# Patient Record
Sex: FEMALE | Race: BLACK OR AFRICAN AMERICAN | NOT HISPANIC OR LATINO | URBAN - METROPOLITAN AREA
[De-identification: names, ages, dates, MRNs, and addresses within clinical notes are randomized per-mention and may not be internally consistent; named-entity substitution may affect disease eponyms.]

---

## 2018-07-24 ENCOUNTER — EMERGENCY (EMERGENCY)
Facility: HOSPITAL | Age: 68
LOS: 0 days | Discharge: HOME | End: 2018-07-24
Attending: EMERGENCY MEDICINE
Payer: MEDICARE

## 2018-07-24 VITALS
SYSTOLIC BLOOD PRESSURE: 152 MMHG | OXYGEN SATURATION: 97 % | HEART RATE: 86 BPM | TEMPERATURE: 98 F | DIASTOLIC BLOOD PRESSURE: 91 MMHG | RESPIRATION RATE: 20 BRPM

## 2018-07-24 VITALS
RESPIRATION RATE: 24 BRPM | SYSTOLIC BLOOD PRESSURE: 145 MMHG | DIASTOLIC BLOOD PRESSURE: 86 MMHG | OXYGEN SATURATION: 94 % | WEIGHT: 160.06 LBS | HEIGHT: 58 IN | HEART RATE: 76 BPM | TEMPERATURE: 98 F

## 2018-07-24 DIAGNOSIS — J45.41 MODERATE PERSISTENT ASTHMA WITH (ACUTE) EXACERBATION: ICD-10-CM

## 2018-07-24 DIAGNOSIS — R06.02 SHORTNESS OF BREATH: ICD-10-CM

## 2018-07-24 LAB
ALBUMIN SERPL ELPH-MCNC: 3.7 G/DL — SIGNIFICANT CHANGE UP (ref 3.3–5)
ALP SERPL-CCNC: 82 U/L — SIGNIFICANT CHANGE UP (ref 40–120)
ALT FLD-CCNC: 42 U/L — SIGNIFICANT CHANGE UP (ref 12–78)
ANION GAP SERPL CALC-SCNC: 1 MMOL/L — LOW (ref 5–17)
AST SERPL-CCNC: 28 U/L — SIGNIFICANT CHANGE UP (ref 15–37)
BASOPHILS # BLD AUTO: 0.01 K/UL — SIGNIFICANT CHANGE UP (ref 0–0.2)
BASOPHILS NFR BLD AUTO: 0.1 % — SIGNIFICANT CHANGE UP (ref 0–2)
BILIRUB SERPL-MCNC: 0.6 MG/DL — SIGNIFICANT CHANGE UP (ref 0.2–1.2)
BUN SERPL-MCNC: 14 MG/DL — SIGNIFICANT CHANGE UP (ref 7–23)
CALCIUM SERPL-MCNC: 8.5 MG/DL — SIGNIFICANT CHANGE UP (ref 8.5–10.1)
CHLORIDE SERPL-SCNC: 104 MMOL/L — SIGNIFICANT CHANGE UP (ref 96–108)
CK MB CFR SERPL CALC: 1.1 NG/ML — SIGNIFICANT CHANGE UP (ref 0.5–3.6)
CO2 SERPL-SCNC: 36 MMOL/L — HIGH (ref 22–31)
CREAT SERPL-MCNC: 0.85 MG/DL — SIGNIFICANT CHANGE UP (ref 0.5–1.3)
EOSINOPHIL # BLD AUTO: 0.24 K/UL — SIGNIFICANT CHANGE UP (ref 0–0.5)
EOSINOPHIL NFR BLD AUTO: 3.3 % — SIGNIFICANT CHANGE UP (ref 0–6)
GLUCOSE SERPL-MCNC: 102 MG/DL — HIGH (ref 70–99)
HCT VFR BLD CALC: 38.7 % — SIGNIFICANT CHANGE UP (ref 34.5–45)
HGB BLD-MCNC: 12.3 G/DL — SIGNIFICANT CHANGE UP (ref 11.5–15.5)
IMM GRANULOCYTES NFR BLD AUTO: 0.1 % — SIGNIFICANT CHANGE UP (ref 0–1.5)
LYMPHOCYTES # BLD AUTO: 4.33 K/UL — HIGH (ref 1–3.3)
LYMPHOCYTES # BLD AUTO: 60 % — HIGH (ref 13–44)
MAGNESIUM SERPL-MCNC: 2.3 MG/DL — SIGNIFICANT CHANGE UP (ref 1.6–2.6)
MCHC RBC-ENTMCNC: 31.8 GM/DL — LOW (ref 32–36)
MCHC RBC-ENTMCNC: 31.9 PG — SIGNIFICANT CHANGE UP (ref 27–34)
MCV RBC AUTO: 100.3 FL — HIGH (ref 80–100)
MONOCYTES # BLD AUTO: 0.59 K/UL — SIGNIFICANT CHANGE UP (ref 0–0.9)
MONOCYTES NFR BLD AUTO: 8.2 % — SIGNIFICANT CHANGE UP (ref 2–14)
NEUTROPHILS # BLD AUTO: 2.04 K/UL — SIGNIFICANT CHANGE UP (ref 1.8–7.4)
NEUTROPHILS NFR BLD AUTO: 28.3 % — LOW (ref 43–77)
NRBC # BLD: 0 /100 WBCS — SIGNIFICANT CHANGE UP (ref 0–0)
NT-PROBNP SERPL-SCNC: 81 PG/ML — SIGNIFICANT CHANGE UP (ref 0–125)
PLATELET # BLD AUTO: 143 K/UL — LOW (ref 150–400)
POTASSIUM SERPL-MCNC: 4.2 MMOL/L — SIGNIFICANT CHANGE UP (ref 3.5–5.3)
POTASSIUM SERPL-SCNC: 4.2 MMOL/L — SIGNIFICANT CHANGE UP (ref 3.5–5.3)
PROT SERPL-MCNC: 7.2 GM/DL — SIGNIFICANT CHANGE UP (ref 6–8.3)
RBC # BLD: 3.86 M/UL — SIGNIFICANT CHANGE UP (ref 3.8–5.2)
RBC # FLD: 12.8 % — SIGNIFICANT CHANGE UP (ref 10.3–14.5)
SODIUM SERPL-SCNC: 141 MMOL/L — SIGNIFICANT CHANGE UP (ref 135–145)
TROPONIN I SERPL-MCNC: <.015 NG/ML — SIGNIFICANT CHANGE UP (ref 0.01–0.04)
WBC # BLD: 7.22 K/UL — SIGNIFICANT CHANGE UP (ref 3.8–10.5)
WBC # FLD AUTO: 7.22 K/UL — SIGNIFICANT CHANGE UP (ref 3.8–10.5)

## 2018-07-24 PROCEDURE — 99285 EMERGENCY DEPT VISIT HI MDM: CPT

## 2018-07-24 PROCEDURE — 71045 X-RAY EXAM CHEST 1 VIEW: CPT | Mod: 26

## 2018-07-24 RX ORDER — ALBUTEROL 90 UG/1
2 AEROSOL, METERED ORAL
Qty: 1 | Refills: 0 | OUTPATIENT
Start: 2018-07-24 | End: 2018-07-25

## 2018-07-24 RX ORDER — ALBUTEROL 90 UG/1
2.5 AEROSOL, METERED ORAL
Qty: 0 | Refills: 0 | Status: COMPLETED | OUTPATIENT
Start: 2018-07-24 | End: 2018-07-24

## 2018-07-24 RX ORDER — DEXAMETHASONE 0.5 MG/5ML
2 ELIXIR ORAL
Qty: 6 | Refills: 0 | OUTPATIENT
Start: 2018-07-24 | End: 2018-07-26

## 2018-07-24 RX ADMIN — Medication 125 MILLIGRAM(S): at 12:49

## 2018-07-24 RX ADMIN — ALBUTEROL 2.5 MILLIGRAM(S): 90 AEROSOL, METERED ORAL at 12:49

## 2018-07-24 RX ADMIN — ALBUTEROL 2.5 MILLIGRAM(S): 90 AEROSOL, METERED ORAL at 13:00

## 2018-07-24 RX ADMIN — ALBUTEROL 2.5 MILLIGRAM(S): 90 AEROSOL, METERED ORAL at 13:36

## 2018-07-24 NOTE — ED ADULT TRIAGE NOTE - CHIEF COMPLAINT QUOTE
pt complaining of shortness of breath and swelling to bilateral legs. states a nurse practitioner listened to her breath sounds this morning and told her to come to the ER. pt has history of asthma.

## 2018-07-24 NOTE — ED PROVIDER NOTE - PHYSICAL EXAMINATION
Gen: Alert, NAD  Head: NC, AT   Eyes: PERRL, EOMI, normal lids/conjunctiva  ENT: normal hearing, patent oropharynx without erythema/exudate, uvula midline  Neck: supple, no tenderness, Trachea midline  Pulm: Bilateral BS, decreased air movement bl. no significant wheezing   CV: RRR, no M/R/G, 2+ radial and dp pulses bl, mild ankle edema  Abd: soft, NT/ND, +BS, no hepatosplenomegaly  Mskel: extremities x4 with normal ROM and no joint effusions. no ctl spine ttp.   Skin: no rash, no bruising   Neuro: AAOx3, no sensory/motor deficits, CN 2-12 intact

## 2018-07-24 NOTE — ED ADULT NURSE NOTE - OBJECTIVE STATEMENT
patient stated that a house nurse came and he said that the breathing was not normal and I am swelling up also, he called my doctor and my doctor asked my  to see me, denies pain at this current time, no SOB

## 2018-07-24 NOTE — ED PROVIDER NOTE - OBJECTIVE STATEMENT
Pertinent PMH/PSH/FHx/SHx and Review of Systems contained within:  67F hx of asthma pw sob and leg aswelling. patient was seen by visiting np who heard wheezing and told her to come to the er. no cp, sob, cough, fever, chills, nausea or vomiting. patient also denies ha, vision change, rash, bleeding and dysuria  she did not take anything for symptoms as of yet  Fh and Sh not otherwise contributory  ROS otherwise negative

## 2018-07-24 NOTE — ED PROVIDER NOTE - MEDICAL DECISION MAKING DETAILS
patient pw sob that is more likely to be asthma rather than chf. will give nebs and steroids. though there is some pitting edema in the lower ext. do not think this represents acute heart failure or dvt. patient pw sob that is more likely to be asthma rather than chf. will give nebs and steroids. though there is some pitting edema in the lower ext. do not think this represents acute heart failure or dvt. As interpreted by ED physician, ECG is NSR with normal intervals/axis, no changes in QRS, no ST/T changes. patient pw sob that is more likely to be asthma rather than chf. will give nebs and steroids. though there is some pitting edema in the lower ext. do not think this represents acute heart failure or dvt. As interpreted by ED physician, ECG is NSR with normal intervals/axis, no changes in QRS, no ST/T changes. after treatment patient breathing much better. okay for dc home.

## 2018-07-24 NOTE — ED ADULT NURSE NOTE - ADDITIONAL PRINTED INSTRUCTIONS GIVEN
discharged home, pt and  instructed to follow up with primary md, verbalized understanding of instructions

## 2020-04-01 NOTE — ED PROVIDER NOTE - NS ED MD EM SELECTION
[id number] Comprehensive You can access the FollowMyHealth Patient Portal offered by [facility] by registering at the following website: [email or web address] By joining [facility]’s FollowMyHealth portal, you will also be able to view your health information using other applications (apps) compatible with our system.

## 2021-03-25 ENCOUNTER — OFFICE VISIT (OUTPATIENT)
Dept: URGENT CARE | Age: 71
End: 2021-03-25
Payer: COMMERCIAL

## 2021-03-25 VITALS
TEMPERATURE: 99 F | HEIGHT: 60 IN | BODY MASS INDEX: 32.39 KG/M2 | RESPIRATION RATE: 20 BRPM | HEART RATE: 80 BPM | OXYGEN SATURATION: 95 % | SYSTOLIC BLOOD PRESSURE: 148 MMHG | DIASTOLIC BLOOD PRESSURE: 76 MMHG | WEIGHT: 165 LBS

## 2021-03-25 DIAGNOSIS — M79.604 LOW BACK PAIN RADIATING TO RIGHT LOWER EXTREMITY: Primary | ICD-10-CM

## 2021-03-25 DIAGNOSIS — M62.830 LUMBAR PARASPINAL MUSCLE SPASM: ICD-10-CM

## 2021-03-25 DIAGNOSIS — M54.50 LOW BACK PAIN RADIATING TO RIGHT LOWER EXTREMITY: Primary | ICD-10-CM

## 2021-03-25 PROCEDURE — 99213 OFFICE O/P EST LOW 20 MIN: CPT | Performed by: NURSE PRACTITIONER

## 2021-03-25 RX ORDER — AMLODIPINE BESYLATE 10 MG/1
10 TABLET ORAL DAILY
COMMUNITY

## 2021-03-25 RX ORDER — MELOXICAM 7.5 MG/1
7.5 TABLET ORAL DAILY
Qty: 12 TABLET | Refills: 0 | Status: SHIPPED | OUTPATIENT
Start: 2021-03-25

## 2021-03-25 RX ORDER — SERTRALINE HYDROCHLORIDE 100 MG/1
100 TABLET, FILM COATED ORAL
COMMUNITY

## 2021-03-25 RX ORDER — MONTELUKAST SODIUM 10 MG/1
10 TABLET ORAL
COMMUNITY

## 2021-03-25 RX ORDER — METHOCARBAMOL 500 MG/1
500 TABLET, FILM COATED ORAL 2 TIMES DAILY PRN
Qty: 20 TABLET | Refills: 0 | Status: SHIPPED | OUTPATIENT
Start: 2021-03-25

## 2021-03-25 RX ORDER — OLANZAPINE 10 MG/1
10 TABLET ORAL
COMMUNITY

## 2021-03-25 NOTE — LETTER
March 25, 2021     Patient: Sonia Simmons   YOB: 1950   Date of Visit: 3/25/2021       To Whom It May Concern:    Please excuse this patients son _____________________________ from work today 0/25/2021 for he was he with his mother for an appt           Sincerely,        KOLTON Lopez    CC: No Recipients

## 2021-03-25 NOTE — PROGRESS NOTES
NAME: Andrea Green is a 79 y o  female  : 1950    MRN: [id number]    /76   Pulse 80   Temp 99 °F (37 2 °C)   Resp 20   Ht 5' (1 524 m)   Wt 74 8 kg (165 lb)   SpO2 95%   BMI 32 22 kg/m²     Assessment and Plan   Low back pain radiating to right lower extremity [M54 5]  1  Low back pain radiating to right lower extremity  methocarbamol (ROBAXIN) 500 mg tablet    meloxicam (MOBIC) 7 5 mg tablet   2  Lumbar paraspinal muscle spasm  methocarbamol (ROBAXIN) 500 mg tablet    meloxicam (MOBIC) 7 5 mg tablet       Manisha was seen today for leg pain  Diagnoses and all orders for this visit:    Low back pain radiating to right lower extremity  -     methocarbamol (ROBAXIN) 500 mg tablet; Take 1 tablet (500 mg total) by mouth 2 (two) times a day as needed for muscle spasms  -     meloxicam (MOBIC) 7 5 mg tablet; Take 1 tablet (7 5 mg total) by mouth daily    Lumbar paraspinal muscle spasm  -     methocarbamol (ROBAXIN) 500 mg tablet; Take 1 tablet (500 mg total) by mouth 2 (two) times a day as needed for muscle spasms  -     meloxicam (MOBIC) 7 5 mg tablet; Take 1 tablet (7 5 mg total) by mouth daily        Patient Instructions     Patient Instructions   Sciatica   WHAT YOU NEED TO KNOW:   Sciatica is a condition that causes pain along your sciatic nerve  The sciatic nerve runs from your spine through both sides of your buttocks  It then runs down the back of your thigh, into your lower leg and foot  Your sciatic nerve may be compressed, inflamed, irritated, or stretched  DISCHARGE INSTRUCTIONS:   Medicines:   · NSAIDs:  These medicines decrease swelling and pain  NSAIDs are available without a doctor's order  Ask your healthcare provider which medicine is right for you  Ask how much to take and when to take it  Take as directed  NSAIDs can cause stomach bleeding or kidney problems if not taken correctly  · Acetaminophen: This medicine decreases pain   Acetaminophen is available without a doctor's order  Ask how much to take and when to take it  Follow directions  Acetaminophen can cause liver damage if not taken correctly  · Muscle relaxers  help decrease pain and muscle spasms  · Take your medicine as directed  Contact your healthcare provider if you think your medicine is not helping or if you have side effects  Tell him of her if you are allergic to any medicine  Keep a list of the medicines, vitamins, and herbs you take  Include the amounts, and when and why you take them  Bring the list or the pill bottles to follow-up visits  Carry your medicine list with you in case of an emergency  Follow up with your healthcare provider as directed:  Write down your questions so you remember to ask them during your visits  Manage your symptoms:   · Activity:  Decrease your activity  Do not lift heavy objects or twist your back for at least 6 weeks  Slowly return to your usual activity  · Ice:  Ice helps decrease swelling and pain  Ice may also help prevent tissue damage  Use an ice pack, or put crushed ice in a plastic bag  Cover it with a towel and place it on your low back or leg for 15 to 20 minutes every hour or as directed  · Heat:  Heat helps decrease pain and muscle spasms  Apply heat on the area for 20 to 30 minutes every 2 hours for as many days as directed  · Physical therapy:  You may need to see physical therapist to teach you exercises to help improve movement and strength, and to decrease pain  An occupational therapist teaches you skills to help with your daily activities  · Use assistive devices if directed: You may need to wear back support, such as a back brace  You may need crutches, a cane, or a walker to decrease stress on your lower back and leg muscles  Ask your healthcare provider for more information about assistive devices and how to use them correctly      Self-care:   · Avoid pressure on your back and legs:  Do not  lift heavy objects, or stand or sit for long periods of time  · Lift objects safely:  Keep your back straight and bend your knees when you  an object  Do not bend or twist your back when you lift  · Maintain a healthy weight:  Ask your healthcare provider how much you should weigh  Ask him to help you create a weight loss plan if you are overweight  · Exercise:  Ask your healthcare provider about the best stretching, warmup, and exercise plan for you  Contact your healthcare provider if:   · You have pain in your lower back at night or when resting  · You have pain in your lower back with numbness below the knee  · You have weakness in one leg only  · You have questions or concerns about your condition or care  Return to the emergency department if:   · You have trouble holding back your urine or bowel movements  · You have weakness in both legs  · You have numbness in your groin or buttocks  © Copyright [address] Information is for End User's use only and may not be sold, redistributed or otherwise used for commercial purposes  All illustrations and images included in CareNotes® are the copyrighted property of A D A M , Inc  or [address] [place] [address]  The above information is an  only  It is not intended as medical advice for individual conditions or treatments  Talk to your doctor, nurse or pharmacist before following any medical regimen to see if it is safe and effective for you  Muscle Strain   WHAT YOU NEED TO KNOW:   A muscle strain is a twist, pull, or tear of a muscle or tendon  A tendon is a strong elastic tissue that connects a muscle to a bone  Signs of a strained muscle include bruising and swelling over the area, pain with movement, and loss of strength  DISCHARGE INSTRUCTIONS:   Return to the emergency department if:   · You suddenly cannot feel or move your injured muscle  Contact your healthcare provider if:   · Your pain and swelling worsen or do not go away  · You have questions or concerns about your condition or care  Medicines:   · NSAIDs  help decrease swelling and pain or fever  This medicine is available with or without a doctor's order  NSAIDs can cause stomach bleeding or kidney problems in certain people  If you take blood thinner medicine, always ask your healthcare provider if NSAIDs are safe for you  Always read the medicine label and follow directions  · Muscle relaxers  help decrease pain and muscle spasms  · Take your medicine as directed  Contact your healthcare provider if you think your medicine is not helping or if you have side effects  Tell him of her if you are allergic to any medicine  Keep a list of the medicines, vitamins, and herbs you take  Include the amounts, and when and why you take them  Bring the list or the pill bottles to follow-up visits  Carry your medicine list with you in case of an emergency  Follow up with your healthcare provider as directed: Your healthcare provider may suggest that you have a follow-up visit before you go back to your usual activity  Write down your questions so you remember to ask them during your visits  Self-care:   · 3 to 7 days after the injury:  Use Rest, Ice, Compression, and Elevation (RICE) to help stop bruising and decrease pain and swelling  ? Rest:  Rest your muscle to allow your injury to heal  When the pain decreases, begin normal, slow movements  For mild and moderate muscle strains, you should rest your muscles for about 2 days  However, if you have a severe muscle strain, you should rest for 10 to 14 days  You may need to use crutches to walk if your muscle strain is in your legs or lower body  ? Ice:  Put an ice pack on the injured area  Put a towel between the ice pack and your skin  Do not put the ice pack directly on your skin  You can use a package of frozen peas instead of an ice pack  ?  Compression:  You may need to wrap an elastic bandage around the area to decrease swelling  It should be tight enough for you to feel support  Do not wrap it too tightly  ? Elevation:  Keep the injured muscle raised above your heart if possible  For example if you have a strain of your lower leg muscle, lie down and prop your leg up on pillows  This helps decrease pain and swelling  · 3 to 21 days after the injury:  Start to slowly and regularly exercise your muscle  This will help it heal  If you feel pain, decrease how hard you are exercising  · 1 to 6 weeks after the injury:  Stretch the injured muscle  Hold the stretch for about 30 seconds  Do this 4 times a day  You may stretch the muscle until you feel a slight pull  Stop stretching if you feel pain  · 2 weeks to 6 months after the injury:  The goal of this phase is to return to the activity you were doing before the injury happened, without hurting the muscle again  · 3 weeks to 6 months after the injury:  Keep stretching and strengthening your muscles to avoid injury  Slowly increase the time and distance that you exercise  You may have signs and symptoms of muscle strain 6 months after the injury, even if you do things to help it heal  In this case, you may need surgery on the muscle  © Copyright [address] Information is for End User's use only and may not be sold, redistributed or otherwise used for commercial purposes  All illustrations and images included in CareNotes® are the copyrighted property of [employer]  or [place] [facility] Vig[facility]   The above information is an  only  It is not intended as medical advice for individual conditions or treatments  Talk to your doctor, nurse or pharmacist before following any medical regimen to see if it is safe and effective for you  Proceed to the nearest ER if symptoms worsen, Follow up with your PCP  Continue to social distance, wash your hands, and wear your masks  Please continue to follow the CDC  gov guidelines daily for they are subject to change on COVID-19    Chief Complaint     Chief Complaint   Patient presents with    Leg Pain     Pt states she was pushing a stroller yesterday afternoon and felt pain in her rt hip radiating down leg  No pain meds attempted  History of Present Illness      Patient is a 51-year-old female who is in town from [place] visiting her son developed right lower back pain  Yesterday while patient was pushing a double Stroll of her grandchildren weight approximately 60 lb up a slight hill about a block in slippers patient developed right lower back pain and has increased down her leg behind her knee  She has pain when walking and bearing weight on to the right leg  Patient is walking with a shifting gait  Patient denies having any other symptoms and denies any pain with urination  She has not taken anything over-the-counter for symptoms and has not used any ice or heat at this time  Patient is accompanied by her son who is in the waiting room with history older in  Patient will be returning to [place] and will follow up her PCP  Pt denies hx of lower right sided back pain, no hx of sciatica that she is aware of, denies falling on the right side  Review of Systems   Review of Systems   Constitutional: Negative  HENT: Negative  Cardiovascular: Negative  Gastrointestinal: Negative  Genitourinary: Negative  Musculoskeletal: Positive for back pain (lower right side), gait problem and myalgias (lower right side)  Negative for arthralgias and joint swelling           Current Medications       Current Outpatient Medications:     amLODIPine (NORVASC) 10 mg tablet, Take 10 mg by mouth daily, Disp: , Rfl:     montelukast (SINGULAIR) 10 mg tablet, Take 10 mg by mouth daily at bedtime, Disp: , Rfl:     meloxicam (MOBIC) 7 5 mg tablet, Take 1 tablet (7 5 mg total) by mouth daily, Disp: 12 tablet, Rfl: 0    methocarbamol (ROBAXIN) 500 mg tablet, Take 1 tablet (500 mg total) by mouth 2 (two) times a day as needed for muscle spasms, Disp: 20 tablet, Rfl: 0    OLANZapine (ZyPREXA) 10 mg tablet, Take 10 mg by mouth, Disp: , Rfl:     sertraline (ZOLOFT) 100 mg tablet, Take 100 mg by mouth, Disp: , Rfl:     Current Allergies     Allergies as of 03/25/2021    (No Known Allergies)              Past Medical History:   Diagnosis Date    Depression        History reviewed  No pertinent surgical history  History reviewed  No pertinent family history  Medications have been verified  The following portions of the patient's history were reviewed and updated as appropriate: allergies, current medications, past family history, past medical history, past social history, past surgical history and problem list     Objective   /76   Pulse 80   Temp 99 °F (37 2 °C)   Resp 20   Ht 5' (1 524 m)   Wt 74 8 kg (165 lb)   SpO2 95%   BMI 32 22 kg/m²      Physical Exam     Physical Exam  Constitutional:       Appearance: Normal appearance  Musculoskeletal:      Lumbar back: She exhibits decreased range of motion, tenderness, pain and spasm  She exhibits no swelling and no deformity  Back:    Skin:     General: Skin is warm  Capillary Refill: Capillary refill takes less than 2 seconds  Findings: No abrasion, abscess, burn, ecchymosis, signs of injury, petechiae or rash  Neurological:      Mental Status: She is alert  Note: Portions of this record may have been created with voice recognition software  Occasional wrong word or "sound a like" substitutions may have occurred due to the inherent limitations of voice recognition software  Please read the chart carefully and recognize, using context, where substitutions have occurred  KOLTON Dowling

## 2021-03-25 NOTE — PATIENT INSTRUCTIONS
Sciatica   WHAT YOU NEED TO KNOW:   Sciatica is a condition that causes pain along your sciatic nerve  The sciatic nerve runs from your spine through both sides of your buttocks  It then runs down the back of your thigh, into your lower leg and foot  Your sciatic nerve may be compressed, inflamed, irritated, or stretched  DISCHARGE INSTRUCTIONS:   Medicines:   · NSAIDs:  These medicines decrease swelling and pain  NSAIDs are available without a doctor's order  Ask your healthcare provider which medicine is right for you  Ask how much to take and when to take it  Take as directed  NSAIDs can cause stomach bleeding or kidney problems if not taken correctly  · Acetaminophen: This medicine decreases pain  Acetaminophen is available without a doctor's order  Ask how much to take and when to take it  Follow directions  Acetaminophen can cause liver damage if not taken correctly  · Muscle relaxers  help decrease pain and muscle spasms  · Take your medicine as directed  Contact your healthcare provider if you think your medicine is not helping or if you have side effects  Tell him of her if you are allergic to any medicine  Keep a list of the medicines, vitamins, and herbs you take  Include the amounts, and when and why you take them  Bring the list or the pill bottles to follow-up visits  Carry your medicine list with you in case of an emergency  Follow up with your healthcare provider as directed:  Write down your questions so you remember to ask them during your visits  Manage your symptoms:   · Activity:  Decrease your activity  Do not lift heavy objects or twist your back for at least 6 weeks  Slowly return to your usual activity  · Ice:  Ice helps decrease swelling and pain  Ice may also help prevent tissue damage  Use an ice pack, or put crushed ice in a plastic bag  Cover it with a towel and place it on your low back or leg for 15 to 20 minutes every hour or as directed      · Heat:  Heat helps decrease pain and muscle spasms  Apply heat on the area for 20 to 30 minutes every 2 hours for as many days as directed  · Physical therapy:  You may need to see physical therapist to teach you exercises to help improve movement and strength, and to decrease pain  An occupational therapist teaches you skills to help with your daily activities  · Use assistive devices if directed: You may need to wear back support, such as a back brace  You may need crutches, a cane, or a walker to decrease stress on your lower back and leg muscles  Ask your healthcare provider for more information about assistive devices and how to use them correctly  Self-care:   · Avoid pressure on your back and legs:  Do not  lift heavy objects, or stand or sit for long periods of time  · Lift objects safely:  Keep your back straight and bend your knees when you  an object  Do not bend or twist your back when you lift  · Maintain a healthy weight:  Ask your healthcare provider how much you should weigh  Ask him to help you create a weight loss plan if you are overweight  · Exercise:  Ask your healthcare provider about the best stretching, warmup, and exercise plan for you  Contact your healthcare provider if:   · You have pain in your lower back at night or when resting  · You have pain in your lower back with numbness below the knee  · You have weakness in one leg only  · You have questions or concerns about your condition or care  Return to the emergency department if:   · You have trouble holding back your urine or bowel movements  · You have weakness in both legs  · You have numbness in your groin or buttocks  © Copyright [address] Information is for End User's use only and may not be sold, redistributed or otherwise used for commercial purposes   All illustrations and images included in CareNotes® are the copyrighted property of A D A M , Inc  or Eileen Norris  The above information is an  only  It is not intended as medical advice for individual conditions or treatments  Talk to your doctor, nurse or pharmacist before following any medical regimen to see if it is safe and effective for you  Muscle Strain   WHAT YOU NEED TO KNOW:   A muscle strain is a twist, pull, or tear of a muscle or tendon  A tendon is a strong elastic tissue that connects a muscle to a bone  Signs of a strained muscle include bruising and swelling over the area, pain with movement, and loss of strength  DISCHARGE INSTRUCTIONS:   Return to the emergency department if:   · You suddenly cannot feel or move your injured muscle  Contact your healthcare provider if:   · Your pain and swelling worsen or do not go away  · You have questions or concerns about your condition or care  Medicines:   · NSAIDs  help decrease swelling and pain or fever  This medicine is available with or without a doctor's order  NSAIDs can cause stomach bleeding or kidney problems in certain people  If you take blood thinner medicine, always ask your healthcare provider if NSAIDs are safe for you  Always read the medicine label and follow directions  · Muscle relaxers  help decrease pain and muscle spasms  · Take your medicine as directed  Contact your healthcare provider if you think your medicine is not helping or if you have side effects  Tell him of her if you are allergic to any medicine  Keep a list of the medicines, vitamins, and herbs you take  Include the amounts, and when and why you take them  Bring the list or the pill bottles to follow-up visits  Carry your medicine list with you in case of an emergency  Follow up with your healthcare provider as directed: Your healthcare provider may suggest that you have a follow-up visit before you go back to your usual activity  Write down your questions so you remember to ask them during your visits    Self-care:   · 3 to 7 days after the injury:  Use Rest, Ice, Compression, and Elevation (RICE) to help stop bruising and decrease pain and swelling  ? Rest:  Rest your muscle to allow your injury to heal  When the pain decreases, begin normal, slow movements  For mild and moderate muscle strains, you should rest your muscles for about 2 days  However, if you have a severe muscle strain, you should rest for 10 to 14 days  You may need to use crutches to walk if your muscle strain is in your legs or lower body  ? Ice:  Put an ice pack on the injured area  Put a towel between the ice pack and your skin  Do not put the ice pack directly on your skin  You can use a package of frozen peas instead of an ice pack  ? Compression:  You may need to wrap an elastic bandage around the area to decrease swelling  It should be tight enough for you to feel support  Do not wrap it too tightly  ? Elevation:  Keep the injured muscle raised above your heart if possible  For example if you have a strain of your lower leg muscle, lie down and prop your leg up on pillows  This helps decrease pain and swelling  · 3 to 21 days after the injury:  Start to slowly and regularly exercise your muscle  This will help it heal  If you feel pain, decrease how hard you are exercising  · 1 to 6 weeks after the injury:  Stretch the injured muscle  Hold the stretch for about 30 seconds  Do this 4 times a day  You may stretch the muscle until you feel a slight pull  Stop stretching if you feel pain  · 2 weeks to 6 months after the injury:  The goal of this phase is to return to the activity you were doing before the injury happened, without hurting the muscle again  · 3 weeks to 6 months after the injury:  Keep stretching and strengthening your muscles to avoid injury  Slowly increase the time and distance that you exercise   You may have signs and symptoms of muscle strain 6 months after the injury, even if you do things to help it heal  In this case, you may need surgery on the muscle  © Copyright [address] Information is for End User's use only and may not be sold, redistributed or otherwise used for commercial purposes  All illustrations and images included in CareNotes® are the copyrighted property of A D A M , Inc  or Eileen Norris  The above information is an  only  It is not intended as medical advice for individual conditions or treatments  Talk to your doctor, nurse or pharmacist before following any medical regimen to see if it is safe and effective for you

## 2024-08-27 ENCOUNTER — APPOINTMENT (OUTPATIENT)
Dept: NEUROLOGY | Facility: CLINIC | Age: 74
End: 2024-08-27

## 2025-03-08 ENCOUNTER — INPATIENT (INPATIENT)
Facility: HOSPITAL | Age: 75
LOS: 73 days | Discharge: PSYCHIATRIC OR SUBSTANCE USE FACILITY | End: 2025-05-21
Attending: HOSPITALIST | Admitting: HOSPITALIST
Payer: MEDICARE

## 2025-03-08 VITALS
DIASTOLIC BLOOD PRESSURE: 77 MMHG | TEMPERATURE: 98 F | SYSTOLIC BLOOD PRESSURE: 122 MMHG | OXYGEN SATURATION: 95 % | RESPIRATION RATE: 24 BRPM | HEART RATE: 70 BPM

## 2025-03-08 DIAGNOSIS — R06.02 SHORTNESS OF BREATH: ICD-10-CM

## 2025-03-08 LAB
ADD ON TEST-SPECIMEN IN LAB: SIGNIFICANT CHANGE UP
ADD ON TEST-SPECIMEN IN LAB: SIGNIFICANT CHANGE UP
ALBUMIN SERPL ELPH-MCNC: 4.3 G/DL — SIGNIFICANT CHANGE UP (ref 3.3–5)
ALP SERPL-CCNC: 75 U/L — SIGNIFICANT CHANGE UP (ref 40–120)
ALT FLD-CCNC: 47 U/L — HIGH (ref 4–33)
ANION GAP SERPL CALC-SCNC: 14 MMOL/L — SIGNIFICANT CHANGE UP (ref 7–14)
ANION GAP SERPL CALC-SCNC: 15 MMOL/L — HIGH (ref 7–14)
ANION GAP SERPL CALC-SCNC: 16 MMOL/L — HIGH (ref 7–14)
APTT BLD: 25.3 SEC — SIGNIFICANT CHANGE UP (ref 24.5–35.6)
AST SERPL-CCNC: 59 U/L — HIGH (ref 4–32)
B PERT DNA SPEC QL NAA+PROBE: SIGNIFICANT CHANGE UP
B PERT+PARAPERT DNA PNL SPEC NAA+PROBE: SIGNIFICANT CHANGE UP
BASOPHILS # BLD AUTO: 0 K/UL — SIGNIFICANT CHANGE UP (ref 0–0.2)
BASOPHILS # BLD AUTO: 0.01 K/UL — SIGNIFICANT CHANGE UP (ref 0–0.2)
BASOPHILS NFR BLD AUTO: 0 % — SIGNIFICANT CHANGE UP (ref 0–2)
BASOPHILS NFR BLD AUTO: 0.1 % — SIGNIFICANT CHANGE UP (ref 0–2)
BILIRUB SERPL-MCNC: 0.5 MG/DL — SIGNIFICANT CHANGE UP (ref 0.2–1.2)
BLOOD GAS ARTERIAL COMPREHENSIVE RESULT: SIGNIFICANT CHANGE UP
BUN SERPL-MCNC: 21 MG/DL — SIGNIFICANT CHANGE UP (ref 7–23)
BUN SERPL-MCNC: 22 MG/DL — SIGNIFICANT CHANGE UP (ref 7–23)
BUN SERPL-MCNC: 25 MG/DL — HIGH (ref 7–23)
C PNEUM DNA SPEC QL NAA+PROBE: SIGNIFICANT CHANGE UP
CALCIUM SERPL-MCNC: 8.6 MG/DL — SIGNIFICANT CHANGE UP (ref 8.4–10.5)
CALCIUM SERPL-MCNC: 8.7 MG/DL — SIGNIFICANT CHANGE UP (ref 8.4–10.5)
CALCIUM SERPL-MCNC: 9.2 MG/DL — SIGNIFICANT CHANGE UP (ref 8.4–10.5)
CHLORIDE SERPL-SCNC: 85 MMOL/L — LOW (ref 98–107)
CHLORIDE SERPL-SCNC: 86 MMOL/L — LOW (ref 98–107)
CHLORIDE SERPL-SCNC: 87 MMOL/L — LOW (ref 98–107)
CO2 SERPL-SCNC: 24 MMOL/L — SIGNIFICANT CHANGE UP (ref 22–31)
CO2 SERPL-SCNC: 25 MMOL/L — SIGNIFICANT CHANGE UP (ref 22–31)
CO2 SERPL-SCNC: 27 MMOL/L — SIGNIFICANT CHANGE UP (ref 22–31)
CREAT SERPL-MCNC: 0.58 MG/DL — SIGNIFICANT CHANGE UP (ref 0.5–1.3)
CREAT SERPL-MCNC: 0.63 MG/DL — SIGNIFICANT CHANGE UP (ref 0.5–1.3)
CREAT SERPL-MCNC: 0.69 MG/DL — SIGNIFICANT CHANGE UP (ref 0.5–1.3)
EGFR: 91 ML/MIN/1.73M2 — SIGNIFICANT CHANGE UP
EGFR: 91 ML/MIN/1.73M2 — SIGNIFICANT CHANGE UP
EGFR: 93 ML/MIN/1.73M2 — SIGNIFICANT CHANGE UP
EGFR: 93 ML/MIN/1.73M2 — SIGNIFICANT CHANGE UP
EGFR: 95 ML/MIN/1.73M2 — SIGNIFICANT CHANGE UP
EGFR: 95 ML/MIN/1.73M2 — SIGNIFICANT CHANGE UP
EOSINOPHIL # BLD AUTO: 0.03 K/UL — SIGNIFICANT CHANGE UP (ref 0–0.5)
EOSINOPHIL # BLD AUTO: 0.13 K/UL — SIGNIFICANT CHANGE UP (ref 0–0.5)
EOSINOPHIL NFR BLD AUTO: 0.5 % — SIGNIFICANT CHANGE UP (ref 0–6)
EOSINOPHIL NFR BLD AUTO: 1.5 % — SIGNIFICANT CHANGE UP (ref 0–6)
FLUAV AG NPH QL: SIGNIFICANT CHANGE UP
FLUAV SUBTYP SPEC NAA+PROBE: SIGNIFICANT CHANGE UP
FLUBV AG NPH QL: SIGNIFICANT CHANGE UP
FLUBV RNA SPEC QL NAA+PROBE: SIGNIFICANT CHANGE UP
GAS PNL BLDV: SIGNIFICANT CHANGE UP
GAS PNL BLDV: SIGNIFICANT CHANGE UP
GLUCOSE SERPL-MCNC: 111 MG/DL — HIGH (ref 70–99)
GLUCOSE SERPL-MCNC: 150 MG/DL — HIGH (ref 70–99)
GLUCOSE SERPL-MCNC: 164 MG/DL — HIGH (ref 70–99)
HADV DNA SPEC QL NAA+PROBE: SIGNIFICANT CHANGE UP
HCOV 229E RNA SPEC QL NAA+PROBE: SIGNIFICANT CHANGE UP
HCOV HKU1 RNA SPEC QL NAA+PROBE: SIGNIFICANT CHANGE UP
HCOV NL63 RNA SPEC QL NAA+PROBE: SIGNIFICANT CHANGE UP
HCOV OC43 RNA SPEC QL NAA+PROBE: SIGNIFICANT CHANGE UP
HCT VFR BLD CALC: 36.2 % — SIGNIFICANT CHANGE UP (ref 34.5–45)
HCT VFR BLD CALC: 36.3 % — SIGNIFICANT CHANGE UP (ref 34.5–45)
HCT VFR BLD CALC: 36.9 % — SIGNIFICANT CHANGE UP (ref 34.5–45)
HGB BLD-MCNC: 12.5 G/DL — SIGNIFICANT CHANGE UP (ref 11.5–15.5)
HGB BLD-MCNC: 12.6 G/DL — SIGNIFICANT CHANGE UP (ref 11.5–15.5)
HGB BLD-MCNC: 12.7 G/DL — SIGNIFICANT CHANGE UP (ref 11.5–15.5)
HMPV RNA SPEC QL NAA+PROBE: SIGNIFICANT CHANGE UP
HPIV1 RNA SPEC QL NAA+PROBE: SIGNIFICANT CHANGE UP
HPIV2 RNA SPEC QL NAA+PROBE: SIGNIFICANT CHANGE UP
HPIV3 RNA SPEC QL NAA+PROBE: SIGNIFICANT CHANGE UP
HPIV4 RNA SPEC QL NAA+PROBE: SIGNIFICANT CHANGE UP
IANC: 3.61 K/UL — SIGNIFICANT CHANGE UP (ref 1.8–7.4)
IANC: 3.86 K/UL — SIGNIFICANT CHANGE UP (ref 1.8–7.4)
IMM GRANULOCYTES NFR BLD AUTO: 0.2 % — SIGNIFICANT CHANGE UP (ref 0–0.9)
IMM GRANULOCYTES NFR BLD AUTO: 0.3 % — SIGNIFICANT CHANGE UP (ref 0–0.9)
INR BLD: <0.9 RATIO — SIGNIFICANT CHANGE UP (ref 0.85–1.16)
LYMPHOCYTES # BLD AUTO: 2.41 K/UL — SIGNIFICANT CHANGE UP (ref 1–3.3)
LYMPHOCYTES # BLD AUTO: 36.5 % — SIGNIFICANT CHANGE UP (ref 13–44)
LYMPHOCYTES # BLD AUTO: 4.1 K/UL — HIGH (ref 1–3.3)
LYMPHOCYTES # BLD AUTO: 48 % — HIGH (ref 13–44)
M PNEUMO DNA SPEC QL NAA+PROBE: SIGNIFICANT CHANGE UP
MAGNESIUM SERPL-MCNC: 1.8 MG/DL — SIGNIFICANT CHANGE UP (ref 1.6–2.6)
MAGNESIUM SERPL-MCNC: 1.8 MG/DL — SIGNIFICANT CHANGE UP (ref 1.6–2.6)
MAGNESIUM SERPL-MCNC: 2.1 MG/DL — SIGNIFICANT CHANGE UP (ref 1.6–2.6)
MCHC RBC-ENTMCNC: 31.9 PG — SIGNIFICANT CHANGE UP (ref 27–34)
MCHC RBC-ENTMCNC: 32.3 PG — SIGNIFICANT CHANGE UP (ref 27–34)
MCHC RBC-ENTMCNC: 32.7 PG — SIGNIFICANT CHANGE UP (ref 27–34)
MCHC RBC-ENTMCNC: 34.1 G/DL — SIGNIFICANT CHANGE UP (ref 32–36)
MCHC RBC-ENTMCNC: 34.4 G/DL — SIGNIFICANT CHANGE UP (ref 32–36)
MCHC RBC-ENTMCNC: 35.1 G/DL — SIGNIFICANT CHANGE UP (ref 32–36)
MCV RBC AUTO: 92.6 FL — SIGNIFICANT CHANGE UP (ref 80–100)
MCV RBC AUTO: 93.3 FL — SIGNIFICANT CHANGE UP (ref 80–100)
MCV RBC AUTO: 94.6 FL — SIGNIFICANT CHANGE UP (ref 80–100)
MONOCYTES # BLD AUTO: 0.29 K/UL — SIGNIFICANT CHANGE UP (ref 0–0.9)
MONOCYTES # BLD AUTO: 0.67 K/UL — SIGNIFICANT CHANGE UP (ref 0–0.9)
MONOCYTES NFR BLD AUTO: 4.4 % — SIGNIFICANT CHANGE UP (ref 2–14)
MONOCYTES NFR BLD AUTO: 7.8 % — SIGNIFICANT CHANGE UP (ref 2–14)
NEUTROPHILS # BLD AUTO: 3.61 K/UL — SIGNIFICANT CHANGE UP (ref 1.8–7.4)
NEUTROPHILS # BLD AUTO: 3.86 K/UL — SIGNIFICANT CHANGE UP (ref 1.8–7.4)
NEUTROPHILS NFR BLD AUTO: 42.4 % — LOW (ref 43–77)
NEUTROPHILS NFR BLD AUTO: 58.3 % — SIGNIFICANT CHANGE UP (ref 43–77)
NRBC # BLD AUTO: 0 K/UL — SIGNIFICANT CHANGE UP (ref 0–0)
NRBC # FLD: 0 K/UL — SIGNIFICANT CHANGE UP (ref 0–0)
NRBC BLD AUTO-RTO: 0 /100 WBCS — SIGNIFICANT CHANGE UP (ref 0–0)
NT-PROBNP SERPL-SCNC: 480 PG/ML — HIGH
OSMOLALITY UR: 145 MOSM/KG — SIGNIFICANT CHANGE UP (ref 50–1200)
PHOSPHATE SERPL-MCNC: 2 MG/DL — LOW (ref 2.5–4.5)
PHOSPHATE SERPL-MCNC: 2.2 MG/DL — LOW (ref 2.5–4.5)
PLATELET # BLD AUTO: 120 K/UL — LOW (ref 150–400)
PLATELET # BLD AUTO: 131 K/UL — LOW (ref 150–400)
PLATELET # BLD AUTO: 158 K/UL — SIGNIFICANT CHANGE UP (ref 150–400)
POTASSIUM SERPL-MCNC: 4.1 MMOL/L — SIGNIFICANT CHANGE UP (ref 3.5–5.3)
POTASSIUM SERPL-MCNC: 4.2 MMOL/L — SIGNIFICANT CHANGE UP (ref 3.5–5.3)
POTASSIUM SERPL-MCNC: 4.3 MMOL/L — SIGNIFICANT CHANGE UP (ref 3.5–5.3)
POTASSIUM SERPL-SCNC: 4.1 MMOL/L — SIGNIFICANT CHANGE UP (ref 3.5–5.3)
POTASSIUM SERPL-SCNC: 4.2 MMOL/L — SIGNIFICANT CHANGE UP (ref 3.5–5.3)
POTASSIUM SERPL-SCNC: 4.3 MMOL/L — SIGNIFICANT CHANGE UP (ref 3.5–5.3)
PROCALCITONIN SERPL-MCNC: 0.07 NG/ML — SIGNIFICANT CHANGE UP (ref 0.02–0.1)
PROT SERPL-MCNC: 7.2 G/DL — SIGNIFICANT CHANGE UP (ref 6–8.3)
PROTHROM AB SERPL-ACNC: 10.3 SEC — SIGNIFICANT CHANGE UP (ref 9.9–13.4)
RAPID RVP RESULT: SIGNIFICANT CHANGE UP
RBC # BLD: 3.88 M/UL — SIGNIFICANT CHANGE UP (ref 3.8–5.2)
RBC # BLD: 3.9 M/UL — SIGNIFICANT CHANGE UP (ref 3.8–5.2)
RBC # BLD: 3.92 M/UL — SIGNIFICANT CHANGE UP (ref 3.8–5.2)
RBC # FLD: 12 % — SIGNIFICANT CHANGE UP (ref 10.3–14.5)
RBC # FLD: 12 % — SIGNIFICANT CHANGE UP (ref 10.3–14.5)
RBC # FLD: 12.6 % — SIGNIFICANT CHANGE UP (ref 10.3–14.5)
RSV RNA NPH QL NAA+NON-PROBE: SIGNIFICANT CHANGE UP
RSV RNA SPEC QL NAA+PROBE: SIGNIFICANT CHANGE UP
RV+EV RNA SPEC QL NAA+PROBE: SIGNIFICANT CHANGE UP
SARS-COV-2 RNA SPEC QL NAA+PROBE: SIGNIFICANT CHANGE UP
SARS-COV-2 RNA SPEC QL NAA+PROBE: SIGNIFICANT CHANGE UP
SODIUM SERPL-SCNC: 126 MMOL/L — LOW (ref 135–145)
SODIUM SERPL-SCNC: 126 MMOL/L — LOW (ref 135–145)
SODIUM SERPL-SCNC: 127 MMOL/L — LOW (ref 135–145)
SODIUM UR-SCNC: <20 MMOL/L — SIGNIFICANT CHANGE UP
TROPONIN T, HIGH SENSITIVITY RESULT: 9 NG/L — SIGNIFICANT CHANGE UP
WBC # BLD: 6.2 K/UL — SIGNIFICANT CHANGE UP (ref 3.8–10.5)
WBC # BLD: 6.61 K/UL — SIGNIFICANT CHANGE UP (ref 3.8–10.5)
WBC # BLD: 8.54 K/UL — SIGNIFICANT CHANGE UP (ref 3.8–10.5)
WBC # FLD AUTO: 6.2 K/UL — SIGNIFICANT CHANGE UP (ref 3.8–10.5)
WBC # FLD AUTO: 6.61 K/UL — SIGNIFICANT CHANGE UP (ref 3.8–10.5)
WBC # FLD AUTO: 8.54 K/UL — SIGNIFICANT CHANGE UP (ref 3.8–10.5)

## 2025-03-08 PROCEDURE — 70450 CT HEAD/BRAIN W/O DYE: CPT | Mod: 26

## 2025-03-08 PROCEDURE — 99291 CRITICAL CARE FIRST HOUR: CPT

## 2025-03-08 PROCEDURE — 31500 INSERT EMERGENCY AIRWAY: CPT

## 2025-03-08 PROCEDURE — 99291 CRITICAL CARE FIRST HOUR: CPT | Mod: FT,25

## 2025-03-08 PROCEDURE — 71275 CT ANGIOGRAPHY CHEST: CPT | Mod: 26

## 2025-03-08 PROCEDURE — 72125 CT NECK SPINE W/O DYE: CPT | Mod: 26

## 2025-03-08 PROCEDURE — 71045 X-RAY EXAM CHEST 1 VIEW: CPT | Mod: 26,76

## 2025-03-08 RX ORDER — KETAMINE HCL IN 0.9 % NACL 50 MG/5 ML
2 SYRINGE (ML) INTRAVENOUS
Qty: 1000 | Refills: 0 | Status: DISCONTINUED | OUTPATIENT
Start: 2025-03-08 | End: 2025-03-12

## 2025-03-08 RX ORDER — FENTANYL CITRATE-0.9 % NACL/PF 100MCG/2ML
0.5 SYRINGE (ML) INTRAVENOUS
Qty: 2500 | Refills: 0 | Status: DISCONTINUED | OUTPATIENT
Start: 2025-03-08 | End: 2025-03-08

## 2025-03-08 RX ORDER — NOREPINEPHRINE BITARTRATE 8 MG
0.02 SOLUTION INTRAVENOUS
Qty: 8 | Refills: 0 | Status: DISCONTINUED | OUTPATIENT
Start: 2025-03-08 | End: 2025-03-11

## 2025-03-08 RX ORDER — ENOXAPARIN SODIUM 100 MG/ML
40 INJECTION SUBCUTANEOUS EVERY 24 HOURS
Refills: 0 | Status: DISCONTINUED | OUTPATIENT
Start: 2025-03-08 | End: 2025-03-17

## 2025-03-08 RX ORDER — AZITHROMYCIN 250 MG
500 CAPSULE ORAL EVERY 24 HOURS
Refills: 0 | Status: DISCONTINUED | OUTPATIENT
Start: 2025-03-08 | End: 2025-03-08

## 2025-03-08 RX ORDER — PROPOFOL 10 MG/ML
20 INJECTION, EMULSION INTRAVENOUS
Qty: 500 | Refills: 0 | Status: DISCONTINUED | OUTPATIENT
Start: 2025-03-08 | End: 2025-03-08

## 2025-03-08 RX ORDER — IPRATROPIUM BROMIDE AND ALBUTEROL SULFATE .5; 2.5 MG/3ML; MG/3ML
3 SOLUTION RESPIRATORY (INHALATION) ONCE
Refills: 0 | Status: COMPLETED | OUTPATIENT
Start: 2025-03-08 | End: 2025-03-08

## 2025-03-08 RX ORDER — MAGNESIUM SULFATE 500 MG/ML
2 SYRINGE (ML) INJECTION ONCE
Refills: 0 | Status: COMPLETED | OUTPATIENT
Start: 2025-03-08 | End: 2025-03-08

## 2025-03-08 RX ORDER — CEFTRIAXONE 500 MG/1
1000 INJECTION, POWDER, FOR SOLUTION INTRAMUSCULAR; INTRAVENOUS EVERY 24 HOURS
Refills: 0 | Status: DISCONTINUED | OUTPATIENT
Start: 2025-03-08 | End: 2025-03-08

## 2025-03-08 RX ORDER — METHYLPREDNISOLONE ACETATE 80 MG/ML
125 INJECTION, SUSPENSION INTRA-ARTICULAR; INTRALESIONAL; INTRAMUSCULAR; SOFT TISSUE ONCE
Refills: 0 | Status: COMPLETED | OUTPATIENT
Start: 2025-03-08 | End: 2025-03-08

## 2025-03-08 RX ORDER — ROCURONIUM BROMIDE 10 MG/ML
80 INJECTION, SOLUTION INTRAVENOUS ONCE
Refills: 0 | Status: COMPLETED | OUTPATIENT
Start: 2025-03-08 | End: 2025-03-08

## 2025-03-08 RX ORDER — FENTANYL CITRATE-0.9 % NACL/PF 100MCG/2ML
3 SYRINGE (ML) INTRAVENOUS
Qty: 2500 | Refills: 0 | Status: DISCONTINUED | OUTPATIENT
Start: 2025-03-08 | End: 2025-03-11

## 2025-03-08 RX ORDER — POLYETHYLENE GLYCOL 3350 17 G/17G
17 POWDER, FOR SOLUTION ORAL DAILY
Refills: 0 | Status: DISCONTINUED | OUTPATIENT
Start: 2025-03-08 | End: 2025-03-20

## 2025-03-08 RX ORDER — OLANZAPINE 10 MG/1
7.5 TABLET ORAL DAILY
Refills: 0 | Status: DISCONTINUED | OUTPATIENT
Start: 2025-03-08 | End: 2025-03-13

## 2025-03-08 RX ORDER — SERTRALINE 100 MG/1
25 TABLET, FILM COATED ORAL DAILY
Refills: 0 | Status: DISCONTINUED | OUTPATIENT
Start: 2025-03-08 | End: 2025-03-14

## 2025-03-08 RX ORDER — IPRATROPIUM BROMIDE AND ALBUTEROL SULFATE .5; 2.5 MG/3ML; MG/3ML
3 SOLUTION RESPIRATORY (INHALATION) EVERY 6 HOURS
Refills: 0 | Status: DISCONTINUED | OUTPATIENT
Start: 2025-03-08 | End: 2025-03-08

## 2025-03-08 RX ORDER — KETAMINE HCL IN 0.9 % NACL 50 MG/5 ML
150 SYRINGE (ML) INTRAVENOUS ONCE
Refills: 0 | Status: DISCONTINUED | OUTPATIENT
Start: 2025-03-08 | End: 2025-03-08

## 2025-03-08 RX ORDER — ALBUTEROL SULFATE 2.5 MG/3ML
5 VIAL, NEBULIZER (ML) INHALATION
Qty: 100 | Refills: 0 | Status: DISCONTINUED | OUTPATIENT
Start: 2025-03-08 | End: 2025-03-09

## 2025-03-08 RX ORDER — METHYLPREDNISOLONE ACETATE 80 MG/ML
40 INJECTION, SUSPENSION INTRA-ARTICULAR; INTRALESIONAL; INTRAMUSCULAR; SOFT TISSUE DAILY
Refills: 0 | Status: DISCONTINUED | OUTPATIENT
Start: 2025-03-09 | End: 2025-03-11

## 2025-03-08 RX ORDER — AZITHROMYCIN 250 MG
500 CAPSULE ORAL EVERY 24 HOURS
Refills: 0 | Status: COMPLETED | OUTPATIENT
Start: 2025-03-08 | End: 2025-03-10

## 2025-03-08 RX ORDER — CEFTRIAXONE 500 MG/1
1000 INJECTION, POWDER, FOR SOLUTION INTRAMUSCULAR; INTRAVENOUS EVERY 24 HOURS
Refills: 0 | Status: DISCONTINUED | OUTPATIENT
Start: 2025-03-08 | End: 2025-03-10

## 2025-03-08 RX ORDER — AMLODIPINE BESYLATE 10 MG/1
10 TABLET ORAL DAILY
Refills: 0 | Status: DISCONTINUED | OUTPATIENT
Start: 2025-03-08 | End: 2025-03-09

## 2025-03-08 RX ADMIN — ROCURONIUM BROMIDE 80 MILLIGRAM(S): 10 INJECTION, SOLUTION INTRAVENOUS at 15:40

## 2025-03-08 RX ADMIN — Medication 150 GRAM(S): at 20:40

## 2025-03-08 RX ADMIN — Medication 2 MG/HR: at 23:29

## 2025-03-08 RX ADMIN — PROPOFOL 9.6 MICROGRAM(S)/KG/MIN: 10 INJECTION, EMULSION INTRAVENOUS at 16:41

## 2025-03-08 RX ADMIN — CEFTRIAXONE 100 MILLIGRAM(S): 500 INJECTION, POWDER, FOR SOLUTION INTRAMUSCULAR; INTRAVENOUS at 20:40

## 2025-03-08 RX ADMIN — Medication 250 MILLIGRAM(S): at 20:45

## 2025-03-08 RX ADMIN — Medication 8 MG/KG/HR: at 20:41

## 2025-03-08 RX ADMIN — Medication 2 MG/HR: at 20:54

## 2025-03-08 RX ADMIN — Medication 4 MICROGRAM(S)/KG/HR: at 16:40

## 2025-03-08 RX ADMIN — IPRATROPIUM BROMIDE AND ALBUTEROL SULFATE 3 MILLILITER(S): .5; 2.5 SOLUTION RESPIRATORY (INHALATION) at 14:12

## 2025-03-08 RX ADMIN — Medication 250 MILLIGRAM(S): at 20:40

## 2025-03-08 RX ADMIN — Medication 16 MICROGRAM(S)/KG/HR: at 20:41

## 2025-03-08 RX ADMIN — IPRATROPIUM BROMIDE AND ALBUTEROL SULFATE 3 MILLILITER(S): .5; 2.5 SOLUTION RESPIRATORY (INHALATION) at 14:58

## 2025-03-08 RX ADMIN — ENOXAPARIN SODIUM 40 MILLIGRAM(S): 100 INJECTION SUBCUTANEOUS at 20:41

## 2025-03-08 RX ADMIN — Medication 150 MILLIGRAM(S): at 15:40

## 2025-03-08 RX ADMIN — METHYLPREDNISOLONE ACETATE 125 MILLIGRAM(S): 80 INJECTION, SUSPENSION INTRA-ARTICULAR; INTRALESIONAL; INTRAMUSCULAR; SOFT TISSUE at 14:15

## 2025-03-08 RX ADMIN — Medication 1000 MILLILITER(S): at 15:39

## 2025-03-08 RX ADMIN — IPRATROPIUM BROMIDE AND ALBUTEROL SULFATE 3 MILLILITER(S): .5; 2.5 SOLUTION RESPIRATORY (INHALATION) at 14:55

## 2025-03-08 NOTE — H&P ADULT - ATTENDING COMMENTS
75 yo F with PMH wheezing for 3 days, found on floor (unwitnessed fall, no LOC yesterday) presenting with AHRF and hypercapnic respiratory acidosis with AMS requiring intubation for airway protection in ED, admitted to MICU. Hx of 3 asthma attacks last year, no hx of intubation, no clear asthma triggers but felt sick after flight home from [facility] 3/3/25 days where she visited for 3 months and was in usual state of health per . RVP negative, f/u cultures.  Continue ventilatory support, sedation, serial ABGS, monitor off antibiotics (no clear source), CTPA reviewed, no PE, no evidence of PNA, f/u CTH/cspine.

## 2025-03-08 NOTE — ED PROVIDER NOTE - PHYSICAL EXAMINATION
Attending/Laurel:  No signs of trauma, PERR, crustations bilateral eyes, supple, no ERASMO, no JVD, RRR, limited airway movement with exp wheezing; Abd-soft, NT/ND, no HSM; +2 pitting LE edema Rt > Lt, Responsive to noxious stimuli only; nonfocal; Skin-warm/dry  POx 89% on RA imrpoves with 4 liter O2 to 95%

## 2025-03-08 NOTE — PATIENT PROFILE ADULT - CENTRAL VENOUS CATHETER/PICC LINE
9.6    9.41  )-----------( 207      ( 27 Mar 2022 17:23 )             31.2       03-27    141  |  106  |  12  ----------------------------<  102<H>  3.4<L>   |  26  |  0.96    Ca    9.0      27 Mar 2022 17:23  Mg     1.8     03-27          EKG:     UTOX: negative    CTA Chest (PE Protocol) 3/27  IMPRESSION:  1.  Motion artifact limits evaluation of the bilateral segmental and subsegmental pulmonary arteries. No central pulmonary embolism.  2.  Poorly visualized enlarged right thyroid lobe. Recommend correlation with thyroid function tests and ultrasound.    CT Head 3/27  IMPRESSION:  No acute intracranial hemorrhage, mass effect, or transcortical infarction. 9.6    9.41  )-----------( 207      ( 27 Mar 2022 17:23 )             31.2       03-27    141  |  106  |  12  ----------------------------<  102<H>  3.4<L>   |  26  |  0.96    Ca    9.0      27 Mar 2022 17:23  Mg     1.8     03-27          EKG (per ER provider note; EKG from [facility] unavailable)- sinus tachycardia 109bpm, non-specific repolarization abnormality    UTOX: negative    CTA Chest (PE Protocol) 3/27  IMPRESSION:  1.  Motion artifact limits evaluation of the bilateral segmental and subsegmental pulmonary arteries. No central pulmonary embolism.  2.  Poorly visualized enlarged right thyroid lobe. Recommend correlation with thyroid function tests and ultrasound.    CT Head 3/27  IMPRESSION:  No acute intracranial hemorrhage, mass effect, or transcortical infarction. no LABS:              9.6    9.41  )-----------( 207      ( 27 Mar 2022 17:23 )             31.2       03-27    141  |  106  |  12  ----------------------------<  102<H>  3.4<L>   |  26  |  0.96    Ca    9.0      27 Mar 2022 17:23  Mg     1.8     03-27        EKG (per ER provider note; EKG from [facility] unavailable)- sinus tachycardia 109bpm, non-specific repolarization abnormality    UTOX: negative    Imaging:  CTA Chest (PE Protocol) 3/27  IMPRESSION:  1.  Motion artifact limits evaluation of the bilateral segmental and subsegmental pulmonary arteries. No central pulmonary embolism.  2.  Poorly visualized enlarged right thyroid lobe. Recommend correlation with thyroid function tests and ultrasound.    CT Head 3/27  IMPRESSION:  No acute intracranial hemorrhage, mass effect, or transcortical infarction.

## 2025-03-08 NOTE — ED ADULT TRIAGE NOTE - CHIEF COMPLAINT QUOTE
states pt   " not  acting herself since returning from [facility] on monday"   increased  sleeping. denies fever. denies cough.  decreased appetite. py wheezing upon arrival.   .  states wheezing x 2 days. fell during night,- unwitnessed-  heard pt fall to floor- was awake-  leg swelling noted.

## 2025-03-08 NOTE — ED ADULT NURSE NOTE - AS TEMP SITE
[facility]  [address] ,  [address]     Dear Dr Tadeo Nguyen     Thank you for requesting a  consultation on your patient Michael Camarillo for the following indications:  Fetal anatomy  She started her prenatal care in [place]  Based on records sent she had a 7 week 5 day ultrasound that correlated with her last menstrual period  Her hospital did not send any copies of prenatal labs or genetic screening or a 20 week anatomy scan  She completed prenatal labs on 21 which we reviewed and she had a normal Glucola screen  History  Past medical history:  BMI of 36  Past surgical history:  denies  Past obstetrical history:   1 para 0  Social history:  Does not report use of cigarettes, alcohol or illicit drugs  First generation family history:  Denies hypertension, diabetes, thrombosis or congenital anomalies    Medications:  Prenatal vitamins  Allergies to medications:  denies    Ultrasound findings: her ultrasound today shows a fetus that is growing concordant with her dates  No malformations are detected however we could not complete the anatomical survey secondary to fetal position  The patient was informed of the findings and counseled about the limitations of the exam in detecting all forms of fetal congenital abnormalities  She does not report any vaginal bleeding or uterine cramping/contractions  She does feel fetal movement  BMI above 30 confers increased pregnancy risks  Maternal risks include preeclampsia, gestational diabetes, cardiac dysfunction, and sleep apnea  Fetal risks include miscarriage, fetal anomalies, and stillbirth as well as macrosomia and impaired growth  Intrapartum risks include  delivery, wound complications, and venous thrombosis  The  detection of congenital anomalies is reduced with increasing maternal BMI       Baby aspirin 162 milligrams orally taken till delivery has been associated with a lower risk of developing preeclampsia in pregnancy  We discussed the option of genetic screening which she is interested in but currently does not have insurance  She is applying for medical assistance  At this gestational age the option for genetic screening includes NIPT which can screen for Down syndrome, trisomy 25,  trisomy 15 and sex chromosome issues  Follow up recommended:   Recommend a follow-up ultrasound for growth and missed anatomy at 32 weeks  She is aware to call our office when she gets her insurance coverage and she was given our office phone number  A prescription for baby aspirin 162 milligrams daily was sent to her pharmacy  The majority of time (greater then 50%) was spent on counseling and coordination of care of this patient and /or family member  Approximate face to face time was 15 minutes              Sarahi Fan MD rectal

## 2025-03-08 NOTE — PATIENT PROFILE ADULT - FUNCTIONAL ASSESSMENT - BASIC MOBILITY SCORE.
Spoke with patient's mother Lily. Dr. Velez's message conveyed. She states that the clindamycin was not in stock at the pharmacy but she will  when available. She is using the other medications as directed. She verbalized understanding of instructions for Luis. Patient was scheduled for 7/16 at 1:15PM.   12

## 2025-03-08 NOTE — ED PROCEDURE NOTE - ATTENDING CONTRIBUTION TO CARE
ETT procedure as documented above. ETT confirmed by exam, capnography and CXR.  No complications, POx remained above 92.

## 2025-03-08 NOTE — H&P ADULT - ASSESSMENT
Assessment/Plan:    73 yo F with PMH notable for asthma presenting with AHRF and hypercapnic respiratory acidosis with AMS requiring intubation for airway protection. Now admitted to MICU on ventilator for further evlauation and management iso asthma exacerbation.     Neuro  #acute metabolic encepholopathy  - likely iso hypercapnic respiratory failure vs hyponatremia  - intubated and sedated  - monitor mental status once once sedation  - f/u CTH/cspine    #sedation  -propofol  -fentanyl  -wean as tolerated    Cardiovascular  #HTN  - hold antihypertensives for now  - lisinopril and HCTZ  - troponin 9, EKG without acute ischemia  - TTE  - monitor MAP > 65    Pulmonary  #AHRF  #hypercapnic respiratory failure  #respiratory acidosis  #asthma  - likely iso asthma exacerbation with wheezing  - RVP negative, expand to full  - CTA without evidence of PNA or PE  - albuterol/ipratropium   - s/p methylprednisone 125mg  - c/w solumedrol x 5 days  - Mg 2mg  - respiratory support as tolerated  - serial blood gases  - vent settings to avoid air trapping  - monitor peak pressures    GI  - no acute issues  #Diet  - NPO for now    Renal/  #metabolic alkalosis  #hyponatremia  - Cr 0.69  - bicarb likely compensatory iso hypercapnia  - trend Cr, electrolytes  - free water restriction  - replete electrolytes prn (K, Mg, phos)    ID  - no infectious symptoms or leukocytosis  - RVP neg, expand to full  - CT chest without PNA  - monitor for infectious symptoms and trend WBC  - MRSA swab    Endocrine  - no acute issues  - f/u A1c  - SSI if needed for hyperglycemia    Heme  #thrombocytopenia  - monitor for now  - transfuse for PLT < 10 or < 50 with bleeding  #DVT ppx  - Lovenox  - SCDs    Ethics: full code

## 2025-03-08 NOTE — PATIENT PROFILE ADULT - FUNCTIONAL ASSESSMENT - BASIC MOBILITY 5.
Gastroenterology Pre-op History and Physical    Rosario Rudd MRN# [id number]   Age: 83 year old YOB: 1941      Date of Surgery: 06/25/24  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  No history of pancreatitis, no family history of pancreatic cancer.           Past Medical and Surgical History:     Past Medical History:   Diagnosis Date    Depression     GERD (gastroesophageal reflux disease)     Glaucoma     Macular degeneration      Past Surgical History:   Procedure Laterality Date    BIOPSY BREAST      CATARACT IOL, RT/LT      Cataract IOL RT/LT    CHOLECYSTECTOMY, LAPOROSCOPIC  01/01/2008    Cholecystectomy, Laparoscopic    LAMINECT/DISCECTOMY, LUMBAR  2008, 2009    infection in spine, had rods and screws placed, removed a year later    TUBAL LIGATION              Medications (include herbals and vitamins):        Medications Prior to Admission   Medication Sig Dispense Refill Last Dose    acetaminophen (TYLENOL) 325 MG tablet Take 3 tablets (975 mg) by mouth every 8 hours 30 tablet 0 More than a month    atorvastatin (LIPITOR) 10 MG tablet Take 1 tablet (10 mg) by mouth daily 90 tablet 3 6/25/2024    Cholecalciferol (VITAMIN D3 PO) Take 1 capsule by mouth daily Unknown dose   6/25/2024    hydrochlorothiazide (HYDRODIURIL) 12.5 MG tablet Take 1 tablet (12.5 mg) by mouth daily 90 tablet 3 6/25/2024    losartan (COZAAR) 100 MG tablet Take 1 tablet (100 mg) by mouth every morning 90 tablet 3 6/25/2024    Multiple Vitamins-Minerals (PRESERVISION AREDS) TABS Take 1 tablet by mouth 2 times daily   More than a month    omeprazole (PRILOSEC) 40 MG DR capsule Take 1 capsule (40 mg) by mouth every morning 90 capsule 3 6/25/2024    fluticasone (FLONASE) 50  "MCG/ACT nasal spray Spray 1 spray into both nostrils daily (Patient not taking: Reported on 10/27/2023) 11.1 mL 0     latanoprost (XALATAN) 0.005 % ophthalmic solution Place 1 drop into both eyes At Bedtime       metroNIDAZOLE (FLAGYL) 500 MG tablet Take 1 tablet (500 mg) by mouth 2 times daily (Patient not taking: Reported on 5/10/2024) 14 tablet 0     timolol maleate (TIMOPTIC) 0.5 % ophthalmic solution Place 1 drop Into the left eye 2 times daily                Allergies:      Allergies   Allergen Reactions    Hydromorphone Other (See Comments) and Nausea     also felt hot and BP \"dropped\", noted 12/12/22                 Physical Exam:   All vitals have been reviewed  Patient Vitals for the past 8 hrs:   BP Temp Temp src Pulse Resp SpO2 Height Weight   06/25/24 1607 -- -- -- -- 16 -- -- --   06/25/24 1546 (!) 136/92 -- -- 84 16 98 % -- --   06/25/24 1400 (!) 151/79 97.7  F (36.5  C) Oral 84 19 98 % 1.565 m (5' 1.61\") 69.9 kg (154 lb 1.6 oz)     No intake/output data recorded.  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  The main duct abruptly cuts off in this region.   Will proceed with EUS for further evalution of an underlying lesion and for further evaluation of the duct for worrisome features.    Rom Cavanaguh MD        " 2 = A lot of assistance

## 2025-03-08 NOTE — ED ADULT NURSE NOTE - OBJECTIVE STATEMENT
Pt arrives via wheelchair to room 4 with  at bedside.  brought patient, 73 y/o F to ER after noticing a mental status change, and generalized weakness since Monday after returning home from [facility] . Pt states she is experiencing right thigh pain that began on Monday. Pt noted to be lethargic, and closing her eyes between questions. Rectal temperature 99.4 at this time. Noted to have expiratory wheezing at this time. Pt noted to be saturating 85% on RA, placed on 5L NC at this time. Skin dry, clean and intact. radial and dorsalis pedis pulses noted, +2 and equal bilaterally. Noted to have swelling to right leg. #20g placed to L forearm. As per , patient experienced a fall over night. Unknown LOC and unknown head strike. Labs drawn and sent per provider order. Medications administered per provider order. Report given to primary RN.

## 2025-03-08 NOTE — ED PROVIDER NOTE - NSICDXPASTSURGICALHX_GEN_ALL_CORE_FT
PAST SURGICAL HISTORY:  No significant past surgical history      Showering allowed/Walking - Indoors allowed/Walking - Outdoors allowed/Follow Instructions Provided by your Surgical Team

## 2025-03-08 NOTE — ED PROCEDURE NOTE - CPROC ED PATIENT POSITION1
63 Y F with hx HTN, HLD, gout and BL OA of the knees, she is complaining of pain in her L arm over the past 6 days today was worse mostly in elbow area. Pt states that the entire arm hurts. She denies numbness tingling, swelling, bruising or truama to the arm. She notes that she also has mild pain in her R arm x 2 days. She denies fever, nausea vomiting. She has had pain like this before in her elbow but similar to her OA pain in the knees. She is able to move the arm but has mild increase in pain when she does. supine

## 2025-03-08 NOTE — PATIENT PROFILE ADULT - FALL HARM RISK - RISK INTERVENTIONS

## 2025-03-08 NOTE — ED ADULT NURSE NOTE - NSFALLRISKINTERV_ED_ALL_ED
Assistance OOB with selected safe patient handling equipment if applicable/Assistance with ambulation/Communicate fall risk and risk factors to all staff, patient, and family/Monitor gait and stability/Monitor for mental status changes and reorient to person, place, and time, as needed/Move patient closer to nursing station/within visual sight of ED staff/Provide visual cue: yellow wristband, yellow gown, etc/Reinforce activity limits and safety measures with patient and family/Toileting schedule using arm’s reach rule for commode and bathroom/Use of alarms - bed, stretcher, chair and/or video monitoring/Call bell, personal items and telephone in reach/Instruct patient to call for assistance before getting out of bed/chair/stretcher/Non-slip footwear applied when patient is off stretcher/[place] to call system/Physically safe environment - no spills, clutter or unnecessary equipment/Purposeful Proactive Rounding/Room/bathroom lighting operational, light cord in reach

## 2025-03-08 NOTE — ED PROVIDER NOTE - OBJECTIVE STATEMENT
Attending/Laurel: 74-year-old female history of asthma, depression flew back from [place] 6 days ago (10.5-hour trip) as per her  patient with worsening difficulty breathing/wheezing, depressed mood, and BLE edema.. Patient is responsive only to noxious stimuli only. Patient's  providing collateral information. He reported she had reportedly fallen at home; found on floor with presumed unwitnessed fall. Attending/Laurel: 74-year-old female history of asthma, depression flew back from [place] 6 days ago (10.5-hour trip) as per her  patient with worsening difficulty breathing/wheezing, depressed mood, and BLE edema.. Patient is responsive only to noxious stimuli only. Patient's  providing collateral information. He reported she had reportedly fallen at home; found on floor with presumed unwitnessed fall. Patient nonverbal, unable to provide additional information.

## 2025-03-08 NOTE — ED ADULT NURSE REASSESSMENT NOTE - NS ED NURSE REASSESS COMMENT FT1
received report from PANCHITO Sparks, pt remains obtunded. pt is currently on bipap, as per MD Paul pt will be intubated. float PANCHITO Knapp at bedside.

## 2025-03-08 NOTE — ED ADULT NURSE NOTE - NSFALLDEVICES_ED_ALL_ED
Patient notes he has had a tremor on and off in his right hand, he is able to control the tremor thinking about, the tremor last for a few minutes per day and then goes away its own, it has not worsened at any point, he has noticed no other weakness he is able to run for exercise without difficulty, likely essential tremor advised if worsening or any weakness in extremity or pains to call us right away  Will check thyroid hormone today  None

## 2025-03-08 NOTE — ED ADULT NURSE REASSESSMENT NOTE - NS ED NURSE REASSESS COMMENT FT1
Facilitator RN: pt received in trauma room C from PANCHITO Latham. Pt intubated and sedated, on fentanyl 1.5mcg/kg/h and propofol 40mcg/kg/min. size 7.5 et tube 23cm at lip line, VSS.  attempted report with MICU, will call back. Family updated on plan of care

## 2025-03-08 NOTE — H&P ADULT - VTE RISK ASSESSMENT
<<--- Click to launch Complex Repair And A-T Advancement Flap Text: The defect edges were debeveled with a #15 scalpel blade.  The primary defect was closed partially with a complex linear closure.  Given the location of the remaining defect, shape of the defect and the proximity to free margins an A-T advancement flap was deemed most appropriate for complete closure of the defect.  Using a sterile surgical marker, an appropriate advancement flap was drawn incorporating the defect and placing the expected incisions within the relaxed skin tension lines where possible.    The area thus outlined was incised deep to adipose tissue with a #15 scalpel blade.  The skin margins were undermined to an appropriate distance in all directions utilizing iris scissors.

## 2025-03-08 NOTE — ED PROVIDER NOTE - PROGRESS NOTE DETAILS
Kaila Cruz PGY3 MD  Spoke with MICU.  Patient to be admitted under their service.  Will obtain repeat VBG.  Patient will be given fentanyl for increased sedation.

## 2025-03-08 NOTE — ED ADULT NURSE NOTE - CHIEF COMPLAINT QUOTE
states pt   " not  acting herself since returning from [facility] on monday"   increased  sleeping. denies fever. denies cough.  decreased appetite. py wheezing upon arrival.   .  states wheezing x 2 days. fell during night,- unwitnessed-  heard pt fall to floor- was awake-  leg swelling noted.

## 2025-03-08 NOTE — ED PROVIDER NOTE - CLINICAL SUMMARY MEDICAL DECISION MAKING FREE TEXT BOX
A/P  74-year-old female history of hypertension, asthma, recently traveled back from [facility] who presents with worsening depressed mood, wheezing, lower extremity edema.  patient's  on her before yesterday after an unwitnessed fall.  Exam noted for altered mental status P/AV PU, no signs of head trauma, diminished breath sounds bilateral with expiratory wheezing, +2 bilateral lower extremity pitting edema.  Plan: Screening EKG, CT-head/cspine, labs,duoneb, mag, steroids, cardiac monitoring, admit  Relevant EMR reviewed

## 2025-03-08 NOTE — H&P ADULT - HISTORY OF PRESENT ILLNESS
75 yo F with PMH asthma, depression presenting to [facility] with worsening dyspnea and wheezing with BLE edema since returning from 10.5 hour trip to [facility] 6 days ago. Altered in ED and unable to provide history, only reponsive to noxious stimulli. History provided by  who states he found her on floor with unwitnessed fall.     ED Course: vitals notable for HR 60s, -115 with systolics in 180s, tachypneic to 24 with sPO2 95% on RA. Exam notable for wheezing. Placed on NC 5L then trialed on BiPAP but then required intubation to protect airway due to AMS. Labs notable for WBC 8.5, Hgb 12.6, , coags wnl, Na 126, K 4.3, Cl 85, bicarb 27, Cr 0.69, AST/ALT 59/47, proBNP 480, troponin 9, TSH 2.81, pH 7.29, pCO2 72, bicarb 35, neg RVP, CTA chest with no acute findings. CT head/cspine pending. Treated with 1L NS, Duonebs, solumedrol, and sedated with propofol/fentanyl. Admitted to MICU for AHRF.     75 yo F with PMH notable for asthma presenting with AHRF and hypercapnic respiratory acidosis with AMS requiring intubation for airway protection. Now admitted to MICU on ventilator for further evlauation and management iso asthma exacerbation.  73 yo F with PMH asthma, depression presenting to [facility] with worsening dyspnea and wheezing with BLE edema since returning from 10.5 hour trip to [facility] 6 days ago. Altered in ED and unable to provide history, only responsive to noxious stimulli. History provided by  who states he found her on floor with unwitnessed fall. Hx of 3 asthma attacks last year, no hx of intubation, no clear asthma triggers but felt sick after flight home from [facility] 3/3/25 days where she visited for 3 months and was in usual state of health per .    ED Course: vitals notable for HR 60s, -115 with systolics in 180s, tachypneic to 24 with sPO2 95% on RA. Exam notable for wheezing. Placed on NC 5L then trialed on BiPAP but then required intubation to protect airway due to AMS. Labs notable for WBC 8.5, Hgb 12.6, , coags wnl, Na 126, K 4.3, Cl 85, bicarb 27, Cr 0.69, AST/ALT 59/47, proBNP 480, troponin 9, TSH 2.81, pH 7.29, pCO2 72, bicarb 35, neg RVP, CTA chest with no acute findings. CT head/cspine pending. Treated with 1L NS, Duonebs, solumedrol, and sedated with propofol/fentanyl. Admitted to MICU for AHRF.     73 yo F with PMH notable for asthma presenting with AHRF and hypercapnic respiratory acidosis with AMS requiring intubation for airway protection. Now admitted to MICU on ventilator for further evlauation and management iso asthma exacerbation.

## 2025-03-08 NOTE — ED PROVIDER NOTE - NSICDXPASTMEDICALHX_GEN_ALL_CORE_FT
PAST MEDICAL HISTORY:  Asthma, mild intermittent, uncomplicated     Essential hypertension     MDD (major depressive disorder), recurrent, severe, with psychosis

## 2025-03-09 LAB
A1C WITH ESTIMATED AVERAGE GLUCOSE RESULT: 5.7 % — HIGH (ref 4–5.6)
ALBUMIN SERPL ELPH-MCNC: 3.6 G/DL — SIGNIFICANT CHANGE UP (ref 3.3–5)
ALBUMIN SERPL ELPH-MCNC: 3.7 G/DL — SIGNIFICANT CHANGE UP (ref 3.3–5)
ALP SERPL-CCNC: 67 U/L — SIGNIFICANT CHANGE UP (ref 40–120)
ALP SERPL-CCNC: 71 U/L — SIGNIFICANT CHANGE UP (ref 40–120)
ALT FLD-CCNC: 52 U/L — HIGH (ref 4–33)
ALT FLD-CCNC: 58 U/L — HIGH (ref 4–33)
ANION GAP SERPL CALC-SCNC: 16 MMOL/L — HIGH (ref 7–14)
ANION GAP SERPL CALC-SCNC: 19 MMOL/L — HIGH (ref 7–14)
ANION GAP SERPL CALC-SCNC: 20 MMOL/L — HIGH (ref 7–14)
APPEARANCE UR: CLEAR — SIGNIFICANT CHANGE UP
APTT BLD: 30.3 SEC — SIGNIFICANT CHANGE UP (ref 24.5–35.6)
AST SERPL-CCNC: 40 U/L — HIGH (ref 4–32)
AST SERPL-CCNC: 51 U/L — HIGH (ref 4–32)
BACTERIA # UR AUTO: NEGATIVE /HPF — SIGNIFICANT CHANGE UP
BILIRUB SERPL-MCNC: 0.5 MG/DL — SIGNIFICANT CHANGE UP (ref 0.2–1.2)
BILIRUB SERPL-MCNC: 0.6 MG/DL — SIGNIFICANT CHANGE UP (ref 0.2–1.2)
BILIRUB UR-MCNC: NEGATIVE — SIGNIFICANT CHANGE UP
BLOOD GAS VENOUS COMPREHENSIVE RESULT: SIGNIFICANT CHANGE UP
BUN SERPL-MCNC: 16 MG/DL — SIGNIFICANT CHANGE UP (ref 7–23)
BUN SERPL-MCNC: 18 MG/DL — SIGNIFICANT CHANGE UP (ref 7–23)
BUN SERPL-MCNC: 19 MG/DL — SIGNIFICANT CHANGE UP (ref 7–23)
CA-I BLD-SCNC: 1.02 MMOL/L — LOW (ref 1.15–1.29)
CALCIUM SERPL-MCNC: 8.5 MG/DL — SIGNIFICANT CHANGE UP (ref 8.4–10.5)
CALCIUM SERPL-MCNC: 8.8 MG/DL — SIGNIFICANT CHANGE UP (ref 8.4–10.5)
CALCIUM SERPL-MCNC: 9.1 MG/DL — SIGNIFICANT CHANGE UP (ref 8.4–10.5)
CAST: 2 /LPF — SIGNIFICANT CHANGE UP (ref 0–4)
CHLORIDE SERPL-SCNC: 88 MMOL/L — LOW (ref 98–107)
CHLORIDE SERPL-SCNC: 92 MMOL/L — LOW (ref 98–107)
CHLORIDE SERPL-SCNC: 98 MMOL/L — SIGNIFICANT CHANGE UP (ref 98–107)
CK SERPL-CCNC: 291 U/L — HIGH (ref 25–170)
CO2 SERPL-SCNC: 19 MMOL/L — LOW (ref 22–31)
CO2 SERPL-SCNC: 21 MMOL/L — LOW (ref 22–31)
CO2 SERPL-SCNC: 24 MMOL/L — SIGNIFICANT CHANGE UP (ref 22–31)
COLOR SPEC: YELLOW — SIGNIFICANT CHANGE UP
CREAT SERPL-MCNC: 0.68 MG/DL — SIGNIFICANT CHANGE UP (ref 0.5–1.3)
CREAT SERPL-MCNC: 0.69 MG/DL — SIGNIFICANT CHANGE UP (ref 0.5–1.3)
CREAT SERPL-MCNC: 0.69 MG/DL — SIGNIFICANT CHANGE UP (ref 0.5–1.3)
DIFF PNL FLD: NEGATIVE — SIGNIFICANT CHANGE UP
EGFR: 91 ML/MIN/1.73M2 — SIGNIFICANT CHANGE UP
ESTIMATED AVERAGE GLUCOSE: 117 — SIGNIFICANT CHANGE UP
GLUCOSE BLDC GLUCOMTR-MCNC: 140 MG/DL — HIGH (ref 70–99)
GLUCOSE BLDC GLUCOMTR-MCNC: 276 MG/DL — HIGH (ref 70–99)
GLUCOSE BLDC GLUCOMTR-MCNC: 288 MG/DL — HIGH (ref 70–99)
GLUCOSE BLDC GLUCOMTR-MCNC: 95 MG/DL — SIGNIFICANT CHANGE UP (ref 70–99)
GLUCOSE SERPL-MCNC: 293 MG/DL — HIGH (ref 70–99)
GLUCOSE SERPL-MCNC: 312 MG/DL — HIGH (ref 70–99)
GLUCOSE SERPL-MCNC: 326 MG/DL — HIGH (ref 70–99)
GLUCOSE UR QL: 500 MG/DL
HCT VFR BLD CALC: 35.3 % — SIGNIFICANT CHANGE UP (ref 34.5–45)
HGB BLD-MCNC: 12.4 G/DL — SIGNIFICANT CHANGE UP (ref 11.5–15.5)
INR BLD: 0.91 RATIO — SIGNIFICANT CHANGE UP (ref 0.85–1.16)
KETONES UR-MCNC: NEGATIVE MG/DL — SIGNIFICANT CHANGE UP
LACTATE BLDV-MCNC: 5.4 MMOL/L — CRITICAL HIGH (ref 0.5–2)
LACTATE BLDV-MCNC: 6.2 MMOL/L — CRITICAL HIGH (ref 0.5–2)
LACTATE SERPL-SCNC: 7.3 MMOL/L — CRITICAL HIGH (ref 0.5–2)
LEUKOCYTE ESTERASE UR-ACNC: NEGATIVE — SIGNIFICANT CHANGE UP
MAGNESIUM SERPL-MCNC: 2.4 MG/DL — SIGNIFICANT CHANGE UP (ref 1.6–2.6)
MAGNESIUM SERPL-MCNC: 2.5 MG/DL — SIGNIFICANT CHANGE UP (ref 1.6–2.6)
MAGNESIUM SERPL-MCNC: 2.6 MG/DL — SIGNIFICANT CHANGE UP (ref 1.6–2.6)
MCHC RBC-ENTMCNC: 32.5 PG — SIGNIFICANT CHANGE UP (ref 27–34)
MCHC RBC-ENTMCNC: 35.1 G/DL — SIGNIFICANT CHANGE UP (ref 32–36)
MCV RBC AUTO: 92.4 FL — SIGNIFICANT CHANGE UP (ref 80–100)
MRSA PCR RESULT.: SIGNIFICANT CHANGE UP
NITRITE UR-MCNC: NEGATIVE — SIGNIFICANT CHANGE UP
NRBC # BLD AUTO: 0 K/UL — SIGNIFICANT CHANGE UP (ref 0–0)
NRBC # FLD: 0 K/UL — SIGNIFICANT CHANGE UP (ref 0–0)
NRBC BLD AUTO-RTO: 0 /100 WBCS — SIGNIFICANT CHANGE UP (ref 0–0)
PH UR: 6 — SIGNIFICANT CHANGE UP (ref 5–8)
PHOSPHATE SERPL-MCNC: 2.2 MG/DL — LOW (ref 2.5–4.5)
PHOSPHATE SERPL-MCNC: 2.7 MG/DL — SIGNIFICANT CHANGE UP (ref 2.5–4.5)
PHOSPHATE SERPL-MCNC: 3.9 MG/DL — SIGNIFICANT CHANGE UP (ref 2.5–4.5)
PLATELET # BLD AUTO: 130 K/UL — LOW (ref 150–400)
POTASSIUM SERPL-MCNC: 2.9 MMOL/L — CRITICAL LOW (ref 3.5–5.3)
POTASSIUM SERPL-MCNC: 3.3 MMOL/L — LOW (ref 3.5–5.3)
POTASSIUM SERPL-MCNC: 3.3 MMOL/L — LOW (ref 3.5–5.3)
POTASSIUM SERPL-SCNC: 2.9 MMOL/L — CRITICAL LOW (ref 3.5–5.3)
POTASSIUM SERPL-SCNC: 3.3 MMOL/L — LOW (ref 3.5–5.3)
POTASSIUM SERPL-SCNC: 3.3 MMOL/L — LOW (ref 3.5–5.3)
PROLACTIN SERPL-MCNC: 59.9 NG/ML — HIGH (ref 3.4–24.1)
PROT SERPL-MCNC: 6 G/DL — SIGNIFICANT CHANGE UP (ref 6–8.3)
PROT SERPL-MCNC: 6.4 G/DL — SIGNIFICANT CHANGE UP (ref 6–8.3)
PROT UR-MCNC: NEGATIVE MG/DL — SIGNIFICANT CHANGE UP
PROTHROM AB SERPL-ACNC: 10.8 SEC — SIGNIFICANT CHANGE UP (ref 9.9–13.4)
RBC # BLD: 3.82 M/UL — SIGNIFICANT CHANGE UP (ref 3.8–5.2)
RBC # FLD: 11.9 % — SIGNIFICANT CHANGE UP (ref 10.3–14.5)
RBC CASTS # UR COMP ASSIST: 1 /HPF — SIGNIFICANT CHANGE UP (ref 0–4)
S AUREUS DNA NOSE QL NAA+PROBE: SIGNIFICANT CHANGE UP
SODIUM SERPL-SCNC: 127 MMOL/L — LOW (ref 135–145)
SODIUM SERPL-SCNC: 132 MMOL/L — LOW (ref 135–145)
SODIUM SERPL-SCNC: 138 MMOL/L — SIGNIFICANT CHANGE UP (ref 135–145)
SP GR SPEC: 1.01 — SIGNIFICANT CHANGE UP (ref 1–1.03)
SQUAMOUS # UR AUTO: 0 /HPF — SIGNIFICANT CHANGE UP (ref 0–5)
TSH SERPL-MCNC: 0.69 UIU/ML — SIGNIFICANT CHANGE UP (ref 0.27–4.2)
UROBILINOGEN FLD QL: 0.2 MG/DL — SIGNIFICANT CHANGE UP (ref 0.2–1)
VIT B12 SERPL-MCNC: >2000 PG/ML — HIGH (ref 200–900)
WBC # BLD: 8.49 K/UL — SIGNIFICANT CHANGE UP (ref 3.8–10.5)
WBC # FLD AUTO: 8.49 K/UL — SIGNIFICANT CHANGE UP (ref 3.8–10.5)
WBC UR QL: 1 /HPF — SIGNIFICANT CHANGE UP (ref 0–5)

## 2025-03-09 PROCEDURE — 95720 EEG PHY/QHP EA INCR W/VEEG: CPT

## 2025-03-09 PROCEDURE — 93356 MYOCRD STRAIN IMG SPCKL TRCK: CPT

## 2025-03-09 PROCEDURE — 93308 TTE F-UP OR LMTD: CPT | Mod: 26,GC

## 2025-03-09 PROCEDURE — 99291 CRITICAL CARE FIRST HOUR: CPT

## 2025-03-09 PROCEDURE — 93306 TTE W/DOPPLER COMPLETE: CPT | Mod: 26

## 2025-03-09 RX ORDER — INSULIN LISPRO 100 U/ML
INJECTION, SOLUTION INTRAVENOUS; SUBCUTANEOUS EVERY 6 HOURS
Refills: 0 | Status: DISCONTINUED | OUTPATIENT
Start: 2025-03-09 | End: 2025-03-14

## 2025-03-09 RX ORDER — LORAZEPAM 4 MG/ML
2 VIAL (ML) INJECTION ONCE
Refills: 0 | Status: DISCONTINUED | OUTPATIENT
Start: 2025-03-09 | End: 2025-03-09

## 2025-03-09 RX ORDER — IPRATROPIUM BROMIDE AND ALBUTEROL SULFATE .5; 2.5 MG/3ML; MG/3ML
3 SOLUTION RESPIRATORY (INHALATION) EVERY 6 HOURS
Refills: 0 | Status: DISCONTINUED | OUTPATIENT
Start: 2025-03-09 | End: 2025-03-10

## 2025-03-09 RX ORDER — SODIUM CHLORIDE 9 G/1000ML
500 INJECTION, SOLUTION INTRAVENOUS ONCE
Refills: 0 | Status: COMPLETED | OUTPATIENT
Start: 2025-03-09 | End: 2025-03-09

## 2025-03-09 RX ORDER — DEXTROSE 50 % IN WATER 50 %
25 SYRINGE (ML) INTRAVENOUS ONCE
Refills: 0 | Status: DISCONTINUED | OUTPATIENT
Start: 2025-03-09 | End: 2025-05-21

## 2025-03-09 RX ORDER — MIDAZOLAM IN 0.9 % SOD.CHLORID 1 MG/ML
2 PLASTIC BAG, INJECTION (ML) INTRAVENOUS ONCE
Refills: 0 | Status: DISCONTINUED | OUTPATIENT
Start: 2025-03-09 | End: 2025-03-09

## 2025-03-09 RX ORDER — DEXTROSE 50 % IN WATER 50 %
15 SYRINGE (ML) INTRAVENOUS ONCE
Refills: 0 | Status: DISCONTINUED | OUTPATIENT
Start: 2025-03-09 | End: 2025-05-21

## 2025-03-09 RX ORDER — DEXTROSE 50 % IN WATER 50 %
12.5 SYRINGE (ML) INTRAVENOUS ONCE
Refills: 0 | Status: DISCONTINUED | OUTPATIENT
Start: 2025-03-09 | End: 2025-05-21

## 2025-03-09 RX ORDER — GLUCAGON 3 MG/1
1 POWDER NASAL ONCE
Refills: 0 | Status: DISCONTINUED | OUTPATIENT
Start: 2025-03-09 | End: 2025-05-21

## 2025-03-09 RX ORDER — SODIUM CHLORIDE 9 G/1000ML
1000 INJECTION, SOLUTION INTRAVENOUS
Refills: 0 | Status: DISCONTINUED | OUTPATIENT
Start: 2025-03-09 | End: 2025-04-20

## 2025-03-09 RX ORDER — CALCIUM GLUCONATE 20 MG/ML
1 INJECTION, SOLUTION INTRAVENOUS ONCE
Refills: 0 | Status: COMPLETED | OUTPATIENT
Start: 2025-03-09 | End: 2025-03-09

## 2025-03-09 RX ORDER — POTASSIUM PHOSPHATE, MONOBASIC POTASSIUM PHOSPHATE, DIBASIC INJECTION, 236; 224 MG/ML; MG/ML
30 SOLUTION, CONCENTRATE INTRAVENOUS ONCE
Refills: 0 | Status: COMPLETED | OUTPATIENT
Start: 2025-03-09 | End: 2025-03-09

## 2025-03-09 RX ORDER — POTASSIUM PHOSPHATE, MONOBASIC POTASSIUM PHOSPHATE, DIBASIC INJECTION, 236; 224 MG/ML; MG/ML
15 SOLUTION, CONCENTRATE INTRAVENOUS ONCE
Refills: 0 | Status: COMPLETED | OUTPATIENT
Start: 2025-03-09 | End: 2025-03-09

## 2025-03-09 RX ORDER — FENTANYL CITRATE-0.9 % NACL/PF 100MCG/2ML
50 SYRINGE (ML) INTRAVENOUS ONCE
Refills: 0 | Status: DISCONTINUED | OUTPATIENT
Start: 2025-03-09 | End: 2025-03-09

## 2025-03-09 RX ADMIN — Medication 8 MG/KG/HR: at 19:21

## 2025-03-09 RX ADMIN — CEFTRIAXONE 100 MILLIGRAM(S): 500 INJECTION, POWDER, FOR SOLUTION INTRAMUSCULAR; INTRAVENOUS at 18:29

## 2025-03-09 RX ADMIN — POTASSIUM PHOSPHATE, MONOBASIC POTASSIUM PHOSPHATE, DIBASIC INJECTION, 83.33 MILLIMOLE(S): 236; 224 SOLUTION, CONCENTRATE INTRAVENOUS at 13:24

## 2025-03-09 RX ADMIN — Medication 8 MG/KG/HR: at 05:21

## 2025-03-09 RX ADMIN — Medication 50 MICROGRAM(S): at 20:02

## 2025-03-09 RX ADMIN — Medication 2 MG/HR: at 03:33

## 2025-03-09 RX ADMIN — Medication 100 MILLIEQUIVALENT(S): at 13:25

## 2025-03-09 RX ADMIN — Medication 100 MILLIEQUIVALENT(S): at 16:08

## 2025-03-09 RX ADMIN — Medication 2 MILLIGRAM(S): at 06:38

## 2025-03-09 RX ADMIN — Medication 2 MILLIGRAM(S): at 03:00

## 2025-03-09 RX ADMIN — POLYETHYLENE GLYCOL 3350 17 GRAM(S): 17 POWDER, FOR SOLUTION ORAL at 11:30

## 2025-03-09 RX ADMIN — IPRATROPIUM BROMIDE AND ALBUTEROL SULFATE 3 MILLILITER(S): .5; 2.5 SOLUTION RESPIRATORY (INHALATION) at 08:25

## 2025-03-09 RX ADMIN — Medication 15 MILLILITER(S): at 05:29

## 2025-03-09 RX ADMIN — Medication 8 MG/KG/HR: at 08:13

## 2025-03-09 RX ADMIN — SERTRALINE 25 MILLIGRAM(S): 100 TABLET, FILM COATED ORAL at 11:30

## 2025-03-09 RX ADMIN — ENOXAPARIN SODIUM 40 MILLIGRAM(S): 100 INJECTION SUBCUTANEOUS at 19:35

## 2025-03-09 RX ADMIN — Medication 1 APPLICATION(S): at 05:21

## 2025-03-09 RX ADMIN — Medication 16 MICROGRAM(S)/KG/HR: at 08:12

## 2025-03-09 RX ADMIN — Medication 2 MILLIGRAM(S): at 23:32

## 2025-03-09 RX ADMIN — NOREPINEPHRINE BITARTRATE 3 MICROGRAM(S)/KG/MIN: 8 SOLUTION at 08:13

## 2025-03-09 RX ADMIN — INSULIN LISPRO 3: 100 INJECTION, SOLUTION INTRAVENOUS; SUBCUTANEOUS at 06:44

## 2025-03-09 RX ADMIN — Medication 100 MILLIEQUIVALENT(S): at 14:18

## 2025-03-09 RX ADMIN — Medication 16 MICROGRAM(S)/KG/HR: at 19:21

## 2025-03-09 RX ADMIN — IPRATROPIUM BROMIDE AND ALBUTEROL SULFATE 3 MILLILITER(S): .5; 2.5 SOLUTION RESPIRATORY (INHALATION) at 20:30

## 2025-03-09 RX ADMIN — AMLODIPINE BESYLATE 10 MILLIGRAM(S): 10 TABLET ORAL at 05:21

## 2025-03-09 RX ADMIN — Medication 40 MILLIEQUIVALENT(S): at 06:15

## 2025-03-09 RX ADMIN — METHYLPREDNISOLONE ACETATE 40 MILLIGRAM(S): 80 INJECTION, SUSPENSION INTRA-ARTICULAR; INTRALESIONAL; INTRAMUSCULAR; SOFT TISSUE at 05:21

## 2025-03-09 RX ADMIN — Medication 8 MG/KG/HR: at 18:28

## 2025-03-09 RX ADMIN — SODIUM CHLORIDE 4.17 MILLILITER(S): 9 INJECTION, SOLUTION INTRAVENOUS at 09:34

## 2025-03-09 RX ADMIN — Medication 100 MILLIEQUIVALENT(S): at 16:05

## 2025-03-09 RX ADMIN — IPRATROPIUM BROMIDE AND ALBUTEROL SULFATE 3 MILLILITER(S): .5; 2.5 SOLUTION RESPIRATORY (INHALATION) at 15:26

## 2025-03-09 RX ADMIN — POTASSIUM PHOSPHATE, MONOBASIC POTASSIUM PHOSPHATE, DIBASIC INJECTION, 62.5 MILLIMOLE(S): 236; 224 SOLUTION, CONCENTRATE INTRAVENOUS at 05:28

## 2025-03-09 RX ADMIN — NOREPINEPHRINE BITARTRATE 3 MICROGRAM(S)/KG/MIN: 8 SOLUTION at 19:22

## 2025-03-09 RX ADMIN — CALCIUM GLUCONATE 100 GRAM(S): 20 INJECTION, SOLUTION INTRAVENOUS at 06:07

## 2025-03-09 RX ADMIN — Medication 250 MILLIGRAM(S): at 19:35

## 2025-03-09 RX ADMIN — OLANZAPINE 7.5 MILLIGRAM(S): 10 TABLET ORAL at 11:30

## 2025-03-09 RX ADMIN — INSULIN LISPRO 3: 100 INJECTION, SOLUTION INTRAVENOUS; SUBCUTANEOUS at 11:30

## 2025-03-09 RX ADMIN — Medication 15 MILLILITER(S): at 17:16

## 2025-03-09 NOTE — CHART NOTE - NSCHARTNOTEFT_GEN_A_CORE
:  [employer]Wang    INDICATION:  Shock    PROCEDURE:  [x ] LIMITED ECHO  [ ] LIMITED CHEST  [ ] LIMITED RETROPERITONEAL  [ ] LIMITED ABDOMINAL  [ ] LIMITED DVT  [ ] NEEDLE GUIDANCE VASCULAR  [ ] NEEDLE GUIDANCE THORACENTESIS  [ ] NEEDLE GUIDANCE PARACENTESIS  [ ] NEEDLE GUIDANCE PERICARDIOCENTESIS  [ ] OTHER    FINDINGS:  normal LVSF and RVSF. No SWMA. LVOT VTI 23. TAPSE 2.2cm. IVC 1.9cm.    INTERPRETATION:  Consistent with vasodistributive shock    Images uploaded to [employer]    Time spent on the procedure was separate from the critical care time spent for patient care.

## 2025-03-09 NOTE — CHART NOTE - NSCHARTNOTEFT_GEN_A_CORE
EEG preliminary read (not final) on the initial recording hour(s) = x 7    No seizures recorded.  Severe slowing noted, nonspecific.  Final report to follow tomorrow morning after completion of study.    [facility] EEG Reading Room Ph#: [phone]  Epilepsy Answering Service after 5PM and before 8:30AM: Ph#: [phone]

## 2025-03-09 NOTE — CHART NOTE - NSCHARTNOTEFT_GEN_A_CORE
Patient noted with rhythmic flexing movements of upper and lower right extremities suspicious for possible seizure activity. Movements limited to right side for first 3 minutes of seizure before progressing to bilateral upper/lower extremities. No eye deviation, tongue biting, tachycardia or tachypnea. Unable to assess urinary incontinence 2/2 mahan catheter. Activity stopped after 5 minutes following 2mg Ativan x 2. F/u CK, Lactate, Prolactin. EEG pending. Will continue to monitor.

## 2025-03-09 NOTE — PROGRESS NOTE ADULT - ASSESSMENT
75 yo F with PMH notable for asthma presenting with AHRF and hypercapnic respiratory acidosis with AMS requiring intubation for airway protection. Now admitted to MICU on ventilator for further evlauation and management iso asthma exacerbation.     Neuro  #acute metabolic encepholopathy  #seizure  - likely iso hypercapnic respiratory failure vs hyponatremia  - CTH: no acute changes  - CT spine: no fractures  - 3/8 ON: 6 minutes of seizure-like activity with myoclonic upper jerks -> broke with ativan  - vEEG ordered   - consider neuro consult     Cardiovascular  #HTN  - hold antihypertensives for now  - lisinopril and HCTZ  - troponin 9, EKG without acute ischemia  - f/u TTE  - monitor MAP > 65    Pulmonary  #AHRF  #hypercapnic respiratory failure  #respiratory acidosis  #asthma  - likely iso asthma exacerbation with wheezing  - CTA without evidence of PNA or PE  - RVP negative, expand to full  - albuterol/ipratropium   - s/p methylprednisone 125mg  - c/w solumedrol x 5 days  - Mg 2mg  - respiratory support as tolerated  - serial blood gases  - vent settings to avoid air trapping  - monitor peak pressures    GI  #Diet  - NPO for now    Renal/  #metabolic alkalosis  #hyponatremia  - Cr 0.69  - bicarb likely compensatory iso hypercapnia  - trend Cr, electrolytes  - free water restriction  - replete electrolytes prn (K, Mg, phos)    ID  - no infectious symptoms or leukocytosis  - RVP neg, expand to full  - CT chest without PNA  - monitor for infectious symptoms and trend WBC  - MRSA swab    Endocrine  - a1c 5.7  - SSI if needed for hyperglycemia    Heme  #thrombocytopenia  - monitor for now  - transfuse for PLT < 10 or < 50 with bleeding  #DVT ppx  - Lovenox  - SCDs       75 yo F with PMH notable for asthma presenting with AHRF and hypercapnic respiratory acidosis with AMS requiring intubation for airway protection. Now admitted to MICU on ventilator for further evlauation and management iso asthma exacerbation.     Neuro  #acute metabolic encepholopathy  #seizure  - likely iso hypercapnic respiratory failure vs hyponatremia  - CTH: no acute changes  - CT spine: no fractures  - 3/8 ON: 6 minutes of seizure-like activity with myoclonic upper jerks -> broke with ativan  - vEEG ordered       Cardiovascular  #HTN  - hold antihypertensives for now  - lisinopril and HCTZ  - troponin 9, EKG without acute ischemia  - f/u TTE  - monitor MAP > 65    Pulmonary  #AHRF  #hypercapnic respiratory failure  #respiratory acidosis  #asthma  - likely iso asthma exacerbation with wheezing  - CTA without evidence of PNA or PE  - RVP negative, expand to full  - albuterol/ipratropium   - s/p methylprednisone 125mg  - c/w solumedrol x 5 days  - Mg 2mg  - respiratory support as tolerated  - serial blood gases  - vent settings to avoid air trapping  - monitor peak pressures    GI  #Diet  - NPO for now    Renal/  #metabolic alkalosis  #hyponatremia  - Cr 0.69  - bicarb likely compensatory iso hypercapnia  - trend Cr, electrolytes  - free water restriction  - replete electrolytes prn (K, Mg, phos)    ID  - no infectious symptoms or leukocytosis  - RVP neg, expand to full  - CT chest without PNA  - c/w empiric CAP azithro and CTX  - ur strep and legionella ordered  - f/u bcx     Endocrine  - a1c 5.7  - SSI if needed for hyperglycemia    Heme  #thrombocytopenia  - monitor for now  - transfuse for PLT < 10 or < 50 with bleeding  #DVT ppx  - Lovenox  - SCDs

## 2025-03-09 NOTE — PROGRESS NOTE ADULT - SUBJECTIVE AND OBJECTIVE BOX
INTERVAL HPI/OVERNIGHT EVENTS:    SUBJECTIVE: Patient seen and examined at bedside.       VITAL SIGNS:  ICU Vital Signs Last 24 Hrs  T(C): 37.4 (09 Mar 2025 04:00), Max: 37.4 (08 Mar 2025 14:18)  T(F): 99.3 (09 Mar 2025 04:00), Max: 99.4 (08 Mar 2025 14:18)  HR: 88 (09 Mar 2025 07:37) (50 - 94)  BP: 136/59 (09 Mar 2025 07:00) (86/47 - 188/99)  BP(mean): 81 (09 Mar 2025 07:00) (60 - 123)  ABP: --  ABP(mean): --  RR: 14 (09 Mar 2025 07:00) (13 - 24)  SpO2: 98% (09 Mar 2025 07:37) (94% - 100%)    O2 Parameters below as of 09 Mar 2025 07:00  Patient On (Oxygen Delivery Method): ventilator    O2 Concentration (%): 40      Mode: AC/ CMV (Assist Control/ Continuous Mandatory Ventilation), RR (machine): 14, TV (machine): 400, FiO2: 40, PEEP: 6, ITime: 0.53, MAP: 9, PIP: 27  Plateau pressure:   P/F ratio:     03-08 @ 06:01  -  03-09 @ 07:00  --------------------------------------------------------  IN: 1038 mL / OUT: 2150 mL / NET: -1112 mL      CAPILLARY BLOOD GLUCOSE      POCT Blood Glucose.: 288 mg/dL (09 Mar 2025 06:41)    ECG:    PHYSICAL EXAM:    General:   HEENT:   Neck:   Respiratory:   Cardiovascular:   Abdomen:   Extremities:  Neurological:    MEDICATIONS:  MEDICATIONS  (STANDING):  albuterol/ipratropium for Nebulization 3 milliLiter(s) Nebulizer every 6 hours  amLODIPine   Tablet 10 milliGRAM(s) Oral daily  azithromycin  IVPB 500 milliGRAM(s) IV Intermittent every 24 hours  cefTRIAXone   IVPB 1000 milliGRAM(s) IV Intermittent every 24 hours  chlorhexidine 0.12% Liquid 15 milliLiter(s) Oral Mucosa every 12 hours  chlorhexidine 2% Cloths 1 Application(s) Topical <User Schedule>  dextrose 5%. 1000 milliLiter(s) (100 mL/Hr) IV Continuous <Continuous>  dextrose 5%. 1000 milliLiter(s) (50 mL/Hr) IV Continuous <Continuous>  dextrose 50% Injectable 25 Gram(s) IV Push once  dextrose 50% Injectable 12.5 Gram(s) IV Push once  dextrose 50% Injectable 25 Gram(s) IV Push once  dextrose Oral Gel 15 Gram(s) Oral once  enoxaparin Injectable 40 milliGRAM(s) SubCutaneous every 24 hours  fentaNYL   Infusion. 2 MICROgram(s)/kG/Hr (16 mL/Hr) IV Continuous <Continuous>  glucagon  Injectable 1 milliGRAM(s) IntraMuscular once  insulin lispro (ADMELOG) corrective regimen sliding scale   SubCutaneous every 6 hours  ketamine Infusion. 1 mG/kG/Hr (8 mL/Hr) IV Continuous <Continuous>  methylPREDNISolone sodium succinate Injectable 40 milliGRAM(s) IV Push daily  norepinephrine Infusion 0.02 MICROgram(s)/kG/Min (3 mL/Hr) IV Continuous <Continuous>  OLANZapine 7.5 milliGRAM(s) Oral daily  polyethylene glycol 3350 17 Gram(s) Oral daily  sertraline 25 milliGRAM(s) Oral daily    MEDICATIONS  (PRN):      ALLERGIES:  Allergies    No Known Allergies    Intolerances        LABS:                        12.4   8.49  )-----------( 130      ( 09 Mar 2025 03:30 )             35.3     03-09    127[L]  |  88[L]  |  19  ----------------------------<  326[H]  2.9[LL]   |  19[L]  |  0.68    Ca    8.5      09 Mar 2025 03:30  Phos  2.7     03-09  Mg     2.40     03-09    TPro  6.4  /  Alb  3.7  /  TBili  0.6  /  DBili  x   /  AST  51[H]  /  ALT  58[H]  /  AlkPhos  71  03-09    PT/INR - ( 09 Mar 2025 03:30 )   PT: 10.8 sec;   INR: 0.91 ratio         PTT - ( 09 Mar 2025 03:30 )  PTT:30.3 sec  Urinalysis Basic - ( 09 Mar 2025 03:30 )    Color: Yellow / Appearance: Clear / S.014 / pH: x  Gluc: 326 mg/dL / Ketone: Negative mg/dL  / Bili: Negative / Urobili: 0.2 mg/dL   Blood: x / Protein: Negative mg/dL / Nitrite: Negative   Leuk Esterase: Negative / RBC: 1 /HPF / WBC 1 /HPF   Sq Epi: x / Non Sq Epi: 0 /HPF / Bacteria: Negative /HPF        RADIOLOGY & ADDITIONAL TESTS: Reviewed.   INTERVAL HPI/OVERNIGHT EVENTS:    SUBJECTIVE: Patient seen and examined at bedside.       VITAL SIGNS:  ICU Vital Signs Last 24 Hrs  T(C): 37.4 (09 Mar 2025 04:00), Max: 37.4 (08 Mar 2025 14:18)  T(F): 99.3 (09 Mar 2025 04:00), Max: 99.4 (08 Mar 2025 14:18)  HR: 88 (09 Mar 2025 07:37) (50 - 94)  BP: 136/59 (09 Mar 2025 07:00) (86/47 - 188/99)  BP(mean): 81 (09 Mar 2025 07:00) (60 - 123)  ABP: --  ABP(mean): --  RR: 14 (09 Mar 2025 07:00) (13 - 24)  SpO2: 98% (09 Mar 2025 07:37) (94% - 100%)    O2 Parameters below as of 09 Mar 2025 07:00  Patient On (Oxygen Delivery Method): ventilator    O2 Concentration (%): 40      Mode: AC/ CMV (Assist Control/ Continuous Mandatory Ventilation), RR (machine): 14, TV (machine): 400, FiO2: 40, PEEP: 6, ITime: 0.53, MAP: 9, PIP: 27  Plateau pressure:   P/F ratio:     03-08 @ 06:01  -  03-09 @ 07:00  --------------------------------------------------------  IN: 1038 mL / OUT: 2150 mL / NET: -1112 mL      CAPILLARY BLOOD GLUCOSE      POCT Blood Glucose.: 288 mg/dL (09 Mar 2025 06:41)    ECG:    PHYSICAL EXAM:    General: NAD  HEENT: PERRLA  Respiratory: intubated, no wheezing  Cardiovascular: RRR  Abdomen: soft. NT  Extremities: 2+ b/l pitting edema  Neurological: sedated     MEDICATIONS:  MEDICATIONS  (STANDING):  albuterol/ipratropium for Nebulization 3 milliLiter(s) Nebulizer every 6 hours  amLODIPine   Tablet 10 milliGRAM(s) Oral daily  azithromycin  IVPB 500 milliGRAM(s) IV Intermittent every 24 hours  cefTRIAXone   IVPB 1000 milliGRAM(s) IV Intermittent every 24 hours  chlorhexidine 0.12% Liquid 15 milliLiter(s) Oral Mucosa every 12 hours  chlorhexidine 2% Cloths 1 Application(s) Topical <User Schedule>  dextrose 5%. 1000 milliLiter(s) (100 mL/Hr) IV Continuous <Continuous>  dextrose 5%. 1000 milliLiter(s) (50 mL/Hr) IV Continuous <Continuous>  dextrose 50% Injectable 25 Gram(s) IV Push once  dextrose 50% Injectable 12.5 Gram(s) IV Push once  dextrose 50% Injectable 25 Gram(s) IV Push once  dextrose Oral Gel 15 Gram(s) Oral once  enoxaparin Injectable 40 milliGRAM(s) SubCutaneous every 24 hours  fentaNYL   Infusion. 2 MICROgram(s)/kG/Hr (16 mL/Hr) IV Continuous <Continuous>  glucagon  Injectable 1 milliGRAM(s) IntraMuscular once  insulin lispro (ADMELOG) corrective regimen sliding scale   SubCutaneous every 6 hours  ketamine Infusion. 1 mG/kG/Hr (8 mL/Hr) IV Continuous <Continuous>  methylPREDNISolone sodium succinate Injectable 40 milliGRAM(s) IV Push daily  norepinephrine Infusion 0.02 MICROgram(s)/kG/Min (3 mL/Hr) IV Continuous <Continuous>  OLANZapine 7.5 milliGRAM(s) Oral daily  polyethylene glycol 3350 17 Gram(s) Oral daily  sertraline 25 milliGRAM(s) Oral daily    MEDICATIONS  (PRN):      ALLERGIES:  Allergies    No Known Allergies    Intolerances        LABS:                        12.4   8.49  )-----------( 130      ( 09 Mar 2025 03:30 )             35.3     03-    127[L]  |  88[L]  |  19  ----------------------------<  326[H]  2.9[LL]   |  19[L]  |  0.68    Ca    8.5      09 Mar 2025 03:30  Phos  2.7     03-09  Mg     2.40     -    TPro  6.4  /  Alb  3.7  /  TBili  0.6  /  DBili  x   /  AST  51[H]  /  ALT  58[H]  /  AlkPhos  71  03-09    PT/INR - ( 09 Mar 2025 03:30 )   PT: 10.8 sec;   INR: 0.91 ratio         PTT - ( 09 Mar 2025 03:30 )  PTT:30.3 sec  Urinalysis Basic - ( 09 Mar 2025 03:30 )    Color: Yellow / Appearance: Clear / S.014 / pH: x  Gluc: 326 mg/dL / Ketone: Negative mg/dL  / Bili: Negative / Urobili: 0.2 mg/dL   Blood: x / Protein: Negative mg/dL / Nitrite: Negative   Leuk Esterase: Negative / RBC: 1 /HPF / WBC 1 /HPF   Sq Epi: x / Non Sq Epi: 0 /HPF / Bacteria: Negative /HPF        RADIOLOGY & ADDITIONAL TESTS: Reviewed.

## 2025-03-09 NOTE — PROGRESS NOTE ADULT - ATTENDING COMMENTS
73 yo F with PMH wheezing for 3 days, found on floor (unwitnessed fall, no LOC yesterday) presenting with AHRF and hypercapnic respiratory acidosis with AMS requiring intubation for airway protection in ED, admitted to MICU. CTPA reviewed, no PE, no evidence of PNA. CTH and c-spine without acute findings. Switch to ketamine with improved wheezing and peak airway pressures, Continue ventilatory support, sedation, wean levophed as tolerated. serial ABGS. empiric antibiotics started overnight although no clear source, f/u cultures,

## 2025-03-10 ENCOUNTER — RESULT REVIEW (OUTPATIENT)
Age: 75
End: 2025-03-10

## 2025-03-10 LAB
ALBUMIN SERPL ELPH-MCNC: 3.3 G/DL — SIGNIFICANT CHANGE UP (ref 3.3–5)
ALP SERPL-CCNC: 62 U/L — SIGNIFICANT CHANGE UP (ref 40–120)
ALT FLD-CCNC: 45 U/L — HIGH (ref 4–33)
ANION GAP SERPL CALC-SCNC: 8 MMOL/L — SIGNIFICANT CHANGE UP (ref 7–14)
APTT BLD: 20.3 SEC — LOW (ref 24.5–35.6)
AST SERPL-CCNC: 33 U/L — HIGH (ref 4–32)
BILIRUB SERPL-MCNC: 0.5 MG/DL — SIGNIFICANT CHANGE UP (ref 0.2–1.2)
BLD GP AB SCN SERPL QL: NEGATIVE — SIGNIFICANT CHANGE UP
BLOOD GAS VENOUS COMPREHENSIVE RESULT: SIGNIFICANT CHANGE UP
BLOOD GAS VENOUS COMPREHENSIVE RESULT: SIGNIFICANT CHANGE UP
BUN SERPL-MCNC: 11 MG/DL — SIGNIFICANT CHANGE UP (ref 7–23)
CA-I BLD-SCNC: 1.15 MMOL/L — SIGNIFICANT CHANGE UP (ref 1.15–1.29)
CALCIUM SERPL-MCNC: 8.9 MG/DL — SIGNIFICANT CHANGE UP (ref 8.4–10.5)
CHLORIDE SERPL-SCNC: 104 MMOL/L — SIGNIFICANT CHANGE UP (ref 98–107)
CK SERPL-CCNC: 322 U/L — HIGH (ref 25–170)
CO2 SERPL-SCNC: 26 MMOL/L — SIGNIFICANT CHANGE UP (ref 22–31)
CREAT SERPL-MCNC: 0.7 MG/DL — SIGNIFICANT CHANGE UP (ref 0.5–1.3)
EGFR: 91 ML/MIN/1.73M2 — SIGNIFICANT CHANGE UP
EGFR: 91 ML/MIN/1.73M2 — SIGNIFICANT CHANGE UP
GLUCOSE BLDC GLUCOMTR-MCNC: 113 MG/DL — HIGH (ref 70–99)
GLUCOSE BLDC GLUCOMTR-MCNC: 137 MG/DL — HIGH (ref 70–99)
GLUCOSE BLDC GLUCOMTR-MCNC: 190 MG/DL — HIGH (ref 70–99)
GLUCOSE BLDC GLUCOMTR-MCNC: 98 MG/DL — SIGNIFICANT CHANGE UP (ref 70–99)
GLUCOSE SERPL-MCNC: 87 MG/DL — SIGNIFICANT CHANGE UP (ref 70–99)
HCT VFR BLD CALC: 32.5 % — LOW (ref 34.5–45)
HGB BLD-MCNC: 11.5 G/DL — SIGNIFICANT CHANGE UP (ref 11.5–15.5)
INR BLD: 1 RATIO — SIGNIFICANT CHANGE UP (ref 0.85–1.16)
LEGIONELLA AG UR QL: NEGATIVE — SIGNIFICANT CHANGE UP
MAGNESIUM SERPL-MCNC: 2.1 MG/DL — SIGNIFICANT CHANGE UP (ref 1.6–2.6)
MCHC RBC-ENTMCNC: 32.6 PG — SIGNIFICANT CHANGE UP (ref 27–34)
MCHC RBC-ENTMCNC: 35.4 G/DL — SIGNIFICANT CHANGE UP (ref 32–36)
MCV RBC AUTO: 92.1 FL — SIGNIFICANT CHANGE UP (ref 80–100)
NRBC # BLD AUTO: 0 K/UL — SIGNIFICANT CHANGE UP (ref 0–0)
NRBC # FLD: 0 K/UL — SIGNIFICANT CHANGE UP (ref 0–0)
NRBC BLD AUTO-RTO: 0 /100 WBCS — SIGNIFICANT CHANGE UP (ref 0–0)
PHOSPHATE SERPL-MCNC: 2.4 MG/DL — LOW (ref 2.5–4.5)
PLATELET # BLD AUTO: 124 K/UL — LOW (ref 150–400)
POTASSIUM SERPL-MCNC: 4.3 MMOL/L — SIGNIFICANT CHANGE UP (ref 3.5–5.3)
POTASSIUM SERPL-SCNC: 4.3 MMOL/L — SIGNIFICANT CHANGE UP (ref 3.5–5.3)
PROT SERPL-MCNC: 5.7 G/DL — LOW (ref 6–8.3)
PROTHROM AB SERPL-ACNC: 11.9 SEC — SIGNIFICANT CHANGE UP (ref 9.9–13.4)
RBC # BLD: 3.53 M/UL — LOW (ref 3.8–5.2)
RBC # FLD: 12.5 % — SIGNIFICANT CHANGE UP (ref 10.3–14.5)
RH IG SCN BLD-IMP: POSITIVE — SIGNIFICANT CHANGE UP
SODIUM SERPL-SCNC: 138 MMOL/L — SIGNIFICANT CHANGE UP (ref 135–145)
WBC # BLD: 17.69 K/UL — HIGH (ref 3.8–10.5)
WBC # FLD AUTO: 17.69 K/UL — HIGH (ref 3.8–10.5)

## 2025-03-10 PROCEDURE — 95718 EEG PHYS/QHP 2-12 HR W/VEEG: CPT

## 2025-03-10 PROCEDURE — 99291 CRITICAL CARE FIRST HOUR: CPT

## 2025-03-10 PROCEDURE — 93970 EXTREMITY STUDY: CPT | Mod: 26

## 2025-03-10 RX ORDER — IPRATROPIUM BROMIDE AND ALBUTEROL SULFATE .5; 2.5 MG/3ML; MG/3ML
3 SOLUTION RESPIRATORY (INHALATION) EVERY 4 HOURS
Refills: 0 | Status: DISCONTINUED | OUTPATIENT
Start: 2025-03-10 | End: 2025-03-13

## 2025-03-10 RX ORDER — DEXMEDETOMIDINE HYDROCHLORIDE IN SODIUM CHLORIDE 4 UG/ML
0.4 INJECTION INTRAVENOUS
Qty: 400 | Refills: 0 | Status: DISCONTINUED | OUTPATIENT
Start: 2025-03-10 | End: 2025-03-10

## 2025-03-10 RX ORDER — DEXMEDETOMIDINE HYDROCHLORIDE IN SODIUM CHLORIDE 4 UG/ML
0.8 INJECTION INTRAVENOUS
Qty: 400 | Refills: 0 | Status: DISCONTINUED | OUTPATIENT
Start: 2025-03-10 | End: 2025-03-12

## 2025-03-10 RX ORDER — MIDAZOLAM IN 0.9 % SOD.CHLORID 1 MG/ML
2 PLASTIC BAG, INJECTION (ML) INTRAVENOUS ONCE
Refills: 0 | Status: DISCONTINUED | OUTPATIENT
Start: 2025-03-10 | End: 2025-03-10

## 2025-03-10 RX ORDER — POTASSIUM PHOSPHATE, MONOBASIC POTASSIUM PHOSPHATE, DIBASIC INJECTION, 236; 224 MG/ML; MG/ML
15 SOLUTION, CONCENTRATE INTRAVENOUS ONCE
Refills: 0 | Status: COMPLETED | OUTPATIENT
Start: 2025-03-10 | End: 2025-03-10

## 2025-03-10 RX ORDER — BUDESONIDE 0.25 MG/2ML
0.5 SUSPENSION RESPIRATORY (INHALATION) EVERY 12 HOURS
Refills: 0 | Status: DISCONTINUED | OUTPATIENT
Start: 2025-03-10 | End: 2025-03-15

## 2025-03-10 RX ORDER — LANOLIN/MINERAL OIL/PETROLATUM
1 OINTMENT (GRAM) OPHTHALMIC (EYE)
Refills: 0 | Status: DISCONTINUED | OUTPATIENT
Start: 2025-03-10 | End: 2025-03-12

## 2025-03-10 RX ADMIN — IPRATROPIUM BROMIDE AND ALBUTEROL SULFATE 3 MILLILITER(S): .5; 2.5 SOLUTION RESPIRATORY (INHALATION) at 19:54

## 2025-03-10 RX ADMIN — POTASSIUM PHOSPHATE, MONOBASIC POTASSIUM PHOSPHATE, DIBASIC INJECTION, 62.5 MILLIMOLE(S): 236; 224 SOLUTION, CONCENTRATE INTRAVENOUS at 03:39

## 2025-03-10 RX ADMIN — DEXMEDETOMIDINE HYDROCHLORIDE IN SODIUM CHLORIDE 10 MICROGRAM(S)/KG/HR: 4 INJECTION INTRAVENOUS at 02:26

## 2025-03-10 RX ADMIN — Medication 24 MICROGRAM(S)/KG/HR: at 07:31

## 2025-03-10 RX ADMIN — IPRATROPIUM BROMIDE AND ALBUTEROL SULFATE 3 MILLILITER(S): .5; 2.5 SOLUTION RESPIRATORY (INHALATION) at 23:20

## 2025-03-10 RX ADMIN — POLYETHYLENE GLYCOL 3350 17 GRAM(S): 17 POWDER, FOR SOLUTION ORAL at 12:02

## 2025-03-10 RX ADMIN — Medication 1 APPLICATION(S): at 05:11

## 2025-03-10 RX ADMIN — Medication 24 MICROGRAM(S)/KG/HR: at 06:21

## 2025-03-10 RX ADMIN — Medication 2 MILLIGRAM(S): at 00:50

## 2025-03-10 RX ADMIN — IPRATROPIUM BROMIDE AND ALBUTEROL SULFATE 3 MILLILITER(S): .5; 2.5 SOLUTION RESPIRATORY (INHALATION) at 11:04

## 2025-03-10 RX ADMIN — DEXMEDETOMIDINE HYDROCHLORIDE IN SODIUM CHLORIDE 16 MICROGRAM(S)/KG/HR: 4 INJECTION INTRAVENOUS at 05:11

## 2025-03-10 RX ADMIN — SERTRALINE 25 MILLIGRAM(S): 100 TABLET, FILM COATED ORAL at 12:02

## 2025-03-10 RX ADMIN — Medication 16 MG/KG/HR: at 07:32

## 2025-03-10 RX ADMIN — ENOXAPARIN SODIUM 40 MILLIGRAM(S): 100 INJECTION SUBCUTANEOUS at 21:05

## 2025-03-10 RX ADMIN — Medication 16 MG/KG/HR: at 21:05

## 2025-03-10 RX ADMIN — BUDESONIDE 0.5 MILLIGRAM(S): 0.25 SUSPENSION RESPIRATORY (INHALATION) at 15:27

## 2025-03-10 RX ADMIN — OLANZAPINE 7.5 MILLIGRAM(S): 10 TABLET ORAL at 12:02

## 2025-03-10 RX ADMIN — BUDESONIDE 0.5 MILLIGRAM(S): 0.25 SUSPENSION RESPIRATORY (INHALATION) at 19:54

## 2025-03-10 RX ADMIN — Medication 1 APPLICATION(S): at 18:31

## 2025-03-10 RX ADMIN — IPRATROPIUM BROMIDE AND ALBUTEROL SULFATE 3 MILLILITER(S): .5; 2.5 SOLUTION RESPIRATORY (INHALATION) at 07:49

## 2025-03-10 RX ADMIN — METHYLPREDNISOLONE ACETATE 40 MILLIGRAM(S): 80 INJECTION, SUSPENSION INTRA-ARTICULAR; INTRALESIONAL; INTRAMUSCULAR; SOFT TISSUE at 05:10

## 2025-03-10 RX ADMIN — IPRATROPIUM BROMIDE AND ALBUTEROL SULFATE 3 MILLILITER(S): .5; 2.5 SOLUTION RESPIRATORY (INHALATION) at 03:19

## 2025-03-10 RX ADMIN — Medication 250 MILLIGRAM(S): at 21:05

## 2025-03-10 RX ADMIN — Medication 16 MG/KG/HR: at 05:11

## 2025-03-10 RX ADMIN — IPRATROPIUM BROMIDE AND ALBUTEROL SULFATE 3 MILLILITER(S): .5; 2.5 SOLUTION RESPIRATORY (INHALATION) at 15:27

## 2025-03-10 RX ADMIN — INSULIN LISPRO 1: 100 INJECTION, SOLUTION INTRAVENOUS; SUBCUTANEOUS at 12:03

## 2025-03-10 RX ADMIN — Medication 24 MICROGRAM(S)/KG/HR: at 21:05

## 2025-03-10 RX ADMIN — Medication 15 MILLILITER(S): at 05:13

## 2025-03-10 RX ADMIN — Medication 16 MG/KG/HR: at 01:07

## 2025-03-10 RX ADMIN — DEXMEDETOMIDINE HYDROCHLORIDE IN SODIUM CHLORIDE 16 MICROGRAM(S)/KG/HR: 4 INJECTION INTRAVENOUS at 07:32

## 2025-03-10 RX ADMIN — Medication 15 MILLILITER(S): at 18:31

## 2025-03-10 NOTE — PROGRESS NOTE ADULT - ATTENDING COMMENTS
73 yo F with hx of asthma (on prn Albuterol only) admitted to MICU with acute hypoxemic hypercapnic respiratory failure in the setting of asthma exacerbation.   Currently improved respiratory status, on minimal vent settings, no e/o air trapping or increased peak flows. Continue with standing duonebs, Mg, IV steroids, add Budesonide nebs. Ok to d/c Ceftriaxone given no e/o bacterial infection, c/w Azithro for anti-inflammatory effects. Wean sedation and CPAP as able.

## 2025-03-10 NOTE — DIETITIAN INITIAL EVALUATION ADULT - ENTERAL
- Start Rhonda Aguayo's Peptide 1.5 @ 10mL/hr then increase by 10mL q 4hrs, as tolerated to goal of 55mL/hr x 18hrs (11a-5a) to provide:    990mL total volume  1485 Kcals  73g protein  693mL free water      This will give 18.5 KCals/kg actual/current weight & ~1.5g protein/kg Ideal Body Weight.

## 2025-03-10 NOTE — DIETITIAN INITIAL EVALUATION ADULT - ORAL INTAKE PTA/DIET HISTORY
Per son, Dr. Silva, Pt. with good appetite/PO intake PTA. Generally eats 2-3 meals/day and drinks mostly water.  Pt. prefers to eat home cooked meals.  Takes multivitamin.    NKFA.

## 2025-03-10 NOTE — PROGRESS NOTE ADULT - SUBJECTIVE AND OBJECTIVE BOX
************************  Mir Tyler MD-PhD  Internal Medicine PGY-3  ************************    SUBJECTIVE:      Patient seen and examined at bedside. No events overnight, no acute complaints this morning. Patient with appropriate PO intake and urinating well with BM(s). Denies CP, SOB, fevers/chills, N/V, or abdominal pain. Patient reminded of ongoing careplan.      VITAL SIGNS:  ICU Vital Signs Last 24 Hrs  T(C): 36.2 (10 Mar 2025 04:00), Max: 37.2 (09 Mar 2025 08:00)  T(F): 97.2 (10 Mar 2025 04:00), Max: 99 (09 Mar 2025 08:00)  HR: 78 (10 Mar 2025 05:00) (72 - 92)  BP: 145/77 (10 Mar 2025 05:00) (86/69 - 149/66)  BP(mean): 97 (10 Mar 2025 05:00) (69 - 99)  ABP: --  ABP(mean): --  RR: 13 (10 Mar 2025 05:00) (13 - 19)  SpO2: 95% (10 Mar 2025 05:00) (93% - 99%)    O2 Parameters below as of 10 Mar 2025 05:00  Patient On (Oxygen Delivery Method): ventilator    O2 Concentration (%): 30      Mode: AC/ CMV (Assist Control/ Continuous Mandatory Ventilation), RR (machine): 13, TV (machine): 380, FiO2: 30, PEEP: 6, ITime: 0.53, MAP: 9, PIP: 26  Plateau pressure:   P/F ratio:     03-08 @ 06:01  -  03-09 @ 07:00  --------------------------------------------------------  IN: 1038 mL / OUT: 2150 mL / NET: -1112 mL    03-09 @ 07:01  -  03-10 @ 06:18  --------------------------------------------------------  IN: 1456 mL / OUT: 3510 mL / NET: -2054 mL      CAPILLARY BLOOD GLUCOSE      POCT Blood Glucose.: 113 mg/dL (10 Mar 2025 05:15)    ECG:     PHYSICAL EXAM:    03-08 @ 06:01  -  03-09 @ 07:00  --------------------------------------------------------  IN: 1038 mL / OUT: 2150 mL / NET: -1112 mL    03-09 @ 07:01  -  03-10 @ 06:18  --------------------------------------------------------  IN: 1456 mL / OUT: 3510 mL / NET: -2054 mL    Vital Signs Last 24 Hrs  T(C): 36.2 (10 Mar 2025 04:00), Max: 37.2 (09 Mar 2025 08:00)  T(F): 97.2 (10 Mar 2025 04:00), Max: 99 (09 Mar 2025 08:00)  HR: 78 (10 Mar 2025 05:00) (72 - 92)  BP: 145/77 (10 Mar 2025 05:00) (86/69 - 149/66)  BP(mean): 97 (10 Mar 2025 05:00) (69 - 99)  RR: 13 (10 Mar 2025 05:00) (13 - 19)  SpO2: 95% (10 Mar 2025 05:00) (93% - 99%)    Parameters below as of 10 Mar 2025 05:00  Patient On (Oxygen Delivery Method): ventilator    O2 Concentration (%): 30  T(C): 36.2 (03-10-25 @ 04:00), Max: 37.2 (03-09-25 @ 08:00)  HR: 78 (03-10-25 @ 05:00) (72 - 92)  BP: 145/77 (03-10-25 @ 05:00) (86/69 - 149/66)  RR: 13 (03-10-25 @ 05:00) (13 - 19)  SpO2: 95% (03-10-25 @ 05:00) (93% - 99%)    General: NAD  HEENT: PERRLA  Respiratory: intubated, no wheezing  Cardiovascular: RRR  Abdomen: soft. NT  Extremities: 2+ b/l pitting edema  Neurological: sedated     MEDICATIONS:  MEDICATIONS  (STANDING):  albuterol/ipratropium for Nebulization 3 milliLiter(s) Nebulizer every 4 hours  azithromycin  IVPB 500 milliGRAM(s) IV Intermittent every 24 hours  cefTRIAXone   IVPB 1000 milliGRAM(s) IV Intermittent every 24 hours  chlorhexidine 0.12% Liquid 15 milliLiter(s) Oral Mucosa every 12 hours  chlorhexidine 2% Cloths 1 Application(s) Topical <User Schedule>  dexMEDEtomidine Infusion 0.8 MICROgram(s)/kG/Hr (16 mL/Hr) IV Continuous <Continuous>  dextrose 5%. 1000 milliLiter(s) (100 mL/Hr) IV Continuous <Continuous>  dextrose 5%. 1000 milliLiter(s) (50 mL/Hr) IV Continuous <Continuous>  dextrose 50% Injectable 25 Gram(s) IV Push once  dextrose 50% Injectable 12.5 Gram(s) IV Push once  dextrose 50% Injectable 25 Gram(s) IV Push once  dextrose Oral Gel 15 Gram(s) Oral once  enoxaparin Injectable 40 milliGRAM(s) SubCutaneous every 24 hours  fentaNYL   Infusion. 3 MICROgram(s)/kG/Hr (24 mL/Hr) IV Continuous <Continuous>  glucagon  Injectable 1 milliGRAM(s) IntraMuscular once  insulin lispro (ADMELOG) corrective regimen sliding scale   SubCutaneous every 6 hours  ketamine Infusion. 2 mG/kG/Hr (16 mL/Hr) IV Continuous <Continuous>  methylPREDNISolone sodium succinate Injectable 40 milliGRAM(s) IV Push daily  norepinephrine Infusion 0.02 MICROgram(s)/kG/Min (3 mL/Hr) IV Continuous <Continuous>  OLANZapine 7.5 milliGRAM(s) Oral daily  polyethylene glycol 3350 17 Gram(s) Oral daily  sertraline 25 milliGRAM(s) Oral daily    MEDICATIONS  (PRN):      ALLERGIES:  Allergies    No Known Allergies    Intolerances        LABS:                        11.5   17.69 )-----------( 124      ( 10 Mar 2025 00:20 )             32.5     03-10    138  |  104  |  11  ----------------------------<  87  4.3   |  26  |  0.70    Ca    8.9      10 Mar 2025 00:20  Phos  2.4     03-10  Mg     2.10     03-10    TPro  5.7[L]  /  Alb  3.3  /  TBili  0.5  /  DBili  x   /  AST  33[H]  /  ALT  45[H]  /  AlkPhos  62  03-10    PT/INR - ( 10 Mar 2025 00:20 )   PT: 11.9 sec;   INR: 1.00 ratio         PTT - ( 10 Mar 2025 00:20 )  PTT:20.3 sec      Urinalysis Basic - ( 10 Mar 2025 00:20 )    Color: x / Appearance: x / SG: x / pH: x  Gluc: 87 mg/dL / Ketone: x  / Bili: x / Urobili: x   Blood: x / Protein: x / Nitrite: x   Leuk Esterase: x / RBC: x / WBC x   Sq Epi: x / Non Sq Epi: x / Bacteria: x          IMAGING & OTHER TESTS:    NNI   ************************  Mir Tyler, MD-PhD  Internal Medicine PGY-3  ************************    SUBJECTIVE:      Patient seen and examined at bedside. No events overnight. Patient reminded of ongoing careplan.      VITAL SIGNS:  ICU Vital Signs Last 24 Hrs  T(C): 36.2 (10 Mar 2025 04:00), Max: 37.2 (09 Mar 2025 08:00)  T(F): 97.2 (10 Mar 2025 04:00), Max: 99 (09 Mar 2025 08:00)  HR: 78 (10 Mar 2025 05:00) (72 - 92)  BP: 145/77 (10 Mar 2025 05:00) (86/69 - 149/66)  BP(mean): 97 (10 Mar 2025 05:00) (69 - 99)  ABP: --  ABP(mean): --  RR: 13 (10 Mar 2025 05:00) (13 - 19)  SpO2: 95% (10 Mar 2025 05:00) (93% - 99%)    O2 Parameters below as of 10 Mar 2025 05:00  Patient On (Oxygen Delivery Method): ventilator    O2 Concentration (%): 30      Mode: AC/ CMV (Assist Control/ Continuous Mandatory Ventilation), RR (machine): 13, TV (machine): 380, FiO2: 30, PEEP: 6, ITime: 0.53, MAP: 9, PIP: 26  Plateau pressure:   P/F ratio:     03-08 @ 06:01  -  03-09 @ 07:00  --------------------------------------------------------  IN: 1038 mL / OUT: 2150 mL / NET: -1112 mL    03-09 @ 07:01  -  03-10 @ 06:18  --------------------------------------------------------  IN: 1456 mL / OUT: 3510 mL / NET: -2054 mL      CAPILLARY BLOOD GLUCOSE      POCT Blood Glucose.: 113 mg/dL (10 Mar 2025 05:15)    ECG:     PHYSICAL EXAM:    03-08 @ 06:01  -  03-09 @ 07:00  --------------------------------------------------------  IN: 1038 mL / OUT: 2150 mL / NET: -1112 mL    03-09 @ 07:01  -  03-10 @ 06:18  --------------------------------------------------------  IN: 1456 mL / OUT: 3510 mL / NET: -2054 mL    Vital Signs Last 24 Hrs  T(C): 36.2 (10 Mar 2025 04:00), Max: 37.2 (09 Mar 2025 08:00)  T(F): 97.2 (10 Mar 2025 04:00), Max: 99 (09 Mar 2025 08:00)  HR: 78 (10 Mar 2025 05:00) (72 - 92)  BP: 145/77 (10 Mar 2025 05:00) (86/69 - 149/66)  BP(mean): 97 (10 Mar 2025 05:00) (69 - 99)  RR: 13 (10 Mar 2025 05:00) (13 - 19)  SpO2: 95% (10 Mar 2025 05:00) (93% - 99%)    Parameters below as of 10 Mar 2025 05:00  Patient On (Oxygen Delivery Method): ventilator    O2 Concentration (%): 30  T(C): 36.2 (03-10-25 @ 04:00), Max: 37.2 (03-09-25 @ 08:00)  HR: 78 (03-10-25 @ 05:00) (72 - 92)  BP: 145/77 (03-10-25 @ 05:00) (86/69 - 149/66)  RR: 13 (03-10-25 @ 05:00) (13 - 19)  SpO2: 95% (03-10-25 @ 05:00) (93% - 99%)    General: NAD  HEENT: PERRLA  Respiratory: intubated, lungs clear  Cardiovascular: RRR  Abdomen: soft. NT  Extremities: 2+ b/l pitting edema  Neurological: sedated     MEDICATIONS:  MEDICATIONS  (STANDING):  albuterol/ipratropium for Nebulization 3 milliLiter(s) Nebulizer every 4 hours  azithromycin  IVPB 500 milliGRAM(s) IV Intermittent every 24 hours  cefTRIAXone   IVPB 1000 milliGRAM(s) IV Intermittent every 24 hours  chlorhexidine 0.12% Liquid 15 milliLiter(s) Oral Mucosa every 12 hours  chlorhexidine 2% Cloths 1 Application(s) Topical <User Schedule>  dexMEDEtomidine Infusion 0.8 MICROgram(s)/kG/Hr (16 mL/Hr) IV Continuous <Continuous>  dextrose 5%. 1000 milliLiter(s) (100 mL/Hr) IV Continuous <Continuous>  dextrose 5%. 1000 milliLiter(s) (50 mL/Hr) IV Continuous <Continuous>  dextrose 50% Injectable 25 Gram(s) IV Push once  dextrose 50% Injectable 12.5 Gram(s) IV Push once  dextrose 50% Injectable 25 Gram(s) IV Push once  dextrose Oral Gel 15 Gram(s) Oral once  enoxaparin Injectable 40 milliGRAM(s) SubCutaneous every 24 hours  fentaNYL   Infusion. 3 MICROgram(s)/kG/Hr (24 mL/Hr) IV Continuous <Continuous>  glucagon  Injectable 1 milliGRAM(s) IntraMuscular once  insulin lispro (ADMELOG) corrective regimen sliding scale   SubCutaneous every 6 hours  ketamine Infusion. 2 mG/kG/Hr (16 mL/Hr) IV Continuous <Continuous>  methylPREDNISolone sodium succinate Injectable 40 milliGRAM(s) IV Push daily  norepinephrine Infusion 0.02 MICROgram(s)/kG/Min (3 mL/Hr) IV Continuous <Continuous>  OLANZapine 7.5 milliGRAM(s) Oral daily  polyethylene glycol 3350 17 Gram(s) Oral daily  sertraline 25 milliGRAM(s) Oral daily    MEDICATIONS  (PRN):      ALLERGIES:  Allergies    No Known Allergies    Intolerances        LABS:                        11.5   17.69 )-----------( 124      ( 10 Mar 2025 00:20 )             32.5     03-10    138  |  104  |  11  ----------------------------<  87  4.3   |  26  |  0.70    Ca    8.9      10 Mar 2025 00:20  Phos  2.4     03-10  Mg     2.10     03-10    TPro  5.7[L]  /  Alb  3.3  /  TBili  0.5  /  DBili  x   /  AST  33[H]  /  ALT  45[H]  /  AlkPhos  62  03-10    PT/INR - ( 10 Mar 2025 00:20 )   PT: 11.9 sec;   INR: 1.00 ratio         PTT - ( 10 Mar 2025 00:20 )  PTT:20.3 sec      Urinalysis Basic - ( 10 Mar 2025 00:20 )    Color: x / Appearance: x / SG: x / pH: x  Gluc: 87 mg/dL / Ketone: x  / Bili: x / Urobili: x   Blood: x / Protein: x / Nitrite: x   Leuk Esterase: x / RBC: x / WBC x   Sq Epi: x / Non Sq Epi: x / Bacteria: x          IMAGING & OTHER TESTS:    NNI

## 2025-03-10 NOTE — DIETITIAN INITIAL EVALUATION ADULT - PERTINENT LABORATORY DATA
03-10    138  |  104  |  11  ----------------------------<  87  4.3   |  26  |  0.70    Ca    8.9      10 Mar 2025 00:20  Phos  2.4     03-10  Mg     2.10     03-10      CAPILLARY BLOOD GLUCOSE    POCT Blood Glucose.: 190 mg/dL (10 Mar 2025 12:01)  POCT Blood Glucose.: 113 mg/dL (10 Mar 2025 05:15)  POCT Blood Glucose.: 98 mg/dL (10 Mar 2025 00:22)  POCT Blood Glucose.: 95 mg/dL (09 Mar 2025 22:12)  POCT Blood Glucose.: 140 mg/dL (09 Mar 2025 17:11)    A1C with Estimated Average Glucose Result: 5.7 % (03-09-25 @ 07:10)

## 2025-03-10 NOTE — DIETITIAN INITIAL EVALUATION ADULT - [id number]_GEN_A_CORE
Bowel movement (3/7). Hypoactive bowel sounds, per flow sheets.    No noted vomiting/diarrhea/constipation or abdominal distention.

## 2025-03-10 NOTE — PROGRESS NOTE ADULT - ASSESSMENT
74F hx asthma and depressed presented with AHHRF c/b AMS requiring MICU admission for intubation for airway protection i/s/o asthma exacerbation.    Neuro  # Acute Metabolic Encephalopathy  # Seizure-like activity  Encephalopathy i/s/o hypercapnic respiratory failure vs hyponatremia  - CTH: no acute changes  - CT spine: no fractures  - 3/8 ON: 6 minutes of seizure-like activity with myoclonic upper jerks -> broke with ativan  > cont sedation w/ fent and ketamine; wean as tolerated (off proprofol for vEEG)  > prelim vEEG w/ diffuse slowing (non-specific); pending final vEEG results      Cardiovascular  # HTN  # Resolved shock state  - troponin 9, EKG without acute ischemia  - TTE unremarkable, mild pulm HTN (44 mmHg)  - off pressors (norepi) since 3/10 00:01  > hold antihypertensives for now (lisinopril and HCTZ)    Pulmonary  # AHHRF  # Asthma, prior exacerbation  Likely 2/2 asthma exacerbation, with wheezing.  - CTA without evidence of PNA or PE  - ful RVP negative  > cont albuterol/ipratropium   > c/w solumedrol x 5 days; s/p methylprednisone 125mg, and Magnesium  > cont suppl O2 as needed, wean as tolerated based on serial blood gases  > Vent settings to avoid air trapping; CTM peak pressures    GI  #Diet  > NPO for now, resume w/ TFs if to remain intubated    Renal/  # Metabolic alkalosis  # Hyponatremia  Kidney fxn intact (SCr ~0.7). Bicarb elevation likely compensatory i/s/o chronic hypercapnia.  > trend Cr, electrolytes  > free water restriction  > replete electrolytes PRN (K, Mg, Ph)    ID  No infectious symptoms or leukocytosis  - Full RVP neg  - CT chest without PNA  > c/w empiric CAP antibiotics: azithro and CTX  > f/u Ur strep and legionella (in lab)  > f/u Bcx     Endocrine  No active issues.  - A1c 5.7%  > SSI if needed for hyperglycemia    Heme  # Thrombocytopenia, mild  Likely i/s/o critical illness.  > CTM for now; transfuse for PLT < 10 or < 50 with bleeding    # DVT ppx  > Enoxaparin 40mg qD 74F hx asthma and depressed presented with AHHRF c/b AMS requiring MICU admission for intubation for airway protection i/s/o asthma exacerbation.    Neuro  # Acute Metabolic Encephalopathy  # Seizure-like activity  Encephalopathy i/s/o hypercapnic respiratory failure vs hyponatremia  - CTH: no acute changes  - CT spine: no fractures  - 3/8 ON: 6 minutes of seizure-like activity with myoclonic upper jerks -> broke with ativan  > cont sedation w/ fent (wean), ketamine (wean), precedex; wean as tolerated (off propofol for vEEG)  > prelim vEEG w/ diffuse slowing (non-specific); pending final vEEG results      Cardiovascular  # HTN  # Resolved shock state  - troponin 9, EKG without acute ischemia  - TTE unremarkable, mild pulm HTN (44 mmHg)  - off pressors (norepi) since 3/10 00:01  > cont holding antihypertensives for now (lisinopril and HCTZ)    Pulmonary  # AHHRF  # Asthma, prior exacerbation  Likely 2/2 asthma exacerbation, with wheezing.  - CTA without evidence of PNA or PE  - ful RVP negative  > cont duonebs w/ budesonide  > c/w solumedrol x 5 days w/ PO taper planned; s/p methylprednisolone 125mg, and Magnesium  > cont suppl O2 as needed, wean as tolerated  > Vent settings to avoid air trapping; CTM peak pressures    GI  #Diet  > started TFs    Renal/  # Metabolic alkalosis  # Hyponatremia  Kidney fxn intact (SCr ~0.7). Bicarb elevation likely compensatory i/s/o chronic hypercapnia.  > trend Cr, electrolytes  > free water restriction  > replete electrolytes PRN (K, Mg, Ph)    ID  No infectious symptoms or leukocytosis  - Full RVP neg; legionella negative  - CT chest without PNA  > c/w azithro for anti-inflammatory aspect; stop empiric CAP coverage (CTX)  > f/u Ur strep (in lab)  > f/u Bcx (NGTD)    Endocrine  No active issues.  - A1c 5.7%  > SSI if needed for hyperglycemia    Heme  # Thrombocytopenia, mild  Likely i/s/o critical illness.  > CTM for now; transfuse for PLT < 10 or < 50 with bleeding    # r/o DVT  # DVT ppx  > R>L leg size; f/u bi/l LE duplex  > Enoxaparin 40mg qD

## 2025-03-10 NOTE — EEG REPORT - NS EEG TEXT BOX
[facility]   COMPREHENSIVE EPILEPSY CENTER   REPORT OF LONG-TERM VIDEO EEG     [facility]: [address], [place] [address], Ph#: [phone]  [facility]: [address] [place] [address], Ph#: [phone]  [facility]: [address] [place] [address], Ph#: [phone]    Patient Name: GHISLAINE RODRIGUEZ  Age and : 74y (50)  MRN #: [id number]  Location: [place] [address]  Referring Physician: Elham Yusuf    Start Time/Date: 13:00 on 3/9/2025  End Time/Date: 08:00 on 3/10/2025  Duration: 17  hours    _____________________________________________________________  STUDY INFORMATION    EEG Recording Technique:  The patient underwent continuous Video-EEG monitoring, using Telemetry System hardware on the XLTek Digital System. EEG and video data were stored on a computer hard drive with important events saved in digital archive files. The material was reviewed by a physician (electroencephalographer / epileptologist) on a daily basis. Trev and seizure detection algorithms were utilized and reviewed. An EEG Technician attended to the patient, and was available throughout daytime work hours.  The epilepsy center neurologist was available in person or on call 24-hours per day.    EEG Placement and Labeling of Electrodes:  The EEG was performed utilizing 20 channel referential EEG connections (coronal over temporal over parasagittal montage) using all standard 10-20 electrode placements with EKG, with additional electrodes placed in the inferior temporal region using the modified 10-10 montage electrode placements for elective admissions, or if deemed necessary. Recording was at a sampling rate of 256 samples per second per channel. Time synchronized digital video recording was done simultaneously with EEG recording. A low light infrared camera was used for low light recording.     _____________________________________________________________  HISTORY    Patient is a 74y old  Female who presents with a chief complaint of respiratory failure.    PERTINENT MEDICATION:  MEDICATIONS  (STANDING):  albuterol/ipratropium for Nebulization 3 milliLiter(s) Nebulizer every 4 hours  azithromycin  IVPB 500 milliGRAM(s) IV Intermittent every 24 hours  cefTRIAXone   IVPB 1000 milliGRAM(s) IV Intermittent every 24 hours  chlorhexidine 0.12% Liquid 15 milliLiter(s) Oral Mucosa every 12 hours  chlorhexidine 2% Cloths 1 Application(s) Topical <User Schedule>  dexMEDEtomidine Infusion 0.8 MICROgram(s)/kG/Hr (16 mL/Hr) IV Continuous <Continuous>  dextrose 5%. 1000 milliLiter(s) (100 mL/Hr) IV Continuous <Continuous>  dextrose 5%. 1000 milliLiter(s) (50 mL/Hr) IV Continuous <Continuous>  dextrose 50% Injectable 25 Gram(s) IV Push once  dextrose 50% Injectable 12.5 Gram(s) IV Push once  dextrose 50% Injectable 25 Gram(s) IV Push once  dextrose Oral Gel 15 Gram(s) Oral once  enoxaparin Injectable 40 milliGRAM(s) SubCutaneous every 24 hours  fentaNYL   Infusion. 3 MICROgram(s)/kG/Hr (24 mL/Hr) IV Continuous <Continuous>  glucagon  Injectable 1 milliGRAM(s) IntraMuscular once  insulin lispro (ADMELOG) corrective regimen sliding scale   SubCutaneous every 6 hours  ketamine Infusion. 2 mG/kG/Hr (16 mL/Hr) IV Continuous <Continuous>  methylPREDNISolone sodium succinate Injectable 40 milliGRAM(s) IV Push daily  norepinephrine Infusion 0.02 MICROgram(s)/kG/Min (3 mL/Hr) IV Continuous <Continuous>  OLANZapine 7.5 milliGRAM(s) Oral daily  petrolatum Ophthalmic Ointment 1 Application(s) Both EYES two times a day  polyethylene glycol 3350 17 Gram(s) Oral daily  sertraline 25 milliGRAM(s) Oral daily    _____________________________________________________________  STUDY INTERPRETATION      FINDINGS:      Background:  Continuity: continuous  Symmetry: symmetric  PDR: none  Reactivity: absent  Voltage: low  Anterior Posterior Gradient: absent  Other background findings: none  Breach: absent    Background Slowing:  Generalized slowing: severe, with predominantly low amplitude delta activity.  Focal slowing: none was present.    State Changes:   Absent    Sporadic Epileptiform Discharges:    None    Rhythmic and Periodic Patterns (RPPs):  None     Electrographic and Electroclinical seizures:  None    Other Clinical Events:  None    Activation Procedures:   -Hyperventilation was not performed.    -Photic stimulation was not performed.    Artifacts:  Intermittent myogenic and movement artifacts were noted.    ECG:  The heart rate on single channel ECG was predominantly ~80 bpm.    Summary:  Abnormal  EEG in the comatose state.  Severe diffuse slowing and attenuation.    Clinical Impression:  Severe degree of diffuse or multifocal cerebral dysfunction.  There were no epileptiform abnormalities recorded.      -------------------------------------------------------------------------------------------------------    Chyna Morrissey MD  Director, Epilepsy - [facility]    Questions please call [phone]  After hours (5 PM - 8:30 AM) please call the epilepsy answering service at [phone]

## 2025-03-10 NOTE — DIETITIAN INITIAL EVALUATION ADULT - PERTINENT MEDS FT
MEDICATIONS  (STANDING):  albuterol/ipratropium for Nebulization 3 milliLiter(s) Nebulizer every 4 hours  azithromycin  IVPB 500 milliGRAM(s) IV Intermittent every 24 hours  buDESOnide    Inhalation Suspension 0.5 milliGRAM(s) Inhalation every 12 hours  chlorhexidine 0.12% Liquid 15 milliLiter(s) Oral Mucosa every 12 hours  chlorhexidine 2% Cloths 1 Application(s) Topical <User Schedule>  dexMEDEtomidine Infusion 0.8 MICROgram(s)/kG/Hr (16 mL/Hr) IV Continuous <Continuous>  dextrose 5%. 1000 milliLiter(s) (100 mL/Hr) IV Continuous <Continuous>  dextrose 5%. 1000 milliLiter(s) (50 mL/Hr) IV Continuous <Continuous>  dextrose 50% Injectable 25 Gram(s) IV Push once  dextrose 50% Injectable 12.5 Gram(s) IV Push once  dextrose 50% Injectable 25 Gram(s) IV Push once  dextrose Oral Gel 15 Gram(s) Oral once  enoxaparin Injectable 40 milliGRAM(s) SubCutaneous every 24 hours  fentaNYL   Infusion. 3 MICROgram(s)/kG/Hr (24 mL/Hr) IV Continuous <Continuous>  glucagon  Injectable 1 milliGRAM(s) IntraMuscular once  insulin lispro (ADMELOG) corrective regimen sliding scale   SubCutaneous every 6 hours  ketamine Infusion. 2 mG/kG/Hr (16 mL/Hr) IV Continuous <Continuous>  methylPREDNISolone sodium succinate Injectable 40 milliGRAM(s) IV Push daily  norepinephrine Infusion 0.02 MICROgram(s)/kG/Min (3 mL/Hr) IV Continuous <Continuous>  OLANZapine 7.5 milliGRAM(s) Oral daily  petrolatum Ophthalmic Ointment 1 Application(s) Both EYES two times a day  polyethylene glycol 3350 17 Gram(s) Oral daily  sertraline 25 milliGRAM(s) Oral daily    MEDICATIONS  (PRN):

## 2025-03-10 NOTE — DIETITIAN INITIAL EVALUATION ADULT - REASON FOR ADMISSION
-75 y/o F with Hx of asthma & MDD. Presenting with wheezing x 3d & s/p unwitnessed fall.  Admitted with acute hypoxic respiratory failure & hypercapnic respiratory acidosis.  Intubated in ED for airway protection.

## 2025-03-10 NOTE — DIETITIAN INITIAL EVALUATION ADULT - OTHER INFO
Spoke with resident physician.  Plan is to initiate enteral nutrition.     Spoke with resident physician.  Plan is to initiate enteral nutrition.  Pt. remains intubated.  vEEG monitoring observed in place.    Met with son of Pt.  When asked, reports Pt. with gradual weight increase in recent years attributed to Olanzapine.  Amount of weight increase not specified.  RDN discussed role of enteral nutrition & tube feeding @ this time.  Son with appropriate nutrition related questions, which were answered and explained & son demonstrates understanding.    Informed RN and resident physician of enteral feeding suggestions with Rhonda Aguayo's Peptide 1.5 with eventual goal of 55mL/hr x 18hrs.

## 2025-03-11 LAB
ALBUMIN SERPL ELPH-MCNC: 3.5 G/DL — SIGNIFICANT CHANGE UP (ref 3.3–5)
ALP SERPL-CCNC: 79 U/L — SIGNIFICANT CHANGE UP (ref 40–120)
ALT FLD-CCNC: 46 U/L — HIGH (ref 4–33)
ANION GAP SERPL CALC-SCNC: 13 MMOL/L — SIGNIFICANT CHANGE UP (ref 7–14)
APTT BLD: 22.7 SEC — LOW (ref 24.5–35.6)
AST SERPL-CCNC: 35 U/L — HIGH (ref 4–32)
BILIRUB SERPL-MCNC: 0.4 MG/DL — SIGNIFICANT CHANGE UP (ref 0.2–1.2)
BLOOD GAS VENOUS COMPREHENSIVE RESULT: SIGNIFICANT CHANGE UP
BUN SERPL-MCNC: 11 MG/DL — SIGNIFICANT CHANGE UP (ref 7–23)
CALCIUM SERPL-MCNC: 9 MG/DL — SIGNIFICANT CHANGE UP (ref 8.4–10.5)
CHLORIDE SERPL-SCNC: 101 MMOL/L — SIGNIFICANT CHANGE UP (ref 98–107)
CO2 SERPL-SCNC: 27 MMOL/L — SIGNIFICANT CHANGE UP (ref 22–31)
CREAT SERPL-MCNC: 0.54 MG/DL — SIGNIFICANT CHANGE UP (ref 0.5–1.3)
EGFR: 97 ML/MIN/1.73M2 — SIGNIFICANT CHANGE UP
EGFR: 97 ML/MIN/1.73M2 — SIGNIFICANT CHANGE UP
GLUCOSE BLDC GLUCOMTR-MCNC: 135 MG/DL — HIGH (ref 70–99)
GLUCOSE BLDC GLUCOMTR-MCNC: 135 MG/DL — HIGH (ref 70–99)
GLUCOSE BLDC GLUCOMTR-MCNC: 141 MG/DL — HIGH (ref 70–99)
GLUCOSE BLDC GLUCOMTR-MCNC: 181 MG/DL — HIGH (ref 70–99)
GLUCOSE SERPL-MCNC: 143 MG/DL — HIGH (ref 70–99)
HCT VFR BLD CALC: 40.1 % — SIGNIFICANT CHANGE UP (ref 34.5–45)
HGB BLD-MCNC: 13.6 G/DL — SIGNIFICANT CHANGE UP (ref 11.5–15.5)
INR BLD: 1 RATIO — SIGNIFICANT CHANGE UP (ref 0.85–1.16)
MAGNESIUM SERPL-MCNC: 1.8 MG/DL — SIGNIFICANT CHANGE UP (ref 1.6–2.6)
MCHC RBC-ENTMCNC: 32.1 PG — SIGNIFICANT CHANGE UP (ref 27–34)
MCHC RBC-ENTMCNC: 33.9 G/DL — SIGNIFICANT CHANGE UP (ref 32–36)
MCV RBC AUTO: 94.6 FL — SIGNIFICANT CHANGE UP (ref 80–100)
NRBC # BLD AUTO: 0 K/UL — SIGNIFICANT CHANGE UP (ref 0–0)
NRBC # FLD: 0 K/UL — SIGNIFICANT CHANGE UP (ref 0–0)
NRBC BLD AUTO-RTO: 0 /100 WBCS — SIGNIFICANT CHANGE UP (ref 0–0)
PHOSPHATE SERPL-MCNC: 3.9 MG/DL — SIGNIFICANT CHANGE UP (ref 2.5–4.5)
PLATELET # BLD AUTO: 130 K/UL — LOW (ref 150–400)
POTASSIUM SERPL-MCNC: 3.7 MMOL/L — SIGNIFICANT CHANGE UP (ref 3.5–5.3)
POTASSIUM SERPL-SCNC: 3.7 MMOL/L — SIGNIFICANT CHANGE UP (ref 3.5–5.3)
PROCALCITONIN SERPL-MCNC: 0.07 NG/ML — SIGNIFICANT CHANGE UP (ref 0.02–0.1)
PROT SERPL-MCNC: 6.5 G/DL — SIGNIFICANT CHANGE UP (ref 6–8.3)
PROTHROM AB SERPL-ACNC: 11.6 SEC — SIGNIFICANT CHANGE UP (ref 9.9–13.4)
RBC # BLD: 4.24 M/UL — SIGNIFICANT CHANGE UP (ref 3.8–5.2)
RBC # FLD: 13.2 % — SIGNIFICANT CHANGE UP (ref 10.3–14.5)
S PNEUM AG UR QL: NEGATIVE — SIGNIFICANT CHANGE UP
SODIUM SERPL-SCNC: 141 MMOL/L — SIGNIFICANT CHANGE UP (ref 135–145)
WBC # BLD: 16.04 K/UL — HIGH (ref 3.8–10.5)
WBC # FLD AUTO: 16.04 K/UL — HIGH (ref 3.8–10.5)

## 2025-03-11 PROCEDURE — 99291 CRITICAL CARE FIRST HOUR: CPT

## 2025-03-11 RX ORDER — MAGNESIUM SULFATE 500 MG/ML
2 SYRINGE (ML) INJECTION ONCE
Refills: 0 | Status: COMPLETED | OUTPATIENT
Start: 2025-03-11 | End: 2025-03-11

## 2025-03-11 RX ORDER — ACETAMINOPHEN 500 MG/5ML
1000 LIQUID (ML) ORAL ONCE
Refills: 0 | Status: COMPLETED | OUTPATIENT
Start: 2025-03-11 | End: 2025-03-12

## 2025-03-11 RX ORDER — PREDNISONE 20 MG/1
TABLET ORAL
Refills: 0 | Status: DISCONTINUED | OUTPATIENT
Start: 2025-03-12 | End: 2025-03-13

## 2025-03-11 RX ORDER — AZITHROMYCIN 250 MG
500 CAPSULE ORAL EVERY 24 HOURS
Refills: 0 | Status: COMPLETED | OUTPATIENT
Start: 2025-03-11 | End: 2025-03-12

## 2025-03-11 RX ORDER — PREDNISONE 20 MG/1
40 TABLET ORAL DAILY
Refills: 0 | Status: DISCONTINUED | OUTPATIENT
Start: 2025-03-12 | End: 2025-03-13

## 2025-03-11 RX ADMIN — POLYETHYLENE GLYCOL 3350 17 GRAM(S): 17 POWDER, FOR SOLUTION ORAL at 13:29

## 2025-03-11 RX ADMIN — Medication 15 MILLILITER(S): at 05:45

## 2025-03-11 RX ADMIN — IPRATROPIUM BROMIDE AND ALBUTEROL SULFATE 3 MILLILITER(S): .5; 2.5 SOLUTION RESPIRATORY (INHALATION) at 08:00

## 2025-03-11 RX ADMIN — ENOXAPARIN SODIUM 40 MILLIGRAM(S): 100 INJECTION SUBCUTANEOUS at 21:49

## 2025-03-11 RX ADMIN — IPRATROPIUM BROMIDE AND ALBUTEROL SULFATE 3 MILLILITER(S): .5; 2.5 SOLUTION RESPIRATORY (INHALATION) at 14:55

## 2025-03-11 RX ADMIN — IPRATROPIUM BROMIDE AND ALBUTEROL SULFATE 3 MILLILITER(S): .5; 2.5 SOLUTION RESPIRATORY (INHALATION) at 03:14

## 2025-03-11 RX ADMIN — IPRATROPIUM BROMIDE AND ALBUTEROL SULFATE 3 MILLILITER(S): .5; 2.5 SOLUTION RESPIRATORY (INHALATION) at 20:05

## 2025-03-11 RX ADMIN — SERTRALINE 25 MILLIGRAM(S): 100 TABLET, FILM COATED ORAL at 13:28

## 2025-03-11 RX ADMIN — METHYLPREDNISOLONE ACETATE 40 MILLIGRAM(S): 80 INJECTION, SUSPENSION INTRA-ARTICULAR; INTRALESIONAL; INTRAMUSCULAR; SOFT TISSUE at 05:45

## 2025-03-11 RX ADMIN — Medication 250 MILLIGRAM(S): at 21:48

## 2025-03-11 RX ADMIN — Medication 1 APPLICATION(S): at 05:45

## 2025-03-11 RX ADMIN — IPRATROPIUM BROMIDE AND ALBUTEROL SULFATE 3 MILLILITER(S): .5; 2.5 SOLUTION RESPIRATORY (INHALATION) at 23:09

## 2025-03-11 RX ADMIN — OLANZAPINE 7.5 MILLIGRAM(S): 10 TABLET ORAL at 13:29

## 2025-03-11 RX ADMIN — BUDESONIDE 0.5 MILLIGRAM(S): 0.25 SUSPENSION RESPIRATORY (INHALATION) at 20:06

## 2025-03-11 RX ADMIN — Medication 40 MILLIEQUIVALENT(S): at 06:45

## 2025-03-11 RX ADMIN — Medication 1 APPLICATION(S): at 05:44

## 2025-03-11 RX ADMIN — BUDESONIDE 0.5 MILLIGRAM(S): 0.25 SUSPENSION RESPIRATORY (INHALATION) at 08:00

## 2025-03-11 RX ADMIN — IPRATROPIUM BROMIDE AND ALBUTEROL SULFATE 3 MILLILITER(S): .5; 2.5 SOLUTION RESPIRATORY (INHALATION) at 11:32

## 2025-03-11 RX ADMIN — Medication 15 MILLILITER(S): at 18:15

## 2025-03-11 RX ADMIN — Medication 25 GRAM(S): at 06:46

## 2025-03-11 RX ADMIN — INSULIN LISPRO 1: 100 INJECTION, SOLUTION INTRAVENOUS; SUBCUTANEOUS at 13:27

## 2025-03-11 RX ADMIN — Medication 1 APPLICATION(S): at 18:15

## 2025-03-11 NOTE — EEG REPORT - NS EEG TEXT BOX
[facility]   COMPREHENSIVE EPILEPSY CENTER   REPORT OF LONG-TERM VIDEO EEG     [facility]: [address], [place] [address], Ph#: [phone]  [facility]:  [address] [place] [address], Ph#: [phone]  [facility]: [address] [place] [address], Ph#: [phone]    Patient Name: GHISLAINE ORDRIGUEZ  Age and : 74y (50)  MRN #: [id number]  Location: [place] [address]  Referring Physician: Elham Yusuf    Start Time/Date: 08:00 on 3/10/2025  End Time/Date: 14:05 on 3/10/2025  Duration: 6 hours 5 minutes  _____________________________________________________________  STUDY INFORMATION    EEG Recording Technique:  The patient underwent continuous Video-EEG monitoring, using Telemetry System hardware on the XLTek Digital System. EEG and video data were stored on a computer hard drive with important events saved in digital archive files. The material was reviewed by a physician (electroencephalographer / epileptologist) on a daily basis. Trev and seizure detection algorithms were utilized and reviewed. An EEG Technician attended to the patient, and was available throughout daytime work hours.  The epilepsy center neurologist was available in person or on call 24-hours per day.    EEG Placement and Labeling of Electrodes:  The EEG was performed utilizing 20 channel referential EEG connections (coronal over temporal over parasagittal montage) using all standard 10-20 electrode placements with EKG, with additional electrodes placed in the inferior temporal region using the modified 10-10 montage electrode placements for elective admissions, or if deemed necessary. Recording was at a sampling rate of 256 samples per second per channel. Time synchronized digital video recording was done simultaneously with EEG recording. A low light infrared camera was used for low light recording.     _____________________________________________________________  HISTORY    Patient is a 74y old  Female who presents with a chief complaint of respiratory failure.    PERTINENT MEDICATION:  MEDICATIONS  (STANDING):  albuterol/ipratropium for Nebulization 3 milliLiter(s) Nebulizer every 4 hours  azithromycin  IVPB 500 milliGRAM(s) IV Intermittent every 24 hours  cefTRIAXone   IVPB 1000 milliGRAM(s) IV Intermittent every 24 hours  chlorhexidine 0.12% Liquid 15 milliLiter(s) Oral Mucosa every 12 hours  chlorhexidine 2% Cloths 1 Application(s) Topical <User Schedule>  dexMEDEtomidine Infusion 0.8 MICROgram(s)/kG/Hr (16 mL/Hr) IV Continuous <Continuous>  dextrose 5%. 1000 milliLiter(s) (100 mL/Hr) IV Continuous <Continuous>  dextrose 5%. 1000 milliLiter(s) (50 mL/Hr) IV Continuous <Continuous>  dextrose 50% Injectable 25 Gram(s) IV Push once  dextrose 50% Injectable 12.5 Gram(s) IV Push once  dextrose 50% Injectable 25 Gram(s) IV Push once  dextrose Oral Gel 15 Gram(s) Oral once  enoxaparin Injectable 40 milliGRAM(s) SubCutaneous every 24 hours  fentaNYL   Infusion. 3 MICROgram(s)/kG/Hr (24 mL/Hr) IV Continuous <Continuous>  glucagon  Injectable 1 milliGRAM(s) IntraMuscular once  insulin lispro (ADMELOG) corrective regimen sliding scale   SubCutaneous every 6 hours  ketamine Infusion. 2 mG/kG/Hr (16 mL/Hr) IV Continuous <Continuous>  methylPREDNISolone sodium succinate Injectable 40 milliGRAM(s) IV Push daily  norepinephrine Infusion 0.02 MICROgram(s)/kG/Min (3 mL/Hr) IV Continuous <Continuous>  OLANZapine 7.5 milliGRAM(s) Oral daily  petrolatum Ophthalmic Ointment 1 Application(s) Both EYES two times a day  polyethylene glycol 3350 17 Gram(s) Oral daily  sertraline 25 milliGRAM(s) Oral daily    _____________________________________________________________  STUDY INTERPRETATION      FINDINGS:      Background:  Continuity: continuous  Symmetry: symmetric  PDR: none  Reactivity: absent  Voltage: low  Anterior Posterior Gradient: absent  Other background findings: none  Breach: absent    Background Slowing:  Generalized slowing: severe, with predominantly low amplitude delta activity.  Focal slowing: none was present.    State Changes:   Absent    Sporadic Epileptiform Discharges:    None    Rhythmic and Periodic Patterns (RPPs):  None     Electrographic and Electroclinical seizures:  None    Other Clinical Events:  None    Activation Procedures:   -Hyperventilation was not performed.    -Photic stimulation was not performed.    Artifacts:  Intermittent myogenic and movement artifacts were noted.    ECG:  The heart rate on single channel ECG was predominantly ~80 bpm.    Summary:  Abnormal  EEG in the comatose state.  Severe diffuse slowing and attenuation.    Clinical Impression:  Severe degree of diffuse or multifocal cerebral dysfunction.  There were no epileptiform abnormalities recorded.      -------------------------------------------------------------------------------------------------------    Chyna Morrissey MD  Director, Epilepsy - [facility]    Questions please call [phone]  After hours (5 PM - 8:30 AM) please call the epilepsy answering service at [phone]

## 2025-03-11 NOTE — PROGRESS NOTE ADULT - ASSESSMENT
74F hx asthma and depressed presented with AHHRF c/b AMS requiring MICU admission for intubation for airway protection i/s/o asthma exacerbation.    Neuro  # Acute Metabolic Encephalopathy  # Seizure-like activity, resolved  Encephalopathy i/s/o hypercapnic respiratory failure vs hyponatremia  - CTH: no acute changes  - CT spine: no fractures  - 3/8 ON: 6 minutes of seizure-like activity with myoclonic upper jerks -> broke with ativan  -- EEG 3/10 showing no epileptiform activity; severe degree of diffuse or multifocal cerebral dysfunction c/w comatose  > cont to wean sedation: fent -> ketamine -> pcdx      Cardiovascular  - No active issues  # HTN  # Resolved shock state  - troponin 9, EKG without acute ischemia  - TTE unremarkable, mild pulm HTN (44 mmHg)  - off pressors (norepi) since ON 3/10  > cont holding antihypertensives for now (lisinopril and HCTZ); monitor BPs and consider restarting as needed    Pulmonary  # AHHRF  # Asthma, prior exacerbation  Likely 2/2 asthma exacerbation, with wheezing.  - CTA without evidence of PNA or PE  - full RVP negative  > cont duonebs w/ budesonide  > c/w solumedrol x 5 days w/ PO taper planned; s/p methylprednisolone 125mg, and Magnesium  > cont suppl O2 as needed, wean as tolerated; on minimal vent support  > Vent settings to avoid air trapping; CTM peak pressures    GI  - No active issues  # Diet  > cont TFs    Renal/  - No active issues    ID  - No active issues  - Full RVP neg; legionella negative  - CT chest without PNA  > c/w azithro for anti-inflammatory aspect; stop empiric CAP coverage (CTX)  > f/u Ur strep (in lab)  > f/u Bcx (NGTD)    Endocrine  - No active issues  - A1c 5.7%  > SSI if needed for hyperglycemia    Heme  - No active issues  # Thrombocytopenia, mild  Likely i/s/o critical illness.  > CTM for now; transfuse for PLT < 10 or < 50 with bleeding    # DVT ppx  - LE bi/l duplex w/o DVT  > Enoxaparin 40mg qD 74F hx asthma and depressed presented with AHHRF c/b AMS requiring MICU admission for intubation for airway protection i/s/o asthma exacerbation.    Neuro  # Acute Metabolic Encephalopathy  # Seizure-like activity, resolved  Encephalopathy i/s/o hypercapnic respiratory failure vs hyponatremia  - CTH: no acute changes  - CT spine: no fractures  - 3/8 ON: 6 minutes of seizure-like activity with myoclonic upper jerks -> broke with ativan  -- EEG 3/10 showing no epileptiform activity; severe degree of diffuse or multifocal cerebral dysfunction c/w comatose  > cont to wean sedation: ketamine -> pcdx; off fent since (3/11)      Cardiovascular  - No active issues  # HTN  # Resolved shock state  - troponin 9, EKG without acute ischemia  - TTE unremarkable, mild pulm HTN (44 mmHg)  - off pressors (norepi) since ON 3/10  > cont holding antihypertensives for now (lisinopril and HCTZ); monitor BPs and consider restarting as needed    Pulmonary  # AHHRF  # Asthma, prior exacerbation  Likely 2/2 asthma exacerbation, with wheezing.  - CTA without evidence of PNA or PE  - full RVP negative  > cont duonebs w/ budesonide  > c/w steroid PO taper; s/p solumedrol x 3 days and initial methylprednisolone 125mg, and Magnesium  > replete Mag PRN, goal >2  > cont suppl O2 as needed, wean as tolerated; on minimal vent support  > Vent settings to avoid air trapping; CTM peak pressures    GI  - No active issues  # Diet  > cont TFs    Renal/  - No active issues    ID  - No active issues  - Full RVP neg; legionella negative  - Bcx no growth  - CT chest without PNA  > c/w azithro for anti-inflammatory aspect to complete 5 days; stop empiric CAP coverage (CTX)  > f/u Ur strep (in lab)    Endocrine  - No active issues  - A1c 5.7%  > SSI if needed for hyperglycemia    Heme  - No active issues  # Thrombocytopenia, mild  Likely i/s/o critical illness.  > CTM for now; transfuse for PLT < 10 or < 50 with bleeding    # DVT ppx  - LE bi/l duplex w/o DVT  > Enoxaparin 40mg qD 74F hx asthma and depressed presented with AHHRF c/b AMS requiring MICU admission for intubation for airway protection i/s/o asthma exacerbation.    Neuro  # Acute Metabolic Encephalopathy  # Seizure-like activity, resolved  Encephalopathy i/s/o hypercapnic respiratory failure vs hyponatremia  - CTH: no acute changes  - CT spine: no fractures  - 3/8 ON: 6 minutes of seizure-like activity with myoclonic upper jerks -> broke with ativan  -- EEG 3/10 showing no epileptiform activity; severe degree of diffuse or multifocal cerebral dysfunction c/w comatose  > cont to wean sedation: ketamine -> pcdx; off fent since (3/11)      Cardiovascular  - No active issues  # HTN  # Resolved shock state  - troponin 9, EKG without acute ischemia  - TTE unremarkable, mild pulm HTN (44 mmHg)  - off pressors (norepi) since ON 3/10  > cont holding antihypertensives for now (lisinopril and HCTZ); monitor BPs and consider restarting as needed    Pulmonary  # AHHRF  # Asthma, prior exacerbation  Likely 2/2 asthma exacerbation, with wheezing.  - CTA without evidence of PNA or PE  - full RVP negative  > SBTs as tolerated  > cont duonebs w/ budesonide  > c/w steroid PO taper; s/p solumedrol x 3 days and initial methylprednisolone 125mg, and Magnesium  > replete Mag PRN, goal >2  > cont suppl O2 as needed, wean as tolerated; on minimal vent support  > Vent settings to avoid air trapping; CTM peak pressures    GI  - No active issues  # Diet  > cont TFs    Renal/  - No active issues    ID  - No active issues  - Full RVP neg; legionella negative  - Bcx no growth  - CT chest without PNA  > c/w azithro for anti-inflammatory aspect to complete 5 days; stop empiric CAP coverage (CTX)  > f/u Ur strep (in lab)    Endocrine  - No active issues  - A1c 5.7%  > SSI if needed for hyperglycemia    Heme  - No active issues  # Thrombocytopenia, mild  Likely i/s/o critical illness.  > CTM for now; transfuse for PLT < 10 or < 50 with bleeding    # DVT ppx  - LE bi/l duplex w/o DVT  > Enoxaparin 40mg qD

## 2025-03-11 NOTE — PROGRESS NOTE ADULT - SUBJECTIVE AND OBJECTIVE BOX
************************  Mir Tyler, MD-PhD  Internal Medicine PGY-3  ************************    SUBJECTIVE:      Patient seen and examined at bedside. No events overnight, no acute complaints this morning. Patient reminded of ongoing careplan.      VITAL SIGNS:  ICU Vital Signs Last 24 Hrs  T(C): 35.4 (11 Mar 2025 04:00), Max: 36 (10 Mar 2025 08:00)  T(F): 95.7 (11 Mar 2025 04:00), Max: 96.8 (10 Mar 2025 08:00)  HR: 87 (11 Mar 2025 05:00) (69 - 88)  BP: 108/70 (11 Mar 2025 05:00) (103/63 - 166/93)  BP(mean): 83 (11 Mar 2025 05:00) (75 - 132)  ABP: --  ABP(mean): --  RR: 13 (11 Mar 2025 05:00) (13 - 23)  SpO2: 93% (11 Mar 2025 05:00) (91% - 100%)    O2 Parameters below as of 11 Mar 2025 05:00  Patient On (Oxygen Delivery Method): ventilator    O2 Concentration (%): 30      Mode: AC/ CMV (Assist Control/ Continuous Mandatory Ventilation), RR (machine): 13, TV (machine): 350, FiO2: 30, PEEP: 6, ITime: 0.51, MAP: 8, PIP: 25  Plateau pressure:   P/F ratio:     03-09 @ 07:01  -  03-10 @ 07:00  --------------------------------------------------------  IN: 1512 mL / OUT: 4385 mL / NET: -2873 mL    03-10 @ 07:01  -  03-11 @ 06:27  --------------------------------------------------------  IN: 1236 mL / OUT: 3475 mL / NET: -2239 mL      CAPILLARY BLOOD GLUCOSE      POCT Blood Glucose.: 141 mg/dL (11 Mar 2025 05:40)    ECG:     PHYSICAL EXAM:    03-09 @ 07:01  -  03-10 @ 07:00  --------------------------------------------------------  IN: 1512 mL / OUT: 4385 mL / NET: -2873 mL    03-10 @ 07:01  -  03-11 @ 06:27  --------------------------------------------------------  IN: 1236 mL / OUT: 3475 mL / NET: -2239 mL    Vital Signs Last 24 Hrs  T(C): 35.4 (11 Mar 2025 04:00), Max: 36 (10 Mar 2025 08:00)  T(F): 95.7 (11 Mar 2025 04:00), Max: 96.8 (10 Mar 2025 08:00)  HR: 87 (11 Mar 2025 05:00) (69 - 88)  BP: 108/70 (11 Mar 2025 05:00) (103/63 - 166/93)  BP(mean): 83 (11 Mar 2025 05:00) (75 - 132)  RR: 13 (11 Mar 2025 05:00) (13 - 23)  SpO2: 93% (11 Mar 2025 05:00) (91% - 100%)    Parameters below as of 11 Mar 2025 05:00  Patient On (Oxygen Delivery Method): ventilator    O2 Concentration (%): 30  T(C): 35.4 (03-11-25 @ 04:00), Max: 36 (03-10-25 @ 08:00)  HR: 87 (03-11-25 @ 05:00) (69 - 88)  BP: 108/70 (03-11-25 @ 05:00) (103/63 - 166/93)  RR: 13 (03-11-25 @ 05:00) (13 - 23)  SpO2: 93% (03-11-25 @ 05:00) (91% - 100%)    General: NAD  HEENT: PERRLA  Respiratory: intubated, lungs clear  Cardiovascular: RRR  Abdomen: soft. NT  Extremities: 2+ b/l pitting edema  Neurological: sedated     MEDICATIONS:  MEDICATIONS  (STANDING):  albuterol/ipratropium for Nebulization 3 milliLiter(s) Nebulizer every 4 hours  buDESOnide    Inhalation Suspension 0.5 milliGRAM(s) Inhalation every 12 hours  chlorhexidine 0.12% Liquid 15 milliLiter(s) Oral Mucosa every 12 hours  chlorhexidine 2% Cloths 1 Application(s) Topical <User Schedule>  dexMEDEtomidine Infusion 0.8 MICROgram(s)/kG/Hr (16 mL/Hr) IV Continuous <Continuous>  dextrose 5%. 1000 milliLiter(s) (100 mL/Hr) IV Continuous <Continuous>  dextrose 5%. 1000 milliLiter(s) (50 mL/Hr) IV Continuous <Continuous>  dextrose 50% Injectable 25 Gram(s) IV Push once  dextrose 50% Injectable 12.5 Gram(s) IV Push once  dextrose 50% Injectable 25 Gram(s) IV Push once  dextrose Oral Gel 15 Gram(s) Oral once  enoxaparin Injectable 40 milliGRAM(s) SubCutaneous every 24 hours  fentaNYL   Infusion. 3 MICROgram(s)/kG/Hr (24 mL/Hr) IV Continuous <Continuous>  glucagon  Injectable 1 milliGRAM(s) IntraMuscular once  insulin lispro (ADMELOG) corrective regimen sliding scale   SubCutaneous every 6 hours  ketamine Infusion. 2 mG/kG/Hr (16 mL/Hr) IV Continuous <Continuous>  methylPREDNISolone sodium succinate Injectable 40 milliGRAM(s) IV Push daily  norepinephrine Infusion 0.02 MICROgram(s)/kG/Min (3 mL/Hr) IV Continuous <Continuous>  OLANZapine 7.5 milliGRAM(s) Oral daily  petrolatum Ophthalmic Ointment 1 Application(s) Both EYES two times a day  polyethylene glycol 3350 17 Gram(s) Oral daily  sertraline 25 milliGRAM(s) Oral daily    MEDICATIONS  (PRN):      ALLERGIES:  Allergies    No Known Allergies    Intolerances        LABS:                        13.6   16.04 )-----------( 130      ( 11 Mar 2025 00:55 )             40.1     03-11    141  |  101  |  11  ----------------------------<  143[H]  3.7   |  27  |  0.54    Ca    9.0      11 Mar 2025 00:55  Phos  3.9     03-11  Mg     1.80     03-11    TPro  6.5  /  Alb  3.5  /  TBili  0.4  /  DBili  x   /  AST  35[H]  /  ALT  46[H]  /  AlkPhos  79  03-11    PT/INR - ( 11 Mar 2025 00:55 )   PT: 11.6 sec;   INR: 1.00 ratio         PTT - ( 11 Mar 2025 00:55 )  PTT:22.7 sec      Urinalysis Basic - ( 11 Mar 2025 00:55 )    Color: x / Appearance: x / SG: x / pH: x  Gluc: 143 mg/dL / Ketone: x  / Bili: x / Urobili: x   Blood: x / Protein: x / Nitrite: x   Leuk Esterase: x / RBC: x / WBC x   Sq Epi: x / Non Sq Epi: x / Bacteria: x        Culture - Blood (collected 08 Mar 2025 23:00)  Source: Blood Blood  Preliminary Report (10 Mar 2025 09:01):    No growth at 24 hours    Culture - Blood (collected 08 Mar 2025 22:00)  Source: Blood Blood  Preliminary Report (10 Mar 2025 09:01):    No growth at 24 hours        IMAGING & OTHER TESTS:    NNI

## 2025-03-11 NOTE — PROGRESS NOTE ADULT - ATTENDING COMMENTS
73 yo F with hx of asthma (on prn Albuterol only) admitted to MICU with acute hypoxemic hypercapnic respiratory failure in the setting of asthma exacerbation.   Currently improved respiratory status, on minimal vent settings, no e/o air trapping or increased peak flows. CPAP when awake.    Continue with standing duonebs + Budesonide. Change to PO steroids, plan for slow taper. No e/o active Change to PO IV steroids, add Budesonide nebs. c/w Azithro x5d for anti-inflammatory effects, no e/o bacterial infection.   Will need pulmonary f/u as outpatient.

## 2025-03-12 LAB
ALBUMIN SERPL ELPH-MCNC: 3.6 G/DL — SIGNIFICANT CHANGE UP (ref 3.3–5)
ALP SERPL-CCNC: 91 U/L — SIGNIFICANT CHANGE UP (ref 40–120)
ALT FLD-CCNC: 43 U/L — HIGH (ref 4–33)
ANION GAP SERPL CALC-SCNC: 13 MMOL/L — SIGNIFICANT CHANGE UP (ref 7–14)
APTT BLD: 27.8 SEC — SIGNIFICANT CHANGE UP (ref 24.5–35.6)
AST SERPL-CCNC: 28 U/L — SIGNIFICANT CHANGE UP (ref 4–32)
BILIRUB SERPL-MCNC: 0.4 MG/DL — SIGNIFICANT CHANGE UP (ref 0.2–1.2)
BLOOD GAS ARTERIAL - LYTES,HGB,ICA,LACT RESULT: SIGNIFICANT CHANGE UP
BLOOD GAS VENOUS COMPREHENSIVE RESULT: SIGNIFICANT CHANGE UP
BUN SERPL-MCNC: 16 MG/DL — SIGNIFICANT CHANGE UP (ref 7–23)
CA-I BLD-SCNC: 1.15 MMOL/L — SIGNIFICANT CHANGE UP (ref 1.15–1.29)
CALCIUM SERPL-MCNC: 9.2 MG/DL — SIGNIFICANT CHANGE UP (ref 8.4–10.5)
CHLORIDE SERPL-SCNC: 93 MMOL/L — LOW (ref 98–107)
CO2 SERPL-SCNC: 27 MMOL/L — SIGNIFICANT CHANGE UP (ref 22–31)
CREAT SERPL-MCNC: 0.57 MG/DL — SIGNIFICANT CHANGE UP (ref 0.5–1.3)
EGFR: 95 ML/MIN/1.73M2 — SIGNIFICANT CHANGE UP
EGFR: 95 ML/MIN/1.73M2 — SIGNIFICANT CHANGE UP
GLUCOSE BLDC GLUCOMTR-MCNC: 114 MG/DL — HIGH (ref 70–99)
GLUCOSE BLDC GLUCOMTR-MCNC: 131 MG/DL — HIGH (ref 70–99)
GLUCOSE BLDC GLUCOMTR-MCNC: 136 MG/DL — HIGH (ref 70–99)
GLUCOSE BLDC GLUCOMTR-MCNC: 176 MG/DL — HIGH (ref 70–99)
GLUCOSE BLDC GLUCOMTR-MCNC: 91 MG/DL — SIGNIFICANT CHANGE UP (ref 70–99)
GLUCOSE SERPL-MCNC: 117 MG/DL — HIGH (ref 70–99)
HCT VFR BLD CALC: 42.7 % — SIGNIFICANT CHANGE UP (ref 34.5–45)
HGB BLD-MCNC: 14.2 G/DL — SIGNIFICANT CHANGE UP (ref 11.5–15.5)
INR BLD: 0.94 RATIO — SIGNIFICANT CHANGE UP (ref 0.85–1.16)
MAGNESIUM SERPL-MCNC: 2 MG/DL — SIGNIFICANT CHANGE UP (ref 1.6–2.6)
MCHC RBC-ENTMCNC: 32.6 PG — SIGNIFICANT CHANGE UP (ref 27–34)
MCHC RBC-ENTMCNC: 33.3 G/DL — SIGNIFICANT CHANGE UP (ref 32–36)
MCV RBC AUTO: 98.2 FL — SIGNIFICANT CHANGE UP (ref 80–100)
NRBC # BLD AUTO: 0 K/UL — SIGNIFICANT CHANGE UP (ref 0–0)
NRBC # FLD: 0 K/UL — SIGNIFICANT CHANGE UP (ref 0–0)
NRBC BLD AUTO-RTO: 0 /100 WBCS — SIGNIFICANT CHANGE UP (ref 0–0)
PHOSPHATE SERPL-MCNC: 2.9 MG/DL — SIGNIFICANT CHANGE UP (ref 2.5–4.5)
PLATELET # BLD AUTO: 126 K/UL — LOW (ref 150–400)
POTASSIUM SERPL-MCNC: 3.8 MMOL/L — SIGNIFICANT CHANGE UP (ref 3.5–5.3)
POTASSIUM SERPL-SCNC: 3.8 MMOL/L — SIGNIFICANT CHANGE UP (ref 3.5–5.3)
PROT SERPL-MCNC: 6.7 G/DL — SIGNIFICANT CHANGE UP (ref 6–8.3)
PROTHROM AB SERPL-ACNC: 11.2 SEC — SIGNIFICANT CHANGE UP (ref 9.9–13.4)
RBC # BLD: 4.35 M/UL — SIGNIFICANT CHANGE UP (ref 3.8–5.2)
RBC # FLD: 13.4 % — SIGNIFICANT CHANGE UP (ref 10.3–14.5)
SODIUM SERPL-SCNC: 133 MMOL/L — LOW (ref 135–145)
WBC # BLD: 14.03 K/UL — HIGH (ref 3.8–10.5)
WBC # FLD AUTO: 14.03 K/UL — HIGH (ref 3.8–10.5)

## 2025-03-12 PROCEDURE — 99291 CRITICAL CARE FIRST HOUR: CPT

## 2025-03-12 RX ORDER — AMLODIPINE BESYLATE 10 MG/1
5 TABLET ORAL DAILY
Refills: 0 | Status: DISCONTINUED | OUTPATIENT
Start: 2025-03-12 | End: 2025-03-13

## 2025-03-12 RX ORDER — ACETAMINOPHEN 500 MG/5ML
1000 LIQUID (ML) ORAL ONCE
Refills: 0 | Status: COMPLETED | OUTPATIENT
Start: 2025-03-12 | End: 2025-03-12

## 2025-03-12 RX ADMIN — POLYETHYLENE GLYCOL 3350 17 GRAM(S): 17 POWDER, FOR SOLUTION ORAL at 11:10

## 2025-03-12 RX ADMIN — Medication 5 MILLIGRAM(S): at 11:06

## 2025-03-12 RX ADMIN — Medication 1000 MILLIGRAM(S): at 00:15

## 2025-03-12 RX ADMIN — Medication 400 MILLIGRAM(S): at 06:29

## 2025-03-12 RX ADMIN — INSULIN LISPRO 1: 100 INJECTION, SOLUTION INTRAVENOUS; SUBCUTANEOUS at 06:02

## 2025-03-12 RX ADMIN — DEXMEDETOMIDINE HYDROCHLORIDE IN SODIUM CHLORIDE 16 MICROGRAM(S)/KG/HR: 4 INJECTION INTRAVENOUS at 07:37

## 2025-03-12 RX ADMIN — SERTRALINE 25 MILLIGRAM(S): 100 TABLET, FILM COATED ORAL at 11:10

## 2025-03-12 RX ADMIN — BUDESONIDE 0.5 MILLIGRAM(S): 0.25 SUSPENSION RESPIRATORY (INHALATION) at 07:25

## 2025-03-12 RX ADMIN — IPRATROPIUM BROMIDE AND ALBUTEROL SULFATE 3 MILLILITER(S): .5; 2.5 SOLUTION RESPIRATORY (INHALATION) at 03:15

## 2025-03-12 RX ADMIN — Medication 5 MILLIGRAM(S): at 21:37

## 2025-03-12 RX ADMIN — Medication 250 MILLIGRAM(S): at 21:41

## 2025-03-12 RX ADMIN — PREDNISONE 40 MILLIGRAM(S): 20 TABLET ORAL at 06:03

## 2025-03-12 RX ADMIN — IPRATROPIUM BROMIDE AND ALBUTEROL SULFATE 3 MILLILITER(S): .5; 2.5 SOLUTION RESPIRATORY (INHALATION) at 23:08

## 2025-03-12 RX ADMIN — Medication 1 APPLICATION(S): at 06:03

## 2025-03-12 RX ADMIN — Medication 15 MILLILITER(S): at 06:03

## 2025-03-12 RX ADMIN — IPRATROPIUM BROMIDE AND ALBUTEROL SULFATE 3 MILLILITER(S): .5; 2.5 SOLUTION RESPIRATORY (INHALATION) at 15:01

## 2025-03-12 RX ADMIN — AMLODIPINE BESYLATE 5 MILLIGRAM(S): 10 TABLET ORAL at 11:09

## 2025-03-12 RX ADMIN — IPRATROPIUM BROMIDE AND ALBUTEROL SULFATE 3 MILLILITER(S): .5; 2.5 SOLUTION RESPIRATORY (INHALATION) at 19:45

## 2025-03-12 RX ADMIN — Medication 1 DOSE(S): at 20:25

## 2025-03-12 RX ADMIN — Medication 400 MILLIGRAM(S): at 00:00

## 2025-03-12 RX ADMIN — IPRATROPIUM BROMIDE AND ALBUTEROL SULFATE 3 MILLILITER(S): .5; 2.5 SOLUTION RESPIRATORY (INHALATION) at 10:59

## 2025-03-12 RX ADMIN — IPRATROPIUM BROMIDE AND ALBUTEROL SULFATE 3 MILLILITER(S): .5; 2.5 SOLUTION RESPIRATORY (INHALATION) at 07:24

## 2025-03-12 RX ADMIN — BUDESONIDE 0.5 MILLIGRAM(S): 0.25 SUSPENSION RESPIRATORY (INHALATION) at 20:25

## 2025-03-12 RX ADMIN — OLANZAPINE 7.5 MILLIGRAM(S): 10 TABLET ORAL at 11:09

## 2025-03-12 RX ADMIN — ENOXAPARIN SODIUM 40 MILLIGRAM(S): 100 INJECTION SUBCUTANEOUS at 21:39

## 2025-03-12 RX ADMIN — Medication 5 MILLIGRAM(S): at 19:43

## 2025-03-12 NOTE — PROGRESS NOTE ADULT - ASSESSMENT
74F hx asthma and depressed presented with AHHRF c/b AMS requiring MICU admission for intubation for airway protection i/s/o asthma exacerbation.    Neuro  # Acute Metabolic Encephalopathy  # Seizure-like activity, resolved  Encephalopathy i/s/o hypercapnic respiratory failure vs hyponatremia  - CTH: no acute changes  - CT spine: no fractures  - 3/8 ON: 6 minutes of seizure-like activity with myoclonic upper jerks -> broke with ativan  -- EEG 3/10 showing no epileptiform activity; severe degree of diffuse or multifocal cerebral dysfunction c/w comatose  > cont to wean sedation: ketamine -> pcdx; off fent since (3/11)      Cardiovascular  - No active issues  # HTN  # Resolved shock state  - troponin 9, EKG without acute ischemia  - TTE unremarkable, mild pulm HTN (44 mmHg)  - off pressors (norepi) since ON 3/10  > cont holding antihypertensives for now (lisinopril and HCTZ); monitor BPs and consider restarting as needed    Pulmonary  # AHHRF  # Asthma, prior exacerbation  Likely 2/2 asthma exacerbation, with wheezing.  - CTA without evidence of PNA or PE  - full RVP negative  > SBTs as tolerated  > cont duonebs w/ budesonide  > c/w steroid PO taper; s/p solumedrol x 3 days and initial methylprednisolone 125mg, and Magnesium  > replete Mag PRN, goal >2  > cont suppl O2 as needed, wean as tolerated; on minimal vent support  > Vent settings to avoid air trapping; CTM peak pressures    GI  - No active issues  # Diet  > cont TFs    Renal/  - No active issues    ID  # Leukocytosis, resolving  Likely reactive to steroids. Febrile ON into 3/12.  - Full RVP neg; legionella/strep negative  - Bcx no growth  - CT chest without PNA  > c/w azithro for anti-inflammatory aspect to complete 5 days; stop empiric CAP coverage (CTX)    Endocrine  - No active issues  - A1c 5.7%  > SSI if needed for hyperglycemia    Heme  - No active issues  # Thrombocytopenia, mild  Likely i/s/o critical illness.  > CTM for now; transfuse for PLT < 10 or < 50 with bleeding    # DVT ppx  - LE bi/l duplex w/o DVT  > Enoxaparin 40mg qD 74F hx asthma and depressed presented with AHHRF c/b AMS requiring MICU admission for intubation for airway protection i/s/o asthma exacerbation.    Neuro  # Acute Metabolic Encephalopathy  # Seizure-like activity, resolved  Encephalopathy i/s/o hypercapnic respiratory failure vs hyponatremia  - CTH: no acute changes  - CT spine: no fractures  - 3/8 ON: 6 minutes of seizure-like activity with myoclonic upper jerks -> broke with ativan  -- EEG 3/10 showing no epileptiform activity; severe degree of diffuse or multifocal cerebral dysfunction c/w comatose  > cont to wean sedation: ketamine -> pcdx; off fent since (3/11)      Cardiovascular  - No active issues  # HTN  # Resolved shock state  - troponin 9, EKG without acute ischemia  - TTE unremarkable, mild pulm HTN (44 mmHg)  - off pressors (norepi) since ON 3/10  > cont holding antihypertensives for now (lisinopril and HCTZ); monitor BPs and consider restarting as needed  > control blood pressures for extubation; hydral 5mv IVP PRN and start amlodipine 5mg PO    Pulmonary  # AHHRF  # Asthma, prior exacerbation  Likely 2/2 asthma exacerbation, with wheezing.  - CTA without evidence of PNA or PE  - full RVP negative  > SBTs as tolerated  > cont duonebs w/ budesonide; transition to inhaled fluticasone/salmeterol post-extubation  > c/w steroid PO taper (40x3 days/30x3 days/20x3 days/10x3 days); s/p solumedrol x 3 days and initial methylprednisolone 125mg, and Magnesium  > replete Mag PRN, goal >2  > cont suppl O2 as needed, wean as tolerated; on minimal vent support  > Vent settings to avoid air trapping; CTM peak pressures    GI  - No active issues  # Diet  > cont TFs    Renal/  - No active issues    ID  # Leukocytosis, resolving  Likely reactive to steroids. Febrile ON into 3/12.  - Full RVP neg; legionella/strep negative  - Bcx no growth  - CT chest without PNA  > c/w azithro for anti-inflammatory aspect to complete 5 days (finishes 3/12); stop empiric CAP coverage (CTX)    Endocrine  - No active issues  - A1c 5.7%  > SSI if needed for hyperglycemia    Heme  - No active issues  # Thrombocytopenia, mild  Likely i/s/o critical illness.  > CTM for now; transfuse for PLT < 10 or < 50 with bleeding    # DVT ppx  - LE bi/l duplex w/o DVT  > Enoxaparin 40mg qD

## 2025-03-12 NOTE — PROGRESS NOTE ADULT - ATTENDING COMMENTS
75 yo F with hx of asthma (on prn Albuterol only) admitted to MICU with acute hypoxemic hypercapnic respiratory failure in the setting of asthma exacerbation. S/p intubation, now extubated on 3/12.    Continue with standing duonebs + Budesonide. Start Symbicort with spacer. c/w PO steroids, plan for slow taper.  c/w Azithro x5d for anti-inflammatory effects, no e/o bacterial infection.   Will need pulmonary f/u as outpatient.

## 2025-03-12 NOTE — PROGRESS NOTE ADULT - SUBJECTIVE AND OBJECTIVE BOX
************************  Mir Tyler, MD-PhD  Internal Medicine PGY-3  ************************    SUBJECTIVE:      Patient seen and examined at bedside. Febrile overnight, received tylenol. Patient reminded of ongoing careplan.      VITAL SIGNS:  ICU Vital Signs Last 24 Hrs  T(C): 38.1 (12 Mar 2025 00:15), Max: 38.3 (12 Mar 2025 00:00)  T(F): 100.6 (12 Mar 2025 00:15), Max: 100.9 (12 Mar 2025 00:00)  HR: 90 (12 Mar 2025 05:00) (72 - 90)  BP: 174/90 (12 Mar 2025 05:00) (96/65 - 175/97)  BP(mean): 115 (12 Mar 2025 05:00) (76 - 128)  ABP: --  ABP(mean): --  RR: 21 (12 Mar 2025 05:00) (13 - 21)  SpO2: 94% (12 Mar 2025 05:00) (91% - 97%)    O2 Parameters below as of 12 Mar 2025 03:45  Patient On (Oxygen Delivery Method): ventilator          Mode: AC/ CMV (Assist Control/ Continuous Mandatory Ventilation), RR (machine): 13, TV (machine): 350, FiO2: 30, PEEP: 6, MAP: 9, PIP: 32  Plateau pressure:   P/F ratio:     03-10 @ 07:01  -  03-11 @ 07:00  --------------------------------------------------------  IN: 1296 mL / OUT: 3475 mL / NET: -2179 mL    03-11 @ 07:01  -  03-12 @ 06:24  --------------------------------------------------------  IN: 1148 mL / OUT: 1200 mL / NET: -52 mL      CAPILLARY BLOOD GLUCOSE      POCT Blood Glucose.: 176 mg/dL (12 Mar 2025 05:49)    ECG:     PHYSICAL EXAM:    03-10 @ 07:01  -  03-11 @ 07:00  --------------------------------------------------------  IN: 1296 mL / OUT: 3475 mL / NET: -2179 mL    03-11 @ 07:01  -  03-12 @ 06:24  --------------------------------------------------------  IN: 1148 mL / OUT: 1200 mL / NET: -52 mL    Vital Signs Last 24 Hrs  T(C): 38.1 (12 Mar 2025 00:15), Max: 38.3 (12 Mar 2025 00:00)  T(F): 100.6 (12 Mar 2025 00:15), Max: 100.9 (12 Mar 2025 00:00)  HR: 90 (12 Mar 2025 05:00) (72 - 90)  BP: 174/90 (12 Mar 2025 05:00) (96/65 - 175/97)  BP(mean): 115 (12 Mar 2025 05:00) (76 - 128)  RR: 21 (12 Mar 2025 05:00) (13 - 21)  SpO2: 94% (12 Mar 2025 05:00) (91% - 97%)    Parameters below as of 12 Mar 2025 03:45  Patient On (Oxygen Delivery Method): ventilator      T(C): 38.1 (03-12-25 @ 00:15), Max: 38.3 (03-12-25 @ 00:00)  HR: 90 (03-12-25 @ 05:00) (72 - 90)  BP: 174/90 (03-12-25 @ 05:00) (96/65 - 175/97)  RR: 21 (03-12-25 @ 05:00) (13 - 21)  SpO2: 94% (03-12-25 @ 05:00) (91% - 97%)    General: NAD  HEENT: PERRLA  Respiratory: intubated, lungs clear  Cardiovascular: RRR  Abdomen: soft. NT  Extremities: 2+ b/l pitting edema  Neurological: sedated     MEDICATIONS:  MEDICATIONS  (STANDING):  acetaminophen   IVPB .. 1000 milliGRAM(s) IV Intermittent once  albuterol/ipratropium for Nebulization 3 milliLiter(s) Nebulizer every 4 hours  azithromycin  IVPB 500 milliGRAM(s) IV Intermittent every 24 hours  buDESOnide    Inhalation Suspension 0.5 milliGRAM(s) Inhalation every 12 hours  chlorhexidine 0.12% Liquid 15 milliLiter(s) Oral Mucosa every 12 hours  chlorhexidine 2% Cloths 1 Application(s) Topical <User Schedule>  dexMEDEtomidine Infusion 0.8 MICROgram(s)/kG/Hr (16 mL/Hr) IV Continuous <Continuous>  dextrose 5%. 1000 milliLiter(s) (100 mL/Hr) IV Continuous <Continuous>  dextrose 5%. 1000 milliLiter(s) (50 mL/Hr) IV Continuous <Continuous>  dextrose 50% Injectable 25 Gram(s) IV Push once  dextrose 50% Injectable 12.5 Gram(s) IV Push once  dextrose 50% Injectable 25 Gram(s) IV Push once  dextrose Oral Gel 15 Gram(s) Oral once  enoxaparin Injectable 40 milliGRAM(s) SubCutaneous every 24 hours  glucagon  Injectable 1 milliGRAM(s) IntraMuscular once  insulin lispro (ADMELOG) corrective regimen sliding scale   SubCutaneous every 6 hours  ketamine Infusion. 2 mG/kG/Hr (16 mL/Hr) IV Continuous <Continuous>  OLANZapine 7.5 milliGRAM(s) Oral daily  petrolatum Ophthalmic Ointment 1 Application(s) Both EYES two times a day  polyethylene glycol 3350 17 Gram(s) Oral daily  predniSONE   Tablet   Oral   predniSONE   Tablet 40 milliGRAM(s) Oral daily  sertraline 25 milliGRAM(s) Oral daily    MEDICATIONS  (PRN):      ALLERGIES:  Allergies    No Known Allergies    Intolerances        LABS:                        14.2   14.03 )-----------( 126      ( 12 Mar 2025 01:10 )             42.7     03-12    133[L]  |  93[L]  |  16  ----------------------------<  117[H]  3.8   |  27  |  0.57    Ca    9.2      12 Mar 2025 01:10  Phos  2.9     03-12  Mg     2.00     03-12    TPro  6.7  /  Alb  3.6  /  TBili  0.4  /  DBili  x   /  AST  28  /  ALT  43[H]  /  AlkPhos  91  03-12    PT/INR - ( 12 Mar 2025 01:10 )   PT: 11.2 sec;   INR: 0.94 ratio         PTT - ( 12 Mar 2025 01:10 )  PTT:27.8 sec      Urinalysis Basic - ( 12 Mar 2025 01:10 )    Color: x / Appearance: x / SG: x / pH: x  Gluc: 117 mg/dL / Ketone: x  / Bili: x / Urobili: x   Blood: x / Protein: x / Nitrite: x   Leuk Esterase: x / RBC: x / WBC x   Sq Epi: x / Non Sq Epi: x / Bacteria: x          IMAGING & OTHER TESTS:    NNI

## 2025-03-13 LAB
ALBUMIN SERPL ELPH-MCNC: 3.4 G/DL — SIGNIFICANT CHANGE UP (ref 3.3–5)
ALBUMIN SERPL ELPH-MCNC: 3.5 G/DL — SIGNIFICANT CHANGE UP (ref 3.3–5)
ALP SERPL-CCNC: 104 U/L — SIGNIFICANT CHANGE UP (ref 40–120)
ALP SERPL-CCNC: 96 U/L — SIGNIFICANT CHANGE UP (ref 40–120)
ALT FLD-CCNC: 38 U/L — HIGH (ref 4–33)
ALT FLD-CCNC: 39 U/L — HIGH (ref 4–33)
ANION GAP SERPL CALC-SCNC: 15 MMOL/L — HIGH (ref 7–14)
ANION GAP SERPL CALC-SCNC: 16 MMOL/L — HIGH (ref 7–14)
APPEARANCE UR: CLEAR — SIGNIFICANT CHANGE UP
APTT BLD: 44.7 SEC — HIGH (ref 24.5–35.6)
AST SERPL-CCNC: 31 U/L — SIGNIFICANT CHANGE UP (ref 4–32)
AST SERPL-CCNC: 32 U/L — SIGNIFICANT CHANGE UP (ref 4–32)
BACTERIA # UR AUTO: NEGATIVE /HPF — SIGNIFICANT CHANGE UP
BILIRUB SERPL-MCNC: 0.5 MG/DL — SIGNIFICANT CHANGE UP (ref 0.2–1.2)
BILIRUB SERPL-MCNC: 0.7 MG/DL — SIGNIFICANT CHANGE UP (ref 0.2–1.2)
BILIRUB UR-MCNC: NEGATIVE — SIGNIFICANT CHANGE UP
BLD GP AB SCN SERPL QL: NEGATIVE — SIGNIFICANT CHANGE UP
BLOOD GAS VENOUS COMPREHENSIVE RESULT: SIGNIFICANT CHANGE UP
BUN SERPL-MCNC: 18 MG/DL — SIGNIFICANT CHANGE UP (ref 7–23)
BUN SERPL-MCNC: 22 MG/DL — SIGNIFICANT CHANGE UP (ref 7–23)
CA-I BLD-SCNC: 1.1 MMOL/L — LOW (ref 1.15–1.29)
CALCIUM SERPL-MCNC: 9.3 MG/DL — SIGNIFICANT CHANGE UP (ref 8.4–10.5)
CALCIUM SERPL-MCNC: 9.8 MG/DL — SIGNIFICANT CHANGE UP (ref 8.4–10.5)
CAST: 1 /LPF — SIGNIFICANT CHANGE UP (ref 0–4)
CHLORIDE SERPL-SCNC: 92 MMOL/L — LOW (ref 98–107)
CHLORIDE SERPL-SCNC: 93 MMOL/L — LOW (ref 98–107)
CO2 SERPL-SCNC: 22 MMOL/L — SIGNIFICANT CHANGE UP (ref 22–31)
CO2 SERPL-SCNC: 24 MMOL/L — SIGNIFICANT CHANGE UP (ref 22–31)
COLOR SPEC: SIGNIFICANT CHANGE UP
CREAT ?TM UR-MCNC: 140 MG/DL — SIGNIFICANT CHANGE UP
CREAT SERPL-MCNC: 0.54 MG/DL — SIGNIFICANT CHANGE UP (ref 0.5–1.3)
CREAT SERPL-MCNC: 0.61 MG/DL — SIGNIFICANT CHANGE UP (ref 0.5–1.3)
DIFF PNL FLD: ABNORMAL
EGFR: 94 ML/MIN/1.73M2 — SIGNIFICANT CHANGE UP
EGFR: 94 ML/MIN/1.73M2 — SIGNIFICANT CHANGE UP
EGFR: 97 ML/MIN/1.73M2 — SIGNIFICANT CHANGE UP
EGFR: 97 ML/MIN/1.73M2 — SIGNIFICANT CHANGE UP
GAS PNL BLDA: SIGNIFICANT CHANGE UP
GLUCOSE BLDC GLUCOMTR-MCNC: 103 MG/DL — HIGH (ref 70–99)
GLUCOSE BLDC GLUCOMTR-MCNC: 108 MG/DL — HIGH (ref 70–99)
GLUCOSE BLDC GLUCOMTR-MCNC: 138 MG/DL — HIGH (ref 70–99)
GLUCOSE BLDC GLUCOMTR-MCNC: 142 MG/DL — HIGH (ref 70–99)
GLUCOSE SERPL-MCNC: 145 MG/DL — HIGH (ref 70–99)
GLUCOSE SERPL-MCNC: 149 MG/DL — HIGH (ref 70–99)
GLUCOSE UR QL: NEGATIVE MG/DL — SIGNIFICANT CHANGE UP
HCT VFR BLD CALC: 40.6 % — SIGNIFICANT CHANGE UP (ref 34.5–45)
HGB BLD-MCNC: 13.6 G/DL — SIGNIFICANT CHANGE UP (ref 11.5–15.5)
INR BLD: 0.94 RATIO — SIGNIFICANT CHANGE UP (ref 0.85–1.16)
KETONES UR-MCNC: 40 MG/DL
LEUKOCYTE ESTERASE UR-ACNC: NEGATIVE — SIGNIFICANT CHANGE UP
MAGNESIUM SERPL-MCNC: 1.9 MG/DL — SIGNIFICANT CHANGE UP (ref 1.6–2.6)
MAGNESIUM SERPL-MCNC: 2.1 MG/DL — SIGNIFICANT CHANGE UP (ref 1.6–2.6)
MCHC RBC-ENTMCNC: 32.2 PG — SIGNIFICANT CHANGE UP (ref 27–34)
MCHC RBC-ENTMCNC: 33.5 G/DL — SIGNIFICANT CHANGE UP (ref 32–36)
MCV RBC AUTO: 96 FL — SIGNIFICANT CHANGE UP (ref 80–100)
NITRITE UR-MCNC: NEGATIVE — SIGNIFICANT CHANGE UP
NRBC # BLD AUTO: 0 K/UL — SIGNIFICANT CHANGE UP (ref 0–0)
NRBC # FLD: 0 K/UL — SIGNIFICANT CHANGE UP (ref 0–0)
NRBC BLD AUTO-RTO: 0 /100 WBCS — SIGNIFICANT CHANGE UP (ref 0–0)
OSMOLALITY UR: 681 MOSM/KG — SIGNIFICANT CHANGE UP (ref 50–1200)
PH UR: 6 — SIGNIFICANT CHANGE UP (ref 5–8)
PHOSPHATE SERPL-MCNC: 2.7 MG/DL — SIGNIFICANT CHANGE UP (ref 2.5–4.5)
PHOSPHATE SERPL-MCNC: 3.4 MG/DL — SIGNIFICANT CHANGE UP (ref 2.5–4.5)
PLATELET # BLD AUTO: 123 K/UL — LOW (ref 150–400)
POTASSIUM SERPL-MCNC: 4.2 MMOL/L — SIGNIFICANT CHANGE UP (ref 3.5–5.3)
POTASSIUM SERPL-MCNC: 4.3 MMOL/L — SIGNIFICANT CHANGE UP (ref 3.5–5.3)
POTASSIUM SERPL-SCNC: 4.2 MMOL/L — SIGNIFICANT CHANGE UP (ref 3.5–5.3)
POTASSIUM SERPL-SCNC: 4.3 MMOL/L — SIGNIFICANT CHANGE UP (ref 3.5–5.3)
POTASSIUM UR-SCNC: 77.1 MMOL/L — SIGNIFICANT CHANGE UP
PROT ?TM UR-MCNC: 141 MG/DL — SIGNIFICANT CHANGE UP
PROT SERPL-MCNC: 6.9 G/DL — SIGNIFICANT CHANGE UP (ref 6–8.3)
PROT SERPL-MCNC: 7.2 G/DL — SIGNIFICANT CHANGE UP (ref 6–8.3)
PROT UR-MCNC: 100 MG/DL
PROT/CREAT UR-RTO: 1 RATIO — HIGH (ref 0–0.2)
PROTHROM AB SERPL-ACNC: 11.2 SEC — SIGNIFICANT CHANGE UP (ref 9.9–13.4)
RBC # BLD: 4.23 M/UL — SIGNIFICANT CHANGE UP (ref 3.8–5.2)
RBC # FLD: 13.5 % — SIGNIFICANT CHANGE UP (ref 10.3–14.5)
RBC CASTS # UR COMP ASSIST: 127 /HPF — HIGH (ref 0–4)
RH IG SCN BLD-IMP: POSITIVE — SIGNIFICANT CHANGE UP
SODIUM SERPL-SCNC: 130 MMOL/L — LOW (ref 135–145)
SODIUM SERPL-SCNC: 132 MMOL/L — LOW (ref 135–145)
SODIUM UR-SCNC: 37 MMOL/L — SIGNIFICANT CHANGE UP
SP GR SPEC: 1.03 — SIGNIFICANT CHANGE UP (ref 1–1.03)
SQUAMOUS # UR AUTO: 4 /HPF — SIGNIFICANT CHANGE UP (ref 0–5)
UROBILINOGEN FLD QL: 1 MG/DL — SIGNIFICANT CHANGE UP (ref 0.2–1)
UUN UR-MCNC: 896 MG/DL — SIGNIFICANT CHANGE UP
WBC # BLD: 14.8 K/UL — HIGH (ref 3.8–10.5)
WBC # FLD AUTO: 14.8 K/UL — HIGH (ref 3.8–10.5)
WBC UR QL: 4 /HPF — SIGNIFICANT CHANGE UP (ref 0–5)

## 2025-03-13 PROCEDURE — 99291 CRITICAL CARE FIRST HOUR: CPT

## 2025-03-13 PROCEDURE — 76604 US EXAM CHEST: CPT | Mod: 26

## 2025-03-13 RX ORDER — SODIUM CHLORIDE 9 G/1000ML
1000 INJECTION, SOLUTION INTRAVENOUS
Refills: 0 | Status: DISCONTINUED | OUTPATIENT
Start: 2025-03-13 | End: 2025-03-14

## 2025-03-13 RX ORDER — PIPERACILLIN-TAZO-DEXTROSE,ISO 3.375G/5
3.38 IV SOLUTION, PIGGYBACK PREMIX FROZEN(ML) INTRAVENOUS ONCE
Refills: 0 | Status: COMPLETED | OUTPATIENT
Start: 2025-03-13 | End: 2025-03-13

## 2025-03-13 RX ORDER — CALCIUM GLUCONATE 20 MG/ML
1 INJECTION, SOLUTION INTRAVENOUS ONCE
Refills: 0 | Status: COMPLETED | OUTPATIENT
Start: 2025-03-13 | End: 2025-03-13

## 2025-03-13 RX ORDER — IPRATROPIUM BROMIDE AND ALBUTEROL SULFATE .5; 2.5 MG/3ML; MG/3ML
3 SOLUTION RESPIRATORY (INHALATION) EVERY 4 HOURS
Refills: 0 | Status: DISCONTINUED | OUTPATIENT
Start: 2025-03-13 | End: 2025-03-13

## 2025-03-13 RX ORDER — MAGNESIUM SULFATE 500 MG/ML
2 SYRINGE (ML) INJECTION ONCE
Refills: 0 | Status: COMPLETED | OUTPATIENT
Start: 2025-03-13 | End: 2025-03-13

## 2025-03-13 RX ORDER — IPRATROPIUM BROMIDE AND ALBUTEROL SULFATE .5; 2.5 MG/3ML; MG/3ML
3 SOLUTION RESPIRATORY (INHALATION) EVERY 4 HOURS
Refills: 0 | Status: DISCONTINUED | OUTPATIENT
Start: 2025-03-13 | End: 2025-03-15

## 2025-03-13 RX ORDER — IPRATROPIUM BROMIDE AND ALBUTEROL SULFATE .5; 2.5 MG/3ML; MG/3ML
3 SOLUTION RESPIRATORY (INHALATION) EVERY 6 HOURS
Refills: 0 | Status: DISCONTINUED | OUTPATIENT
Start: 2025-03-13 | End: 2025-03-13

## 2025-03-13 RX ORDER — SODIUM PHOSPHATE,DIBASIC DIHYD
15 POWDER (GRAM) MISCELLANEOUS ONCE
Refills: 0 | Status: COMPLETED | OUTPATIENT
Start: 2025-03-13 | End: 2025-03-13

## 2025-03-13 RX ORDER — PIPERACILLIN-TAZO-DEXTROSE,ISO 3.375G/5
3.38 IV SOLUTION, PIGGYBACK PREMIX FROZEN(ML) INTRAVENOUS EVERY 8 HOURS
Refills: 0 | Status: DISCONTINUED | OUTPATIENT
Start: 2025-03-13 | End: 2025-03-17

## 2025-03-13 RX ORDER — LABETALOL HYDROCHLORIDE 200 MG/1
10 TABLET, FILM COATED ORAL EVERY 8 HOURS
Refills: 0 | Status: DISCONTINUED | OUTPATIENT
Start: 2025-03-13 | End: 2025-03-13

## 2025-03-13 RX ORDER — SODIUM CHLORIDE 9 G/1000ML
1000 INJECTION, SOLUTION INTRAVENOUS ONCE
Refills: 0 | Status: COMPLETED | OUTPATIENT
Start: 2025-03-13 | End: 2025-03-13

## 2025-03-13 RX ORDER — METHYLPREDNISOLONE ACETATE 80 MG/ML
20 INJECTION, SUSPENSION INTRA-ARTICULAR; INTRALESIONAL; INTRAMUSCULAR; SOFT TISSUE EVERY 8 HOURS
Refills: 0 | Status: DISCONTINUED | OUTPATIENT
Start: 2025-03-13 | End: 2025-03-15

## 2025-03-13 RX ADMIN — IPRATROPIUM BROMIDE AND ALBUTEROL SULFATE 3 MILLILITER(S): .5; 2.5 SOLUTION RESPIRATORY (INHALATION) at 19:47

## 2025-03-13 RX ADMIN — IPRATROPIUM BROMIDE AND ALBUTEROL SULFATE 3 MILLILITER(S): .5; 2.5 SOLUTION RESPIRATORY (INHALATION) at 07:31

## 2025-03-13 RX ADMIN — Medication 5 MILLIGRAM(S): at 11:14

## 2025-03-13 RX ADMIN — SODIUM CHLORIDE 250 MILLILITER(S): 9 INJECTION, SOLUTION INTRAVENOUS at 12:17

## 2025-03-13 RX ADMIN — Medication 25 GRAM(S): at 05:29

## 2025-03-13 RX ADMIN — SODIUM CHLORIDE 75 MILLILITER(S): 9 INJECTION, SOLUTION INTRAVENOUS at 11:59

## 2025-03-13 RX ADMIN — ENOXAPARIN SODIUM 40 MILLIGRAM(S): 100 INJECTION SUBCUTANEOUS at 21:15

## 2025-03-13 RX ADMIN — METHYLPREDNISOLONE ACETATE 20 MILLIGRAM(S): 80 INJECTION, SUSPENSION INTRA-ARTICULAR; INTRALESIONAL; INTRAMUSCULAR; SOFT TISSUE at 14:02

## 2025-03-13 RX ADMIN — METHYLPREDNISOLONE ACETATE 20 MILLIGRAM(S): 80 INJECTION, SUSPENSION INTRA-ARTICULAR; INTRALESIONAL; INTRAMUSCULAR; SOFT TISSUE at 21:13

## 2025-03-13 RX ADMIN — LABETALOL HYDROCHLORIDE 10 MILLIGRAM(S): 200 TABLET, FILM COATED ORAL at 14:02

## 2025-03-13 RX ADMIN — Medication 25 GRAM(S): at 22:49

## 2025-03-13 RX ADMIN — Medication 1 APPLICATION(S): at 05:28

## 2025-03-13 RX ADMIN — IPRATROPIUM BROMIDE AND ALBUTEROL SULFATE 3 MILLILITER(S): .5; 2.5 SOLUTION RESPIRATORY (INHALATION) at 03:12

## 2025-03-13 RX ADMIN — Medication 63.75 MILLIMOLE(S): at 21:11

## 2025-03-13 RX ADMIN — BUDESONIDE 0.5 MILLIGRAM(S): 0.25 SUSPENSION RESPIRATORY (INHALATION) at 20:24

## 2025-03-13 RX ADMIN — Medication 200 GRAM(S): at 12:19

## 2025-03-13 RX ADMIN — IPRATROPIUM BROMIDE AND ALBUTEROL SULFATE 3 MILLILITER(S): .5; 2.5 SOLUTION RESPIRATORY (INHALATION) at 16:05

## 2025-03-13 RX ADMIN — IPRATROPIUM BROMIDE AND ALBUTEROL SULFATE 3 MILLILITER(S): .5; 2.5 SOLUTION RESPIRATORY (INHALATION) at 23:16

## 2025-03-13 RX ADMIN — Medication 25 GRAM(S): at 15:01

## 2025-03-13 RX ADMIN — CALCIUM GLUCONATE 100 GRAM(S): 20 INJECTION, SOLUTION INTRAVENOUS at 07:31

## 2025-03-13 RX ADMIN — SODIUM CHLORIDE 75 MILLILITER(S): 9 INJECTION, SOLUTION INTRAVENOUS at 21:11

## 2025-03-13 RX ADMIN — Medication 10 MILLIGRAM(S): at 15:16

## 2025-03-13 RX ADMIN — Medication 10 MILLIGRAM(S): at 21:11

## 2025-03-13 RX ADMIN — BUDESONIDE 0.5 MILLIGRAM(S): 0.25 SUSPENSION RESPIRATORY (INHALATION) at 07:31

## 2025-03-13 NOTE — PROGRESS NOTE ADULT - ASSESSMENT
74F hx asthma and depressed presented with AHHRF c/b AMS requiring MICU admission for intubation for airway protection i/s/o asthma exacerbation.    Neuro  - No active issues  # Acute Metabolic Encephalopathy  # Seizure-like activity, resolved  Encephalopathy i/s/o hypercapnic respiratory failure.  - CTH: no acute changes  - CT spine: no fractures  - 3/8 ON: 6 minutes of seizure-like activity with myoclonic upper jerks -> broke with ativan  -- EEG 3/10 showing no epileptiform activity; severe degree of diffuse or multifocal cerebral dysfunction c/w comatose    Cardiovascular  - No active issues  # HTN  > cont amlodipine 5mg qD (home meds lisinopril and HCTZ)    Pulmonary  # Chronic Hypercapnia i/s/o Asthma, controlled  # AHHRF, resolved  Likely 2/2 asthma exacerbation, with wheezing.  - CTA without evidence of PNA or PE  - full RVP negative  > cont duonebs w/ budesonide; transition to inhaled fluticasone/salmeterol post-extubation  > c/w steroid PO taper (40x3 days/30x3 days/20x3 days/10x3 days)  > replete Mag PRN, goal >2  > cont suppl O2 as needed, wean as tolerated    GI  - No active issues  # Diet  > cont TFs    Renal/  - No active issues    ID  # Leukocytosis, resolving  # Fevers, unknown origin  Likely reactive to steroids.  Persistent new onset fevers since ON into 3/12.  - Full RVP neg; legionella/strep negative  - Bcx no growth  - CTA chest w/o evidence of PNA or PE  - LE dopplers w/o DVT  - s/p azithro (5-days, finished 3/12)  > f/u rpt Bcx sent 3/12    Endocrine  - No active issues  - A1c 5.7%  > low SSI for steroids    Heme  - No active issues  # Thrombocytopenia, mild  Likely i/s/o critical illness.  > CTM for now; transfuse for PLT < 10 or < 50 with bleeding    # DVT ppx  - LE bi/l duplex w/o DVT  > Enoxaparin 40mg qD 74F hx asthma and depressed presented with AHHRF c/b AMS requiring MICU admission for intubation for airway protection i/s/o asthma exacerbation.    Neuro  - No active issues  # Acute Metabolic Encephalopathy  # Seizure-like activity, resolved  Encephalopathy i/s/o hypercapnic respiratory failure.  - CTH: no acute changes  - CT spine: no fractures  - 3/8 ON: 6 minutes of seizure-like activity with myoclonic upper jerks -> broke with ativan  -- EEG 3/10 showing no epileptiform activity; severe degree of diffuse or multifocal cerebral dysfunction c/w comatose    Cardiovascular  # HTN  > start labetalol 10mg IV q8h; Hydral 5mg IV PRN q8hr, stop amlodipine (NPO)    Pulmonary  # Chronic Hypercapnia i/s/o Asthma  # AHHRF, resolved  # PNA  Likely 2/2 asthma exacerbation, with wheezing.  - CTA without evidence of PNA or PE  - full RVP negative  > cont duonebs q4h w/ budesonide q12h; transition to inhaled fluticasone/salmeterol when PNA/asthma symptoms better controlled before discharge  > continue steroids as 20mg IV q8hr (transition back to IV)  > replete Mg PRN, goal >2  > cont suppl O2 as needed, wean as tolerated    GI  # Diet  > NPO post-extubation while patient having questionable aspiration    Renal/  # Hyponatremia  Likely i/s/o hypovolemia.  > IVF trial; salt tabs if not improving  > f/u urine studies  > CTM Na on daily labs    ID  # Leukocytosis, resolving  # Fevers, unknown origin  Likely reactive to steroids.  Persistent new onset fevers since ON into 3/12.  - Full RVP neg; legionella/strep negative  - Bcx no growth  - CTA chest w/o evidence of PNA or PE  - LE dopplers w/o DVT  - s/p azithro (5-days, finished 3/12)  > f/u rpt Bcx sent 3/12  > f/u UA/Ucx reflex  > resume empiric coverage with zosyn (3/13- )    Endocrine  - No active issues  - A1c 5.7%  > low SSI for steroids    Heme  - No active issues  # Thrombocytopenia, mild  Likely i/s/o critical illness.  > CTM for now; transfuse for PLT < 10 or < 50 with bleeding    # DVT ppx  - LE bi/l duplex w/o DVT  > Enoxaparin 40mg qD 74F hx asthma and depressed presented with AHHRF c/b AMS requiring MICU admission for intubation for airway protection i/s/o asthma exacerbation.    Neuro  - No active issues  # Acute Metabolic Encephalopathy  # Seizure-like activity, resolved  Encephalopathy i/s/o hypercapnic respiratory failure.  - CTH: no acute changes  - CT spine: no fractures  - 3/8 ON: 6 minutes of seizure-like activity with myoclonic upper jerks -> broke with ativan  -- EEG 3/10 showing no epileptiform activity; severe degree of diffuse or multifocal cerebral dysfunction c/w comatose    Cardiovascular  # HTN  > start labetalol 10mg IV q8h; Hydral 5mg IV PRN q8hr, stop amlodipine (NPO)    Pulmonary  # Chronic Hypercapnia i/s/o Asthma  # AHHRF, resolved  # PNA  Likely 2/2 asthma exacerbation, with wheezing.  - CTA without evidence of PNA or PE  - full RVP negative  > cont duonebs q4h w/ budesonide q12h; transition to inhaled fluticasone/salmeterol when PNA/asthma symptoms better controlled before discharge  > continue steroids as 20mg IV q8hr (transition back to IV)  > replete Mg PRN, goal >2  > cont suppl O2 as needed, wean as tolerated    GI  # Diet  > NPO post-extubation while patient having questionable aspiration  > SLP eval    Renal/  # Hyponatremia  Likely i/s/o hypovolemia.  > IVF trial; salt tabs if not improving  > f/u urine studies  > CTM Na on daily labs    ID  # Leukocytosis, resolving  # Fevers, unknown origin  Likely reactive to steroids.  Persistent new onset fevers since ON into 3/12.  - Full RVP neg; legionella/strep negative  - Bcx no growth  - CTA chest w/o evidence of PNA or PE  - LE dopplers w/o DVT  - s/p azithro (5-days, finished 3/12)  > f/u rpt Bcx sent 3/12  > f/u UA/Ucx reflex  > resume empiric coverage with zosyn (3/13- )    Endocrine  - No active issues  - A1c 5.7%  > low SSI for steroids    Heme  - No active issues  # Thrombocytopenia, mild  Likely i/s/o critical illness.  > CTM for now; transfuse for PLT < 10 or < 50 with bleeding    # DVT ppx  - LE bi/l duplex w/o DVT  > Enoxaparin 40mg qD

## 2025-03-13 NOTE — SWALLOW BEDSIDE ASSESSMENT ADULT - COMMENTS
Progress Note- MICU 3/13: "74F hx asthma and depressed presented with AHHRF c/b AMS requiring MICU admission for intubation for airway protection i/s/o asthma exacerbation."     Patient seen awake/alert this PM in MICU for a clinical swallow evaluation with RN and son present at bedside. Patient is currently on face tent with oxygen saturation of 93 upon arrival. Face tent loosened to accommodate for PO trials and patient noted with desaturation to mid to low 80s therefore face tent readjusted with oxygen saturation returning to high of mid 90s (96%). Swallow evaluation/PO trials deferred at this time given patient presenting with increased oxygen demand at this time. MICU team made aware and this service to follow up tomorrow.

## 2025-03-13 NOTE — PROGRESS NOTE ADULT - SUBJECTIVE AND OBJECTIVE BOX
************************  Mir Tyler, MD-PhD  Internal Medicine PGY-3  ************************    SUBJECTIVE:      Patient seen and examined at bedside. No events overnight, no acute complaints this morning. Patient reminded of ongoing careplan.      VITAL SIGNS:  ICU Vital Signs Last 24 Hrs  T(C): 37.4 (13 Mar 2025 04:00), Max: 38.1 (12 Mar 2025 06:30)  T(F): 99.3 (13 Mar 2025 04:00), Max: 100.6 (12 Mar 2025 06:30)  HR: 102 (13 Mar 2025 05:00) (79 - 115)  BP: 167/74 (13 Mar 2025 05:00) (125/70 - 194/101)  BP(mean): 99 (13 Mar 2025 05:00) (79 - 129)  ABP: --  ABP(mean): --  RR: 21 (13 Mar 2025 05:00) (10 - 35)  SpO2: 96% (13 Mar 2025 05:00) (92% - 98%)    O2 Parameters below as of 13 Mar 2025 04:00  Patient On (Oxygen Delivery Method): face tent  O2 Flow (L/min): 10  O2 Concentration (%): 50      Mode: standby  Plateau pressure:   P/F ratio:     03-11 @ 07:01  -  03-12 @ 07:00  --------------------------------------------------------  IN: 1188 mL / OUT: 1400 mL / NET: -212 mL    03-12 @ 07:01  -  03-13 @ 06:19  --------------------------------------------------------  IN: 510 mL / OUT: 950 mL / NET: -440 mL      CAPILLARY BLOOD GLUCOSE      POCT Blood Glucose.: 103 mg/dL (13 Mar 2025 05:27)    ECG:     PHYSICAL EXAM:    03-11 @ 07:01  -  03-12 @ 07:00  --------------------------------------------------------  IN: 1188 mL / OUT: 1400 mL / NET: -212 mL    03-12 @ 07:01  -  03-13 @ 06:19  --------------------------------------------------------  IN: 510 mL / OUT: 950 mL / NET: -440 mL    Vital Signs Last 24 Hrs  T(C): 37.4 (13 Mar 2025 04:00), Max: 38.1 (12 Mar 2025 06:30)  T(F): 99.3 (13 Mar 2025 04:00), Max: 100.6 (12 Mar 2025 06:30)  HR: 102 (13 Mar 2025 05:00) (79 - 115)  BP: 167/74 (13 Mar 2025 05:00) (125/70 - 194/101)  BP(mean): 99 (13 Mar 2025 05:00) (79 - 129)  RR: 21 (13 Mar 2025 05:00) (10 - 35)  SpO2: 96% (13 Mar 2025 05:00) (92% - 98%)    Parameters below as of 13 Mar 2025 04:00  Patient On (Oxygen Delivery Method): face tent  O2 Flow (L/min): 10  O2 Concentration (%): 50  T(C): 37.4 (03-13-25 @ 04:00), Max: 38.1 (03-12-25 @ 06:30)  HR: 102 (03-13-25 @ 05:00) (79 - 115)  BP: 167/74 (03-13-25 @ 05:00) (125/70 - 194/101)  RR: 21 (03-13-25 @ 05:00) (10 - 35)  SpO2: 96% (03-13-25 @ 05:00) (92% - 98%)    GENERAL: well-appearing, NAD, appears comfortable  HEENT: NC/AT, EOMI, no conjunctival icterus, dry mucus membranes  LUNGS: non-labored breathing, CTAB, no wheezing/rales/rhonchi  CV: RRR, no m/r/g, 2+ palpable peripheral pulses bi/l, cap refill <2 secs  ABD: non-distended, soft, non-tender, no rebound tenderness or guarding  EXT: warm and well-perfused, trace-1+ bi/l LE peripheral edema  SKIN: no rashes or lesions  NEURO: AAOx1-2, no focal deficits    MEDICATIONS:  MEDICATIONS  (STANDING):  albuterol/ipratropium for Nebulization 3 milliLiter(s) Nebulizer every 4 hours  amLODIPine   Tablet 5 milliGRAM(s) Oral daily  buDESOnide    Inhalation Suspension 0.5 milliGRAM(s) Inhalation every 12 hours  chlorhexidine 2% Cloths 1 Application(s) Topical <User Schedule>  dextrose 5%. 1000 milliLiter(s) (100 mL/Hr) IV Continuous <Continuous>  dextrose 5%. 1000 milliLiter(s) (50 mL/Hr) IV Continuous <Continuous>  dextrose 50% Injectable 25 Gram(s) IV Push once  dextrose 50% Injectable 12.5 Gram(s) IV Push once  dextrose 50% Injectable 25 Gram(s) IV Push once  dextrose Oral Gel 15 Gram(s) Oral once  enoxaparin Injectable 40 milliGRAM(s) SubCutaneous every 24 hours  fluticasone propionate/ salmeterol 250-50 MICROgram(s) Diskus 1 Dose(s) Inhalation two times a day  glucagon  Injectable 1 milliGRAM(s) IntraMuscular once  insulin lispro (ADMELOG) corrective regimen sliding scale   SubCutaneous every 6 hours  OLANZapine 7.5 milliGRAM(s) Oral daily  polyethylene glycol 3350 17 Gram(s) Oral daily  predniSONE   Tablet   Oral   predniSONE   Tablet 40 milliGRAM(s) Oral daily  sertraline 25 milliGRAM(s) Oral daily    MEDICATIONS  (PRN):      ALLERGIES:  Allergies    No Known Allergies    Intolerances        LABS:                        13.6   14.80 )-----------( 123      ( 13 Mar 2025 00:50 )             40.6     03-13    130[L]  |  92[L]  |  18  ----------------------------<  145[H]  4.2   |  22  |  0.54    Ca    9.3      13 Mar 2025 00:50  Phos  3.4     03-13  Mg     1.90     03-13    TPro  6.9  /  Alb  3.4  /  TBili  0.5  /  DBili  x   /  AST  32  /  ALT  39[H]  /  AlkPhos  104  03-13    PT/INR - ( 13 Mar 2025 00:50 )   PT: 11.2 sec;   INR: 0.94 ratio         PTT - ( 13 Mar 2025 00:50 )  PTT:44.7 sec      Urinalysis Basic - ( 13 Mar 2025 00:50 )    Color: x / Appearance: x / SG: x / pH: x  Gluc: 145 mg/dL / Ketone: x  / Bili: x / Urobili: x   Blood: x / Protein: x / Nitrite: x   Leuk Esterase: x / RBC: x / WBC x   Sq Epi: x / Non Sq Epi: x / Bacteria: x          IMAGING & OTHER TESTS:    NNI   ************************  Mir Tyler, MD-PhD  Internal Medicine PGY-3  ************************    SUBJECTIVE:      Patient seen and examined at bedside. Febrile overnight, cultures sent, no acute complaints this morning. Patient reminded of ongoing careplan.      VITAL SIGNS:  ICU Vital Signs Last 24 Hrs  T(C): 37.4 (13 Mar 2025 04:00), Max: 38.1 (12 Mar 2025 06:30)  T(F): 99.3 (13 Mar 2025 04:00), Max: 100.6 (12 Mar 2025 06:30)  HR: 102 (13 Mar 2025 05:00) (79 - 115)  BP: 167/74 (13 Mar 2025 05:00) (125/70 - 194/101)  BP(mean): 99 (13 Mar 2025 05:00) (79 - 129)  ABP: --  ABP(mean): --  RR: 21 (13 Mar 2025 05:00) (10 - 35)  SpO2: 96% (13 Mar 2025 05:00) (92% - 98%)    O2 Parameters below as of 13 Mar 2025 04:00  Patient On (Oxygen Delivery Method): face tent  O2 Flow (L/min): 10  O2 Concentration (%): 50      Mode: standby  Plateau pressure:   P/F ratio:     03-11 @ 07:01  -  03-12 @ 07:00  --------------------------------------------------------  IN: 1188 mL / OUT: 1400 mL / NET: -212 mL    03-12 @ 07:01  -  03-13 @ 06:19  --------------------------------------------------------  IN: 510 mL / OUT: 950 mL / NET: -440 mL      CAPILLARY BLOOD GLUCOSE      POCT Blood Glucose.: 103 mg/dL (13 Mar 2025 05:27)    ECG:     PHYSICAL EXAM:    03-11 @ 07:01  -  03-12 @ 07:00  --------------------------------------------------------  IN: 1188 mL / OUT: 1400 mL / NET: -212 mL    03-12 @ 07:01  -  03-13 @ 06:19  --------------------------------------------------------  IN: 510 mL / OUT: 950 mL / NET: -440 mL    Vital Signs Last 24 Hrs  T(C): 37.4 (13 Mar 2025 04:00), Max: 38.1 (12 Mar 2025 06:30)  T(F): 99.3 (13 Mar 2025 04:00), Max: 100.6 (12 Mar 2025 06:30)  HR: 102 (13 Mar 2025 05:00) (79 - 115)  BP: 167/74 (13 Mar 2025 05:00) (125/70 - 194/101)  BP(mean): 99 (13 Mar 2025 05:00) (79 - 129)  RR: 21 (13 Mar 2025 05:00) (10 - 35)  SpO2: 96% (13 Mar 2025 05:00) (92% - 98%)    Parameters below as of 13 Mar 2025 04:00  Patient On (Oxygen Delivery Method): face tent  O2 Flow (L/min): 10  O2 Concentration (%): 50  T(C): 37.4 (03-13-25 @ 04:00), Max: 38.1 (03-12-25 @ 06:30)  HR: 102 (03-13-25 @ 05:00) (79 - 115)  BP: 167/74 (03-13-25 @ 05:00) (125/70 - 194/101)  RR: 21 (03-13-25 @ 05:00) (10 - 35)  SpO2: 96% (03-13-25 @ 05:00) (92% - 98%)    GENERAL: well-appearing, NAD, appears comfortable  HEENT: NC/AT, EOMI, no conjunctival icterus, dry mucus membranes  LUNGS: non-labored breathing, wheezing and tight airway, no wheezing/rales/rhonchi  CV: RRR, no m/r/g, 2+ palpable peripheral pulses bi/l, cap refill <2 secs  ABD: non-distended, soft, non-tender, no rebound tenderness or guarding  EXT: warm and well-perfused, trace-1+ bi/l LE peripheral edema  SKIN: no rashes or lesions  NEURO: AAOx1-2, no focal deficits    MEDICATIONS:  MEDICATIONS  (STANDING):  albuterol/ipratropium for Nebulization 3 milliLiter(s) Nebulizer every 4 hours  amLODIPine   Tablet 5 milliGRAM(s) Oral daily  buDESOnide    Inhalation Suspension 0.5 milliGRAM(s) Inhalation every 12 hours  chlorhexidine 2% Cloths 1 Application(s) Topical <User Schedule>  dextrose 5%. 1000 milliLiter(s) (100 mL/Hr) IV Continuous <Continuous>  dextrose 5%. 1000 milliLiter(s) (50 mL/Hr) IV Continuous <Continuous>  dextrose 50% Injectable 25 Gram(s) IV Push once  dextrose 50% Injectable 12.5 Gram(s) IV Push once  dextrose 50% Injectable 25 Gram(s) IV Push once  dextrose Oral Gel 15 Gram(s) Oral once  enoxaparin Injectable 40 milliGRAM(s) SubCutaneous every 24 hours  fluticasone propionate/ salmeterol 250-50 MICROgram(s) Diskus 1 Dose(s) Inhalation two times a day  glucagon  Injectable 1 milliGRAM(s) IntraMuscular once  insulin lispro (ADMELOG) corrective regimen sliding scale   SubCutaneous every 6 hours  OLANZapine 7.5 milliGRAM(s) Oral daily  polyethylene glycol 3350 17 Gram(s) Oral daily  predniSONE   Tablet   Oral   predniSONE   Tablet 40 milliGRAM(s) Oral daily  sertraline 25 milliGRAM(s) Oral daily    MEDICATIONS  (PRN):      ALLERGIES:  Allergies    No Known Allergies    Intolerances        LABS:                        13.6   14.80 )-----------( 123      ( 13 Mar 2025 00:50 )             40.6     03-13    130[L]  |  92[L]  |  18  ----------------------------<  145[H]  4.2   |  22  |  0.54    Ca    9.3      13 Mar 2025 00:50  Phos  3.4     03-13  Mg     1.90     03-13    TPro  6.9  /  Alb  3.4  /  TBili  0.5  /  DBili  x   /  AST  32  /  ALT  39[H]  /  AlkPhos  104  03-13    PT/INR - ( 13 Mar 2025 00:50 )   PT: 11.2 sec;   INR: 0.94 ratio         PTT - ( 13 Mar 2025 00:50 )  PTT:44.7 sec      Urinalysis Basic - ( 13 Mar 2025 00:50 )    Color: x / Appearance: x / SG: x / pH: x  Gluc: 145 mg/dL / Ketone: x  / Bili: x / Urobili: x   Blood: x / Protein: x / Nitrite: x   Leuk Esterase: x / RBC: x / WBC x   Sq Epi: x / Non Sq Epi: x / Bacteria: x          IMAGING & OTHER TESTS:    NNI

## 2025-03-13 NOTE — CHART NOTE - NSCHARTNOTEFT_GEN_A_CORE
:  [facility]une/Morim    INDICATION:  respiratory failure    PROCEDURE:  [ ] LIMITED ECHO  [x ] LIMITED CHEST  [ ] LIMITED RETROPERITONEAL  [ ] LIMITED ABDOMINAL  [ ] LIMITED DVT  [ ] NEEDLE GUIDANCE VASCULAR  [ ] NEEDLE GUIDANCE THORACENTESIS  [ ] NEEDLE GUIDANCE PARACENTESIS  [ ] NEEDLE GUIDANCE PERICARDIOCENTESIS  [ ] OTHER    FINDINGS:  A-line predominant lung pattern  Small b/l basilar consolidations, Lt>Rt    INTERPRETATION:  normal lung aeration pattern anteriorly with bases concerning for pneumonia      Images uploaded to [employer]    Time spent on the procedure was separate from the critical care time spent for patient care.

## 2025-03-13 NOTE — PROGRESS NOTE ADULT - ATTENDING COMMENTS
75 yo F with hx of asthma (on prn Albuterol only) admitted to MICU with acute hypoxemic hypercapnic respiratory failure in the setting of asthma exacerbation. S/p intubation, now extubated on 3/12. Still wheezing, borderline hypoxemic, wheezing on exam.    Continue with standing duonebs + Budesonide. Start Symbicort with spacer when able. Will place back on IV steroids.  s/p Azithro. Concern for superimposed PNA. Add Zosyn.   Will need close pulmonary f/u as outpatient. 73 yo F with hx of asthma (on prn Albuterol only) admitted to MICU with acute hypoxemic hypercapnic respiratory failure in the setting of asthma exacerbation. S/p intubation, now extubated on 3/12. Still wheezing, borderline hypoxemic, wheezing on exam.    Continue with standing duonebs + Budesonide. Start Symbicort with spacer when able. Will place back on IV steroids.  s/p Azithro. Concern for superimposed PNA. Add Zosyn. Virtual IVC on POCUS. Will bolus IVF and start maintenance while NPO. Check urine lytes and add salt tabs for hyponatremia.   Will need close pulmonary f/u as outpatient.

## 2025-03-13 NOTE — CHART NOTE - NSCHARTNOTEFT_GEN_A_CORE
MICU Transfer Note  ---------------------------    Transfer from: MICU  Transfer to:  (  ) Medicine    (  ) Telemetry    (  ) RCU    (  ) Palliative    (  ) Stroke Unit    (  ) _______________  Accepting Physician:  Room:     Hospital course:  74F hx asthma, depression, and HTN was admitted to the MICU with AHRF 2/2 asthma exacerbation resulting in acute on chronic HHRF causing AMS and increased WOB that required intubation. During her ICU course, she experienced seizure-like activity (6 minutes of myoclonic upper jerks on 3/8 that resolved with Ativan, later progressing to rhythmic flexing movements in extremities) likely 2/2 hypercapnia and/or hyponatremia. Her CT head was negative for acute changes, and a video EEG was ordered that showed severe diffuse cerebral dysfunction consistent with comatose state but no epileptiform activity. Respiratory management included albuterol/ipratropium w/ budesonide in addition to solumedrol 125mg load and resulting 40mg x 5 days with a subsequent transition to PO taper, and magnesium supplementation, and ventilator settings to avoid air trapping. CTA was negative for pneumonia or pulmonary embolism, and respiratory viral panel was negative. She was empirically treated with azithromycin and ceftriaxone for CAP despite no infectious symptoms or leukocytosis, but CTX was stopped and azithro continued to complete 5 days for anti-inflammatory properties. Additional issues included hyponatremia (resolved, managed with free water restriction), acute on chronic metabolic alkalosis (likely compensatory), and mild thrombocytopenia (monitoring with plans). The primary etiology of her decompensation appears to have be an asthma exacerbation, which has now improved.  Since 3/12 patient also having fevers without clear source (no VTEs, prior cultures negative, no known hx of malignancy). Sent rpt Bcx 3/12 PM.    For Follow-Up:  [ ] CTM fevers, WBC  [ ] f/u 3/12 blood cultures  [ ] chronic asthma management with duonebs and inhaled+PO steroids (taper); requires outpatient pulm f/u for ongoing management        OBJECTIVE --  Vital Signs Last 24 Hrs  T(C): 37.4 (13 Mar 2025 04:00), Max: 38.1 (12 Mar 2025 07:00)  T(F): 99.3 (13 Mar 2025 04:00), Max: 100.6 (12 Mar 2025 07:00)  HR: 104 (13 Mar 2025 06:00) (79 - 115)  BP: 163/79 (13 Mar 2025 06:00) (125/70 - 194/101)  BP(mean): 102 (13 Mar 2025 06:00) (79 - 129)  RR: 29 (13 Mar 2025 06:00) (10 - 35)  SpO2: 96% (13 Mar 2025 06:00) (92% - 98%)    Parameters below as of 13 Mar 2025 06:00  Patient On (Oxygen Delivery Method): face tent    O2 Concentration (%): 50    I&O's Summary    11 Mar 2025 07:01  -  12 Mar 2025 07:00  --------------------------------------------------------  IN: 1188 mL / OUT: 1400 mL / NET: -212 mL    12 Mar 2025 07:01  -  13 Mar 2025 06:30  --------------------------------------------------------  IN: 510 mL / OUT: 950 mL / NET: -440 mL        MEDICATIONS  (STANDING):  albuterol/ipratropium for Nebulization 3 milliLiter(s) Nebulizer every 4 hours  amLODIPine   Tablet 5 milliGRAM(s) Oral daily  buDESOnide    Inhalation Suspension 0.5 milliGRAM(s) Inhalation every 12 hours  chlorhexidine 2% Cloths 1 Application(s) Topical <User Schedule>  dextrose 5%. 1000 milliLiter(s) (100 mL/Hr) IV Continuous <Continuous>  dextrose 5%. 1000 milliLiter(s) (50 mL/Hr) IV Continuous <Continuous>  dextrose 50% Injectable 25 Gram(s) IV Push once  dextrose 50% Injectable 12.5 Gram(s) IV Push once  dextrose 50% Injectable 25 Gram(s) IV Push once  dextrose Oral Gel 15 Gram(s) Oral once  enoxaparin Injectable 40 milliGRAM(s) SubCutaneous every 24 hours  fluticasone propionate/ salmeterol 250-50 MICROgram(s) Diskus 1 Dose(s) Inhalation two times a day  glucagon  Injectable 1 milliGRAM(s) IntraMuscular once  insulin lispro (ADMELOG) corrective regimen sliding scale   SubCutaneous every 6 hours  OLANZapine 7.5 milliGRAM(s) Oral daily  polyethylene glycol 3350 17 Gram(s) Oral daily  predniSONE   Tablet   Oral   predniSONE   Tablet 40 milliGRAM(s) Oral daily  sertraline 25 milliGRAM(s) Oral daily    MEDICATIONS  (PRN):        LABS                                            13.6                  Neurophils% (auto):   x      (03-13 @ 00:50):    14.80)-----------(123          Lymphocytes% (auto):  x                                             40.6                   Eosinphils% (auto):   x        Manual%: Neutrophils x    ; Lymphocytes x    ; Eosinophils x    ; Bands%: x    ; Blasts x                                    130    |  92     |  18                  Calcium: 9.3   / iCa: 1.10   (03-13 @ 00:50)    ----------------------------<  145       Magnesium: 1.90                             4.2     |  22     |  0.54             Phosphorous: 3.4      TPro  6.9    /  Alb  3.4    /  TBili  0.5    /  DBili  x      /  AST  32     /  ALT  39     /  AlkPhos  104    13 Mar 2025 00:50    ( 03-13 @ 00:50 )   PT: 11.2 sec;   INR: 0.94 ratio  aPTT: 44.7 sec          ASSESSMENT & PLAN:    74F hx asthma and depressed presented with AHHRF c/b AMS requiring MICU admission for intubation for airway protection i/s/o asthma exacerbation.    Neuro  - No active issues  # Acute Metabolic Encephalopathy  # Seizure-like activity, resolved  Encephalopathy i/s/o hypercapnic respiratory failure.  - CTH: no acute changes  - CT spine: no fractures  - 3/8 ON: 6 minutes of seizure-like activity with myoclonic upper jerks -> broke with ativan  -- EEG 3/10 showing no epileptiform activity; severe degree of diffuse or multifocal cerebral dysfunction c/w comatose    Cardiovascular  - No active issues  # HTN  > cont amlodipine 5mg qD (home meds lisinopril and HCTZ)    Pulmonary  # Chronic Hypercapnia i/s/o Asthma, controlled  # AHHRF, resolved  Likely 2/2 asthma exacerbation, with wheezing.  - CTA without evidence of PNA or PE  - full RVP negative  > cont duonebs w/ budesonide; transition to inhaled fluticasone/salmeterol post-extubation  > c/w steroid PO taper (40x3 days/30x3 days/20x3 days/10x3 days)  > replete Mag PRN, goal >2  > cont suppl O2 as needed, wean as tolerated    GI  - No active issues  # Diet  > cont TFs    Renal/  - No active issues    ID  # Leukocytosis, resolving  # Fevers, unknown origin  Likely reactive to steroids.  Persistent new onset fevers since ON into 3/12.  - Full RVP neg; legionella/strep negative  - Bcx no growth  - CTA chest w/o evidence of PNA or PE  - LE dopplers w/o DVT  - s/p azithro (5-days, finished 3/12)  > f/u rpt Bcx sent 3/12    Endocrine  - No active issues  - A1c 5.7%  > low SSI for steroids    Heme  - No active issues  # Thrombocytopenia, mild  Likely i/s/o critical illness.  > CTM for now; transfuse for PLT < 10 or < 50 with bleeding    # DVT ppx  - LE bi/l duplex w/o DVT  > Enoxaparin 40mg qD MICU Transfer Note  ---------------------------    Transfer from: MICU  Transfer to:  (  ) Medicine    (  ) Telemetry    (  ) RCU    (  ) Palliative    (  ) Stroke Unit    (  ) _______________  Accepting Physician:  Room:     Hospital course:  74F hx asthma, depression, and HTN was admitted to the MICU with AHRF 2/2 asthma exacerbation resulting in acute on chronic HHRF causing AMS and increased WOB that required intubation. During her ICU course, she experienced seizure-like activity (6 minutes of myoclonic upper jerks on 3/8 that resolved with Ativan, later progressing to rhythmic flexing movements in extremities) likely 2/2 hypercapnia and/or hyponatremia. Her CT head was negative for acute changes, and a video EEG was ordered that showed severe diffuse cerebral dysfunction consistent with comatose state but no epileptiform activity. Respiratory management included albuterol/ipratropium w/ budesonide in addition to solumedrol 125mg load and resulting 40mg x 5 days with a subsequent transition to PO taper, and magnesium supplementation, and ventilator settings to avoid air trapping. CTA was negative for pneumonia or pulmonary embolism, and respiratory viral panel was negative. She was empirically treated with azithromycin and ceftriaxone for CAP despite no infectious symptoms or leukocytosis, but CTX was stopped and azithro continued to complete 5 days for anti-inflammatory properties. Patient extubated 3/12 to face tent, responding to commands. Additional issues included hyponatremia (resolved, managed with free water restriction), acute on chronic metabolic alkalosis (likely compensatory), and mild thrombocytopenia (monitoring with plans). The primary etiology of her decompensation appears to have be an asthma exacerbation, which has now improved. Since 3/12 patient also having fevers without clear source (no VTEs, prior cultures negative, no known hx of malignancy). Sent rpt Bcx 3/12 PM.    For Follow-Up:  [ ] CTM fevers, WBC  [ ] f/u 3/12 blood cultures  [ ] chronic asthma management with duonebs and inhaled+PO steroids (taper); requires outpatient pulm f/u for ongoing management        OBJECTIVE --  Vital Signs Last 24 Hrs  T(C): 37.4 (13 Mar 2025 04:00), Max: 38.1 (12 Mar 2025 07:00)  T(F): 99.3 (13 Mar 2025 04:00), Max: 100.6 (12 Mar 2025 07:00)  HR: 104 (13 Mar 2025 06:00) (79 - 115)  BP: 163/79 (13 Mar 2025 06:00) (125/70 - 194/101)  BP(mean): 102 (13 Mar 2025 06:00) (79 - 129)  RR: 29 (13 Mar 2025 06:00) (10 - 35)  SpO2: 96% (13 Mar 2025 06:00) (92% - 98%)    Parameters below as of 13 Mar 2025 06:00  Patient On (Oxygen Delivery Method): face tent    O2 Concentration (%): 50    I&O's Summary    11 Mar 2025 07:01  -  12 Mar 2025 07:00  --------------------------------------------------------  IN: 1188 mL / OUT: 1400 mL / NET: -212 mL    12 Mar 2025 07:01  -  13 Mar 2025 06:30  --------------------------------------------------------  IN: 510 mL / OUT: 950 mL / NET: -440 mL        MEDICATIONS  (STANDING):  albuterol/ipratropium for Nebulization 3 milliLiter(s) Nebulizer every 4 hours  amLODIPine   Tablet 5 milliGRAM(s) Oral daily  buDESOnide    Inhalation Suspension 0.5 milliGRAM(s) Inhalation every 12 hours  chlorhexidine 2% Cloths 1 Application(s) Topical <User Schedule>  dextrose 5%. 1000 milliLiter(s) (100 mL/Hr) IV Continuous <Continuous>  dextrose 5%. 1000 milliLiter(s) (50 mL/Hr) IV Continuous <Continuous>  dextrose 50% Injectable 25 Gram(s) IV Push once  dextrose 50% Injectable 12.5 Gram(s) IV Push once  dextrose 50% Injectable 25 Gram(s) IV Push once  dextrose Oral Gel 15 Gram(s) Oral once  enoxaparin Injectable 40 milliGRAM(s) SubCutaneous every 24 hours  fluticasone propionate/ salmeterol 250-50 MICROgram(s) Diskus 1 Dose(s) Inhalation two times a day  glucagon  Injectable 1 milliGRAM(s) IntraMuscular once  insulin lispro (ADMELOG) corrective regimen sliding scale   SubCutaneous every 6 hours  OLANZapine 7.5 milliGRAM(s) Oral daily  polyethylene glycol 3350 17 Gram(s) Oral daily  predniSONE   Tablet   Oral   predniSONE   Tablet 40 milliGRAM(s) Oral daily  sertraline 25 milliGRAM(s) Oral daily    MEDICATIONS  (PRN):        LABS                                            13.6                  Neurophils% (auto):   x      (03-13 @ 00:50):    14.80)-----------(123          Lymphocytes% (auto):  x                                             40.6                   Eosinphils% (auto):   x        Manual%: Neutrophils x    ; Lymphocytes x    ; Eosinophils x    ; Bands%: x    ; Blasts x                                    130    |  92     |  18                  Calcium: 9.3   / iCa: 1.10   (03-13 @ 00:50)    ----------------------------<  145       Magnesium: 1.90                             4.2     |  22     |  0.54             Phosphorous: 3.4      TPro  6.9    /  Alb  3.4    /  TBili  0.5    /  DBili  x      /  AST  32     /  ALT  39     /  AlkPhos  104    13 Mar 2025 00:50    ( 03-13 @ 00:50 )   PT: 11.2 sec;   INR: 0.94 ratio  aPTT: 44.7 sec          ASSESSMENT & PLAN:    74F hx asthma and depressed presented with AHHRF c/b AMS requiring MICU admission for intubation for airway protection i/s/o asthma exacerbation.    Neuro  - No active issues  # Acute Metabolic Encephalopathy  # Seizure-like activity, resolved  Encephalopathy i/s/o hypercapnic respiratory failure.  - CTH: no acute changes  - CT spine: no fractures  - 3/8 ON: 6 minutes of seizure-like activity with myoclonic upper jerks -> broke with ativan  -- EEG 3/10 showing no epileptiform activity; severe degree of diffuse or multifocal cerebral dysfunction c/w comatose    Cardiovascular  - No active issues  # HTN  > cont amlodipine 5mg qD (home meds lisinopril and HCTZ)    Pulmonary  # Chronic Hypercapnia i/s/o Asthma, controlled  # AHHRF, resolved  Likely 2/2 asthma exacerbation, with wheezing.  - CTA without evidence of PNA or PE  - full RVP negative  > cont duonebs w/ budesonide; transition to inhaled fluticasone/salmeterol post-extubation  > c/w steroid PO taper (40x3 days/30x3 days/20x3 days/10x3 days)  > replete Mag PRN, goal >2  > cont suppl O2 as needed, wean as tolerated    GI  - No active issues  # Diet  > cont TFs    Renal/  - No active issues    ID  # Leukocytosis, resolving  # Fevers, unknown origin  Likely reactive to steroids.  Persistent new onset fevers since ON into 3/12.  - Full RVP neg; legionella/strep negative  - Bcx no growth  - CTA chest w/o evidence of PNA or PE  - LE dopplers w/o DVT  - s/p azithro (5-days, finished 3/12)  > f/u rpt Bcx sent 3/12    Endocrine  - No active issues  - A1c 5.7%  > low SSI for steroids    Heme  - No active issues  # Thrombocytopenia, mild  Likely i/s/o critical illness.  > CTM for now; transfuse for PLT < 10 or < 50 with bleeding    # DVT ppx  - LE bi/l duplex w/o DVT  > Enoxaparin 40mg qD MICU Transfer Note  ---------------------------    Transfer from: MICU  Transfer to:  ( x) Medicine    (  ) Telemetry    (  ) RCU    (  ) Palliative    (  ) Stroke Unit    (  ) _______________  Accepting Physician:  Room:     Hospital course:  74F hx asthma, depression, and HTN was admitted to the MICU with AHRF 2/2 asthma exacerbation resulting in acute on chronic HHRF causing AMS and increased WOB that required intubation. During her ICU course, she experienced seizure-like activity (6 minutes of myoclonic upper jerks on 3/8 that resolved with Ativan, later progressing to rhythmic flexing movements in extremities) likely 2/2 hypercapnia and/or hyponatremia. Her CT head was negative for acute changes, and a video EEG was ordered that showed severe diffuse cerebral dysfunction consistent with comatose state but no epileptiform activity. Respiratory management included albuterol/ipratropium w/ budesonide in addition to solumedrol 125mg load and resulting 40mg x 5 days with a subsequent transition to PO taper, and magnesium supplementation, and ventilator settings to avoid air trapping. CTA was negative for pneumonia or pulmonary embolism, and respiratory viral panel was negative. She was empirically treated with azithromycin and ceftriaxone for CAP despite no infectious symptoms or leukocytosis, but CTX was stopped and azithro continued to complete 5 days for anti-inflammatory properties. Patient extubated 3/12 to face tent, responding to commands. Additional issues included hyponatremia (resolved, managed with free water restriction), acute on chronic metabolic alkalosis (likely compensatory), and mild thrombocytopenia (monitoring with plans). The primary etiology of her decompensation appears to have be an asthma exacerbation, which has now improved. Since 3/12 patient also having fevers without clear source (no VTEs, prior cultures negative, no known hx of malignancy). Sent rpt Bcx 3/12 PM.    For Follow-Up:  [ ] Monitor Blood pressures- started Amlodipine 10mg and Lisinopril 40mg   [ ] f/u 3/12 blood cultures  [ ] Taper Steroids- currently on Prednisone 40mg   [ ] Continue Zosyn for a 7 day course- until 3/19         OBJECTIVE --  Vital Signs Last 24 Hrs  T(C): 37.4 (13 Mar 2025 04:00), Max: 38.1 (12 Mar 2025 07:00)  T(F): 99.3 (13 Mar 2025 04:00), Max: 100.6 (12 Mar 2025 07:00)  HR: 104 (13 Mar 2025 06:00) (79 - 115)  BP: 163/79 (13 Mar 2025 06:00) (125/70 - 194/101)  BP(mean): 102 (13 Mar 2025 06:00) (79 - 129)  RR: 29 (13 Mar 2025 06:00) (10 - 35)  SpO2: 96% (13 Mar 2025 06:00) (92% - 98%)    Parameters below as of 13 Mar 2025 06:00  Patient On (Oxygen Delivery Method): face tent    O2 Concentration (%): 50    I&O's Summary    11 Mar 2025 07:01  -  12 Mar 2025 07:00  --------------------------------------------------------  IN: 1188 mL / OUT: 1400 mL / NET: -212 mL    12 Mar 2025 07:01  -  13 Mar 2025 06:30  --------------------------------------------------------  IN: 510 mL / OUT: 950 mL / NET: -440 mL        MEDICATIONS  (STANDING):  albuterol/ipratropium for Nebulization 3 milliLiter(s) Nebulizer every 4 hours  amLODIPine   Tablet 5 milliGRAM(s) Oral daily  buDESOnide    Inhalation Suspension 0.5 milliGRAM(s) Inhalation every 12 hours  chlorhexidine 2% Cloths 1 Application(s) Topical <User Schedule>  dextrose 5%. 1000 milliLiter(s) (100 mL/Hr) IV Continuous <Continuous>  dextrose 5%. 1000 milliLiter(s) (50 mL/Hr) IV Continuous <Continuous>  dextrose 50% Injectable 25 Gram(s) IV Push once  dextrose 50% Injectable 12.5 Gram(s) IV Push once  dextrose 50% Injectable 25 Gram(s) IV Push once  dextrose Oral Gel 15 Gram(s) Oral once  enoxaparin Injectable 40 milliGRAM(s) SubCutaneous every 24 hours  fluticasone propionate/ salmeterol 250-50 MICROgram(s) Diskus 1 Dose(s) Inhalation two times a day  glucagon  Injectable 1 milliGRAM(s) IntraMuscular once  insulin lispro (ADMELOG) corrective regimen sliding scale   SubCutaneous every 6 hours  OLANZapine 7.5 milliGRAM(s) Oral daily  polyethylene glycol 3350 17 Gram(s) Oral daily  predniSONE   Tablet   Oral   predniSONE   Tablet 40 milliGRAM(s) Oral daily  sertraline 25 milliGRAM(s) Oral daily    MEDICATIONS  (PRN):        LABS                                            13.6                  Neurophils% (auto):   x      (03-13 @ 00:50):    14.80)-----------(123          Lymphocytes% (auto):  x                                             40.6                   Eosinphils% (auto):   x        Manual%: Neutrophils x    ; Lymphocytes x    ; Eosinophils x    ; Bands%: x    ; Blasts x                                    130    |  92     |  18                  Calcium: 9.3   / iCa: 1.10   (03-13 @ 00:50)    ----------------------------<  145       Magnesium: 1.90                             4.2     |  22     |  0.54             Phosphorous: 3.4      TPro  6.9    /  Alb  3.4    /  TBili  0.5    /  DBili  x      /  AST  32     /  ALT  39     /  AlkPhos  104    13 Mar 2025 00:50    ( 03-13 @ 00:50 )   PT: 11.2 sec;   INR: 0.94 ratio  aPTT: 44.7 sec          ASSESSMENT & PLAN:    74F hx asthma and depressed presented with AHHRF c/b AMS requiring MICU admission for intubation for airway protection i/s/o asthma exacerbation.    Neuro  - No active issues  # Acute Metabolic Encephalopathy  # Seizure-like activity, resolved  Encephalopathy i/s/o hypercapnic respiratory failure.  - CTH: no acute changes  - CT spine: no fractures  - 3/8 ON: 6 minutes of seizure-like activity with myoclonic upper jerks -> broke with ativan  -- EEG 3/10 showing no epileptiform activity; severe degree of diffuse or multifocal cerebral dysfunction c/w comatose    Cardiovascular  - No active issues  # HTN  > cont amlodipine 5mg qD (home meds lisinopril and HCTZ)    Pulmonary  # Chronic Hypercapnia i/s/o Asthma, controlled  # AHHRF, resolved  Likely 2/2 asthma exacerbation, with wheezing.  - CTA without evidence of PNA or PE  - full RVP negative  > cont duonebs w/ budesonide; transition to inhaled fluticasone/salmeterol post-extubation  > c/w steroid PO taper (40x3 days/30x3 days/20x3 days/10x3 days)  > replete Mag PRN, goal >2  > cont suppl O2 as needed, wean as tolerated    GI  - No active issues  # Diet  > cont TFs    Renal/  - No active issues    ID  # Leukocytosis, resolving  # Fevers, unknown origin  Likely reactive to steroids.  Persistent new onset fevers since ON into 3/12.  - Full RVP neg; legionella/strep negative  - Bcx no growth  - CTA chest w/o evidence of PNA or PE  - LE dopplers w/o DVT  - s/p azithro (5-days, finished 3/12)  > f/u rpt Bcx sent 3/12    Endocrine  - No active issues  - A1c 5.7%  > low SSI for steroids    Heme  - No active issues  # Thrombocytopenia, mild  Likely i/s/o critical illness.  > CTM for now; transfuse for PLT < 10 or < 50 with bleeding    # DVT ppx  - LE bi/l duplex w/o DVT  > Enoxaparin 40mg qD MICU Transfer Note  ---------------------------    Transfer from: MICU  Transfer to:  ( x) Medicine    (  ) Telemetry    (  ) RCU    (  ) Palliative    (  ) Stroke Unit    (  ) _______________  Accepting Physician:  Room:     Hospital course:  Patient is a 74F hx of asthma, depression, and HTN was admitted to the MICU with AHRF 2/2 asthma exacerbation resulting in acute on chronic HHRF causing AMS and increased WOB that required intubation. During her ICU course, she experienced seizure-like activity (6 minutes of myoclonic upper jerks on 3/8 that resolved with Ativan, later progressing to rhythmic flexing movements in extremities) likely 2/2 hypercapnia and/or hyponatremia. Her CT head was negative for acute changes, and a video EEG was ordered that showed severe diffuse cerebral dysfunction consistent with comatose state but no epileptiform activity. Respiratory management included albuterol/ipratropium w/ budesonide in addition to solumedrol 125mg load and resulting 40mg x 5 days with a subsequent transition to PO taper, and magnesium supplementation, and ventilator settings to avoid air trapping. CTA was negative for pneumonia or pulmonary embolism, and respiratory viral panel was negative. She was empirically treated with azithromycin and Zosyn for CAP. Patient extubated 3/12 to face tent, responding to commands. Additional issues included hyponatremia (resolved, managed with free water restriction), acute on chronic metabolic alkalosis (likely compensatory), and mild thrombocytopenia (monitoring with plans). The primary etiology of her decompensation appears to have be an asthma exacerbation, which has now improved. Since 3/12 patient also having fevers without clear source (no VTEs, prior cultures negative, no known hx of malignancy). Sent rpt Bcx 3/12 pm with no growth so far.     For Follow-Up:  [ ] Monitor Blood pressures- started Amlodipine 10mg and Lisinopril 40mg   [ ] f/u 3/12 blood cultures- NGTD   [ ] Taper Steroids- currently on Prednisone 40mg   [ ] Continue Zosyn for a 7 day course- until 3/19         OBJECTIVE --  Vital Signs Last 24 Hrs  T(C): 37.4 (13 Mar 2025 04:00), Max: 38.1 (12 Mar 2025 07:00)  T(F): 99.3 (13 Mar 2025 04:00), Max: 100.6 (12 Mar 2025 07:00)  HR: 104 (13 Mar 2025 06:00) (79 - 115)  BP: 163/79 (13 Mar 2025 06:00) (125/70 - 194/101)  BP(mean): 102 (13 Mar 2025 06:00) (79 - 129)  RR: 29 (13 Mar 2025 06:00) (10 - 35)  SpO2: 96% (13 Mar 2025 06:00) (92% - 98%)    Parameters below as of 13 Mar 2025 06:00  Patient On (Oxygen Delivery Method): face tent    O2 Concentration (%): 50    I&O's Summary    11 Mar 2025 07:01  -  12 Mar 2025 07:00  --------------------------------------------------------  IN: 1188 mL / OUT: 1400 mL / NET: -212 mL    12 Mar 2025 07:01  -  13 Mar 2025 06:30  --------------------------------------------------------  IN: 510 mL / OUT: 950 mL / NET: -440 mL        MEDICATIONS  (STANDING):  albuterol/ipratropium for Nebulization 3 milliLiter(s) Nebulizer every 4 hours  amLODIPine   Tablet 5 milliGRAM(s) Oral daily  buDESOnide    Inhalation Suspension 0.5 milliGRAM(s) Inhalation every 12 hours  chlorhexidine 2% Cloths 1 Application(s) Topical <User Schedule>  dextrose 5%. 1000 milliLiter(s) (100 mL/Hr) IV Continuous <Continuous>  dextrose 5%. 1000 milliLiter(s) (50 mL/Hr) IV Continuous <Continuous>  dextrose 50% Injectable 25 Gram(s) IV Push once  dextrose 50% Injectable 12.5 Gram(s) IV Push once  dextrose 50% Injectable 25 Gram(s) IV Push once  dextrose Oral Gel 15 Gram(s) Oral once  enoxaparin Injectable 40 milliGRAM(s) SubCutaneous every 24 hours  fluticasone propionate/ salmeterol 250-50 MICROgram(s) Diskus 1 Dose(s) Inhalation two times a day  glucagon  Injectable 1 milliGRAM(s) IntraMuscular once  insulin lispro (ADMELOG) corrective regimen sliding scale   SubCutaneous every 6 hours  OLANZapine 7.5 milliGRAM(s) Oral daily  polyethylene glycol 3350 17 Gram(s) Oral daily  predniSONE   Tablet   Oral   predniSONE   Tablet 40 milliGRAM(s) Oral daily  sertraline 25 milliGRAM(s) Oral daily    MEDICATIONS  (PRN):        LABS                                            13.6                  Neurophils% (auto):   x      (03-13 @ 00:50):    14.80)-----------(123          Lymphocytes% (auto):  x                                             40.6                   Eosinphils% (auto):   x        Manual%: Neutrophils x    ; Lymphocytes x    ; Eosinophils x    ; Bands%: x    ; Blasts x                                    130    |  92     |  18                  Calcium: 9.3   / iCa: 1.10   (03-13 @ 00:50)    ----------------------------<  145       Magnesium: 1.90                             4.2     |  22     |  0.54             Phosphorous: 3.4      TPro  6.9    /  Alb  3.4    /  TBili  0.5    /  DBili  x      /  AST  32     /  ALT  39     /  AlkPhos  104    13 Mar 2025 00:50    ( 03-13 @ 00:50 )   PT: 11.2 sec;   INR: 0.94 ratio  aPTT: 44.7 sec          ASSESSMENT & PLAN:    74F hx asthma and depressed presented with AHHRF c/b AMS requiring MICU admission for intubation for airway protection i/s/o asthma exacerbation. s/p extubation with labile BPs requiring nicardipine gtt.    Neuro  - No active issues  # Depression  Hx tardive dyskinesia symptoms on prior medications.  > continue home Sertraline  > hold home Olanzapine, consider resuming    # Toxic Metabolic encephalopathy, resolved  # Seizure-like activity, resolved  Encephalopathy i/s/o hypercapnic respiratory failure and sepsis.  - CTH: no acute changes  - CT spine: no fractures  - 3/8 ON: 6 minutes of seizure-like activity with myoclonic upper jerks -> broke with ativan  -- EEG 3/10 showing no epileptiform activity; severe degree of diffuse or multifocal cerebral dysfunction c/w comatose    Cardiovascular  # HTN  -Off Nicardipine ggt since 3/15  > Continue amlodipine 10mg qD and increase Lisinopril to 40mg qD; hold home HCTZ, avoid beta-blockers given asthma    Pulmonary  # Chronic Hypercapnia i/s/o Asthma  # Acute on Chronic HHRF, resolving  # PNA, improving  Likely 2/2 asthma exacerbation, with wheezing.  - CTA without evidence of PNA or PE  > Continue Advair   > Last Solumedrol 20mg IV Dose on 3/16- from 3/17 will transition to Prednisone 40mg daily.   > replete Mg PRN, goal >2  > cont suppl O2 as needed, wean as tolerated    GI  # Diet  > soft and bite sized, escalate as tolerated    Renal/  # Hyponatremia, resolved  Likely i/s/o hypovolemia and component of underlying SIADH.  - IVFs trial successful  > CTM Na on daily labs  > consider fluid restriction vs salt tabs if worsening Na again    ID  # ?PNA  Leukocytosis i/s/o steroids and possible PNA, persistent fevers starting 3/12 now progressed to low-grade temps.  - Full RVP neg, legionella/strep negative, full RVP negative  - initial Bcx no growth; rpt Bcx 3/13 no growth  - UA 3/12 neg  - CTA chest w/o evidence of PNA or PE  - LE dopplers w/o DVT  - s/p azithro (5-days, finished 3/12)  > cont empiric coverage with zosyn (3/13- ) will aim for 7 day course    Endocrine  - No active issues  - A1c 5.7%  >moderate ISS for steroids and hyperglycemia     Heme  - No active issues  # Thrombocytopenia, mild  Likely i/s/o critical illness.  > CTM for now; transfuse for PLT <10 or <50 with bleeding    # DVT r/o for LE edema  - LE bi/l duplex w/o DVT    # Prophylactic  > Enoxaparin 40mg qD  > PT consulted MICU Transfer Note  ---------------------------    Transfer from: MICU  Transfer to:  ( x) Medicine    (  ) Telemetry    (  ) RCU    (  ) Palliative    (  ) Stroke Unit    (  ) _______________  Accepting Physician: Dr. Lima   Room:     Hospital course:  Patient is a 74F hx of asthma, depression, and HTN was admitted to the MICU with AHRF 2/2 asthma exacerbation resulting in acute on chronic HHRF causing AMS and increased WOB that required intubation. During her ICU course, she experienced seizure-like activity (6 minutes of myoclonic upper jerks on 3/8 that resolved with Ativan, later progressing to rhythmic flexing movements in extremities) likely 2/2 hypercapnia and/or hyponatremia. Her CT head was negative for acute changes, and a video EEG was ordered that showed severe diffuse cerebral dysfunction consistent with comatose state but no epileptiform activity. Respiratory management included albuterol/ipratropium w/ budesonide in addition to solumedrol 125mg load and resulting 40mg x 5 days with a subsequent transition to PO taper, and magnesium supplementation, and ventilator settings to avoid air trapping. CTA was negative for pneumonia or pulmonary embolism, and respiratory viral panel was negative. She was empirically treated with azithromycin and Zosyn for CAP. Patient extubated 3/12 to face tent, responding to commands. Additional issues included hyponatremia (resolved, managed with free water restriction), acute on chronic metabolic alkalosis (likely compensatory), and mild thrombocytopenia (monitoring with plans). The primary etiology of her decompensation appears to have be an asthma exacerbation, which has now improved. Since 3/12 patient also having fevers without clear source (no VTEs, prior cultures negative, no known hx of malignancy). Sent rpt Bcx 3/12 pm with no growth so far.     For Follow-Up:  [ ] Monitor Blood pressures- started Amlodipine 10mg and Lisinopril 40mg   [ ] f/u 3/12 blood cultures- NGTD   [ ] Taper Steroids- currently on Prednisone 40mg   [ ] Continue Zosyn for a 7 day course- until 3/19         OBJECTIVE --  Vital Signs Last 24 Hrs  T(C): 37.4 (13 Mar 2025 04:00), Max: 38.1 (12 Mar 2025 07:00)  T(F): 99.3 (13 Mar 2025 04:00), Max: 100.6 (12 Mar 2025 07:00)  HR: 104 (13 Mar 2025 06:00) (79 - 115)  BP: 163/79 (13 Mar 2025 06:00) (125/70 - 194/101)  BP(mean): 102 (13 Mar 2025 06:00) (79 - 129)  RR: 29 (13 Mar 2025 06:00) (10 - 35)  SpO2: 96% (13 Mar 2025 06:00) (92% - 98%)    Parameters below as of 13 Mar 2025 06:00  Patient On (Oxygen Delivery Method): face tent    O2 Concentration (%): 50    I&O's Summary    11 Mar 2025 07:01  -  12 Mar 2025 07:00  --------------------------------------------------------  IN: 1188 mL / OUT: 1400 mL / NET: -212 mL    12 Mar 2025 07:01  -  13 Mar 2025 06:30  --------------------------------------------------------  IN: 510 mL / OUT: 950 mL / NET: -440 mL        MEDICATIONS  (STANDING):  albuterol/ipratropium for Nebulization 3 milliLiter(s) Nebulizer every 4 hours  amLODIPine   Tablet 5 milliGRAM(s) Oral daily  buDESOnide    Inhalation Suspension 0.5 milliGRAM(s) Inhalation every 12 hours  chlorhexidine 2% Cloths 1 Application(s) Topical <User Schedule>  dextrose 5%. 1000 milliLiter(s) (100 mL/Hr) IV Continuous <Continuous>  dextrose 5%. 1000 milliLiter(s) (50 mL/Hr) IV Continuous <Continuous>  dextrose 50% Injectable 25 Gram(s) IV Push once  dextrose 50% Injectable 12.5 Gram(s) IV Push once  dextrose 50% Injectable 25 Gram(s) IV Push once  dextrose Oral Gel 15 Gram(s) Oral once  enoxaparin Injectable 40 milliGRAM(s) SubCutaneous every 24 hours  fluticasone propionate/ salmeterol 250-50 MICROgram(s) Diskus 1 Dose(s) Inhalation two times a day  glucagon  Injectable 1 milliGRAM(s) IntraMuscular once  insulin lispro (ADMELOG) corrective regimen sliding scale   SubCutaneous every 6 hours  OLANZapine 7.5 milliGRAM(s) Oral daily  polyethylene glycol 3350 17 Gram(s) Oral daily  predniSONE   Tablet   Oral   predniSONE   Tablet 40 milliGRAM(s) Oral daily  sertraline 25 milliGRAM(s) Oral daily    MEDICATIONS  (PRN):        LABS                                            13.6                  Neurophils% (auto):   x      (03-13 @ 00:50):    14.80)-----------(123          Lymphocytes% (auto):  x                                             40.6                   Eosinphils% (auto):   x        Manual%: Neutrophils x    ; Lymphocytes x    ; Eosinophils x    ; Bands%: x    ; Blasts x                                    130    |  92     |  18                  Calcium: 9.3   / iCa: 1.10   (03-13 @ 00:50)    ----------------------------<  145       Magnesium: 1.90                             4.2     |  22     |  0.54             Phosphorous: 3.4      TPro  6.9    /  Alb  3.4    /  TBili  0.5    /  DBili  x      /  AST  32     /  ALT  39     /  AlkPhos  104    13 Mar 2025 00:50    ( 03-13 @ 00:50 )   PT: 11.2 sec;   INR: 0.94 ratio  aPTT: 44.7 sec          ASSESSMENT & PLAN:    74F hx asthma and depressed presented with AHHRF c/b AMS requiring MICU admission for intubation for airway protection i/s/o asthma exacerbation. s/p extubation with labile BPs requiring nicardipine gtt.    Neuro  - No active issues  # Depression  Hx tardive dyskinesia symptoms on prior medications.  > continue home Sertraline  > hold home Olanzapine, consider resuming    # Toxic Metabolic encephalopathy, resolved  # Seizure-like activity, resolved  Encephalopathy i/s/o hypercapnic respiratory failure and sepsis.  - CTH: no acute changes  - CT spine: no fractures  - 3/8 ON: 6 minutes of seizure-like activity with myoclonic upper jerks -> broke with ativan  -- EEG 3/10 showing no epileptiform activity; severe degree of diffuse or multifocal cerebral dysfunction c/w comatose    Cardiovascular  # HTN  -Off Nicardipine ggt since 3/15  > Continue amlodipine 10mg qD and increase Lisinopril to 40mg qD; hold home HCTZ, avoid beta-blockers given asthma    Pulmonary  # Chronic Hypercapnia i/s/o Asthma  # Acute on Chronic HHRF, resolving  # PNA, improving  Likely 2/2 asthma exacerbation, with wheezing.  - CTA without evidence of PNA or PE  > Continue Advair   > Last Solumedrol 20mg IV Dose on 3/16- from 3/17 will transition to Prednisone 40mg daily.   > replete Mg PRN, goal >2  > cont suppl O2 as needed, wean as tolerated    GI  # Diet  > soft and bite sized, escalate as tolerated    Renal/  # Hyponatremia, resolved  Likely i/s/o hypovolemia and component of underlying SIADH.  - IVFs trial successful  > CTM Na on daily labs  > consider fluid restriction vs salt tabs if worsening Na again    ID  # ?PNA  Leukocytosis i/s/o steroids and possible PNA, persistent fevers starting 3/12 now progressed to low-grade temps.  - Full RVP neg, legionella/strep negative, full RVP negative  - initial Bcx no growth; rpt Bcx 3/13 no growth  - UA 3/12 neg  - CTA chest w/o evidence of PNA or PE  - LE dopplers w/o DVT  - s/p azithro (5-days, finished 3/12)  > cont empiric coverage with zosyn (3/13- ) will aim for 7 day course    Endocrine  - No active issues  - A1c 5.7%  >moderate ISS for steroids and hyperglycemia     Heme  - No active issues  # Thrombocytopenia, mild  Likely i/s/o critical illness.  > CTM for now; transfuse for PLT <10 or <50 with bleeding    # DVT r/o for LE edema  - LE bi/l duplex w/o DVT    # Prophylactic  > Enoxaparin 40mg qD  > PT consulted MICU Transfer Note  ---------------------------    Transfer from: MICU  Transfer to:  ( x) Medicine    (  ) Telemetry    (  ) RCU    (  ) Palliative    (  ) Stroke Unit    (  ) _______________  Accepting Physician: Dr. Lima   Room: 909B    Hospital course:  Patient is a 74F hx of asthma, depression, and HTN was admitted to the MICU with AHRF 2/2 asthma exacerbation resulting in acute on chronic HHRF causing AMS and increased WOB that required intubation. During her ICU course, she experienced seizure-like activity (6 minutes of myoclonic upper jerks on 3/8 that resolved with Ativan, later progressing to rhythmic flexing movements in extremities) likely 2/2 hypercapnia and/or hyponatremia. Her CT head was negative for acute changes, and a video EEG was ordered that showed severe diffuse cerebral dysfunction consistent with comatose state but no epileptiform activity. Respiratory management included albuterol/ipratropium w/ budesonide in addition to solumedrol 125mg load and resulting 40mg x 5 days with a subsequent transition to PO taper, and magnesium supplementation, and ventilator settings to avoid air trapping. CTA was negative for pneumonia or pulmonary embolism, and respiratory viral panel was negative. She was empirically treated with azithromycin and Zosyn for CAP. Patient extubated 3/12 to face tent, responding to commands. Additional issues included hyponatremia (resolved, managed with free water restriction), acute on chronic metabolic alkalosis (likely compensatory), and mild thrombocytopenia (monitoring with plans). The primary etiology of her decompensation appears to have be an asthma exacerbation, which has now improved. Since 3/12 patient also having fevers without clear source (no VTEs, prior cultures negative, no known hx of malignancy). Sent rpt Bcx 3/12 pm with no growth so far.     For Follow-Up:  [ ] Monitor Blood pressures- started Amlodipine 10mg and Lisinopril 40mg   [ ] f/u 3/12 blood cultures- NGTD   [ ] Taper Steroids- currently on Prednisone 40mg   [ ] Continue Zosyn for a 7 day course- until 3/19         OBJECTIVE --  Vital Signs Last 24 Hrs  T(C): 37.4 (13 Mar 2025 04:00), Max: 38.1 (12 Mar 2025 07:00)  T(F): 99.3 (13 Mar 2025 04:00), Max: 100.6 (12 Mar 2025 07:00)  HR: 104 (13 Mar 2025 06:00) (79 - 115)  BP: 163/79 (13 Mar 2025 06:00) (125/70 - 194/101)  BP(mean): 102 (13 Mar 2025 06:00) (79 - 129)  RR: 29 (13 Mar 2025 06:00) (10 - 35)  SpO2: 96% (13 Mar 2025 06:00) (92% - 98%)    Parameters below as of 13 Mar 2025 06:00  Patient On (Oxygen Delivery Method): face tent    O2 Concentration (%): 50    I&O's Summary    11 Mar 2025 07:01  -  12 Mar 2025 07:00  --------------------------------------------------------  IN: 1188 mL / OUT: 1400 mL / NET: -212 mL    12 Mar 2025 07:01  -  13 Mar 2025 06:30  --------------------------------------------------------  IN: 510 mL / OUT: 950 mL / NET: -440 mL        MEDICATIONS  (STANDING):  albuterol/ipratropium for Nebulization 3 milliLiter(s) Nebulizer every 4 hours  amLODIPine   Tablet 5 milliGRAM(s) Oral daily  buDESOnide    Inhalation Suspension 0.5 milliGRAM(s) Inhalation every 12 hours  chlorhexidine 2% Cloths 1 Application(s) Topical <User Schedule>  dextrose 5%. 1000 milliLiter(s) (100 mL/Hr) IV Continuous <Continuous>  dextrose 5%. 1000 milliLiter(s) (50 mL/Hr) IV Continuous <Continuous>  dextrose 50% Injectable 25 Gram(s) IV Push once  dextrose 50% Injectable 12.5 Gram(s) IV Push once  dextrose 50% Injectable 25 Gram(s) IV Push once  dextrose Oral Gel 15 Gram(s) Oral once  enoxaparin Injectable 40 milliGRAM(s) SubCutaneous every 24 hours  fluticasone propionate/ salmeterol 250-50 MICROgram(s) Diskus 1 Dose(s) Inhalation two times a day  glucagon  Injectable 1 milliGRAM(s) IntraMuscular once  insulin lispro (ADMELOG) corrective regimen sliding scale   SubCutaneous every 6 hours  OLANZapine 7.5 milliGRAM(s) Oral daily  polyethylene glycol 3350 17 Gram(s) Oral daily  predniSONE   Tablet   Oral   predniSONE   Tablet 40 milliGRAM(s) Oral daily  sertraline 25 milliGRAM(s) Oral daily    MEDICATIONS  (PRN):        LABS                                            13.6                  Neurophils% (auto):   x      (03-13 @ 00:50):    14.80)-----------(123          Lymphocytes% (auto):  x                                             40.6                   Eosinphils% (auto):   x        Manual%: Neutrophils x    ; Lymphocytes x    ; Eosinophils x    ; Bands%: x    ; Blasts x                                    130    |  92     |  18                  Calcium: 9.3   / iCa: 1.10   (03-13 @ 00:50)    ----------------------------<  145       Magnesium: 1.90                             4.2     |  22     |  0.54             Phosphorous: 3.4      TPro  6.9    /  Alb  3.4    /  TBili  0.5    /  DBili  x      /  AST  32     /  ALT  39     /  AlkPhos  104    13 Mar 2025 00:50    ( 03-13 @ 00:50 )   PT: 11.2 sec;   INR: 0.94 ratio  aPTT: 44.7 sec          ASSESSMENT & PLAN:    74F hx asthma and depressed presented with AHHRF c/b AMS requiring MICU admission for intubation for airway protection i/s/o asthma exacerbation. s/p extubation with labile BPs requiring nicardipine gtt.    Neuro  - No active issues  # Depression  Hx tardive dyskinesia symptoms on prior medications.  > continue home Sertraline  > hold home Olanzapine, consider resuming    # Toxic Metabolic encephalopathy, resolved  # Seizure-like activity, resolved  Encephalopathy i/s/o hypercapnic respiratory failure and sepsis.  - CTH: no acute changes  - CT spine: no fractures  - 3/8 ON: 6 minutes of seizure-like activity with myoclonic upper jerks -> broke with ativan  -- EEG 3/10 showing no epileptiform activity; severe degree of diffuse or multifocal cerebral dysfunction c/w comatose    Cardiovascular  # HTN  -Off Nicardipine ggt since 3/15  > Continue amlodipine 10mg qD and increase Lisinopril to 40mg qD; hold home HCTZ, avoid beta-blockers given asthma    Pulmonary  # Chronic Hypercapnia i/s/o Asthma  # Acute on Chronic HHRF, resolving  # PNA, improving  Likely 2/2 asthma exacerbation, with wheezing.  - CTA without evidence of PNA or PE  > Continue Advair   > Last Solumedrol 20mg IV Dose on 3/16- from 3/17 will transition to Prednisone 40mg daily.   > replete Mg PRN, goal >2  > cont suppl O2 as needed, wean as tolerated    GI  # Diet  > soft and bite sized, escalate as tolerated    Renal/  # Hyponatremia, resolved  Likely i/s/o hypovolemia and component of underlying SIADH.  - IVFs trial successful  > CTM Na on daily labs  > consider fluid restriction vs salt tabs if worsening Na again    ID  # ?PNA  Leukocytosis i/s/o steroids and possible PNA, persistent fevers starting 3/12 now progressed to low-grade temps.  - Full RVP neg, legionella/strep negative, full RVP negative  - initial Bcx no growth; rpt Bcx 3/13 no growth  - UA 3/12 neg  - CTA chest w/o evidence of PNA or PE  - LE dopplers w/o DVT  - s/p azithro (5-days, finished 3/12)  > cont empiric coverage with zosyn (3/13- ) will aim for 7 day course    Endocrine  - No active issues  - A1c 5.7%  >moderate ISS for steroids and hyperglycemia     Heme  - No active issues  # Thrombocytopenia, mild  Likely i/s/o critical illness.  > CTM for now; transfuse for PLT <10 or <50 with bleeding    # DVT r/o for LE edema  - LE bi/l duplex w/o DVT    # Prophylactic  > Enoxaparin 40mg qD  > PT consulted

## 2025-03-14 LAB
ALBUMIN SERPL ELPH-MCNC: 3.2 G/DL — LOW (ref 3.3–5)
ALBUMIN SERPL ELPH-MCNC: 3.2 G/DL — LOW (ref 3.3–5)
ALP SERPL-CCNC: 78 U/L — SIGNIFICANT CHANGE UP (ref 40–120)
ALP SERPL-CCNC: 90 U/L — SIGNIFICANT CHANGE UP (ref 40–120)
ALT FLD-CCNC: 30 U/L — SIGNIFICANT CHANGE UP (ref 4–33)
ALT FLD-CCNC: 33 U/L — SIGNIFICANT CHANGE UP (ref 4–33)
ANION GAP SERPL CALC-SCNC: 12 MMOL/L — SIGNIFICANT CHANGE UP (ref 7–14)
ANION GAP SERPL CALC-SCNC: 20 MMOL/L — HIGH (ref 7–14)
APTT BLD: 29.7 SEC — SIGNIFICANT CHANGE UP (ref 24.5–35.6)
AST SERPL-CCNC: 23 U/L — SIGNIFICANT CHANGE UP (ref 4–32)
AST SERPL-CCNC: 26 U/L — SIGNIFICANT CHANGE UP (ref 4–32)
BILIRUB SERPL-MCNC: 0.5 MG/DL — SIGNIFICANT CHANGE UP (ref 0.2–1.2)
BILIRUB SERPL-MCNC: 0.6 MG/DL — SIGNIFICANT CHANGE UP (ref 0.2–1.2)
BLOOD GAS VENOUS COMPREHENSIVE RESULT: SIGNIFICANT CHANGE UP
BUN SERPL-MCNC: 17 MG/DL — SIGNIFICANT CHANGE UP (ref 7–23)
BUN SERPL-MCNC: 22 MG/DL — SIGNIFICANT CHANGE UP (ref 7–23)
CA-I BLD-SCNC: 1.1 MMOL/L — LOW (ref 1.15–1.29)
CALCIUM SERPL-MCNC: 8.9 MG/DL — SIGNIFICANT CHANGE UP (ref 8.4–10.5)
CALCIUM SERPL-MCNC: 9 MG/DL — SIGNIFICANT CHANGE UP (ref 8.4–10.5)
CHLORIDE SERPL-SCNC: 91 MMOL/L — LOW (ref 98–107)
CHLORIDE SERPL-SCNC: 95 MMOL/L — LOW (ref 98–107)
CO2 SERPL-SCNC: 21 MMOL/L — LOW (ref 22–31)
CO2 SERPL-SCNC: 26 MMOL/L — SIGNIFICANT CHANGE UP (ref 22–31)
CREAT SERPL-MCNC: 0.52 MG/DL — SIGNIFICANT CHANGE UP (ref 0.5–1.3)
CREAT SERPL-MCNC: 0.6 MG/DL — SIGNIFICANT CHANGE UP (ref 0.5–1.3)
CULTURE RESULTS: SIGNIFICANT CHANGE UP
CULTURE RESULTS: SIGNIFICANT CHANGE UP
EGFR: 94 ML/MIN/1.73M2 — SIGNIFICANT CHANGE UP
EGFR: 94 ML/MIN/1.73M2 — SIGNIFICANT CHANGE UP
EGFR: 97 ML/MIN/1.73M2 — SIGNIFICANT CHANGE UP
EGFR: 97 ML/MIN/1.73M2 — SIGNIFICANT CHANGE UP
GAS PNL BLDA: SIGNIFICANT CHANGE UP
GLUCOSE BLDC GLUCOMTR-MCNC: 137 MG/DL — HIGH (ref 70–99)
GLUCOSE BLDC GLUCOMTR-MCNC: 204 MG/DL — HIGH (ref 70–99)
GLUCOSE BLDC GLUCOMTR-MCNC: 230 MG/DL — HIGH (ref 70–99)
GLUCOSE BLDC GLUCOMTR-MCNC: 255 MG/DL — HIGH (ref 70–99)
GLUCOSE SERPL-MCNC: 175 MG/DL — HIGH (ref 70–99)
GLUCOSE SERPL-MCNC: 195 MG/DL — HIGH (ref 70–99)
HCT VFR BLD CALC: 37.5 % — SIGNIFICANT CHANGE UP (ref 34.5–45)
HGB BLD-MCNC: 12.5 G/DL — SIGNIFICANT CHANGE UP (ref 11.5–15.5)
INR BLD: 0.96 RATIO — SIGNIFICANT CHANGE UP (ref 0.85–1.16)
MAGNESIUM SERPL-MCNC: 1.9 MG/DL — SIGNIFICANT CHANGE UP (ref 1.6–2.6)
MCHC RBC-ENTMCNC: 32 PG — SIGNIFICANT CHANGE UP (ref 27–34)
MCHC RBC-ENTMCNC: 33.3 G/DL — SIGNIFICANT CHANGE UP (ref 32–36)
MCV RBC AUTO: 95.9 FL — SIGNIFICANT CHANGE UP (ref 80–100)
NRBC # BLD AUTO: 0 K/UL — SIGNIFICANT CHANGE UP (ref 0–0)
NRBC # FLD: 0 K/UL — SIGNIFICANT CHANGE UP (ref 0–0)
NRBC BLD AUTO-RTO: 0 /100 WBCS — SIGNIFICANT CHANGE UP (ref 0–0)
PHOSPHATE SERPL-MCNC: 3.6 MG/DL — SIGNIFICANT CHANGE UP (ref 2.5–4.5)
PLATELET # BLD AUTO: 142 K/UL — LOW (ref 150–400)
POTASSIUM SERPL-MCNC: 3.7 MMOL/L — SIGNIFICANT CHANGE UP (ref 3.5–5.3)
POTASSIUM SERPL-MCNC: 4.1 MMOL/L — SIGNIFICANT CHANGE UP (ref 3.5–5.3)
POTASSIUM SERPL-SCNC: 3.7 MMOL/L — SIGNIFICANT CHANGE UP (ref 3.5–5.3)
POTASSIUM SERPL-SCNC: 4.1 MMOL/L — SIGNIFICANT CHANGE UP (ref 3.5–5.3)
PROT SERPL-MCNC: 6.3 G/DL — SIGNIFICANT CHANGE UP (ref 6–8.3)
PROT SERPL-MCNC: 6.3 G/DL — SIGNIFICANT CHANGE UP (ref 6–8.3)
PROTHROM AB SERPL-ACNC: 11.4 SEC — SIGNIFICANT CHANGE UP (ref 9.9–13.4)
RBC # BLD: 3.91 M/UL — SIGNIFICANT CHANGE UP (ref 3.8–5.2)
RBC # FLD: 13.6 % — SIGNIFICANT CHANGE UP (ref 10.3–14.5)
SODIUM SERPL-SCNC: 132 MMOL/L — LOW (ref 135–145)
SODIUM SERPL-SCNC: 133 MMOL/L — LOW (ref 135–145)
SPECIMEN SOURCE: SIGNIFICANT CHANGE UP
SPECIMEN SOURCE: SIGNIFICANT CHANGE UP
WBC # BLD: 12.76 K/UL — HIGH (ref 3.8–10.5)
WBC # FLD AUTO: 12.76 K/UL — HIGH (ref 3.8–10.5)

## 2025-03-14 PROCEDURE — 99291 CRITICAL CARE FIRST HOUR: CPT

## 2025-03-14 RX ORDER — INSULIN LISPRO 100 U/ML
INJECTION, SOLUTION INTRAVENOUS; SUBCUTANEOUS AT BEDTIME
Refills: 0 | Status: DISCONTINUED | OUTPATIENT
Start: 2025-03-14 | End: 2025-03-15

## 2025-03-14 RX ORDER — NICARDIPINE HCL 30 MG
5 CAPSULE ORAL
Qty: 40 | Refills: 0 | Status: DISCONTINUED | OUTPATIENT
Start: 2025-03-14 | End: 2025-03-15

## 2025-03-14 RX ORDER — AMLODIPINE BESYLATE 10 MG/1
10 TABLET ORAL DAILY
Refills: 0 | Status: DISCONTINUED | OUTPATIENT
Start: 2025-03-14 | End: 2025-03-15

## 2025-03-14 RX ORDER — AMLODIPINE BESYLATE 10 MG/1
5 TABLET ORAL DAILY
Refills: 0 | Status: DISCONTINUED | OUTPATIENT
Start: 2025-03-14 | End: 2025-03-14

## 2025-03-14 RX ORDER — MAGNESIUM SULFATE 500 MG/ML
2 SYRINGE (ML) INJECTION ONCE
Refills: 0 | Status: COMPLETED | OUTPATIENT
Start: 2025-03-14 | End: 2025-03-14

## 2025-03-14 RX ORDER — SENNA 187 MG
2 TABLET ORAL AT BEDTIME
Refills: 0 | Status: DISCONTINUED | OUTPATIENT
Start: 2025-03-14 | End: 2025-05-21

## 2025-03-14 RX ORDER — DEXTROMETHORPHAN HBR, GUAIFENESIN 200 MG/10ML
600 LIQUID ORAL EVERY 12 HOURS
Refills: 0 | Status: DISCONTINUED | OUTPATIENT
Start: 2025-03-14 | End: 2025-04-02

## 2025-03-14 RX ORDER — INSULIN LISPRO 100 U/ML
INJECTION, SOLUTION INTRAVENOUS; SUBCUTANEOUS
Refills: 0 | Status: DISCONTINUED | OUTPATIENT
Start: 2025-03-14 | End: 2025-03-15

## 2025-03-14 RX ORDER — SODIUM CHLORIDE 9 G/1000ML
1000 INJECTION, SOLUTION INTRAVENOUS
Refills: 0 | Status: DISCONTINUED | OUTPATIENT
Start: 2025-03-14 | End: 2025-03-14

## 2025-03-14 RX ORDER — MAGNESIUM SULFATE 500 MG/ML
2 SYRINGE (ML) INJECTION ONCE
Refills: 0 | Status: DISCONTINUED | OUTPATIENT
Start: 2025-03-14 | End: 2025-03-14

## 2025-03-14 RX ORDER — SODIUM CHLORIDE 9 G/1000ML
1000 INJECTION, SOLUTION INTRAVENOUS ONCE
Refills: 0 | Status: COMPLETED | OUTPATIENT
Start: 2025-03-14 | End: 2025-03-14

## 2025-03-14 RX ORDER — ACETAMINOPHEN 500 MG/5ML
650 LIQUID (ML) ORAL ONCE
Refills: 0 | Status: COMPLETED | OUTPATIENT
Start: 2025-03-14 | End: 2025-03-14

## 2025-03-14 RX ORDER — ACETAMINOPHEN 500 MG/5ML
1000 LIQUID (ML) ORAL ONCE
Refills: 0 | Status: COMPLETED | OUTPATIENT
Start: 2025-03-14 | End: 2025-03-14

## 2025-03-14 RX ORDER — ACETAMINOPHEN 500 MG/5ML
800 LIQUID (ML) ORAL EVERY 6 HOURS
Refills: 0 | Status: DISCONTINUED | OUTPATIENT
Start: 2025-03-14 | End: 2025-03-14

## 2025-03-14 RX ORDER — SODIUM CHLORIDE 9 G/1000ML
1000 INJECTION, SOLUTION INTRAVENOUS
Refills: 0 | Status: DISCONTINUED | OUTPATIENT
Start: 2025-03-14 | End: 2025-03-16

## 2025-03-14 RX ADMIN — Medication 2 TABLET(S): at 21:24

## 2025-03-14 RX ADMIN — SODIUM CHLORIDE 75 MILLILITER(S): 9 INJECTION, SOLUTION INTRAVENOUS at 04:52

## 2025-03-14 RX ADMIN — IPRATROPIUM BROMIDE AND ALBUTEROL SULFATE 3 MILLILITER(S): .5; 2.5 SOLUTION RESPIRATORY (INHALATION) at 15:02

## 2025-03-14 RX ADMIN — IPRATROPIUM BROMIDE AND ALBUTEROL SULFATE 3 MILLILITER(S): .5; 2.5 SOLUTION RESPIRATORY (INHALATION) at 03:04

## 2025-03-14 RX ADMIN — METHYLPREDNISOLONE ACETATE 20 MILLIGRAM(S): 80 INJECTION, SUSPENSION INTRA-ARTICULAR; INTRALESIONAL; INTRAMUSCULAR; SOFT TISSUE at 06:02

## 2025-03-14 RX ADMIN — Medication 25 GRAM(S): at 04:52

## 2025-03-14 RX ADMIN — IPRATROPIUM BROMIDE AND ALBUTEROL SULFATE 3 MILLILITER(S): .5; 2.5 SOLUTION RESPIRATORY (INHALATION) at 18:53

## 2025-03-14 RX ADMIN — BUDESONIDE 0.5 MILLIGRAM(S): 0.25 SUSPENSION RESPIRATORY (INHALATION) at 07:36

## 2025-03-14 RX ADMIN — AMLODIPINE BESYLATE 10 MILLIGRAM(S): 10 TABLET ORAL at 11:31

## 2025-03-14 RX ADMIN — Medication 25 GRAM(S): at 05:57

## 2025-03-14 RX ADMIN — BUDESONIDE 0.5 MILLIGRAM(S): 0.25 SUSPENSION RESPIRATORY (INHALATION) at 18:59

## 2025-03-14 RX ADMIN — SODIUM CHLORIDE 1000 MILLILITER(S): 9 INJECTION, SOLUTION INTRAVENOUS at 09:18

## 2025-03-14 RX ADMIN — Medication 25 MG/HR: at 03:17

## 2025-03-14 RX ADMIN — ENOXAPARIN SODIUM 40 MILLIGRAM(S): 100 INJECTION SUBCUTANEOUS at 20:11

## 2025-03-14 RX ADMIN — IPRATROPIUM BROMIDE AND ALBUTEROL SULFATE 3 MILLILITER(S): .5; 2.5 SOLUTION RESPIRATORY (INHALATION) at 07:36

## 2025-03-14 RX ADMIN — POLYETHYLENE GLYCOL 3350 17 GRAM(S): 17 POWDER, FOR SOLUTION ORAL at 11:31

## 2025-03-14 RX ADMIN — IPRATROPIUM BROMIDE AND ALBUTEROL SULFATE 3 MILLILITER(S): .5; 2.5 SOLUTION RESPIRATORY (INHALATION) at 23:10

## 2025-03-14 RX ADMIN — IPRATROPIUM BROMIDE AND ALBUTEROL SULFATE 3 MILLILITER(S): .5; 2.5 SOLUTION RESPIRATORY (INHALATION) at 11:15

## 2025-03-14 RX ADMIN — Medication 25 GRAM(S): at 15:04

## 2025-03-14 RX ADMIN — METHYLPREDNISOLONE ACETATE 20 MILLIGRAM(S): 80 INJECTION, SUSPENSION INTRA-ARTICULAR; INTRALESIONAL; INTRAMUSCULAR; SOFT TISSUE at 21:23

## 2025-03-14 RX ADMIN — Medication 25 GRAM(S): at 21:24

## 2025-03-14 RX ADMIN — METHYLPREDNISOLONE ACETATE 20 MILLIGRAM(S): 80 INJECTION, SUSPENSION INTRA-ARTICULAR; INTRALESIONAL; INTRAMUSCULAR; SOFT TISSUE at 15:04

## 2025-03-14 RX ADMIN — INSULIN LISPRO 2: 100 INJECTION, SOLUTION INTRAVENOUS; SUBCUTANEOUS at 12:53

## 2025-03-14 RX ADMIN — Medication 1 APPLICATION(S): at 05:57

## 2025-03-14 RX ADMIN — Medication 25 MG/HR: at 09:18

## 2025-03-14 RX ADMIN — Medication 400 MILLIGRAM(S): at 12:53

## 2025-03-14 RX ADMIN — Medication 25 MG/HR: at 22:14

## 2025-03-14 RX ADMIN — SODIUM CHLORIDE 75 MILLILITER(S): 9 INJECTION, SOLUTION INTRAVENOUS at 09:25

## 2025-03-14 RX ADMIN — Medication 10 MILLIGRAM(S): at 02:10

## 2025-03-14 RX ADMIN — INSULIN LISPRO 1: 100 INJECTION, SOLUTION INTRAVENOUS; SUBCUTANEOUS at 22:14

## 2025-03-14 RX ADMIN — INSULIN LISPRO 2: 100 INJECTION, SOLUTION INTRAVENOUS; SUBCUTANEOUS at 19:10

## 2025-03-14 NOTE — PHYSICAL THERAPY INITIAL EVALUATION ADULT - GENERAL OBSERVATIONS, REHAB EVAL
Pt found semi reclined in bed; +02 via face tent; spoke with PANCHITO Martines, who advised patient may participate;  bpm.

## 2025-03-14 NOTE — PHYSICAL THERAPY INITIAL EVALUATION ADULT - PLANNED THERAPY INTERVENTIONS, PT EVAL
Patient left positioned for safety in bed in NAD, call bell in reach, all lines intact. +bed alarm. PANCHITO Martines, at bedside and aware./balance training/bed mobility training/gait training/strengthening/transfer training

## 2025-03-14 NOTE — SWALLOW BEDSIDE ASSESSMENT ADULT - COMMENTS
MICU Resident 3/14: "74F hx asthma and depressed presented with AHHRF c/b AMS requiring MICU admission for intubation for airway protection i/s/o asthma exacerbation."    3/9: IMPRESSION: Enteric tube coursing below the diaphragm with side port in the stomach. Endotracheal tube terminating above the rudolph.    Patient received awake, alert, on face tent; O2 sats maintained above 95% throughout session. Patient denies difficulty chewing/swallowing prior to admission.

## 2025-03-14 NOTE — PHYSICAL THERAPY INITIAL EVALUATION ADULT - PERTINENT HX OF CURRENT PROBLEM, REHAB EVAL
As per chart, patient is a 74 year old female with PMH asthma, depression presenting to [facility] ED with worsening dyspnea and wheezing with bilateral Lower Extremity edema since returning from 10.5 hour trip to [facility] 6 days ago. Altered in ED and unable to provide history, only responsive to noxious stimuli. History provided by  who states he found her on floor with unwitnessed fall. Hx of 3 asthma attacks last year, no hx of intubation, no clear asthma triggers but felt sick after flight home from [facility] 3/3/25 days where she visited for 3 months and was in usual state of health per . Ivermectin Counseling:  Patient instructed to take medication on an empty stomach with a full glass of water.  Patient informed of potential adverse effects including but not limited to nausea, diarrhea, dizziness, itching, and swelling of the extremities or lymph nodes.  The patient verbalized understanding of the proper use and possible adverse effects of ivermectin.  All of the patient's questions and concerns were addressed.

## 2025-03-14 NOTE — PROGRESS NOTE ADULT - SUBJECTIVE AND OBJECTIVE BOX
************************  Mir Tyler, MD-PhD  Internal Medicine PGY-3  ************************    SUBJECTIVE:      Patient seen and examined at bedside. No events overnight, no acute complaints this morning. Patient NPO pending additional SLP eval. Patient reminded of ongoing careplan.      VITAL SIGNS:  ICU Vital Signs Last 24 Hrs  T(C): 37.4 (14 Mar 2025 04:00), Max: 37.9 (13 Mar 2025 20:00)  T(F): 99.3 (14 Mar 2025 04:00), Max: 100.2 (13 Mar 2025 20:00)  HR: 105 (14 Mar 2025 06:15) (98 - 115)  BP: 154/65 (14 Mar 2025 06:15) (128/87 - 191/96)  BP(mean): 91 (14 Mar 2025 06:15) (86 - 128)  ABP: --  ABP(mean): --  RR: 23 (14 Mar 2025 06:15) (23 - 40)  SpO2: 96% (14 Mar 2025 06:15) (91% - 97%)    O2 Parameters below as of 14 Mar 2025 06:00  Patient On (Oxygen Delivery Method): face tent    O2 Concentration (%): 50        Plateau pressure:   P/F ratio:     03-12 @ 07:01  -  03-13 @ 07:00  --------------------------------------------------------  IN: 510 mL / OUT: 950 mL / NET: -440 mL    03-13 @ 07:01  -  03-14 @ 06:36  --------------------------------------------------------  IN: 3017.5 mL / OUT: 1450 mL / NET: 1567.5 mL      CAPILLARY BLOOD GLUCOSE      POCT Blood Glucose.: 137 mg/dL (14 Mar 2025 05:55)    ECG:     PHYSICAL EXAM:    03-12 @ 07:01  -  03-13 @ 07:00  --------------------------------------------------------  IN: 510 mL / OUT: 950 mL / NET: -440 mL    03-13 @ 07:01  -  03-14 @ 06:36  --------------------------------------------------------  IN: 3017.5 mL / OUT: 1450 mL / NET: 1567.5 mL    Vital Signs Last 24 Hrs  T(C): 37.4 (14 Mar 2025 04:00), Max: 37.9 (13 Mar 2025 20:00)  T(F): 99.3 (14 Mar 2025 04:00), Max: 100.2 (13 Mar 2025 20:00)  HR: 105 (14 Mar 2025 06:15) (98 - 115)  BP: 154/65 (14 Mar 2025 06:15) (128/87 - 191/96)  BP(mean): 91 (14 Mar 2025 06:15) (86 - 128)  RR: 23 (14 Mar 2025 06:15) (23 - 40)  SpO2: 96% (14 Mar 2025 06:15) (91% - 97%)    Parameters below as of 14 Mar 2025 06:00  Patient On (Oxygen Delivery Method): face tent    O2 Concentration (%): 50  T(C): 37.4 (03-14-25 @ 04:00), Max: 37.9 (03-13-25 @ 20:00)  HR: 105 (03-14-25 @ 06:15) (98 - 115)  BP: 154/65 (03-14-25 @ 06:15) (128/87 - 191/96)  RR: 23 (03-14-25 @ 06:15) (23 - 40)  SpO2: 96% (03-14-25 @ 06:15) (91% - 97%)    GENERAL: well-appearing, NAD, appears comfortable  HEENT: NC/AT, EOMI, no conjunctival icterus, dry mucus membranes  LUNGS: non-labored breathing, wheezing and tight airway, no wheezing/rales/rhonchi  CV: RRR, no m/r/g, 2+ palpable peripheral pulses bi/l, cap refill <2 secs  ABD: non-distended, soft, non-tender, no rebound tenderness or guarding  EXT: warm and well-perfused, trace-1+ bi/l LE peripheral edema  SKIN: no rashes or lesions  NEURO: AAOx1-2, no focal deficits    MEDICATIONS:  MEDICATIONS  (STANDING):  albuterol/ipratropium for Nebulization 3 milliLiter(s) Nebulizer every 4 hours  buDESOnide    Inhalation Suspension 0.5 milliGRAM(s) Inhalation every 12 hours  chlorhexidine 2% Cloths 1 Application(s) Topical <User Schedule>  dextrose 5%. 1000 milliLiter(s) (100 mL/Hr) IV Continuous <Continuous>  dextrose 5%. 1000 milliLiter(s) (50 mL/Hr) IV Continuous <Continuous>  dextrose 50% Injectable 25 Gram(s) IV Push once  dextrose 50% Injectable 12.5 Gram(s) IV Push once  dextrose 50% Injectable 25 Gram(s) IV Push once  dextrose Oral Gel 15 Gram(s) Oral once  enoxaparin Injectable 40 milliGRAM(s) SubCutaneous every 24 hours  glucagon  Injectable 1 milliGRAM(s) IntraMuscular once  insulin lispro (ADMELOG) corrective regimen sliding scale   SubCutaneous every 6 hours  lactated ringers. 1000 milliLiter(s) (75 mL/Hr) IV Continuous <Continuous>  methylPREDNISolone sodium succinate Injectable 20 milliGRAM(s) IV Push every 8 hours  niCARdipine Infusion 5 mG/Hr (25 mL/Hr) IV Continuous <Continuous>  piperacillin/tazobactam IVPB.. 3.375 Gram(s) IV Intermittent every 8 hours  polyethylene glycol 3350 17 Gram(s) Oral daily  sertraline 25 milliGRAM(s) Oral daily    MEDICATIONS  (PRN):      ALLERGIES:  Allergies    No Known Allergies    Intolerances        LABS:                        12.5   12.76 )-----------( 142      ( 14 Mar 2025 00:30 )             37.5     03-14    132[L]  |  91[L]  |  22  ----------------------------<  175[H]  4.1   |  21[L]  |  0.60    Ca    9.0      14 Mar 2025 00:30  Phos  3.6     03-14  Mg     1.90     03-14    TPro  6.3  /  Alb  3.2[L]  /  TBili  0.6  /  DBili  x   /  AST  26  /  ALT  33  /  AlkPhos  90  03-14    PT/INR - ( 14 Mar 2025 00:30 )   PT: 11.4 sec;   INR: 0.96 ratio         PTT - ( 14 Mar 2025 00:30 )  PTT:29.7 sec      Urinalysis Basic - ( 14 Mar 2025 00:30 )    Color: x / Appearance: x / SG: x / pH: x  Gluc: 175 mg/dL / Ketone: x  / Bili: x / Urobili: x   Blood: x / Protein: x / Nitrite: x   Leuk Esterase: x / RBC: x / WBC x   Sq Epi: x / Non Sq Epi: x / Bacteria: x        Urinalysis with Rflx Culture (collected 13 Mar 2025 12:00)        IMAGING & OTHER TESTS:    NNI

## 2025-03-14 NOTE — PROGRESS NOTE ADULT - ATTENDING COMMENTS
73 yo F with hx of asthma (on prn Albuterol only) admitted to MICU with acute hypoxemic hypercapnic respiratory failure in the setting of asthma exacerbation. S/p intubation, now extubated on 3/12. Still wheezing, borderline hypoxemic, wheezing on exam.    Continue with standing duonebs + Budesonide. Start Symbicort with spacer when able. c/w IV steroids today, plan to transition to PO tomorrow.  s/p Azithro. On Zosyn for superimposed PNA. Metabolic acidosis noted, likely starvation. Start D5LR for maintenance. Will place NG and start feeds.   Will need close pulmonary f/u as outpatient.

## 2025-03-14 NOTE — CHART NOTE - NSCHARTNOTEFT_GEN_A_CORE
______________ CRITICAL CARE NUTRITION FOLLOW-UP ________________        --Medical Course--  73 y/o F with Hx of asthma & MDD. Presenting with wheezing x 3d & s/p unwitnessed fall.  Admitted with acute hypoxic respiratory failure & hypercapnic respiratory acidosis.  Intubated in ED for airway protection.  S/p extubation (3/12).  Also with hyponatremia, likely hypovolemia, per chart    + Constipation.  Bowel movement (3/11).  Consider more aggressive bowel regime (in addition to Miralax).    --Nutrition Course--  Inadequate energy intake (<75%) since admission (6d)  Pt. frequently NPO.  Received enteral feeding (10/10-10/12). Highest noted rate with 40mL/hr, per flow sheets review.  Prior nutrition evaluation had recommended goal of 55mL/hr x 18hrs with Rhonda Aguayo's Peptide 1.5.    Pt. also with >4.5% significant weight decrease since admission. May somewhat fluid related with consideration of possible hypovolemia.    Met with Pt.  Alert, disoriented and minimally verbal.    S/p swallow evaluation. Per discussion with SLP, recommendation for soft and bite sized foods with thin liquids.  RDN also suggests Glucerna Shake 8oz PO 2x daily.  Also discussed with MD and RN.          __________Diet Order_________            Weight:      Height:    62"             Ideal Body Weight: 110lbs / 50kg  76.4kg (3/14)  80.2kg (3/10)  80.0kg (3/8 on admit)          _____Estimated Energy Needs (based on current weight 76.4kg)_____  20- 25 Kcals/kg = 4097-7958 KCals/d  1.2-1.4g protein/kg = 92-104g protein/d        Edema: 1+ generalized, 2+ b/l feet    Skin: No pressure injuries    ________________________Pertinent Medications____________   MEDICATIONS  (STANDING):  albuterol/ipratropium for Nebulization 3 milliLiter(s) Nebulizer every 4 hours  amLODIPine   Tablet 10 milliGRAM(s) Oral daily  buDESOnide    Inhalation Suspension 0.5 milliGRAM(s) Inhalation every 12 hours  chlorhexidine 2% Cloths 1 Application(s) Topical <User Schedule>  dextrose 5% + lactated ringers. 1000 milliLiter(s) (75 mL/Hr) IV Continuous <Continuous>  dextrose 5%. 1000 milliLiter(s) (100 mL/Hr) IV Continuous <Continuous>  dextrose 5%. 1000 milliLiter(s) (50 mL/Hr) IV Continuous <Continuous>  dextrose 50% Injectable 25 Gram(s) IV Push once  dextrose 50% Injectable 12.5 Gram(s) IV Push once  dextrose 50% Injectable 25 Gram(s) IV Push once  dextrose Oral Gel 15 Gram(s) Oral once  enoxaparin Injectable 40 milliGRAM(s) SubCutaneous every 24 hours  glucagon  Injectable 1 milliGRAM(s) IntraMuscular once  guaiFENesin  milliGRAM(s) Oral every 12 hours  insulin lispro (ADMELOG) corrective regimen sliding scale   SubCutaneous every 6 hours  methylPREDNISolone sodium succinate Injectable 20 milliGRAM(s) IV Push every 8 hours  niCARdipine Infusion 5 mG/Hr (25 mL/Hr) IV Continuous <Continuous>  piperacillin/tazobactam IVPB.. 3.375 Gram(s) IV Intermittent every 8 hours  polyethylene glycol 3350 17 Gram(s) Oral daily  senna 2 Tablet(s) Oral at bedtime    MEDICATIONS  (PRN):          _________________________Pertinent Labs____________________     03-14    132[L]  |  91[L]  |  22  ----------------------------<  175[H]  4.1   |  21[L]  |  0.60    Ca    9.0      14 Mar 2025 00:30  Phos  3.6     03-14  Mg     1.90     03-14        CAPILLARY BLOOD GLUCOSE      POCT Blood Glucose.: 137 mg/dL (03-14-25 @ 05:55)  POCT Blood Glucose.: 142 mg/dL (03-13-25 @ 23:15)  POCT Blood Glucose.: 138 mg/dL (03-13-25 @ 17:39)  POCT Blood Glucose.: 108 mg/dL (03-13-25 @ 12:13)        NEW NUTRITION Dx  Inadequate energy intake (>75% of estimated needs) related to NPO status and Pt. below recommend enteral goal rate.          PLAN/RECOMMENDATIONS:    1) Soft and Bite Sized diet, thin liquids + Glucerna Shake 8oz PO 2x daily   2) Obtain daily weights  3) Monitor GI status, electrolytes, glucose  4) Assist with feeding  5) Nursing to please document % PO intake via nutrition flow sheet.    RDN remains available and will f/u PRN.          Bambi Braun RDN, CDN       pager [phone] or MS Teams

## 2025-03-14 NOTE — SWALLOW BEDSIDE ASSESSMENT ADULT - SWALLOW EVAL: DIAGNOSIS
Functional oral stage for puree, regular solids, and thin liquids characterized by adequate oral retrieval, functional mastication of solid, adequate bolus collection, anterior posterior transfer, and oral clearance. Functional pharyngeal stage suspected for the above noted consistencies characterized by swallow initiation & hyolaryngeal excursion upon digital palpation and no overt clinical s/s airway penetration/aspiration.

## 2025-03-14 NOTE — PROGRESS NOTE ADULT - ASSESSMENT
74F hx asthma and depressed presented with AHHRF c/b AMS requiring MICU admission for intubation for airway protection i/s/o asthma exacerbation.    Neuro  - No active issues  # Acute Metabolic Encephalopathy  # Seizure-like activity, resolved  Encephalopathy i/s/o hypercapnic respiratory failure.  - CTH: no acute changes  - CT spine: no fractures  - 3/8 ON: 6 minutes of seizure-like activity with myoclonic upper jerks -> broke with ativan  -- EEG 3/10 showing no epileptiform activity; severe degree of diffuse or multifocal cerebral dysfunction c/w comatose    Cardiovascular  # HTN  > cont Nicardipine gtt, wean as tolerated; stop amlodipine (NPO) and labetalol    Pulmonary  # Chronic Hypercapnia i/s/o Asthma  # AHHRF, resolved  # PNA  Likely 2/2 asthma exacerbation, with wheezing.  - CTA without evidence of PNA or PE  - full RVP negative  > cont duonebs q4h w/ budesonide q12h; transition to inhaled fluticasone/salmeterol when PNA/asthma symptoms better controlled before discharge  > continue steroids as 20mg IV q8hr (transition back to IV)  > replete Mg PRN, goal >2  > cont suppl O2 as needed, wean as tolerated    GI  # Diet  > NPO post-extubation while patient having questionable aspiration  > f/u SLP eval    Renal/  # Hyponatremia  Likely i/s/o hypovolemia.  > IVF trial; salt tabs if not improving  > f/u urine studies  > CTM Na on daily labs    ID  # Leukocytosis, resolving  # Fevers, unknown origin  Likely reactive to steroids.  Persistent new onset fevers since ON into 3/12.  - Full RVP neg; legionella/strep negative  - Bcx no growth  - CTA chest w/o evidence of PNA or PE  - LE dopplers w/o DVT  - s/p azithro (5-days, finished 3/12)  > f/u rpt Bcx sent 3/12  > f/u UA/Ucx reflex  > resume empiric coverage with zosyn (3/13- )    Endocrine  - No active issues  - A1c 5.7%  > low SSI for steroids    Heme  - No active issues  # Thrombocytopenia, mild  Likely i/s/o critical illness.  > CTM for now; transfuse for PLT < 10 or < 50 with bleeding    # DVT ppx  - LE bi/l duplex w/o DVT  > Enoxaparin 40mg qD 74F hx asthma and depressed presented with AHHRF c/b AMS requiring MICU admission for intubation for airway protection i/s/o asthma exacerbation.    Neuro  - No active issues  # Acute Metabolic Encephalopathy  # Seizure-like activity, resolved  Encephalopathy i/s/o hypercapnic respiratory failure.  - CTH: no acute changes  - CT spine: no fractures  - 3/8 ON: 6 minutes of seizure-like activity with myoclonic upper jerks -> broke with ativan  -- EEG 3/10 showing no epileptiform activity; severe degree of diffuse or multifocal cerebral dysfunction c/w comatose    Cardiovascular  # HTN  > cont Nicardipine gtt, wean as tolerated; stop amlodipine (NPO) and labetalol    Pulmonary  # Chronic Hypercapnia i/s/o Asthma  # Acute on Chronic HHRF  # PNA  Likely 2/2 asthma exacerbation, with wheezing.  - CTA without evidence of PNA or PE  > cont duonebs q4h w/ budesonide q12h; transition to inhaled fluticasone/salmeterol when PNA/asthma symptoms better controlled before discharge  > continue steroids as 20mg IV q8hr  > replete Mg PRN, goal >2  > cont suppl O2 as needed, wean as tolerated    GI  # Diet  > NPO post-extubation while patient having questionable aspiration  > f/u SLP eval    Renal/  # Hyponatremia  Likely i/s/o hypovolemia and component of underlying SIADH.  > IVF trial; salt tabs if not improving or <130  > CTM Na on daily labs    ID  # Leukocytosis, resolving  # Fevers, unknown origin  Likely reactive to steroids.  Persistent new onset fevers since ON into 3/12.  - Full RVP neg; legionella/strep negative  - initial Bcx no growth  - full RVP negative  - CTA chest w/o evidence of PNA or PE  - LE dopplers w/o DVT  - s/p azithro (5-days, finished 3/12)  - UA 3/12 neg  > f/u rpt Bcx sent 3/12  > resume empiric coverage with zosyn (3/13- )    Endocrine  - No active issues  - A1c 5.7%  > low SSI for steroids    Heme  - No active issues  # Thrombocytopenia, mild  Likely i/s/o critical illness.  > CTM for now; transfuse for PLT < 10 or < 50 with bleeding    # DVT ppx  - LE bi/l duplex w/o DVT  > Enoxaparin 40mg qD

## 2025-03-15 LAB
ALBUMIN SERPL ELPH-MCNC: 3.6 G/DL — SIGNIFICANT CHANGE UP (ref 3.3–5)
ALP SERPL-CCNC: 84 U/L — SIGNIFICANT CHANGE UP (ref 40–120)
ALT FLD-CCNC: 30 U/L — SIGNIFICANT CHANGE UP (ref 4–33)
ANION GAP SERPL CALC-SCNC: 12 MMOL/L — SIGNIFICANT CHANGE UP (ref 7–14)
APTT BLD: 29.3 SEC — SIGNIFICANT CHANGE UP (ref 24.5–35.6)
AST SERPL-CCNC: 22 U/L — SIGNIFICANT CHANGE UP (ref 4–32)
BILIRUB SERPL-MCNC: 0.6 MG/DL — SIGNIFICANT CHANGE UP (ref 0.2–1.2)
BLOOD GAS VENOUS COMPREHENSIVE RESULT: SIGNIFICANT CHANGE UP
BUN SERPL-MCNC: 17 MG/DL — SIGNIFICANT CHANGE UP (ref 7–23)
CALCIUM SERPL-MCNC: 9 MG/DL — SIGNIFICANT CHANGE UP (ref 8.4–10.5)
CHLORIDE SERPL-SCNC: 96 MMOL/L — LOW (ref 98–107)
CO2 SERPL-SCNC: 27 MMOL/L — SIGNIFICANT CHANGE UP (ref 22–31)
CREAT SERPL-MCNC: 0.59 MG/DL — SIGNIFICANT CHANGE UP (ref 0.5–1.3)
EGFR: 95 ML/MIN/1.73M2 — SIGNIFICANT CHANGE UP
EGFR: 95 ML/MIN/1.73M2 — SIGNIFICANT CHANGE UP
GLUCOSE BLDC GLUCOMTR-MCNC: 198 MG/DL — HIGH (ref 70–99)
GLUCOSE BLDC GLUCOMTR-MCNC: 203 MG/DL — HIGH (ref 70–99)
GLUCOSE BLDC GLUCOMTR-MCNC: 285 MG/DL — HIGH (ref 70–99)
GLUCOSE SERPL-MCNC: 236 MG/DL — HIGH (ref 70–99)
HCT VFR BLD CALC: 37.6 % — SIGNIFICANT CHANGE UP (ref 34.5–45)
HGB BLD-MCNC: 12.4 G/DL — SIGNIFICANT CHANGE UP (ref 11.5–15.5)
INR BLD: 0.99 RATIO — SIGNIFICANT CHANGE UP (ref 0.85–1.16)
MAGNESIUM SERPL-MCNC: 1.9 MG/DL — SIGNIFICANT CHANGE UP (ref 1.6–2.6)
MCHC RBC-ENTMCNC: 32 PG — SIGNIFICANT CHANGE UP (ref 27–34)
MCHC RBC-ENTMCNC: 33 G/DL — SIGNIFICANT CHANGE UP (ref 32–36)
MCV RBC AUTO: 97.2 FL — SIGNIFICANT CHANGE UP (ref 80–100)
NRBC # BLD AUTO: 0 K/UL — SIGNIFICANT CHANGE UP (ref 0–0)
NRBC # FLD: 0 K/UL — SIGNIFICANT CHANGE UP (ref 0–0)
NRBC BLD AUTO-RTO: 0 /100 WBCS — SIGNIFICANT CHANGE UP (ref 0–0)
PHOSPHATE SERPL-MCNC: 2.1 MG/DL — LOW (ref 2.5–4.5)
PLATELET # BLD AUTO: 150 K/UL — SIGNIFICANT CHANGE UP (ref 150–400)
POTASSIUM SERPL-MCNC: 4 MMOL/L — SIGNIFICANT CHANGE UP (ref 3.5–5.3)
POTASSIUM SERPL-SCNC: 4 MMOL/L — SIGNIFICANT CHANGE UP (ref 3.5–5.3)
PROT SERPL-MCNC: 6.6 G/DL — SIGNIFICANT CHANGE UP (ref 6–8.3)
PROTHROM AB SERPL-ACNC: 11.5 SEC — SIGNIFICANT CHANGE UP (ref 9.9–13.4)
RBC # BLD: 3.87 M/UL — SIGNIFICANT CHANGE UP (ref 3.8–5.2)
RBC # FLD: 13.3 % — SIGNIFICANT CHANGE UP (ref 10.3–14.5)
SODIUM SERPL-SCNC: 135 MMOL/L — SIGNIFICANT CHANGE UP (ref 135–145)
WBC # BLD: 9.64 K/UL — SIGNIFICANT CHANGE UP (ref 3.8–10.5)
WBC # FLD AUTO: 9.64 K/UL — SIGNIFICANT CHANGE UP (ref 3.8–10.5)

## 2025-03-15 PROCEDURE — 99291 CRITICAL CARE FIRST HOUR: CPT

## 2025-03-15 RX ORDER — INSULIN LISPRO 100 U/ML
INJECTION, SOLUTION INTRAVENOUS; SUBCUTANEOUS
Refills: 0 | Status: DISCONTINUED | OUTPATIENT
Start: 2025-03-15 | End: 2025-03-16

## 2025-03-15 RX ORDER — METHYLPREDNISOLONE ACETATE 80 MG/ML
20 INJECTION, SUSPENSION INTRA-ARTICULAR; INTRALESIONAL; INTRAMUSCULAR; SOFT TISSUE EVERY 12 HOURS
Refills: 0 | Status: DISCONTINUED | OUTPATIENT
Start: 2025-03-15 | End: 2025-03-16

## 2025-03-15 RX ORDER — INSULIN LISPRO 100 U/ML
INJECTION, SOLUTION INTRAVENOUS; SUBCUTANEOUS AT BEDTIME
Refills: 0 | Status: DISCONTINUED | OUTPATIENT
Start: 2025-03-15 | End: 2025-03-16

## 2025-03-15 RX ORDER — LISINOPRIL 5 MG/1
10 TABLET ORAL DAILY
Refills: 0 | Status: DISCONTINUED | OUTPATIENT
Start: 2025-03-15 | End: 2025-03-16

## 2025-03-15 RX ORDER — AMLODIPINE BESYLATE 10 MG/1
10 TABLET ORAL DAILY
Refills: 0 | Status: DISCONTINUED | OUTPATIENT
Start: 2025-03-15 | End: 2025-03-15

## 2025-03-15 RX ORDER — SERTRALINE 100 MG/1
25 TABLET, FILM COATED ORAL DAILY
Refills: 0 | Status: DISCONTINUED | OUTPATIENT
Start: 2025-03-15 | End: 2025-03-21

## 2025-03-15 RX ORDER — SODIUM PHOSPHATE,DIBASIC DIHYD
15 POWDER (GRAM) MISCELLANEOUS ONCE
Refills: 0 | Status: COMPLETED | OUTPATIENT
Start: 2025-03-15 | End: 2025-03-15

## 2025-03-15 RX ORDER — MAGNESIUM SULFATE 500 MG/ML
2 SYRINGE (ML) INJECTION ONCE
Refills: 0 | Status: COMPLETED | OUTPATIENT
Start: 2025-03-15 | End: 2025-03-15

## 2025-03-15 RX ORDER — AMLODIPINE BESYLATE 10 MG/1
10 TABLET ORAL DAILY
Refills: 0 | Status: DISCONTINUED | OUTPATIENT
Start: 2025-03-15 | End: 2025-03-19

## 2025-03-15 RX ADMIN — POLYETHYLENE GLYCOL 3350 17 GRAM(S): 17 POWDER, FOR SOLUTION ORAL at 12:26

## 2025-03-15 RX ADMIN — Medication 25 GRAM(S): at 02:51

## 2025-03-15 RX ADMIN — DEXTROMETHORPHAN HBR, GUAIFENESIN 600 MILLIGRAM(S): 200 LIQUID ORAL at 05:39

## 2025-03-15 RX ADMIN — IPRATROPIUM BROMIDE AND ALBUTEROL SULFATE 3 MILLILITER(S): .5; 2.5 SOLUTION RESPIRATORY (INHALATION) at 11:06

## 2025-03-15 RX ADMIN — METHYLPREDNISOLONE ACETATE 20 MILLIGRAM(S): 80 INJECTION, SUSPENSION INTRA-ARTICULAR; INTRALESIONAL; INTRAMUSCULAR; SOFT TISSUE at 05:34

## 2025-03-15 RX ADMIN — Medication 63.75 MILLIMOLE(S): at 03:22

## 2025-03-15 RX ADMIN — AMLODIPINE BESYLATE 10 MILLIGRAM(S): 10 TABLET ORAL at 05:34

## 2025-03-15 RX ADMIN — Medication 1 DOSE(S): at 19:31

## 2025-03-15 RX ADMIN — ENOXAPARIN SODIUM 40 MILLIGRAM(S): 100 INJECTION SUBCUTANEOUS at 21:08

## 2025-03-15 RX ADMIN — Medication 25 GRAM(S): at 05:34

## 2025-03-15 RX ADMIN — LISINOPRIL 10 MILLIGRAM(S): 5 TABLET ORAL at 05:44

## 2025-03-15 RX ADMIN — IPRATROPIUM BROMIDE AND ALBUTEROL SULFATE 3 MILLILITER(S): .5; 2.5 SOLUTION RESPIRATORY (INHALATION) at 03:03

## 2025-03-15 RX ADMIN — INSULIN LISPRO 1: 100 INJECTION, SOLUTION INTRAVENOUS; SUBCUTANEOUS at 22:04

## 2025-03-15 RX ADMIN — Medication 2 TABLET(S): at 21:07

## 2025-03-15 RX ADMIN — METHYLPREDNISOLONE ACETATE 20 MILLIGRAM(S): 80 INJECTION, SUSPENSION INTRA-ARTICULAR; INTRALESIONAL; INTRAMUSCULAR; SOFT TISSUE at 17:30

## 2025-03-15 RX ADMIN — INSULIN LISPRO 1: 100 INJECTION, SOLUTION INTRAVENOUS; SUBCUTANEOUS at 12:26

## 2025-03-15 RX ADMIN — Medication 25 GRAM(S): at 14:30

## 2025-03-15 RX ADMIN — SERTRALINE 25 MILLIGRAM(S): 100 TABLET, FILM COATED ORAL at 12:26

## 2025-03-15 RX ADMIN — Medication 1 APPLICATION(S): at 05:33

## 2025-03-15 RX ADMIN — INSULIN LISPRO 2: 100 INJECTION, SOLUTION INTRAVENOUS; SUBCUTANEOUS at 05:44

## 2025-03-15 RX ADMIN — IPRATROPIUM BROMIDE AND ALBUTEROL SULFATE 3 MILLILITER(S): .5; 2.5 SOLUTION RESPIRATORY (INHALATION) at 07:42

## 2025-03-15 RX ADMIN — Medication 25 GRAM(S): at 21:05

## 2025-03-15 RX ADMIN — BUDESONIDE 0.5 MILLIGRAM(S): 0.25 SUSPENSION RESPIRATORY (INHALATION) at 07:42

## 2025-03-15 NOTE — PROGRESS NOTE ADULT - ATTENDING COMMENTS
74-year-old female with history of asthma, recently extubated after ICU admission for toxic respiratory failure due to pneumonia and asthma exacerbation.     1. Asthma Exacerbation - Post-extubation asthma worsened but improved with medication adjustments. Currently stable on Duoneb Q4, budesonide Q12, and IV steroids. Patient experienced desaturation issues when taken off prednisone but is now stable at 97% oxygen saturation. Lungs sound clear on examination.  - Continue Duoneb Q4  - Attempt to discontinue budesonide  - Consider switching to Symbicort or dual medication inhaler with spacer if patient can participate  - Transition off IV steroids to a taper (Soumedrol Q12 from Q8)  - Continue chest physiotherapy  - Continue Mucinex for secretions    2. Pneumonia - Patient was treated for suspected pneumonia, though no true evidence was found. She responded well to antibiotic therapy. Currently on day 3 of Zosyn, added due to fevers and increased white blood cell count. Fevers have resolved, and white blood cell count has normalized.  - Continue Zosyn antibiotic regimen    3. Hypertension - History of hypertension. Blood pressures initially difficult to control but improved with medication adjustments. Currently managed with nicotine drip, hydralazine, and lisinopril. Blood pressures have stabilized, and patient was transitioned back to home medications.  - Continue current antihypertensive regimen  - Monitor blood pressure    4. Depression and Mood Disorder - History of depression, previously on sertraline and olanzapine for mood disorder. Both medications were held post-extubation. Patient is currently doing better off these medications.  - Give back sertraline    5. Nutrition - Patient is on a soft and bite-sized diet, taking 30-45 minutes to eat. This diet was recommended by the care team and preferred by the patient.  - Continue soft and bite-sized diet  - Monitor nutritional intake and tolerance    Will need outpatient followup with pulmonary for further evaluation of her asthma.   Prognosis is guarded. Rest of care as per above.

## 2025-03-15 NOTE — PROGRESS NOTE ADULT - ASSESSMENT
74F hx asthma and depressed presented with AHHRF c/b AMS requiring MICU admission for intubation for airway protection i/s/o asthma exacerbation. s/p extubation with labile BPs requiring nicardipine gtt.    Neuro  - No active issues  # Acute Metabolic Encephalopathy, resolving  # Seizure-like activity, resolved  Encephalopathy i/s/o hypercapnic respiratory failure.  - CTH: no acute changes  - CT spine: no fractures  - 3/8 ON: 6 minutes of seizure-like activity with myoclonic upper jerks -> broke with ativan  -- EEG 3/10 showing no epileptiform activity; severe degree of diffuse or multifocal cerebral dysfunction c/w comatose    Cardiovascular  # HTN  > cont Nicardipine gtt, wean as tolerated  > resume amlodipine 10mg qD and start home lisinopril 10mg qD; hold home HCTZ, avoid beta-blockers given asthma    Pulmonary  # Chronic Hypercapnia i/s/o Asthma  # Acute on Chronic HHRF, resolving  # PNA, improving  Likely 2/2 asthma exacerbation, with wheezing.  - CTA without evidence of PNA or PE  > cont duonebs q4h w/ budesonide q12h; transition to inhaled fluticasone/salmeterol when PNA/asthma symptoms better controlled before discharge  > continue steroids as 20mg IV q8hr  > replete Mg PRN, goal >2  > cont suppl O2 as needed, wean as tolerated    GI  # Diet  > soft and bite sized    Renal/  # Hyponatremia  Likely i/s/o hypovolemia and component of underlying SIADH.  > IVFs trial; start salt tabs  > CTM Na on daily labs    ID  # ?PNA  Leukocytosis i/s/o steroids and possible PNA, persistent fevers starting 3/12 now progressed to low-grade temps.  - Full RVP neg, legionella/strep negative, full RVP negative  - initial Bcx no growth; rpt Bcx 3/13 no growth  - UA 3/12 neg  - CTA chest w/o evidence of PNA or PE  - LE dopplers w/o DVT  - s/p azithro (5-days, finished 3/12)  > cont empiric coverage with zosyn (3/13- )    Endocrine  - No active issues  - A1c 5.7%  > low SSI for steroids    Heme  - No active issues  # Thrombocytopenia, mild  Likely i/s/o critical illness.  > CTM for now; transfuse for PLT < 10 or < 50 with bleeding    # DVT ppx  - LE bi/l duplex w/o DVT  > Enoxaparin 40mg qD  > PT consulted   74F hx asthma and depressed presented with AHHRF c/b AMS requiring MICU admission for intubation for airway protection i/s/o asthma exacerbation. s/p extubation with labile BPs requiring nicardipine gtt.    Neuro  - No active issues  # Depression  Hx tardive dyskinesia symptoms on prior medications.  > resume home Sertraline  > hold home Olanzapine, consider resuming    # Toxic Metabolic encephalopathy, resolved  # Seizure-like activity, resolved  Encephalopathy i/s/o hypercapnic respiratory failure and sepsis.  - CTH: no acute changes  - CT spine: no fractures  - 3/8 ON: 6 minutes of seizure-like activity with myoclonic upper jerks -> broke with ativan  -- EEG 3/10 showing no epileptiform activity; severe degree of diffuse or multifocal cerebral dysfunction c/w comatose    Cardiovascular  # HTN  > cont Nicardipine gtt, wean as tolerated  > resume amlodipine 10mg qD and start home lisinopril 10mg qD; hold home HCTZ, avoid beta-blockers given asthma    Pulmonary  # Chronic Hypercapnia i/s/o Asthma  # Acute on Chronic HHRF, resolving  # PNA, improving  Likely 2/2 asthma exacerbation, with wheezing.  - CTA without evidence of PNA or PE  > switch to Advair from duonebs/budesonide for asthma control  > continue steroids as 20mg IV q12hr; eventual switch to PO w/ taper  > replete Mg PRN, goal >2  > cont suppl O2 as needed, wean as tolerated    GI  # Diet  > soft and bite sized, escalate as tolerated    Renal/  # Hyponatremia, resolved  Likely i/s/o hypovolemia and component of underlying SIADH.  - IVFs trial successful  > CTM Na on daily labs  > consider fluid restriction vs salt tabs if worsening Na again    ID  # ?PNA  Leukocytosis i/s/o steroids and possible PNA, persistent fevers starting 3/12 now progressed to low-grade temps.  - Full RVP neg, legionella/strep negative, full RVP negative  - initial Bcx no growth; rpt Bcx 3/13 no growth  - UA 3/12 neg  - CTA chest w/o evidence of PNA or PE  - LE dopplers w/o DVT  - s/p azithro (5-days, finished 3/12)  > cont empiric coverage with zosyn (3/13- )    Endocrine  - No active issues  - A1c 5.7%  > low SSI for steroids    Heme  - No active issues  # Thrombocytopenia, mild  Likely i/s/o critical illness.  > CTM for now; transfuse for PLT <10 or <50 with bleeding    # DVT r/o for LE edema  - LE bi/l duplex w/o DVT    # Prophylactic  > Enoxaparin 40mg qD  > PT consulted

## 2025-03-15 NOTE — PROGRESS NOTE ADULT - SUBJECTIVE AND OBJECTIVE BOX
************************  Mir Tyler, MD-PhD  Internal Medicine PGY-3  ************************    SUBJECTIVE:      Patient seen and examined at bedside. No events overnight, no acute complaints this morning. Patient reminded of ongoing careplan.      VITAL SIGNS:  ICU Vital Signs Last 24 Hrs  T(C): 37.3 (15 Mar 2025 00:00), Max: 37.8 (14 Mar 2025 12:00)  T(F): 99.1 (15 Mar 2025 00:00), Max: 100 (14 Mar 2025 12:00)  HR: 101 (15 Mar 2025 04:00) (80 - 116)  BP: 166/78 (15 Mar 2025 04:00) (141/72 - 189/93)  BP(mean): 100 (15 Mar 2025 04:00) (92 - 116)  ABP: --  ABP(mean): --  RR: 20 (15 Mar 2025 04:00) (18 - 31)  SpO2: 92% (15 Mar 2025 04:00) (92% - 99%)    O2 Parameters below as of 15 Mar 2025 04:00  Patient On (Oxygen Delivery Method): face tent  O2 Flow (L/min): 10  O2 Concentration (%): 50        Plateau pressure:   P/F ratio:     03-13 @ 07:01  -  03-14 @ 07:00  --------------------------------------------------------  IN: 3117.5 mL / OUT: 1450 mL / NET: 1667.5 mL    03-14 @ 07:01  -  03-15 @ 06:45  --------------------------------------------------------  IN: 1287.5 mL / OUT: 2000 mL / NET: -712.5 mL      CAPILLARY BLOOD GLUCOSE      POCT Blood Glucose.: 203 mg/dL (15 Mar 2025 05:37)    ECG:     PHYSICAL EXAM:    03-13 @ 07:01  -  03-14 @ 07:00  --------------------------------------------------------  IN: 3117.5 mL / OUT: 1450 mL / NET: 1667.5 mL    03-14 @ 07:01  -  03-15 @ 06:45  --------------------------------------------------------  IN: 1287.5 mL / OUT: 2000 mL / NET: -712.5 mL    Vital Signs Last 24 Hrs  T(C): 37.3 (15 Mar 2025 00:00), Max: 37.8 (14 Mar 2025 12:00)  T(F): 99.1 (15 Mar 2025 00:00), Max: 100 (14 Mar 2025 12:00)  HR: 101 (15 Mar 2025 04:00) (80 - 116)  BP: 166/78 (15 Mar 2025 04:00) (141/72 - 189/93)  BP(mean): 100 (15 Mar 2025 04:00) (92 - 116)  RR: 20 (15 Mar 2025 04:00) (18 - 31)  SpO2: 92% (15 Mar 2025 04:00) (92% - 99%)    Parameters below as of 15 Mar 2025 04:00  Patient On (Oxygen Delivery Method): face tent  O2 Flow (L/min): 10  O2 Concentration (%): 50  T(C): 37.3 (03-15-25 @ 00:00), Max: 37.8 (03-14-25 @ 12:00)  HR: 101 (03-15-25 @ 04:00) (80 - 116)  BP: 166/78 (03-15-25 @ 04:00) (141/72 - 189/93)  RR: 20 (03-15-25 @ 04:00) (18 - 31)  SpO2: 92% (03-15-25 @ 04:00) (92% - 99%)    GENERAL: well-appearing, NAD, appears comfortable  HEENT: NC/AT, EOMI, no conjunctival icterus, dry mucus membranes  LUNGS: non-labored breathing, CTAB, no wheezing/rales/rhonchi  CV: RRR, no m/r/g, 2+ palpable peripheral pulses bi/l, cap refill <2 secs  ABD: non-distended, soft, non-tender, no rebound tenderness or guarding  EXT: warm and well-perfused, trace-1+ bi/l LE peripheral edema  SKIN: no rashes or lesions  NEURO: AAOx1-2, no focal deficits    MEDICATIONS:  MEDICATIONS  (STANDING):  albuterol/ipratropium for Nebulization 3 milliLiter(s) Nebulizer every 4 hours  amLODIPine   Tablet 10 milliGRAM(s) Oral daily  buDESOnide    Inhalation Suspension 0.5 milliGRAM(s) Inhalation every 12 hours  chlorhexidine 2% Cloths 1 Application(s) Topical <User Schedule>  dextrose 5% + lactated ringers. 1000 milliLiter(s) (75 mL/Hr) IV Continuous <Continuous>  dextrose 5%. 1000 milliLiter(s) (100 mL/Hr) IV Continuous <Continuous>  dextrose 5%. 1000 milliLiter(s) (50 mL/Hr) IV Continuous <Continuous>  dextrose 50% Injectable 25 Gram(s) IV Push once  dextrose 50% Injectable 12.5 Gram(s) IV Push once  dextrose 50% Injectable 25 Gram(s) IV Push once  dextrose Oral Gel 15 Gram(s) Oral once  enoxaparin Injectable 40 milliGRAM(s) SubCutaneous every 24 hours  glucagon  Injectable 1 milliGRAM(s) IntraMuscular once  guaiFENesin  milliGRAM(s) Oral every 12 hours  insulin lispro (ADMELOG) corrective regimen sliding scale   SubCutaneous three times a day before meals  insulin lispro (ADMELOG) corrective regimen sliding scale   SubCutaneous at bedtime  lisinopril 10 milliGRAM(s) Oral daily  methylPREDNISolone sodium succinate Injectable 20 milliGRAM(s) IV Push every 8 hours  niCARdipine Infusion 5 mG/Hr (25 mL/Hr) IV Continuous <Continuous>  piperacillin/tazobactam IVPB.. 3.375 Gram(s) IV Intermittent every 8 hours  polyethylene glycol 3350 17 Gram(s) Oral daily  senna 2 Tablet(s) Oral at bedtime    MEDICATIONS  (PRN):      ALLERGIES:  Allergies    No Known Allergies    Intolerances        LABS:                        12.4   9.64  )-----------( 150      ( 15 Mar 2025 00:36 )             37.6     03-15    135  |  96[L]  |  17  ----------------------------<  236[H]  4.0   |  27  |  0.59    Ca    9.0      15 Mar 2025 00:36  Phos  2.1     03-15  Mg     1.90     03-15    TPro  6.6  /  Alb  3.6  /  TBili  0.6  /  DBili  x   /  AST  22  /  ALT  30  /  AlkPhos  84  03-15    PT/INR - ( 15 Mar 2025 00:36 )   PT: 11.5 sec;   INR: 0.99 ratio         PTT - ( 15 Mar 2025 00:36 )  PTT:29.3 sec      Urinalysis Basic - ( 15 Mar 2025 00:36 )    Color: x / Appearance: x / SG: x / pH: x  Gluc: 236 mg/dL / Ketone: x  / Bili: x / Urobili: x   Blood: x / Protein: x / Nitrite: x   Leuk Esterase: x / RBC: x / WBC x   Sq Epi: x / Non Sq Epi: x / Bacteria: x        Urinalysis with Rflx Culture (collected 13 Mar 2025 12:00)    Culture - Blood (collected 13 Mar 2025 00:50)  Source: Blood Blood-Peripheral  Preliminary Report (14 Mar 2025 09:02):    No growth at 24 hours    Culture - Blood (collected 13 Mar 2025 00:45)  Source: Blood Blood-Peripheral  Preliminary Report (14 Mar 2025 09:02):    No growth at 24 hours        IMAGING & OTHER TESTS:    NNI

## 2025-03-16 LAB
ALBUMIN SERPL ELPH-MCNC: 3.3 G/DL — SIGNIFICANT CHANGE UP (ref 3.3–5)
ALP SERPL-CCNC: 79 U/L — SIGNIFICANT CHANGE UP (ref 40–120)
ALT FLD-CCNC: 27 U/L — SIGNIFICANT CHANGE UP (ref 4–33)
ANION GAP SERPL CALC-SCNC: 10 MMOL/L — SIGNIFICANT CHANGE UP (ref 7–14)
AST SERPL-CCNC: 19 U/L — SIGNIFICANT CHANGE UP (ref 4–32)
BILIRUB SERPL-MCNC: 0.4 MG/DL — SIGNIFICANT CHANGE UP (ref 0.2–1.2)
BLD GP AB SCN SERPL QL: NEGATIVE — SIGNIFICANT CHANGE UP
BUN SERPL-MCNC: 18 MG/DL — SIGNIFICANT CHANGE UP (ref 7–23)
CA-I BLD-SCNC: 1.14 MMOL/L — LOW (ref 1.15–1.29)
CALCIUM SERPL-MCNC: 9 MG/DL — SIGNIFICANT CHANGE UP (ref 8.4–10.5)
CHLORIDE SERPL-SCNC: 94 MMOL/L — LOW (ref 98–107)
CO2 SERPL-SCNC: 29 MMOL/L — SIGNIFICANT CHANGE UP (ref 22–31)
CREAT SERPL-MCNC: 0.6 MG/DL — SIGNIFICANT CHANGE UP (ref 0.5–1.3)
EGFR: 94 ML/MIN/1.73M2 — SIGNIFICANT CHANGE UP
EGFR: 94 ML/MIN/1.73M2 — SIGNIFICANT CHANGE UP
GAS PNL BLDA: SIGNIFICANT CHANGE UP
GLUCOSE BLDC GLUCOMTR-MCNC: 159 MG/DL — HIGH (ref 70–99)
GLUCOSE BLDC GLUCOMTR-MCNC: 160 MG/DL — HIGH (ref 70–99)
GLUCOSE BLDC GLUCOMTR-MCNC: 170 MG/DL — HIGH (ref 70–99)
GLUCOSE BLDC GLUCOMTR-MCNC: 173 MG/DL — HIGH (ref 70–99)
GLUCOSE BLDC GLUCOMTR-MCNC: 218 MG/DL — HIGH (ref 70–99)
GLUCOSE BLDC GLUCOMTR-MCNC: 243 MG/DL — HIGH (ref 70–99)
GLUCOSE SERPL-MCNC: 264 MG/DL — HIGH (ref 70–99)
HCT VFR BLD CALC: 36.6 % — SIGNIFICANT CHANGE UP (ref 34.5–45)
HGB BLD-MCNC: 12.1 G/DL — SIGNIFICANT CHANGE UP (ref 11.5–15.5)
MAGNESIUM SERPL-MCNC: 2.1 MG/DL — SIGNIFICANT CHANGE UP (ref 1.6–2.6)
MCHC RBC-ENTMCNC: 31.9 PG — SIGNIFICANT CHANGE UP (ref 27–34)
MCHC RBC-ENTMCNC: 33.1 G/DL — SIGNIFICANT CHANGE UP (ref 32–36)
MCV RBC AUTO: 96.6 FL — SIGNIFICANT CHANGE UP (ref 80–100)
NRBC # BLD AUTO: 0 K/UL — SIGNIFICANT CHANGE UP (ref 0–0)
NRBC # FLD: 0 K/UL — SIGNIFICANT CHANGE UP (ref 0–0)
NRBC BLD AUTO-RTO: 0 /100 WBCS — SIGNIFICANT CHANGE UP (ref 0–0)
PHOSPHATE SERPL-MCNC: 3.5 MG/DL — SIGNIFICANT CHANGE UP (ref 2.5–4.5)
PLATELET # BLD AUTO: 158 K/UL — SIGNIFICANT CHANGE UP (ref 150–400)
POTASSIUM SERPL-MCNC: 4.7 MMOL/L — SIGNIFICANT CHANGE UP (ref 3.5–5.3)
POTASSIUM SERPL-SCNC: 4.7 MMOL/L — SIGNIFICANT CHANGE UP (ref 3.5–5.3)
PROT SERPL-MCNC: 6 G/DL — SIGNIFICANT CHANGE UP (ref 6–8.3)
RBC # BLD: 3.79 M/UL — LOW (ref 3.8–5.2)
RBC # FLD: 12.8 % — SIGNIFICANT CHANGE UP (ref 10.3–14.5)
RH IG SCN BLD-IMP: POSITIVE — SIGNIFICANT CHANGE UP
SODIUM SERPL-SCNC: 133 MMOL/L — LOW (ref 135–145)
WBC # BLD: 10.76 K/UL — HIGH (ref 3.8–10.5)
WBC # FLD AUTO: 10.76 K/UL — HIGH (ref 3.8–10.5)

## 2025-03-16 PROCEDURE — 99291 CRITICAL CARE FIRST HOUR: CPT

## 2025-03-16 RX ORDER — LISINOPRIL 5 MG/1
40 TABLET ORAL DAILY
Refills: 0 | Status: DISCONTINUED | OUTPATIENT
Start: 2025-03-16 | End: 2025-05-21

## 2025-03-16 RX ORDER — INSULIN LISPRO 100 U/ML
INJECTION, SOLUTION INTRAVENOUS; SUBCUTANEOUS AT BEDTIME
Refills: 0 | Status: DISCONTINUED | OUTPATIENT
Start: 2025-03-16 | End: 2025-03-19

## 2025-03-16 RX ORDER — INSULIN LISPRO 100 U/ML
INJECTION, SOLUTION INTRAVENOUS; SUBCUTANEOUS
Refills: 0 | Status: DISCONTINUED | OUTPATIENT
Start: 2025-03-16 | End: 2025-03-19

## 2025-03-16 RX ORDER — INSULIN LISPRO 100 U/ML
2 INJECTION, SOLUTION INTRAVENOUS; SUBCUTANEOUS ONCE
Refills: 0 | Status: COMPLETED | OUTPATIENT
Start: 2025-03-16 | End: 2025-03-16

## 2025-03-16 RX ORDER — PREDNISONE 20 MG/1
40 TABLET ORAL DAILY
Refills: 0 | Status: COMPLETED | OUTPATIENT
Start: 2025-03-17 | End: 2025-03-17

## 2025-03-16 RX ORDER — LISINOPRIL 5 MG/1
30 TABLET ORAL ONCE
Refills: 0 | Status: COMPLETED | OUTPATIENT
Start: 2025-03-16 | End: 2025-03-16

## 2025-03-16 RX ADMIN — Medication 25 GRAM(S): at 21:57

## 2025-03-16 RX ADMIN — INSULIN LISPRO 2 UNIT(S): 100 INJECTION, SOLUTION INTRAVENOUS; SUBCUTANEOUS at 08:13

## 2025-03-16 RX ADMIN — LISINOPRIL 10 MILLIGRAM(S): 5 TABLET ORAL at 05:10

## 2025-03-16 RX ADMIN — DEXTROMETHORPHAN HBR, GUAIFENESIN 600 MILLIGRAM(S): 200 LIQUID ORAL at 05:11

## 2025-03-16 RX ADMIN — LISINOPRIL 40 MILLIGRAM(S): 5 TABLET ORAL at 18:04

## 2025-03-16 RX ADMIN — LISINOPRIL 30 MILLIGRAM(S): 5 TABLET ORAL at 10:12

## 2025-03-16 RX ADMIN — AMLODIPINE BESYLATE 10 MILLIGRAM(S): 10 TABLET ORAL at 05:10

## 2025-03-16 RX ADMIN — Medication 1 APPLICATION(S): at 05:11

## 2025-03-16 RX ADMIN — Medication 2 TABLET(S): at 21:57

## 2025-03-16 RX ADMIN — METHYLPREDNISOLONE ACETATE 20 MILLIGRAM(S): 80 INJECTION, SUSPENSION INTRA-ARTICULAR; INTRALESIONAL; INTRAMUSCULAR; SOFT TISSUE at 05:10

## 2025-03-16 RX ADMIN — SERTRALINE 25 MILLIGRAM(S): 100 TABLET, FILM COATED ORAL at 11:53

## 2025-03-16 RX ADMIN — INSULIN LISPRO 2: 100 INJECTION, SOLUTION INTRAVENOUS; SUBCUTANEOUS at 11:53

## 2025-03-16 RX ADMIN — INSULIN LISPRO 4: 100 INJECTION, SOLUTION INTRAVENOUS; SUBCUTANEOUS at 18:04

## 2025-03-16 RX ADMIN — Medication 1 DOSE(S): at 21:57

## 2025-03-16 RX ADMIN — Medication 25 GRAM(S): at 14:49

## 2025-03-16 RX ADMIN — DEXTROMETHORPHAN HBR, GUAIFENESIN 600 MILLIGRAM(S): 200 LIQUID ORAL at 18:04

## 2025-03-16 RX ADMIN — Medication 1 DOSE(S): at 08:53

## 2025-03-16 RX ADMIN — Medication 25 GRAM(S): at 05:11

## 2025-03-16 RX ADMIN — ENOXAPARIN SODIUM 40 MILLIGRAM(S): 100 INJECTION SUBCUTANEOUS at 21:58

## 2025-03-16 NOTE — PROGRESS NOTE ADULT - SUBJECTIVE AND OBJECTIVE BOX
INTERVAL HPI/OVERNIGHT EVENTS: Weaned off Nicardipine ggt     SUBJECTIVE: Patient seen and examined at bedside. Doing well on 3L NC.       VITAL SIGNS:  ICU Vital Signs Last 24 Hrs  T(C): 36.8 (16 Mar 2025 07:00), Max: 37.2 (15 Mar 2025 08:00)  T(F): 98.3 (16 Mar 2025 07:00), Max: 99 (15 Mar 2025 08:00)  HR: 85 (16 Mar 2025 07:00) (73 - 103)  BP: 188/92 (16 Mar 2025 07:00) (136/73 - 188/92)  BP(mean): 118 (16 Mar 2025 07:00) (91 - 123)  ABP: --  ABP(mean): --  RR: 17 (16 Mar 2025 07:00) (13 - 23)  SpO2: 96% (16 Mar 2025 07:00) (89% - 100%)    O2 Parameters below as of 16 Mar 2025 07:00  Patient On (Oxygen Delivery Method): nasal cannula, high flow  O2 Flow (L/min): 3          Plateau pressure:   P/F ratio:     03-15 @ 07:01  -  03-16 @ 07:00  --------------------------------------------------------  IN: 550 mL / OUT: 1400 mL / NET: -850 mL      CAPILLARY BLOOD GLUCOSE      POCT Blood Glucose.: 285 mg/dL (15 Mar 2025 21:07)    ECG:    PHYSICAL EXAM:  GENERAL: well-appearing, NAD, appears comfortable  HEENT: NC/AT, EOMI, no conjunctival icterus, dry mucus membranes  LUNGS: non-labored breathing, wheezing and tight airway, no wheezing/rales/rhonchi  CV: RRR, no m/r/g, 2+ palpable peripheral pulses bi/l, cap refill <2 secs  ABD: non-distended, soft, non-tender, no rebound tenderness or guarding  EXT: warm and well-perfused, trace-1+ bi/l LE peripheral edema  SKIN: no rashes or lesions  NEURO: AAOx1-2, no focal deficits    MEDICATIONS:  MEDICATIONS  (STANDING):  amLODIPine   Tablet 10 milliGRAM(s) Oral daily  chlorhexidine 2% Cloths 1 Application(s) Topical <User Schedule>  dextrose 5% + lactated ringers. 1000 milliLiter(s) (75 mL/Hr) IV Continuous <Continuous>  dextrose 5%. 1000 milliLiter(s) (50 mL/Hr) IV Continuous <Continuous>  dextrose 5%. 1000 milliLiter(s) (100 mL/Hr) IV Continuous <Continuous>  dextrose 50% Injectable 25 Gram(s) IV Push once  dextrose 50% Injectable 12.5 Gram(s) IV Push once  dextrose 50% Injectable 25 Gram(s) IV Push once  dextrose Oral Gel 15 Gram(s) Oral once  enoxaparin Injectable 40 milliGRAM(s) SubCutaneous every 24 hours  fluticasone propionate/ salmeterol 250-50 MICROgram(s) Diskus 1 Dose(s) Inhalation two times a day  glucagon  Injectable 1 milliGRAM(s) IntraMuscular once  guaiFENesin  milliGRAM(s) Oral every 12 hours  insulin lispro (ADMELOG) corrective regimen sliding scale   SubCutaneous three times a day before meals  insulin lispro (ADMELOG) corrective regimen sliding scale   SubCutaneous at bedtime  lisinopril 10 milliGRAM(s) Oral daily  methylPREDNISolone sodium succinate Injectable 20 milliGRAM(s) IV Push every 12 hours  piperacillin/tazobactam IVPB.. 3.375 Gram(s) IV Intermittent every 8 hours  polyethylene glycol 3350 17 Gram(s) Oral daily  senna 2 Tablet(s) Oral at bedtime  sertraline 25 milliGRAM(s) Oral daily    MEDICATIONS  (PRN):      ALLERGIES:  Allergies    No Known Allergies    Intolerances        LABS:                        12.1   10.76 )-----------( 158      ( 16 Mar 2025 01:00 )             36.6     03-16    133[L]  |  94[L]  |  18  ----------------------------<  264[H]  4.7   |  29  |  0.60    Ca    9.0      16 Mar 2025 01:00  Phos  3.5     03-16  Mg     2.10     03-16    TPro  6.0  /  Alb  3.3  /  TBili  0.4  /  DBili  x   /  AST  19  /  ALT  27  /  AlkPhos  79  03-16    PT/INR - ( 15 Mar 2025 00:36 )   PT: 11.5 sec;   INR: 0.99 ratio         PTT - ( 15 Mar 2025 00:36 )  PTT:29.3 sec  Urinalysis Basic - ( 16 Mar 2025 01:00 )    Color: x / Appearance: x / SG: x / pH: x  Gluc: 264 mg/dL / Ketone: x  / Bili: x / Urobili: x   Blood: x / Protein: x / Nitrite: x   Leuk Esterase: x / RBC: x / WBC x   Sq Epi: x / Non Sq Epi: x / Bacteria: x        RADIOLOGY & ADDITIONAL TESTS: Reviewed.   INTERVAL HPI/OVERNIGHT EVENTS: Weaned off Nicardipine ggt     SUBJECTIVE: Patient seen and examined at bedside. Doing well on 3L NC. Denies any dyspnea, or chest pain.       VITAL SIGNS:  ICU Vital Signs Last 24 Hrs  T(C): 36.8 (16 Mar 2025 07:00), Max: 37.2 (15 Mar 2025 08:00)  T(F): 98.3 (16 Mar 2025 07:00), Max: 99 (15 Mar 2025 08:00)  HR: 85 (16 Mar 2025 07:00) (73 - 103)  BP: 188/92 (16 Mar 2025 07:00) (136/73 - 188/92)  BP(mean): 118 (16 Mar 2025 07:00) (91 - 123)  ABP: --  ABP(mean): --  RR: 17 (16 Mar 2025 07:00) (13 - 23)  SpO2: 96% (16 Mar 2025 07:00) (89% - 100%)    O2 Parameters below as of 16 Mar 2025 07:00  Patient On (Oxygen Delivery Method): nasal cannula, high flow  O2 Flow (L/min): 3          Plateau pressure:   P/F ratio:     03-15 @ 07:01  -  03-16 @ 07:00  --------------------------------------------------------  IN: 550 mL / OUT: 1400 mL / NET: -850 mL      CAPILLARY BLOOD GLUCOSE      POCT Blood Glucose.: 285 mg/dL (15 Mar 2025 21:07)    ECG:    PHYSICAL EXAM:  GENERAL: well-appearing, NAD, appears comfortable  HEENT: NC/AT, EOMI, no conjunctival icterus, dry mucus membranes  LUNGS: non-labored breathing, wheezing and tight airway, no wheezing/rales/rhonchi  CV: RRR, no m/r/g, 2+ palpable peripheral pulses bi/l, cap refill <2 secs  ABD: non-distended, soft, non-tender, no rebound tenderness or guarding  EXT: warm and well-perfused, trace-1+ bi/l LE peripheral edema  SKIN: no rashes or lesions  NEURO: AAOx3, no focal deficits    MEDICATIONS:  MEDICATIONS  (STANDING):  amLODIPine   Tablet 10 milliGRAM(s) Oral daily  chlorhexidine 2% Cloths 1 Application(s) Topical <User Schedule>  dextrose 5% + lactated ringers. 1000 milliLiter(s) (75 mL/Hr) IV Continuous <Continuous>  dextrose 5%. 1000 milliLiter(s) (50 mL/Hr) IV Continuous <Continuous>  dextrose 5%. 1000 milliLiter(s) (100 mL/Hr) IV Continuous <Continuous>  dextrose 50% Injectable 25 Gram(s) IV Push once  dextrose 50% Injectable 12.5 Gram(s) IV Push once  dextrose 50% Injectable 25 Gram(s) IV Push once  dextrose Oral Gel 15 Gram(s) Oral once  enoxaparin Injectable 40 milliGRAM(s) SubCutaneous every 24 hours  fluticasone propionate/ salmeterol 250-50 MICROgram(s) Diskus 1 Dose(s) Inhalation two times a day  glucagon  Injectable 1 milliGRAM(s) IntraMuscular once  guaiFENesin  milliGRAM(s) Oral every 12 hours  insulin lispro (ADMELOG) corrective regimen sliding scale   SubCutaneous three times a day before meals  insulin lispro (ADMELOG) corrective regimen sliding scale   SubCutaneous at bedtime  lisinopril 10 milliGRAM(s) Oral daily  methylPREDNISolone sodium succinate Injectable 20 milliGRAM(s) IV Push every 12 hours  piperacillin/tazobactam IVPB.. 3.375 Gram(s) IV Intermittent every 8 hours  polyethylene glycol 3350 17 Gram(s) Oral daily  senna 2 Tablet(s) Oral at bedtime  sertraline 25 milliGRAM(s) Oral daily    MEDICATIONS  (PRN):      ALLERGIES:  Allergies    No Known Allergies    Intolerances        LABS:                        12.1   10.76 )-----------( 158      ( 16 Mar 2025 01:00 )             36.6     03-16    133[L]  |  94[L]  |  18  ----------------------------<  264[H]  4.7   |  29  |  0.60    Ca    9.0      16 Mar 2025 01:00  Phos  3.5     03-16  Mg     2.10     03-16    TPro  6.0  /  Alb  3.3  /  TBili  0.4  /  DBili  x   /  AST  19  /  ALT  27  /  AlkPhos  79  03-16    PT/INR - ( 15 Mar 2025 00:36 )   PT: 11.5 sec;   INR: 0.99 ratio         PTT - ( 15 Mar 2025 00:36 )  PTT:29.3 sec  Urinalysis Basic - ( 16 Mar 2025 01:00 )    Color: x / Appearance: x / SG: x / pH: x  Gluc: 264 mg/dL / Ketone: x  / Bili: x / Urobili: x   Blood: x / Protein: x / Nitrite: x   Leuk Esterase: x / RBC: x / WBC x   Sq Epi: x / Non Sq Epi: x / Bacteria: x        RADIOLOGY & ADDITIONAL TESTS: Reviewed.

## 2025-03-16 NOTE — CHART NOTE - NSCHARTNOTEFT_GEN_A_CORE
MAR Accept:    Hospital course:  Patient is a 74F hx of asthma, depression, and HTN was admitted to the MICU with AHRF 2/2 asthma exacerbation resulting in acute on chronic HRF causing AMS and increased WOB that required intubation. During her ICU course, she experienced seizure-like activity (6 minutes of myoclonic upper jerks on 3/8 that resolved with Ativan, later progressing to rhythmic flexing movements in extremities) likely 2/2 hypercapnia and/or hyponatremia. Her CT head was negative for acute changes, and a video EEG was ordered that showed severe diffuse cerebral dysfunction consistent with comatose state but no epileptiform activity. Respiratory management included albuterol/ipratropium w/ budesonide in addition to solumedrol 125mg load and resulting 40mg x 5 days with a subsequent transition to PO taper, and magnesium supplementation, and ventilator settings to avoid air trapping. CTA was negative for pneumonia or pulmonary embolism, and respiratory viral panel was negative. She was empirically treated with azithromycin and Zosyn for CAP. Patient extubated 3/12 to face tent, responding to commands. Additional issues included hyponatremia (resolved, managed with free water restriction), acute on chronic metabolic alkalosis (likely compensatory), and mild thrombocytopenia (monitoring with plans). The primary etiology of her decompensation appears to have be an asthma exacerbation, which has now improved. Since 3/12 patient also having fevers without clear source (no VTEs, prior cultures negative, no known hx of malignancy). Sent rpt Bcx 3/12 pm with no growth so far.     For Follow-Up:  [ ] Monitor Blood pressures- started Amlodipine 10mg and Lisinopril 40mg   [ ] f/u 3/12 blood cultures- NGTD   [ ] Taper Steroids- currently on Prednisone 40mg   [ ] Continue Zosyn for a 7 day course- until 3/19

## 2025-03-16 NOTE — PROGRESS NOTE ADULT - ATTENDING COMMENTS
74-year-old female with history of asthma, recently extubated after ICU admission for toxic respiratory failure due to pneumonia and asthma exacerbation.     1. Asthma Exacerbation - Post-extubation asthma worsened but improved with medication adjustments. Currently stable on Duoneb Q4, budesonide Q12, and IV steroids. Patient experienced desaturation issues when taken off prednisone and put back on IV steroids. Lungs sound clear on examination.  - Continue Duoneb Q4 PRN  - Cont Advair or any ICS/LABA with spacer if patient can participate  - Transition off IV steroids to a taper (Soumedrol QD today then D/C)  - Start prednisone 40mg on Monday 03/17  - Continue chest physiotherapy  - Continue Mucinex for secretions    2. Pneumonia - Patient was treated for suspected pneumonia, though no true evidence was found. She responded well to antibiotic therapy. Currently on day 3 of Zosyn, added due to fevers and increased white blood cell count. Fevers have resolved, and white blood cell count has normalized.  - Continue Zosyn antibiotic regimen for a total of 7 days (Can switch to augmentin if needed)    3. Hypertension - History of hypertension. Blood pressures initially difficult to control but improved with medication adjustments. Currently managed with nicotine drip, hydralazine, and lisinopril. Blood pressures have stabilized, and patient was transitioned back to home medications.  - Increase Lisinopril from 10mg to 40mg  - Continue Amlodipine 10mg  - Monitor blood pressure  - Now off Cardene drip    4. Depression and Mood Disorder - History of depression, previously on sertraline and olanzapine for mood disorder. Both medications were held post-extubation. Patient is currently doing better off these medications.  - Cont sertraline    5. Nutrition - Patient is on a soft and bite-sized diet, taking 30-45 minutes to eat. This diet was recommended by the care team and preferred by the patient.  - Continue soft and bite-sized diet  - Monitor nutritional intake and tolerance    Will need outpatient followup with pulmonary for further evaluation of her asthma.   Prognosis is guarded. Rest of care as per above.

## 2025-03-16 NOTE — PROGRESS NOTE ADULT - ASSESSMENT
74F hx asthma and depressed presented with AHHRF c/b AMS requiring MICU admission for intubation for airway protection i/s/o asthma exacerbation. s/p extubation with labile BPs requiring nicardipine gtt.    Neuro  - No active issues  # Depression  Hx tardive dyskinesia symptoms on prior medications.  > continue home Sertraline  > hold home Olanzapine, consider resuming    # Toxic Metabolic encephalopathy, resolved  # Seizure-like activity, resolved  Encephalopathy i/s/o hypercapnic respiratory failure and sepsis.  - CTH: no acute changes  - CT spine: no fractures  - 3/8 ON: 6 minutes of seizure-like activity with myoclonic upper jerks -> broke with ativan  -- EEG 3/10 showing no epileptiform activity; severe degree of diffuse or multifocal cerebral dysfunction c/w comatose    Cardiovascular  # HTN  -Off Nicardipine ggt since 3/15  > resume amlodipine 10mg qD and start home lisinopril 10mg qD; hold home HCTZ, avoid beta-blockers given asthma    Pulmonary  # Chronic Hypercapnia i/s/o Asthma  # Acute on Chronic HHRF, resolving  # PNA, improving  Likely 2/2 asthma exacerbation, with wheezing.  - CTA without evidence of PNA or PE  > Continue Advair   > Solumedrol 20mg IV q12hr; eventual switch to PO w/ taper  > replete Mg PRN, goal >2  > cont suppl O2 as needed, wean as tolerated    GI  # Diet  > soft and bite sized, escalate as tolerated    Renal/  # Hyponatremia, resolved  Likely i/s/o hypovolemia and component of underlying SIADH.  - IVFs trial successful  > CTM Na on daily labs  > consider fluid restriction vs salt tabs if worsening Na again    ID  # ?PNA  Leukocytosis i/s/o steroids and possible PNA, persistent fevers starting 3/12 now progressed to low-grade temps.  - Full RVP neg, legionella/strep negative, full RVP negative  - initial Bcx no growth; rpt Bcx 3/13 no growth  - UA 3/12 neg  - CTA chest w/o evidence of PNA or PE  - LE dopplers w/o DVT  - s/p azithro (5-days, finished 3/12)  > cont empiric coverage with zosyn (3/13- )    Endocrine  - No active issues  - A1c 5.7%  > low SSI for steroids    Heme  - No active issues  # Thrombocytopenia, mild  Likely i/s/o critical illness.  > CTM for now; transfuse for PLT <10 or <50 with bleeding    # DVT r/o for LE edema  - LE bi/l duplex w/o DVT    # Prophylactic  > Enoxaparin 40mg qD  > PT consulted   74F hx asthma and depressed presented with AHHRF c/b AMS requiring MICU admission for intubation for airway protection i/s/o asthma exacerbation. s/p extubation with labile BPs requiring nicardipine gtt.    Neuro  - No active issues  # Depression  Hx tardive dyskinesia symptoms on prior medications.  > continue home Sertraline  > hold home Olanzapine, consider resuming    # Toxic Metabolic encephalopathy, resolved  # Seizure-like activity, resolved  Encephalopathy i/s/o hypercapnic respiratory failure and sepsis.  - CTH: no acute changes  - CT spine: no fractures  - 3/8 ON: 6 minutes of seizure-like activity with myoclonic upper jerks -> broke with ativan  -- EEG 3/10 showing no epileptiform activity; severe degree of diffuse or multifocal cerebral dysfunction c/w comatose    Cardiovascular  # HTN  -Off Nicardipine ggt since 3/15  > Continue amlodipine 10mg qD and increase Lisinopril to 40mg qD; hold home HCTZ, avoid beta-blockers given asthma    Pulmonary  # Chronic Hypercapnia i/s/o Asthma  # Acute on Chronic HHRF, resolving  # PNA, improving  Likely 2/2 asthma exacerbation, with wheezing.  - CTA without evidence of PNA or PE  > Continue Advair   > Last Solumedrol 20mg IV Dose on 3/16- from 3/17 will transition to Prednisone 40mg daily.   > replete Mg PRN, goal >2  > cont suppl O2 as needed, wean as tolerated    GI  # Diet  > soft and bite sized, escalate as tolerated    Renal/  # Hyponatremia, resolved  Likely i/s/o hypovolemia and component of underlying SIADH.  - IVFs trial successful  > CTM Na on daily labs  > consider fluid restriction vs salt tabs if worsening Na again    ID  # ?PNA  Leukocytosis i/s/o steroids and possible PNA, persistent fevers starting 3/12 now progressed to low-grade temps.  - Full RVP neg, legionella/strep negative, full RVP negative  - initial Bcx no growth; rpt Bcx 3/13 no growth  - UA 3/12 neg  - CTA chest w/o evidence of PNA or PE  - LE dopplers w/o DVT  - s/p azithro (5-days, finished 3/12)  > cont empiric coverage with zosyn (3/13- ) will aim for 7 day course    Endocrine  - No active issues  - A1c 5.7%  >moderate ISS for steroids and hyperglycemia     Heme  - No active issues  # Thrombocytopenia, mild  Likely i/s/o critical illness.  > CTM for now; transfuse for PLT <10 or <50 with bleeding    # DVT r/o for LE edema  - LE bi/l duplex w/o DVT    # Prophylactic  > Enoxaparin 40mg qD  > PT consulted

## 2025-03-17 DIAGNOSIS — J45.901 UNSPECIFIED ASTHMA WITH (ACUTE) EXACERBATION: ICD-10-CM

## 2025-03-17 DIAGNOSIS — E87.1 HYPO-OSMOLALITY AND HYPONATREMIA: ICD-10-CM

## 2025-03-17 DIAGNOSIS — G92.8 OTHER TOXIC ENCEPHALOPATHY: ICD-10-CM

## 2025-03-17 DIAGNOSIS — R73.03 PREDIABETES: ICD-10-CM

## 2025-03-17 DIAGNOSIS — F41.9 ANXIETY DISORDER, UNSPECIFIED: ICD-10-CM

## 2025-03-17 DIAGNOSIS — I10 ESSENTIAL (PRIMARY) HYPERTENSION: ICD-10-CM

## 2025-03-17 DIAGNOSIS — R60.0 LOCALIZED EDEMA: ICD-10-CM

## 2025-03-17 DIAGNOSIS — J18.9 PNEUMONIA, UNSPECIFIED ORGANISM: ICD-10-CM

## 2025-03-17 DIAGNOSIS — Z29.9 ENCOUNTER FOR PROPHYLACTIC MEASURES, UNSPECIFIED: ICD-10-CM

## 2025-03-17 LAB
ALBUMIN SERPL ELPH-MCNC: 3.5 G/DL — SIGNIFICANT CHANGE UP (ref 3.3–5)
ALP SERPL-CCNC: 83 U/L — SIGNIFICANT CHANGE UP (ref 40–120)
ALT FLD-CCNC: 30 U/L — SIGNIFICANT CHANGE UP (ref 4–33)
ANION GAP SERPL CALC-SCNC: 14 MMOL/L — SIGNIFICANT CHANGE UP (ref 7–14)
ANISOCYTOSIS BLD QL: SLIGHT — SIGNIFICANT CHANGE UP
AST SERPL-CCNC: 19 U/L — SIGNIFICANT CHANGE UP (ref 4–32)
BASOPHILS # BLD AUTO: 0 K/UL — SIGNIFICANT CHANGE UP (ref 0–0.2)
BASOPHILS NFR BLD AUTO: 0 % — SIGNIFICANT CHANGE UP (ref 0–2)
BILIRUB SERPL-MCNC: 0.4 MG/DL — SIGNIFICANT CHANGE UP (ref 0.2–1.2)
BLOOD GAS VENOUS COMPREHENSIVE RESULT: SIGNIFICANT CHANGE UP
BLOOD GAS VENOUS COMPREHENSIVE RESULT: SIGNIFICANT CHANGE UP
BUN SERPL-MCNC: 14 MG/DL — SIGNIFICANT CHANGE UP (ref 7–23)
CALCIUM SERPL-MCNC: 9.5 MG/DL — SIGNIFICANT CHANGE UP (ref 8.4–10.5)
CHLORIDE SERPL-SCNC: 93 MMOL/L — LOW (ref 98–107)
CO2 SERPL-SCNC: 30 MMOL/L — SIGNIFICANT CHANGE UP (ref 22–31)
CREAT SERPL-MCNC: 0.52 MG/DL — SIGNIFICANT CHANGE UP (ref 0.5–1.3)
EGFR: 97 ML/MIN/1.73M2 — SIGNIFICANT CHANGE UP
EGFR: 97 ML/MIN/1.73M2 — SIGNIFICANT CHANGE UP
EOSINOPHIL # BLD AUTO: 0.27 K/UL — SIGNIFICANT CHANGE UP (ref 0–0.5)
EOSINOPHIL NFR BLD AUTO: 2 % — SIGNIFICANT CHANGE UP (ref 0–6)
GLUCOSE BLDC GLUCOMTR-MCNC: 195 MG/DL — HIGH (ref 70–99)
GLUCOSE SERPL-MCNC: 166 MG/DL — HIGH (ref 70–99)
HCT VFR BLD CALC: 40.4 % — SIGNIFICANT CHANGE UP (ref 34.5–45)
HGB BLD-MCNC: 13.4 G/DL — SIGNIFICANT CHANGE UP (ref 11.5–15.5)
IANC: 6.35 K/UL — SIGNIFICANT CHANGE UP (ref 1.8–7.4)
LG PLATELETS BLD QL AUTO: SLIGHT — SIGNIFICANT CHANGE UP
LYMPHOCYTES # BLD AUTO: 36 % — SIGNIFICANT CHANGE UP (ref 13–44)
LYMPHOCYTES # BLD AUTO: 4.78 K/UL — HIGH (ref 1–3.3)
MACROCYTES BLD QL: SLIGHT — SIGNIFICANT CHANGE UP
MAGNESIUM SERPL-MCNC: 1.7 MG/DL — SIGNIFICANT CHANGE UP (ref 1.6–2.6)
MANUAL SMEAR VERIFICATION: SIGNIFICANT CHANGE UP
MCHC RBC-ENTMCNC: 31.9 PG — SIGNIFICANT CHANGE UP (ref 27–34)
MCHC RBC-ENTMCNC: 33.2 G/DL — SIGNIFICANT CHANGE UP (ref 32–36)
MCV RBC AUTO: 96.2 FL — SIGNIFICANT CHANGE UP (ref 80–100)
MONOCYTES # BLD AUTO: 1.06 K/UL — HIGH (ref 0–0.9)
MONOCYTES NFR BLD AUTO: 8 % — SIGNIFICANT CHANGE UP (ref 2–14)
NEUTROPHILS # BLD AUTO: 6.37 K/UL — SIGNIFICANT CHANGE UP (ref 1.8–7.4)
NEUTROPHILS NFR BLD AUTO: 48 % — SIGNIFICANT CHANGE UP (ref 43–77)
NEUTS HYPERSEG # BLD: PRESENT — SIGNIFICANT CHANGE UP
NRBC # BLD: 0 /100 WBCS — SIGNIFICANT CHANGE UP (ref 0–0)
NRBC BLD-RTO: 0 /100 WBCS — SIGNIFICANT CHANGE UP (ref 0–0)
PHOSPHATE SERPL-MCNC: 2.1 MG/DL — LOW (ref 2.5–4.5)
PLAT MORPH BLD: ABNORMAL
PLATELET # BLD AUTO: 182 K/UL — SIGNIFICANT CHANGE UP (ref 150–400)
PLATELET CLUMP BLD QL SMEAR: SLIGHT
PLATELET COUNT - ESTIMATE: NORMAL — SIGNIFICANT CHANGE UP
POLYCHROMASIA BLD QL SMEAR: SLIGHT — SIGNIFICANT CHANGE UP
POTASSIUM SERPL-MCNC: 3.7 MMOL/L — SIGNIFICANT CHANGE UP (ref 3.5–5.3)
POTASSIUM SERPL-SCNC: 3.7 MMOL/L — SIGNIFICANT CHANGE UP (ref 3.5–5.3)
PROT SERPL-MCNC: 6.7 G/DL — SIGNIFICANT CHANGE UP (ref 6–8.3)
RBC # BLD: 4.2 M/UL — SIGNIFICANT CHANGE UP (ref 3.8–5.2)
RBC # FLD: 12.1 % — SIGNIFICANT CHANGE UP (ref 10.3–14.5)
RBC BLD AUTO: ABNORMAL
SODIUM SERPL-SCNC: 137 MMOL/L — SIGNIFICANT CHANGE UP (ref 135–145)
VARIANT LYMPHS # BLD: 6 % — SIGNIFICANT CHANGE UP (ref 0–6)
VARIANT LYMPHS NFR BLD MANUAL: 6 % — SIGNIFICANT CHANGE UP (ref 0–6)
WBC # BLD: 13.27 K/UL — HIGH (ref 3.8–10.5)
WBC # FLD AUTO: 13.27 K/UL — HIGH (ref 3.8–10.5)

## 2025-03-17 PROCEDURE — 99233 SBSQ HOSP IP/OBS HIGH 50: CPT | Mod: GC

## 2025-03-17 RX ORDER — ENOXAPARIN SODIUM 100 MG/ML
40 INJECTION SUBCUTANEOUS EVERY 24 HOURS
Refills: 0 | Status: DISCONTINUED | OUTPATIENT
Start: 2025-03-17 | End: 2025-05-21

## 2025-03-17 RX ADMIN — AMLODIPINE BESYLATE 10 MILLIGRAM(S): 10 TABLET ORAL at 04:18

## 2025-03-17 RX ADMIN — DEXTROMETHORPHAN HBR, GUAIFENESIN 600 MILLIGRAM(S): 200 LIQUID ORAL at 05:14

## 2025-03-17 RX ADMIN — ENOXAPARIN SODIUM 40 MILLIGRAM(S): 100 INJECTION SUBCUTANEOUS at 12:36

## 2025-03-17 RX ADMIN — INSULIN LISPRO 8: 100 INJECTION, SOLUTION INTRAVENOUS; SUBCUTANEOUS at 12:37

## 2025-03-17 RX ADMIN — SERTRALINE 25 MILLIGRAM(S): 100 TABLET, FILM COATED ORAL at 12:36

## 2025-03-17 RX ADMIN — Medication 1 DOSE(S): at 22:02

## 2025-03-17 RX ADMIN — Medication 10 MILLIGRAM(S): at 22:20

## 2025-03-17 RX ADMIN — DEXTROMETHORPHAN HBR, GUAIFENESIN 600 MILLIGRAM(S): 200 LIQUID ORAL at 17:45

## 2025-03-17 RX ADMIN — Medication 2 TABLET(S): at 22:03

## 2025-03-17 RX ADMIN — INSULIN LISPRO 2: 100 INJECTION, SOLUTION INTRAVENOUS; SUBCUTANEOUS at 08:29

## 2025-03-17 RX ADMIN — Medication 1 APPLICATION(S): at 05:23

## 2025-03-17 RX ADMIN — Medication 10 MILLIGRAM(S): at 13:24

## 2025-03-17 RX ADMIN — Medication 25 GRAM(S): at 05:13

## 2025-03-17 RX ADMIN — PREDNISONE 40 MILLIGRAM(S): 20 TABLET ORAL at 05:17

## 2025-03-17 RX ADMIN — LISINOPRIL 40 MILLIGRAM(S): 5 TABLET ORAL at 05:14

## 2025-03-17 RX ADMIN — Medication 1 DOSE(S): at 08:30

## 2025-03-17 NOTE — PROGRESS NOTE ADULT - SUBJECTIVE AND OBJECTIVE BOX
[facility]  74[facility]  MRN: [id number]    Patient is a 74y old  Female who presents with a chief complaint of  -73 y/o F with Hx of asthma & MDD. Presenting with wheezing x 3d & s/p unwitnessed fall.  Admitted with acute hypoxic respiratory failure & hypercapnic respiratory acidosis.  Intubated in ED for airway protection.       (10 Mar 2025 12:59)      Interval/Overnight Events: no events ON.     Subjective: Pt seen and examined at bedside.     MEDICATIONS  (STANDING):  amLODIPine   Tablet 10 milliGRAM(s) Oral daily  chlorhexidine 2% Cloths 1 Application(s) Topical <User Schedule>  dextrose 5%. 1000 milliLiter(s) (50 mL/Hr) IV Continuous <Continuous>  dextrose 5%. 1000 milliLiter(s) (100 mL/Hr) IV Continuous <Continuous>  dextrose 50% Injectable 25 Gram(s) IV Push once  dextrose 50% Injectable 12.5 Gram(s) IV Push once  dextrose 50% Injectable 25 Gram(s) IV Push once  dextrose Oral Gel 15 Gram(s) Oral once  enoxaparin Injectable 40 milliGRAM(s) SubCutaneous every 24 hours  fluticasone propionate/ salmeterol 250-50 MICROgram(s) Diskus 1 Dose(s) Inhalation two times a day  glucagon  Injectable 1 milliGRAM(s) IntraMuscular once  guaiFENesin  milliGRAM(s) Oral every 12 hours  hydrALAZINE 10 milliGRAM(s) Oral every 8 hours  insulin lispro (ADMELOG) corrective regimen sliding scale   SubCutaneous three times a day before meals  insulin lispro (ADMELOG) corrective regimen sliding scale   SubCutaneous at bedtime  lisinopril 40 milliGRAM(s) Oral daily  piperacillin/tazobactam IVPB.. 3.375 Gram(s) IV Intermittent every 8 hours  polyethylene glycol 3350 17 Gram(s) Oral daily  senna 2 Tablet(s) Oral at bedtime  sertraline 25 milliGRAM(s) Oral daily    MEDICATIONS  (PRN):      Objective:    Vitals: Vital Signs Last 24 Hrs  T(C): 36.8 (03-17-25 @ 09:00), Max: 37.1 (03-16-25 @ 12:00)  T(F): 98.2 (03-17-25 @ 09:00), Max: 98.8 (03-16-25 @ 12:00)  HR: 94 (03-17-25 @ 09:00) (84 - 98)  BP: 175/88 (03-17-25 @ 09:00) (152/74 - 187/100)  BP(mean): 106 (03-16-25 @ 13:00) (97 - 106)  RR: 18 (03-17-25 @ 09:00) (15 - 19)  SpO2: 99% (03-17-25 @ 09:00) (95% - 100%)                I&O's Summary    16 Mar 2025 07:01  -  17 Mar 2025 07:00  --------------------------------------------------------  IN: 100 mL / OUT: 400 mL / NET: -300 mL        PHYSICAL EXAM:  GENERAL: NAD  HEAD:  Atraumatic, Normocephalic  EYES: EOMI, conjunctiva and sclera clear  CHEST/LUNG: Clear to auscultation bilaterally; No rales, rhonchi, wheezing, or rubs  HEART: Regular rate and rhythm; No murmurs, rubs, or gallops  ABDOMEN: Soft, Nontender, Nondistended;   SKIN: No rashes or lesions  NERVOUS SYSTEM:  Alert & Oriented X3, no focal deficits    LABS:                        13.4   13.27 )-----------( 182      ( 17 Mar 2025 05:15 )             40.4                         12.1   10.76 )-----------( 158      ( 16 Mar 2025 01:00 )             36.6                         12.4   9.64  )-----------( 150      ( 15 Mar 2025 00:36 )             37.6     03-17    137  |  93[L]  |  14  ----------------------------<  166[H]  3.7   |  30  |  0.52  03-16    133[L]  |  94[L]  |  18  ----------------------------<  264[H]  4.7   |  29  |  0.60  03-15    135  |  96[L]  |  17  ----------------------------<  236[H]  4.0   |  27  |  0.59    Ca    9.5      17 Mar 2025 05:15  Ca    9.0      16 Mar 2025 01:00  Ca    9.0      15 Mar 2025 00:36  Phos  2.1     03-17  Mg     1.70     03-17    TPro  6.7  /  Alb  3.5  /  TBili  0.4  /  DBili  x   /  AST  19  /  ALT  30  /  AlkPhos  83  03-17  TPro  6.0  /  Alb  3.3  /  TBili  0.4  /  DBili  x   /  AST  19  /  ALT  27  /  AlkPhos  79  03-16  TPro  6.6  /  Alb  3.6  /  TBili  0.6  /  DBili  x   /  AST  22  /  ALT  30  /  AlkPhos  84  03-15    CAPILLARY BLOOD GLUCOSE      POCT Blood Glucose.: 195 mg/dL (17 Mar 2025 08:15)  POCT Blood Glucose.: 160 mg/dL (16 Mar 2025 21:53)  POCT Blood Glucose.: 243 mg/dL (16 Mar 2025 17:40)  POCT Blood Glucose.: 159 mg/dL (16 Mar 2025 13:54)  POCT Blood Glucose.: 218 mg/dL (16 Mar 2025 13:14)  POCT Blood Glucose.: 170 mg/dL (16 Mar 2025 11:50)      ABG - ( 16 Mar 2025 01:00 )  pH, Arterial: 7.43  pH, Blood: x     /  pCO2: 48    /  pO2: 188   / HCO3: 32    / Base Excess: 6.5   /  SaO2: 98.8              Urinalysis Basic - ( 17 Mar 2025 05:15 )    Color: x / Appearance: x / SG: x / pH: x  Gluc: 166 mg/dL / Ketone: x  / Bili: x / Urobili: x   Blood: x / Protein: x / Nitrite: x   Leuk Esterase: x / RBC: x / WBC x   Sq Epi: x / Non Sq Epi: x / Bacteria: x          RADIOLOGY & ADDITIONAL TESTS:    Imaging Personally Reviewed:  [x ] YES  [ ] NO    Consultants involved in case:   Consultant(s) Notes Reviewed:  [ x] YES  [ ] NO:   Care Discussed with Consultants/Other Providers [x ] YES  [ ] NO           [facility]  74[facility]  MRN: [id number]    Patient is a 74y old  Female who presents with a chief complaint of  -75 y/o F with Hx of asthma & MDD. Presenting with wheezing x 3d & s/p unwitnessed fall.  Admitted with acute hypoxic respiratory failure & hypercapnic respiratory acidosis.  Intubated in ED for airway protection.       (10 Mar 2025 12:59)      Interval/Overnight Events: no events ON.     Subjective: Pt seen and examined at bedside. Patient feels that her breathing continues to improve overall. Still has some shortness of breath. No chest pain. No abdominal plan.    MEDICATIONS  (STANDING):  amLODIPine   Tablet 10 milliGRAM(s) Oral daily  chlorhexidine 2% Cloths 1 Application(s) Topical <User Schedule>  dextrose 5%. 1000 milliLiter(s) (50 mL/Hr) IV Continuous <Continuous>  dextrose 5%. 1000 milliLiter(s) (100 mL/Hr) IV Continuous <Continuous>  dextrose 50% Injectable 25 Gram(s) IV Push once  dextrose 50% Injectable 12.5 Gram(s) IV Push once  dextrose 50% Injectable 25 Gram(s) IV Push once  dextrose Oral Gel 15 Gram(s) Oral once  enoxaparin Injectable 40 milliGRAM(s) SubCutaneous every 24 hours  fluticasone propionate/ salmeterol 250-50 MICROgram(s) Diskus 1 Dose(s) Inhalation two times a day  glucagon  Injectable 1 milliGRAM(s) IntraMuscular once  guaiFENesin  milliGRAM(s) Oral every 12 hours  hydrALAZINE 10 milliGRAM(s) Oral every 8 hours  insulin lispro (ADMELOG) corrective regimen sliding scale   SubCutaneous three times a day before meals  insulin lispro (ADMELOG) corrective regimen sliding scale   SubCutaneous at bedtime  lisinopril 40 milliGRAM(s) Oral daily  piperacillin/tazobactam IVPB.. 3.375 Gram(s) IV Intermittent every 8 hours  polyethylene glycol 3350 17 Gram(s) Oral daily  senna 2 Tablet(s) Oral at bedtime  sertraline 25 milliGRAM(s) Oral daily    MEDICATIONS  (PRN):      Objective:    Vitals: Vital Signs Last 24 Hrs  T(C): 36.8 (03-17-25 @ 09:00), Max: 37.1 (03-16-25 @ 12:00)  T(F): 98.2 (03-17-25 @ 09:00), Max: 98.8 (03-16-25 @ 12:00)  HR: 94 (03-17-25 @ 09:00) (84 - 98)  BP: 175/88 (03-17-25 @ 09:00) (152/74 - 187/100)  BP(mean): 106 (03-16-25 @ 13:00) (97 - 106)  RR: 18 (03-17-25 @ 09:00) (15 - 19)  SpO2: 99% (03-17-25 @ 09:00) (95% - 100%)                I&O's Summary    16 Mar 2025 07:01  -  17 Mar 2025 07:00  --------------------------------------------------------  IN: 100 mL / OUT: 400 mL / NET: -300 mL        PHYSICAL EXAM:  GENERAL: NAD  HEAD:  Atraumatic, Normocephalic  EYES: EOMI, conjunctiva and sclera clear  CHEST/LUNG: Decreased BS diffusely, mild end expiratory wheeze  HEART: Regular rate and rhythm; No murmurs, rubs, or gallops  ABDOMEN: Soft, Nontender, Nondistended;   SKIN: No rashes or lesions  NERVOUS SYSTEM:  Alert & Oriented X3, no focal deficits    LABS:                        13.4   13.27 )-----------( 182      ( 17 Mar 2025 05:15 )             40.4                         12.1   10.76 )-----------( 158      ( 16 Mar 2025 01:00 )             36.6                         12.4   9.64  )-----------( 150      ( 15 Mar 2025 00:36 )             37.6     03-17    137  |  93[L]  |  14  ----------------------------<  166[H]  3.7   |  30  |  0.52  03-16    133[L]  |  94[L]  |  18  ----------------------------<  264[H]  4.7   |  29  |  0.60  03-15    135  |  96[L]  |  17  ----------------------------<  236[H]  4.0   |  27  |  0.59    Ca    9.5      17 Mar 2025 05:15  Ca    9.0      16 Mar 2025 01:00  Ca    9.0      15 Mar 2025 00:36  Phos  2.1     03-17  Mg     1.70     03-17    TPro  6.7  /  Alb  3.5  /  TBili  0.4  /  DBili  x   /  AST  19  /  ALT  30  /  AlkPhos  83  03-17  TPro  6.0  /  Alb  3.3  /  TBili  0.4  /  DBili  x   /  AST  19  /  ALT  27  /  AlkPhos  79  03-16  TPro  6.6  /  Alb  3.6  /  TBili  0.6  /  DBili  x   /  AST  22  /  ALT  30  /  AlkPhos  84  03-15    CAPILLARY BLOOD GLUCOSE      POCT Blood Glucose.: 195 mg/dL (17 Mar 2025 08:15)  POCT Blood Glucose.: 160 mg/dL (16 Mar 2025 21:53)  POCT Blood Glucose.: 243 mg/dL (16 Mar 2025 17:40)  POCT Blood Glucose.: 159 mg/dL (16 Mar 2025 13:54)  POCT Blood Glucose.: 218 mg/dL (16 Mar 2025 13:14)  POCT Blood Glucose.: 170 mg/dL (16 Mar 2025 11:50)      ABG - ( 16 Mar 2025 01:00 )  pH, Arterial: 7.43  pH, Blood: x     /  pCO2: 48    /  pO2: 188   / HCO3: 32    / Base Excess: 6.5   /  SaO2: 98.8              Urinalysis Basic - ( 17 Mar 2025 05:15 )    Color: x / Appearance: x / SG: x / pH: x  Gluc: 166 mg/dL / Ketone: x  / Bili: x / Urobili: x   Blood: x / Protein: x / Nitrite: x   Leuk Esterase: x / RBC: x / WBC x   Sq Epi: x / Non Sq Epi: x / Bacteria: x          RADIOLOGY & ADDITIONAL TESTS:    Imaging Personally Reviewed:  [x ] YES  [ ] NO    Consultants involved in case:   Consultant(s) Notes Reviewed:  [ x] YES  [ ] NO:   Care Discussed with Consultants/Other Providers [x ] YES  [ ] NO

## 2025-03-17 NOTE — PROGRESS NOTE ADULT - PROBLEM SELECTOR PLAN 4
# Depression  Hx tardive dyskinesia symptoms on prior medications.    Plan:  - continue home Sertraline  - hold home Olanzapine, consider resuming Hx tardive dyskinesia symptoms on prior medications.    Plan:  - continue home Sertraline  - hold home Olanzapine, consider resuming

## 2025-03-17 NOTE — PROGRESS NOTE ADULT - PROBLEM SELECTOR PLAN 9
- DVT: Enoxaparin 40mg qD  - Diet: soft and bite sized, escalate as tolerated  - PT consulted, recs appreciated  - Dispo: pending clinical improvement - DVT: Enoxaparin 40mg qD  - Diet: soft and bite sized, escalate as tolerated  - PT consulted, recs appreciated  - Dispo: pending clinical improvement and weaning off oxygen

## 2025-03-17 NOTE — PROGRESS NOTE ADULT - SUBJECTIVE AND OBJECTIVE BOX
Interval Events:      REVIEW OF SYSTEMS:  Negative except as documented above.      OBJECTIVE:  ICU Vital Signs Last 24 Hrs  T(C): 36.6 (17 Mar 2025 13:24), Max: 37 (16 Mar 2025 21:20)  T(F): 97.9 (17 Mar 2025 13:24), Max: 98.6 (16 Mar 2025 21:20)  HR: 102 (17 Mar 2025 13:24) (90 - 102)  BP: 178/90 (17 Mar 2025 13:24) (175/88 - 187/100)  BP(mean): --  ABP: --  ABP(mean): --  RR: 18 (17 Mar 2025 13:24) (18 - 18)  SpO2: 92% (17 Mar 2025 13:24) (92% - 100%)    O2 Parameters below as of 17 Mar 2025 13:24  Patient On (Oxygen Delivery Method): nasal cannula  O2 Flow (L/min): 3            03-16 @ 07:01  -  03-17 @ 07:00  --------------------------------------------------------  IN: 100 mL / OUT: 400 mL / NET: -300 mL      CAPILLARY BLOOD GLUCOSE      POCT Blood Glucose.: 330 mg/dL (17 Mar 2025 12:02)      PHYSICAL EXAM:  General: NAD  HEENT:  EOMI, sclera anicteric, moist mucus membranes  Neck: supple  Cardiovascular: RRR  Respiratory: CTAB, no wheezes, crackles, or rhonci  Abdomen: soft, nontender  Extremities: warm and well perfused, no edema, no clubbing  Skin: no rashes  Neurological: no focal deficits    HOSPITAL MEDICATIONS:  MEDICATIONS  (STANDING):  amLODIPine   Tablet 10 milliGRAM(s) Oral daily  chlorhexidine 2% Cloths 1 Application(s) Topical <User Schedule>  dextrose 5%. 1000 milliLiter(s) (50 mL/Hr) IV Continuous <Continuous>  dextrose 5%. 1000 milliLiter(s) (100 mL/Hr) IV Continuous <Continuous>  dextrose 50% Injectable 25 Gram(s) IV Push once  dextrose 50% Injectable 12.5 Gram(s) IV Push once  dextrose 50% Injectable 25 Gram(s) IV Push once  dextrose Oral Gel 15 Gram(s) Oral once  enoxaparin Injectable 40 milliGRAM(s) SubCutaneous every 24 hours  fluticasone propionate/ salmeterol 250-50 MICROgram(s) Diskus 1 Dose(s) Inhalation two times a day  glucagon  Injectable 1 milliGRAM(s) IntraMuscular once  guaiFENesin  milliGRAM(s) Oral every 12 hours  hydrALAZINE 10 milliGRAM(s) Oral every 8 hours  insulin lispro (ADMELOG) corrective regimen sliding scale   SubCutaneous three times a day before meals  insulin lispro (ADMELOG) corrective regimen sliding scale   SubCutaneous at bedtime  lisinopril 40 milliGRAM(s) Oral daily  polyethylene glycol 3350 17 Gram(s) Oral daily  senna 2 Tablet(s) Oral at bedtime  sertraline 25 milliGRAM(s) Oral daily    MEDICATIONS  (PRN):      LABS:                        13.4   13.27 )-----------( 182      ( 17 Mar 2025 05:15 )             40.4     Hgb Trend: 13.4<--, 12.1<--, 12.4<--, 12.5<--, 13.6<--  03-17    137  |  93[L]  |  14  ----------------------------<  166[H]  3.7   |  30  |  0.52    Ca    9.5      17 Mar 2025 05:15  Phos  2.1     03-17  Mg     1.70     03-17    TPro  6.7  /  Alb  3.5  /  TBili  0.4  /  DBili  x   /  AST  19  /  ALT  30  /  AlkPhos  83  03-17    Creatinine Trend: 0.52<--, 0.60<--, 0.59<--, 0.52<--, 0.60<--, 0.61<--    Urinalysis Basic - ( 17 Mar 2025 05:15 )    Color: x / Appearance: x / SG: x / pH: x  Gluc: 166 mg/dL / Ketone: x  / Bili: x / Urobili: x   Blood: x / Protein: x / Nitrite: x   Leuk Esterase: x / RBC: x / WBC x   Sq Epi: x / Non Sq Epi: x / Bacteria: x      Arterial Blood Gas:  03-16 @ 01:00  7.43/48/188/32/98.8/6.5  ABG lactate: --    Venous Blood Gas:  03-17 @ 05:15  7.44/57/49/39/83.1  VBG Lactate: 1.9      MICROBIOLOGY:       RADIOLOGY:  [x] Reviewed and interpreted by me   Interval Events:  Reports breathing today is better than yesterday    REVIEW OF SYSTEMS:  Negative except as documented above.      OBJECTIVE:  ICU Vital Signs Last 24 Hrs  T(C): 36.6 (17 Mar 2025 13:24), Max: 37 (16 Mar 2025 21:20)  T(F): 97.9 (17 Mar 2025 13:24), Max: 98.6 (16 Mar 2025 21:20)  HR: 102 (17 Mar 2025 13:24) (90 - 102)  BP: 178/90 (17 Mar 2025 13:24) (175/88 - 187/100)  BP(mean): --  ABP: --  ABP(mean): --  RR: 18 (17 Mar 2025 13:24) (18 - 18)  SpO2: 92% (17 Mar 2025 13:24) (92% - 100%)    O2 Parameters below as of 17 Mar 2025 13:24  Patient On (Oxygen Delivery Method): nasal cannula  O2 Flow (L/min): 3            03-16 @ 07:01  -  03-17 @ 07:00  --------------------------------------------------------  IN: 100 mL / OUT: 400 mL / NET: -300 mL      CAPILLARY BLOOD GLUCOSE      POCT Blood Glucose.: 330 mg/dL (17 Mar 2025 12:02)      PHYSICAL EXAM:  General: NAD, mildly diaphoretic  HEENT:  EOMI, sclera anicteric, moist mucus membranes  Neck: supple  Cardiovascular: RRR  Respiratory: CTAB, no wheezes, crackles, or rhonci  Abdomen: soft, nontender  Extremities: warm and well perfused, no edema, no clubbing  Skin: no rashes  Neurological: no focal deficits    HOSPITAL MEDICATIONS:  MEDICATIONS  (STANDING):  amLODIPine   Tablet 10 milliGRAM(s) Oral daily  chlorhexidine 2% Cloths 1 Application(s) Topical <User Schedule>  dextrose 5%. 1000 milliLiter(s) (50 mL/Hr) IV Continuous <Continuous>  dextrose 5%. 1000 milliLiter(s) (100 mL/Hr) IV Continuous <Continuous>  dextrose 50% Injectable 25 Gram(s) IV Push once  dextrose 50% Injectable 12.5 Gram(s) IV Push once  dextrose 50% Injectable 25 Gram(s) IV Push once  dextrose Oral Gel 15 Gram(s) Oral once  enoxaparin Injectable 40 milliGRAM(s) SubCutaneous every 24 hours  fluticasone propionate/ salmeterol 250-50 MICROgram(s) Diskus 1 Dose(s) Inhalation two times a day  glucagon  Injectable 1 milliGRAM(s) IntraMuscular once  guaiFENesin  milliGRAM(s) Oral every 12 hours  hydrALAZINE 10 milliGRAM(s) Oral every 8 hours  insulin lispro (ADMELOG) corrective regimen sliding scale   SubCutaneous three times a day before meals  insulin lispro (ADMELOG) corrective regimen sliding scale   SubCutaneous at bedtime  lisinopril 40 milliGRAM(s) Oral daily  polyethylene glycol 3350 17 Gram(s) Oral daily  senna 2 Tablet(s) Oral at bedtime  sertraline 25 milliGRAM(s) Oral daily    MEDICATIONS  (PRN):      LABS:                        13.4   13.27 )-----------( 182      ( 17 Mar 2025 05:15 )             40.4     Hgb Trend: 13.4<--, 12.1<--, 12.4<--, 12.5<--, 13.6<--  03-17    137  |  93[L]  |  14  ----------------------------<  166[H]  3.7   |  30  |  0.52    Ca    9.5      17 Mar 2025 05:15  Phos  2.1     03-17  Mg     1.70     03-17    TPro  6.7  /  Alb  3.5  /  TBili  0.4  /  DBili  x   /  AST  19  /  ALT  30  /  AlkPhos  83  03-17    Creatinine Trend: 0.52<--, 0.60<--, 0.59<--, 0.52<--, 0.60<--, 0.61<--    Urinalysis Basic - ( 17 Mar 2025 05:15 )    Color: x / Appearance: x / SG: x / pH: x  Gluc: 166 mg/dL / Ketone: x  / Bili: x / Urobili: x   Blood: x / Protein: x / Nitrite: x   Leuk Esterase: x / RBC: x / WBC x   Sq Epi: x / Non Sq Epi: x / Bacteria: x      Arterial Blood Gas:  03-16 @ 01:00  7.43/48/188/32/98.8/6.5  ABG lactate: --    Venous Blood Gas:  03-17 @ 05:15  7.44/57/49/39/83.1  VBG Lactate: 1.9      MICROBIOLOGY:       RADIOLOGY:  [x] Reviewed and interpreted by me

## 2025-03-17 NOTE — PROGRESS NOTE ADULT - PROBLEM SELECTOR PLAN 5
# Toxic Metabolic encephalopathy, resolved  # Seizure-like activity, resolved  Encephalopathy i/s/o hypercapnic respiratory failure and sepsis.  - CTH: no acute changes, CT spine: no fractures  - 3/8 ON: 6 minutes of seizure-like activity with myoclonic upper jerks -> broke with ativan  - EEG 3/10 showing no epileptiform activity; severe degree of diffuse or multifocal cerebral dysfunction c/w comatose    Plan:  - CTM Encephalopathy i/s/o hypercapnic respiratory failure and sepsis and resolved seizure-like activity.  - CTH: no acute changes, CT spine: no fractures  - 3/8 ON: 6 minutes of seizure-like activity with myoclonic upper jerks -> broke with ativan  - EEG 3/10 showing no epileptiform activity; severe degree of diffuse or multifocal cerebral dysfunction c/w comatose    Plan:  - CTM Encephalopathy i/s/o hypercapnic respiratory failure and sepsis and resolved seizure-like activity.  - CTH: no acute changes, CT spine: no fractures  - 3/8 ON: 6 minutes of seizure-like activity with myoclonic upper jerks -> broke with ativan  - EEG 3/10 showing no epileptiform activity; severe degree of diffuse or multifocal cerebral dysfunction    Plan:  - CTM

## 2025-03-17 NOTE — PROVIDER CONTACT NOTE (OTHER) - ASSESSMENT
/100 all other vitals stable. no s/s of acute distress noted at this time. Morning lisinopril given as ordered

## 2025-03-17 NOTE — PROGRESS NOTE ADULT - PROBLEM SELECTOR PLAN 3
# Hypertension  - Off Nicardipine ggt since 3/15    Plan:  - c/w amlodipine 10mg qD  - Increased Lisinopril to 40mg qD  - hold home HCTZ, avoid beta-blockers given asthma # Hypertension  - Off Nicardipine ggt since 3/15    Plan:  - c/w amlodipine 10mg qD  - Increased Lisinopril to 40mg qD  - add hydralazine 10 mg TID   - hold home HCTZ, avoid beta-blockers given asthma

## 2025-03-17 NOTE — PROGRESS NOTE ADULT - PROBLEM SELECTOR PLAN 6
# Hyponatremia, resolved  Likely i/s/o hypovolemia and component of underlying SIADH.    Plan:  - CTM Na on daily labs  - consider fluid restriction vs salt tabs if worsening Na again Likely i/s/o hypovolemia and component of underlying SIADH.  - resolved    Plan:  - CTM Na on daily labs  - consider fluid restriction vs salt tabs if worsening Na again

## 2025-03-17 NOTE — PROGRESS NOTE ADULT - ASSESSMENT
74F hx asthma and depressed presented with AHHRF c/b AMS requiring MICU admission for intubation for airway protection i/s/o asthma exacerbation. s/p extubation with labile BPs requiring nicardipine gtt. Now improved BPs and downgraded to floors.

## 2025-03-17 NOTE — PROGRESS NOTE ADULT - PROBLEM SELECTOR PLAN 7
# Pre-diabetes  - A1c 5.7%    Plan:  - moderate ISS for steroids and hyperglycemia - A1c 5.7%    Plan:  - moderate ISS for steroids and hyperglycemia

## 2025-03-17 NOTE — PROVIDER CONTACT NOTE (OTHER) - ACTION/TREATMENT ORDERED:
MD made aware. No new interventions at this time MD made aware. Administer morning amlodipine early.

## 2025-03-17 NOTE — PROGRESS NOTE ADULT - ASSESSMENT
74F hx asthma and depression presented with AHHRF c/b AMS requiring intubation due to asthma exacerbation now s/p extubation with hypertension s/p nicardipine drip.    Has had 3 asthma attacks in the past year. Patient reports breathing is better today than yesterday. No wheezing on exam and saturating 95% on 3L O2.    s/p azithro 3/12 and zosyn - 3/17    #Asthma  #HTN    Recommendation:  - continue advair, duoneb, budesonide nebs q12h  - continue prednisone 40mg daily starting tomorrow  - airway clearance  - wean o2 with goal spo2 >92%  - out of bed to chair as tolerate, PT    Recommendations are not final pending attending attestation.

## 2025-03-17 NOTE — PROVIDER CONTACT NOTE (OTHER) - ACTION/TREATMENT ORDERED:
Reason for Call/Chief Complaint: Abscess  Onset:  1 week  Nursing Assessment/Associated Symptoms: patient stated that has had on and off abscess in her mouth with her teeth for the past 1 year. Last 1 week has had an abscess in her top teeth.  Dentist has t MD made aware. No new interventions at this time

## 2025-03-17 NOTE — PROGRESS NOTE ADULT - PROBLEM SELECTOR PLAN 1
#Asthma Exacerbation  # Chronic Hypercapnia i/s/o Asthma  # Acute on Chronic HHRF, resolving    Likely 2/2 asthma exacerbation, with wheezing. Hx of 3 asthma attacks last year, no hx of intubation, no clear asthma triggers  - s/p intubation 03/08 and extubation 03/12  - s/p MICU stay, downgraded on 03/16    Plan:  - continue Advair, DuoNeb q4h, budesonide q12h   - s/p solu-medrol 20 mg IV (03/16 - 03/17), c/w Prednisone 40 mg daily  - c/w chest pt  - wean o2 as able Likely 2/2 asthma exacerbation, with wheezing. Hx of 3 asthma attacks last year, no hx of intubation, no clear asthma triggers. Likely has chronic hypercapnia in the setting of asthma.  - s/p intubation 03/08 and extubation 03/12  - s/p MICU stay, downgraded on 03/16    Plan:  - continue Advair, DuoNeb q4h, budesonide q12h   - s/p solu-medrol 20 mg IV (03/16 - 03/17), c/w Prednisone 40 mg daily  - c/w chest pt  - goal spO2: 92%  - wean o2 as able

## 2025-03-17 NOTE — PROGRESS NOTE ADULT - ATTENDING COMMENTS
73 yo F w/ PMH Asthma (3 excerbations/yr and never intubated), depression p/w dyspnea and wheezing with BLE edema after returning from [facility] 6 days prior to admission. CTA neg for PE or PNA. Intubated for airway protection and s/p extubation 3/12. Started on treatment for PNA. Noted rhythmic movement 6 min resolved with Ativan. Febrile post extubation. Blood cx neg       # hypertension- noted elevated BP; c/ norvasc; increase lisinopril to 40; add hydralazine   # Asthma exacerbation - c/w advair and duonebs; prednisone taper; s/p azithro; noted increasing PCo2 on VBG; repeat VBG; wean off oxygen goal spo2 92%  # PNA- suspected; s/p antibiotics  # pre DM w/ steroid induced hyperglycemia- A1c 5.7%; moderate ISS  # LE edema- 3/9 TTE EF 78% no WMA mild pHTN; 3/10 US LE neg for DVT  # Toxic metabolic encephalopathy- poss in setting hyponatremia/hypercapenia ; EEG neg for seizure

## 2025-03-17 NOTE — PROGRESS NOTE ADULT - PROBLEM SELECTOR PLAN 2
# PNA  No PNA seen on imaging, though high clinical suspicion. Leukocytosis i/s/o steroids and possible PNA, persistent fevers starting 3/12 now progressed to low-grade temps.  - Full RVP neg, legionella/strep negative, LE dopplers w/o DVT  - Blood cultures: NGTD  - UA 3/12 neg  - s/p azithro (5-days, finished 3/12)    Plan:  - c/w empiric Zosyn for suspected PNA, 7 day course (03/13 - 03/19)  - consider transition to augmentin if needed No PNA seen on imaging, though high clinical suspicion. Leukocytosis i/s/o steroids and possible PNA, persistent fevers starting 3/12 now progressed to low-grade temps.  - Full RVP neg, legionella/strep negative, LE dopplers w/o DVT  - Blood cultures: NGTD  - UA 3/12 neg  - s/p azithro (5-days, finished 3/12)    Plan:  - c/w empiric Zosyn for suspected PNA, 5 day course (03/13 - 03/17), finished No PNA seen on imaging, though high clinical suspicion. Leukocytosis i/s/o steroids and possible PNA, persistent fevers starting 3/12 now progressed to low-grade temps.  - Full RVP neg, legionella/strep negative, LE dopplers w/o DVT  - Blood cultures: NGTD  - UA 3/12 neg  - s/p azithro (5-days, finished 3/12)    Plan:  - s/p empiric Zosyn for suspected PNA, 5 day course (03/13 - 03/17), finished

## 2025-03-17 NOTE — PROGRESS NOTE ADULT - ATTENDING COMMENTS
Agree with above  74F PMH asthma (three exacerbations in past year) p/w dyspnea and AMS requiring intubation, now extubated. Stay c/b hypertension requiring nicardipine gtt.  - Currently no wheezing, spO2 93% on 3LNC  - Mild dyspnea on exam, but awake alert "feels hot"  - Can taper steroids to prednisone 40mg PO daily  - c/w Advair / DuoNEBs  - c/w afterload reduction

## 2025-03-18 LAB
ADD ON TEST-SPECIMEN IN LAB: SIGNIFICANT CHANGE UP
ALBUMIN SERPL ELPH-MCNC: 3.4 G/DL — SIGNIFICANT CHANGE UP (ref 3.3–5)
ALP SERPL-CCNC: 83 U/L — SIGNIFICANT CHANGE UP (ref 40–120)
ALT FLD-CCNC: 27 U/L — SIGNIFICANT CHANGE UP (ref 4–33)
ANION GAP SERPL CALC-SCNC: 15 MMOL/L — HIGH (ref 7–14)
AST SERPL-CCNC: 19 U/L — SIGNIFICANT CHANGE UP (ref 4–32)
BASOPHILS # BLD AUTO: 0.03 K/UL — SIGNIFICANT CHANGE UP (ref 0–0.2)
BASOPHILS NFR BLD AUTO: 0.2 % — SIGNIFICANT CHANGE UP (ref 0–2)
BILIRUB SERPL-MCNC: 0.4 MG/DL — SIGNIFICANT CHANGE UP (ref 0.2–1.2)
BUN SERPL-MCNC: 16 MG/DL — SIGNIFICANT CHANGE UP (ref 7–23)
CALCIUM SERPL-MCNC: 9.8 MG/DL — SIGNIFICANT CHANGE UP (ref 8.4–10.5)
CHLORIDE SERPL-SCNC: 92 MMOL/L — LOW (ref 98–107)
CO2 SERPL-SCNC: 29 MMOL/L — SIGNIFICANT CHANGE UP (ref 22–31)
CREAT SERPL-MCNC: 0.55 MG/DL — SIGNIFICANT CHANGE UP (ref 0.5–1.3)
CULTURE RESULTS: SIGNIFICANT CHANGE UP
CULTURE RESULTS: SIGNIFICANT CHANGE UP
EGFR: 96 ML/MIN/1.73M2 — SIGNIFICANT CHANGE UP
EGFR: 96 ML/MIN/1.73M2 — SIGNIFICANT CHANGE UP
EOSINOPHIL # BLD AUTO: 0.13 K/UL — SIGNIFICANT CHANGE UP (ref 0–0.5)
EOSINOPHIL NFR BLD AUTO: 0.9 % — SIGNIFICANT CHANGE UP (ref 0–6)
GAS PNL BLDV: SIGNIFICANT CHANGE UP
GLUCOSE SERPL-MCNC: 190 MG/DL — HIGH (ref 70–99)
HCT VFR BLD CALC: 42.4 % — SIGNIFICANT CHANGE UP (ref 34.5–45)
HGB BLD-MCNC: 13.9 G/DL — SIGNIFICANT CHANGE UP (ref 11.5–15.5)
IANC: 8.45 K/UL — HIGH (ref 1.8–7.4)
IMM GRANULOCYTES NFR BLD AUTO: 0.4 % — SIGNIFICANT CHANGE UP (ref 0–0.9)
LYMPHOCYTES # BLD AUTO: 32.7 % — SIGNIFICANT CHANGE UP (ref 13–44)
LYMPHOCYTES # BLD AUTO: 4.61 K/UL — HIGH (ref 1–3.3)
MAGNESIUM SERPL-MCNC: 1.8 MG/DL — SIGNIFICANT CHANGE UP (ref 1.6–2.6)
MCHC RBC-ENTMCNC: 32 PG — SIGNIFICANT CHANGE UP (ref 27–34)
MCHC RBC-ENTMCNC: 32.8 G/DL — SIGNIFICANT CHANGE UP (ref 32–36)
MCV RBC AUTO: 97.7 FL — SIGNIFICANT CHANGE UP (ref 80–100)
MONOCYTES # BLD AUTO: 0.8 K/UL — SIGNIFICANT CHANGE UP (ref 0–0.9)
MONOCYTES NFR BLD AUTO: 5.7 % — SIGNIFICANT CHANGE UP (ref 2–14)
NEUTROPHILS # BLD AUTO: 8.45 K/UL — HIGH (ref 1.8–7.4)
NEUTROPHILS NFR BLD AUTO: 60.1 % — SIGNIFICANT CHANGE UP (ref 43–77)
NRBC # BLD AUTO: 0 K/UL — SIGNIFICANT CHANGE UP (ref 0–0)
NRBC # FLD: 0 K/UL — SIGNIFICANT CHANGE UP (ref 0–0)
NRBC BLD AUTO-RTO: 0 /100 WBCS — SIGNIFICANT CHANGE UP (ref 0–0)
PHOSPHATE SERPL-MCNC: 2.9 MG/DL — SIGNIFICANT CHANGE UP (ref 2.5–4.5)
PLATELET # BLD AUTO: 176 K/UL — SIGNIFICANT CHANGE UP (ref 150–400)
POTASSIUM SERPL-MCNC: 3.7 MMOL/L — SIGNIFICANT CHANGE UP (ref 3.5–5.3)
POTASSIUM SERPL-SCNC: 3.7 MMOL/L — SIGNIFICANT CHANGE UP (ref 3.5–5.3)
PROT SERPL-MCNC: 6.9 G/DL — SIGNIFICANT CHANGE UP (ref 6–8.3)
RBC # BLD: 4.34 M/UL — SIGNIFICANT CHANGE UP (ref 3.8–5.2)
RBC # FLD: 12.3 % — SIGNIFICANT CHANGE UP (ref 10.3–14.5)
SODIUM SERPL-SCNC: 136 MMOL/L — SIGNIFICANT CHANGE UP (ref 135–145)
SPECIMEN SOURCE: SIGNIFICANT CHANGE UP
SPECIMEN SOURCE: SIGNIFICANT CHANGE UP
WBC # BLD: 14.08 K/UL — HIGH (ref 3.8–10.5)
WBC # FLD AUTO: 14.08 K/UL — HIGH (ref 3.8–10.5)

## 2025-03-18 PROCEDURE — 99233 SBSQ HOSP IP/OBS HIGH 50: CPT | Mod: GC

## 2025-03-18 PROCEDURE — 71045 X-RAY EXAM CHEST 1 VIEW: CPT | Mod: 26

## 2025-03-18 RX ORDER — BUDESONIDE 0.25 MG/2ML
0.5 SUSPENSION RESPIRATORY (INHALATION) EVERY 12 HOURS
Refills: 0 | Status: DISCONTINUED | OUTPATIENT
Start: 2025-03-18 | End: 2025-04-25

## 2025-03-18 RX ORDER — IPRATROPIUM BROMIDE AND ALBUTEROL SULFATE .5; 2.5 MG/3ML; MG/3ML
3 SOLUTION RESPIRATORY (INHALATION) EVERY 6 HOURS
Refills: 0 | Status: DISCONTINUED | OUTPATIENT
Start: 2025-03-18 | End: 2025-03-19

## 2025-03-18 RX ORDER — PREDNISONE 20 MG/1
40 TABLET ORAL DAILY
Refills: 0 | Status: DISCONTINUED | OUTPATIENT
Start: 2025-03-18 | End: 2025-03-19

## 2025-03-18 RX ORDER — FUROSEMIDE 10 MG/ML
40 INJECTION INTRAMUSCULAR; INTRAVENOUS ONCE
Refills: 0 | Status: COMPLETED | OUTPATIENT
Start: 2025-03-18 | End: 2025-03-18

## 2025-03-18 RX ADMIN — Medication 2 TABLET(S): at 22:27

## 2025-03-18 RX ADMIN — BUDESONIDE 0.5 MILLIGRAM(S): 0.25 SUSPENSION RESPIRATORY (INHALATION) at 21:06

## 2025-03-18 RX ADMIN — Medication 1 APPLICATION(S): at 05:53

## 2025-03-18 RX ADMIN — INSULIN LISPRO 4: 100 INJECTION, SOLUTION INTRAVENOUS; SUBCUTANEOUS at 12:38

## 2025-03-18 RX ADMIN — IPRATROPIUM BROMIDE AND ALBUTEROL SULFATE 3 MILLILITER(S): .5; 2.5 SOLUTION RESPIRATORY (INHALATION) at 16:17

## 2025-03-18 RX ADMIN — Medication 25 MILLIGRAM(S): at 13:27

## 2025-03-18 RX ADMIN — INSULIN LISPRO 2: 100 INJECTION, SOLUTION INTRAVENOUS; SUBCUTANEOUS at 08:32

## 2025-03-18 RX ADMIN — Medication 25 MILLIGRAM(S): at 22:31

## 2025-03-18 RX ADMIN — SERTRALINE 25 MILLIGRAM(S): 100 TABLET, FILM COATED ORAL at 12:48

## 2025-03-18 RX ADMIN — FUROSEMIDE 40 MILLIGRAM(S): 10 INJECTION INTRAMUSCULAR; INTRAVENOUS at 12:47

## 2025-03-18 RX ADMIN — IPRATROPIUM BROMIDE AND ALBUTEROL SULFATE 3 MILLILITER(S): .5; 2.5 SOLUTION RESPIRATORY (INHALATION) at 21:06

## 2025-03-18 RX ADMIN — DEXTROMETHORPHAN HBR, GUAIFENESIN 600 MILLIGRAM(S): 200 LIQUID ORAL at 17:19

## 2025-03-18 RX ADMIN — POLYETHYLENE GLYCOL 3350 17 GRAM(S): 17 POWDER, FOR SOLUTION ORAL at 12:41

## 2025-03-18 RX ADMIN — Medication 1 DOSE(S): at 08:34

## 2025-03-18 RX ADMIN — DEXTROMETHORPHAN HBR, GUAIFENESIN 600 MILLIGRAM(S): 200 LIQUID ORAL at 05:54

## 2025-03-18 RX ADMIN — PREDNISONE 40 MILLIGRAM(S): 20 TABLET ORAL at 12:40

## 2025-03-18 RX ADMIN — AMLODIPINE BESYLATE 10 MILLIGRAM(S): 10 TABLET ORAL at 05:54

## 2025-03-18 RX ADMIN — Medication 10 MILLIGRAM(S): at 05:54

## 2025-03-18 RX ADMIN — ENOXAPARIN SODIUM 40 MILLIGRAM(S): 100 INJECTION SUBCUTANEOUS at 12:47

## 2025-03-18 RX ADMIN — INSULIN LISPRO 2: 100 INJECTION, SOLUTION INTRAVENOUS; SUBCUTANEOUS at 17:19

## 2025-03-18 RX ADMIN — LISINOPRIL 40 MILLIGRAM(S): 5 TABLET ORAL at 05:54

## 2025-03-18 NOTE — PROGRESS NOTE ADULT - PROBLEM SELECTOR PLAN 4
Hx tardive dyskinesia symptoms on prior medications.    Plan:  - continue home Sertraline  - hold home Olanzapine, consider resuming

## 2025-03-18 NOTE — PROGRESS NOTE ADULT - SUBJECTIVE AND OBJECTIVE BOX
Interval Events:  Looks a little dyspneic but reports feeling better today compared to yesterday.    REVIEW OF SYSTEMS:  Negative except as documented above.      OBJECTIVE:  ICU Vital Signs Last 24 Hrs  T(C): 36.9 (18 Mar 2025 13:10), Max: 37.1 (18 Mar 2025 05:11)  T(F): 98.4 (18 Mar 2025 13:10), Max: 98.8 (18 Mar 2025 05:11)  HR: 98 (18 Mar 2025 13:10) (98 - 104)  BP: 152/86 (18 Mar 2025 13:10) (152/86 - 190/97)  BP(mean): --  ABP: --  ABP(mean): --  RR: 18 (18 Mar 2025 13:10) (16 - 19)  SpO2: 98% (18 Mar 2025 13:10) (92% - 98%)    O2 Parameters below as of 18 Mar 2025 13:10  Patient On (Oxygen Delivery Method): room air              CAPILLARY BLOOD GLUCOSE      POCT Blood Glucose.: 220 mg/dL (18 Mar 2025 12:10)      PHYSICAL EXAM:  General: NAD  HEENT:  EOMI, sclera anicteric, moist mucus membranes  Neck: supple  Cardiovascular: RRR  Respiratory: mild bilateral expiratory wheezing  Abdomen: soft, nontender  Extremities: warm and well perfused, no edema, no clubbing  Skin: no rashes  Neurological: no focal deficits    HOSPITAL MEDICATIONS:  MEDICATIONS  (STANDING):  albuterol/ipratropium for Nebulization 3 milliLiter(s) Nebulizer every 6 hours  amLODIPine   Tablet 10 milliGRAM(s) Oral daily  buDESOnide    Inhalation Suspension 0.5 milliGRAM(s) Inhalation every 12 hours  chlorhexidine 2% Cloths 1 Application(s) Topical <User Schedule>  dextrose 5%. 1000 milliLiter(s) (100 mL/Hr) IV Continuous <Continuous>  dextrose 5%. 1000 milliLiter(s) (50 mL/Hr) IV Continuous <Continuous>  dextrose 50% Injectable 25 Gram(s) IV Push once  dextrose 50% Injectable 12.5 Gram(s) IV Push once  dextrose 50% Injectable 25 Gram(s) IV Push once  dextrose Oral Gel 15 Gram(s) Oral once  enoxaparin Injectable 40 milliGRAM(s) SubCutaneous every 24 hours  glucagon  Injectable 1 milliGRAM(s) IntraMuscular once  guaiFENesin  milliGRAM(s) Oral every 12 hours  hydrALAZINE 25 milliGRAM(s) Oral three times a day  insulin lispro (ADMELOG) corrective regimen sliding scale   SubCutaneous three times a day before meals  insulin lispro (ADMELOG) corrective regimen sliding scale   SubCutaneous at bedtime  lisinopril 40 milliGRAM(s) Oral daily  polyethylene glycol 3350 17 Gram(s) Oral daily  predniSONE   Tablet 40 milliGRAM(s) Oral daily  senna 2 Tablet(s) Oral at bedtime  sertraline 25 milliGRAM(s) Oral daily    MEDICATIONS  (PRN):      LABS:                        13.9   14.08 )-----------( 176      ( 18 Mar 2025 06:30 )             42.4     Hgb Trend: 13.9<--, 13.4<--, 12.1<--, 12.4<--, 12.5<--  03-18    136  |  92[L]  |  16  ----------------------------<  190[H]  3.7   |  29  |  0.55    Ca    9.8      18 Mar 2025 06:30  Phos  2.9     03-18  Mg     1.80     03-18    TPro  6.9  /  Alb  3.4  /  TBili  0.4  /  DBili  x   /  AST  19  /  ALT  27  /  AlkPhos  83  03-18    Creatinine Trend: 0.55<--, 0.52<--, 0.60<--, 0.59<--, 0.52<--, 0.60<--    Urinalysis Basic - ( 18 Mar 2025 06:30 )    Color: x / Appearance: x / SG: x / pH: x  Gluc: 190 mg/dL / Ketone: x  / Bili: x / Urobili: x   Blood: x / Protein: x / Nitrite: x   Leuk Esterase: x / RBC: x / WBC x   Sq Epi: x / Non Sq Epi: x / Bacteria: x        Venous Blood Gas:  03-18 @ 13:36  7.47/49/66/36/93.1  VBG Lactate: 1.2  Venous Blood Gas:  03-17 @ 18:19  7.46/55/46/39/79.6  VBG Lactate: 1.4  Venous Blood Gas:  03-17 @ 05:15  7.44/57/49/39/83.1  VBG Lactate: 1.9      MICROBIOLOGY:       RADIOLOGY:  [x] Reviewed and interpreted by me

## 2025-03-18 NOTE — DISCHARGE NOTE PROVIDER - ATTENDING DISCHARGE PHYSICAL EXAMINATION:
pt seen and examined by me  doing well  eating well  VSS  CHEST b/l AE  EXT no cce  a/w asthma exac with status, acute hypoxic resp failure c/b catatonia  resp issues resolved, needs pulm f/u as outpt  cont ECT per psych  plans for [facility]

## 2025-03-18 NOTE — DISCHARGE NOTE PROVIDER - NSDCFUSCHEDAPPT_GEN_ALL_CORE_FT
[address]  Scheduled Appointment: 07/15/2025    Darren Harry  [address]  Scheduled Appointment: 07/15/2025

## 2025-03-18 NOTE — PROGRESS NOTE ADULT - PROBLEM SELECTOR PLAN 6
Likely i/s/o hypovolemia and component of underlying SIADH.  - resolved    Plan:  - CTM Na on daily labs  - consider fluid restriction vs salt tabs if worsening Na again

## 2025-03-18 NOTE — DISCHARGE NOTE PROVIDER - NSDCMRMEDTOKEN_GEN_ALL_CORE_FT
amLODIPine 10 mg oral tablet: 1 tab(s) orally once a day  Proventil HFA 90 mcg/inh inhalation aerosol: 2 puff(s) inhaled every 4 hours   sertraline 25 mg oral tablet: 1 tab(s) orally once a day  ZyPREXA 7.5 mg oral tablet: 1 tab(s) orally once a day   fluticasone-salmeterol: 1 puff(s) inhaled 2 times a day  hydroCHLOROthiazide 25 mg oral tablet: 1 tab(s) orally once a day  insulin glargine 100 units/mL subcutaneous solution: 3 unit(s) subcutaneous once a day (at bedtime)  insulin lispro 100 units/mL injectable solution: 2 unit(s) injectable 3 times a day (before meals)  levalbuterol 0.63 mg/3 mL inhalation solution: 3 milliliter(s) inhaled every 8 hours  lisinopril 40 mg oral tablet: 1 tab(s) orally once a day  melatonin 3 mg oral tablet: 3 tab(s) orally once a day (at bedtime)  memantine 10 mg oral tablet: 1 tab(s) orally once a day (at bedtime)  memantine 10 mg oral tablet: 1 tab(s) orally once a day at 7 AM  ocular lubricant ophthalmic ointment: 1 application to each affected eye 2 times a day  ocular lubricant preservative-free ophthalmic solution: 1 drop(s) to each affected eye every 8 hours  OLANZapine 2.5 mg oral tablet: 1 tab(s) orally once a day (at bedtime)  polyethylene glycol 3350 oral powder for reconstitution: 17 gram(s) orally 2 times a day  senna leaf extract oral tablet: 2 tab(s) orally once a day (at bedtime)  sertraline 25 mg oral tablet: 5 tab(s) orally once a day  tiotropium 2.5 mcg/inh inhalation aerosol: 2 puff(s) inhaled once a day

## 2025-03-18 NOTE — DISCHARGE NOTE PROVIDER - NSDCFUADDAPPT_GEN_ALL_CORE_FT
Follow up with medicine physician and psychiatry at [facility].     Follow up with pulmonology upon discharge from [facility].

## 2025-03-18 NOTE — PROGRESS NOTE ADULT - SUBJECTIVE AND OBJECTIVE BOX
INTERVAL HPI/OVERNIGHT EVENTS: no acute events overnight    SUBJECTIVE: Patient seen and examined at bedside.     ROS: All negative except as listed above.    VITAL SIGNS:  ICU Vital Signs Last 24 Hrs  T(C): 37.1 (18 Mar 2025 05:11), Max: 37.1 (18 Mar 2025 05:11)  T(F): 98.8 (18 Mar 2025 05:11), Max: 98.8 (18 Mar 2025 05:11)  HR: 103 (18 Mar 2025 05:11) (94 - 104)  BP: 190/97 (18 Mar 2025 05:11) (172/82 - 190/97)  BP(mean): --  ABP: --  ABP(mean): --  RR: 16 (18 Mar 2025 05:11) (16 - 19)  SpO2: 95% (18 Mar 2025 05:11) (92% - 99%)    O2 Parameters below as of 18 Mar 2025 05:11  Patient On (Oxygen Delivery Method): nasal cannula            Plateau pressure:   P/F ratio:     03-16 @ 07:01  -  03-17 @ 07:00  --------------------------------------------------------  IN: 100 mL / OUT: 400 mL / NET: -300 mL      CAPILLARY BLOOD GLUCOSE      POCT Blood Glucose.: 174 mg/dL (17 Mar 2025 21:44)      ECG: reviewed.    PHYSICAL EXAM:    GENERAL: NAD, lying in bed comfortably  HEAD:  Atraumatic, normocephalic  EYES: EOMI, PERRLA, conjunctiva and sclera clear  NECK: Supple, trachea midline, no JVD  HEART: Regular rate and rhythm, no murmurs, rubs, or gallops  LUNGS: Unlabored respirations.  Clear to auscultation bilaterally, no crackles, wheezing, or rhonchi  ABDOMEN: Soft, nontender, nondistended, +BS  EXTREMITIES: 2+ peripheral pulses bilaterally, cap refill<2 secs. No clubbing, cyanosis, or edema  NERVOUS SYSTEM:  A&Ox3, following commands, moving all extremities, no focal deficits   SKIN: No rashes or lesions    MEDICATIONS:  MEDICATIONS  (STANDING):  amLODIPine   Tablet 10 milliGRAM(s) Oral daily  chlorhexidine 2% Cloths 1 Application(s) Topical <User Schedule>  dextrose 5%. 1000 milliLiter(s) (100 mL/Hr) IV Continuous <Continuous>  dextrose 5%. 1000 milliLiter(s) (50 mL/Hr) IV Continuous <Continuous>  dextrose 50% Injectable 25 Gram(s) IV Push once  dextrose 50% Injectable 12.5 Gram(s) IV Push once  dextrose 50% Injectable 25 Gram(s) IV Push once  dextrose Oral Gel 15 Gram(s) Oral once  enoxaparin Injectable 40 milliGRAM(s) SubCutaneous every 24 hours  fluticasone propionate/ salmeterol 250-50 MICROgram(s) Diskus 1 Dose(s) Inhalation two times a day  glucagon  Injectable 1 milliGRAM(s) IntraMuscular once  guaiFENesin  milliGRAM(s) Oral every 12 hours  hydrALAZINE 10 milliGRAM(s) Oral every 8 hours  insulin lispro (ADMELOG) corrective regimen sliding scale   SubCutaneous at bedtime  insulin lispro (ADMELOG) corrective regimen sliding scale   SubCutaneous three times a day before meals  lisinopril 40 milliGRAM(s) Oral daily  polyethylene glycol 3350 17 Gram(s) Oral daily  senna 2 Tablet(s) Oral at bedtime  sertraline 25 milliGRAM(s) Oral daily    MEDICATIONS  (PRN):      ALLERGIES:  Allergies    No Known Allergies    Intolerances        LABS:                        13.4   13.27 )-----------( 182      ( 17 Mar 2025 05:15 )             40.4     03-17    137  |  93[L]  |  14  ----------------------------<  166[H]  3.7   |  30  |  0.52    Ca    9.5      17 Mar 2025 05:15  Phos  2.1     03-17  Mg     1.70     03-17    TPro  6.7  /  Alb  3.5  /  TBili  0.4  /  DBili  x   /  AST  19  /  ALT  30  /  AlkPhos  83  03-17      Urinalysis Basic - ( 17 Mar 2025 05:15 )    Color: x / Appearance: x / SG: x / pH: x  Gluc: 166 mg/dL / Ketone: x  / Bili: x / Urobili: x   Blood: x / Protein: x / Nitrite: x   Leuk Esterase: x / RBC: x / WBC x   Sq Epi: x / Non Sq Epi: x / Bacteria: x      ABG:      vBG:  pH, Venous: 7.46 (03-17-25 @ 18:19)  pCO2, Venous: 55 mmHg (03-17-25 @ 18:19)  pO2, Venous: 46 mmHg (03-17-25 @ 18:19)  HCO3, Venous: 39 mmol/L (03-17-25 @ 18:19)    Micro:    Culture - Blood (collected 03-13-25 @ 00:50)  Source: Blood Blood-Peripheral  Preliminary Report (03-17-25 @ 09:00):    No growth at 4 days    Culture - Blood (collected 03-13-25 @ 00:45)  Source: Blood Blood-Peripheral  Preliminary Report (03-17-25 @ 09:00):    No growth at 4 days    Culture - Blood (collected 03-08-25 @ 23:00)  Source: Blood Blood  Final Report (03-14-25 @ 09:01):    No growth at 5 days    Culture - Blood (collected 03-08-25 @ 22:00)  Source: Blood Blood  Final Report (03-14-25 @ 09:01):    No growth at 5 days       Urinalysis with Rflx Culture (collected 03-13-25 @ 12:00)         RADIOLOGY & ADDITIONAL TESTS: Reviewed.   INTERVAL HPI/OVERNIGHT EVENTS: no acute events overnight    SUBJECTIVE: Patient seen and examined at bedside. Patient currently on 5L NC. Patient states she does not feel SOB but is speaking in short sentences due to her breathing. Denies fever, chills, chest pain, abdominal pain, n/v/d    ROS: All negative except as listed above.    VITAL SIGNS:  ICU Vital Signs Last 24 Hrs  T(C): 37.1 (18 Mar 2025 05:11), Max: 37.1 (18 Mar 2025 05:11)  T(F): 98.8 (18 Mar 2025 05:11), Max: 98.8 (18 Mar 2025 05:11)  HR: 103 (18 Mar 2025 05:11) (94 - 104)  BP: 190/97 (18 Mar 2025 05:11) (172/82 - 190/97)  BP(mean): --  ABP: --  ABP(mean): --  RR: 16 (18 Mar 2025 05:11) (16 - 19)  SpO2: 95% (18 Mar 2025 05:11) (92% - 99%)    O2 Parameters below as of 18 Mar 2025 05:11  Patient On (Oxygen Delivery Method): nasal cannula            Plateau pressure:   P/F ratio:     03-16 @ 07:01  -  03-17 @ 07:00  --------------------------------------------------------  IN: 100 mL / OUT: 400 mL / NET: -300 mL      CAPILLARY BLOOD GLUCOSE      POCT Blood Glucose.: 174 mg/dL (17 Mar 2025 21:44)      ECG: reviewed.    PHYSICAL EXAM:    GENERAL: NAD, lying in bed comfortably  HEAD:  Atraumatic, normocephalic  EYES: EOMI, PERRLA, conjunctiva and sclera clear  HEART: Regular rate and rhythm, no murmurs, rubs, or gallops  LUNGS: Not speaking in full sentences, shallows breaths, mildly tachypneic   ABDOMEN: Soft, nontender, nondistended, +BS  EXTREMITIES: No edema  NERVOUS SYSTEM:  A&Ox3, following commands, moving all extremities, no focal deficits     MEDICATIONS:  MEDICATIONS  (STANDING):  amLODIPine   Tablet 10 milliGRAM(s) Oral daily  chlorhexidine 2% Cloths 1 Application(s) Topical <User Schedule>  dextrose 5%. 1000 milliLiter(s) (100 mL/Hr) IV Continuous <Continuous>  dextrose 5%. 1000 milliLiter(s) (50 mL/Hr) IV Continuous <Continuous>  dextrose 50% Injectable 25 Gram(s) IV Push once  dextrose 50% Injectable 12.5 Gram(s) IV Push once  dextrose 50% Injectable 25 Gram(s) IV Push once  dextrose Oral Gel 15 Gram(s) Oral once  enoxaparin Injectable 40 milliGRAM(s) SubCutaneous every 24 hours  fluticasone propionate/ salmeterol 250-50 MICROgram(s) Diskus 1 Dose(s) Inhalation two times a day  glucagon  Injectable 1 milliGRAM(s) IntraMuscular once  guaiFENesin  milliGRAM(s) Oral every 12 hours  hydrALAZINE 10 milliGRAM(s) Oral every 8 hours  insulin lispro (ADMELOG) corrective regimen sliding scale   SubCutaneous at bedtime  insulin lispro (ADMELOG) corrective regimen sliding scale   SubCutaneous three times a day before meals  lisinopril 40 milliGRAM(s) Oral daily  polyethylene glycol 3350 17 Gram(s) Oral daily  senna 2 Tablet(s) Oral at bedtime  sertraline 25 milliGRAM(s) Oral daily    MEDICATIONS  (PRN):      ALLERGIES:  Allergies    No Known Allergies    Intolerances        LABS:                        13.4   13.27 )-----------( 182      ( 17 Mar 2025 05:15 )             40.4     03-17    137  |  93[L]  |  14  ----------------------------<  166[H]  3.7   |  30  |  0.52    Ca    9.5      17 Mar 2025 05:15  Phos  2.1     03-17  Mg     1.70     03-17    TPro  6.7  /  Alb  3.5  /  TBili  0.4  /  DBili  x   /  AST  19  /  ALT  30  /  AlkPhos  83  03-17      Urinalysis Basic - ( 17 Mar 2025 05:15 )    Color: x / Appearance: x / SG: x / pH: x  Gluc: 166 mg/dL / Ketone: x  / Bili: x / Urobili: x   Blood: x / Protein: x / Nitrite: x   Leuk Esterase: x / RBC: x / WBC x   Sq Epi: x / Non Sq Epi: x / Bacteria: x      ABG:      vBG:  pH, Venous: 7.46 (03-17-25 @ 18:19)  pCO2, Venous: 55 mmHg (03-17-25 @ 18:19)  pO2, Venous: 46 mmHg (03-17-25 @ 18:19)  HCO3, Venous: 39 mmol/L (03-17-25 @ 18:19)    Micro:    Culture - Blood (collected 03-13-25 @ 00:50)  Source: Blood Blood-Peripheral  Preliminary Report (03-17-25 @ 09:00):    No growth at 4 days    Culture - Blood (collected 03-13-25 @ 00:45)  Source: Blood Blood-Peripheral  Preliminary Report (03-17-25 @ 09:00):    No growth at 4 days    Culture - Blood (collected 03-08-25 @ 23:00)  Source: Blood Blood  Final Report (03-14-25 @ 09:01):    No growth at 5 days    Culture - Blood (collected 03-08-25 @ 22:00)  Source: Blood Blood  Final Report (03-14-25 @ 09:01):    No growth at 5 days       Urinalysis with Rflx Culture (collected 03-13-25 @ 12:00)         RADIOLOGY & ADDITIONAL TESTS: Reviewed.   INTERVAL HPI/OVERNIGHT EVENTS: no acute events overnight    SUBJECTIVE: Patient seen and examined at bedside. Patient currently on 5L NC. Patient states she does not feel SOB but is speaking in short sentences due to her breathing. Denies fever, chills, chest pain, abdominal pain, n/v/d    ROS: All negative except as listed above.    VITAL SIGNS:  ICU Vital Signs Last 24 Hrs  T(C): 37.1 (18 Mar 2025 05:11), Max: 37.1 (18 Mar 2025 05:11)  T(F): 98.8 (18 Mar 2025 05:11), Max: 98.8 (18 Mar 2025 05:11)  HR: 103 (18 Mar 2025 05:11) (94 - 104)  BP: 190/97 (18 Mar 2025 05:11) (172/82 - 190/97)  BP(mean): --  ABP: --  ABP(mean): --  RR: 16 (18 Mar 2025 05:11) (16 - 19)  SpO2: 95% (18 Mar 2025 05:11) (92% - 99%)    O2 Parameters below as of 18 Mar 2025 05:11  Patient On (Oxygen Delivery Method): nasal cannula            Plateau pressure:   P/F ratio:     03-16 @ 07:01  -  03-17 @ 07:00  --------------------------------------------------------  IN: 100 mL / OUT: 400 mL / NET: -300 mL      CAPILLARY BLOOD GLUCOSE      POCT Blood Glucose.: 174 mg/dL (17 Mar 2025 21:44)      ECG: reviewed.    PHYSICAL EXAM:    GENERAL: NAD, lying in bed comfortably  HEAD:  Atraumatic, normocephalic  EYES: EOMI, PERRLA, conjunctiva and sclera clear  HEART: Regular rate and rhythm, no murmurs, rubs, or gallops  LUNGS: Not speaking in full sentences, shallows breaths, mildly tachypneic, expiratory wheezing  ABDOMEN: Soft, nontender, nondistended, +BS  EXTREMITIES: No edema  NERVOUS SYSTEM:  A&Ox3, following commands, moving all extremities, no focal deficits     MEDICATIONS:  MEDICATIONS  (STANDING):  amLODIPine   Tablet 10 milliGRAM(s) Oral daily  chlorhexidine 2% Cloths 1 Application(s) Topical <User Schedule>  dextrose 5%. 1000 milliLiter(s) (100 mL/Hr) IV Continuous <Continuous>  dextrose 5%. 1000 milliLiter(s) (50 mL/Hr) IV Continuous <Continuous>  dextrose 50% Injectable 25 Gram(s) IV Push once  dextrose 50% Injectable 12.5 Gram(s) IV Push once  dextrose 50% Injectable 25 Gram(s) IV Push once  dextrose Oral Gel 15 Gram(s) Oral once  enoxaparin Injectable 40 milliGRAM(s) SubCutaneous every 24 hours  fluticasone propionate/ salmeterol 250-50 MICROgram(s) Diskus 1 Dose(s) Inhalation two times a day  glucagon  Injectable 1 milliGRAM(s) IntraMuscular once  guaiFENesin  milliGRAM(s) Oral every 12 hours  hydrALAZINE 10 milliGRAM(s) Oral every 8 hours  insulin lispro (ADMELOG) corrective regimen sliding scale   SubCutaneous at bedtime  insulin lispro (ADMELOG) corrective regimen sliding scale   SubCutaneous three times a day before meals  lisinopril 40 milliGRAM(s) Oral daily  polyethylene glycol 3350 17 Gram(s) Oral daily  senna 2 Tablet(s) Oral at bedtime  sertraline 25 milliGRAM(s) Oral daily    MEDICATIONS  (PRN):      ALLERGIES:  Allergies    No Known Allergies    Intolerances        LABS:                        13.4   13.27 )-----------( 182      ( 17 Mar 2025 05:15 )             40.4     03-17    137  |  93[L]  |  14  ----------------------------<  166[H]  3.7   |  30  |  0.52    Ca    9.5      17 Mar 2025 05:15  Phos  2.1     03-17  Mg     1.70     03-17    TPro  6.7  /  Alb  3.5  /  TBili  0.4  /  DBili  x   /  AST  19  /  ALT  30  /  AlkPhos  83  03-17      Urinalysis Basic - ( 17 Mar 2025 05:15 )    Color: x / Appearance: x / SG: x / pH: x  Gluc: 166 mg/dL / Ketone: x  / Bili: x / Urobili: x   Blood: x / Protein: x / Nitrite: x   Leuk Esterase: x / RBC: x / WBC x   Sq Epi: x / Non Sq Epi: x / Bacteria: x      ABG:      vBG:  pH, Venous: 7.46 (03-17-25 @ 18:19)  pCO2, Venous: 55 mmHg (03-17-25 @ 18:19)  pO2, Venous: 46 mmHg (03-17-25 @ 18:19)  HCO3, Venous: 39 mmol/L (03-17-25 @ 18:19)    Micro:    Culture - Blood (collected 03-13-25 @ 00:50)  Source: Blood Blood-Peripheral  Preliminary Report (03-17-25 @ 09:00):    No growth at 4 days    Culture - Blood (collected 03-13-25 @ 00:45)  Source: Blood Blood-Peripheral  Preliminary Report (03-17-25 @ 09:00):    No growth at 4 days    Culture - Blood (collected 03-08-25 @ 23:00)  Source: Blood Blood  Final Report (03-14-25 @ 09:01):    No growth at 5 days    Culture - Blood (collected 03-08-25 @ 22:00)  Source: Blood Blood  Final Report (03-14-25 @ 09:01):    No growth at 5 days       Urinalysis with Rflx Culture (collected 03-13-25 @ 12:00)         RADIOLOGY & ADDITIONAL TESTS: Reviewed.   INTERVAL HPI/OVERNIGHT EVENTS: no acute events overnight    SUBJECTIVE: Patient seen and examined at bedside. Pt reports feeling better overall but reports intermittent increased difficulty in breathing and attributes that to increased phlegm production in the last few days. Pt afebrile, with increased leukocytosis to 14.08 while on steroids, RVP, covid-19, influenza A/B, and RSV panel ordered. Patient satting 92-95% with NC recorded at 3L overnight, pt was on 5L NC when examined this AM. Patient states she does not feel SOB but is speaking in short sentences due to her breathing. Pt also reporting new onset urinary frequency w/o associated pain, burning, or discomfort on urination. Denies fever, chills, chest pain, abdominal pain, n/v.     ROS: All negative except as listed above.    VITAL SIGNS:  ICU Vital Signs Last 24 Hrs  T(C): 37.1 (18 Mar 2025 05:11), Max: 37.1 (18 Mar 2025 05:11)  T(F): 98.8 (18 Mar 2025 05:11), Max: 98.8 (18 Mar 2025 05:11)  HR: 103 (18 Mar 2025 05:11) (94 - 104)  BP: 190/97 (18 Mar 2025 05:11) (172/82 - 190/97)  BP(mean): --  ABP: --  ABP(mean): --  RR: 16 (18 Mar 2025 05:11) (16 - 19)  SpO2: 95% (18 Mar 2025 05:11) (92% - 99%)    O2 Parameters below as of 18 Mar 2025 05:11  Patient On (Oxygen Delivery Method): nasal cannula            Plateau pressure:   P/F ratio:     03-16 @ 07:01  -  03-17 @ 07:00  --------------------------------------------------------  IN: 100 mL / OUT: 400 mL / NET: -300 mL      CAPILLARY BLOOD GLUCOSE      POCT Blood Glucose.: 174 mg/dL (17 Mar 2025 21:44)      ECG: reviewed.    PHYSICAL EXAM:    GENERAL: NAD, lying in bed comfortably  HEAD:  Atraumatic, normocephalic  EYES: EOMI, PERRLA, conjunctiva and sclera clear  HEART: Regular rate and rhythm, no murmurs, rubs, or gallops  LUNGS: Not speaking in full sentences, shallows breaths, mildly tachypneic, expiratory wheezing  ABDOMEN: Soft, nontender, nondistended, +BS  EXTREMITIES: No edema  NERVOUS SYSTEM:  A&Ox3, following commands, moving all extremities, no focal deficits     MEDICATIONS:  MEDICATIONS  (STANDING):  amLODIPine   Tablet 10 milliGRAM(s) Oral daily  chlorhexidine 2% Cloths 1 Application(s) Topical <User Schedule>  dextrose 5%. 1000 milliLiter(s) (100 mL/Hr) IV Continuous <Continuous>  dextrose 5%. 1000 milliLiter(s) (50 mL/Hr) IV Continuous <Continuous>  dextrose 50% Injectable 25 Gram(s) IV Push once  dextrose 50% Injectable 12.5 Gram(s) IV Push once  dextrose 50% Injectable 25 Gram(s) IV Push once  dextrose Oral Gel 15 Gram(s) Oral once  enoxaparin Injectable 40 milliGRAM(s) SubCutaneous every 24 hours  fluticasone propionate/ salmeterol 250-50 MICROgram(s) Diskus 1 Dose(s) Inhalation two times a day  glucagon  Injectable 1 milliGRAM(s) IntraMuscular once  guaiFENesin  milliGRAM(s) Oral every 12 hours  hydrALAZINE 10 milliGRAM(s) Oral every 8 hours  insulin lispro (ADMELOG) corrective regimen sliding scale   SubCutaneous at bedtime  insulin lispro (ADMELOG) corrective regimen sliding scale   SubCutaneous three times a day before meals  lisinopril 40 milliGRAM(s) Oral daily  polyethylene glycol 3350 17 Gram(s) Oral daily  senna 2 Tablet(s) Oral at bedtime  sertraline 25 milliGRAM(s) Oral daily    MEDICATIONS  (PRN):      ALLERGIES:  Allergies    No Known Allergies    Intolerances        LABS:                        13.4   13.27 )-----------( 182      ( 17 Mar 2025 05:15 )             40.4     03-17    137  |  93[L]  |  14  ----------------------------<  166[H]  3.7   |  30  |  0.52    Ca    9.5      17 Mar 2025 05:15  Phos  2.1     03-17  Mg     1.70     03-17    TPro  6.7  /  Alb  3.5  /  TBili  0.4  /  DBili  x   /  AST  19  /  ALT  30  /  AlkPhos  83  03-17      Urinalysis Basic - ( 17 Mar 2025 05:15 )    Color: x / Appearance: x / SG: x / pH: x  Gluc: 166 mg/dL / Ketone: x  / Bili: x / Urobili: x   Blood: x / Protein: x / Nitrite: x   Leuk Esterase: x / RBC: x / WBC x   Sq Epi: x / Non Sq Epi: x / Bacteria: x      ABG:      vBG:  pH, Venous: 7.46 (03-17-25 @ 18:19)  pCO2, Venous: 55 mmHg (03-17-25 @ 18:19)  pO2, Venous: 46 mmHg (03-17-25 @ 18:19)  HCO3, Venous: 39 mmol/L (03-17-25 @ 18:19)    Micro:    Culture - Blood (collected 03-13-25 @ 00:50)  Source: Blood Blood-Peripheral  Preliminary Report (03-17-25 @ 09:00):    No growth at 4 days    Culture - Blood (collected 03-13-25 @ 00:45)  Source: Blood Blood-Peripheral  Preliminary Report (03-17-25 @ 09:00):    No growth at 4 days    Culture - Blood (collected 03-08-25 @ 23:00)  Source: Blood Blood  Final Report (03-14-25 @ 09:01):    No growth at 5 days    Culture - Blood (collected 03-08-25 @ 22:00)  Source: Blood Blood  Final Report (03-14-25 @ 09:01):    No growth at 5 days       Urinalysis with Rflx Culture (collected 03-13-25 @ 12:00)         RADIOLOGY & ADDITIONAL TESTS: Reviewed.   INTERVAL HPI/OVERNIGHT EVENTS: no acute events overnight    SUBJECTIVE: Patient seen and examined at bedside. Pt reports feeling better overall but reports intermittent increased difficulty in breathing and attributes that to increased phlegm production in the last few days. Pt afebrile, with increased leukocytosis to 14.08 while on steroids, RVP, covid-19, influenza A/B, and RSV panel ordered. Patient satting 92-95% with NC recorded at 3L overnight, pt was on 5L NC when examined this AM. Patient states she does not feel SOB but is speaking in short sentences due to her breathing. Pt also reporting new onset urinary frequency w/o associated pain, burning, or discomfort on urination. Denies fever, chills, chest pain, abdominal pain, n/v.     ROS: All negative except as listed above.    VITAL SIGNS:  ICU Vital Signs Last 24 Hrs  T(C): 37.1 (18 Mar 2025 05:11), Max: 37.1 (18 Mar 2025 05:11)  T(F): 98.8 (18 Mar 2025 05:11), Max: 98.8 (18 Mar 2025 05:11)  HR: 103 (18 Mar 2025 05:11) (94 - 104)  BP: 190/97 (18 Mar 2025 05:11) (172/82 - 190/97)  BP(mean): --  ABP: --  ABP(mean): --  RR: 16 (18 Mar 2025 05:11) (16 - 19)  SpO2: 95% (18 Mar 2025 05:11) (92% - 99%)    O2 Parameters below as of 18 Mar 2025 05:11  Patient On (Oxygen Delivery Method): nasal cannula            Plateau pressure:   P/F ratio:     03-16 @ 07:01  -  03-17 @ 07:00  --------------------------------------------------------  IN: 100 mL / OUT: 400 mL / NET: -300 mL      CAPILLARY BLOOD GLUCOSE      POCT Blood Glucose.: 174 mg/dL (17 Mar 2025 21:44)      ECG: reviewed.    PHYSICAL EXAM:  GENERAL: NAD, lying in bed comfortably  HEAD:  Atraumatic, normocephalic  HEART: S1/S2 present, no murmurs, RRR  LUNGS: Not speaking in full sentences, shallows breaths, mildly tachypneic, expiratory wheezing  ABDOMEN: Soft, nontender, nondistended, +BS  EXTREMITIES: No edema  NERVOUS SYSTEM: A&Ox3, following commands, moving all extremities, no focal deficits     MEDICATIONS:  MEDICATIONS  (STANDING):  amLODIPine   Tablet 10 milliGRAM(s) Oral daily  chlorhexidine 2% Cloths 1 Application(s) Topical <User Schedule>  dextrose 5%. 1000 milliLiter(s) (100 mL/Hr) IV Continuous <Continuous>  dextrose 5%. 1000 milliLiter(s) (50 mL/Hr) IV Continuous <Continuous>  dextrose 50% Injectable 25 Gram(s) IV Push once  dextrose 50% Injectable 12.5 Gram(s) IV Push once  dextrose 50% Injectable 25 Gram(s) IV Push once  dextrose Oral Gel 15 Gram(s) Oral once  enoxaparin Injectable 40 milliGRAM(s) SubCutaneous every 24 hours  fluticasone propionate/ salmeterol 250-50 MICROgram(s) Diskus 1 Dose(s) Inhalation two times a day  glucagon  Injectable 1 milliGRAM(s) IntraMuscular once  guaiFENesin  milliGRAM(s) Oral every 12 hours  hydrALAZINE 10 milliGRAM(s) Oral every 8 hours  insulin lispro (ADMELOG) corrective regimen sliding scale   SubCutaneous at bedtime  insulin lispro (ADMELOG) corrective regimen sliding scale   SubCutaneous three times a day before meals  lisinopril 40 milliGRAM(s) Oral daily  polyethylene glycol 3350 17 Gram(s) Oral daily  senna 2 Tablet(s) Oral at bedtime  sertraline 25 milliGRAM(s) Oral daily    MEDICATIONS  (PRN):      ALLERGIES:  Allergies    No Known Allergies    Intolerances        LABS:                        13.4   13.27 )-----------( 182      ( 17 Mar 2025 05:15 )             40.4     03-17    137  |  93[L]  |  14  ----------------------------<  166[H]  3.7   |  30  |  0.52    Ca    9.5      17 Mar 2025 05:15  Phos  2.1     03-17  Mg     1.70     03-17    TPro  6.7  /  Alb  3.5  /  TBili  0.4  /  DBili  x   /  AST  19  /  ALT  30  /  AlkPhos  83  03-17      Urinalysis Basic - ( 17 Mar 2025 05:15 )    Color: x / Appearance: x / SG: x / pH: x  Gluc: 166 mg/dL / Ketone: x  / Bili: x / Urobili: x   Blood: x / Protein: x / Nitrite: x   Leuk Esterase: x / RBC: x / WBC x   Sq Epi: x / Non Sq Epi: x / Bacteria: x      ABG:      vBG:  pH, Venous: 7.46 (03-17-25 @ 18:19)  pCO2, Venous: 55 mmHg (03-17-25 @ 18:19)  pO2, Venous: 46 mmHg (03-17-25 @ 18:19)  HCO3, Venous: 39 mmol/L (03-17-25 @ 18:19)    Micro:    Culture - Blood (collected 03-13-25 @ 00:50)  Source: Blood Blood-Peripheral  Preliminary Report (03-17-25 @ 09:00):    No growth at 4 days    Culture - Blood (collected 03-13-25 @ 00:45)  Source: Blood Blood-Peripheral  Preliminary Report (03-17-25 @ 09:00):    No growth at 4 days    Culture - Blood (collected 03-08-25 @ 23:00)  Source: Blood Blood  Final Report (03-14-25 @ 09:01):    No growth at 5 days    Culture - Blood (collected 03-08-25 @ 22:00)  Source: Blood Blood  Final Report (03-14-25 @ 09:01):    No growth at 5 days       Urinalysis with Rflx Culture (collected 03-13-25 @ 12:00)         RADIOLOGY & ADDITIONAL TESTS: Reviewed.

## 2025-03-18 NOTE — PROGRESS NOTE ADULT - PROBLEM SELECTOR PLAN 5
Encephalopathy i/s/o hypercapnic respiratory failure and sepsis and resolved seizure-like activity.  - CTH: no acute changes, CT spine: no fractures  - 3/8 ON: 6 minutes of seizure-like activity with myoclonic upper jerks -> broke with ativan  - EEG 3/10 showing no epileptiform activity; severe degree of diffuse or multifocal cerebral dysfunction    Plan:  - CTM

## 2025-03-18 NOTE — PROGRESS NOTE ADULT - ASSESSMENT
74F hx asthma and depression presented with AHHRF c/b AMS requiring intubation due to asthma exacerbation now s/p extubation with hypertension s/p nicardipine drip.    Has had 3 asthma attacks in the past year. Patient reports breathing is better today than yesterday. No wheezing on exam and saturating 95% on 3L O2.    s/p azithro 3/12 and zosyn - 3/17    Noted to have some wheezing today, was not receiving duonebs.    #Asthma  #HTN    Recommendation:  - continue duoneb, budesonide nebs q12h  - continue prednisone 40mg daily  - airway clearance  - wean o2 with goal spo2 >92%  - out of bed to chair as tolerate, PT    Recommendations are not final pending attending attestation.

## 2025-03-18 NOTE — DISCHARGE NOTE PROVIDER - NSDCCPCAREPLAN_GEN_ALL_CORE_FT
PRINCIPAL DISCHARGE DIAGNOSIS  Diagnosis: Shortness of breath  Assessment and Plan of Treatment: this was due to your asthma. we treated you with steroids as well as nebulizers and you improved significantly. it is important to f/u with pulm as an outpatient     PRINCIPAL DISCHARGE DIAGNOSIS  Diagnosis: Acute asthma exacerbation  Assessment and Plan of Treatment: You were treated for acute exacerbation. You required intubation and were monitored in the ICU. Please continue medications as prescribed.      SECONDARY DISCHARGE DIAGNOSES  Diagnosis: Anxiety and depression  Assessment and Plan of Treatment: You were seen by psychiatry. Your medications were adjusted. Please take as prescribed.     PRINCIPAL DISCHARGE DIAGNOSIS  Diagnosis: Acute asthma exacerbation  Assessment and Plan of Treatment: You were treated for acute exacerbation. You required intubation and were monitored in the ICU. Please continue medications as prescribed.  You will need pulmonary follow-up as an outpatient.      SECONDARY DISCHARGE DIAGNOSES  Diagnosis: Anxiety and depression  Assessment and Plan of Treatment: You were seen by psychiatry. Your medications were adjusted. Please take as prescribed.     PRINCIPAL DISCHARGE DIAGNOSIS  Diagnosis: Acute asthma exacerbation  Assessment and Plan of Treatment: You were treated for acute exacerbation with status asthmaticus. You required intubation and were monitored in the ICU. This has now resolved. Please continue medications as prescribed.  You will need pulmonary follow-up as an outpatient.      SECONDARY DISCHARGE DIAGNOSES  Diagnosis: Acute hypoxic respiratory failure  Assessment and Plan of Treatment: Due to the severity of your asthma exacerbation, you were not getting enough oxygen and required intubation with mechanical ventilation.  This has now resolved    Diagnosis: Catatonia  Assessment and Plan of Treatment: You were followed by the psychiatry team and initially were tried with medications to help with your catatonia but you did not improve.  Ultimately your family decided to proceed with ECT which has helped significantly.  You will continue weekly ECT sessions at [facility]    Diagnosis: Anxiety and depression  Assessment and Plan of Treatment: You were seen by psychiatry. Your medications were adjusted. Please take as prescribed.    Diagnosis: Hypertension  Assessment and Plan of Treatment: Please continue your hydralazine for blood pressure control    Diagnosis: Steroid-induced hyperglycemia  Assessment and Plan of Treatment: You have elevated blood sugars due to the steroids needed to help your breathing, you had been pre-diabetic prior to this and now you are on insulin for glucose control.  You will need outpatient follow up with endocrinology for further management of your diabetes after [facility]    Diagnosis: Aspiration pneumonia  Assessment and Plan of Treatment: You were treated for aspiration pneumonia with antibiotics.  THis has resolved and your swallowing is improved since your mental status in closer to baseline    Diagnosis: Pneumonia  Assessment and Plan of Treatment: You were treated with antibiotics for aspiration pneumonia    Diagnosis: Toxic metabolic encephalopathy  Assessment and Plan of Treatment: You had altered mental status likely due to the above; you are now close to baseline    Diagnosis: Acute asthma exacerbation  Assessment and Plan of Treatment:     Diagnosis: Physical deconditioning  Assessment and Plan of Treatment: Due to above you were weak but your muscles and strength are improving and you are walking on your own

## 2025-03-18 NOTE — PROVIDER CONTACT NOTE (OTHER) - ASSESSMENT
pt in no acute distress, is resting comfortably in bed, and is denying sob or any distress/pain whatsoever

## 2025-03-18 NOTE — DISCHARGE NOTE PROVIDER - DETAILS OF MALNUTRITION DIAGNOSIS/DIAGNOSES
This patient has been assessed with a concern for Malnutrition and was treated during this hospitalization for the following Nutrition diagnosis/diagnoses:     -  03/19/2025: Moderate protein-calorie malnutrition

## 2025-03-18 NOTE — DISCHARGE NOTE PROVIDER - NSFOLLOWUPCLINICS_GEN_ALL_ED_FT
[facility] Pulmonolgy and Sleep Medicine  Pulmonology  [address] [place] [address], [place] [address]  Phone: [phone]  Fax:

## 2025-03-18 NOTE — PROGRESS NOTE ADULT - ATTENDING COMMENTS
Violent Restraint Face-to-Face Evaluation  (must be completed within one hour of initiation of restraints)      Evaluate immediate situation:  Patient resting quietly in bed, appears to be sleeping. Reaction to intervention: Appears to be sleeping. Medical Condition/Assessment: Appears to be sleeping with even respirations and no signs of distress. Behavioral Condition/Assessment: Has not issues since he has been asleep. Prior to that was agitated, pacing, and verbally aggressive. The patients review of systems, history, medications, and recent labs were reviewed at this time.      Continue/Discontinue restraints at this time: Continue 73 yo F w/ PMH Asthma (3 excerbations/yr and never intubated), depression p/w dyspnea and wheezing with BLE edema after returning from [facility] 6 days prior to admission. CTA neg for PE or PNA. Intubated for airway protection and s/p extubation 3/12. Started on treatment for PNA. Noted rhythmic movement 6 min resolved with Ativan. Febrile post extubation. Blood cx neg       # hypertension- noted elevated BP; c/ norvasc; lisinopril to 40; increased hydralazine 25 TID and lasix 40 IV x 1; monitor BP     # Asthma exacerbation - c/w duonebs; s/p azithro; wean off oxygen goal spo2 92%; prednisone x 5 days; + noted wheezing on exam today; budesonide nebs; repeat VBG reviewed improving PCO2; pro BNP low; check CXR and RVP full     # PNA- suspected; s/p antibiotics; CT neg for consolidation     # pre DM w/ steroid induced hyperglycemia- A1c 5.7%; moderate ISS    # LE edema- 3/9 TTE EF 78% no WMA mild pHTN; 3/10 US LE neg for DVT    # Toxic metabolic encephalopathy- poss in setting hyponatremia/hypercapnia ; EEG neg for seizure; improving

## 2025-03-18 NOTE — DISCHARGE NOTE PROVIDER - HOSPITAL COURSE
HPI:  75 yo F with PMH asthma, depression presenting to [facility] with worsening dyspnea and wheezing with BLE edema since returning from 10.5 hour trip to [facility] 6 days ago. Altered in ED and unable to provide history, only responsive to noxious stimulli. History provided by  who states he found her on floor with unwitnessed fall. Hx of 3 asthma attacks last year, no hx of intubation, no clear asthma triggers but felt sick after flight home from [facility] 3/3/25 days where she visited for 3 months and was in usual state of health per .    ED Course: vitals notable for HR 60s, -115 with systolics in 180s, tachypneic to 24 with sPO2 95% on RA. Exam notable for wheezing. Placed on NC 5L then trialed on BiPAP but then required intubation to protect airway due to AMS. Labs notable for WBC 8.5, Hgb 12.6, , coags wnl, Na 126, K 4.3, Cl 85, bicarb 27, Cr 0.69, AST/ALT 59/47, proBNP 480, troponin 9, TSH 2.81, pH 7.29, pCO2 72, bicarb 35, neg RVP, CTA chest with no acute findings. CT head/cspine pending. Treated with 1L NS, Duonebs, solumedrol, and sedated with propofol/fentanyl. Admitted to MICU for AHRF.     75 yo F with PMH notable for asthma presenting with AHRF and hypercapnic respiratory acidosis with AMS requiring intubation for airway protection. Now admitted to MICU on ventilator for further evlauation and management iso asthma exacerbation.     Hospital Course:  During her ICU course, she experienced seizure-like activity (6 minutes of myoclonic upper jerks on 3/8 that resolved with Ativan, later progressing to rhythmic flexing movements in extremities) likely 2/2 hypercapnia and/or hyponatremia. Her CT head was negative for acute changes, and a video EEG was ordered that showed severe diffuse cerebral dysfunction consistent with comatose state but no epileptiform activity. Respiratory management included albuterol/ipratropium w/ budesonide in addition to solumedrol 125mg load and resulting 40mg x 5 days with a subsequent transition to PO taper, and magnesium supplementation, and ventilator settings to avoid air trapping. CTA was negative for pneumonia or pulmonary embolism, and respiratory viral panel was negative. She was empirically treated with azithromycin and Zosyn for CAP. Patient extubated 3/12 to face tent, responding to commands. Additional issues included hyponatremia (resolved, managed with free water restriction), acute on chronic metabolic alkalosis (likely compensatory), and mild thrombocytopenia (monitoring with plans). The primary etiology of her decompensation appears to have be an asthma exacerbation, which has now improved. Since 3/12 patient also having fevers without clear source (no VTEs, prior cultures negative, no known hx of malignancy). Sent rpt Bcx 3/12 pm with no growth so far.     On 3/13, Patient was transferred to medicine floors.    On day of discharge, patient is clinically stable with no new exam findings or acute symptoms compared to prior. The patient was seen by the attending physician on the date of discharge and deemed stable and acceptable for discharge. The patient's chronic medical conditions were treated accordingly per the patient's home medication regimen. The patient's medication reconciliation (with changes made to chronic medications), follow up appointments, discharge orders, instructions, and significant lab and diagnostic studies are as noted.    Changes to Medications:    Pending results or Follow-up Appointments:    Advanced Directives:    Discharge Diagnosis:    Dispo:  pending hospital course HPI:  75 yo F with PMH asthma, depression presenting to [facility] with worsening dyspnea and wheezing with BLE edema since returning from 10.5 hour trip to [facility] 6 days ago. Altered in ED and unable to provide history, only responsive to noxious stimulli. History provided by  who states he found her on floor with unwitnessed fall. Hx of 3 asthma attacks last year, no hx of intubation, no clear asthma triggers but felt sick after flight home from [facility] 3/3/25 days where she visited for 3 months and was in usual state of health per .    ED Course: vitals notable for HR 60s, -115 with systolics in 180s, tachypneic to 24 with sPO2 95% on RA. Exam notable for wheezing. Placed on NC 5L then trialed on BiPAP but then required intubation to protect airway due to AMS. Labs notable for WBC 8.5, Hgb 12.6, , coags wnl, Na 126, K 4.3, Cl 85, bicarb 27, Cr 0.69, AST/ALT 59/47, proBNP 480, troponin 9, TSH 2.81, pH 7.29, pCO2 72, bicarb 35, neg RVP, CTA chest with no acute findings. CT head/cspine pending. Treated with 1L NS, Duonebs, solumedrol, and sedated with propofol/fentanyl. Admitted to MICU for AHRF.     75 yo F with PMH notable for asthma presenting with AHRF and hypercapnic respiratory acidosis with AMS requiring intubation for airway protection. Now admitted to MICU on ventilator for further evlauation and management iso asthma exacerbation.     Hospital Course:  During her ICU course, she experienced seizure-like activity (6 minutes of myoclonic upper jerks on 3/8 that resolved with Ativan, later progressing to rhythmic flexing movements in extremities) likely 2/2 hypercapnia and/or hyponatremia. Her CT head was negative for acute changes, and a video EEG was ordered that showed severe diffuse cerebral dysfunction consistent with comatose state but no epileptiform activity. Respiratory management included albuterol/ipratropium w/ budesonide in addition to solumedrol 125mg load and resulting 40mg x 5 days with a subsequent transition to PO taper, and magnesium supplementation, and ventilator settings to avoid air trapping. CTA was negative for pneumonia or pulmonary embolism, and respiratory viral panel was negative. She was empirically treated with azithromycin and Zosyn for CAP. Patient extubated 3/12 to face tent, responding to commands. Additional issues included hyponatremia (resolved, managed with free water restriction), acute on chronic metabolic alkalosis (likely compensatory), and mild thrombocytopenia (monitoring with plans). The primary etiology of her decompensation appears to have be an asthma exacerbation, which has now improved. Since 3/12 patient also having fevers without clear source (no VTEs, prior cultures negative, no known hx of malignancy). Sent rpt Bcx 3/12 pm with no growth so far.     On 3/13, Patient was transferred to medicine floors. Pt noted to be significantly weak and deconditioned as well as difficulty speaking. Psych consulted and Pt reinitiated on her anxiety and depression medications. MRI brain ordered which noted ___    On day of discharge, patient is clinically stable with no new exam findings or acute symptoms compared to prior. The patient was seen by the attending physician on the date of discharge and deemed stable and acceptable for discharge. The patient's chronic medical conditions were treated accordingly per the patient's home medication regimen. The patient's medication reconciliation (with changes made to chronic medications), follow up appointments, discharge orders, instructions, and significant lab and diagnostic studies are as noted.    Changes to Medications:    Pending results or Follow-up Appointments:    Advanced Directives:    Discharge Diagnosis:    Dispo:  pending hospital course HPI:  73 yo F with PMH asthma, depression presenting to [facility] with worsening dyspnea and wheezing with BLE edema since returning from 10.5 hour trip to [facility] 6 days ago. Altered in ED and unable to provide history, only responsive to noxious stimulli. History provided by  who states he found her on floor with unwitnessed fall. Hx of 3 asthma attacks last year, no hx of intubation, no clear asthma triggers but felt sick after flight home from [facility] 3/3/25 days where she visited for 3 months and was in usual state of health per .    ED Course: vitals notable for HR 60s, -115 with systolics in 180s, tachypneic to 24 with sPO2 95% on RA. Exam notable for wheezing. Placed on NC 5L then trialed on BiPAP but then required intubation to protect airway due to AMS. Labs notable for WBC 8.5, Hgb 12.6, , coags wnl, Na 126, K 4.3, Cl 85, bicarb 27, Cr 0.69, AST/ALT 59/47, proBNP 480, troponin 9, TSH 2.81, pH 7.29, pCO2 72, bicarb 35, neg RVP, CTA chest with no acute findings. CT head/cspine pending. Treated with 1L NS, Duonebs, solumedrol, and sedated with propofol/fentanyl. Admitted to MICU for AHRF.     73 yo F with PMH notable for asthma presenting with AHRF and hypercapnic respiratory acidosis with AMS requiring intubation for airway protection. Now admitted to MICU on ventilator for further evlauation and management iso asthma exacerbation.     Hospital Course:  During her ICU course, she experienced seizure-like activity (6 minutes of myoclonic upper jerks on 3/8 that resolved with Ativan, later progressing to rhythmic flexing movements in extremities) likely 2/2 hypercapnia and/or hyponatremia. Her CT head was negative for acute changes, and a video EEG was ordered that showed severe diffuse cerebral dysfunction consistent with comatose state but no epileptiform activity. Respiratory management included albuterol/ipratropium w/ budesonide in addition to solumedrol 125mg load and resulting 40mg x 5 days with a subsequent transition to PO taper, and magnesium supplementation, and ventilator settings to avoid air trapping. CTA was negative for pneumonia or pulmonary embolism, and respiratory viral panel was negative. She was empirically treated with azithromycin and Zosyn for CAP. Patient extubated 3/12 to face tent, responding to commands. Additional issues included hyponatremia (resolved, managed with free water restriction), acute on chronic metabolic alkalosis (likely compensatory), and mild thrombocytopenia (monitoring with plans). The primary etiology of her decompensation appears to have be an asthma exacerbation, which has now improved. Since 3/12 patient also having fevers without clear source (no VTEs, prior cultures negative, no known hx of malignancy). Sent rpt Bcx 3/12 pm with no growth so far.     On 3/13, Patient was transferred to medicine floors. Pt noted to be significantly weak and deconditioned as well as difficulty speaking. Psych consulted and Pt reinitiated on her anxiety and depression medications. MRI brain ordered which noted no acute strokes. Psychiatry was following and started you on standing ativan for concern for catatonia     On day of discharge, patient is clinically stable with no new exam findings or acute symptoms compared to prior. The patient was seen by the attending physician on the date of discharge and deemed stable and acceptable for discharge. The patient's chronic medical conditions were treated accordingly per the patient's home medication regimen. The patient's medication reconciliation (with changes made to chronic medications), follow up appointments, discharge orders, instructions, and significant lab and diagnostic studies are as noted.    Changes to Medications:    Pending results or Follow-up Appointments:    Advanced Directives:    Discharge Diagnosis:    Dispo:  pending hospital course HPI:  75 yo F with PMH asthma, depression presenting to [facility] with worsening dyspnea and wheezing with BLE edema since returning from 10.5 hour trip to [facility] 6 days ago. Altered in ED and unable to provide history, only responsive to noxious stimulli. History provided by  who states he found her on floor with unwitnessed fall. Hx of 3 asthma attacks last year, no hx of intubation, no clear asthma triggers but felt sick after flight home from [facility] 3/3/25 days where she visited for 3 months and was in usual state of health per .    ED Course: vitals notable for HR 60s, -115 with systolics in 180s, tachypneic to 24 with sPO2 95% on RA. Exam notable for wheezing. Placed on NC 5L then trialed on BiPAP but then required intubation to protect airway due to AMS. Labs notable for WBC 8.5, Hgb 12.6, , coags wnl, Na 126, K 4.3, Cl 85, bicarb 27, Cr 0.69, AST/ALT 59/47, proBNP 480, troponin 9, TSH 2.81, pH 7.29, pCO2 72, bicarb 35, neg RVP, CTA chest with no acute findings. CT head/cspine pending. Treated with 1L NS, Duonebs, solumedrol, and sedated with propofol/fentanyl. Admitted to MICU for AHRF.     75 yo F with PMH notable for asthma presenting with AHRF and hypercapnic respiratory acidosis with AMS requiring intubation for airway protection. Now admitted to MICU on ventilator for further evlauation and management iso asthma exacerbation.     Hospital Course:  During her ICU course, she experienced seizure-like activity (6 minutes of myoclonic upper jerks on 3/8 that resolved with Ativan, later progressing to rhythmic flexing movements in extremities) likely 2/2 hypercapnia and/or hyponatremia. Her CT head was negative for acute changes, and a video EEG was ordered that showed severe diffuse cerebral dysfunction consistent with comatose state but no epileptiform activity. Respiratory management included albuterol/ipratropium w/ budesonide in addition to solumedrol 125mg load and resulting 40mg x 5 days with a subsequent transition to PO taper, and magnesium supplementation, and ventilator settings to avoid air trapping. CTA was negative for pneumonia or pulmonary embolism, and respiratory viral panel was negative. She was empirically treated with azithromycin and Zosyn for CAP. Patient extubated 3/12 to face tent, responding to commands. Additional issues included hyponatremia (resolved, managed with free water restriction), acute on chronic metabolic alkalosis (likely compensatory), and mild thrombocytopenia (monitoring with plans). The primary etiology of her decompensation appears to have be an asthma exacerbation.    On 3/13, Patient was transferred to medicine floors. Pt noted to be significantly weak and deconditioned as well as difficulty speaking. Psych consulted and Pt reinitiated on her anxiety and depression medications. MRI brain ordered which noted no acute strokes. Psychiatry was following and started her on standing ativan for concern for catatonia     Patient's mental status was waxing and waning, with concern for aspiration events. Her respiratory status acutely worsened - she was seen again by pulmonology, placed on AVAPS which was able to be titrated to _____. IV steroids were started and weaned _____. Ativan was held _____. XR cinesophagram was performed and a pureed diet was recommended.    On day of discharge, patient is clinically stable with no new exam findings or acute symptoms compared to prior. The patient was seen by the attending physician on the date of discharge and deemed stable and acceptable for discharge. The patient's chronic medical conditions were treated accordingly per the patient's home medication regimen. The patient's medication reconciliation (with changes made to chronic medications), follow up appointments, discharge orders, instructions, and significant lab and diagnostic studies are as noted.    Changes to Medications:    Pending results or Follow-up Appointments:    Advanced Directives:    Discharge Diagnosis:    Dispo:  pending hospital course HPI:  73 yo F with PMH asthma, depression presenting to [facility] with worsening dyspnea and wheezing with BLE edema since returning from 10.5 hour trip to [facility] 6 days ago. Altered in ED and unable to provide history, only responsive to noxious stimulli. History provided by  who states he found her on floor with unwitnessed fall. Hx of 3 asthma attacks last year, no hx of intubation, no clear asthma triggers but felt sick after flight home from [facility] 3/3/25 days where she visited for 3 months and was in usual state of health per .    ED Course: vitals notable for HR 60s, -115 with systolics in 180s, tachypneic to 24 with sPO2 95% on RA. Exam notable for wheezing. Placed on NC 5L then trialed on BiPAP but then required intubation to protect airway due to AMS. Labs notable for WBC 8.5, Hgb 12.6, , coags wnl, Na 126, K 4.3, Cl 85, bicarb 27, Cr 0.69, AST/ALT 59/47, proBNP 480, troponin 9, TSH 2.81, pH 7.29, pCO2 72, bicarb 35, neg RVP, CTA chest with no acute findings. CT head/cspine pending. Treated with 1L NS, Duonebs, solumedrol, and sedated with propofol/fentanyl. Admitted to MICU for AHRF.     73 yo F with PMH notable for asthma presenting with AHRF and hypercapnic respiratory acidosis with AMS requiring intubation for airway protection. Now admitted to MICU on ventilator for further evlauation and management iso asthma exacerbation.     Hospital Course:  During her ICU course, she experienced seizure-like activity (6 minutes of myoclonic upper jerks on 3/8 that resolved with Ativan, later progressing to rhythmic flexing movements in extremities) likely 2/2 hypercapnia and/or hyponatremia. Her CT head was negative for acute changes, and a video EEG was ordered that showed severe diffuse cerebral dysfunction consistent with comatose state but no epileptiform activity. Respiratory management included albuterol/ipratropium w/ budesonide in addition to solumedrol 125mg load and resulting 40mg x 5 days with a subsequent transition to PO taper, and magnesium supplementation, and ventilator settings to avoid air trapping. CTA was negative for pneumonia or pulmonary embolism, and respiratory viral panel was negative. She was empirically treated with azithromycin and Zosyn for CAP. Patient extubated 3/12 to face tent, responding to commands. Additional issues included hyponatremia (resolved, managed with free water restriction), acute on chronic metabolic alkalosis (likely compensatory), and mild thrombocytopenia (monitoring with plans). The primary etiology of her decompensation appears to have be an asthma exacerbation.    On 3/13, Patient was transferred to medicine floors. Pt noted to be significantly weak and deconditioned as well as difficulty speaking. Psych consulted and Pt reinitiated on her anxiety and depression medications. MRI brain ordered which noted no acute strokes. Psychiatry was following and started her on standing ativan for concern for catatonia     Patient's mental status was waxing and waning, with concern for aspiration events. Her respiratory status acutely worsened - she was seen again by pulmonology, placed on AVAPS which was able to be titrated to nasal canula. IV steroids were started. Ativan was held indefinitely as there is concern it may have triggered respiratory event. XR cinesophagram was performed and a pureed diet was recommended.    Pt remained catatonic. Zoloft dosing being titrated back up by . Further medication adjustment done _____.     On day of discharge, patient is clinically stable with no new exam findings or acute symptoms compared to prior. The patient was seen by the attending physician on the date of discharge and deemed stable and acceptable for discharge. The patient's chronic medical conditions were treated accordingly per the patient's home medication regimen. The patient's medication reconciliation (with changes made to chronic medications), follow up appointments, discharge orders, instructions, and significant lab and diagnostic studies are as noted.    Changes to Medications:    Pending results or Follow-up Appointments:    Advanced Directives:    Discharge Diagnosis:  Acute hypoxic/hypercapnic respiratory failure due to asthma exacerbation, status asthmaticus  Depression  Catatonia     HPI:  73 yo F with PMH asthma, depression presenting to [facility] with worsening dyspnea and wheezing with BLE edema since returning from 10.5 hour trip to [facility] 6 days ago. Altered in ED and unable to provide history, only responsive to noxious stimulli. History provided by  who states he found her on floor with unwitnessed fall. Hx of 3 asthma attacks last year, no hx of intubation, no clear asthma triggers but felt sick after flight home from [facility] 3/3/25 days where she visited for 3 months and was in usual state of health per .    ED Course: vitals notable for HR 60s, -115 with systolics in 180s, tachypneic to 24 with sPO2 95% on RA. Exam notable for wheezing. Placed on NC 5L then trialed on BiPAP but then required intubation to protect airway due to AMS. Labs notable for WBC 8.5, Hgb 12.6, , coags wnl, Na 126, K 4.3, Cl 85, bicarb 27, Cr 0.69, AST/ALT 59/47, proBNP 480, troponin 9, TSH 2.81, pH 7.29, pCO2 72, bicarb 35, neg RVP, CTA chest with no acute findings. CT head/cspine pending. Treated with 1L NS, Duonebs, solumedrol, and sedated with propofol/fentanyl. Admitted to MICU for AHRF.     73 yo F with PMH notable for asthma presenting with AHRF and hypercapnic respiratory acidosis with AMS requiring intubation for airway protection. Now admitted to MICU on ventilator for further evlauation and management iso asthma exacerbation.     Hospital Course:  During her ICU course, she experienced seizure-like activity (6 minutes of myoclonic upper jerks on 3/8 that resolved with Ativan, later progressing to rhythmic flexing movements in extremities) likely 2/2 hypercapnia and/or hyponatremia. Her CT head was negative for acute changes, and a video EEG was ordered that showed severe diffuse cerebral dysfunction consistent with comatose state but no epileptiform activity. Respiratory management included albuterol/ipratropium w/ budesonide in addition to solumedrol 125mg load and resulting 40mg x 5 days with a subsequent transition to PO taper, and magnesium supplementation, and ventilator settings to avoid air trapping. CTA was negative for pneumonia or pulmonary embolism, and respiratory viral panel was negative. She was empirically treated with azithromycin and Zosyn for CAP. Patient extubated 3/12 to face tent, responding to commands. Additional issues included hyponatremia (resolved, managed with free water restriction), acute on chronic metabolic alkalosis (likely compensatory), and mild thrombocytopenia (monitoring with plans). The primary etiology of her decompensation appears to have be an asthma exacerbation.    On 3/13, Patient was transferred to medicine floors. Pt noted to be significantly weak and deconditioned as well as difficulty speaking. Psych consulted and Pt reinitiated on her anxiety and depression medications. MRI brain ordered which noted no acute strokes. Psychiatry was following and started her on standing ativan for concern for catatonia     Patient's mental status was waxing and waning, with concern for aspiration events. Her respiratory status acutely worsened - she was seen again by pulmonology, placed on AVAPS which was able to be titrated to nasal canula. IV steroids were started. Ativan was held indefinitely as there is concern it may have triggered respiratory event. XR cinesophagram was performed and a pureed diet was recommended.    Pt remained catatonic. Zoloft dosing being titrated back up by . Zyprexa was started. Pt tolerated meds. Clinically improved.****    On day of discharge, patient is clinically stable with no new exam findings or acute symptoms compared to prior. The patient was seen by the attending physician on the date of discharge and deemed stable and acceptable for discharge. The patient's chronic medical conditions were treated accordingly per the patient's home medication regimen. The patient's medication reconciliation (with changes made to chronic medications), follow up appointments, discharge orders, instructions, and significant lab and diagnostic studies are as noted.    Changes to Medications:    Pending results or Follow-up Appointments:    Advanced Directives:    Discharge Diagnosis:  Acute hypoxic/hypercapnic respiratory failure due to asthma exacerbation, status asthmaticus  Depression  Catatonia     HPI:  75 yo F with PMH asthma, depression presenting to [place] Hospital with worsening dyspnea and wheezing with BLE edema since returning from 10.5 hour trip to [facility] 6 days ago. Altered in ED and unable to provide history, only responsive to noxious stimulli. History provided by  who states he found her on floor with unwitnessed fall. Hx of 3 asthma attacks last year, no hx of intubation, no clear asthma triggers but felt sick after flight home from [facility] 3/3/25 days where she visited for 3 months and was in usual state of health per .    ED Course: vitals notable for HR 60s, -115 with systolics in 180s, tachypneic to 24 with sPO2 95% on RA. Exam notable for wheezing. Placed on NC 5L then trialed on BiPAP but then required intubation to protect airway due to AMS. Labs notable for WBC 8.5, Hgb 12.6, , coags wnl, Na 126, K 4.3, Cl 85, bicarb 27, Cr 0.69, AST/ALT 59/47, proBNP 480, troponin 9, TSH 2.81, pH 7.29, pCO2 72, bicarb 35, neg RVP, CTA chest with no acute findings. CT head/cspine pending. Treated with 1L NS, Duonebs, solumedrol, and sedated with propofol/fentanyl. Admitted to MICU for AHRF.     Hospital Course:  During her ICU course, she experienced seizure-like activity (6 minutes of myoclonic upper jerks on 3/8 that resolved with Ativan, later progressing to rhythmic flexing movements in extremities) likely 2/2 hypercapnia and/or hyponatremia. Her CT head was negative for acute changes, and a video EEG was ordered that showed severe diffuse cerebral dysfunction consistent with comatose state but no epileptiform activity. Respiratory management included albuterol/ipratropium w/ budesonide in addition to solumedrol 125mg load and resulting 40mg x 5 days with a subsequent transition to PO taper, and magnesium supplementation, and ventilator settings to avoid air trapping. CTA was negative for pneumonia or pulmonary embolism, and respiratory viral panel was negative. She was empirically treated with azithromycin and Zosyn for CAP. Patient extubated 3/12 to face tent, responding to commands. Additional issues included hyponatremia (resolved, managed with free water restriction), acute on chronic metabolic alkalosis (likely compensatory), and mild thrombocytopenia. The primary etiology of her decompensation appears to have be an asthma exacerbation.    On 3/13, Patient was transferred to medicine floors. Pt noted to be significantly weak and deconditioned as well as difficulty speaking. Psych consulted and Pt reinitiated on her anxiety and depression medications. MRI brain ordered which noted no acute strokes. Psychiatry was following and started her on standing ativan for concern for catatonia     Patient's mental status was waxing and waning, with concern for aspiration events. Her respiratory status acutely worsened - she was seen again by pulmonology, placed on AVAPS which was able to be titrated to nasal canula. IV steroids were started. Ativan was held indefinitely as there is concern it may have triggered respiratory event. XR cinesophagram was performed and a pureed diet was recommended.    Pt noted to be catatonic. Zoloft dosing being titrated back up by . Zyprexa was started.    recommended ECT.     Discharge Diagnosis:  Acute hypoxic/hypercapnic respiratory failure due to asthma exacerbation, status asthmaticus  Depression with Catatonia  Steroid induced hyperglycemia in prediabetic patient      HPI:  73 yo F with PMH asthma, depression presenting to [facility] with worsening dyspnea and wheezing with BLE edema since returning from 10.5 hour trip to [facility] 6 days ago. Altered in ED and unable to provide history, only responsive to noxious stimulli. History provided by  who states he found her on floor with unwitnessed fall. Hx of 3 asthma attacks last year, no hx of intubation, no clear asthma triggers but felt sick after flight home from [facility] 3/3/25 days where she visited for 3 months and was in usual state of health per .    ED Course: vitals notable for HR 60s, -115 with systolics in 180s, tachypneic to 24 with sPO2 95% on RA. Exam notable for wheezing. Placed on NC 5L then trialed on BiPAP but then required intubation to protect airway due to AMS. Labs notable for WBC 8.5, Hgb 12.6, , coags wnl, Na 126, K 4.3, Cl 85, bicarb 27, Cr 0.69, AST/ALT 59/47, proBNP 480, troponin 9, TSH 2.81, pH 7.29, pCO2 72, bicarb 35, neg RVP, CTA chest with no acute findings. CT head/cspine pending. Treated with 1L NS, Duonebs, solumedrol, and sedated with propofol/fentanyl. Admitted to MICU for AHRF.     Hospital Course:  During her ICU course, she experienced seizure-like activity (6 minutes of myoclonic upper jerks on 3/8 that resolved with Ativan, later progressing to rhythmic flexing movements in extremities) likely 2/2 hypercapnia and/or hyponatremia. Her CT head was negative for acute changes, and a video EEG was ordered that showed severe diffuse cerebral dysfunction consistent with comatose state but no epileptiform activity. Respiratory management included albuterol/ipratropium w/ budesonide in addition to solumedrol 125mg load and resulting 40mg x 5 days with a subsequent transition to PO taper, and magnesium supplementation, and ventilator settings to avoid air trapping. CTA was negative for pneumonia or pulmonary embolism, and respiratory viral panel was negative. She was empirically treated with azithromycin and Zosyn for CAP. Patient extubated 3/12 to face tent, responding to commands. Additional issues included hyponatremia (resolved, managed with free water restriction), acute on chronic metabolic alkalosis (likely compensatory), and mild thrombocytopenia. The primary etiology of her decompensation appears to have be an asthma exacerbation.    On 3/13, Patient was transferred to medicine floors. Pt noted to be significantly weak and deconditioned as well as difficulty speaking. Psych consulted and Pt reinitiated on her anxiety and depression medications. MRI brain ordered which noted no acute strokes. Psychiatry was following and started her on standing ativan for concern for catatonia     Patient's mental status was waxing and waning, with concern for aspiration events. Her respiratory status acutely worsened - she was seen again by pulmonology, placed on AVAPS which was able to be titrated to nasal canula. IV steroids were started. Ativan was held indefinitely as there is concern it may have triggered respiratory event. XR cinesophagram was performed and a pureed diet was recommended.    Pt noted to be catatonic. Zoloft dosing being titrated back up by . Zyprexa was started.    recommended ECT.   She was initiated on ECT and demonstrated improvement in wakefulness, mobility, and oral intake.  She was recommended for transfer to [facility] for inpatient psychiatric hospitalization with ongoing ECT.    Discharge Diagnosis:  Acute hypoxic/hypercapnic respiratory failure due to asthma exacerbation, status asthmaticus  Depression with Catatonia  Steroid induced hyperglycemia in prediabetic patient      HPI:  75 yo F with PMH asthma, depression presenting to [facility] with worsening dyspnea and wheezing with BLE edema since returning from 10.5 hour trip to [facility] 6 days ago. Altered in ED and unable to provide history, only responsive to noxious stimulli. History provided by  who states he found her on floor with unwitnessed fall. Hx of 3 asthma attacks last year, no hx of intubation, no clear asthma triggers but felt sick after flight home from [facility] 3/3/25 days where she visited for 3 months and was in usual state of health per .    ED Course: vitals notable for HR 60s, -115 with systolics in 180s, tachypneic to 24 with sPO2 95% on RA. Exam notable for wheezing. Placed on NC 5L then trialed on BiPAP but then required intubation to protect airway due to AMS. Labs notable for WBC 8.5, Hgb 12.6, , coags wnl, Na 126, K 4.3, Cl 85, bicarb 27, Cr 0.69, AST/ALT 59/47, proBNP 480, troponin 9, TSH 2.81, pH 7.29, pCO2 72, bicarb 35, neg RVP, CTA chest with no acute findings. CT head/cspine pending. Treated with 1L NS, Duonebs, solumedrol, and sedated with propofol/fentanyl. Admitted to MICU for AHRF.     Hospital Course:  During her ICU course, she experienced seizure-like activity (6 minutes of myoclonic upper jerks on 3/8 that resolved with Ativan, later progressing to rhythmic flexing movements in extremities) likely 2/2 hypercapnia and/or hyponatremia. Her CT head was negative for acute changes, and a video EEG was ordered that showed severe diffuse cerebral dysfunction consistent with comatose state but no epileptiform activity. Respiratory management included albuterol/ipratropium w/ budesonide in addition to solumedrol 125mg load and resulting 40mg x 5 days with a subsequent transition to PO taper, and magnesium supplementation, and ventilator settings to avoid air trapping. CTA was negative for pneumonia or pulmonary embolism, and respiratory viral panel was negative. She was empirically treated with azithromycin and Zosyn for CAP. Patient extubated 3/12 to face tent, responding to commands. Additional issues included hyponatremia (resolved, managed with free water restriction), acute on chronic metabolic alkalosis (likely compensatory), and mild thrombocytopenia. The primary etiology of her decompensation appears to have be an asthma exacerbation.    On 3/13, Patient was transferred to medicine floors. Pt noted to be significantly weak and deconditioned as well as difficulty speaking. Psych consulted and Pt reinitiated on her anxiety and depression medications. MRI brain ordered which noted no acute strokes. Psychiatry was following and started her on standing ativan for concern for catatonia     Patient's mental status was waxing and waning, with concern for aspiration events. Her respiratory status acutely worsened - she was seen again by pulmonology, placed on AVAPS which was able to be titrated to nasal canula. IV steroids were started, which have been converted to pred taper and now completed. Ativan was held indefinitely as there is concern it may have triggered respiratory event. XR cinesophagram was performed and a pureed diet was recommended, diet has been advanced with improved MS    Pt noted to be catatonic. Zoloft dosing being titrated back up by . Zyprexa was started.    recommended ECT.   She was initiated on ECT and demonstrated improvement in wakefulness, mobility, and oral intake.  She was recommended for transfer to [facility] for inpatient psychiatric hospitalization with ongoing ECT.    Discharge Diagnosis:  Acute hypoxic/hypercapnic respiratory failure due to asthma exacerbation, status asthmaticus  Depression with Catatonia  Steroid induced hyperglycemia in prediabetic patient

## 2025-03-18 NOTE — PROGRESS NOTE ADULT - PROBLEM SELECTOR PLAN 3
# Hypertension  - Off Nicardipine ggt since 3/15    Plan:  - c/w amlodipine 10mg qD  - Increased Lisinopril to 40mg qD  - add hydralazine 10 mg TID   - hold home HCTZ, avoid beta-blockers given asthma # Hypertension  - Off Nicardipine ggt since 3/15    Plan:  - c/w amlodipine from 20mg qD  - Increased Lisinopril to 40mg qD  - add hydralazine 10 mg TID   - hold home HCTZ, avoid beta-blockers given asthma # Hypertension  - Off Nicardipine ggt since 3/15    SBP goal 150     Plan:  - c/w amlodipine from 20mg qD  - increased Lisinopril to 40mg qD  - increased hydralazine 10 to 25 mg TID   - hold home HCTZ, avoid beta-blockers given asthma

## 2025-03-18 NOTE — PROVIDER CONTACT NOTE (OTHER) - ACTION/TREATMENT ORDERED:
notify md. no interventions at this time, since pt denied difficulty breathing and any distress whatsoever

## 2025-03-18 NOTE — PROGRESS NOTE ADULT - PROBLEM SELECTOR PLAN 1
Likely 2/2 asthma exacerbation, with wheezing. Hx of 3 asthma attacks last year, no hx of intubation, no clear asthma triggers. Likely has chronic hypercapnia in the setting of asthma.  - s/p intubation 03/08 and extubation 03/12  - s/p MICU stay, downgraded on 03/16    Plan:  - continue Advair, DuoNeb q4h, budesonide q12h   - s/p solu-medrol 20 mg IV (03/16 - 03/17), c/w Prednisone 40 mg daily  - c/w chest pt  - goal spO2: 92%  - wean o2 as able Likely 2/2 asthma exacerbation, with wheezing. Hx of 3 asthma attacks last year, no hx of intubation, no clear asthma triggers. Likely has chronic hypercapnia in the setting of asthma.  - s/p intubation 03/08 and extubation 03/12  - s/p MICU stay, downgraded on 03/16  - s/p solu-medrol 20 mg IV (03/16 - 03/17)    Plan:  - Pulmonology following, appreciate recs  - continue DuoNeb q4h  - c/w Prednisone 40 mg x 5 days  - stopped advair  - restarted budesonide q12h  - c/w chest pt  - goal spO2: 92%  - wean o2 as able

## 2025-03-18 NOTE — PROGRESS NOTE ADULT - PROBLEM SELECTOR PLAN 2
No PNA seen on imaging, though high clinical suspicion. Leukocytosis i/s/o steroids and possible PNA, persistent fevers starting 3/12 now progressed to low-grade temps.  - Full RVP neg, legionella/strep negative, LE dopplers w/o DVT  - Blood cultures: NGTD  - UA 3/12 neg  - s/p azithro (5-days, finished 3/12)    Plan:  - s/p empiric Zosyn for suspected PNA, 5 day course (03/13 - 03/17), finished No PNA seen on imaging, though high clinical suspicion. Leukocytosis i/s/o steroids and possible PNA, persistent fevers starting 3/12 now progressed to low-grade temps.  - Full RVP neg, legionella/strep negative, LE dopplers w/o DVT  - Blood cultures: NGTD  - UA 3/12 neg  - s/p azithro (5-days, finished 3/12)    Plan:  - s/p empiric Zosyn for suspected PNA, 5 day course (03/13 - 03/17), finished  - patient with increased congestion and wheezing > ordered RVP, proBNP, lasix 20 mg x1

## 2025-03-18 NOTE — PROGRESS NOTE ADULT - PROBLEM SELECTOR PLAN 9
- DVT: Enoxaparin 40mg qD  - Diet: soft and bite sized, escalate as tolerated  - PT consulted, recs appreciated  - Dispo: pending clinical improvement and weaning off oxygen

## 2025-03-18 NOTE — PROGRESS NOTE ADULT - ATTENDING COMMENTS
Agree with above  74F PMH asthma (three exacerbations in past year) p/w dyspnea and AMS requiring intubation, now extubated. Stay c/b hypertension requiring nicardipine gtt.  - Currently no wheezing (albeit recently received NEBs), titrated down to 2LNC, spO2 98%  - c/w Advair / DuoNEBs  - Mild dyspnea on exam, but awake alert  - c/w prednisone 40mg PO daily  - c/w afterload reduction

## 2025-03-19 LAB
ADD ON TEST-SPECIMEN IN LAB: SIGNIFICANT CHANGE UP
ALBUMIN SERPL ELPH-MCNC: 3.3 G/DL — SIGNIFICANT CHANGE UP (ref 3.3–5)
ALP SERPL-CCNC: 83 U/L — SIGNIFICANT CHANGE UP (ref 40–120)
ALT FLD-CCNC: 18 U/L — SIGNIFICANT CHANGE UP (ref 4–33)
ANION GAP SERPL CALC-SCNC: 18 MMOL/L — HIGH (ref 7–14)
AST SERPL-CCNC: 11 U/L — SIGNIFICANT CHANGE UP (ref 4–32)
B PERT DNA SPEC QL NAA+PROBE: SIGNIFICANT CHANGE UP
B PERT+PARAPERT DNA PNL SPEC NAA+PROBE: SIGNIFICANT CHANGE UP
BASOPHILS # BLD AUTO: 0.02 K/UL — SIGNIFICANT CHANGE UP (ref 0–0.2)
BASOPHILS NFR BLD AUTO: 0.1 % — SIGNIFICANT CHANGE UP (ref 0–2)
BILIRUB SERPL-MCNC: 0.3 MG/DL — SIGNIFICANT CHANGE UP (ref 0.2–1.2)
BLOOD GAS ARTERIAL - LYTES,HGB,ICA,LACT RESULT: SIGNIFICANT CHANGE UP
BUN SERPL-MCNC: 24 MG/DL — HIGH (ref 7–23)
C PNEUM DNA SPEC QL NAA+PROBE: SIGNIFICANT CHANGE UP
CALCIUM SERPL-MCNC: 9.9 MG/DL — SIGNIFICANT CHANGE UP (ref 8.4–10.5)
CHLORIDE SERPL-SCNC: 95 MMOL/L — LOW (ref 98–107)
CO2 SERPL-SCNC: 24 MMOL/L — SIGNIFICANT CHANGE UP (ref 22–31)
CREAT SERPL-MCNC: 0.57 MG/DL — SIGNIFICANT CHANGE UP (ref 0.5–1.3)
EGFR: 95 ML/MIN/1.73M2 — SIGNIFICANT CHANGE UP
EGFR: 95 ML/MIN/1.73M2 — SIGNIFICANT CHANGE UP
EOSINOPHIL # BLD AUTO: 0.02 K/UL — SIGNIFICANT CHANGE UP (ref 0–0.5)
EOSINOPHIL NFR BLD AUTO: 0.1 % — SIGNIFICANT CHANGE UP (ref 0–6)
FLUAV AG NPH QL: SIGNIFICANT CHANGE UP
FLUAV SUBTYP SPEC NAA+PROBE: SIGNIFICANT CHANGE UP
FLUBV AG NPH QL: SIGNIFICANT CHANGE UP
FLUBV RNA SPEC QL NAA+PROBE: SIGNIFICANT CHANGE UP
GLUCOSE BLDC GLUCOMTR-MCNC: 247 MG/DL — HIGH (ref 70–99)
GLUCOSE BLDC GLUCOMTR-MCNC: 264 MG/DL — HIGH (ref 70–99)
GLUCOSE SERPL-MCNC: 181 MG/DL — HIGH (ref 70–99)
HADV DNA SPEC QL NAA+PROBE: SIGNIFICANT CHANGE UP
HCOV 229E RNA SPEC QL NAA+PROBE: SIGNIFICANT CHANGE UP
HCOV HKU1 RNA SPEC QL NAA+PROBE: SIGNIFICANT CHANGE UP
HCOV NL63 RNA SPEC QL NAA+PROBE: SIGNIFICANT CHANGE UP
HCOV OC43 RNA SPEC QL NAA+PROBE: SIGNIFICANT CHANGE UP
HCT VFR BLD CALC: 43.2 % — SIGNIFICANT CHANGE UP (ref 34.5–45)
HGB BLD-MCNC: 13.9 G/DL — SIGNIFICANT CHANGE UP (ref 11.5–15.5)
HMPV RNA SPEC QL NAA+PROBE: SIGNIFICANT CHANGE UP
HPIV1 RNA SPEC QL NAA+PROBE: SIGNIFICANT CHANGE UP
HPIV2 RNA SPEC QL NAA+PROBE: SIGNIFICANT CHANGE UP
HPIV3 RNA SPEC QL NAA+PROBE: SIGNIFICANT CHANGE UP
HPIV4 RNA SPEC QL NAA+PROBE: SIGNIFICANT CHANGE UP
IANC: 8.79 K/UL — HIGH (ref 1.8–7.4)
IMM GRANULOCYTES NFR BLD AUTO: 0.4 % — SIGNIFICANT CHANGE UP (ref 0–0.9)
LYMPHOCYTES # BLD AUTO: 32 % — SIGNIFICANT CHANGE UP (ref 13–44)
LYMPHOCYTES # BLD AUTO: 4.47 K/UL — HIGH (ref 1–3.3)
M PNEUMO DNA SPEC QL NAA+PROBE: SIGNIFICANT CHANGE UP
MAGNESIUM SERPL-MCNC: 2 MG/DL — SIGNIFICANT CHANGE UP (ref 1.6–2.6)
MCHC RBC-ENTMCNC: 32.1 PG — SIGNIFICANT CHANGE UP (ref 27–34)
MCHC RBC-ENTMCNC: 32.2 G/DL — SIGNIFICANT CHANGE UP (ref 32–36)
MCV RBC AUTO: 99.8 FL — SIGNIFICANT CHANGE UP (ref 80–100)
MONOCYTES # BLD AUTO: 0.62 K/UL — SIGNIFICANT CHANGE UP (ref 0–0.9)
MONOCYTES NFR BLD AUTO: 4.4 % — SIGNIFICANT CHANGE UP (ref 2–14)
NEUTROPHILS # BLD AUTO: 8.79 K/UL — HIGH (ref 1.8–7.4)
NEUTROPHILS NFR BLD AUTO: 63 % — SIGNIFICANT CHANGE UP (ref 43–77)
NRBC # BLD AUTO: 0 K/UL — SIGNIFICANT CHANGE UP (ref 0–0)
NRBC # FLD: 0 K/UL — SIGNIFICANT CHANGE UP (ref 0–0)
NRBC BLD AUTO-RTO: 0 /100 WBCS — SIGNIFICANT CHANGE UP (ref 0–0)
PHOSPHATE SERPL-MCNC: 3.5 MG/DL — SIGNIFICANT CHANGE UP (ref 2.5–4.5)
PLATELET # BLD AUTO: 149 K/UL — LOW (ref 150–400)
POTASSIUM SERPL-MCNC: 3.5 MMOL/L — SIGNIFICANT CHANGE UP (ref 3.5–5.3)
POTASSIUM SERPL-SCNC: 3.5 MMOL/L — SIGNIFICANT CHANGE UP (ref 3.5–5.3)
PROT SERPL-MCNC: 6.9 G/DL — SIGNIFICANT CHANGE UP (ref 6–8.3)
RAPID RVP RESULT: SIGNIFICANT CHANGE UP
RBC # BLD: 4.33 M/UL — SIGNIFICANT CHANGE UP (ref 3.8–5.2)
RBC # FLD: 12.8 % — SIGNIFICANT CHANGE UP (ref 10.3–14.5)
RSV RNA NPH QL NAA+NON-PROBE: SIGNIFICANT CHANGE UP
RSV RNA SPEC QL NAA+PROBE: SIGNIFICANT CHANGE UP
RV+EV RNA SPEC QL NAA+PROBE: SIGNIFICANT CHANGE UP
SARS-COV-2 RNA SPEC QL NAA+PROBE: SIGNIFICANT CHANGE UP
SARS-COV-2 RNA SPEC QL NAA+PROBE: SIGNIFICANT CHANGE UP
SODIUM SERPL-SCNC: 137 MMOL/L — SIGNIFICANT CHANGE UP (ref 135–145)
WBC # BLD: 13.98 K/UL — HIGH (ref 3.8–10.5)
WBC # FLD AUTO: 13.98 K/UL — HIGH (ref 3.8–10.5)

## 2025-03-19 PROCEDURE — 99291 CRITICAL CARE FIRST HOUR: CPT

## 2025-03-19 RX ORDER — INSULIN LISPRO 100 U/ML
INJECTION, SOLUTION INTRAVENOUS; SUBCUTANEOUS EVERY 6 HOURS
Refills: 0 | Status: DISCONTINUED | OUTPATIENT
Start: 2025-03-19 | End: 2025-03-20

## 2025-03-19 RX ORDER — MAGNESIUM SULFATE 500 MG/ML
2 SYRINGE (ML) INJECTION ONCE
Refills: 0 | Status: COMPLETED | OUTPATIENT
Start: 2025-03-19 | End: 2025-03-19

## 2025-03-19 RX ORDER — IPRATROPIUM BROMIDE AND ALBUTEROL SULFATE .5; 2.5 MG/3ML; MG/3ML
3 SOLUTION RESPIRATORY (INHALATION)
Refills: 0 | Status: COMPLETED | OUTPATIENT
Start: 2025-03-19 | End: 2025-03-19

## 2025-03-19 RX ORDER — SULFAMETHOXAZOLE/TRIMETHOPRIM 800-160 MG
1 TABLET ORAL
Refills: 0 | Status: DISCONTINUED | OUTPATIENT
Start: 2025-03-19 | End: 2025-03-19

## 2025-03-19 RX ORDER — METHYLPREDNISOLONE ACETATE 80 MG/ML
20 INJECTION, SUSPENSION INTRA-ARTICULAR; INTRALESIONAL; INTRAMUSCULAR; SOFT TISSUE EVERY 8 HOURS
Refills: 0 | Status: DISCONTINUED | OUTPATIENT
Start: 2025-03-19 | End: 2025-03-21

## 2025-03-19 RX ORDER — OLANZAPINE 10 MG/1
10 TABLET ORAL AT BEDTIME
Refills: 0 | Status: DISCONTINUED | OUTPATIENT
Start: 2025-03-19 | End: 2025-03-19

## 2025-03-19 RX ORDER — IPRATROPIUM BROMIDE AND ALBUTEROL SULFATE .5; 2.5 MG/3ML; MG/3ML
3 SOLUTION RESPIRATORY (INHALATION) EVERY 4 HOURS
Refills: 0 | Status: DISCONTINUED | OUTPATIENT
Start: 2025-03-19 | End: 2025-03-22

## 2025-03-19 RX ORDER — METHYLPREDNISOLONE ACETATE 80 MG/ML
40 INJECTION, SUSPENSION INTRA-ARTICULAR; INTRALESIONAL; INTRAMUSCULAR; SOFT TISSUE ONCE
Refills: 0 | Status: COMPLETED | OUTPATIENT
Start: 2025-03-19 | End: 2025-03-19

## 2025-03-19 RX ORDER — OLANZAPINE 10 MG/1
10 TABLET ORAL AT BEDTIME
Refills: 0 | Status: DISCONTINUED | OUTPATIENT
Start: 2025-03-19 | End: 2025-03-21

## 2025-03-19 RX ORDER — IPRATROPIUM BROMIDE AND ALBUTEROL SULFATE .5; 2.5 MG/3ML; MG/3ML
3 SOLUTION RESPIRATORY (INHALATION) ONCE
Refills: 0 | Status: COMPLETED | OUTPATIENT
Start: 2025-03-19 | End: 2025-03-19

## 2025-03-19 RX ADMIN — Medication 4 MILLILITER(S): at 09:37

## 2025-03-19 RX ADMIN — Medication 4 MILLILITER(S): at 20:02

## 2025-03-19 RX ADMIN — BUDESONIDE 0.5 MILLIGRAM(S): 0.25 SUSPENSION RESPIRATORY (INHALATION) at 09:56

## 2025-03-19 RX ADMIN — IPRATROPIUM BROMIDE AND ALBUTEROL SULFATE 3 MILLILITER(S): .5; 2.5 SOLUTION RESPIRATORY (INHALATION) at 09:36

## 2025-03-19 RX ADMIN — METHYLPREDNISOLONE ACETATE 20 MILLIGRAM(S): 80 INJECTION, SUSPENSION INTRA-ARTICULAR; INTRALESIONAL; INTRAMUSCULAR; SOFT TISSUE at 15:01

## 2025-03-19 RX ADMIN — INSULIN LISPRO 3: 100 INJECTION, SOLUTION INTRAVENOUS; SUBCUTANEOUS at 18:21

## 2025-03-19 RX ADMIN — IPRATROPIUM BROMIDE AND ALBUTEROL SULFATE 3 MILLILITER(S): .5; 2.5 SOLUTION RESPIRATORY (INHALATION) at 05:36

## 2025-03-19 RX ADMIN — IPRATROPIUM BROMIDE AND ALBUTEROL SULFATE 3 MILLILITER(S): .5; 2.5 SOLUTION RESPIRATORY (INHALATION) at 05:55

## 2025-03-19 RX ADMIN — IPRATROPIUM BROMIDE AND ALBUTEROL SULFATE 3 MILLILITER(S): .5; 2.5 SOLUTION RESPIRATORY (INHALATION) at 13:28

## 2025-03-19 RX ADMIN — ENOXAPARIN SODIUM 40 MILLIGRAM(S): 100 INJECTION SUBCUTANEOUS at 11:40

## 2025-03-19 RX ADMIN — IPRATROPIUM BROMIDE AND ALBUTEROL SULFATE 3 MILLILITER(S): .5; 2.5 SOLUTION RESPIRATORY (INHALATION) at 15:47

## 2025-03-19 RX ADMIN — IPRATROPIUM BROMIDE AND ALBUTEROL SULFATE 3 MILLILITER(S): .5; 2.5 SOLUTION RESPIRATORY (INHALATION) at 04:11

## 2025-03-19 RX ADMIN — Medication 40 MILLIGRAM(S): at 11:39

## 2025-03-19 RX ADMIN — IPRATROPIUM BROMIDE AND ALBUTEROL SULFATE 3 MILLILITER(S): .5; 2.5 SOLUTION RESPIRATORY (INHALATION) at 20:01

## 2025-03-19 RX ADMIN — Medication 25 GRAM(S): at 05:55

## 2025-03-19 RX ADMIN — PREDNISONE 40 MILLIGRAM(S): 20 TABLET ORAL at 05:56

## 2025-03-19 RX ADMIN — METHYLPREDNISOLONE ACETATE 40 MILLIGRAM(S): 80 INJECTION, SUSPENSION INTRA-ARTICULAR; INTRALESIONAL; INTRAMUSCULAR; SOFT TISSUE at 06:15

## 2025-03-19 RX ADMIN — METHYLPREDNISOLONE ACETATE 20 MILLIGRAM(S): 80 INJECTION, SUSPENSION INTRA-ARTICULAR; INTRALESIONAL; INTRAMUSCULAR; SOFT TISSUE at 23:25

## 2025-03-19 RX ADMIN — BUDESONIDE 0.5 MILLIGRAM(S): 0.25 SUSPENSION RESPIRATORY (INHALATION) at 20:04

## 2025-03-19 RX ADMIN — INSULIN LISPRO 4: 100 INJECTION, SOLUTION INTRAVENOUS; SUBCUTANEOUS at 11:41

## 2025-03-19 NOTE — PROGRESS NOTE ADULT - PROBLEM SELECTOR PLAN 1
Likely 2/2 asthma exacerbation, with wheezing. Hx of 3 asthma attacks last year, no hx of intubation, no clear asthma triggers. Likely has chronic hypercapnia in the setting of asthma.  - s/p intubation 03/08 and extubation 03/12  - s/p MICU stay, downgraded on 03/16  - s/p solu-medrol 20 mg IV (03/16 - 03/17)    Plan:  - Pulmonology following, appreciate recs  - continue DuoNeb q4h  - c/w Prednisone 40 mg x 5 days  - stopped advair  - restarted budesonide q12h  - c/w chest pt  - goal spO2: 92%  - wean o2 as able

## 2025-03-19 NOTE — CHART NOTE - NSCHARTNOTEFT_GEN_A_CORE
______________ CRITICAL CARE NUTRITION FOLLOW-UP ________________        --Medical Course--  73 y/o F with Hx of asthma & MDD. Presenting with wheezing x 3d & s/p unwitnessed fall.  Admitted with acute hypoxic respiratory failure & hypercapnic respiratory acidosis.  Intubated in ED for airway protection.  S/p extubation (3/12).  Also prior hyponatremia, likely hypovolemia, per chart.  Pt. s/p extubation (3/12).  Transferred to floors, but s/p RRT (3/19) and transferred back to MICU.  Initiated on BiPAP, then transitioned to NC.      Spoke with PA and attending physician.      --Nutrition Course--  Inconsistent and overall likely inadequate (<75%) energy/nutrient intake x at least 11d.    Pt. frequently NPO throughout hospitalization.  Had received TF during initial MICU stay (3/10-3/12). Eventually transitioned to soft/bite sized diet w Glucerna 2x daily, however for <5d.  Pt. currently NPO due to being on BiPAP. If Pt. able to maintain oxygen sats on NC, will dysphagia screen for possible advancement to PO diet.    Significant 8.4kg, 10.5% weight loss x 1-2weeks. Also with overtly visible physical findings of muscle depletion and fat loss:    Mild -orbitals, temples, shoulders.    Pt. deemed acutely, moderately malnourished.      Met with son @ bedside.  Other son on phone during RDN encounter.  Explained suggestion for consideration of alternate (enteral) means of nutrition, if aligns with Pt./family wishes, and if PO diet deemed medically inappropriate given prolonged suboptimal energy intake.            __________Diet Order_________  Diet, NPO (03-19-25 @ 07:46) [Active]            Weight:       62"             Ideal Body Weight: 110lbs / 50kg  71.6kg (3/16)  76.4kg (3/14)  80.2kg (3/10)  80.0kg (3/8 on admit)        _____Estimated Energy Needs (based on current weight 76.4kg)_____  25-30= 2111-3453 KCals/d  1.2-1.4g protein/kg = 92-107g protein/d    Edema: 1+ generalized    Last bowel movement: (3/17)    Skin: No pressure injuries    ________________________Pertinent Medications____________   MEDICATIONS  (STANDING):  albuterol/ipratropium for Nebulization 3 milliLiter(s) Nebulizer every 4 hours  buDESOnide    Inhalation Suspension 0.5 milliGRAM(s) Inhalation every 12 hours  chlorhexidine 2% Cloths 1 Application(s) Topical <User Schedule>  dextrose 5%. 1000 milliLiter(s) (100 mL/Hr) IV Continuous <Continuous>  dextrose 5%. 1000 milliLiter(s) (50 mL/Hr) IV Continuous <Continuous>  dextrose 50% Injectable 25 Gram(s) IV Push once  dextrose 50% Injectable 12.5 Gram(s) IV Push once  dextrose 50% Injectable 25 Gram(s) IV Push once  dextrose Oral Gel 15 Gram(s) Oral once  enoxaparin Injectable 40 milliGRAM(s) SubCutaneous every 24 hours  glucagon  Injectable 1 milliGRAM(s) IntraMuscular once  guaiFENesin  milliGRAM(s) Oral every 12 hours  hydrALAZINE 25 milliGRAM(s) Oral three times a day  hydrochlorothiazide 25 milliGRAM(s) Oral daily  insulin lispro (ADMELOG) corrective regimen sliding scale   SubCutaneous three times a day before meals  insulin lispro (ADMELOG) corrective regimen sliding scale   SubCutaneous at bedtime  lisinopril 40 milliGRAM(s) Oral daily  methylPREDNISolone sodium succinate Injectable 20 milliGRAM(s) IV Push every 8 hours  OLANZapine 10 milliGRAM(s) Oral at bedtime  pantoprazole  Injectable 40 milliGRAM(s) IV Push daily  polyethylene glycol 3350 17 Gram(s) Oral daily  senna 2 Tablet(s) Oral at bedtime  sertraline 25 milliGRAM(s) Oral daily  sodium chloride 3%  Inhalation 4 milliLiter(s) Inhalation every 12 hours    MEDICATIONS  (PRN):          _________________________Pertinent Labs____________________     03-19    137  |  95[L]  |  24[H]  ----------------------------<  181[H]  3.5   |  24  |  0.57    Ca    9.9      19 Mar 2025 06:05  Phos  3.5     03-19  Mg     2.00     03-19          CAPILLARY BLOOD GLUCOSE    POCT Blood Glucose.: 247 mg/dL (03-19-25 @ 11:38)  POCT Blood Glucose.: 195 mg/dL (03-19-25 @ 08:43)  POCT Blood Glucose.: 171 mg/dL (03-19-25 @ 06:27)  POCT Blood Glucose.: 229 mg/dL (03-18-25 @ 21:46)  POCT Blood Glucose.: 197 mg/dL (03-18-25 @ 17:15)        ____New Nutrition Dx_____  Meets criteria for MODERATE, acute malnutrition 2/2 to pulmonary dysfunction warranting supplemental oxygen as evidenced by <75% energy intake x >7d due to frequently NPO, as well as mild muscle wasting/fat loss and significant >10% weight decrease x 11d.      PLAN/RECOMMENDATIONS:    1) NPO with consideration for alternate means of [enteral] nutrition.  2) If PO intake feasible, advise repeat swallow evaluation prior to initiating oral diet  3) Monitor GI status, electrolytes, glucose  4) Daily weights      RDN remains available and will f/u PRN.          Bambi Braun RDN, CDN       pager [phone] or [place] Teams

## 2025-03-19 NOTE — CHART NOTE - NSCHARTNOTEFT_GEN_A_CORE
MICU Accept Note    CHIEF COMPLAINT:     HPI / INTERVAL HISTORY:  "  HPI:  73 yo F with PMH asthma, depression presenting to [facility] with worsening dyspnea and wheezing with BLE edema since returning from 10.5 hour trip to [facility] 6 days ago. Altered in ED and unable to provide history, only responsive to noxious stimulli. History provided by  who states he found her on floor with unwitnessed fall. Hx of 3 asthma attacks last year, no hx of intubation, no clear asthma triggers but felt sick after flight home from [facility] 3/3/25 days where she visited for 3 months and was in usual state of health per .    ED Course: vitals notable for HR 60s, -115 with systolics in 180s, tachypneic to 24 with sPO2 95% on RA. Exam notable for wheezing. Placed on NC 5L then trialed on BiPAP but then required intubation to protect airway due to AMS. Labs notable for WBC 8.5, Hgb 12.6, , coags wnl, Na 126, K 4.3, Cl 85, bicarb 27, Cr 0.69, AST/ALT 59/47, proBNP 480, troponin 9, TSH 2.81, pH 7.29, pCO2 72, bicarb 35, neg RVP, CTA chest with no acute findings. CT head/cspine pending. Treated with 1L NS, Duonebs, solumedrol, and sedated with propofol/fentanyl. Admitted to MICU for AHRF.     73 yo F with PMH notable for asthma presenting with AHRF and hypercapnic respiratory acidosis with AMS requiring intubation for airway protection. Now admitted to MICU on ventilator for further evlauation and management iso asthma exacerbation.  (08 Mar 2025 17:07)    "    PAST MEDICAL & SURGICAL HISTORY:  MDD (major depressive disorder), recurrent, severe, with psychosis      Asthma, mild intermittent, uncomplicated      Essential hypertension      No significant past surgical history          FAMILY HISTORY:      SOCIAL HISTORY:  Smoking:   Substance Use:   EtOH Use:   Marital Status:   Sexual History:   Occupation:  Recent Travel:  Country of Birth:   Advance Directives:     HOME MEDICATIONS:      Allergies    No Known Allergies    Intolerances          REVIEW OF SYSTEMS:  Constitutional: No fevers, chills, weight loss, weight gain  HEENT: No vision problems, eye pain, nasal congestion, rhinorrhea, sore throat, dysphagia  CV: No chest pain, orthopnea, palpitations  Resp: No cough, dyspnea, wheezing, hemoptysis  GI: No nausea, vomiting, diarrhea, constipation, abdominal pain  : [ ] dysuria [ ] nocturia [ ] hematuria [ ] increased urinary frequency  Musculoskeletal: [ ] back pain [ ] myalgias [ ] arthralgias [ ] fracture  Skin: [ ] rash [ ] itch  Neurological: [ ] headache [ ] dizziness [ ] syncope [ ] weakness [ ] numbness  Psychiatric: [ ] anxiety [ ] depression  Endocrine: [ ] diabetes [ ] thyroid problem  Hematologic/Lymphatic: [ ] anemia [ ] bleeding problem  Allergic/Immunologic: [ ] itchy eyes [ ] nasal discharge [ ] hives [ ] angioedema  [ ] All other systems negative  [ ] Unable to assess ROS because ________    OBJECTIVE:  ICU Vital Signs Last 24 Hrs  T(C): 36.9 (19 Mar 2025 01:35), Max: 36.9 (18 Mar 2025 13:10)  T(F): 98.4 (19 Mar 2025 01:35), Max: 98.4 (18 Mar 2025 13:10)  HR: 89 (19 Mar 2025 07:31) (89 - 108)  BP: 174/100 (19 Mar 2025 01:35) (145/101 - 174/100)  BP(mean): --  ABP: --  ABP(mean): --  RR: 20 (19 Mar 2025 01:35) (18 - 20)  SpO2: 98% (19 Mar 2025 07:31) (93% - 100%)    O2 Parameters below as of 19 Mar 2025 04:11  Patient On (Oxygen Delivery Method): nasal cannula              03-18 @ 07:01  -  03-19 @ 07:00  --------------------------------------------------------  IN: 0 mL / OUT: 500 mL / NET: -500 mL      CAPILLARY BLOOD GLUCOSE      POCT Blood Glucose.: 171 mg/dL (19 Mar 2025 06:27)      PHYSICAL EXAM:  GENERAL: ill-appearing, NAD, appears uncomfortable  HEENT: NC/AT, EOMI, no conjunctival icterus, dry mucus membranes  LUNGS: increased WOB, tachypneic, improved but wheezing and tight airway  CV: RRR, no m/r/g, 2+ palpable peripheral pulses bi/l, cap refill <2 secs  ABD: non-distended, soft, non-tender, no rebound tenderness or guarding  EXT: warm and well-perfused, trace-1+ bi/l LE peripheral edema  SKIN: no rashes or lesions  NEURO: AAOx1-2, no focal deficits    LINES: [facility]    HOSPITAL MEDICATIONS:  MEDICATIONS  (STANDING):  albuterol/ipratropium for Nebulization 3 milliLiter(s) Nebulizer every 6 hours  amLODIPine   Tablet 10 milliGRAM(s) Oral daily  buDESOnide    Inhalation Suspension 0.5 milliGRAM(s) Inhalation every 12 hours  chlorhexidine 2% Cloths 1 Application(s) Topical <User Schedule>  dextrose 5%. 1000 milliLiter(s) (100 mL/Hr) IV Continuous <Continuous>  dextrose 5%. 1000 milliLiter(s) (50 mL/Hr) IV Continuous <Continuous>  dextrose 50% Injectable 25 Gram(s) IV Push once  dextrose 50% Injectable 12.5 Gram(s) IV Push once  dextrose 50% Injectable 25 Gram(s) IV Push once  dextrose Oral Gel 15 Gram(s) Oral once  enoxaparin Injectable 40 milliGRAM(s) SubCutaneous every 24 hours  glucagon  Injectable 1 milliGRAM(s) IntraMuscular once  guaiFENesin  milliGRAM(s) Oral every 12 hours  hydrALAZINE 25 milliGRAM(s) Oral three times a day  insulin lispro (ADMELOG) corrective regimen sliding scale   SubCutaneous three times a day before meals  insulin lispro (ADMELOG) corrective regimen sliding scale   SubCutaneous at bedtime  lisinopril 40 milliGRAM(s) Oral daily  methylPREDNISolone sodium succinate Injectable 40 milliGRAM(s) IV Push once  pantoprazole  Injectable 40 milliGRAM(s) IV Push daily  polyethylene glycol 3350 17 Gram(s) Oral daily  predniSONE   Tablet 40 milliGRAM(s) Oral daily  senna 2 Tablet(s) Oral at bedtime  sertraline 25 milliGRAM(s) Oral daily  trimethoprim  160 mG/sulfamethoxazole 800 mG 1 Tablet(s) Oral <User Schedule>    MEDICATIONS  (PRN):      LABS:                        13.9   13.98 )-----------( 149      ( 19 Mar 2025 06:05 )             43.2     Hgb Trend: 13.9<--, 13.9<--, 13.4<--, 12.1<--, 12.4<--  03-19    137  |  95[L]  |  24[H]  ----------------------------<  181[H]  3.5   |  24  |  0.57    Ca    9.9      19 Mar 2025 06:05  Phos  3.5     03-19  Mg     2.00     03-19    TPro  6.9  /  Alb  3.3  /  TBili  0.3  /  DBili  x   /  AST  11  /  ALT  18  /  AlkPhos  83  03-19    Creatinine Trend: 0.57<--, 0.55<--, 0.52<--, 0.60<--, 0.59<--, 0.52<--    Urinalysis Basic - ( 19 Mar 2025 06:05 )    Color: x / Appearance: x / SG: x / pH: x  Gluc: 181 mg/dL / Ketone: x  / Bili: x / Urobili: x   Blood: x / Protein: x / Nitrite: x   Leuk Esterase: x / RBC: x / WBC x   Sq Epi: x / Non Sq Epi: x / Bacteria: x      Arterial Blood Gas:  03-19 @ 06:05  7.45/51/143/35/99.3/9.7  ABG lactate: --    Venous Blood Gas:  03-18 @ 13:36  7.47/49/66/36/93.1  VBG Lactate: 1.2  Venous Blood Gas:  03-17 @ 18:19  7.46/55/46/39/79.6  VBG Lactate: 1.4      MICROBIOLOGY:     RADIOLOGY & ADDITIONAL TESTS:      ASSESSMENT AND PLAN:    74F hx asthma and depressed presented with AHHRF c/b AMS requiring MICU admission for intubation for airway protection i/s/o asthma exacerbation. s/p extubation with labile BPs requiring nicardipine gtt.      =======NEURO=================  Sedation: none  Pain: tylenol  AAO: x2-3 baseline, currently AAOx1-2    # Depression  Hx tardive dyskinesia symptoms on prior medications.  > continue home Sertraline  > hold home Olanzapine, consider resuming    # Seizure-like activity, resolved  Encephalopathy i/s/o hypercapnic respiratory failure.  - CTH: no acute changes  - CT spine: no fractures  - 3/8 ON: 6 minutes of seizure-like activity with myoclonic upper jerks -> broke with ativan  -- EEG 3/10 showing no epileptiform activity; severe degree of diffuse or multifocal cerebral dysfunction c/w comatose    =======RESPIRATORY============  # Acute on Chronic Hypoxic and Hypercapnic Resp Failure  # Asthma, uncontrolled  # PNA, improving  Likely 2/2 asthma exacerbation despite appropriate medications.  - RRT 3/19 for increased WOB --> ABG 7.45/51/143/35/1.2  - CTA chest 3/8 without evidence of PNA or PE  > cont DuoNebs q4h; nebulized budesonide  > cont IV steroids 20mg q8h  > replete Mg PRN, goal >2  > cont BIPAP 12/6; cont suppl O2 as needed, wean as tolerated  > trend ABG/VBG  > PPI and Bactrim ppx for steroids  > pulm toilet; consider d/c hyper-sal if not benefiting from secretion mobilization and is more of an asthma irritant    =======CARDIOVASCULAR=========  # HTN  - while in the MICU was briefly on Nicardipine ggt (was off since 3/15)  > cont amlodipine 10mg qD  > cont Lisinopril 40mg qD  > cont hydralazine 25mg q8hr  > hold home HCTZ, avoid beta-blockers given asthma    =======GASTROINTESTINAL=======  - Diet: NPO on BIPAP  - PPI ppx: pantoprazole 40mg IV qD  - No active issues    ======RENAL/GENITOURINARY=====      =======INFECTIOUS DISEASE=======  - Abx:   - Bcx:   - Ucx:     # Leukocytosis  # ?PNA, ?resolved  Leukocytosis i/s/o steroids and possible PNA, persistent fevers starting 3/12 now progressed to low-grade temps now resolved since 3/14.  - Full RVP neg, legionella/strep negative, full RVP negative  - initial Bcx no growth; rpt Bcx 3/13 no growth  - UA 3/12 neg  - CTA chest w/o evidence of PNA or PE  - LE dopplers w/o DVT  - s/p azithro (5-days, finished 3/12)  > cont empiric coverage with zosyn (3/13- ) will aim for 7 day course    - Full RVP neg, legionella/strep negative, LE dopplers w/o DVT  - Blood cultures no growth  - UA 3/12 neg  - s/p azithro (5-days, finished 3/12)  - s/p Zosyn (5-day course finished 3/17)  > rpt RVP  > monitor off abx      =======ENDOCRINE==============  - A1c:     =======HEME===================  - DVT ppx:     =======PROPHYLACTIC============  - Lines:   - Code Status:  - Dispo: PT/OT Pending Hospital Course  - Communication/Ethics:                      GI  # Diet  > soft and bite sized, escalate as tolerated    Renal/  # Hyponatremia, resolved  Likely i/s/o hypovolemia and component of underlying SIADH.  - IVFs trial successful  > CTM Na on daily labs  > consider fluid restriction vs salt tabs if worsening Na again    ID      Endocrine  - No active issues  - A1c 5.7%  >moderate ISS for steroids and hyperglycemia     Heme  - No active issues  # Thrombocytopenia, mild  Likely i/s/o critical illness.  > CTM for now; transfuse for PLT <10 or <50 with bleeding    # DVT r/o for LE edema  - LE bi/l duplex w/o DVT    # Prophylactic  > Enoxaparin 40mg qD  > PT consulted MICU Accept Note    HPI:  73 yo F with PMH asthma, depression presenting to [facility] with worsening dyspnea and wheezing with BLE edema since returning from 10.5 hour trip to [facility] 6 days ago. Altered in ED and unable to provide history, only responsive to noxious stimulli. History provided by  who states he found her on floor with unwitnessed fall. Hx of 3 asthma attacks last year, no hx of intubation, no clear asthma triggers but felt sick after flight home from [facility] 3/3/25 days where she visited for 3 months and was in usual state of health per .    ED Course: vitals notable for HR 60s, -115 with systolics in 180s, tachypneic to 24 with sPO2 95% on RA. Exam notable for wheezing. Placed on NC 5L then trialed on BiPAP but then required intubation to protect airway due to AMS. Labs notable for WBC 8.5, Hgb 12.6, , coags wnl, Na 126, K 4.3, Cl 85, bicarb 27, Cr 0.69, AST/ALT 59/47, proBNP 480, troponin 9, TSH 2.81, pH 7.29, pCO2 72, bicarb 35, neg RVP, CTA chest with no acute findings. CT head/cspine pending. Treated with 1L NS, Duonebs, solumedrol, and sedated with propofol/fentanyl. Admitted to MICU for AHRF.     73 yo F with PMH notable for asthma presenting with AHRF and hypercapnic respiratory acidosis with AMS requiring intubation for airway protection. Now admitted to MICU on ventilator for further evlauation and management iso asthma exacerbation.  (08 Mar 2025 17:07)  "    Hospital Course:  Patient admitted to the MICU for AHHRF i/s/o asthma exacerbation requiring intubation. No prior history of ICU admissions for asthma. In the ICU patient on duonebs/budesonide/steroids with gradual improvement in symptoms. Patient extubated 3/12 to face tent with prolonged suppl O2 requirement. Downgraded to floors where asthma tx transitioned to advair/PO steroids. Course otherwise complicated by hyponatremia 2/2 hypovolemia and SIADH, improved with fluid restriction. Also HTN requiring nicardipine gtt transitioned to PO meds (lisinopril, amlodipine, hydralazine). On the floor patient with plateau in respiratory status and transitioned back to duonebs/budesonide. RRT on 3/19 for increased WOB requiring BIPAP. MICU consulted for respiratory failure.      PAST MEDICAL & SURGICAL HISTORY:  MDD (major depressive disorder), recurrent, severe, with psychosis      Asthma, mild intermittent, uncomplicated      Essential hypertension      No significant past surgical history          FAMILY HISTORY:      SOCIAL HISTORY:  Smoking:   Substance Use:   EtOH Use:   Marital Status:   Sexual History:   Occupation:  Recent Travel:  Country of Birth:   Advance Directives:     HOME MEDICATIONS:      Allergies    No Known Allergies    Intolerances          REVIEW OF SYSTEMS:  Constitutional: No fevers, chills, weight loss, weight gain  HEENT: No vision problems, eye pain, nasal congestion, rhinorrhea, sore throat, dysphagia  CV: No chest pain, orthopnea, palpitations  Resp: No cough, dyspnea, wheezing, hemoptysis  GI: No nausea, vomiting, diarrhea, constipation, abdominal pain  : [ ] dysuria [ ] nocturia [ ] hematuria [ ] increased urinary frequency  Musculoskeletal: [ ] back pain [ ] myalgias [ ] arthralgias [ ] fracture  Skin: [ ] rash [ ] itch  Neurological: [ ] headache [ ] dizziness [ ] syncope [ ] weakness [ ] numbness  Psychiatric: [ ] anxiety [ ] depression  Endocrine: [ ] diabetes [ ] thyroid problem  Hematologic/Lymphatic: [ ] anemia [ ] bleeding problem  Allergic/Immunologic: [ ] itchy eyes [ ] nasal discharge [ ] hives [ ] angioedema  [ ] All other systems negative  [ ] Unable to assess ROS because ________    OBJECTIVE:  ICU Vital Signs Last 24 Hrs  T(C): 36.9 (19 Mar 2025 01:35), Max: 36.9 (18 Mar 2025 13:10)  T(F): 98.4 (19 Mar 2025 01:35), Max: 98.4 (18 Mar 2025 13:10)  HR: 89 (19 Mar 2025 07:31) (89 - 108)  BP: 174/100 (19 Mar 2025 01:35) (145/101 - 174/100)  BP(mean): --  ABP: --  ABP(mean): --  RR: 20 (19 Mar 2025 01:35) (18 - 20)  SpO2: 98% (19 Mar 2025 07:31) (93% - 100%)    O2 Parameters below as of 19 Mar 2025 04:11  Patient On (Oxygen Delivery Method): nasal cannula              03-18 @ 07:01  -  03-19 @ 07:00  --------------------------------------------------------  IN: 0 mL / OUT: 500 mL / NET: -500 mL      CAPILLARY BLOOD GLUCOSE      POCT Blood Glucose.: 171 mg/dL (19 Mar 2025 06:27)      PHYSICAL EXAM:  GENERAL: ill-appearing, NAD, appears uncomfortable  HEENT: NC/AT, EOMI, no conjunctival icterus, dry mucus membranes  LUNGS: increased WOB, tachypneic, improved but wheezing and tight airway  CV: RRR, no m/r/g, 2+ palpable peripheral pulses bi/l, cap refill <2 secs  ABD: non-distended, soft, non-tender, no rebound tenderness or guarding  EXT: warm and well-perfused, trace-1+ bi/l LE peripheral edema  SKIN: no rashes or lesions  NEURO: AAOx1-2, no focal deficits    LINES: [facility] MEDICATIONS:  MEDICATIONS  (STANDING):  albuterol/ipratropium for Nebulization 3 milliLiter(s) Nebulizer every 6 hours  amLODIPine   Tablet 10 milliGRAM(s) Oral daily  buDESOnide    Inhalation Suspension 0.5 milliGRAM(s) Inhalation every 12 hours  chlorhexidine 2% Cloths 1 Application(s) Topical <User Schedule>  dextrose 5%. 1000 milliLiter(s) (100 mL/Hr) IV Continuous <Continuous>  dextrose 5%. 1000 milliLiter(s) (50 mL/Hr) IV Continuous <Continuous>  dextrose 50% Injectable 25 Gram(s) IV Push once  dextrose 50% Injectable 12.5 Gram(s) IV Push once  dextrose 50% Injectable 25 Gram(s) IV Push once  dextrose Oral Gel 15 Gram(s) Oral once  enoxaparin Injectable 40 milliGRAM(s) SubCutaneous every 24 hours  glucagon  Injectable 1 milliGRAM(s) IntraMuscular once  guaiFENesin  milliGRAM(s) Oral every 12 hours  hydrALAZINE 25 milliGRAM(s) Oral three times a day  insulin lispro (ADMELOG) corrective regimen sliding scale   SubCutaneous three times a day before meals  insulin lispro (ADMELOG) corrective regimen sliding scale   SubCutaneous at bedtime  lisinopril 40 milliGRAM(s) Oral daily  methylPREDNISolone sodium succinate Injectable 40 milliGRAM(s) IV Push once  pantoprazole  Injectable 40 milliGRAM(s) IV Push daily  polyethylene glycol 3350 17 Gram(s) Oral daily  predniSONE   Tablet 40 milliGRAM(s) Oral daily  senna 2 Tablet(s) Oral at bedtime  sertraline 25 milliGRAM(s) Oral daily  trimethoprim  160 mG/sulfamethoxazole 800 mG 1 Tablet(s) Oral <User Schedule>    MEDICATIONS  (PRN):      LABS:                        13.9   13.98 )-----------( 149      ( 19 Mar 2025 06:05 )             43.2     Hgb Trend: 13.9<--, 13.9<--, 13.4<--, 12.1<--, 12.4<--  03-19    137  |  95[L]  |  24[H]  ----------------------------<  181[H]  3.5   |  24  |  0.57    Ca    9.9      19 Mar 2025 06:05  Phos  3.5     03-19  Mg     2.00     03-19    TPro  6.9  /  Alb  3.3  /  TBili  0.3  /  DBili  x   /  AST  11  /  ALT  18  /  AlkPhos  83  03-19    Creatinine Trend: 0.57<--, 0.55<--, 0.52<--, 0.60<--, 0.59<--, 0.52<--    Urinalysis Basic - ( 19 Mar 2025 06:05 )    Color: x / Appearance: x / SG: x / pH: x  Gluc: 181 mg/dL / Ketone: x  / Bili: x / Urobili: x   Blood: x / Protein: x / Nitrite: x   Leuk Esterase: x / RBC: x / WBC x   Sq Epi: x / Non Sq Epi: x / Bacteria: x      Arterial Blood Gas:  03-19 @ 06:05  7.45/51/143/35/99.3/9.7  ABG lactate: --    Venous Blood Gas:  03-18 @ 13:36  7.47/49/66/36/93.1  VBG Lactate: 1.2  Venous Blood Gas:  03-17 @ 18:19  7.46/55/46/39/79.6  VBG Lactate: 1.4      MICROBIOLOGY:     RADIOLOGY & ADDITIONAL TESTS:      ASSESSMENT AND PLAN:    74F hx asthma and depressed presented with AHHRF c/b AMS requiring MICU admission for intubation for respiratory failure i/s/o asthma exacerbation. Extubated 3/12 to face tent and now on BIPAP s/p RRT 3/19 for increased WOB / hypoxia despite aggressive asthma management. Transferred back to MICU for management of severe asthma.    =======NEURO=================  Sedation: none  Pain: tylenol  AAO: x2-3 baseline, currently AAOx1-2    # Depression  Hx tardive dyskinesia symptoms on prior medications.  > continue home Sertraline  > hold home Olanzapine, consider resuming    # Seizure-like activity, resolved  Encephalopathy i/s/o hypercapnic respiratory failure.  - CTH: no acute changes  - CT spine: no fractures  - 3/8 ON: 6 minutes of seizure-like activity with myoclonic upper jerks -> broke with ativan  -- EEG 3/10 showing no epileptiform activity; severe degree of diffuse or multifocal cerebral dysfunction c/w comatose    =======RESPIRATORY============  # Acute on Chronic Hypoxic and Hypercapnic Resp Failure  # Asthma, uncontrolled  # PNA, improving  Likely 2/2 asthma exacerbation despite appropriate medications.  - RRT 3/19 for increased WOB --> ABG 7.45/51/143/35/1.2  - CTA chest 3/8 without evidence of PNA or PE  > cont DuoNebs q4h; nebulized budesonide  > cont IV steroids 20mg q8h  > replete Mg PRN, goal >2  > cont BIPAP 12/6; cont suppl O2 as needed, wean as tolerated  > trend ABG/VBG  > PPI and Bactrim ppx for steroids  > pulm toilet; consider d/c hyper-sal if not benefiting from secretion mobilization and is more of an asthma irritant    =======CARDIOVASCULAR=========  # HTN  - while in the MICU was briefly on Nicardipine ggt (was off since 3/15)  > cont amlodipine 10mg qD  > cont Lisinopril 40mg qD  > cont hydralazine 25mg q8hr  > hold home HCTZ, avoid beta-blockers given asthma    =======GASTROINTESTINAL=======  - Diet: NPO on BIPAP  - PPI ppx: pantoprazole 40mg IV qD  - No active issues    ======RENAL/GENITOURINARY=====  - No active issues    # Hyponatremia, resolved  Likely i/s/o hypovolemia and component of underlying SIADH.  - IVFs trial successful  > CTM Na on daily labs  > consider fluid restriction vs salt tabs if worsening Na again    =======INFECTIOUS DISEASE=======  # Leukocytosis  # ?PNA, ?resolved  Leukocytosis i/s/o steroids and possible PNA, persistent fevers starting 3/12 now progressed to low-grade temps now resolved since 3/14.  - Full RVP neg, legionella/strep negative, full RVP negative  - initial Bcx no growth; rpt Bcx 3/13 no growth  - UA 3/12 neg  - CTA chest w/o evidence of PNA or PE  - LE dopplers w/o DVT  - s/p azithro (5-days, finished 3/12)  > cont empiric coverage with zosyn (3/13- ) will aim for 7 day course    - Full RVP neg, legionella/strep negative, LE dopplers w/o DVT  - Blood cultures no growth  - UA 3/12 neg  - s/p azithro (5-days, finished 3/12)  - s/p Zosyn (5-day course finished 3/17)  > rpt RVP  > monitor off abx    =======ENDOCRINE==============  - No active issues  - A1c 5.7%  >moderate ISS for steroids and hyperglycemia     =======HEME===================  # Thrombocytopenia, mild  Likely i/s/o critical illness.  > CTM for now; transfuse for PLT <10 or <50 with bleeding    # DVT r/o for LE edema  - LE bi/l duplex w/o DVT    =======PROPHYLACTIC============  - DVT ppx: lovenox 40mg qD  - Lines: PIVs  - Code Status: full code  - Dispo: PT previously consulted  - Communication/Ethics: fernando MICU Accept Note    HPI:  75 yo F with PMH asthma, depression presenting to [facility] with worsening dyspnea and wheezing with BLE edema since returning from 10.5 hour trip to [facility] 6 days ago. Altered in ED and unable to provide history, only responsive to noxious stimulli. History provided by  who states he found her on floor with unwitnessed fall. Hx of 3 asthma attacks last year, no hx of intubation, no clear asthma triggers but felt sick after flight home from [facility] 3/3/25 days where she visited for 3 months and was in usual state of health per .    ED Course: vitals notable for HR 60s, -115 with systolics in 180s, tachypneic to 24 with sPO2 95% on RA. Exam notable for wheezing. Placed on NC 5L then trialed on BiPAP but then required intubation to protect airway due to AMS. Labs notable for WBC 8.5, Hgb 12.6, , coags wnl, Na 126, K 4.3, Cl 85, bicarb 27, Cr 0.69, AST/ALT 59/47, proBNP 480, troponin 9, TSH 2.81, pH 7.29, pCO2 72, bicarb 35, neg RVP, CTA chest with no acute findings. CT head/cspine pending. Treated with 1L NS, Duonebs, solumedrol, and sedated with propofol/fentanyl. Admitted to MICU for AHRF.     75 yo F with PMH notable for asthma presenting with AHRF and hypercapnic respiratory acidosis with AMS requiring intubation for airway protection. Now admitted to MICU on ventilator for further evlauation and management iso asthma exacerbation.  (08 Mar 2025 17:07)  "    Hospital Course:  Patient admitted to the MICU for AHHRF i/s/o asthma exacerbation requiring intubation. No prior history of ICU admissions for asthma. In the ICU patient on duonebs/budesonide/steroids with gradual improvement in symptoms. Patient extubated 3/12 to face tent with prolonged suppl O2 requirement. Downgraded to floors where asthma tx transitioned to advair/PO steroids. Course otherwise complicated by hyponatremia 2/2 hypovolemia and SIADH, improved with fluid restriction. Also HTN requiring nicardipine gtt transitioned to PO meds (lisinopril, amlodipine, hydralazine). On the floor patient with plateau in respiratory status and transitioned back to duonebs/budesonide. RRT on 3/19 for increased WOB requiring BIPAP. MICU consulted for respiratory failure.      PAST MEDICAL & SURGICAL HISTORY:  MDD (major depressive disorder), recurrent, severe, with psychosis  Asthma, mild intermittent, uncomplicated  Essential hypertension      No significant past surgical history          FAMILY HISTORY:      SOCIAL HISTORY:  Smoking:   Substance Use:   EtOH Use:   Marital Status:   Sexual History:   Occupation:  Recent Travel:  Country of Birth:   Advance Directives:     HOME MEDICATIONS:      Allergies    No Known Allergies    Intolerances          REVIEW OF SYSTEMS:  Gen: No fevers, + chills  Cards: no chest pain or palpitations  Lungs: Wheezing, no cough or hemoptysis  Gastro: no abdominal pain, nausea, or vomiting  Renal: no urinary complaints  MSK: no weakness reported  [ ] All other systems negative  [ ] Unable to assess ROS because ________    OBJECTIVE:  ICU Vital Signs Last 24 Hrs  T(C): 36.9 (19 Mar 2025 01:35), Max: 36.9 (18 Mar 2025 13:10)  T(F): 98.4 (19 Mar 2025 01:35), Max: 98.4 (18 Mar 2025 13:10)  HR: 89 (19 Mar 2025 07:31) (89 - 108)  BP: 174/100 (19 Mar 2025 01:35) (145/101 - 174/100)  RR: 20 (19 Mar 2025 01:35) (18 - 20)  SpO2: 98% (19 Mar 2025 07:31) (93% - 100%)    O2 Parameters below as of 19 Mar 2025 04:11  Patient On (Oxygen Delivery Method): nasal cannula              03-18 @ 07:01  -  03-19 @ 07:00  --------------------------------------------------------  IN: 0 mL / OUT: 500 mL / NET: -500 mL      CAPILLARY BLOOD GLUCOSE      POCT Blood Glucose.: 171 mg/dL (19 Mar 2025 06:27)      PHYSICAL EXAM:  GENERAL: ill-appearing, NAD, appears uncomfortable  HEENT: NC/AT, EOMI, no conjunctival icterus, dry mucus membranes  LUNGS: increased WOB, tachypneic, improved but wheezing and tight airway  CV: RRR, no m/r/g, 2+ palpable peripheral pulses bi/l, cap refill <2 secs  ABD: non-distended, soft, non-tender, no rebound tenderness or guarding  EXT: warm and well-perfused, trace-1+ bi/l LE peripheral edema  SKIN: no rashes or lesions  NEURO: AAOx1-2, no focal deficits    LINES: [facility] MEDICATIONS:  MEDICATIONS  (STANDING):  albuterol/ipratropium for Nebulization 3 milliLiter(s) Nebulizer every 6 hours  amLODIPine   Tablet 10 milliGRAM(s) Oral daily  buDESOnide    Inhalation Suspension 0.5 milliGRAM(s) Inhalation every 12 hours  chlorhexidine 2% Cloths 1 Application(s) Topical <User Schedule>  dextrose 5%. 1000 milliLiter(s) (100 mL/Hr) IV Continuous <Continuous>  dextrose 5%. 1000 milliLiter(s) (50 mL/Hr) IV Continuous <Continuous>  dextrose 50% Injectable 25 Gram(s) IV Push once  dextrose 50% Injectable 12.5 Gram(s) IV Push once  dextrose 50% Injectable 25 Gram(s) IV Push once  dextrose Oral Gel 15 Gram(s) Oral once  enoxaparin Injectable 40 milliGRAM(s) SubCutaneous every 24 hours  glucagon  Injectable 1 milliGRAM(s) IntraMuscular once  guaiFENesin  milliGRAM(s) Oral every 12 hours  hydrALAZINE 25 milliGRAM(s) Oral three times a day  insulin lispro (ADMELOG) corrective regimen sliding scale   SubCutaneous three times a day before meals  insulin lispro (ADMELOG) corrective regimen sliding scale   SubCutaneous at bedtime  lisinopril 40 milliGRAM(s) Oral daily  methylPREDNISolone sodium succinate Injectable 40 milliGRAM(s) IV Push once  pantoprazole  Injectable 40 milliGRAM(s) IV Push daily  polyethylene glycol 3350 17 Gram(s) Oral daily  predniSONE   Tablet 40 milliGRAM(s) Oral daily  senna 2 Tablet(s) Oral at bedtime  sertraline 25 milliGRAM(s) Oral daily  trimethoprim  160 mG/sulfamethoxazole 800 mG 1 Tablet(s) Oral <User Schedule>    MEDICATIONS  (PRN):      LABS:                        13.9   13.98 )-----------( 149      ( 19 Mar 2025 06:05 )             43.2     Hgb Trend: 13.9<--, 13.9<--, 13.4<--, 12.1<--, 12.4<--  03-19    137  |  95[L]  |  24[H]  ----------------------------<  181[H]  3.5   |  24  |  0.57    Ca    9.9      19 Mar 2025 06:05  Phos  3.5     03-19  Mg     2.00     03-19    TPro  6.9  /  Alb  3.3  /  TBili  0.3  /  DBili  x   /  AST  11  /  ALT  18  /  AlkPhos  83  03-19    Creatinine Trend: 0.57<--, 0.55<--, 0.52<--, 0.60<--, 0.59<--, 0.52<--    Urinalysis Basic - ( 19 Mar 2025 06:05 )    Color: x / Appearance: x / SG: x / pH: x  Gluc: 181 mg/dL / Ketone: x  / Bili: x / Urobili: x   Blood: x / Protein: x / Nitrite: x   Leuk Esterase: x / RBC: x / WBC x   Sq Epi: x / Non Sq Epi: x / Bacteria: x      Arterial Blood Gas:  03-19 @ 06:05  7.45/51/143/35/99.3/9.7  ABG lactate: --    Venous Blood Gas:  03-18 @ 13:36  7.47/49/66/36/93.1  VBG Lactate: 1.2  Venous Blood Gas:  03-17 @ 18:19  7.46/55/46/39/79.6  VBG Lactate: 1.4      MICROBIOLOGY:     RADIOLOGY & ADDITIONAL TESTS:      ASSESSMENT AND PLAN:    74F hx asthma and depressed presented with AHHRF c/b AMS requiring MICU admission for intubation for respiratory failure i/s/o asthma exacerbation. Extubated 3/12 to face tent and now on BIPAP s/p RRT 3/19 for increased WOB / hypoxia despite aggressive asthma management. Transferred back to MICU for management of severe asthma.    =======NEURO=================  Sedation: none  Pain: tylenol  AAO: x2-3 baseline, currently AAOx1-2    # Depression  Hx tardive dyskinesia symptoms on prior medications.  > continue home Sertraline  > hold home Olanzapine, consider resuming    # Seizure-like activity, resolved  Encephalopathy i/s/o hypercapnic respiratory failure.  - CTH: no acute changes  - CT spine: no fractures  - 3/8 ON: 6 minutes of seizure-like activity with myoclonic upper jerks -> broke with ativan  -- EEG 3/10 showing no epileptiform activity; severe degree of diffuse or multifocal cerebral dysfunction c/w comatose    =======RESPIRATORY============  # Acute on Chronic Hypoxic and Hypercapnic Resp Failure  # Asthma, uncontrolled  # PNA, improving  Likely 2/2 asthma exacerbation despite appropriate medications.  - RRT 3/19 for increased WOB --> ABG 7.45/51/143/35/1.2  - CTA chest 3/8 without evidence of PNA or PE  > cont DuoNebs q4h; nebulized budesonide  > cont IV steroids 20mg q8h  > replete Mg PRN, goal >2  > cont BIPAP 12/6; cont suppl O2 as needed, wean as tolerated  > trend ABG/VBG  > PPI and Bactrim ppx for steroids  > pulm toilet; consider d/c hyper-sal if not benefiting from secretion mobilization and is more of an asthma irritant    =======CARDIOVASCULAR=========  # HTN  - while in the MICU was briefly on Nicardipine ggt (was off since 3/15)  > cont amlodipine 10mg qD  > cont Lisinopril 40mg qD  > cont hydralazine 25mg q8hr  > hold home HCTZ, avoid beta-blockers given asthma    =======GASTROINTESTINAL=======  - Diet: NPO on BIPAP  - PPI ppx: pantoprazole 40mg IV qD  - No active issues    ======RENAL/GENITOURINARY=====  - No active issues    # Hyponatremia, resolved  Likely i/s/o hypovolemia and component of underlying SIADH.  - IVFs trial successful  > CTM Na on daily labs  > consider fluid restriction vs salt tabs if worsening Na again    =======INFECTIOUS DISEASE=======  # Leukocytosis  # ?PNA, ?resolved  Leukocytosis i/s/o steroids and possible PNA, persistent fevers starting 3/12 now progressed to low-grade temps now resolved since 3/14.  - Full RVP neg, legionella/strep negative, full RVP negative  - initial Bcx no growth; rpt Bcx 3/13 no growth  - UA 3/12 neg  - CTA chest w/o evidence of PNA or PE  - LE dopplers w/o DVT  - s/p azithro (5-days, finished 3/12)  > cont empiric coverage with zosyn (3/13- ) will aim for 7 day course    - Full RVP neg, legionella/strep negative, LE dopplers w/o DVT  - Blood cultures no growth  - UA 3/12 neg  - s/p azithro (5-days, finished 3/12)  - s/p Zosyn (5-day course finished 3/17)  > rpt RVP  > monitor off abx    =======ENDOCRINE==============  - No active issues  - A1c 5.7%  >moderate ISS for steroids and hyperglycemia     =======HEME===================  # Thrombocytopenia, mild  Likely i/s/o critical illness.  > CTM for now; transfuse for PLT <10 or <50 with bleeding    # DVT r/o for LE edema  - LE bi/l duplex w/o DVT    =======PROPHYLACTIC============  - DVT ppx: lovenox 40mg qD  - Lines: PIVs  - Code Status: full code  - Dispo: PT previously consulted  - Communication/Ethics: fernando      Attending Attestation:  74F reported asthma and depression initially admitted to MICU with suspected asthma exacerbation with RRT this AM for hypoxemia and increased work of breathing with poor air entry. Patient transferred back to ICU for airway evaluation and asthma management in this setting.    #Neuro - non-focal exam this AM  - Will need to clarify baseline activity status with family and home medications  - Restart Olanzapine if this was part of her home regimen  - Patient is pleasant and in no acute distress  - Initial EEG without evidence of seizures  #Cardiovascular - not in shock   - Monitor BP and HR - continue antihypertensives  #Pulmonary - acute hypoxemic and hypercapnic respiratory failure in setting of asthma exacerbation  - Unclear what happened this AM during RRT but patient now on BIPAP 12/6 FiO2 50% and breathing comfortably with good air movement - presently patient does not require intubation at this time but will continue close monitoring  - Decrease FiO2 40% and maintain O2 sat > 90%  - Continue Duoneb Q4 and Pulmicort BID  - Continue Solumedrol 20mg IV Q8 for now  - Continue airway clearance  - Check aspergillus Ab, ANCA, IgE   - Initially had eosinophils of ~ 130  #Gastro - NPO while on BIPAP  - When able to trial off BIPAP check dysphagia screen  - Protonix while on steroids  #Nephro - normal renal function  - monitor urine output  - Prior hyponatremia improved  #Infectious Disease - monitor off antibiotics  - Check Full RVP  #DVT proph - Lovenox      I have provided 50 minutes of critical care time independent of procedures and/or teaching services MICU Accept Note    HPI:  75 yo F with PMH asthma, depression presenting to [facility] with worsening dyspnea and wheezing with BLE edema since returning from 10.5 hour trip to [facility] 6 days ago. Altered in ED and unable to provide history, only responsive to noxious stimulli. History provided by  who states he found her on floor with unwitnessed fall. Hx of 3 asthma attacks last year, no hx of intubation, no clear asthma triggers but felt sick after flight home from [facility] 3/3/25 days where she visited for 3 months and was in usual state of health per .    ED Course: vitals notable for HR 60s, -115 with systolics in 180s, tachypneic to 24 with sPO2 95% on RA. Exam notable for wheezing. Placed on NC 5L then trialed on BiPAP but then required intubation to protect airway due to AMS. Labs notable for WBC 8.5, Hgb 12.6, , coags wnl, Na 126, K 4.3, Cl 85, bicarb 27, Cr 0.69, AST/ALT 59/47, proBNP 480, troponin 9, TSH 2.81, pH 7.29, pCO2 72, bicarb 35, neg RVP, CTA chest with no acute findings. CT head/cspine pending. Treated with 1L NS, Duonebs, solumedrol, and sedated with propofol/fentanyl. Admitted to MICU for AHRF.     75 yo F with PMH notable for asthma presenting with AHRF and hypercapnic respiratory acidosis with AMS requiring intubation for airway protection. Now admitted to MICU on ventilator for further evlauation and management iso asthma exacerbation.  (08 Mar 2025 17:07)  "    Hospital Course:  Patient admitted to the MICU for AHHRF i/s/o asthma exacerbation requiring intubation. No prior history of ICU admissions for asthma. In the ICU patient on duonebs/budesonide/steroids with gradual improvement in symptoms. Patient extubated 3/12 to face tent with prolonged suppl O2 requirement. Downgraded to floors where asthma tx transitioned to advair/PO steroids. Course otherwise complicated by hyponatremia 2/2 hypovolemia and SIADH, improved with fluid restriction. Also HTN requiring nicardipine gtt transitioned to PO meds (lisinopril, amlodipine, hydralazine). On the floor patient with plateau in respiratory status and transitioned back to duonebs/budesonide. RRT on 3/19 for increased WOB requiring BIPAP. MICU consulted for respiratory failure.      PAST MEDICAL & SURGICAL HISTORY:  MDD (major depressive disorder), recurrent, severe, with psychosis  Asthma, mild intermittent, uncomplicated  Essential hypertension      No significant past surgical history          FAMILY HISTORY:      SOCIAL HISTORY:  Smoking:   Substance Use:   EtOH Use:   Marital Status:   Sexual History:   Occupation:  Recent Travel:  Country of Birth:   Advance Directives:     HOME MEDICATIONS:      Allergies    No Known Allergies    Intolerances          REVIEW OF SYSTEMS:  Gen: No fevers, + chills  Cards: no chest pain or palpitations  Lungs: Wheezing, no cough or hemoptysis  Gastro: no abdominal pain, nausea, or vomiting  Renal: no urinary complaints  MSK: no weakness reported  [ ] All other systems negative  [ ] Unable to assess ROS because ________    OBJECTIVE:  ICU Vital Signs Last 24 Hrs  T(C): 36.9 (19 Mar 2025 01:35), Max: 36.9 (18 Mar 2025 13:10)  T(F): 98.4 (19 Mar 2025 01:35), Max: 98.4 (18 Mar 2025 13:10)  HR: 89 (19 Mar 2025 07:31) (89 - 108)  BP: 174/100 (19 Mar 2025 01:35) (145/101 - 174/100)  RR: 20 (19 Mar 2025 01:35) (18 - 20)  SpO2: 98% (19 Mar 2025 07:31) (93% - 100%)    O2 Parameters below as of 19 Mar 2025 04:11  Patient On (Oxygen Delivery Method): nasal cannula              03-18 @ 07:01  -  03-19 @ 07:00  --------------------------------------------------------  IN: 0 mL / OUT: 500 mL / NET: -500 mL      CAPILLARY BLOOD GLUCOSE      POCT Blood Glucose.: 171 mg/dL (19 Mar 2025 06:27)      PHYSICAL EXAM:  GENERAL: ill-appearing, NAD, appears uncomfortable  HEENT: NC/AT, EOMI, no conjunctival icterus, dry mucus membranes  LUNGS: increased WOB, tachypneic, improved but wheezing and tight airway  CV: RRR, no m/r/g, 2+ palpable peripheral pulses bi/l, cap refill <2 secs  ABD: non-distended, soft, non-tender, no rebound tenderness or guarding  EXT: warm and well-perfused, trace-1+ bi/l LE peripheral edema  SKIN: no rashes or lesions  NEURO: AAOx1-2, no focal deficits    LINES: [facility] MEDICATIONS:  MEDICATIONS  (STANDING):  albuterol/ipratropium for Nebulization 3 milliLiter(s) Nebulizer every 6 hours  amLODIPine   Tablet 10 milliGRAM(s) Oral daily  buDESOnide    Inhalation Suspension 0.5 milliGRAM(s) Inhalation every 12 hours  chlorhexidine 2% Cloths 1 Application(s) Topical <User Schedule>  dextrose 5%. 1000 milliLiter(s) (100 mL/Hr) IV Continuous <Continuous>  dextrose 5%. 1000 milliLiter(s) (50 mL/Hr) IV Continuous <Continuous>  dextrose 50% Injectable 25 Gram(s) IV Push once  dextrose 50% Injectable 12.5 Gram(s) IV Push once  dextrose 50% Injectable 25 Gram(s) IV Push once  dextrose Oral Gel 15 Gram(s) Oral once  enoxaparin Injectable 40 milliGRAM(s) SubCutaneous every 24 hours  glucagon  Injectable 1 milliGRAM(s) IntraMuscular once  guaiFENesin  milliGRAM(s) Oral every 12 hours  hydrALAZINE 25 milliGRAM(s) Oral three times a day  insulin lispro (ADMELOG) corrective regimen sliding scale   SubCutaneous three times a day before meals  insulin lispro (ADMELOG) corrective regimen sliding scale   SubCutaneous at bedtime  lisinopril 40 milliGRAM(s) Oral daily  methylPREDNISolone sodium succinate Injectable 40 milliGRAM(s) IV Push once  pantoprazole  Injectable 40 milliGRAM(s) IV Push daily  polyethylene glycol 3350 17 Gram(s) Oral daily  predniSONE   Tablet 40 milliGRAM(s) Oral daily  senna 2 Tablet(s) Oral at bedtime  sertraline 25 milliGRAM(s) Oral daily  trimethoprim  160 mG/sulfamethoxazole 800 mG 1 Tablet(s) Oral <User Schedule>    MEDICATIONS  (PRN):      LABS:                        13.9   13.98 )-----------( 149      ( 19 Mar 2025 06:05 )             43.2     Hgb Trend: 13.9<--, 13.9<--, 13.4<--, 12.1<--, 12.4<--  03-19    137  |  95[L]  |  24[H]  ----------------------------<  181[H]  3.5   |  24  |  0.57    Ca    9.9      19 Mar 2025 06:05  Phos  3.5     03-19  Mg     2.00     03-19    TPro  6.9  /  Alb  3.3  /  TBili  0.3  /  DBili  x   /  AST  11  /  ALT  18  /  AlkPhos  83  03-19    Creatinine Trend: 0.57<--, 0.55<--, 0.52<--, 0.60<--, 0.59<--, 0.52<--    Urinalysis Basic - ( 19 Mar 2025 06:05 )    Color: x / Appearance: x / SG: x / pH: x  Gluc: 181 mg/dL / Ketone: x  / Bili: x / Urobili: x   Blood: x / Protein: x / Nitrite: x   Leuk Esterase: x / RBC: x / WBC x   Sq Epi: x / Non Sq Epi: x / Bacteria: x      Arterial Blood Gas:  03-19 @ 06:05  7.45/51/143/35/99.3/9.7  ABG lactate: --    Venous Blood Gas:  03-18 @ 13:36  7.47/49/66/36/93.1  VBG Lactate: 1.2  Venous Blood Gas:  03-17 @ 18:19  7.46/55/46/39/79.6  VBG Lactate: 1.4      MICROBIOLOGY:     RADIOLOGY & ADDITIONAL TESTS:      ASSESSMENT AND PLAN:    74F hx asthma and depressed presented with AHHRF c/b AMS requiring MICU admission for intubation for respiratory failure i/s/o asthma exacerbation. Extubated 3/12 to face tent and now on BIPAP s/p RRT 3/19 for increased WOB / hypoxia despite aggressive asthma management. Transferred back to MICU for management of severe asthma.    =======NEURO=================  Sedation: none  Pain: tylenol  AAO: x2-3 baseline, currently AAOx1-2    # Depression  Hx tardive dyskinesia symptoms on prior medications.  > continue home Sertraline  > hold home Olanzapine, consider resuming    # Seizure-like activity, resolved  Encephalopathy i/s/o hypercapnic respiratory failure.  - CTH: no acute changes  - CT spine: no fractures  - 3/8 ON: 6 minutes of seizure-like activity with myoclonic upper jerks -> broke with ativan  -- EEG 3/10 showing no epileptiform activity; severe degree of diffuse or multifocal cerebral dysfunction c/w comatose    =======RESPIRATORY============  # Acute on Chronic Hypoxic and Hypercapnic Resp Failure  # Asthma, uncontrolled  # PNA, improving  Likely 2/2 asthma exacerbation despite appropriate medications.  - RRT 3/19 for increased WOB --> ABG 7.45/51/143/35/1.2  - CTA chest 3/8 without evidence of PNA or PE  > cont DuoNebs q4h; nebulized budesonide  > cont IV steroids 20mg q8h  > replete Mg PRN, goal >2  > cont BIPAP 12/6; cont suppl O2 as needed, wean as tolerated  > trend ABG/VBG  > PPI and Bactrim ppx for steroids  > pulm toilet; consider d/c hyper-sal if not benefiting from secretion mobilization and is more of an asthma irritant    =======CARDIOVASCULAR=========  # HTN  - while in the MICU was briefly on Nicardipine ggt (was off since 3/15)  > cont amlodipine 10mg qD  > cont Lisinopril 40mg qD  > cont hydralazine 25mg q8hr  > hold home HCTZ, avoid beta-blockers given asthma    =======GASTROINTESTINAL=======  - Diet: NPO on BIPAP  - PPI ppx: pantoprazole 40mg IV qD  - No active issues    ======RENAL/GENITOURINARY=====  - No active issues    # Hyponatremia, resolved  Likely i/s/o hypovolemia and component of underlying SIADH.  - IVFs trial successful  > CTM Na on daily labs  > consider fluid restriction vs salt tabs if worsening Na again    =======INFECTIOUS DISEASE=======  # Leukocytosis  # ?PNA, ?resolved  Leukocytosis i/s/o steroids and possible PNA, persistent fevers starting 3/12 now progressed to low-grade temps now resolved since 3/14.  - Full RVP neg, legionella/strep negative, full RVP negative  - initial Bcx no growth; rpt Bcx 3/13 no growth  - UA 3/12 neg  - CTA chest w/o evidence of PNA or PE  - LE dopplers w/o DVT  - s/p azithro (5-days, finished 3/12)  > cont empiric coverage with zosyn (3/13- ) will aim for 7 day course    - Full RVP neg, legionella/strep negative, LE dopplers w/o DVT  - Blood cultures no growth  - UA 3/12 neg  - s/p azithro (5-days, finished 3/12)  - s/p Zosyn (5-day course finished 3/17)  > rpt RVP  > monitor off abx    =======ENDOCRINE==============  - No active issues  - A1c 5.7%  >moderate ISS for steroids and hyperglycemia     =======HEME===================  # Thrombocytopenia, mild  Likely i/s/o critical illness.  > CTM for now; transfuse for PLT <10 or <50 with bleeding    # DVT r/o for LE edema  - LE bi/l duplex w/o DVT    =======PROPHYLACTIC============  - DVT ppx: lovenox 40mg qD  - Lines: PIVs  - Code Status: full code  - Dispo: PT previously consulted  - Communication/Ethics: fernando      Attending Attestation:  74F reported asthma and depression initially admitted to MICU with suspected asthma exacerbation with RRT this AM for hypoxemia and increased work of breathing with poor air entry. Patient transferred back to ICU for airway evaluation and asthma management in this setting.    #Neuro - non-focal exam this AM  - Will need to clarify baseline activity status with family and home medications  - Restart Olanzapine if this was part of her home regimen - may benefit from Pysch evaluation  - Patient is pleasant and in no acute distress without any focal neurologic deficits but reportedly had gotten back to her baseline after her initial intubation  - Initial EEG without evidence of seizures  #Cardiovascular - not in shock   - Monitor BP and HR - continue antihypertensives  #Pulmonary - acute hypoxemic and hypercapnic respiratory failure in setting of asthma exacerbation  - Unclear what happened this AM during RRT but patient now on BIPAP 12/6 FiO2 50% and breathing comfortably with good air movement - presently patient does not require intubation at this time but will continue close monitoring  - Decrease FiO2 40% and maintain O2 sat > 90%  - Continue Duoneb Q4 and Pulmicort BID  - Continue Solumedrol 20mg IV Q8 for now  - Continue airway clearance  - Check aspergillus Ab, ANCA, IgE   - Initially had eosinophils of ~ 130  #Gastro - NPO while on BIPAP  - When able to trial off BIPAP check dysphagia screen  - Protonix while on steroids  #Nephro - normal renal function  - monitor urine output  - Prior hyponatremia improved  #Infectious Disease - monitor off antibiotics  - Check Full RVP  #DVT proph - Lovenox  #GOC - Full Code      I have provided 50 minutes of critical care time independent of procedures and/or teaching services  Family contacted by MICU ACP after rounds to obtain collateral, provide updates, and answer questions.

## 2025-03-19 NOTE — PROGRESS NOTE ADULT - ASSESSMENT
74F hx asthma and depressed presented with AHHRF c/b AMS requiring MICU admission for intubation for respiratory failure i/s/o asthma exacerbation. Extubated 3/12 to face tent and now on BIPAP s/p RRT 3/19 for increased WOB / hypoxia despite aggressive asthma management. Transferred back to MICU for management of severe asthma.    =======NEURO=================  Sedation: none  Pain: tylenol  AAO: x2-3 baseline, currently AAOx1-2    # Depression  Hx tardive dyskinesia symptoms on prior medications.  > continue home Sertraline  > hold home Olanzapine, consider resuming    # Seizure-like activity, resolved  Encephalopathy i/s/o hypercapnic respiratory failure.  - CTH: no acute changes  - CT spine: no fractures  - 3/8 ON: 6 minutes of seizure-like activity with myoclonic upper jerks -> broke with ativan  -- EEG 3/10 showing no epileptiform activity; severe degree of diffuse or multifocal cerebral dysfunction c/w comatose    =======RESPIRATORY============  # Acute on Chronic Hypoxic and Hypercapnic Resp Failure  # Asthma, uncontrolled  # PNA, improving  Likely 2/2 asthma exacerbation despite appropriate medications.  - RRT 3/19 for increased WOB --> ABG 7.45/51/143/35/1.2  - CTA chest 3/8 without evidence of PNA or PE  > cont DuoNebs q4h; nebulized budesonide  > cont IV steroids 20mg q8h  > replete Mg PRN, goal >2  > cont BIPAP 12/6; cont suppl O2 as needed, wean as tolerated  > trend ABG/VBG  > PPI and Bactrim ppx for steroids  > pulm toilet; consider d/c hyper-sal if not benefiting from secretion mobilization and is more of an asthma irritant    =======CARDIOVASCULAR=========  # HTN  - while in the MICU was briefly on Nicardipine ggt (was off since 3/15)  > cont amlodipine 10mg qD  > cont Lisinopril 40mg qD  > cont hydralazine 25mg q8hr  > hold home HCTZ, avoid beta-blockers given asthma    =======GASTROINTESTINAL=======  - Diet: NPO on BIPAP  - PPI ppx: pantoprazole 40mg IV qD  - No active issues    ======RENAL/GENITOURINARY=====  - No active issues    # Hyponatremia, resolved  Likely i/s/o hypovolemia and component of underlying SIADH.  - IVFs trial successful  > CTM Na on daily labs  > consider fluid restriction vs salt tabs if worsening Na again    =======INFECTIOUS DISEASE=======  # Leukocytosis  # ?PNA, ?resolved  Leukocytosis i/s/o steroids and possible PNA, persistent fevers starting 3/12 now progressed to low-grade temps now resolved since 3/14.  - Full RVP neg, legionella/strep negative, full RVP negative  - initial Bcx no growth; rpt Bcx 3/13 no growth  - UA 3/12 neg  - CTA chest w/o evidence of PNA or PE  - LE dopplers w/o DVT  - s/p azithro (5-days, finished 3/12)  > cont empiric coverage with zosyn (3/13- ) will aim for 7 day course    - Full RVP neg, legionella/strep negative, LE dopplers w/o DVT  - Blood cultures no growth  - UA 3/12 neg  - s/p azithro (5-days, finished 3/12)  - s/p Zosyn (5-day course finished 3/17)  > rpt RVP  > monitor off abx    =======ENDOCRINE==============  - No active issues  - A1c 5.7%  >moderate ISS for steroids and hyperglycemia     =======HEME===================  # Thrombocytopenia, mild  Likely i/s/o critical illness.  > CTM for now; transfuse for PLT <10 or <50 with bleeding    # DVT r/o for LE edema  - LE bi/l duplex w/o DVT    =======PROPHYLACTIC============  - DVT ppx: lovenox 40mg qD  - Lines: PIVs  - Code Status: full code  - Dispo: PT previously consulted  - Communication/Ethics: shaye

## 2025-03-19 NOTE — CHART NOTE - NSCHARTNOTEFT_GEN_A_CORE
Notified of patient satting mid eighties on RA, placed on 6L NC with slow response. Presented to bedside and found patient w/o obvious external evidence of increased respiratory effort; however, pt with minimal aeration throughout all lung fields. When prompted to forcibly exhale, trace expiratory wheeeze appreciated c/f overall poor air movement. XR from PM reviewed with possible obscuration of R costophrenic angle.    Diagnostics  - ABG, will obtain morning labs  - FluVID reordered    Therapeutics:  - Duonebs STAT x3  - MgSO4 ordered  - Pt already due for prednisone, will give dose now    Morgan Lyon MD [id number]

## 2025-03-19 NOTE — PROGRESS NOTE ADULT - PROBLEM SELECTOR PLAN 3
# Hypertension  - Off Nicardipine ggt since 3/15    SBP goal 150     Plan:  - c/w amlodipine from 20mg qD  - increased Lisinopril to 40mg qD  - increased hydralazine 10 to 25 mg TID   - hold home HCTZ, avoid beta-blockers given asthma

## 2025-03-19 NOTE — PROVIDER CONTACT NOTE (OTHER) - ASSESSMENT
pt resting comfortably in bed at baseline mental status, pt denying shortness of breath, chest pain, or any pain/discomfort whatsoever. pt is wheezing on auscultation. no other signs or respiratory distress noted

## 2025-03-19 NOTE — PROVIDER CONTACT NOTE (OTHER) - ASSESSMENT
pt o2 sat dropping down to 90-91%. mds went to other rapid, and instructed me to monitor and notify if pt o2 sat drops again. o2 sat dropped and md notified right away

## 2025-03-19 NOTE — PROGRESS NOTE ADULT - PROBLEM SELECTOR PLAN 2
No PNA seen on imaging, though high clinical suspicion. Leukocytosis i/s/o steroids and possible PNA, persistent fevers starting 3/12 now progressed to low-grade temps.  - Full RVP neg, legionella/strep negative, LE dopplers w/o DVT  - Blood cultures: NGTD  - UA 3/12 neg  - s/p azithro (5-days, finished 3/12)    Plan:  - s/p empiric Zosyn for suspected PNA, 5 day course (03/13 - 03/17), finished  - patient with increased congestion and wheezing > ordered RVP, proBNP, lasix 20 mg x1

## 2025-03-19 NOTE — PROGRESS NOTE ADULT - SUBJECTIVE AND OBJECTIVE BOX
SUBJECTIVE:   LENGTH OF HOSPITAL STAY: 11d    CHIEF COMPLAINT:  Patient is a 74y old  Female who presents with a chief complaint of Asthma exacerbation (18 Mar 2025 06:58)      Events over the past 24 hours:      REVIEW OF SYSTEMS  Negative Except as mentioned above.    ALLERGIES:  No Known Allergies        MEDICATIONS:  STANDING MEDICATIONS  albuterol/ipratropium for Nebulization 3 milliLiter(s) Nebulizer every 20 minutes  albuterol/ipratropium for Nebulization 3 milliLiter(s) Nebulizer every 6 hours  albuterol/ipratropium for Nebulization. 3 milliLiter(s) Nebulizer once  amLODIPine   Tablet 10 milliGRAM(s) Oral daily  buDESOnide    Inhalation Suspension 0.5 milliGRAM(s) Inhalation every 12 hours  chlorhexidine 2% Cloths 1 Application(s) Topical <User Schedule>  dextrose 5%. 1000 milliLiter(s) IV Continuous <Continuous>  dextrose 5%. 1000 milliLiter(s) IV Continuous <Continuous>  dextrose 50% Injectable 25 Gram(s) IV Push once  dextrose 50% Injectable 12.5 Gram(s) IV Push once  dextrose 50% Injectable 25 Gram(s) IV Push once  dextrose Oral Gel 15 Gram(s) Oral once  enoxaparin Injectable 40 milliGRAM(s) SubCutaneous every 24 hours  glucagon  Injectable 1 milliGRAM(s) IntraMuscular once  guaiFENesin  milliGRAM(s) Oral every 12 hours  hydrALAZINE 25 milliGRAM(s) Oral three times a day  insulin lispro (ADMELOG) corrective regimen sliding scale   SubCutaneous three times a day before meals  insulin lispro (ADMELOG) corrective regimen sliding scale   SubCutaneous at bedtime  lisinopril 40 milliGRAM(s) Oral daily  methylPREDNISolone sodium succinate Injectable 40 milliGRAM(s) IV Push once  polyethylene glycol 3350 17 Gram(s) Oral daily  predniSONE   Tablet 40 milliGRAM(s) Oral daily  senna 2 Tablet(s) Oral at bedtime  sertraline 25 milliGRAM(s) Oral daily    PRN MEDICATIONS        OBJECTIVE:  VITALS:   T(F): 98.4 (03-19 @ 01:35), Max: 98.4 (03-18 @ 13:10)  HR: 108 (03-19 @ 01:35) (90 - 108)  BP: 174/100 (03-19 @ 01:35) (145/101 - 174/100)  RR: 20 (03-19 @ 01:35) (18 - 20)  SpO2: 93% (03-19 @ 01:35) (93% - 100%)    I&O's:     03-18 @ 07:01  -  03-19 @ 07:00  --------------------------------------------------------  IN: 0 mL / OUT: 500 mL / NET: -500 mL        PHYSICAL EXAM:  T(C): 36.9 (03-19-25 @ 01:35), Max: 36.9 (03-18-25 @ 13:10)  HR: 108 (03-19-25 @ 01:35) (90 - 108)  BP: 174/100 (03-19-25 @ 01:35) (145/101 - 174/100)  RR: 20 (03-19-25 @ 01:35) (18 - 20)  SpO2: 93% (03-19-25 @ 01:35) (93% - 100%)    CONSTITUTIONAL: Well groomed, no apparent distress  EYES: PERRLA and symmetric, EOMI, No conjunctival or scleral injection, non-icteric  ENMT: Oral mucosa with moist membranes. Normal dentition; no pharyngeal injection or exudates             NECK: Supple, symmetric and without tracheal deviation   RESP: No respiratory distress, no use of accessory muscles; CTA b/l, no WRR  CV: RRR, +S1S2, no MRG; no JVD; no peripheral edema  GI: Soft, NT, ND, no rebound, no guarding; no palpable masses; no hepatosplenomegaly; no hernia palpated  LYMPH: No cervical LAD or tenderness; no axillary LAD or tenderness; no inguinal LAD or tenderness  MSK: Normal gait; No digital clubbing or cyanosis; examination of the (head/neck/spine/ribs/pelvis, RUE, LUE, RLE, LLE) without misalignment,            Normal ROM without pain, no spinal tenderness, normal muscle strength/tone  SKIN: No rashes or ulcers noted; no subcutaneous nodules or induration palpable  NEURO: CN II-XII intact; normal reflexes in upper and lower extremities, sensation intact in upper and lower extremities b/l to light touch   PSYCH: Appropriate insight/judgment; A+O x 3, mood and affect appropriate, recent/remote memory intact    LABS:                        13.9   13.98 )-----------( 149      ( 19 Mar 2025 06:05 )             43.2             03-18    136  |  92[L]  |  16  ----------------------------<  190[H]  3.7   |  29  |  0.55    Ca    9.8      18 Mar 2025 06:30  Phos  2.9     03-18  Mg     1.80     03-18    TPro  6.9  /  Alb  3.4  /  TBili  0.4  /  DBili  x   /  AST  19  /  ALT  27  /  AlkPhos  83  03-18    LIVER FUNCTIONS - ( 18 Mar 2025 06:30 )  Alb: 3.4 g/dL / Pro: 6.9 g/dL / ALK PHOS: 83 U/L / ALT: 27 U/L / AST: 19 U/L / GGT: x                       ABG - ( 19 Mar 2025 06:05 )  pH, Arterial: 7.45  pH, Blood: x     /  pCO2: 51    /  pO2: 143   / HCO3: 35    / Base Excess: 9.7   /  SaO2: 99.3              Urinalysis Basic - ( 18 Mar 2025 06:30 )    Color: x / Appearance: x / SG: x / pH: x  Gluc: 190 mg/dL / Ketone: x  / Bili: x / Urobili: x   Blood: x / Protein: x / Nitrite: x   Leuk Esterase: x / RBC: x / WBC x   Sq Epi: x / Non Sq Epi: x / Bacteria: x        Blood Glucose:  171 (03-19-25 @ 06:27)  229 (03-18-25 @ 21:46)  197 (03-18-25 @ 17:15)  220 (03-18-25 @ 12:10)  183 (03-18-25 @ 08:17)  174 (03-17-25 @ 21:44)  148 (03-17-25 @ 17:35)  330 (03-17-25 @ 12:02)  195 (03-17-25 @ 08:15)  160 (03-16-25 @ 21:53)  243 (03-16-25 @ 17:40)  159 (03-16-25 @ 13:54)  218 (03-16-25 @ 13:14)  170 (03-16-25 @ 11:50)  173 (03-16-25 @ 07:57)        RADIOLOGY & ADDITIONAL TESTS:  Xray Chest 1 View- PORTABLE-Urgent:   ACC: [id number] EXAM:  XR CHEST PORTABLE URGENT 1V   ORDERED BY: JEANETH DE LEON     ACC: [id number] EXAM:  XR CHEST PORTABLE URGENT 1V   ORDERED BY: JEANETH DE LEON     PROCEDURE DATE:  03/08/2025          INTERPRETATION:  EXAMINATION: XR CHEST URGENT, XR CHEST URGENT    CLINICAL INDICATION: ETT Placement    TECHNIQUE: Series of single frontal, portable views of the chest were   obtained.    COMPARISON: Chest x-ray 3/8/2025.    FINDINGS:    Chest x-ray 3/8/2025 at 7:00 PM:  Endotracheal tube terminating above the rudolph.  The heart is normal in size.  Linear bibasilar atelectasis.  No focal consolidation. Normal pulmonary vasculature  No pneumothorax.  Trace left pleural effusion.  No acute bony abnormality.    Chest x-ray 325 at 9:23 PM:  Enteric tube coursing below the diaphragm with side port in the stomach.    IMPRESSION:  Enteric tube coursing below the diaphragm with side port in the stomach.  Endotracheal tube terminating above the rudolph.    --- End of Report ---          BARBARA GLASS; Resident Radiologist  This document has been electronically signed.  SHIELA MCCARTHY DO; Attending Radiologist  This document has been electronically signed. Mar  9 2025  9:17AM (03-08-25 @ 22:26)    CT Angio Chest PE Protocol w/ IV Cont:   ACC: [id number] EXAM:  CT ANGIO CHEST PULM [facility]   ORDERED BY: DEANNA CAMPBELL     PROCEDURE DATE:  03/08/2025          INTERPRETATION:  Clinical information: Shortness of breath. Evaluate for   pulmonary embolus.    CT angiogram of the chest was obtained following administration of   intravenous contrast. Approximately 50 cc of Omnipaque 350 was   administered in 50 cc was discarded. Coronal, sagittal and MIP images   were submitted for review.    No hilar or mediastinal adenopathy is noted.    Heart is enlarged in size. No pericardial effusion is noted. Pulmonary   arteries are normal in caliber. No filling defects are noted.    No endobronchial lesions are noted. Minimal atelectasis is noted   involving portions of both lower lobes. The remaining lungs are clear. No   pleural effusions are noted.    Below the diaphragm, visualized portions of the abdomen are unremarkable.    Degenerative changes of the spine are noted.    IMPRESSION: No pulmonary embolus is noted.    --- End of Report ---            WAN ROB MD; Attending Radiologist  This document has been electronically signed. Mar  8 2025  3:41PM (03-08-25 @ 15:29)

## 2025-03-19 NOTE — PROGRESS NOTE ADULT - SUBJECTIVE AND OBJECTIVE BOX
INTERVAL HPI/OVERNIGHT EVENTS:    HPI:    SUBJECTIVE: Patient seen and examined at bedside.     CONSTITUTIONAL: No weakness, fevers or chills  EYES/ENT: No visual changes;  No vertigo or throat pain   NECK: No pain or stiffness  RESPIRATORY: No cough, wheezing, hemoptysis; No shortness of breath  CARDIOVASCULAR: No chest pain or palpitations  GASTROINTESTINAL: No abdominal or epigastric pain. No nausea, vomiting, or hematemesis; No diarrhea or constipation. No melena or hematochezia.  GENITOURINARY: No dysuria, frequency or hematuria  NEUROLOGICAL: No numbness or weakness  SKIN: No itching, rashes    OBJECTIVE:    VITAL SIGNS:  ICU Vital Signs Last 24 Hrs  T(C): 37 (19 Mar 2025 08:00), Max: 37.1 (19 Mar 2025 05:15)  T(F): 98.6 (19 Mar 2025 08:00), Max: 98.8 (19 Mar 2025 05:15)  HR: 101 (19 Mar 2025 08:00) (89 - 108)  BP: 157/75 (19 Mar 2025 07:00) (145/101 - 176/85)  BP(mean): 91 (19 Mar 2025 07:00) (91 - 91)  ABP: --  ABP(mean): --  RR: 16 (19 Mar 2025 08:00) (16 - 20)  SpO2: 100% (19 Mar 2025 08:00) (90% - 100%)    O2 Parameters below as of 19 Mar 2025 08:00  Patient On (Oxygen Delivery Method): BiPAP/CPAP    O2 Concentration (%): 50          03-18 @ 07:01  -  03-19 @ 07:00  --------------------------------------------------------  IN: 0 mL / OUT: 500 mL / NET: -500 mL      CAPILLARY BLOOD GLUCOSE      POCT Blood Glucose.: 171 mg/dL (19 Mar 2025 06:27)      PHYSICAL EXAM:    General: NAD  HEENT: NC/AT; PERRL, clear conjunctiva  Neck: supple  Respiratory: CTA b/l  Cardiovascular: +S1/S2; RRR  Abdomen: soft, NT/ND; +BS x4  Extremities: WWP, 2+ peripheral pulses b/l; no LE edema  Skin: normal color and turgor; no rash  Neurological:    MEDICATIONS:  MEDICATIONS  (STANDING):  albuterol/ipratropium for Nebulization 3 milliLiter(s) Nebulizer every 4 hours  amLODIPine   Tablet 10 milliGRAM(s) Oral daily  buDESOnide    Inhalation Suspension 0.5 milliGRAM(s) Inhalation every 12 hours  chlorhexidine 2% Cloths 1 Application(s) Topical <User Schedule>  dextrose 5%. 1000 milliLiter(s) (100 mL/Hr) IV Continuous <Continuous>  dextrose 5%. 1000 milliLiter(s) (50 mL/Hr) IV Continuous <Continuous>  dextrose 50% Injectable 25 Gram(s) IV Push once  dextrose 50% Injectable 12.5 Gram(s) IV Push once  dextrose 50% Injectable 25 Gram(s) IV Push once  dextrose Oral Gel 15 Gram(s) Oral once  enoxaparin Injectable 40 milliGRAM(s) SubCutaneous every 24 hours  glucagon  Injectable 1 milliGRAM(s) IntraMuscular once  guaiFENesin  milliGRAM(s) Oral every 12 hours  hydrALAZINE 25 milliGRAM(s) Oral three times a day  insulin lispro (ADMELOG) corrective regimen sliding scale   SubCutaneous three times a day before meals  insulin lispro (ADMELOG) corrective regimen sliding scale   SubCutaneous at bedtime  lisinopril 40 milliGRAM(s) Oral daily  methylPREDNISolone sodium succinate Injectable 20 milliGRAM(s) IV Push every 8 hours  pantoprazole  Injectable 40 milliGRAM(s) IV Push daily  polyethylene glycol 3350 17 Gram(s) Oral daily  senna 2 Tablet(s) Oral at bedtime  sertraline 25 milliGRAM(s) Oral daily  sodium chloride 3%  Inhalation 4 milliLiter(s) Inhalation every 12 hours  trimethoprim  160 mG/sulfamethoxazole 800 mG 1 Tablet(s) Oral <User Schedule>    MEDICATIONS  (PRN):      ALLERGIES:  Allergies    No Known Allergies    Intolerances        LABS:                        13.9   13.98 )-----------( 149      ( 19 Mar 2025 06:05 )             43.2     03-19    137  |  95[L]  |  24[H]  ----------------------------<  181[H]  3.5   |  24  |  0.57    Ca    9.9      19 Mar 2025 06:05  Phos  3.5     03-19  Mg     2.00     03-19    TPro  6.9  /  Alb  3.3  /  TBili  0.3  /  DBili  x   /  AST  11  /  ALT  18  /  AlkPhos  83  03-19      Urinalysis Basic - ( 19 Mar 2025 06:05 )    Color: x / Appearance: x / SG: x / pH: x  Gluc: 181 mg/dL / Ketone: x  / Bili: x / Urobili: x   Blood: x / Protein: x / Nitrite: x   Leuk Esterase: x / RBC: x / WBC x   Sq Epi: x / Non Sq Epi: x / Bacteria: x        RADIOLOGY & ADDITIONAL TESTS: Reviewed.

## 2025-03-19 NOTE — PROVIDER CONTACT NOTE (OTHER) - ACTION/TREATMENT ORDERED:
md notified. rapid called, more duonebs, bipap, methylprednisolone, and labs ordered. pt eventually transferred to icu

## 2025-03-20 LAB
ALBUMIN SERPL ELPH-MCNC: 3.4 G/DL — SIGNIFICANT CHANGE UP (ref 3.3–5)
ALP SERPL-CCNC: 76 U/L — SIGNIFICANT CHANGE UP (ref 40–120)
ALT FLD-CCNC: 19 U/L — SIGNIFICANT CHANGE UP (ref 4–33)
ANION GAP SERPL CALC-SCNC: 14 MMOL/L — SIGNIFICANT CHANGE UP (ref 7–14)
AST SERPL-CCNC: 9 U/L — SIGNIFICANT CHANGE UP (ref 4–32)
BASOPHILS # BLD AUTO: 0.01 K/UL — SIGNIFICANT CHANGE UP (ref 0–0.2)
BASOPHILS NFR BLD AUTO: 0.1 % — SIGNIFICANT CHANGE UP (ref 0–2)
BILIRUB SERPL-MCNC: 0.3 MG/DL — SIGNIFICANT CHANGE UP (ref 0.2–1.2)
BLOOD GAS VENOUS COMPREHENSIVE RESULT: SIGNIFICANT CHANGE UP
BUN SERPL-MCNC: 33 MG/DL — HIGH (ref 7–23)
CALCIUM SERPL-MCNC: 10.2 MG/DL — SIGNIFICANT CHANGE UP (ref 8.4–10.5)
CHLORIDE SERPL-SCNC: 99 MMOL/L — SIGNIFICANT CHANGE UP (ref 98–107)
CO2 SERPL-SCNC: 28 MMOL/L — SIGNIFICANT CHANGE UP (ref 22–31)
CREAT SERPL-MCNC: 0.72 MG/DL — SIGNIFICANT CHANGE UP (ref 0.5–1.3)
EGFR: 88 ML/MIN/1.73M2 — SIGNIFICANT CHANGE UP
EGFR: 88 ML/MIN/1.73M2 — SIGNIFICANT CHANGE UP
EOSINOPHIL # BLD AUTO: 0 K/UL — SIGNIFICANT CHANGE UP (ref 0–0.5)
EOSINOPHIL NFR BLD AUTO: 0 % — SIGNIFICANT CHANGE UP (ref 0–6)
GLUCOSE BLDC GLUCOMTR-MCNC: 210 MG/DL — HIGH (ref 70–99)
GLUCOSE BLDC GLUCOMTR-MCNC: 224 MG/DL — HIGH (ref 70–99)
GLUCOSE BLDC GLUCOMTR-MCNC: 236 MG/DL — HIGH (ref 70–99)
GLUCOSE BLDC GLUCOMTR-MCNC: 267 MG/DL — HIGH (ref 70–99)
GLUCOSE BLDC GLUCOMTR-MCNC: 367 MG/DL — HIGH (ref 70–99)
GLUCOSE BLDC GLUCOMTR-MCNC: 377 MG/DL — HIGH (ref 70–99)
GLUCOSE SERPL-MCNC: 216 MG/DL — HIGH (ref 70–99)
HCT VFR BLD CALC: 40.1 % — SIGNIFICANT CHANGE UP (ref 34.5–45)
HGB BLD-MCNC: 12.8 G/DL — SIGNIFICANT CHANGE UP (ref 11.5–15.5)
IANC: 10.65 K/UL — HIGH (ref 1.8–7.4)
IMM GRANULOCYTES NFR BLD AUTO: 0.4 % — SIGNIFICANT CHANGE UP (ref 0–0.9)
LYMPHOCYTES # BLD AUTO: 17.1 % — SIGNIFICANT CHANGE UP (ref 13–44)
LYMPHOCYTES # BLD AUTO: 2.31 K/UL — SIGNIFICANT CHANGE UP (ref 1–3.3)
MAGNESIUM SERPL-MCNC: 2.5 MG/DL — SIGNIFICANT CHANGE UP (ref 1.6–2.6)
MCHC RBC-ENTMCNC: 31.8 PG — SIGNIFICANT CHANGE UP (ref 27–34)
MCHC RBC-ENTMCNC: 31.9 G/DL — LOW (ref 32–36)
MCV RBC AUTO: 99.8 FL — SIGNIFICANT CHANGE UP (ref 80–100)
MONOCYTES # BLD AUTO: 0.47 K/UL — SIGNIFICANT CHANGE UP (ref 0–0.9)
MONOCYTES NFR BLD AUTO: 3.5 % — SIGNIFICANT CHANGE UP (ref 2–14)
NEUTROPHILS # BLD AUTO: 10.65 K/UL — HIGH (ref 1.8–7.4)
NEUTROPHILS NFR BLD AUTO: 78.9 % — HIGH (ref 43–77)
NRBC # BLD AUTO: 0 K/UL — SIGNIFICANT CHANGE UP (ref 0–0)
NRBC # FLD: 0 K/UL — SIGNIFICANT CHANGE UP (ref 0–0)
NRBC BLD AUTO-RTO: 0 /100 WBCS — SIGNIFICANT CHANGE UP (ref 0–0)
NT-PROBNP SERPL-SCNC: 49 PG/ML — SIGNIFICANT CHANGE UP
PHOSPHATE SERPL-MCNC: 3.9 MG/DL — SIGNIFICANT CHANGE UP (ref 2.5–4.5)
PLATELET # BLD AUTO: 252 K/UL — SIGNIFICANT CHANGE UP (ref 150–400)
POTASSIUM SERPL-MCNC: 4.1 MMOL/L — SIGNIFICANT CHANGE UP (ref 3.5–5.3)
POTASSIUM SERPL-SCNC: 4.1 MMOL/L — SIGNIFICANT CHANGE UP (ref 3.5–5.3)
PROT SERPL-MCNC: 6.7 G/DL — SIGNIFICANT CHANGE UP (ref 6–8.3)
RBC # BLD: 4.02 M/UL — SIGNIFICANT CHANGE UP (ref 3.8–5.2)
RBC # FLD: 13 % — SIGNIFICANT CHANGE UP (ref 10.3–14.5)
SODIUM SERPL-SCNC: 141 MMOL/L — SIGNIFICANT CHANGE UP (ref 135–145)
WBC # BLD: 13.5 K/UL — HIGH (ref 3.8–10.5)
WBC # FLD AUTO: 13.5 K/UL — HIGH (ref 3.8–10.5)

## 2025-03-20 PROCEDURE — 99233 SBSQ HOSP IP/OBS HIGH 50: CPT

## 2025-03-20 RX ORDER — INSULIN LISPRO 100 U/ML
INJECTION, SOLUTION INTRAVENOUS; SUBCUTANEOUS EVERY 6 HOURS
Refills: 0 | Status: DISCONTINUED | OUTPATIENT
Start: 2025-03-20 | End: 2025-03-20

## 2025-03-20 RX ORDER — INSULIN LISPRO 100 U/ML
INJECTION, SOLUTION INTRAVENOUS; SUBCUTANEOUS AT BEDTIME
Refills: 0 | Status: DISCONTINUED | OUTPATIENT
Start: 2025-03-20 | End: 2025-04-03

## 2025-03-20 RX ORDER — POLYETHYLENE GLYCOL 3350 17 G/17G
17 POWDER, FOR SOLUTION ORAL
Refills: 0 | Status: DISCONTINUED | OUTPATIENT
Start: 2025-03-20 | End: 2025-05-21

## 2025-03-20 RX ORDER — INSULIN LISPRO 100 U/ML
INJECTION, SOLUTION INTRAVENOUS; SUBCUTANEOUS
Refills: 0 | Status: DISCONTINUED | OUTPATIENT
Start: 2025-03-20 | End: 2025-04-03

## 2025-03-20 RX ADMIN — METHYLPREDNISOLONE ACETATE 20 MILLIGRAM(S): 80 INJECTION, SUSPENSION INTRA-ARTICULAR; INTRALESIONAL; INTRAMUSCULAR; SOFT TISSUE at 21:48

## 2025-03-20 RX ADMIN — IPRATROPIUM BROMIDE AND ALBUTEROL SULFATE 3 MILLILITER(S): .5; 2.5 SOLUTION RESPIRATORY (INHALATION) at 13:15

## 2025-03-20 RX ADMIN — SERTRALINE 25 MILLIGRAM(S): 100 TABLET, FILM COATED ORAL at 12:08

## 2025-03-20 RX ADMIN — POLYETHYLENE GLYCOL 3350 17 GRAM(S): 17 POWDER, FOR SOLUTION ORAL at 17:06

## 2025-03-20 RX ADMIN — IPRATROPIUM BROMIDE AND ALBUTEROL SULFATE 3 MILLILITER(S): .5; 2.5 SOLUTION RESPIRATORY (INHALATION) at 17:39

## 2025-03-20 RX ADMIN — INSULIN LISPRO 2: 100 INJECTION, SOLUTION INTRAVENOUS; SUBCUTANEOUS at 00:17

## 2025-03-20 RX ADMIN — Medication 1 APPLICATION(S): at 06:11

## 2025-03-20 RX ADMIN — INSULIN LISPRO 6: 100 INJECTION, SOLUTION INTRAVENOUS; SUBCUTANEOUS at 17:04

## 2025-03-20 RX ADMIN — INSULIN LISPRO 4: 100 INJECTION, SOLUTION INTRAVENOUS; SUBCUTANEOUS at 12:08

## 2025-03-20 RX ADMIN — OLANZAPINE 10 MILLIGRAM(S): 10 TABLET ORAL at 21:48

## 2025-03-20 RX ADMIN — Medication 25 MILLIGRAM(S): at 21:48

## 2025-03-20 RX ADMIN — Medication 25 MILLIGRAM(S): at 14:09

## 2025-03-20 RX ADMIN — IPRATROPIUM BROMIDE AND ALBUTEROL SULFATE 3 MILLILITER(S): .5; 2.5 SOLUTION RESPIRATORY (INHALATION) at 00:55

## 2025-03-20 RX ADMIN — OLANZAPINE 10 MILLIGRAM(S): 10 TABLET ORAL at 00:16

## 2025-03-20 RX ADMIN — INSULIN LISPRO 3: 100 INJECTION, SOLUTION INTRAVENOUS; SUBCUTANEOUS at 21:47

## 2025-03-20 RX ADMIN — IPRATROPIUM BROMIDE AND ALBUTEROL SULFATE 3 MILLILITER(S): .5; 2.5 SOLUTION RESPIRATORY (INHALATION) at 09:29

## 2025-03-20 RX ADMIN — IPRATROPIUM BROMIDE AND ALBUTEROL SULFATE 3 MILLILITER(S): .5; 2.5 SOLUTION RESPIRATORY (INHALATION) at 23:18

## 2025-03-20 RX ADMIN — DEXTROMETHORPHAN HBR, GUAIFENESIN 600 MILLIGRAM(S): 200 LIQUID ORAL at 17:06

## 2025-03-20 RX ADMIN — IPRATROPIUM BROMIDE AND ALBUTEROL SULFATE 3 MILLILITER(S): .5; 2.5 SOLUTION RESPIRATORY (INHALATION) at 20:44

## 2025-03-20 RX ADMIN — Medication 2 TABLET(S): at 21:48

## 2025-03-20 RX ADMIN — BUDESONIDE 0.5 MILLIGRAM(S): 0.25 SUSPENSION RESPIRATORY (INHALATION) at 09:46

## 2025-03-20 RX ADMIN — Medication 4 MILLILITER(S): at 09:30

## 2025-03-20 RX ADMIN — Medication 40 MILLIGRAM(S): at 12:08

## 2025-03-20 RX ADMIN — METHYLPREDNISOLONE ACETATE 20 MILLIGRAM(S): 80 INJECTION, SUSPENSION INTRA-ARTICULAR; INTRALESIONAL; INTRAMUSCULAR; SOFT TISSUE at 06:12

## 2025-03-20 RX ADMIN — BUDESONIDE 0.5 MILLIGRAM(S): 0.25 SUSPENSION RESPIRATORY (INHALATION) at 20:45

## 2025-03-20 RX ADMIN — ENOXAPARIN SODIUM 40 MILLIGRAM(S): 100 INJECTION SUBCUTANEOUS at 12:08

## 2025-03-20 RX ADMIN — IPRATROPIUM BROMIDE AND ALBUTEROL SULFATE 3 MILLILITER(S): .5; 2.5 SOLUTION RESPIRATORY (INHALATION) at 03:31

## 2025-03-20 RX ADMIN — METHYLPREDNISOLONE ACETATE 20 MILLIGRAM(S): 80 INJECTION, SUSPENSION INTRA-ARTICULAR; INTRALESIONAL; INTRAMUSCULAR; SOFT TISSUE at 14:09

## 2025-03-20 RX ADMIN — INSULIN LISPRO 2: 100 INJECTION, SOLUTION INTRAVENOUS; SUBCUTANEOUS at 06:11

## 2025-03-20 NOTE — BH CONSULTATION LIAISON ASSESSMENT NOTE - NSBHATTESTCOMMENTATTENDFT_PSY_A_CORE
Met with the patient on 3/21. Oriented, recognizes md from yesterday. Remains with PMR, thought latency, but no catatonic symptoms on exam. Denies any mood symptoms, denies any si or hi. No evident ah or vh or delusions when asked.   Appetite fair as per staff    Agree with plan as above

## 2025-03-20 NOTE — SWALLOW BEDSIDE ASSESSMENT ADULT - SWALLOW EVAL: DIAGNOSIS
Patient presents with functional oropharyngeal swallow for trials provided. Patient stated preference for Soft solids, therefore advanced solid trials deferred at this time. Oral stage characterized by adequate oral containment, functional mastication and timely transit of all PO trials. Pharyngeal stage characterized by initiation of swallow trigger with hyolaryngeal elevation/excursion evidenced via digital palpation. NO overt signs or symptoms of penetration/aspiration were noted. Patient remained with clear vocal quality throughout.

## 2025-03-20 NOTE — PROGRESS NOTE ADULT - ASSESSMENT
74F hx asthma and depressed presented with AHHRF c/b AMS requiring MICU admission for intubation for respiratory failure i/s/o asthma exacerbation. Extubated 3/12 to face tent and now on BIPAP s/p RRT 3/19 for increased WOB / hypoxia despite aggressive asthma management. Transferred back to MICU for management of severe asthma.    =======NEURO=================  Sedation: none  Pain: tylenol  AAO: x2-3 baseline, currently AAOx1-2    # Depression  Hx tardive dyskinesia symptoms on prior medications.  > continue home Sertraline  > hold home Olanzapine, consider resuming    # Seizure-like activity, resolved  Encephalopathy i/s/o hypercapnic respiratory failure.  - CTH: no acute changes  - CT spine: no fractures  - 3/8 ON: 6 minutes of seizure-like activity with myoclonic upper jerks -> broke with ativan  -- EEG 3/10 showing no epileptiform activity; severe degree of diffuse or multifocal cerebral dysfunction c/w comatose    =======RESPIRATORY============  # Acute on Chronic Hypoxic and Hypercapnic Resp Failure  # Asthma, uncontrolled  # PNA, improving  Likely 2/2 asthma exacerbation despite appropriate medications.  - RRT 3/19 for increased WOB --> ABG 7.45/51/143/35/1.2  - CTA chest 3/8 without evidence of PNA or PE  > cont DuoNebs q4h; nebulized budesonide  > cont IV steroids 20mg q8h  > replete Mg PRN, goal >2  > cont BIPAP 12/6; cont suppl O2 as needed, wean as tolerated  > trend ABG/VBG  > PPI and Bactrim ppx for steroids  > pulm toilet; consider d/c hyper-sal if not benefiting from secretion mobilization and is more of an asthma irritant    =======CARDIOVASCULAR=========  # HTN  - while in the MICU was briefly on Nicardipine ggt (was off since 3/15)  > cont amlodipine 10mg qD  > cont Lisinopril 40mg qD  > cont hydralazine 25mg q8hr  > hold home HCTZ, avoid beta-blockers given asthma    =======GASTROINTESTINAL=======  - Diet: NPO on BIPAP  - PPI ppx: pantoprazole 40mg IV qD  - No active issues    ======RENAL/GENITOURINARY=====  - No active issues    # Hyponatremia, resolved  Likely i/s/o hypovolemia and component of underlying SIADH.  - IVFs trial successful  > CTM Na on daily labs  > consider fluid restriction vs salt tabs if worsening Na again    =======INFECTIOUS DISEASE=======  # Leukocytosis  # ?PNA, ?resolved  Leukocytosis i/s/o steroids and possible PNA, persistent fevers starting 3/12 now progressed to low-grade temps now resolved since 3/14.  - Full RVP neg, legionella/strep negative, full RVP negative  - initial Bcx no growth; rpt Bcx 3/13 no growth  - UA 3/12 neg  - CTA chest w/o evidence of PNA or PE  - LE dopplers w/o DVT  - s/p azithro (5-days, finished 3/12)  > cont empiric coverage with zosyn (3/13- ) will aim for 7 day course    - Full RVP neg, legionella/strep negative, LE dopplers w/o DVT  - Blood cultures no growth  - UA 3/12 neg  - s/p azithro (5-days, finished 3/12)  - s/p Zosyn (5-day course finished 3/17)  > rpt RVP  > monitor off abx    =======ENDOCRINE==============  - No active issues  - A1c 5.7%  >moderate ISS for steroids and hyperglycemia     =======HEME===================  # Thrombocytopenia, mild  Likely i/s/o critical illness.  > CTM for now; transfuse for PLT <10 or <50 with bleeding    # DVT r/o for LE edema  - LE bi/l duplex w/o DVT    =======PROPHYLACTIC============  - DVT ppx: lovenox 40mg qD  - Lines: PIVs  - Code Status: full code  - Dispo: PT previously consulted  - Communication/Ethics: fernando   74F hx asthma and depressed presented with AHHRF c/b AMS requiring MICU admission for intubation for respiratory failure i/s/o asthma exacerbation. Extubated 3/12 to face tent and now on BIPAP s/p RRT 3/19 for increased WOB / hypoxia despite aggressive asthma management. Transferred back to MICU for management of severe asthma.    =======NEURO=================  Sedation: none  Pain: tylenol  AAO: x2-3 baseline, currently AAOx1-2    # Depression  Hx tardive dyskinesia symptoms on prior medications.  > continue home Sertraline  -  team __  3/20 - INCREASE Zoloft to 50mg daily ( home dose 150mg) ( monitor NA- recent hyponatremia), As per  team, psych will follow and make any changes if needed, continue present dose 25 mg daily   > restarted home Olanzapine, 10 mg qhs     # Seizure-like activity, resolved  Encephalopathy i/s/o hypercapnic respiratory failure.  - CTH: no acute changes  - CT spine: no fractures  - 3/8 ON: 6 minutes of seizure-like activity with myoclonic upper jerks -> broke with ativan  -- EEG 3/10 showing no epileptiform activity; severe degree of diffuse or multifocal cerebral dysfunction c/w comatose    =======RESPIRATORY============  # Acute on Chronic Hypoxic and Hypercapnic Resp Failure  # Asthma, uncontrolled  # PNA, improving  Likely 2/2 asthma exacerbation despite appropriate medications.  - RRT 3/19 for increased WOB --> ABG 7.45/51/143/35/1.2  - CTA chest 3/8 without evidence of PNA or PE  > cont DuoNebs q4h; nebulized budesonide  > cont on Methylprednisolone 20 mg IV q 8 hrs since 3/19 >> do not taper today (steroids were tapered off on 3/16 and restarted on 3/19)  > replete Mg PRN, goal >2  > s/p BIPAP 12/6; cont suppl O2 as needed, pt remains off non invasive since 3/19, O 2 was weaned from 4 L to 2 L NC   > repeat VBG__ 7.46/52/111/37/98  > PPI and Bactrim ppx for steroids  > pulm toilet; discontinued hyper-sal, no significant secretions     =======CARDIOVASCULAR=========  # HTN  - while in the MICU was briefly on Nicardipine ggt (was off since 3/15)  > Amlodipine 10mg qD>> discontinued 2/2 c/f LE edema   > cont Lisinopril 40mg qD  > cont hydralazine 25mg q8hr  > cont home HCTZ, avoid beta-blockers given asthma    =======GASTROINTESTINAL=======  - passed SLP-- on soft and bite sized diet >> good PO intake, advance as tolerated   - PPI ppx: pantoprazole 40mg IV qD  - No active issues    ======RENAL/GENITOURINARY=====  - No active issues    # Hyponatremia, resolved  Likely i/s/o hypovolemia and component of underlying SIADH.  - IVFs trial successful  > CTM Na on daily labs  > consider fluid restriction vs salt tabs if worsening Na again    =======INFECTIOUS DISEASE=======  # Leukocytosis, stable   # low concern for PNA,   Leukocytosis i/s/o steroids and possible PNA, persistent fevers starting 3/12 now progressed to low-grade temps now resolved since 3/14.  - Full RVP neg, legionella/strep negative, full RVP negative  - initial Bcx 3/8 - no growth; rpt Bcx 3/13 no growth  - UA 3/12 neg  - CTA chest w/o evidence of PNA or PE  - LE dopplers w/o DVT  - Full RVP neg, legionella/strep negative, LE dopplers w/o DVT  - s/p azithro (5-days, finished 3/12)  - s/p Zosyn (5-day course finished 3/17)  > rpt Full RVP neg   > monitor off abx    =======ENDOCRINE==============  -- A1c 5.7%  - >224>236, possibly iso steroids   >moderate ISS for steroids and hyperglycemia   - CTM    =======HEME===================  # Thrombocytopenia, mild  Likely i/s/o critical illness.  > CTM for now; transfuse for PLT <10 or <50 with bleeding    # DVT r/o for LE edema  - LE bi/l duplex w/o DVT    =======PROPHYLACTIC============  - DVT ppx: lovenox 40mg qD  - Lines: PIVs  - Code Status: full code  - Dispo: TK, transfer to Medicine with    - Communication/Ethics: fernando

## 2025-03-20 NOTE — BH CONSULTATION LIAISON ASSESSMENT NOTE - NSBHATTESTAPPAMEND_PSY_A_CORE
I have personally seen and examined this patient. I fully participated in the care of this patient. I have made amendments to the documentation where appropriate and otherwise agree with the history, physical exam, and plan as documented by the [facility]

## 2025-03-20 NOTE — BH CONSULTATION LIAISON ASSESSMENT NOTE - NSBHCHARTREVIEWLAB_PSY_A_CORE FT
12.8   13.50 )-----------( 252      ( 20 Mar 2025 03:20 )             40.1   03-20    141  |  99  |  33[H]  ----------------------------<  216[H]  4.1   |  28  |  0.72    Ca    10.2      20 Mar 2025 03:20  Phos  3.9     03-20  Mg     2.50     03-20    TPro  6.7  /  Alb  3.4  /  TBili  0.3  /  DBili  x   /  AST  9   /  ALT  19  /  AlkPhos  76  03-20

## 2025-03-20 NOTE — BH CONSULTATION LIAISON ASSESSMENT NOTE - CURRENT MEDICATION
MEDICATIONS  (STANDING):  albuterol/ipratropium for Nebulization 3 milliLiter(s) Nebulizer every 4 hours  buDESOnide    Inhalation Suspension 0.5 milliGRAM(s) Inhalation every 12 hours  chlorhexidine 2% Cloths 1 Application(s) Topical <User Schedule>  dextrose 5%. 1000 milliLiter(s) (100 mL/Hr) IV Continuous <Continuous>  dextrose 5%. 1000 milliLiter(s) (50 mL/Hr) IV Continuous <Continuous>  dextrose 50% Injectable 25 Gram(s) IV Push once  dextrose 50% Injectable 12.5 Gram(s) IV Push once  dextrose 50% Injectable 25 Gram(s) IV Push once  dextrose Oral Gel 15 Gram(s) Oral once  enoxaparin Injectable 40 milliGRAM(s) SubCutaneous every 24 hours  glucagon  Injectable 1 milliGRAM(s) IntraMuscular once  guaiFENesin  milliGRAM(s) Oral every 12 hours  hydrALAZINE 25 milliGRAM(s) Oral three times a day  hydrochlorothiazide 25 milliGRAM(s) Oral daily  insulin lispro (ADMELOG) corrective regimen sliding scale   SubCutaneous every 6 hours  insulin lispro (ADMELOG) corrective regimen sliding scale   SubCutaneous three times a day before meals  insulin lispro (ADMELOG) corrective regimen sliding scale   SubCutaneous at bedtime  lisinopril 40 milliGRAM(s) Oral daily  methylPREDNISolone sodium succinate Injectable 20 milliGRAM(s) IV Push every 8 hours  OLANZapine Disintegrating Tablet 10 milliGRAM(s) Oral at bedtime  pantoprazole    Tablet 40 milliGRAM(s) Oral before breakfast  polyethylene glycol 3350 17 Gram(s) Oral two times a day  senna 2 Tablet(s) Oral at bedtime  sertraline 25 milliGRAM(s) Oral daily  sodium chloride 3%  Inhalation 4 milliLiter(s) Inhalation every 12 hours    MEDICATIONS  (PRN):

## 2025-03-20 NOTE — BH CONSULTATION LIAISON ASSESSMENT NOTE - NSICDXBHPRIMARYDX_PSY_ALL_CORE
The following information and instructions were given:    Do not eat, drink, smoke or chew gum after midnight the night before surgery. This also includes no mints.  Take all routine, prescribed medications including heart and blood pressure medicines with a sip of water unless otherwise instructed by your physician.   Do NOT take diabetic medication unless instructed by your physician.    If you were instructed to drink 20 ounces (or until full) of Gatorade the morning of surgery, the Gatorade must be completed 1 hour before arrival time on the day of surgery .  No RED Gatorade.      DO NOT shave for two days before your procedure.  Do not wear makeup.      DO NOT wear fingernail polish (gel/regular) and/or acrylic/artificial nails on the day of surgery.   If you have had a recent manicure and would rather not remove polish or artificial nails, then the minimum requirement is that the polish/artificial nails must be removed from the middle finger on each hand.      If you are having surgery/procedure on an upper extremity, then fingernail polish/artificial fingernails must be removed for surgery.  NO EXCEPTIONS.      If you are having surgery/procedure on a lower extremity, then toenail polish on both extremities must be removed for surgery.  NO EXCEPTIONS.    Remove all jewelry (advise to go to jeweler if unable to remove).  Jewelry, especially rings, can no longer be taped for surgery.    Leave anything you consider valuable at home.    Leave your suitcase in the car until after your surgery.    Bring the following with you the day of your procedure (when applicable)       -picture ID and insurance cards       -Co-pay/deductible required by insurance       -Medications in the original bottles (not a list) including all over-the-counter medications if not brought to PAT       -Copy of advance directive, living will or power of  documents if not brought to PAT       -CPAP or BIPAP mask and tubing (do not  bring machine)       -Skin prep instruction(s) sheet       -PAT Pass    Education booklet, brochure, handout or binder related to procedure given to patient.    When applicable, an ERAS booklet was given to patient.    Pain Control After Surgery handout given to patient.    Respirex use (handout given to patient) and pneumonia prevention education provided.    Signs and Symptoms of infection discussed.    DVT Prevention education given.  Stressing the importance of ambulation.    Please apply Chlorhexadine wipes to surgical area (if instructed) the night before procedure and the AM of procedure and document date/time of applications on skin prep instruction sheet.           Major depressive disorder with psychotic features   F32.3

## 2025-03-20 NOTE — BH CONSULTATION LIAISON ASSESSMENT NOTE - SUMMARY
Patient is a 74F hx asthma, depression, and HTN was admitted to the MICU with AHRF 2/2 asthma exacerbation resulting in acute on chronic HHRF causing AMS and increased WOB that required intubation. During her ICU course, she experienced seizure-like activity .  Additional issues included hyponatremia, acute on chronic metabolic alkalosis, and mild thrombocytopenia,  also having fevers.  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 Patient has h/o hx of major depressive disorder with psychosis, hx of 3 past inpatient psychiatric hospitalizations last in 2016 at [facility], all rehospitalizations were in context non compliance with the treatment. She has no hx of suicide attempts, no hx of substance abuse. Patient w hx of paranoia, disorganization, catatonia, required MOO when in [facility].     PLAN  - no SI or HI, no need for psychiatric CO  - EKG  -- antipsychotics can only be given if qtc < 500  - MEDICATIONS:  -- INCREASE zoloft to 50mg daily (( continue to monitor NA)) (( home dose 150mg ))  -- CONTINUE Zyprexa 10mg qhs  - PRN:  -- Zyprexa 2.5 mg q6hrs for severe agitation Patient is a 74F hx asthma, depression, and HTN was admitted to the MICU with AHRF 2/2 asthma exacerbation resulting in acute on chronic HHRF causing AMS and increased WOB that required intubation. During her ICU course, she experienced seizure-like activity .  Additional issues included hyponatremia, acute on chronic metabolic alkalosis, and mild thrombocytopenia,  also having fevers.  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 Patient has h/o hx of major depressive disorder with psychosis, hx of 3 past inpatient psychiatric hospitalizations last in 2016 at [facility], all rehospitalizations were in context non compliance with the treatment. She has no hx of suicide attempts, no hx of substance abuse. Patient w hx of paranoia, disorganization, catatonia, required MOO when in [facility].     PLAN  - no SI or HI, no need for psychiatric CO  - EKG  -- antipsychotics can only be given if qtc < 500  - MEDICATIONS:  -- zoloft PENDING RECS  -- Zyprexa   - PRN:  -- Zyprexa 2.5 mg q6hrs for severe agitation Patient is a 74F hx asthma, depression, and HTN was admitted to the MICU with AHRF 2/2 asthma exacerbation resulting in acute on chronic HHRF causing AMS and increased WOB that required intubation. During her ICU course, she experienced seizure-like activity .  Additional issues included hyponatremia, acute on chronic metabolic alkalosis, and mild thrombocytopenia,  also having fevers.  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 Patient has h/o hx of major depressive disorder with psychosis, hx of 3 past inpatient psychiatric hospitalizations last in 2016 at [facility], all rehospitalizations were in context non compliance with the treatment. She has no hx of suicide attempts, no hx of substance abuse. Patient w hx of paranoia, disorganization, catatonia, required MOO when in [facility].     PLAN  - no SI or HI, no need for psychiatric CO  - EKG  -- antipsychotics can only be given if qtc < 500  - MEDICATIONS:  -- zoloft INCREASE to 50mg daily ( home dose 150mg) ( monitor NA- recent hyponatremia)  -- Zyprexa CONTINUE at 10mg qhs  - PRN:  -- Zyprexa 2.5 mg q6hrs for severe agitation  - DISPO: pending  Patient is a 74F hx asthma, depression, and HTN was admitted to the MICU with AHRF 2/2 asthma exacerbation resulting in acute on chronic HHRF causing AMS and increased WOB that required intubation. During her ICU course, she experienced seizure-like activity .  Additional issues included hyponatremia, acute on chronic metabolic alkalosis, and mild thrombocytopenia,  also having fevers.  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 Patient has h/o hx of major depressive disorder with psychosis, hx of 3 past inpatient psychiatric hospitalizations last in 2016 at [facility], all rehospitalizations were in context non compliance with the treatment. She has no hx of suicide attempts, no hx of substance abuse. Patient w hx of paranoia, disorganization, catatonia, required MOO when in [facility].     PLAN  - no SI or HI, no need for psychiatric CO  - EKG  -- antipsychotics can only be given if qtc < 500    - MEDICATIONS:  -- zoloft INCREASE to 50mg daily ( home dose 150mg) ( monitor NA- recent hyponatremia)  -- Zyprexa CONTINUE at 10mg qhs    - PRN:  -- Zyprexa 2.5 mg q6hrs for severe agitation  - DISPO: pending

## 2025-03-20 NOTE — SWALLOW BEDSIDE ASSESSMENT ADULT - COMMENTS
3/18 Internal Medicine Note: “74F hx asthma and depressed presented with AHHRF c/b AMS requiring MICU admission for intubation for airway protection i/s/o asthma exacerbation. s/p extubation with labile BPs requiring nicardipine gtt. Now improved BPs and downgraded to floors.”     3/18 Chest Xray: “IMPRESSION: Clear lungs.”        Patient is known to this service from this admission, last seen for clinical swallow evaluation on 3/13/25 with the following recommendations made: “From An Oral/Pharyngeal Standpoint: Regular & Thin Liquids however patient reports preference for Soft Bite Size & Thin at this time”. See consult for full details.      Patient received in bed, awake and alert. O2 via nasal cannula in place. Patient is currently NPO post rapid response 3/19.  Repositioned upright for PO trials.

## 2025-03-20 NOTE — PROGRESS NOTE ADULT - SUBJECTIVE AND OBJECTIVE BOX
Significant recent/past 24 hr events:    Subjective:    Review of Systems         [ ] A ten-point review of systems was otherwise negative except as noted.  [ ] Due to altered mental status/intubation, subjective information were not able to be obtained from the patient. History was obtained, to the extent possible, from review of the chart and collateral sources of information.      Patient is a 74y old  Female who presents with a chief complaint of Asthma exacerbation (18 Mar 2025 06:58)    HPI:  73 yo F with PMH asthma, depression presenting to [facility] with worsening dyspnea and wheezing with BLE edema since returning from 10.5 hour trip to [facility] 6 days ago. Altered in ED and unable to provide history, only responsive to noxious stimulli. History provided by  who states he found her on floor with unwitnessed fall. Hx of 3 asthma attacks last year, no hx of intubation, no clear asthma triggers but felt sick after flight home from [facility] 3/3/25 days where she visited for 3 months and was in usual state of health per .    ED Course: vitals notable for HR 60s, -115 with systolics in 180s, tachypneic to 24 with sPO2 95% on RA. Exam notable for wheezing. Placed on NC 5L then trialed on BiPAP but then required intubation to protect airway due to AMS. Labs notable for WBC 8.5, Hgb 12.6, , coags wnl, Na 126, K 4.3, Cl 85, bicarb 27, Cr 0.69, AST/ALT 59/47, proBNP 480, troponin 9, TSH 2.81, pH 7.29, pCO2 72, bicarb 35, neg RVP, CTA chest with no acute findings. CT head/cspine pending. Treated with 1L NS, Duonebs, solumedrol, and sedated with propofol/fentanyl. Admitted to MICU for AHRF.     73 yo F with PMH notable for asthma presenting with AHRF and hypercapnic respiratory acidosis with AMS requiring intubation for airway protection. Now admitted to MICU on ventilator for further evlauation and management iso asthma exacerbation.  (08 Mar 2025 17:07)    PAST MEDICAL & SURGICAL HISTORY:  MDD (major depressive disorder), recurrent, severe, with psychosis      Asthma, mild intermittent, uncomplicated      Essential hypertension      No significant past surgical history        FAMILY HISTORY:      Vitals   ICU Vital Signs Last 24 Hrs  T(C): 37.2 (20 Mar 2025 04:00), Max: 37.3 (19 Mar 2025 12:00)  T(F): 99 (20 Mar 2025 04:00), Max: 99.1 (19 Mar 2025 12:00)  HR: 92 (20 Mar 2025 07:21) (83 - 108)  BP: 143/74 (20 Mar 2025 06:00) (120/68 - 163/86)  BP(mean): 94 (20 Mar 2025 06:00) (75 - 106)  ABP: --  ABP(mean): --  RR: 20 (20 Mar 2025 06:00) (12 - 20)  SpO2: 95% (20 Mar 2025 07:21) (93% - 100%)    O2 Parameters below as of 20 Mar 2025 06:00  Patient On (Oxygen Delivery Method): nasal cannula  O2 Flow (L/min): 4          Physical Exam:   Constitutional: NAD, well-groomed, well-developed  HEENT: PERRLA, EOMI, no drainage or redness  Neck: supple,  No JVD, Trachea midline  Back: Normal spine flexure, No CVA tenderness, No deformity or limitation of movement  Respiratory: Breath Sounds equal & clear bilaterally to auscultation, no accessory muscle use noted  Cardiovascular: Regular rate, regular rhythm, normal S1, S2; no murmurs or rub  Gastrointestinal: Soft, non-tender, non distended, no hepatosplenomegaly, normal bowel sounds  Extremities: MCCULLOUGH x 4, no peripheral edema, no cyanosis, no clubbing   Vascular: Equal and normal pulses: 2+ peripheral pulses throughout  Neurological: A+O x 3; speech clear and intact; no sensory, motor  deficits, normal reflexes  Psychiatric: calm, normal mood, normal affect  Musculoskeletal: No joint swelling or deformity; no limitation of movement  Skin: warm, dry, well perfused, no rashes    VENT SETTINGS     ABG - ( 19 Mar 2025 06:05 )  pH, Arterial: 7.45  pH, Blood: x     /  pCO2: 51    /  pO2: 143   / HCO3: 35    / Base Excess: 9.7   /  SaO2: 99.3                I&O's Detail    19 Mar 2025 07:01  -  20 Mar 2025 07:00  --------------------------------------------------------  IN:  Total IN: 0 mL    OUT:    Voided (mL): 450 mL  Total OUT: 450 mL    Total NET: -450 mL          LABS                        12.8   13.50 )-----------( 252      ( 20 Mar 2025 03:20 )             40.1     03-20    141  |  99  |  33[H]  ----------------------------<  216[H]  4.1   |  28  |  0.72    Ca    10.2      20 Mar 2025 03:20  Phos  3.9     03-20  Mg     2.50     03-20    TPro  6.7  /  Alb  3.4  /  TBili  0.3  /  DBili  x   /  AST  9   /  ALT  19  /  AlkPhos  76  03-20    LIVER FUNCTIONS - ( 20 Mar 2025 03:20 )  Alb: 3.4 g/dL / Pro: 6.7 g/dL / ALK PHOS: 76 U/L / ALT: 19 U/L / AST: 9 U/L / GGT: x                   Urinalysis Basic - ( 20 Mar 2025 03:20 )    Color: x / Appearance: x / SG: x / pH: x  Gluc: 216 mg/dL / Ketone: x  / Bili: x / Urobili: x   Blood: x / Protein: x / Nitrite: x   Leuk Esterase: x / RBC: x / WBC x   Sq Epi: x / Non Sq Epi: x / Bacteria: x      POCT Blood Glucose.: 224 mg/dL *H* (03-20-25 @ 06:06)  POCT Blood Glucose.: 210 mg/dL *H* (03-20-25 @ 00:14)  POCT Blood Glucose.: 264 mg/dL *H* (03-19-25 @ 17:21)  POCT Blood Glucose.: 247 mg/dL *H* (03-19-25 @ 11:38)  POCT Blood Glucose.: 195 mg/dL *H* (03-19-25 @ 08:43)        MEDICATIONS  (STANDING):  albuterol/ipratropium for Nebulization 3 milliLiter(s) Nebulizer every 4 hours  buDESOnide    Inhalation Suspension 0.5 milliGRAM(s) Inhalation every 12 hours  chlorhexidine 2% Cloths 1 Application(s) Topical <User Schedule>  dextrose 5%. 1000 milliLiter(s) (100 mL/Hr) IV Continuous <Continuous>  dextrose 5%. 1000 milliLiter(s) (50 mL/Hr) IV Continuous <Continuous>  dextrose 50% Injectable 25 Gram(s) IV Push once  dextrose 50% Injectable 12.5 Gram(s) IV Push once  dextrose 50% Injectable 25 Gram(s) IV Push once  dextrose Oral Gel 15 Gram(s) Oral once  enoxaparin Injectable 40 milliGRAM(s) SubCutaneous every 24 hours  glucagon  Injectable 1 milliGRAM(s) IntraMuscular once  guaiFENesin  milliGRAM(s) Oral every 12 hours  hydrALAZINE 25 milliGRAM(s) Oral three times a day  hydrochlorothiazide 25 milliGRAM(s) Oral daily  insulin lispro (ADMELOG) corrective regimen sliding scale   SubCutaneous every 6 hours  lisinopril 40 milliGRAM(s) Oral daily  methylPREDNISolone sodium succinate Injectable 20 milliGRAM(s) IV Push every 8 hours  OLANZapine Disintegrating Tablet 10 milliGRAM(s) Oral at bedtime  pantoprazole  Injectable 40 milliGRAM(s) IV Push daily  polyethylene glycol 3350 17 Gram(s) Oral daily  senna 2 Tablet(s) Oral at bedtime  sertraline 25 milliGRAM(s) Oral daily  sodium chloride 3%  Inhalation 4 milliLiter(s) Inhalation every 12 hours    MEDICATIONS  (PRN):      Allergies:  No Known Allergies             Significant recent/past 24 hr events: pt remained off Bipap all night, O 2 was titrated down from 4 L to 2 L NC,     Subjective: pt denies any complaints     Review of Systems         [ ] A ten-point review of systems was otherwise negative except as noted.  [ ] Due to altered mental status/intubation, subjective information were not able to be obtained from the patient. History was obtained, to the extent possible, from review of the chart and collateral sources of information.      Patient is a 74y old  Female who presents with a chief complaint of Asthma exacerbation (18 Mar 2025 06:58)    HPI:  73 yo F with PMH asthma, depression presenting to [facility] with worsening dyspnea and wheezing with BLE edema since returning from 10.5 hour trip to [facility] 6 days ago. Altered in ED and unable to provide history, only responsive to noxious stimulli. History provided by  who states he found her on floor with unwitnessed fall. Hx of 3 asthma attacks last year, no hx of intubation, no clear asthma triggers but felt sick after flight home from [facility] 3/3/25 days where she visited for 3 months and was in usual state of health per .    ED Course: vitals notable for HR 60s, -115 with systolics in 180s, tachypneic to 24 with sPO2 95% on RA. Exam notable for wheezing. Placed on NC 5L then trialed on BiPAP but then required intubation to protect airway due to AMS. Labs notable for WBC 8.5, Hgb 12.6, , coags wnl, Na 126, K 4.3, Cl 85, bicarb 27, Cr 0.69, AST/ALT 59/47, proBNP 480, troponin 9, TSH 2.81, pH 7.29, pCO2 72, bicarb 35, neg RVP, CTA chest with no acute findings. CT head/cspine pending. Treated with 1L NS, Duonebs, solumedrol, and sedated with propofol/fentanyl. Admitted to MICU for AHRF.     73 yo F with PMH notable for asthma presenting with AHRF and hypercapnic respiratory acidosis with AMS requiring intubation for airway protection. Now admitted to MICU on ventilator for further evlauation and management iso asthma exacerbation.  (08 Mar 2025 17:07)    PAST MEDICAL & SURGICAL HISTORY:  MDD (major depressive disorder), recurrent, severe, with psychosis      Asthma, mild intermittent, uncomplicated      Essential hypertension      No significant past surgical history        FAMILY HISTORY:      Vitals   ICU Vital Signs Last 24 Hrs  T(C): 37.2 (20 Mar 2025 04:00), Max: 37.3 (19 Mar 2025 12:00)  T(F): 99 (20 Mar 2025 04:00), Max: 99.1 (19 Mar 2025 12:00)  HR: 92 (20 Mar 2025 07:21) (83 - 108)  BP: 143/74 (20 Mar 2025 06:00) (120/68 - 163/86)  BP(mean): 94 (20 Mar 2025 06:00) (75 - 106)  ABP: --  ABP(mean): --  RR: 20 (20 Mar 2025 06:00) (12 - 20)  SpO2: 95% (20 Mar 2025 07:21) (93% - 100%)    O2 Parameters below as of 20 Mar 2025 06:00  Patient On (Oxygen Delivery Method): nasal cannula  O2 Flow (L/min): 4          Physical Exam:   Constitutional: NAD, well-groomed, well-developed  HEENT: PERRLA, EOMI, no drainage or redness  Neck: supple,  No JVD, Trachea midline  Back: Normal spine flexure, No CVA tenderness, No deformity or limitation of movement  Respiratory: Breath Sounds equal & clear bilaterally to auscultation, no accessory muscle use noted  Cardiovascular: Regular rate, regular rhythm, normal S1, S2; no murmurs or rub  Gastrointestinal: Soft, non-tender, non distended, no hepatosplenomegaly, normal bowel sounds  Extremities: MCCULLOUGH x 4, no peripheral edema, no cyanosis, no clubbing   Vascular: Equal and normal pulses: 2+ peripheral pulses throughout  Neurological: A+O x 3; speech clear and intact; no sensory, motor  deficits, normal reflexes  Psychiatric: withdrawn, normal mood, normal affect  Musculoskeletal: No joint swelling or deformity; no limitation of movement  Skin: warm, dry, well perfused, no rashes    VENT SETTINGS     ABG - ( 19 Mar 2025 06:05 )  pH, Arterial: 7.45  pH, Blood: x     /  pCO2: 51    /  pO2: 143   / HCO3: 35    / Base Excess: 9.7   /  SaO2: 99.3                I&O's Detail    19 Mar 2025 07:01  -  20 Mar 2025 07:00  --------------------------------------------------------  IN:  Total IN: 0 mL    OUT:    Voided (mL): 450 mL  Total OUT: 450 mL    Total NET: -450 mL          LABS                        12.8   13.50 )-----------( 252      ( 20 Mar 2025 03:20 )             40.1     03-20    141  |  99  |  33[H]  ----------------------------<  216[H]  4.1   |  28  |  0.72    Ca    10.2      20 Mar 2025 03:20  Phos  3.9     03-20  Mg     2.50     03-20    TPro  6.7  /  Alb  3.4  /  TBili  0.3  /  DBili  x   /  AST  9   /  ALT  19  /  AlkPhos  76  03-20    LIVER FUNCTIONS - ( 20 Mar 2025 03:20 )  Alb: 3.4 g/dL / Pro: 6.7 g/dL / ALK PHOS: 76 U/L / ALT: 19 U/L / AST: 9 U/L / GGT: x                   Urinalysis Basic - ( 20 Mar 2025 03:20 )    Color: x / Appearance: x / SG: x / pH: x  Gluc: 216 mg/dL / Ketone: x  / Bili: x / Urobili: x   Blood: x / Protein: x / Nitrite: x   Leuk Esterase: x / RBC: x / WBC x   Sq Epi: x / Non Sq Epi: x / Bacteria: x      POCT Blood Glucose.: 224 mg/dL *H* (03-20-25 @ 06:06)  POCT Blood Glucose.: 210 mg/dL *H* (03-20-25 @ 00:14)  POCT Blood Glucose.: 264 mg/dL *H* (03-19-25 @ 17:21)  POCT Blood Glucose.: 247 mg/dL *H* (03-19-25 @ 11:38)  POCT Blood Glucose.: 195 mg/dL *H* (03-19-25 @ 08:43)        MEDICATIONS  (STANDING):  albuterol/ipratropium for Nebulization 3 milliLiter(s) Nebulizer every 4 hours  buDESOnide    Inhalation Suspension 0.5 milliGRAM(s) Inhalation every 12 hours  chlorhexidine 2% Cloths 1 Application(s) Topical <User Schedule>  dextrose 5%. 1000 milliLiter(s) (100 mL/Hr) IV Continuous <Continuous>  dextrose 5%. 1000 milliLiter(s) (50 mL/Hr) IV Continuous <Continuous>  dextrose 50% Injectable 25 Gram(s) IV Push once  dextrose 50% Injectable 12.5 Gram(s) IV Push once  dextrose 50% Injectable 25 Gram(s) IV Push once  dextrose Oral Gel 15 Gram(s) Oral once  enoxaparin Injectable 40 milliGRAM(s) SubCutaneous every 24 hours  glucagon  Injectable 1 milliGRAM(s) IntraMuscular once  guaiFENesin  milliGRAM(s) Oral every 12 hours  hydrALAZINE 25 milliGRAM(s) Oral three times a day  hydrochlorothiazide 25 milliGRAM(s) Oral daily  insulin lispro (ADMELOG) corrective regimen sliding scale   SubCutaneous every 6 hours  lisinopril 40 milliGRAM(s) Oral daily  methylPREDNISolone sodium succinate Injectable 20 milliGRAM(s) IV Push every 8 hours  OLANZapine Disintegrating Tablet 10 milliGRAM(s) Oral at bedtime  pantoprazole  Injectable 40 milliGRAM(s) IV Push daily  polyethylene glycol 3350 17 Gram(s) Oral daily  senna 2 Tablet(s) Oral at bedtime  sertraline 25 milliGRAM(s) Oral daily  sodium chloride 3%  Inhalation 4 milliLiter(s) Inhalation every 12 hours    MEDICATIONS  (PRN):      Allergies:  No Known Allergies             Significant recent/past 24 hr events: pt remained off Bipap all night, O 2 was titrated down from 4 L to 2 L NC,     Subjective: pt denies any complaints     Review of Systems         [ ] A ten-point review of systems was otherwise negative except as noted.  [ ] Due to altered mental status/intubation, subjective information were not able to be obtained from the patient. History was obtained, to the extent possible, from review of the chart and collateral sources of information.      Patient is a 74y old  Female who presents with a chief complaint of Asthma exacerbation (18 Mar 2025 06:58)    HPI:  75 yo F with PMH asthma, depression presenting to [facility] with worsening dyspnea and wheezing with BLE edema since returning from 10.5 hour trip to [facility] 6 days ago. Altered in ED and unable to provide history, only responsive to noxious stimulli. History provided by  who states he found her on floor with unwitnessed fall. Hx of 3 asthma attacks last year, no hx of intubation, no clear asthma triggers but felt sick after flight home from [facility] 3/3/25 days where she visited for 3 months and was in usual state of health per .    ED Course: vitals notable for HR 60s, -115 with systolics in 180s, tachypneic to 24 with sPO2 95% on RA. Exam notable for wheezing. Placed on NC 5L then trialed on BiPAP but then required intubation to protect airway due to AMS. Labs notable for WBC 8.5, Hgb 12.6, , coags wnl, Na 126, K 4.3, Cl 85, bicarb 27, Cr 0.69, AST/ALT 59/47, proBNP 480, troponin 9, TSH 2.81, pH 7.29, pCO2 72, bicarb 35, neg RVP, CTA chest with no acute findings. CT head/cspine pending. Treated with 1L NS, Duonebs, solumedrol, and sedated with propofol/fentanyl. Admitted to MICU for AHRF.     75 yo F with PMH notable for asthma presenting with AHRF and hypercapnic respiratory acidosis with AMS requiring intubation for airway protection. Now admitted to MICU on ventilator for further evlauation and management iso asthma exacerbation.  (08 Mar 2025 17:07)    PAST MEDICAL & SURGICAL HISTORY:  MDD (major depressive disorder), recurrent, severe, with psychosis      Asthma, mild intermittent, uncomplicated      Essential hypertension      No significant past surgical history        FAMILY HISTORY:      Vitals   ICU Vital Signs Last 24 Hrs  T(C): 37.2 (20 Mar 2025 04:00), Max: 37.3 (19 Mar 2025 12:00)  T(F): 99 (20 Mar 2025 04:00), Max: 99.1 (19 Mar 2025 12:00)  HR: 92 (20 Mar 2025 07:21) (83 - 108)  BP: 143/74 (20 Mar 2025 06:00) (120/68 - 163/86)  BP(mean): 94 (20 Mar 2025 06:00) (75 - 106)  ABP: --  ABP(mean): --  RR: 20 (20 Mar 2025 06:00) (12 - 20)  SpO2: 95% (20 Mar 2025 07:21) (93% - 100%)    O2 Parameters below as of 20 Mar 2025 06:00  Patient On (Oxygen Delivery Method): nasal cannula  O2 Flow (L/min): 4          Physical Exam:   Constitutional: NAD, well-groomed, well-developed  HEENT: PERRLA, EOMI, no drainage or redness  Neck: supple,  No JVD, Trachea midline  Back: Normal spine flexure, No CVA tenderness, No deformity or limitation of movement  Respiratory: Breath Sounds equal & clear bilaterally to auscultation, no accessory muscle use noted  Cardiovascular: Regular rate, regular rhythm, normal S1, S2; no murmurs or rub  Gastrointestinal: Soft, non-tender, non distended, no hepatosplenomegaly, normal bowel sounds  Extremities: MCCULLOUGH x 4, no peripheral edema, no cyanosis, no clubbing   Vascular: Equal and normal pulses: 2+ peripheral pulses throughout  Neurological: A+O x 3; speech clear and intact; no sensory, motor  deficits, normal reflexes  Psychiatric: withdrawn, appears very sad,   Musculoskeletal: No joint swelling or deformity; no limitation of movement  Skin: warm, dry, well perfused, no rashes    VENT SETTINGS     ABG - ( 19 Mar 2025 06:05 )  pH, Arterial: 7.45  pH, Blood: x     /  pCO2: 51    /  pO2: 143   / HCO3: 35    / Base Excess: 9.7   /  SaO2: 99.3                I&O's Detail    19 Mar 2025 07:01  -  20 Mar 2025 07:00  --------------------------------------------------------  IN:  Total IN: 0 mL    OUT:    Voided (mL): 450 mL  Total OUT: 450 mL    Total NET: -450 mL          LABS                        12.8   13.50 )-----------( 252      ( 20 Mar 2025 03:20 )             40.1     03-20    141  |  99  |  33[H]  ----------------------------<  216[H]  4.1   |  28  |  0.72    Ca    10.2      20 Mar 2025 03:20  Phos  3.9     03-20  Mg     2.50     03-20    TPro  6.7  /  Alb  3.4  /  TBili  0.3  /  DBili  x   /  AST  9   /  ALT  19  /  AlkPhos  76  03-20    LIVER FUNCTIONS - ( 20 Mar 2025 03:20 )  Alb: 3.4 g/dL / Pro: 6.7 g/dL / ALK PHOS: 76 U/L / ALT: 19 U/L / AST: 9 U/L / GGT: x                   Urinalysis Basic - ( 20 Mar 2025 03:20 )    Color: x / Appearance: x / SG: x / pH: x  Gluc: 216 mg/dL / Ketone: x  / Bili: x / Urobili: x   Blood: x / Protein: x / Nitrite: x   Leuk Esterase: x / RBC: x / WBC x   Sq Epi: x / Non Sq Epi: x / Bacteria: x      POCT Blood Glucose.: 224 mg/dL *H* (03-20-25 @ 06:06)  POCT Blood Glucose.: 210 mg/dL *H* (03-20-25 @ 00:14)  POCT Blood Glucose.: 264 mg/dL *H* (03-19-25 @ 17:21)  POCT Blood Glucose.: 247 mg/dL *H* (03-19-25 @ 11:38)  POCT Blood Glucose.: 195 mg/dL *H* (03-19-25 @ 08:43)        MEDICATIONS  (STANDING):  albuterol/ipratropium for Nebulization 3 milliLiter(s) Nebulizer every 4 hours  buDESOnide    Inhalation Suspension 0.5 milliGRAM(s) Inhalation every 12 hours  chlorhexidine 2% Cloths 1 Application(s) Topical <User Schedule>  dextrose 5%. 1000 milliLiter(s) (100 mL/Hr) IV Continuous <Continuous>  dextrose 5%. 1000 milliLiter(s) (50 mL/Hr) IV Continuous <Continuous>  dextrose 50% Injectable 25 Gram(s) IV Push once  dextrose 50% Injectable 12.5 Gram(s) IV Push once  dextrose 50% Injectable 25 Gram(s) IV Push once  dextrose Oral Gel 15 Gram(s) Oral once  enoxaparin Injectable 40 milliGRAM(s) SubCutaneous every 24 hours  glucagon  Injectable 1 milliGRAM(s) IntraMuscular once  guaiFENesin  milliGRAM(s) Oral every 12 hours  hydrALAZINE 25 milliGRAM(s) Oral three times a day  hydrochlorothiazide 25 milliGRAM(s) Oral daily  insulin lispro (ADMELOG) corrective regimen sliding scale   SubCutaneous every 6 hours  lisinopril 40 milliGRAM(s) Oral daily  methylPREDNISolone sodium succinate Injectable 20 milliGRAM(s) IV Push every 8 hours  OLANZapine Disintegrating Tablet 10 milliGRAM(s) Oral at bedtime  pantoprazole  Injectable 40 milliGRAM(s) IV Push daily  polyethylene glycol 3350 17 Gram(s) Oral daily  senna 2 Tablet(s) Oral at bedtime  sertraline 25 milliGRAM(s) Oral daily  sodium chloride 3%  Inhalation 4 milliLiter(s) Inhalation every 12 hours    MEDICATIONS  (PRN):      Allergies:  No Known Allergies

## 2025-03-20 NOTE — BH CONSULTATION LIAISON ASSESSMENT NOTE - NSBHCHARTREVIEWVS_PSY_A_CORE FT
Vital Signs Last 24 Hrs  T(C): 35.8 (20 Mar 2025 12:00), Max: 37.2 (20 Mar 2025 04:00)  T(F): 96.5 (20 Mar 2025 12:00), Max: 99 (20 Mar 2025 04:00)  HR: 105 (20 Mar 2025 13:00) (83 - 108)  BP: 156/81 (20 Mar 2025 13:00) (121/75 - 156/81)  BP(mean): 102 (20 Mar 2025 13:00) (83 - 111)  RR: 18 (20 Mar 2025 13:00) (12 - 20)  SpO2: 97% (20 Mar 2025 13:00) (91% - 98%)    Parameters below as of 20 Mar 2025 14:00  Patient On (Oxygen Delivery Method): nasal cannula

## 2025-03-20 NOTE — CHART NOTE - NSCHARTNOTEFT_GEN_A_CORE
MICU Transfer Note    Transfer from: MICU    Transfer to: (  ) Medicine with     (  ) Telemetry     (   ) RCU        (    ) Palliative         (   ) Stroke Unit       (  ) MICU     Accepting Physician: Shaan Miller    Signout given to: Shaan Miller     HPI: 73 yo F with PMH notable for asthma and depression, presenting with AHRF and hypercapnic respiratory acidosis with AMS requiring intubation for airway protection.     MICU COURSE:   Pt was admitted to MICU on ventilator for further evaluation and management iso asthma exacerbation. Extubated 3/12 to face tent and transferred to the floor. Pt has been stable between 2-4 L NC and RA. Steroids were tapered off on 3/16. RRT 3/19 for increased WOB and hypoxia, was placed on Bipap, Pt was treated with methylprednisolone 40 mg IVP and restarted on Methylprednisolone 20 mg IV q 8 hrs; and pt was transferred to MICU for further management of asthma. Pt remains stable on 2 L NC, passed swallow study, and is tolerating bite sized and soft diet. Pt with atelectasis, on duonebs, out of bed to chair, and using incentive spirometer.   __ pt appears very depressed and missed her psych meds yesterday due to AHRF and failed dysphagia screening. Pt passed swallow study and is back on her Zoloft and Zyprexa (home meds), Continue as per psych recs.       PAST MEDICAL & SURGICAL HISTORY:  MDD (major depressive disorder), recurrent, severe, with psychosis      Asthma, mild intermittent, uncomplicated      Essential hypertension      No significant past surgical history          Vital Signs Last 24 Hrs  T(C): 35.8 (20 Mar 2025 12:00), Max: 37.2 (20 Mar 2025 04:00)  T(F): 96.5 (20 Mar 2025 12:00), Max: 99 (20 Mar 2025 04:00)  HR: 105 (20 Mar 2025 13:00) (83 - 108)  BP: 156/81 (20 Mar 2025 13:00) (121/75 - 156/81)  BP(mean): 102 (20 Mar 2025 13:00) (83 - 111)  RR: 18 (20 Mar 2025 13:00) (12 - 20)  SpO2: 97% (20 Mar 2025 13:00) (91% - 98%)    Parameters below as of 20 Mar 2025 14:00  Patient On (Oxygen Delivery Method): nasal cannula      I&O's Summary    19 Mar 2025 07:01  -  20 Mar 2025 07:00  --------------------------------------------------------  IN: 0 mL / OUT: 450 mL / NET: -450 mL      Allergies    No Known Allergies    Intolerances      MEDICATIONS  (STANDING):  albuterol/ipratropium for Nebulization 3 milliLiter(s) Nebulizer every 4 hours  buDESOnide    Inhalation Suspension 0.5 milliGRAM(s) Inhalation every 12 hours  chlorhexidine 2% Cloths 1 Application(s) Topical <User Schedule>  dextrose 5%. 1000 milliLiter(s) (100 mL/Hr) IV Continuous <Continuous>  dextrose 5%. 1000 milliLiter(s) (50 mL/Hr) IV Continuous <Continuous>  dextrose 50% Injectable 25 Gram(s) IV Push once  dextrose 50% Injectable 12.5 Gram(s) IV Push once  dextrose 50% Injectable 25 Gram(s) IV Push once  dextrose Oral Gel 15 Gram(s) Oral once  enoxaparin Injectable 40 milliGRAM(s) SubCutaneous every 24 hours  glucagon  Injectable 1 milliGRAM(s) IntraMuscular once  guaiFENesin  milliGRAM(s) Oral every 12 hours  hydrALAZINE 25 milliGRAM(s) Oral three times a day  hydrochlorothiazide 25 milliGRAM(s) Oral daily  insulin lispro (ADMELOG) corrective regimen sliding scale   SubCutaneous every 6 hours  insulin lispro (ADMELOG) corrective regimen sliding scale   SubCutaneous three times a day before meals  insulin lispro (ADMELOG) corrective regimen sliding scale   SubCutaneous at bedtime  lisinopril 40 milliGRAM(s) Oral daily  methylPREDNISolone sodium succinate Injectable 20 milliGRAM(s) IV Push every 8 hours  OLANZapine Disintegrating Tablet 10 milliGRAM(s) Oral at bedtime  pantoprazole    Tablet 40 milliGRAM(s) Oral before breakfast  polyethylene glycol 3350 17 Gram(s) Oral two times a day  senna 2 Tablet(s) Oral at bedtime  sertraline 25 milliGRAM(s) Oral daily  sodium chloride 3%  Inhalation 4 milliLiter(s) Inhalation every 12 hours    MEDICATIONS  (PRN):                                    12.8   13.50 )-----------( 252      ( 20 Mar 2025 03:20 )             40.1     03-20    141  |  99  |  33[H]  ----------------------------<  216[H]  4.1   |  28  |  0.72    Ca    10.2      20 Mar 2025 03:20  Phos  3.9     03-20  Mg     2.50     03-20    TPro  6.7  /  Alb  3.4  /  TBili  0.3  /  DBili  x   /  AST  9   /  ALT  19  /  AlkPhos  76  03-20      ABG - ( 19 Mar 2025 06:05 )  pH, Arterial: 7.45  pH, Blood: x     /  pCO2: 51    /  pO2: 143   / HCO3: 35    / Base Excess: 9.7   /  SaO2: 99.3              Lactate: 1.3     Ekg: NSR 92   Telemetry: NSR       FOR FOLLOW UP:  [] Psych -- 3/20 - INCREASE Zoloft to 50mg daily ( home dose 150mg) ( monitor NA- recent hyponatremia), As per  team, psych will follow and make any changes if needed, continue present dose 25 mg daily   [] taper steroids_  on Methylprednisolone 20 mg IV q 8 hrs since 3/19   [] PT-- recommends TK, as per discussion with son Jesus Yepez___ will discuss with family re: proceeding with TK   [] SLP-- soft and bite sized diet__ advance as tolerated  [] Wean off O 2 as able     Alessandra Agosto PA-C MICU Transfer Note    Transfer from: MICU    Transfer to: (  ) Medicine with     (  ) Telemetry     (   ) RCU        (    ) Palliative         (   ) Stroke Unit       (  ) MICU     Accepting Physician: Shaan Miller    Signout given to: Shaan Miller     HPI: 75 yo F with PMH notable for asthma and depression, presenting with AHRF and hypercapnic respiratory acidosis with AMS requiring intubation for airway protection.     MICU COURSE:   Pt was admitted to MICU on ventilator for further evaluation and management iso asthma exacerbation. Extubated 3/12 to face tent and transferred to the floor. Pt has been stable between 2-4 L NC and RA. Steroids were tapered off on 3/16. RRT 3/19 for increased WOB and hypoxia, was placed on Bipap, Pt was treated with methylprednisolone 40 mg IVP and restarted on Methylprednisolone 20 mg IV q 8 hrs; and pt was transferred to MICU for further management of asthma. Pt remains stable on 2 L NC, passed swallow study, and is tolerating bite sized and soft diet. Pt with atelectasis, on duonebs, out of bed to chair, and using incentive spirometer.   __ pt appears very depressed and missed her psych meds yesterday due to AHRF and failed dysphagia screening. Pt passed swallow study and is back on her Zoloft and Zyprexa (home meds), Continue as per psych recs.       PAST MEDICAL & SURGICAL HISTORY:  MDD (major depressive disorder), recurrent, severe, with psychosis      Asthma, mild intermittent, uncomplicated      Essential hypertension      No significant past surgical history          Vital Signs Last 24 Hrs  T(C): 35.8 (20 Mar 2025 12:00), Max: 37.2 (20 Mar 2025 04:00)  T(F): 96.5 (20 Mar 2025 12:00), Max: 99 (20 Mar 2025 04:00)  HR: 105 (20 Mar 2025 13:00) (83 - 108)  BP: 156/81 (20 Mar 2025 13:00) (121/75 - 156/81)  BP(mean): 102 (20 Mar 2025 13:00) (83 - 111)  RR: 18 (20 Mar 2025 13:00) (12 - 20)  SpO2: 97% (20 Mar 2025 13:00) (91% - 98%)    Parameters below as of 20 Mar 2025 14:00  Patient On (Oxygen Delivery Method): nasal cannula      I&O's Summary    19 Mar 2025 07:01  -  20 Mar 2025 07:00  --------------------------------------------------------  IN: 0 mL / OUT: 450 mL / NET: -450 mL      Allergies    No Known Allergies    Intolerances      MEDICATIONS  (STANDING):  albuterol/ipratropium for Nebulization 3 milliLiter(s) Nebulizer every 4 hours  buDESOnide    Inhalation Suspension 0.5 milliGRAM(s) Inhalation every 12 hours  chlorhexidine 2% Cloths 1 Application(s) Topical <User Schedule>  dextrose 5%. 1000 milliLiter(s) (100 mL/Hr) IV Continuous <Continuous>  dextrose 5%. 1000 milliLiter(s) (50 mL/Hr) IV Continuous <Continuous>  dextrose 50% Injectable 25 Gram(s) IV Push once  dextrose 50% Injectable 12.5 Gram(s) IV Push once  dextrose 50% Injectable 25 Gram(s) IV Push once  dextrose Oral Gel 15 Gram(s) Oral once  enoxaparin Injectable 40 milliGRAM(s) SubCutaneous every 24 hours  glucagon  Injectable 1 milliGRAM(s) IntraMuscular once  guaiFENesin  milliGRAM(s) Oral every 12 hours  hydrALAZINE 25 milliGRAM(s) Oral three times a day  hydrochlorothiazide 25 milliGRAM(s) Oral daily  insulin lispro (ADMELOG) corrective regimen sliding scale   SubCutaneous every 6 hours  insulin lispro (ADMELOG) corrective regimen sliding scale   SubCutaneous three times a day before meals  insulin lispro (ADMELOG) corrective regimen sliding scale   SubCutaneous at bedtime  lisinopril 40 milliGRAM(s) Oral daily  methylPREDNISolone sodium succinate Injectable 20 milliGRAM(s) IV Push every 8 hours  OLANZapine Disintegrating Tablet 10 milliGRAM(s) Oral at bedtime  pantoprazole    Tablet 40 milliGRAM(s) Oral before breakfast  polyethylene glycol 3350 17 Gram(s) Oral two times a day  senna 2 Tablet(s) Oral at bedtime  sertraline 25 milliGRAM(s) Oral daily  sodium chloride 3%  Inhalation 4 milliLiter(s) Inhalation every 12 hours    MEDICATIONS  (PRN):                                    12.8   13.50 )-----------( 252      ( 20 Mar 2025 03:20 )             40.1     03-20    141  |  99  |  33[H]  ----------------------------<  216[H]  4.1   |  28  |  0.72    Ca    10.2      20 Mar 2025 03:20  Phos  3.9     03-20  Mg     2.50     03-20    TPro  6.7  /  Alb  3.4  /  TBili  0.3  /  DBili  x   /  AST  9   /  ALT  19  /  AlkPhos  76  03-20      ABG - ( 19 Mar 2025 06:05 )  pH, Arterial: 7.45  pH, Blood: x     /  pCO2: 51    /  pO2: 143   / HCO3: 35    / Base Excess: 9.7   /  SaO2: 99.3              Lactate: 1.3     Ekg: NSR 92   Telemetry: NSR       FOR FOLLOW UP:  [] Psych -- 3/20 - INCREASE Zoloft to 50mg daily ( home dose 150mg) ( monitor NA- recent hyponatremia), As per  team, psych will follow and make any changes if needed, continue present dose 25 mg daily   [] taper steroids_  on Methylprednisolone 20 mg IV q 8 hrs since 3/19 >> pulmonary will follow up on the floor for steroids taper   [] PT-- recommends TK, as per discussion with son Jesus Yepez___ will discuss with family re: proceeding with TK   [] SLP-- soft and bite sized diet__ advance as tolerated  [] Wean off O 2 as able     Alessandra Agosto PA-C MICU Transfer Note    Transfer from: MICU    Transfer to: (  ) Medicine with     (  ) Telemetry     (   ) RCU        (    ) Palliative         (   ) Stroke Unit       (  ) MICU     Accepting Physician: Shaan Miller    Signout given to: Shaan Miller, Dr. Lima ([facility] at time of bed assignment), Dr. Coker ([facility]), [place] (On license of [facility]), Luciano Ziegler ([facility])    HPI: 75 yo F with PMH notable for asthma and depression, presenting with AHRF and hypercapnic respiratory acidosis with AMS requiring intubation for airway protection.     MICU COURSE:   Pt was admitted to MICU on ventilator for further evaluation and management iso asthma exacerbation. Extubated 3/12 to face tent and transferred to the floor. Pt has been stable between 2-4 L NC and RA. Steroids were tapered off on 3/16. RRT 3/19 for increased WOB and hypoxia, was placed on Bipap, Pt was treated with methylprednisolone 40 mg IVP and restarted on Methylprednisolone 20 mg IV q 8 hrs; and pt was transferred to MICU for further management of asthma. Pt remains stable on 2 L NC, passed swallow study, and is tolerating bite sized and soft diet. Pt with atelectasis, on duonebs, out of bed to chair, and using incentive spirometer.   __ pt appears very depressed and missed her psych meds yesterday due to AHRF and failed dysphagia screening. Pt passed swallow study and is back on her Zoloft and Zyprexa (home meds), Continue as per psych recs.       PAST MEDICAL & SURGICAL HISTORY:  MDD (major depressive disorder), recurrent, severe, with psychosis      Asthma, mild intermittent, uncomplicated      Essential hypertension      No significant past surgical history          Vital Signs Last 24 Hrs  T(C): 35.8 (20 Mar 2025 12:00), Max: 37.2 (20 Mar 2025 04:00)  T(F): 96.5 (20 Mar 2025 12:00), Max: 99 (20 Mar 2025 04:00)  HR: 105 (20 Mar 2025 13:00) (83 - 108)  BP: 156/81 (20 Mar 2025 13:00) (121/75 - 156/81)  BP(mean): 102 (20 Mar 2025 13:00) (83 - 111)  RR: 18 (20 Mar 2025 13:00) (12 - 20)  SpO2: 97% (20 Mar 2025 13:00) (91% - 98%)    Parameters below as of 20 Mar 2025 14:00  Patient On (Oxygen Delivery Method): nasal cannula      I&O's Summary    19 Mar 2025 07:01  -  20 Mar 2025 07:00  --------------------------------------------------------  IN: 0 mL / OUT: 450 mL / NET: -450 mL      Allergies    No Known Allergies    Intolerances      MEDICATIONS  (STANDING):  albuterol/ipratropium for Nebulization 3 milliLiter(s) Nebulizer every 4 hours  buDESOnide    Inhalation Suspension 0.5 milliGRAM(s) Inhalation every 12 hours  chlorhexidine 2% Cloths 1 Application(s) Topical <User Schedule>  dextrose 5%. 1000 milliLiter(s) (100 mL/Hr) IV Continuous <Continuous>  dextrose 5%. 1000 milliLiter(s) (50 mL/Hr) IV Continuous <Continuous>  dextrose 50% Injectable 25 Gram(s) IV Push once  dextrose 50% Injectable 12.5 Gram(s) IV Push once  dextrose 50% Injectable 25 Gram(s) IV Push once  dextrose Oral Gel 15 Gram(s) Oral once  enoxaparin Injectable 40 milliGRAM(s) SubCutaneous every 24 hours  glucagon  Injectable 1 milliGRAM(s) IntraMuscular once  guaiFENesin  milliGRAM(s) Oral every 12 hours  hydrALAZINE 25 milliGRAM(s) Oral three times a day  hydrochlorothiazide 25 milliGRAM(s) Oral daily  insulin lispro (ADMELOG) corrective regimen sliding scale   SubCutaneous every 6 hours  insulin lispro (ADMELOG) corrective regimen sliding scale   SubCutaneous three times a day before meals  insulin lispro (ADMELOG) corrective regimen sliding scale   SubCutaneous at bedtime  lisinopril 40 milliGRAM(s) Oral daily  methylPREDNISolone sodium succinate Injectable 20 milliGRAM(s) IV Push every 8 hours  OLANZapine Disintegrating Tablet 10 milliGRAM(s) Oral at bedtime  pantoprazole    Tablet 40 milliGRAM(s) Oral before breakfast  polyethylene glycol 3350 17 Gram(s) Oral two times a day  senna 2 Tablet(s) Oral at bedtime  sertraline 25 milliGRAM(s) Oral daily  sodium chloride 3%  Inhalation 4 milliLiter(s) Inhalation every 12 hours    MEDICATIONS  (PRN):                                    12.8   13.50 )-----------( 252      ( 20 Mar 2025 03:20 )             40.1     03-20    141  |  99  |  33[H]  ----------------------------<  216[H]  4.1   |  28  |  0.72    Ca    10.2      20 Mar 2025 03:20  Phos  3.9     03-20  Mg     2.50     03-20    TPro  6.7  /  Alb  3.4  /  TBili  0.3  /  DBili  x   /  AST  9   /  ALT  19  /  AlkPhos  76  03-20      ABG - ( 19 Mar 2025 06:05 )  pH, Arterial: 7.45  pH, Blood: x     /  pCO2: 51    /  pO2: 143   / HCO3: 35    / Base Excess: 9.7   /  SaO2: 99.3              Lactate: 1.3     Ekg: NSR 92   Telemetry: NSR       FOR FOLLOW UP:  [] Psych -- 3/20 - INCREASE Zoloft to 50mg daily ( home dose 150mg) ( monitor NA- recent hyponatremia), As per  team, psych will follow and make any changes if needed, continue present dose 25 mg daily   [] taper steroids_  on Methylprednisolone 20 mg IV q 8 hrs since 3/19 >> pulmonary will follow up on the floor for steroids taper   [] PT-- recommends TK, as per discussion with son Jesus Yepez___ will discuss with family re: proceeding with TK   [] SLP-- soft and bite sized diet__ advance as tolerated  [] Wean off O 2 as able     Alessandra Agosto PA-C

## 2025-03-20 NOTE — BH CONSULTATION LIAISON ASSESSMENT NOTE - RISK ASSESSMENT
risk: inpatient admissions  protective: no SI or HI, no hx of SA, support from family, in treatment, domiciled

## 2025-03-20 NOTE — BH CONSULTATION LIAISON ASSESSMENT NOTE - HPI (INCLUDE ILLNESS QUALITY, SEVERITY, DURATION, TIMING, CONTEXT, MODIFYING FACTORS, ASSOCIATED SIGNS AND SYMPTOMS)
Patient is a 74F hx asthma, depression, and HTN was admitted to the MICU with AHRF 2/2 asthma exacerbation resulting in acute on chronic HHRF causing AMS and increased WOB that required intubation. During her ICU course, she experienced seizure-like activity .  Additional issues included hyponatremia, acute on chronic metabolic alkalosis, and mild thrombocytopenia,  also having fevers.  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 Patient has h/o hx of major depressive disorder with psychosis, hx of 3 past inpatient psychiatric hospitalizations last in 2016 at [facility], all rehospitalizations were in context non compliance with the treatment. She has no hx of suicide attempts, no hx of substance abuse. Patient w hx of paranoia, disorganization, catatonia, required MOO when in [facility].     Patient was seen and assessed at bedside. Patient is alert, does answer direct questions - states she is in [facility], aware of her name, says it is 2005 but corrects to 2025. Says she is in [facility] as "I was sick". Patient uses few words - appears psychomotor retarded and thought blocked. As per patient, she was doing well at home, has an outpatient psychiatrist - aware it is Dr. Duarte. Feels at times when home she can be depressed but overall doing well. States doing well at this time too. Patient denies any Si or Hi. Denies Ah or VH. Is allowing for staff to assist her get OOB and feed her.   Patient in MICU, has been extuabted, and placed back on psychotropic medications. Zoloft placed back on 3/15 at 25mg. Zyprexa restarted on 3.19.      Patient is a 74F hx asthma, depression, and HTN was admitted to the MICU with AHRF 2/2 asthma exacerbation resulting in acute on chronic HHRF causing AMS and increased WOB that required intubation. During her ICU course, she experienced seizure-like activity .  Additional issues included hyponatremia, acute on chronic metabolic alkalosis, and mild thrombocytopenia,  also having fevers.  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 Patient has h/o hx of major depressive disorder with psychosis, hx of 3 past inpatient psychiatric hospitalizations last in 2016 at [facility], all rehospitalizations were in context non compliance with the treatment. She has no hx of suicide attempts, no hx of substance abuse. Patient w hx of paranoia, disorganization, catatonia, required MOO when in [facility].     Patient was seen and assessed at bedside. Patient is alert, does answer direct questions - states she is in [facility], aware of her name, says it is 2005 but corrects to 2025. Says she is in [facility] as "I was sick". Patient uses few words - appears psychomotor retarded and thought blocked. As per patient, she was doing well at home, has an outpatient psychiatrist - aware it is Dr. Duarte. Feels at times when home she can be depressed but overall doing well. States doing well at this time too. Patient denies any Si or Hi. Denies Ah or VH. Is allowing for staff to assist her get OOB and feed her.   Patient in MICU, has been extuabted, and placed back on psychotropic medications. Zoloft placed back on 3/15 at 25mg. Zyprexa restarted on 3.19.     Spoke with Son (physician, Dr. Silva) - [phone] - as per son, patient was doing well when he last saw her in November. patient has since traveled to [facility] [facility] - son spoke with his father who states patient has been compliant with medications when away.  As per son, patient tends to "cycle" and decompensate every few years - stops doing ADLSs, paranoia.  Also relayed some psychosocial stressors between patient and spouse.    Spoke with Dr. Duarte - patient does well when on zyprexa 10 and zoloft 150. has been going to follow ups. Supported by .        Patient is a 74F hx asthma, depression, and HTN was admitted to the MICU with AHRF 2/2 asthma exacerbation resulting in acute on chronic HHRF causing AMS and increased WOB that required intubation. During her ICU course, she experienced seizure-like activity .  Additional issues included hyponatremia, acute on chronic metabolic alkalosis, and mild thrombocytopenia,  also having fevers.  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 Patient has h/o hx of major depressive disorder with psychosis, hx of 3 past inpatient psychiatric hospitalizations last in 2016 at [facility], all rehospitalizations were in context non compliance with the treatment. She has no hx of suicide attempts, no hx of substance abuse. Patient w hx of paranoia, disorganization, catatonia, required MOO when in [facility].     Patient was seen and assessed at bedside. Patient is alert, does answer direct questions - states she is in [facility], aware of her name, says it is 2005 but corrects to 2025. Says she is in [facility] as "I was sick". Patient uses few words - appears psychomotor retarded and thought blocked. As per patient, she was doing well at home, has an outpatient psychiatrist - aware it is Dr. Duarte. Feels at times when home she can be depressed but overall doing well. States doing well at this time too. Patient denies any Si or Hi. Denies Ah or VH. Is allowing for staff to assist her get OOB and feed her.   Patient in MICU, has been extubated, and placed back on psychotropic medications. Zoloft placed back on 3/15 at 25mg. Zyprexa restarted on 3.19.     Spoke with Son (physician, Dr. Silva) - [phone] - as per son, patient was doing well when he last saw her in November. patient has since traveled to [facility] - son spoke with his father who states patient has been compliant with medications when away.  As per son, patient tends to "cycle" and decompensate every few years - stops doing ADLSs, paranoia.  Also relayed some psychosocial stressors between patient and spouse.    Spoke with Dr. Duarte - patient does well when on zyprexa 10 and zoloft 150. has been going to follow ups. Supported by .

## 2025-03-21 LAB
ALBUMIN SERPL ELPH-MCNC: 3.5 G/DL — SIGNIFICANT CHANGE UP (ref 3.3–5)
ALP SERPL-CCNC: 72 U/L — SIGNIFICANT CHANGE UP (ref 40–120)
ALT FLD-CCNC: 16 U/L — SIGNIFICANT CHANGE UP (ref 4–33)
ANION GAP SERPL CALC-SCNC: 13 MMOL/L — SIGNIFICANT CHANGE UP (ref 7–14)
AST SERPL-CCNC: 9 U/L — SIGNIFICANT CHANGE UP (ref 4–32)
BASOPHILS # BLD AUTO: 0.01 K/UL — SIGNIFICANT CHANGE UP (ref 0–0.2)
BASOPHILS NFR BLD AUTO: 0.1 % — SIGNIFICANT CHANGE UP (ref 0–2)
BILIRUB SERPL-MCNC: 0.3 MG/DL — SIGNIFICANT CHANGE UP (ref 0.2–1.2)
BLOOD GAS VENOUS COMPREHENSIVE RESULT: SIGNIFICANT CHANGE UP
BUN SERPL-MCNC: 32 MG/DL — HIGH (ref 7–23)
CALCIUM SERPL-MCNC: 10 MG/DL — SIGNIFICANT CHANGE UP (ref 8.4–10.5)
CHLORIDE SERPL-SCNC: 98 MMOL/L — SIGNIFICANT CHANGE UP (ref 98–107)
CO2 SERPL-SCNC: 28 MMOL/L — SIGNIFICANT CHANGE UP (ref 22–31)
CREAT SERPL-MCNC: 0.73 MG/DL — SIGNIFICANT CHANGE UP (ref 0.5–1.3)
EGFR: 86 ML/MIN/1.73M2 — SIGNIFICANT CHANGE UP
EGFR: 86 ML/MIN/1.73M2 — SIGNIFICANT CHANGE UP
EOSINOPHIL # BLD AUTO: 0 K/UL — SIGNIFICANT CHANGE UP (ref 0–0.5)
EOSINOPHIL NFR BLD AUTO: 0 % — SIGNIFICANT CHANGE UP (ref 0–6)
GLUCOSE BLDC GLUCOMTR-MCNC: 186 MG/DL — HIGH (ref 70–99)
GLUCOSE BLDC GLUCOMTR-MCNC: 234 MG/DL — HIGH (ref 70–99)
GLUCOSE BLDC GLUCOMTR-MCNC: 251 MG/DL — HIGH (ref 70–99)
GLUCOSE BLDC GLUCOMTR-MCNC: 253 MG/DL — HIGH (ref 70–99)
GLUCOSE BLDC GLUCOMTR-MCNC: 257 MG/DL — HIGH (ref 70–99)
GLUCOSE SERPL-MCNC: 338 MG/DL — HIGH (ref 70–99)
HCT VFR BLD CALC: 37.9 % — SIGNIFICANT CHANGE UP (ref 34.5–45)
HGB BLD-MCNC: 12.2 G/DL — SIGNIFICANT CHANGE UP (ref 11.5–15.5)
IANC: 11.48 K/UL — HIGH (ref 1.8–7.4)
IMM GRANULOCYTES NFR BLD AUTO: 0.5 % — SIGNIFICANT CHANGE UP (ref 0–0.9)
LYMPHOCYTES # BLD AUTO: 1.83 K/UL — SIGNIFICANT CHANGE UP (ref 1–3.3)
LYMPHOCYTES # BLD AUTO: 13.3 % — SIGNIFICANT CHANGE UP (ref 13–44)
MAGNESIUM SERPL-MCNC: 2.2 MG/DL — SIGNIFICANT CHANGE UP (ref 1.6–2.6)
MCHC RBC-ENTMCNC: 31.9 PG — SIGNIFICANT CHANGE UP (ref 27–34)
MCHC RBC-ENTMCNC: 32.2 G/DL — SIGNIFICANT CHANGE UP (ref 32–36)
MCV RBC AUTO: 99.2 FL — SIGNIFICANT CHANGE UP (ref 80–100)
MONOCYTES # BLD AUTO: 0.33 K/UL — SIGNIFICANT CHANGE UP (ref 0–0.9)
MONOCYTES NFR BLD AUTO: 2.4 % — SIGNIFICANT CHANGE UP (ref 2–14)
NEUTROPHILS # BLD AUTO: 11.48 K/UL — HIGH (ref 1.8–7.4)
NEUTROPHILS NFR BLD AUTO: 83.7 % — HIGH (ref 43–77)
NRBC # BLD AUTO: 0 K/UL — SIGNIFICANT CHANGE UP (ref 0–0)
NRBC # FLD: 0 K/UL — SIGNIFICANT CHANGE UP (ref 0–0)
NRBC BLD AUTO-RTO: 0 /100 WBCS — SIGNIFICANT CHANGE UP (ref 0–0)
PHOSPHATE SERPL-MCNC: 3.5 MG/DL — SIGNIFICANT CHANGE UP (ref 2.5–4.5)
PLATELET # BLD AUTO: 248 K/UL — SIGNIFICANT CHANGE UP (ref 150–400)
POTASSIUM SERPL-MCNC: 4.3 MMOL/L — SIGNIFICANT CHANGE UP (ref 3.5–5.3)
POTASSIUM SERPL-SCNC: 4.3 MMOL/L — SIGNIFICANT CHANGE UP (ref 3.5–5.3)
PROT SERPL-MCNC: 6.6 G/DL — SIGNIFICANT CHANGE UP (ref 6–8.3)
RBC # BLD: 3.82 M/UL — SIGNIFICANT CHANGE UP (ref 3.8–5.2)
RBC # FLD: 13 % — SIGNIFICANT CHANGE UP (ref 10.3–14.5)
SODIUM SERPL-SCNC: 139 MMOL/L — SIGNIFICANT CHANGE UP (ref 135–145)
WBC # BLD: 13.72 K/UL — HIGH (ref 3.8–10.5)
WBC # FLD AUTO: 13.72 K/UL — HIGH (ref 3.8–10.5)

## 2025-03-21 PROCEDURE — 99233 SBSQ HOSP IP/OBS HIGH 50: CPT

## 2025-03-21 PROCEDURE — 90792 PSYCH DIAG EVAL W/MED SRVCS: CPT

## 2025-03-21 PROCEDURE — 93010 ELECTROCARDIOGRAM REPORT: CPT

## 2025-03-21 RX ORDER — METHYLPREDNISOLONE ACETATE 80 MG/ML
20 INJECTION, SUSPENSION INTRA-ARTICULAR; INTRALESIONAL; INTRAMUSCULAR; SOFT TISSUE EVERY 12 HOURS
Refills: 0 | Status: COMPLETED | OUTPATIENT
Start: 2025-03-21 | End: 2025-03-22

## 2025-03-21 RX ORDER — INSULIN GLARGINE-YFGN 100 [IU]/ML
4 INJECTION, SOLUTION SUBCUTANEOUS AT BEDTIME
Refills: 0 | Status: DISCONTINUED | OUTPATIENT
Start: 2025-03-21 | End: 2025-03-22

## 2025-03-21 RX ORDER — OLANZAPINE 10 MG/1
10 TABLET ORAL AT BEDTIME
Refills: 0 | Status: DISCONTINUED | OUTPATIENT
Start: 2025-03-21 | End: 2025-03-24

## 2025-03-21 RX ORDER — SERTRALINE 100 MG/1
50 TABLET, FILM COATED ORAL DAILY
Refills: 0 | Status: DISCONTINUED | OUTPATIENT
Start: 2025-03-21 | End: 2025-04-07

## 2025-03-21 RX ORDER — PREDNISONE 20 MG/1
30 TABLET ORAL DAILY
Refills: 0 | Status: COMPLETED | OUTPATIENT
Start: 2025-03-26 | End: 2025-03-27

## 2025-03-21 RX ORDER — PREDNISONE 20 MG/1
40 TABLET ORAL DAILY
Refills: 0 | Status: COMPLETED | OUTPATIENT
Start: 2025-03-23 | End: 2025-03-25

## 2025-03-21 RX ORDER — PREDNISONE 20 MG/1
TABLET ORAL
Refills: 0 | Status: COMPLETED | OUTPATIENT
Start: 2025-03-23 | End: 2025-04-04

## 2025-03-21 RX ORDER — PREDNISONE 20 MG/1
20 TABLET ORAL DAILY
Refills: 0 | Status: COMPLETED | OUTPATIENT
Start: 2025-03-29 | End: 2025-03-31

## 2025-03-21 RX ORDER — PREDNISONE 20 MG/1
10 TABLET ORAL DAILY
Refills: 0 | Status: COMPLETED | OUTPATIENT
Start: 2025-04-01 | End: 2025-04-03

## 2025-03-21 RX ORDER — METHYLPREDNISOLONE ACETATE 80 MG/ML
20 INJECTION, SUSPENSION INTRA-ARTICULAR; INTRALESIONAL; INTRAMUSCULAR; SOFT TISSUE EVERY 12 HOURS
Refills: 0 | Status: DISCONTINUED | OUTPATIENT
Start: 2025-03-21 | End: 2025-03-21

## 2025-03-21 RX ADMIN — METHYLPREDNISOLONE ACETATE 20 MILLIGRAM(S): 80 INJECTION, SUSPENSION INTRA-ARTICULAR; INTRALESIONAL; INTRAMUSCULAR; SOFT TISSUE at 17:06

## 2025-03-21 RX ADMIN — SERTRALINE 25 MILLIGRAM(S): 100 TABLET, FILM COATED ORAL at 13:03

## 2025-03-21 RX ADMIN — Medication 1 APPLICATION(S): at 05:51

## 2025-03-21 RX ADMIN — INSULIN LISPRO 1: 100 INJECTION, SOLUTION INTRAVENOUS; SUBCUTANEOUS at 23:40

## 2025-03-21 RX ADMIN — IPRATROPIUM BROMIDE AND ALBUTEROL SULFATE 3 MILLILITER(S): .5; 2.5 SOLUTION RESPIRATORY (INHALATION) at 12:02

## 2025-03-21 RX ADMIN — Medication 25 MILLIGRAM(S): at 13:03

## 2025-03-21 RX ADMIN — Medication 25 MILLIGRAM(S): at 05:51

## 2025-03-21 RX ADMIN — POLYETHYLENE GLYCOL 3350 17 GRAM(S): 17 POWDER, FOR SOLUTION ORAL at 05:50

## 2025-03-21 RX ADMIN — IPRATROPIUM BROMIDE AND ALBUTEROL SULFATE 3 MILLILITER(S): .5; 2.5 SOLUTION RESPIRATORY (INHALATION) at 16:00

## 2025-03-21 RX ADMIN — BUDESONIDE 0.5 MILLIGRAM(S): 0.25 SUSPENSION RESPIRATORY (INHALATION) at 09:49

## 2025-03-21 RX ADMIN — INSULIN LISPRO 2: 100 INJECTION, SOLUTION INTRAVENOUS; SUBCUTANEOUS at 17:05

## 2025-03-21 RX ADMIN — BUDESONIDE 0.5 MILLIGRAM(S): 0.25 SUSPENSION RESPIRATORY (INHALATION) at 19:29

## 2025-03-21 RX ADMIN — IPRATROPIUM BROMIDE AND ALBUTEROL SULFATE 3 MILLILITER(S): .5; 2.5 SOLUTION RESPIRATORY (INHALATION) at 03:18

## 2025-03-21 RX ADMIN — DEXTROMETHORPHAN HBR, GUAIFENESIN 600 MILLIGRAM(S): 200 LIQUID ORAL at 05:51

## 2025-03-21 RX ADMIN — IPRATROPIUM BROMIDE AND ALBUTEROL SULFATE 3 MILLILITER(S): .5; 2.5 SOLUTION RESPIRATORY (INHALATION) at 19:29

## 2025-03-21 RX ADMIN — IPRATROPIUM BROMIDE AND ALBUTEROL SULFATE 3 MILLILITER(S): .5; 2.5 SOLUTION RESPIRATORY (INHALATION) at 09:48

## 2025-03-21 RX ADMIN — INSULIN GLARGINE-YFGN 4 UNIT(S): 100 INJECTION, SOLUTION SUBCUTANEOUS at 23:43

## 2025-03-21 RX ADMIN — INSULIN LISPRO 4: 100 INJECTION, SOLUTION INTRAVENOUS; SUBCUTANEOUS at 07:51

## 2025-03-21 RX ADMIN — Medication 2 TABLET(S): at 23:41

## 2025-03-21 RX ADMIN — METHYLPREDNISOLONE ACETATE 20 MILLIGRAM(S): 80 INJECTION, SUSPENSION INTRA-ARTICULAR; INTRALESIONAL; INTRAMUSCULAR; SOFT TISSUE at 05:51

## 2025-03-21 RX ADMIN — Medication 40 MILLIGRAM(S): at 07:59

## 2025-03-21 RX ADMIN — ENOXAPARIN SODIUM 40 MILLIGRAM(S): 100 INJECTION SUBCUTANEOUS at 13:02

## 2025-03-21 RX ADMIN — OLANZAPINE 10 MILLIGRAM(S): 10 TABLET ORAL at 23:53

## 2025-03-21 RX ADMIN — INSULIN LISPRO 6: 100 INJECTION, SOLUTION INTRAVENOUS; SUBCUTANEOUS at 11:59

## 2025-03-21 RX ADMIN — Medication 25 MILLIGRAM(S): at 23:38

## 2025-03-21 RX ADMIN — DEXTROMETHORPHAN HBR, GUAIFENESIN 600 MILLIGRAM(S): 200 LIQUID ORAL at 17:06

## 2025-03-21 RX ADMIN — LISINOPRIL 40 MILLIGRAM(S): 5 TABLET ORAL at 05:51

## 2025-03-21 NOTE — PROGRESS NOTE ADULT - ASSESSMENT
74F hx asthma and depressed presented with AHHRF c/b AMS requiring MICU admission for intubation for respiratory failure i/s/o asthma exacerbation. Extubated 3/12 to face tent and now on BIPAP s/p RRT 3/19 for increased WOB / hypoxia despite aggressive asthma management. Transferred back to MICU for management of severe asthma.    =======NEURO=================  Sedation: none  Pain: tylenol  AAO: x2-3 baseline, currently AAOx1-2    # Depression  Hx tardive dyskinesia symptoms on prior medications.  > continue home Sertraline  -  team __  3/20 - INCREASE Zoloft to 50mg daily ( home dose 150mg) ( monitor NA- recent hyponatremia), As per  team, psych will follow and make any changes if needed, continue present dose 25 mg daily   > restarted home Olanzapine, 10 mg qhs     # Seizure-like activity, resolved  Encephalopathy i/s/o hypercapnic respiratory failure.  - CTH: no acute changes  - CT spine: no fractures  - 3/8 ON: 6 minutes of seizure-like activity with myoclonic upper jerks -> broke with ativan  -- EEG 3/10 showing no epileptiform activity; severe degree of diffuse or multifocal cerebral dysfunction c/w comatose    =======RESPIRATORY============  # Acute on Chronic Hypoxic and Hypercapnic Resp Failure  # Asthma, uncontrolled  # PNA, improving  Likely 2/2 asthma exacerbation despite appropriate medications.  - RRT 3/19 for increased WOB --> ABG 7.45/51/143/35/1.2  - CTA chest 3/8 without evidence of PNA or PE  > cont DuoNebs q4h; nebulized budesonide  > cont on Methylprednisolone 20 mg IV q 8 hrs since 3/19 >> do not taper today (steroids were tapered off on 3/16 and restarted on 3/19)  > replete Mg PRN, goal >2  > s/p BIPAP 12/6; cont suppl O2 as needed, pt remains off non invasive since 3/19, O 2 was weaned from 4 L to 2 L NC   > repeat VBG__ 7.46/52/111/37/98  > PPI and Bactrim ppx for steroids  > pulm toilet; discontinued hyper-sal, no significant secretions     =======CARDIOVASCULAR=========  # HTN  - while in the MICU was briefly on Nicardipine ggt (was off since 3/15)  > Amlodipine 10mg qD>> discontinued 2/2 c/f LE edema   > cont Lisinopril 40mg qD  > cont hydralazine 25mg q8hr  > cont home HCTZ, avoid beta-blockers given asthma    =======GASTROINTESTINAL=======  - passed SLP-- on soft and bite sized diet >> good PO intake, advance as tolerated   - PPI ppx: pantoprazole 40mg IV qD  - No active issues    ======RENAL/GENITOURINARY=====  - No active issues    # Hyponatremia, resolved  Likely i/s/o hypovolemia and component of underlying SIADH.  - IVFs trial successful  > CTM Na on daily labs  > consider fluid restriction vs salt tabs if worsening Na again    =======INFECTIOUS DISEASE=======  # Leukocytosis, stable   # low concern for PNA,   Leukocytosis i/s/o steroids and possible PNA, persistent fevers starting 3/12 now progressed to low-grade temps now resolved since 3/14.  - Full RVP neg, legionella/strep negative, full RVP negative  - initial Bcx 3/8 - no growth; rpt Bcx 3/13 no growth  - UA 3/12 neg  - CTA chest w/o evidence of PNA or PE  - LE dopplers w/o DVT  - Full RVP neg, legionella/strep negative, LE dopplers w/o DVT  - s/p azithro (5-days, finished 3/12)  - s/p Zosyn (5-day course finished 3/17)  > rpt Full RVP neg   > monitor off abx    =======ENDOCRINE==============  -- A1c 5.7%  - >224>236, possibly iso steroids   >moderate ISS for steroids and hyperglycemia   - CTM    =======HEME===================  # Thrombocytopenia, mild  Likely i/s/o critical illness.  > CTM for now; transfuse for PLT <10 or <50 with bleeding    # DVT r/o for LE edema  - LE bi/l duplex w/o DVT    =======PROPHYLACTIC============  - DVT ppx: lovenox 40mg qD  - Lines: PIVs  - Code Status: full code  - Dispo: TK, transfer to Medicine with    - Communication/Ethics: fernando   74F hx asthma and depressed presented with AHHRF c/b AMS requiring MICU admission for intubation for respiratory failure i/s/o asthma exacerbation. Extubated 3/12 to face tent and now on BIPAP s/p RRT 3/19 for increased WOB / hypoxia despite aggressive asthma management. Transferred back to MICU for management of severe asthma.    =======NEURO=================  Sedation: none  Pain: tylenol  AAO: x2-3 baseline, currently AAOx1-2    # Depression  Hx tardive dyskinesia symptoms on prior medications.  > continue home Sertraline  - As per  team recs __  3/21 - Increased Zoloft to 50mg daily ( home dose 150mg) ( monitor NA- recent hyponatremia), As per  team, team will follow and make any changes if needed,   > continue home Olanzapine, 10 mg qhs     # Seizure-like activity, resolved  Encephalopathy i/s/o hypercapnic respiratory failure.  - CTH: no acute changes  - CT spine: no fractures  - 3/8 ON: 6 minutes of seizure-like activity with myoclonic upper jerks -> broke with ativan  -- EEG 3/10 showing no epileptiform activity; severe degree of diffuse or multifocal cerebral dysfunction c/w comatose    =======RESPIRATORY============  # Acute on Chronic Hypoxic and Hypercapnic Resp Failure  # Asthma, uncontrolled  # PNA, improving  Likely 2/2 asthma exacerbation despite appropriate medications.  - RRT 3/19 for increased WOB --> ABG 7.45/51/143/35/1.2  - CTA chest 3/8 without evidence of PNA or PE  > cont DuoNebs q4h; nebulized budesonide  > taper Methylprednisolone 20 mg IV q 8 hrs since 3/19, to q 12 hrs (3/21 and 3/22), and, subsequently, Prednisone taper(3/23- )   > replete Mg PRN, goal >2  > s/p BIPAP 12/6; cont suppl O2 as needed, pt remains off non invasive since 3/19, O 2 was weaned from 4 L to 2 L NC (3/20), > wean off O2 as able   > repeat VBG__ 7.44/51/  > PPI while on steroids  > pulm toilet; discontinued hyper-sal, no significant secretions     =======CARDIOVASCULAR=========  # HTN  - while in the MICU was briefly on Nicardipine ggt (was off since 3/15)  > Amlodipine 10mg qD>> discontinued 2/2 c/f LE edema   > cont Lisinopril 40mg qD  > cont hydralazine 25mg q8hr  > cont home HCTZ, avoid beta-blockers given asthma    =======GASTROINTESTINAL=======  - passed SLP-- on soft and bite sized diet >> good PO intake, advance as tolerated   - PPI ppx: pantoprazole 40mg IV qD  - No active issues    ======RENAL/GENITOURINARY=====  - No active issues    # Hyponatremia, resolved  Likely i/s/o hypovolemia and component of underlying SIADH.  - IVFs trial successful  > CTM Na on daily labs  > consider fluid restriction vs salt tabs if worsening Na again    =======INFECTIOUS DISEASE=======  # Leukocytosis, stable   # low concern for PNA,   Leukocytosis i/s/o steroids and possible PNA, persistent fevers starting 3/12 now progressed to low-grade temps now resolved since 3/14.  - Full RVP neg, legionella/strep negative, full RVP negative  - initial Bcx 3/8 - no growth; rpt Bcx 3/13 no growth  - UA 3/12 neg  - CTA chest w/o evidence of PNA or PE  - LE dopplers w/o DVT  - Full RVP neg, legionella/strep negative, LE dopplers w/o DVT  - s/p azithro (5-days, finished 3/12)  - s/p Zosyn (5-day course finished 3/17)  > rpt Full RVP neg   > monitor off abx    =======ENDOCRINE==============  -- A1c 5.7%  - >377>234,, possibly iso steroids   >moderate ISS for steroids and hyperglycemia   - today started Lantus 4 U qhs while on steroids, >> discontinue once off steroids   - CTM    =======HEME===================  # Thrombocytopenia, mild  Likely i/s/o critical illness.  > CTM for now; transfuse for PLT <10 or <50 with bleeding    # DVT r/o for LE edema  - LE bi/l duplex w/o DVT    =======PROPHYLACTIC============  - DVT ppx: lovenox 40mg qD  - Lines: PIVs  - Code Status: full code  - Dispo: TK, transfer to Medicine with    - Communication/Ethics: fernando, 3/21- Son Jesus at the bedside; was updated about pt's condition

## 2025-03-21 NOTE — PROGRESS NOTE ADULT - SUBJECTIVE AND OBJECTIVE BOX
Significant recent/past 24 hr events:    Subjective:    Review of Systems         [ ] A ten-point review of systems was otherwise negative except as noted.  [ ] Due to altered mental status/intubation, subjective information were not able to be obtained from the patient. History was obtained, to the extent possible, from review of the chart and collateral sources of information.      Patient is a 74y old  Female who presents with a chief complaint of Asthma exacerbation (18 Mar 2025 06:58)    HPI:  75 yo F with PMH asthma, depression presenting to [facility] with worsening dyspnea and wheezing with BLE edema since returning from 10.5 hour trip to [facility] 6 days ago. Altered in ED and unable to provide history, only responsive to noxious stimulli. History provided by  who states he found her on floor with unwitnessed fall. Hx of 3 asthma attacks last year, no hx of intubation, no clear asthma triggers but felt sick after flight home from [facility] 3/3/25 days where she visited for 3 months and was in usual state of health per .    ED Course: vitals notable for HR 60s, -115 with systolics in 180s, tachypneic to 24 with sPO2 95% on RA. Exam notable for wheezing. Placed on NC 5L then trialed on BiPAP but then required intubation to protect airway due to AMS. Labs notable for WBC 8.5, Hgb 12.6, , coags wnl, Na 126, K 4.3, Cl 85, bicarb 27, Cr 0.69, AST/ALT 59/47, proBNP 480, troponin 9, TSH 2.81, pH 7.29, pCO2 72, bicarb 35, neg RVP, CTA chest with no acute findings. CT head/cspine pending. Treated with 1L NS, Duonebs, solumedrol, and sedated with propofol/fentanyl. Admitted to MICU for AHRF.     75 yo F with PMH notable for asthma presenting with AHRF and hypercapnic respiratory acidosis with AMS requiring intubation for airway protection. Now admitted to MICU on ventilator for further evlauation and management iso asthma exacerbation.  (08 Mar 2025 17:07)    PAST MEDICAL & SURGICAL HISTORY:  MDD (major depressive disorder), recurrent, severe, with psychosis      Asthma, mild intermittent, uncomplicated      Essential hypertension      No significant past surgical history        FAMILY HISTORY:      Vitals   ICU Vital Signs Last 24 Hrs  T(C): 37.6 (21 Mar 2025 04:00), Max: 37.6 (20 Mar 2025 16:00)  T(F): 99.6 (21 Mar 2025 04:00), Max: 99.6 (20 Mar 2025 16:00)  HR: 88 (21 Mar 2025 04:00) (88 - 110)  BP: 124/64 (21 Mar 2025 04:00) (121/63 - 159/80)  BP(mean): 83 (21 Mar 2025 04:00) (79 - 111)  ABP: --  ABP(mean): --  RR: 13 (21 Mar 2025 04:00) (13 - 19)  SpO2: 96% (21 Mar 2025 04:00) (65% - 98%)    O2 Parameters below as of 21 Mar 2025 04:00  Patient On (Oxygen Delivery Method): nasal cannula  O2 Flow (L/min): 2          Physical Exam:   Constitutional: NAD, well-groomed, well-developed  HEENT: PERRLA, EOMI, no drainage or redness  Neck: supple,  No JVD, Trachea midline  Back: Normal spine flexure, No CVA tenderness, No deformity or limitation of movement  Respiratory: Breath Sounds equal & clear bilaterally to auscultation, no accessory muscle use noted  Cardiovascular: Regular rate, regular rhythm, normal S1, S2; no murmurs or rub  Gastrointestinal: Soft, non-tender, non distended, no hepatosplenomegaly, normal bowel sounds  Extremities: MCCULLOUGH x 4, no peripheral edema, no cyanosis, no clubbing   Vascular: Equal and normal pulses: 2+ peripheral pulses throughout  Neurological: A+O x 3; speech clear and intact; no sensory, motor  deficits, normal reflexes  Psychiatric: calm, normal mood, normal affect  Musculoskeletal: No joint swelling or deformity; no limitation of movement  Skin: warm, dry, well perfused, no rashes    VENT SETTINGS         I&O's Detail    19 Mar 2025 07:01  -  20 Mar 2025 07:00  --------------------------------------------------------  IN:  Total IN: 0 mL    OUT:    Voided (mL): 450 mL  Total OUT: 450 mL    Total NET: -450 mL      20 Mar 2025 07:01  -  21 Mar 2025 06:25  --------------------------------------------------------  IN:    Oral Fluid: 900 mL  Total IN: 900 mL    OUT:    Voided (mL): 1000 mL  Total OUT: 1000 mL    Total NET: -100 mL          LABS                        12.2   13.72 )-----------( 248      ( 21 Mar 2025 00:40 )             37.9     03-21    139  |  98  |  32[H]  ----------------------------<  338[H]  4.3   |  28  |  0.73    Ca    10.0      21 Mar 2025 00:40  Phos  3.5     03-21  Mg     2.20     03-21    TPro  6.6  /  Alb  3.5  /  TBili  0.3  /  DBili  x   /  AST  9   /  ALT  16  /  AlkPhos  72  03-21    LIVER FUNCTIONS - ( 21 Mar 2025 00:40 )  Alb: 3.5 g/dL / Pro: 6.6 g/dL / ALK PHOS: 72 U/L / ALT: 16 U/L / AST: 9 U/L / GGT: x                   Urinalysis Basic - ( 21 Mar 2025 00:40 )    Color: x / Appearance: x / SG: x / pH: x  Gluc: 338 mg/dL / Ketone: x  / Bili: x / Urobili: x   Blood: x / Protein: x / Nitrite: x   Leuk Esterase: x / RBC: x / WBC x   Sq Epi: x / Non Sq Epi: x / Bacteria: x      POCT Blood Glucose.: 377 mg/dL *H* (03-20-25 @ 21:46)  POCT Blood Glucose.: 367 mg/dL *H* (03-20-25 @ 21:44)  POCT Blood Glucose.: 267 mg/dL *H* (03-20-25 @ 17:02)  POCT Blood Glucose.: 236 mg/dL *H* (03-20-25 @ 12:07)        MEDICATIONS  (STANDING):  albuterol/ipratropium for Nebulization 3 milliLiter(s) Nebulizer every 4 hours  buDESOnide    Inhalation Suspension 0.5 milliGRAM(s) Inhalation every 12 hours  chlorhexidine 2% Cloths 1 Application(s) Topical <User Schedule>  dextrose 5%. 1000 milliLiter(s) (100 mL/Hr) IV Continuous <Continuous>  dextrose 5%. 1000 milliLiter(s) (50 mL/Hr) IV Continuous <Continuous>  dextrose 50% Injectable 25 Gram(s) IV Push once  dextrose 50% Injectable 12.5 Gram(s) IV Push once  dextrose 50% Injectable 25 Gram(s) IV Push once  dextrose Oral Gel 15 Gram(s) Oral once  enoxaparin Injectable 40 milliGRAM(s) SubCutaneous every 24 hours  glucagon  Injectable 1 milliGRAM(s) IntraMuscular once  guaiFENesin  milliGRAM(s) Oral every 12 hours  hydrALAZINE 25 milliGRAM(s) Oral three times a day  hydrochlorothiazide 25 milliGRAM(s) Oral daily  insulin lispro (ADMELOG) corrective regimen sliding scale   SubCutaneous three times a day before meals  insulin lispro (ADMELOG) corrective regimen sliding scale   SubCutaneous at bedtime  lisinopril 40 milliGRAM(s) Oral daily  methylPREDNISolone sodium succinate Injectable 20 milliGRAM(s) IV Push every 8 hours  OLANZapine Disintegrating Tablet 10 milliGRAM(s) Oral at bedtime  pantoprazole    Tablet 40 milliGRAM(s) Oral before breakfast  polyethylene glycol 3350 17 Gram(s) Oral two times a day  senna 2 Tablet(s) Oral at bedtime  sertraline 25 milliGRAM(s) Oral daily    MEDICATIONS  (PRN):      Allergies:  No Known Allergies        CRITICAL CARE TIME SPENT:  minutes of critical care time spent providing medical care for patient's acute illness/conditions that impairs at least one vital organ system and/or poses a high risk of imminent or life threatening deterioration in the patient's condition. It includes time spent evaluating and treating the patient's acute illness as well as time spent reviewing labs, radiology, discussing goals of care with patient and/or patient's family, and discussing the case with a multidisciplinary team, in an effort to prevent further life threatening deterioration or end organ damage. This time is independent of any procedures performed.         Significant recent/past 24 hr events: pt     Subjective:    Review of Systems         [ ] A ten-point review of systems was otherwise negative except as noted.  [ ] Due to altered mental status/intubation, subjective information were not able to be obtained from the patient. History was obtained, to the extent possible, from review of the chart and collateral sources of information.      Patient is a 74y old  Female who presents with a chief complaint of Asthma exacerbation (18 Mar 2025 06:58)    HPI:  75 yo F with PMH asthma, depression presenting to [facility] with worsening dyspnea and wheezing with BLE edema since returning from 10.5 hour trip to [facility] 6 days ago. Altered in ED and unable to provide history, only responsive to noxious stimulli. History provided by  who states he found her on floor with unwitnessed fall. Hx of 3 asthma attacks last year, no hx of intubation, no clear asthma triggers but felt sick after flight home from [facility] 3/3/25 days where she visited for 3 months and was in usual state of health per .    ED Course: vitals notable for HR 60s, -115 with systolics in 180s, tachypneic to 24 with sPO2 95% on RA. Exam notable for wheezing. Placed on NC 5L then trialed on BiPAP but then required intubation to protect airway due to AMS. Labs notable for WBC 8.5, Hgb 12.6, , coags wnl, Na 126, K 4.3, Cl 85, bicarb 27, Cr 0.69, AST/ALT 59/47, proBNP 480, troponin 9, TSH 2.81, pH 7.29, pCO2 72, bicarb 35, neg RVP, CTA chest with no acute findings. CT head/cspine pending. Treated with 1L NS, Duonebs, solumedrol, and sedated with propofol/fentanyl. Admitted to MICU for AHRF.     75 yo F with PMH notable for asthma presenting with AHRF and hypercapnic respiratory acidosis with AMS requiring intubation for airway protection. Now admitted to MICU on ventilator for further evlauation and management iso asthma exacerbation.  (08 Mar 2025 17:07)    PAST MEDICAL & SURGICAL HISTORY:  MDD (major depressive disorder), recurrent, severe, with psychosis      Asthma, mild intermittent, uncomplicated      Essential hypertension      No significant past surgical history        FAMILY HISTORY:      Vitals   ICU Vital Signs Last 24 Hrs  T(C): 37.6 (21 Mar 2025 04:00), Max: 37.6 (20 Mar 2025 16:00)  T(F): 99.6 (21 Mar 2025 04:00), Max: 99.6 (20 Mar 2025 16:00)  HR: 88 (21 Mar 2025 04:00) (88 - 110)  BP: 124/64 (21 Mar 2025 04:00) (121/63 - 159/80)  BP(mean): 83 (21 Mar 2025 04:00) (79 - 111)  ABP: --  ABP(mean): --  RR: 13 (21 Mar 2025 04:00) (13 - 19)  SpO2: 96% (21 Mar 2025 04:00) (65% - 98%)    O2 Parameters below as of 21 Mar 2025 04:00  Patient On (Oxygen Delivery Method): nasal cannula  O2 Flow (L/min): 2          Physical Exam:   Constitutional: NAD, well-groomed, well-developed  HEENT: PERRLA, EOMI, no drainage or redness  Neck: supple,  No JVD, Trachea midline  Back: Normal spine flexure, No CVA tenderness, No deformity or limitation of movement  Respiratory: Breath Sounds equal & clear bilaterally to auscultation, no accessory muscle use noted  Cardiovascular: Regular rate, regular rhythm, normal S1, S2; no murmurs or rub  Gastrointestinal: Soft, non-tender, non distended, no hepatosplenomegaly, normal bowel sounds  Extremities: MCCULLOUGH x 4, no peripheral edema, no cyanosis, no clubbing   Vascular: Equal and normal pulses: 2+ peripheral pulses throughout  Neurological: A+O x 3; speech clear and intact; no sensory, motor  deficits, normal reflexes  Psychiatric: calm, normal mood, normal affect  Musculoskeletal: No joint swelling or deformity; no limitation of movement  Skin: warm, dry, well perfused, no rashes    VENT SETTINGS         I&O's Detail    19 Mar 2025 07:01  -  20 Mar 2025 07:00  --------------------------------------------------------  IN:  Total IN: 0 mL    OUT:    Voided (mL): 450 mL  Total OUT: 450 mL    Total NET: -450 mL      20 Mar 2025 07:01  -  21 Mar 2025 06:25  --------------------------------------------------------  IN:    Oral Fluid: 900 mL  Total IN: 900 mL    OUT:    Voided (mL): 1000 mL  Total OUT: 1000 mL    Total NET: -100 mL          LABS                        12.2   13.72 )-----------( 248      ( 21 Mar 2025 00:40 )             37.9     03-21    139  |  98  |  32[H]  ----------------------------<  338[H]  4.3   |  28  |  0.73    Ca    10.0      21 Mar 2025 00:40  Phos  3.5     03-21  Mg     2.20     03-21    TPro  6.6  /  Alb  3.5  /  TBili  0.3  /  DBili  x   /  AST  9   /  ALT  16  /  AlkPhos  72  03-21    LIVER FUNCTIONS - ( 21 Mar 2025 00:40 )  Alb: 3.5 g/dL / Pro: 6.6 g/dL / ALK PHOS: 72 U/L / ALT: 16 U/L / AST: 9 U/L / GGT: x                   Urinalysis Basic - ( 21 Mar 2025 00:40 )    Color: x / Appearance: x / SG: x / pH: x  Gluc: 338 mg/dL / Ketone: x  / Bili: x / Urobili: x   Blood: x / Protein: x / Nitrite: x   Leuk Esterase: x / RBC: x / WBC x   Sq Epi: x / Non Sq Epi: x / Bacteria: x      POCT Blood Glucose.: 377 mg/dL *H* (03-20-25 @ 21:46)  POCT Blood Glucose.: 367 mg/dL *H* (03-20-25 @ 21:44)  POCT Blood Glucose.: 267 mg/dL *H* (03-20-25 @ 17:02)  POCT Blood Glucose.: 236 mg/dL *H* (03-20-25 @ 12:07)        MEDICATIONS  (STANDING):  albuterol/ipratropium for Nebulization 3 milliLiter(s) Nebulizer every 4 hours  buDESOnide    Inhalation Suspension 0.5 milliGRAM(s) Inhalation every 12 hours  chlorhexidine 2% Cloths 1 Application(s) Topical <User Schedule>  dextrose 5%. 1000 milliLiter(s) (100 mL/Hr) IV Continuous <Continuous>  dextrose 5%. 1000 milliLiter(s) (50 mL/Hr) IV Continuous <Continuous>  dextrose 50% Injectable 25 Gram(s) IV Push once  dextrose 50% Injectable 12.5 Gram(s) IV Push once  dextrose 50% Injectable 25 Gram(s) IV Push once  dextrose Oral Gel 15 Gram(s) Oral once  enoxaparin Injectable 40 milliGRAM(s) SubCutaneous every 24 hours  glucagon  Injectable 1 milliGRAM(s) IntraMuscular once  guaiFENesin  milliGRAM(s) Oral every 12 hours  hydrALAZINE 25 milliGRAM(s) Oral three times a day  hydrochlorothiazide 25 milliGRAM(s) Oral daily  insulin lispro (ADMELOG) corrective regimen sliding scale   SubCutaneous three times a day before meals  insulin lispro (ADMELOG) corrective regimen sliding scale   SubCutaneous at bedtime  lisinopril 40 milliGRAM(s) Oral daily  methylPREDNISolone sodium succinate Injectable 20 milliGRAM(s) IV Push every 8 hours  OLANZapine Disintegrating Tablet 10 milliGRAM(s) Oral at bedtime  pantoprazole    Tablet 40 milliGRAM(s) Oral before breakfast  polyethylene glycol 3350 17 Gram(s) Oral two times a day  senna 2 Tablet(s) Oral at bedtime  sertraline 25 milliGRAM(s) Oral daily    MEDICATIONS  (PRN):      Allergies:  No Known Allergies        CRITICAL CARE TIME SPENT:  minutes of critical care time spent providing medical care for patient's acute illness/conditions that impairs at least one vital organ system and/or poses a high risk of imminent or life threatening deterioration in the patient's condition. It includes time spent evaluating and treating the patient's acute illness as well as time spent reviewing labs, radiology, discussing goals of care with patient and/or patient's family, and discussing the case with a multidisciplinary team, in an effort to prevent further life threatening deterioration or end organ damage. This time is independent of any procedures performed.         Significant recent/past 24 hr events: no significant events     Subjective: pt denies any complaints     Review of Systems         [ ] A ten-point review of systems was otherwise negative except as noted.  [ ] Due to altered mental status/intubation, subjective information were not able to be obtained from the patient. History was obtained, to the extent possible, from review of the chart and collateral sources of information.      Patient is a 74y old  Female who presents with a chief complaint of Asthma exacerbation (18 Mar 2025 06:58)    HPI:  73 yo F with PMH asthma, depression presenting to [facility] with worsening dyspnea and wheezing with BLE edema since returning from 10.5 hour trip to [facility] 6 days ago. Altered in ED and unable to provide history, only responsive to noxious stimulli. History provided by  who states he found her on floor with unwitnessed fall. Hx of 3 asthma attacks last year, no hx of intubation, no clear asthma triggers but felt sick after flight home from [facility] 3/3/25 days where she visited for 3 months and was in usual state of health per .    ED Course: vitals notable for HR 60s, -115 with systolics in 180s, tachypneic to 24 with sPO2 95% on RA. Exam notable for wheezing. Placed on NC 5L then trialed on BiPAP but then required intubation to protect airway due to AMS. Labs notable for WBC 8.5, Hgb 12.6, , coags wnl, Na 126, K 4.3, Cl 85, bicarb 27, Cr 0.69, AST/ALT 59/47, proBNP 480, troponin 9, TSH 2.81, pH 7.29, pCO2 72, bicarb 35, neg RVP, CTA chest with no acute findings. CT head/cspine pending. Treated with 1L NS, Duonebs, solumedrol, and sedated with propofol/fentanyl. Admitted to MICU for AHRF.     73 yo F with PMH notable for asthma presenting with AHRF and hypercapnic respiratory acidosis with AMS requiring intubation for airway protection. Now admitted to MICU on ventilator for further evlauation and management iso asthma exacerbation.  (08 Mar 2025 17:07)    PAST MEDICAL & SURGICAL HISTORY:  MDD (major depressive disorder), recurrent, severe, with psychosis      Asthma, mild intermittent, uncomplicated      Essential hypertension      No significant past surgical history        FAMILY HISTORY:      Vitals   ICU Vital Signs Last 24 Hrs  T(C): 37.6 (21 Mar 2025 04:00), Max: 37.6 (20 Mar 2025 16:00)  T(F): 99.6 (21 Mar 2025 04:00), Max: 99.6 (20 Mar 2025 16:00)  HR: 88 (21 Mar 2025 04:00) (88 - 110)  BP: 124/64 (21 Mar 2025 04:00) (121/63 - 159/80)  BP(mean): 83 (21 Mar 2025 04:00) (79 - 111)  ABP: --  ABP(mean): --  RR: 13 (21 Mar 2025 04:00) (13 - 19)  SpO2: 96% (21 Mar 2025 04:00) (65% - 98%)    O2 Parameters below as of 21 Mar 2025 04:00  Patient On (Oxygen Delivery Method): nasal cannula  O2 Flow (L/min): 2          Physical Exam:   Constitutional: NAD, well-groomed, well-developed  HEENT: PERRLA, EOMI, no drainage or redness  Neck: supple,  No JVD, Trachea midline  Back: Normal spine flexure, No CVA tenderness, No deformity or limitation of movement  Respiratory: Breath Sounds -- very distant and diminished breath sounds, - bilaterally to auscultation, no accessory muscle use noted  Cardiovascular: Regular rate, regular rhythm, normal S1, S2; no murmurs or rub  Gastrointestinal: Soft, non-tender, non distended, no hepatosplenomegaly, normal bowel sounds  Extremities: MCCULLOUGH x 4, no peripheral edema, no cyanosis, no clubbing   Vascular: Equal and normal pulses: 2+ peripheral pulses throughout  Neurological: A+O x 3; speech clear and intact; no sensory, motor  deficits, normal reflexes  Psychiatric: calm, withdrawn, appears very sad   Musculoskeletal: No joint swelling or deformity; no limitation of movement  Skin: warm, dry, well perfused, no rashes    VENT SETTINGS         I&O's Detail    19 Mar 2025 07:01  -  20 Mar 2025 07:00  --------------------------------------------------------  IN:  Total IN: 0 mL    OUT:    Voided (mL): 450 mL  Total OUT: 450 mL    Total NET: -450 mL      20 Mar 2025 07:01  -  21 Mar 2025 06:25  --------------------------------------------------------  IN:    Oral Fluid: 900 mL  Total IN: 900 mL    OUT:    Voided (mL): 1000 mL  Total OUT: 1000 mL    Total NET: -100 mL          LABS                        12.2   13.72 )-----------( 248      ( 21 Mar 2025 00:40 )             37.9     03-21    139  |  98  |  32[H]  ----------------------------<  338[H]  4.3   |  28  |  0.73    Ca    10.0      21 Mar 2025 00:40  Phos  3.5     03-21  Mg     2.20     03-21    TPro  6.6  /  Alb  3.5  /  TBili  0.3  /  DBili  x   /  AST  9   /  ALT  16  /  AlkPhos  72  03-21    LIVER FUNCTIONS - ( 21 Mar 2025 00:40 )  Alb: 3.5 g/dL / Pro: 6.6 g/dL / ALK PHOS: 72 U/L / ALT: 16 U/L / AST: 9 U/L / GGT: x                   Urinalysis Basic - ( 21 Mar 2025 00:40 )    Color: x / Appearance: x / SG: x / pH: x  Gluc: 338 mg/dL / Ketone: x  / Bili: x / Urobili: x   Blood: x / Protein: x / Nitrite: x   Leuk Esterase: x / RBC: x / WBC x   Sq Epi: x / Non Sq Epi: x / Bacteria: x      POCT Blood Glucose.: 377 mg/dL *H* (03-20-25 @ 21:46)  POCT Blood Glucose.: 367 mg/dL *H* (03-20-25 @ 21:44)  POCT Blood Glucose.: 267 mg/dL *H* (03-20-25 @ 17:02)  POCT Blood Glucose.: 236 mg/dL *H* (03-20-25 @ 12:07)        MEDICATIONS  (STANDING):  albuterol/ipratropium for Nebulization 3 milliLiter(s) Nebulizer every 4 hours  buDESOnide    Inhalation Suspension 0.5 milliGRAM(s) Inhalation every 12 hours  chlorhexidine 2% Cloths 1 Application(s) Topical <User Schedule>  dextrose 5%. 1000 milliLiter(s) (100 mL/Hr) IV Continuous <Continuous>  dextrose 5%. 1000 milliLiter(s) (50 mL/Hr) IV Continuous <Continuous>  dextrose 50% Injectable 25 Gram(s) IV Push once  dextrose 50% Injectable 12.5 Gram(s) IV Push once  dextrose 50% Injectable 25 Gram(s) IV Push once  dextrose Oral Gel 15 Gram(s) Oral once  enoxaparin Injectable 40 milliGRAM(s) SubCutaneous every 24 hours  glucagon  Injectable 1 milliGRAM(s) IntraMuscular once  guaiFENesin  milliGRAM(s) Oral every 12 hours  hydrALAZINE 25 milliGRAM(s) Oral three times a day  hydrochlorothiazide 25 milliGRAM(s) Oral daily  insulin lispro (ADMELOG) corrective regimen sliding scale   SubCutaneous three times a day before meals  insulin lispro (ADMELOG) corrective regimen sliding scale   SubCutaneous at bedtime  lisinopril 40 milliGRAM(s) Oral daily  methylPREDNISolone sodium succinate Injectable 20 milliGRAM(s) IV Push every 8 hours  OLANZapine Disintegrating Tablet 10 milliGRAM(s) Oral at bedtime  pantoprazole    Tablet 40 milliGRAM(s) Oral before breakfast  polyethylene glycol 3350 17 Gram(s) Oral two times a day  senna 2 Tablet(s) Oral at bedtime  sertraline 25 milliGRAM(s) Oral daily    MEDICATIONS  (PRN):      Allergies:  No Known Allergies

## 2025-03-21 NOTE — CHART NOTE - NSCHARTNOTEFT_GEN_A_CORE
MAR Accept Note  LENCHOClaudiaSHOSHANA JACKI, PGY3  Transfer to:    Accepting Attending Physician: Dr. Shaan Miller   Assigned Room: 550A     Patient seen and examined.   Labs and data reviewed.   No findings precluding transfer of service.       HPI/MICU COURSE:   Pt was admitted to MICU on ventilator for further evaluation and management iso asthma exacerbation. Extubated 3/12 to face tent and transferred to the floor. Pt has been stable between 2-4 L NC and RA. Steroids were tapered off on 3/16. RRT 3/19 for increased WOB and hypoxia, was placed on Bipap, Pt was treated with methylprednisolone 40 mg IVP and restarted on Methylprednisolone 20 mg IV q 8 hrs; and pt was transferred to MICU for further management of asthma. Pt remains stable on 2 L NC, passed swallow study, and is tolerating bite sized and soft diet. Pt with atelectasis, on duonebs, out of bed to chair, and using incentive spirometer.   __ pt appears very depressed and missed her psych meds yesterday due to AHRF and failed dysphagia screening. Pt passed swallow study and is back on her Zoloft and Zyprexa (home meds), Continue as per psych recs.         FOR FOLLOW UP:  [] Psych -- 3/20 - INCREASE Zoloft to 50mg daily ( home dose 150mg) ( monitor NA- recent hyponatremia), As per  team, psych will follow and make any changes if needed, continue present dose 25 mg daily   [] taper steroids_  on Methylprednisolone 20 mg IV q 8 hrs since 3/19 >> pulmonary will follow up on the floor for steroids taper   [] PT-- recommends TK, as per discussion with son Jesus Yepez___ will discuss with family re: proceeding with TK   [] SLP-- soft and bite sized diet__ advance as tolerated  [] Wean off O 2 as able

## 2025-03-22 DIAGNOSIS — R56.9 UNSPECIFIED CONVULSIONS: ICD-10-CM

## 2025-03-22 DIAGNOSIS — J96.01 ACUTE RESPIRATORY FAILURE WITH HYPOXIA: ICD-10-CM

## 2025-03-22 DIAGNOSIS — R53.81 OTHER MALAISE: ICD-10-CM

## 2025-03-22 LAB
ADD ON TEST-SPECIMEN IN LAB: SIGNIFICANT CHANGE UP
ANION GAP SERPL CALC-SCNC: 14 MMOL/L — SIGNIFICANT CHANGE UP (ref 7–14)
BUN SERPL-MCNC: 22 MG/DL — SIGNIFICANT CHANGE UP (ref 7–23)
CALCIUM SERPL-MCNC: 10 MG/DL — SIGNIFICANT CHANGE UP (ref 8.4–10.5)
CHLORIDE SERPL-SCNC: 98 MMOL/L — SIGNIFICANT CHANGE UP (ref 98–107)
CK SERPL-CCNC: 40 U/L — SIGNIFICANT CHANGE UP (ref 25–170)
CO2 SERPL-SCNC: 29 MMOL/L — SIGNIFICANT CHANGE UP (ref 22–31)
CREAT SERPL-MCNC: 0.61 MG/DL — SIGNIFICANT CHANGE UP (ref 0.5–1.3)
EGFR: 94 ML/MIN/1.73M2 — SIGNIFICANT CHANGE UP
EGFR: 94 ML/MIN/1.73M2 — SIGNIFICANT CHANGE UP
GLUCOSE BLDC GLUCOMTR-MCNC: 185 MG/DL — HIGH (ref 70–99)
GLUCOSE BLDC GLUCOMTR-MCNC: 225 MG/DL — HIGH (ref 70–99)
GLUCOSE BLDC GLUCOMTR-MCNC: 252 MG/DL — HIGH (ref 70–99)
GLUCOSE BLDC GLUCOMTR-MCNC: 279 MG/DL — HIGH (ref 70–99)
GLUCOSE BLDC GLUCOMTR-MCNC: 328 MG/DL — HIGH (ref 70–99)
GLUCOSE SERPL-MCNC: 200 MG/DL — HIGH (ref 70–99)
HCT VFR BLD CALC: 37.6 % — SIGNIFICANT CHANGE UP (ref 34.5–45)
HGB BLD-MCNC: 12.4 G/DL — SIGNIFICANT CHANGE UP (ref 11.5–15.5)
MAGNESIUM SERPL-MCNC: 1.8 MG/DL — SIGNIFICANT CHANGE UP (ref 1.6–2.6)
MCHC RBC-ENTMCNC: 32.2 PG — SIGNIFICANT CHANGE UP (ref 27–34)
MCHC RBC-ENTMCNC: 33 G/DL — SIGNIFICANT CHANGE UP (ref 32–36)
MCV RBC AUTO: 97.7 FL — SIGNIFICANT CHANGE UP (ref 80–100)
NRBC # BLD AUTO: 0 K/UL — SIGNIFICANT CHANGE UP (ref 0–0)
NRBC # FLD: 0 K/UL — SIGNIFICANT CHANGE UP (ref 0–0)
NRBC BLD AUTO-RTO: 0 /100 WBCS — SIGNIFICANT CHANGE UP (ref 0–0)
PHOSPHATE SERPL-MCNC: 3 MG/DL — SIGNIFICANT CHANGE UP (ref 2.5–4.5)
PLATELET # BLD AUTO: 250 K/UL — SIGNIFICANT CHANGE UP (ref 150–400)
POTASSIUM SERPL-MCNC: 3.6 MMOL/L — SIGNIFICANT CHANGE UP (ref 3.5–5.3)
POTASSIUM SERPL-SCNC: 3.6 MMOL/L — SIGNIFICANT CHANGE UP (ref 3.5–5.3)
RBC # BLD: 3.85 M/UL — SIGNIFICANT CHANGE UP (ref 3.8–5.2)
RBC # FLD: 12.7 % — SIGNIFICANT CHANGE UP (ref 10.3–14.5)
SODIUM SERPL-SCNC: 141 MMOL/L — SIGNIFICANT CHANGE UP (ref 135–145)
WBC # BLD: 11 K/UL — HIGH (ref 3.8–10.5)
WBC # FLD AUTO: 11 K/UL — HIGH (ref 3.8–10.5)

## 2025-03-22 PROCEDURE — 99233 SBSQ HOSP IP/OBS HIGH 50: CPT

## 2025-03-22 RX ORDER — IPRATROPIUM BROMIDE AND ALBUTEROL SULFATE .5; 2.5 MG/3ML; MG/3ML
3 SOLUTION RESPIRATORY (INHALATION) EVERY 6 HOURS
Refills: 0 | Status: DISCONTINUED | OUTPATIENT
Start: 2025-03-22 | End: 2025-03-23

## 2025-03-22 RX ORDER — INSULIN GLARGINE-YFGN 100 [IU]/ML
4 INJECTION, SOLUTION SUBCUTANEOUS AT BEDTIME
Refills: 0 | Status: DISCONTINUED | OUTPATIENT
Start: 2025-03-22 | End: 2025-03-23

## 2025-03-22 RX ADMIN — Medication 2 TABLET(S): at 21:53

## 2025-03-22 RX ADMIN — BUDESONIDE 0.5 MILLIGRAM(S): 0.25 SUSPENSION RESPIRATORY (INHALATION) at 10:04

## 2025-03-22 RX ADMIN — SERTRALINE 50 MILLIGRAM(S): 100 TABLET, FILM COATED ORAL at 11:56

## 2025-03-22 RX ADMIN — POLYETHYLENE GLYCOL 3350 17 GRAM(S): 17 POWDER, FOR SOLUTION ORAL at 17:25

## 2025-03-22 RX ADMIN — BUDESONIDE 0.5 MILLIGRAM(S): 0.25 SUSPENSION RESPIRATORY (INHALATION) at 21:21

## 2025-03-22 RX ADMIN — IPRATROPIUM BROMIDE AND ALBUTEROL SULFATE 3 MILLILITER(S): .5; 2.5 SOLUTION RESPIRATORY (INHALATION) at 09:43

## 2025-03-22 RX ADMIN — Medication 1 APPLICATION(S): at 06:52

## 2025-03-22 RX ADMIN — METHYLPREDNISOLONE ACETATE 20 MILLIGRAM(S): 80 INJECTION, SUSPENSION INTRA-ARTICULAR; INTRALESIONAL; INTRAMUSCULAR; SOFT TISSUE at 17:25

## 2025-03-22 RX ADMIN — Medication 40 MILLIGRAM(S): at 07:28

## 2025-03-22 RX ADMIN — INSULIN GLARGINE-YFGN 4 UNIT(S): 100 INJECTION, SOLUTION SUBCUTANEOUS at 23:33

## 2025-03-22 RX ADMIN — INSULIN LISPRO 4: 100 INJECTION, SOLUTION INTRAVENOUS; SUBCUTANEOUS at 17:26

## 2025-03-22 RX ADMIN — INSULIN LISPRO 2: 100 INJECTION, SOLUTION INTRAVENOUS; SUBCUTANEOUS at 08:48

## 2025-03-22 RX ADMIN — POLYETHYLENE GLYCOL 3350 17 GRAM(S): 17 POWDER, FOR SOLUTION ORAL at 06:45

## 2025-03-22 RX ADMIN — INSULIN LISPRO 2: 100 INJECTION, SOLUTION INTRAVENOUS; SUBCUTANEOUS at 23:34

## 2025-03-22 RX ADMIN — Medication 25 MILLIGRAM(S): at 21:52

## 2025-03-22 RX ADMIN — DEXTROMETHORPHAN HBR, GUAIFENESIN 600 MILLIGRAM(S): 200 LIQUID ORAL at 17:25

## 2025-03-22 RX ADMIN — DEXTROMETHORPHAN HBR, GUAIFENESIN 600 MILLIGRAM(S): 200 LIQUID ORAL at 06:44

## 2025-03-22 RX ADMIN — ENOXAPARIN SODIUM 40 MILLIGRAM(S): 100 INJECTION SUBCUTANEOUS at 11:54

## 2025-03-22 RX ADMIN — IPRATROPIUM BROMIDE AND ALBUTEROL SULFATE 3 MILLILITER(S): .5; 2.5 SOLUTION RESPIRATORY (INHALATION) at 21:20

## 2025-03-22 RX ADMIN — IPRATROPIUM BROMIDE AND ALBUTEROL SULFATE 3 MILLILITER(S): .5; 2.5 SOLUTION RESPIRATORY (INHALATION) at 13:39

## 2025-03-22 RX ADMIN — INSULIN LISPRO 6: 100 INJECTION, SOLUTION INTRAVENOUS; SUBCUTANEOUS at 12:15

## 2025-03-22 RX ADMIN — OLANZAPINE 10 MILLIGRAM(S): 10 TABLET ORAL at 21:52

## 2025-03-22 RX ADMIN — Medication 25 MILLIGRAM(S): at 13:41

## 2025-03-22 RX ADMIN — METHYLPREDNISOLONE ACETATE 20 MILLIGRAM(S): 80 INJECTION, SUSPENSION INTRA-ARTICULAR; INTRALESIONAL; INTRAMUSCULAR; SOFT TISSUE at 06:45

## 2025-03-22 RX ADMIN — Medication 25 MILLIGRAM(S): at 06:45

## 2025-03-22 RX ADMIN — LISINOPRIL 40 MILLIGRAM(S): 5 TABLET ORAL at 06:44

## 2025-03-22 NOTE — PROGRESS NOTE ADULT - PROBLEM SELECTOR PLAN 5
waxing and waning seems to be slowly improving  A&O x 2, person and place  some difficulty in responding, Pt w/hx of paranoia and stupor from depression  increased Zoloft to 50mg daily ( home dose 150mg) ( monitor NA- recent hyponatremia), As per  team, team will follow and make any changes if needed,   > continue home Olanzapine, 10 mg qhs

## 2025-03-22 NOTE — PROGRESS NOTE ADULT - PROBLEM SELECTOR PLAN 6
resolved waxing and waning seems to be slowly improving  A&O x 2, person and place  some difficulty in responding,

## 2025-03-22 NOTE — PROGRESS NOTE ADULT - ASSESSMENT
74F hx asthma and depressed presented with AHHRF c/b AMS requiring MICU admission for intubation for respiratory failure i/s/o asthma exacerbation. Extubated 3/12 to face tent and now on BIPAP s/p RRT 3/19 for increased WOB / hypoxia despite aggressive asthma management. Transferred back to MICU for management of severe asthma.

## 2025-03-22 NOTE — PROGRESS NOTE ADULT - PROBLEM SELECTOR PLAN 3
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

## 2025-03-22 NOTE — PROGRESS NOTE ADULT - PROBLEM SELECTOR PLAN 8
- CTH: no acute changes  - CT spine: no fractures  - 3/8 ON: 6 minutes of seizure-like activity with myoclonic upper jerks -> broke with ativan  -- EEG 3/10 showing no epileptiform activity; severe degree of diffuse or multifocal cerebral dysfunction c/w comatose  -MRI brain pending resolved

## 2025-03-22 NOTE — PROGRESS NOTE ADULT - PROBLEM SELECTOR PLAN 2
will need ICS/LABA on discharge in addition to duonebs  as noted above, extended steroid taper  needs pulm as outpatient

## 2025-03-22 NOTE — PROGRESS NOTE ADULT - PROBLEM SELECTOR PLAN 4
Pt w/hx of paranoia and stupor from depression  increased Zoloft to 50mg daily ( home dose 150mg) ( monitor NA- recent hyponatremia), As per  team, team will follow and make any changes if needed,   > continue home Olanzapine, 10 mg qhs > Amlodipine 10mg qD>> discontinued 2/2 c/f LE edema   > cont Lisinopril 40mg qD  > cont hydralazine 25mg q8hr  > cont home HCTZ, avoid beta-blockers given asthma

## 2025-03-22 NOTE — PROGRESS NOTE ADULT - PROBLEM SELECTOR PLAN 1
2/2 to status asthmaticus  Follow-up aspergillus Ab, ANCA, IgE   s/p intubation and bipap now on NC  c/w duonebs q6h  s/p course of antibiotics  c/w budesonide BID  currently on solumedrol with plan to start pred taper on 3/23

## 2025-03-22 NOTE — PROGRESS NOTE ADULT - SUBJECTIVE AND OBJECTIVE BOX
Catherine Zepeda MD   [facility] Medicine  Teams preferred  Pager: [phone]    Patient is a 74y old  Female who presents with a chief complaint of AMS and increased WOB (21 Mar 2025 06:24)      INTERVAL HPI/OVERNIGHT EVENTS:    MEDICATIONS  (STANDING):  albuterol/ipratropium for Nebulization 3 milliLiter(s) Nebulizer every 6 hours  buDESOnide    Inhalation Suspension 0.5 milliGRAM(s) Inhalation every 12 hours  chlorhexidine 2% Cloths 1 Application(s) Topical <User Schedule>  dextrose 5%. 1000 milliLiter(s) (100 mL/Hr) IV Continuous <Continuous>  dextrose 5%. 1000 milliLiter(s) (50 mL/Hr) IV Continuous <Continuous>  dextrose 50% Injectable 25 Gram(s) IV Push once  dextrose 50% Injectable 12.5 Gram(s) IV Push once  dextrose 50% Injectable 25 Gram(s) IV Push once  dextrose Oral Gel 15 Gram(s) Oral once  enoxaparin Injectable 40 milliGRAM(s) SubCutaneous every 24 hours  glucagon  Injectable 1 milliGRAM(s) IntraMuscular once  guaiFENesin  milliGRAM(s) Oral every 12 hours  hydrALAZINE 25 milliGRAM(s) Oral three times a day  hydrochlorothiazide 25 milliGRAM(s) Oral daily  insulin glargine Injectable (LANTUS) 4 Unit(s) SubCutaneous at bedtime  insulin lispro (ADMELOG) corrective regimen sliding scale   SubCutaneous three times a day before meals  insulin lispro (ADMELOG) corrective regimen sliding scale   SubCutaneous at bedtime  lisinopril 40 milliGRAM(s) Oral daily  methylPREDNISolone sodium succinate Injectable 20 milliGRAM(s) IV Push every 12 hours  OLANZapine 10 milliGRAM(s) Oral at bedtime  pantoprazole    Tablet 40 milliGRAM(s) Oral before breakfast  polyethylene glycol 3350 17 Gram(s) Oral two times a day  senna 2 Tablet(s) Oral at bedtime  sertraline 50 milliGRAM(s) Oral daily    MEDICATIONS  (PRN):      Allergies    No Known Allergies    Intolerances        REVIEW OF SYSTEMS:  Please see interval HPI:    I&O's Detail    21 Mar 2025 07:01  -  22 Mar 2025 07:00  --------------------------------------------------------  IN:    Oral Fluid: 840 mL  Total IN: 840 mL    OUT:    Voided (mL): 950 mL  Total OUT: 950 mL    Total NET: -110 mL            PHYSICAL EXAM:  Vital Signs Last 24 Hrs  T(C): 36.8 (22 Mar 2025 11:57), Max: 37.4 (22 Mar 2025 02:30)  T(F): 98.2 (22 Mar 2025 11:57), Max: 99.4 (22 Mar 2025 02:30)  HR: 86 (22 Mar 2025 13:35) (86 - 102)  BP: 140/80 (22 Mar 2025 13:35) (122/65 - 149/85)  BP(mean): 99 (21 Mar 2025 20:00) (81 - 99)  RR: 16 (22 Mar 2025 13:35) (10 - 19)  SpO2: 99% (22 Mar 2025 13:35) (64% - 99%)    Parameters below as of 22 Mar 2025 13:35  Patient On (Oxygen Delivery Method): room air      CONSTITUTIONAL: NAD,   ENMT: Moist oral mucosa,   RESPIRATORY: Normal respiratory effort; lungs are clear to auscultation bilaterally  CARDIOVASCULAR: Regular rate and rhythm, normal S1 and S2, no murmur/rub/gallop; No lower extremity edema;   ABDOMEN: Nontender to palpation, normoactive bowel sounds, no rebound/guarding; No hepatosplenomegaly  EXT: no edema b/l  NEUROLOGY: alert, A&O x 2, able to move all extremities, extremely weak, slow to respond vs word finding difficulty  SKIN: No rashes; no palpable lesions      LABS:                        12.4   11.00 )-----------( 250      ( 22 Mar 2025 06:00 )             37.6     22 Mar 2025 06:00    141    |  98     |  22     ----------------------------<  200    3.6     |  29     |  0.61     Ca    10.0       22 Mar 2025 06:00  Phos  3.0       22 Mar 2025 06:00  Mg     1.80      22 Mar 2025 06:00        CAPILLARY BLOOD GLUCOSE      POCT Blood Glucose.: 252 mg/dL (22 Mar 2025 12:08)  POCT Blood Glucose.: 185 mg/dL (22 Mar 2025 08:24)  POCT Blood Glucose.: 253 mg/dL (21 Mar 2025 22:51)  POCT Blood Glucose.: 251 mg/dL (21 Mar 2025 21:11)  POCT Blood Glucose.: 186 mg/dL (21 Mar 2025 17:02)    BLOOD CULTURE    RADIOLOGY & ADDITIONAL TESTS:    Imaging Personally Reviewed:  [ ] YES     Consultant(s) Notes Reviewed:      Care Discussed with Consultants/Other Providers:

## 2025-03-22 NOTE — PROGRESS NOTE ADULT - PROBLEM SELECTOR PLAN 7
DVT: lovenox  code: full  GI; PPI - CTH: no acute changes  - CT spine: no fractures  - 3/8 ON: 6 minutes of seizure-like activity with myoclonic upper jerks -> broke with ativan  -- EEG 3/10 showing no epileptiform activity; severe degree of diffuse or multifocal cerebral dysfunction c/w comatose  -MRI brain pending

## 2025-03-23 DIAGNOSIS — R73.9 HYPERGLYCEMIA, UNSPECIFIED: ICD-10-CM

## 2025-03-23 LAB
AUTO DIFF PNL BLD: NEGATIVE — SIGNIFICANT CHANGE UP
C-ANCA SER-ACNC: NEGATIVE — SIGNIFICANT CHANGE UP
GLUCOSE BLDC GLUCOMTR-MCNC: 121 MG/DL — HIGH (ref 70–99)
GLUCOSE BLDC GLUCOMTR-MCNC: 190 MG/DL — HIGH (ref 70–99)
GLUCOSE BLDC GLUCOMTR-MCNC: 256 MG/DL — HIGH (ref 70–99)
GLUCOSE BLDC GLUCOMTR-MCNC: 311 MG/DL — HIGH (ref 70–99)
P-ANCA SER-ACNC: NEGATIVE — SIGNIFICANT CHANGE UP

## 2025-03-23 PROCEDURE — 99232 SBSQ HOSP IP/OBS MODERATE 35: CPT

## 2025-03-23 PROCEDURE — 93010 ELECTROCARDIOGRAM REPORT: CPT

## 2025-03-23 RX ORDER — INSULIN LISPRO 100 U/ML
3 INJECTION, SOLUTION INTRAVENOUS; SUBCUTANEOUS
Refills: 0 | Status: DISCONTINUED | OUTPATIENT
Start: 2025-03-23 | End: 2025-03-24

## 2025-03-23 RX ORDER — INSULIN GLARGINE-YFGN 100 [IU]/ML
10 INJECTION, SOLUTION SUBCUTANEOUS AT BEDTIME
Refills: 0 | Status: DISCONTINUED | OUTPATIENT
Start: 2025-03-23 | End: 2025-03-24

## 2025-03-23 RX ORDER — LEVALBUTEROL HYDROCHLORIDE 1.25 MG/3ML
0.63 SOLUTION RESPIRATORY (INHALATION) EVERY 6 HOURS
Refills: 0 | Status: DISCONTINUED | OUTPATIENT
Start: 2025-03-23 | End: 2025-04-03

## 2025-03-23 RX ORDER — LORAZEPAM 4 MG/ML
0.5 VIAL (ML) INJECTION ONCE
Refills: 0 | Status: DISCONTINUED | OUTPATIENT
Start: 2025-03-23 | End: 2025-03-23

## 2025-03-23 RX ADMIN — ENOXAPARIN SODIUM 40 MILLIGRAM(S): 100 INJECTION SUBCUTANEOUS at 12:48

## 2025-03-23 RX ADMIN — Medication 25 MILLIGRAM(S): at 05:28

## 2025-03-23 RX ADMIN — POLYETHYLENE GLYCOL 3350 17 GRAM(S): 17 POWDER, FOR SOLUTION ORAL at 05:27

## 2025-03-23 RX ADMIN — DEXTROMETHORPHAN HBR, GUAIFENESIN 600 MILLIGRAM(S): 200 LIQUID ORAL at 05:28

## 2025-03-23 RX ADMIN — Medication 25 MILLIGRAM(S): at 15:31

## 2025-03-23 RX ADMIN — OLANZAPINE 10 MILLIGRAM(S): 10 TABLET ORAL at 22:15

## 2025-03-23 RX ADMIN — INSULIN LISPRO 8: 100 INJECTION, SOLUTION INTRAVENOUS; SUBCUTANEOUS at 12:49

## 2025-03-23 RX ADMIN — IPRATROPIUM BROMIDE AND ALBUTEROL SULFATE 3 MILLILITER(S): .5; 2.5 SOLUTION RESPIRATORY (INHALATION) at 04:44

## 2025-03-23 RX ADMIN — POLYETHYLENE GLYCOL 3350 17 GRAM(S): 17 POWDER, FOR SOLUTION ORAL at 17:48

## 2025-03-23 RX ADMIN — SERTRALINE 50 MILLIGRAM(S): 100 TABLET, FILM COATED ORAL at 12:48

## 2025-03-23 RX ADMIN — Medication 1 APPLICATION(S): at 06:55

## 2025-03-23 RX ADMIN — BUDESONIDE 0.5 MILLIGRAM(S): 0.25 SUSPENSION RESPIRATORY (INHALATION) at 10:01

## 2025-03-23 RX ADMIN — IPRATROPIUM BROMIDE AND ALBUTEROL SULFATE 3 MILLILITER(S): .5; 2.5 SOLUTION RESPIRATORY (INHALATION) at 15:14

## 2025-03-23 RX ADMIN — LISINOPRIL 40 MILLIGRAM(S): 5 TABLET ORAL at 05:28

## 2025-03-23 RX ADMIN — Medication 2 TABLET(S): at 22:15

## 2025-03-23 RX ADMIN — INSULIN LISPRO 2: 100 INJECTION, SOLUTION INTRAVENOUS; SUBCUTANEOUS at 09:10

## 2025-03-23 RX ADMIN — INSULIN GLARGINE-YFGN 10 UNIT(S): 100 INJECTION, SOLUTION SUBCUTANEOUS at 22:51

## 2025-03-23 RX ADMIN — INSULIN LISPRO 3 UNIT(S): 100 INJECTION, SOLUTION INTRAVENOUS; SUBCUTANEOUS at 17:48

## 2025-03-23 RX ADMIN — Medication 40 MILLIGRAM(S): at 05:29

## 2025-03-23 RX ADMIN — INSULIN LISPRO 6: 100 INJECTION, SOLUTION INTRAVENOUS; SUBCUTANEOUS at 17:48

## 2025-03-23 RX ADMIN — BUDESONIDE 0.5 MILLIGRAM(S): 0.25 SUSPENSION RESPIRATORY (INHALATION) at 21:41

## 2025-03-23 RX ADMIN — DEXTROMETHORPHAN HBR, GUAIFENESIN 600 MILLIGRAM(S): 200 LIQUID ORAL at 17:48

## 2025-03-23 RX ADMIN — Medication 0.5 MILLIGRAM(S): at 11:25

## 2025-03-23 RX ADMIN — IPRATROPIUM BROMIDE AND ALBUTEROL SULFATE 3 MILLILITER(S): .5; 2.5 SOLUTION RESPIRATORY (INHALATION) at 10:00

## 2025-03-23 RX ADMIN — Medication 25 MILLIGRAM(S): at 22:14

## 2025-03-23 RX ADMIN — PREDNISONE 40 MILLIGRAM(S): 20 TABLET ORAL at 05:27

## 2025-03-23 NOTE — PROGRESS NOTE ADULT - PROBLEM SELECTOR PLAN 8
- CTH: no acute changes  - CT spine: no fractures  - 3/8 ON: 6 minutes of seizure-like activity with myoclonic upper jerks -> broke with ativan  -- EEG 3/10 showing no epileptiform activity; severe degree of diffuse or multifocal cerebral dysfunction c/w comatose  -MRI brain pending

## 2025-03-23 NOTE — PROGRESS NOTE ADULT - PROBLEM SELECTOR PLAN 6
Pt w/hx of paranoia and stupor from depression  increased Zoloft to 50mg daily ( home dose 150mg) ( monitor NA- recent hyponatremia), As per  team, team will follow and make any changes if needed,   > continue home Olanzapine, 10 mg qhs Pt w/hx of paranoia and stupor from depression  increased Zoloft to 50mg daily ( home dose 150mg) ( monitor NA- recent hyponatremia), As per  team, team will follow and make any changes if needed,   > continue home Olanzapine, 10 mg qhs    given mild rigidity, will trial small dose of IV ativan for response

## 2025-03-23 NOTE — PROGRESS NOTE ADULT - SUBJECTIVE AND OBJECTIVE BOX
Catherine Zepeda MD   [facility] Medicine  Teams preferred  Pager: [phone]    Patient is a 74y old  Female who presents with a chief complaint of AMS and increased WOB (21 Mar 2025 06:24)      INTERVAL HPI/OVERNIGHT EVENTS:    MEDICATIONS  (STANDING):  albuterol/ipratropium for Nebulization 3 milliLiter(s) Nebulizer every 6 hours  buDESOnide    Inhalation Suspension 0.5 milliGRAM(s) Inhalation every 12 hours  chlorhexidine 2% Cloths 1 Application(s) Topical <User Schedule>  dextrose 5%. 1000 milliLiter(s) (100 mL/Hr) IV Continuous <Continuous>  dextrose 5%. 1000 milliLiter(s) (50 mL/Hr) IV Continuous <Continuous>  dextrose 50% Injectable 25 Gram(s) IV Push once  dextrose 50% Injectable 12.5 Gram(s) IV Push once  dextrose 50% Injectable 25 Gram(s) IV Push once  dextrose Oral Gel 15 Gram(s) Oral once  enoxaparin Injectable 40 milliGRAM(s) SubCutaneous every 24 hours  glucagon  Injectable 1 milliGRAM(s) IntraMuscular once  guaiFENesin  milliGRAM(s) Oral every 12 hours  hydrALAZINE 25 milliGRAM(s) Oral three times a day  hydrochlorothiazide 25 milliGRAM(s) Oral daily  insulin glargine Injectable (LANTUS) 10 Unit(s) SubCutaneous at bedtime  insulin lispro (ADMELOG) corrective regimen sliding scale   SubCutaneous three times a day before meals  insulin lispro (ADMELOG) corrective regimen sliding scale   SubCutaneous at bedtime  lisinopril 40 milliGRAM(s) Oral daily  OLANZapine 10 milliGRAM(s) Oral at bedtime  pantoprazole    Tablet 40 milliGRAM(s) Oral before breakfast  polyethylene glycol 3350 17 Gram(s) Oral two times a day  predniSONE   Tablet   Oral   predniSONE   Tablet 40 milliGRAM(s) Oral daily  senna 2 Tablet(s) Oral at bedtime  sertraline 50 milliGRAM(s) Oral daily    MEDICATIONS  (PRN):      Allergies    No Known Allergies    Intolerances        REVIEW OF SYSTEMS:  Please see interval HPI:    I&O's Detail    22 Mar 2025 07:01  -  23 Mar 2025 07:00  --------------------------------------------------------  IN:    Oral Fluid: 250 mL  Total IN: 250 mL    OUT:    Voided (mL): 500 mL  Total OUT: 500 mL    Total NET: -250 mL            PHYSICAL EXAM:  Vital Signs Last 24 Hrs  T(C): 36.8 (23 Mar 2025 05:28), Max: 36.9 (22 Mar 2025 21:47)  T(F): 98.3 (23 Mar 2025 05:28), Max: 98.5 (22 Mar 2025 21:47)  HR: 92 (23 Mar 2025 05:28) (86 - 102)  BP: 144/87 (23 Mar 2025 05:28) (120/64 - 151/82)  BP(mean): --  RR: 19 (23 Mar 2025 05:28) (16 - 19)  SpO2: 98% (23 Mar 2025 05:28) (97% - 100%)    Parameters below as of 23 Mar 2025 05:28  Patient On (Oxygen Delivery Method): nasal cannula  O2 Flow (L/min): 2    CONSTITUTIONAL: NAD,   ENMT: Moist oral mucosa,   RESPIRATORY: Normal respiratory effort; lungs are clear to auscultation bilaterally  CARDIOVASCULAR: Regular rate and rhythm, normal S1 and S2, no murmur/rub/gallop; No lower extremity edema;   ABDOMEN: Nontender to palpation, normoactive bowel sounds, no rebound/guarding; No hepatosplenomegaly  EXT: no edema b/l  NEUROLOGY: alert, following commands  SKIN: No rashes; no palpable lesions      LABS:      Ca    10.0       22 Mar 2025 06:00        CAPILLARY BLOOD GLUCOSE      POCT Blood Glucose.: 328 mg/dL (22 Mar 2025 23:28)  POCT Blood Glucose.: 279 mg/dL (22 Mar 2025 22:18)  POCT Blood Glucose.: 225 mg/dL (22 Mar 2025 17:20)  POCT Blood Glucose.: 252 mg/dL (22 Mar 2025 12:08)  POCT Blood Glucose.: 185 mg/dL (22 Mar 2025 08:24)    BLOOD CULTURE    RADIOLOGY & ADDITIONAL TESTS:    Imaging Personally Reviewed:  [ ] YES     Consultant(s) Notes Reviewed:      Care Discussed with Consultants/Other Providers: Catherine Zepeda MD   [facility] Medicine  Teams preferred  Pager: [phone]    Patient is a 74y old  Female who presents with a chief complaint of AMS and increased WOB (21 Mar 2025 06:24)      INTERVAL HPI/OVERNIGHT EVENTS: No issues overnight. Reports breathing is ok. slow to respond but states that she doesn't feel weak but that it is hard to move. denies CP,     MEDICATIONS  (STANDING):  albuterol/ipratropium for Nebulization 3 milliLiter(s) Nebulizer every 6 hours  buDESOnide    Inhalation Suspension 0.5 milliGRAM(s) Inhalation every 12 hours  chlorhexidine 2% Cloths 1 Application(s) Topical <User Schedule>  dextrose 5%. 1000 milliLiter(s) (100 mL/Hr) IV Continuous <Continuous>  dextrose 5%. 1000 milliLiter(s) (50 mL/Hr) IV Continuous <Continuous>  dextrose 50% Injectable 25 Gram(s) IV Push once  dextrose 50% Injectable 12.5 Gram(s) IV Push once  dextrose 50% Injectable 25 Gram(s) IV Push once  dextrose Oral Gel 15 Gram(s) Oral once  enoxaparin Injectable 40 milliGRAM(s) SubCutaneous every 24 hours  glucagon  Injectable 1 milliGRAM(s) IntraMuscular once  guaiFENesin  milliGRAM(s) Oral every 12 hours  hydrALAZINE 25 milliGRAM(s) Oral three times a day  hydrochlorothiazide 25 milliGRAM(s) Oral daily  insulin glargine Injectable (LANTUS) 10 Unit(s) SubCutaneous at bedtime  insulin lispro (ADMELOG) corrective regimen sliding scale   SubCutaneous three times a day before meals  insulin lispro (ADMELOG) corrective regimen sliding scale   SubCutaneous at bedtime  lisinopril 40 milliGRAM(s) Oral daily  OLANZapine 10 milliGRAM(s) Oral at bedtime  pantoprazole    Tablet 40 milliGRAM(s) Oral before breakfast  polyethylene glycol 3350 17 Gram(s) Oral two times a day  predniSONE   Tablet   Oral   predniSONE   Tablet 40 milliGRAM(s) Oral daily  senna 2 Tablet(s) Oral at bedtime  sertraline 50 milliGRAM(s) Oral daily    MEDICATIONS  (PRN):      Allergies    No Known Allergies    Intolerances        REVIEW OF SYSTEMS:  Please see interval HPI:    I&O's Detail    22 Mar 2025 07:01  -  23 Mar 2025 07:00  --------------------------------------------------------  IN:    Oral Fluid: 250 mL  Total IN: 250 mL    OUT:    Voided (mL): 500 mL  Total OUT: 500 mL    Total NET: -250 mL            PHYSICAL EXAM:  Vital Signs Last 24 Hrs  T(C): 36.8 (23 Mar 2025 05:28), Max: 36.9 (22 Mar 2025 21:47)  T(F): 98.3 (23 Mar 2025 05:28), Max: 98.5 (22 Mar 2025 21:47)  HR: 92 (23 Mar 2025 05:28) (86 - 102)  BP: 144/87 (23 Mar 2025 05:28) (120/64 - 151/82)  BP(mean): --  RR: 19 (23 Mar 2025 05:28) (16 - 19)  SpO2: 98% (23 Mar 2025 05:28) (97% - 100%)    Parameters below as of 23 Mar 2025 05:28  Patient On (Oxygen Delivery Method): nasal cannula  O2 Flow (L/min): 2    CONSTITUTIONAL: NAD,   ENMT: Moist oral mucosa,   RESPIRATORY: Normal respiratory effort; lungs are clear to auscultation bilaterally  CARDIOVASCULAR: Regular rate and rhythm, normal S1 and S2, no murmur/rub/gallop; No lower extremity edema;   ABDOMEN: Nontender to palpation, normoactive bowel sounds, no rebound/guarding; No hepatosplenomegaly  EXT: no edema b/l  NEUROLOGY: alert, following commands, slow to respond, mild rigidity noted on exam, no dysarthria, moving all extremities  SKIN: No rashes; no palpable lesions      LABS:      Ca    10.0       22 Mar 2025 06:00        CAPILLARY BLOOD GLUCOSE      POCT Blood Glucose.: 328 mg/dL (22 Mar 2025 23:28)  POCT Blood Glucose.: 279 mg/dL (22 Mar 2025 22:18)  POCT Blood Glucose.: 225 mg/dL (22 Mar 2025 17:20)  POCT Blood Glucose.: 252 mg/dL (22 Mar 2025 12:08)  POCT Blood Glucose.: 185 mg/dL (22 Mar 2025 08:24)    BLOOD CULTURE    RADIOLOGY & ADDITIONAL TESTS:    Imaging Personally Reviewed:  [ ] YES     Consultant(s) Notes Reviewed:      Care Discussed with Consultants/Other Providers:

## 2025-03-23 NOTE — PROGRESS NOTE ADULT - PROBLEM SELECTOR PLAN 4
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

## 2025-03-23 NOTE — PROVIDER CONTACT NOTE (OTHER) - ASSESSMENT
Today's daily weight taken with manager was 73.3 kg. Yesterday, there was a mistake in method and an outlier weight of 59.3 kg was taken. Upon further inspection, patient's weight has been trending between 77 and 71 kg as of March 16th. Unknown why the weight in banner from the 16th states 80.2 kg

## 2025-03-23 NOTE — PROGRESS NOTE ADULT - PROBLEM SELECTOR PLAN 7
started lantus yesterday  increase to lantus 10 units qhs today  ISS  anticipate insulin needs will decrease as weaning off steroids. now on oral pred started lantus yesterday  increase to lantus 10 units qhs today  add premeal  ISS  anticipate insulin needs will decrease as weaning off steroids. now on oral pred

## 2025-03-23 NOTE — PROGRESS NOTE ADULT - PROBLEM SELECTOR PLAN 3
waxing and waning seems to be slowly improving  A&O x 2, person and place  some difficulty in responding,

## 2025-03-23 NOTE — PROGRESS NOTE ADULT - PROBLEM SELECTOR PLAN 5
> Amlodipine 10mg qD>> discontinued 2/2 c/f LE edema   > cont Lisinopril 40mg qD  > cont hydralazine 25mg q8hr  > cont home HCTZ, avoid beta-blockers given asthma

## 2025-03-24 LAB
GLUCOSE BLDC GLUCOMTR-MCNC: 125 MG/DL — HIGH (ref 70–99)
GLUCOSE BLDC GLUCOMTR-MCNC: 128 MG/DL — HIGH (ref 70–99)
GLUCOSE BLDC GLUCOMTR-MCNC: 220 MG/DL — HIGH (ref 70–99)
GLUCOSE BLDC GLUCOMTR-MCNC: 272 MG/DL — HIGH (ref 70–99)

## 2025-03-24 PROCEDURE — 99222 1ST HOSP IP/OBS MODERATE 55: CPT

## 2025-03-24 PROCEDURE — 99233 SBSQ HOSP IP/OBS HIGH 50: CPT

## 2025-03-24 PROCEDURE — 99232 SBSQ HOSP IP/OBS MODERATE 35: CPT

## 2025-03-24 PROCEDURE — 70551 MRI BRAIN STEM W/O DYE: CPT | Mod: 26

## 2025-03-24 PROCEDURE — 99233 SBSQ HOSP IP/OBS HIGH 50: CPT | Mod: GC

## 2025-03-24 RX ORDER — OLANZAPINE 10 MG/1
5 TABLET ORAL AT BEDTIME
Refills: 0 | Status: DISCONTINUED | OUTPATIENT
Start: 2025-03-24 | End: 2025-03-24

## 2025-03-24 RX ORDER — LORAZEPAM 4 MG/ML
0.5 VIAL (ML) INJECTION
Refills: 0 | Status: DISCONTINUED | OUTPATIENT
Start: 2025-03-24 | End: 2025-03-24

## 2025-03-24 RX ORDER — LORAZEPAM 4 MG/ML
0.5 VIAL (ML) INJECTION
Refills: 0 | Status: DISCONTINUED | OUTPATIENT
Start: 2025-03-24 | End: 2025-03-26

## 2025-03-24 RX ORDER — INSULIN GLARGINE-YFGN 100 [IU]/ML
15 INJECTION, SOLUTION SUBCUTANEOUS AT BEDTIME
Refills: 0 | Status: DISCONTINUED | OUTPATIENT
Start: 2025-03-24 | End: 2025-03-27

## 2025-03-24 RX ORDER — INSULIN LISPRO 100 U/ML
5 INJECTION, SOLUTION INTRAVENOUS; SUBCUTANEOUS
Refills: 0 | Status: DISCONTINUED | OUTPATIENT
Start: 2025-03-24 | End: 2025-03-27

## 2025-03-24 RX ORDER — INSULIN LISPRO 100 U/ML
3 INJECTION, SOLUTION INTRAVENOUS; SUBCUTANEOUS ONCE
Refills: 0 | Status: COMPLETED | OUTPATIENT
Start: 2025-03-24 | End: 2025-03-24

## 2025-03-24 RX ORDER — LORAZEPAM 4 MG/ML
0.5 VIAL (ML) INJECTION ONCE
Refills: 0 | Status: DISCONTINUED | OUTPATIENT
Start: 2025-03-24 | End: 2025-03-24

## 2025-03-24 RX ADMIN — LISINOPRIL 40 MILLIGRAM(S): 5 TABLET ORAL at 05:28

## 2025-03-24 RX ADMIN — BUDESONIDE 0.5 MILLIGRAM(S): 0.25 SUSPENSION RESPIRATORY (INHALATION) at 09:13

## 2025-03-24 RX ADMIN — LEVALBUTEROL HYDROCHLORIDE 0.63 MILLIGRAM(S): 1.25 SOLUTION RESPIRATORY (INHALATION) at 16:39

## 2025-03-24 RX ADMIN — DEXTROMETHORPHAN HBR, GUAIFENESIN 600 MILLIGRAM(S): 200 LIQUID ORAL at 05:26

## 2025-03-24 RX ADMIN — Medication 1 APPLICATION(S): at 05:26

## 2025-03-24 RX ADMIN — INSULIN LISPRO 3 UNIT(S): 100 INJECTION, SOLUTION INTRAVENOUS; SUBCUTANEOUS at 09:14

## 2025-03-24 RX ADMIN — INSULIN LISPRO 6: 100 INJECTION, SOLUTION INTRAVENOUS; SUBCUTANEOUS at 12:18

## 2025-03-24 RX ADMIN — Medication 2 TABLET(S): at 21:58

## 2025-03-24 RX ADMIN — ENOXAPARIN SODIUM 40 MILLIGRAM(S): 100 INJECTION SUBCUTANEOUS at 12:15

## 2025-03-24 RX ADMIN — INSULIN LISPRO 3 UNIT(S): 100 INJECTION, SOLUTION INTRAVENOUS; SUBCUTANEOUS at 12:18

## 2025-03-24 RX ADMIN — BUDESONIDE 0.5 MILLIGRAM(S): 0.25 SUSPENSION RESPIRATORY (INHALATION) at 21:50

## 2025-03-24 RX ADMIN — INSULIN LISPRO 4: 100 INJECTION, SOLUTION INTRAVENOUS; SUBCUTANEOUS at 09:13

## 2025-03-24 RX ADMIN — DEXTROMETHORPHAN HBR, GUAIFENESIN 600 MILLIGRAM(S): 200 LIQUID ORAL at 21:57

## 2025-03-24 RX ADMIN — POLYETHYLENE GLYCOL 3350 17 GRAM(S): 17 POWDER, FOR SOLUTION ORAL at 05:28

## 2025-03-24 RX ADMIN — LEVALBUTEROL HYDROCHLORIDE 0.63 MILLIGRAM(S): 1.25 SOLUTION RESPIRATORY (INHALATION) at 10:16

## 2025-03-24 RX ADMIN — Medication 25 MILLIGRAM(S): at 21:58

## 2025-03-24 RX ADMIN — Medication 40 MILLIGRAM(S): at 05:24

## 2025-03-24 RX ADMIN — Medication 0.5 MILLIGRAM(S): at 12:14

## 2025-03-24 RX ADMIN — Medication 25 MILLIGRAM(S): at 05:27

## 2025-03-24 RX ADMIN — SERTRALINE 50 MILLIGRAM(S): 100 TABLET, FILM COATED ORAL at 12:16

## 2025-03-24 RX ADMIN — INSULIN GLARGINE-YFGN 15 UNIT(S): 100 INJECTION, SOLUTION SUBCUTANEOUS at 22:54

## 2025-03-24 RX ADMIN — PREDNISONE 40 MILLIGRAM(S): 20 TABLET ORAL at 05:26

## 2025-03-24 RX ADMIN — POLYETHYLENE GLYCOL 3350 17 GRAM(S): 17 POWDER, FOR SOLUTION ORAL at 21:58

## 2025-03-24 NOTE — BH CONSULTATION LIAISON PROGRESS NOTE - NSBHASSESSMENTFT_PSY_ALL_CORE
Patient is a 74F hx asthma, depression, and HTN was admitted to the MICU with AHRF 2/2 asthma exacerbation resulting in acute on chronic HHRF causing AMS and increased WOB that required intubation. During her ICU course, she experienced seizure-like activity .  Additional issues included hyponatremia, acute on chronic metabolic alkalosis, and mild thrombocytopenia,  also having fevers.  Patient is from [facility] [place] [place] [place] [facility]; primary language :Twi pronounced as Chi) domiciled with , retired teacher used to work in [facility], she has 6 children. Patient has h/o hx of major depressive disorder with psychosis, hx of 3 past inpatient psychiatric hospitalizations last in 2016 at [facility], all rehospitalizations were in context non compliance with the treatment. She has no hx of suicide attempts, no hx of substance abuse. Patient w hx of paranoia, disorganization, catatonia, required MOO when in [facility].     3/24----more withdrawn, some stiffness in neck, not communicative. Some concerns for catatonia. Unsure if symptoms attributed to catatonia vs delirium    PLAN  - no SI or HI, no need for psychiatric CO  - EKG  -- antipsychotics can only be given if qtc < 500  -- Vitals    - MEDICATIONS:  -- Give Ativan 0.5mg IV at 7am tomorrow to target possible catatonic symptoms. Will assess tomorrow if to continue vs d/c based on response.   -- Continue Zoloft  50mg daily ( home dose 150mg) ( monitor NA- recent hyponatremia)  -- LOWER DOSE OF Zyprexa TO 5mg qhs for now. Will assess again tomorrow    - PRN:  --Ativan 0.5mg q 6hrs prn IM/IV/PO  - DISPO: pending

## 2025-03-24 NOTE — BH CONSULTATION LIAISON PROGRESS NOTE - NSBHFUPINTERVALHXFT_PSY_A_CORE
Met with the patient. Opens eyes slightly when name is called, but otherwise remained withdrawn, and minimally communicative. Did not follow commands. No rigidity on UE exam. Neck does remain positioned to one side.   Ativan 0.5mg IV was given, and patient was re-examined at 1:20pm----improvement in alertness, opens eyes, tries to communicate-follow commands.

## 2025-03-24 NOTE — PROGRESS NOTE ADULT - PROBLEM SELECTOR PLAN 6
Pt w/hx of paranoia and stupor from depression  increased Zoloft to 50mg daily ( home dose 150mg) ( monitor NA- recent hyponatremia), As per  team, team will follow and make any changes if needed,   > continue home Olanzapine, 10 mg qhs -> lowered to 5mg per psych     given mild rigidity, will trial small dose of IV ativan for response  - appreciate psych recs

## 2025-03-24 NOTE — CONSULT NOTE ADULT - SUBJECTIVE AND OBJECTIVE BOX
Patient is a 74y old  Female who presents with a chief complaint of AMS and increased WOB (21 Mar 2025 06:24)      HPI:  73 yo F with PMH asthma, depression presenting to [facility] with worsening dyspnea and wheezing with BLE edema since returning from 10.5 hour trip to [facility] 6 days ago. Altered in ED and unable to provide history, only responsive to noxious stimulli. History provided by  who states he found her on floor with unwitnessed fall. Hx of 3 asthma attacks last year, no hx of intubation, no clear asthma triggers but felt sick after flight home from [facility] 3/3/25 days where she visited for 3 months and was in usual state of health per .    ED Course: vitals notable for HR 60s, -115 with systolics in 180s, tachypneic to 24 with sPO2 95% on RA. Exam notable for wheezing. Placed on NC 5L then trialed on BiPAP but then required intubation to protect airway due to AMS. Labs notable for WBC 8.5, Hgb 12.6, , coags wnl, Na 126, K 4.3, Cl 85, bicarb 27, Cr 0.69, AST/ALT 59/47, proBNP 480, troponin 9, TSH 2.81, pH 7.29, pCO2 72, bicarb 35, neg RVP, CTA chest with no acute findings. CT head/cspine pending. Treated with 1L NS, Duonebs, solumedrol, and sedated with propofol/fentanyl. Admitted to MICU for AHRF.     73 yo F with PMH notable for asthma presenting with AHRF and hypercapnic respiratory acidosis with AMS requiring intubation for airway protection. Now admitted to MICU on ventilator for further evlauation and management iso asthma exacerbation.  (08 Mar 2025 17:07)    ct a/p negative for PE  RRT for persistant hypoxia 85-89% on 6L NC on 3/19  recommended bipap, trasnferred to micu    REVIEW OF SYSTEMS  Constitutional - No fever, No weight loss, No fatigue  HEENT - No eye pain, No visual disturbances, No difficulty hearing, No tinnitus, No vertigo, No neck pain  Respiratory - No cough, No wheezing, No shortness of breath  Cardiovascular - No chest pain, No palpitations  Gastrointestinal - No abdominal pain, No nausea, No vomiting, No diarrhea, No constipation  Genitourinary - No dysuria, No frequency, No hematuria, No incontinence  Neurological - No headaches, No memory loss, No loss of strength, No numbness, No tremors  Skin - No itching, No rashes, No lesions   Endocrine - No temperature intolerance  Musculoskeletal - No joint pain, No joint swelling, No muscle pain  Psychiatric - No depression, No anxiety    PAST MEDICAL & SURGICAL HISTORY  MDD (major depressive disorder), recurrent, severe, with psychosis    Asthma, mild intermittent, uncomplicated    Essential hypertension    No significant past surgical history        SOCIAL HISTORY  Smoking - Denied  EtOH - Denied   Drugs - Denied    FUNCTIONAL HISTORY  Lives with  in private home with stairs  Independent    CURRENT FUNCTIONAL STATUS  3/22  Bed Mobility  Bed Mobility Training Rehab Potential: good, to achieve stated therapy goals  Bed Mobility Training Symptoms Noted During/After Treatment: none  Bed Mobility Training Sit-to-Supine: maximum assist (25% patient effort);  1 person assist;  nonverbal cues (demo/gestures);  verbal cues  Bed Mobility Training Supine-to-Sit: maximum assist (25% patient effort);  1 person assist;  verbal cues;  nonverbal cues (demo/gestures)  Bed Mobility Training Limitations: decreased strength;  impaired balance;  impaired postural control;  RETROPULSIVE/POOR SITTING BALANCE    Therapeutic Exercise  Therapeutic Exercise Rehab Effort: good  Therapeutic Exercise Symptoms Noted During/After Treatment: none  Therapeutic Exercise Detail: Patient performed 1x10 active assisted heel slides in semi supine, active long arc quads while sitting at edge of bed.           FAMILY HISTORY   No pertinent family history in first degree relatives        RECENT LABS/IMAGING  < from: CT Angio Chest PE Protocol w/ IV Cont (03.08.25 @ 15:29) >  ACC: [id number] [facility]   ORDERED BY: DEANNA CAMPBELL     PROCEDURE DATE:  03/08/2025          INTERPRETATION:  Clinical information: Shortness of breath. Evaluate for   pulmonary embolus.    CT angiogram of the chest was obtained following administration of   intravenous contrast. Approximately 50 cc of Omnipaque 350 was   administered in 50 cc was discarded. Coronal, sagittal and MIP images   were submitted for review.    No hilar or mediastinal adenopathy is noted.    Heart is enlarged in size. No pericardial effusion is noted. Pulmonary   arteries are normal in caliber. No filling defects are noted.    No endobronchial lesions are noted. Minimal atelectasis is noted   involving portions of both lower lobes. The remaining lungs are clear. No   pleural effusions are noted.    Below the diaphragm, visualized portions of the abdomen are unremarkable.    Degenerative changes of the spine are noted.    IMPRESSION: No pulmonary embolus is noted.    --- End of Report ---            WAN ROB MD; Attending Radiologist  This document has been electronically signed. Mar  8 2025  3:41PM    < end of copied text >    < from: Xray Chest 1 View- PORTABLE-Urgent (Xray Chest 1 View- PORTABLE-Urgent .) (03.18.25 @ 18:12) >    ACC: [id number] EXAM:  XR CHEST PORTABLE URGENT 1V   ORDERED BY: STEPHANIE ROB     PROCEDURE DATE:  03/18/2025          INTERPRETATION:  EXAMINATION: XR CHEST URGENT    CLINICAL INDICATION: Chest pain    TECHNIQUE: Single frontal, portable view of the chest was obtained.    COMPARISON: Chest x-ray 3/8/2025    FINDINGS:    The heart is not accurately assessed in this AP projection.  No focal consolidations  There is no pneumothorax or pleural effusion.  No acute bony abnormality.    IMPRESSION:  Clear lungs.    --- End of Report ---          LUKE MONIQUE DO; Resident Radiologist  This document has been electronically signed.  YADIRA GALLEGOS MD; Attending Radiologist  This document has been electronically signed. Mar 19 2025 10:25AM    < end of copied text >    < from: CT Head No Cont (03.08.25 @ 15:28) >    ACC: [id number] EXAM:  CT CERVICAL SPINE   ORDERED BY: DEANNA CAMPBELL     ACC: [id number] EXAM:  CT BRAIN   ORDERED BY: DEANNA CAMPBELL     PROCEDURE DATE:  03/08/2025          INTERPRETATION:  CLINICAL INFORMATION: AMS    COMPARISON: None.      TECHNIQUE:    CT BRAIN: Serial axial images were obtained from the skull base to the   vertex using multi-slice helical technique. Sagittal and coronal   reformats were obtained.    CT CERVICAL SPINE: Axial images were obtained of the cervical spine using   multislice helical technique. Reformatted coronal and sagittal images   were obtained.    FINDINGS:    CT BRAIN:    VENTRICLES AND SULCI: Normal in size and configuration.  INTRA-AXIAL: No mass effect, acute hemorrhage, or midline shift.  There   are periventricular and subcortical white matter hypodensities,   consistent with microvascular type changes.  Bilateral basal ganglia lacunar infarcts.  EXTRA-AXIAL: No mass or fluid collection. Basal cisterns are normal in   appearance.    VISUALIZED SINUSES:  Clear.  TYMPANOMASTOID CAVITIES:  Clear.  VISUALIZED ORBITS: Bilateral lens replacement.  CALVARIUM: Intact.    MISCELLANEOUS: None.      CT CERVICAL SPINE:    VERTEBRAE:  Normal in height. No acute fracture. No significant   osteophytosis.  ALIGNMENT: No subluxation or scoliosis.  INTERVERTEBRAL DISC SPACES:    C2-C3: No significant spinal canal stenosis or neural foraminal narrowing.    C3-C4: No significant spinal canal stenosis. Mild to moderate left neural   foraminal narrowing.    C4-C5: No significant spinal canal stenosis. Mild left neural foraminal   narrowing.    C5-C6: No significant spinal canal stenosis or neural foraminal narrowing.    C6-C7: No significant spinal canal stenosis. Mild left neural foraminal   narrowing.    C7-T1: No significant spinal canal stenosis or neural foraminal narrowing.    VISUALIZED LUNGS: Clear.    MISCELLANEOUS:  None.      IMPRESSION:    CT HEAD:  No acute intracranial hemorrhage, mass effect, or midline shift. If   altered mental status persists, consider further evaluation via MR   imaging to include DWI and ADC mapping techniques, provided there are no   contraindications.    CT CERVICAL SPINE:  No acute fracture or traumatic subluxation.    Multi-level degenerative changes, as described level by level in detail   above.  If there is clinical concern for myelopathy or radiculopathy,   consider further evaluation via MR imaging of the cervical spine,   provided the patient has no contraindications.        --- End of Report ---            DAWN BEHR VENTURA MD; Attending Radiologist  This document has been electronically signed. Mar  8 2025  5:18PM    < end of copied text >        VITALS  T(C): 36.8 (03-24-25 @ 05:15), Max: 37 (03-23-25 @ 18:00)  HR: 85 (03-24-25 @ 05:15) (85 - 112)  BP: 123/73 (03-24-25 @ 05:20) (92/58 - 144/88)  RR: 16 (03-24-25 @ 05:15) (16 - 20)  SpO2: 97% (03-24-25 @ 05:15) (95% - 100%)  Wt(kg): --    ALLERGIES  No Known Allergies      MEDICATIONS   buDESOnide    Inhalation Suspension 0.5 milliGRAM(s) Inhalation every 12 hours  chlorhexidine 2% Cloths 1 Application(s) Topical <User Schedule>  dextrose 5%. 1000 milliLiter(s) IV Continuous <Continuous>  dextrose 5%. 1000 milliLiter(s) IV Continuous <Continuous>  dextrose 50% Injectable 25 Gram(s) IV Push once  dextrose 50% Injectable 12.5 Gram(s) IV Push once  dextrose 50% Injectable 25 Gram(s) IV Push once  dextrose Oral Gel 15 Gram(s) Oral once  enoxaparin Injectable 40 milliGRAM(s) SubCutaneous every 24 hours  glucagon  Injectable 1 milliGRAM(s) IntraMuscular once  guaiFENesin  milliGRAM(s) Oral every 12 hours  hydrALAZINE 25 milliGRAM(s) Oral three times a day  hydrochlorothiazide 25 milliGRAM(s) Oral daily  insulin glargine Injectable (LANTUS) 10 Unit(s) SubCutaneous at bedtime  insulin lispro (ADMELOG) corrective regimen sliding scale   SubCutaneous three times a day before meals  insulin lispro (ADMELOG) corrective regimen sliding scale   SubCutaneous at bedtime  insulin lispro Injectable (ADMELOG) 3 Unit(s) SubCutaneous three times a day before meals  levalbuterol Inhalation 0.63 milliGRAM(s) Inhalation every 6 hours  lisinopril 40 milliGRAM(s) Oral daily  OLANZapine 10 milliGRAM(s) Oral at bedtime  pantoprazole    Tablet 40 milliGRAM(s) Oral before breakfast  polyethylene glycol 3350 17 Gram(s) Oral two times a day  predniSONE   Tablet   Oral   predniSONE   Tablet 40 milliGRAM(s) Oral daily  senna 2 Tablet(s) Oral at bedtime  sertraline 50 milliGRAM(s) Oral daily      ----------------------------------------------------------------------------------------  PHYSICAL EXAM  Constitutional - NAD, Comfortable  HEENT - NCAT, EOMI  Neck - Supple, No limited ROM  Chest - no respiratory distress   Cardiovascular - RRR, S1S2   Abdomen -  Soft, NTND  Extremities - No C/C/E, No calf tenderness   Neurologic Exam -                    Cognitive - Awake, Alert, AAO to self, place, date, year, situation     Communication - Fluent, No dysarthria     Cranial Nerves - CN 2-12 intact     Motor - No focal deficits                    LEFT    UE - ShAB 5/5, EF 5/5, EE 5/5, WE 5/5,  5/5                    RIGHT UE - ShAB 5/5, EF 5/5, EE 5/5, WE 5/5,  5/5                    LEFT    LE - HF 5/5, KE 5/5, DF 5/5, PF 5/5                    RIGHT LE - HF 5/5, KE 5/5, DF 5/5, PF 5/5        Sensory - Intact to LT     Reflexes - DTR Intact, No primitive reflexive     Coordination - FTN intact     OculoVestibular - No saccades, No nystagmus, VOR         Balance - WNL Static  Psychiatric - Mood stable, Affect WNL  ----------------------------------------------------------------------------------------  ASSESSMENT/PLAN  PT for bed mobility, transfers, ambulation as tolerated  out of bed to chair as tolerated  OT for ADLs  Pain -  DVT PPX -   Rehab -  incomplete note, consult in progress    Total time spent to review relevant records and imaging results, examine patient, complete documentation, and when applicable discuss the case with the patient, family, , social workers, and medical team:    minutes Patient is a 74y old  Female who presents with a chief complaint of AMS and increased WOB (21 Mar 2025 06:24)      HPI:  75 yo F with PMH asthma, depression presenting to [facility] with worsening dyspnea and wheezing with BLE edema since returning from 10.5 hour trip to [facility] 6 days ago. Altered in ED and unable to provide history, only responsive to noxious stimulli. History provided by  who states he found her on floor with unwitnessed fall. Hx of 3 asthma attacks last year, no hx of intubation, no clear asthma triggers but felt sick after flight home from [facility] 3/3/25 days where she visited for 3 months and was in usual state of health per .    ED Course: vitals notable for HR 60s, -115 with systolics in 180s, tachypneic to 24 with sPO2 95% on RA. Exam notable for wheezing. Placed on NC 5L then trialed on BiPAP but then required intubation to protect airway due to AMS. Labs notable for WBC 8.5, Hgb 12.6, , coags wnl, Na 126, K 4.3, Cl 85, bicarb 27, Cr 0.69, AST/ALT 59/47, proBNP 480, troponin 9, TSH 2.81, pH 7.29, pCO2 72, bicarb 35, neg RVP, CTA chest with no acute findings. CT head/cspine pending. Treated with 1L NS, Duonebs, solumedrol, and sedated with propofol/fentanyl. Admitted to MICU for AHRF.     75 yo F with PMH notable for asthma presenting with AHRF and hypercapnic respiratory acidosis with AMS requiring intubation for airway protection. Now admitted to MICU on ventilator for further evlauation and management iso asthma exacerbation.  (08 Mar 2025 17:07)    ct a/p negative for PE  RRT for persistent hypoxia 85-89% on 6L NC on 3/19  recommended bipap, transferred to micu, now on 5th floor on nasal cannula    REVIEW OF SYSTEMS  lethargic, unable to participate in ROS    PAST MEDICAL & SURGICAL HISTORY  MDD (major depressive disorder), recurrent, severe, with psychosis    Asthma, mild intermittent, uncomplicated    Essential hypertension    No significant past surgical history         FUNCTIONAL HISTORY  Lives with  in private home with stairs  Independent at baseline per chart    CURRENT FUNCTIONAL STATUS  3/22  Bed Mobility  Bed Mobility Training Rehab Potential: good, to achieve stated therapy goals  Bed Mobility Training Symptoms Noted During/After Treatment: none  Bed Mobility Training Sit-to-Supine: maximum assist (25% patient effort);  1 person assist;  nonverbal cues (demo/gestures);  verbal cues  Bed Mobility Training Supine-to-Sit: maximum assist (25% patient effort);  1 person assist;  verbal cues;  nonverbal cues (demo/gestures)  Bed Mobility Training Limitations: decreased strength;  impaired balance;  impaired postural control;  RETROPULSIVE/POOR SITTING BALANCE    Therapeutic Exercise  Therapeutic Exercise Rehab Effort: good  Therapeutic Exercise Symptoms Noted During/After Treatment: none  Therapeutic Exercise Detail: Patient performed 1x10 active assisted heel slides in semi supine, active long arc quads while sitting at edge of bed.           FAMILY HISTORY   No pertinent family history in first degree relatives        RECENT LABS/IMAGING  < from: CT Angio Chest PE Protocol w/ IV Cont (03.08.25 @ 15:29) >  ACC: [id number] EXAM:  CT ANGIO CHEST PULM ART WAWIC   ORDERED BY: DEANNA CAMPBELL     PROCEDURE DATE:  03/08/2025          INTERPRETATION:  Clinical information: Shortness of breath. Evaluate for   pulmonary embolus.    CT angiogram of the chest was obtained following administration of   intravenous contrast. Approximately 50 cc of Omnipaque 350 was   administered in 50 cc was discarded. Coronal, sagittal and MIP images   were submitted for review.    No hilar or mediastinal adenopathy is noted.    Heart is enlarged in size. No pericardial effusion is noted. Pulmonary   arteries are normal in caliber. No filling defects are noted.    No endobronchial lesions are noted. Minimal atelectasis is noted   involving portions of both lower lobes. The remaining lungs are clear. No   pleural effusions are noted.    Below the diaphragm, visualized portions of the abdomen are unremarkable.    Degenerative changes of the spine are noted.    IMPRESSION: No pulmonary embolus is noted.    --- End of Report ---            WAN ROB MD; Attending Radiologist  This document has been electronically signed. Mar  8 2025  3:41PM    < end of copied text >    < from: Xray Chest 1 View- PORTABLE-Urgent (Xray Chest 1 View- PORTABLE-Urgent .) (03.18.25 @ 18:12) >    ACC: [id number] EXAM:  XR CHEST PORTABLE URGENT 1V   ORDERED BY: STEPHANIE ROB     PROCEDURE DATE:  03/18/2025          INTERPRETATION:  EXAMINATION: XR CHEST URGENT    CLINICAL INDICATION: Chest pain    TECHNIQUE: Single frontal, portable view of the chest was obtained.    COMPARISON: Chest x-ray 3/8/2025    FINDINGS:    The heart is not accurately assessed in this AP projection.  No focal consolidations  There is no pneumothorax or pleural effusion.  No acute bony abnormality.    IMPRESSION:  Clear lungs.    --- End of Report ---          LUKE MONIQUE DO; Resident Radiologist  This document has been electronically signed.  YADIRA GALLEGOS MD; Attending Radiologist  This document has been electronically signed. Mar 19 2025 10:25AM    < end of copied text >    < from: CT Head No Cont (03.08.25 @ 15:28) >    ACC: [id number] EXAM:  CT CERVICAL SPINE   ORDERED BY: DEANNA CAMPBELL     ACC: [id number] EXAM:  CT BRAIN   ORDERED BY: DEANNA CAMPBELL     PROCEDURE DATE:  03/08/2025          INTERPRETATION:  CLINICAL INFORMATION: AMS    COMPARISON: None.      TECHNIQUE:    CT BRAIN: Serial axial images were obtained from the skull base to the   vertex using multi-slice helical technique. Sagittal and coronal   reformats were obtained.    CT CERVICAL SPINE: Axial images were obtained of the cervical spine using   multislice helical technique. Reformatted coronal and sagittal images   were obtained.    FINDINGS:    CT BRAIN:    VENTRICLES AND SULCI: Normal in size and configuration.  INTRA-AXIAL: No mass effect, acute hemorrhage, or midline shift.  There   are periventricular and subcortical white matter hypodensities,   consistent with microvascular type changes.  Bilateral basal ganglia lacunar infarcts.  EXTRA-AXIAL: No mass or fluid collection. Basal cisterns are normal in   appearance.    VISUALIZED SINUSES:  Clear.  TYMPANOMASTOID CAVITIES:  Clear.  VISUALIZED ORBITS: Bilateral lens replacement.  CALVARIUM: Intact.    MISCELLANEOUS: None.      CT CERVICAL SPINE:    VERTEBRAE:  Normal in height. No acute fracture. No significant   osteophytosis.  ALIGNMENT: No subluxation or scoliosis.  INTERVERTEBRAL DISC SPACES:    C2-C3: No significant spinal canal stenosis or neural foraminal narrowing.    C3-C4: No significant spinal canal stenosis. Mild to moderate left neural   foraminal narrowing.    C4-C5: No significant spinal canal stenosis. Mild left neural foraminal   narrowing.    C5-C6: No significant spinal canal stenosis or neural foraminal narrowing.    C6-C7: No significant spinal canal stenosis. Mild left neural foraminal   narrowing.    C7-T1: No significant spinal canal stenosis or neural foraminal narrowing.    VISUALIZED LUNGS: Clear.    MISCELLANEOUS:  None.      IMPRESSION:    CT HEAD:  No acute intracranial hemorrhage, mass effect, or midline shift. If   altered mental status persists, consider further evaluation via MR   imaging to include DWI and ADC mapping techniques, provided there are no   contraindications.    CT CERVICAL SPINE:  No acute fracture or traumatic subluxation.    Multi-level degenerative changes, as described level by level in detail   above.  If there is clinical concern for myelopathy or radiculopathy,   consider further evaluation via MR imaging of the cervical spine,   provided the patient has no contraindications.        --- End of Report ---            DAWN BEHR VENTURA MD; Attending Radiologist  This document has been electronically signed. Mar  8 2025  5:18PM    < end of copied text >        VITALS  T(C): 36.8 (03-24-25 @ 05:15), Max: 37 (03-23-25 @ 18:00)  HR: 85 (03-24-25 @ 05:15) (85 - 112)  BP: 123/73 (03-24-25 @ 05:20) (92/58 - 144/88)  RR: 16 (03-24-25 @ 05:15) (16 - 20)  SpO2: 97% (03-24-25 @ 05:15) (95% - 100%)  Wt(kg): --    ALLERGIES  No Known Allergies      MEDICATIONS   buDESOnide    Inhalation Suspension 0.5 milliGRAM(s) Inhalation every 12 hours  chlorhexidine 2% Cloths 1 Application(s) Topical <User Schedule>  dextrose 5%. 1000 milliLiter(s) IV Continuous <Continuous>  dextrose 5%. 1000 milliLiter(s) IV Continuous <Continuous>  dextrose 50% Injectable 25 Gram(s) IV Push once  dextrose 50% Injectable 12.5 Gram(s) IV Push once  dextrose 50% Injectable 25 Gram(s) IV Push once  dextrose Oral Gel 15 Gram(s) Oral once  enoxaparin Injectable 40 milliGRAM(s) SubCutaneous every 24 hours  glucagon  Injectable 1 milliGRAM(s) IntraMuscular once  guaiFENesin  milliGRAM(s) Oral every 12 hours  hydrALAZINE 25 milliGRAM(s) Oral three times a day  hydrochlorothiazide 25 milliGRAM(s) Oral daily  insulin glargine Injectable (LANTUS) 10 Unit(s) SubCutaneous at bedtime  insulin lispro (ADMELOG) corrective regimen sliding scale   SubCutaneous three times a day before meals  insulin lispro (ADMELOG) corrective regimen sliding scale   SubCutaneous at bedtime  insulin lispro Injectable (ADMELOG) 3 Unit(s) SubCutaneous three times a day before meals  levalbuterol Inhalation 0.63 milliGRAM(s) Inhalation every 6 hours  lisinopril 40 milliGRAM(s) Oral daily  OLANZapine 10 milliGRAM(s) Oral at bedtime  pantoprazole    Tablet 40 milliGRAM(s) Oral before breakfast  polyethylene glycol 3350 17 Gram(s) Oral two times a day  predniSONE   Tablet   Oral   predniSONE   Tablet 40 milliGRAM(s) Oral daily  senna 2 Tablet(s) Oral at bedtime  sertraline 50 milliGRAM(s) Oral daily      ----------------------------------------------------------------------------------------  PHYSICAL EXAM  Constitutional - NAD, lethargic  HEENT - NCAT.  on nasal cannula   Chest - no respiratory distress   Cardiovascular - RRR, S1S2   Abdomen -  Soft, NTND  Extremities - No C/C/E, No calf tenderness   Neurologic Exam -                    Cognitive - lethargic      Motor - opens eyes briefly to tactile stimuli, not following motor commands      Balance - WNL Static  Psychiatric - calm  ----------------------------------------------------------------------------------------  ASSESSMENT/PLAN 74 year old f admitted for respiratory failure in setting of asthma exacerbation.   PT for bed mobility, transfers, ambulation as tolerated  out of bed to chair as tolerated  OT for ADLs   DVT PPX - lovenox  Rehab -  limited command following, tolerated bedside therapy 3/22 however max assist for bed mobility.  recommend subacute rehab when medically cleared.    Total time spent to review relevant records and imaging results, examine patient, complete documentation, and when applicable discuss the case with the patient, family, , social workers, and medical team:    minutes Patient is a 74y old  Female who presents with a chief complaint of AMS and increased WOB (21 Mar 2025 06:24)      HPI:  75 yo F with PMH asthma, depression presenting to [facility] with worsening dyspnea and wheezing with BLE edema since returning from 10.5 hour trip to [facility] 6 days ago. Altered in ED and unable to provide history, only responsive to noxious stimulli. History provided by  who states he found her on floor with unwitnessed fall. Hx of 3 asthma attacks last year, no hx of intubation, no clear asthma triggers but felt sick after flight home from [facility] 3/3/25 days where she visited for 3 months and was in usual state of health per .    ED Course: vitals notable for HR 60s, -115 with systolics in 180s, tachypneic to 24 with sPO2 95% on RA. Exam notable for wheezing. Placed on NC 5L then trialed on BiPAP but then required intubation to protect airway due to AMS. Labs notable for WBC 8.5, Hgb 12.6, , coags wnl, Na 126, K 4.3, Cl 85, bicarb 27, Cr 0.69, AST/ALT 59/47, proBNP 480, troponin 9, TSH 2.81, pH 7.29, pCO2 72, bicarb 35, neg RVP, CTA chest with no acute findings. CT head/cspine pending. Treated with 1L NS, Duonebs, solumedrol, and sedated with propofol/fentanyl. Admitted to MICU for AHRF.     75 yo F with PMH notable for asthma presenting with AHRF and hypercapnic respiratory acidosis with AMS requiring intubation for airway protection. Now admitted to MICU on ventilator for further evlauation and management iso asthma exacerbation.  (08 Mar 2025 17:07)    ct a/p negative for PE  RRT for persistent hypoxia 85-89% on 6L NC on 3/19  recommended bipap, transferred to micu, now on 5th floor on nasal cannula    REVIEW OF SYSTEMS  lethargic, unable to participate in ROS    PAST MEDICAL & SURGICAL HISTORY  MDD (major depressive disorder), recurrent, severe, with psychosis    Asthma, mild intermittent, uncomplicated    Essential hypertension    No significant past surgical history         FUNCTIONAL HISTORY  Lives with  in private home with stairs  Independent at baseline per chart    CURRENT FUNCTIONAL STATUS  3/22  Bed Mobility  Bed Mobility Training Rehab Potential: good, to achieve stated therapy goals  Bed Mobility Training Symptoms Noted During/After Treatment: none  Bed Mobility Training Sit-to-Supine: maximum assist (25% patient effort);  1 person assist;  nonverbal cues (demo/gestures);  verbal cues  Bed Mobility Training Supine-to-Sit: maximum assist (25% patient effort);  1 person assist;  verbal cues;  nonverbal cues (demo/gestures)  Bed Mobility Training Limitations: decreased strength;  impaired balance;  impaired postural control;  RETROPULSIVE/POOR SITTING BALANCE    Therapeutic Exercise  Therapeutic Exercise Rehab Effort: good  Therapeutic Exercise Symptoms Noted During/After Treatment: none  Therapeutic Exercise Detail: Patient performed 1x10 active assisted heel slides in semi supine, active long arc quads while sitting at edge of bed.           FAMILY HISTORY   No pertinent family history in first degree relatives        RECENT LABS/IMAGING  < from: CT Angio Chest PE Protocol w/ IV Cont (03.08.25 @ 15:29) >  ACC: [id number] EXAM:  CT ANGIO CHEST PULM ART WAWIC   ORDERED BY: DEANNA CAMPBELL     PROCEDURE DATE:  03/08/2025          INTERPRETATION:  Clinical information: Shortness of breath. Evaluate for   pulmonary embolus.    CT angiogram of the chest was obtained following administration of   intravenous contrast. Approximately 50 cc of Omnipaque 350 was   administered in 50 cc was discarded. Coronal, sagittal and MIP images   were submitted for review.    No hilar or mediastinal adenopathy is noted.    Heart is enlarged in size. No pericardial effusion is noted. Pulmonary   arteries are normal in caliber. No filling defects are noted.    No endobronchial lesions are noted. Minimal atelectasis is noted   involving portions of both lower lobes. The remaining lungs are clear. No   pleural effusions are noted.    Below the diaphragm, visualized portions of the abdomen are unremarkable.    Degenerative changes of the spine are noted.    IMPRESSION: No pulmonary embolus is noted.    --- End of Report ---            WAN ROB MD; Attending Radiologist  This document has been electronically signed. Mar  8 2025  3:41PM    < end of copied text >    < from: Xray Chest 1 View- PORTABLE-Urgent (Xray Chest 1 View- PORTABLE-Urgent .) (03.18.25 @ 18:12) >    ACC: [id number] EXAM:  XR CHEST PORTABLE URGENT 1V   ORDERED BY: STEPHANIE ROB     PROCEDURE DATE:  03/18/2025          INTERPRETATION:  EXAMINATION: XR CHEST URGENT    CLINICAL INDICATION: Chest pain    TECHNIQUE: Single frontal, portable view of the chest was obtained.    COMPARISON: Chest x-ray 3/8/2025    FINDINGS:    The heart is not accurately assessed in this AP projection.  No focal consolidations  There is no pneumothorax or pleural effusion.  No acute bony abnormality.    IMPRESSION:  Clear lungs.    --- End of Report ---          LUKE MONIQUE DO; Resident Radiologist  This document has been electronically signed.  YADIRA GALLEGOS MD; Attending Radiologist  This document has been electronically signed. Mar 19 2025 10:25AM    < end of copied text >    < from: CT Head No Cont (03.08.25 @ 15:28) >    ACC: [id number] EXAM:  CT CERVICAL SPINE   ORDERED BY: DEANNA CAMPBELL     ACC: [id number] EXAM:  CT BRAIN   ORDERED BY: DEANNA CAMPBELL     PROCEDURE DATE:  03/08/2025          INTERPRETATION:  CLINICAL INFORMATION: AMS    COMPARISON: None.      TECHNIQUE:    CT BRAIN: Serial axial images were obtained from the skull base to the   vertex using multi-slice helical technique. Sagittal and coronal   reformats were obtained.    CT CERVICAL SPINE: Axial images were obtained of the cervical spine using   multislice helical technique. Reformatted coronal and sagittal images   were obtained.    FINDINGS:    CT BRAIN:    VENTRICLES AND SULCI: Normal in size and configuration.  INTRA-AXIAL: No mass effect, acute hemorrhage, or midline shift.  There   are periventricular and subcortical white matter hypodensities,   consistent with microvascular type changes.  Bilateral basal ganglia lacunar infarcts.  EXTRA-AXIAL: No mass or fluid collection. Basal cisterns are normal in   appearance.    VISUALIZED SINUSES:  Clear.  TYMPANOMASTOID CAVITIES:  Clear.  VISUALIZED ORBITS: Bilateral lens replacement.  CALVARIUM: Intact.    MISCELLANEOUS: None.      CT CERVICAL SPINE:    VERTEBRAE:  Normal in height. No acute fracture. No significant   osteophytosis.  ALIGNMENT: No subluxation or scoliosis.  INTERVERTEBRAL DISC SPACES:    C2-C3: No significant spinal canal stenosis or neural foraminal narrowing.    C3-C4: No significant spinal canal stenosis. Mild to moderate left neural   foraminal narrowing.    C4-C5: No significant spinal canal stenosis. Mild left neural foraminal   narrowing.    C5-C6: No significant spinal canal stenosis or neural foraminal narrowing.    C6-C7: No significant spinal canal stenosis. Mild left neural foraminal   narrowing.    C7-T1: No significant spinal canal stenosis or neural foraminal narrowing.    VISUALIZED LUNGS: Clear.    MISCELLANEOUS:  None.      IMPRESSION:    CT HEAD:  No acute intracranial hemorrhage, mass effect, or midline shift. If   altered mental status persists, consider further evaluation via MR   imaging to include DWI and ADC mapping techniques, provided there are no   contraindications.    CT CERVICAL SPINE:  No acute fracture or traumatic subluxation.    Multi-level degenerative changes, as described level by level in detail   above.  If there is clinical concern for myelopathy or radiculopathy,   consider further evaluation via MR imaging of the cervical spine,   provided the patient has no contraindications.        --- End of Report ---            DAWN BEHR VENTURA MD; Attending Radiologist  This document has been electronically signed. Mar  8 2025  5:18PM    < end of copied text >        VITALS  T(C): 36.8 (03-24-25 @ 05:15), Max: 37 (03-23-25 @ 18:00)  HR: 85 (03-24-25 @ 05:15) (85 - 112)  BP: 123/73 (03-24-25 @ 05:20) (92/58 - 144/88)  RR: 16 (03-24-25 @ 05:15) (16 - 20)  SpO2: 97% (03-24-25 @ 05:15) (95% - 100%)  Wt(kg): --    ALLERGIES  No Known Allergies      MEDICATIONS   buDESOnide    Inhalation Suspension 0.5 milliGRAM(s) Inhalation every 12 hours  chlorhexidine 2% Cloths 1 Application(s) Topical <User Schedule>  dextrose 5%. 1000 milliLiter(s) IV Continuous <Continuous>  dextrose 5%. 1000 milliLiter(s) IV Continuous <Continuous>  dextrose 50% Injectable 25 Gram(s) IV Push once  dextrose 50% Injectable 12.5 Gram(s) IV Push once  dextrose 50% Injectable 25 Gram(s) IV Push once  dextrose Oral Gel 15 Gram(s) Oral once  enoxaparin Injectable 40 milliGRAM(s) SubCutaneous every 24 hours  glucagon  Injectable 1 milliGRAM(s) IntraMuscular once  guaiFENesin  milliGRAM(s) Oral every 12 hours  hydrALAZINE 25 milliGRAM(s) Oral three times a day  hydrochlorothiazide 25 milliGRAM(s) Oral daily  insulin glargine Injectable (LANTUS) 10 Unit(s) SubCutaneous at bedtime  insulin lispro (ADMELOG) corrective regimen sliding scale   SubCutaneous three times a day before meals  insulin lispro (ADMELOG) corrective regimen sliding scale   SubCutaneous at bedtime  insulin lispro Injectable (ADMELOG) 3 Unit(s) SubCutaneous three times a day before meals  levalbuterol Inhalation 0.63 milliGRAM(s) Inhalation every 6 hours  lisinopril 40 milliGRAM(s) Oral daily  OLANZapine 10 milliGRAM(s) Oral at bedtime  pantoprazole    Tablet 40 milliGRAM(s) Oral before breakfast  polyethylene glycol 3350 17 Gram(s) Oral two times a day  predniSONE   Tablet   Oral   predniSONE   Tablet 40 milliGRAM(s) Oral daily  senna 2 Tablet(s) Oral at bedtime  sertraline 50 milliGRAM(s) Oral daily      ----------------------------------------------------------------------------------------  PHYSICAL EXAM  Constitutional - NAD, lethargic  HEENT - NCAT.  on nasal cannula   Chest - no respiratory distress   Cardiovascular - RRR, S1S2   Abdomen -  Soft, NTND  Extremities - No C/C/E, No calf tenderness   Neurologic Exam -                    Cognitive - lethargic      Motor - opens eyes briefly to tactile stimuli, not following motor commands      Balance - WNL Static  Psychiatric - calm  ----------------------------------------------------------------------------------------  ASSESSMENT/PLAN 74 year old f admitted for respiratory failure in setting of asthma exacerbation.   PT for bed mobility, transfers, ambulation as tolerated  out of bed to chair as tolerated  OT for ADLs   DVT PPX - lovenox  Rehab -  limited command following, tolerated bedside therapy 3/22 however max assist for bed mobility.  recommend subacute rehab when medically cleared.    Total time spent to review relevant records and imaging results, examine patient, complete documentation, and when applicable discuss the case with the patient, family, , social workers, and medical team:  55  minutes

## 2025-03-24 NOTE — BH CONSULTATION LIAISON PROGRESS NOTE - NSBHFUPINTERVALCCFT_PSY_A_CORE
Medicine team was concerned about catatonia, and gave ativan 0.5mg IV yesterday with improvement in alertness, and engagement.   On 2l oxygen  Case discussed with medicine NP----- below discussed

## 2025-03-24 NOTE — PROGRESS NOTE ADULT - PROBLEM SELECTOR PLAN 7
increase to lantus 15 units qhs and 5 units premeal  ISS  anticipate insulin needs will decrease as weaning off steroids. now on oral pred  [ ] monitor FS and adjust as needed

## 2025-03-24 NOTE — PROVIDER CONTACT NOTE (OTHER) - ASSESSMENT
Pt is A&Ox1 disoriented to time, place and situation. Pt is lethargic provider was made aware. Pt is asymptomatic at this time no s/s of distress noted. Blood glucose monitoring done as ordered. Pt on , SPO2 97% on 2 l nc.

## 2025-03-24 NOTE — PROGRESS NOTE ADULT - PROBLEM SELECTOR PLAN 1
2/2 to status asthmaticus  Follow-up aspergillus Ab, ANCA: negative , IgE   s/p intubation and bipap now on NC  c/w duonebs q6h  s/p course of antibiotics  c/w budesonide BID  currently on solumedrol with plan to start pred taper on 3/23

## 2025-03-24 NOTE — PROGRESS NOTE ADULT - PROBLEM SELECTOR PLAN 8
- CTH: no acute changes  - CT spine: no fractures  - 3/8 ON: 6 minutes of seizure-like activity with myoclonic upper jerks -> broke with ativan  -- EEG 3/10 showing no epileptiform activity; severe degree of diffuse or multifocal cerebral dysfunction c/w comatose  [ ] MRI brain pending

## 2025-03-24 NOTE — PROGRESS NOTE ADULT - SUBJECTIVE AND OBJECTIVE BOX
[facility]  Division of Hospital Medicine  Dede Marquez MD  Pager: [phone]      Patient is a 74y old  Female who presents with a chief complaint of AMS and increased WOB (21 Mar 2025 06:24)      SUBJECTIVE / OVERNIGHT EVENTS: Patient examined at bedside. Noted to be lethargic. Noted to be withdrawn on exam   ADDITIONAL REVIEW OF SYSTEMS:    MEDICATIONS  (STANDING):  buDESOnide    Inhalation Suspension 0.5 milliGRAM(s) Inhalation every 12 hours  chlorhexidine 2% Cloths 1 Application(s) Topical <User Schedule>  dextrose 5%. 1000 milliLiter(s) (100 mL/Hr) IV Continuous <Continuous>  dextrose 5%. 1000 milliLiter(s) (50 mL/Hr) IV Continuous <Continuous>  dextrose 50% Injectable 25 Gram(s) IV Push once  dextrose 50% Injectable 12.5 Gram(s) IV Push once  dextrose 50% Injectable 25 Gram(s) IV Push once  dextrose Oral Gel 15 Gram(s) Oral once  enoxaparin Injectable 40 milliGRAM(s) SubCutaneous every 24 hours  glucagon  Injectable 1 milliGRAM(s) IntraMuscular once  guaiFENesin  milliGRAM(s) Oral every 12 hours  hydrALAZINE 25 milliGRAM(s) Oral three times a day  hydrochlorothiazide 25 milliGRAM(s) Oral daily  insulin glargine Injectable (LANTUS) 15 Unit(s) SubCutaneous at bedtime  insulin lispro (ADMELOG) corrective regimen sliding scale   SubCutaneous three times a day before meals  insulin lispro (ADMELOG) corrective regimen sliding scale   SubCutaneous at bedtime  insulin lispro Injectable (ADMELOG) 5 Unit(s) SubCutaneous three times a day before meals  levalbuterol Inhalation 0.63 milliGRAM(s) Inhalation every 6 hours  lisinopril 40 milliGRAM(s) Oral daily  LORazepam   Injectable 0.5 milliGRAM(s) IV Push <User Schedule>  OLANZapine 5 milliGRAM(s) Oral at bedtime  pantoprazole    Tablet 40 milliGRAM(s) Oral before breakfast  polyethylene glycol 3350 17 Gram(s) Oral two times a day  predniSONE   Tablet 40 milliGRAM(s) Oral daily  predniSONE   Tablet   Oral   senna 2 Tablet(s) Oral at bedtime  sertraline 50 milliGRAM(s) Oral daily    MEDICATIONS  (PRN):      CAPILLARY BLOOD GLUCOSE      POCT Blood Glucose.: 272 mg/dL (24 Mar 2025 12:16)  POCT Blood Glucose.: 220 mg/dL (24 Mar 2025 08:32)  POCT Blood Glucose.: 121 mg/dL (23 Mar 2025 22:45)  POCT Blood Glucose.: 256 mg/dL (23 Mar 2025 17:18)    I&O's Summary    24 Mar 2025 07:01  -  24 Mar 2025 16:01  --------------------------------------------------------  IN: 250 mL / OUT: 0 mL / NET: 250 mL        PHYSICAL EXAM:  Vital Signs Last 24 Hrs  T(C): 37.1 (24 Mar 2025 14:06), Max: 37.2 (24 Mar 2025 12:23)  T(F): 98.8 (24 Mar 2025 14:06), Max: 99 (24 Mar 2025 12:23)  HR: 101 (24 Mar 2025 14:06) (81 - 110)  BP: 107/67 (24 Mar 2025 14:06) (92/58 - 144/88)  BP(mean): --  RR: 18 (24 Mar 2025 14:06) (16 - 19)  SpO2: 100% (24 Mar 2025 14:06) (95% - 100%)    Parameters below as of 24 Mar 2025 14:06  Patient On (Oxygen Delivery Method): nasal cannula  O2 Flow (L/min): 2      CONSTITUTIONAL: Elderly woman in  NAD,   ENMT: Moist oral mucosa,   RESPIRATORY: Normal respiratory effort; lungs are clear to auscultation bilaterally  CARDIOVASCULAR: Regular rate and rhythm, normal S1 and S2, no murmur/rub/gallop; No lower extremity edema;   ABDOMEN: Nontender to palpation, normoactive bowel sounds, no rebound/guarding; No hepatosplenomegaly  EXT: no edema b/l  NEUROLOGY: alert, following commands, slow to respond, mild rigidity noted on exam,   SKIN: No rashes; no palpable lesions    LABS:                      RADIOLOGY & ADDITIONAL TESTS:  Results Reviewed:   Imaging Personally Reviewed:  Electrocardiogram Personally Reviewed:    COORDINATION OF CARE:  Care Discussed with Consultants/Other Providers [Y/N]:  Prior or Outpatient Records Reviewed [Y/N]:

## 2025-03-24 NOTE — PROGRESS NOTE ADULT - SUBJECTIVE AND OBJECTIVE BOX
Interval Events:      REVIEW OF SYSTEMS:  Negative except as documented above.      OBJECTIVE:  ICU Vital Signs Last 24 Hrs  T(C): 36.8 (24 Mar 2025 05:15), Max: 37 (23 Mar 2025 18:00)  T(F): 98.2 (24 Mar 2025 05:15), Max: 98.6 (23 Mar 2025 18:00)  HR: 85 (24 Mar 2025 05:15) (85 - 112)  BP: 123/73 (24 Mar 2025 05:20) (92/58 - 144/88)  BP(mean): --  ABP: --  ABP(mean): --  RR: 16 (24 Mar 2025 05:15) (16 - 20)  SpO2: 97% (24 Mar 2025 05:15) (95% - 100%)    O2 Parameters below as of 24 Mar 2025 05:15  Patient On (Oxygen Delivery Method): nasal cannula  O2 Flow (L/min): 2            CAPILLARY BLOOD GLUCOSE      POCT Blood Glucose.: 220 mg/dL (24 Mar 2025 08:32)      PHYSICAL EXAM:  General: NAD  HEENT:  EOMI, sclera anicteric, moist mucus membranes  Neck: supple  Cardiovascular: RRR  Respiratory: CTAB, no wheezes, crackles, or rhonci  Abdomen: soft, nontender  Extremities: warm and well perfused, no edema, no clubbing  Skin: no rashes  Neurological: no focal deficits    HOSPITAL MEDICATIONS:  MEDICATIONS  (STANDING):  buDESOnide    Inhalation Suspension 0.5 milliGRAM(s) Inhalation every 12 hours  chlorhexidine 2% Cloths 1 Application(s) Topical <User Schedule>  dextrose 5%. 1000 milliLiter(s) (100 mL/Hr) IV Continuous <Continuous>  dextrose 5%. 1000 milliLiter(s) (50 mL/Hr) IV Continuous <Continuous>  dextrose 50% Injectable 25 Gram(s) IV Push once  dextrose 50% Injectable 12.5 Gram(s) IV Push once  dextrose 50% Injectable 25 Gram(s) IV Push once  dextrose Oral Gel 15 Gram(s) Oral once  enoxaparin Injectable 40 milliGRAM(s) SubCutaneous every 24 hours  glucagon  Injectable 1 milliGRAM(s) IntraMuscular once  guaiFENesin  milliGRAM(s) Oral every 12 hours  hydrALAZINE 25 milliGRAM(s) Oral three times a day  hydrochlorothiazide 25 milliGRAM(s) Oral daily  insulin glargine Injectable (LANTUS) 10 Unit(s) SubCutaneous at bedtime  insulin lispro (ADMELOG) corrective regimen sliding scale   SubCutaneous three times a day before meals  insulin lispro (ADMELOG) corrective regimen sliding scale   SubCutaneous at bedtime  insulin lispro Injectable (ADMELOG) 3 Unit(s) SubCutaneous three times a day before meals  levalbuterol Inhalation 0.63 milliGRAM(s) Inhalation every 6 hours  lisinopril 40 milliGRAM(s) Oral daily  OLANZapine 10 milliGRAM(s) Oral at bedtime  pantoprazole    Tablet 40 milliGRAM(s) Oral before breakfast  polyethylene glycol 3350 17 Gram(s) Oral two times a day  predniSONE   Tablet   Oral   predniSONE   Tablet 40 milliGRAM(s) Oral daily  senna 2 Tablet(s) Oral at bedtime  sertraline 50 milliGRAM(s) Oral daily    MEDICATIONS  (PRN):      LABS:    Hgb Trend: 12.4<--, 12.2<--, 12.8<--, 13.9<--, 13.9<--        Creatinine Trend: 0.61<--, 0.73<--, 0.72<--, 0.57<--, 0.55<--, 0.52<--            MICROBIOLOGY:       RADIOLOGY:  [x] Reviewed and interpreted by me   Interval Events:      REVIEW OF SYSTEMS:  Negative except as documented above.      OBJECTIVE:  ICU Vital Signs Last 24 Hrs  T(C): 36.8 (24 Mar 2025 05:15), Max: 37 (23 Mar 2025 18:00)  T(F): 98.2 (24 Mar 2025 05:15), Max: 98.6 (23 Mar 2025 18:00)  HR: 85 (24 Mar 2025 05:15) (85 - 112)  BP: 123/73 (24 Mar 2025 05:20) (92/58 - 144/88)  BP(mean): --  ABP: --  ABP(mean): --  RR: 16 (24 Mar 2025 05:15) (16 - 20)  SpO2: 97% (24 Mar 2025 05:15) (95% - 100%)    O2 Parameters below as of 24 Mar 2025 05:15  Patient On (Oxygen Delivery Method): nasal cannula  O2 Flow (L/min): 2            CAPILLARY BLOOD GLUCOSE      POCT Blood Glucose.: 220 mg/dL (24 Mar 2025 08:32)      PHYSICAL EXAM:  General: difficult to arouse  HEENT:  EOMI, sclera anicteric, moist mucus membranes  Neck: supple  Cardiovascular: RRR  Respiratory: CTAB, no wheezes, crackles, or rhonci  Abdomen: soft, nontender  Extremities: warm and well perfused, no edema, no clubbing  Skin: no rashes  Neurological: lethargic    HOSPITAL MEDICATIONS:  MEDICATIONS  (STANDING):  buDESOnide    Inhalation Suspension 0.5 milliGRAM(s) Inhalation every 12 hours  chlorhexidine 2% Cloths 1 Application(s) Topical <User Schedule>  dextrose 5%. 1000 milliLiter(s) (100 mL/Hr) IV Continuous <Continuous>  dextrose 5%. 1000 milliLiter(s) (50 mL/Hr) IV Continuous <Continuous>  dextrose 50% Injectable 25 Gram(s) IV Push once  dextrose 50% Injectable 12.5 Gram(s) IV Push once  dextrose 50% Injectable 25 Gram(s) IV Push once  dextrose Oral Gel 15 Gram(s) Oral once  enoxaparin Injectable 40 milliGRAM(s) SubCutaneous every 24 hours  glucagon  Injectable 1 milliGRAM(s) IntraMuscular once  guaiFENesin  milliGRAM(s) Oral every 12 hours  hydrALAZINE 25 milliGRAM(s) Oral three times a day  hydrochlorothiazide 25 milliGRAM(s) Oral daily  insulin glargine Injectable (LANTUS) 10 Unit(s) SubCutaneous at bedtime  insulin lispro (ADMELOG) corrective regimen sliding scale   SubCutaneous three times a day before meals  insulin lispro (ADMELOG) corrective regimen sliding scale   SubCutaneous at bedtime  insulin lispro Injectable (ADMELOG) 3 Unit(s) SubCutaneous three times a day before meals  levalbuterol Inhalation 0.63 milliGRAM(s) Inhalation every 6 hours  lisinopril 40 milliGRAM(s) Oral daily  OLANZapine 10 milliGRAM(s) Oral at bedtime  pantoprazole    Tablet 40 milliGRAM(s) Oral before breakfast  polyethylene glycol 3350 17 Gram(s) Oral two times a day  predniSONE   Tablet   Oral   predniSONE   Tablet 40 milliGRAM(s) Oral daily  senna 2 Tablet(s) Oral at bedtime  sertraline 50 milliGRAM(s) Oral daily    MEDICATIONS  (PRN):      LABS:    Hgb Trend: 12.4<--, 12.2<--, 12.8<--, 13.9<--, 13.9<--        Creatinine Trend: 0.61<--, 0.73<--, 0.72<--, 0.57<--, 0.55<--, 0.52<--            MICROBIOLOGY:       RADIOLOGY:  [x] Reviewed and interpreted by me

## 2025-03-24 NOTE — PROGRESS NOTE ADULT - PROBLEM SELECTOR PLAN 4
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

## 2025-03-24 NOTE — PROGRESS NOTE ADULT - ASSESSMENT
74F reported asthma and depression initially admitted to MICU with suspected asthma exacerbation with RRT on 3/19 AM for hypoxemia and increased work of breathing with poor air entry. Patient transferred back to ICU for airway evaluation and asthma management and transferred back to floor 3/21, initially requiring BIPAP    #Asthma Exacerbation  - CTA chest 3/8 without evidence of PNA or PE  - continue Duonebs every 6h, ensure to continue as well on discharge for 7 days   - Continue Pulmicort 0.5mg BID   - Follow up IgE  - Continue Prednisone taper   - Incentive spirometry  - Wean off NC and assess O2 Sat on ambulation  - Use Home token on DC for OP Pulmonary follow up    74F reported asthma with prior intubations and depression initially admitted to MICU with suspected asthma exacerbation with RRT on 3/19 AM for hypoxemia and increased work of breathing with poor air entry. Patient transferred back to ICU for airway evaluation and asthma management and transferred back to floor 3/21, initially requiring BIPAP    #Asthma Exacerbation  #Lethargic  - CTA chest 3/8 without evidence of PNA or PE  - continue Duonebs every 6h, ensure to continue as well on discharge for 7 days   - Obtain VBG  - Continue Pulmicort 0.5mg BID   - Follow up IgE  - Continue Prednisone taper to complete 4/4   - Incentive spirometry  - Wean off NC and assess O2 Sat on ambulation  - Use Home token on DC for OP Pulmonary follow up

## 2025-03-24 NOTE — PROGRESS NOTE ADULT - ATTENDING COMMENTS
73 yo F w/ PMHx asthma (three exacerbations in past year) p/w dyspnea and AMS requiring intubation, now extubated. Stay c/b hypertension requiring nicardipine gtt.    Recommend:  - currently on 4L NC, titrated down to 3LNC, spO2 98%  - c/w Pulmicort BID  - Duonebs q6h  - difficult to arouse, check VBG  - if hypercapneic may require BIPAP  - c/w prednisone 40mg PO daily, can start slow taper  - BP control

## 2025-03-25 LAB
A FLAVUS AB FLD QL: NEGATIVE — SIGNIFICANT CHANGE UP
A NIGER AB FLD QL: NEGATIVE — SIGNIFICANT CHANGE UP
A NIGER AB FLD QL: NEGATIVE — SIGNIFICANT CHANGE UP
ANION GAP SERPL CALC-SCNC: 17 MMOL/L — HIGH (ref 7–14)
APPEARANCE UR: ABNORMAL
BACTERIA # UR AUTO: ABNORMAL /HPF
BILIRUB UR-MCNC: NEGATIVE — SIGNIFICANT CHANGE UP
BUN SERPL-MCNC: 50 MG/DL — HIGH (ref 7–23)
CALCIUM SERPL-MCNC: 10.2 MG/DL — SIGNIFICANT CHANGE UP (ref 8.4–10.5)
CAST: 1 /LPF — SIGNIFICANT CHANGE UP (ref 0–4)
CHLORIDE SERPL-SCNC: 97 MMOL/L — LOW (ref 98–107)
CO2 SERPL-SCNC: 23 MMOL/L — SIGNIFICANT CHANGE UP (ref 22–31)
COLOR SPEC: SIGNIFICANT CHANGE UP
CREAT ?TM UR-MCNC: 132 MG/DL — SIGNIFICANT CHANGE UP
CREAT SERPL-MCNC: 1.32 MG/DL — HIGH (ref 0.5–1.3)
DIFF PNL FLD: ABNORMAL
EGFR: 42 ML/MIN/1.73M2 — LOW
EGFR: 42 ML/MIN/1.73M2 — LOW
GLUCOSE BLDC GLUCOMTR-MCNC: 158 MG/DL — HIGH (ref 70–99)
GLUCOSE BLDC GLUCOMTR-MCNC: 193 MG/DL — HIGH (ref 70–99)
GLUCOSE BLDC GLUCOMTR-MCNC: 324 MG/DL — HIGH (ref 70–99)
GLUCOSE BLDC GLUCOMTR-MCNC: 442 MG/DL — HIGH (ref 70–99)
GLUCOSE BLDC GLUCOMTR-MCNC: 73 MG/DL — SIGNIFICANT CHANGE UP (ref 70–99)
GLUCOSE BLDC GLUCOMTR-MCNC: 75 MG/DL — SIGNIFICANT CHANGE UP (ref 70–99)
GLUCOSE SERPL-MCNC: 377 MG/DL — HIGH (ref 70–99)
GLUCOSE UR QL: >=1000 MG/DL
HCT VFR BLD CALC: 39.4 % — SIGNIFICANT CHANGE UP (ref 34.5–45)
HGB BLD-MCNC: 12.5 G/DL — SIGNIFICANT CHANGE UP (ref 11.5–15.5)
IGE SERPL-ACNC: 57 KU/L — SIGNIFICANT CHANGE UP
KETONES UR-MCNC: NEGATIVE MG/DL — SIGNIFICANT CHANGE UP
LEUKOCYTE ESTERASE UR-ACNC: ABNORMAL
MAGNESIUM SERPL-MCNC: 2.1 MG/DL — SIGNIFICANT CHANGE UP (ref 1.6–2.6)
MCHC RBC-ENTMCNC: 31.5 PG — SIGNIFICANT CHANGE UP (ref 27–34)
MCHC RBC-ENTMCNC: 31.7 G/DL — LOW (ref 32–36)
MCV RBC AUTO: 99.2 FL — SIGNIFICANT CHANGE UP (ref 80–100)
NITRITE UR-MCNC: NEGATIVE — SIGNIFICANT CHANGE UP
NRBC # BLD AUTO: 0 K/UL — SIGNIFICANT CHANGE UP (ref 0–0)
NRBC # FLD: 0 K/UL — SIGNIFICANT CHANGE UP (ref 0–0)
NRBC BLD AUTO-RTO: 0 /100 WBCS — SIGNIFICANT CHANGE UP (ref 0–0)
PH UR: 6 — SIGNIFICANT CHANGE UP (ref 5–8)
PHOSPHATE SERPL-MCNC: 4.4 MG/DL — SIGNIFICANT CHANGE UP (ref 2.5–4.5)
PLATELET # BLD AUTO: 237 K/UL — SIGNIFICANT CHANGE UP (ref 150–400)
POTASSIUM SERPL-MCNC: 3.6 MMOL/L — SIGNIFICANT CHANGE UP (ref 3.5–5.3)
POTASSIUM SERPL-SCNC: 3.6 MMOL/L — SIGNIFICANT CHANGE UP (ref 3.5–5.3)
PROT UR-MCNC: SIGNIFICANT CHANGE UP MG/DL
RBC # BLD: 3.97 M/UL — SIGNIFICANT CHANGE UP (ref 3.8–5.2)
RBC # FLD: 12.5 % — SIGNIFICANT CHANGE UP (ref 10.3–14.5)
RBC CASTS # UR COMP ASSIST: 170 /HPF — HIGH (ref 0–4)
REVIEW: SIGNIFICANT CHANGE UP
SODIUM SERPL-SCNC: 137 MMOL/L — SIGNIFICANT CHANGE UP (ref 135–145)
SODIUM UR-SCNC: 56 MMOL/L — SIGNIFICANT CHANGE UP
SP GR SPEC: 1.02 — SIGNIFICANT CHANGE UP (ref 1–1.03)
SQUAMOUS # UR AUTO: 2 /HPF — SIGNIFICANT CHANGE UP (ref 0–5)
UROBILINOGEN FLD QL: 0.2 MG/DL — SIGNIFICANT CHANGE UP (ref 0.2–1)
UUN UR-MCNC: 991 MG/DL — SIGNIFICANT CHANGE UP
WBC # BLD: 11.62 K/UL — HIGH (ref 3.8–10.5)
WBC # FLD AUTO: 11.62 K/UL — HIGH (ref 3.8–10.5)
WBC UR QL: 81 /HPF — HIGH (ref 0–5)
YEAST-LIKE CELLS: PRESENT

## 2025-03-25 PROCEDURE — 99233 SBSQ HOSP IP/OBS HIGH 50: CPT

## 2025-03-25 PROCEDURE — 99233 SBSQ HOSP IP/OBS HIGH 50: CPT | Mod: GC

## 2025-03-25 RX ORDER — INSULIN GLARGINE-YFGN 100 [IU]/ML
12 INJECTION, SOLUTION SUBCUTANEOUS ONCE
Refills: 0 | Status: COMPLETED | OUTPATIENT
Start: 2025-03-25 | End: 2025-03-25

## 2025-03-25 RX ADMIN — PREDNISONE 40 MILLIGRAM(S): 20 TABLET ORAL at 05:57

## 2025-03-25 RX ADMIN — INSULIN LISPRO 12: 100 INJECTION, SOLUTION INTRAVENOUS; SUBCUTANEOUS at 12:55

## 2025-03-25 RX ADMIN — INSULIN GLARGINE-YFGN 12 UNIT(S): 100 INJECTION, SOLUTION SUBCUTANEOUS at 23:33

## 2025-03-25 RX ADMIN — BUDESONIDE 0.5 MILLIGRAM(S): 0.25 SUSPENSION RESPIRATORY (INHALATION) at 07:42

## 2025-03-25 RX ADMIN — SERTRALINE 50 MILLIGRAM(S): 100 TABLET, FILM COATED ORAL at 12:15

## 2025-03-25 RX ADMIN — INSULIN LISPRO 2: 100 INJECTION, SOLUTION INTRAVENOUS; SUBCUTANEOUS at 18:13

## 2025-03-25 RX ADMIN — ENOXAPARIN SODIUM 40 MILLIGRAM(S): 100 INJECTION SUBCUTANEOUS at 12:15

## 2025-03-25 RX ADMIN — Medication 1 APPLICATION(S): at 06:13

## 2025-03-25 RX ADMIN — BUDESONIDE 0.5 MILLIGRAM(S): 0.25 SUSPENSION RESPIRATORY (INHALATION) at 21:32

## 2025-03-25 RX ADMIN — LISINOPRIL 40 MILLIGRAM(S): 5 TABLET ORAL at 05:56

## 2025-03-25 RX ADMIN — Medication 25 MILLIGRAM(S): at 22:09

## 2025-03-25 RX ADMIN — Medication 125 MILLILITER(S): at 18:44

## 2025-03-25 RX ADMIN — INSULIN LISPRO 5 UNIT(S): 100 INJECTION, SOLUTION INTRAVENOUS; SUBCUTANEOUS at 18:14

## 2025-03-25 RX ADMIN — INSULIN LISPRO 8: 100 INJECTION, SOLUTION INTRAVENOUS; SUBCUTANEOUS at 08:47

## 2025-03-25 RX ADMIN — POLYETHYLENE GLYCOL 3350 17 GRAM(S): 17 POWDER, FOR SOLUTION ORAL at 18:14

## 2025-03-25 RX ADMIN — Medication 25 MILLIGRAM(S): at 05:56

## 2025-03-25 RX ADMIN — POLYETHYLENE GLYCOL 3350 17 GRAM(S): 17 POWDER, FOR SOLUTION ORAL at 05:57

## 2025-03-25 RX ADMIN — DEXTROMETHORPHAN HBR, GUAIFENESIN 600 MILLIGRAM(S): 200 LIQUID ORAL at 18:14

## 2025-03-25 RX ADMIN — DEXTROMETHORPHAN HBR, GUAIFENESIN 600 MILLIGRAM(S): 200 LIQUID ORAL at 08:48

## 2025-03-25 RX ADMIN — INSULIN LISPRO 5 UNIT(S): 100 INJECTION, SOLUTION INTRAVENOUS; SUBCUTANEOUS at 12:55

## 2025-03-25 RX ADMIN — Medication 2 TABLET(S): at 22:10

## 2025-03-25 RX ADMIN — LEVALBUTEROL HYDROCHLORIDE 0.63 MILLIGRAM(S): 1.25 SOLUTION RESPIRATORY (INHALATION) at 04:40

## 2025-03-25 RX ADMIN — LEVALBUTEROL HYDROCHLORIDE 0.63 MILLIGRAM(S): 1.25 SOLUTION RESPIRATORY (INHALATION) at 07:41

## 2025-03-25 RX ADMIN — INSULIN LISPRO 5 UNIT(S): 100 INJECTION, SOLUTION INTRAVENOUS; SUBCUTANEOUS at 08:47

## 2025-03-25 RX ADMIN — LEVALBUTEROL HYDROCHLORIDE 0.63 MILLIGRAM(S): 1.25 SOLUTION RESPIRATORY (INHALATION) at 14:49

## 2025-03-25 RX ADMIN — Medication 40 MILLIGRAM(S): at 05:56

## 2025-03-25 RX ADMIN — Medication 25 MILLIGRAM(S): at 14:46

## 2025-03-25 RX ADMIN — Medication 0.5 MILLIGRAM(S): at 06:04

## 2025-03-25 NOTE — PROGRESS NOTE ADULT - PROBLEM SELECTOR PLAN 3
waxing and waning seems to be slowly improving  A&O x 2, person and place -> now AOX 3 - hospital and 2025   some difficulty in responding,

## 2025-03-25 NOTE — PROVIDER CONTACT NOTE (OTHER) - ASSESSMENT
Pt is A&Ox1-2, on 2LO2, minimal movement of extremities, pt more at night compared to during the day, able to eat/swallow food. pt c/o of muscle stiffness/ repositioned with relieve.

## 2025-03-25 NOTE — PROGRESS NOTE ADULT - ATTENDING COMMENTS
75 yo F w/ PMHx asthma (three exacerbations in past year) p/w dyspnea and AMS requiring intubation, now extubated. Stay c/b hypertension requiring nicardipine gtt.    Recommend:  - wean suppl O2 to maintain spO2 > 92%  - c/w Pulmicort BID  - Bronchodilator q6h  - c/w prednisone 40mg PO daily, can start slow taper  - BP control  - avoid oversedation

## 2025-03-25 NOTE — BH CONSULTATION LIAISON PROGRESS NOTE - NSBHFUPINTERVALHXFT_PSY_A_CORE
Met with the patient. Compared to yesterday more awake, speaking a bit more. Still can fall asleep mid sentence. Looks at MD, follows commands, no rigidity on UE or neck, no catalepsy, no posturing, no echopraxia or echolalia. PMR++. Knows it is 2025, March. Complains 'I still cannot breathe well'. No si or hi, no ah or vh or delusions when asked.    at bedside-----was worried about patient's lethargy yesterday that he stayed overnight with her. Discussed below plan in detail with . He is in agreement

## 2025-03-25 NOTE — BH CONSULTATION LIAISON PROGRESS NOTE - NSBHFUPINTERVALCCFT_PSY_A_CORE
Tolerating Ativan dosing  Vitals reviewed  Poor PO intake.   Still on 2L NC  Case discussed with medicine--- below discussed

## 2025-03-25 NOTE — PROGRESS NOTE ADULT - ASSESSMENT
74F reported asthma with prior intubations and depression initially admitted to MICU with suspected asthma exacerbation with RRT on 3/19 AM for hypoxemia and increased work of breathing with poor air entry. Patient transferred back to ICU for airway evaluation and asthma management and transferred back to floor 3/21, initially requiring BIPAP    #Asthma Exacerbation  #Lethargy improved  - CTA chest 3/8 without evidence of PNA or PE  - continue Duonebs every 6h, ensure to continue as well on discharge for 7 days   - Continue Pulmicort 0.5mg BID   - Follow up IgE  - Continue Prednisone taper to complete 4/4   - Incentive spirometry  - Wean off NC and assess O2 Sat on ambulation - at the moment ok on 1L NC, can trial off oxygen  - Use Home token on DC for OP Pulmonary follow up    74F reported asthma with prior intubations and depression initially admitted to MICU with suspected asthma exacerbation with RRT on 3/19 AM for hypoxemia and increased work of breathing with poor air entry. Patient transferred back to ICU for airway evaluation and asthma management and transferred back to floor 3/21, initially requiring BIPAP. Now on NC.    #Asthma Exacerbation  #Lethargy improved  - CTA chest 3/8 without evidence of PNA or PE  - continue Duonebs every 6h, ensure to continue as well on discharge for 7 days   - Continue Pulmicort 0.5mg BID   - Follow up IgE  - Continue Prednisone taper to complete 4/4   - Incentive spirometry  - Wean off NC and assess O2 Sat on ambulation - at the moment ok on 1L NC, can trial off oxygen  - Use Home token on DC for OP Pulmonary follow up

## 2025-03-25 NOTE — BH CONSULTATION LIAISON PROGRESS NOTE - NSBHASSESSMENTFT_PSY_ALL_CORE
Patient is a 74F hx asthma, depression, and HTN was admitted to the MICU with AHRF 2/2 asthma exacerbation resulting in acute on chronic HHRF causing AMS and increased WOB that required intubation. During her ICU course, she experienced seizure-like activity .  Additional issues included hyponatremia, acute on chronic metabolic alkalosis, and mild thrombocytopenia,  also having fevers.  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 Patient has h/o hx of major depressive disorder with psychosis, hx of 3 past inpatient psychiatric hospitalizations last in 2016 at [facility], all rehospitalizations were in context non compliance with the treatment. She has no hx of suicide attempts, no hx of substance abuse. Patient w hx of paranoia, disorganization, catatonia, required MOO when in [facility].     3/24----more withdrawn, some stiffness in neck, not communicative. Some concerns for catatonia. Unsure if symptoms attributed to catatonia vs delirium    3/25--- more alert today, trying to following commands. Still with PMR+. Appears to be responding to Ativan    PLAN  - no SI or HI, no need for psychiatric CO  - EKG  -- antipsychotics can only be given if qtc < 500  -- Vitals    - MEDICATIONS:  -- Continue Ativan 0.5mg IV at 7am to target possible catatonic symptoms.   -- Continue Zoloft  50mg daily ( home dose 150mg) ( monitor NA- recent hyponatremia)  -- HOLD Zyprexa for now    - PRN:  --Ativan 0.5mg q 6hrs prn IM/IV/PO  - DISPO: pending

## 2025-03-25 NOTE — PROGRESS NOTE ADULT - SUBJECTIVE AND OBJECTIVE BOX
[facility]  Division of Hospital Medicine  Dede Marquez MD  Pager: [phone]      Patient is a 74y old  Female who presents with a chief complaint of AMS and increased WOB (21 Mar 2025 06:24)      SUBJECTIVE / OVERNIGHT EVENTS: Patient examined at bedside. More awake and engaged in conversation. Knows she is in the hospital and that it is 2025. Weaning off oxygen   ADDITIONAL REVIEW OF SYSTEMS:    MEDICATIONS  (STANDING):  buDESOnide    Inhalation Suspension 0.5 milliGRAM(s) Inhalation every 12 hours  chlorhexidine 2% Cloths 1 Application(s) Topical <User Schedule>  dextrose 5%. 1000 milliLiter(s) (100 mL/Hr) IV Continuous <Continuous>  dextrose 5%. 1000 milliLiter(s) (50 mL/Hr) IV Continuous <Continuous>  dextrose 50% Injectable 25 Gram(s) IV Push once  dextrose 50% Injectable 12.5 Gram(s) IV Push once  dextrose 50% Injectable 25 Gram(s) IV Push once  dextrose Oral Gel 15 Gram(s) Oral once  enoxaparin Injectable 40 milliGRAM(s) SubCutaneous every 24 hours  glucagon  Injectable 1 milliGRAM(s) IntraMuscular once  guaiFENesin  milliGRAM(s) Oral every 12 hours  hydrALAZINE 25 milliGRAM(s) Oral three times a day  hydrochlorothiazide 25 milliGRAM(s) Oral daily  insulin glargine Injectable (LANTUS) 15 Unit(s) SubCutaneous at bedtime  insulin lispro (ADMELOG) corrective regimen sliding scale   SubCutaneous three times a day before meals  insulin lispro (ADMELOG) corrective regimen sliding scale   SubCutaneous at bedtime  insulin lispro Injectable (ADMELOG) 5 Unit(s) SubCutaneous three times a day before meals  levalbuterol Inhalation 0.63 milliGRAM(s) Inhalation every 6 hours  lisinopril 40 milliGRAM(s) Oral daily  LORazepam   Injectable 0.5 milliGRAM(s) IV Push <User Schedule>  pantoprazole    Tablet 40 milliGRAM(s) Oral before breakfast  polyethylene glycol 3350 17 Gram(s) Oral two times a day  predniSONE   Tablet   Oral   senna 2 Tablet(s) Oral at bedtime  sertraline 50 milliGRAM(s) Oral daily    MEDICATIONS  (PRN):      CAPILLARY BLOOD GLUCOSE      POCT Blood Glucose.: 442 mg/dL (25 Mar 2025 12:20)  POCT Blood Glucose.: 324 mg/dL (25 Mar 2025 08:29)  POCT Blood Glucose.: 125 mg/dL (24 Mar 2025 22:18)  POCT Blood Glucose.: 128 mg/dL (24 Mar 2025 17:16)    I&O's Summary    24 Mar 2025 07:01  -  25 Mar 2025 07:00  --------------------------------------------------------  IN: 250 mL / OUT: 750 mL / NET: -500 mL        PHYSICAL EXAM:  Vital Signs Last 24 Hrs  T(C): 36.7 (25 Mar 2025 14:45), Max: 37.2 (24 Mar 2025 18:01)  T(F): 98 (25 Mar 2025 14:45), Max: 99 (24 Mar 2025 18:01)  HR: 100 (25 Mar 2025 14:45) (79 - 101)  BP: 135/72 (25 Mar 2025 14:45) (105/56 - 139/71)  BP(mean): --  RR: 18 (25 Mar 2025 14:45) (17 - 18)  SpO2: 94% (25 Mar 2025 14:45) (93% - 100%)    Parameters below as of 25 Mar 2025 14:45  Patient On (Oxygen Delivery Method): nasal cannula  O2 Flow (L/min): 2    \CONSTITUTIONAL: Elderly woman in  NAD,   ENMT: Moist oral mucosa,   RESPIRATORY: Normal respiratory effort on NC ; lungs are clear to auscultation bilaterally  CARDIOVASCULAR: Regular rate and rhythm, normal S1 and S2, no murmur/rub/gallop; No lower extremity edema;   ABDOMEN: Nontender to palpation, normoactive bowel sounds, no rebound/guarding; No hepatosplenomegaly  EXT: no edema b/l  NEUROLOGY: alert, following commands, AOX 3 - hospital and 2025   SKIN: No rashes; no palpable lesions    LABS:                        12.5   11.62 )-----------( 237      ( 25 Mar 2025 06:30 )             39.4     03-25    137  |  97[L]  |  50[H]  ----------------------------<  377[H]  3.6   |  23  |  1.32[H]    Ca    10.2      25 Mar 2025 06:30  Phos  4.4     03-25  Mg     2.10     03-25            Urinalysis Basic - ( 25 Mar 2025 06:30 )    Color: x / Appearance: x / SG: x / pH: x  Gluc: 377 mg/dL / Ketone: x  / Bili: x / Urobili: x   Blood: x / Protein: x / Nitrite: x   Leuk Esterase: x / RBC: x / WBC x   Sq Epi: x / Non Sq Epi: x / Bacteria: x          RADIOLOGY & ADDITIONAL TESTS:  Results Reviewed:   Imaging Personally Reviewed:  Electrocardiogram Personally Reviewed:    COORDINATION OF CARE:  Care Discussed with Consultants/Other Providers [Y/N]:  Prior or Outpatient Records Reviewed [Y/N]:

## 2025-03-25 NOTE — PROGRESS NOTE ADULT - SUBJECTIVE AND OBJECTIVE BOX
Interval Events:      REVIEW OF SYSTEMS:  Negative except as documented above.      OBJECTIVE:  ICU Vital Signs Last 24 Hrs  T(C): 36.3 (25 Mar 2025 04:35), Max: 37.2 (24 Mar 2025 12:23)  T(F): 97.3 (25 Mar 2025 04:35), Max: 99 (24 Mar 2025 12:23)  HR: 84 (25 Mar 2025 04:40) (79 - 103)  BP: 139/71 (25 Mar 2025 04:35) (105/56 - 139/71)  BP(mean): --  ABP: --  ABP(mean): --  RR: 17 (25 Mar 2025 04:35) (17 - 18)  SpO2: 99% (25 Mar 2025 04:40) (96% - 100%)    O2 Parameters below as of 25 Mar 2025 04:40  Patient On (Oxygen Delivery Method): nasal cannula              03-24 @ 07:01  -  03-25 @ 07:00  --------------------------------------------------------  IN: 250 mL / OUT: 750 mL / NET: -500 mL      CAPILLARY BLOOD GLUCOSE      POCT Blood Glucose.: 125 mg/dL (24 Mar 2025 22:18)      PHYSICAL EXAM:  General: NAD  HEENT:  EOMI, sclera anicteric, moist mucus membranes  Neck: supple  Cardiovascular: RRR  Respiratory: CTAB, no wheezes, crackles, or rhonci  Abdomen: soft, nontender  Extremities: warm and well perfused, no edema, no clubbing  Skin: no rashes  Neurological: no focal deficits    HOSPITAL MEDICATIONS:  MEDICATIONS  (STANDING):  buDESOnide    Inhalation Suspension 0.5 milliGRAM(s) Inhalation every 12 hours  chlorhexidine 2% Cloths 1 Application(s) Topical <User Schedule>  dextrose 5%. 1000 milliLiter(s) (100 mL/Hr) IV Continuous <Continuous>  dextrose 5%. 1000 milliLiter(s) (50 mL/Hr) IV Continuous <Continuous>  dextrose 50% Injectable 25 Gram(s) IV Push once  dextrose 50% Injectable 12.5 Gram(s) IV Push once  dextrose 50% Injectable 25 Gram(s) IV Push once  dextrose Oral Gel 15 Gram(s) Oral once  enoxaparin Injectable 40 milliGRAM(s) SubCutaneous every 24 hours  glucagon  Injectable 1 milliGRAM(s) IntraMuscular once  guaiFENesin  milliGRAM(s) Oral every 12 hours  hydrALAZINE 25 milliGRAM(s) Oral three times a day  hydrochlorothiazide 25 milliGRAM(s) Oral daily  insulin glargine Injectable (LANTUS) 15 Unit(s) SubCutaneous at bedtime  insulin lispro (ADMELOG) corrective regimen sliding scale   SubCutaneous three times a day before meals  insulin lispro (ADMELOG) corrective regimen sliding scale   SubCutaneous at bedtime  insulin lispro Injectable (ADMELOG) 5 Unit(s) SubCutaneous three times a day before meals  levalbuterol Inhalation 0.63 milliGRAM(s) Inhalation every 6 hours  lisinopril 40 milliGRAM(s) Oral daily  LORazepam   Injectable 0.5 milliGRAM(s) IV Push <User Schedule>  pantoprazole    Tablet 40 milliGRAM(s) Oral before breakfast  polyethylene glycol 3350 17 Gram(s) Oral two times a day  predniSONE   Tablet   Oral   senna 2 Tablet(s) Oral at bedtime  sertraline 50 milliGRAM(s) Oral daily    MEDICATIONS  (PRN):      LABS:    Hgb Trend: 12.4<--, 12.2<--, 12.8<--, 13.9<--        Creatinine Trend: 0.61<--, 0.73<--, 0.72<--, 0.57<--, 0.55<--, 0.52<--            MICROBIOLOGY:       RADIOLOGY:  [x] Reviewed and interpreted by me

## 2025-03-25 NOTE — PROVIDER CONTACT NOTE (OTHER) - SITUATION
Pt not given Zyprexa at bedtime as pt son requested. pt was lethargic during day shift, unable to take meds.

## 2025-03-25 NOTE — PROGRESS NOTE ADULT - ASSESSMENT
74F hx asthma and depressed presented with AHHRF c/b AMS requiring MICU admission for intubation for respiratory failure i/s/o asthma exacerbation. Extubated 3/12 to face tent and now on BIPAP s/p RRT 3/19 for increased WOB / hypoxia despite aggressive asthma management. Transferred back to MICU for management of severe asthma.      #Damaris - patient with uptrending creating o 1.32 from .61   - WIll trial IVF   - F/U urine studies

## 2025-03-25 NOTE — PROVIDER CONTACT NOTE (OTHER) - ASSESSMENT
Pt is A&Ox2 disoriented to situation and time. pt NSR to sinus tachy at times on tele. SPO2 94% on 2l nc and . Pt alert and asymptomatic.

## 2025-03-26 DIAGNOSIS — F06.1 CATATONIC DISORDER DUE TO KNOWN PHYSIOLOGICAL CONDITION: ICD-10-CM

## 2025-03-26 LAB
ANION GAP SERPL CALC-SCNC: 12 MMOL/L — SIGNIFICANT CHANGE UP (ref 7–14)
BUN SERPL-MCNC: 43 MG/DL — HIGH (ref 7–23)
CALCIUM SERPL-MCNC: 10 MG/DL — SIGNIFICANT CHANGE UP (ref 8.4–10.5)
CHLORIDE SERPL-SCNC: 102 MMOL/L — SIGNIFICANT CHANGE UP (ref 98–107)
CO2 SERPL-SCNC: 25 MMOL/L — SIGNIFICANT CHANGE UP (ref 22–31)
CREAT SERPL-MCNC: 0.82 MG/DL — SIGNIFICANT CHANGE UP (ref 0.5–1.3)
EGFR: 75 ML/MIN/1.73M2 — SIGNIFICANT CHANGE UP
EGFR: 75 ML/MIN/1.73M2 — SIGNIFICANT CHANGE UP
GLUCOSE BLDC GLUCOMTR-MCNC: 129 MG/DL — HIGH (ref 70–99)
GLUCOSE BLDC GLUCOMTR-MCNC: 130 MG/DL — HIGH (ref 70–99)
GLUCOSE BLDC GLUCOMTR-MCNC: 134 MG/DL — HIGH (ref 70–99)
GLUCOSE BLDC GLUCOMTR-MCNC: 185 MG/DL — HIGH (ref 70–99)
GLUCOSE BLDC GLUCOMTR-MCNC: 193 MG/DL — HIGH (ref 70–99)
GLUCOSE BLDC GLUCOMTR-MCNC: 66 MG/DL — LOW (ref 70–99)
GLUCOSE BLDC GLUCOMTR-MCNC: 73 MG/DL — SIGNIFICANT CHANGE UP (ref 70–99)
GLUCOSE SERPL-MCNC: 181 MG/DL — HIGH (ref 70–99)
MAGNESIUM SERPL-MCNC: 1.8 MG/DL — SIGNIFICANT CHANGE UP (ref 1.6–2.6)
PHOSPHATE SERPL-MCNC: 3.3 MG/DL — SIGNIFICANT CHANGE UP (ref 2.5–4.5)
POTASSIUM SERPL-MCNC: 3.4 MMOL/L — LOW (ref 3.5–5.3)
POTASSIUM SERPL-SCNC: 3.4 MMOL/L — LOW (ref 3.5–5.3)
SODIUM SERPL-SCNC: 139 MMOL/L — SIGNIFICANT CHANGE UP (ref 135–145)

## 2025-03-26 PROCEDURE — 99233 SBSQ HOSP IP/OBS HIGH 50: CPT | Mod: GC

## 2025-03-26 PROCEDURE — 99233 SBSQ HOSP IP/OBS HIGH 50: CPT

## 2025-03-26 PROCEDURE — 99232 SBSQ HOSP IP/OBS MODERATE 35: CPT

## 2025-03-26 RX ORDER — LORAZEPAM 4 MG/ML
0.5 VIAL (ML) INJECTION ONCE
Refills: 0 | Status: DISCONTINUED | OUTPATIENT
Start: 2025-03-26 | End: 2025-03-26

## 2025-03-26 RX ORDER — LORAZEPAM 4 MG/ML
0.5 VIAL (ML) INJECTION
Refills: 0 | Status: DISCONTINUED | OUTPATIENT
Start: 2025-03-26 | End: 2025-03-27

## 2025-03-26 RX ADMIN — DEXTROMETHORPHAN HBR, GUAIFENESIN 600 MILLIGRAM(S): 200 LIQUID ORAL at 17:36

## 2025-03-26 RX ADMIN — SERTRALINE 50 MILLIGRAM(S): 100 TABLET, FILM COATED ORAL at 11:17

## 2025-03-26 RX ADMIN — PREDNISONE 30 MILLIGRAM(S): 20 TABLET ORAL at 06:30

## 2025-03-26 RX ADMIN — BUDESONIDE 0.5 MILLIGRAM(S): 0.25 SUSPENSION RESPIRATORY (INHALATION) at 22:58

## 2025-03-26 RX ADMIN — Medication 25 MILLIGRAM(S): at 13:25

## 2025-03-26 RX ADMIN — INSULIN LISPRO 5 UNIT(S): 100 INJECTION, SOLUTION INTRAVENOUS; SUBCUTANEOUS at 08:30

## 2025-03-26 RX ADMIN — ENOXAPARIN SODIUM 40 MILLIGRAM(S): 100 INJECTION SUBCUTANEOUS at 12:53

## 2025-03-26 RX ADMIN — INSULIN LISPRO 2: 100 INJECTION, SOLUTION INTRAVENOUS; SUBCUTANEOUS at 08:30

## 2025-03-26 RX ADMIN — Medication 40 MILLIEQUIVALENT(S): at 11:17

## 2025-03-26 RX ADMIN — LEVALBUTEROL HYDROCHLORIDE 0.63 MILLIGRAM(S): 1.25 SOLUTION RESPIRATORY (INHALATION) at 02:48

## 2025-03-26 RX ADMIN — Medication 40 MILLIGRAM(S): at 06:29

## 2025-03-26 RX ADMIN — INSULIN LISPRO 5 UNIT(S): 100 INJECTION, SOLUTION INTRAVENOUS; SUBCUTANEOUS at 12:53

## 2025-03-26 RX ADMIN — LEVALBUTEROL HYDROCHLORIDE 0.63 MILLIGRAM(S): 1.25 SOLUTION RESPIRATORY (INHALATION) at 15:01

## 2025-03-26 RX ADMIN — DEXTROMETHORPHAN HBR, GUAIFENESIN 600 MILLIGRAM(S): 200 LIQUID ORAL at 06:30

## 2025-03-26 RX ADMIN — LISINOPRIL 40 MILLIGRAM(S): 5 TABLET ORAL at 06:29

## 2025-03-26 RX ADMIN — Medication 0.5 MILLIGRAM(S): at 06:37

## 2025-03-26 RX ADMIN — Medication 0.5 MILLIGRAM(S): at 13:22

## 2025-03-26 RX ADMIN — Medication 25 MILLIGRAM(S): at 06:29

## 2025-03-26 RX ADMIN — Medication 1 APPLICATION(S): at 06:20

## 2025-03-26 RX ADMIN — BUDESONIDE 0.5 MILLIGRAM(S): 0.25 SUSPENSION RESPIRATORY (INHALATION) at 08:23

## 2025-03-26 RX ADMIN — POLYETHYLENE GLYCOL 3350 17 GRAM(S): 17 POWDER, FOR SOLUTION ORAL at 06:29

## 2025-03-26 NOTE — BH CONSULTATION LIAISON PROGRESS NOTE - NSBHFUPINTERVALHXFT_PSY_A_CORE
Patient was seen and assessed at bedside. Patient noted to not be eating lunch. Laying in bed, arms to chest, rigid. Minimally vocal. Appearing more catatonic on todays exam.     BFS: stupor 1, mutism 1, rigidity 2, negativism 2, withdrawal 1, gegenhalten 3 = 10

## 2025-03-26 NOTE — BH CONSULTATION LIAISON PROGRESS NOTE - NSBHATTESTBILLING_PSY_A_CORE
01963-[id number] OBS or IP - high complexity OR 50-79 mins 90427-[id number] OBS or IP - moderate complexity OR 35-49 mins

## 2025-03-26 NOTE — PROGRESS NOTE ADULT - SUBJECTIVE AND OBJECTIVE BOX
[facility]  Division of Hospital Medicine  Dede Marquez MD  Pager: [phone]      Patient is a 74y old  Female who presents with a chief complaint of AMS and increased WOB (21 Mar 2025 06:24)      SUBJECTIVE / OVERNIGHT EVENTS: PAtient examined at bedside. More alert. Denies any dysuria, no vaginal itching. Reports she wants to go home and leaves the hospital. Updated son yesterday evening who reported concerns with mother being lethargic and having a cough since starting lisopril. Now more alert and no cough   ADDITIONAL REVIEW OF SYSTEMS:    MEDICATIONS  (STANDING):  buDESOnide    Inhalation Suspension 0.5 milliGRAM(s) Inhalation every 12 hours  chlorhexidine 2% Cloths 1 Application(s) Topical <User Schedule>  dextrose 5%. 1000 milliLiter(s) (100 mL/Hr) IV Continuous <Continuous>  dextrose 5%. 1000 milliLiter(s) (50 mL/Hr) IV Continuous <Continuous>  dextrose 50% Injectable 25 Gram(s) IV Push once  dextrose 50% Injectable 12.5 Gram(s) IV Push once  dextrose 50% Injectable 25 Gram(s) IV Push once  dextrose Oral Gel 15 Gram(s) Oral once  enoxaparin Injectable 40 milliGRAM(s) SubCutaneous every 24 hours  glucagon  Injectable 1 milliGRAM(s) IntraMuscular once  guaiFENesin  milliGRAM(s) Oral every 12 hours  hydrALAZINE 25 milliGRAM(s) Oral three times a day  hydrochlorothiazide 25 milliGRAM(s) Oral daily  insulin glargine Injectable (LANTUS) 15 Unit(s) SubCutaneous at bedtime  insulin lispro (ADMELOG) corrective regimen sliding scale   SubCutaneous three times a day before meals  insulin lispro (ADMELOG) corrective regimen sliding scale   SubCutaneous at bedtime  insulin lispro Injectable (ADMELOG) 5 Unit(s) SubCutaneous three times a day before meals  levalbuterol Inhalation 0.63 milliGRAM(s) Inhalation every 6 hours  lisinopril 40 milliGRAM(s) Oral daily  LORazepam   Injectable 0.5 milliGRAM(s) IV Push <User Schedule>  pantoprazole    Tablet 40 milliGRAM(s) Oral before breakfast  polyethylene glycol 3350 17 Gram(s) Oral two times a day  predniSONE   Tablet   Oral   predniSONE   Tablet 30 milliGRAM(s) Oral daily  senna 2 Tablet(s) Oral at bedtime  sertraline 50 milliGRAM(s) Oral daily  sodium chloride 0.9%. 1000 milliLiter(s) (125 mL/Hr) IV Continuous <Continuous>    MEDICATIONS  (PRN):      CAPILLARY BLOOD GLUCOSE      POCT Blood Glucose.: 134 mg/dL (26 Mar 2025 12:08)  POCT Blood Glucose.: 185 mg/dL (26 Mar 2025 08:19)  POCT Blood Glucose.: 193 mg/dL (26 Mar 2025 02:12)  POCT Blood Glucose.: 73 mg/dL (25 Mar 2025 23:31)  POCT Blood Glucose.: 75 mg/dL (25 Mar 2025 22:19)  POCT Blood Glucose.: 158 mg/dL (25 Mar 2025 17:50)  POCT Blood Glucose.: 193 mg/dL (25 Mar 2025 16:39)    I&O's Summary    25 Mar 2025 07:01  -  26 Mar 2025 07:00  --------------------------------------------------------  IN: 480 mL / OUT: 1200 mL / NET: -720 mL        PHYSICAL EXAM:  Vital Signs Last 24 Hrs  T(C): 36.5 (26 Mar 2025 13:25), Max: 37.3 (25 Mar 2025 22:00)  T(F): 97.7 (26 Mar 2025 13:25), Max: 99.1 (25 Mar 2025 22:00)  HR: 104 (26 Mar 2025 13:25) (88 - 115)  BP: 157/83 (26 Mar 2025 13:25) (137/82 - 159/93)  BP(mean): --  RR: 16 (26 Mar 2025 13:25) (16 - 18)  SpO2: 97% (26 Mar 2025 13:25) (92% - 99%)    Parameters below as of 26 Mar 2025 13:25  Patient On (Oxygen Delivery Method): nasal cannula  O2 Flow (L/min): 2    CONSTITUTIONAL: Elderly woman in  NAD,   ENMT: Moist oral mucosa,   RESPIRATORY: Normal respiratory effort on NC ; lungs are clear to auscultation bilaterally  CARDIOVASCULAR: Regular rate and rhythm, normal S1 and S2, no murmur/rub/gallop; No lower extremity edema;   ABDOMEN: Nontender to palpation, normoactive bowel sounds, no rebound/guarding; No hepatosplenomegaly  EXT: no edema b/l  NEUROLOGY: alert, following commands, AOX 3 - hospital and 2025   SKIN: No rashes; no palpable lesions      LABS:                        12.5   11.62 )-----------( 237      ( 25 Mar 2025 06:30 )             39.4     03-26    139  |  102  |  43[H]  ----------------------------<  181[H]  3.4[L]   |  25  |  0.82    Ca    10.0      26 Mar 2025 06:40  Phos  3.3     03-26  Mg     1.80     03-26            Urinalysis Basic - ( 26 Mar 2025 06:40 )    Color: x / Appearance: x / SG: x / pH: x  Gluc: 181 mg/dL / Ketone: x  / Bili: x / Urobili: x   Blood: x / Protein: x / Nitrite: x   Leuk Esterase: x / RBC: x / WBC x   Sq Epi: x / Non Sq Epi: x / Bacteria: x          RADIOLOGY & ADDITIONAL TESTS:  Results Reviewed:   Imaging Personally Reviewed:  Electrocardiogram Personally Reviewed:    COORDINATION OF CARE:  Care Discussed with Consultants/Other Providers [Y/N]:  Prior or Outpatient Records Reviewed [Y/N]:   [facility]  Division of Hospital Medicine  Dede Marquez MD  Pager: [phone]      Patient is a 74y old  Female who presents with a chief complaint of AMS and increased WOB (21 Mar 2025 06:24)      SUBJECTIVE / OVERNIGHT EVENTS: PAtient examined at bedside. More alert. Denies any dysuria, no vaginal itching. Reports she wants to go home and leaves the hospital. Updated son yesterday evening who reported concerns with mother being lethargic and having a cough since starting lisopril. Now more alert and no cough   ADDITIONAL REVIEW OF SYSTEMS:    MEDICATIONS  (STANDING):  buDESOnide    Inhalation Suspension 0.5 milliGRAM(s) Inhalation every 12 hours  chlorhexidine 2% Cloths 1 Application(s) Topical <User Schedule>  dextrose 5%. 1000 milliLiter(s) (100 mL/Hr) IV Continuous <Continuous>  dextrose 5%. 1000 milliLiter(s) (50 mL/Hr) IV Continuous <Continuous>  dextrose 50% Injectable 25 Gram(s) IV Push once  dextrose 50% Injectable 12.5 Gram(s) IV Push once  dextrose 50% Injectable 25 Gram(s) IV Push once  dextrose Oral Gel 15 Gram(s) Oral once  enoxaparin Injectable 40 milliGRAM(s) SubCutaneous every 24 hours  glucagon  Injectable 1 milliGRAM(s) IntraMuscular once  guaiFENesin  milliGRAM(s) Oral every 12 hours  hydrALAZINE 25 milliGRAM(s) Oral three times a day  hydrochlorothiazide 25 milliGRAM(s) Oral daily  insulin glargine Injectable (LANTUS) 15 Unit(s) SubCutaneous at bedtime  insulin lispro (ADMELOG) corrective regimen sliding scale   SubCutaneous three times a day before meals  insulin lispro (ADMELOG) corrective regimen sliding scale   SubCutaneous at bedtime  insulin lispro Injectable (ADMELOG) 5 Unit(s) SubCutaneous three times a day before meals  levalbuterol Inhalation 0.63 milliGRAM(s) Inhalation every 6 hours  lisinopril 40 milliGRAM(s) Oral daily  LORazepam   Injectable 0.5 milliGRAM(s) IV Push <User Schedule>  pantoprazole    Tablet 40 milliGRAM(s) Oral before breakfast  polyethylene glycol 3350 17 Gram(s) Oral two times a day  predniSONE   Tablet   Oral   predniSONE   Tablet 30 milliGRAM(s) Oral daily  senna 2 Tablet(s) Oral at bedtime  sertraline 50 milliGRAM(s) Oral daily  sodium chloride 0.9%. 1000 milliLiter(s) (125 mL/Hr) IV Continuous <Continuous>    MEDICATIONS  (PRN):      CAPILLARY BLOOD GLUCOSE      POCT Blood Glucose.: 134 mg/dL (26 Mar 2025 12:08)  POCT Blood Glucose.: 185 mg/dL (26 Mar 2025 08:19)  POCT Blood Glucose.: 193 mg/dL (26 Mar 2025 02:12)  POCT Blood Glucose.: 73 mg/dL (25 Mar 2025 23:31)  POCT Blood Glucose.: 75 mg/dL (25 Mar 2025 22:19)  POCT Blood Glucose.: 158 mg/dL (25 Mar 2025 17:50)  POCT Blood Glucose.: 193 mg/dL (25 Mar 2025 16:39)    I&O's Summary    25 Mar 2025 07:01  -  26 Mar 2025 07:00  --------------------------------------------------------  IN: 480 mL / OUT: 1200 mL / NET: -720 mL        PHYSICAL EXAM:  Vital Signs Last 24 Hrs  T(C): 36.5 (26 Mar 2025 13:25), Max: 37.3 (25 Mar 2025 22:00)  T(F): 97.7 (26 Mar 2025 13:25), Max: 99.1 (25 Mar 2025 22:00)  HR: 104 (26 Mar 2025 13:25) (88 - 115)  BP: 157/83 (26 Mar 2025 13:25) (137/82 - 159/93)  BP(mean): --  RR: 16 (26 Mar 2025 13:25) (16 - 18)  SpO2: 97% (26 Mar 2025 13:25) (92% - 99%)    Parameters below as of 26 Mar 2025 13:25  Patient On (Oxygen Delivery Method): nasal cannula  O2 Flow (L/min): 2    CONSTITUTIONAL: Elderly woman in  NAD,   ENMT: Moist oral mucosa,   RESPIRATORY: Normal respiratory effort on NC ; lungs are clear to auscultation bilaterally  CARDIOVASCULAR: Regular rate and rhythm, normal S1 and S2, no murmur/rub/gallop; No lower extremity edema;   ABDOMEN: Nontender to palpation, normoactive bowel sounds, no rebound/guarding; No hepatosplenomegaly  EXT: no edema b/l  NEUROLOGY: alert, following commands, AOX 3 - hospital and 2025   SKIN: No rashes; no palpable lesions      LABS:                        12.5   11.62 )-----------( 237      ( 25 Mar 2025 06:30 )             39.4     03-26    139  |  102  |  43[H]  ----------------------------<  181[H]  3.4[L]   |  25  |  0.82    Ca    10.0      26 Mar 2025 06:40  Phos  3.3     03-26  Mg     1.80     03-26            Urinalysis Basic - ( 26 Mar 2025 06:40 )    Color: x / Appearance: x / SG: x / pH: x  Gluc: 181 mg/dL / Ketone: x  / Bili: x / Urobili: x   Blood: x / Protein: x / Nitrite: x   Leuk Esterase: x / RBC: x / WBC x   Sq Epi: x / Non Sq Epi: x / Bacteria: x          RADIOLOGY & ADDITIONAL TESTS:  Results Reviewed: < from: MR Head No Cont (03.24.25 @ 21:33) >  IMPRESSION: No acute intracranial hemorrhage or evidence of acute   ischemia.    Multiple patchy confluent nonspecific abnormal white matter foci of   T2/FLAIR prolongation statistically favoring microvascular type changes.    Multiple chronic lacunar infarcts in the bilateral basal ganglia and   thalami.    < end of copied text >    Imaging Personally Reviewed:  Electrocardiogram Personally Reviewed:    COORDINATION OF CARE:  Care Discussed with Consultants/Other Providers [Y/N]:  Prior or Outpatient Records Reviewed [Y/N]:

## 2025-03-26 NOTE — PROGRESS NOTE ADULT - PROBLEM SELECTOR PLAN 8
- CTH: no acute changes  - CT spine: no fractures  - 3/8 ON: 6 minutes of seizure-like activity with myoclonic upper jerks -> broke with ativan  -- EEG 3/10 showing no epileptiform activity; severe degree of diffuse or multifocal cerebral dysfunction c/w comatose  [ ] MRI brain pending - CTH: no acute changes  - CT spine: no fractures  - 3/8 ON: 6 minutes of seizure-like activity with myoclonic upper jerks -> broke with ativan  -- EEG 3/10 showing no epileptiform activity; severe degree of diffuse or multifocal cerebral dysfunction c/w comatose   MRI brain- Multiple chronic lacunar infarcts in the bilateral basal ganglia and   thalami. No acute infarct

## 2025-03-26 NOTE — PROGRESS NOTE ADULT - PROBLEM SELECTOR PLAN 10
DVT: lovenox  code: full  GI; PPI DVT: lovenox  code: full  GI; PPI  PT recommends - TK - pending psych clearance

## 2025-03-26 NOTE — BH CONSULTATION LIAISON PROGRESS NOTE - NSBHFUPINTERVALCCFT_PSY_A_CORE
[FreeTextEntry1] : Follow up [de-identified] : Pt reports recent episode of hypoglycemia that led to a brief LOC. She further sates that she feels generally unwell/unstable and occasionally dizzy/woozy, she is in the process of inquiring about getting a part time aid to check in on her, help check her BG/BP and assist her with attending medical appointments/running errands. She reports living alone and feeling lonely. \par \par She reports intentional weight loss with strict diet management because she is afraid of her elevated A1C (5.8 at last  visit 2/23). \par \par Pt has been attending PT.  catatonic

## 2025-03-26 NOTE — PROGRESS NOTE ADULT - ASSESSMENT
74F reported asthma with prior intubations and depression initially admitted to MICU with suspected asthma exacerbation with RRT on 3/19 AM for hypoxemia and increased work of breathing with poor air entry. Patient transferred back to ICU for airway evaluation and asthma management and transferred back to floor 3/21, initially requiring BIPAP. Now on NC.    #Asthma Exacerbation  #Lethargy improved  - CTA chest 3/8 without evidence of PNA or PE  - continue Duonebs every 6h, ensure to continue as well on discharge for 7 days   - Continue Pulmicort 0.5mg BID   - Continue Prednisone taper to complete 4/4   - Incentive spirometry  - Wean off NC and assess O2 Sat on ambulation - at the moment ok on 1L NC, can trial off oxygen  - Use Home token on DC for OP Pulmonary follow up   - Pulmonary will sign off

## 2025-03-26 NOTE — BH CONSULTATION LIAISON PROGRESS NOTE - NSBHTIMEACTIVITIESPERFORMED_PSY_A_CORE
chart review, coordinated with medicine, daina chart review, coordinated with medicine about plan above, catatonia exam

## 2025-03-26 NOTE — PROGRESS NOTE ADULT - ATTENDING COMMENTS
73 yo F w/ PMHx asthma (three exacerbations in past year) p/w dyspnea and AMS requiring intubation, now extubated. Stay c/b hypertension requiring nicardipine gtt.  Currently on 2L NC.      Recommend:  - wean suppl O2 to maintain spO2 > 92%  - c/w Pulmicort BID  - Bronchodilator q6h  - c/w prednisone taper to complete 4/4  - BP control  - avoid oversedation    Follow-up with Pulmonary as outpatient.

## 2025-03-26 NOTE — CHART NOTE - NSCHARTNOTEFT_GEN_A_CORE
Nutrition Follow-Up Chart Note         Source: Patient A&Ox1     Chart [x ]     Pt is seen for nutrition follow up due to severe/moderate malnutrition, per protocol.    __________________ Medical Course__________________    74F hx asthma and depressed presented with AHHRF c/b AMS requiring MICU admission for intubation for respiratory failure i/s/o asthma exacerbation. Extubated 3/12 to face tent and now on BIPAP s/p RRT 3/19 for increased WOB / hypoxia despite aggressive asthma management. Transferred back to MICU for management of severe asthma.    Diet, Soft and Bite Sized:   Consistent Carbohydrate {Evening Snack} (CSTCHOSN)  DASH/TLC {Sodium & Cholesterol Restricted} (DASH)  Supplement Feeding Modality:  Oral  Glucerna Shake Cans or Servings Per Day:  1       Frequency:  Two Times a day (03-25-25 @ 19:18) [Active]           __________________ Nutrition Course__________________   Met with patient at bedside, pt noted with decreased cognition but able to answer simple questions. Reports her appetite is not so great in hospital, PO intake varies from 0-75% in hospital.  Encouragement given to increase oral intake to meet her nutritional needs. Pt's diet has been supplementing with Glucerna 2x daily (440kcals, 20g protein), pt reported <50% completion. attempted with explored food preferences, nothing voiced at this time. Pt noted previously on TF from 3/10-3/12, s/p Swallow Eval on 3/20 recommended soft and bite sized with thin liquid diet. Pt currently is tolerating soft and bite sized diet thus far. [facility] Recommend consider appetite stimulant as per medical discretion. Pt's labs notable with POCT ranging from 75-442H, noted pt's insulin adjusted for glycemic management. Recommend cont. monitor and trend POCT and adjust insulin PRN. RD to remain available for further nutritional interventions as indicated.     __________________ Pertinent Medications__________________   MEDICATIONS  (STANDING):  buDESOnide    Inhalation Suspension 0.5 milliGRAM(s) Inhalation every 12 hours  chlorhexidine 2% Cloths 1 Application(s) Topical <User Schedule>  dextrose 5%. 1000 milliLiter(s) (100 mL/Hr) IV Continuous <Continuous>  dextrose 5%. 1000 milliLiter(s) (50 mL/Hr) IV Continuous <Continuous>  dextrose 50% Injectable 25 Gram(s) IV Push once  dextrose 50% Injectable 12.5 Gram(s) IV Push once  dextrose 50% Injectable 25 Gram(s) IV Push once  dextrose Oral Gel 15 Gram(s) Oral once  enoxaparin Injectable 40 milliGRAM(s) SubCutaneous every 24 hours  glucagon  Injectable 1 milliGRAM(s) IntraMuscular once  guaiFENesin  milliGRAM(s) Oral every 12 hours  hydrALAZINE 25 milliGRAM(s) Oral three times a day  hydrochlorothiazide 25 milliGRAM(s) Oral daily  insulin glargine Injectable (LANTUS) 15 Unit(s) SubCutaneous at bedtime  insulin lispro (ADMELOG) corrective regimen sliding scale   SubCutaneous three times a day before meals  insulin lispro (ADMELOG) corrective regimen sliding scale   SubCutaneous at bedtime  insulin lispro Injectable (ADMELOG) 5 Unit(s) SubCutaneous three times a day before meals  levalbuterol Inhalation 0.63 milliGRAM(s) Inhalation every 6 hours  lisinopril 40 milliGRAM(s) Oral daily  LORazepam   Injectable 0.5 milliGRAM(s) IV Push <User Schedule>  pantoprazole    Tablet 40 milliGRAM(s) Oral before breakfast  polyethylene glycol 3350 17 Gram(s) Oral two times a day  predniSONE   Tablet   Oral   predniSONE   Tablet 30 milliGRAM(s) Oral daily  senna 2 Tablet(s) Oral at bedtime  sertraline 50 milliGRAM(s) Oral daily  sodium chloride 0.9%. 1000 milliLiter(s) (125 mL/Hr) IV Continuous <Continuous>    MEDICATIONS  (PRN):      __________________ Pertinent Labs__________________   03-26    139  |  102  |  43[H]  ----------------------------<  181[H]  3.4[L]   |  25  |  0.82    Ca    10.0      26 Mar 2025 06:40  Phos  3.3     03-26  Mg     1.80     03-26                            12.5   11.62 )-----------( 237      ( 25 Mar 2025 06:30 )             39.4          POCT Blood Glucose.: 134 mg/dL (03-26-25 @ 12:08)  POCT Blood Glucose.: 185 mg/dL (03-26-25 @ 08:19)  POCT Blood Glucose.: 193 mg/dL (03-26-25 @ 02:12)  POCT Blood Glucose.: 73 mg/dL (03-25-25 @ 23:31)  POCT Blood Glucose.: 75 mg/dL (03-25-25 @ 22:19)  POCT Blood Glucose.: 158 mg/dL (03-25-25 @ 17:50)  POCT Blood Glucose.: 193 mg/dL (03-25-25 @ 16:39)  POCT Blood Glucose.: 442 mg/dL (03-25-25 @ 12:20)  POCT Blood Glucose.: 324 mg/dL (03-25-25 @ 08:29)  POCT Blood Glucose.: 125 mg/dL (03-24-25 @ 22:18)  POCT Blood Glucose.: 128 mg/dL (03-24-25 @ 17:16)  POCT Blood Glucose.: 272 mg/dL (03-24-25 @ 12:16)  POCT Blood Glucose.: 220 mg/dL (03-24-25 @ 08:32)  POCT Blood Glucose.: 121 mg/dL (03-23-25 @ 22:45)  POCT Blood Glucose.: 256 mg/dL (03-23-25 @ 17:18)        __________________ Anthropometrics__________________     Anthropometrics: Height (cm): 157.5 (03-16)  Weight (kg): 80.2 (03-16)  BMI (kg/m2): 32.3 (03-16)  IBW: 110 +/- 10%    Weight Assessment: per RN flowsheet in lbs  3/26- 164.6  3/24- 164.2  3/23- 161.5   3/20- 163.5   3/16- 157.8   3/15- 171.5  Weight trend reflects weight loss of 11.4lbs/6.4% x 10days, likely due to pt with decreased appetite and fluid shift due to pt noted with 1+ generalized edema, and 2+ b/l legs.      Edema: 1+ generalized edema, and 2+ b/l legs as per RN flow sheet     Skin: None noted at present as per RN flow sheet.     Estimated Needs Assessment:   [ x ] recalculated, Pt with moderate edema which CBW may not be her ABW  Weight Used: IBW- 110lbs/50kg  Estimated Energy: 3803-4638  Kcal/kg/day (30-35  kcal/kg)  Estimated Protein: 60-84 gm/kg/day (1.2-1.4  gm/kg)  Estimated Fluid: per Medical and team discretion.    Nutrition Focused Physical Exam:   [ x ] not applicable;        Previous Nutrition Diagnosis:   [ x ] Moderate malnutrition    Nutrition Diagnosis is : [ x ] ongoing     Education:  [ x ] Not applicable 2/2 cognitive deficit      Recommendations:  [ x ] Continue present PO diet order as it remains appropriate at this time  [ x ] Honor food and fluid preferences within therapeutic diet restrictions as able.  [ x ] RD to remain available for further nutritional interventions as indicated.     Monitoring and Evaluation:   [ x ] Monitor PO intake, skin integrity, bowel regimen, and nutrition pertinent labs.  [ x ] Tolerance to diet prescription [ x ] weights [ x ] follow up per protocol Nutrition Follow-Up Chart Note         Source: Patient A&Ox1     Chart [x ]     Pt is seen for nutrition follow up due to severe/moderate malnutrition, per protocol.    __________________ Medical Course__________________    74F hx asthma and depressed presented with AHHRF c/b AMS requiring MICU admission for intubation for respiratory failure i/s/o asthma exacerbation. Extubated 3/12 to face tent and now on BIPAP s/p RRT 3/19 for increased WOB / hypoxia despite aggressive asthma management. Transferred back to MICU for management of severe asthma.    Diet, Soft and Bite Sized:   Consistent Carbohydrate {Evening Snack} (CSTCHOSN)  DASH/TLC {Sodium & Cholesterol Restricted} (DASH)  Supplement Feeding Modality:  Oral  Glucerna Shake Cans or Servings Per Day:  1       Frequency:  Two Times a day (03-25-25 @ 19:18) [Active]           __________________ Nutrition Course__________________   Met with patient at bedside, pt noted with decreased cognition but able to answer simple questions. Reports her appetite is not so great in hospital, PO intake varies from 0-75% in hospital.  Encouragement given to increase oral intake to meet her nutritional needs. Pt's diet has been supplementing with Glucerna 2x daily (440kcals, 20g protein), pt reported <50% completion. attempted with explored food preferences, nothing voiced at this time. Pt noted previously on TF from 3/10-3/12, s/p Swallow Eval on 3/20 recommended soft and bite sized with thin liquid diet. Pt currently is tolerating soft and bite sized diet thus far. [facility]. Recommend consider appetite stimulant as per medical discretion. Pt's labs notable with POCT ranging from 75-442H, noted pt's insulin adjusted for glycemic management. Recommend cont. monitor and trend POCT and adjust insulin PRN. RD to remain available for further nutritional interventions as indicated.     __________________ Pertinent Medications__________________   MEDICATIONS  (STANDING):  buDESOnide    Inhalation Suspension 0.5 milliGRAM(s) Inhalation every 12 hours  chlorhexidine 2% Cloths 1 Application(s) Topical <User Schedule>  dextrose 5%. 1000 milliLiter(s) (100 mL/Hr) IV Continuous <Continuous>  dextrose 5%. 1000 milliLiter(s) (50 mL/Hr) IV Continuous <Continuous>  dextrose 50% Injectable 25 Gram(s) IV Push once  dextrose 50% Injectable 12.5 Gram(s) IV Push once  dextrose 50% Injectable 25 Gram(s) IV Push once  dextrose Oral Gel 15 Gram(s) Oral once  enoxaparin Injectable 40 milliGRAM(s) SubCutaneous every 24 hours  glucagon  Injectable 1 milliGRAM(s) IntraMuscular once  guaiFENesin  milliGRAM(s) Oral every 12 hours  hydrALAZINE 25 milliGRAM(s) Oral three times a day  hydrochlorothiazide 25 milliGRAM(s) Oral daily  insulin glargine Injectable (LANTUS) 15 Unit(s) SubCutaneous at bedtime  insulin lispro (ADMELOG) corrective regimen sliding scale   SubCutaneous three times a day before meals  insulin lispro (ADMELOG) corrective regimen sliding scale   SubCutaneous at bedtime  insulin lispro Injectable (ADMELOG) 5 Unit(s) SubCutaneous three times a day before meals  levalbuterol Inhalation 0.63 milliGRAM(s) Inhalation every 6 hours  lisinopril 40 milliGRAM(s) Oral daily  LORazepam   Injectable 0.5 milliGRAM(s) IV Push <User Schedule>  pantoprazole    Tablet 40 milliGRAM(s) Oral before breakfast  polyethylene glycol 3350 17 Gram(s) Oral two times a day  predniSONE   Tablet   Oral   predniSONE   Tablet 30 milliGRAM(s) Oral daily  senna 2 Tablet(s) Oral at bedtime  sertraline 50 milliGRAM(s) Oral daily  sodium chloride 0.9%. 1000 milliLiter(s) (125 mL/Hr) IV Continuous <Continuous>    MEDICATIONS  (PRN):      __________________ Pertinent Labs__________________   03-26    139  |  102  |  43[H]  ----------------------------<  181[H]  3.4[L]   |  25  |  0.82    Ca    10.0      26 Mar 2025 06:40  Phos  3.3     03-26  Mg     1.80     03-26                            12.5   11.62 )-----------( 237      ( 25 Mar 2025 06:30 )             39.4          POCT Blood Glucose.: 134 mg/dL (03-26-25 @ 12:08)  POCT Blood Glucose.: 185 mg/dL (03-26-25 @ 08:19)  POCT Blood Glucose.: 193 mg/dL (03-26-25 @ 02:12)  POCT Blood Glucose.: 73 mg/dL (03-25-25 @ 23:31)  POCT Blood Glucose.: 75 mg/dL (03-25-25 @ 22:19)  POCT Blood Glucose.: 158 mg/dL (03-25-25 @ 17:50)  POCT Blood Glucose.: 193 mg/dL (03-25-25 @ 16:39)  POCT Blood Glucose.: 442 mg/dL (03-25-25 @ 12:20)  POCT Blood Glucose.: 324 mg/dL (03-25-25 @ 08:29)  POCT Blood Glucose.: 125 mg/dL (03-24-25 @ 22:18)  POCT Blood Glucose.: 128 mg/dL (03-24-25 @ 17:16)  POCT Blood Glucose.: 272 mg/dL (03-24-25 @ 12:16)  POCT Blood Glucose.: 220 mg/dL (03-24-25 @ 08:32)  POCT Blood Glucose.: 121 mg/dL (03-23-25 @ 22:45)  POCT Blood Glucose.: 256 mg/dL (03-23-25 @ 17:18)        __________________ Anthropometrics__________________     Anthropometrics: Height (cm): 157.5 (03-16)  Weight (kg): 80.2 (03-16)  BMI (kg/m2): 32.3 (03-16)  IBW: 110 +/- 10%    Weight Assessment: per RN flowsheet in lbs  3/26- 164.6  3/24- 164.2  3/23- 161.5   3/20- 163.5   3/16- 157.8   3/15- 171.5  Weight trend reflects weight loss of 11.4lbs/6.4% x 10days, likely due to pt with decreased appetite and fluid shift due to pt noted with 1+ generalized edema, and 2+ b/l legs.      Edema: 1+ generalized edema, and 2+ b/l legs as per RN flow sheet     Skin: None noted at present as per RN flow sheet.     Estimated Needs Assessment:   [ x ] recalculated, Pt with moderate edema which CBW may not be her ABW  Weight Used: IBW- 110lbs/50kg  Estimated Energy: 0767-9315  Kcal/kg/day (30-35  kcal/kg)  Estimated Protein: 60-84 gm/kg/day (1.2-1.4  gm/kg)  Estimated Fluid: per Medical and team discretion.    Nutrition Focused Physical Exam:   [ x ] not applicable;        Previous Nutrition Diagnosis:   [ x ] Moderate malnutrition    Nutrition Diagnosis is : [ x ] ongoing     Education:  [ x ] Not applicable 2/2 cognitive deficit      Recommendations:  [ x ] Continue present PO diet order as it remains appropriate at this time; if PO intake remains poor consider adding appetite stimulant as per medical discretion.   [ x ] Honor food and fluid preferences within therapeutic diet restrictions as able. Will cont. to provide  Glucerna 2x daily (440kcals, 20g protein) to provide optimal nutrition.   [ x ] RD to remain available for further nutritional interventions as indicated.     Monitoring and Evaluation:   [ x ] Monitor PO intake, skin integrity, bowel regimen, and nutrition pertinent labs.  [ x ] Tolerance to diet prescription [ x ] weights [ x ] follow up per protocol

## 2025-03-26 NOTE — PROGRESS NOTE ADULT - PROBLEM SELECTOR PLAN 1
2/2 to status asthmaticus  Follow-up aspergillus Ab, ANCA: negative , IgE   s/p intubation and bipap now on NC  c/w duonebs q6h  ensure to continue as well on discharge for 7 days   s/p course of antibiotics  c/w budesonide BID  currently on solumedrol with plan to start pred taper on 3/23 - complete 4/4   - wean oxygen 2/2 to status asthmaticus  Follow-up aspergillus Ab: negative , ANCA: negative , IgE: negative    s/p intubation and bipap now on NC  c/w duonebs q6h  ensure to continue as well on discharge for 7 days   s/p course of antibiotics  c/w budesonide BID  currently on solumedrol with plan to start pred taper on 3/23 - complete 4/4   - wean oxygen

## 2025-03-26 NOTE — PROGRESS NOTE ADULT - ASSESSMENT
74F hx asthma and depressed presented with AHHRF c/b AMS requiring MICU admission for intubation for respiratory failure i/s/o asthma exacerbation. Extubated 3/12 to face tent and now on BIPAP s/p RRT 3/19 for increased WOB / hypoxia despite aggressive asthma management. Transferred back to MICU for management of severe asthma.      #Damaris - patient with uptrending creating o 1.32 from .61  -> Resolved   - s/p IVF, now resolved .82   - F/U urine studies

## 2025-03-26 NOTE — PROGRESS NOTE ADULT - SUBJECTIVE AND OBJECTIVE BOX
Interval Events:      REVIEW OF SYSTEMS:  Negative except as documented above.      OBJECTIVE:  ICU Vital Signs Last 24 Hrs  T(C): 36.5 (26 Mar 2025 13:25), Max: 37.3 (25 Mar 2025 22:00)  T(F): 97.7 (26 Mar 2025 13:25), Max: 99.1 (25 Mar 2025 22:00)  HR: 104 (26 Mar 2025 13:25) (88 - 115)  BP: 157/83 (26 Mar 2025 13:25) (137/82 - 159/93)  BP(mean): --  ABP: --  ABP(mean): --  RR: 16 (26 Mar 2025 13:25) (16 - 18)  SpO2: 97% (26 Mar 2025 13:25) (92% - 99%)    O2 Parameters below as of 26 Mar 2025 13:25  Patient On (Oxygen Delivery Method): nasal cannula  O2 Flow (L/min): 2            03-25 @ 07:01  -  03-26 @ 07:00  --------------------------------------------------------  IN: 480 mL / OUT: 1200 mL / NET: -720 mL      CAPILLARY BLOOD GLUCOSE      POCT Blood Glucose.: 134 mg/dL (26 Mar 2025 12:08)      PHYSICAL EXAM:  General: NAD  HEENT:  EOMI, sclera anicteric, moist mucus membranes  Neck: supple  Cardiovascular: RRR  Respiratory: CTAB, no wheezes, crackles, or rhonci  Abdomen: soft, nontender  Extremities: warm and well perfused, no edema, no clubbing  Skin: no rashes  Neurological: no focal deficits    HOSPITAL MEDICATIONS:  MEDICATIONS  (STANDING):  buDESOnide    Inhalation Suspension 0.5 milliGRAM(s) Inhalation every 12 hours  chlorhexidine 2% Cloths 1 Application(s) Topical <User Schedule>  dextrose 5%. 1000 milliLiter(s) (100 mL/Hr) IV Continuous <Continuous>  dextrose 5%. 1000 milliLiter(s) (50 mL/Hr) IV Continuous <Continuous>  dextrose 50% Injectable 25 Gram(s) IV Push once  dextrose 50% Injectable 12.5 Gram(s) IV Push once  dextrose 50% Injectable 25 Gram(s) IV Push once  dextrose Oral Gel 15 Gram(s) Oral once  enoxaparin Injectable 40 milliGRAM(s) SubCutaneous every 24 hours  glucagon  Injectable 1 milliGRAM(s) IntraMuscular once  guaiFENesin  milliGRAM(s) Oral every 12 hours  hydrALAZINE 25 milliGRAM(s) Oral three times a day  hydrochlorothiazide 25 milliGRAM(s) Oral daily  insulin glargine Injectable (LANTUS) 15 Unit(s) SubCutaneous at bedtime  insulin lispro (ADMELOG) corrective regimen sliding scale   SubCutaneous three times a day before meals  insulin lispro (ADMELOG) corrective regimen sliding scale   SubCutaneous at bedtime  insulin lispro Injectable (ADMELOG) 5 Unit(s) SubCutaneous three times a day before meals  levalbuterol Inhalation 0.63 milliGRAM(s) Inhalation every 6 hours  lisinopril 40 milliGRAM(s) Oral daily  LORazepam   Injectable 0.5 milliGRAM(s) IV Push <User Schedule>  pantoprazole    Tablet 40 milliGRAM(s) Oral before breakfast  polyethylene glycol 3350 17 Gram(s) Oral two times a day  predniSONE   Tablet   Oral   predniSONE   Tablet 30 milliGRAM(s) Oral daily  senna 2 Tablet(s) Oral at bedtime  sertraline 50 milliGRAM(s) Oral daily  sodium chloride 0.9%. 1000 milliLiter(s) (125 mL/Hr) IV Continuous <Continuous>    MEDICATIONS  (PRN):      LABS:                        12.5   11.62 )-----------( 237      ( 25 Mar 2025 06:30 )             39.4     Hgb Trend: 12.5<--, 12.4<--, 12.2<--, 12.8<--  03-26    139  |  102  |  43[H]  ----------------------------<  181[H]  3.4[L]   |  25  |  0.82    Ca    10.0      26 Mar 2025 06:40  Phos  3.3     03-26  Mg     1.80     03-26      Creatinine Trend: 0.82<--, 1.32<--, 0.61<--, 0.73<--, 0.72<--, 0.57<--    Urinalysis Basic - ( 26 Mar 2025 06:40 )    Color: x / Appearance: x / SG: x / pH: x  Gluc: 181 mg/dL / Ketone: x  / Bili: x / Urobili: x   Blood: x / Protein: x / Nitrite: x   Leuk Esterase: x / RBC: x / WBC x   Sq Epi: x / Non Sq Epi: x / Bacteria: x            MICROBIOLOGY:       RADIOLOGY:  [x] Reviewed and interpreted by me

## 2025-03-26 NOTE — BH CONSULTATION LIAISON PROGRESS NOTE - NSBHASSESSMENTFT_PSY_ALL_CORE
Patient is a 74F hx asthma, depression, and HTN was admitted to the MICU with AHRF 2/2 asthma exacerbation resulting in acute on chronic HHRF causing AMS and increased WOB that required intubation. During her ICU course, she experienced seizure-like activity .  Additional issues included hyponatremia, acute on chronic metabolic alkalosis, and mild thrombocytopenia,  also having fevers.  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 Patient has h/o hx of major depressive disorder with psychosis, hx of 3 past inpatient psychiatric hospitalizations last in 2016 at [facility], all rehospitalizations were in context non compliance with the treatment. She has no hx of suicide attempts, no hx of substance abuse. Patient w hx of paranoia, disorganization, catatonia, required MOO when in [facility].     3/24----more withdrawn, some stiffness in neck, not communicative. Some concerns for catatonia. Unsure if symptoms attributed to catatonia vs delirium    3/25--- more alert today, trying to following commands. Still with PMR+. Appears to be responding to Ativan  3/26-- BFS 10, will increasing standing ativan to target catatonic symptoms - giving STAT 0.5mg NOW discussed w acp     PLAN  - no SI or HI, no need for psychiatric CO  - EKG  -- antipsychotics can only be given if qtc < 500      - MEDICATIONS:  -- INCREASE Ativan to 0.5mg IV at 7am and 1 pm standing   -- Continue Zoloft  50mg daily ( home dose 150mg) ( monitor NA- recent hyponatremia)  -- HOLD Zyprexa for now    - PRN:  --Ativan 0.5mg q 6hrs prn IM/IV/PO  - DISPO: pending

## 2025-03-27 LAB
GLUCOSE BLDC GLUCOMTR-MCNC: 158 MG/DL — HIGH (ref 70–99)
GLUCOSE BLDC GLUCOMTR-MCNC: 158 MG/DL — HIGH (ref 70–99)
GLUCOSE BLDC GLUCOMTR-MCNC: 189 MG/DL — HIGH (ref 70–99)
GLUCOSE BLDC GLUCOMTR-MCNC: 235 MG/DL — HIGH (ref 70–99)

## 2025-03-27 PROCEDURE — 99232 SBSQ HOSP IP/OBS MODERATE 35: CPT

## 2025-03-27 PROCEDURE — 99233 SBSQ HOSP IP/OBS HIGH 50: CPT

## 2025-03-27 RX ORDER — INSULIN GLARGINE-YFGN 100 [IU]/ML
12 INJECTION, SOLUTION SUBCUTANEOUS AT BEDTIME
Refills: 0 | Status: DISCONTINUED | OUTPATIENT
Start: 2025-03-27 | End: 2025-04-03

## 2025-03-27 RX ORDER — LORAZEPAM 4 MG/ML
0.5 VIAL (ML) INJECTION
Refills: 0 | Status: DISCONTINUED | OUTPATIENT
Start: 2025-03-27 | End: 2025-03-28

## 2025-03-27 RX ORDER — INSULIN LISPRO 100 U/ML
3 INJECTION, SOLUTION INTRAVENOUS; SUBCUTANEOUS
Refills: 0 | Status: DISCONTINUED | OUTPATIENT
Start: 2025-03-27 | End: 2025-03-30

## 2025-03-27 RX ADMIN — INSULIN LISPRO 3 UNIT(S): 100 INJECTION, SOLUTION INTRAVENOUS; SUBCUTANEOUS at 18:03

## 2025-03-27 RX ADMIN — INSULIN LISPRO 2: 100 INJECTION, SOLUTION INTRAVENOUS; SUBCUTANEOUS at 12:30

## 2025-03-27 RX ADMIN — Medication 50 MILLIGRAM(S): at 21:48

## 2025-03-27 RX ADMIN — LEVALBUTEROL HYDROCHLORIDE 0.63 MILLIGRAM(S): 1.25 SOLUTION RESPIRATORY (INHALATION) at 14:18

## 2025-03-27 RX ADMIN — PREDNISONE 30 MILLIGRAM(S): 20 TABLET ORAL at 06:25

## 2025-03-27 RX ADMIN — Medication 2 TABLET(S): at 00:03

## 2025-03-27 RX ADMIN — INSULIN GLARGINE-YFGN 12 UNIT(S): 100 INJECTION, SOLUTION SUBCUTANEOUS at 22:12

## 2025-03-27 RX ADMIN — INSULIN LISPRO 5 UNIT(S): 100 INJECTION, SOLUTION INTRAVENOUS; SUBCUTANEOUS at 09:06

## 2025-03-27 RX ADMIN — BUDESONIDE 0.5 MILLIGRAM(S): 0.25 SUSPENSION RESPIRATORY (INHALATION) at 21:38

## 2025-03-27 RX ADMIN — POLYETHYLENE GLYCOL 3350 17 GRAM(S): 17 POWDER, FOR SOLUTION ORAL at 18:05

## 2025-03-27 RX ADMIN — INSULIN LISPRO 4: 100 INJECTION, SOLUTION INTRAVENOUS; SUBCUTANEOUS at 18:02

## 2025-03-27 RX ADMIN — ENOXAPARIN SODIUM 40 MILLIGRAM(S): 100 INJECTION SUBCUTANEOUS at 13:32

## 2025-03-27 RX ADMIN — LEVALBUTEROL HYDROCHLORIDE 0.63 MILLIGRAM(S): 1.25 SOLUTION RESPIRATORY (INHALATION) at 03:52

## 2025-03-27 RX ADMIN — Medication 50 MILLIGRAM(S): at 13:38

## 2025-03-27 RX ADMIN — Medication 40 MILLIGRAM(S): at 06:24

## 2025-03-27 RX ADMIN — Medication 50 MILLIGRAM(S): at 00:02

## 2025-03-27 RX ADMIN — Medication 1 APPLICATION(S): at 06:26

## 2025-03-27 RX ADMIN — LEVALBUTEROL HYDROCHLORIDE 0.63 MILLIGRAM(S): 1.25 SOLUTION RESPIRATORY (INHALATION) at 21:38

## 2025-03-27 RX ADMIN — LISINOPRIL 40 MILLIGRAM(S): 5 TABLET ORAL at 06:25

## 2025-03-27 RX ADMIN — INSULIN GLARGINE-YFGN 15 UNIT(S): 100 INJECTION, SOLUTION SUBCUTANEOUS at 00:02

## 2025-03-27 RX ADMIN — BUDESONIDE 0.5 MILLIGRAM(S): 0.25 SUSPENSION RESPIRATORY (INHALATION) at 08:12

## 2025-03-27 RX ADMIN — Medication 50 MILLIGRAM(S): at 06:24

## 2025-03-27 RX ADMIN — DEXTROMETHORPHAN HBR, GUAIFENESIN 600 MILLIGRAM(S): 200 LIQUID ORAL at 18:05

## 2025-03-27 RX ADMIN — LEVALBUTEROL HYDROCHLORIDE 0.63 MILLIGRAM(S): 1.25 SOLUTION RESPIRATORY (INHALATION) at 08:12

## 2025-03-27 RX ADMIN — INSULIN LISPRO 2: 100 INJECTION, SOLUTION INTRAVENOUS; SUBCUTANEOUS at 09:05

## 2025-03-27 RX ADMIN — Medication 2 TABLET(S): at 21:44

## 2025-03-27 RX ADMIN — Medication 0.5 MILLIGRAM(S): at 06:24

## 2025-03-27 RX ADMIN — DEXTROMETHORPHAN HBR, GUAIFENESIN 600 MILLIGRAM(S): 200 LIQUID ORAL at 06:24

## 2025-03-27 RX ADMIN — SERTRALINE 50 MILLIGRAM(S): 100 TABLET, FILM COATED ORAL at 13:31

## 2025-03-27 RX ADMIN — Medication 0.5 MILLIGRAM(S): at 13:28

## 2025-03-27 NOTE — BH CONSULTATION LIAISON PROGRESS NOTE - NSBHFUPINTERVALCCFT_PSY_A_CORE
Reviewed medicine notes  Eating better but with much assistance from staff  On NC  Tolerating Ativan  Medicine team aware of plan below

## 2025-03-27 NOTE — BH CONSULTATION LIAISON PROGRESS NOTE - NSBHASSESSMENTFT_PSY_ALL_CORE
Patient is a 74F hx asthma, depression, and HTN was admitted to the MICU with AHRF 2/2 asthma exacerbation resulting in acute on chronic HHRF causing AMS and increased WOB that required intubation. During her ICU course, she experienced seizure-like activity .  Additional issues included hyponatremia, acute on chronic metabolic alkalosis, and mild thrombocytopenia,  also having fevers.  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 Patient has h/o hx of major depressive disorder with psychosis, hx of 3 past inpatient psychiatric hospitalizations last in 2016 at [facility], all rehospitalizations were in context non compliance with the treatment. She has no hx of suicide attempts, no hx of substance abuse. Patient w hx of paranoia, disorganization, catatonia, required MOO when in [facility].     3/24----more withdrawn, some stiffness in neck, not communicative. Some concerns for catatonia. Unsure if symptoms attributed to catatonia vs delirium    3/25--- more alert today, trying to following commands. Still with PMR+. Appears to be responding to Ativan  3/26-- BFS 10, will increasing standing ativan to target catatonic symptoms - giving STAT 0.5mg NOW discussed w acp     3/27----will continue ativan dosing for one more day before increasing    PLAN  - no SI or HI, no need for psychiatric CO  - EKG  -- antipsychotics can only be given if qtc < 500      - MEDICATIONS:  -- Continue Ativan 0.5mg IV at 7am and 1 pm standing   -- Continue Zoloft  50mg daily ( home dose 150mg) ( monitor NA- recent hyponatremia)  -- HOLD Zyprexa for now    - PRN:  --Ativan 0.5mg q 6hrs prn IM/IV/PO  - DISPO: pending

## 2025-03-27 NOTE — PROVIDER CONTACT NOTE (OTHER) - ASSESSMENT
pt A&Ox2. no s/s of distress noted. no s/s of shortness of breath or chest pain noted. VS 98.9 temp, 101 HR, 106/58 BP, 18 RR, 100 spo2 on 2 L NC. pt scheduled for hydralazine. pt A&Ox1-2. no s/s of distress noted. no s/s of shortness of breath or chest pain noted. VS 98.9 temp, 101 HR, 106/58 BP, 18 RR, 100 spo2 on 2 L NC. pt scheduled for hydralazine.

## 2025-03-27 NOTE — PROGRESS NOTE ADULT - PROBLEM SELECTOR PLAN 7
increase to lantus 15 units qhs and 5 units premeal  ISS  anticipate insulin needs will decrease as weaning off steroids. now on oral pred  [ ] monitor FS and adjust as needed Low FS, will reduce  lantus 15 -> 12 units qhs and 5 -> 3 units premeal  ISS  anticipate insulin needs will decrease as weaning off steroids. now on oral pred  [ ] monitor FS and adjust as needed

## 2025-03-27 NOTE — PROVIDER CONTACT NOTE (OTHER) - ACTION/TREATMENT ORDERED:
provider made aware, provider stated ok to give hydralazine if within written hydralazine order parameter, no further actions at this time

## 2025-03-27 NOTE — PROGRESS NOTE ADULT - SUBJECTIVE AND OBJECTIVE BOX
[facility]  Division of Hospital Medicine  Dede Marquez MD  Pager: [phone]      Patient is a 74y old  Female who presents with a chief complaint of AMS and increased WOB (21 Mar 2025 06:24)      SUBJECTIVE / OVERNIGHT EVENTS: Patient examined at bedside. Noted to be more withdrawn and less engaged in convo. Psych increased ativan dose given concern for catatonia   ADDITIONAL REVIEW OF SYSTEMS:    MEDICATIONS  (STANDING):  buDESOnide    Inhalation Suspension 0.5 milliGRAM(s) Inhalation every 12 hours  chlorhexidine 2% Cloths 1 Application(s) Topical <User Schedule>  dextrose 5%. 1000 milliLiter(s) (50 mL/Hr) IV Continuous <Continuous>  dextrose 5%. 1000 milliLiter(s) (100 mL/Hr) IV Continuous <Continuous>  dextrose 50% Injectable 25 Gram(s) IV Push once  dextrose 50% Injectable 12.5 Gram(s) IV Push once  dextrose 50% Injectable 25 Gram(s) IV Push once  dextrose Oral Gel 15 Gram(s) Oral once  enoxaparin Injectable 40 milliGRAM(s) SubCutaneous every 24 hours  glucagon  Injectable 1 milliGRAM(s) IntraMuscular once  guaiFENesin  milliGRAM(s) Oral every 12 hours  hydrALAZINE 50 milliGRAM(s) Oral three times a day  hydrochlorothiazide 25 milliGRAM(s) Oral daily  insulin glargine Injectable (LANTUS) 15 Unit(s) SubCutaneous at bedtime  insulin lispro (ADMELOG) corrective regimen sliding scale   SubCutaneous three times a day before meals  insulin lispro (ADMELOG) corrective regimen sliding scale   SubCutaneous at bedtime  insulin lispro Injectable (ADMELOG) 5 Unit(s) SubCutaneous three times a day before meals  levalbuterol Inhalation 0.63 milliGRAM(s) Inhalation every 6 hours  lisinopril 40 milliGRAM(s) Oral daily  LORazepam   Injectable 0.5 milliGRAM(s) IV Push <User Schedule>  pantoprazole    Tablet 40 milliGRAM(s) Oral before breakfast  polyethylene glycol 3350 17 Gram(s) Oral two times a day  predniSONE   Tablet   Oral   predniSONE   Tablet 30 milliGRAM(s) Oral daily  senna 2 Tablet(s) Oral at bedtime  sertraline 50 milliGRAM(s) Oral daily  sodium chloride 0.9%. 1000 milliLiter(s) (125 mL/Hr) IV Continuous <Continuous>    MEDICATIONS  (PRN):      CAPILLARY BLOOD GLUCOSE      POCT Blood Glucose.: 158 mg/dL (27 Mar 2025 12:03)  POCT Blood Glucose.: 189 mg/dL (27 Mar 2025 08:20)  POCT Blood Glucose.: 130 mg/dL (26 Mar 2025 23:04)  POCT Blood Glucose.: 129 mg/dL (26 Mar 2025 18:11)  POCT Blood Glucose.: 73 mg/dL (26 Mar 2025 17:35)  POCT Blood Glucose.: 66 mg/dL (26 Mar 2025 17:28)    I&O's Summary      PHYSICAL EXAM:  Vital Signs Last 24 Hrs  T(C): 36.5 (27 Mar 2025 12:45), Max: 37.3 (27 Mar 2025 02:00)  T(F): 97.7 (27 Mar 2025 12:45), Max: 99.2 (27 Mar 2025 02:00)  HR: 89 (27 Mar 2025 12:45) (87 - 99)  BP: 145/76 (27 Mar 2025 12:45) (124/61 - 145/76)  BP(mean): --  RR: 18 (27 Mar 2025 12:45) (17 - 18)  SpO2: 99% (27 Mar 2025 12:45) (96% - 100%)    Parameters below as of 27 Mar 2025 08:12  Patient On (Oxygen Delivery Method): nasal cannula      CONSTITUTIONAL: Elderly woman in  NAD,   ENMT: Moist oral mucosa,   RESPIRATORY: Normal respiratory effort on NC ; lungs are clear to auscultation bilaterally  CARDIOVASCULAR: Regular rate and rhythm, normal S1 and S2, no murmur/rub/gallop; No lower extremity edema;   ABDOMEN: Nontender to palpation, normoactive bowel sounds, no rebound/guarding; No hepatosplenomegaly  EXT: no edema b/l  NEUROLOGY: more withdrawn, less engaged in exam   SKIN: No rashes; no palpable lesions  LABS:    03-26    139  |  102  |  43[H]  ----------------------------<  181[H]  3.4[L]   |  25  |  0.82    Ca    10.0      26 Mar 2025 06:40  Phos  3.3     03-26  Mg     1.80     03-26            Urinalysis Basic - ( 26 Mar 2025 06:40 )    Color: x / Appearance: x / SG: x / pH: x  Gluc: 181 mg/dL / Ketone: x  / Bili: x / Urobili: x   Blood: x / Protein: x / Nitrite: x   Leuk Esterase: x / RBC: x / WBC x   Sq Epi: x / Non Sq Epi: x / Bacteria: x          RADIOLOGY & ADDITIONAL TESTS:  Results Reviewed:   Imaging Personally Reviewed:  Electrocardiogram Personally Reviewed:    COORDINATION OF CARE:  Care Discussed with Consultants/Other Providers [Y/N]:  Prior or Outpatient Records Reviewed [Y/N]:

## 2025-03-27 NOTE — PROGRESS NOTE ADULT - PROBLEM SELECTOR PLAN 8
- CTH: no acute changes  - CT spine: no fractures  - 3/8 ON: 6 minutes of seizure-like activity with myoclonic upper jerks -> broke with ativan  -- EEG 3/10 showing no epileptiform activity; severe degree of diffuse or multifocal cerebral dysfunction c/w comatose   MRI brain- Multiple chronic lacunar infarcts in the bilateral basal ganglia and   thalami. No acute infarct

## 2025-03-27 NOTE — BH CONSULTATION LIAISON PROGRESS NOTE - NSBHFUPINTERVALHXFT_PSY_A_CORE
Met with the patient. Son at bedside assisting with feeding the patient. Patient is awake, answer few brief questions. She is oriented x 3. PMR+, follows very simple commands. Tracks MD. No posturing or catalepsy noted today. Not rigid on UE exam.   Seems tired, and dozing off.   Denies any si or hi, or ah or paranoia. States- ' I don't feel good'.  Care coordinated with son at length---- discussed below impression and plan. Son's questions were answered. Knows that we will coordinate with brother who is a physician, and patient's

## 2025-03-27 NOTE — PROGRESS NOTE ADULT - PROBLEM SELECTOR PLAN 6
Pt w/hx of paranoia and stupor from depression  increased Zoloft to 50mg daily ( home dose 150mg) ( monitor NA- recent hyponatremia), As per  team, team will follow and make any changes if needed,   > continue home Olanzapine, 10 mg qhs -> lowered to 5mg per psych     given mild rigidity, concern for catatonia now on standing  Ativan to 0.5mg IV at 7am and 1 pm standing   - appreciate psych recs

## 2025-03-27 NOTE — PROVIDER CONTACT NOTE (OTHER) - ASSESSMENT
pt A&Ox2. no s/s of distress noted. no s/s of shortness of breath or chest pain noted. VS 98.7 temp, 100 HR, 103/63 BP, 17 RR, 96 spo2 on 2L NC. no signs of discomfort observed, vital signs within normal range pt A&Ox1-2. no s/s of distress noted. no s/s of shortness of breath or chest pain noted. VS 98.7 temp, 100 HR, 103/63 BP, 17 RR, 96 spo2 on 2L NC. no signs of discomfort observed, vital signs within normal range

## 2025-03-27 NOTE — PROGRESS NOTE ADULT - PROBLEM SELECTOR PLAN 1
2/2 to status asthmaticus  Follow-up aspergillus Ab: negative , ANCA: negative , IgE: negative    s/p intubation and bipap now on NC  c/w duonebs q6h  ensure to continue as well on discharge for 7 days   s/p course of antibiotics  c/w budesonide BID  currently on solumedrol with plan to start pred taper on 3/23 - complete 4/4   - wean oxygen

## 2025-03-28 LAB
ANION GAP SERPL CALC-SCNC: 12 MMOL/L — SIGNIFICANT CHANGE UP (ref 7–14)
BUN SERPL-MCNC: 40 MG/DL — HIGH (ref 7–23)
CALCIUM SERPL-MCNC: 10.1 MG/DL — SIGNIFICANT CHANGE UP (ref 8.4–10.5)
CHLORIDE SERPL-SCNC: 103 MMOL/L — SIGNIFICANT CHANGE UP (ref 98–107)
CO2 SERPL-SCNC: 26 MMOL/L — SIGNIFICANT CHANGE UP (ref 22–31)
CREAT SERPL-MCNC: 1.2 MG/DL — SIGNIFICANT CHANGE UP (ref 0.5–1.3)
EGFR: 48 ML/MIN/1.73M2 — LOW
EGFR: 48 ML/MIN/1.73M2 — LOW
GLUCOSE BLDC GLUCOMTR-MCNC: 153 MG/DL — HIGH (ref 70–99)
GLUCOSE BLDC GLUCOMTR-MCNC: 159 MG/DL — HIGH (ref 70–99)
GLUCOSE BLDC GLUCOMTR-MCNC: 180 MG/DL — HIGH (ref 70–99)
GLUCOSE BLDC GLUCOMTR-MCNC: 198 MG/DL — HIGH (ref 70–99)
GLUCOSE SERPL-MCNC: 217 MG/DL — HIGH (ref 70–99)
HCT VFR BLD CALC: 35.8 % — SIGNIFICANT CHANGE UP (ref 34.5–45)
HGB BLD-MCNC: 11.5 G/DL — SIGNIFICANT CHANGE UP (ref 11.5–15.5)
MAGNESIUM SERPL-MCNC: 2.1 MG/DL — SIGNIFICANT CHANGE UP (ref 1.6–2.6)
MCHC RBC-ENTMCNC: 31.6 PG — SIGNIFICANT CHANGE UP (ref 27–34)
MCHC RBC-ENTMCNC: 32.1 G/DL — SIGNIFICANT CHANGE UP (ref 32–36)
MCV RBC AUTO: 98.4 FL — SIGNIFICANT CHANGE UP (ref 80–100)
NRBC # BLD AUTO: 0 K/UL — SIGNIFICANT CHANGE UP (ref 0–0)
NRBC # FLD: 0 K/UL — SIGNIFICANT CHANGE UP (ref 0–0)
NRBC BLD AUTO-RTO: 0 /100 WBCS — SIGNIFICANT CHANGE UP (ref 0–0)
PHOSPHATE SERPL-MCNC: 4.7 MG/DL — HIGH (ref 2.5–4.5)
PLATELET # BLD AUTO: 176 K/UL — SIGNIFICANT CHANGE UP (ref 150–400)
POTASSIUM SERPL-MCNC: 3.7 MMOL/L — SIGNIFICANT CHANGE UP (ref 3.5–5.3)
POTASSIUM SERPL-SCNC: 3.7 MMOL/L — SIGNIFICANT CHANGE UP (ref 3.5–5.3)
RBC # BLD: 3.64 M/UL — LOW (ref 3.8–5.2)
RBC # FLD: 12.7 % — SIGNIFICANT CHANGE UP (ref 10.3–14.5)
SODIUM SERPL-SCNC: 141 MMOL/L — SIGNIFICANT CHANGE UP (ref 135–145)
WBC # BLD: 9.98 K/UL — SIGNIFICANT CHANGE UP (ref 3.8–10.5)
WBC # FLD AUTO: 9.98 K/UL — SIGNIFICANT CHANGE UP (ref 3.8–10.5)

## 2025-03-28 PROCEDURE — 99232 SBSQ HOSP IP/OBS MODERATE 35: CPT

## 2025-03-28 PROCEDURE — 99233 SBSQ HOSP IP/OBS HIGH 50: CPT

## 2025-03-28 RX ORDER — LORAZEPAM 4 MG/ML
0.25 VIAL (ML) INJECTION
Refills: 0 | Status: DISCONTINUED | OUTPATIENT
Start: 2025-03-28 | End: 2025-03-29

## 2025-03-28 RX ORDER — LORAZEPAM 4 MG/ML
0.5 VIAL (ML) INJECTION
Refills: 0 | Status: DISCONTINUED | OUTPATIENT
Start: 2025-03-28 | End: 2025-03-31

## 2025-03-28 RX ADMIN — Medication 50 MILLIGRAM(S): at 14:14

## 2025-03-28 RX ADMIN — LEVALBUTEROL HYDROCHLORIDE 0.63 MILLIGRAM(S): 1.25 SOLUTION RESPIRATORY (INHALATION) at 03:40

## 2025-03-28 RX ADMIN — INSULIN LISPRO 3 UNIT(S): 100 INJECTION, SOLUTION INTRAVENOUS; SUBCUTANEOUS at 12:15

## 2025-03-28 RX ADMIN — Medication 50 MILLIGRAM(S): at 21:01

## 2025-03-28 RX ADMIN — LEVALBUTEROL HYDROCHLORIDE 0.63 MILLIGRAM(S): 1.25 SOLUTION RESPIRATORY (INHALATION) at 09:50

## 2025-03-28 RX ADMIN — Medication 0.25 MILLIGRAM(S): at 18:49

## 2025-03-28 RX ADMIN — LEVALBUTEROL HYDROCHLORIDE 0.63 MILLIGRAM(S): 1.25 SOLUTION RESPIRATORY (INHALATION) at 21:30

## 2025-03-28 RX ADMIN — Medication 2 TABLET(S): at 21:01

## 2025-03-28 RX ADMIN — Medication 0.5 MILLIGRAM(S): at 12:15

## 2025-03-28 RX ADMIN — Medication 0.5 MILLIGRAM(S): at 06:51

## 2025-03-28 RX ADMIN — DEXTROMETHORPHAN HBR, GUAIFENESIN 600 MILLIGRAM(S): 200 LIQUID ORAL at 18:50

## 2025-03-28 RX ADMIN — LEVALBUTEROL HYDROCHLORIDE 0.63 MILLIGRAM(S): 1.25 SOLUTION RESPIRATORY (INHALATION) at 15:19

## 2025-03-28 RX ADMIN — ENOXAPARIN SODIUM 40 MILLIGRAM(S): 100 INJECTION SUBCUTANEOUS at 12:19

## 2025-03-28 RX ADMIN — INSULIN GLARGINE-YFGN 12 UNIT(S): 100 INJECTION, SOLUTION SUBCUTANEOUS at 21:09

## 2025-03-28 RX ADMIN — BUDESONIDE 0.5 MILLIGRAM(S): 0.25 SUSPENSION RESPIRATORY (INHALATION) at 09:50

## 2025-03-28 RX ADMIN — INSULIN LISPRO 3 UNIT(S): 100 INJECTION, SOLUTION INTRAVENOUS; SUBCUTANEOUS at 18:00

## 2025-03-28 RX ADMIN — INSULIN LISPRO 2: 100 INJECTION, SOLUTION INTRAVENOUS; SUBCUTANEOUS at 08:55

## 2025-03-28 RX ADMIN — INSULIN LISPRO 2: 100 INJECTION, SOLUTION INTRAVENOUS; SUBCUTANEOUS at 18:00

## 2025-03-28 RX ADMIN — INSULIN LISPRO 2: 100 INJECTION, SOLUTION INTRAVENOUS; SUBCUTANEOUS at 12:15

## 2025-03-28 RX ADMIN — SERTRALINE 50 MILLIGRAM(S): 100 TABLET, FILM COATED ORAL at 12:20

## 2025-03-28 RX ADMIN — BUDESONIDE 0.5 MILLIGRAM(S): 0.25 SUSPENSION RESPIRATORY (INHALATION) at 21:30

## 2025-03-28 RX ADMIN — INSULIN LISPRO 3 UNIT(S): 100 INJECTION, SOLUTION INTRAVENOUS; SUBCUTANEOUS at 08:56

## 2025-03-28 RX ADMIN — POLYETHYLENE GLYCOL 3350 17 GRAM(S): 17 POWDER, FOR SOLUTION ORAL at 18:50

## 2025-03-28 RX ADMIN — Medication 1 APPLICATION(S): at 06:02

## 2025-03-28 NOTE — BH CONSULTATION LIAISON PROGRESS NOTE - NSBHFUPINTERVALHXFT_PSY_A_CORE
Patient was seen and assessed at bedside. Patient say "I don't know, I don't know" often throughout conversation - verbigeration?   Able to state her name, otherwise does not answer direct questions - few words  No posturing, but remains rigid and withdrawal, poor eye contact Patient was seen and assessed at bedside. Patient say "I don't know, I don't know" often throughout conversation - verbigeration?   Able to state her name, otherwise does not answer direct questions - few words  No posturing, but remains rigid and withdrawal, poor eye contact    SPoke with son   Andrewmedina - aware patient presented as catatonic on todays exam, aware of medication regime and plan of care. in agreement.

## 2025-03-28 NOTE — PROGRESS NOTE ADULT - PROBLEM SELECTOR PLAN 7
Low FS, will reduce  lantus 15 -> 12 units qhs and 5 -> 3 units premeal  ISS  anticipate insulin needs will decrease as weaning off steroids. now on oral pred  [ ] monitor FS and adjust as needed

## 2025-03-28 NOTE — PROGRESS NOTE ADULT - SUBJECTIVE AND OBJECTIVE BOX
[facility]  Division of Hospital Medicine  Dede Marquez MD  Pager: [phone]      Patient is a 74y old  Female who presents with a chief complaint of AMS and increased WOB (21 Mar 2025 06:24)      SUBJECTIVE / OVERNIGHT EVENTS: Patient withdrawn on todays exam. Psych increasing ativan given concern for catatonia   ADDITIONAL REVIEW OF SYSTEMS:    MEDICATIONS  (STANDING):  buDESOnide    Inhalation Suspension 0.5 milliGRAM(s) Inhalation every 12 hours  chlorhexidine 2% Cloths 1 Application(s) Topical <User Schedule>  dextrose 5%. 1000 milliLiter(s) (100 mL/Hr) IV Continuous <Continuous>  dextrose 5%. 1000 milliLiter(s) (50 mL/Hr) IV Continuous <Continuous>  dextrose 50% Injectable 25 Gram(s) IV Push once  dextrose 50% Injectable 12.5 Gram(s) IV Push once  dextrose 50% Injectable 25 Gram(s) IV Push once  dextrose Oral Gel 15 Gram(s) Oral once  enoxaparin Injectable 40 milliGRAM(s) SubCutaneous every 24 hours  glucagon  Injectable 1 milliGRAM(s) IntraMuscular once  guaiFENesin  milliGRAM(s) Oral every 12 hours  hydrALAZINE 50 milliGRAM(s) Oral three times a day  hydrochlorothiazide 25 milliGRAM(s) Oral daily  insulin glargine Injectable (LANTUS) 12 Unit(s) SubCutaneous at bedtime  insulin lispro (ADMELOG) corrective regimen sliding scale   SubCutaneous three times a day before meals  insulin lispro (ADMELOG) corrective regimen sliding scale   SubCutaneous at bedtime  insulin lispro Injectable (ADMELOG) 3 Unit(s) SubCutaneous three times a day before meals  levalbuterol Inhalation 0.63 milliGRAM(s) Inhalation every 6 hours  lisinopril 40 milliGRAM(s) Oral daily  LORazepam   Injectable 0.5 milliGRAM(s) IV Push <User Schedule>  LORazepam   Injectable 0.25 milliGRAM(s) IV Push <User Schedule>  pantoprazole    Tablet 40 milliGRAM(s) Oral before breakfast  polyethylene glycol 3350 17 Gram(s) Oral two times a day  predniSONE   Tablet   Oral   senna 2 Tablet(s) Oral at bedtime  sertraline 50 milliGRAM(s) Oral daily  sodium chloride 0.9%. 1000 milliLiter(s) (125 mL/Hr) IV Continuous <Continuous>    MEDICATIONS  (PRN):      CAPILLARY BLOOD GLUCOSE      POCT Blood Glucose.: 159 mg/dL (28 Mar 2025 12:10)  POCT Blood Glucose.: 198 mg/dL (28 Mar 2025 08:32)  POCT Blood Glucose.: 158 mg/dL (27 Mar 2025 22:07)  POCT Blood Glucose.: 235 mg/dL (27 Mar 2025 17:24)    I&O's Summary    27 Mar 2025 07:01  -  28 Mar 2025 07:00  --------------------------------------------------------  IN: 0 mL / OUT: 500 mL / NET: -500 mL        PHYSICAL EXAM:  Vital Signs Last 24 Hrs  T(C): 37.3 (28 Mar 2025 08:00), Max: 37.3 (28 Mar 2025 08:00)  T(F): 99.1 (28 Mar 2025 08:00), Max: 99.1 (28 Mar 2025 08:00)  HR: 91 (28 Mar 2025 15:19) (81 - 103)  BP: 112/64 (28 Mar 2025 08:00) (103/63 - 128/67)  BP(mean): --  RR: 18 (28 Mar 2025 08:00) (17 - 18)  SpO2: 98% (28 Mar 2025 15:19) (94% - 100%)    Parameters below as of 28 Mar 2025 15:19  Patient On (Oxygen Delivery Method): nasal cannula      CONSTITUTIONAL: Elderly woman in  NAD,   ENMT: Moist oral mucosa,   RESPIRATORY: Normal respiratory effort on NC ; lungs are clear to auscultation bilaterally  CARDIOVASCULAR: Regular rate and rhythm, normal S1 and S2, no murmur/rub/gallop; No lower extremity edema;   ABDOMEN: Nontender to palpation, normoactive bowel sounds, no rebound/guarding; No hepatosplenomegaly  EXT: no edema b/l  NEUROLOGY: more withdrawn, less engaged in exam, catatonic   SKIN: No rashes; no palpable lesions    LABS:                        11.5   9.98  )-----------( 176      ( 28 Mar 2025 07:13 )             35.8     03-28    141  |  103  |  40[H]  ----------------------------<  217[H]  3.7   |  26  |  1.20    Ca    10.1      28 Mar 2025 07:13  Phos  4.7     03-28  Mg     2.10     03-28            Urinalysis Basic - ( 28 Mar 2025 07:13 )    Color: x / Appearance: x / SG: x / pH: x  Gluc: 217 mg/dL / Ketone: x  / Bili: x / Urobili: x   Blood: x / Protein: x / Nitrite: x   Leuk Esterase: x / RBC: x / WBC x   Sq Epi: x / Non Sq Epi: x / Bacteria: x          RADIOLOGY & ADDITIONAL TESTS:  Results Reviewed:   Imaging Personally Reviewed:  Electrocardiogram Personally Reviewed:    COORDINATION OF CARE:  Care Discussed with Consultants/Other Providers [Y/N]:  Prior or Outpatient Records Reviewed [Y/N]:

## 2025-03-28 NOTE — BH CONSULTATION LIAISON PROGRESS NOTE - NSBHASSESSMENTFT_PSY_ALL_CORE
Patient is a 74F hx asthma, depression, and HTN was admitted to the MICU with AHRF 2/2 asthma exacerbation resulting in acute on chronic HHRF causing AMS and increased WOB that required intubation. During her ICU course, she experienced seizure-like activity .  Additional issues included hyponatremia, acute on chronic metabolic alkalosis, and mild thrombocytopenia,  also having fevers.  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 Patient has h/o hx of major depressive disorder with psychosis, hx of 3 past inpatient psychiatric hospitalizations last in 2016 at [facility], all rehospitalizations were in context non compliance with the treatment. She has no hx of suicide attempts, no hx of substance abuse. Patient w hx of paranoia, disorganization, catatonia, required MOO when in [facility].     3/24----more withdrawn, some stiffness in neck, not communicative. Some concerns for catatonia. Unsure if symptoms attributed to catatonia vs delirium    3/25--- more alert today, trying to following commands. Still with PMR+. Appears to be responding to Ativan  3/26-- BFS 10, will increasing standing ativan to target catatonic symptoms - giving STAT 0.5mg NOW discussed w acp     3/27----will continue ativan dosing for one more day before increasing  3/28: BFS: stupor 1, mutism 2,withdrawan 2, verbigeration 1, rigidity 2 =8.  LEft VM with Son, attempted 2x, will try again later this evening     PLAN  - no SI or HI, no need for psychiatric CO  - EKG  -- antipsychotics can only be given if qtc < 500      - MEDICATIONS:  -- INCREASE ativan to 0.5mg at 7 am, 0.5mg at 11am, 0.25mg at 4pm  -- Continue Zoloft  50mg daily ( home dose 150mg) ( monitor NA- recent hyponatremia)  -- HOLD Zyprexa for now    - PRN:  --Ativan 0.5mg q 6hrs prn IM/IV/PO  - DISPO: pending   Patient is a 74F hx asthma, depression, and HTN was admitted to the MICU with AHRF 2/2 asthma exacerbation resulting in acute on chronic HHRF causing AMS and increased WOB that required intubation. During her ICU course, she experienced seizure-like activity .  Additional issues included hyponatremia, acute on chronic metabolic alkalosis, and mild thrombocytopenia,  also having fevers.  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 Patient has h/o hx of major depressive disorder with psychosis, hx of 3 past inpatient psychiatric hospitalizations last in 2016 at [facility], all rehospitalizations were in context non compliance with the treatment. She has no hx of suicide attempts, no hx of substance abuse. Patient w hx of paranoia, disorganization, catatonia, required MOO when in [facility].     3/24----more withdrawn, some stiffness in neck, not communicative. Some concerns for catatonia. Unsure if symptoms attributed to catatonia vs delirium    3/25--- more alert today, trying to following commands. Still with PMR+. Appears to be responding to Ativan  3/26-- BFS 10, will increasing standing ativan to target catatonic symptoms - giving STAT 0.5mg NOW discussed w acp     3/27----will continue ativan dosing for one more day before increasing  3/28: BFS: stupor 1, mutism 2,withdrawan 2, verbigeration 1, rigidity 2 =8.      PLAN  - no SI or HI, no need for psychiatric CO  - EKG  -- antipsychotics can only be given if qtc < 500      - MEDICATIONS:  -- INCREASE ativan to 0.5mg at 7 am, 0.5mg at 11am, 0.25mg at 4pm  -- Continue Zoloft  50mg daily ( home dose 150mg) ( monitor NA- recent hyponatremia)  -- HOLD Zyprexa for now    - PRN:  --Ativan 0.5mg q 6hrs prn IM/IV/PO  - DISPO: pending

## 2025-03-28 NOTE — PROGRESS NOTE ADULT - PROBLEM SELECTOR PLAN 6
Pt w/hx of paranoia and stupor from depression  increased Zoloft to 50mg daily ( home dose 150mg) ( monitor NA- recent hyponatremia), As per  team, team will follow and make any changes if needed,   > continue home Olanzapine, 10 mg qhs -> lowered to 5mg per psych     given mild rigidity, concern for catatonia now on standing  Ativan  0.5mg at 7 am, 0.5mg at 11am, 0.25mg at 4pm  - appreciate psych recs

## 2025-03-28 NOTE — PROVIDER CONTACT NOTE (OTHER) - ASSESSMENT
pt A&Ox1-2. no s/s of distress noted. no s/s of shortness of breath or chest pain noted. VS 98.5 temp,  100 HR, 128/67 BP, 17 RR, 100 % spo2 on 1L NC. no signs of discomfort observed, vital signs within normal range. pt refusing to swallow morning medications, cheeking them in her mouth. pt paranoid thinking something is in the medications. medications suctioned out of mouth and marked as not done.

## 2025-03-29 LAB
GLUCOSE BLDC GLUCOMTR-MCNC: 149 MG/DL — HIGH (ref 70–99)
GLUCOSE BLDC GLUCOMTR-MCNC: 181 MG/DL — HIGH (ref 70–99)
GLUCOSE BLDC GLUCOMTR-MCNC: 208 MG/DL — HIGH (ref 70–99)
GLUCOSE BLDC GLUCOMTR-MCNC: 234 MG/DL — HIGH (ref 70–99)

## 2025-03-29 PROCEDURE — 99233 SBSQ HOSP IP/OBS HIGH 50: CPT

## 2025-03-29 RX ORDER — LORAZEPAM 4 MG/ML
0.75 VIAL (ML) INJECTION
Refills: 0 | Status: DISCONTINUED | OUTPATIENT
Start: 2025-03-29 | End: 2025-03-31

## 2025-03-29 RX ADMIN — LEVALBUTEROL HYDROCHLORIDE 0.63 MILLIGRAM(S): 1.25 SOLUTION RESPIRATORY (INHALATION) at 21:09

## 2025-03-29 RX ADMIN — Medication 50 MILLIGRAM(S): at 14:17

## 2025-03-29 RX ADMIN — LEVALBUTEROL HYDROCHLORIDE 0.63 MILLIGRAM(S): 1.25 SOLUTION RESPIRATORY (INHALATION) at 15:24

## 2025-03-29 RX ADMIN — POLYETHYLENE GLYCOL 3350 17 GRAM(S): 17 POWDER, FOR SOLUTION ORAL at 06:44

## 2025-03-29 RX ADMIN — PREDNISONE 20 MILLIGRAM(S): 20 TABLET ORAL at 06:26

## 2025-03-29 RX ADMIN — POLYETHYLENE GLYCOL 3350 17 GRAM(S): 17 POWDER, FOR SOLUTION ORAL at 17:41

## 2025-03-29 RX ADMIN — SERTRALINE 50 MILLIGRAM(S): 100 TABLET, FILM COATED ORAL at 12:37

## 2025-03-29 RX ADMIN — Medication 2 TABLET(S): at 22:41

## 2025-03-29 RX ADMIN — INSULIN GLARGINE-YFGN 12 UNIT(S): 100 INJECTION, SOLUTION SUBCUTANEOUS at 23:16

## 2025-03-29 RX ADMIN — INSULIN LISPRO 4: 100 INJECTION, SOLUTION INTRAVENOUS; SUBCUTANEOUS at 12:37

## 2025-03-29 RX ADMIN — Medication 40 MILLIGRAM(S): at 06:26

## 2025-03-29 RX ADMIN — LEVALBUTEROL HYDROCHLORIDE 0.63 MILLIGRAM(S): 1.25 SOLUTION RESPIRATORY (INHALATION) at 03:27

## 2025-03-29 RX ADMIN — INSULIN LISPRO 3 UNIT(S): 100 INJECTION, SOLUTION INTRAVENOUS; SUBCUTANEOUS at 12:38

## 2025-03-29 RX ADMIN — DEXTROMETHORPHAN HBR, GUAIFENESIN 600 MILLIGRAM(S): 200 LIQUID ORAL at 06:26

## 2025-03-29 RX ADMIN — INSULIN LISPRO 4: 100 INJECTION, SOLUTION INTRAVENOUS; SUBCUTANEOUS at 17:42

## 2025-03-29 RX ADMIN — BUDESONIDE 0.5 MILLIGRAM(S): 0.25 SUSPENSION RESPIRATORY (INHALATION) at 09:48

## 2025-03-29 RX ADMIN — Medication 0.5 MILLIGRAM(S): at 06:43

## 2025-03-29 RX ADMIN — BUDESONIDE 0.5 MILLIGRAM(S): 0.25 SUSPENSION RESPIRATORY (INHALATION) at 21:10

## 2025-03-29 RX ADMIN — LISINOPRIL 40 MILLIGRAM(S): 5 TABLET ORAL at 09:02

## 2025-03-29 RX ADMIN — DEXTROMETHORPHAN HBR, GUAIFENESIN 600 MILLIGRAM(S): 200 LIQUID ORAL at 17:41

## 2025-03-29 RX ADMIN — INSULIN LISPRO 2: 100 INJECTION, SOLUTION INTRAVENOUS; SUBCUTANEOUS at 08:58

## 2025-03-29 RX ADMIN — Medication 1 APPLICATION(S): at 06:25

## 2025-03-29 RX ADMIN — INSULIN LISPRO 3 UNIT(S): 100 INJECTION, SOLUTION INTRAVENOUS; SUBCUTANEOUS at 17:42

## 2025-03-29 RX ADMIN — Medication 0.75 MILLIGRAM(S): at 17:40

## 2025-03-29 RX ADMIN — Medication 0.5 MILLIGRAM(S): at 12:36

## 2025-03-29 RX ADMIN — ENOXAPARIN SODIUM 40 MILLIGRAM(S): 100 INJECTION SUBCUTANEOUS at 12:36

## 2025-03-29 RX ADMIN — Medication 50 MILLIGRAM(S): at 22:41

## 2025-03-29 RX ADMIN — LEVALBUTEROL HYDROCHLORIDE 0.63 MILLIGRAM(S): 1.25 SOLUTION RESPIRATORY (INHALATION) at 09:47

## 2025-03-29 NOTE — PROGRESS NOTE ADULT - PROBLEM SELECTOR PLAN 6
Pt w/hx of paranoia and stupor from depression  increased Zoloft to 50mg daily ( home dose 150mg) ( monitor NA- recent hyponatremia), As per  team, team will follow and make any changes if needed,   > continue home Olanzapine, 10 mg qhs -> lowered to 5mg per psych     given mild rigidity, concern for catatonia now on standing  Ativan  0.5mg at 7 am, 0.5mg at 11am, 0..75mg at 4pm  - appreciate psych recs

## 2025-03-29 NOTE — BH CONSULTATION LIAISON PROGRESS NOTE - NSBHFUPINTERVALHXFT_PSY_A_CORE
Patient was seen and assessed at bedside. Patient only says "please" once, unable to say any additional words.   + posturing, + poor eye contact/staring, +rigid, +waxy flexibility.

## 2025-03-29 NOTE — PROGRESS NOTE ADULT - SUBJECTIVE AND OBJECTIVE BOX
[facility]  Division of Hospital Medicine  Dede Marquez MD  Pager: [phone]      Patient is a 74y old  Female who presents with a chief complaint of AMS and increased WOB (21 Mar 2025 06:24)      SUBJECTIVE / OVERNIGHT EVENTS: Patient examined at bedside. Drinking water. Still with catotonia. BH increasing ativan dosing   ADDITIONAL REVIEW OF SYSTEMS:    MEDICATIONS  (STANDING):  buDESOnide    Inhalation Suspension 0.5 milliGRAM(s) Inhalation every 12 hours  chlorhexidine 2% Cloths 1 Application(s) Topical <User Schedule>  dextrose 5%. 1000 milliLiter(s) (100 mL/Hr) IV Continuous <Continuous>  dextrose 5%. 1000 milliLiter(s) (50 mL/Hr) IV Continuous <Continuous>  dextrose 50% Injectable 25 Gram(s) IV Push once  dextrose 50% Injectable 12.5 Gram(s) IV Push once  dextrose 50% Injectable 25 Gram(s) IV Push once  dextrose Oral Gel 15 Gram(s) Oral once  enoxaparin Injectable 40 milliGRAM(s) SubCutaneous every 24 hours  glucagon  Injectable 1 milliGRAM(s) IntraMuscular once  guaiFENesin  milliGRAM(s) Oral every 12 hours  hydrALAZINE 50 milliGRAM(s) Oral three times a day  hydrochlorothiazide 25 milliGRAM(s) Oral daily  insulin glargine Injectable (LANTUS) 12 Unit(s) SubCutaneous at bedtime  insulin lispro (ADMELOG) corrective regimen sliding scale   SubCutaneous three times a day before meals  insulin lispro (ADMELOG) corrective regimen sliding scale   SubCutaneous at bedtime  insulin lispro Injectable (ADMELOG) 3 Unit(s) SubCutaneous three times a day before meals  levalbuterol Inhalation 0.63 milliGRAM(s) Inhalation every 6 hours  lisinopril 40 milliGRAM(s) Oral daily  LORazepam   Injectable 0.5 milliGRAM(s) IV Push <User Schedule>  LORazepam   Injectable 0.75 milliGRAM(s) IV Push <User Schedule>  pantoprazole    Tablet 40 milliGRAM(s) Oral before breakfast  polyethylene glycol 3350 17 Gram(s) Oral two times a day  predniSONE   Tablet 20 milliGRAM(s) Oral daily  predniSONE   Tablet   Oral   senna 2 Tablet(s) Oral at bedtime  sertraline 50 milliGRAM(s) Oral daily  sodium chloride 0.9%. 1000 milliLiter(s) (125 mL/Hr) IV Continuous <Continuous>    MEDICATIONS  (PRN):      CAPILLARY BLOOD GLUCOSE      POCT Blood Glucose.: 234 mg/dL (29 Mar 2025 12:33)  POCT Blood Glucose.: 181 mg/dL (29 Mar 2025 08:44)  POCT Blood Glucose.: 180 mg/dL (28 Mar 2025 21:08)  POCT Blood Glucose.: 153 mg/dL (28 Mar 2025 17:20)    I&O's Summary    28 Mar 2025 07:01  -  29 Mar 2025 07:00  --------------------------------------------------------  IN: 0 mL / OUT: 300 mL / NET: -300 mL        PHYSICAL EXAM:  Vital Signs Last 24 Hrs  T(C): 36.9 (29 Mar 2025 12:36), Max: 37.2 (29 Mar 2025 01:30)  T(F): 98.5 (29 Mar 2025 12:36), Max: 98.9 (29 Mar 2025 01:30)  HR: 103 (29 Mar 2025 12:36) (91 - 109)  BP: 151/90 (29 Mar 2025 12:36) (91/62 - 156/79)  BP(mean): --  RR: 18 (29 Mar 2025 12:36) (18 - 18)  SpO2: 95% (29 Mar 2025 12:36) (93% - 100%)    Parameters below as of 29 Mar 2025 12:36  Patient On (Oxygen Delivery Method): room air      CONSTITUTIONAL: Elderly woman in  NAD,   ENMT: Moist oral mucosa,   RESPIRATORY: Normal respiratory effort on NC ; lungs are clear to auscultation bilaterally  CARDIOVASCULAR: Regular rate and rhythm, normal S1 and S2, no murmur/rub/gallop; No lower extremity edema;   ABDOMEN: Nontender to palpation, normoactive bowel sounds, no rebound/guarding; No hepatosplenomegaly  EXT: no edema b/l  NEUROLOGY: more withdrawn, less engaged in exam, catatonic   SKIN: No rashes; no palpable lesions    LABS:                        11.5   9.98  )-----------( 176      ( 28 Mar 2025 07:13 )             35.8     03-28    141  |  103  |  40[H]  ----------------------------<  217[H]  3.7   |  26  |  1.20    Ca    10.1      28 Mar 2025 07:13  Phos  4.7     03-28  Mg     2.10     03-28            Urinalysis Basic - ( 28 Mar 2025 07:13 )    Color: x / Appearance: x / SG: x / pH: x  Gluc: 217 mg/dL / Ketone: x  / Bili: x / Urobili: x   Blood: x / Protein: x / Nitrite: x   Leuk Esterase: x / RBC: x / WBC x   Sq Epi: x / Non Sq Epi: x / Bacteria: x          RADIOLOGY & ADDITIONAL TESTS:  Results Reviewed:   Imaging Personally Reviewed:  Electrocardiogram Personally Reviewed:    COORDINATION OF CARE:  Care Discussed with Consultants/Other Providers [Y/N]:  Prior or Outpatient Records Reviewed [Y/N]:

## 2025-03-29 NOTE — BH CONSULTATION LIAISON PROGRESS NOTE - NSBHASSESSMENTFT_PSY_ALL_CORE
Patient is a 74F hx asthma, depression, and HTN was admitted to the MICU with AHRF 2/2 asthma exacerbation resulting in acute on chronic HHRF causing AMS and increased WOB that required intubation. During her ICU course, she experienced seizure-like activity .  Additional issues included hyponatremia, acute on chronic metabolic alkalosis, and mild thrombocytopenia,  also having fevers.  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 Patient has h/o hx of major depressive disorder with psychosis, hx of 3 past inpatient psychiatric hospitalizations last in 2016 at [facility], all rehospitalizations were in context non compliance with the treatment. She has no hx of suicide attempts, no hx of substance abuse. Patient w hx of paranoia, disorganization, catatonia, required MOO when in [facility].     3/24----more withdrawn, some stiffness in neck, not communicative. Some concerns for catatonia. Unsure if symptoms attributed to catatonia vs delirium  3/25--- more alert today, trying to following commands. Still with PMR+. Appears to be responding to Ativan  3/26-- BFS 10, will increasing standing ativan to target catatonic symptoms - giving STAT 0.5mg NOW discussed w acp   3/27----will continue ativan dosing for one more day before increasing  3/28: BFS: stupor 1, mutism 2,withdrawan 2, verbigeration 1, rigidity 2 =8.  3/29: BFS: stupor 2, mutism 2, staring 1, posturing 2, rigidity 2, waxy flexibility 3, withdrawal 2 = 14; please monitor nutritional intake, plan to increase ativan by total daily dose of 0.5 mg at bedtime due to persistent catatonia     PLAN  - no SI or HI, no need for psychiatric CO  - EKG  -- antipsychotics can only be given if qtc < 500      - MEDICATIONS:  -- INCREASE ativan to 0.5mg at 7 am, 0.5mg at 11am, 0.75mg at 4pm  -- Continue Zoloft  50mg daily ( home dose 150mg) ( monitor NA- recent hyponatremia)  -- HOLD Zyprexa for now    - PRN:  --Ativan 0.5mg q 6hrs prn IM/IV/PO  - DISPO: pending

## 2025-03-30 LAB
GLUCOSE BLDC GLUCOMTR-MCNC: 164 MG/DL — HIGH (ref 70–99)
GLUCOSE BLDC GLUCOMTR-MCNC: 208 MG/DL — HIGH (ref 70–99)
GLUCOSE BLDC GLUCOMTR-MCNC: 320 MG/DL — HIGH (ref 70–99)
GLUCOSE BLDC GLUCOMTR-MCNC: 328 MG/DL — HIGH (ref 70–99)

## 2025-03-30 PROCEDURE — 99233 SBSQ HOSP IP/OBS HIGH 50: CPT

## 2025-03-30 RX ORDER — INSULIN LISPRO 100 U/ML
5 INJECTION, SOLUTION INTRAVENOUS; SUBCUTANEOUS
Refills: 0 | Status: DISCONTINUED | OUTPATIENT
Start: 2025-03-30 | End: 2025-04-18

## 2025-03-30 RX ADMIN — DEXTROMETHORPHAN HBR, GUAIFENESIN 600 MILLIGRAM(S): 200 LIQUID ORAL at 18:05

## 2025-03-30 RX ADMIN — Medication 40 MILLIGRAM(S): at 07:48

## 2025-03-30 RX ADMIN — PREDNISONE 20 MILLIGRAM(S): 20 TABLET ORAL at 07:49

## 2025-03-30 RX ADMIN — Medication 0.5 MILLIGRAM(S): at 07:48

## 2025-03-30 RX ADMIN — Medication 1 APPLICATION(S): at 07:49

## 2025-03-30 RX ADMIN — INSULIN LISPRO 3 UNIT(S): 100 INJECTION, SOLUTION INTRAVENOUS; SUBCUTANEOUS at 12:37

## 2025-03-30 RX ADMIN — LISINOPRIL 40 MILLIGRAM(S): 5 TABLET ORAL at 07:49

## 2025-03-30 RX ADMIN — INSULIN LISPRO 8: 100 INJECTION, SOLUTION INTRAVENOUS; SUBCUTANEOUS at 18:08

## 2025-03-30 RX ADMIN — SERTRALINE 50 MILLIGRAM(S): 100 TABLET, FILM COATED ORAL at 12:38

## 2025-03-30 RX ADMIN — BUDESONIDE 0.5 MILLIGRAM(S): 0.25 SUSPENSION RESPIRATORY (INHALATION) at 21:31

## 2025-03-30 RX ADMIN — ENOXAPARIN SODIUM 40 MILLIGRAM(S): 100 INJECTION SUBCUTANEOUS at 12:37

## 2025-03-30 RX ADMIN — Medication 50 MILLIGRAM(S): at 14:06

## 2025-03-30 RX ADMIN — Medication 0.5 MILLIGRAM(S): at 12:36

## 2025-03-30 RX ADMIN — INSULIN LISPRO 3 UNIT(S): 100 INJECTION, SOLUTION INTRAVENOUS; SUBCUTANEOUS at 09:12

## 2025-03-30 RX ADMIN — INSULIN LISPRO 4: 100 INJECTION, SOLUTION INTRAVENOUS; SUBCUTANEOUS at 09:11

## 2025-03-30 RX ADMIN — POLYETHYLENE GLYCOL 3350 17 GRAM(S): 17 POWDER, FOR SOLUTION ORAL at 18:05

## 2025-03-30 RX ADMIN — LEVALBUTEROL HYDROCHLORIDE 0.63 MILLIGRAM(S): 1.25 SOLUTION RESPIRATORY (INHALATION) at 21:31

## 2025-03-30 RX ADMIN — Medication 0.75 MILLIGRAM(S): at 16:53

## 2025-03-30 RX ADMIN — LEVALBUTEROL HYDROCHLORIDE 0.63 MILLIGRAM(S): 1.25 SOLUTION RESPIRATORY (INHALATION) at 03:47

## 2025-03-30 RX ADMIN — INSULIN LISPRO 8: 100 INJECTION, SOLUTION INTRAVENOUS; SUBCUTANEOUS at 12:36

## 2025-03-30 RX ADMIN — Medication 50 MILLIGRAM(S): at 22:37

## 2025-03-30 RX ADMIN — BUDESONIDE 0.5 MILLIGRAM(S): 0.25 SUSPENSION RESPIRATORY (INHALATION) at 08:34

## 2025-03-30 RX ADMIN — POLYETHYLENE GLYCOL 3350 17 GRAM(S): 17 POWDER, FOR SOLUTION ORAL at 07:48

## 2025-03-30 RX ADMIN — DEXTROMETHORPHAN HBR, GUAIFENESIN 600 MILLIGRAM(S): 200 LIQUID ORAL at 07:49

## 2025-03-30 RX ADMIN — LEVALBUTEROL HYDROCHLORIDE 0.63 MILLIGRAM(S): 1.25 SOLUTION RESPIRATORY (INHALATION) at 09:34

## 2025-03-30 RX ADMIN — LEVALBUTEROL HYDROCHLORIDE 0.63 MILLIGRAM(S): 1.25 SOLUTION RESPIRATORY (INHALATION) at 15:33

## 2025-03-30 RX ADMIN — INSULIN GLARGINE-YFGN 12 UNIT(S): 100 INJECTION, SOLUTION SUBCUTANEOUS at 22:43

## 2025-03-30 RX ADMIN — INSULIN LISPRO 5 UNIT(S): 100 INJECTION, SOLUTION INTRAVENOUS; SUBCUTANEOUS at 18:09

## 2025-03-30 RX ADMIN — Medication 50 MILLIGRAM(S): at 07:49

## 2025-03-30 NOTE — PROGRESS NOTE ADULT - PROBLEM SELECTOR PLAN 7
Low FS, will reduce  lantus 15 -> 12 units qhs and 5 -> 3 units premeal  ISS  anticipate insulin needs will decrease as weaning off steroids. now on oral pred  [ ] monitor FS and adjust as needed on   lantus  12 units qhs and will increase premeal to 5  units premeal  ISS  anticipate insulin needs will decrease as weaning off steroids. now on oral pred  [ ] monitor FS and adjust as needed

## 2025-03-30 NOTE — PROGRESS NOTE ADULT - SUBJECTIVE AND OBJECTIVE BOX
[facility]  Division of Hospital Medicine  Dede Marquez MD  Pager: [phone]      Patient is a 74y old  Female who presents with a chief complaint of AMS and increased WOB (21 Mar 2025 06:24)      SUBJECTIVE / OVERNIGHT EVENTS: Patient on standing ativan  ADDITIONAL REVIEW OF SYSTEMS:    MEDICATIONS  (STANDING):  buDESOnide    Inhalation Suspension 0.5 milliGRAM(s) Inhalation every 12 hours  chlorhexidine 2% Cloths 1 Application(s) Topical <User Schedule>  dextrose 5%. 1000 milliLiter(s) (50 mL/Hr) IV Continuous <Continuous>  dextrose 5%. 1000 milliLiter(s) (100 mL/Hr) IV Continuous <Continuous>  dextrose 50% Injectable 25 Gram(s) IV Push once  dextrose 50% Injectable 12.5 Gram(s) IV Push once  dextrose 50% Injectable 25 Gram(s) IV Push once  dextrose Oral Gel 15 Gram(s) Oral once  enoxaparin Injectable 40 milliGRAM(s) SubCutaneous every 24 hours  glucagon  Injectable 1 milliGRAM(s) IntraMuscular once  guaiFENesin  milliGRAM(s) Oral every 12 hours  hydrALAZINE 50 milliGRAM(s) Oral three times a day  hydrochlorothiazide 25 milliGRAM(s) Oral daily  insulin glargine Injectable (LANTUS) 12 Unit(s) SubCutaneous at bedtime  insulin lispro (ADMELOG) corrective regimen sliding scale   SubCutaneous three times a day before meals  insulin lispro (ADMELOG) corrective regimen sliding scale   SubCutaneous at bedtime  insulin lispro Injectable (ADMELOG) 3 Unit(s) SubCutaneous three times a day before meals  levalbuterol Inhalation 0.63 milliGRAM(s) Inhalation every 6 hours  lisinopril 40 milliGRAM(s) Oral daily  LORazepam   Injectable 0.75 milliGRAM(s) IV Push <User Schedule>  LORazepam   Injectable 0.5 milliGRAM(s) IV Push <User Schedule>  pantoprazole    Tablet 40 milliGRAM(s) Oral before breakfast  polyethylene glycol 3350 17 Gram(s) Oral two times a day  predniSONE   Tablet 20 milliGRAM(s) Oral daily  predniSONE   Tablet   Oral   senna 2 Tablet(s) Oral at bedtime  sertraline 50 milliGRAM(s) Oral daily  sodium chloride 0.9%. 1000 milliLiter(s) (125 mL/Hr) IV Continuous <Continuous>    MEDICATIONS  (PRN):      CAPILLARY BLOOD GLUCOSE      POCT Blood Glucose.: 208 mg/dL (30 Mar 2025 08:26)  POCT Blood Glucose.: 149 mg/dL (29 Mar 2025 22:48)  POCT Blood Glucose.: 208 mg/dL (29 Mar 2025 17:34)  POCT Blood Glucose.: 234 mg/dL (29 Mar 2025 12:33)    I&O's Summary    29 Mar 2025 07:01  -  30 Mar 2025 07:00  --------------------------------------------------------  IN: 1000 mL / OUT: 801 mL / NET: 199 mL        PHYSICAL EXAM:  Vital Signs Last 24 Hrs  T(C): 37.1 (30 Mar 2025 05:20), Max: 37.1 (30 Mar 2025 02:15)  T(F): 98.8 (30 Mar 2025 05:20), Max: 98.8 (30 Mar 2025 05:20)  HR: 99 (30 Mar 2025 05:20) (79 - 105)  BP: 148/82 (30 Mar 2025 05:20) (119/66 - 163/92)  BP(mean): --  RR: 18 (30 Mar 2025 05:20) (18 - 18)  SpO2: 98% (30 Mar 2025 05:20) (92% - 100%)    Parameters below as of 30 Mar 2025 05:20  Patient On (Oxygen Delivery Method): room air      CONSTITUTIONAL: NAD, well-developed, well-groomed  EYES: PERRLA; conjunctiva and sclera clear  ENMT: Moist oral mucosa, no pharyngeal injection or exudates; normal dentition  NECK: Supple, no palpable masses; no thyromegaly  RESPIRATORY: Normal respiratory effort; lungs are clear to auscultation bilaterally  CARDIOVASCULAR: Regular rate and rhythm, normal S1 and S2, no murmur/rub/gallop; No lower extremity edema; Peripheral pulses are 2+ bilaterally  ABDOMEN: Nontender to palpation, normoactive bowel sounds, no rebound/guarding; No hepatosplenomegaly  MUSCULOSKELETAL:  Normal gait; no clubbing or cyanosis of digits; no joint swelling or tenderness to palpation  PSYCH: A+O to person, place, and time; affect appropriate  NEUROLOGY: CN 2-12 are intact and symmetric; no gross sensory deficits   SKIN: No rashes; no palpable lesions    LABS:                      RADIOLOGY & ADDITIONAL TESTS:  Results Reviewed:   Imaging Personally Reviewed:  Electrocardiogram Personally Reviewed:    COORDINATION OF CARE:  Care Discussed with Consultants/Other Providers [Y/N]:  Prior or Outpatient Records Reviewed [Y/N]:   [facility]  Division of Hospital Medicine  Dede Marquez MD  Pager: [phone]      Patient is a 74y old  Female who presents with a chief complaint of AMS and increased WOB (21 Mar 2025 06:24)      SUBJECTIVE / OVERNIGHT EVENTS: Patient on standing ativan. More interactive on exam. Awake. Updated  at bedside. Reports ate breakfast   ADDITIONAL REVIEW OF SYSTEMS:    MEDICATIONS  (STANDING):  buDESOnide    Inhalation Suspension 0.5 milliGRAM(s) Inhalation every 12 hours  chlorhexidine 2% Cloths 1 Application(s) Topical <User Schedule>  dextrose 5%. 1000 milliLiter(s) (50 mL/Hr) IV Continuous <Continuous>  dextrose 5%. 1000 milliLiter(s) (100 mL/Hr) IV Continuous <Continuous>  dextrose 50% Injectable 25 Gram(s) IV Push once  dextrose 50% Injectable 12.5 Gram(s) IV Push once  dextrose 50% Injectable 25 Gram(s) IV Push once  dextrose Oral Gel 15 Gram(s) Oral once  enoxaparin Injectable 40 milliGRAM(s) SubCutaneous every 24 hours  glucagon  Injectable 1 milliGRAM(s) IntraMuscular once  guaiFENesin  milliGRAM(s) Oral every 12 hours  hydrALAZINE 50 milliGRAM(s) Oral three times a day  hydrochlorothiazide 25 milliGRAM(s) Oral daily  insulin glargine Injectable (LANTUS) 12 Unit(s) SubCutaneous at bedtime  insulin lispro (ADMELOG) corrective regimen sliding scale   SubCutaneous three times a day before meals  insulin lispro (ADMELOG) corrective regimen sliding scale   SubCutaneous at bedtime  insulin lispro Injectable (ADMELOG) 3 Unit(s) SubCutaneous three times a day before meals  levalbuterol Inhalation 0.63 milliGRAM(s) Inhalation every 6 hours  lisinopril 40 milliGRAM(s) Oral daily  LORazepam   Injectable 0.75 milliGRAM(s) IV Push <User Schedule>  LORazepam   Injectable 0.5 milliGRAM(s) IV Push <User Schedule>  pantoprazole    Tablet 40 milliGRAM(s) Oral before breakfast  polyethylene glycol 3350 17 Gram(s) Oral two times a day  predniSONE   Tablet 20 milliGRAM(s) Oral daily  predniSONE   Tablet   Oral   senna 2 Tablet(s) Oral at bedtime  sertraline 50 milliGRAM(s) Oral daily  sodium chloride 0.9%. 1000 milliLiter(s) (125 mL/Hr) IV Continuous <Continuous>    MEDICATIONS  (PRN):      CAPILLARY BLOOD GLUCOSE      POCT Blood Glucose.: 208 mg/dL (30 Mar 2025 08:26)  POCT Blood Glucose.: 149 mg/dL (29 Mar 2025 22:48)  POCT Blood Glucose.: 208 mg/dL (29 Mar 2025 17:34)  POCT Blood Glucose.: 234 mg/dL (29 Mar 2025 12:33)    I&O's Summary    29 Mar 2025 07:01  -  30 Mar 2025 07:00  --------------------------------------------------------  IN: 1000 mL / OUT: 801 mL / NET: 199 mL        PHYSICAL EXAM:  Vital Signs Last 24 Hrs  T(C): 37.1 (30 Mar 2025 05:20), Max: 37.1 (30 Mar 2025 02:15)  T(F): 98.8 (30 Mar 2025 05:20), Max: 98.8 (30 Mar 2025 05:20)  HR: 99 (30 Mar 2025 05:20) (79 - 105)  BP: 148/82 (30 Mar 2025 05:20) (119/66 - 163/92)  BP(mean): --  RR: 18 (30 Mar 2025 05:20) (18 - 18)  SpO2: 98% (30 Mar 2025 05:20) (92% - 100%)    Parameters below as of 30 Mar 2025 05:20  Patient On (Oxygen Delivery Method): room air        CONSTITUTIONAL: Elderly woman in  NAD,   ENMT: Moist oral mucosa,   RESPIRATORY: Normal respiratory effort on NC ; lungs are clear to auscultation bilaterally  CARDIOVASCULAR: Regular rate and rhythm, normal S1 and S2, no murmur/rub/gallop; No lower extremity edema;   ABDOMEN: Nontender to palpation, normoactive bowel sounds, no rebound/guarding; No hepatosplenomegaly  EXT: no edema b/l  NEUROLOGY: more awake and engaged in exam   SKIN: No rashes; no palpable lesions    LABS:                      RADIOLOGY & ADDITIONAL TESTS:  Results Reviewed:   Imaging Personally Reviewed:  Electrocardiogram Personally Reviewed:    COORDINATION OF CARE:  Care Discussed with Consultants/Other Providers [Y/N]:  Prior or Outpatient Records Reviewed [Y/N]:   [facility]  Division of Hospital Medicine  Dede Marquez MD  Pager: [phone]      Patient is a 74y old  Female who presents with a chief complaint of AMS and increased WOB (21 Mar 2025 06:24)      SUBJECTIVE / OVERNIGHT EVENTS: Patient on standing ativan. More interactive on exam. Awake. Updated  at bedside. Reports ate breakfast. FS elevated    ADDITIONAL REVIEW OF SYSTEMS:    MEDICATIONS  (STANDING):  buDESOnide    Inhalation Suspension 0.5 milliGRAM(s) Inhalation every 12 hours  chlorhexidine 2% Cloths 1 Application(s) Topical <User Schedule>  dextrose 5%. 1000 milliLiter(s) (50 mL/Hr) IV Continuous <Continuous>  dextrose 5%. 1000 milliLiter(s) (100 mL/Hr) IV Continuous <Continuous>  dextrose 50% Injectable 25 Gram(s) IV Push once  dextrose 50% Injectable 12.5 Gram(s) IV Push once  dextrose 50% Injectable 25 Gram(s) IV Push once  dextrose Oral Gel 15 Gram(s) Oral once  enoxaparin Injectable 40 milliGRAM(s) SubCutaneous every 24 hours  glucagon  Injectable 1 milliGRAM(s) IntraMuscular once  guaiFENesin  milliGRAM(s) Oral every 12 hours  hydrALAZINE 50 milliGRAM(s) Oral three times a day  hydrochlorothiazide 25 milliGRAM(s) Oral daily  insulin glargine Injectable (LANTUS) 12 Unit(s) SubCutaneous at bedtime  insulin lispro (ADMELOG) corrective regimen sliding scale   SubCutaneous three times a day before meals  insulin lispro (ADMELOG) corrective regimen sliding scale   SubCutaneous at bedtime  insulin lispro Injectable (ADMELOG) 3 Unit(s) SubCutaneous three times a day before meals  levalbuterol Inhalation 0.63 milliGRAM(s) Inhalation every 6 hours  lisinopril 40 milliGRAM(s) Oral daily  LORazepam   Injectable 0.75 milliGRAM(s) IV Push <User Schedule>  LORazepam   Injectable 0.5 milliGRAM(s) IV Push <User Schedule>  pantoprazole    Tablet 40 milliGRAM(s) Oral before breakfast  polyethylene glycol 3350 17 Gram(s) Oral two times a day  predniSONE   Tablet 20 milliGRAM(s) Oral daily  predniSONE   Tablet   Oral   senna 2 Tablet(s) Oral at bedtime  sertraline 50 milliGRAM(s) Oral daily  sodium chloride 0.9%. 1000 milliLiter(s) (125 mL/Hr) IV Continuous <Continuous>    MEDICATIONS  (PRN):      CAPILLARY BLOOD GLUCOSE      POCT Blood Glucose.: 208 mg/dL (30 Mar 2025 08:26)  POCT Blood Glucose.: 149 mg/dL (29 Mar 2025 22:48)  POCT Blood Glucose.: 208 mg/dL (29 Mar 2025 17:34)  POCT Blood Glucose.: 234 mg/dL (29 Mar 2025 12:33)    I&O's Summary    29 Mar 2025 07:01  -  30 Mar 2025 07:00  --------------------------------------------------------  IN: 1000 mL / OUT: 801 mL / NET: 199 mL        PHYSICAL EXAM:  Vital Signs Last 24 Hrs  T(C): 37.1 (30 Mar 2025 05:20), Max: 37.1 (30 Mar 2025 02:15)  T(F): 98.8 (30 Mar 2025 05:20), Max: 98.8 (30 Mar 2025 05:20)  HR: 99 (30 Mar 2025 05:20) (79 - 105)  BP: 148/82 (30 Mar 2025 05:20) (119/66 - 163/92)  BP(mean): --  RR: 18 (30 Mar 2025 05:20) (18 - 18)  SpO2: 98% (30 Mar 2025 05:20) (92% - 100%)    Parameters below as of 30 Mar 2025 05:20  Patient On (Oxygen Delivery Method): room air        CONSTITUTIONAL: Elderly woman in  NAD,   ENMT: Moist oral mucosa,   RESPIRATORY: Normal respiratory effort on NC ; lungs are clear to auscultation bilaterally  CARDIOVASCULAR: Regular rate and rhythm, normal S1 and S2, no murmur/rub/gallop; No lower extremity edema;   ABDOMEN: Nontender to palpation, normoactive bowel sounds, no rebound/guarding; No hepatosplenomegaly  EXT: no edema b/l  NEUROLOGY: more awake and engaged in exam   SKIN: No rashes; no palpable lesions    LABS:                      RADIOLOGY & ADDITIONAL TESTS:  Results Reviewed:   Imaging Personally Reviewed:  Electrocardiogram Personally Reviewed:    COORDINATION OF CARE:  Care Discussed with Consultants/Other Providers [Y/N]:  Prior or Outpatient Records Reviewed [Y/N]:

## 2025-03-31 LAB
ANION GAP SERPL CALC-SCNC: 13 MMOL/L — SIGNIFICANT CHANGE UP (ref 7–14)
BUN SERPL-MCNC: 30 MG/DL — HIGH (ref 7–23)
CALCIUM SERPL-MCNC: 9.7 MG/DL — SIGNIFICANT CHANGE UP (ref 8.4–10.5)
CHLORIDE SERPL-SCNC: 101 MMOL/L — SIGNIFICANT CHANGE UP (ref 98–107)
CO2 SERPL-SCNC: 24 MMOL/L — SIGNIFICANT CHANGE UP (ref 22–31)
CREAT SERPL-MCNC: 0.64 MG/DL — SIGNIFICANT CHANGE UP (ref 0.5–1.3)
EGFR: 93 ML/MIN/1.73M2 — SIGNIFICANT CHANGE UP
EGFR: 93 ML/MIN/1.73M2 — SIGNIFICANT CHANGE UP
GLUCOSE BLDC GLUCOMTR-MCNC: 167 MG/DL — HIGH (ref 70–99)
GLUCOSE BLDC GLUCOMTR-MCNC: 177 MG/DL — HIGH (ref 70–99)
GLUCOSE BLDC GLUCOMTR-MCNC: 179 MG/DL — HIGH (ref 70–99)
GLUCOSE BLDC GLUCOMTR-MCNC: 218 MG/DL — HIGH (ref 70–99)
GLUCOSE BLDC GLUCOMTR-MCNC: 230 MG/DL — HIGH (ref 70–99)
GLUCOSE SERPL-MCNC: 174 MG/DL — HIGH (ref 70–99)
MAGNESIUM SERPL-MCNC: 1.7 MG/DL — SIGNIFICANT CHANGE UP (ref 1.6–2.6)
PHOSPHATE SERPL-MCNC: 3.1 MG/DL — SIGNIFICANT CHANGE UP (ref 2.5–4.5)
POTASSIUM SERPL-MCNC: 3.3 MMOL/L — LOW (ref 3.5–5.3)
POTASSIUM SERPL-SCNC: 3.3 MMOL/L — LOW (ref 3.5–5.3)
SODIUM SERPL-SCNC: 138 MMOL/L — SIGNIFICANT CHANGE UP (ref 135–145)

## 2025-03-31 PROCEDURE — 99231 SBSQ HOSP IP/OBS SF/LOW 25: CPT

## 2025-03-31 PROCEDURE — 71045 X-RAY EXAM CHEST 1 VIEW: CPT | Mod: 26

## 2025-03-31 PROCEDURE — 71275 CT ANGIOGRAPHY CHEST: CPT | Mod: 26

## 2025-03-31 PROCEDURE — 99233 SBSQ HOSP IP/OBS HIGH 50: CPT | Mod: GC

## 2025-03-31 PROCEDURE — 99233 SBSQ HOSP IP/OBS HIGH 50: CPT

## 2025-03-31 RX ORDER — METHYLPREDNISOLONE ACETATE 80 MG/ML
40 INJECTION, SUSPENSION INTRA-ARTICULAR; INTRALESIONAL; INTRAMUSCULAR; SOFT TISSUE ONCE
Refills: 0 | Status: COMPLETED | OUTPATIENT
Start: 2025-03-31 | End: 2025-03-31

## 2025-03-31 RX ORDER — LORAZEPAM 4 MG/ML
0.75 VIAL (ML) INJECTION
Refills: 0 | Status: DISCONTINUED | OUTPATIENT
Start: 2025-03-31 | End: 2025-04-02

## 2025-03-31 RX ORDER — LORAZEPAM 4 MG/ML
0.75 VIAL (ML) INJECTION
Refills: 0 | Status: DISCONTINUED | OUTPATIENT
Start: 2025-03-31 | End: 2025-03-31

## 2025-03-31 RX ORDER — IPRATROPIUM BROMIDE AND ALBUTEROL SULFATE .5; 2.5 MG/3ML; MG/3ML
3 SOLUTION RESPIRATORY (INHALATION) ONCE
Refills: 0 | Status: COMPLETED | OUTPATIENT
Start: 2025-03-31 | End: 2025-03-31

## 2025-03-31 RX ORDER — MAGNESIUM SULFATE 500 MG/ML
2 SYRINGE (ML) INJECTION ONCE
Refills: 0 | Status: COMPLETED | OUTPATIENT
Start: 2025-03-31 | End: 2025-03-31

## 2025-03-31 RX ADMIN — Medication 50 MILLIGRAM(S): at 22:36

## 2025-03-31 RX ADMIN — INSULIN LISPRO 5 UNIT(S): 100 INJECTION, SOLUTION INTRAVENOUS; SUBCUTANEOUS at 12:33

## 2025-03-31 RX ADMIN — DEXTROMETHORPHAN HBR, GUAIFENESIN 600 MILLIGRAM(S): 200 LIQUID ORAL at 18:17

## 2025-03-31 RX ADMIN — Medication 0.75 MILLIGRAM(S): at 16:07

## 2025-03-31 RX ADMIN — LEVALBUTEROL HYDROCHLORIDE 0.63 MILLIGRAM(S): 1.25 SOLUTION RESPIRATORY (INHALATION) at 03:26

## 2025-03-31 RX ADMIN — LEVALBUTEROL HYDROCHLORIDE 0.63 MILLIGRAM(S): 1.25 SOLUTION RESPIRATORY (INHALATION) at 16:30

## 2025-03-31 RX ADMIN — METHYLPREDNISOLONE ACETATE 40 MILLIGRAM(S): 80 INJECTION, SUSPENSION INTRA-ARTICULAR; INTRALESIONAL; INTRAMUSCULAR; SOFT TISSUE at 14:28

## 2025-03-31 RX ADMIN — Medication 50 MILLIGRAM(S): at 13:29

## 2025-03-31 RX ADMIN — INSULIN LISPRO 2: 100 INJECTION, SOLUTION INTRAVENOUS; SUBCUTANEOUS at 12:33

## 2025-03-31 RX ADMIN — INSULIN LISPRO 2: 100 INJECTION, SOLUTION INTRAVENOUS; SUBCUTANEOUS at 18:16

## 2025-03-31 RX ADMIN — INSULIN LISPRO 5 UNIT(S): 100 INJECTION, SOLUTION INTRAVENOUS; SUBCUTANEOUS at 18:16

## 2025-03-31 RX ADMIN — LISINOPRIL 40 MILLIGRAM(S): 5 TABLET ORAL at 05:55

## 2025-03-31 RX ADMIN — INSULIN GLARGINE-YFGN 12 UNIT(S): 100 INJECTION, SOLUTION SUBCUTANEOUS at 22:36

## 2025-03-31 RX ADMIN — ENOXAPARIN SODIUM 40 MILLIGRAM(S): 100 INJECTION SUBCUTANEOUS at 12:13

## 2025-03-31 RX ADMIN — Medication 0.75 MILLIGRAM(S): at 12:11

## 2025-03-31 RX ADMIN — BUDESONIDE 0.5 MILLIGRAM(S): 0.25 SUSPENSION RESPIRATORY (INHALATION) at 09:39

## 2025-03-31 RX ADMIN — Medication 50 MILLIGRAM(S): at 05:54

## 2025-03-31 RX ADMIN — Medication 40 MILLIGRAM(S): at 05:55

## 2025-03-31 RX ADMIN — LEVALBUTEROL HYDROCHLORIDE 0.63 MILLIGRAM(S): 1.25 SOLUTION RESPIRATORY (INHALATION) at 22:10

## 2025-03-31 RX ADMIN — PREDNISONE 20 MILLIGRAM(S): 20 TABLET ORAL at 05:55

## 2025-03-31 RX ADMIN — INSULIN LISPRO 4: 100 INJECTION, SOLUTION INTRAVENOUS; SUBCUTANEOUS at 09:18

## 2025-03-31 RX ADMIN — IPRATROPIUM BROMIDE AND ALBUTEROL SULFATE 3 MILLILITER(S): .5; 2.5 SOLUTION RESPIRATORY (INHALATION) at 14:33

## 2025-03-31 RX ADMIN — SERTRALINE 50 MILLIGRAM(S): 100 TABLET, FILM COATED ORAL at 12:12

## 2025-03-31 RX ADMIN — Medication 1 APPLICATION(S): at 05:55

## 2025-03-31 RX ADMIN — Medication 0.5 MILLIGRAM(S): at 07:01

## 2025-03-31 RX ADMIN — Medication 150 GRAM(S): at 13:28

## 2025-03-31 RX ADMIN — Medication 40 MILLIEQUIVALENT(S): at 09:18

## 2025-03-31 RX ADMIN — LEVALBUTEROL HYDROCHLORIDE 0.63 MILLIGRAM(S): 1.25 SOLUTION RESPIRATORY (INHALATION) at 09:39

## 2025-03-31 RX ADMIN — BUDESONIDE 0.5 MILLIGRAM(S): 0.25 SUSPENSION RESPIRATORY (INHALATION) at 22:10

## 2025-03-31 RX ADMIN — DEXTROMETHORPHAN HBR, GUAIFENESIN 600 MILLIGRAM(S): 200 LIQUID ORAL at 05:54

## 2025-03-31 NOTE — PROGRESS NOTE ADULT - PROBLEM SELECTOR PLAN 7
on   lantus  12 units qhs and will increase premeal to 5  units premeal  ISS  anticipate insulin needs will decrease as weaning off steroids. now on oral pred  [ ] monitor FS and adjust as needed

## 2025-03-31 NOTE — PROGRESS NOTE ADULT - SUBJECTIVE AND OBJECTIVE BOX
[facility] Division of Hospital Medicine  Carlos Summers DO  Available via MS Teams    SUBJECTIVE / OVERNIGHT EVENTS:  No acute events overnight. Patient seen and examined at bedside this morning.  Son Jesus at bedside. Discussed catatonia. Attempted to call other son Phani, a physician, but could not.    Patient speaks short sentences, not eating food.   Listens to some directions.    ADDITIONAL REVIEW OF SYSTEMS:    MEDICATIONS  (STANDING):  albuterol/ipratropium for Nebulization. 3 milliLiter(s) Nebulizer once  buDESOnide    Inhalation Suspension 0.5 milliGRAM(s) Inhalation every 12 hours  chlorhexidine 2% Cloths 1 Application(s) Topical <User Schedule>  dextrose 5%. 1000 milliLiter(s) (100 mL/Hr) IV Continuous <Continuous>  dextrose 5%. 1000 milliLiter(s) (50 mL/Hr) IV Continuous <Continuous>  dextrose 50% Injectable 25 Gram(s) IV Push once  dextrose 50% Injectable 12.5 Gram(s) IV Push once  dextrose 50% Injectable 25 Gram(s) IV Push once  dextrose Oral Gel 15 Gram(s) Oral once  enoxaparin Injectable 40 milliGRAM(s) SubCutaneous every 24 hours  glucagon  Injectable 1 milliGRAM(s) IntraMuscular once  guaiFENesin  milliGRAM(s) Oral every 12 hours  hydrALAZINE 50 milliGRAM(s) Oral three times a day  hydrochlorothiazide 25 milliGRAM(s) Oral daily  insulin glargine Injectable (LANTUS) 12 Unit(s) SubCutaneous at bedtime  insulin lispro (ADMELOG) corrective regimen sliding scale   SubCutaneous at bedtime  insulin lispro (ADMELOG) corrective regimen sliding scale   SubCutaneous three times a day before meals  insulin lispro Injectable (ADMELOG) 5 Unit(s) SubCutaneous three times a day before meals  levalbuterol Inhalation 0.63 milliGRAM(s) Inhalation every 6 hours  lisinopril 40 milliGRAM(s) Oral daily  LORazepam   Injectable 0.75 milliGRAM(s) IV Push <User Schedule>  methylPREDNISolone sodium succinate Injectable 40 milliGRAM(s) IV Push once  pantoprazole    Tablet 40 milliGRAM(s) Oral before breakfast  polyethylene glycol 3350 17 Gram(s) Oral two times a day  predniSONE   Tablet   Oral   senna 2 Tablet(s) Oral at bedtime  sertraline 50 milliGRAM(s) Oral daily  sodium chloride 0.9%. 1000 milliLiter(s) (125 mL/Hr) IV Continuous <Continuous>    MEDICATIONS  (PRN):      I&O's Summary      PHYSICAL EXAM:  Vital Signs Last 24 Hrs  T(C): 37.4 (31 Mar 2025 13:29), Max: 37.5 (31 Mar 2025 12:11)  T(F): 99.3 (31 Mar 2025 13:29), Max: 99.5 (31 Mar 2025 12:11)  HR: 120 (31 Mar 2025 13:29) (73 - 120)  BP: 173/94 (31 Mar 2025 13:29) (121/70 - 173/94)  BP(mean): --  RR: 18 (31 Mar 2025 13:29) (18 - 19)  SpO2: 93% (31 Mar 2025 13:29) (92% - 98%)    Parameters below as of 31 Mar 2025 13:29  Patient On (Oxygen Delivery Method): room air        LABS:    03-31    138  |  101  |  30[H]  ----------------------------<  174[H]  3.3[L]   |  24  |  0.64    Ca    9.7      31 Mar 2025 06:26  Phos  3.1     03-31  Mg     1.70     03-31            Urinalysis Basic - ( 31 Mar 2025 06:26 )    Color: x / Appearance: x / SG: x / pH: x  Gluc: 174 mg/dL / Ketone: x  / Bili: x / Urobili: x   Blood: x / Protein: x / Nitrite: x   Leuk Esterase: x / RBC: x / WBC x   Sq Epi: x / Non Sq Epi: x / Bacteria: x        SARS-CoV-2: NotDetec (19 Mar 2025 09:05)  SARS-CoV-2: NotDetec (08 Mar 2025 14:40)   [Negative] : Neurological [Fever] : no fever [Chills] : no chills [Feeling Fatigued] : not feeling fatigued [SOB] : no shortness of breath [Chest Discomfort] : no chest discomfort [Syncope] : no syncope [Cough] : no cough [Abdominal Pain] : no abdominal pain [Rash] : no rash [Depression] : no depression [Under Stress] : not under stress [Easy Bleeding] : no tendency for easy bleeding

## 2025-03-31 NOTE — PROGRESS NOTE ADULT - PROBLEM SELECTOR PLAN 2
will need ICS/LABA on discharge in addition to duonebs  as noted above, extended steroid taper  needs pulm as outpatient    Worsening wheezing today, some associated tachycardia.  CXR personally reviewed appears unchanged.  Offer magnesium IVPB, dose solumedrol 40mg IV x1. Offer additional duonebs.  Pulmonology re-consulted.  Obtain CTA chest.

## 2025-03-31 NOTE — CHART NOTE - NSCHARTNOTEFT_GEN_A_CORE
Patient is a 74 year old female with history of asthma and depression presented with AHHRF c/b AMS requiring MICU admission for intubation for respiratory failure i/s/o asthma exacerbation. Now extubated.     Called by RN to patient's bedside to complete consent for CTA.     Met with patient and son, Jesus, at bedside. Patient is a 74 year old female with history of asthma and depression presented with AHHRF c/b AMS requiring MICU admission for intubation for respiratory failure i/s/o asthma exacerbation. Now extubated.     Called by RN to patient's bedside to complete consent for CTA. Of note, patient is pending CTA chest with IV contrast to r/o PE.    Met with patient and son, Jesus, at bedside and patient's , Liam, on speaker phone. Patient alert and oriented x1. Jesus and Liam both able to correctly answer patient identification questions. Discussed the plan for CTA chest with IV contrast and the risks and benefits with both patient's  and son. Both verbalized understanding. No questions at the time of interaction. Signed consent placed in patient's chart.     Medicine ACP Coverage,   Kay BRENDAN Gastelum   [id number]

## 2025-03-31 NOTE — BH CONSULTATION LIAISON PROGRESS NOTE - NSBHASSESSMENTFT_PSY_ALL_CORE
Patient is a 74F hx asthma, depression, and HTN was admitted to the MICU with AHRF 2/2 asthma exacerbation resulting in acute on chronic HHRF causing AMS and increased WOB that required intubation. During her ICU course, she experienced seizure-like activity .  Additional issues included hyponatremia, acute on chronic metabolic alkalosis, and mild thrombocytopenia,  also having fevers.  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 Patient has h/o hx of major depressive disorder with psychosis, hx of 3 past inpatient psychiatric hospitalizations last in 2016 at [facility], all rehospitalizations were in context non compliance with the treatment. She has no hx of suicide attempts, no hx of substance abuse. Patient w hx of paranoia, disorganization, catatonia, required MOO when in [facility].     3/24----more withdrawn, some stiffness in neck, not communicative. Some concerns for catatonia. Unsure if symptoms attributed to catatonia vs delirium  3/25--- more alert today, trying to following commands. Still with PMR+. Appears to be responding to Ativan  3/26-- BFS 10, will increasing standing ativan to target catatonic symptoms - giving STAT 0.5mg NOW discussed w acp   3/27----will continue ativan dosing for one more day before increasing  3/28: BFS: stupor 1, mutism 2,withdrawan 2, verbigeration 1, rigidity 2 =8.  3/29: BFS: stupor 2, mutism 2, staring 1, posturing 2, rigidity 2, waxy flexibility 3, withdrawal 2 = 14; please monitor nutritional intake, plan to increase ativan by total daily dose of 0.5 mg at bedtime due to persistent catatonia   3/31: BFS: stupor 2, mutism 2, staring 2, rigidity 1, negativism 1, withdrawal 1, waxy flexibility 3 =12    PLAN  - no SI or HI, no need for psychiatric CO  - EKG  -- antipsychotics can only be given if qtc < 500      - MEDICATIONS:  -- INCREASE ativan IV to 0.75 TID at 7a, 11am, 4pm  -- Continue Zoloft  50mg daily ( home dose 150mg) ( monitor NA- recent hyponatremia)  -- HOLD Zyprexa for now    - PRN:  --Ativan 0.5mg q 6hrs prn IM/IV/PO  - DISPO: pending   Patient is a 74F hx asthma, depression, and HTN was admitted to the MICU with AHRF 2/2 asthma exacerbation resulting in acute on chronic HHRF causing AMS and increased WOB that required intubation. During her ICU course, she experienced seizure-like activity .  Additional issues included hyponatremia, acute on chronic metabolic alkalosis, and mild thrombocytopenia,  also having fevers.  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 Patient has h/o hx of major depressive disorder with psychosis, hx of 3 past inpatient psychiatric hospitalizations last in 2016 at [facility], all rehospitalizations were in context non compliance with the treatment. She has no hx of suicide attempts, no hx of substance abuse. Patient w hx of paranoia, disorganization, catatonia, required MOO when in [facility].     3/24----more withdrawn, some stiffness in neck, not communicative. Some concerns for catatonia. Unsure if symptoms attributed to catatonia vs delirium  3/25--- more alert today, trying to following commands. Still with PMR+. Appears to be responding to Ativan  3/26-- BFS 10, will increasing standing ativan to target catatonic symptoms - giving STAT 0.5mg NOW discussed w acp   3/27----will continue ativan dosing for one more day before increasing  3/28: BFS: stupor 1, mutism 2,withdrawan 2, verbigeration 1, rigidity 2 =8.  3/29: BFS: stupor 2, mutism 2, staring 1, posturing 2, rigidity 2, waxy flexibility 3, withdrawal 2 = 14; please monitor nutritional intake, plan to increase ativan by total daily dose of 0.5 mg at bedtime due to persistent catatonia   3/31: BFS: stupor 2, mutism 2, staring 2, rigidity 1, negativism 1, withdrawal 1, waxy flexibility 3 =12, patient with productive cough, advised for medical work up    PLAN  - no SI or HI, no need for psychiatric CO  - EKG  -- antipsychotics can only be given if qtc < 500  - consider CT chest?      - MEDICATIONS:  -- INCREASE ativan IV to 0.75 TID at 7a, 11am, 4pm  -- Continue Zoloft  50mg daily ( home dose 150mg) ( monitor NA- recent hyponatremia)  -- HOLD Zyprexa for now    - PRN:  --Ativan 0.5mg q 6hrs prn IM/IV/PO  - DISPO: pending

## 2025-03-31 NOTE — CONSULT NOTE ADULT - ASSESSMENT
74F reported asthma with prior intubations and depression initially admitted to MICU with suspected asthma exacerbation with RRT on 3/19 AM for hypoxemia and increased work of breathing with poor air entry. Patient transferred back to ICU for airway evaluation and asthma management and transferred back to floor 3/21, initially requiring BIPAP. Patient had adequate improvement from pulmonary perspective on floor however with episodes reported by team and family of wheezing and cough yesterday. Upon assesment patient is significantly lethargic, still under treatment with Ativan for underlying psychiatric disorder. At the moment no evidence of wheezing or imaging changes to suggest PNA, however clinically concerning for recurrent aspiration.     #Asthma Exacerbation - Resolved  #Lethargy - on Ativan TID  - CTA chest 3/8 without evidence of PNA or PE  - Repeat CXR without acute infiltrates  - continue Duonebs every 6h, ensure to continue as well on discharge for 7 days   - Continue Pulmicort 0.5mg BID while lethargic  - Continue Prednisone taper to complete 4/4   - Incentive spirometry  - Would obtain speech and swallow reassesment  - Obtain VBG specially when lethargic  - NPO for now until cleared by SLP   - Use Home token on DC for OP Pulmonary follow up

## 2025-03-31 NOTE — BH CONSULTATION LIAISON PROGRESS NOTE - NSBHFUPINTERVALCCFT_PSY_A_CORE
remains with s/s of catatonia on exam  Productive cough  remains with s/s of catatonia on exam  Productive cough - team notified

## 2025-03-31 NOTE — BH CONSULTATION LIAISON PROGRESS NOTE - NSBHFUPINTERVALHXFT_PSY_A_CORE
Patient was seen and assessed at bedside. Patient is with eyes open, staring, does not make eye contact with writer, says "please" however unable to state more words when asked direct questions.   BFS: stupor 2, mutism 2, staring 2, rigidity 1, negativism 1, withdrawal 1, waxy flexibility 3 =12

## 2025-03-31 NOTE — PROVIDER CONTACT NOTE (OTHER) - ASSESSMENT
Pt is A&Ox1 minimally verbal. Sinus tachycardic on tele, on , SPO2 95% on RA. pt is alert experiencing expiratory wheezing making gurgling noises. F/s 177. Pt perspiring with increase WOB. VS: 173/95, HR: 120. T:99.3 oral.

## 2025-03-31 NOTE — CONSULT NOTE ADULT - SUBJECTIVE AND OBJECTIVE BOX
HPI:    74F reported asthma with prior intubations and depression initially admitted to MICU with suspected asthma exacerbation with RRT on 3/19 AM for hypoxemia and increased work of breathing with poor air entry. Patient transferred back to ICU for airway evaluation and asthma management and transferred back to floor 3/21, initially requiring BIPAP. Patient had adequate improvement from pulmonary perspective on floor however with episodes reported by team and family of wheezing and cough yesterday. Upon assesment patient is significantly lethargic, still under treatment with Ativan for underlying psychiatric disorder. At the moment no evidence of wheezing or imaging changes to suggest PNA, however clinically concerning for recurrent aspiration.     PAST MEDICAL & SURGICAL HISTORY:  MDD (major depressive disorder), recurrent, severe, with psychosis      Asthma, mild intermittent, uncomplicated      Essential hypertension      No significant past surgical history          FAMILY HISTORY:      SOCIAL HISTORY:  Smoking: [ ] Never Smoked [ ] Former Smoker (__ packs x ___ years) [ ] Current Smoker  (__ packs x ___ years)  Substance Use: [ ] Never Used [ ] Used ____  EtOH Use:  Marital Status: [ ] Single [ ]  [ ]  [ ]   Sexual History:   Occupation:  Recent Travel:  Country of Birth:  Advance Directives:    Allergies    No Known Allergies    Intolerances        HOME MEDICATIONS:  Home Medications:  amLODIPine 10 mg oral tablet: 1 tab(s) orally once a day (08 Mar 2025 18:10)  sertraline 25 mg oral tablet: 1 tab(s) orally once a day (08 Mar 2025 18:10)  ZyPREXA 7.5 mg oral tablet: 1 tab(s) orally once a day (08 Mar 2025 18:09)      REVIEW OF SYSTEMS:  All systems negative except as documented above.    OBJECTIVE:  ICU Vital Signs Last 24 Hrs  T(C): 37.4 (31 Mar 2025 14:28), Max: 37.5 (31 Mar 2025 12:11)  T(F): 99.4 (31 Mar 2025 14:28), Max: 99.5 (31 Mar 2025 12:11)  HR: 114 (31 Mar 2025 16:53) (73 - 120)  BP: 125/62 (31 Mar 2025 14:28) (121/70 - 173/94)  BP(mean): --  ABP: --  ABP(mean): --  RR: 18 (31 Mar 2025 14:28) (18 - 19)  SpO2: 98% (31 Mar 2025 16:53) (92% - 99%)    O2 Parameters below as of 31 Mar 2025 16:53  Patient On (Oxygen Delivery Method): room air              CAPILLARY BLOOD GLUCOSE      POCT Blood Glucose.: 179 mg/dL (31 Mar 2025 17:35)      PHYSICAL EXAM:  General: Lethargic  HEENT: EOMI, sclera anicteric  Neck: supple  Cardiovascular: RR  Respiratory: CTAB, no wheezes, crackles, or rhonci  Abdomen: soft  Extremities: warm and well perfused, no edema, no clubbing  Skin: no rashes  Neurological: Unable to interact verbally    HOSPITAL MEDICATIONS:  Standing Meds:  buDESOnide    Inhalation Suspension 0.5 milliGRAM(s) Inhalation every 12 hours  chlorhexidine 2% Cloths 1 Application(s) Topical <User Schedule>  dextrose 5%. 1000 milliLiter(s) IV Continuous <Continuous>  dextrose 5%. 1000 milliLiter(s) IV Continuous <Continuous>  dextrose 50% Injectable 25 Gram(s) IV Push once  dextrose 50% Injectable 12.5 Gram(s) IV Push once  dextrose 50% Injectable 25 Gram(s) IV Push once  dextrose Oral Gel 15 Gram(s) Oral once  enoxaparin Injectable 40 milliGRAM(s) SubCutaneous every 24 hours  glucagon  Injectable 1 milliGRAM(s) IntraMuscular once  guaiFENesin  milliGRAM(s) Oral every 12 hours  hydrALAZINE 50 milliGRAM(s) Oral three times a day  hydrochlorothiazide 25 milliGRAM(s) Oral daily  insulin glargine Injectable (LANTUS) 12 Unit(s) SubCutaneous at bedtime  insulin lispro (ADMELOG) corrective regimen sliding scale   SubCutaneous three times a day before meals  insulin lispro (ADMELOG) corrective regimen sliding scale   SubCutaneous at bedtime  insulin lispro Injectable (ADMELOG) 5 Unit(s) SubCutaneous three times a day before meals  levalbuterol Inhalation 0.63 milliGRAM(s) Inhalation every 6 hours  lisinopril 40 milliGRAM(s) Oral daily  LORazepam   Injectable 0.75 milliGRAM(s) IV Push <User Schedule>  pantoprazole    Tablet 40 milliGRAM(s) Oral before breakfast  polyethylene glycol 3350 17 Gram(s) Oral two times a day  predniSONE   Tablet   Oral   senna 2 Tablet(s) Oral at bedtime  sertraline 50 milliGRAM(s) Oral daily  sodium chloride 0.9%. 1000 milliLiter(s) IV Continuous <Continuous>      PRN Meds:      LABS:    Hgb Trend: 11.5<--, 12.5<--  03-31    138  |  101  |  30[H]  ----------------------------<  174[H]  3.3[L]   |  24  |  0.64    Ca    9.7      31 Mar 2025 06:26  Phos  3.1     03-31  Mg     1.70     03-31      Creatinine Trend: 0.64<--, 1.20<--, 0.82<--, 1.32<--, 0.61<--, 0.73<--    Urinalysis Basic - ( 31 Mar 2025 06:26 )    Color: x / Appearance: x / SG: x / pH: x  Gluc: 174 mg/dL / Ketone: x  / Bili: x / Urobili: x   Blood: x / Protein: x / Nitrite: x   Leuk Esterase: x / RBC: x / WBC x   Sq Epi: x / Non Sq Epi: x / Bacteria: x            MICROBIOLOGY:       RADIOLOGY:  [x] Reviewed and interpreted by me

## 2025-04-01 LAB
ANION GAP SERPL CALC-SCNC: 14 MMOL/L — SIGNIFICANT CHANGE UP (ref 7–14)
BASOPHILS # BLD AUTO: 0.02 K/UL — SIGNIFICANT CHANGE UP (ref 0–0.2)
BASOPHILS NFR BLD AUTO: 0.2 % — SIGNIFICANT CHANGE UP (ref 0–2)
BUN SERPL-MCNC: 26 MG/DL — HIGH (ref 7–23)
CALCIUM SERPL-MCNC: 10 MG/DL — SIGNIFICANT CHANGE UP (ref 8.4–10.5)
CHLORIDE SERPL-SCNC: 103 MMOL/L — SIGNIFICANT CHANGE UP (ref 98–107)
CO2 SERPL-SCNC: 23 MMOL/L — SIGNIFICANT CHANGE UP (ref 22–31)
CREAT SERPL-MCNC: 0.61 MG/DL — SIGNIFICANT CHANGE UP (ref 0.5–1.3)
EGFR: 94 ML/MIN/1.73M2 — SIGNIFICANT CHANGE UP
EGFR: 94 ML/MIN/1.73M2 — SIGNIFICANT CHANGE UP
EOSINOPHIL # BLD AUTO: 0.1 K/UL — SIGNIFICANT CHANGE UP (ref 0–0.5)
EOSINOPHIL NFR BLD AUTO: 0.9 % — SIGNIFICANT CHANGE UP (ref 0–6)
GLUCOSE BLDC GLUCOMTR-MCNC: 130 MG/DL — HIGH (ref 70–99)
GLUCOSE BLDC GLUCOMTR-MCNC: 151 MG/DL — HIGH (ref 70–99)
GLUCOSE BLDC GLUCOMTR-MCNC: 153 MG/DL — HIGH (ref 70–99)
GLUCOSE BLDC GLUCOMTR-MCNC: 93 MG/DL — SIGNIFICANT CHANGE UP (ref 70–99)
GLUCOSE SERPL-MCNC: 141 MG/DL — HIGH (ref 70–99)
HCT VFR BLD CALC: 34.1 % — LOW (ref 34.5–45)
HGB BLD-MCNC: 11.3 G/DL — LOW (ref 11.5–15.5)
IANC: 5.66 K/UL — SIGNIFICANT CHANGE UP (ref 1.8–7.4)
IMM GRANULOCYTES NFR BLD AUTO: 0.2 % — SIGNIFICANT CHANGE UP (ref 0–0.9)
LYMPHOCYTES # BLD AUTO: 4.68 K/UL — HIGH (ref 1–3.3)
LYMPHOCYTES # BLD AUTO: 42.2 % — SIGNIFICANT CHANGE UP (ref 13–44)
MAGNESIUM SERPL-MCNC: 2.2 MG/DL — SIGNIFICANT CHANGE UP (ref 1.6–2.6)
MCHC RBC-ENTMCNC: 31.7 PG — SIGNIFICANT CHANGE UP (ref 27–34)
MCHC RBC-ENTMCNC: 33.1 G/DL — SIGNIFICANT CHANGE UP (ref 32–36)
MCV RBC AUTO: 95.5 FL — SIGNIFICANT CHANGE UP (ref 80–100)
MONOCYTES # BLD AUTO: 0.61 K/UL — SIGNIFICANT CHANGE UP (ref 0–0.9)
MONOCYTES NFR BLD AUTO: 5.5 % — SIGNIFICANT CHANGE UP (ref 2–14)
NEUTROPHILS # BLD AUTO: 5.66 K/UL — SIGNIFICANT CHANGE UP (ref 1.8–7.4)
NEUTROPHILS NFR BLD AUTO: 51 % — SIGNIFICANT CHANGE UP (ref 43–77)
NRBC # BLD AUTO: 0 K/UL — SIGNIFICANT CHANGE UP (ref 0–0)
NRBC # FLD: 0 K/UL — SIGNIFICANT CHANGE UP (ref 0–0)
NRBC BLD AUTO-RTO: 0 /100 WBCS — SIGNIFICANT CHANGE UP (ref 0–0)
PHOSPHATE SERPL-MCNC: 2.8 MG/DL — SIGNIFICANT CHANGE UP (ref 2.5–4.5)
PLATELET # BLD AUTO: 142 K/UL — LOW (ref 150–400)
POTASSIUM SERPL-MCNC: 3.8 MMOL/L — SIGNIFICANT CHANGE UP (ref 3.5–5.3)
POTASSIUM SERPL-SCNC: 3.8 MMOL/L — SIGNIFICANT CHANGE UP (ref 3.5–5.3)
RBC # BLD: 3.57 M/UL — LOW (ref 3.8–5.2)
RBC # FLD: 12.7 % — SIGNIFICANT CHANGE UP (ref 10.3–14.5)
SODIUM SERPL-SCNC: 140 MMOL/L — SIGNIFICANT CHANGE UP (ref 135–145)
WBC # BLD: 11.09 K/UL — HIGH (ref 3.8–10.5)
WBC # FLD AUTO: 11.09 K/UL — HIGH (ref 3.8–10.5)

## 2025-04-01 PROCEDURE — 99233 SBSQ HOSP IP/OBS HIGH 50: CPT | Mod: GC

## 2025-04-01 PROCEDURE — 99232 SBSQ HOSP IP/OBS MODERATE 35: CPT

## 2025-04-01 RX ORDER — SODIUM CHLORIDE 9 G/1000ML
1000 INJECTION, SOLUTION INTRAVENOUS
Refills: 0 | Status: DISCONTINUED | OUTPATIENT
Start: 2025-04-01 | End: 2025-04-02

## 2025-04-01 RX ORDER — DEXTROSE 50 % IN WATER 50 %
12.5 SYRINGE (ML) INTRAVENOUS ONCE
Refills: 0 | Status: COMPLETED | OUTPATIENT
Start: 2025-04-01 | End: 2025-04-01

## 2025-04-01 RX ADMIN — Medication 12.5 MILLILITER(S): at 22:19

## 2025-04-01 RX ADMIN — Medication 40 MILLIGRAM(S): at 06:32

## 2025-04-01 RX ADMIN — DEXTROMETHORPHAN HBR, GUAIFENESIN 600 MILLIGRAM(S): 200 LIQUID ORAL at 17:16

## 2025-04-01 RX ADMIN — Medication 0.75 MILLIGRAM(S): at 06:30

## 2025-04-01 RX ADMIN — SODIUM CHLORIDE 75 MILLILITER(S): 9 INJECTION, SOLUTION INTRAVENOUS at 15:36

## 2025-04-01 RX ADMIN — SERTRALINE 50 MILLIGRAM(S): 100 TABLET, FILM COATED ORAL at 11:07

## 2025-04-01 RX ADMIN — INSULIN GLARGINE-YFGN 12 UNIT(S): 100 INJECTION, SOLUTION SUBCUTANEOUS at 22:18

## 2025-04-01 RX ADMIN — INSULIN LISPRO 2: 100 INJECTION, SOLUTION INTRAVENOUS; SUBCUTANEOUS at 08:40

## 2025-04-01 RX ADMIN — Medication 0.75 MILLIGRAM(S): at 11:06

## 2025-04-01 RX ADMIN — LEVALBUTEROL HYDROCHLORIDE 0.63 MILLIGRAM(S): 1.25 SOLUTION RESPIRATORY (INHALATION) at 15:47

## 2025-04-01 RX ADMIN — Medication 50 MILLIGRAM(S): at 15:36

## 2025-04-01 RX ADMIN — LISINOPRIL 40 MILLIGRAM(S): 5 TABLET ORAL at 06:32

## 2025-04-01 RX ADMIN — ENOXAPARIN SODIUM 40 MILLIGRAM(S): 100 INJECTION SUBCUTANEOUS at 12:09

## 2025-04-01 RX ADMIN — POLYETHYLENE GLYCOL 3350 17 GRAM(S): 17 POWDER, FOR SOLUTION ORAL at 17:15

## 2025-04-01 RX ADMIN — PREDNISONE 10 MILLIGRAM(S): 20 TABLET ORAL at 06:32

## 2025-04-01 RX ADMIN — LEVALBUTEROL HYDROCHLORIDE 0.63 MILLIGRAM(S): 1.25 SOLUTION RESPIRATORY (INHALATION) at 04:13

## 2025-04-01 RX ADMIN — Medication 50 MILLIGRAM(S): at 21:36

## 2025-04-01 RX ADMIN — BUDESONIDE 0.5 MILLIGRAM(S): 0.25 SUSPENSION RESPIRATORY (INHALATION) at 20:02

## 2025-04-01 RX ADMIN — LEVALBUTEROL HYDROCHLORIDE 0.63 MILLIGRAM(S): 1.25 SOLUTION RESPIRATORY (INHALATION) at 20:03

## 2025-04-01 RX ADMIN — Medication 1 APPLICATION(S): at 06:40

## 2025-04-01 RX ADMIN — POLYETHYLENE GLYCOL 3350 17 GRAM(S): 17 POWDER, FOR SOLUTION ORAL at 06:33

## 2025-04-01 RX ADMIN — BUDESONIDE 0.5 MILLIGRAM(S): 0.25 SUSPENSION RESPIRATORY (INHALATION) at 10:21

## 2025-04-01 RX ADMIN — Medication 0.75 MILLIGRAM(S): at 17:05

## 2025-04-01 RX ADMIN — INSULIN LISPRO 2: 100 INJECTION, SOLUTION INTRAVENOUS; SUBCUTANEOUS at 17:14

## 2025-04-01 RX ADMIN — Medication 50 MILLIGRAM(S): at 06:32

## 2025-04-01 RX ADMIN — LEVALBUTEROL HYDROCHLORIDE 0.63 MILLIGRAM(S): 1.25 SOLUTION RESPIRATORY (INHALATION) at 10:21

## 2025-04-01 NOTE — BH CONSULTATION LIAISON PROGRESS NOTE - NSBHFUPINTERVALHXFT_PSY_A_CORE
Met with the patient. Sleeping prior, opens eyes when name is called. She follows commands, is weak, and complains- ' I feel tired'. She denies any mood symptoms, denies any si or hi, or ah or vh or paranoia when asked. Is grossly oriented.   Examined--- no rigidity noted on ue or neck, no other symptoms of catatonia.  PMR++

## 2025-04-01 NOTE — SWALLOW BEDSIDE ASSESSMENT ADULT - COMMENTS
4/1 Hospitalist Note: "74F hx asthma and depressed presented with AHHRF c/b AMS requiring MICU admission for intubation for respiratory failure i/s/o asthma exacerbation. Extubated 3/12 to face tent and now on BIPAP s/p RRT 3/19 for increased WOB / hypoxia despite aggressive asthma management. Transferred back to MICU for management of severe asthma."    4/1 Pulmonology Note: "At the moment no evidence of wheezing or imaging changes to suggest PNA, however clinically concerning for recurrent aspiration."    Patient is known to this service from this admission.    Of Note 1: Clinical swallow evaluation completed on 3/13/25 with the following recommendations made: “From An Oral/Pharyngeal Standpoint: Regular & Thin Liquids however patient reports preference for Soft Bite Size & Thin at this time” (See consult for full details).      Of Note 2: Clinical swallow re-evaluation was completed on 3/20/25 with recommendations made for Soft and Bite Sized diet with Thin Liquids (See consult for details).    Patient received in bed, awake and alert. Patient's son present at bedside, who reports the patient was tolerating Soft and Bite Sized diet with Thin Liquids well, until yesterday when she began wheezing. Patient's son reported the patient was asking for food this afternoon. Congested cough and wheezing observed prior to PO trials. Patient stated "I have a lot of mucous". She expectorated a moderate amount of yellow-tinged secretions; RN assisted with suctioning via [place] prior to PO trials. Patient agreeable to PO trials following suctioning.

## 2025-04-01 NOTE — BH CONSULTATION LIAISON PROGRESS NOTE - NSBHFUPINTERVALCCFT_PSY_A_CORE
Ativan dosing increased to 0.75mg tid iv  cough+, possible aspiration, now NPO, awaiting s+s  medicine team aware of plan below

## 2025-04-01 NOTE — BH CONSULTATION LIAISON PROGRESS NOTE - NSBHASSESSMENTFT_PSY_ALL_CORE
Patient is a 74F hx asthma, depression, and HTN was admitted to the MICU with AHRF 2/2 asthma exacerbation resulting in acute on chronic HHRF causing AMS and increased WOB that required intubation. During her ICU course, she experienced seizure-like activity .  Additional issues included hyponatremia, acute on chronic metabolic alkalosis, and mild thrombocytopenia,  also having fevers.  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 Patient has h/o hx of major depressive disorder with psychosis, hx of 3 past inpatient psychiatric hospitalizations last in 2016 at [facility], all rehospitalizations were in context non compliance with the treatment. She has no hx of suicide attempts, no hx of substance abuse. Patient w hx of paranoia, disorganization, catatonia, required MOO when in [facility].     3/24----more withdrawn, some stiffness in neck, not communicative. Some concerns for catatonia. Unsure if symptoms attributed to catatonia vs delirium  3/25--- more alert today, trying to following commands. Still with PMR+. Appears to be responding to Ativan  3/26-- BFS 10, will increasing standing ativan to target catatonic symptoms - giving STAT 0.5mg NOW discussed w acp   3/27----will continue ativan dosing for one more day before increasing  3/28: BFS: stupor 1, mutism 2,withdrawan 2, verbigeration 1, rigidity 2 =8.  3/29: BFS: stupor 2, mutism 2, staring 1, posturing 2, rigidity 2, waxy flexibility 3, withdrawal 2 = 14; please monitor nutritional intake, plan to increase ativan by total daily dose of 0.5 mg at bedtime due to persistent catatonia   3/31: BFS: stupor 2, mutism 2, staring 2, rigidity 1, negativism 1, withdrawal 1, waxy flexibility 3 =12, patient with productive cough, advised for medical work up    4/1---tired, speaking more, not rigid, has PMR+. NPO status now.     PLAN  - no SI or HI, no need for psychiatric CO  - EKG  -- antipsychotics can only be given if qtc < 500  - consider CT chest?      - MEDICATIONS:  -- CONTINUE ativan 0.75 TID at 7a, 11am, 4pm  -- Continue Zoloft  50mg daily ( home dose 150mg) ( monitor NA- recent hyponatremia)  -- HOLD Zyprexa for now    - PRN:  --Ativan 0.5mg q 6hrs prn IM/IV/PO  - DISPO: pending

## 2025-04-01 NOTE — PROGRESS NOTE ADULT - SUBJECTIVE AND OBJECTIVE BOX
Interval Events: more alert today, bzd dose increased     PHYSICAL EXAM:  General: Lethargic but arousable to voice  HEENT: EOMI, sclera anicteric  Neck: supple  Cardiovascular: RR  Respiratory: CTAB, no wheezes, crackles, or rhonci  Abdomen: soft  Extremities: warm and well perfused, no edema, no clubbing  Skin: no rashes  Neurological: AOx1-2      REVIEW OF SYSTEMS:  All systems negative unless described below:    OBJECTIVE:  ICU Vital Signs Last 24 Hrs  T(C): 37.1 (01 Apr 2025 04:58), Max: 37.4 (31 Mar 2025 13:29)  T(F): 98.8 (01 Apr 2025 04:58), Max: 99.4 (31 Mar 2025 14:28)  HR: 100 (01 Apr 2025 10:21) (77 - 120)  BP: 153/74 (01 Apr 2025 04:58) (125/62 - 173/94)  BP(mean): --  ABP: --  ABP(mean): --  RR: 18 (01 Apr 2025 04:58) (17 - 18)  SpO2: 100% (01 Apr 2025 10:21) (93% - 100%)    O2 Parameters below as of 01 Apr 2025 10:21  Patient On (Oxygen Delivery Method): room air              03-31 @ 07:01  -  04-01 @ 07:00  --------------------------------------------------------  IN: 450 mL / OUT: 500 mL / NET: -50 mL      CAPILLARY BLOOD GLUCOSE      POCT Blood Glucose.: 130 mg/dL (01 Apr 2025 12:09)          HOSPITAL MEDICATIONS:  enoxaparin Injectable 40 milliGRAM(s) SubCutaneous every 24 hours      hydrALAZINE 50 milliGRAM(s) Oral three times a day  hydrochlorothiazide 25 milliGRAM(s) Oral daily  lisinopril 40 milliGRAM(s) Oral daily    dextrose 50% Injectable 25 Gram(s) IV Push once  dextrose 50% Injectable 12.5 Gram(s) IV Push once  dextrose 50% Injectable 25 Gram(s) IV Push once  dextrose Oral Gel 15 Gram(s) Oral once  glucagon  Injectable 1 milliGRAM(s) IntraMuscular once  insulin glargine Injectable (LANTUS) 12 Unit(s) SubCutaneous at bedtime  insulin lispro (ADMELOG) corrective regimen sliding scale   SubCutaneous three times a day before meals  insulin lispro (ADMELOG) corrective regimen sliding scale   SubCutaneous at bedtime  insulin lispro Injectable (ADMELOG) 5 Unit(s) SubCutaneous three times a day before meals  predniSONE   Tablet   Oral   predniSONE   Tablet 10 milliGRAM(s) Oral daily    buDESOnide    Inhalation Suspension 0.5 milliGRAM(s) Inhalation every 12 hours  guaiFENesin  milliGRAM(s) Oral every 12 hours  levalbuterol Inhalation 0.63 milliGRAM(s) Inhalation every 6 hours    LORazepam   Injectable 0.75 milliGRAM(s) IV Push <User Schedule>  sertraline 50 milliGRAM(s) Oral daily    pantoprazole    Tablet 40 milliGRAM(s) Oral before breakfast  polyethylene glycol 3350 17 Gram(s) Oral two times a day  senna 2 Tablet(s) Oral at bedtime        dextrose 5%. 1000 milliLiter(s) IV Continuous <Continuous>  dextrose 5%. 1000 milliLiter(s) IV Continuous <Continuous>  sodium chloride 0.9%. 1000 milliLiter(s) IV Continuous <Continuous>      chlorhexidine 2% Cloths 1 Application(s) Topical <User Schedule>        LABS:                        11.3   11.09 )-----------( 142      ( 01 Apr 2025 06:14 )             34.1     Hgb Trend: 11.3<--, 11.5<--  04-01    140  |  103  |  26[H]  ----------------------------<  141[H]  3.8   |  23  |  0.61    Ca    10.0      01 Apr 2025 06:14  Phos  2.8     04-01  Mg     2.20     04-01      Creatinine Trend: 0.61<--, 0.64<--, 1.20<--, 0.82<--, 1.32<--, 0.61<--    Urinalysis Basic - ( 01 Apr 2025 06:14 )    Color: x / Appearance: x / SG: x / pH: x  Gluc: 141 mg/dL / Ketone: x  / Bili: x / Urobili: x   Blood: x / Protein: x / Nitrite: x   Leuk Esterase: x / RBC: x / WBC x   Sq Epi: x / Non Sq Epi: x / Bacteria: x            MICROBIOLOGY:     RADIOLOGY:  [x] Reviewed and interpreted by me

## 2025-04-01 NOTE — SWALLOW BEDSIDE ASSESSMENT ADULT - SWALLOW EVAL: DIAGNOSIS
Of note, this was an overall limited assessment, as patient accepted only x1 teaspoon of Puree and x1 single sip of Thin Liquids. Patient presents with moderate pharyngeal dysphagia. Oral stage was unremarkable for trials provided, characterized by adequate oral containment, adequate bolus manipulation and timely oral transit with adequate oral clearance. Pharyngeal stage marked by swallow initiation with hyolaryngeal elevation/excursion evidenced via digital palpation. Ongoing wheezing with increased work of breathing observed following Puree, though difficult to discern from baseline wheezing. Immediate cough repsonse observed following single sip of Thin Liquids. Patient declined further trials at that time, stating "I want to rest".

## 2025-04-01 NOTE — PROGRESS NOTE ADULT - ATTENDING COMMENTS
74F reported asthma with prior intubations and depression, hx of catatonia initially admitted to MICU with suspected asthma exacerbation with RRT on 3/19 AM for hypoxemia and increased work of breathing with poor air entry. Patient transferred back to ICU for airway evaluation and asthma management and transferred back to floor 3/21, initially requiring BIPAP. Patient had adequate improvement from pulmonary perspective on floor however with episodes reported by team and family of wheezing and cough yesterday. Upon assesment patient is significantly lethargic, still under treatment with Ativan for underlying psychiatric disorder. At the moment no evidence of wheezing or imaging changes to suggest PNA, however clinically concerning for recurrent aspiration. CTPA 3/31/25 reviewed – no PE, no consolidations, no pleural effusions, + subsegmental atelectasis. No recent VBG (last one was 3/21/25 - 7.44/51); pt appears more responsive today, following commands, A & O x2, still mildly lethargic. Please check repeat VBG. Swallow eval pending.

## 2025-04-01 NOTE — PROGRESS NOTE ADULT - PROBLEM SELECTOR PLAN 1
2/2 to status asthmaticus  Follow-up aspergillus Ab: negative , ANCA: negative , IgE: negative    s/p intubation and bipap now on NC  c/w duonebs q6h ensure to continue as well on discharge for 7 days   s/p course of antibiotics  c/w budesonide BID  currently on solumedrol with plan to start pred taper on 3/23 - complete 4/4    Possible aspiration event 3/31. Obtain SLP eval. NPO until formal eval.

## 2025-04-01 NOTE — PROGRESS NOTE ADULT - SUBJECTIVE AND OBJECTIVE BOX
[facility] Division of Hospital Medicine  Carlos Summers DO  Available via MS Teams    SUBJECTIVE / OVERNIGHT EVENTS:  No acute events overnight. Patient seen and examined at bedside this morning.  This morning patient anxiously repeating, "please don't let me die" multiple times and appears tearful.     ADDITIONAL REVIEW OF SYSTEMS:    MEDICATIONS  (STANDING):  buDESOnide    Inhalation Suspension 0.5 milliGRAM(s) Inhalation every 12 hours  chlorhexidine 2% Cloths 1 Application(s) Topical <User Schedule>  dextrose 5%. 1000 milliLiter(s) (100 mL/Hr) IV Continuous <Continuous>  dextrose 5%. 1000 milliLiter(s) (50 mL/Hr) IV Continuous <Continuous>  dextrose 50% Injectable 25 Gram(s) IV Push once  dextrose 50% Injectable 12.5 Gram(s) IV Push once  dextrose 50% Injectable 25 Gram(s) IV Push once  dextrose Oral Gel 15 Gram(s) Oral once  enoxaparin Injectable 40 milliGRAM(s) SubCutaneous every 24 hours  glucagon  Injectable 1 milliGRAM(s) IntraMuscular once  guaiFENesin  milliGRAM(s) Oral every 12 hours  hydrALAZINE 50 milliGRAM(s) Oral three times a day  hydrochlorothiazide 25 milliGRAM(s) Oral daily  insulin glargine Injectable (LANTUS) 12 Unit(s) SubCutaneous at bedtime  insulin lispro (ADMELOG) corrective regimen sliding scale   SubCutaneous three times a day before meals  insulin lispro (ADMELOG) corrective regimen sliding scale   SubCutaneous at bedtime  insulin lispro Injectable (ADMELOG) 5 Unit(s) SubCutaneous three times a day before meals  levalbuterol Inhalation 0.63 milliGRAM(s) Inhalation every 6 hours  lisinopril 40 milliGRAM(s) Oral daily  LORazepam   Injectable 0.75 milliGRAM(s) IV Push <User Schedule>  pantoprazole    Tablet 40 milliGRAM(s) Oral before breakfast  polyethylene glycol 3350 17 Gram(s) Oral two times a day  predniSONE   Tablet 10 milliGRAM(s) Oral daily  predniSONE   Tablet   Oral   senna 2 Tablet(s) Oral at bedtime  sertraline 50 milliGRAM(s) Oral daily  sodium chloride 0.9%. 1000 milliLiter(s) (125 mL/Hr) IV Continuous <Continuous>    MEDICATIONS  (PRN):      I&O's Summary    31 Mar 2025 07:01  -  01 Apr 2025 07:00  --------------------------------------------------------  IN: 450 mL / OUT: 500 mL / NET: -50 mL        PHYSICAL EXAM:  Vital Signs Last 24 Hrs  T(C): 37.2 (01 Apr 2025 11:50), Max: 37.4 (31 Mar 2025 14:28)  T(F): 99 (01 Apr 2025 11:50), Max: 99.4 (31 Mar 2025 14:28)  HR: 93 (01 Apr 2025 11:50) (77 - 115)  BP: 141/81 (01 Apr 2025 11:50) (125/62 - 153/74)  BP(mean): --  RR: 18 (01 Apr 2025 11:50) (17 - 18)  SpO2: 98% (01 Apr 2025 11:50) (93% - 100%)    Parameters below as of 01 Apr 2025 10:21  Patient On (Oxygen Delivery Method): room air    LABS:                        11.3   11.09 )-----------( 142      ( 01 Apr 2025 06:14 )             34.1     04-01    140  |  103  |  26[H]  ----------------------------<  141[H]  3.8   |  23  |  0.61    Ca    10.0      01 Apr 2025 06:14  Phos  2.8     04-01  Mg     2.20     04-01            Urinalysis Basic - ( 01 Apr 2025 06:14 )    Color: x / Appearance: x / SG: x / pH: x  Gluc: 141 mg/dL / Ketone: x  / Bili: x / Urobili: x   Blood: x / Protein: x / Nitrite: x   Leuk Esterase: x / RBC: x / WBC x   Sq Epi: x / Non Sq Epi: x / Bacteria: x        SARS-CoV-2: NotDetec (19 Mar 2025 09:05)  SARS-CoV-2: NotDetec (08 Mar 2025 14:40)

## 2025-04-01 NOTE — PROGRESS NOTE ADULT - ASSESSMENT
74F reported asthma with prior intubations and depression initially admitted to MICU with suspected asthma exacerbation with RRT on 3/19 AM for hypoxemia and increased work of breathing with poor air entry. Patient transferred back to ICU for airway evaluation and asthma management and transferred back to floor 3/21, initially requiring BIPAP. Patient had adequate improvement from pulmonary perspective on floor however with episodes reported by team and family of wheezing and cough yesterday. Upon assesment patient is significantly lethargic, still under treatment with Ativan for underlying psychiatric disorder. At the moment no evidence of wheezing or imaging changes to suggest PNA, however clinically concerning for recurrent aspiration.     #Asthma Exacerbation - Resolved  #Lethargy - on Ativan TID    - CTA chest 3/8 without evidence of PNA or PE  - repeat CTA negative for PE and clear lungs  - continue Duonebs every 6h, ensure to continue as well on discharge for 7 days   - Continue Pulmicort 0.5mg BID while lethargic  - Continue Prednisone taper to complete 4/4   - Incentive spirometry  - Would obtain speech and swallow reassesment  - Obtain VBG   - NPO for now until cleared by SLP   - Use Home token on DC for OP Pulmonary follow up

## 2025-04-02 LAB
ALBUMIN SERPL ELPH-MCNC: 3.3 G/DL — SIGNIFICANT CHANGE UP (ref 3.3–5)
ALP SERPL-CCNC: 70 U/L — SIGNIFICANT CHANGE UP (ref 40–120)
ALT FLD-CCNC: 27 U/L — SIGNIFICANT CHANGE UP (ref 4–33)
ANION GAP SERPL CALC-SCNC: 14 MMOL/L — SIGNIFICANT CHANGE UP (ref 7–14)
ANION GAP SERPL CALC-SCNC: 15 MMOL/L — HIGH (ref 7–14)
AST SERPL-CCNC: 26 U/L — SIGNIFICANT CHANGE UP (ref 4–32)
BASOPHILS # BLD AUTO: 0.01 K/UL — SIGNIFICANT CHANGE UP (ref 0–0.2)
BASOPHILS # BLD AUTO: 0.02 K/UL — SIGNIFICANT CHANGE UP (ref 0–0.2)
BASOPHILS NFR BLD AUTO: 0.1 % — SIGNIFICANT CHANGE UP (ref 0–2)
BASOPHILS NFR BLD AUTO: 0.2 % — SIGNIFICANT CHANGE UP (ref 0–2)
BILIRUB SERPL-MCNC: 0.6 MG/DL — SIGNIFICANT CHANGE UP (ref 0.2–1.2)
BLOOD GAS ARTERIAL - LYTES,HGB,ICA,LACT RESULT: SIGNIFICANT CHANGE UP
BLOOD GAS VENOUS COMPREHENSIVE RESULT: SIGNIFICANT CHANGE UP
BUN SERPL-MCNC: 15 MG/DL — SIGNIFICANT CHANGE UP (ref 7–23)
BUN SERPL-MCNC: 17 MG/DL — SIGNIFICANT CHANGE UP (ref 7–23)
CALCIUM SERPL-MCNC: 9.7 MG/DL — SIGNIFICANT CHANGE UP (ref 8.4–10.5)
CALCIUM SERPL-MCNC: 9.8 MG/DL — SIGNIFICANT CHANGE UP (ref 8.4–10.5)
CHLORIDE SERPL-SCNC: 100 MMOL/L — SIGNIFICANT CHANGE UP (ref 98–107)
CHLORIDE SERPL-SCNC: 99 MMOL/L — SIGNIFICANT CHANGE UP (ref 98–107)
CO2 SERPL-SCNC: 22 MMOL/L — SIGNIFICANT CHANGE UP (ref 22–31)
CO2 SERPL-SCNC: 22 MMOL/L — SIGNIFICANT CHANGE UP (ref 22–31)
CREAT SERPL-MCNC: 0.52 MG/DL — SIGNIFICANT CHANGE UP (ref 0.5–1.3)
CREAT SERPL-MCNC: 0.66 MG/DL — SIGNIFICANT CHANGE UP (ref 0.5–1.3)
EGFR: 92 ML/MIN/1.73M2 — SIGNIFICANT CHANGE UP
EGFR: 92 ML/MIN/1.73M2 — SIGNIFICANT CHANGE UP
EGFR: 97 ML/MIN/1.73M2 — SIGNIFICANT CHANGE UP
EGFR: 97 ML/MIN/1.73M2 — SIGNIFICANT CHANGE UP
EOSINOPHIL # BLD AUTO: 0.06 K/UL — SIGNIFICANT CHANGE UP (ref 0–0.5)
EOSINOPHIL # BLD AUTO: 0.21 K/UL — SIGNIFICANT CHANGE UP (ref 0–0.5)
EOSINOPHIL NFR BLD AUTO: 0.9 % — SIGNIFICANT CHANGE UP (ref 0–6)
EOSINOPHIL NFR BLD AUTO: 2.4 % — SIGNIFICANT CHANGE UP (ref 0–6)
FLUAV AG NPH QL: SIGNIFICANT CHANGE UP
FLUBV AG NPH QL: SIGNIFICANT CHANGE UP
GLUCOSE BLDC GLUCOMTR-MCNC: 120 MG/DL — HIGH (ref 70–99)
GLUCOSE BLDC GLUCOMTR-MCNC: 145 MG/DL — HIGH (ref 70–99)
GLUCOSE BLDC GLUCOMTR-MCNC: 165 MG/DL — HIGH (ref 70–99)
GLUCOSE BLDC GLUCOMTR-MCNC: 216 MG/DL — HIGH (ref 70–99)
GLUCOSE BLDC GLUCOMTR-MCNC: 220 MG/DL — HIGH (ref 70–99)
GLUCOSE BLDC GLUCOMTR-MCNC: 224 MG/DL — HIGH (ref 70–99)
GLUCOSE SERPL-MCNC: 130 MG/DL — HIGH (ref 70–99)
GLUCOSE SERPL-MCNC: 163 MG/DL — HIGH (ref 70–99)
HCT VFR BLD CALC: 35.9 % — SIGNIFICANT CHANGE UP (ref 34.5–45)
HCT VFR BLD CALC: 36.2 % — SIGNIFICANT CHANGE UP (ref 34.5–45)
HGB BLD-MCNC: 11.6 G/DL — SIGNIFICANT CHANGE UP (ref 11.5–15.5)
HGB BLD-MCNC: 11.6 G/DL — SIGNIFICANT CHANGE UP (ref 11.5–15.5)
IANC: 4.15 K/UL — SIGNIFICANT CHANGE UP (ref 1.8–7.4)
IANC: 4.49 K/UL — SIGNIFICANT CHANGE UP (ref 1.8–7.4)
IMM GRANULOCYTES NFR BLD AUTO: 0.2 % — SIGNIFICANT CHANGE UP (ref 0–0.9)
IMM GRANULOCYTES NFR BLD AUTO: 0.4 % — SIGNIFICANT CHANGE UP (ref 0–0.9)
LYMPHOCYTES # BLD AUTO: 2.33 K/UL — SIGNIFICANT CHANGE UP (ref 1–3.3)
LYMPHOCYTES # BLD AUTO: 3.39 K/UL — HIGH (ref 1–3.3)
LYMPHOCYTES # BLD AUTO: 33.2 % — SIGNIFICANT CHANGE UP (ref 13–44)
LYMPHOCYTES # BLD AUTO: 38.9 % — SIGNIFICANT CHANGE UP (ref 13–44)
MAGNESIUM SERPL-MCNC: 1.7 MG/DL — SIGNIFICANT CHANGE UP (ref 1.6–2.6)
MAGNESIUM SERPL-MCNC: 1.8 MG/DL — SIGNIFICANT CHANGE UP (ref 1.6–2.6)
MCHC RBC-ENTMCNC: 31.9 PG — SIGNIFICANT CHANGE UP (ref 27–34)
MCHC RBC-ENTMCNC: 32 G/DL — SIGNIFICANT CHANGE UP (ref 32–36)
MCHC RBC-ENTMCNC: 32.3 G/DL — SIGNIFICANT CHANGE UP (ref 32–36)
MCHC RBC-ENTMCNC: 32.3 PG — SIGNIFICANT CHANGE UP (ref 27–34)
MCV RBC AUTO: 100 FL — SIGNIFICANT CHANGE UP (ref 80–100)
MCV RBC AUTO: 99.5 FL — SIGNIFICANT CHANGE UP (ref 80–100)
MONOCYTES # BLD AUTO: 0.43 K/UL — SIGNIFICANT CHANGE UP (ref 0–0.9)
MONOCYTES # BLD AUTO: 0.58 K/UL — SIGNIFICANT CHANGE UP (ref 0–0.9)
MONOCYTES NFR BLD AUTO: 6.1 % — SIGNIFICANT CHANGE UP (ref 2–14)
MONOCYTES NFR BLD AUTO: 6.7 % — SIGNIFICANT CHANGE UP (ref 2–14)
NEUTROPHILS # BLD AUTO: 4.15 K/UL — SIGNIFICANT CHANGE UP (ref 1.8–7.4)
NEUTROPHILS # BLD AUTO: 4.49 K/UL — SIGNIFICANT CHANGE UP (ref 1.8–7.4)
NEUTROPHILS NFR BLD AUTO: 51.6 % — SIGNIFICANT CHANGE UP (ref 43–77)
NEUTROPHILS NFR BLD AUTO: 59.3 % — SIGNIFICANT CHANGE UP (ref 43–77)
NRBC # BLD AUTO: 0 K/UL — SIGNIFICANT CHANGE UP (ref 0–0)
NRBC # BLD AUTO: 0 K/UL — SIGNIFICANT CHANGE UP (ref 0–0)
NRBC # FLD: 0 K/UL — SIGNIFICANT CHANGE UP (ref 0–0)
NRBC # FLD: 0 K/UL — SIGNIFICANT CHANGE UP (ref 0–0)
NRBC BLD AUTO-RTO: 0 /100 WBCS — SIGNIFICANT CHANGE UP (ref 0–0)
NRBC BLD AUTO-RTO: 0 /100 WBCS — SIGNIFICANT CHANGE UP (ref 0–0)
PHOSPHATE SERPL-MCNC: 4.3 MG/DL — SIGNIFICANT CHANGE UP (ref 2.5–4.5)
PHOSPHATE SERPL-MCNC: 4.9 MG/DL — HIGH (ref 2.5–4.5)
PLATELET # BLD AUTO: 130 K/UL — LOW (ref 150–400)
PLATELET # BLD AUTO: 146 K/UL — LOW (ref 150–400)
POTASSIUM SERPL-MCNC: 5.2 MMOL/L — SIGNIFICANT CHANGE UP (ref 3.5–5.3)
POTASSIUM SERPL-MCNC: 5.3 MMOL/L — SIGNIFICANT CHANGE UP (ref 3.5–5.3)
POTASSIUM SERPL-SCNC: 5.2 MMOL/L — SIGNIFICANT CHANGE UP (ref 3.5–5.3)
POTASSIUM SERPL-SCNC: 5.3 MMOL/L — SIGNIFICANT CHANGE UP (ref 3.5–5.3)
PROT SERPL-MCNC: 6.5 G/DL — SIGNIFICANT CHANGE UP (ref 6–8.3)
RBC # BLD: 3.59 M/UL — LOW (ref 3.8–5.2)
RBC # BLD: 3.64 M/UL — LOW (ref 3.8–5.2)
RBC # FLD: 12.8 % — SIGNIFICANT CHANGE UP (ref 10.3–14.5)
RBC # FLD: 12.9 % — SIGNIFICANT CHANGE UP (ref 10.3–14.5)
RSV RNA NPH QL NAA+NON-PROBE: SIGNIFICANT CHANGE UP
SARS-COV-2 RNA SPEC QL NAA+PROBE: SIGNIFICANT CHANGE UP
SODIUM SERPL-SCNC: 135 MMOL/L — SIGNIFICANT CHANGE UP (ref 135–145)
SODIUM SERPL-SCNC: 137 MMOL/L — SIGNIFICANT CHANGE UP (ref 135–145)
SOURCE RESPIRATORY: SIGNIFICANT CHANGE UP
WBC # BLD: 7.01 K/UL — SIGNIFICANT CHANGE UP (ref 3.8–10.5)
WBC # BLD: 8.71 K/UL — SIGNIFICANT CHANGE UP (ref 3.8–10.5)
WBC # FLD AUTO: 7.01 K/UL — SIGNIFICANT CHANGE UP (ref 3.8–10.5)
WBC # FLD AUTO: 8.71 K/UL — SIGNIFICANT CHANGE UP (ref 3.8–10.5)

## 2025-04-02 PROCEDURE — 99232 SBSQ HOSP IP/OBS MODERATE 35: CPT

## 2025-04-02 PROCEDURE — 99223 1ST HOSP IP/OBS HIGH 75: CPT | Mod: GC

## 2025-04-02 PROCEDURE — 99233 SBSQ HOSP IP/OBS HIGH 50: CPT

## 2025-04-02 PROCEDURE — 71045 X-RAY EXAM CHEST 1 VIEW: CPT | Mod: 26

## 2025-04-02 RX ORDER — METHYLPREDNISOLONE ACETATE 80 MG/ML
125 INJECTION, SUSPENSION INTRA-ARTICULAR; INTRALESIONAL; INTRAMUSCULAR; SOFT TISSUE ONCE
Refills: 0 | Status: DISCONTINUED | OUTPATIENT
Start: 2025-04-02 | End: 2025-04-03

## 2025-04-02 RX ORDER — IPRATROPIUM BROMIDE AND ALBUTEROL SULFATE .5; 2.5 MG/3ML; MG/3ML
3 SOLUTION RESPIRATORY (INHALATION) ONCE
Refills: 0 | Status: COMPLETED | OUTPATIENT
Start: 2025-04-02 | End: 2025-04-02

## 2025-04-02 RX ORDER — LEVALBUTEROL HYDROCHLORIDE 1.25 MG/3ML
0.63 SOLUTION RESPIRATORY (INHALATION) ONCE
Refills: 0 | Status: COMPLETED | OUTPATIENT
Start: 2025-04-02 | End: 2025-04-02

## 2025-04-02 RX ORDER — SODIUM CHLORIDE 9 G/1000ML
1000 INJECTION, SOLUTION INTRAVENOUS
Refills: 0 | Status: DISCONTINUED | OUTPATIENT
Start: 2025-04-02 | End: 2025-04-04

## 2025-04-02 RX ORDER — DEXTROMETHORPHAN HBR, GUAIFENESIN 200 MG/10ML
200 LIQUID ORAL EVERY 6 HOURS
Refills: 0 | Status: DISCONTINUED | OUTPATIENT
Start: 2025-04-02 | End: 2025-04-20

## 2025-04-02 RX ADMIN — DEXTROMETHORPHAN HBR, GUAIFENESIN 200 MILLIGRAM(S): 200 LIQUID ORAL at 11:26

## 2025-04-02 RX ADMIN — ENOXAPARIN SODIUM 40 MILLIGRAM(S): 100 INJECTION SUBCUTANEOUS at 11:40

## 2025-04-02 RX ADMIN — Medication 50 MILLIGRAM(S): at 13:38

## 2025-04-02 RX ADMIN — BUDESONIDE 0.5 MILLIGRAM(S): 0.25 SUSPENSION RESPIRATORY (INHALATION) at 07:03

## 2025-04-02 RX ADMIN — Medication 40 MILLIGRAM(S): at 05:29

## 2025-04-02 RX ADMIN — LISINOPRIL 40 MILLIGRAM(S): 5 TABLET ORAL at 05:29

## 2025-04-02 RX ADMIN — LEVALBUTEROL HYDROCHLORIDE 0.63 MILLIGRAM(S): 1.25 SOLUTION RESPIRATORY (INHALATION) at 22:01

## 2025-04-02 RX ADMIN — DEXTROMETHORPHAN HBR, GUAIFENESIN 200 MILLIGRAM(S): 200 LIQUID ORAL at 05:44

## 2025-04-02 RX ADMIN — PREDNISONE 10 MILLIGRAM(S): 20 TABLET ORAL at 05:29

## 2025-04-02 RX ADMIN — Medication 50 MILLIGRAM(S): at 05:29

## 2025-04-02 RX ADMIN — LEVALBUTEROL HYDROCHLORIDE 0.63 MILLIGRAM(S): 1.25 SOLUTION RESPIRATORY (INHALATION) at 01:15

## 2025-04-02 RX ADMIN — BUDESONIDE 0.5 MILLIGRAM(S): 0.25 SUSPENSION RESPIRATORY (INHALATION) at 22:00

## 2025-04-02 RX ADMIN — LEVALBUTEROL HYDROCHLORIDE 0.63 MILLIGRAM(S): 1.25 SOLUTION RESPIRATORY (INHALATION) at 14:07

## 2025-04-02 RX ADMIN — SODIUM CHLORIDE 75 MILLILITER(S): 9 INJECTION, SOLUTION INTRAVENOUS at 13:37

## 2025-04-02 RX ADMIN — LEVALBUTEROL HYDROCHLORIDE 0.63 MILLIGRAM(S): 1.25 SOLUTION RESPIRATORY (INHALATION) at 07:03

## 2025-04-02 RX ADMIN — Medication 0.75 MILLIGRAM(S): at 11:22

## 2025-04-02 RX ADMIN — INSULIN LISPRO 4: 100 INJECTION, SOLUTION INTRAVENOUS; SUBCUTANEOUS at 08:36

## 2025-04-02 RX ADMIN — Medication 0.75 MILLIGRAM(S): at 05:34

## 2025-04-02 RX ADMIN — Medication 1 APPLICATION(S): at 05:30

## 2025-04-02 RX ADMIN — LEVALBUTEROL HYDROCHLORIDE 0.63 MILLIGRAM(S): 1.25 SOLUTION RESPIRATORY (INHALATION) at 01:14

## 2025-04-02 RX ADMIN — INSULIN LISPRO 4: 100 INJECTION, SOLUTION INTRAVENOUS; SUBCUTANEOUS at 19:00

## 2025-04-02 RX ADMIN — SERTRALINE 50 MILLIGRAM(S): 100 TABLET, FILM COATED ORAL at 11:24

## 2025-04-02 NOTE — PROGRESS NOTE ADULT - ASSESSMENT
74F reported asthma with prior intubations and depression initially admitted to MICU with suspected asthma exacerbation with RRT on 3/19 AM for hypoxemia and increased work of breathing with poor air entry. Patient transferred back to ICU for airway evaluation and asthma management and transferred back to floor 3/21, initially requiring BIPAP. Patient had adequate improvement from pulmonary perspective on floor however with episodes reported by team and family of wheezing and cough yesterday. Upon assesment patient is significantly lethargic, still under treatment with Ativan for underlying psychiatric disorder. At the moment no evidence of wheezing or imaging changes to suggest PNA, however clinically concerning for recurrent aspiration.     #Asthma Exacerbation - Resolved: At this moment symptoms are concerning more for upper airway rather that bronchoconstriction.   #Lethargy - on Ativan TID    - CTA chest 3/8 without evidence of PNA or PE  - repeat CTA negative for PE and clear lungs  - continue Duonebs every 6h, ensure to continue as well on discharge for 7 days   - Continue Pulmicort 0.5mg BID while lethargic  - Continue Prednisone taper to complete 4/4   - Incentive spirometry  - Unable to participate for Cinesophagram due to AMS   - Obtain VBG  - Defer BZD therapy to primary and BH team, at this moment at significant risk for aspiration which could trigger a severe asthma exacerbation. However, no exacerbation at this time.   - NPO for now until cleared by SLP   - Use Home token on DC for OP Pulmonary follow up

## 2025-04-02 NOTE — CONSULT NOTE ADULT - SUBJECTIVE AND OBJECTIVE BOX
CHIEF COMPLAINT:    HPI: 74F w h/o asthma, depression initially presenting with AHRF from asthma exacerbation with AMS requiring intubation and MICU admission/transfer, now being cared for on general medical floor for persistent altered mentation and lethargy thought to be from catatonia. RRT called today for progressive worsening of mentation and respiratory distress. MICU consulted for AMS. Patient has been somnolent since his arrival ~1pm, a large change from her baseline mentation verbally responsive yesterday.  Pt was also administered IV ativan 0.75mg ~ noon possibly contributing to worsened mental status. Given concern for hypercapnic narcosis, patient supported w bag valve mask with some improvement in her mentation but still grossly somnolent. ABG obtained PRIOR to bag masking ultimately showed pCO2 of 55. Patient on AVAPS on MICU arrival still somnulent but able to open eyes. Not able to endorse any complaints.     PAST MEDICAL & SURGICAL HISTORY:  MDD (major depressive disorder), recurrent, severe, with psychosis      Asthma, mild intermittent, uncomplicated      Essential hypertension      No significant past surgical history          FAMILY HISTORY:      Allergies    No Known Allergies    Intolerances        HOME MEDICATIONS:    REVIEW OF SYSTEMS:  Unable to be done 2/2 to AMS    OBJECTIVE:  ICU Vital Signs Last 24 Hrs  T(C): 36.9 (02 Apr 2025 13:20), Max: 37.2 (02 Apr 2025 05:10)  T(F): 98.4 (02 Apr 2025 13:20), Max: 99 (02 Apr 2025 05:10)  HR: 97 (02 Apr 2025 15:45) (92 - 120)  BP: 113/65 (02 Apr 2025 13:20) (106/63 - 161/89)  BP(mean): --  ABP: --  ABP(mean): --  RR: 18 (02 Apr 2025 13:20) (18 - 18)  SpO2: 95% (02 Apr 2025 15:45) (95% - 100%)    O2 Parameters below as of 02 Apr 2025 09:20  Patient On (Oxygen Delivery Method): nasal cannula  O2 Flow (L/min): 3        Mode: NIV (Noninvasive Ventilation), RR (machine): 10, TV (machine): 400, FiO2: 30, PEEP: 6, ITime: 1, P-High: 25, P-Low: 8, PIP: 17    CAPILLARY BLOOD GLUCOSE      POCT Blood Glucose.: 120 mg/dL (02 Apr 2025 14:20)      VITALS:   T(C): 36.9 (04-02-25 @ 13:20), Max: 37.2 (04-02-25 @ 05:10)  HR: 97 (04-02-25 @ 15:45) (92 - 120)  BP: 113/65 (04-02-25 @ 13:20) (106/63 - 161/89)  RR: 18 (04-02-25 @ 13:20) (18 - 18)  SpO2: 95% (04-02-25 @ 15:45) (95% - 100%)    GENERAL: NAD, lying in bed comfortably  HEAD:  Atraumatic, Normocephalic  EYES: EOMI, PERRLA, conjunctiva and sclera clear  NECK: Supple, No JVD  CHEST/LUNG: +Diffuse expiratory wheezing in b/l lung fields with trace crackles  HEART: Regular rate and rhythm; No murmurs, rubs, or gallops  ABDOMEN: BSx4; Soft, nontender, nondistended  EXTREMITIES:  2+ Peripheral Pulses, brisk capillary refill. No clubbing, cyanosis, or edema  NERVOUS SYSTEM:  A&Ox0, unable to participate in exam. No cogwheel rigidity, clonus/myoclonus noted in any extremeties  SKIN: No rashes or lesions    HOSPITAL MEDICATIONS:  MEDICATIONS  (STANDING):  buDESOnide    Inhalation Suspension 0.5 milliGRAM(s) Inhalation every 12 hours  chlorhexidine 2% Cloths 1 Application(s) Topical <User Schedule>  dextrose 5% + lactated ringers. 1000 milliLiter(s) (75 mL/Hr) IV Continuous <Continuous>  dextrose 5%. 1000 milliLiter(s) (100 mL/Hr) IV Continuous <Continuous>  dextrose 5%. 1000 milliLiter(s) (50 mL/Hr) IV Continuous <Continuous>  dextrose 50% Injectable 25 Gram(s) IV Push once  dextrose 50% Injectable 12.5 Gram(s) IV Push once  dextrose 50% Injectable 25 Gram(s) IV Push once  dextrose Oral Gel 15 Gram(s) Oral once  enoxaparin Injectable 40 milliGRAM(s) SubCutaneous every 24 hours  glucagon  Injectable 1 milliGRAM(s) IntraMuscular once  guaiFENesin Oral Liquid (Sugar-Free) 200 milliGRAM(s) Oral every 6 hours  hydrALAZINE 50 milliGRAM(s) Oral three times a day  hydrochlorothiazide 25 milliGRAM(s) Oral daily  insulin glargine Injectable (LANTUS) 12 Unit(s) SubCutaneous at bedtime  insulin lispro (ADMELOG) corrective regimen sliding scale   SubCutaneous three times a day before meals  insulin lispro (ADMELOG) corrective regimen sliding scale   SubCutaneous at bedtime  insulin lispro Injectable (ADMELOG) 5 Unit(s) SubCutaneous three times a day before meals  levalbuterol Inhalation 0.63 milliGRAM(s) Inhalation every 6 hours  lisinopril 40 milliGRAM(s) Oral daily  methylPREDNISolone sodium succinate Injectable 125 milliGRAM(s) IV Push once  pantoprazole    Tablet 40 milliGRAM(s) Oral before breakfast  polyethylene glycol 3350 17 Gram(s) Oral two times a day  predniSONE   Tablet 10 milliGRAM(s) Oral daily  predniSONE   Tablet   Oral   senna 2 Tablet(s) Oral at bedtime  sertraline 50 milliGRAM(s) Oral daily  sodium chloride 0.9%. 1000 milliLiter(s) (125 mL/Hr) IV Continuous <Continuous>    MEDICATIONS  (PRN):      LABS:                        11.6   7.01  )-----------( 146      ( 02 Apr 2025 14:30 )             35.9     04-02    135  |  99  |  17  ----------------------------<  130[H]  5.3   |  22  |  0.66    Ca    9.8      02 Apr 2025 14:30  Phos  4.9     04-02  Mg     1.80     04-02    TPro  6.5  /  Alb  3.3  /  TBili  0.6  /  DBili  x   /  AST  26  /  ALT  27  /  AlkPhos  70  04-02      Urinalysis Basic - ( 02 Apr 2025 14:30 )    Color: x / Appearance: x / SG: x / pH: x  Gluc: 130 mg/dL / Ketone: x  / Bili: x / Urobili: x   Blood: x / Protein: x / Nitrite: x   Leuk Esterase: x / RBC: x / WBC x   Sq Epi: x / Non Sq Epi: x / Bacteria: x      Arterial Blood Gas:  04-02 @ 14:30  7.37/52/91/30/96.7/3.7  ABG lactate: --        MICROBIOLOGY:     RADIOLOGY:  [ ] Reviewed and interpreted by me    EKG:

## 2025-04-02 NOTE — CONSULT NOTE ADULT - ASSESSMENT
74F reported asthma with prior intubations and depression initially admitted to MICU with suspected asthma exacerbation with RRT on 3/19 AM for hypoxemia and increased work of breathing with poor air entry. Patient transferred back to ICU for airway evaluation and asthma management and transferred back to floor 3/21, initially requiring BIPAP. Patient had adequate improvement from pulmonary perspective on floor however with episodes reported by team and family of wheezing and cough yesterday. Upon assesment patient is significantly lethargic, still under treatment with Ativan for underlying psychiatric disorder. MICU consulted for AMS and lethargy      #AMS  Wide ddx suspect iso receiving ativan multiple doses over the past week and blood gas with some hypercapnea vs new primary structural etiology vs underlying psych catatonia  -complete AMS w/u including head CT, infectious w/u  -f/u psych recommendations  -discontinue ativan for now as patient sleepy and reportedly improving on BFS scale previously and interactive    #Asthma  -ENT c/s for upper airway evaluation for wheezing  -c/w nebs with xopenex  -c/w steroids--treat patient for asthma exacerbation  -c/w bipap  -trend blood gases    Not a micu candidate for not but please reconsult of not improving or worsens. Note not finalized until attending attestation

## 2025-04-02 NOTE — RAPID RESPONSE TEAM SUMMARY - NSSITUATIONBACKGROUNDRRT_GEN_ALL_CORE
74F with Asthma admitted with acute hypoxic/hypercarbic respiratory failure with severe wheezing & restricted aeration c/b letharigc AMS requiring intubation 03/08-12/2025. RRT activated for persistent hypoxia to 85-89% on 6L NC despite stacked Duoneb x3 & MgS04. ABG expedited from prior note.     MICU consulted, recommend BIPAP 12/6. 
74F w h/o asthma, depression initially presenting with AHRF from asthma exacerbation with AMS requiring intubation and MICU admission/transfer, now being cared for on general medical floor for persistent altered mentation and lethargy thought to be from catatonia. RRT called today for progressive worsening of mentation and respiratory distress. On my arrival, patient was on 6L NC with audible expiratory wheezing with accessory muscle use, only arousable to deep sternal rub. Initial vitals showed stable BP >110s, HR 100s, RR 20-30s, SpO2 99% on 6L NC, , rectal temp 98.6F. Exam showed pupils equal reactive to light, b/l expiratory wheezing with fine crackles on inspiration, tachycardia w/o murmurs, withdrawing all extremities to pain. Per son at bedside, patient has remained NPO, did not notice any coughing or aspiration event but reported patient has been somnolent since his arrival ~1pm, a large change from her baseline mentation verbally responsive yesterday. Bedside nurse also notes that patient was poorly responsive but still speaking this morning and has steadily worsened since then with increasing respiratory effort. Pt was also administered IV ativan 0.75mg ~ noon possibly contributing to worsened mental status. Given concern for hypercapnic narcosis, patient supported w bag valve mask with some improvement in her mentation but still grossly somnolent. ABG obtained PRIOR to bag masking ultimately showed pCO2 of 55, making it less certain, but to support pt's respiratory distress, placed on BiPAP with good volumes. Solumedrol 125mg and Lasix 40mg IVPs administered for possible worsening asthma exacerbation or upper airway swelling, as well as possible pulmonary edema from recent fluid administration without substantial improvement. Repeat CXR obtained showing no overt opacity. Pulm and MICU assessed patient at bedside, recommended further workup of AMS including infectious (blood cultures, RVP), CTH to evaluate for structural, ENT consult to evaluate upper airway. Ativan held, primary team to contact psych for further recommendations.     - Continue duonebs/xopenex nebs ATC  - monitor closely on BiPAP, son to stay bedside  - obtain RVP  - ENT consult for laryngoscope to eval upper airway  - f/u CBC CMP procal proBNP  - consider obtaining BCx UA/UCx RVP if evidence of infection  - trend VBG  - CTH  - f/u MICU and pulm recommendations
Opt out

## 2025-04-02 NOTE — PROGRESS NOTE ADULT - SUBJECTIVE AND OBJECTIVE BOX
[facility] Division of Hospital Medicine  Carlos Summers DO  Available via MS Teams    SUBJECTIVE / OVERNIGHT EVENTS:  No acute events overnight. Patient seen and examined at bedside this morning.   Liam at bedside today.  Patient went for cinesophagram but unable to complete due to waxing/waning mental status - rescheduled for tomorrow.    ADDITIONAL REVIEW OF SYSTEMS:    MEDICATIONS  (STANDING):  buDESOnide    Inhalation Suspension 0.5 milliGRAM(s) Inhalation every 12 hours  chlorhexidine 2% Cloths 1 Application(s) Topical <User Schedule>  dextrose 5%. 1000 milliLiter(s) (100 mL/Hr) IV Continuous <Continuous>  dextrose 5%. 1000 milliLiter(s) (50 mL/Hr) IV Continuous <Continuous>  dextrose 50% Injectable 25 Gram(s) IV Push once  dextrose 50% Injectable 12.5 Gram(s) IV Push once  dextrose 50% Injectable 25 Gram(s) IV Push once  dextrose Oral Gel 15 Gram(s) Oral once  enoxaparin Injectable 40 milliGRAM(s) SubCutaneous every 24 hours  glucagon  Injectable 1 milliGRAM(s) IntraMuscular once  guaiFENesin Oral Liquid (Sugar-Free) 200 milliGRAM(s) Oral every 6 hours  hydrALAZINE 50 milliGRAM(s) Oral three times a day  hydrochlorothiazide 25 milliGRAM(s) Oral daily  insulin glargine Injectable (LANTUS) 12 Unit(s) SubCutaneous at bedtime  insulin lispro (ADMELOG) corrective regimen sliding scale   SubCutaneous three times a day before meals  insulin lispro (ADMELOG) corrective regimen sliding scale   SubCutaneous at bedtime  insulin lispro Injectable (ADMELOG) 5 Unit(s) SubCutaneous three times a day before meals  levalbuterol Inhalation 0.63 milliGRAM(s) Inhalation every 6 hours  lisinopril 40 milliGRAM(s) Oral daily  LORazepam   Injectable 0.75 milliGRAM(s) IV Push <User Schedule>  pantoprazole    Tablet 40 milliGRAM(s) Oral before breakfast  polyethylene glycol 3350 17 Gram(s) Oral two times a day  predniSONE   Tablet 10 milliGRAM(s) Oral daily  predniSONE   Tablet   Oral   senna 2 Tablet(s) Oral at bedtime  sertraline 50 milliGRAM(s) Oral daily  sodium chloride 0.9%. 1000 milliLiter(s) (125 mL/Hr) IV Continuous <Continuous>    MEDICATIONS  (PRN):      I&O's Summary      PHYSICAL EXAM:  Vital Signs Last 24 Hrs  T(C): 37 (02 Apr 2025 09:20), Max: 37.2 (02 Apr 2025 05:10)  T(F): 98.6 (02 Apr 2025 09:20), Max: 99 (02 Apr 2025 05:10)  HR: 98 (02 Apr 2025 09:20) (87 - 104)  BP: 106/63 (02 Apr 2025 09:20) (106/63 - 161/89)  BP(mean): --  RR: 18 (02 Apr 2025 09:20) (18 - 18)  SpO2: 98% (02 Apr 2025 09:20) (94% - 100%)    Parameters below as of 02 Apr 2025 09:20  Patient On (Oxygen Delivery Method): nasal cannula  O2 Flow (L/min): 3      LABS:                        11.6   8.71  )-----------( 130      ( 02 Apr 2025 04:58 )             36.2     04-02    137  |  100  |  15  ----------------------------<  163[H]  5.2   |  22  |  0.52    Ca    9.7      02 Apr 2025 04:58  Phos  4.3     04-02  Mg     1.70     04-02            Urinalysis Basic - ( 02 Apr 2025 04:58 )    Color: x / Appearance: x / SG: x / pH: x  Gluc: 163 mg/dL / Ketone: x  / Bili: x / Urobili: x   Blood: x / Protein: x / Nitrite: x   Leuk Esterase: x / RBC: x / WBC x   Sq Epi: x / Non Sq Epi: x / Bacteria: x        SARS-CoV-2: NotDetec (19 Mar 2025 09:05)  SARS-CoV-2: NotDetec (08 Mar 2025 14:40)

## 2025-04-02 NOTE — CONSULT NOTE ADULT - ATTENDING COMMENTS
Patient is a 75 yo F w/ Asthma (prior intubations), and depression (prior inpatient psychiatric hospitalizations x3) who was initially admitted on 3/8 w/ worsening dyspnea and acute hypercapnic respiratory failure and AMS requiring intubation with hospital course c/b recurrent respiratory distress requiring BIPAP and readmission to MICU on 3/19 as well as possible catatonia, initiated on Ativan by Psych.   RRT called today for worsening mental status lethargy and increased WOB, concern for recurrent severe asthma exacerbation.     Labs notable for no leukocytosis, no significant peripheral eosinophilia on admission or currently, K 5.2, Bicarb 22. ABG at RRT 7.37/52/91    CTA 3/31 was negative for PE, notable for subsegmental atelectasis    #Respiratory distress  #AMS  #Possible Catatonia  - Unclear if lethargy is related to hypercapnia in setting of Asthma exacerbation. ABG is slightly hypercapnic but compensated. Lethargy may also be related to possible catatonia, doubt 2/2 Ativan given low dose vs toxic metabolic vs structural  - Can c/w BIPAP PRN for now, monitor VBG  - c/w bronchodilators, steroids as per pulm/primary team  - Recommend ENT eval to eval for VC dysfunction  - Recommend CTH given mental status change  - f/u Psych recs re Catatonia  - Toxic metabolic workup as per primary team    Patient does not require MICU level of care at this time.  Please re-consult as needed.    Felipe Monahan MD  Pulmonary & Critical Care
74F reported asthma with prior intubations and depression initially admitted to MICU with suspected asthma exacerbation with RRT on 3/19 AM for hypoxemia and increased work of breathing with poor air entry. Patient transferred back to ICU for airway evaluation and asthma management and transferred back to floor 3/21, initially requiring BIPAP. Patient with waxing and waning lethargic, on standing Ativan for underlying psychiatric disorder. When alert, family and staff try to feed patient and son reports alertness with no apparent aspiration signs until this morning.   - CTA chest 3/8 without evidence of PNA or PE. agree with above. check speech and swallow reassesment, blood gas, NPO, aspiration precautions.

## 2025-04-02 NOTE — PROGRESS NOTE ADULT - PROBLEM SELECTOR PLAN 1
2/2 to status asthmaticus  Follow-up aspergillus Ab: negative , ANCA: negative , IgE: negative    s/p intubation and bipap now on NC  c/w duonebs q6h ensure to continue as well on discharge for 7 days   s/p course of antibiotics  c/w budesonide BID  currently on solumedrol with plan to start pred taper on 3/23 - complete 4/4    Possible aspiration event 3/31. SLP eval appreciated - c/w NPO means. XR cine unsuccessful due to waxing/waning mental status. Repeat XR cine 4/3. Brought up possibility of NGT w/ .

## 2025-04-02 NOTE — SWALLOW VFSS/MBS ASSESSMENT ADULT - COMMENTS
Progress Note- Hospitalist 4/1: "74F hx asthma and depressed presented with AHHRF c/b AMS requiring MICU admission for intubation for respiratory failure i/s/o asthma exacerbation. Extubated 3/12 to face tent and now on BIPAP s/p RRT 3/19 for increased WOB / hypoxia despite aggressive asthma management. Transferred back to MICU for management of severe asthma."    Of note, patient has been seen by this service for multiple clinical swallow evaluation during this admission on the following dates: 3/13, 3/14, 3/20, and 4/1 with most recent recommendations of Continue NPO with consideration for short term nonoral means of nutrition and a Cinesophagram (see note for details)     Patient received in Radiology Suite this AM for a Cinesophagram with patient on nasal cannula. Swallow study deferred at this time given patient is unable to maintain a level of alert state to safely participate in PO trials despite max verbal/tactile cues. ACP informed and patient sent back to unit/room in no acute distress. Cinesophagram to be rescheduled as patient becomes medically optimized.

## 2025-04-02 NOTE — CHART NOTE - NSCHARTNOTEFT_GEN_A_CORE
Patient is a 74 year old female with history of asthma and depression presented with AHHRF c/b AMS requiring MICU admission for intubation for respiratory failure i/s/o asthma exacerbation. Now extubated.     Called by RN to patient's bedside as patient is audibly wheezing. Patient assessed at bedside. Patient's  also present. Patient A&Ox1 and found resting comfortably on NC. Upon assessment, patient wheezing on ausculation and also has trace of edema B/L LE.     Plan:   - STAT Xopenex administered with some improvement  - IVF held  - CXR ordered     Will continue to monitor patient overnight.     Medicine overnight coverage,   Kay BRENDAN Gastelum  [id number]

## 2025-04-02 NOTE — PATIENT PROFILE ADULT - FUNCTIONAL ASSESSMENT - BASIC MOBILITY 3.
4 Eyes Skin Assessment Completed by LINDSAY Roth and LINDSAY Dunn.    Head WDL  Ears WDL  Nose WDL  Mouth WDL  Neck WDL  Breast/Chest WDL  Shoulder Blades WDL  Spine WDL  (R) Arm/Elbow/Hand WDL  (L) Arm/Elbow/Hand WDL  Abdomen WDL  Groin WDL  Scrotum/Coccyx/Buttocks WDL  (R) Leg WDL  (L) Leg WDL  (R) Heel/Foot/Toe WDL  (L) Heel/Foot/Toe WDL          Devices In Places G Tube      Interventions In Place Pillows    Possible Skin Injury No    Pictures Uploaded Into Epic N/A  Wound Consult Placed N/A  RN Wound Prevention Protocol Ordered No      2 = A lot of assistance

## 2025-04-02 NOTE — PROGRESS NOTE ADULT - SUBJECTIVE AND OBJECTIVE BOX
Interval Events:      REVIEW OF SYSTEMS:  Negative except as documented above.      OBJECTIVE:  ICU Vital Signs Last 24 Hrs  T(C): 36.9 (02 Apr 2025 13:20), Max: 37.2 (02 Apr 2025 05:10)  T(F): 98.4 (02 Apr 2025 13:20), Max: 99 (02 Apr 2025 05:10)  HR: 95 (02 Apr 2025 13:20) (87 - 104)  BP: 113/65 (02 Apr 2025 13:20) (106/63 - 161/89)  BP(mean): --  ABP: --  ABP(mean): --  RR: 18 (02 Apr 2025 13:20) (18 - 18)  SpO2: 100% (02 Apr 2025 13:20) (94% - 100%)    O2 Parameters below as of 02 Apr 2025 09:20  Patient On (Oxygen Delivery Method): nasal cannula  O2 Flow (L/min): 3            CAPILLARY BLOOD GLUCOSE      POCT Blood Glucose.: 145 mg/dL (02 Apr 2025 12:12)      PHYSICAL EXAM:  General: NAD  HEENT:  EOMI, sclera anicteric, moist mucus membranes  Neck: Loud upper airway high pitch sounds  Cardiovascular: RRR  Respiratory: CTAB, no wheezes, crackles, or rhonci  Abdomen: soft, nontender  Extremities: warm and well perfused, no edema, no clubbing  Skin: no rashes  Neurological: no focal deficits    HOSPITAL MEDICATIONS:  MEDICATIONS  (STANDING):  buDESOnide    Inhalation Suspension 0.5 milliGRAM(s) Inhalation every 12 hours  chlorhexidine 2% Cloths 1 Application(s) Topical <User Schedule>  dextrose 5% + lactated ringers. 1000 milliLiter(s) (75 mL/Hr) IV Continuous <Continuous>  dextrose 5%. 1000 milliLiter(s) (100 mL/Hr) IV Continuous <Continuous>  dextrose 5%. 1000 milliLiter(s) (50 mL/Hr) IV Continuous <Continuous>  dextrose 50% Injectable 25 Gram(s) IV Push once  dextrose 50% Injectable 12.5 Gram(s) IV Push once  dextrose 50% Injectable 25 Gram(s) IV Push once  dextrose Oral Gel 15 Gram(s) Oral once  enoxaparin Injectable 40 milliGRAM(s) SubCutaneous every 24 hours  glucagon  Injectable 1 milliGRAM(s) IntraMuscular once  guaiFENesin Oral Liquid (Sugar-Free) 200 milliGRAM(s) Oral every 6 hours  hydrALAZINE 50 milliGRAM(s) Oral three times a day  hydrochlorothiazide 25 milliGRAM(s) Oral daily  insulin glargine Injectable (LANTUS) 12 Unit(s) SubCutaneous at bedtime  insulin lispro (ADMELOG) corrective regimen sliding scale   SubCutaneous three times a day before meals  insulin lispro (ADMELOG) corrective regimen sliding scale   SubCutaneous at bedtime  insulin lispro Injectable (ADMELOG) 5 Unit(s) SubCutaneous three times a day before meals  levalbuterol Inhalation 0.63 milliGRAM(s) Inhalation every 6 hours  lisinopril 40 milliGRAM(s) Oral daily  LORazepam   Injectable 0.75 milliGRAM(s) IV Push <User Schedule>  pantoprazole    Tablet 40 milliGRAM(s) Oral before breakfast  polyethylene glycol 3350 17 Gram(s) Oral two times a day  predniSONE   Tablet 10 milliGRAM(s) Oral daily  predniSONE   Tablet   Oral   senna 2 Tablet(s) Oral at bedtime  sertraline 50 milliGRAM(s) Oral daily  sodium chloride 0.9%. 1000 milliLiter(s) (125 mL/Hr) IV Continuous <Continuous>    MEDICATIONS  (PRN):      LABS:                        11.6   8.71  )-----------( 130      ( 02 Apr 2025 04:58 )             36.2     Hgb Trend: 11.6<--, 11.3<--, 11.5<--  04-02    137  |  100  |  15  ----------------------------<  163[H]  5.2   |  22  |  0.52    Ca    9.7      02 Apr 2025 04:58  Phos  4.3     04-02  Mg     1.70     04-02      Creatinine Trend: 0.52<--, 0.61<--, 0.64<--, 1.20<--, 0.82<--, 1.32<--    Urinalysis Basic - ( 02 Apr 2025 04:58 )    Color: x / Appearance: x / SG: x / pH: x  Gluc: 163 mg/dL / Ketone: x  / Bili: x / Urobili: x   Blood: x / Protein: x / Nitrite: x   Leuk Esterase: x / RBC: x / WBC x   Sq Epi: x / Non Sq Epi: x / Bacteria: x            MICROBIOLOGY:       RADIOLOGY:  [x] Reviewed and interpreted by me

## 2025-04-02 NOTE — BH CONSULTATION LIAISON PROGRESS NOTE - NSBHFUPINTERVALHXFT_PSY_A_CORE
Met with the patient. Seems more sleepy and did not engage.  at bedside, and feels that patient was 'better'. Examined--- no chester catatonic symptoms.   Discussed below plan with -- all questions answered

## 2025-04-02 NOTE — PROGRESS NOTE ADULT - ATTENDING COMMENTS
74F reported asthma with prior intubations and depression, hx of catatonia initially admitted to MICU with suspected asthma exacerbation with RRT on 3/19 AM for hypoxemia and increased work of breathing with poor air entry. Patient transferred back to ICU for airway evaluation and asthma management and transferred back to floor 3/21, initially requiring BIPAP. Patient had adequate improvement from pulmonary perspective on floor however with episodes reported by team and family of wheezing and cough yesterday. Upon assesment patient remains lethargic, still under treatment with Ativan for underlying psychiatric disorder. CTPA 3/31/25 reviewed – no PE, no consolidations, no pleural effusions, + subsegmental atelectasis. No recent VBG (last one was 3/21/25 - 7.44/51); RRT called today, MICU consulted, mildly hypercapneic, remains NPO, unable to perform cinesophagram. Agree with checking CT head, consider ENT eval for VCD.

## 2025-04-02 NOTE — CHART NOTE - NSCHARTNOTEFT_GEN_A_CORE
74F hx asthma and depressed presented with AHHRF c/b AMS requiring MICU admission for intubation for respiratory failure i/s/o asthma exacerbation. Extubated 3/12 to face tent and now on BIPAP s/p RRT 3/19 for increased WOB / hypoxia despite aggressive asthma management. Transferred back to MICU for management of severe asthma.      Reason for RRT Activation: RN notified me that the patient was wheezing, experiencing respiratory distress, and was lethargic and difficult to arouse.        Assessment:    Upon arrival at the bedside, the patient was noted to have accessory muscle use with wheezing present in all lung fields. The patient was responsive only to noxious stimuli.    Vital Signs (24 Hrs):  T(C): 36.9 (04-02-25 @ 13:20), Max: 37.2 (04-02-25 @ 05:10)  HR: 97 (04-02-25 @ 15:45) (92 - 120)  BP: 113/65 (04-02-25 @ 13:20) (106/63 - 161/89)  RR: 18 (04-02-25 @ 13:20) (18 - 18)  SpO2: 95% (04-02-25 @ 15:45) (95% - 100%)  Wt(kg): --      Interventions:    • Instructed RN to call the RRT.    • RRT team and respiratory therapy arrived.    • Labs drawn per RRT protocol.    • Patient received 40 mg IV Lasix and 125 mg IV Solu-Medrol.    • STAT chest X-ray performed—findings showed clear lungs.    • Patient placed on BiPAP by respiratory therapy, with slight improvement in alertness.    • MICU consult completed—patient to remain on the medical floor.    • Ativan held per behavioral health attending.        Current Status:    • Patient remains on BiPAP, vitals within normal limits, and labs unremarkable.    • RRT concluded.    • Attending physician notified.    • Will continue to monitor closely.      Omi Kohli NP-BC  [facility]  In-house pager #[phone]

## 2025-04-02 NOTE — BH CONSULTATION LIAISON PROGRESS NOTE - NSBHFUPINTERVALCCFT_PSY_A_CORE
Remains NPO  Poor PO intake  On NC  Received call from PA on case that patient was a RRT this afternoon. Discussed with acp. Discussed case with medicine hospitalist Dr Summers as well--- discussed below

## 2025-04-02 NOTE — BH CONSULTATION LIAISON PROGRESS NOTE - NSBHASSESSMENTFT_PSY_ALL_CORE
Patient is a 74F hx asthma, depression, and HTN was admitted to the MICU with AHRF 2/2 asthma exacerbation resulting in acute on chronic HHRF causing AMS and increased WOB that required intubation. During her ICU course, she experienced seizure-like activity .  Additional issues included hyponatremia, acute on chronic metabolic alkalosis, and mild thrombocytopenia,  also having fevers.  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 Patient has h/o hx of major depressive disorder with psychosis, hx of 3 past inpatient psychiatric hospitalizations last in 2016 at [facility], all rehospitalizations were in context non compliance with the treatment. She has no hx of suicide attempts, no hx of substance abuse. Patient w hx of paranoia, disorganization, catatonia, required MOO when in [facility].     3/24----more withdrawn, some stiffness in neck, not communicative. Some concerns for catatonia. Unsure if symptoms attributed to catatonia vs delirium  3/25--- more alert today, trying to following commands. Still with PMR+. Appears to be responding to Ativan  3/26-- BFS 10, will increasing standing ativan to target catatonic symptoms - giving STAT 0.5mg NOW discussed w acp   3/27----will continue ativan dosing for one more day before increasing  3/28: BFS: stupor 1, mutism 2,withdrawan 2, verbigeration 1, rigidity 2 =8.  3/29: BFS: stupor 2, mutism 2, staring 1, posturing 2, rigidity 2, waxy flexibility 3, withdrawal 2 = 14; please monitor nutritional intake, plan to increase ativan by total daily dose of 0.5 mg at bedtime due to persistent catatonia   3/31: BFS: stupor 2, mutism 2, staring 2, rigidity 1, negativism 1, withdrawal 1, waxy flexibility 3 =12, patient with productive cough, advised for medical work up    4/1---tired, speaking more, not rigid, has PMR+. NPO status now.     4/2--sleepy, RRT this afternoon    PLAN  - no SI or HI, no need for psychiatric CO  -- antipsychotics can only be given if qtc < 500    - MEDICATIONS:  -- HOLD ATIVAN TODAY TO ASSESS IF ANY CHANGE TO MENTATION  -- Continue Zoloft  50mg daily ( home dose 150mg) ( monitor NA- recent hyponatremia)  -- HOLD Zyprexa for now    - PRN:  --Ativan 0.5mg q 6hrs prn IM/IV/PO  - DISPO: pending

## 2025-04-02 NOTE — PROGRESS NOTE ADULT - SUBJECTIVE AND OBJECTIVE BOX
Called to Evaluate patient admitted with acute asthma exacerbation to rule out vocal cord dysfunction. Patient is on BiPaP and not tolerating scope exam long enough because she quickly desaturates when off BiPaP. Call ENT when patient is stable off BiPaP to have scope exam at that time.

## 2025-04-03 LAB
ANION GAP SERPL CALC-SCNC: 14 MMOL/L — SIGNIFICANT CHANGE UP (ref 7–14)
BASOPHILS # BLD AUTO: 0.01 K/UL — SIGNIFICANT CHANGE UP (ref 0–0.2)
BASOPHILS NFR BLD AUTO: 0.1 % — SIGNIFICANT CHANGE UP (ref 0–2)
BUN SERPL-MCNC: 24 MG/DL — HIGH (ref 7–23)
CALCIUM SERPL-MCNC: 9.9 MG/DL — SIGNIFICANT CHANGE UP (ref 8.4–10.5)
CHLORIDE SERPL-SCNC: 99 MMOL/L — SIGNIFICANT CHANGE UP (ref 98–107)
CO2 SERPL-SCNC: 23 MMOL/L — SIGNIFICANT CHANGE UP (ref 22–31)
CREAT SERPL-MCNC: 0.71 MG/DL — SIGNIFICANT CHANGE UP (ref 0.5–1.3)
EGFR: 89 ML/MIN/1.73M2 — SIGNIFICANT CHANGE UP
EGFR: 89 ML/MIN/1.73M2 — SIGNIFICANT CHANGE UP
EOSINOPHIL # BLD AUTO: 0 K/UL — SIGNIFICANT CHANGE UP (ref 0–0.5)
EOSINOPHIL NFR BLD AUTO: 0 % — SIGNIFICANT CHANGE UP (ref 0–6)
GAS PNL BLDV: SIGNIFICANT CHANGE UP
GAS PNL BLDV: SIGNIFICANT CHANGE UP
GLUCOSE BLDC GLUCOMTR-MCNC: 109 MG/DL — HIGH (ref 70–99)
GLUCOSE BLDC GLUCOMTR-MCNC: 125 MG/DL — HIGH (ref 70–99)
GLUCOSE BLDC GLUCOMTR-MCNC: 179 MG/DL — HIGH (ref 70–99)
GLUCOSE BLDC GLUCOMTR-MCNC: 191 MG/DL — HIGH (ref 70–99)
GLUCOSE SERPL-MCNC: 168 MG/DL — HIGH (ref 70–99)
HCT VFR BLD CALC: 36.2 % — SIGNIFICANT CHANGE UP (ref 34.5–45)
HGB BLD-MCNC: 11.9 G/DL — SIGNIFICANT CHANGE UP (ref 11.5–15.5)
IANC: 4.03 K/UL — SIGNIFICANT CHANGE UP (ref 1.8–7.4)
IMM GRANULOCYTES NFR BLD AUTO: 0.3 % — SIGNIFICANT CHANGE UP (ref 0–0.9)
LYMPHOCYTES # BLD AUTO: 2.42 K/UL — SIGNIFICANT CHANGE UP (ref 1–3.3)
LYMPHOCYTES # BLD AUTO: 34.4 % — SIGNIFICANT CHANGE UP (ref 13–44)
MAGNESIUM SERPL-MCNC: 1.7 MG/DL — SIGNIFICANT CHANGE UP (ref 1.6–2.6)
MCHC RBC-ENTMCNC: 32.2 PG — SIGNIFICANT CHANGE UP (ref 27–34)
MCHC RBC-ENTMCNC: 32.9 G/DL — SIGNIFICANT CHANGE UP (ref 32–36)
MCV RBC AUTO: 98.1 FL — SIGNIFICANT CHANGE UP (ref 80–100)
MONOCYTES # BLD AUTO: 0.56 K/UL — SIGNIFICANT CHANGE UP (ref 0–0.9)
MONOCYTES NFR BLD AUTO: 8 % — SIGNIFICANT CHANGE UP (ref 2–14)
NEUTROPHILS # BLD AUTO: 4.03 K/UL — SIGNIFICANT CHANGE UP (ref 1.8–7.4)
NEUTROPHILS NFR BLD AUTO: 57.2 % — SIGNIFICANT CHANGE UP (ref 43–77)
NRBC # BLD AUTO: 0 K/UL — SIGNIFICANT CHANGE UP (ref 0–0)
NRBC # FLD: 0 K/UL — SIGNIFICANT CHANGE UP (ref 0–0)
NRBC BLD AUTO-RTO: 0 /100 WBCS — SIGNIFICANT CHANGE UP (ref 0–0)
PHOSPHATE SERPL-MCNC: 3.6 MG/DL — SIGNIFICANT CHANGE UP (ref 2.5–4.5)
PLATELET # BLD AUTO: 130 K/UL — LOW (ref 150–400)
POTASSIUM SERPL-MCNC: 4.2 MMOL/L — SIGNIFICANT CHANGE UP (ref 3.5–5.3)
POTASSIUM SERPL-SCNC: 4.2 MMOL/L — SIGNIFICANT CHANGE UP (ref 3.5–5.3)
RBC # BLD: 3.69 M/UL — LOW (ref 3.8–5.2)
RBC # FLD: 12.5 % — SIGNIFICANT CHANGE UP (ref 10.3–14.5)
SODIUM SERPL-SCNC: 136 MMOL/L — SIGNIFICANT CHANGE UP (ref 135–145)
WBC # BLD: 7.04 K/UL — SIGNIFICANT CHANGE UP (ref 3.8–10.5)
WBC # FLD AUTO: 7.04 K/UL — SIGNIFICANT CHANGE UP (ref 3.8–10.5)

## 2025-04-03 PROCEDURE — 99233 SBSQ HOSP IP/OBS HIGH 50: CPT | Mod: GC

## 2025-04-03 PROCEDURE — 99233 SBSQ HOSP IP/OBS HIGH 50: CPT

## 2025-04-03 RX ORDER — LEVALBUTEROL HYDROCHLORIDE 1.25 MG/3ML
0.63 SOLUTION RESPIRATORY (INHALATION) EVERY 6 HOURS
Refills: 0 | Status: DISCONTINUED | OUTPATIENT
Start: 2025-04-03 | End: 2025-04-09

## 2025-04-03 RX ORDER — LEVALBUTEROL HYDROCHLORIDE 1.25 MG/3ML
0.63 SOLUTION RESPIRATORY (INHALATION) EVERY 4 HOURS
Refills: 0 | Status: DISCONTINUED | OUTPATIENT
Start: 2025-04-03 | End: 2025-04-03

## 2025-04-03 RX ORDER — SODIUM CHLORIDE 9 G/1000ML
1000 INJECTION, SOLUTION INTRAVENOUS
Refills: 0 | Status: DISCONTINUED | OUTPATIENT
Start: 2025-04-03 | End: 2025-04-04

## 2025-04-03 RX ORDER — INSULIN GLARGINE-YFGN 100 [IU]/ML
11 INJECTION, SOLUTION SUBCUTANEOUS AT BEDTIME
Refills: 0 | Status: DISCONTINUED | OUTPATIENT
Start: 2025-04-03 | End: 2025-04-18

## 2025-04-03 RX ORDER — METHYLPREDNISOLONE ACETATE 80 MG/ML
40 INJECTION, SUSPENSION INTRA-ARTICULAR; INTRALESIONAL; INTRAMUSCULAR; SOFT TISSUE EVERY 24 HOURS
Refills: 0 | Status: COMPLETED | OUTPATIENT
Start: 2025-04-03 | End: 2025-04-08

## 2025-04-03 RX ORDER — IPRATROPIUM BROMIDE AND ALBUTEROL SULFATE .5; 2.5 MG/3ML; MG/3ML
3 SOLUTION RESPIRATORY (INHALATION) ONCE
Refills: 0 | Status: COMPLETED | OUTPATIENT
Start: 2025-04-03 | End: 2025-04-03

## 2025-04-03 RX ORDER — INSULIN LISPRO 100 U/ML
INJECTION, SOLUTION INTRAVENOUS; SUBCUTANEOUS EVERY 6 HOURS
Refills: 0 | Status: DISCONTINUED | OUTPATIENT
Start: 2025-04-03 | End: 2025-04-04

## 2025-04-03 RX ADMIN — Medication 1 APPLICATION(S): at 06:53

## 2025-04-03 RX ADMIN — BUDESONIDE 0.5 MILLIGRAM(S): 0.25 SUSPENSION RESPIRATORY (INHALATION) at 07:15

## 2025-04-03 RX ADMIN — BUDESONIDE 0.5 MILLIGRAM(S): 0.25 SUSPENSION RESPIRATORY (INHALATION) at 19:33

## 2025-04-03 RX ADMIN — LEVALBUTEROL HYDROCHLORIDE 0.63 MILLIGRAM(S): 1.25 SOLUTION RESPIRATORY (INHALATION) at 19:08

## 2025-04-03 RX ADMIN — METHYLPREDNISOLONE ACETATE 40 MILLIGRAM(S): 80 INJECTION, SUSPENSION INTRA-ARTICULAR; INTRALESIONAL; INTRAMUSCULAR; SOFT TISSUE at 19:16

## 2025-04-03 RX ADMIN — INSULIN GLARGINE-YFGN 11 UNIT(S): 100 INJECTION, SOLUTION SUBCUTANEOUS at 23:29

## 2025-04-03 RX ADMIN — INSULIN GLARGINE-YFGN 12 UNIT(S): 100 INJECTION, SOLUTION SUBCUTANEOUS at 00:00

## 2025-04-03 RX ADMIN — LEVALBUTEROL HYDROCHLORIDE 0.63 MILLIGRAM(S): 1.25 SOLUTION RESPIRATORY (INHALATION) at 07:15

## 2025-04-03 RX ADMIN — ENOXAPARIN SODIUM 40 MILLIGRAM(S): 100 INJECTION SUBCUTANEOUS at 17:34

## 2025-04-03 RX ADMIN — LEVALBUTEROL HYDROCHLORIDE 0.63 MILLIGRAM(S): 1.25 SOLUTION RESPIRATORY (INHALATION) at 15:38

## 2025-04-03 RX ADMIN — LEVALBUTEROL HYDROCHLORIDE 0.63 MILLIGRAM(S): 1.25 SOLUTION RESPIRATORY (INHALATION) at 03:28

## 2025-04-03 RX ADMIN — SODIUM CHLORIDE 50 MILLILITER(S): 9 INJECTION, SOLUTION INTRAVENOUS at 15:00

## 2025-04-03 RX ADMIN — INSULIN LISPRO 1: 100 INJECTION, SOLUTION INTRAVENOUS; SUBCUTANEOUS at 23:45

## 2025-04-03 NOTE — PROGRESS NOTE ADULT - ATTENDING COMMENTS
74F reported asthma with prior intubations and depression, hx of catatonia initially admitted to MICU with suspected asthma exacerbation with RRT on 3/19 AM for hypoxemia and increased work of breathing with poor air entry. Patient transferred back to ICU for airway evaluation and asthma management and transferred back to floor 3/21, initially requiring BIPAP. Asthma exacerbation had resolved however pt had waxing/waning metabolic encephalopathy and respiratory distress with wheezing, Ativan stopped 4/2/25, pt on bilevel since RRT on 4/2. Appreciate ENT scope no VCD. Continue asthma management, c/w bronchodilators, steroids as serial VBGs, pending CTH given mental status change. NPO aspiration precautions. swallow eval rescheduled for tomorrow.

## 2025-04-03 NOTE — BH CONSULTATION LIAISON PROGRESS NOTE - NSBHFUPINTERVALHXFT_PSY_A_CORE
Met with patient. Compared to previous days, awake, alert, and smiling. Still with PMR+, latency in speech, but no rigidity, or other signs of catatonia. Denies any ah or vh or delusions. Denied any mood symptoms, denies any si or hi. Oriented to year, location, but did not know the month  Coordinated with son at bedside, and 2 other sons via video chat in room---- discussed the psychiatric course here at [facility], including catatonia, and yesterday's mental status change. Discussed below plan in detail. All questions answered- sons in agreement. Sons have been concerned about patient's lethargy with home zyprexa. Asks about seroquel  Son alludes to marital issues which could be stressors

## 2025-04-03 NOTE — PROGRESS NOTE ADULT - PROBLEM SELECTOR PLAN 1
2/2 to status asthmaticus  Follow-up aspergillus Ab: negative , ANCA: negative , IgE: negative    s/p intubation and bipap now on NC  c/w duonebs q6h ensure to continue as well on discharge for 7 days   s/p course of antibiotics  c/w budesonide BID  prednisone taper    Worsening respiratory status 4/2 - placed on NIV, wean as tolerated. Pulm trending VBGs.   ENT eval - no upper airway consideration for structural causes of asthma such as vocal cord dysfunction.     Possible aspiration event 3/31. SLP eval appreciated - c/w NPO means. XR cine unsuccessful due to waxing/waning mental status. Repeat when off NIV and mental status allows. 2/2 to status asthmaticus  Follow-up aspergillus Ab: negative , ANCA: negative , IgE: negative    s/p intubation and bipap now on NC  c/w ipratropium + xopenex nebs  s/p course of antibiotics  c/w budesonide BID  Back on solumedrol 40mg IV daily    Worsening respiratory status 4/2 - placed on NIV, wean as tolerated. Pulm trending VBGs.   ENT eval - no upper airway consideration for structural causes of asthma such as vocal cord dysfunction.     Possible aspiration event 3/31. SLP eval appreciated - c/w NPO means. XR cine unsuccessful due to waxing/waning mental status. Repeat when off NIV and mental status allows.

## 2025-04-03 NOTE — PROGRESS NOTE ADULT - SUBJECTIVE AND OBJECTIVE BOX
[facility] Division of Hospital Medicine  Carlos Summers DO  Available via MS Teams    SUBJECTIVE / OVERNIGHT EVENTS:  Yesterday episode of labored respirations, RRT called, placed on BIPAP switched to AVAPS. Received IV solumedrol. Seen by pulm and MICU.  ENT attempted scope yesterday, unable to tolerate off NIV. Able to perform today - unremarkable.    Son Jesus at bedside this morning - he is not health care proxy but we discussed medical management, prognosis, and goals of care. Discussed DNR/DNI - patient remains full code, but Jesus understands that a repeat intubation may be prolonged and we discussed the possibility of eventual tracheostomy should her condition not improve.    Patient is more awake today, denies acute complaints, speaking short appropriate sentences.    ADDITIONAL REVIEW OF SYSTEMS:    MEDICATIONS  (STANDING):  buDESOnide    Inhalation Suspension 0.5 milliGRAM(s) Inhalation every 12 hours  chlorhexidine 2% Cloths 1 Application(s) Topical <User Schedule>  dextrose 5% + lactated ringers. 1000 milliLiter(s) (75 mL/Hr) IV Continuous <Continuous>  dextrose 5%. 1000 milliLiter(s) (100 mL/Hr) IV Continuous <Continuous>  dextrose 5%. 1000 milliLiter(s) (50 mL/Hr) IV Continuous <Continuous>  dextrose 50% Injectable 25 Gram(s) IV Push once  dextrose 50% Injectable 12.5 Gram(s) IV Push once  dextrose 50% Injectable 25 Gram(s) IV Push once  dextrose Oral Gel 15 Gram(s) Oral once  enoxaparin Injectable 40 milliGRAM(s) SubCutaneous every 24 hours  glucagon  Injectable 1 milliGRAM(s) IntraMuscular once  guaiFENesin Oral Liquid (Sugar-Free) 200 milliGRAM(s) Oral every 6 hours  hydrALAZINE 50 milliGRAM(s) Oral three times a day  hydrochlorothiazide 25 milliGRAM(s) Oral daily  insulin glargine Injectable (LANTUS) 12 Unit(s) SubCutaneous at bedtime  insulin lispro (ADMELOG) corrective regimen sliding scale   SubCutaneous every 6 hours  insulin lispro Injectable (ADMELOG) 5 Unit(s) SubCutaneous three times a day before meals  levalbuterol Inhalation 0.63 milliGRAM(s) Inhalation every 4 hours  lisinopril 40 milliGRAM(s) Oral daily  methylPREDNISolone sodium succinate Injectable 125 milliGRAM(s) IV Push once  pantoprazole    Tablet 40 milliGRAM(s) Oral before breakfast  polyethylene glycol 3350 17 Gram(s) Oral two times a day  senna 2 Tablet(s) Oral at bedtime  sertraline 50 milliGRAM(s) Oral daily  sodium chloride 0.9%. 1000 milliLiter(s) (125 mL/Hr) IV Continuous <Continuous>    MEDICATIONS  (PRN):      I&O's Summary      PHYSICAL EXAM:  Vital Signs Last 24 Hrs  T(C): 36.6 (03 Apr 2025 11:05), Max: 37.2 (02 Apr 2025 17:50)  T(F): 97.9 (03 Apr 2025 11:05), Max: 98.9 (02 Apr 2025 17:50)  HR: 88 (03 Apr 2025 11:34) (85 - 120)  BP: 138/83 (03 Apr 2025 11:05) (114/58 - 150/71)  BP(mean): --  RR: 18 (03 Apr 2025 11:05) (17 - 18)  SpO2: 100% (03 Apr 2025 11:34) (95% - 100%)    Parameters below as of 03 Apr 2025 07:16  Patient On (Oxygen Delivery Method): AVAPS    LABS:                        11.9   7.04  )-----------( 130      ( 03 Apr 2025 06:50 )             36.2     04-03    136  |  99  |  24[H]  ----------------------------<  168[H]  4.2   |  23  |  0.71    Ca    9.9      03 Apr 2025 06:50  Phos  3.6     04-03  Mg     1.70     04-03    TPro  6.5  /  Alb  3.3  /  TBili  0.6  /  DBili  x   /  AST  26  /  ALT  27  /  AlkPhos  70  04-02          Urinalysis Basic - ( 03 Apr 2025 06:50 )    Color: x / Appearance: x / SG: x / pH: x  Gluc: 168 mg/dL / Ketone: x  / Bili: x / Urobili: x   Blood: x / Protein: x / Nitrite: x   Leuk Esterase: x / RBC: x / WBC x   Sq Epi: x / Non Sq Epi: x / Bacteria: x        SARS-CoV-2: NotDete (19 Mar 2025 09:05)  SARS-CoV-2: NotDete (08 Mar 2025 14:40)

## 2025-04-03 NOTE — PROGRESS NOTE ADULT - PROBLEM SELECTOR PLAN 2
will need ICS/LABA on discharge in addition to duonebs  as noted above, extended steroid taper  needs pulm as outpatient will need ICS/LABA on discharge  c/w ipratropium, xopenex nebs  steroids as above  needs pulm as outpatient

## 2025-04-03 NOTE — PROGRESS NOTE ADULT - ASSESSMENT
74F reported asthma with prior intubations and depression initially admitted to MICU with suspected asthma exacerbation with RRT on 3/19 AM for hypoxemia and increased work of breathing with poor air entry. Patient transferred back to ICU for airway evaluation and asthma management and transferred back to floor 3/21, initially requiring BIPAP. Patient had adequate improvement from pulmonary perspective on floor however with episodes reported by team and family of wheezing and cough yesterday. Upon assesment patient is significantly lethargic, still under treatment with Ativan for underlying psychiatric disorder. Patient with likely recurring aspiration due to underlying mood disorder, with now recurretn asthma exacerbation    #Asthma Exacerbation - Recurred likely in setting of aspiration  #Lethargy - on Ativan TID    - CTA chest 3/8 without evidence of PNA or PE  - repeat CTA negative for PE and clear lungs  - continue Duonebs every 6h, ensure to continue as well on discharge for 7 days   - Continue Pulmicort 0.5mg BID while lethargic  - Restart Solumedrol 40mg daily IV   - Levalbuterol nebs every 6h + Ipratropium nebs every 6h  - Incentive spirometry  - Pending Cinesophagram  - Obtain VBG, if normal pH with no increasing pCO2 can give break of NIV with repeat VBG 30min after  - Defer BZD therapy to primary and BH team, at this moment at significant risk for aspiration which could trigger a severe asthma exacerbation.  - NPO for now until cleared by SLP   - Use Home token on DC for OP Pulmonary follow up

## 2025-04-03 NOTE — CONSULT NOTE ADULT - PROBLEM SELECTOR RECOMMENDATION 9
1. Flexible laryngoscopy WNL. No ENT intervention at this time. Continue to follow pulm recs and SLP recs for diet. ENT to sign off at this time. Will discuss with ENT attending.

## 2025-04-03 NOTE — SWALLOW VFSS/MBS ASSESSMENT ADULT - COMMENTS
Chart Note 4/2/2025 - 74F hx asthma and depressed presented with AHHRF c/b AMS requiring MICU admission for intubation for respiratory failure i/s/o asthma exacerbation. Extubated 3/12 to face tent and now on BIPAP s/p RRT 3/19 for increased WOB / hypoxia despite aggressive asthma management. Transferred back to MICU for management of severe asthma.  Reason for RRT Activation: RN notified me that the patient was wheezing, experiencing respiratory distress, and was lethargic and difficult to arouse. (See Note for details).    ENT Note 4/2/2025 - Called to Evaluate patient admitted with acute asthma exacerbation to rule out vocal cord dysfunction. Patient is on BiPaP and not tolerating scope exam long enough because she quickly desaturates when off BiPaP. Call ENT when patient is stable off BiPaP to have scope exam at that time.      Medicine Note 4/2/2025 - 74F reported asthma with prior intubations and depression initially admitted to MICU with suspected asthma exacerbation with RRT on 3/19 AM for hypoxemia and increased work of breathing with poor air entry. Patient transferred back to ICU for airway evaluation and asthma management and transferred back to floor 3/21, initially requiring BIPAP. Patient had adequate improvement from pulmonary perspective on floor however with episodes reported by team and family of wheezing and cough yesterday. Upon assesment patient is significantly lethargic, still under treatment with Ativan for underlying psychiatric disorder. At the moment no evidence of wheezing or imaging changes to suggest PNA, however clinically concerning for recurrent aspiration.     Of Note: Patient was seen for Clinical Swallow Evals on 3/13, 3/14, 3/20, 4/1 (See Consults). Cinesophagram was deferred on 4/2 given Patient unable to participate (See note for details).       As per discussion with ACP, Cinesophagram is rescheduled for tomorrow 4/4/2025 given Patient has been on AVAP at this time.

## 2025-04-03 NOTE — PROGRESS NOTE ADULT - SUBJECTIVE AND OBJECTIVE BOX
Patient returned from PACU awake and alert.  Notes slight pain along incision line.  While transferring from stretcher to chair, patient became nauseous and pale.   Patient assisted back to stretcher.  BP and HR obtained and are WNL.  Will call anesthesia and obtain order for anti-emetic.    Interval Events:      REVIEW OF SYSTEMS:  Negative except as documented above.      OBJECTIVE:  ICU Vital Signs Last 24 Hrs  T(C): 36.7 (03 Apr 2025 16:00), Max: 37.2 (02 Apr 2025 17:50)  T(F): 98 (03 Apr 2025 16:00), Max: 98.9 (02 Apr 2025 17:50)  HR: 86 (03 Apr 2025 16:00) (85 - 94)  BP: 128/78 (03 Apr 2025 16:00) (114/58 - 150/71)  BP(mean): --  ABP: --  ABP(mean): --  RR: 18 (03 Apr 2025 16:47) (17 - 18)  SpO2: 100% (03 Apr 2025 16:47) (95% - 100%)    O2 Parameters below as of 03 Apr 2025 16:47  Patient On (Oxygen Delivery Method): nasal cannula  O2 Flow (L/min): 3        Mode: standby    CAPILLARY BLOOD GLUCOSE      POCT Blood Glucose.: 125 mg/dL (03 Apr 2025 12:10)      PHYSICAL EXAM:  General: NAD  HEENT:  EOMI, sclera anicteric, moist mucus membranes  Neck: supple  Cardiovascular: RRR  Respiratory: Bilateral wheezing  Abdomen: soft, nontender  Extremities: warm and well perfused, no edema, no clubbing  Skin: no rashes  Neurological: no focal deficits    HOSPITAL MEDICATIONS:  MEDICATIONS  (STANDING):  buDESOnide    Inhalation Suspension 0.5 milliGRAM(s) Inhalation every 12 hours  chlorhexidine 2% Cloths 1 Application(s) Topical <User Schedule>  dextrose 5% + lactated ringers. 1000 milliLiter(s) (75 mL/Hr) IV Continuous <Continuous>  dextrose 5% + sodium chloride 0.9%. 1000 milliLiter(s) (50 mL/Hr) IV Continuous <Continuous>  dextrose 5%. 1000 milliLiter(s) (100 mL/Hr) IV Continuous <Continuous>  dextrose 5%. 1000 milliLiter(s) (50 mL/Hr) IV Continuous <Continuous>  dextrose 50% Injectable 25 Gram(s) IV Push once  dextrose 50% Injectable 12.5 Gram(s) IV Push once  dextrose 50% Injectable 25 Gram(s) IV Push once  dextrose Oral Gel 15 Gram(s) Oral once  enoxaparin Injectable 40 milliGRAM(s) SubCutaneous every 24 hours  glucagon  Injectable 1 milliGRAM(s) IntraMuscular once  guaiFENesin Oral Liquid (Sugar-Free) 200 milliGRAM(s) Oral every 6 hours  hydrALAZINE 50 milliGRAM(s) Oral three times a day  hydrochlorothiazide 25 milliGRAM(s) Oral daily  insulin glargine Injectable (LANTUS) 12 Unit(s) SubCutaneous at bedtime  insulin lispro (ADMELOG) corrective regimen sliding scale   SubCutaneous every 6 hours  insulin lispro Injectable (ADMELOG) 5 Unit(s) SubCutaneous three times a day before meals  levalbuterol Inhalation 0.63 milliGRAM(s) Inhalation every 4 hours  lisinopril 40 milliGRAM(s) Oral daily  methylPREDNISolone sodium succinate Injectable 125 milliGRAM(s) IV Push once  pantoprazole    Tablet 40 milliGRAM(s) Oral before breakfast  polyethylene glycol 3350 17 Gram(s) Oral two times a day  senna 2 Tablet(s) Oral at bedtime  sertraline 50 milliGRAM(s) Oral daily  sodium chloride 0.9%. 1000 milliLiter(s) (125 mL/Hr) IV Continuous <Continuous>    MEDICATIONS  (PRN):      LABS:                        11.9   7.04  )-----------( 130      ( 03 Apr 2025 06:50 )             36.2     Hgb Trend: 11.9<--, 11.6<--, 11.6<--, 11.3<--, 11.5<--  04-03    136  |  99  |  24[H]  ----------------------------<  168[H]  4.2   |  23  |  0.71    Ca    9.9      03 Apr 2025 06:50  Phos  3.6     04-03  Mg     1.70     04-03    TPro  6.5  /  Alb  3.3  /  TBili  0.6  /  DBili  x   /  AST  26  /  ALT  27  /  AlkPhos  70  04-02    Creatinine Trend: 0.71<--, 0.66<--, 0.52<--, 0.61<--, 0.64<--, 1.20<--    Urinalysis Basic - ( 03 Apr 2025 06:50 )    Color: x / Appearance: x / SG: x / pH: x  Gluc: 168 mg/dL / Ketone: x  / Bili: x / Urobili: x   Blood: x / Protein: x / Nitrite: x   Leuk Esterase: x / RBC: x / WBC x   Sq Epi: x / Non Sq Epi: x / Bacteria: x      Arterial Blood Gas:  04-02 @ 14:30  7.37/52/91/30/96.7/3.7  ABG lactate: --    Venous Blood Gas:  04-03 @ 13:19  7.37/64/69/37/93.9  VBG Lactate: 0.7  Venous Blood Gas:  04-03 @ 12:20  7.36/62/60/35/90.7  VBG Lactate: 0.6  Venous Blood Gas:  04-02 @ 18:07  7.37/53/70/31/95.0  VBG Lactate: 1.0      MICROBIOLOGY:       RADIOLOGY:  [x] Reviewed and interpreted by me

## 2025-04-03 NOTE — CONSULT NOTE ADULT - ASSESSMENT
74 year old female with a history of HTN and Asthma that is admitted to [facility] for acute asthma exacerbation

## 2025-04-03 NOTE — BH CONSULTATION LIAISON PROGRESS NOTE - NSBHASSESSMENTFT_PSY_ALL_CORE
Patient is a 74F hx asthma, depression, and HTN was admitted to the MICU with AHRF 2/2 asthma exacerbation resulting in acute on chronic HHRF causing AMS and increased WOB that required intubation. During her ICU course, she experienced seizure-like activity .  Additional issues included hyponatremia, acute on chronic metabolic alkalosis, and mild thrombocytopenia,  also having fevers.  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 Patient has h/o hx of major depressive disorder with psychosis, hx of 3 past inpatient psychiatric hospitalizations last in 2016 at [facility], all rehospitalizations were in context non compliance with the treatment. She has no hx of suicide attempts, no hx of substance abuse. Patient w hx of paranoia, disorganization, catatonia, required MOO when in [facility].     3/24----more withdrawn, some stiffness in neck, not communicative. Some concerns for catatonia. Unsure if symptoms attributed to catatonia vs delirium  3/25--- more alert today, trying to following commands. Still with PMR+. Appears to be responding to Ativan  3/26-- BFS 10, will increasing standing ativan to target catatonic symptoms - giving STAT 0.5mg NOW discussed w acp   3/27----will continue ativan dosing for one more day before increasing  3/28: BFS: stupor 1, mutism 2,withdrawan 2, verbigeration 1, rigidity 2 =8.  3/29: BFS: stupor 2, mutism 2, staring 1, posturing 2, rigidity 2, waxy flexibility 3, withdrawal 2 = 14; please monitor nutritional intake, plan to increase ativan by total daily dose of 0.5 mg at bedtime due to persistent catatonia   3/31: BFS: stupor 2, mutism 2, staring 2, rigidity 1, negativism 1, withdrawal 1, waxy flexibility 3 =12, patient with productive cough, advised for medical work up    4/1---tired, speaking more, not rigid, has PMR+. NPO status now.     4/2--sleepy, RRT this afternoon    4/3---better mentation, pmr but no chester catatonic symptoms    PLAN  - no SI or HI, no need for psychiatric CO  -- antipsychotics can only be given if qtc < 500    - MEDICATIONS:  -- HOLD ATIVAN   -- Continue Zoloft  50mg daily ( home dose 150mg) ( monitor NA- recent hyponatremia)  -- HOLD Zyprexa for now    - PRN:  --Ativan 0.5mg q 6hrs prn IM/IV/PO  - DISPO: pending

## 2025-04-03 NOTE — CONSULT NOTE ADULT - SUBJECTIVE AND OBJECTIVE BOX
CC: Vocal cord dysfunction     HPI: 74 year old female with a history with a history of HTN that is admitted to [facility] for Acute exacerbation of Asthma. patient is non able to answer questions at this time. Had Respiratory RR 4/2 and was placed on BIPAP overnight. Primary team called to consult last evening. Pulmonary team requested to Rule out V/C dysfunction. Patient not able to be scoped last night, now off BIPAP.       PAST MEDICAL & SURGICAL HISTORY:  MDD (major depressive disorder), recurrent, severe, with psychosis      Asthma, mild intermittent, uncomplicated      Essential hypertension      No significant past surgical history        Allergies    No Known Allergies    Intolerances      MEDICATIONS  (STANDING):  buDESOnide    Inhalation Suspension 0.5 milliGRAM(s) Inhalation every 12 hours  chlorhexidine 2% Cloths 1 Application(s) Topical <User Schedule>  dextrose 5% + lactated ringers. 1000 milliLiter(s) (75 mL/Hr) IV Continuous <Continuous>  dextrose 5%. 1000 milliLiter(s) (100 mL/Hr) IV Continuous <Continuous>  dextrose 5%. 1000 milliLiter(s) (50 mL/Hr) IV Continuous <Continuous>  dextrose 50% Injectable 25 Gram(s) IV Push once  dextrose 50% Injectable 12.5 Gram(s) IV Push once  dextrose 50% Injectable 25 Gram(s) IV Push once  dextrose Oral Gel 15 Gram(s) Oral once  enoxaparin Injectable 40 milliGRAM(s) SubCutaneous every 24 hours  glucagon  Injectable 1 milliGRAM(s) IntraMuscular once  guaiFENesin Oral Liquid (Sugar-Free) 200 milliGRAM(s) Oral every 6 hours  hydrALAZINE 50 milliGRAM(s) Oral three times a day  hydrochlorothiazide 25 milliGRAM(s) Oral daily  insulin glargine Injectable (LANTUS) 12 Unit(s) SubCutaneous at bedtime  insulin lispro (ADMELOG) corrective regimen sliding scale   SubCutaneous every 6 hours  insulin lispro Injectable (ADMELOG) 5 Unit(s) SubCutaneous three times a day before meals  levalbuterol Inhalation 0.63 milliGRAM(s) Inhalation every 4 hours  lisinopril 40 milliGRAM(s) Oral daily  methylPREDNISolone sodium succinate Injectable 125 milliGRAM(s) IV Push once  pantoprazole    Tablet 40 milliGRAM(s) Oral before breakfast  polyethylene glycol 3350 17 Gram(s) Oral two times a day  senna 2 Tablet(s) Oral at bedtime  sertraline 50 milliGRAM(s) Oral daily  sodium chloride 0.9%. 1000 milliLiter(s) (125 mL/Hr) IV Continuous <Continuous>    MEDICATIONS  (PRN):    ROS:   ENT: all negative except as noted in HPI   CV: denies palpitations  Pulm: denies SOB, cough, hemoptysis  GI: denies change in apetite, indigestion, n/v  : denies pertinent urinary symptoms, urgency  Neuro: denies numbness/tingling, loss of sensation  Psych: denies anxiety  MS: denies muscle weakness, instability  Heme: denies easy bruising or bleeding  Endo: denies heat/cold intolerance, excessive sweating  Vascular: denies LE edema    Vital Signs Last 24 Hrs  T(C): 36.6 (03 Apr 2025 11:05), Max: 37.2 (02 Apr 2025 17:50)  T(F): 97.9 (03 Apr 2025 11:05), Max: 98.9 (02 Apr 2025 17:50)  HR: 88 (03 Apr 2025 11:34) (85 - 120)  BP: 138/83 (03 Apr 2025 11:05) (114/58 - 150/71)  BP(mean): --  RR: 18 (03 Apr 2025 11:05) (17 - 18)  SpO2: 100% (03 Apr 2025 11:34) (95% - 100%)    Parameters below as of 03 Apr 2025 07:16  Patient On (Oxygen Delivery Method): AVAPS                              11.9   7.04  )-----------( 130      ( 03 Apr 2025 06:50 )             36.2    04-03    136  |  99  |  24[H]  ----------------------------<  168[H]  4.2   |  23  |  0.71    Ca    9.9      03 Apr 2025 06:50  Phos  3.6     04-03  Mg     1.70     04-03    TPro  6.5  /  Alb  3.3  /  TBili  0.6  /  DBili  x   /  AST  26  /  ALT  27  /  AlkPhos  70  04-02       PHYSICAL EXAM:  Gen: NAD  Skin: No rashes, bruises, or lesions  Head: Normocephalic, Atraumatic  Face: no edema, erythema, or fluctuance. Parotid glands soft without mass  Eyes: no scleral injection  Ears: Right - ear canal clear, TM intact without effusion or erythema. No evidence of any fluid drainage. No mastoid tenderness, erythema, or ear bulging            Left - ear canal clear, TM intact without effusion or erythema. No evidence of any fluid drainage. No mastoid tenderness, erythema, or ear bulging  Nose: Nares bilaterally patent,  Mouth: No Stridor / Drooling / Trismus.  Mucosa moist, tongue/uvula midline, oropharynx clear  Neck: Flat, supple, no lymphadenopathy, trachea midline, no masses  Lymphatic: No lymphadenopathy  Resp: breathing easily, no stridor  CV: no peripheral edema/cyanosis  GI: nondistended   Peripheral vascular: no JVD or edema  Neuro: facial nerve intact, no facial droop      Diagnostic Nasal Endoscopy:    no nasal polyps appreciated   pink, moist and sensate      Fiberoptic Indirect laryngoscopy:   No base of tongue edema,   no masses   Epiglottis sharp  Vocal cords mobile and widely patent

## 2025-04-04 LAB
GAS PNL BLDV: SIGNIFICANT CHANGE UP
GLUCOSE BLDC GLUCOMTR-MCNC: 146 MG/DL — HIGH (ref 70–99)
GLUCOSE BLDC GLUCOMTR-MCNC: 165 MG/DL — HIGH (ref 70–99)
GLUCOSE BLDC GLUCOMTR-MCNC: 175 MG/DL — HIGH (ref 70–99)
GLUCOSE BLDC GLUCOMTR-MCNC: 178 MG/DL — HIGH (ref 70–99)

## 2025-04-04 PROCEDURE — 99232 SBSQ HOSP IP/OBS MODERATE 35: CPT

## 2025-04-04 PROCEDURE — 74230 X-RAY XM SWLNG FUNCJ C+: CPT | Mod: 26

## 2025-04-04 PROCEDURE — 99233 SBSQ HOSP IP/OBS HIGH 50: CPT | Mod: GC

## 2025-04-04 PROCEDURE — 70450 CT HEAD/BRAIN W/O DYE: CPT | Mod: 26

## 2025-04-04 PROCEDURE — 99233 SBSQ HOSP IP/OBS HIGH 50: CPT

## 2025-04-04 RX ORDER — INSULIN LISPRO 100 U/ML
INJECTION, SOLUTION INTRAVENOUS; SUBCUTANEOUS AT BEDTIME
Refills: 0 | Status: DISCONTINUED | OUTPATIENT
Start: 2025-04-04 | End: 2025-04-28

## 2025-04-04 RX ORDER — INSULIN LISPRO 100 U/ML
INJECTION, SOLUTION INTRAVENOUS; SUBCUTANEOUS
Refills: 0 | Status: DISCONTINUED | OUTPATIENT
Start: 2025-04-04 | End: 2025-04-28

## 2025-04-04 RX ADMIN — INSULIN LISPRO 1: 100 INJECTION, SOLUTION INTRAVENOUS; SUBCUTANEOUS at 17:53

## 2025-04-04 RX ADMIN — INSULIN GLARGINE-YFGN 11 UNIT(S): 100 INJECTION, SOLUTION SUBCUTANEOUS at 23:10

## 2025-04-04 RX ADMIN — POLYETHYLENE GLYCOL 3350 17 GRAM(S): 17 POWDER, FOR SOLUTION ORAL at 17:35

## 2025-04-04 RX ADMIN — LEVALBUTEROL HYDROCHLORIDE 0.63 MILLIGRAM(S): 1.25 SOLUTION RESPIRATORY (INHALATION) at 07:49

## 2025-04-04 RX ADMIN — Medication 1 APPLICATION(S): at 05:51

## 2025-04-04 RX ADMIN — ENOXAPARIN SODIUM 40 MILLIGRAM(S): 100 INJECTION SUBCUTANEOUS at 17:54

## 2025-04-04 RX ADMIN — BUDESONIDE 0.5 MILLIGRAM(S): 0.25 SUSPENSION RESPIRATORY (INHALATION) at 07:50

## 2025-04-04 RX ADMIN — DEXTROMETHORPHAN HBR, GUAIFENESIN 200 MILLIGRAM(S): 200 LIQUID ORAL at 17:34

## 2025-04-04 RX ADMIN — Medication 2 TABLET(S): at 21:21

## 2025-04-04 RX ADMIN — Medication 500 MICROGRAM(S): at 22:50

## 2025-04-04 RX ADMIN — INSULIN LISPRO 1: 100 INJECTION, SOLUTION INTRAVENOUS; SUBCUTANEOUS at 06:57

## 2025-04-04 RX ADMIN — Medication 50 MILLIGRAM(S): at 14:13

## 2025-04-04 RX ADMIN — METHYLPREDNISOLONE ACETATE 40 MILLIGRAM(S): 80 INJECTION, SUSPENSION INTRA-ARTICULAR; INTRALESIONAL; INTRAMUSCULAR; SOFT TISSUE at 17:51

## 2025-04-04 RX ADMIN — BUDESONIDE 0.5 MILLIGRAM(S): 0.25 SUSPENSION RESPIRATORY (INHALATION) at 22:50

## 2025-04-04 RX ADMIN — Medication 50 MILLIGRAM(S): at 21:21

## 2025-04-04 RX ADMIN — LEVALBUTEROL HYDROCHLORIDE 0.63 MILLIGRAM(S): 1.25 SOLUTION RESPIRATORY (INHALATION) at 14:51

## 2025-04-04 RX ADMIN — LEVALBUTEROL HYDROCHLORIDE 0.63 MILLIGRAM(S): 1.25 SOLUTION RESPIRATORY (INHALATION) at 03:44

## 2025-04-04 RX ADMIN — Medication 500 MICROGRAM(S): at 03:44

## 2025-04-04 RX ADMIN — SERTRALINE 50 MILLIGRAM(S): 100 TABLET, FILM COATED ORAL at 14:13

## 2025-04-04 RX ADMIN — LEVALBUTEROL HYDROCHLORIDE 0.63 MILLIGRAM(S): 1.25 SOLUTION RESPIRATORY (INHALATION) at 22:50

## 2025-04-04 RX ADMIN — INSULIN LISPRO 5 UNIT(S): 100 INJECTION, SOLUTION INTRAVENOUS; SUBCUTANEOUS at 17:53

## 2025-04-04 NOTE — PROGRESS NOTE ADULT - PROBLEM SELECTOR PLAN 2
will need ICS/LABA on discharge  c/w ipratropium, xopenex nebs  steroids as above  needs pulm as outpatient

## 2025-04-04 NOTE — PROGRESS NOTE ADULT - NS ATTEST RISK PROBLEM GEN_ALL_CORE FT
Medical management as above, review of results/records, discussion with patient and primary team, documentation.

## 2025-04-04 NOTE — SWALLOW VFSS/MBS ASSESSMENT ADULT - COMMENTS
4/3 Medicine Progress Note: "74F hx asthma and depressed presented with AHHRF c/b AMS requiring MICU admission for intubation for respiratory failure i/s/o asthma exacerbation. Extubated 3/12 to face tent and now on BIPAP s/p RRT 3/19 for increased WOB / hypoxia despite aggressive asthma management. Transferred back to MICU for management of severe asthma."    4/3 ENT Note: "·  Problem: Acute asthma exacerbation. Recommendation: 1. Flexible laryngoscopy WNL. No ENT intervention at this time. Continue to follow pulm recs and SLP recs for diet. ENT to sign off at this time. Will discuss with ENT attending"    4/3 Pulm Note: "74F reported asthma with prior intubations and depression initially admitted to MICU with suspected asthma exacerbation with RRT on 3/19 AM for hypoxemia and increased work of breathing with poor air entry. Patient transferred back to ICU for airway evaluation and asthma management and transferred back to floor 3/21, initially requiring BIPAP. Patient had adequate improvement from pulmonary perspective on floor however with episodes reported by team and family of wheezing and cough yesterday. Upon assesment patient is significantly lethargic, still under treatment with Ativan for underlying psychiatric disorder. Patient with likely recurring aspiration due to underlying mood disorder, with now recurretn asthma exacerbation"        Patient known to this service throughout this admission. Patient seen most recently for bedside swallow evaluation on April 1st with recommendations of NPO with consideration of short-term non-oral means of nutrition and cinesophagram. Cinesophagram attempted on April 2nd, though deferred given patient unable to maintain alert state. 4/3 Medicine Progress Note: "74F hx asthma and depressed presented with AHHRF c/b AMS requiring MICU admission for intubation for respiratory failure i/s/o asthma exacerbation. Extubated 3/12 to face tent and now on BIPAP s/p RRT 3/19 for increased WOB / hypoxia despite aggressive asthma management. Transferred back to MICU for management of severe asthma."    4/3 ENT Note: "·  Problem: Acute asthma exacerbation. Recommendation: 1. Flexible laryngoscopy WNL. No ENT intervention at this time. Continue to follow pulm recs and SLP recs for diet. ENT to sign off at this time. Will discuss with ENT attending"    4/3 Pulm Note: "74F reported asthma with prior intubations and depression initially admitted to MICU with suspected asthma exacerbation with RRT on 3/19 AM for hypoxemia and increased work of breathing with poor air entry. Patient transferred back to ICU for airway evaluation and asthma management and transferred back to floor 3/21, initially requiring BIPAP. Patient had adequate improvement from pulmonary perspective on floor however with episodes reported by team and family of wheezing and cough yesterday. Upon assesment patient is significantly lethargic, still under treatment with Ativan for underlying psychiatric disorder. Patient with likely recurring aspiration due to underlying mood disorder, with now recurretn asthma exacerbation"    Patient known to this service throughout this admission. Patient seen for bedside swallow evals on 3/13, 3/14, 3/20, and 4/1 (see notes for details). Patient seen most recently for bedside swallow evaluation on April 1st with recommendations of NPO with consideration of short-term non-oral means of nutrition and cinesophagram. Cinesophagram attempted on April 2nd, though deferred given patient unable to maintain alert state (see note).     Patient arrived to radiology suite positioned upright in specialized seating unit for lateral viewing. Patient received awake and alert, receiving supplemental O2 via NC.

## 2025-04-04 NOTE — PROGRESS NOTE ADULT - PROBLEM SELECTOR PLAN 1
2/2 to status asthmaticus  Follow-up aspergillus Ab: negative , ANCA: negative , IgE: negative    s/p intubation and bipap now on NC  c/w ipratropium + xopenex nebs  s/p course of antibiotics  c/w budesonide BID  Back on solumedrol 40mg IV daily, plan for eventual steroid taper    Worsening respiratory status 4/2 - placed on NIV, now weaned to nasal cannula. Wean further as tolerated,  ENT eval - no upper airway consideration for structural causes of asthma such as vocal cord dysfunction.     Possible aspiration event 3/31. SLP eval appreciated. XR cine performed with improvement in mental status - pureed diet w/ thin liquids recommended.

## 2025-04-04 NOTE — PROGRESS NOTE ADULT - ASSESSMENT
74F reported asthma with prior intubations and depression initially admitted to MICU with suspected asthma exacerbation with RRT on 3/19 AM for hypoxemia and increased work of breathing with poor air entry. Patient transferred back to ICU for airway evaluation and asthma management and transferred back to floor 3/21, initially requiring BIPAP. Patient had adequate improvement from pulmonary perspective on floor however with episodes reported by team and family of wheezing and cough yesterday. Upon assesment patient is significantly lethargic, still under treatment with Ativan for underlying psychiatric disorder. Patient with likely recurring aspiration due to underlying mood disorder, with now recurretn asthma exacerbation    #Asthma Exacerbation - Recurred likely in setting of aspiration  #Lethargy - on Ativan TID now off: MS much more improved     - CTA chest 3/8 without evidence of PNA or PE  - repeat CTA negative for PE and clear lungs  - continue Duonebs every 6h, ensure to continue as well on discharge for 7 days   - Continue Pulmicort 0.5mg BID while lethargic  - Continue Solumedrol 40mg daily IV   - Levalbuterol nebs every 6h + Ipratropium nebs every 6h  - Incentive spirometry  - Pending Cinesophagram  - Can stop NIV for now   - NPO for now until cleared by SLP   - Use Home token on DC for OP Pulmonary follow up

## 2025-04-04 NOTE — BH CONSULTATION LIAISON PROGRESS NOTE - NSBHTIMEACTIVITIESPERFORMED_PSY_A_CORE
35 minutes spent on total encounter. The necessity of the time spent during the encounter on this date of service was due to:     I had a face to face encounter with this patient. I spent 35 total minutes on the bedside interview and examination, coordination of care with patient and spouse at bedside, counseling, chart review, and documentation for this patient.

## 2025-04-04 NOTE — BH CONSULTATION LIAISON PROGRESS NOTE - NSBHFUPINTERVALHXFT_PSY_A_CORE
Patient was seen and assessed,  at bedside as well.   patient sitting up, on NC, offers no complaints however uses few words to communicate. Patient able to state her name, aware her  is in the room with her, but unable to answer any other direct questions.  Some  mild rigidity felt (RUE > LUE). no catalepsy or negativism.   Remains off Zyprexa and standing ativan at this time.

## 2025-04-04 NOTE — SWALLOW VFSS/MBS ASSESSMENT ADULT - ORAL PHASE COMMENTS
Initial bolus hold and delayed initiation of transfer, likely secondary to reluctance for PO trials. Further solid textures were not administered due to patient reluctance for PO trials Initial bolus hold and delayed initiation of transfer, likely secondary to reluctance for PO trials.

## 2025-04-04 NOTE — BH CONSULTATION LIAISON PROGRESS NOTE - NSBHASSESSMENTFT_PSY_ALL_CORE
Patient is a 74F hx asthma, depression, and HTN was admitted to the MICU with AHRF 2/2 asthma exacerbation resulting in acute on chronic HHRF causing AMS and increased WOB that required intubation. During her ICU course, she experienced seizure-like activity .  Additional issues included hyponatremia, acute on chronic metabolic alkalosis, and mild thrombocytopenia,  also having fevers.  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 Patient has h/o hx of major depressive disorder with psychosis, hx of 3 past inpatient psychiatric hospitalizations last in 2016 at [facility], all rehospitalizations were in context non compliance with the treatment. She has no hx of suicide attempts, no hx of substance abuse. Patient w hx of paranoia, disorganization, catatonia, required MOO when in [facility].     3/24----more withdrawn, some stiffness in neck, not communicative. Some concerns for catatonia. Unsure if symptoms attributed to catatonia vs delirium  3/25--- more alert today, trying to following commands. Still with PMR+. Appears to be responding to Ativan  3/26-- BFS 10, will increasing standing ativan to target catatonic symptoms - giving STAT 0.5mg NOW discussed w acp   3/27----will continue ativan dosing for one more day before increasing  3/28: BFS: stupor 1, mutism 2,withdrawan 2, verbigeration 1, rigidity 2 =8.  3/29: BFS: stupor 2, mutism 2, staring 1, posturing 2, rigidity 2, waxy flexibility 3, withdrawal 2 = 14; please monitor nutritional intake, plan to increase ativan by total daily dose of 0.5 mg at bedtime due to persistent catatonia   3/31: BFS: stupor 2, mutism 2, staring 2, rigidity 1, negativism 1, withdrawal 1, waxy flexibility 3 =12, patient with productive cough, advised for medical work up    4/1---tired, speaking more, not rigid, has PMR+. NPO status now.     4/2--sleepy, RRT this afternoon    4/3---better mentation, pmr but no chester catatonic symptoms  4/4: alert, using few words, mild rigidity -  at bedside, updated     PLAN  - no SI or HI, no need for psychiatric CO  -- antipsychotics can only be given if qtc < 500    - MEDICATIONS:  -- HOLD ATIVAN   -- Continue Zoloft  50mg daily ( home dose 150mg) ( monitor NA- recent hyponatremia)  -- HOLD Zyprexa for now    - PRN:  --Ativan 0.5mg q 6hrs prn IM/IV/PO  - DISPO: pending

## 2025-04-04 NOTE — SWALLOW VFSS/MBS ASSESSMENT ADULT - RECOMMENDED CONSISTENCY
1. Puree and thin liquids  2. Feeding/swallowing guidelines: allow for swallow between intakes, position upright  3. Oral hygiene  4. Aspiration/reflux precautions

## 2025-04-04 NOTE — PROGRESS NOTE ADULT - SUBJECTIVE AND OBJECTIVE BOX
[facility] Division of Hospital Medicine  Carlos Summers DO  Available via MS Teams    SUBJECTIVE / OVERNIGHT EVENTS:  No acute events overnight. Patient seen and examined at bedside this morning.   [place] at bedside. Pulmonology team also at bedside.   reports patient doing better - more awake. Went for XR cine this AM.  Patient denies acute complaints.    ADDITIONAL REVIEW OF SYSTEMS:    MEDICATIONS  (STANDING):  buDESOnide    Inhalation Suspension 0.5 milliGRAM(s) Inhalation every 12 hours  chlorhexidine 2% Cloths 1 Application(s) Topical <User Schedule>  dextrose 5% + lactated ringers. 1000 milliLiter(s) (75 mL/Hr) IV Continuous <Continuous>  dextrose 5% + sodium chloride 0.9%. 1000 milliLiter(s) (50 mL/Hr) IV Continuous <Continuous>  dextrose 5%. 1000 milliLiter(s) (100 mL/Hr) IV Continuous <Continuous>  dextrose 5%. 1000 milliLiter(s) (50 mL/Hr) IV Continuous <Continuous>  dextrose 50% Injectable 25 Gram(s) IV Push once  dextrose 50% Injectable 12.5 Gram(s) IV Push once  dextrose 50% Injectable 25 Gram(s) IV Push once  dextrose Oral Gel 15 Gram(s) Oral once  enoxaparin Injectable 40 milliGRAM(s) SubCutaneous every 24 hours  glucagon  Injectable 1 milliGRAM(s) IntraMuscular once  guaiFENesin Oral Liquid (Sugar-Free) 200 milliGRAM(s) Oral every 6 hours  hydrALAZINE 50 milliGRAM(s) Oral three times a day  hydrochlorothiazide 25 milliGRAM(s) Oral daily  insulin glargine Injectable (LANTUS) 11 Unit(s) SubCutaneous at bedtime  insulin lispro (ADMELOG) corrective regimen sliding scale   SubCutaneous every 6 hours  insulin lispro Injectable (ADMELOG) 5 Unit(s) SubCutaneous three times a day before meals  ipratropium    for Nebulization 500 MICROGram(s) Nebulizer every 6 hours  levalbuterol Inhalation 0.63 milliGRAM(s) Inhalation every 6 hours  lisinopril 40 milliGRAM(s) Oral daily  methylPREDNISolone sodium succinate Injectable 40 milliGRAM(s) IV Push every 24 hours  pantoprazole    Tablet 40 milliGRAM(s) Oral before breakfast  polyethylene glycol 3350 17 Gram(s) Oral two times a day  senna 2 Tablet(s) Oral at bedtime  sertraline 50 milliGRAM(s) Oral daily  sodium chloride 0.9%. 1000 milliLiter(s) (125 mL/Hr) IV Continuous <Continuous>    MEDICATIONS  (PRN):      I&O's Summary      PHYSICAL EXAM:  Vital Signs Last 24 Hrs  T(C): 37.1 (04 Apr 2025 10:09), Max: 37.4 (04 Apr 2025 05:00)  T(F): 98.7 (04 Apr 2025 10:09), Max: 99.3 (04 Apr 2025 05:00)  HR: 80 (04 Apr 2025 11:47) (80 - 93)  BP: 169/90 (04 Apr 2025 10:09) (128/78 - 169/90)  BP(mean): --  RR: 19 (04 Apr 2025 10:09) (18 - 19)  SpO2: 100% (04 Apr 2025 11:47) (97% - 100%)    Parameters below as of 04 Apr 2025 10:09  Patient On (Oxygen Delivery Method): nasal cannula  O2 Flow (L/min): 3        LABS:                        11.9   7.04  )-----------( 130      ( 03 Apr 2025 06:50 )             36.2     04-03    136  |  99  |  24[H]  ----------------------------<  168[H]  4.2   |  23  |  0.71    Ca    9.9      03 Apr 2025 06:50  Phos  3.6     04-03  Mg     1.70     04-03    TPro  6.5  /  Alb  3.3  /  TBili  0.6  /  DBili  x   /  AST  26  /  ALT  27  /  AlkPhos  70  04-02          Urinalysis Basic - ( 03 Apr 2025 06:50 )    Color: x / Appearance: x / SG: x / pH: x  Gluc: 168 mg/dL / Ketone: x  / Bili: x / Urobili: x   Blood: x / Protein: x / Nitrite: x   Leuk Esterase: x / RBC: x / WBC x   Sq Epi: x / Non Sq Epi: x / Bacteria: x        SARS-CoV-2: NotDetec (19 Mar 2025 09:05)  SARS-CoV-2: NotDetec (08 Mar 2025 14:40)

## 2025-04-04 NOTE — SWALLOW VFSS/MBS ASSESSMENT ADULT - ADDITIONAL RECOMMENDATIONS
1. This service to follow as schedule permits to assess for PO tolerance/advancement. 2. Medical team advised to reconsult if change in medical status. 1. This service to follow as schedule permits to assess for PO tolerance/advancement. 2. Medical team advised to reconsult if change in medical status. 3. Swallowing therapy pending discharge plans (rehab vs homecare vs outpatient at [facility], phone number [phone]).

## 2025-04-04 NOTE — SWALLOW VFSS/MBS ASSESSMENT ADULT - MODE OF PRESENTATION
spoon/fed by clinician cup/fed by clinician Further solid textures were not administered due to patient reluctance for PO trials

## 2025-04-04 NOTE — PROGRESS NOTE ADULT - ATTENDING COMMENTS
74F reported asthma with prior intubations and depression, hx of catatonia initially admitted to MICU with suspected asthma exacerbation with RRT on 3/19 AM for hypoxemia and increased work of breathing with poor air entry. Patient transferred back to ICU for airway evaluation and asthma management and transferred back to floor 3/21, initially requiring BIPAP. Asthma exacerbation had resolved however pt had waxing/waning metabolic encephalopathy and respiratory distress with wheezing, Ativan stopped 4/2/25, pt on bilevel since RRT on 4/2. Appreciate ENT scope no VCD. Continue asthma management, c/w bronchodilators, steroids. No acute changes on CT head. Overall, improved MS since being off ativan. Monitor off Bilevel, and repeat VBGs.

## 2025-04-04 NOTE — PROGRESS NOTE ADULT - SUBJECTIVE AND OBJECTIVE BOX
Interval Events:      REVIEW OF SYSTEMS:  Negative except as documented above.      OBJECTIVE:  ICU Vital Signs Last 24 Hrs  T(C): 37.1 (04 Apr 2025 10:09), Max: 37.4 (04 Apr 2025 05:00)  T(F): 98.7 (04 Apr 2025 10:09), Max: 99.3 (04 Apr 2025 05:00)  HR: 80 (04 Apr 2025 11:47) (80 - 93)  BP: 169/90 (04 Apr 2025 10:09) (128/78 - 169/90)  BP(mean): --  ABP: --  ABP(mean): --  RR: 19 (04 Apr 2025 10:09) (18 - 19)  SpO2: 100% (04 Apr 2025 11:47) (97% - 100%)    O2 Parameters below as of 04 Apr 2025 10:09  Patient On (Oxygen Delivery Method): nasal cannula  O2 Flow (L/min): 3        Mode: standby    CAPILLARY BLOOD GLUCOSE      POCT Blood Glucose.: 146 mg/dL (04 Apr 2025 12:06)      PHYSICAL EXAM:  General: NAD  HEENT:  EOMI, sclera anicteric, moist mucus membranes  Neck: supple  Cardiovascular: RRR  Respiratory: Bilateral wheezing  Abdomen: soft, nontender  Extremities: warm and well perfused, no edema, no clubbing  Skin: no rashes  Neurological: no focal deficits    HOSPITAL MEDICATIONS:  MEDICATIONS  (STANDING):  buDESOnide    Inhalation Suspension 0.5 milliGRAM(s) Inhalation every 12 hours  chlorhexidine 2% Cloths 1 Application(s) Topical <User Schedule>  dextrose 5% + lactated ringers. 1000 milliLiter(s) (75 mL/Hr) IV Continuous <Continuous>  dextrose 5% + sodium chloride 0.9%. 1000 milliLiter(s) (50 mL/Hr) IV Continuous <Continuous>  dextrose 5%. 1000 milliLiter(s) (100 mL/Hr) IV Continuous <Continuous>  dextrose 5%. 1000 milliLiter(s) (50 mL/Hr) IV Continuous <Continuous>  dextrose 50% Injectable 25 Gram(s) IV Push once  dextrose 50% Injectable 12.5 Gram(s) IV Push once  dextrose 50% Injectable 25 Gram(s) IV Push once  dextrose Oral Gel 15 Gram(s) Oral once  enoxaparin Injectable 40 milliGRAM(s) SubCutaneous every 24 hours  glucagon  Injectable 1 milliGRAM(s) IntraMuscular once  guaiFENesin Oral Liquid (Sugar-Free) 200 milliGRAM(s) Oral every 6 hours  hydrALAZINE 50 milliGRAM(s) Oral three times a day  hydrochlorothiazide 25 milliGRAM(s) Oral daily  insulin glargine Injectable (LANTUS) 11 Unit(s) SubCutaneous at bedtime  insulin lispro (ADMELOG) corrective regimen sliding scale   SubCutaneous every 6 hours  insulin lispro Injectable (ADMELOG) 5 Unit(s) SubCutaneous three times a day before meals  ipratropium    for Nebulization 500 MICROGram(s) Nebulizer every 6 hours  levalbuterol Inhalation 0.63 milliGRAM(s) Inhalation every 6 hours  lisinopril 40 milliGRAM(s) Oral daily  methylPREDNISolone sodium succinate Injectable 40 milliGRAM(s) IV Push every 24 hours  pantoprazole    Tablet 40 milliGRAM(s) Oral before breakfast  polyethylene glycol 3350 17 Gram(s) Oral two times a day  senna 2 Tablet(s) Oral at bedtime  sertraline 50 milliGRAM(s) Oral daily  sodium chloride 0.9%. 1000 milliLiter(s) (125 mL/Hr) IV Continuous <Continuous>    MEDICATIONS  (PRN):      LABS:                        11.9   7.04  )-----------( 130      ( 03 Apr 2025 06:50 )             36.2     Hgb Trend: 11.9<--, 11.6<--, 11.6<--, 11.3<--  04-03    136  |  99  |  24[H]  ----------------------------<  168[H]  4.2   |  23  |  0.71    Ca    9.9      03 Apr 2025 06:50  Phos  3.6     04-03  Mg     1.70     04-03    TPro  6.5  /  Alb  3.3  /  TBili  0.6  /  DBili  x   /  AST  26  /  ALT  27  /  AlkPhos  70  04-02    Creatinine Trend: 0.71<--, 0.66<--, 0.52<--, 0.61<--, 0.64<--, 1.20<--    Urinalysis Basic - ( 03 Apr 2025 06:50 )    Color: x / Appearance: x / SG: x / pH: x  Gluc: 168 mg/dL / Ketone: x  / Bili: x / Urobili: x   Blood: x / Protein: x / Nitrite: x   Leuk Esterase: x / RBC: x / WBC x   Sq Epi: x / Non Sq Epi: x / Bacteria: x      Arterial Blood Gas:  04-02 @ 14:30  7.37/52/91/30/96.7/3.7  ABG lactate: --    Venous Blood Gas:  04-04 @ 07:11  7.39/55/66/33/93.9  VBG Lactate: 0.7  Venous Blood Gas:  04-03 @ 13:19  7.37/64/69/37/93.9  VBG Lactate: 0.7  Venous Blood Gas:  04-03 @ 12:20  7.36/62/60/35/90.7  VBG Lactate: 0.6  Venous Blood Gas:  04-02 @ 18:07  7.37/53/70/31/95.0  VBG Lactate: 1.0      MICROBIOLOGY:       RADIOLOGY:  [x] Reviewed and interpreted by me

## 2025-04-04 NOTE — SWALLOW VFSS/MBS ASSESSMENT ADULT - DIAGNOSTIC IMPRESSIONS
1. The patient presents with a functional oral stage for puree and thin liquids characterized by adequate oral grading, adequate spoon stripping, adequate cup retrieval, adequate labial closure, adequate anterior-posterior transport, and adequate tongue to palate seal. Initial bolus holding and delayed initiation of bolus transfer was noted with pureed texture likely secondary to patient reluctance for PO trials. There was trace coating along the lingual surface post primary swallow, cleared with a spontaneous secondary swallow. 2. Functional pharyngeal stage for puree characterized by adequate initiation of the swallow, adequate base of tongue retraction, adequate epiglottic deflection, adequate hyolaryngeal excursion, adequate laryngeal vestibular closure, and adequate pharyngeal contractility. There was adequate pharyngeal clearance post swallow. 3. Mild pharyngeal dysphagia for thin liquids characterized by delayed initiation of the swallow (bolus head in the pyriform sinuses), adequate base of tongue retraction, adequate epiglottic deflection, adequate hyolaryngeal excursion, adequate laryngeal vestibular closure, and adequate pharyngeal contractility. There was adequate pharyngeal clearance post swallow. 4. There is No Penetration or Aspiration observed before, during, or after the swallow with puree and thin liquids. 1. The patient presents with a functional oral stage for puree and thin liquids characterized by adequate oral grading, adequate spoon stripping, adequate cup retrieval, adequate labial closure, adequate anterior-posterior transport, and adequate tongue to palate seal. Initial bolus holding and delayed initiation of bolus transfer was noted with pureed texture likely secondary to patient reluctance for PO trials requiring verbal encouragement. There was trace coating along the lingual surface post primary swallow, cleared with a spontaneous secondary swallow. 2. Functional pharyngeal stage for puree characterized by adequate initiation of the swallow, adequate base of tongue retraction, adequate epiglottic deflection, adequate hyolaryngeal excursion, adequate laryngeal vestibular closure, and adequate pharyngeal contractility. There was adequate pharyngeal clearance post swallow. 3. Mild pharyngeal dysphagia for thin liquids characterized by delayed initiation of the swallow (bolus head in the pyriform sinuses), adequate base of tongue retraction, adequate epiglottic deflection, adequate hyolaryngeal excursion, adequate laryngeal vestibular closure, and adequate pharyngeal contractility. There was adequate pharyngeal clearance post swallow. 4. There is No Penetration or Aspiration observed before, during, or after the swallow with puree and thin liquids.

## 2025-04-04 NOTE — PROGRESS NOTE ADULT - PROBLEM SELECTOR PLAN 6
Pt w/hx of paranoia and stupor from depression  increased Zoloft to 50mg daily ( home dose 150mg) ( monitor NA- recent hyponatremia), As per  team, team will follow and make any changes if needed,   > continue home Olanzapine, 10 mg qhs -> lowered to 5mg per psych     given mild rigidity, concern for catatonia was receiving standing  Ativan  0.5mg at 7 am, 0.5mg at 11am, 0..75mg at 4pm  - appreciate psych recs: Ativan held for now

## 2025-04-04 NOTE — PROGRESS NOTE ADULT - PROBLEM SELECTOR PLAN 7
on   lantus  12 units qhs and will increase premeal to 5  units premeal  ISS  anticipate insulin needs will decrease as weaning off steroids. now on oral pred  [ ] monitor FS and adjust as needed Subsequent Stages Histo Method Verbiage: Using a similar technique to that described above, a thin layer of tissue was removed from all areas where tumor was visible on the previous stage.  The tissue was again oriented, mapped, dyed, and processed as above.

## 2025-04-05 LAB
GLUCOSE BLDC GLUCOMTR-MCNC: 101 MG/DL — HIGH (ref 70–99)
GLUCOSE BLDC GLUCOMTR-MCNC: 118 MG/DL — HIGH (ref 70–99)
GLUCOSE BLDC GLUCOMTR-MCNC: 164 MG/DL — HIGH (ref 70–99)
GLUCOSE BLDC GLUCOMTR-MCNC: 182 MG/DL — HIGH (ref 70–99)

## 2025-04-05 PROCEDURE — 99233 SBSQ HOSP IP/OBS HIGH 50: CPT

## 2025-04-05 RX ADMIN — LEVALBUTEROL HYDROCHLORIDE 0.63 MILLIGRAM(S): 1.25 SOLUTION RESPIRATORY (INHALATION) at 10:04

## 2025-04-05 RX ADMIN — Medication 1 APPLICATION(S): at 06:22

## 2025-04-05 RX ADMIN — ENOXAPARIN SODIUM 40 MILLIGRAM(S): 100 INJECTION SUBCUTANEOUS at 19:29

## 2025-04-05 RX ADMIN — INSULIN GLARGINE-YFGN 11 UNIT(S): 100 INJECTION, SOLUTION SUBCUTANEOUS at 22:48

## 2025-04-05 RX ADMIN — Medication 50 MILLIGRAM(S): at 21:53

## 2025-04-05 RX ADMIN — Medication 50 MILLIGRAM(S): at 06:12

## 2025-04-05 RX ADMIN — DEXTROMETHORPHAN HBR, GUAIFENESIN 200 MILLIGRAM(S): 200 LIQUID ORAL at 17:37

## 2025-04-05 RX ADMIN — LEVALBUTEROL HYDROCHLORIDE 0.63 MILLIGRAM(S): 1.25 SOLUTION RESPIRATORY (INHALATION) at 04:40

## 2025-04-05 RX ADMIN — Medication 500 MICROGRAM(S): at 21:18

## 2025-04-05 RX ADMIN — BUDESONIDE 0.5 MILLIGRAM(S): 0.25 SUSPENSION RESPIRATORY (INHALATION) at 10:04

## 2025-04-05 RX ADMIN — BUDESONIDE 0.5 MILLIGRAM(S): 0.25 SUSPENSION RESPIRATORY (INHALATION) at 21:18

## 2025-04-05 RX ADMIN — METHYLPREDNISOLONE ACETATE 40 MILLIGRAM(S): 80 INJECTION, SUSPENSION INTRA-ARTICULAR; INTRALESIONAL; INTRAMUSCULAR; SOFT TISSUE at 17:37

## 2025-04-05 RX ADMIN — INSULIN LISPRO 5 UNIT(S): 100 INJECTION, SOLUTION INTRAVENOUS; SUBCUTANEOUS at 12:11

## 2025-04-05 RX ADMIN — INSULIN LISPRO 5 UNIT(S): 100 INJECTION, SOLUTION INTRAVENOUS; SUBCUTANEOUS at 17:37

## 2025-04-05 RX ADMIN — Medication 500 MICROGRAM(S): at 10:04

## 2025-04-05 RX ADMIN — DEXTROMETHORPHAN HBR, GUAIFENESIN 200 MILLIGRAM(S): 200 LIQUID ORAL at 22:49

## 2025-04-05 RX ADMIN — Medication 40 MILLIGRAM(S): at 06:12

## 2025-04-05 RX ADMIN — SERTRALINE 50 MILLIGRAM(S): 100 TABLET, FILM COATED ORAL at 12:06

## 2025-04-05 RX ADMIN — POLYETHYLENE GLYCOL 3350 17 GRAM(S): 17 POWDER, FOR SOLUTION ORAL at 06:11

## 2025-04-05 RX ADMIN — LEVALBUTEROL HYDROCHLORIDE 0.63 MILLIGRAM(S): 1.25 SOLUTION RESPIRATORY (INHALATION) at 16:18

## 2025-04-05 RX ADMIN — POLYETHYLENE GLYCOL 3350 17 GRAM(S): 17 POWDER, FOR SOLUTION ORAL at 17:37

## 2025-04-05 RX ADMIN — INSULIN LISPRO 1: 100 INJECTION, SOLUTION INTRAVENOUS; SUBCUTANEOUS at 12:11

## 2025-04-05 RX ADMIN — Medication 500 MICROGRAM(S): at 16:18

## 2025-04-05 RX ADMIN — LEVALBUTEROL HYDROCHLORIDE 0.63 MILLIGRAM(S): 1.25 SOLUTION RESPIRATORY (INHALATION) at 21:18

## 2025-04-05 RX ADMIN — Medication 2 TABLET(S): at 21:52

## 2025-04-05 RX ADMIN — LISINOPRIL 40 MILLIGRAM(S): 5 TABLET ORAL at 06:12

## 2025-04-05 RX ADMIN — DEXTROMETHORPHAN HBR, GUAIFENESIN 200 MILLIGRAM(S): 200 LIQUID ORAL at 06:11

## 2025-04-05 RX ADMIN — Medication 50 MILLIGRAM(S): at 13:55

## 2025-04-05 RX ADMIN — DEXTROMETHORPHAN HBR, GUAIFENESIN 200 MILLIGRAM(S): 200 LIQUID ORAL at 12:06

## 2025-04-05 RX ADMIN — Medication 500 MICROGRAM(S): at 04:40

## 2025-04-05 NOTE — PROGRESS NOTE ADULT - SUBJECTIVE AND OBJECTIVE BOX
[facility] Division of Hospital Medicine  Carlos Summers DO  Available via MS Teams    SUBJECTIVE / OVERNIGHT EVENTS:  No acute events overnight. Patient seen and examined at bedside this morning.  Denies acute complaints. Son Jesus at bedside. He continues to worry about how she will be after discharge to a facility where there isn't as intense monitoring.  He understands we are still working to optimize her from a respiratory and psychiatric perspective.    ADDITIONAL REVIEW OF SYSTEMS:    MEDICATIONS  (STANDING):  buDESOnide    Inhalation Suspension 0.5 milliGRAM(s) Inhalation every 12 hours  chlorhexidine 2% Cloths 1 Application(s) Topical <User Schedule>  dextrose 5%. 1000 milliLiter(s) (100 mL/Hr) IV Continuous <Continuous>  dextrose 5%. 1000 milliLiter(s) (50 mL/Hr) IV Continuous <Continuous>  dextrose 50% Injectable 25 Gram(s) IV Push once  dextrose 50% Injectable 12.5 Gram(s) IV Push once  dextrose 50% Injectable 25 Gram(s) IV Push once  dextrose Oral Gel 15 Gram(s) Oral once  enoxaparin Injectable 40 milliGRAM(s) SubCutaneous every 24 hours  glucagon  Injectable 1 milliGRAM(s) IntraMuscular once  guaiFENesin Oral Liquid (Sugar-Free) 200 milliGRAM(s) Oral every 6 hours  hydrALAZINE 50 milliGRAM(s) Oral three times a day  hydrochlorothiazide 25 milliGRAM(s) Oral daily  insulin glargine Injectable (LANTUS) 11 Unit(s) SubCutaneous at bedtime  insulin lispro (ADMELOG) corrective regimen sliding scale   SubCutaneous three times a day before meals  insulin lispro (ADMELOG) corrective regimen sliding scale   SubCutaneous at bedtime  insulin lispro Injectable (ADMELOG) 5 Unit(s) SubCutaneous three times a day before meals  ipratropium    for Nebulization 500 MICROGram(s) Nebulizer every 6 hours  levalbuterol Inhalation 0.63 milliGRAM(s) Inhalation every 6 hours  lisinopril 40 milliGRAM(s) Oral daily  methylPREDNISolone sodium succinate Injectable 40 milliGRAM(s) IV Push every 24 hours  pantoprazole    Tablet 40 milliGRAM(s) Oral before breakfast  polyethylene glycol 3350 17 Gram(s) Oral two times a day  senna 2 Tablet(s) Oral at bedtime  sertraline 50 milliGRAM(s) Oral daily    MEDICATIONS  (PRN):      I&O's Summary      PHYSICAL EXAM:  Vital Signs Last 24 Hrs  T(C): 36.6 (05 Apr 2025 09:57), Max: 37.2 (04 Apr 2025 14:07)  T(F): 97.8 (05 Apr 2025 09:57), Max: 99 (04 Apr 2025 14:07)  HR: 103 (05 Apr 2025 10:04) (84 - 106)  BP: 172/81 (05 Apr 2025 09:57) (140/75 - 179/86)  BP(mean): --  RR: 17 (05 Apr 2025 09:57) (17 - 18)  SpO2: 97% (05 Apr 2025 10:04) (92% - 100%)    Parameters below as of 05 Apr 2025 10:04  Patient On (Oxygen Delivery Method): nasal cannula      LABS:                    SARS-CoV-2: NotDete (19 Mar 2025 09:05)  SARS-CoV-2: NotDete (08 Mar 2025 14:40)

## 2025-04-05 NOTE — BH CONSULTATION LIAISON PROGRESS NOTE - NSBHFUPINTERVALHXFT_PSY_A_CORE
Patient was seen and assessed, son at bedside as well. No PRNs needed for agitation. No acute episodes overnight. Patient continues to be hypertensive and tachycardic 4/5/25 9:57am (/81, , RR 17, O2% 100 on NC 3L).     Patient was found asleep but briefly woke up to speak with writer. When asked how she was feeling, reports 'fine'. Reports 'no' to SI and AVH. When asked about sleep, patient started falling asleep. Unable to continue interview. Mild BL rigidity on exam.  no catalepsy or negativism. No signs of respiratory distress.    Per son at bedside, patient with frequent fluctuations in wakefulness, waxing and waning alertness. Denies agitation. Reports increased PO. Reports overnight, patient took her NC off. RT at bedside initiating nebulizing treatment reporting patient's respirations have improved.

## 2025-04-05 NOTE — BH CONSULTATION LIAISON PROGRESS NOTE - NSBHASSESSMENTFT_PSY_ALL_CORE
Patient is a 74F hx asthma, depression, and HTN was admitted to the MICU with AHRF 2/2 asthma exacerbation resulting in acute on chronic HHRF causing AMS and increased WOB that required intubation. During her ICU course, she experienced seizure-like activity .  Additional issues included hyponatremia, acute on chronic metabolic alkalosis, and mild thrombocytopenia,  also having fevers.  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 Patient has h/o hx of major depressive disorder with psychosis, hx of 3 past inpatient psychiatric hospitalizations last in 2016 at [facility], all rehospitalizations were in context non compliance with the treatment. She has no hx of suicide attempts, no hx of substance abuse. Patient w hx of paranoia, disorganization, catatonia, required MOO when in [facility].     3/24----more withdrawn, some stiffness in neck, not communicative. Some concerns for catatonia. Unsure if symptoms attributed to catatonia vs delirium  3/25--- more alert today, trying to following commands. Still with PMR+. Appears to be responding to Ativan  3/26-- BFS 10, will increasing standing ativan to target catatonic symptoms - giving STAT 0.5mg NOW discussed w acp   3/27----will continue ativan dosing for one more day before increasing  3/28: BFS: stupor 1, mutism 2,withdrawan 2, verbigeration 1, rigidity 2 =8.  3/29: BFS: stupor 2, mutism 2, staring 1, posturing 2, rigidity 2, waxy flexibility 3, withdrawal 2 = 14; please monitor nutritional intake, plan to increase ativan by total daily dose of 0.5 mg at bedtime due to persistent catatonia   3/31: BFS: stupor 2, mutism 2, staring 2, rigidity 1, negativism 1, withdrawal 1, waxy flexibility 3 =12, patient with productive cough, advised for medical work up    4/1---tired, speaking more, not rigid, has PMR+. NPO status now.     4/2--sleepy, RRT this afternoon    4/3---better mentation, pmr but no chester catatonic symptoms  4/4: alert, using few words, mild rigidity -  at bedside, updated   4/5: few words, lethargic, fluctuating wakefulness. No chester catatonic symptoms.     PLAN  - no SI or HI, no need for psychiatric CO  -- antipsychotics can only be given if qtc < 500    - MEDICATIONS:  -- HOLD ATIVAN   -- Continue Zoloft  50mg daily ( home dose 150mg) ( monitor NA- recent hyponatremia)  -- HOLD Zyprexa for now    - PRN:  --Ativan 0.5mg q 6hrs prn IM/IV/PO  - DISPO: pending   Patient is a 74F hx asthma, depression, and HTN was admitted to the MICU with AHRF 2/2 asthma exacerbation resulting in acute on chronic HHRF causing AMS and increased WOB that required intubation. During her ICU course, she experienced seizure-like activity .  Additional issues included hyponatremia, acute on chronic metabolic alkalosis, and mild thrombocytopenia,  also having fevers.  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 Patient has h/o hx of major depressive disorder with psychosis, hx of 3 past inpatient psychiatric hospitalizations last in 2016 at [facility], all rehospitalizations were in context non compliance with the treatment. She has no hx of suicide attempts, no hx of substance abuse. Patient w hx of paranoia, disorganization, catatonia, required MOO when in [facility].     3/24----more withdrawn, some stiffness in neck, not communicative. Some concerns for catatonia. Unsure if symptoms attributed to catatonia vs delirium  3/25--- more alert today, trying to following commands. Still with PMR+. Appears to be responding to Ativan  3/26-- BFS 10, will increasing standing ativan to target catatonic symptoms - giving STAT 0.5mg NOW discussed w acp   3/27----will continue ativan dosing for one more day before increasing  3/28: BFS: stupor 1, mutism 2,withdrawan 2, verbigeration 1, rigidity 2 =8.  3/29: BFS: stupor 2, mutism 2, staring 1, posturing 2, rigidity 2, waxy flexibility 3, withdrawal 2 = 14; please monitor nutritional intake, plan to increase ativan by total daily dose of 0.5 mg at bedtime due to persistent catatonia   3/31: BFS: stupor 2, mutism 2, staring 2, rigidity 1, negativism 1, withdrawal 1, waxy flexibility 3 =12, patient with productive cough, advised for medical work up    4/1---tired, speaking more, not rigid, has PMR+. NPO status now.     4/2--sleepy, RRT this afternoon    4/3---better mentation, pmr but no chester catatonic symptoms  4/4: alert, using few words, mild rigidity -  at bedside, updated   4/5: few words, lethargic, fluctuating wakefulness. Mild rigidity.      PLAN  - no SI or HI, no need for psychiatric CO  -- antipsychotics can only be given if qtc < 500    - MEDICATIONS:  -- HOLD ATIVAN   -- Continue Zoloft  50mg daily ( home dose 150mg) ( monitor NA- recent hyponatremia)  -- HOLD Zyprexa for now    - PRN:  --Ativan 0.5mg q 6hrs prn IM/IV/PO  - DISPO: pending   Patient is a 74F hx asthma, depression, and HTN was admitted to the MICU with AHRF 2/2 asthma exacerbation resulting in acute on chronic HHRF causing AMS and increased WOB that required intubation. During her ICU course, she experienced seizure-like activity .  Additional issues included hyponatremia, acute on chronic metabolic alkalosis, and mild thrombocytopenia,  also having fevers.  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 Patient has h/o hx of major depressive disorder with psychosis, hx of 3 past inpatient psychiatric hospitalizations last in 2016 at [facility], all rehospitalizations were in context non compliance with the treatment. She has no hx of suicide attempts, no hx of substance abuse. Patient w hx of paranoia, disorganization, catatonia, required MOO when in [facility].     3/24----more withdrawn, some stiffness in neck, not communicative. Some concerns for catatonia. Unsure if symptoms attributed to catatonia vs delirium  3/25--- more alert today, trying to following commands. Still with PMR+. Appears to be responding to Ativan  3/26-- BFS 10, will increasing standing ativan to target catatonic symptoms - giving STAT 0.5mg NOW discussed w acp   3/27----will continue ativan dosing for one more day before increasing  3/28: BFS: stupor 1, mutism 2,withdrawan 2, verbigeration 1, rigidity 2 =8.  3/29: BFS: stupor 2, mutism 2, staring 1, posturing 2, rigidity 2, waxy flexibility 3, withdrawal 2 = 14; please monitor nutritional intake, plan to increase ativan by total daily dose of 0.5 mg at bedtime due to persistent catatonia   3/31: BFS: stupor 2, mutism 2, staring 2, rigidity 1, negativism 1, withdrawal 1, waxy flexibility 3 =12, patient with productive cough, advised for medical work up    4/1---tired, speaking more, not rigid, has PMR+. NPO status now.     4/2--sleepy, RRT this afternoon    4/3---better mentation, pmr but no chester catatonic symptoms  4/4: alert, using few words, mild rigidity -  at bedside, updated   4/5: few words, lethargic, fluctuating wakefulness. Mild rigidity.  No overt catatonic sxs.     PLAN  - no SI or HI, no need for psychiatric CO  -- antipsychotics can only be given if qtc < 500    - MEDICATIONS:  -- HOLD ATIVAN   -- Continue Zoloft  50mg daily ( home dose 150mg) ( monitor NA- recent hyponatremia)  -- HOLD Zyprexa for now    - PRN:  --Ativan 0.5mg q 6hrs prn IM/IV/PO  - DISPO: pending

## 2025-04-06 LAB
ALBUMIN SERPL ELPH-MCNC: 3.7 G/DL — SIGNIFICANT CHANGE UP (ref 3.3–5)
ALP SERPL-CCNC: 76 U/L — SIGNIFICANT CHANGE UP (ref 40–120)
ALT FLD-CCNC: 24 U/L — SIGNIFICANT CHANGE UP (ref 4–33)
ANION GAP SERPL CALC-SCNC: 17 MMOL/L — HIGH (ref 7–14)
AST SERPL-CCNC: 17 U/L — SIGNIFICANT CHANGE UP (ref 4–32)
BILIRUB SERPL-MCNC: 0.8 MG/DL — SIGNIFICANT CHANGE UP (ref 0.2–1.2)
BUN SERPL-MCNC: 21 MG/DL — SIGNIFICANT CHANGE UP (ref 7–23)
CALCIUM SERPL-MCNC: 10.5 MG/DL — SIGNIFICANT CHANGE UP (ref 8.4–10.5)
CHLORIDE SERPL-SCNC: 98 MMOL/L — SIGNIFICANT CHANGE UP (ref 98–107)
CO2 SERPL-SCNC: 25 MMOL/L — SIGNIFICANT CHANGE UP (ref 22–31)
CREAT SERPL-MCNC: 0.81 MG/DL — SIGNIFICANT CHANGE UP (ref 0.5–1.3)
EGFR: 76 ML/MIN/1.73M2 — SIGNIFICANT CHANGE UP
EGFR: 76 ML/MIN/1.73M2 — SIGNIFICANT CHANGE UP
GAS PNL BLDV: SIGNIFICANT CHANGE UP
GLUCOSE BLDC GLUCOMTR-MCNC: 104 MG/DL — HIGH (ref 70–99)
GLUCOSE BLDC GLUCOMTR-MCNC: 112 MG/DL — HIGH (ref 70–99)
GLUCOSE BLDC GLUCOMTR-MCNC: 170 MG/DL — HIGH (ref 70–99)
GLUCOSE BLDC GLUCOMTR-MCNC: 195 MG/DL — HIGH (ref 70–99)
GLUCOSE SERPL-MCNC: 150 MG/DL — HIGH (ref 70–99)
HCT VFR BLD CALC: 39.9 % — SIGNIFICANT CHANGE UP (ref 34.5–45)
HGB BLD-MCNC: 13.1 G/DL — SIGNIFICANT CHANGE UP (ref 11.5–15.5)
MCHC RBC-ENTMCNC: 32 PG — SIGNIFICANT CHANGE UP (ref 27–34)
MCHC RBC-ENTMCNC: 32.8 G/DL — SIGNIFICANT CHANGE UP (ref 32–36)
MCV RBC AUTO: 97.6 FL — SIGNIFICANT CHANGE UP (ref 80–100)
NRBC # BLD AUTO: 0 K/UL — SIGNIFICANT CHANGE UP (ref 0–0)
NRBC # FLD: 0 K/UL — SIGNIFICANT CHANGE UP (ref 0–0)
NRBC BLD AUTO-RTO: 0 /100 WBCS — SIGNIFICANT CHANGE UP (ref 0–0)
PLATELET # BLD AUTO: 172 K/UL — SIGNIFICANT CHANGE UP (ref 150–400)
POTASSIUM SERPL-MCNC: 3.3 MMOL/L — LOW (ref 3.5–5.3)
POTASSIUM SERPL-SCNC: 3.3 MMOL/L — LOW (ref 3.5–5.3)
PROT SERPL-MCNC: 6.9 G/DL — SIGNIFICANT CHANGE UP (ref 6–8.3)
RBC # BLD: 4.09 M/UL — SIGNIFICANT CHANGE UP (ref 3.8–5.2)
RBC # FLD: 12.6 % — SIGNIFICANT CHANGE UP (ref 10.3–14.5)
SODIUM SERPL-SCNC: 140 MMOL/L — SIGNIFICANT CHANGE UP (ref 135–145)
WBC # BLD: 11.6 K/UL — HIGH (ref 3.8–10.5)
WBC # FLD AUTO: 11.6 K/UL — HIGH (ref 3.8–10.5)

## 2025-04-06 PROCEDURE — 99232 SBSQ HOSP IP/OBS MODERATE 35: CPT

## 2025-04-06 RX ADMIN — LEVALBUTEROL HYDROCHLORIDE 0.63 MILLIGRAM(S): 1.25 SOLUTION RESPIRATORY (INHALATION) at 15:02

## 2025-04-06 RX ADMIN — LISINOPRIL 40 MILLIGRAM(S): 5 TABLET ORAL at 05:54

## 2025-04-06 RX ADMIN — DEXTROMETHORPHAN HBR, GUAIFENESIN 200 MILLIGRAM(S): 200 LIQUID ORAL at 17:37

## 2025-04-06 RX ADMIN — SERTRALINE 50 MILLIGRAM(S): 100 TABLET, FILM COATED ORAL at 12:33

## 2025-04-06 RX ADMIN — LEVALBUTEROL HYDROCHLORIDE 0.63 MILLIGRAM(S): 1.25 SOLUTION RESPIRATORY (INHALATION) at 04:17

## 2025-04-06 RX ADMIN — INSULIN LISPRO 5 UNIT(S): 100 INJECTION, SOLUTION INTRAVENOUS; SUBCUTANEOUS at 08:58

## 2025-04-06 RX ADMIN — Medication 50 MILLIGRAM(S): at 05:55

## 2025-04-06 RX ADMIN — INSULIN LISPRO 5 UNIT(S): 100 INJECTION, SOLUTION INTRAVENOUS; SUBCUTANEOUS at 17:38

## 2025-04-06 RX ADMIN — LEVALBUTEROL HYDROCHLORIDE 0.63 MILLIGRAM(S): 1.25 SOLUTION RESPIRATORY (INHALATION) at 10:07

## 2025-04-06 RX ADMIN — Medication 500 MICROGRAM(S): at 21:52

## 2025-04-06 RX ADMIN — DEXTROMETHORPHAN HBR, GUAIFENESIN 200 MILLIGRAM(S): 200 LIQUID ORAL at 12:33

## 2025-04-06 RX ADMIN — Medication 500 MICROGRAM(S): at 10:07

## 2025-04-06 RX ADMIN — INSULIN LISPRO 1: 100 INJECTION, SOLUTION INTRAVENOUS; SUBCUTANEOUS at 12:33

## 2025-04-06 RX ADMIN — METHYLPREDNISOLONE ACETATE 40 MILLIGRAM(S): 80 INJECTION, SUSPENSION INTRA-ARTICULAR; INTRALESIONAL; INTRAMUSCULAR; SOFT TISSUE at 17:37

## 2025-04-06 RX ADMIN — Medication 500 MICROGRAM(S): at 04:17

## 2025-04-06 RX ADMIN — INSULIN LISPRO 5 UNIT(S): 100 INJECTION, SOLUTION INTRAVENOUS; SUBCUTANEOUS at 12:33

## 2025-04-06 RX ADMIN — Medication 40 MILLIGRAM(S): at 05:54

## 2025-04-06 RX ADMIN — LEVALBUTEROL HYDROCHLORIDE 0.63 MILLIGRAM(S): 1.25 SOLUTION RESPIRATORY (INHALATION) at 21:52

## 2025-04-06 RX ADMIN — Medication 50 MILLIGRAM(S): at 22:09

## 2025-04-06 RX ADMIN — Medication 500 MICROGRAM(S): at 15:02

## 2025-04-06 RX ADMIN — POLYETHYLENE GLYCOL 3350 17 GRAM(S): 17 POWDER, FOR SOLUTION ORAL at 05:55

## 2025-04-06 RX ADMIN — BUDESONIDE 0.5 MILLIGRAM(S): 0.25 SUSPENSION RESPIRATORY (INHALATION) at 21:52

## 2025-04-06 RX ADMIN — DEXTROMETHORPHAN HBR, GUAIFENESIN 200 MILLIGRAM(S): 200 LIQUID ORAL at 05:54

## 2025-04-06 RX ADMIN — Medication 1 APPLICATION(S): at 06:08

## 2025-04-06 RX ADMIN — ENOXAPARIN SODIUM 40 MILLIGRAM(S): 100 INJECTION SUBCUTANEOUS at 17:38

## 2025-04-06 RX ADMIN — DEXTROMETHORPHAN HBR, GUAIFENESIN 200 MILLIGRAM(S): 200 LIQUID ORAL at 22:08

## 2025-04-06 RX ADMIN — Medication 50 MILLIGRAM(S): at 12:32

## 2025-04-06 RX ADMIN — BUDESONIDE 0.5 MILLIGRAM(S): 0.25 SUSPENSION RESPIRATORY (INHALATION) at 10:14

## 2025-04-06 RX ADMIN — INSULIN GLARGINE-YFGN 11 UNIT(S): 100 INJECTION, SOLUTION SUBCUTANEOUS at 22:09

## 2025-04-06 NOTE — PROGRESS NOTE ADULT - PROBLEM SELECTOR PLAN 1
2/2 to status asthmaticus  Follow-up aspergillus Ab: negative , ANCA: negative , IgE: negative    s/p intubation and bipap now on NC  c/w ipratropium + xopenex nebs  s/p course of antibiotics  c/w budesonide BID  Back on solumedrol 40mg IV daily, plan for eventual steroid taper    Worsening respiratory status 4/2 - placed on NIV, now weaned to nasal cannula. Weaning further as tolerated,  ENT eval - no upper airway consideration for structural causes of asthma such as vocal cord dysfunction.     Possible aspiration event 3/31. SLP eval appreciated. XR cine performed with improvement in mental status - pureed diet w/ thin liquids recommended.

## 2025-04-06 NOTE — PROGRESS NOTE ADULT - PROBLEM SELECTOR PLAN 7
standing qhs insulin glargine and pre-meal insulin lispro - titrate as warranted  ISS  anticipate insulin needs will decrease as weaning off steroids. now on oral pred  [ ] monitor FS and adjust as needed

## 2025-04-06 NOTE — PROGRESS NOTE ADULT - SUBJECTIVE AND OBJECTIVE BOX
[facility] Division of Hospital Medicine  Carlos Summers DO  Available via MS Teams    SUBJECTIVE / OVERNIGHT EVENTS:  No acute events overnight. Patient seen and examined at bedside this morning.   Liam at bedside.  Patient takes some time to speak but answers most questions appropriately. Tells me her name and that she is in the hospital, but does not tell me the year.    ADDITIONAL REVIEW OF SYSTEMS:    MEDICATIONS  (STANDING):  buDESOnide    Inhalation Suspension 0.5 milliGRAM(s) Inhalation every 12 hours  chlorhexidine 2% Cloths 1 Application(s) Topical <User Schedule>  dextrose 5%. 1000 milliLiter(s) (100 mL/Hr) IV Continuous <Continuous>  dextrose 5%. 1000 milliLiter(s) (50 mL/Hr) IV Continuous <Continuous>  dextrose 50% Injectable 25 Gram(s) IV Push once  dextrose 50% Injectable 12.5 Gram(s) IV Push once  dextrose 50% Injectable 25 Gram(s) IV Push once  dextrose Oral Gel 15 Gram(s) Oral once  enoxaparin Injectable 40 milliGRAM(s) SubCutaneous every 24 hours  glucagon  Injectable 1 milliGRAM(s) IntraMuscular once  guaiFENesin Oral Liquid (Sugar-Free) 200 milliGRAM(s) Oral every 6 hours  hydrALAZINE 50 milliGRAM(s) Oral three times a day  hydrochlorothiazide 25 milliGRAM(s) Oral daily  insulin glargine Injectable (LANTUS) 11 Unit(s) SubCutaneous at bedtime  insulin lispro (ADMELOG) corrective regimen sliding scale   SubCutaneous three times a day before meals  insulin lispro (ADMELOG) corrective regimen sliding scale   SubCutaneous at bedtime  insulin lispro Injectable (ADMELOG) 5 Unit(s) SubCutaneous three times a day before meals  ipratropium    for Nebulization 500 MICROGram(s) Nebulizer every 6 hours  levalbuterol Inhalation 0.63 milliGRAM(s) Inhalation every 6 hours  lisinopril 40 milliGRAM(s) Oral daily  methylPREDNISolone sodium succinate Injectable 40 milliGRAM(s) IV Push every 24 hours  pantoprazole    Tablet 40 milliGRAM(s) Oral before breakfast  polyethylene glycol 3350 17 Gram(s) Oral two times a day  senna 2 Tablet(s) Oral at bedtime  sertraline 50 milliGRAM(s) Oral daily    MEDICATIONS  (PRN):      I&O's Summary      PHYSICAL EXAM:  Vital Signs Last 24 Hrs  T(C): 37.1 (06 Apr 2025 05:30), Max: 37.3 (05 Apr 2025 13:59)  T(F): 98.7 (06 Apr 2025 05:30), Max: 99.1 (05 Apr 2025 13:59)  HR: 10 (06 Apr 2025 10:07) (10 - 105)  BP: 140/87 (06 Apr 2025 05:30) (140/87 - 162/89)  BP(mean): --  RR: 18 (06 Apr 2025 05:30) (17 - 18)  SpO2: 98% (06 Apr 2025 10:07) (97% - 99%)    Parameters below as of 06 Apr 2025 05:30  Patient On (Oxygen Delivery Method): nasal cannula  O2 Flow (L/min): 3      LABS:                        13.1   11.60 )-----------( 172      ( 06 Apr 2025 10:35 )             39.9     04-06    140  |  98  |  21  ----------------------------<  150[H]  3.3[L]   |  25  |  0.81    Ca    10.5      06 Apr 2025 10:35    TPro  6.9  /  Alb  3.7  /  TBili  0.8  /  DBili  x   /  AST  17  /  ALT  24  /  AlkPhos  76  04-06          Urinalysis Basic - ( 06 Apr 2025 10:35 )    Color: x / Appearance: x / SG: x / pH: x  Gluc: 150 mg/dL / Ketone: x  / Bili: x / Urobili: x   Blood: x / Protein: x / Nitrite: x   Leuk Esterase: x / RBC: x / WBC x   Sq Epi: x / Non Sq Epi: x / Bacteria: x        SARS-CoV-2: NotDetec (19 Mar 2025 09:05)  SARS-CoV-2: NotDetec (08 Mar 2025 14:40)

## 2025-04-06 NOTE — PROGRESS NOTE ADULT - PROBLEM SELECTOR PLAN 3
waxing and waning seems to be slowly improving  some difficulty in responding - takes time to answer questions, may be a reflection of her catatonia

## 2025-04-07 LAB
ANION GAP SERPL CALC-SCNC: 14 MMOL/L — SIGNIFICANT CHANGE UP (ref 7–14)
BUN SERPL-MCNC: 21 MG/DL — SIGNIFICANT CHANGE UP (ref 7–23)
CALCIUM SERPL-MCNC: 9.9 MG/DL — SIGNIFICANT CHANGE UP (ref 8.4–10.5)
CHLORIDE SERPL-SCNC: 98 MMOL/L — SIGNIFICANT CHANGE UP (ref 98–107)
CO2 SERPL-SCNC: 28 MMOL/L — SIGNIFICANT CHANGE UP (ref 22–31)
CREAT SERPL-MCNC: 0.59 MG/DL — SIGNIFICANT CHANGE UP (ref 0.5–1.3)
EGFR: 95 ML/MIN/1.73M2 — SIGNIFICANT CHANGE UP
EGFR: 95 ML/MIN/1.73M2 — SIGNIFICANT CHANGE UP
GAS PNL BLDV: SIGNIFICANT CHANGE UP
GLUCOSE BLDC GLUCOMTR-MCNC: 108 MG/DL — HIGH (ref 70–99)
GLUCOSE BLDC GLUCOMTR-MCNC: 135 MG/DL — HIGH (ref 70–99)
GLUCOSE BLDC GLUCOMTR-MCNC: 212 MG/DL — HIGH (ref 70–99)
GLUCOSE BLDC GLUCOMTR-MCNC: 289 MG/DL — HIGH (ref 70–99)
GLUCOSE BLDC GLUCOMTR-MCNC: 77 MG/DL — SIGNIFICANT CHANGE UP (ref 70–99)
GLUCOSE SERPL-MCNC: 95 MG/DL — SIGNIFICANT CHANGE UP (ref 70–99)
MAGNESIUM SERPL-MCNC: 1.7 MG/DL — SIGNIFICANT CHANGE UP (ref 1.6–2.6)
PHOSPHATE SERPL-MCNC: 2.7 MG/DL — SIGNIFICANT CHANGE UP (ref 2.5–4.5)
POTASSIUM SERPL-MCNC: 3.6 MMOL/L — SIGNIFICANT CHANGE UP (ref 3.5–5.3)
POTASSIUM SERPL-SCNC: 3.6 MMOL/L — SIGNIFICANT CHANGE UP (ref 3.5–5.3)
SODIUM SERPL-SCNC: 140 MMOL/L — SIGNIFICANT CHANGE UP (ref 135–145)

## 2025-04-07 PROCEDURE — 99232 SBSQ HOSP IP/OBS MODERATE 35: CPT

## 2025-04-07 PROCEDURE — 99233 SBSQ HOSP IP/OBS HIGH 50: CPT | Mod: GC

## 2025-04-07 PROCEDURE — 99233 SBSQ HOSP IP/OBS HIGH 50: CPT

## 2025-04-07 RX ORDER — SERTRALINE 100 MG/1
75 TABLET, FILM COATED ORAL DAILY
Refills: 0 | Status: DISCONTINUED | OUTPATIENT
Start: 2025-04-08 | End: 2025-04-09

## 2025-04-07 RX ADMIN — LEVALBUTEROL HYDROCHLORIDE 0.63 MILLIGRAM(S): 1.25 SOLUTION RESPIRATORY (INHALATION) at 22:48

## 2025-04-07 RX ADMIN — Medication 1 APPLICATION(S): at 06:12

## 2025-04-07 RX ADMIN — Medication 500 MICROGRAM(S): at 04:34

## 2025-04-07 RX ADMIN — ENOXAPARIN SODIUM 40 MILLIGRAM(S): 100 INJECTION SUBCUTANEOUS at 18:44

## 2025-04-07 RX ADMIN — LISINOPRIL 40 MILLIGRAM(S): 5 TABLET ORAL at 11:56

## 2025-04-07 RX ADMIN — Medication 500 MICROGRAM(S): at 22:48

## 2025-04-07 RX ADMIN — BUDESONIDE 0.5 MILLIGRAM(S): 0.25 SUSPENSION RESPIRATORY (INHALATION) at 10:13

## 2025-04-07 RX ADMIN — Medication 500 MICROGRAM(S): at 10:14

## 2025-04-07 RX ADMIN — LEVALBUTEROL HYDROCHLORIDE 0.63 MILLIGRAM(S): 1.25 SOLUTION RESPIRATORY (INHALATION) at 04:34

## 2025-04-07 RX ADMIN — SERTRALINE 50 MILLIGRAM(S): 100 TABLET, FILM COATED ORAL at 11:52

## 2025-04-07 RX ADMIN — INSULIN GLARGINE-YFGN 11 UNIT(S): 100 INJECTION, SOLUTION SUBCUTANEOUS at 22:54

## 2025-04-07 RX ADMIN — Medication 50 MILLIGRAM(S): at 11:50

## 2025-04-07 RX ADMIN — LEVALBUTEROL HYDROCHLORIDE 0.63 MILLIGRAM(S): 1.25 SOLUTION RESPIRATORY (INHALATION) at 10:14

## 2025-04-07 RX ADMIN — Medication 500 MICROGRAM(S): at 16:20

## 2025-04-07 RX ADMIN — INSULIN LISPRO 5 UNIT(S): 100 INJECTION, SOLUTION INTRAVENOUS; SUBCUTANEOUS at 12:57

## 2025-04-07 RX ADMIN — Medication 40 MILLIGRAM(S): at 11:57

## 2025-04-07 RX ADMIN — LEVALBUTEROL HYDROCHLORIDE 0.63 MILLIGRAM(S): 1.25 SOLUTION RESPIRATORY (INHALATION) at 16:20

## 2025-04-07 RX ADMIN — INSULIN LISPRO 2: 100 INJECTION, SOLUTION INTRAVENOUS; SUBCUTANEOUS at 12:56

## 2025-04-07 RX ADMIN — INSULIN LISPRO 1: 100 INJECTION, SOLUTION INTRAVENOUS; SUBCUTANEOUS at 22:55

## 2025-04-07 RX ADMIN — DEXTROMETHORPHAN HBR, GUAIFENESIN 200 MILLIGRAM(S): 200 LIQUID ORAL at 18:44

## 2025-04-07 RX ADMIN — METHYLPREDNISOLONE ACETATE 40 MILLIGRAM(S): 80 INJECTION, SUSPENSION INTRA-ARTICULAR; INTRALESIONAL; INTRAMUSCULAR; SOFT TISSUE at 18:43

## 2025-04-07 RX ADMIN — Medication 50 MILLIGRAM(S): at 18:44

## 2025-04-07 RX ADMIN — BUDESONIDE 0.5 MILLIGRAM(S): 0.25 SUSPENSION RESPIRATORY (INHALATION) at 22:47

## 2025-04-07 RX ADMIN — DEXTROMETHORPHAN HBR, GUAIFENESIN 200 MILLIGRAM(S): 200 LIQUID ORAL at 11:51

## 2025-04-07 NOTE — PROGRESS NOTE ADULT - PROBLEM SELECTOR PLAN 1
2/2 to status asthmaticus. Concern this may be occuring in setting of aspiration.   Follow-up aspergillus Ab: negative , ANCA: negative , IgE: negative    s/p intubation and bilevel now on NC  c/w ipratropium + xopenex nebs q6h  s/p course of antibiotics  c/w budesonide BID  Cont solumedrol 40mg IV daily, plan for eventual steroid taper  -Wean down O2 as tolerated - now 2L 2/2 to status asthmaticus. Concern this may be occuring in setting of aspiration.   Follow-up aspergillus Ab: negative , ANCA: negative , IgE: negative    s/p intubation and bilevel now on NC  c/w ipratropium + xopenex nebs q6h  s/p course of antibiotics  c/w budesonide BID  Cont solumedrol 40mg IV daily, plan for eventual steroid taper  -Wean down O2 as tolerated - now 2L  -No role for nocturnal NIV for now. Will cont to monitor off. VBG reviewd. Pulm recs appreciated.

## 2025-04-07 NOTE — PROGRESS NOTE ADULT - SUBJECTIVE AND OBJECTIVE BOX
CHIEF COMPLAINT:Patient is a 74y old  Female who presents with a chief complaint of AMS and increased WOB (21 Mar 2025 06:24)      Interval Events:    REVIEW OF SYSTEMS:  [x] All other systems negative except per HPI   [ ] Unable to assess ROS because ________    OBJECTIVE:  ICU Vital Signs Last 24 Hrs  T(C): 37 (07 Apr 2025 10:37), Max: 37.6 (06 Apr 2025 21:38)  T(F): 98.6 (07 Apr 2025 10:37), Max: 99.7 (06 Apr 2025 21:38)  HR: 81 (07 Apr 2025 10:37) (81 - 98)  BP: 146/76 (07 Apr 2025 10:37) (130/80 - 146/76)  BP(mean): --  ABP: --  ABP(mean): --  RR: 18 (07 Apr 2025 10:37) (17 - 18)  SpO2: 98% (07 Apr 2025 10:37) (98% - 100%)    O2 Parameters below as of 07 Apr 2025 10:37  Patient On (Oxygen Delivery Method): nasal cannula          Mode: standby    04-06 @ 07:01  -  04-07 @ 07:00  --------------------------------------------------------  IN: 40 mL / OUT: 600 mL / NET: -560 mL        PHYSICAL EXAM:  GENERAL: NAD, well-groomed, well-developed  HEAD:  Atraumatic, Normocephalic  EYES: EOMI, PERRLA, conjunctiva and sclera clear  ENMT: No tonsillar erythema, exudates, or enlargement; Moist mucous membranes, Good dentition, No lesions  NECK: Supple, No JVD, Normal thyroid  CHEST/LUNG: Clear to auscultation bilaterally; No rales, rhonchi, wheezing, or rubs  HEART: Regular rate and rhythm; No murmurs, rubs, or gallops  ABDOMEN: Soft, Nontender, Nondistended; Bowel sounds present  VASCULAR:  2+ Peripheral Pulses, No clubbing, cyanosis, or edema  LYMPH: No lymphadenopathy noted  SKIN: No rashes or lesions  NERVOUS SYSTEM:  Alert & Oriented X3, Good concentration; Motor Strength 5/5 B/L upper and lower extremities; DTRs 2+ intact and symmetric    HOSPITAL MEDICATIONS:  MEDICATIONS  (STANDING):  buDESOnide    Inhalation Suspension 0.5 milliGRAM(s) Inhalation every 12 hours  chlorhexidine 2% Cloths 1 Application(s) Topical <User Schedule>  dextrose 5%. 1000 milliLiter(s) (100 mL/Hr) IV Continuous <Continuous>  dextrose 5%. 1000 milliLiter(s) (50 mL/Hr) IV Continuous <Continuous>  dextrose 50% Injectable 25 Gram(s) IV Push once  dextrose 50% Injectable 12.5 Gram(s) IV Push once  dextrose 50% Injectable 25 Gram(s) IV Push once  dextrose Oral Gel 15 Gram(s) Oral once  enoxaparin Injectable 40 milliGRAM(s) SubCutaneous every 24 hours  glucagon  Injectable 1 milliGRAM(s) IntraMuscular once  guaiFENesin Oral Liquid (Sugar-Free) 200 milliGRAM(s) Oral every 6 hours  hydrALAZINE 50 milliGRAM(s) Oral three times a day  hydrochlorothiazide 25 milliGRAM(s) Oral daily  insulin glargine Injectable (LANTUS) 11 Unit(s) SubCutaneous at bedtime  insulin lispro (ADMELOG) corrective regimen sliding scale   SubCutaneous three times a day before meals  insulin lispro (ADMELOG) corrective regimen sliding scale   SubCutaneous at bedtime  insulin lispro Injectable (ADMELOG) 5 Unit(s) SubCutaneous three times a day before meals  ipratropium    for Nebulization 500 MICROGram(s) Nebulizer every 6 hours  levalbuterol Inhalation 0.63 milliGRAM(s) Inhalation every 6 hours  lisinopril 40 milliGRAM(s) Oral daily  methylPREDNISolone sodium succinate Injectable 40 milliGRAM(s) IV Push every 24 hours  pantoprazole    Tablet 40 milliGRAM(s) Oral before breakfast  polyethylene glycol 3350 17 Gram(s) Oral two times a day  senna 2 Tablet(s) Oral at bedtime  sertraline 50 milliGRAM(s) Oral daily    MEDICATIONS  (PRN):      LABS:    The Labs were reviewed by me   The Radiology was reviewed by me    EKG tracing reviewed by me    04-07    140  |  98  |  21  ----------------------------<  95  3.6   |  28  |  0.59  04-06    140  |  98  |  21  ----------------------------<  150[H]  3.3[L]   |  25  |  0.81    Ca    9.9      07 Apr 2025 11:00  Ca    10.5      06 Apr 2025 10:35  Phos  2.7     04-07  Mg     1.70     04-07    TPro  6.9  /  Alb  3.7  /  TBili  0.8  /  DBili  x   /  AST  17  /  ALT  24  /  AlkPhos  76  04-06    Magnesium: 1.70 mg/dL (04-07-25 @ 11:00)    Phosphorus: 2.7 mg/dL (04-07-25 @ 11:00)                    Urinalysis Basic - ( 07 Apr 2025 11:00 )    Color: x / Appearance: x / SG: x / pH: x  Gluc: 95 mg/dL / Ketone: x  / Bili: x / Urobili: x   Blood: x / Protein: x / Nitrite: x   Leuk Esterase: x / RBC: x / WBC x   Sq Epi: x / Non Sq Epi: x / Bacteria: x                              13.1   11.60 )-----------( 172      ( 06 Apr 2025 10:35 )             39.9     CAPILLARY BLOOD GLUCOSE      POCT Blood Glucose.: 212 mg/dL (07 Apr 2025 12:13)  POCT Blood Glucose.: 108 mg/dL (07 Apr 2025 08:35)  POCT Blood Glucose.: 170 mg/dL (06 Apr 2025 22:00)  POCT Blood Glucose.: 112 mg/dL (06 Apr 2025 17:17)        MICROBIOLOGY:     RADIOLOGY:  [ ] Reviewed and interpreted by me    Point of Care Ultrasound Findings:    PFT:    EKG: CHIEF COMPLAINT:Patient is a 74y old  Female who presents with a chief complaint of AMS and increased WOB (21 Mar 2025 06:24)      Interval Events: patient calm and breathing comfortably this AM    REVIEW OF SYSTEMS:  [x] All other systems negative except per HPI   [ ] Unable to assess ROS because ________    OBJECTIVE:  ICU Vital Signs Last 24 Hrs  T(C): 37 (07 Apr 2025 10:37), Max: 37.6 (06 Apr 2025 21:38)  T(F): 98.6 (07 Apr 2025 10:37), Max: 99.7 (06 Apr 2025 21:38)  HR: 81 (07 Apr 2025 10:37) (81 - 98)  BP: 146/76 (07 Apr 2025 10:37) (130/80 - 146/76)  BP(mean): --  ABP: --  ABP(mean): --  RR: 18 (07 Apr 2025 10:37) (17 - 18)  SpO2: 98% (07 Apr 2025 10:37) (98% - 100%)    O2 Parameters below as of 07 Apr 2025 10:37  Patient On (Oxygen Delivery Method): nasal cannula          Mode: standby    04-06 @ 07:01  -  04-07 @ 07:00  --------------------------------------------------------  IN: 40 mL / OUT: 600 mL / NET: -560 mL        PHYSICAL EXAM:  GENERAL: NAD, well-groomed, well-developed  HEAD:  Atraumatic, Normocephalic  EYES: EOMI, PERRLA, conjunctiva and sclera clear  ENMT: No tonsillar erythema, exudates, or enlargement; Moist mucous membranes, Good dentition, No lesions  NECK: Supple, No JVD, Normal thyroid  CHEST/LUNG: still wheezing  HEART: Regular rate and rhythm; No murmurs, rubs, or gallops  ABDOMEN: Soft, Nontender, Nondistended; Bowel sounds present  VASCULAR:  2+ Peripheral Pulses, No clubbing, cyanosis, or edema  LYMPH: No lymphadenopathy noted  SKIN: No rashes or lesions  NERVOUS SYSTEM:  Alert & Oriented X3, Good concentration; Motor Strength 5/5 B/L upper and lower extremities; DTRs 2+ intact and symmetric    HOSPITAL MEDICATIONS:  MEDICATIONS  (STANDING):  buDESOnide    Inhalation Suspension 0.5 milliGRAM(s) Inhalation every 12 hours  chlorhexidine 2% Cloths 1 Application(s) Topical <User Schedule>  dextrose 5%. 1000 milliLiter(s) (100 mL/Hr) IV Continuous <Continuous>  dextrose 5%. 1000 milliLiter(s) (50 mL/Hr) IV Continuous <Continuous>  dextrose 50% Injectable 25 Gram(s) IV Push once  dextrose 50% Injectable 12.5 Gram(s) IV Push once  dextrose 50% Injectable 25 Gram(s) IV Push once  dextrose Oral Gel 15 Gram(s) Oral once  enoxaparin Injectable 40 milliGRAM(s) SubCutaneous every 24 hours  glucagon  Injectable 1 milliGRAM(s) IntraMuscular once  guaiFENesin Oral Liquid (Sugar-Free) 200 milliGRAM(s) Oral every 6 hours  hydrALAZINE 50 milliGRAM(s) Oral three times a day  hydrochlorothiazide 25 milliGRAM(s) Oral daily  insulin glargine Injectable (LANTUS) 11 Unit(s) SubCutaneous at bedtime  insulin lispro (ADMELOG) corrective regimen sliding scale   SubCutaneous three times a day before meals  insulin lispro (ADMELOG) corrective regimen sliding scale   SubCutaneous at bedtime  insulin lispro Injectable (ADMELOG) 5 Unit(s) SubCutaneous three times a day before meals  ipratropium    for Nebulization 500 MICROGram(s) Nebulizer every 6 hours  levalbuterol Inhalation 0.63 milliGRAM(s) Inhalation every 6 hours  lisinopril 40 milliGRAM(s) Oral daily  methylPREDNISolone sodium succinate Injectable 40 milliGRAM(s) IV Push every 24 hours  pantoprazole    Tablet 40 milliGRAM(s) Oral before breakfast  polyethylene glycol 3350 17 Gram(s) Oral two times a day  senna 2 Tablet(s) Oral at bedtime  sertraline 50 milliGRAM(s) Oral daily    MEDICATIONS  (PRN):      LABS:    The Labs were reviewed by me   The Radiology was reviewed by me    EKG tracing reviewed by me    04-07    140  |  98  |  21  ----------------------------<  95  3.6   |  28  |  0.59  04-06    140  |  98  |  21  ----------------------------<  150[H]  3.3[L]   |  25  |  0.81    Ca    9.9      07 Apr 2025 11:00  Ca    10.5      06 Apr 2025 10:35  Phos  2.7     04-07  Mg     1.70     04-07    TPro  6.9  /  Alb  3.7  /  TBili  0.8  /  DBili  x   /  AST  17  /  ALT  24  /  AlkPhos  76  04-06    Magnesium: 1.70 mg/dL (04-07-25 @ 11:00)    Phosphorus: 2.7 mg/dL (04-07-25 @ 11:00)                    Urinalysis Basic - ( 07 Apr 2025 11:00 )    Color: x / Appearance: x / SG: x / pH: x  Gluc: 95 mg/dL / Ketone: x  / Bili: x / Urobili: x   Blood: x / Protein: x / Nitrite: x   Leuk Esterase: x / RBC: x / WBC x   Sq Epi: x / Non Sq Epi: x / Bacteria: x                              13.1   11.60 )-----------( 172      ( 06 Apr 2025 10:35 )             39.9     CAPILLARY BLOOD GLUCOSE      POCT Blood Glucose.: 212 mg/dL (07 Apr 2025 12:13)  POCT Blood Glucose.: 108 mg/dL (07 Apr 2025 08:35)  POCT Blood Glucose.: 170 mg/dL (06 Apr 2025 22:00)  POCT Blood Glucose.: 112 mg/dL (06 Apr 2025 17:17)        MICROBIOLOGY:     RADIOLOGY:  [ ] Reviewed and interpreted by me    Point of Care Ultrasound Findings:    PFT:    EKG:

## 2025-04-07 NOTE — PROGRESS NOTE ADULT - PROBLEM SELECTOR PLAN 10
DVT: lovenox  code: full  GI; PPI    Dispo- TK utlimately recommended - however pending BH and pulm optimization.

## 2025-04-07 NOTE — PROGRESS NOTE ADULT - ATTENDING COMMENTS
75 yo F PMH reported asthma w/ prior intubations, depression w/ hx of cataonia presented with asthma exacerbation requiring MICU. Also with concern for recurrent aspiration    - currently awake and alert on 2LNC  - c/w bronchodilators  - c/w solumedrol 40mg daily as still with wheezing on exam  - CTA without overt consolidations or PE  - encourage IS and OOB as tolerated  - holding NIV  - ENT eval 4/3 without VC paralysis  - ongoing BH optimization

## 2025-04-07 NOTE — PROGRESS NOTE ADULT - PROBLEM SELECTOR PLAN 5
> Off amlodipine 2' edema -- BP well within goal.  > cont Lisinopril 40mg qD  > cont hydralazine 25mg q8hr  > cont home HCTZ, avoid beta-blockers given asthma

## 2025-04-07 NOTE — PROGRESS NOTE ADULT - SUBJECTIVE AND OBJECTIVE BOX
[facility] Division of Hospital Medicine  Artis Tolbert) MD Carlos  Pager [phone]    SUBJECTIVE:  Chief complaint: Withdrawn, minimal speech/eye contact..    Pt was seen and evaluated at bedside. Son present. Pt was awake, but occassionally dozed off. Denied pain or discomfort. Appeared to be breathing comfortably.       ROS: Limited given mental status.       Vital Signs Last 24 Hrs  T(C): 36.9 (07 Apr 2025 18:34), Max: 37.6 (06 Apr 2025 21:38)  T(F): 98.4 (07 Apr 2025 18:34), Max: 99.7 (06 Apr 2025 21:38)  HR: 95 (07 Apr 2025 18:34) (81 - 103)  BP: 119/74 (07 Apr 2025 18:34) (119/74 - 146/76)  BP(mean): --  RR: 18 (07 Apr 2025 18:34) (17 - 18)  SpO2: 98% (07 Apr 2025 18:34) (96% - 100%)    Parameters below as of 07 Apr 2025 18:34  Patient On (Oxygen Delivery Method): nasal cannula  O2 Flow (L/min): 2    PHYSICAL EXAM:  Gen- In bed, NAD  Resp- Clear. Scattered wheezes, Adequate effort.   CVS- RRR, S1S2, no g/r/m,.  GI- Soft , NT, ND, +BSx4  Ext- No C/C.       MEDICATION:  MEDICATIONS  (STANDING):  buDESOnide    Inhalation Suspension 0.5 milliGRAM(s) Inhalation every 12 hours  chlorhexidine 2% Cloths 1 Application(s) Topical <User Schedule>  dextrose 5%. 1000 milliLiter(s) (100 mL/Hr) IV Continuous <Continuous>  dextrose 5%. 1000 milliLiter(s) (50 mL/Hr) IV Continuous <Continuous>  dextrose 50% Injectable 25 Gram(s) IV Push once  dextrose 50% Injectable 12.5 Gram(s) IV Push once  dextrose 50% Injectable 25 Gram(s) IV Push once  dextrose Oral Gel 15 Gram(s) Oral once  enoxaparin Injectable 40 milliGRAM(s) SubCutaneous every 24 hours  glucagon  Injectable 1 milliGRAM(s) IntraMuscular once  guaiFENesin Oral Liquid (Sugar-Free) 200 milliGRAM(s) Oral every 6 hours  hydrALAZINE 50 milliGRAM(s) Oral three times a day  hydrochlorothiazide 25 milliGRAM(s) Oral daily  insulin glargine Injectable (LANTUS) 11 Unit(s) SubCutaneous at bedtime  insulin lispro (ADMELOG) corrective regimen sliding scale   SubCutaneous three times a day before meals  insulin lispro (ADMELOG) corrective regimen sliding scale   SubCutaneous at bedtime  insulin lispro Injectable (ADMELOG) 5 Unit(s) SubCutaneous three times a day before meals  ipratropium    for Nebulization 500 MICROGram(s) Nebulizer every 6 hours  levalbuterol Inhalation 0.63 milliGRAM(s) Inhalation every 6 hours  lisinopril 40 milliGRAM(s) Oral daily  methylPREDNISolone sodium succinate Injectable 40 milliGRAM(s) IV Push every 24 hours  pantoprazole    Tablet 40 milliGRAM(s) Oral before breakfast  polyethylene glycol 3350 17 Gram(s) Oral two times a day  senna 2 Tablet(s) Oral at bedtime    MEDICATIONS  (PRN):        LABORATORY:                          13.1   11.60 )-----------( 172      ( 06 Apr 2025 10:35 )             39.9     04-07    140  |  98  |  21  ----------------------------<  95  3.6   |  28  |  0.59    Ca    9.9      07 Apr 2025 11:00  Phos  2.7     04-07  Mg     1.70     04-07    TPro  6.9  /  Alb  3.7  /  TBili  0.8  /  DBili  x   /  AST  17  /  ALT  24  /  AlkPhos  76  04-06      Urinalysis Basic - ( 07 Apr 2025 11:00 )    Color: x / Appearance: x / SG: x / pH: x  Gluc: 95 mg/dL / Ketone: x  / Bili: x / Urobili: x   Blood: x / Protein: x / Nitrite: x   Leuk Esterase: x / RBC: x / WBC x   Sq Epi: x / Non Sq Epi: x / Bacteria: x            SARS-CoV-2: NotDetec (19 Mar 2025 09:05)  SARS-CoV-2: NotDetec (08 Mar 2025 14:40)

## 2025-04-07 NOTE — PROGRESS NOTE ADULT - ASSESSMENT
74F hx asthma and depressed presented with AHHRF c/b AMS requiring MICU admission for intubation for respiratory failure i/s/o asthma exacerbation. Extubated 3/12 to face tent and now on BIPAP s/p RRT 3/19 for increased WOB / hypoxia despite aggressive asthma management. Transferred back to MICU for management of severe asthma. Possible aspiration event 3/31. SLP eval appreciated. XR cine performed with improvement in mental status - pureed diet w/ thin liquids recommended. Worsening respiratory status 4/2 - placed on NIV, now weaned to nasal cannula. Weaning further as tolerated, ENT eval - no upper airway consideration for structural causes of asthma such as vocal cord dysfunction.

## 2025-04-07 NOTE — PROGRESS NOTE ADULT - PROBLEM SELECTOR PLAN 6
-Hx of MDD w/ psychotic features.  -Concerns for catatonia presently.  -Trialed ativan, but now on hold.  -Increase Zoloft to 75 qday (home dose was 150).  -Cont to hold zyprexa.

## 2025-04-07 NOTE — BH CONSULTATION LIAISON PROGRESS NOTE - NSBHFUPINTERVALHXFT_PSY_A_CORE
Patient was seen and assessed, son at bedside as well.   Patient continues to appear withdrawn, few words - uncertain how much PO intake - son to attempt to feed patient breakfast this am. Remains with some rigidity, no catalepsy.  Poor eye contact.   on 3L NC

## 2025-04-07 NOTE — PROGRESS NOTE ADULT - ASSESSMENT
74F reported asthma with prior intubations and depression initially admitted to MICU with suspected asthma exacerbation with RRT on 3/19 AM for hypoxemia and increased work of breathing with poor air entry. Patient transferred back to ICU for airway evaluation and asthma management and transferred back to floor 3/21, initially requiring BIPAP. Patient had adequate improvement from pulmonary perspective on floor however with episodes reported by team and family of wheezing and cough yesterday. Upon assesment patient is significantly lethargic, still under treatment with Ativan for underlying psychiatric disorder. Patient with likely recurring aspiration due to underlying mood disorder, with now recurretn asthma exacerbation    #Asthma Exacerbation - Recurred likely in setting of aspiration  #Lethargy - on Ativan TID now off: MS much more improved     - CTA chest 3/8 without evidence of PNA or PE  - repeat CTA negative for PE and clear lungs  - continue Duonebs every 6h, ensure to continue as well on discharge for 7 days   - Continue Pulmicort 0.5mg BID while lethargic  - Continue Solumedrol 40mg daily IV   - Levalbuterol nebs every 6h + Ipratropium nebs every 6h  - Incentive spirometry  - Pending Cinesophagram  - Can stop NIV for now   - NPO for now until cleared by SLP   - Use Home token on DC for OP Pulmonary follow up    74F reported asthma with prior intubations and depression initially admitted to MICU with suspected asthma exacerbation with RRT on 3/19 AM for hypoxemia and increased work of breathing with poor air entry. Patient transferred back to ICU for airway evaluation and asthma management and transferred back to floor 3/21, initially requiring BIPAP. Patient had adequate improvement from pulmonary perspective on floor however with episodes reported by team and family of wheezing and cough yesterday. Upon assesment patient is significantly lethargic, still under treatment with Ativan for underlying psychiatric disorder. Patient with likely recurring aspiration due to underlying mood disorder, with now recurretn asthma exacerbation    #Asthma Exacerbation - Recurred likely in setting of aspiration  #Lethargy - on Ativan TID now off: MS much more improved     - CTA chest 3/8 without evidence of PNA or PE  - repeat CTA negative for PE and clear lungs  - continue Duonebs every 6h, ensure to continue as well on discharge for 7 days   - Continue Pulmicort 0.5mg BID while lethargic  - Continue Solumedrol 40mg daily IV (still wheezing)  - Levalbuterol nebs every 6h + Ipratropium nebs every 6h  - Incentive spirometry  - SLP: puree and thin liquids   - no indication for nocturnal NIV for now   - Use Home token on DC for OP Pulmonary follow up

## 2025-04-07 NOTE — PROGRESS NOTE ADULT - PROBLEM SELECTOR PLAN 7
Stable. Continue basal/bolus w/ correctional insulin.  -Expect further dose titration as pt is weaned off steroid.

## 2025-04-07 NOTE — BH CONSULTATION LIAISON PROGRESS NOTE - NSBHASSESSMENTFT_PSY_ALL_CORE
Patient is a 74F hx asthma, depression, and HTN was admitted to the MICU with AHRF 2/2 asthma exacerbation resulting in acute on chronic HHRF causing AMS and increased WOB that required intubation. During her ICU course, she experienced seizure-like activity .  Additional issues included hyponatremia, acute on chronic metabolic alkalosis, and mild thrombocytopenia,  also having fevers.  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 Patient has h/o hx of major depressive disorder with psychosis, hx of 3 past inpatient psychiatric hospitalizations last in 2016 at [facility], all rehospitalizations were in context non compliance with the treatment. She has no hx of suicide attempts, no hx of substance abuse. Patient w hx of paranoia, disorganization, catatonia, required MOO when in [facility].     3/24----more withdrawn, some stiffness in neck, not communicative. Some concerns for catatonia. Unsure if symptoms attributed to catatonia vs delirium  3/25--- more alert today, trying to following commands. Still with PMR+. Appears to be responding to Ativan  3/26-- BFS 10, will increasing standing ativan to target catatonic symptoms - giving STAT 0.5mg NOW discussed w acp   3/27----will continue ativan dosing for one more day before increasing  3/28: BFS: stupor 1, mutism 2,withdrawan 2, verbigeration 1, rigidity 2 =8.  3/29: BFS: stupor 2, mutism 2, staring 1, posturing 2, rigidity 2, waxy flexibility 3, withdrawal 2 = 14; please monitor nutritional intake, plan to increase ativan by total daily dose of 0.5 mg at bedtime due to persistent catatonia   3/31: BFS: stupor 2, mutism 2, staring 2, rigidity 1, negativism 1, withdrawal 1, waxy flexibility 3 =12, patient with productive cough, advised for medical work up    4/1---tired, speaking more, not rigid, has PMR+. NPO status now.     4/2--sleepy, RRT this afternoon    4/3---better mentation, pmr but no chester catatonic symptoms  4/4: alert, using few words, mild rigidity -  at bedside, updated   4/5: few words, lethargic, fluctuating wakefulness. Mild rigidity.  No overt catatonic sxs.   4/7: no change, remains thought blocked, few words - son at bedside, updated with care    PLAN  - no SI or HI, no need for psychiatric CO  -- antipsychotics can only be given if qtc < 500    - MEDICATIONS:  -- HOLD ATIVAN   -- INCREASE Zoloft  to 75 mg daily ( home dose 150mg) ( monitor NA- recent hyponatremia)  -- HOLD Zyprexa for now    - PRN:  --Ativan 0.5mg q 6hrs prn IM/IV/PO  - DISPO: pending   Patient is a 74F hx asthma, depression, and HTN was admitted to the MICU with AHRF 2/2 asthma exacerbation resulting in acute on chronic HHRF causing AMS and increased WOB that required intubation. During her ICU course, she experienced seizure-like activity .  Additional issues included hyponatremia, acute on chronic metabolic alkalosis, and mild thrombocytopenia,  also having fevers.  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 Patient has h/o hx of major depressive disorder with psychosis, hx of 3 past inpatient psychiatric hospitalizations last in 2016 at [facility], all rehospitalizations were in context non compliance with the treatment. She has no hx of suicide attempts, no hx of substance abuse. Patient w hx of paranoia, disorganization, catatonia, required MOO when in [facility].     3/24----more withdrawn, some stiffness in neck, not communicative. Some concerns for catatonia. Unsure if symptoms attributed to catatonia vs delirium  3/25--- more alert today, trying to following commands. Still with PMR+. Appears to be responding to Ativan  3/26-- BFS 10, will increasing standing ativan to target catatonic symptoms - giving STAT 0.5mg NOW discussed w acp   3/27----will continue ativan dosing for one more day before increasing  3/28: BFS: stupor 1, mutism 2,withdrawan 2, verbigeration 1, rigidity 2 =8.  3/29: BFS: stupor 2, mutism 2, staring 1, posturing 2, rigidity 2, waxy flexibility 3, withdrawal 2 = 14; please monitor nutritional intake, plan to increase ativan by total daily dose of 0.5 mg at bedtime due to persistent catatonia   3/31: BFS: stupor 2, mutism 2, staring 2, rigidity 1, negativism 1, withdrawal 1, waxy flexibility 3 =12, patient with productive cough, advised for medical work up    4/1---tired, speaking more, not rigid, has PMR+. NPO status now.     4/2--sleepy, RRT this afternoon    4/3---better mentation, pmr but no chester catatonic symptoms  4/4: alert, using few words, mild rigidity -  at bedside, updated   4/5: few words, lethargic, fluctuating wakefulness. Mild rigidity.  No overt catatonic sxs.   4/7: no change, remains thought blocked, few words - son at bedside, updated with care    PLAN  - no SI or HI, no need for psychiatric CO  -- antipsychotics can only be given if qtc < 500    - MEDICATIONS:  -- HOLD ATIVAN   -- INCREASE Zoloft to 75 mg daily ( home dose 150mg) ( monitor NA- recent hyponatremia)  -- HOLD Zyprexa for now    - PRN:  --Ativan 0.5mg q 6hrs prn IM/IV/PO  - DISPO: pending, TBD

## 2025-04-08 LAB
ANION GAP SERPL CALC-SCNC: 12 MMOL/L — SIGNIFICANT CHANGE UP (ref 7–14)
BUN SERPL-MCNC: 20 MG/DL — SIGNIFICANT CHANGE UP (ref 7–23)
CALCIUM SERPL-MCNC: 9.8 MG/DL — SIGNIFICANT CHANGE UP (ref 8.4–10.5)
CHLORIDE SERPL-SCNC: 97 MMOL/L — LOW (ref 98–107)
CO2 SERPL-SCNC: 29 MMOL/L — SIGNIFICANT CHANGE UP (ref 22–31)
CREAT SERPL-MCNC: 0.58 MG/DL — SIGNIFICANT CHANGE UP (ref 0.5–1.3)
EGFR: 95 ML/MIN/1.73M2 — SIGNIFICANT CHANGE UP
EGFR: 95 ML/MIN/1.73M2 — SIGNIFICANT CHANGE UP
GAS PNL BLDV: SIGNIFICANT CHANGE UP
GLUCOSE BLDC GLUCOMTR-MCNC: 118 MG/DL — HIGH (ref 70–99)
GLUCOSE BLDC GLUCOMTR-MCNC: 158 MG/DL — HIGH (ref 70–99)
GLUCOSE BLDC GLUCOMTR-MCNC: 161 MG/DL — HIGH (ref 70–99)
GLUCOSE BLDC GLUCOMTR-MCNC: 178 MG/DL — HIGH (ref 70–99)
GLUCOSE SERPL-MCNC: 132 MG/DL — HIGH (ref 70–99)
HCT VFR BLD CALC: 33.5 % — LOW (ref 34.5–45)
HGB BLD-MCNC: 11.1 G/DL — LOW (ref 11.5–15.5)
MAGNESIUM SERPL-MCNC: 1.7 MG/DL — SIGNIFICANT CHANGE UP (ref 1.6–2.6)
MCHC RBC-ENTMCNC: 31.7 PG — SIGNIFICANT CHANGE UP (ref 27–34)
MCHC RBC-ENTMCNC: 33.1 G/DL — SIGNIFICANT CHANGE UP (ref 32–36)
MCV RBC AUTO: 95.7 FL — SIGNIFICANT CHANGE UP (ref 80–100)
NRBC # BLD AUTO: 0 K/UL — SIGNIFICANT CHANGE UP (ref 0–0)
NRBC # FLD: 0 K/UL — SIGNIFICANT CHANGE UP (ref 0–0)
NRBC BLD AUTO-RTO: 0 /100 WBCS — SIGNIFICANT CHANGE UP (ref 0–0)
PHOSPHATE SERPL-MCNC: 2.4 MG/DL — LOW (ref 2.5–4.5)
PLATELET # BLD AUTO: 147 K/UL — LOW (ref 150–400)
POTASSIUM SERPL-MCNC: 3.4 MMOL/L — LOW (ref 3.5–5.3)
POTASSIUM SERPL-SCNC: 3.4 MMOL/L — LOW (ref 3.5–5.3)
RBC # BLD: 3.5 M/UL — LOW (ref 3.8–5.2)
RBC # FLD: 12.4 % — SIGNIFICANT CHANGE UP (ref 10.3–14.5)
SODIUM SERPL-SCNC: 138 MMOL/L — SIGNIFICANT CHANGE UP (ref 135–145)
WBC # BLD: 10.49 K/UL — SIGNIFICANT CHANGE UP (ref 3.8–10.5)
WBC # FLD AUTO: 10.49 K/UL — SIGNIFICANT CHANGE UP (ref 3.8–10.5)

## 2025-04-08 PROCEDURE — 71045 X-RAY EXAM CHEST 1 VIEW: CPT | Mod: 26

## 2025-04-08 PROCEDURE — 99232 SBSQ HOSP IP/OBS MODERATE 35: CPT

## 2025-04-08 RX ORDER — SOD PHOS DI, MONO/K PHOS MONO 250 MG
1 TABLET ORAL ONCE
Refills: 0 | Status: COMPLETED | OUTPATIENT
Start: 2025-04-08 | End: 2025-04-08

## 2025-04-08 RX ORDER — MAGNESIUM HYDROXIDE 400 MG/5ML
30 SUSPENSION ORAL ONCE
Refills: 0 | Status: COMPLETED | OUTPATIENT
Start: 2025-04-08 | End: 2025-04-08

## 2025-04-08 RX ORDER — BISACODYL 5 MG
10 TABLET, DELAYED RELEASE (ENTERIC COATED) ORAL ONCE
Refills: 0 | Status: DISCONTINUED | OUTPATIENT
Start: 2025-04-08 | End: 2025-05-21

## 2025-04-08 RX ADMIN — Medication 50 MILLIGRAM(S): at 17:56

## 2025-04-08 RX ADMIN — POLYETHYLENE GLYCOL 3350 17 GRAM(S): 17 POWDER, FOR SOLUTION ORAL at 06:02

## 2025-04-08 RX ADMIN — DEXTROMETHORPHAN HBR, GUAIFENESIN 200 MILLIGRAM(S): 200 LIQUID ORAL at 13:05

## 2025-04-08 RX ADMIN — Medication 1 PACKET(S): at 13:04

## 2025-04-08 RX ADMIN — INSULIN LISPRO 5 UNIT(S): 100 INJECTION, SOLUTION INTRAVENOUS; SUBCUTANEOUS at 12:59

## 2025-04-08 RX ADMIN — INSULIN GLARGINE-YFGN 11 UNIT(S): 100 INJECTION, SOLUTION SUBCUTANEOUS at 22:26

## 2025-04-08 RX ADMIN — Medication 50 MILLIGRAM(S): at 13:36

## 2025-04-08 RX ADMIN — INSULIN LISPRO 5 UNIT(S): 100 INJECTION, SOLUTION INTRAVENOUS; SUBCUTANEOUS at 08:57

## 2025-04-08 RX ADMIN — Medication 40 MILLIEQUIVALENT(S): at 13:04

## 2025-04-08 RX ADMIN — Medication 50 MILLIGRAM(S): at 01:22

## 2025-04-08 RX ADMIN — Medication 500 MICROGRAM(S): at 16:11

## 2025-04-08 RX ADMIN — DEXTROMETHORPHAN HBR, GUAIFENESIN 200 MILLIGRAM(S): 200 LIQUID ORAL at 17:56

## 2025-04-08 RX ADMIN — LEVALBUTEROL HYDROCHLORIDE 0.63 MILLIGRAM(S): 1.25 SOLUTION RESPIRATORY (INHALATION) at 04:08

## 2025-04-08 RX ADMIN — Medication 500 MICROGRAM(S): at 04:08

## 2025-04-08 RX ADMIN — LEVALBUTEROL HYDROCHLORIDE 0.63 MILLIGRAM(S): 1.25 SOLUTION RESPIRATORY (INHALATION) at 10:00

## 2025-04-08 RX ADMIN — LEVALBUTEROL HYDROCHLORIDE 0.63 MILLIGRAM(S): 1.25 SOLUTION RESPIRATORY (INHALATION) at 21:12

## 2025-04-08 RX ADMIN — SERTRALINE 75 MILLIGRAM(S): 100 TABLET, FILM COATED ORAL at 13:05

## 2025-04-08 RX ADMIN — DEXTROMETHORPHAN HBR, GUAIFENESIN 200 MILLIGRAM(S): 200 LIQUID ORAL at 01:24

## 2025-04-08 RX ADMIN — LEVALBUTEROL HYDROCHLORIDE 0.63 MILLIGRAM(S): 1.25 SOLUTION RESPIRATORY (INHALATION) at 16:12

## 2025-04-08 RX ADMIN — INSULIN LISPRO 1: 100 INJECTION, SOLUTION INTRAVENOUS; SUBCUTANEOUS at 12:59

## 2025-04-08 RX ADMIN — ENOXAPARIN SODIUM 40 MILLIGRAM(S): 100 INJECTION SUBCUTANEOUS at 17:56

## 2025-04-08 RX ADMIN — INSULIN LISPRO 1: 100 INJECTION, SOLUTION INTRAVENOUS; SUBCUTANEOUS at 17:49

## 2025-04-08 RX ADMIN — Medication 1 APPLICATION(S): at 06:16

## 2025-04-08 RX ADMIN — LISINOPRIL 40 MILLIGRAM(S): 5 TABLET ORAL at 06:01

## 2025-04-08 RX ADMIN — MAGNESIUM HYDROXIDE 30 MILLILITER(S): 400 SUSPENSION ORAL at 16:38

## 2025-04-08 RX ADMIN — Medication 500 MICROGRAM(S): at 10:06

## 2025-04-08 RX ADMIN — METHYLPREDNISOLONE ACETATE 40 MILLIGRAM(S): 80 INJECTION, SUSPENSION INTRA-ARTICULAR; INTRALESIONAL; INTRAMUSCULAR; SOFT TISSUE at 17:55

## 2025-04-08 RX ADMIN — INSULIN LISPRO 5 UNIT(S): 100 INJECTION, SOLUTION INTRAVENOUS; SUBCUTANEOUS at 17:49

## 2025-04-08 RX ADMIN — DEXTROMETHORPHAN HBR, GUAIFENESIN 200 MILLIGRAM(S): 200 LIQUID ORAL at 06:01

## 2025-04-08 RX ADMIN — Medication 40 MILLIGRAM(S): at 06:01

## 2025-04-08 RX ADMIN — BUDESONIDE 0.5 MILLIGRAM(S): 0.25 SUSPENSION RESPIRATORY (INHALATION) at 10:06

## 2025-04-08 NOTE — PROVIDER CONTACT NOTE (OTHER) - ASSESSMENT
Pt AxOx1.pt oral temp was 100.1, rectal temp is 99.8, pt tachy to 113. took the sheets off the patient

## 2025-04-08 NOTE — PROGRESS NOTE ADULT - SUBJECTIVE AND OBJECTIVE BOX
[facility] Division of Hospital Medicine  Artis Tolbert) MD [place]  Pager [phone]    SUBJECTIVE:  Chief complaint:  Constipation.    Pt was seen and evaluated at bedside. Spouse present. Pt looks comfortable. Pt had some of her meals today. No pain. Spouse reports pt looks gloomy. Pt stated she had no complaint, but avoids looking. Minimal interaction otherwise.       ROS: Limited due to mental status      Vital Signs Last 24 Hrs  T(C): 37 (08 Apr 2025 11:05), Max: 37.7 (07 Apr 2025 21:40)  T(F): 98.6 (08 Apr 2025 11:05), Max: 99.8 (07 Apr 2025 21:40)  HR: 98 (08 Apr 2025 11:05) (76 - 113)  BP: 117/56 (08 Apr 2025 11:05) (114/63 - 161/74)  BP(mean): --  RR: 18 (08 Apr 2025 11:05) (17 - 18)  SpO2: 97% (08 Apr 2025 11:05) (95% - 100%)    Parameters below as of 08 Apr 2025 11:05  Patient On (Oxygen Delivery Method): nasal cannula  O2 Flow (L/min): 2      PHYSICAL EXAM:  Gen- In bed, NAD  Resp- Clear - improved. Adequate effort.   CVS- RRR, S1S2, no g/r/m,.  GI- Soft , NT, ND, +BSx4  Ext- No C/C.       MEDICATION:  MEDICATIONS  (STANDING):  bisacodyl 10 milliGRAM(s) Oral once  buDESOnide    Inhalation Suspension 0.5 milliGRAM(s) Inhalation every 12 hours  chlorhexidine 2% Cloths 1 Application(s) Topical <User Schedule>  dextrose 5%. 1000 milliLiter(s) (100 mL/Hr) IV Continuous <Continuous>  dextrose 5%. 1000 milliLiter(s) (50 mL/Hr) IV Continuous <Continuous>  dextrose 50% Injectable 25 Gram(s) IV Push once  dextrose 50% Injectable 12.5 Gram(s) IV Push once  dextrose 50% Injectable 25 Gram(s) IV Push once  dextrose Oral Gel 15 Gram(s) Oral once  enoxaparin Injectable 40 milliGRAM(s) SubCutaneous every 24 hours  glucagon  Injectable 1 milliGRAM(s) IntraMuscular once  guaiFENesin Oral Liquid (Sugar-Free) 200 milliGRAM(s) Oral every 6 hours  hydrALAZINE 50 milliGRAM(s) Oral three times a day  hydrochlorothiazide 25 milliGRAM(s) Oral daily  insulin glargine Injectable (LANTUS) 11 Unit(s) SubCutaneous at bedtime  insulin lispro (ADMELOG) corrective regimen sliding scale   SubCutaneous three times a day before meals  insulin lispro (ADMELOG) corrective regimen sliding scale   SubCutaneous at bedtime  insulin lispro Injectable (ADMELOG) 5 Unit(s) SubCutaneous three times a day before meals  ipratropium    for Nebulization 500 MICROGram(s) Nebulizer every 6 hours  levalbuterol Inhalation 0.63 milliGRAM(s) Inhalation every 6 hours  lisinopril 40 milliGRAM(s) Oral daily  magnesium hydroxide Suspension 30 milliLiter(s) Oral once  methylPREDNISolone sodium succinate Injectable 40 milliGRAM(s) IV Push every 24 hours  pantoprazole    Tablet 40 milliGRAM(s) Oral before breakfast  polyethylene glycol 3350 17 Gram(s) Oral two times a day  senna 2 Tablet(s) Oral at bedtime  sertraline 75 milliGRAM(s) Oral daily    MEDICATIONS  (PRN):            LABORATORY:                          11.1   10.49 )-----------( 147      ( 08 Apr 2025 07:10 )             33.5     04-08    138  |  97[L]  |  20  ----------------------------<  132[H]  3.4[L]   |  29  |  0.58    Ca    9.8      08 Apr 2025 07:10  Phos  2.4     04-08  Mg     1.70     04-08        Urinalysis Basic - ( 08 Apr 2025 07:10 )    Color: x / Appearance: x / SG: x / pH: x  Gluc: 132 mg/dL / Ketone: x  / Bili: x / Urobili: x   Blood: x / Protein: x / Nitrite: x   Leuk Esterase: x / RBC: x / WBC x   Sq Epi: x / Non Sq Epi: x / Bacteria: x            SARS-CoV-2: NotDetec (19 Mar 2025 09:05)  SARS-CoV-2: NotDetec (08 Mar 2025 14:40)               [facility] Division of Hospital Medicine  Artis Tolbert) MD [place]  Pager [phone]    SUBJECTIVE:  Chief complaint:  Constipation.    Pt was seen and evaluated at bedside. Overnight event noted. Spouse present. Pt looks comfortable. Pt had some of her meals today. No pain. Spouse reports pt looks gloomy. Pt stated she had no complaint, but avoids looking. Minimal interaction otherwise.       ROS: Limited due to mental status      Vital Signs Last 24 Hrs  T(C): 37 (08 Apr 2025 11:05), Max: 37.7 (07 Apr 2025 21:40)  T(F): 98.6 (08 Apr 2025 11:05), Max: 99.8 (07 Apr 2025 21:40)  HR: 98 (08 Apr 2025 11:05) (76 - 113)  BP: 117/56 (08 Apr 2025 11:05) (114/63 - 161/74)  BP(mean): --  RR: 18 (08 Apr 2025 11:05) (17 - 18)  SpO2: 97% (08 Apr 2025 11:05) (95% - 100%)    Parameters below as of 08 Apr 2025 11:05  Patient On (Oxygen Delivery Method): nasal cannula  O2 Flow (L/min): 2      PHYSICAL EXAM:  Gen- In bed, NAD  Resp- Clear - improved. Adequate effort.   CVS- RRR, S1S2, no g/r/m,.  GI- Soft , NT, ND, +BSx4  Ext- No C/C.       MEDICATION:  MEDICATIONS  (STANDING):  bisacodyl 10 milliGRAM(s) Oral once  buDESOnide    Inhalation Suspension 0.5 milliGRAM(s) Inhalation every 12 hours  chlorhexidine 2% Cloths 1 Application(s) Topical <User Schedule>  dextrose 5%. 1000 milliLiter(s) (100 mL/Hr) IV Continuous <Continuous>  dextrose 5%. 1000 milliLiter(s) (50 mL/Hr) IV Continuous <Continuous>  dextrose 50% Injectable 25 Gram(s) IV Push once  dextrose 50% Injectable 12.5 Gram(s) IV Push once  dextrose 50% Injectable 25 Gram(s) IV Push once  dextrose Oral Gel 15 Gram(s) Oral once  enoxaparin Injectable 40 milliGRAM(s) SubCutaneous every 24 hours  glucagon  Injectable 1 milliGRAM(s) IntraMuscular once  guaiFENesin Oral Liquid (Sugar-Free) 200 milliGRAM(s) Oral every 6 hours  hydrALAZINE 50 milliGRAM(s) Oral three times a day  hydrochlorothiazide 25 milliGRAM(s) Oral daily  insulin glargine Injectable (LANTUS) 11 Unit(s) SubCutaneous at bedtime  insulin lispro (ADMELOG) corrective regimen sliding scale   SubCutaneous three times a day before meals  insulin lispro (ADMELOG) corrective regimen sliding scale   SubCutaneous at bedtime  insulin lispro Injectable (ADMELOG) 5 Unit(s) SubCutaneous three times a day before meals  ipratropium    for Nebulization 500 MICROGram(s) Nebulizer every 6 hours  levalbuterol Inhalation 0.63 milliGRAM(s) Inhalation every 6 hours  lisinopril 40 milliGRAM(s) Oral daily  magnesium hydroxide Suspension 30 milliLiter(s) Oral once  methylPREDNISolone sodium succinate Injectable 40 milliGRAM(s) IV Push every 24 hours  pantoprazole    Tablet 40 milliGRAM(s) Oral before breakfast  polyethylene glycol 3350 17 Gram(s) Oral two times a day  senna 2 Tablet(s) Oral at bedtime  sertraline 75 milliGRAM(s) Oral daily    MEDICATIONS  (PRN):            LABORATORY:                          11.1   10.49 )-----------( 147      ( 08 Apr 2025 07:10 )             33.5     04-08    138  |  97[L]  |  20  ----------------------------<  132[H]  3.4[L]   |  29  |  0.58    Ca    9.8      08 Apr 2025 07:10  Phos  2.4     04-08  Mg     1.70     04-08        Urinalysis Basic - ( 08 Apr 2025 07:10 )    Color: x / Appearance: x / SG: x / pH: x  Gluc: 132 mg/dL / Ketone: x  / Bili: x / Urobili: x   Blood: x / Protein: x / Nitrite: x   Leuk Esterase: x / RBC: x / WBC x   Sq Epi: x / Non Sq Epi: x / Bacteria: x            SARS-CoV-2: NotDetec (19 Mar 2025 09:05)  SARS-CoV-2: NotDetec (08 Mar 2025 14:40)

## 2025-04-08 NOTE — BH CONSULTATION LIAISON PROGRESS NOTE - NSBHFUPINTERVALHXFT_PSY_A_CORE
Patient was seen and assessed,  at bedside today.  Patient looks at provider when entering the room, waves and states "im doing fine" however, when asked further direct questions, patient unable to answer. Somewhat lethargic? closes eyes during interview.  states patient was more awake as per son yesterday. Did eat some breakfast this am. Zoloft to be increased today.

## 2025-04-08 NOTE — CHART NOTE - NSCHARTNOTEFT_GEN_A_CORE
Nutrition Follow-Up Chart Note         Source: Patient A&Ox1    Family [x] - Spouse    RN [x ]    Chart [x ]     Pt is seen for nutrition follow up due to severe/moderate malnutrition, per protocol.    __________________ Medical Course__________________  74F hx asthma and depressed presented with AHHRF c/b AMS requiring MICU admission for intubation for respiratory failure i/s/o asthma exacerbation. Extubated 3/12 to face tent and now on BIPAP s/p RRT 3/19 for increased WOB / hypoxia despite aggressive asthma management. Transferred back to MICU for management of severe asthma. Possible aspiration event 3/31. SLP eval appreciated. XR cine performed with improvement in mental status - pureed diet w/ thin liquids recommended. Worsening respiratory status 4/2 - placed on NIV, now weaned to nasal cannula. Weaning further as tolerated, ENT eval - no upper airway consideration for structural causes of asthma such as vocal cord dysfunction.       Diet, Pureed:   Consistent Carbohydrate {No Snacks} (CSTCHO)  No Beef  No Pork  Supplement Feeding Modality:  Oral  Glucerna Shake Cans or Servings Per Day:  1       Frequency:  Two Times a day (04-05-25 @ 18:45)         __________________ Nutrition Course__________________   Patient seen at bedside, appears confused. Pt's spouse provided collateral by the bedside, who reports pt's appetite has slight improved in hospital, breakfast visibly seen with 75% meal completion. Tolerating pureed diet well. Pt noted previously on soft and bite size s/p Cineesophagogram VFSS on 4/4 with recommendations for pureed with thin liquids. Pt's diet has been supplementing with Glucerna 2x daily (440kcals, 20g protein) to provide optimal nutrition, spouse reports pt enjoys Glucerna, and able to drink 2x daily (440kcals, 20g protein). Spouse requesting increase Glucerna to 3x daily. Request honored to encourage PO intake. Will recommend increase Glucerna  3x daily (660kcals, 30g protein) to provide optimal nutrition. Pt's labs notable with resolved hypokalemia. Pt's spouse has no nutrition related concerns nor questions at the time of visit. RD to remain available for further nutritional interventions as indicated.      __________________ Pertinent Medications__________________   MEDICATIONS  (STANDING):  buDESOnide    Inhalation Suspension 0.5 milliGRAM(s) Inhalation every 12 hours  chlorhexidine 2% Cloths 1 Application(s) Topical <User Schedule>  dextrose 5%. 1000 milliLiter(s) (100 mL/Hr) IV Continuous <Continuous>  dextrose 5%. 1000 milliLiter(s) (50 mL/Hr) IV Continuous <Continuous>  dextrose 50% Injectable 25 Gram(s) IV Push once  dextrose 50% Injectable 12.5 Gram(s) IV Push once  dextrose 50% Injectable 25 Gram(s) IV Push once  dextrose Oral Gel 15 Gram(s) Oral once  enoxaparin Injectable 40 milliGRAM(s) SubCutaneous every 24 hours  glucagon  Injectable 1 milliGRAM(s) IntraMuscular once  guaiFENesin Oral Liquid (Sugar-Free) 200 milliGRAM(s) Oral every 6 hours  hydrALAZINE 50 milliGRAM(s) Oral three times a day  hydrochlorothiazide 25 milliGRAM(s) Oral daily  insulin glargine Injectable (LANTUS) 11 Unit(s) SubCutaneous at bedtime  insulin lispro (ADMELOG) corrective regimen sliding scale   SubCutaneous three times a day before meals  insulin lispro (ADMELOG) corrective regimen sliding scale   SubCutaneous at bedtime  insulin lispro Injectable (ADMELOG) 5 Unit(s) SubCutaneous three times a day before meals  ipratropium    for Nebulization 500 MICROGram(s) Nebulizer every 6 hours  levalbuterol Inhalation 0.63 milliGRAM(s) Inhalation every 6 hours  lisinopril 40 milliGRAM(s) Oral daily  methylPREDNISolone sodium succinate Injectable 40 milliGRAM(s) IV Push every 24 hours  pantoprazole    Tablet 40 milliGRAM(s) Oral before breakfast  polyethylene glycol 3350 17 Gram(s) Oral two times a day  potassium chloride   Powder 40 milliEquivalent(s) Oral once  potassium phosphate / sodium phosphate Powder (PHOS-NaK) 1 Packet(s) Oral once  senna 2 Tablet(s) Oral at bedtime  sertraline 75 milliGRAM(s) Oral daily    MEDICATIONS  (PRN):      __________________ Pertinent Labs__________________   04-08    138  |  97[L]  |  20  ----------------------------<  132[H]  3.4[L]   |  29  |  0.58    Ca    9.8      08 Apr 2025 07:10  Phos  2.4     04-08  Mg     1.70     04-08                            11.1   10.49 )-----------( 147      ( 08 Apr 2025 07:10 )             33.5          POCT Blood Glucose.: 118 mg/dL (04-08-25 @ 08:19)  POCT Blood Glucose.: 289 mg/dL (04-07-25 @ 22:11)  POCT Blood Glucose.: 135 mg/dL (04-07-25 @ 18:32)  POCT Blood Glucose.: 77 mg/dL (04-07-25 @ 17:33)  POCT Blood Glucose.: 212 mg/dL (04-07-25 @ 12:13)  POCT Blood Glucose.: 108 mg/dL (04-07-25 @ 08:35)  POCT Blood Glucose.: 170 mg/dL (04-06-25 @ 22:00)  POCT Blood Glucose.: 112 mg/dL (04-06-25 @ 17:17)  POCT Blood Glucose.: 195 mg/dL (04-06-25 @ 12:02)  POCT Blood Glucose.: 104 mg/dL (04-06-25 @ 08:11)  POCT Blood Glucose.: 164 mg/dL (04-05-25 @ 22:31)  POCT Blood Glucose.: 101 mg/dL (04-05-25 @ 17:16)  POCT Blood Glucose.: 182 mg/dL (04-05-25 @ 12:09)        __________________ Anthropometrics__________________     Anthropometrics: Height (cm): 157.5 (03-16)  Weight (kg): 80.2 (03-16)  BMI (kg/m2): 32.3 (03-16)  IBW: 110 +/- 10%    Weight Assessment: per RN flowsheet in lbs   4/3 164.4  3/30 160.9  3/29 162  3/28 164.4   3/26 164.6   3/25 164.4   adm weight 176.13 (3/16), it's weight trend reflects significant weight loss of 11.6lbs/6.5% x 20 days, weight loss likely due to combination of poor PO intake, and fluid loss given pt with 1+ generalized edema, and 2+ b/l LE as per RN flow sheet.       Edema: 1+ generalized edema, 2+ b/l LE edema as per RN flow sheet     Skin: None noted at present as per RN flow sheet.     Estimated Needs Assessment:   [x] No change in need assessment     Weight Used: IBW- 110lbs/50kg  Estimated Energy: 4256-8050  Kcal/kg/day (30-35  kcal/kg)  Estimated Protein: 60-84 gm/kg/day (1.2-1.4  gm/kg)  Estimated Fluid: per Medical and team discretion.      Nutrition Focused Physical Exam: [ x ] not applicable;        Previous Nutrition Diagnosis:   [ x ]Moderate malnutrition    Nutrition Diagnosis is : [ x ] ongoing       Education:  [ x ] Not warranted at present    Recommendations:  [ x ] Continue present PO diet order as it remains appropriate at this time  [ x ] Encourage PO intake and honor food preferences as able. Will encourage PO intake considering increase Glucerna 3x daily (660kcals, 30g protein) to provide optimal nutrition.   [ x ] Monitor PO intake, Labs, weights, BMs, and skin integrity.   [ x ] RD to remain available for further nutritional interventions as indicated.     Monitoring and Evaluation:   [ x ] Monitor PO intake, skin integrity, bowel regimen, and nutrition pertinent labs.  [ x ] Tolerance to diet prescription [ x ] weights [ x ] follow up per protocol

## 2025-04-08 NOTE — PROGRESS NOTE ADULT - PROBLEM SELECTOR PLAN 4
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  -Give dulcolax/MoM.  -Reassess in AM. May need enema.

## 2025-04-08 NOTE — PROGRESS NOTE ADULT - PROBLEM SELECTOR PLAN 1
2/2 to status asthmaticus. Concern this may be occuring in setting of aspiration.   Follow-up aspergillus Ab: negative , ANCA: negative , IgE: negative    s/p intubation and bilevel now on NC  c/w ipratropium + xopenex nebs q6h  s/p course of antibiotics  c/w budesonide BID  Cont solumedrol 40mg IV daily -- reassess dosing tomorrow.  -Wean down O2 as tolerated - now 2L  -No role for nocturnal NIV for now. Will cont to monitor off. VBG reviewd. Pulm recs appreciated. D/w fellow.

## 2025-04-08 NOTE — PROGRESS NOTE ADULT - PROBLEM SELECTOR PLAN 6
-Hx of MDD w/ psychotic features.  -Concerns for catatonia presently.  -Trialed ativan, but now on hold.  -Increase Zoloft to 75 qday (home dose was 150).  -Cont to hold zyprexa. stated

## 2025-04-08 NOTE — PROGRESS NOTE ADULT - PROBLEM SELECTOR PLAN 2
will need ICS/LABA on discharge  c/w ipratropium, xopenex nebs  steroids as above  needs pulm as outpatient will need ICS/LABA on discharge  c/w ipratropium, xopenex nebs  steroids as above  needs pulm as outpatient    #Low grade temp overnight  -Ordered CXR. By my read - no acute infiltrates. F/u official read.

## 2025-04-08 NOTE — BH CONSULTATION LIAISON PROGRESS NOTE - NSBHASSESSMENTFT_PSY_ALL_CORE
Patient is a 74F hx asthma, depression, and HTN was admitted to the MICU with AHRF 2/2 asthma exacerbation resulting in acute on chronic HHRF causing AMS and increased WOB that required intubation. During her ICU course, she experienced seizure-like activity .  Additional issues included hyponatremia, acute on chronic metabolic alkalosis, and mild thrombocytopenia,  also having fevers.  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 Patient has h/o hx of major depressive disorder with psychosis, hx of 3 past inpatient psychiatric hospitalizations last in 2016 at [facility], all rehospitalizations were in context non compliance with the treatment. She has no hx of suicide attempts, no hx of substance abuse. Patient w hx of paranoia, disorganization, catatonia, required MOO when in [facility].     3/24----more withdrawn, some stiffness in neck, not communicative. Some concerns for catatonia. Unsure if symptoms attributed to catatonia vs delirium  3/25--- more alert today, trying to following commands. Still with PMR+. Appears to be responding to Ativan  3/26-- BFS 10, will increasing standing ativan to target catatonic symptoms - giving STAT 0.5mg NOW discussed w acp   3/27----will continue ativan dosing for one more day before increasing  3/28: BFS: stupor 1, mutism 2,withdrawan 2, verbigeration 1, rigidity 2 =8.  3/29: BFS: stupor 2, mutism 2, staring 1, posturing 2, rigidity 2, waxy flexibility 3, withdrawal 2 = 14; please monitor nutritional intake, plan to increase ativan by total daily dose of 0.5 mg at bedtime due to persistent catatonia   3/31: BFS: stupor 2, mutism 2, staring 2, rigidity 1, negativism 1, withdrawal 1, waxy flexibility 3 =12, patient with productive cough, advised for medical work up    4/1---tired, speaking more, not rigid, has PMR+. NPO status now.     4/2--sleepy, RRT this afternoon    4/3---better mentation, pmr but no chester catatonic symptoms  4/4: alert, using few words, mild rigidity -  at bedside, updated   4/5: few words, lethargic, fluctuating wakefulness. Mild rigidity.  No overt catatonic sxs.   4/7: no change, remains thought blocked, few words - son at bedside, updated with care  4/8: patient continues to have poor eye contact, uses few words, no rigidity on today's exam.     PLAN  - no SI or HI, no need for psychiatric CO  -- antipsychotics can only be given if qtc < 500    - MEDICATIONS:  -- HOLD ATIVAN   -- INCREASED Zoloft to 75 mg daily ( home dose 150mg) ( monitor NA- recent hyponatremia)  -- HOLD Zyprexa for now    - PRN:  --Ativan 0.5mg q 6hrs prn IM/IV/PO  - DISPO: pending, TBD   Patient is a 74F hx asthma, depression, and HTN was admitted to the MICU with AHRF 2/2 asthma exacerbation resulting in acute on chronic HHRF causing AMS and increased WOB that required intubation. During her ICU course, she experienced seizure-like activity .  Additional issues included hyponatremia, acute on chronic metabolic alkalosis, and mild thrombocytopenia,  also having fevers.  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 Patient has h/o hx of major depressive disorder with psychosis, hx of 3 past inpatient psychiatric hospitalizations last in 2016 at [facility], all rehospitalizations were in context non compliance with the treatment. She has no hx of suicide attempts, no hx of substance abuse. Patient w hx of paranoia, disorganization, catatonia, required MOO when in [facility].     3/24----more withdrawn, some stiffness in neck, not communicative. Some concerns for catatonia. Unsure if symptoms attributed to catatonia vs delirium  3/25--- more alert today, trying to following commands. Still with PMR+. Appears to be responding to Ativan  3/26-- BFS 10, will increasing standing ativan to target catatonic symptoms - giving STAT 0.5mg NOW discussed w acp   3/27----will continue ativan dosing for one more day before increasing  3/28: BFS: stupor 1, mutism 2,withdrawan 2, verbigeration 1, rigidity 2 =8.  3/29: BFS: stupor 2, mutism 2, staring 1, posturing 2, rigidity 2, waxy flexibility 3, withdrawal 2 = 14; please monitor nutritional intake, plan to increase ativan by total daily dose of 0.5 mg at bedtime due to persistent catatonia   3/31: BFS: stupor 2, mutism 2, staring 2, rigidity 1, negativism 1, withdrawal 1, waxy flexibility 3 =12, patient with productive cough, advised for medical work up  4/1---tired, speaking more, not rigid, has PMR+. NPO status now.   4/2--sleepy, RRT this afternoon  4/3---better mentation, pmr but no chester catatonic symptoms  4/4: alert, using few words, mild rigidity -  at bedside, updated   4/5: few words, lethargic, fluctuating wakefulness. Mild rigidity.  No overt catatonic sxs.   4/7: no change, remains thought blocked, few words - son at bedside, updated with care  4/8: patient continues to have poor eye contact, uses few words, no rigidity on today's exam.     PLAN  - no SI or HI, no need for psychiatric CO  -- antipsychotics can only be given if qtc < 500    - MEDICATIONS:  -- HOLD ATIVAN   -- INCREASED Zoloft to 75 mg daily ( home dose 150mg) ( monitor NA- recent hyponatremia)  -- HOLD Zyprexa for now    - PRN:  --Ativan 0.5mg q 6hrs prn IM/IV/PO  - DISPO: pending, TBD

## 2025-04-09 LAB
ANION GAP SERPL CALC-SCNC: 10 MMOL/L — SIGNIFICANT CHANGE UP (ref 7–14)
BUN SERPL-MCNC: 17 MG/DL — SIGNIFICANT CHANGE UP (ref 7–23)
CALCIUM SERPL-MCNC: 9.8 MG/DL — SIGNIFICANT CHANGE UP (ref 8.4–10.5)
CHLORIDE SERPL-SCNC: 99 MMOL/L — SIGNIFICANT CHANGE UP (ref 98–107)
CO2 SERPL-SCNC: 31 MMOL/L — SIGNIFICANT CHANGE UP (ref 22–31)
CREAT SERPL-MCNC: 0.54 MG/DL — SIGNIFICANT CHANGE UP (ref 0.5–1.3)
EGFR: 97 ML/MIN/1.73M2 — SIGNIFICANT CHANGE UP
EGFR: 97 ML/MIN/1.73M2 — SIGNIFICANT CHANGE UP
GAS PNL BLDV: SIGNIFICANT CHANGE UP
GLUCOSE BLDC GLUCOMTR-MCNC: 100 MG/DL — HIGH (ref 70–99)
GLUCOSE BLDC GLUCOMTR-MCNC: 111 MG/DL — HIGH (ref 70–99)
GLUCOSE BLDC GLUCOMTR-MCNC: 180 MG/DL — HIGH (ref 70–99)
GLUCOSE BLDC GLUCOMTR-MCNC: 239 MG/DL — HIGH (ref 70–99)
GLUCOSE SERPL-MCNC: 126 MG/DL — HIGH (ref 70–99)
MAGNESIUM SERPL-MCNC: 1.8 MG/DL — SIGNIFICANT CHANGE UP (ref 1.6–2.6)
PHOSPHATE SERPL-MCNC: 2.6 MG/DL — SIGNIFICANT CHANGE UP (ref 2.5–4.5)
POTASSIUM SERPL-MCNC: 4 MMOL/L — SIGNIFICANT CHANGE UP (ref 3.5–5.3)
POTASSIUM SERPL-SCNC: 4 MMOL/L — SIGNIFICANT CHANGE UP (ref 3.5–5.3)
SODIUM SERPL-SCNC: 140 MMOL/L — SIGNIFICANT CHANGE UP (ref 135–145)

## 2025-04-09 PROCEDURE — 99232 SBSQ HOSP IP/OBS MODERATE 35: CPT

## 2025-04-09 PROCEDURE — 99233 SBSQ HOSP IP/OBS HIGH 50: CPT | Mod: GC

## 2025-04-09 RX ORDER — LEVALBUTEROL HYDROCHLORIDE 1.25 MG/3ML
0.63 SOLUTION RESPIRATORY (INHALATION) EVERY 8 HOURS
Refills: 0 | Status: DISCONTINUED | OUTPATIENT
Start: 2025-04-09 | End: 2025-05-21

## 2025-04-09 RX ORDER — METHYLPREDNISOLONE ACETATE 80 MG/ML
30 INJECTION, SUSPENSION INTRA-ARTICULAR; INTRALESIONAL; INTRAMUSCULAR; SOFT TISSUE EVERY 12 HOURS
Refills: 0 | Status: DISCONTINUED | OUTPATIENT
Start: 2025-04-10 | End: 2025-04-15

## 2025-04-09 RX ORDER — SERTRALINE 100 MG/1
100 TABLET, FILM COATED ORAL DAILY
Refills: 0 | Status: DISCONTINUED | OUTPATIENT
Start: 2025-04-10 | End: 2025-04-15

## 2025-04-09 RX ORDER — SERTRALINE 100 MG/1
25 TABLET, FILM COATED ORAL ONCE
Refills: 0 | Status: COMPLETED | OUTPATIENT
Start: 2025-04-09 | End: 2025-04-09

## 2025-04-09 RX ORDER — PREDNISONE 20 MG/1
40 TABLET ORAL ONCE
Refills: 0 | Status: COMPLETED | OUTPATIENT
Start: 2025-04-09 | End: 2025-04-09

## 2025-04-09 RX ADMIN — ENOXAPARIN SODIUM 40 MILLIGRAM(S): 100 INJECTION SUBCUTANEOUS at 17:51

## 2025-04-09 RX ADMIN — POLYETHYLENE GLYCOL 3350 17 GRAM(S): 17 POWDER, FOR SOLUTION ORAL at 06:07

## 2025-04-09 RX ADMIN — DEXTROMETHORPHAN HBR, GUAIFENESIN 200 MILLIGRAM(S): 200 LIQUID ORAL at 19:29

## 2025-04-09 RX ADMIN — Medication 50 MILLIGRAM(S): at 09:08

## 2025-04-09 RX ADMIN — Medication 500 MICROGRAM(S): at 04:06

## 2025-04-09 RX ADMIN — POLYETHYLENE GLYCOL 3350 17 GRAM(S): 17 POWDER, FOR SOLUTION ORAL at 17:50

## 2025-04-09 RX ADMIN — LEVALBUTEROL HYDROCHLORIDE 0.63 MILLIGRAM(S): 1.25 SOLUTION RESPIRATORY (INHALATION) at 20:56

## 2025-04-09 RX ADMIN — SERTRALINE 75 MILLIGRAM(S): 100 TABLET, FILM COATED ORAL at 10:36

## 2025-04-09 RX ADMIN — Medication 50 MILLIGRAM(S): at 02:11

## 2025-04-09 RX ADMIN — LEVALBUTEROL HYDROCHLORIDE 0.63 MILLIGRAM(S): 1.25 SOLUTION RESPIRATORY (INHALATION) at 04:06

## 2025-04-09 RX ADMIN — INSULIN LISPRO 5 UNIT(S): 100 INJECTION, SOLUTION INTRAVENOUS; SUBCUTANEOUS at 09:03

## 2025-04-09 RX ADMIN — BUDESONIDE 0.5 MILLIGRAM(S): 0.25 SUSPENSION RESPIRATORY (INHALATION) at 04:06

## 2025-04-09 RX ADMIN — INSULIN LISPRO 2: 100 INJECTION, SOLUTION INTRAVENOUS; SUBCUTANEOUS at 17:51

## 2025-04-09 RX ADMIN — LEVALBUTEROL HYDROCHLORIDE 0.63 MILLIGRAM(S): 1.25 SOLUTION RESPIRATORY (INHALATION) at 08:36

## 2025-04-09 RX ADMIN — LEVALBUTEROL HYDROCHLORIDE 0.63 MILLIGRAM(S): 1.25 SOLUTION RESPIRATORY (INHALATION) at 15:00

## 2025-04-09 RX ADMIN — LISINOPRIL 40 MILLIGRAM(S): 5 TABLET ORAL at 06:06

## 2025-04-09 RX ADMIN — Medication 1 APPLICATION(S): at 06:15

## 2025-04-09 RX ADMIN — Medication 500 MICROGRAM(S): at 20:56

## 2025-04-09 RX ADMIN — Medication 40 MILLIGRAM(S): at 06:06

## 2025-04-09 RX ADMIN — DEXTROMETHORPHAN HBR, GUAIFENESIN 200 MILLIGRAM(S): 200 LIQUID ORAL at 13:00

## 2025-04-09 RX ADMIN — INSULIN LISPRO 5 UNIT(S): 100 INJECTION, SOLUTION INTRAVENOUS; SUBCUTANEOUS at 17:52

## 2025-04-09 RX ADMIN — SERTRALINE 25 MILLIGRAM(S): 100 TABLET, FILM COATED ORAL at 17:51

## 2025-04-09 RX ADMIN — Medication 500 MICROGRAM(S): at 15:00

## 2025-04-09 RX ADMIN — INSULIN LISPRO 5 UNIT(S): 100 INJECTION, SOLUTION INTRAVENOUS; SUBCUTANEOUS at 12:28

## 2025-04-09 RX ADMIN — DEXTROMETHORPHAN HBR, GUAIFENESIN 200 MILLIGRAM(S): 200 LIQUID ORAL at 02:11

## 2025-04-09 RX ADMIN — DEXTROMETHORPHAN HBR, GUAIFENESIN 200 MILLIGRAM(S): 200 LIQUID ORAL at 09:08

## 2025-04-09 RX ADMIN — PREDNISONE 40 MILLIGRAM(S): 20 TABLET ORAL at 09:08

## 2025-04-09 RX ADMIN — BUDESONIDE 0.5 MILLIGRAM(S): 0.25 SUSPENSION RESPIRATORY (INHALATION) at 08:35

## 2025-04-09 RX ADMIN — BUDESONIDE 0.5 MILLIGRAM(S): 0.25 SUSPENSION RESPIRATORY (INHALATION) at 20:56

## 2025-04-09 RX ADMIN — Medication 50 MILLIGRAM(S): at 17:51

## 2025-04-09 NOTE — PROGRESS NOTE ADULT - PROBLEM SELECTOR PLAN 4
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 S/p dulcolax/ MoM w/ good response.

## 2025-04-09 NOTE — PROGRESS NOTE ADULT - SUBJECTIVE AND OBJECTIVE BOX
[facility] Division of Hospital Medicine  Artis Tolbert) MD Carlos  Pager [phone]    SUBJECTIVE:  Chief complaint: Depression/catatonia.    Pt was seen and evaluated at bedside this AM. No o/n events. Pt is eating more per family. Still very limited speech and not moving around much. Son present at bedside. Son also reports that pt is having BMs. Discussed plan of care.       ROS: All systems negative except as noted.      Vital Signs Last 24 Hrs  T(C): 36.8 (09 Apr 2025 11:31), Max: 37 (09 Apr 2025 09:00)  T(F): 98.3 (09 Apr 2025 11:31), Max: 98.6 (09 Apr 2025 09:00)  HR: 86 (09 Apr 2025 11:31) (82 - 94)  BP: 157/78 (09 Apr 2025 11:31) (133/69 - 157/78)  BP(mean): --  RR: 18 (09 Apr 2025 11:31) (18 - 18)  SpO2: 94% (09 Apr 2025 11:31) (94% - 100%)    Parameters below as of 09 Apr 2025 11:31  Patient On (Oxygen Delivery Method): nasal cannula  O2 Flow (L/min): 2      PHYSICAL EXAM:  Gen- In bed, NAD  Resp- Clear. Adequate effort. No rrw.  CVS- RRR, S1S2, no g/r/m,.  GI- Soft , NT, ND, +BSx4  Ext- No C/C.       MEDICATION:  MEDICATIONS  (STANDING):  bisacodyl 10 milliGRAM(s) Oral once  buDESOnide    Inhalation Suspension 0.5 milliGRAM(s) Inhalation every 12 hours  chlorhexidine 2% Cloths 1 Application(s) Topical <User Schedule>  dextrose 5%. 1000 milliLiter(s) (100 mL/Hr) IV Continuous <Continuous>  dextrose 5%. 1000 milliLiter(s) (50 mL/Hr) IV Continuous <Continuous>  dextrose 50% Injectable 25 Gram(s) IV Push once  dextrose 50% Injectable 12.5 Gram(s) IV Push once  dextrose 50% Injectable 25 Gram(s) IV Push once  dextrose Oral Gel 15 Gram(s) Oral once  enoxaparin Injectable 40 milliGRAM(s) SubCutaneous every 24 hours  glucagon  Injectable 1 milliGRAM(s) IntraMuscular once  guaiFENesin Oral Liquid (Sugar-Free) 200 milliGRAM(s) Oral every 6 hours  hydrALAZINE 50 milliGRAM(s) Oral three times a day  hydrochlorothiazide 25 milliGRAM(s) Oral daily  insulin glargine Injectable (LANTUS) 11 Unit(s) SubCutaneous at bedtime  insulin lispro (ADMELOG) corrective regimen sliding scale   SubCutaneous three times a day before meals  insulin lispro (ADMELOG) corrective regimen sliding scale   SubCutaneous at bedtime  insulin lispro Injectable (ADMELOG) 5 Unit(s) SubCutaneous three times a day before meals  ipratropium    for Nebulization 500 MICROGram(s) Nebulizer every 8 hours  levalbuterol Inhalation 0.63 milliGRAM(s) Inhalation every 8 hours  lisinopril 40 milliGRAM(s) Oral daily  pantoprazole    Tablet 40 milliGRAM(s) Oral before breakfast  polyethylene glycol 3350 17 Gram(s) Oral two times a day  senna 2 Tablet(s) Oral at bedtime  sertraline 25 milliGRAM(s) Oral once    MEDICATIONS  (PRN):        LABORATORY:                          11.1   10.49 )-----------( 147      ( 08 Apr 2025 07:10 )             33.5     04-09    140  |  99  |  17  ----------------------------<  126[H]  4.0   |  31  |  0.54    Ca    9.8      09 Apr 2025 07:30  Phos  2.6     04-09  Mg     1.80     04-09        Urinalysis Basic - ( 09 Apr 2025 07:30 )    Color: x / Appearance: x / SG: x / pH: x  Gluc: 126 mg/dL / Ketone: x  / Bili: x / Urobili: x   Blood: x / Protein: x / Nitrite: x   Leuk Esterase: x / RBC: x / WBC x   Sq Epi: x / Non Sq Epi: x / Bacteria: x            SARS-CoV-2: NotDetec (19 Mar 2025 09:05)  SARS-CoV-2: NotDetec (08 Mar 2025 14:40)

## 2025-04-09 NOTE — PROGRESS NOTE ADULT - ATTENDING COMMENTS
73 yo F PMH reported asthma w/ prior intubations, depression w/ hx of cataonia presented with asthma exacerbation requiring MICU. Also with concern for recurrent aspiration    - currently awake and alert on 2LNC, noted to be hypoxemic to low 90s when weaned  - c/w bronchodilators  - will increase solumedrol as still with wheezes + respiratory hypoxemia  - CTA without overt consolidations or PE  - encourage IS and OOB as tolerated  - holding NIV, will assess need for NIV for chronic hypercapnia as obstruction improves; d/w family - pt may have difficulty tolerating when she is not at mental state baseline  - ENT eval 4/3 without VC paralysis  - ongoing BH optimization

## 2025-04-09 NOTE — PROGRESS NOTE ADULT - SUBJECTIVE AND OBJECTIVE BOX
CHIEF COMPLAINT:Patient is a 74y old  Female who presents with a chief complaint of AMS and increased WOB (21 Mar 2025 06:24)      Interval Events:    REVIEW OF SYSTEMS:  [x] All other systems negative except per HPI   [ ] Unable to assess ROS because ________    OBJECTIVE:  ICU Vital Signs Last 24 Hrs  T(C): 36.8 (09 Apr 2025 04:40), Max: 37 (08 Apr 2025 11:05)  T(F): 98.2 (09 Apr 2025 04:40), Max: 98.6 (08 Apr 2025 11:05)  HR: 82 (09 Apr 2025 04:40) (82 - 100)  BP: 145/60 (09 Apr 2025 04:40) (117/56 - 146/82)  BP(mean): --  ABP: --  ABP(mean): --  RR: 18 (09 Apr 2025 04:40) (18 - 18)  SpO2: 100% (09 Apr 2025 04:40) (95% - 100%)    O2 Parameters below as of 09 Apr 2025 04:40  Patient On (Oxygen Delivery Method): nasal cannula  O2 Flow (L/min): 2            04-08 @ 07:01  -  04-09 @ 07:00  --------------------------------------------------------  IN: 200 mL / OUT: 750 mL / NET: -550 mL        PHYSICAL EXAM:  GENERAL: NAD, well-groomed, well-developed  HEAD:  Atraumatic, Normocephalic  EYES: EOMI, PERRLA, conjunctiva and sclera clear  ENMT: No tonsillar erythema, exudates, or enlargement; Moist mucous membranes, Good dentition, No lesions  NECK: Supple, No JVD, Normal thyroid  CHEST/LUNG: Clear to auscultation bilaterally; No rales, rhonchi, wheezing, or rubs  HEART: Regular rate and rhythm; No murmurs, rubs, or gallops  ABDOMEN: Soft, Nontender, Nondistended; Bowel sounds present  VASCULAR:  2+ Peripheral Pulses, No clubbing, cyanosis, or edema  LYMPH: No lymphadenopathy noted  SKIN: No rashes or lesions  NERVOUS SYSTEM:  Alert & Oriented X3, Good concentration; Motor Strength 5/5 B/L upper and lower extremities; DTRs 2+ intact and symmetric    HOSPITAL MEDICATIONS:  MEDICATIONS  (STANDING):  bisacodyl 10 milliGRAM(s) Oral once  buDESOnide    Inhalation Suspension 0.5 milliGRAM(s) Inhalation every 12 hours  chlorhexidine 2% Cloths 1 Application(s) Topical <User Schedule>  dextrose 5%. 1000 milliLiter(s) (100 mL/Hr) IV Continuous <Continuous>  dextrose 5%. 1000 milliLiter(s) (50 mL/Hr) IV Continuous <Continuous>  dextrose 50% Injectable 25 Gram(s) IV Push once  dextrose 50% Injectable 12.5 Gram(s) IV Push once  dextrose 50% Injectable 25 Gram(s) IV Push once  dextrose Oral Gel 15 Gram(s) Oral once  enoxaparin Injectable 40 milliGRAM(s) SubCutaneous every 24 hours  glucagon  Injectable 1 milliGRAM(s) IntraMuscular once  guaiFENesin Oral Liquid (Sugar-Free) 200 milliGRAM(s) Oral every 6 hours  hydrALAZINE 50 milliGRAM(s) Oral three times a day  hydrochlorothiazide 25 milliGRAM(s) Oral daily  insulin glargine Injectable (LANTUS) 11 Unit(s) SubCutaneous at bedtime  insulin lispro (ADMELOG) corrective regimen sliding scale   SubCutaneous three times a day before meals  insulin lispro (ADMELOG) corrective regimen sliding scale   SubCutaneous at bedtime  insulin lispro Injectable (ADMELOG) 5 Unit(s) SubCutaneous three times a day before meals  ipratropium    for Nebulization 500 MICROGram(s) Nebulizer every 6 hours  levalbuterol Inhalation 0.63 milliGRAM(s) Inhalation every 6 hours  lisinopril 40 milliGRAM(s) Oral daily  pantoprazole    Tablet 40 milliGRAM(s) Oral before breakfast  polyethylene glycol 3350 17 Gram(s) Oral two times a day  predniSONE   Tablet 40 milliGRAM(s) Oral once  senna 2 Tablet(s) Oral at bedtime  sertraline 75 milliGRAM(s) Oral daily    MEDICATIONS  (PRN):      LABS:    The Labs were reviewed by me   The Radiology was reviewed by me    EKG tracing reviewed by me    04-08    138  |  97[L]  |  20  ----------------------------<  132[H]  3.4[L]   |  29  |  0.58  04-07    140  |  98  |  21  ----------------------------<  95  3.6   |  28  |  0.59  04-06    140  |  98  |  21  ----------------------------<  150[H]  3.3[L]   |  25  |  0.81    Ca    9.8      08 Apr 2025 07:10  Ca    9.9      07 Apr 2025 11:00  Ca    10.5      06 Apr 2025 10:35  Phos  2.4     04-08  Mg     1.70     04-08    TPro  6.9  /  Alb  3.7  /  TBili  0.8  /  DBili  x   /  AST  17  /  ALT  24  /  AlkPhos  76  04-06    Magnesium: 1.70 mg/dL (04-08-25 @ 07:10)  Magnesium: 1.70 mg/dL (04-07-25 @ 11:00)    Phosphorus: 2.4 mg/dL (04-08-25 @ 07:10)  Phosphorus: 2.7 mg/dL (04-07-25 @ 11:00)                    Urinalysis Basic - ( 08 Apr 2025 07:10 )    Color: x / Appearance: x / SG: x / pH: x  Gluc: 132 mg/dL / Ketone: x  / Bili: x / Urobili: x   Blood: x / Protein: x / Nitrite: x   Leuk Esterase: x / RBC: x / WBC x   Sq Epi: x / Non Sq Epi: x / Bacteria: x                              11.1   10.49 )-----------( 147      ( 08 Apr 2025 07:10 )             33.5                         13.1   11.60 )-----------( 172      ( 06 Apr 2025 10:35 )             39.9     CAPILLARY BLOOD GLUCOSE      POCT Blood Glucose.: 100 mg/dL (09 Apr 2025 08:16)  POCT Blood Glucose.: 178 mg/dL (08 Apr 2025 22:23)  POCT Blood Glucose.: 158 mg/dL (08 Apr 2025 17:02)  POCT Blood Glucose.: 161 mg/dL (08 Apr 2025 12:04)        MICROBIOLOGY:     RADIOLOGY:  [ ] Reviewed and interpreted by me    Point of Care Ultrasound Findings:    PFT:    EKG: CHIEF COMPLAINT:Patient is a 74y old  Female who presents with a chief complaint of AMS and increased WOB (21 Mar 2025 06:24)      Interval Events: patient continues to have wheezing desptie prolonged therapy    REVIEW OF SYSTEMS:  [x] All other systems negative except per HPI   [ ] Unable to assess ROS because ________    OBJECTIVE:  ICU Vital Signs Last 24 Hrs  T(C): 36.8 (09 Apr 2025 04:40), Max: 37 (08 Apr 2025 11:05)  T(F): 98.2 (09 Apr 2025 04:40), Max: 98.6 (08 Apr 2025 11:05)  HR: 82 (09 Apr 2025 04:40) (82 - 100)  BP: 145/60 (09 Apr 2025 04:40) (117/56 - 146/82)  BP(mean): --  ABP: --  ABP(mean): --  RR: 18 (09 Apr 2025 04:40) (18 - 18)  SpO2: 100% (09 Apr 2025 04:40) (95% - 100%)    O2 Parameters below as of 09 Apr 2025 04:40  Patient On (Oxygen Delivery Method): nasal cannula  O2 Flow (L/min): 2            04-08 @ 07:01  -  04-09 @ 07:00  --------------------------------------------------------  IN: 200 mL / OUT: 750 mL / NET: -550 mL        PHYSICAL EXAM:  GENERAL: NAD, well-groomed, well-developed  HEAD:  Atraumatic, Normocephalic  EYES: EOMI, PERRLA, conjunctiva and sclera clear  ENMT: No tonsillar erythema, exudates, or enlargement; Moist mucous membranes, Good dentition, No lesions  NECK: Supple, No JVD, Normal thyroid  CHEST/LUNG:wheezing   HEART: Regular rate and rhythm; No murmurs, rubs, or gallops  ABDOMEN: Soft, Nontender, Nondistended; Bowel sounds present  VASCULAR:  2+ Peripheral Pulses, No clubbing, cyanosis, or edema  LYMPH: No lymphadenopathy noted  SKIN: No rashes or lesions  NERVOUS SYSTEM:  Alert & Oriented X3, Good concentration; Motor Strength 5/5 B/L upper and lower extremities; DTRs 2+ intact and symmetric    HOSPITAL MEDICATIONS:  MEDICATIONS  (STANDING):  bisacodyl 10 milliGRAM(s) Oral once  buDESOnide    Inhalation Suspension 0.5 milliGRAM(s) Inhalation every 12 hours  chlorhexidine 2% Cloths 1 Application(s) Topical <User Schedule>  dextrose 5%. 1000 milliLiter(s) (100 mL/Hr) IV Continuous <Continuous>  dextrose 5%. 1000 milliLiter(s) (50 mL/Hr) IV Continuous <Continuous>  dextrose 50% Injectable 25 Gram(s) IV Push once  dextrose 50% Injectable 12.5 Gram(s) IV Push once  dextrose 50% Injectable 25 Gram(s) IV Push once  dextrose Oral Gel 15 Gram(s) Oral once  enoxaparin Injectable 40 milliGRAM(s) SubCutaneous every 24 hours  glucagon  Injectable 1 milliGRAM(s) IntraMuscular once  guaiFENesin Oral Liquid (Sugar-Free) 200 milliGRAM(s) Oral every 6 hours  hydrALAZINE 50 milliGRAM(s) Oral three times a day  hydrochlorothiazide 25 milliGRAM(s) Oral daily  insulin glargine Injectable (LANTUS) 11 Unit(s) SubCutaneous at bedtime  insulin lispro (ADMELOG) corrective regimen sliding scale   SubCutaneous three times a day before meals  insulin lispro (ADMELOG) corrective regimen sliding scale   SubCutaneous at bedtime  insulin lispro Injectable (ADMELOG) 5 Unit(s) SubCutaneous three times a day before meals  ipratropium    for Nebulization 500 MICROGram(s) Nebulizer every 6 hours  levalbuterol Inhalation 0.63 milliGRAM(s) Inhalation every 6 hours  lisinopril 40 milliGRAM(s) Oral daily  pantoprazole    Tablet 40 milliGRAM(s) Oral before breakfast  polyethylene glycol 3350 17 Gram(s) Oral two times a day  predniSONE   Tablet 40 milliGRAM(s) Oral once  senna 2 Tablet(s) Oral at bedtime  sertraline 75 milliGRAM(s) Oral daily    MEDICATIONS  (PRN):      LABS:    The Labs were reviewed by me   The Radiology was reviewed by me    EKG tracing reviewed by me    04-08    138  |  97[L]  |  20  ----------------------------<  132[H]  3.4[L]   |  29  |  0.58  04-07    140  |  98  |  21  ----------------------------<  95  3.6   |  28  |  0.59  04-06    140  |  98  |  21  ----------------------------<  150[H]  3.3[L]   |  25  |  0.81    Ca    9.8      08 Apr 2025 07:10  Ca    9.9      07 Apr 2025 11:00  Ca    10.5      06 Apr 2025 10:35  Phos  2.4     04-08  Mg     1.70     04-08    TPro  6.9  /  Alb  3.7  /  TBili  0.8  /  DBili  x   /  AST  17  /  ALT  24  /  AlkPhos  76  04-06    Magnesium: 1.70 mg/dL (04-08-25 @ 07:10)  Magnesium: 1.70 mg/dL (04-07-25 @ 11:00)    Phosphorus: 2.4 mg/dL (04-08-25 @ 07:10)  Phosphorus: 2.7 mg/dL (04-07-25 @ 11:00)                    Urinalysis Basic - ( 08 Apr 2025 07:10 )    Color: x / Appearance: x / SG: x / pH: x  Gluc: 132 mg/dL / Ketone: x  / Bili: x / Urobili: x   Blood: x / Protein: x / Nitrite: x   Leuk Esterase: x / RBC: x / WBC x   Sq Epi: x / Non Sq Epi: x / Bacteria: x                              11.1   10.49 )-----------( 147      ( 08 Apr 2025 07:10 )             33.5                         13.1   11.60 )-----------( 172      ( 06 Apr 2025 10:35 )             39.9     CAPILLARY BLOOD GLUCOSE      POCT Blood Glucose.: 100 mg/dL (09 Apr 2025 08:16)  POCT Blood Glucose.: 178 mg/dL (08 Apr 2025 22:23)  POCT Blood Glucose.: 158 mg/dL (08 Apr 2025 17:02)  POCT Blood Glucose.: 161 mg/dL (08 Apr 2025 12:04)        MICROBIOLOGY:     RADIOLOGY:  [ ] Reviewed and interpreted by me    Point of Care Ultrasound Findings:    PFT:    EKG:

## 2025-04-09 NOTE — BH CONSULTATION LIAISON PROGRESS NOTE - NSBHASSESSMENTFT_PSY_ALL_CORE
Patient is a 74F hx asthma, depression, and HTN was admitted to the MICU with AHRF 2/2 asthma exacerbation resulting in acute on chronic HHRF causing AMS and increased WOB that required intubation. During her ICU course, she experienced seizure-like activity .  Additional issues included hyponatremia, acute on chronic metabolic alkalosis, and mild thrombocytopenia,  also having fevers.  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 Patient has h/o hx of major depressive disorder with psychosis, hx of 3 past inpatient psychiatric hospitalizations last in 2016 at [facility], all rehospitalizations were in context non compliance with the treatment. She has no hx of suicide attempts, no hx of substance abuse. Patient w hx of paranoia, disorganization, catatonia, required MOO when in [facility].     3/24----more withdrawn, some stiffness in neck, not communicative. Some concerns for catatonia. Unsure if symptoms attributed to catatonia vs delirium  3/25--- more alert today, trying to following commands. Still with PMR+. Appears to be responding to Ativan  3/26-- BFS 10, will increasing standing ativan to target catatonic symptoms - giving STAT 0.5mg NOW discussed w acp   3/27----will continue ativan dosing for one more day before increasing  3/28: BFS: stupor 1, mutism 2,withdrawan 2, verbigeration 1, rigidity 2 =8.  3/29: BFS: stupor 2, mutism 2, staring 1, posturing 2, rigidity 2, waxy flexibility 3, withdrawal 2 = 14; please monitor nutritional intake, plan to increase ativan by total daily dose of 0.5 mg at bedtime due to persistent catatonia   3/31: BFS: stupor 2, mutism 2, staring 2, rigidity 1, negativism 1, withdrawal 1, waxy flexibility 3 =12, patient with productive cough, advised for medical work up  4/1---tired, speaking more, not rigid, has PMR+. NPO status now.   4/2--sleepy, RRT this afternoon  4/3---better mentation, pmr but no chester catatonic symptoms  4/4: alert, using few words, mild rigidity -  at bedside, updated   4/5: few words, lethargic, fluctuating wakefulness. Mild rigidity.  No overt catatonic sxs.   4/7: no change, remains thought blocked, few words - son at bedside, updated with care  4/8: patient continues to have poor eye contact, uses few words, no rigidity on today's exam.   4/9: patient remains minimally verbal, some posturing, poor Po intake - continues on NC for 02 requirements     PLAN  - no SI or HI, no need for psychiatric CO  -- antipsychotics can only be given if qtc < 500    - MEDICATIONS:  -- HOLD ATIVAN   -- INCREASE Zoloft to 100 mg daily ( home dose 150mg) ( monitor NA- recent hyponatremia)  -- HOLD Zyprexa for now  - ongoing discussion with family about treatment plan    - PRN:  --Ativan 0.5mg q 6hrs prn IM/IV/PO  - DISPO: pending, TBD   Patient is a 74F hx asthma, depression, and HTN was admitted to the MICU with AHRF 2/2 asthma exacerbation resulting in acute on chronic HHRF causing AMS and increased WOB that required intubation. During her ICU course, she experienced seizure-like activity .  Additional issues included hyponatremia, acute on chronic metabolic alkalosis, and mild thrombocytopenia,  also having fevers.  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 Patient has h/o hx of major depressive disorder with psychosis, hx of 3 past inpatient psychiatric hospitalizations last in 2016 at [facility], all rehospitalizations were in context non compliance with the treatment. She has no hx of suicide attempts, no hx of substance abuse. Patient w hx of paranoia, disorganization, catatonia, required MOO when in [facility].     3/24----more withdrawn, some stiffness in neck, not communicative. Some concerns for catatonia. Unsure if symptoms attributed to catatonia vs delirium  3/25--- more alert today, trying to following commands. Still with PMR+. Appears to be responding to Ativan  3/26-- BFS 10, will increasing standing ativan to target catatonic symptoms - giving STAT 0.5mg NOW discussed w acp   3/27----will continue ativan dosing for one more day before increasing  3/28: BFS: stupor 1, mutism 2,withdrawan 2, verbigeration 1, rigidity 2 =8.  3/29: BFS: stupor 2, mutism 2, staring 1, posturing 2, rigidity 2, waxy flexibility 3, withdrawal 2 = 14; please monitor nutritional intake, plan to increase ativan by total daily dose of 0.5 mg at bedtime due to persistent catatonia   3/31: BFS: stupor 2, mutism 2, staring 2, rigidity 1, negativism 1, withdrawal 1, waxy flexibility 3 =12, patient with productive cough, advised for medical work up  4/1---tired, speaking more, not rigid, has PMR+. NPO status now.   4/2--sleepy, RRT this afternoon  4/3---better mentation, pmr but no chester catatonic symptoms  4/4: alert, using few words, mild rigidity -  at bedside, updated   4/5: few words, lethargic, fluctuating wakefulness. Mild rigidity.  No overt catatonic sxs.   4/7: no change, remains thought blocked, few words - son at bedside, updated with care  4/8: patient continues to have poor eye contact, uses few words, no rigidity on today's exam.   4/9: patient remains minimally verbal, some posturing, poor Po intake - continues on NC for 02 requirements     PLAN  - no SI or HI, no need for psychiatric CO  -- antipsychotics can only be given if qtc < 500    - MEDICATIONS:  -- HOLD ATIVAN   -- INCREASE Zoloft to 100 mg daily ( home dose 150mg) ( monitor NA- recent hyponatremia)  -- HOLD Zyprexa for now  - ongoing discussion with family about treatment plan    - PRN:  --Ativan 0.5mg q 6hrs prn IM/IV/PO  - DISPO: pending, TBD: inpt psych vs TK depending on recovery

## 2025-04-09 NOTE — PROGRESS NOTE ADULT - ASSESSMENT
74F reported asthma with prior intubations and depression initially admitted to MICU with suspected asthma exacerbation with RRT on 3/19 AM for hypoxemia and increased work of breathing with poor air entry. Patient transferred back to ICU for airway evaluation and asthma management and transferred back to floor 3/21, initially requiring BIPAP. Patient had adequate improvement from pulmonary perspective on floor however with episodes reported by team and family of wheezing and cough yesterday. Upon assesment patient is significantly lethargic, still under treatment with Ativan for underlying psychiatric disorder. Patient with likely recurring aspiration due to underlying mood disorder, with now recurretn asthma exacerbation    #Asthma Exacerbation - Recurred likely in setting of aspiration  #Lethargy - on Ativan TID now off: MS much more improved     - CTA chest 3/8 without evidence of PNA or PE  - repeat CTA negative for PE and clear lungs  - continue Duonebs every 6h, ensure to continue as well on discharge for 7 days   - Continue Pulmicort 0.5mg BID while lethargic  - will transition to pred 40 daily starting today(stat order placed): taper length to be determined   - Levalbuterol nebs every 6h + Ipratropium nebs every 6h: can decrease to q 8 now  - Incentive spirometry  - SLP: puree and thin liquids   - no indication for nocturnal NIV for now   - Use Home token on DC for OP Pulmonary follow up    74F reported asthma with prior intubations and depression initially admitted to MICU with suspected asthma exacerbation with RRT on 3/19 AM for hypoxemia and increased work of breathing with poor air entry. Patient transferred back to ICU for airway evaluation and asthma management and transferred back to floor 3/21, initially requiring BIPAP. Patient had adequate improvement from pulmonary perspective on floor however with episodes reported by team and family of wheezing and cough yesterday. Upon assesment patient is significantly lethargic, still under treatment with Ativan for underlying psychiatric disorder. Patient with likely recurring aspiration due to underlying mood disorder, with now recurretn asthma exacerbation    #Asthma Exacerbation - Recurred likely in setting of aspiration  #Lethargy - on Ativan TID now off: MS much more improved     - CTA chest 3/8 without evidence of PNA or PE  - repeat CTA negative for PE and clear lungs  - continue Duonebs every 6h, ensure to continue as well on discharge for 7 days   - Continue Pulmicort 0.5mg BID while lethargic  - as patient continues to be hypoxemic and wheezing, please continue IV solumderol 30 BID starting tomorrow  - Levalbuterol nebs every 6h + Ipratropium nebs every 6h: can decrease to q 8 now  - Incentive spirometry  - SLP: puree and thin liquids   - no indication for nocturnal NIV for now   - Use Home token on DC for OP Pulmonary follow up

## 2025-04-09 NOTE — PROGRESS NOTE ADULT - PROBLEM SELECTOR PLAN 1
2/2 to status asthmaticus. Concern this may be occuring in setting of aspiration.   Follow-up aspergillus Ab: negative , ANCA: negative , IgE: negative    s/p intubation and bilevel now on NC  c/w ipratropium + xopenex nebs q6h  s/p course of antibiotics  c/w budesonide BID  Cont solumedrol 40mg IV daily -- reassess dosing tomorrow.  -Wean down O2 as tolerated - now 2L  -No role for nocturnal NIV for now. Will cont to monitor off. VBG reviewd. Pulm recs appreciated. D/w fellow. 2/2 to status asthmaticus. Concern this may be occuring in setting of aspiration.   Follow-up aspergillus Ab: negative , ANCA: negative , IgE: negative    s/p intubation and bilevel now on NC  c/w ipratropium + xopenex nebs q6h  s/p course of antibiotics  c/w budesonide BID  -Wean down O2 as tolerated - now 2L  -No role for nocturnal NIV for now. Will cont to monitor off.   -Inc solumedrol back to 30mg BID due to ongoing wheezes appreciated by pulm.

## 2025-04-09 NOTE — PROGRESS NOTE ADULT - PROBLEM SELECTOR PLAN 6
PO intake improved. But still w/ significant mutism, catatonia.  -Hx of MDD w/ psychotic features.  -Concerns for catatonia presently.  -Trialed ativan, but now on hold. Concern that it may have contributed to respiratory decompensation.  -Increase Zoloft to 100 qday (home dose was 150).  -Cont to hold zyprexa.

## 2025-04-09 NOTE — PROGRESS NOTE ADULT - PROBLEM SELECTOR PLAN 2
will need ICS/LABA on discharge  c/w ipratropium, xopenex nebs  steroids as above  needs pulm as outpatient    #Low grade temp overnight  -Ordered CXR. By my read - no acute infiltrates. F/u official read.

## 2025-04-09 NOTE — BH CONSULTATION LIAISON PROGRESS NOTE - NSBHFUPINTERVALHXFT_PSY_A_CORE
Patient was seen and assessed. Patient remains lethargic, opens eyes and waves to provider however does not answer any direct questions.   Some posturing, no rigidity on today's exam  Poor PO intake - had some soup yesterday as per son  Son at bedside - updated on treatment plan - long discussion had about medications including purpose of medications, side effects, treatment options and future care. Family concerned that ativan was too sedating as well as Zyprexa. Talked about patient hx and diagnoses of mdd with psychosis as well as catatonia. Discussed gold standard treatment for catatonia as well as alternatives. Son requested time to research options such as namenda.

## 2025-04-09 NOTE — BH CONSULTATION LIAISON PROGRESS NOTE - NSBHATTESTAPPAMEND_PSY_A_CORE
I have personally seen and examined this patient. I fully participated in the care of this patient. I have made amendments to the documentation where appropriate and otherwise agree with the history, physical exam, and plan as documented by the [facility]
I have personally seen and examined this patient. I fully participated in the care of this patient. I have made amendments to the documentation where appropriate and otherwise agree with the history, physical exam, and plan as documented by the [facility]
I have personally seen and examined this patient. I fully participated in the care of this patient. I have made amendments to the documentation where appropriate and otherwise agree with the history, physical exam, and plan as documented by the [facility]

## 2025-04-10 LAB
GLUCOSE BLDC GLUCOMTR-MCNC: 120 MG/DL — HIGH (ref 70–99)
GLUCOSE BLDC GLUCOMTR-MCNC: 154 MG/DL — HIGH (ref 70–99)
GLUCOSE BLDC GLUCOMTR-MCNC: 343 MG/DL — HIGH (ref 70–99)
GLUCOSE BLDC GLUCOMTR-MCNC: 85 MG/DL — SIGNIFICANT CHANGE UP (ref 70–99)

## 2025-04-10 PROCEDURE — 99232 SBSQ HOSP IP/OBS MODERATE 35: CPT

## 2025-04-10 RX ORDER — OLANZAPINE 10 MG/1
2.5 TABLET ORAL AT BEDTIME
Refills: 0 | Status: DISCONTINUED | OUTPATIENT
Start: 2025-04-10 | End: 2025-05-21

## 2025-04-10 RX ADMIN — BUDESONIDE 0.5 MILLIGRAM(S): 0.25 SUSPENSION RESPIRATORY (INHALATION) at 07:19

## 2025-04-10 RX ADMIN — ENOXAPARIN SODIUM 40 MILLIGRAM(S): 100 INJECTION SUBCUTANEOUS at 17:58

## 2025-04-10 RX ADMIN — Medication 50 MILLIGRAM(S): at 18:08

## 2025-04-10 RX ADMIN — LISINOPRIL 40 MILLIGRAM(S): 5 TABLET ORAL at 06:53

## 2025-04-10 RX ADMIN — INSULIN LISPRO 5 UNIT(S): 100 INJECTION, SOLUTION INTRAVENOUS; SUBCUTANEOUS at 18:08

## 2025-04-10 RX ADMIN — OLANZAPINE 2.5 MILLIGRAM(S): 10 TABLET ORAL at 22:35

## 2025-04-10 RX ADMIN — DEXTROMETHORPHAN HBR, GUAIFENESIN 200 MILLIGRAM(S): 200 LIQUID ORAL at 13:03

## 2025-04-10 RX ADMIN — METHYLPREDNISOLONE ACETATE 30 MILLIGRAM(S): 80 INJECTION, SUSPENSION INTRA-ARTICULAR; INTRALESIONAL; INTRAMUSCULAR; SOFT TISSUE at 06:54

## 2025-04-10 RX ADMIN — LEVALBUTEROL HYDROCHLORIDE 0.63 MILLIGRAM(S): 1.25 SOLUTION RESPIRATORY (INHALATION) at 21:35

## 2025-04-10 RX ADMIN — BUDESONIDE 0.5 MILLIGRAM(S): 0.25 SUSPENSION RESPIRATORY (INHALATION) at 21:34

## 2025-04-10 RX ADMIN — Medication 2 TABLET(S): at 22:35

## 2025-04-10 RX ADMIN — INSULIN LISPRO 5 UNIT(S): 100 INJECTION, SOLUTION INTRAVENOUS; SUBCUTANEOUS at 13:00

## 2025-04-10 RX ADMIN — LEVALBUTEROL HYDROCHLORIDE 0.63 MILLIGRAM(S): 1.25 SOLUTION RESPIRATORY (INHALATION) at 07:15

## 2025-04-10 RX ADMIN — Medication 50 MILLIGRAM(S): at 08:53

## 2025-04-10 RX ADMIN — DEXTROMETHORPHAN HBR, GUAIFENESIN 200 MILLIGRAM(S): 200 LIQUID ORAL at 08:48

## 2025-04-10 RX ADMIN — POLYETHYLENE GLYCOL 3350 17 GRAM(S): 17 POWDER, FOR SOLUTION ORAL at 06:54

## 2025-04-10 RX ADMIN — Medication 1 APPLICATION(S): at 07:01

## 2025-04-10 RX ADMIN — Medication 500 MICROGRAM(S): at 07:15

## 2025-04-10 RX ADMIN — Medication 500 MICROGRAM(S): at 15:25

## 2025-04-10 RX ADMIN — Medication 50 MILLIGRAM(S): at 02:08

## 2025-04-10 RX ADMIN — Medication 500 MICROGRAM(S): at 21:35

## 2025-04-10 RX ADMIN — INSULIN LISPRO 4: 100 INJECTION, SOLUTION INTRAVENOUS; SUBCUTANEOUS at 13:00

## 2025-04-10 RX ADMIN — INSULIN GLARGINE-YFGN 11 UNIT(S): 100 INJECTION, SOLUTION SUBCUTANEOUS at 22:35

## 2025-04-10 RX ADMIN — SERTRALINE 100 MILLIGRAM(S): 100 TABLET, FILM COATED ORAL at 12:56

## 2025-04-10 RX ADMIN — Medication 40 MILLIGRAM(S): at 06:54

## 2025-04-10 RX ADMIN — DEXTROMETHORPHAN HBR, GUAIFENESIN 200 MILLIGRAM(S): 200 LIQUID ORAL at 22:35

## 2025-04-10 RX ADMIN — METHYLPREDNISOLONE ACETATE 30 MILLIGRAM(S): 80 INJECTION, SUSPENSION INTRA-ARTICULAR; INTRALESIONAL; INTRAMUSCULAR; SOFT TISSUE at 17:57

## 2025-04-10 RX ADMIN — INSULIN GLARGINE-YFGN 11 UNIT(S): 100 INJECTION, SOLUTION SUBCUTANEOUS at 00:02

## 2025-04-10 RX ADMIN — LEVALBUTEROL HYDROCHLORIDE 0.63 MILLIGRAM(S): 1.25 SOLUTION RESPIRATORY (INHALATION) at 15:25

## 2025-04-10 RX ADMIN — DEXTROMETHORPHAN HBR, GUAIFENESIN 200 MILLIGRAM(S): 200 LIQUID ORAL at 02:08

## 2025-04-10 NOTE — PROGRESS NOTE ADULT - PROBLEM SELECTOR PLAN 2
will need ICS/LABA on discharge  c/w ipratropium, xopenex nebs  steroids as above  needs pulm as outpatient  Rpt CXR clear.

## 2025-04-10 NOTE — PROGRESS NOTE ADULT - SUBJECTIVE AND OBJECTIVE BOX
[facility] Division of Hospital Medicine  Artis Tolbert) MD [place]  Pager [phone]    SUBJECTIVE:  Chief complaint: Catatonia.    Pt was seen and evaluated at bedside this AM. No o/n events. Denies any SOB/Cp/NV. Spouse at bedside. No new issues.      ROS: All systems negative except as noted.      Vital Signs Last 24 Hrs  T(C): 36.7 (10 Apr 2025 05:40), Max: 37.2 (09 Apr 2025 16:34)  T(F): 98.1 (10 Apr 2025 05:40), Max: 99 (09 Apr 2025 16:34)  HR: 97 (10 Apr 2025 08:50) (78 - 99)  BP: 159/78 (10 Apr 2025 08:50) (127/72 - 159/78)  BP(mean): --  RR: 18 (10 Apr 2025 05:40) (18 - 18)  SpO2: 100% (10 Apr 2025 07:19) (95% - 100%)    Parameters below as of 10 Apr 2025 07:19  Patient On (Oxygen Delivery Method): nasal cannula      PHYSICAL EXAM:  Gen- In bed, NAD  Resp- Clear. Adequate effort. No rrw.  CVS- RRR, S1S2, no g/r/m,.  GI- Soft , NT, ND, +BSx4  Ext- No C/C.         MEDICATION:  MEDICATIONS  (STANDING):  bisacodyl 10 milliGRAM(s) Oral once  buDESOnide    Inhalation Suspension 0.5 milliGRAM(s) Inhalation every 12 hours  chlorhexidine 2% Cloths 1 Application(s) Topical <User Schedule>  dextrose 5%. 1000 milliLiter(s) (100 mL/Hr) IV Continuous <Continuous>  dextrose 5%. 1000 milliLiter(s) (50 mL/Hr) IV Continuous <Continuous>  dextrose 50% Injectable 25 Gram(s) IV Push once  dextrose 50% Injectable 12.5 Gram(s) IV Push once  dextrose 50% Injectable 25 Gram(s) IV Push once  dextrose Oral Gel 15 Gram(s) Oral once  enoxaparin Injectable 40 milliGRAM(s) SubCutaneous every 24 hours  glucagon  Injectable 1 milliGRAM(s) IntraMuscular once  guaiFENesin Oral Liquid (Sugar-Free) 200 milliGRAM(s) Oral every 6 hours  hydrALAZINE 50 milliGRAM(s) Oral three times a day  hydrochlorothiazide 25 milliGRAM(s) Oral daily  insulin glargine Injectable (LANTUS) 11 Unit(s) SubCutaneous at bedtime  insulin lispro (ADMELOG) corrective regimen sliding scale   SubCutaneous three times a day before meals  insulin lispro (ADMELOG) corrective regimen sliding scale   SubCutaneous at bedtime  insulin lispro Injectable (ADMELOG) 5 Unit(s) SubCutaneous three times a day before meals  ipratropium    for Nebulization 500 MICROGram(s) Nebulizer every 8 hours  levalbuterol Inhalation 0.63 milliGRAM(s) Inhalation every 8 hours  lisinopril 40 milliGRAM(s) Oral daily  methylPREDNISolone sodium succinate Injectable 30 milliGRAM(s) IV Push every 12 hours  pantoprazole    Tablet 40 milliGRAM(s) Oral before breakfast  polyethylene glycol 3350 17 Gram(s) Oral two times a day  senna 2 Tablet(s) Oral at bedtime  sertraline 100 milliGRAM(s) Oral daily    MEDICATIONS  (PRN):            LABORATORY:      04-09    140  |  99  |  17  ----------------------------<  126[H]  4.0   |  31  |  0.54    Ca    9.8      09 Apr 2025 07:30  Phos  2.6     04-09  Mg     1.80     04-09        Urinalysis Basic - ( 09 Apr 2025 07:30 )    Color: x / Appearance: x / SG: x / pH: x  Gluc: 126 mg/dL / Ketone: x  / Bili: x / Urobili: x   Blood: x / Protein: x / Nitrite: x   Leuk Esterase: x / RBC: x / WBC x   Sq Epi: x / Non Sq Epi: x / Bacteria: x            SARS-CoV-2: NotDetec (19 Mar 2025 09:05)  SARS-CoV-2: NotDetec (08 Mar 2025 14:40)

## 2025-04-10 NOTE — BH CONSULTATION LIAISON PROGRESS NOTE - NSBHFUPINTERVALHXFT_PSY_A_CORE
Chart reviewed. Seen with . More interactive, speaking, though disorganized, psychotic, saying she is "in chains" which I interpreted as her rigidity/catatonia. Later said she was afraid but could not elaborate. Continues to have signs of catatonia (mild posturing, bradykinesia, grimacing, ambitendency) but improved, speaking more, tracking. She was oriented to place and month. Otherwise exam limited   reports she is improving and ate all her food today. Is in agreement with resumption of Zyprexa for psychosis.

## 2025-04-10 NOTE — BH CONSULTATION LIAISON PROGRESS NOTE - NSBHASSESSMENTFT_PSY_ALL_CORE
Patient is a 74F hx asthma, depression, and HTN was admitted to the MICU with AHRF 2/2 asthma exacerbation resulting in acute on chronic HHRF causing AMS and increased WOB that required intubation. During her ICU course, she experienced seizure-like activity .  Additional issues included hyponatremia, acute on chronic metabolic alkalosis, and mild thrombocytopenia,  also having fevers.  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 Patient has h/o hx of major depressive disorder with psychosis, hx of 3 past inpatient psychiatric hospitalizations last in 2016 at [facility], all rehospitalizations were in context non compliance with the treatment. She has no hx of suicide attempts, no hx of substance abuse. Patient w hx of paranoia, disorganization, catatonia, required MOO when in [facility].     3/24----more withdrawn, some stiffness in neck, not communicative. Some concerns for catatonia. Unsure if symptoms attributed to catatonia vs delirium  3/25--- more alert today, trying to following commands. Still with PMR+. Appears to be responding to Ativan  3/26-- BFS 10, will increasing standing ativan to target catatonic symptoms - giving STAT 0.5mg NOW discussed w acp   3/27----will continue ativan dosing for one more day before increasing  3/28: BFS: stupor 1, mutism 2,withdrawan 2, verbigeration 1, rigidity 2 =8.  3/29: BFS: stupor 2, mutism 2, staring 1, posturing 2, rigidity 2, waxy flexibility 3, withdrawal 2 = 14; please monitor nutritional intake, plan to increase ativan by total daily dose of 0.5 mg at bedtime due to persistent catatonia   3/31: BFS: stupor 2, mutism 2, staring 2, rigidity 1, negativism 1, withdrawal 1, waxy flexibility 3 =12, patient with productive cough, advised for medical work up  4/1---tired, speaking more, not rigid, has PMR+. NPO status now.   4/2--sleepy, RRT this afternoon  4/3---better mentation, pmr but no chester catatonic symptoms  4/4: alert, using few words, mild rigidity -  at bedside, updated   4/5: few words, lethargic, fluctuating wakefulness. Mild rigidity.  No overt catatonic sxs.   4/7: no change, remains thought blocked, few words - son at bedside, updated with care  4/8: patient continues to have poor eye contact, uses few words, no rigidity on today's exam.   4/9: patient remains minimally verbal, some posturing, poor Po intake - continues on NC for 02 requirements   4/10: more verbal/interactive, with increased PO intake per family. Some psychotic output. Family agrees with resumption of Zyprexa.     PLAN  - no SI or HI, no need for psychiatric CO  -- antipsychotics can only be given if qtc < 500    - MEDICATIONS:  -- C/W Zoloft 100 mg daily ( home dose 150mg) ( monitor NA- recent hyponatremia)  -- START Zyprexa 2.5mg HS tonight  - ongoing discussion with family about treatment plan    - PRN:  --Ativan 0.5mg q 6hrs prn IM/IV/PO  - DISPO: pending, TBD: inpt psych vs TK depending on recovery

## 2025-04-10 NOTE — PROGRESS NOTE ADULT - PROBLEM SELECTOR PLAN 6
PO intake improved. But still w/ significant mutism, catatonia.  -Hx of MDD w/ psychotic features.  -Concerns for catatonia presently.  -Trialed ativan, but now on hold. Concern that it may have contributed to respiratory decompensation.  -Cont Zoloft 100 qday (home dose was 150).  -Cont to hold zyprexa.  -Further titration as per .

## 2025-04-10 NOTE — BH CONSULTATION LIAISON PROGRESS NOTE - MSE UNSTRUCTURED FT
Seen with . More interactive, speaking, though disorganized, psychotic, saying she is "in chains" which I interpreted as her rigidity/catatonia. Later said she was afraid but could not elaborate. Continues to have signs of catatonia (mild posturing, bradykinesia, grimacing, ambitendency) but improved, speaking more, tracking. She was oriented to place and month. Otherwise exam limited

## 2025-04-10 NOTE — PROGRESS NOTE ADULT - PROBLEM SELECTOR PLAN 1
2/2 to status asthmaticus. Concern this may be occurring in setting of aspiration.   Follow-up aspergillus Ab: negative , ANCA: negative , IgE: negative    s/p intubation and bilevel now on NC  c/w ipratropium + xopenex nebs q8h  s/p course of antibiotics  c/w budesonide BID  -Wean down O2 as tolerated - now 2L  -No role for nocturnal NIV for now. Will cont to monitor off.   -Solumedrol 30mg BID - dosing as per pulm.

## 2025-04-11 LAB
ANION GAP SERPL CALC-SCNC: 9 MMOL/L — SIGNIFICANT CHANGE UP (ref 7–14)
BASOPHILS # BLD AUTO: 0.01 K/UL — SIGNIFICANT CHANGE UP (ref 0–0.2)
BASOPHILS NFR BLD AUTO: 0.1 % — SIGNIFICANT CHANGE UP (ref 0–2)
BUN SERPL-MCNC: 15 MG/DL — SIGNIFICANT CHANGE UP (ref 7–23)
CALCIUM SERPL-MCNC: 9.9 MG/DL — SIGNIFICANT CHANGE UP (ref 8.4–10.5)
CHLORIDE SERPL-SCNC: 100 MMOL/L — SIGNIFICANT CHANGE UP (ref 98–107)
CO2 SERPL-SCNC: 31 MMOL/L — SIGNIFICANT CHANGE UP (ref 22–31)
CREAT SERPL-MCNC: 0.6 MG/DL — SIGNIFICANT CHANGE UP (ref 0.5–1.3)
EGFR: 94 ML/MIN/1.73M2 — SIGNIFICANT CHANGE UP
EGFR: 94 ML/MIN/1.73M2 — SIGNIFICANT CHANGE UP
EOSINOPHIL # BLD AUTO: 0 K/UL — SIGNIFICANT CHANGE UP (ref 0–0.5)
EOSINOPHIL NFR BLD AUTO: 0 % — SIGNIFICANT CHANGE UP (ref 0–6)
GLUCOSE BLDC GLUCOMTR-MCNC: 119 MG/DL — HIGH (ref 70–99)
GLUCOSE BLDC GLUCOMTR-MCNC: 135 MG/DL — HIGH (ref 70–99)
GLUCOSE BLDC GLUCOMTR-MCNC: 213 MG/DL — HIGH (ref 70–99)
GLUCOSE BLDC GLUCOMTR-MCNC: 246 MG/DL — HIGH (ref 70–99)
GLUCOSE BLDC GLUCOMTR-MCNC: 82 MG/DL — SIGNIFICANT CHANGE UP (ref 70–99)
GLUCOSE SERPL-MCNC: 130 MG/DL — HIGH (ref 70–99)
HCT VFR BLD CALC: 33.1 % — LOW (ref 34.5–45)
HGB BLD-MCNC: 11 G/DL — LOW (ref 11.5–15.5)
IANC: 7.01 K/UL — SIGNIFICANT CHANGE UP (ref 1.8–7.4)
IMM GRANULOCYTES NFR BLD AUTO: 0.5 % — SIGNIFICANT CHANGE UP (ref 0–0.9)
LYMPHOCYTES # BLD AUTO: 2.9 K/UL — SIGNIFICANT CHANGE UP (ref 1–3.3)
LYMPHOCYTES # BLD AUTO: 28.1 % — SIGNIFICANT CHANGE UP (ref 13–44)
MAGNESIUM SERPL-MCNC: 1.8 MG/DL — SIGNIFICANT CHANGE UP (ref 1.6–2.6)
MCHC RBC-ENTMCNC: 32.5 PG — SIGNIFICANT CHANGE UP (ref 27–34)
MCHC RBC-ENTMCNC: 33.2 G/DL — SIGNIFICANT CHANGE UP (ref 32–36)
MCV RBC AUTO: 97.9 FL — SIGNIFICANT CHANGE UP (ref 80–100)
MONOCYTES # BLD AUTO: 0.36 K/UL — SIGNIFICANT CHANGE UP (ref 0–0.9)
MONOCYTES NFR BLD AUTO: 3.5 % — SIGNIFICANT CHANGE UP (ref 2–14)
NEUTROPHILS # BLD AUTO: 7.01 K/UL — SIGNIFICANT CHANGE UP (ref 1.8–7.4)
NEUTROPHILS NFR BLD AUTO: 67.8 % — SIGNIFICANT CHANGE UP (ref 43–77)
NRBC # BLD AUTO: 0 K/UL — SIGNIFICANT CHANGE UP (ref 0–0)
NRBC # FLD: 0 K/UL — SIGNIFICANT CHANGE UP (ref 0–0)
NRBC BLD AUTO-RTO: 0 /100 WBCS — SIGNIFICANT CHANGE UP (ref 0–0)
PHOSPHATE SERPL-MCNC: 3.5 MG/DL — SIGNIFICANT CHANGE UP (ref 2.5–4.5)
PLATELET # BLD AUTO: 164 K/UL — SIGNIFICANT CHANGE UP (ref 150–400)
POTASSIUM SERPL-MCNC: 4.6 MMOL/L — SIGNIFICANT CHANGE UP (ref 3.5–5.3)
POTASSIUM SERPL-SCNC: 4.6 MMOL/L — SIGNIFICANT CHANGE UP (ref 3.5–5.3)
RBC # BLD: 3.38 M/UL — LOW (ref 3.8–5.2)
RBC # FLD: 13.1 % — SIGNIFICANT CHANGE UP (ref 10.3–14.5)
SODIUM SERPL-SCNC: 140 MMOL/L — SIGNIFICANT CHANGE UP (ref 135–145)
WBC # BLD: 10.33 K/UL — SIGNIFICANT CHANGE UP (ref 3.8–10.5)
WBC # FLD AUTO: 10.33 K/UL — SIGNIFICANT CHANGE UP (ref 3.8–10.5)

## 2025-04-11 PROCEDURE — 99232 SBSQ HOSP IP/OBS MODERATE 35: CPT

## 2025-04-11 PROCEDURE — 99233 SBSQ HOSP IP/OBS HIGH 50: CPT | Mod: GC

## 2025-04-11 RX ADMIN — METHYLPREDNISOLONE ACETATE 30 MILLIGRAM(S): 80 INJECTION, SUSPENSION INTRA-ARTICULAR; INTRALESIONAL; INTRAMUSCULAR; SOFT TISSUE at 17:46

## 2025-04-11 RX ADMIN — BUDESONIDE 0.5 MILLIGRAM(S): 0.25 SUSPENSION RESPIRATORY (INHALATION) at 21:09

## 2025-04-11 RX ADMIN — Medication 500 MICROGRAM(S): at 08:56

## 2025-04-11 RX ADMIN — Medication 1 APPLICATION(S): at 05:22

## 2025-04-11 RX ADMIN — LEVALBUTEROL HYDROCHLORIDE 0.63 MILLIGRAM(S): 1.25 SOLUTION RESPIRATORY (INHALATION) at 08:56

## 2025-04-11 RX ADMIN — DEXTROMETHORPHAN HBR, GUAIFENESIN 200 MILLIGRAM(S): 200 LIQUID ORAL at 05:16

## 2025-04-11 RX ADMIN — INSULIN LISPRO 5 UNIT(S): 100 INJECTION, SOLUTION INTRAVENOUS; SUBCUTANEOUS at 12:50

## 2025-04-11 RX ADMIN — Medication 40 MILLIGRAM(S): at 05:16

## 2025-04-11 RX ADMIN — BUDESONIDE 0.5 MILLIGRAM(S): 0.25 SUSPENSION RESPIRATORY (INHALATION) at 08:56

## 2025-04-11 RX ADMIN — INSULIN GLARGINE-YFGN 11 UNIT(S): 100 INJECTION, SOLUTION SUBCUTANEOUS at 23:18

## 2025-04-11 RX ADMIN — DEXTROMETHORPHAN HBR, GUAIFENESIN 200 MILLIGRAM(S): 200 LIQUID ORAL at 15:04

## 2025-04-11 RX ADMIN — Medication 50 MILLIGRAM(S): at 17:45

## 2025-04-11 RX ADMIN — DEXTROMETHORPHAN HBR, GUAIFENESIN 200 MILLIGRAM(S): 200 LIQUID ORAL at 21:34

## 2025-04-11 RX ADMIN — Medication 500 MICROGRAM(S): at 15:58

## 2025-04-11 RX ADMIN — LEVALBUTEROL HYDROCHLORIDE 0.63 MILLIGRAM(S): 1.25 SOLUTION RESPIRATORY (INHALATION) at 21:08

## 2025-04-11 RX ADMIN — Medication 50 MILLIGRAM(S): at 08:40

## 2025-04-11 RX ADMIN — Medication 500 MICROGRAM(S): at 21:08

## 2025-04-11 RX ADMIN — SERTRALINE 100 MILLIGRAM(S): 100 TABLET, FILM COATED ORAL at 12:50

## 2025-04-11 RX ADMIN — ENOXAPARIN SODIUM 40 MILLIGRAM(S): 100 INJECTION SUBCUTANEOUS at 17:45

## 2025-04-11 RX ADMIN — LISINOPRIL 40 MILLIGRAM(S): 5 TABLET ORAL at 05:16

## 2025-04-11 RX ADMIN — DEXTROMETHORPHAN HBR, GUAIFENESIN 200 MILLIGRAM(S): 200 LIQUID ORAL at 08:43

## 2025-04-11 RX ADMIN — OLANZAPINE 2.5 MILLIGRAM(S): 10 TABLET ORAL at 21:34

## 2025-04-11 RX ADMIN — METHYLPREDNISOLONE ACETATE 30 MILLIGRAM(S): 80 INJECTION, SUSPENSION INTRA-ARTICULAR; INTRALESIONAL; INTRAMUSCULAR; SOFT TISSUE at 05:15

## 2025-04-11 RX ADMIN — POLYETHYLENE GLYCOL 3350 17 GRAM(S): 17 POWDER, FOR SOLUTION ORAL at 17:45

## 2025-04-11 RX ADMIN — INSULIN LISPRO 5 UNIT(S): 100 INJECTION, SOLUTION INTRAVENOUS; SUBCUTANEOUS at 08:43

## 2025-04-11 RX ADMIN — LEVALBUTEROL HYDROCHLORIDE 0.63 MILLIGRAM(S): 1.25 SOLUTION RESPIRATORY (INHALATION) at 15:59

## 2025-04-11 RX ADMIN — Medication 2 TABLET(S): at 21:34

## 2025-04-11 RX ADMIN — INSULIN LISPRO 5 UNIT(S): 100 INJECTION, SOLUTION INTRAVENOUS; SUBCUTANEOUS at 17:52

## 2025-04-11 NOTE — PROGRESS NOTE ADULT - SUBJECTIVE AND OBJECTIVE BOX
[facility] Division of Hospital Medicine  Artis Tolbert) MD [place]  Pager [phone]    SUBJECTIVE:  Chief complaint: Catatonia.    Pt was seen and evaluated at bedside this AM. Son at bedside. Pt did not eat much today. She has been quite sleepy. He didn't want to push it. Pt wakes up when called up. Minimal conversation. Falls right back to sleep.      ROS: All systems negative except as noted.      Vital Signs Last 24 Hrs  T(C): 36.5 (11 Apr 2025 10:05), Max: 37.2 (10 Apr 2025 18:00)  T(F): 97.7 (11 Apr 2025 10:05), Max: 98.9 (10 Apr 2025 18:00)  HR: 95 (11 Apr 2025 10:05) (78 - 102)  BP: 113/89 (11 Apr 2025 10:05) (113/89 - 162/87)  BP(mean): --  RR: 17 (11 Apr 2025 10:05) (17 - 17)  SpO2: 95% (11 Apr 2025 10:05) (95% - 100%)    Parameters below as of 11 Apr 2025 10:05  Patient On (Oxygen Delivery Method): nasal cannula      PHYSICAL EXAM:  Gen- In bed, NAD  Resp- Clear. Adequate effort. No rrw.  CVS- RRR, S1S2, no g/r/m,.  GI- Soft , NT, ND, +BSx4  Ext- No C/C.        MEDICATION:  MEDICATIONS  (STANDING):  bisacodyl 10 milliGRAM(s) Oral once  buDESOnide    Inhalation Suspension 0.5 milliGRAM(s) Inhalation every 12 hours  chlorhexidine 2% Cloths 1 Application(s) Topical <User Schedule>  dextrose 5%. 1000 milliLiter(s) (100 mL/Hr) IV Continuous <Continuous>  dextrose 5%. 1000 milliLiter(s) (50 mL/Hr) IV Continuous <Continuous>  dextrose 50% Injectable 25 Gram(s) IV Push once  dextrose 50% Injectable 12.5 Gram(s) IV Push once  dextrose 50% Injectable 25 Gram(s) IV Push once  dextrose Oral Gel 15 Gram(s) Oral once  enoxaparin Injectable 40 milliGRAM(s) SubCutaneous every 24 hours  glucagon  Injectable 1 milliGRAM(s) IntraMuscular once  guaiFENesin Oral Liquid (Sugar-Free) 200 milliGRAM(s) Oral every 6 hours  hydrALAZINE 50 milliGRAM(s) Oral three times a day  hydrochlorothiazide 25 milliGRAM(s) Oral daily  insulin glargine Injectable (LANTUS) 11 Unit(s) SubCutaneous at bedtime  insulin lispro (ADMELOG) corrective regimen sliding scale   SubCutaneous three times a day before meals  insulin lispro (ADMELOG) corrective regimen sliding scale   SubCutaneous at bedtime  insulin lispro Injectable (ADMELOG) 5 Unit(s) SubCutaneous three times a day before meals  ipratropium    for Nebulization 500 MICROGram(s) Nebulizer every 8 hours  levalbuterol Inhalation 0.63 milliGRAM(s) Inhalation every 8 hours  lisinopril 40 milliGRAM(s) Oral daily  methylPREDNISolone sodium succinate Injectable 30 milliGRAM(s) IV Push every 12 hours  OLANZapine 2.5 milliGRAM(s) Oral at bedtime  pantoprazole    Tablet 40 milliGRAM(s) Oral before breakfast  polyethylene glycol 3350 17 Gram(s) Oral two times a day  senna 2 Tablet(s) Oral at bedtime  sertraline 100 milliGRAM(s) Oral daily    MEDICATIONS  (PRN):            LABORATORY:                          11.0   10.33 )-----------( 164      ( 11 Apr 2025 11:40 )             33.1     04-11    140  |  100  |  15  ----------------------------<  130[H]  4.6   |  31  |  0.60    Ca    9.9      11 Apr 2025 11:40  Phos  3.5     04-11  Mg     1.80     04-11        Urinalysis Basic - ( 11 Apr 2025 11:40 )    Color: x / Appearance: x / SG: x / pH: x  Gluc: 130 mg/dL / Ketone: x  / Bili: x / Urobili: x   Blood: x / Protein: x / Nitrite: x   Leuk Esterase: x / RBC: x / WBC x   Sq Epi: x / Non Sq Epi: x / Bacteria: x            SARS-CoV-2: NotDetec (19 Mar 2025 09:05)  SARS-CoV-2: NotDetec (08 Mar 2025 14:40)

## 2025-04-11 NOTE — PROGRESS NOTE ADULT - ASSESSMENT
74F reported asthma with prior intubations and depression initially admitted to MICU with suspected asthma exacerbation with RRT on 3/19 AM for hypoxemia and increased work of breathing with poor air entry. Patient transferred back to ICU for airway evaluation and asthma management and transferred back to floor 3/21, initially requiring BIPAP. Patient had adequate improvement from pulmonary perspective on floor however with episodes reported by team and family of wheezing and cough yesterday. Upon assesment patient is significantly lethargic, still under treatment with Ativan for underlying psychiatric disorder. Patient with likely recurring aspiration due to underlying mood disorder, with now recurretn asthma exacerbation    #Asthma Exacerbation - Recurred likely in setting of aspiration  #Lethargy - on Ativan TID now off: MS much more improved     - CTA chest 3/8 without evidence of PNA or PE  - repeat CTA negative for PE and clear lungs  - continue Duonebs every 6h, ensure to continue as well on discharge for 7 days   - Continue Pulmicort 0.5mg BID while lethargic  - as patient continues to be hypoxemic and wheezing, please continue IV solumderol 30 BID starting tomorrow  - Levalbuterol nebs every 6h + Ipratropium nebs every 6h: can decrease to q 8 now  - Incentive spirometry  - SLP: puree and thin liquids   - no indication for nocturnal NIV for now   - Use Home token on DC for OP Pulmonary follow up    74F reported asthma with prior intubations and depression initially admitted to MICU with suspected asthma exacerbation with RRT on 3/19 AM for hypoxemia and increased work of breathing with poor air entry. Patient transferred back to ICU for airway evaluation and asthma management and transferred back to floor 3/21, initially requiring BIPAP. Patient had adequate improvement from pulmonary perspective on floor however with episodes reported by team and family of wheezing and cough yesterday. Upon assesment patient is significantly lethargic, still under treatment with Ativan for underlying psychiatric disorder. Patient with likely recurring aspiration due to underlying mood disorder, with now recurretn asthma exacerbation    #Asthma Exacerbation - Recurred likely in setting of aspiration  #Lethargy - on Ativan TID now off: MS much more improved     - CTA chest 3/8 without evidence of PNA or PE  - repeat CTA negative for PE and clear lungs  - continue Duonebs every 6h, ensure to continue as well on discharge for 7 days   - Continue Pulmicort 0.5mg BID while lethargic  - as patient continues to be hypoxemic and wheezing, please continue IV solumderol 30 BID  - Levalbuterol nebs every 6h + Ipratropium nebs every 6h: can decrease to q 8 now  - Incentive spirometry  - SLP: puree and thin liquids   - no indication for nocturnal NIV for now   - Use Home token on DC for OP Pulmonary follow up

## 2025-04-11 NOTE — PROGRESS NOTE ADULT - SUBJECTIVE AND OBJECTIVE BOX
CHIEF COMPLAINT:Patient is a 74y old  Female who presents with a chief complaint of AMS and increased WOB (21 Mar 2025 06:24)      Interval Events:    REVIEW OF SYSTEMS:  [x] All other systems negative except per HPI   [ ] Unable to assess ROS because ________    OBJECTIVE:  ICU Vital Signs Last 24 Hrs  T(C): 36.3 (11 Apr 2025 05:00), Max: 37.4 (10 Apr 2025 09:00)  T(F): 97.4 (11 Apr 2025 05:00), Max: 99.3 (10 Apr 2025 09:00)  HR: 78 (11 Apr 2025 05:00) (78 - 104)  BP: 146/78 (11 Apr 2025 05:00) (131/75 - 159/78)  BP(mean): --  ABP: --  ABP(mean): --  RR: 17 (11 Apr 2025 05:00) (17 - 18)  SpO2: 99% (11 Apr 2025 05:00) (96% - 100%)    O2 Parameters below as of 11 Apr 2025 05:00  Patient On (Oxygen Delivery Method): nasal cannula                PHYSICAL EXAM:  GENERAL: NAD, well-groomed, well-developed  HEAD:  Atraumatic, Normocephalic  EYES: EOMI, PERRLA, conjunctiva and sclera clear  ENMT: No tonsillar erythema, exudates, or enlargement; Moist mucous membranes, Good dentition, No lesions  NECK: Supple, No JVD, Normal thyroid  CHEST/LUNG: Clear to auscultation bilaterally; No rales, rhonchi, wheezing, or rubs  HEART: Regular rate and rhythm; No murmurs, rubs, or gallops  ABDOMEN: Soft, Nontender, Nondistended; Bowel sounds present  VASCULAR:  2+ Peripheral Pulses, No clubbing, cyanosis, or edema  LYMPH: No lymphadenopathy noted  SKIN: No rashes or lesions  NERVOUS SYSTEM:  Alert & Oriented X3, Good concentration; Motor Strength 5/5 B/L upper and lower extremities; DTRs 2+ intact and symmetric    HOSPITAL MEDICATIONS:  MEDICATIONS  (STANDING):  bisacodyl 10 milliGRAM(s) Oral once  buDESOnide    Inhalation Suspension 0.5 milliGRAM(s) Inhalation every 12 hours  chlorhexidine 2% Cloths 1 Application(s) Topical <User Schedule>  dextrose 5%. 1000 milliLiter(s) (100 mL/Hr) IV Continuous <Continuous>  dextrose 5%. 1000 milliLiter(s) (50 mL/Hr) IV Continuous <Continuous>  dextrose 50% Injectable 25 Gram(s) IV Push once  dextrose 50% Injectable 12.5 Gram(s) IV Push once  dextrose 50% Injectable 25 Gram(s) IV Push once  dextrose Oral Gel 15 Gram(s) Oral once  enoxaparin Injectable 40 milliGRAM(s) SubCutaneous every 24 hours  glucagon  Injectable 1 milliGRAM(s) IntraMuscular once  guaiFENesin Oral Liquid (Sugar-Free) 200 milliGRAM(s) Oral every 6 hours  hydrALAZINE 50 milliGRAM(s) Oral three times a day  hydrochlorothiazide 25 milliGRAM(s) Oral daily  insulin glargine Injectable (LANTUS) 11 Unit(s) SubCutaneous at bedtime  insulin lispro (ADMELOG) corrective regimen sliding scale   SubCutaneous three times a day before meals  insulin lispro (ADMELOG) corrective regimen sliding scale   SubCutaneous at bedtime  insulin lispro Injectable (ADMELOG) 5 Unit(s) SubCutaneous three times a day before meals  ipratropium    for Nebulization 500 MICROGram(s) Nebulizer every 8 hours  levalbuterol Inhalation 0.63 milliGRAM(s) Inhalation every 8 hours  lisinopril 40 milliGRAM(s) Oral daily  methylPREDNISolone sodium succinate Injectable 30 milliGRAM(s) IV Push every 12 hours  OLANZapine 2.5 milliGRAM(s) Oral at bedtime  pantoprazole    Tablet 40 milliGRAM(s) Oral before breakfast  polyethylene glycol 3350 17 Gram(s) Oral two times a day  senna 2 Tablet(s) Oral at bedtime  sertraline 100 milliGRAM(s) Oral daily    MEDICATIONS  (PRN):      LABS:    The Labs were reviewed by me   The Radiology was reviewed by me    EKG tracing reviewed by me    04-09    140  |  99  |  17  ----------------------------<  126[H]  4.0   |  31  |  0.54    Ca    9.8      09 Apr 2025 07:30  Phos  2.6     04-09  Mg     1.80     04-09      Magnesium: 1.80 mg/dL (04-09-25 @ 07:30)    Phosphorus: 2.6 mg/dL (04-09-25 @ 07:30)                    Urinalysis Basic - ( 09 Apr 2025 07:30 )    Color: x / Appearance: x / SG: x / pH: x  Gluc: 126 mg/dL / Ketone: x  / Bili: x / Urobili: x   Blood: x / Protein: x / Nitrite: x   Leuk Esterase: x / RBC: x / WBC x   Sq Epi: x / Non Sq Epi: x / Bacteria: x          CAPILLARY BLOOD GLUCOSE      POCT Blood Glucose.: 154 mg/dL (10 Apr 2025 22:21)  POCT Blood Glucose.: 120 mg/dL (10 Apr 2025 17:31)  POCT Blood Glucose.: 343 mg/dL (10 Apr 2025 12:23)  POCT Blood Glucose.: 85 mg/dL (10 Apr 2025 08:23)        MICROBIOLOGY:     RADIOLOGY:  [ ] Reviewed and interpreted by me    Point of Care Ultrasound Findings:    PFT:    EKG: CHIEF COMPLAINT:Patient is a 74y old  Female who presents with a chief complaint of AMS and increased WOB (21 Mar 2025 06:24)      Interval Events: patient more interactive, intermittent cough    REVIEW OF SYSTEMS:  [x] All other systems negative except per HPI   [ ] Unable to assess ROS because ________    OBJECTIVE:  ICU Vital Signs Last 24 Hrs  T(C): 36.3 (11 Apr 2025 05:00), Max: 37.4 (10 Apr 2025 09:00)  T(F): 97.4 (11 Apr 2025 05:00), Max: 99.3 (10 Apr 2025 09:00)  HR: 78 (11 Apr 2025 05:00) (78 - 104)  BP: 146/78 (11 Apr 2025 05:00) (131/75 - 159/78)  BP(mean): --  ABP: --  ABP(mean): --  RR: 17 (11 Apr 2025 05:00) (17 - 18)  SpO2: 99% (11 Apr 2025 05:00) (96% - 100%)    O2 Parameters below as of 11 Apr 2025 05:00  Patient On (Oxygen Delivery Method): nasal cannula                PHYSICAL EXAM:  GENERAL: NAD, well-groomed, well-developed  HEAD:  Atraumatic, Normocephalic  EYES: EOMI, PERRLA, conjunctiva and sclera clear  ENMT: No tonsillar erythema, exudates, or enlargement; Moist mucous membranes, Good dentition, No lesions  NECK: Supple, No JVD, Normal thyroid  CHEST/LUNG: wheeze  HEART: Regular rate and rhythm; No murmurs, rubs, or gallops  ABDOMEN: Soft, Nontender, Nondistended; Bowel sounds present  VASCULAR:  2+ Peripheral Pulses, No clubbing, cyanosis, or edema  LYMPH: No lymphadenopathy noted  SKIN: No rashes or lesions  NERVOUS SYSTEM:  Alert & Oriented X1, Good concentration; Motor Strength 5/5 B/L upper and lower extremities; DTRs 2+ intact and symmetric    HOSPITAL MEDICATIONS:  MEDICATIONS  (STANDING):  bisacodyl 10 milliGRAM(s) Oral once  buDESOnide    Inhalation Suspension 0.5 milliGRAM(s) Inhalation every 12 hours  chlorhexidine 2% Cloths 1 Application(s) Topical <User Schedule>  dextrose 5%. 1000 milliLiter(s) (100 mL/Hr) IV Continuous <Continuous>  dextrose 5%. 1000 milliLiter(s) (50 mL/Hr) IV Continuous <Continuous>  dextrose 50% Injectable 25 Gram(s) IV Push once  dextrose 50% Injectable 12.5 Gram(s) IV Push once  dextrose 50% Injectable 25 Gram(s) IV Push once  dextrose Oral Gel 15 Gram(s) Oral once  enoxaparin Injectable 40 milliGRAM(s) SubCutaneous every 24 hours  glucagon  Injectable 1 milliGRAM(s) IntraMuscular once  guaiFENesin Oral Liquid (Sugar-Free) 200 milliGRAM(s) Oral every 6 hours  hydrALAZINE 50 milliGRAM(s) Oral three times a day  hydrochlorothiazide 25 milliGRAM(s) Oral daily  insulin glargine Injectable (LANTUS) 11 Unit(s) SubCutaneous at bedtime  insulin lispro (ADMELOG) corrective regimen sliding scale   SubCutaneous three times a day before meals  insulin lispro (ADMELOG) corrective regimen sliding scale   SubCutaneous at bedtime  insulin lispro Injectable (ADMELOG) 5 Unit(s) SubCutaneous three times a day before meals  ipratropium    for Nebulization 500 MICROGram(s) Nebulizer every 8 hours  levalbuterol Inhalation 0.63 milliGRAM(s) Inhalation every 8 hours  lisinopril 40 milliGRAM(s) Oral daily  methylPREDNISolone sodium succinate Injectable 30 milliGRAM(s) IV Push every 12 hours  OLANZapine 2.5 milliGRAM(s) Oral at bedtime  pantoprazole    Tablet 40 milliGRAM(s) Oral before breakfast  polyethylene glycol 3350 17 Gram(s) Oral two times a day  senna 2 Tablet(s) Oral at bedtime  sertraline 100 milliGRAM(s) Oral daily    MEDICATIONS  (PRN):      LABS:    The Labs were reviewed by me   The Radiology was reviewed by me    EKG tracing reviewed by me    04-09    140  |  99  |  17  ----------------------------<  126[H]  4.0   |  31  |  0.54    Ca    9.8      09 Apr 2025 07:30  Phos  2.6     04-09  Mg     1.80     04-09      Magnesium: 1.80 mg/dL (04-09-25 @ 07:30)    Phosphorus: 2.6 mg/dL (04-09-25 @ 07:30)                    Urinalysis Basic - ( 09 Apr 2025 07:30 )    Color: x / Appearance: x / SG: x / pH: x  Gluc: 126 mg/dL / Ketone: x  / Bili: x / Urobili: x   Blood: x / Protein: x / Nitrite: x   Leuk Esterase: x / RBC: x / WBC x   Sq Epi: x / Non Sq Epi: x / Bacteria: x          CAPILLARY BLOOD GLUCOSE      POCT Blood Glucose.: 154 mg/dL (10 Apr 2025 22:21)  POCT Blood Glucose.: 120 mg/dL (10 Apr 2025 17:31)  POCT Blood Glucose.: 343 mg/dL (10 Apr 2025 12:23)  POCT Blood Glucose.: 85 mg/dL (10 Apr 2025 08:23)        MICROBIOLOGY:     RADIOLOGY:  [ ] Reviewed and interpreted by me    Point of Care Ultrasound Findings:    PFT:    EKG:

## 2025-04-11 NOTE — PROGRESS NOTE ADULT - PROBLEM SELECTOR PLAN 2
2/2 to status asthmaticus. Concern this may be occurring in setting of aspiration.   Follow-up aspergillus Ab: negative , ANCA: negative , IgE: negative    s/p intubation and bilevel now on NC  c/w ipratropium + xopenex nebs q8h  s/p course of antibiotics  c/w budesonide BID  -Wean down O2 as tolerated   -No role for nocturnal NIV for now. Will cont to monitor off.   -Solumedrol 30mg BID - dosing as per pulm.

## 2025-04-11 NOTE — PROGRESS NOTE ADULT - ATTENDING COMMENTS
75 yo F PMH reported asthma w/ prior intubations, depression w/ hx of cataonia presented with asthma exacerbation requiring MICU. Also with concern for recurrent aspiration    - awake and alert weaned to 1LNC though still intermittently responding  - c/w bronchodilators  - increased to solumedrol 30mg BID due to ongoing wheezes and hypoxemia  - CTA without overt consolidations or PE  - encourage IS and OOB as tolerated  - holding NIV, will assess need for NIV for chronic hypercapnia as obstruction improves; d/w family - pt may have difficulty tolerating when she is not at mental state baseline; last VBG off NIV 7.4 / 59 / 43  - ENT eval 4/3 without VC paralysis  - ongoing BH optimization

## 2025-04-11 NOTE — PROVIDER CONTACT NOTE (OTHER) - ASSESSMENT
Patient is alert and oriented to person. Patient asleep and hard to wake up, son reports this is her baseline at times. Medication held for aspiration risk.  /71. Provider aware

## 2025-04-11 NOTE — PROGRESS NOTE ADULT - PROBLEM SELECTOR PLAN 1
PO intake has been good these past few days. More drowsy this AM, ?related to zyprexa.   -Hx of MDD w/ psychotic features.  -Concerns for catatonia presently.  -Trialed ativan, but now on hold. Concern that it may have contributed to respiratory decompensation.  -Cont Zoloft 100 qday (home dose was 150).  -Cont Zyprexa 2.5 QHS.  -Further titration as per .

## 2025-04-12 LAB
GLUCOSE BLDC GLUCOMTR-MCNC: 144 MG/DL — HIGH (ref 70–99)
GLUCOSE BLDC GLUCOMTR-MCNC: 169 MG/DL — HIGH (ref 70–99)
GLUCOSE BLDC GLUCOMTR-MCNC: 313 MG/DL — HIGH (ref 70–99)
GLUCOSE BLDC GLUCOMTR-MCNC: 95 MG/DL — SIGNIFICANT CHANGE UP (ref 70–99)

## 2025-04-12 PROCEDURE — 99232 SBSQ HOSP IP/OBS MODERATE 35: CPT

## 2025-04-12 RX ADMIN — Medication 50 MILLIGRAM(S): at 08:47

## 2025-04-12 RX ADMIN — SERTRALINE 100 MILLIGRAM(S): 100 TABLET, FILM COATED ORAL at 12:36

## 2025-04-12 RX ADMIN — INSULIN LISPRO 5 UNIT(S): 100 INJECTION, SOLUTION INTRAVENOUS; SUBCUTANEOUS at 08:42

## 2025-04-12 RX ADMIN — INSULIN LISPRO 5 UNIT(S): 100 INJECTION, SOLUTION INTRAVENOUS; SUBCUTANEOUS at 12:35

## 2025-04-12 RX ADMIN — DEXTROMETHORPHAN HBR, GUAIFENESIN 200 MILLIGRAM(S): 200 LIQUID ORAL at 13:10

## 2025-04-12 RX ADMIN — Medication 40 MILLIGRAM(S): at 06:39

## 2025-04-12 RX ADMIN — METHYLPREDNISOLONE ACETATE 30 MILLIGRAM(S): 80 INJECTION, SUSPENSION INTRA-ARTICULAR; INTRALESIONAL; INTRAMUSCULAR; SOFT TISSUE at 17:56

## 2025-04-12 RX ADMIN — Medication 50 MILLIGRAM(S): at 02:26

## 2025-04-12 RX ADMIN — LEVALBUTEROL HYDROCHLORIDE 0.63 MILLIGRAM(S): 1.25 SOLUTION RESPIRATORY (INHALATION) at 16:09

## 2025-04-12 RX ADMIN — ENOXAPARIN SODIUM 40 MILLIGRAM(S): 100 INJECTION SUBCUTANEOUS at 17:56

## 2025-04-12 RX ADMIN — METHYLPREDNISOLONE ACETATE 30 MILLIGRAM(S): 80 INJECTION, SUSPENSION INTRA-ARTICULAR; INTRALESIONAL; INTRAMUSCULAR; SOFT TISSUE at 06:40

## 2025-04-12 RX ADMIN — INSULIN GLARGINE-YFGN 11 UNIT(S): 100 INJECTION, SOLUTION SUBCUTANEOUS at 22:59

## 2025-04-12 RX ADMIN — Medication 1 APPLICATION(S): at 06:40

## 2025-04-12 RX ADMIN — DEXTROMETHORPHAN HBR, GUAIFENESIN 200 MILLIGRAM(S): 200 LIQUID ORAL at 21:04

## 2025-04-12 RX ADMIN — DEXTROMETHORPHAN HBR, GUAIFENESIN 200 MILLIGRAM(S): 200 LIQUID ORAL at 02:26

## 2025-04-12 RX ADMIN — LEVALBUTEROL HYDROCHLORIDE 0.63 MILLIGRAM(S): 1.25 SOLUTION RESPIRATORY (INHALATION) at 21:55

## 2025-04-12 RX ADMIN — Medication 500 MICROGRAM(S): at 16:08

## 2025-04-12 RX ADMIN — DEXTROMETHORPHAN HBR, GUAIFENESIN 200 MILLIGRAM(S): 200 LIQUID ORAL at 08:43

## 2025-04-12 RX ADMIN — INSULIN LISPRO 5 UNIT(S): 100 INJECTION, SOLUTION INTRAVENOUS; SUBCUTANEOUS at 17:59

## 2025-04-12 RX ADMIN — LISINOPRIL 40 MILLIGRAM(S): 5 TABLET ORAL at 06:40

## 2025-04-12 RX ADMIN — Medication 500 MICROGRAM(S): at 21:55

## 2025-04-12 RX ADMIN — BUDESONIDE 0.5 MILLIGRAM(S): 0.25 SUSPENSION RESPIRATORY (INHALATION) at 21:55

## 2025-04-12 RX ADMIN — BUDESONIDE 0.5 MILLIGRAM(S): 0.25 SUSPENSION RESPIRATORY (INHALATION) at 08:43

## 2025-04-12 RX ADMIN — INSULIN LISPRO 4: 100 INJECTION, SOLUTION INTRAVENOUS; SUBCUTANEOUS at 12:35

## 2025-04-12 RX ADMIN — OLANZAPINE 2.5 MILLIGRAM(S): 10 TABLET ORAL at 21:03

## 2025-04-12 RX ADMIN — INSULIN LISPRO 1: 100 INJECTION, SOLUTION INTRAVENOUS; SUBCUTANEOUS at 17:59

## 2025-04-12 RX ADMIN — LEVALBUTEROL HYDROCHLORIDE 0.63 MILLIGRAM(S): 1.25 SOLUTION RESPIRATORY (INHALATION) at 08:44

## 2025-04-12 RX ADMIN — Medication 500 MICROGRAM(S): at 08:43

## 2025-04-12 RX ADMIN — Medication 50 MILLIGRAM(S): at 17:55

## 2025-04-12 NOTE — PROGRESS NOTE ADULT - PROBLEM SELECTOR PLAN 4
Likely initially in the setting of AHRF 2' asthma exacerbation.  Currently mental status more likely driven by her psych condition. Catatonia treatment as discussed above.

## 2025-04-12 NOTE — PROGRESS NOTE ADULT - SUBJECTIVE AND OBJECTIVE BOX
[facility] Division of Hospital Medicine  Artis GRACIA BayRaoul) MD Carlos  Pager [phone]    SUBJECTIVE:  Chief complaint: ________.    Pt was seen and evaluated       ROS: All systems negative except as noted.      Vital Signs Last 24 Hrs  T(C): 37.2 (12 Apr 2025 10:00), Max: 37.2 (12 Apr 2025 10:00)  T(F): 99 (12 Apr 2025 10:00), Max: 99 (12 Apr 2025 10:00)  HR: 95 (12 Apr 2025 10:00) (81 - 96)  BP: 104/80 (12 Apr 2025 10:00) (104/80 - 157/89)  BP(mean): --  RR: 18 (12 Apr 2025 10:00) (17 - 18)  SpO2: 96% (12 Apr 2025 10:00) (93% - 100%)    Parameters below as of 12 Apr 2025 10:00  Patient On (Oxygen Delivery Method): nasal cannula          PHYSICAL EXAM:              MEDICATION:  MEDICATIONS  (STANDING):  bisacodyl 10 milliGRAM(s) Oral once  buDESOnide    Inhalation Suspension 0.5 milliGRAM(s) Inhalation every 12 hours  chlorhexidine 2% Cloths 1 Application(s) Topical <User Schedule>  dextrose 5%. 1000 milliLiter(s) (100 mL/Hr) IV Continuous <Continuous>  dextrose 5%. 1000 milliLiter(s) (50 mL/Hr) IV Continuous <Continuous>  dextrose 50% Injectable 25 Gram(s) IV Push once  dextrose 50% Injectable 12.5 Gram(s) IV Push once  dextrose 50% Injectable 25 Gram(s) IV Push once  dextrose Oral Gel 15 Gram(s) Oral once  enoxaparin Injectable 40 milliGRAM(s) SubCutaneous every 24 hours  glucagon  Injectable 1 milliGRAM(s) IntraMuscular once  guaiFENesin Oral Liquid (Sugar-Free) 200 milliGRAM(s) Oral every 6 hours  hydrALAZINE 50 milliGRAM(s) Oral three times a day  hydrochlorothiazide 25 milliGRAM(s) Oral daily  insulin glargine Injectable (LANTUS) 11 Unit(s) SubCutaneous at bedtime  insulin lispro (ADMELOG) corrective regimen sliding scale   SubCutaneous three times a day before meals  insulin lispro (ADMELOG) corrective regimen sliding scale   SubCutaneous at bedtime  insulin lispro Injectable (ADMELOG) 5 Unit(s) SubCutaneous three times a day before meals  ipratropium    for Nebulization 500 MICROGram(s) Nebulizer every 8 hours  levalbuterol Inhalation 0.63 milliGRAM(s) Inhalation every 8 hours  lisinopril 40 milliGRAM(s) Oral daily  methylPREDNISolone sodium succinate Injectable 30 milliGRAM(s) IV Push every 12 hours  OLANZapine 2.5 milliGRAM(s) Oral at bedtime  pantoprazole    Tablet 40 milliGRAM(s) Oral before breakfast  polyethylene glycol 3350 17 Gram(s) Oral two times a day  senna 2 Tablet(s) Oral at bedtime  sertraline 100 milliGRAM(s) Oral daily    MEDICATIONS  (PRN):            LABORATORY:                          11.0   10.33 )-----------( 164      ( 11 Apr 2025 11:40 )             33.1     04-11    140  |  100  |  15  ----------------------------<  130[H]  4.6   |  31  |  0.60    Ca    9.9      11 Apr 2025 11:40  Phos  3.5     04-11  Mg     1.80     04-11        Urinalysis Basic - ( 11 Apr 2025 11:40 )    Color: x / Appearance: x / SG: x / pH: x  Gluc: 130 mg/dL / Ketone: x  / Bili: x / Urobili: x   Blood: x / Protein: x / Nitrite: x   Leuk Esterase: x / RBC: x / WBC x   Sq Epi: x / Non Sq Epi: x / Bacteria: x            SARS-CoV-2: NotDetec (19 Mar 2025 09:05)  SARS-CoV-2: NotDetec (08 Mar 2025 14:40)               [facility] of Hospital Medicine  Artis GRACIA BayRaoul) MD [place]  Pager [phone]    SUBJECTIVE:  Chief complaint: Catatonia.    Pt was seen and evaluated at bedside this AM. no o/n events Denies any SOB/CP/NV. Ate her breakfast. More interactive today/answering all questions. Increased energy level. Spouse at bedside.        ROS: All systems negative except as noted.      Vital Signs Last 24 Hrs  T(C): 37.2 (12 Apr 2025 10:00), Max: 37.2 (12 Apr 2025 10:00)  T(F): 99 (12 Apr 2025 10:00), Max: 99 (12 Apr 2025 10:00)  HR: 95 (12 Apr 2025 10:00) (81 - 96)  BP: 104/80 (12 Apr 2025 10:00) (104/80 - 157/89)  BP(mean): --  RR: 18 (12 Apr 2025 10:00) (17 - 18)  SpO2: 96% (12 Apr 2025 10:00) (93% - 100%)    Parameters below as of 12 Apr 2025 10:00  Patient On (Oxygen Delivery Method): nasal cannula      PHYSICAL EXAM:    Gen- In bed, NAD  Resp- Bilateral exp wheezes today. Good effort.   CVS- RRR, S1S2, no g/r/m,.  GI- Soft , NT, ND, +BSx4  Ext- No C/C.        MEDICATION:  MEDICATIONS  (STANDING):  bisacodyl 10 milliGRAM(s) Oral once  buDESOnide    Inhalation Suspension 0.5 milliGRAM(s) Inhalation every 12 hours  chlorhexidine 2% Cloths 1 Application(s) Topical <User Schedule>  dextrose 5%. 1000 milliLiter(s) (100 mL/Hr) IV Continuous <Continuous>  dextrose 5%. 1000 milliLiter(s) (50 mL/Hr) IV Continuous <Continuous>  dextrose 50% Injectable 25 Gram(s) IV Push once  dextrose 50% Injectable 12.5 Gram(s) IV Push once  dextrose 50% Injectable 25 Gram(s) IV Push once  dextrose Oral Gel 15 Gram(s) Oral once  enoxaparin Injectable 40 milliGRAM(s) SubCutaneous every 24 hours  glucagon  Injectable 1 milliGRAM(s) IntraMuscular once  guaiFENesin Oral Liquid (Sugar-Free) 200 milliGRAM(s) Oral every 6 hours  hydrALAZINE 50 milliGRAM(s) Oral three times a day  hydrochlorothiazide 25 milliGRAM(s) Oral daily  insulin glargine Injectable (LANTUS) 11 Unit(s) SubCutaneous at bedtime  insulin lispro (ADMELOG) corrective regimen sliding scale   SubCutaneous three times a day before meals  insulin lispro (ADMELOG) corrective regimen sliding scale   SubCutaneous at bedtime  insulin lispro Injectable (ADMELOG) 5 Unit(s) SubCutaneous three times a day before meals  ipratropium    for Nebulization 500 MICROGram(s) Nebulizer every 8 hours  levalbuterol Inhalation 0.63 milliGRAM(s) Inhalation every 8 hours  lisinopril 40 milliGRAM(s) Oral daily  methylPREDNISolone sodium succinate Injectable 30 milliGRAM(s) IV Push every 12 hours  OLANZapine 2.5 milliGRAM(s) Oral at bedtime  pantoprazole    Tablet 40 milliGRAM(s) Oral before breakfast  polyethylene glycol 3350 17 Gram(s) Oral two times a day  senna 2 Tablet(s) Oral at bedtime  sertraline 100 milliGRAM(s) Oral daily    MEDICATIONS  (PRN):            LABORATORY:                          11.0   10.33 )-----------( 164      ( 11 Apr 2025 11:40 )             33.1     04-11    140  |  100  |  15  ----------------------------<  130[H]  4.6   |  31  |  0.60    Ca    9.9      11 Apr 2025 11:40  Phos  3.5     04-11  Mg     1.80     04-11        Urinalysis Basic - ( 11 Apr 2025 11:40 )    Color: x / Appearance: x / SG: x / pH: x  Gluc: 130 mg/dL / Ketone: x  / Bili: x / Urobili: x   Blood: x / Protein: x / Nitrite: x   Leuk Esterase: x / RBC: x / WBC x   Sq Epi: x / Non Sq Epi: x / Bacteria: x            SARS-CoV-2: NotDetec (19 Mar 2025 09:05)  SARS-CoV-2: NotDetec (08 Mar 2025 14:40)

## 2025-04-12 NOTE — PROGRESS NOTE ADULT - PROBLEM SELECTOR PLAN 10
Hpi Title: Evaluation of Skin Lesions How Severe Are Your Spot(S)?: mild Have Your Spot(S) Been Treated In The Past?: has not been treated DVT: lovenox  code: full  GI; PPI    Dispo- TK julio KEITA. Pending further optimization as above.

## 2025-04-12 NOTE — PROGRESS NOTE ADULT - PROBLEM SELECTOR PLAN 1
Much improved - more interactive. Eating ok. Much more alert compared to previous days.  -Hx of MDD w/ psychotic features.  -Trialed ativan, but now on hold. Concern that it may have contributed to respiratory decompensation.  -Cont Zoloft 100 qday (home dose was 150).  -Cont Zyprexa 2.5 QHS.  -Further titration as per . No changes over the weekend as discussed w/ pulm.

## 2025-04-12 NOTE — PROGRESS NOTE ADULT - PROBLEM SELECTOR PLAN 2
2/2 to status asthmaticus. Concern this may be occurring in setting of aspiration.   Follow-up aspergillus Ab: negative , ANCA: negative , IgE: negative    s/p intubation and bilevel now on NC  c/w ipratropium + xopenex nebs q8h  s/p course of antibiotics  c/w budesonide BID  -Wean down O2 as tolerated   -No role for nocturnal NIV for now. Will cont to monitor off.   -Solumedrol 30mg BID - dosing as per pulm - quite wheezy today.

## 2025-04-13 LAB
GLUCOSE BLDC GLUCOMTR-MCNC: 109 MG/DL — HIGH (ref 70–99)
GLUCOSE BLDC GLUCOMTR-MCNC: 177 MG/DL — HIGH (ref 70–99)
GLUCOSE BLDC GLUCOMTR-MCNC: 199 MG/DL — HIGH (ref 70–99)
GLUCOSE BLDC GLUCOMTR-MCNC: 207 MG/DL — HIGH (ref 70–99)

## 2025-04-13 PROCEDURE — 99232 SBSQ HOSP IP/OBS MODERATE 35: CPT

## 2025-04-13 RX ADMIN — Medication 50 MILLIGRAM(S): at 18:07

## 2025-04-13 RX ADMIN — Medication 500 MICROGRAM(S): at 22:00

## 2025-04-13 RX ADMIN — LEVALBUTEROL HYDROCHLORIDE 0.63 MILLIGRAM(S): 1.25 SOLUTION RESPIRATORY (INHALATION) at 23:50

## 2025-04-13 RX ADMIN — Medication 40 MILLIGRAM(S): at 07:17

## 2025-04-13 RX ADMIN — DEXTROMETHORPHAN HBR, GUAIFENESIN 200 MILLIGRAM(S): 200 LIQUID ORAL at 02:12

## 2025-04-13 RX ADMIN — INSULIN GLARGINE-YFGN 11 UNIT(S): 100 INJECTION, SOLUTION SUBCUTANEOUS at 23:42

## 2025-04-13 RX ADMIN — POLYETHYLENE GLYCOL 3350 17 GRAM(S): 17 POWDER, FOR SOLUTION ORAL at 18:07

## 2025-04-13 RX ADMIN — Medication 500 MICROGRAM(S): at 14:30

## 2025-04-13 RX ADMIN — Medication 2 TABLET(S): at 21:23

## 2025-04-13 RX ADMIN — INSULIN LISPRO 1: 100 INJECTION, SOLUTION INTRAVENOUS; SUBCUTANEOUS at 18:12

## 2025-04-13 RX ADMIN — LEVALBUTEROL HYDROCHLORIDE 0.63 MILLIGRAM(S): 1.25 SOLUTION RESPIRATORY (INHALATION) at 08:27

## 2025-04-13 RX ADMIN — INSULIN LISPRO 5 UNIT(S): 100 INJECTION, SOLUTION INTRAVENOUS; SUBCUTANEOUS at 09:06

## 2025-04-13 RX ADMIN — ENOXAPARIN SODIUM 40 MILLIGRAM(S): 100 INJECTION SUBCUTANEOUS at 18:08

## 2025-04-13 RX ADMIN — SERTRALINE 100 MILLIGRAM(S): 100 TABLET, FILM COATED ORAL at 12:39

## 2025-04-13 RX ADMIN — INSULIN LISPRO 1: 100 INJECTION, SOLUTION INTRAVENOUS; SUBCUTANEOUS at 12:38

## 2025-04-13 RX ADMIN — DEXTROMETHORPHAN HBR, GUAIFENESIN 200 MILLIGRAM(S): 200 LIQUID ORAL at 09:07

## 2025-04-13 RX ADMIN — METHYLPREDNISOLONE ACETATE 30 MILLIGRAM(S): 80 INJECTION, SUSPENSION INTRA-ARTICULAR; INTRALESIONAL; INTRAMUSCULAR; SOFT TISSUE at 18:07

## 2025-04-13 RX ADMIN — Medication 50 MILLIGRAM(S): at 09:07

## 2025-04-13 RX ADMIN — Medication 500 MICROGRAM(S): at 08:26

## 2025-04-13 RX ADMIN — INSULIN LISPRO 5 UNIT(S): 100 INJECTION, SOLUTION INTRAVENOUS; SUBCUTANEOUS at 12:38

## 2025-04-13 RX ADMIN — Medication 50 MILLIGRAM(S): at 02:11

## 2025-04-13 RX ADMIN — BUDESONIDE 0.5 MILLIGRAM(S): 0.25 SUSPENSION RESPIRATORY (INHALATION) at 08:27

## 2025-04-13 RX ADMIN — METHYLPREDNISOLONE ACETATE 30 MILLIGRAM(S): 80 INJECTION, SUSPENSION INTRA-ARTICULAR; INTRALESIONAL; INTRAMUSCULAR; SOFT TISSUE at 07:17

## 2025-04-13 RX ADMIN — INSULIN LISPRO 5 UNIT(S): 100 INJECTION, SOLUTION INTRAVENOUS; SUBCUTANEOUS at 18:12

## 2025-04-13 RX ADMIN — LEVALBUTEROL HYDROCHLORIDE 0.63 MILLIGRAM(S): 1.25 SOLUTION RESPIRATORY (INHALATION) at 14:29

## 2025-04-13 RX ADMIN — OLANZAPINE 2.5 MILLIGRAM(S): 10 TABLET ORAL at 21:23

## 2025-04-13 RX ADMIN — BUDESONIDE 0.5 MILLIGRAM(S): 0.25 SUSPENSION RESPIRATORY (INHALATION) at 21:59

## 2025-04-13 RX ADMIN — DEXTROMETHORPHAN HBR, GUAIFENESIN 200 MILLIGRAM(S): 200 LIQUID ORAL at 21:33

## 2025-04-13 RX ADMIN — DEXTROMETHORPHAN HBR, GUAIFENESIN 200 MILLIGRAM(S): 200 LIQUID ORAL at 14:14

## 2025-04-13 RX ADMIN — LISINOPRIL 40 MILLIGRAM(S): 5 TABLET ORAL at 07:16

## 2025-04-13 RX ADMIN — Medication 1 APPLICATION(S): at 07:33

## 2025-04-13 NOTE — PROGRESS NOTE ADULT - PROBLEM SELECTOR PLAN 2
2/2 to status asthmaticus. Concern this may be occurring in setting of aspiration.   Follow-up aspergillus Ab: negative , ANCA: negative , IgE: negative    s/p intubation and bilevel now on NC  c/w ipratropium + xopenex nebs q8h  s/p course of antibiotics  c/w budesonide BID  -Wean down O2 as tolerated   -No role for nocturnal NIV for now. Will cont to monitor off.   -Solumedrol 30mg BID - dosing as per pulm - f/u Mon

## 2025-04-13 NOTE — PROGRESS NOTE ADULT - SUBJECTIVE AND OBJECTIVE BOX
[facility] Division of Hospital Medicine  Artis GRACIA BayRaoul) MD [place]  Pager [phone]    SUBJECTIVE:  Chief complaint: Catatonia.    Pt was seen and evaluated at bedside this AM. No o/n events. Son at bedside. No sig changes. Pt is awake, interactive. Not drowsy. Eating/taking meds, but requires more prompting.       ROS: All systems negative except as noted.      Vital Signs Last 24 Hrs  T(C): 37 (13 Apr 2025 09:07), Max: 37.2 (12 Apr 2025 21:00)  T(F): 98.6 (13 Apr 2025 09:07), Max: 98.9 (12 Apr 2025 21:00)  HR: 84 (13 Apr 2025 14:39) (74 - 100)  BP: 122/63 (13 Apr 2025 09:07) (117/67 - 137/78)  BP(mean): --  RR: 18 (13 Apr 2025 09:07) (17 - 18)  SpO2: 96% (13 Apr 2025 14:39) (94% - 100%)    Parameters below as of 13 Apr 2025 14:39  Patient On (Oxygen Delivery Method): room air      PHYSICAL EXAM:  Gen- In bed, NAD  Resp- Scattered wheezes. Moving air well. Better compared to prev days.   CVS- RRR, S1S2, no g/r/m.  GI- Soft abd, NT, ND, +BSx4  Ext- No C/C.   Neuro- CN II-XII intact. Speech fluent/face symmetric.             MEDICATION:  MEDICATIONS  (STANDING):  bisacodyl 10 milliGRAM(s) Oral once  buDESOnide    Inhalation Suspension 0.5 milliGRAM(s) Inhalation every 12 hours  chlorhexidine 2% Cloths 1 Application(s) Topical <User Schedule>  dextrose 5%. 1000 milliLiter(s) (50 mL/Hr) IV Continuous <Continuous>  dextrose 5%. 1000 milliLiter(s) (100 mL/Hr) IV Continuous <Continuous>  dextrose 50% Injectable 25 Gram(s) IV Push once  dextrose 50% Injectable 12.5 Gram(s) IV Push once  dextrose 50% Injectable 25 Gram(s) IV Push once  dextrose Oral Gel 15 Gram(s) Oral once  enoxaparin Injectable 40 milliGRAM(s) SubCutaneous every 24 hours  glucagon  Injectable 1 milliGRAM(s) IntraMuscular once  guaiFENesin Oral Liquid (Sugar-Free) 200 milliGRAM(s) Oral every 6 hours  hydrALAZINE 50 milliGRAM(s) Oral three times a day  hydrochlorothiazide 25 milliGRAM(s) Oral daily  insulin glargine Injectable (LANTUS) 11 Unit(s) SubCutaneous at bedtime  insulin lispro (ADMELOG) corrective regimen sliding scale   SubCutaneous three times a day before meals  insulin lispro (ADMELOG) corrective regimen sliding scale   SubCutaneous at bedtime  insulin lispro Injectable (ADMELOG) 5 Unit(s) SubCutaneous three times a day before meals  ipratropium    for Nebulization 500 MICROGram(s) Nebulizer every 8 hours  levalbuterol Inhalation 0.63 milliGRAM(s) Inhalation every 8 hours  lisinopril 40 milliGRAM(s) Oral daily  methylPREDNISolone sodium succinate Injectable 30 milliGRAM(s) IV Push every 12 hours  OLANZapine 2.5 milliGRAM(s) Oral at bedtime  pantoprazole    Tablet 40 milliGRAM(s) Oral before breakfast  polyethylene glycol 3350 17 Gram(s) Oral two times a day  senna 2 Tablet(s) Oral at bedtime  sertraline 100 milliGRAM(s) Oral daily    MEDICATIONS  (PRN):            LABORATORY:                      SARS-CoV-2: Constantine (19 Mar 2025 09:05)  SARS-CoV-2: Constantine (08 Mar 2025 14:40)

## 2025-04-13 NOTE — CHART NOTE - NSCHARTNOTEFT_GEN_A_CORE
To Whom It May Concern:     This is a letter of support for Isa Silva to advocate for his family member's care at [facility].   If there are any questions please do not hesitate to call.    Jeannie Fonseca DNP   Medicine [id number] To Whom It May Concern:     This is a letter of support for Luis F Silva to advocate for his family member's care at [facility].   If there are any questions please do not hesitate to call.    Jeannie Fonseca [facility] [phone]

## 2025-04-14 LAB
ANION GAP SERPL CALC-SCNC: 12 MMOL/L — SIGNIFICANT CHANGE UP (ref 7–14)
ANISOCYTOSIS BLD QL: SLIGHT — SIGNIFICANT CHANGE UP
BASOPHILS # BLD AUTO: 0 K/UL — SIGNIFICANT CHANGE UP (ref 0–0.2)
BASOPHILS NFR BLD AUTO: 0 % — SIGNIFICANT CHANGE UP (ref 0–2)
BLOOD GAS VENOUS COMPREHENSIVE RESULT: SIGNIFICANT CHANGE UP
BUN SERPL-MCNC: 16 MG/DL — SIGNIFICANT CHANGE UP (ref 7–23)
CALCIUM SERPL-MCNC: 9.8 MG/DL — SIGNIFICANT CHANGE UP (ref 8.4–10.5)
CHLORIDE SERPL-SCNC: 100 MMOL/L — SIGNIFICANT CHANGE UP (ref 98–107)
CO2 SERPL-SCNC: 27 MMOL/L — SIGNIFICANT CHANGE UP (ref 22–31)
CREAT SERPL-MCNC: 0.6 MG/DL — SIGNIFICANT CHANGE UP (ref 0.5–1.3)
EGFR: 94 ML/MIN/1.73M2 — SIGNIFICANT CHANGE UP
EGFR: 94 ML/MIN/1.73M2 — SIGNIFICANT CHANGE UP
EOSINOPHIL # BLD AUTO: 0 K/UL — SIGNIFICANT CHANGE UP (ref 0–0.5)
EOSINOPHIL NFR BLD AUTO: 0 % — SIGNIFICANT CHANGE UP (ref 0–6)
GIANT PLATELETS BLD QL SMEAR: PRESENT — SIGNIFICANT CHANGE UP
GLUCOSE BLDC GLUCOMTR-MCNC: 124 MG/DL — HIGH (ref 70–99)
GLUCOSE BLDC GLUCOMTR-MCNC: 183 MG/DL — HIGH (ref 70–99)
GLUCOSE BLDC GLUCOMTR-MCNC: 188 MG/DL — HIGH (ref 70–99)
GLUCOSE BLDC GLUCOMTR-MCNC: 209 MG/DL — HIGH (ref 70–99)
GLUCOSE SERPL-MCNC: 166 MG/DL — HIGH (ref 70–99)
HCT VFR BLD CALC: 34.1 % — LOW (ref 34.5–45)
HGB BLD-MCNC: 11.3 G/DL — LOW (ref 11.5–15.5)
IANC: 6.91 K/UL — SIGNIFICANT CHANGE UP (ref 1.8–7.4)
LYMPHOCYTES # BLD AUTO: 28.8 % — SIGNIFICANT CHANGE UP (ref 13–44)
LYMPHOCYTES # BLD AUTO: 3.88 K/UL — HIGH (ref 1–3.3)
MACROCYTES BLD QL: SLIGHT — SIGNIFICANT CHANGE UP
MAGNESIUM SERPL-MCNC: 1.7 MG/DL — SIGNIFICANT CHANGE UP (ref 1.6–2.6)
MANUAL SMEAR VERIFICATION: SIGNIFICANT CHANGE UP
MCHC RBC-ENTMCNC: 31.7 PG — SIGNIFICANT CHANGE UP (ref 27–34)
MCHC RBC-ENTMCNC: 33.1 G/DL — SIGNIFICANT CHANGE UP (ref 32–36)
MCV RBC AUTO: 95.8 FL — SIGNIFICANT CHANGE UP (ref 80–100)
MONOCYTES # BLD AUTO: 1.37 K/UL — HIGH (ref 0–0.9)
MONOCYTES NFR BLD AUTO: 10.2 % — SIGNIFICANT CHANGE UP (ref 2–14)
NEUTROPHILS # BLD AUTO: 7.66 K/UL — HIGH (ref 1.8–7.4)
NEUTROPHILS NFR BLD AUTO: 56.8 % — SIGNIFICANT CHANGE UP (ref 43–77)
PHOSPHATE SERPL-MCNC: 3.1 MG/DL — SIGNIFICANT CHANGE UP (ref 2.5–4.5)
PLAT MORPH BLD: ABNORMAL
PLATELET # BLD AUTO: 186 K/UL — SIGNIFICANT CHANGE UP (ref 150–400)
PLATELET COUNT - ESTIMATE: NORMAL — SIGNIFICANT CHANGE UP
POLYCHROMASIA BLD QL SMEAR: SLIGHT — SIGNIFICANT CHANGE UP
POTASSIUM SERPL-MCNC: 3.7 MMOL/L — SIGNIFICANT CHANGE UP (ref 3.5–5.3)
POTASSIUM SERPL-SCNC: 3.7 MMOL/L — SIGNIFICANT CHANGE UP (ref 3.5–5.3)
RBC # BLD: 3.56 M/UL — LOW (ref 3.8–5.2)
RBC # FLD: 12.8 % — SIGNIFICANT CHANGE UP (ref 10.3–14.5)
RBC BLD AUTO: ABNORMAL
SMUDGE CELLS # BLD: PRESENT — SIGNIFICANT CHANGE UP
SODIUM SERPL-SCNC: 139 MMOL/L — SIGNIFICANT CHANGE UP (ref 135–145)
VARIANT LYMPHS # BLD: 4.2 % — SIGNIFICANT CHANGE UP (ref 0–6)
VARIANT LYMPHS NFR BLD MANUAL: 4.2 % — SIGNIFICANT CHANGE UP (ref 0–6)
WBC # BLD: 13.48 K/UL — HIGH (ref 3.8–10.5)
WBC # FLD AUTO: 13.48 K/UL — HIGH (ref 3.8–10.5)

## 2025-04-14 PROCEDURE — 99231 SBSQ HOSP IP/OBS SF/LOW 25: CPT

## 2025-04-14 PROCEDURE — 99233 SBSQ HOSP IP/OBS HIGH 50: CPT | Mod: FS

## 2025-04-14 PROCEDURE — 99233 SBSQ HOSP IP/OBS HIGH 50: CPT | Mod: GC

## 2025-04-14 RX ADMIN — Medication 40 MILLIGRAM(S): at 05:32

## 2025-04-14 RX ADMIN — POLYETHYLENE GLYCOL 3350 17 GRAM(S): 17 POWDER, FOR SOLUTION ORAL at 05:33

## 2025-04-14 RX ADMIN — INSULIN LISPRO 5 UNIT(S): 100 INJECTION, SOLUTION INTRAVENOUS; SUBCUTANEOUS at 17:39

## 2025-04-14 RX ADMIN — INSULIN GLARGINE-YFGN 11 UNIT(S): 100 INJECTION, SOLUTION SUBCUTANEOUS at 22:47

## 2025-04-14 RX ADMIN — Medication 1 APPLICATION(S): at 05:31

## 2025-04-14 RX ADMIN — Medication 50 MILLIGRAM(S): at 17:44

## 2025-04-14 RX ADMIN — METHYLPREDNISOLONE ACETATE 30 MILLIGRAM(S): 80 INJECTION, SUSPENSION INTRA-ARTICULAR; INTRALESIONAL; INTRAMUSCULAR; SOFT TISSUE at 05:32

## 2025-04-14 RX ADMIN — INSULIN LISPRO 5 UNIT(S): 100 INJECTION, SOLUTION INTRAVENOUS; SUBCUTANEOUS at 09:05

## 2025-04-14 RX ADMIN — Medication 500 MICROGRAM(S): at 23:27

## 2025-04-14 RX ADMIN — Medication 50 MILLIGRAM(S): at 09:08

## 2025-04-14 RX ADMIN — BUDESONIDE 0.5 MILLIGRAM(S): 0.25 SUSPENSION RESPIRATORY (INHALATION) at 21:56

## 2025-04-14 RX ADMIN — INSULIN LISPRO 1: 100 INJECTION, SOLUTION INTRAVENOUS; SUBCUTANEOUS at 09:05

## 2025-04-14 RX ADMIN — METHYLPREDNISOLONE ACETATE 30 MILLIGRAM(S): 80 INJECTION, SUSPENSION INTRA-ARTICULAR; INTRALESIONAL; INTRAMUSCULAR; SOFT TISSUE at 17:42

## 2025-04-14 RX ADMIN — Medication 500 MICROGRAM(S): at 14:33

## 2025-04-14 RX ADMIN — INSULIN LISPRO 1: 100 INJECTION, SOLUTION INTRAVENOUS; SUBCUTANEOUS at 12:29

## 2025-04-14 RX ADMIN — LEVALBUTEROL HYDROCHLORIDE 0.63 MILLIGRAM(S): 1.25 SOLUTION RESPIRATORY (INHALATION) at 14:32

## 2025-04-14 RX ADMIN — OLANZAPINE 2.5 MILLIGRAM(S): 10 TABLET ORAL at 21:01

## 2025-04-14 RX ADMIN — SERTRALINE 100 MILLIGRAM(S): 100 TABLET, FILM COATED ORAL at 12:28

## 2025-04-14 RX ADMIN — BUDESONIDE 0.5 MILLIGRAM(S): 0.25 SUSPENSION RESPIRATORY (INHALATION) at 07:36

## 2025-04-14 RX ADMIN — DEXTROMETHORPHAN HBR, GUAIFENESIN 200 MILLIGRAM(S): 200 LIQUID ORAL at 09:08

## 2025-04-14 RX ADMIN — Medication 50 MILLIGRAM(S): at 01:56

## 2025-04-14 RX ADMIN — DEXTROMETHORPHAN HBR, GUAIFENESIN 200 MILLIGRAM(S): 200 LIQUID ORAL at 21:01

## 2025-04-14 RX ADMIN — LEVALBUTEROL HYDROCHLORIDE 0.63 MILLIGRAM(S): 1.25 SOLUTION RESPIRATORY (INHALATION) at 07:32

## 2025-04-14 RX ADMIN — LEVALBUTEROL HYDROCHLORIDE 0.63 MILLIGRAM(S): 1.25 SOLUTION RESPIRATORY (INHALATION) at 23:27

## 2025-04-14 RX ADMIN — LISINOPRIL 40 MILLIGRAM(S): 5 TABLET ORAL at 05:32

## 2025-04-14 RX ADMIN — INSULIN LISPRO 5 UNIT(S): 100 INJECTION, SOLUTION INTRAVENOUS; SUBCUTANEOUS at 12:28

## 2025-04-14 RX ADMIN — DEXTROMETHORPHAN HBR, GUAIFENESIN 200 MILLIGRAM(S): 200 LIQUID ORAL at 01:56

## 2025-04-14 RX ADMIN — DEXTROMETHORPHAN HBR, GUAIFENESIN 200 MILLIGRAM(S): 200 LIQUID ORAL at 15:44

## 2025-04-14 RX ADMIN — Medication 500 MICROGRAM(S): at 07:33

## 2025-04-14 RX ADMIN — ENOXAPARIN SODIUM 40 MILLIGRAM(S): 100 INJECTION SUBCUTANEOUS at 17:44

## 2025-04-14 NOTE — BH CONSULTATION LIAISON PROGRESS NOTE - NSBHFUPINTERVALHXFT_PSY_A_CORE
Chart reviewed, medication compliant, received sitting upright in bed sleeping, spouse, Liam at bedside, patient alert to self, opens eyes for brief intervals with noted to have the ability to track at times, patient receptive to spouse putting Baptism music on computer as well as when spouse quotes scripture from the Bible, patient noted to engage verbally as well with spouse. Patient noted to vacillate between speaking in full sentences then only nodding or answering one-word answers. After patient made statement "I wish I was dead" she was asked again and denied suicidality, spouse denies any safety concerns and states patient has never made such a statement in past. Upon physical exam some mild resistance noted, mild posturing as well as mild waxy flexibility.

## 2025-04-14 NOTE — PROGRESS NOTE ADULT - PROBLEM SELECTOR PLAN 2
2/2 to status asthmaticus. Concern this may be occurring in setting of aspiration.   Follow-up aspergillus Ab: negative , ANCA: negative , IgE: negative    s/p intubation and bilevel now on NC  c/w ipratropium + xopenex nebs q8h  s/p course of antibiotics  c/w budesonide BID  -Wean down O2 as tolerated   -No role for nocturnal NIV for now. Will cont to monitor off.   -Solumedrol 30mg BID - dosing as per pulm - f/u Mon 2/2 to status asthmaticus. Concern this may be occurring in setting of aspiration.   Follow-up aspergillus Ab: negative , ANCA: negative , IgE: negative    s/p intubation and bilevel now on NC  c/w ipratropium + xopenex nebs q8h  s/p course of antibiotics  c/w budesonide BID  -Wean down O2 as tolerated   -No role for nocturnal NIV for now. Will cont to monitor off.   -c/w Solumedrol 30mg BID as per pulm since still wheezing.

## 2025-04-14 NOTE — PROGRESS NOTE ADULT - ATTENDING COMMENTS
74 year old female with reported asthma w/ prior intubations, depression w/ hx of catatonia presented with asthma exacerbation requiring MICU admission and also with concern for recurrent aspiration    Clinically improved. On room air. No appreciable wheeze     - c/w bronchodilators  - Steroid taper as above  - Encourage IS and OOB as tolerated  - At this time holding NIV, would recommend repeating blood gas as able now that clinically improved  - ongoing BH optimization  - Rest as above

## 2025-04-14 NOTE — PROGRESS NOTE ADULT - ASSESSMENT
74F reported asthma with prior intubations and depression initially admitted to MICU with suspected asthma exacerbation with RRT on 3/19 AM for hypoxemia and increased work of breathing with poor air entry. Patient transferred back to ICU for airway evaluation and asthma management and transferred back to floor 3/21, initially requiring BIPAP. Patient had adequate improvement from pulmonary perspective on floor however with episodes reported by team and family of wheezing and cough yesterday. Upon assesment patient is significantly lethargic, still under treatment with Ativan for underlying psychiatric disorder. Patient with likely recurring aspiration due to underlying mood disorder, with now recurretn asthma exacerbation    #Asthma Exacerbation - Recurred likely in setting of aspiration  #Lethargy - on Ativan TID now off: MS much more improved     - CTA chest 3/8 without evidence of PNA or PE  - repeat CTA negative for PE and clear lungs  - continue Duonebs every 6h, ensure to continue as well on discharge for 7 days   - Continue Pulmicort 0.5mg BID while lethargic  - as patient continues to be hypoxemic and wheezing, please continue IV solumderol 30 BID  - Levalbuterol nebs every 6h + Ipratropium nebs every 6h: can decrease to q 8 now  - Incentive spirometry  - SLP: puree and thin liquids   - no indication for nocturnal NIV for now   - Use Home token on DC for OP Pulmonary follow up    74F reported asthma with prior intubations and depression initially admitted to MICU with suspected asthma exacerbation with RRT on 3/19 AM for hypoxemia and increased work of breathing with poor air entry. Patient transferred back to ICU for airway evaluation and asthma management and transferred back to floor 3/21, initially requiring BIPAP. Patient had adequate improvement from pulmonary perspective on floor however with episodes reported by team and family of wheezing and cough yesterday. Upon assesment patient is significantly lethargic, still under treatment with Ativan for underlying psychiatric disorder. Patient with likely recurring aspiration due to underlying mood disorder, with now recurretn asthma exacerbation    #Asthma Exacerbation - Recurred likely in setting of aspiration  #Lethargy - on Ativan TID now off: MS much more improved     - CTA chest 3/8 without evidence of PNA or PE  - repeat CTA negative for PE and clear lungs  - continue Duonebs every 6h, ensure to continue as well on discharge for 7 days   - Continue Pulmicort 0.5mg BID while lethargic  -  wheezing improved, please decrease IV solumderol 30 once a day tomorrow  - Levalbuterol nebs every 6h + Ipratropium nebs every 6h: can decrease to q 8 now  - Incentive spirometry  - SLP: puree and thin liquids   - no indication for nocturnal NIV for now   - Use Home token on DC for OP Pulmonary follow up

## 2025-04-14 NOTE — BH CONSULTATION LIAISON PROGRESS NOTE - NSBHASSESSMENTFT_PSY_ALL_CORE
Patient is a 74F hx asthma, depression, and HTN was admitted to the MICU with AHRF 2/2 asthma exacerbation resulting in acute on chronic HHRF causing AMS and increased WOB that required intubation. During her ICU course, she experienced seizure-like activity .  Additional issues included hyponatremia, acute on chronic metabolic alkalosis, and mild thrombocytopenia,  also having fevers.  Patient is from [place]; primary language :Twi pronounced as Chi) domiciled with , retired teacher used to work in [facility], she has 6 children. Patient has h/o hx of major depressive disorder with psychosis, hx of 3 past inpatient psychiatric hospitalizations last in 2016 at [facility], all rehospitalizations were in context non compliance with the treatment. She has no hx of suicide attempts, no hx of substance abuse. Patient w hx of paranoia, disorganization, catatonia, required MOO when in [facility].     3/24----more withdrawn, some stiffness in neck, not communicative. Some concerns for catatonia. Unsure if symptoms attributed to catatonia vs delirium  3/25--- more alert today, trying to following commands. Still with PMR+. Appears to be responding to Ativan  3/26-- BFS 10, will increasing standing ativan to target catatonic symptoms - giving STAT 0.5mg NOW discussed w acp   3/27----will continue ativan dosing for one more day before increasing  3/28: BFS: stupor 1, mutism 2,withdrawan 2, verbigeration 1, rigidity 2 =8.  3/29: BFS: stupor 2, mutism 2, staring 1, posturing 2, rigidity 2, waxy flexibility 3, withdrawal 2 = 14; please monitor nutritional intake, plan to increase ativan by total daily dose of 0.5 mg at bedtime due to persistent catatonia   3/31: BFS: stupor 2, mutism 2, staring 2, rigidity 1, negativism 1, withdrawal 1, waxy flexibility 3 =12, patient with productive cough, advised for medical work up  4/1---tired, speaking more, not rigid, has PMR+. NPO status now.   4/2--sleepy, RRT this afternoon  4/3---better mentation, pmr but no chester catatonic symptoms  4/4: alert, using few words, mild rigidity -  at bedside, updated   4/5: few words, lethargic, fluctuating wakefulness. Mild rigidity.  No overt catatonic sxs.   4/7: no change, remains thought blocked, few words - son at bedside, updated with care  4/8: patient continues to have poor eye contact, uses few words, no rigidity on today's exam.   4/9: patient remains minimally verbal, some posturing, poor Po intake - continues on NC for 02 requirements   4/10: more verbal/interactive, with increased PO intake per family. Some psychotic output. Family agrees with resumption of Zyprexa.   4/14: alert to self with fluctuating wakefulness, intermittent full sentences f/b one-word answers, some rigidity, posturing, denies si, denies ah/vh    PLAN  - no SI or HI, no need for psychiatric CO  -- antipsychotics can only be given if qtc < 500    - MEDICATIONS:  -- C/W Zoloft 100 mg daily ( home dose 150mg) ( monitor NA- recent hyponatremia)  -- C/W  Zyprexa 2.5mg HS tonight  - ongoing discussion with family about treatment plan    - PRN:  --Ativan 0.5mg q 6hrs prn IM/IV/PO  - DISPO: pending, TBD: inpt psych vs TK depending on recovery

## 2025-04-14 NOTE — PROGRESS NOTE ADULT - SUBJECTIVE AND OBJECTIVE BOX
CHIEF COMPLAINT:Patient is a 74y old  Female who presents with a chief complaint of AMS and increased WOB (21 Mar 2025 06:24)      Interval Events:    REVIEW OF SYSTEMS:  [x] All other systems negative except per HPI   [ ] Unable to assess ROS because ________    OBJECTIVE:  ICU Vital Signs Last 24 Hrs  T(C): 36.9 (14 Apr 2025 08:50), Max: 37.1 (13 Apr 2025 17:54)  T(F): 98.4 (14 Apr 2025 08:50), Max: 98.7 (13 Apr 2025 17:54)  HR: 82 (14 Apr 2025 08:50) (74 - 96)  BP: 151/89 (14 Apr 2025 08:50) (136/70 - 154/79)  BP(mean): --  ABP: --  ABP(mean): --  RR: 18 (14 Apr 2025 08:50) (17 - 18)  SpO2: 98% (14 Apr 2025 08:50) (95% - 100%)    O2 Parameters below as of 14 Apr 2025 08:50  Patient On (Oxygen Delivery Method): room air              04-13 @ 07:01  -  04-14 @ 07:00  --------------------------------------------------------  IN: 0 mL / OUT: 1200 mL / NET: -1200 mL        PHYSICAL EXAM:  GENERAL: NAD, well-groomed, well-developed  HEAD:  Atraumatic, Normocephalic  EYES: EOMI, PERRLA, conjunctiva and sclera clear  ENMT: No tonsillar erythema, exudates, or enlargement; Moist mucous membranes, Good dentition, No lesions  NECK: Supple, No JVD, Normal thyroid  CHEST/LUNG: Clear to auscultation bilaterally; No rales, rhonchi, wheezing, or rubs  HEART: Regular rate and rhythm; No murmurs, rubs, or gallops  ABDOMEN: Soft, Nontender, Nondistended; Bowel sounds present  VASCULAR:  2+ Peripheral Pulses, No clubbing, cyanosis, or edema  LYMPH: No lymphadenopathy noted  SKIN: No rashes or lesions  NERVOUS SYSTEM:  Alert & Oriented X3, Good concentration; Motor Strength 5/5 B/L upper and lower extremities; DTRs 2+ intact and symmetric    HOSPITAL MEDICATIONS:  MEDICATIONS  (STANDING):  bisacodyl 10 milliGRAM(s) Oral once  buDESOnide    Inhalation Suspension 0.5 milliGRAM(s) Inhalation every 12 hours  chlorhexidine 2% Cloths 1 Application(s) Topical <User Schedule>  dextrose 5%. 1000 milliLiter(s) (100 mL/Hr) IV Continuous <Continuous>  dextrose 5%. 1000 milliLiter(s) (50 mL/Hr) IV Continuous <Continuous>  dextrose 50% Injectable 25 Gram(s) IV Push once  dextrose 50% Injectable 12.5 Gram(s) IV Push once  dextrose 50% Injectable 25 Gram(s) IV Push once  dextrose Oral Gel 15 Gram(s) Oral once  enoxaparin Injectable 40 milliGRAM(s) SubCutaneous every 24 hours  glucagon  Injectable 1 milliGRAM(s) IntraMuscular once  guaiFENesin Oral Liquid (Sugar-Free) 200 milliGRAM(s) Oral every 6 hours  hydrALAZINE 50 milliGRAM(s) Oral three times a day  hydrochlorothiazide 25 milliGRAM(s) Oral daily  insulin glargine Injectable (LANTUS) 11 Unit(s) SubCutaneous at bedtime  insulin lispro (ADMELOG) corrective regimen sliding scale   SubCutaneous three times a day before meals  insulin lispro (ADMELOG) corrective regimen sliding scale   SubCutaneous at bedtime  insulin lispro Injectable (ADMELOG) 5 Unit(s) SubCutaneous three times a day before meals  ipratropium    for Nebulization 500 MICROGram(s) Nebulizer every 8 hours  levalbuterol Inhalation 0.63 milliGRAM(s) Inhalation every 8 hours  lisinopril 40 milliGRAM(s) Oral daily  methylPREDNISolone sodium succinate Injectable 30 milliGRAM(s) IV Push every 12 hours  OLANZapine 2.5 milliGRAM(s) Oral at bedtime  pantoprazole    Tablet 40 milliGRAM(s) Oral before breakfast  polyethylene glycol 3350 17 Gram(s) Oral two times a day  senna 2 Tablet(s) Oral at bedtime  sertraline 100 milliGRAM(s) Oral daily    MEDICATIONS  (PRN):      LABS:    The Labs were reviewed by me   The Radiology was reviewed by me    EKG tracing reviewed by me    04-14    139  |  100  |  16  ----------------------------<  166[H]  3.7   |  27  |  0.60    Ca    9.8      14 Apr 2025 06:12  Phos  3.1     04-14  Mg     1.70     04-14      Magnesium: 1.70 mg/dL (04-14-25 @ 06:12)    Phosphorus: 3.1 mg/dL (04-14-25 @ 06:12)                    Urinalysis Basic - ( 14 Apr 2025 06:12 )    Color: x / Appearance: x / SG: x / pH: x  Gluc: 166 mg/dL / Ketone: x  / Bili: x / Urobili: x   Blood: x / Protein: x / Nitrite: x   Leuk Esterase: x / RBC: x / WBC x   Sq Epi: x / Non Sq Epi: x / Bacteria: x                              11.3   13.48 )-----------( 186      ( 14 Apr 2025 06:12 )             34.1     CAPILLARY BLOOD GLUCOSE      POCT Blood Glucose.: 188 mg/dL (14 Apr 2025 12:08)  POCT Blood Glucose.: 183 mg/dL (14 Apr 2025 08:16)  POCT Blood Glucose.: 207 mg/dL (13 Apr 2025 23:31)  POCT Blood Glucose.: 177 mg/dL (13 Apr 2025 17:24)        MICROBIOLOGY:     RADIOLOGY:  [ ] Reviewed and interpreted by me    Point of Care Ultrasound Findings:    PFT:    EKG: CHIEF COMPLAINT:Patient is a 74y old  Female who presents with a chief complaint of AMS and increased WOB (21 Mar 2025 06:24)      Interval Events: much improved mentation, decreased cough,     REVIEW OF SYSTEMS:  [x] All other systems negative except per HPI   [ ] Unable to assess ROS because ________    OBJECTIVE:  ICU Vital Signs Last 24 Hrs  T(C): 36.9 (14 Apr 2025 08:50), Max: 37.1 (13 Apr 2025 17:54)  T(F): 98.4 (14 Apr 2025 08:50), Max: 98.7 (13 Apr 2025 17:54)  HR: 82 (14 Apr 2025 08:50) (74 - 96)  BP: 151/89 (14 Apr 2025 08:50) (136/70 - 154/79)  BP(mean): --  ABP: --  ABP(mean): --  RR: 18 (14 Apr 2025 08:50) (17 - 18)  SpO2: 98% (14 Apr 2025 08:50) (95% - 100%)    O2 Parameters below as of 14 Apr 2025 08:50  Patient On (Oxygen Delivery Method): room air              04-13 @ 07:01  -  04-14 @ 07:00  --------------------------------------------------------  IN: 0 mL / OUT: 1200 mL / NET: -1200 mL        PHYSICAL EXAM:  GENERAL: NAD, well-groomed, well-developed  HEAD:  Atraumatic, Normocephalic  EYES: EOMI, PERRLA, conjunctiva and sclera clear  ENMT: No tonsillar erythema, exudates, or enlargement; Moist mucous membranes, Good dentition, No lesions  NECK: Supple, No JVD, Normal thyroid  CHEST/LUNG: minimal wheezing  HEART: Regular rate and rhythm; No murmurs, rubs, or gallops  ABDOMEN: Soft, Nontender, Nondistended; Bowel sounds present  VASCULAR:  2+ Peripheral Pulses, No clubbing, cyanosis, or edema  LYMPH: No lymphadenopathy noted  SKIN: No rashes or lesions  NERVOUS SYSTEM:  Alert & Oriented X2 Good concentration; Motor Strength 5/5 B/L upper and lower extremities; DTRs 2+ intact and symmetric    HOSPITAL MEDICATIONS:  MEDICATIONS  (STANDING):  bisacodyl 10 milliGRAM(s) Oral once  buDESOnide    Inhalation Suspension 0.5 milliGRAM(s) Inhalation every 12 hours  chlorhexidine 2% Cloths 1 Application(s) Topical <User Schedule>  dextrose 5%. 1000 milliLiter(s) (100 mL/Hr) IV Continuous <Continuous>  dextrose 5%. 1000 milliLiter(s) (50 mL/Hr) IV Continuous <Continuous>  dextrose 50% Injectable 25 Gram(s) IV Push once  dextrose 50% Injectable 12.5 Gram(s) IV Push once  dextrose 50% Injectable 25 Gram(s) IV Push once  dextrose Oral Gel 15 Gram(s) Oral once  enoxaparin Injectable 40 milliGRAM(s) SubCutaneous every 24 hours  glucagon  Injectable 1 milliGRAM(s) IntraMuscular once  guaiFENesin Oral Liquid (Sugar-Free) 200 milliGRAM(s) Oral every 6 hours  hydrALAZINE 50 milliGRAM(s) Oral three times a day  hydrochlorothiazide 25 milliGRAM(s) Oral daily  insulin glargine Injectable (LANTUS) 11 Unit(s) SubCutaneous at bedtime  insulin lispro (ADMELOG) corrective regimen sliding scale   SubCutaneous three times a day before meals  insulin lispro (ADMELOG) corrective regimen sliding scale   SubCutaneous at bedtime  insulin lispro Injectable (ADMELOG) 5 Unit(s) SubCutaneous three times a day before meals  ipratropium    for Nebulization 500 MICROGram(s) Nebulizer every 8 hours  levalbuterol Inhalation 0.63 milliGRAM(s) Inhalation every 8 hours  lisinopril 40 milliGRAM(s) Oral daily  methylPREDNISolone sodium succinate Injectable 30 milliGRAM(s) IV Push every 12 hours  OLANZapine 2.5 milliGRAM(s) Oral at bedtime  pantoprazole    Tablet 40 milliGRAM(s) Oral before breakfast  polyethylene glycol 3350 17 Gram(s) Oral two times a day  senna 2 Tablet(s) Oral at bedtime  sertraline 100 milliGRAM(s) Oral daily    MEDICATIONS  (PRN):      LABS:    The Labs were reviewed by me   The Radiology was reviewed by me    EKG tracing reviewed by me    04-14    139  |  100  |  16  ----------------------------<  166[H]  3.7   |  27  |  0.60    Ca    9.8      14 Apr 2025 06:12  Phos  3.1     04-14  Mg     1.70     04-14      Magnesium: 1.70 mg/dL (04-14-25 @ 06:12)    Phosphorus: 3.1 mg/dL (04-14-25 @ 06:12)                    Urinalysis Basic - ( 14 Apr 2025 06:12 )    Color: x / Appearance: x / SG: x / pH: x  Gluc: 166 mg/dL / Ketone: x  / Bili: x / Urobili: x   Blood: x / Protein: x / Nitrite: x   Leuk Esterase: x / RBC: x / WBC x   Sq Epi: x / Non Sq Epi: x / Bacteria: x                              11.3   13.48 )-----------( 186      ( 14 Apr 2025 06:12 )             34.1     CAPILLARY BLOOD GLUCOSE      POCT Blood Glucose.: 188 mg/dL (14 Apr 2025 12:08)  POCT Blood Glucose.: 183 mg/dL (14 Apr 2025 08:16)  POCT Blood Glucose.: 207 mg/dL (13 Apr 2025 23:31)  POCT Blood Glucose.: 177 mg/dL (13 Apr 2025 17:24)        MICROBIOLOGY:     RADIOLOGY:  [ ] Reviewed and interpreted by me    Point of Care Ultrasound Findings:    PFT:    EKG:

## 2025-04-14 NOTE — PROGRESS NOTE ADULT - ASSESSMENT
74 y.o. Female w/ Hx asthma and depressed presented with AHHRF c/b AMS requiring MICU admission for intubation for respiratory failure i/s/o asthma exacerbation. Extubated 3/12 to face tent and then on BIPAP s/p RRT 3/19 for increased WOB / hypoxia despite aggressive asthma management. Transferred back to MICU for management of severe asthma. Possible aspiration event 3/31. SLP eval appreciated. XR cine performed with improvement in mental status - pureed diet w/ thin liquids recommended. Worsening respiratory status 4/2 - placed on NIV, now weaned to nasal cannula. Weaning further as tolerated, ENT eval - no upper airway consideration for structural causes of asthma such as vocal cord dysfunction.

## 2025-04-14 NOTE — PROGRESS NOTE ADULT - PROBLEM SELECTOR PLAN 1
Much improved - more interactive. Eating ok. Much more alert compared to previous days.  -Hx of MDD w/ psychotic features.  -Trialed ativan, but now on hold. Concern that it may have contributed to respiratory decompensation.  -Cont Zoloft 100 qday (home dose was 150).  -Cont Zyprexa 2.5 QHS.  -Further titration as per .

## 2025-04-14 NOTE — PROGRESS NOTE ADULT - SUBJECTIVE AND OBJECTIVE BOX
Patient is a 74y old  Female who presents with a chief complaint of AMS and increased WOB (21 Mar 2025 06:24)      SUBJECTIVE / OVERNIGHT EVENTS:    MEDICATIONS  (STANDING):  bisacodyl 10 milliGRAM(s) Oral once  buDESOnide    Inhalation Suspension 0.5 milliGRAM(s) Inhalation every 12 hours  chlorhexidine 2% Cloths 1 Application(s) Topical <User Schedule>  dextrose 5%. 1000 milliLiter(s) (100 mL/Hr) IV Continuous <Continuous>  dextrose 5%. 1000 milliLiter(s) (50 mL/Hr) IV Continuous <Continuous>  dextrose 50% Injectable 25 Gram(s) IV Push once  dextrose 50% Injectable 12.5 Gram(s) IV Push once  dextrose 50% Injectable 25 Gram(s) IV Push once  dextrose Oral Gel 15 Gram(s) Oral once  enoxaparin Injectable 40 milliGRAM(s) SubCutaneous every 24 hours  glucagon  Injectable 1 milliGRAM(s) IntraMuscular once  guaiFENesin Oral Liquid (Sugar-Free) 200 milliGRAM(s) Oral every 6 hours  hydrALAZINE 50 milliGRAM(s) Oral three times a day  hydrochlorothiazide 25 milliGRAM(s) Oral daily  insulin glargine Injectable (LANTUS) 11 Unit(s) SubCutaneous at bedtime  insulin lispro (ADMELOG) corrective regimen sliding scale   SubCutaneous three times a day before meals  insulin lispro (ADMELOG) corrective regimen sliding scale   SubCutaneous at bedtime  insulin lispro Injectable (ADMELOG) 5 Unit(s) SubCutaneous three times a day before meals  ipratropium    for Nebulization 500 MICROGram(s) Nebulizer every 8 hours  levalbuterol Inhalation 0.63 milliGRAM(s) Inhalation every 8 hours  lisinopril 40 milliGRAM(s) Oral daily  methylPREDNISolone sodium succinate Injectable 30 milliGRAM(s) IV Push every 12 hours  OLANZapine 2.5 milliGRAM(s) Oral at bedtime  pantoprazole    Tablet 40 milliGRAM(s) Oral before breakfast  polyethylene glycol 3350 17 Gram(s) Oral two times a day  senna 2 Tablet(s) Oral at bedtime  sertraline 100 milliGRAM(s) Oral daily    MEDICATIONS  (PRN):      Vital Signs Last 24 Hrs  T(C): 36.9 (14 Apr 2025 08:50), Max: 37.1 (13 Apr 2025 17:54)  T(F): 98.4 (14 Apr 2025 08:50), Max: 98.7 (13 Apr 2025 17:54)  HR: 82 (14 Apr 2025 08:50) (74 - 99)  BP: 151/89 (14 Apr 2025 08:50) (118/66 - 154/79)  BP(mean): --  RR: 18 (14 Apr 2025 08:50) (17 - 18)  SpO2: 98% (14 Apr 2025 08:50) (95% - 100%)    Parameters below as of 14 Apr 2025 08:50  Patient On (Oxygen Delivery Method): room air      CAPILLARY BLOOD GLUCOSE      POCT Blood Glucose.: 188 mg/dL (14 Apr 2025 12:08)  POCT Blood Glucose.: 183 mg/dL (14 Apr 2025 08:16)  POCT Blood Glucose.: 207 mg/dL (13 Apr 2025 23:31)  POCT Blood Glucose.: 177 mg/dL (13 Apr 2025 17:24)    I&O's Summary    13 Apr 2025 07:01  -  14 Apr 2025 07:00  --------------------------------------------------------  IN: 0 mL / OUT: 1200 mL / NET: -1200 mL        PHYSICAL EXAM:   GENERAL: NAD  HEAD:  Atraumatic, Normocephalic  EYES: EOMI, PERRLA, conjunctiva and sclera clear  NECK: Supple, No JVD  CHEST/LUNG: decreased BS bilaterally; mild wheezes  HEART: Regular rate and rhythm; No murmurs, rubs, or gallops  ABDOMEN: Soft, Nontender, Nondistended; Bowel sounds present  EXTREMITIES:  2+ Peripheral Pulses, No clubbing, cyanosis, or edema  PSYCH: AAOx3  NEUROLOGY: non-focal  SKIN: No rashes or lesions    LABS:                        11.3   13.48 )-----------( 186      ( 14 Apr 2025 06:12 )             34.1     04-14    139  |  100  |  16  ----------------------------<  166[H]  3.7   |  27  |  0.60    Ca    9.8      14 Apr 2025 06:12  Phos  3.1     04-14  Mg     1.70     04-14            Urinalysis Basic - ( 14 Apr 2025 06:12 )    Color: x / Appearance: x / SG: x / pH: x  Gluc: 166 mg/dL / Ketone: x  / Bili: x / Urobili: x   Blood: x / Protein: x / Nitrite: x   Leuk Esterase: x / RBC: x / WBC x   Sq Epi: x / Non Sq Epi: x / Bacteria: x        RADIOLOGY & ADDITIONAL TESTS:    Imaging Personally Reviewed:    Consultant(s) Notes Reviewed:      Care Discussed with Consultants/Other Providers:   Patient is a 74y old  Female who presents with a chief complaint of AMS and increased WOB (21 Mar 2025 06:24)      SUBJECTIVE / OVERNIGHT EVENTS:  Patient has no new complaints. Denies cp, SOB, abdominal pain, N/V/D     MEDICATIONS  (STANDING):  bisacodyl 10 milliGRAM(s) Oral once  buDESOnide    Inhalation Suspension 0.5 milliGRAM(s) Inhalation every 12 hours  chlorhexidine 2% Cloths 1 Application(s) Topical <User Schedule>  dextrose 5%. 1000 milliLiter(s) (100 mL/Hr) IV Continuous <Continuous>  dextrose 5%. 1000 milliLiter(s) (50 mL/Hr) IV Continuous <Continuous>  dextrose 50% Injectable 25 Gram(s) IV Push once  dextrose 50% Injectable 12.5 Gram(s) IV Push once  dextrose 50% Injectable 25 Gram(s) IV Push once  dextrose Oral Gel 15 Gram(s) Oral once  enoxaparin Injectable 40 milliGRAM(s) SubCutaneous every 24 hours  glucagon  Injectable 1 milliGRAM(s) IntraMuscular once  guaiFENesin Oral Liquid (Sugar-Free) 200 milliGRAM(s) Oral every 6 hours  hydrALAZINE 50 milliGRAM(s) Oral three times a day  hydrochlorothiazide 25 milliGRAM(s) Oral daily  insulin glargine Injectable (LANTUS) 11 Unit(s) SubCutaneous at bedtime  insulin lispro (ADMELOG) corrective regimen sliding scale   SubCutaneous three times a day before meals  insulin lispro (ADMELOG) corrective regimen sliding scale   SubCutaneous at bedtime  insulin lispro Injectable (ADMELOG) 5 Unit(s) SubCutaneous three times a day before meals  ipratropium    for Nebulization 500 MICROGram(s) Nebulizer every 8 hours  levalbuterol Inhalation 0.63 milliGRAM(s) Inhalation every 8 hours  lisinopril 40 milliGRAM(s) Oral daily  methylPREDNISolone sodium succinate Injectable 30 milliGRAM(s) IV Push every 12 hours  OLANZapine 2.5 milliGRAM(s) Oral at bedtime  pantoprazole    Tablet 40 milliGRAM(s) Oral before breakfast  polyethylene glycol 3350 17 Gram(s) Oral two times a day  senna 2 Tablet(s) Oral at bedtime  sertraline 100 milliGRAM(s) Oral daily    MEDICATIONS  (PRN):      Vital Signs Last 24 Hrs  T(C): 36.9 (14 Apr 2025 08:50), Max: 37.1 (13 Apr 2025 17:54)  T(F): 98.4 (14 Apr 2025 08:50), Max: 98.7 (13 Apr 2025 17:54)  HR: 82 (14 Apr 2025 08:50) (74 - 99)  BP: 151/89 (14 Apr 2025 08:50) (118/66 - 154/79)  BP(mean): --  RR: 18 (14 Apr 2025 08:50) (17 - 18)  SpO2: 98% (14 Apr 2025 08:50) (95% - 100%)    Parameters below as of 14 Apr 2025 08:50  Patient On (Oxygen Delivery Method): room air      CAPILLARY BLOOD GLUCOSE      POCT Blood Glucose.: 188 mg/dL (14 Apr 2025 12:08)  POCT Blood Glucose.: 183 mg/dL (14 Apr 2025 08:16)  POCT Blood Glucose.: 207 mg/dL (13 Apr 2025 23:31)  POCT Blood Glucose.: 177 mg/dL (13 Apr 2025 17:24)    I&O's Summary    13 Apr 2025 07:01  -  14 Apr 2025 07:00  --------------------------------------------------------  IN: 0 mL / OUT: 1200 mL / NET: -1200 mL        PHYSICAL EXAM:   GENERAL: NAD  HEAD:  Atraumatic, Normocephalic  EYES: EOMI, PERRLA, conjunctiva and sclera clear  NECK: Supple, No JVD  CHEST/LUNG: decreased BS bilaterally; mild wheezes  HEART: Regular rate and rhythm; No murmurs, rubs, or gallops  ABDOMEN: Soft, Nontender, Nondistended; Bowel sounds present  EXTREMITIES:  2+ Peripheral Pulses, No clubbing, cyanosis, or edema  PSYCH: AAOx3  NEUROLOGY: non-focal  SKIN: No rashes or lesions    LABS:                        11.3   13.48 )-----------( 186      ( 14 Apr 2025 06:12 )             34.1     04-14    139  |  100  |  16  ----------------------------<  166[H]  3.7   |  27  |  0.60    Ca    9.8      14 Apr 2025 06:12  Phos  3.1     04-14  Mg     1.70     04-14            Urinalysis Basic - ( 14 Apr 2025 06:12 )    Color: x / Appearance: x / SG: x / pH: x  Gluc: 166 mg/dL / Ketone: x  / Bili: x / Urobili: x   Blood: x / Protein: x / Nitrite: x   Leuk Esterase: x / RBC: x / WBC x   Sq Epi: x / Non Sq Epi: x / Bacteria: x        RADIOLOGY & ADDITIONAL TESTS:    Imaging Personally Reviewed:    Consultant(s) Notes Reviewed:      Care Discussed with Consultants/Other Providers:

## 2025-04-15 LAB
ANION GAP SERPL CALC-SCNC: 13 MMOL/L — SIGNIFICANT CHANGE UP (ref 7–14)
BUN SERPL-MCNC: 20 MG/DL — SIGNIFICANT CHANGE UP (ref 7–23)
CALCIUM SERPL-MCNC: 9.5 MG/DL — SIGNIFICANT CHANGE UP (ref 8.4–10.5)
CHLORIDE SERPL-SCNC: 100 MMOL/L — SIGNIFICANT CHANGE UP (ref 98–107)
CO2 SERPL-SCNC: 26 MMOL/L — SIGNIFICANT CHANGE UP (ref 22–31)
CREAT SERPL-MCNC: 0.61 MG/DL — SIGNIFICANT CHANGE UP (ref 0.5–1.3)
EGFR: 94 ML/MIN/1.73M2 — SIGNIFICANT CHANGE UP
EGFR: 94 ML/MIN/1.73M2 — SIGNIFICANT CHANGE UP
GLUCOSE BLDC GLUCOMTR-MCNC: 139 MG/DL — HIGH (ref 70–99)
GLUCOSE BLDC GLUCOMTR-MCNC: 142 MG/DL — HIGH (ref 70–99)
GLUCOSE BLDC GLUCOMTR-MCNC: 152 MG/DL — HIGH (ref 70–99)
GLUCOSE BLDC GLUCOMTR-MCNC: 282 MG/DL — HIGH (ref 70–99)
GLUCOSE SERPL-MCNC: 145 MG/DL — HIGH (ref 70–99)
MAGNESIUM SERPL-MCNC: 1.8 MG/DL — SIGNIFICANT CHANGE UP (ref 1.6–2.6)
PHOSPHATE SERPL-MCNC: 3.9 MG/DL — SIGNIFICANT CHANGE UP (ref 2.5–4.5)
POTASSIUM SERPL-MCNC: 4.1 MMOL/L — SIGNIFICANT CHANGE UP (ref 3.5–5.3)
POTASSIUM SERPL-SCNC: 4.1 MMOL/L — SIGNIFICANT CHANGE UP (ref 3.5–5.3)
SODIUM SERPL-SCNC: 139 MMOL/L — SIGNIFICANT CHANGE UP (ref 135–145)

## 2025-04-15 PROCEDURE — 99233 SBSQ HOSP IP/OBS HIGH 50: CPT | Mod: GC

## 2025-04-15 PROCEDURE — 99233 SBSQ HOSP IP/OBS HIGH 50: CPT | Mod: FS

## 2025-04-15 RX ORDER — PREDNISONE 20 MG/1
40 TABLET ORAL DAILY
Refills: 0 | Status: COMPLETED | OUTPATIENT
Start: 2025-04-16 | End: 2025-04-18

## 2025-04-15 RX ORDER — SERTRALINE 100 MG/1
125 TABLET, FILM COATED ORAL DAILY
Refills: 0 | Status: DISCONTINUED | OUTPATIENT
Start: 2025-04-16 | End: 2025-05-21

## 2025-04-15 RX ORDER — MEMANTINE HYDROCHLORIDE 21 MG/1
5 CAPSULE, EXTENDED RELEASE ORAL AT BEDTIME
Refills: 0 | Status: DISCONTINUED | OUTPATIENT
Start: 2025-04-15 | End: 2025-04-17

## 2025-04-15 RX ORDER — PREDNISONE 20 MG/1
20 TABLET ORAL DAILY
Refills: 0 | Status: COMPLETED | OUTPATIENT
Start: 2025-04-22 | End: 2025-04-24

## 2025-04-15 RX ORDER — MEMANTINE HYDROCHLORIDE 21 MG/1
5 CAPSULE, EXTENDED RELEASE ORAL
Refills: 0 | Status: DISCONTINUED | OUTPATIENT
Start: 2025-04-15 | End: 2025-04-15

## 2025-04-15 RX ORDER — PREDNISONE 20 MG/1
30 TABLET ORAL DAILY
Refills: 0 | Status: COMPLETED | OUTPATIENT
Start: 2025-04-19 | End: 2025-04-21

## 2025-04-15 RX ORDER — PREDNISONE 20 MG/1
10 TABLET ORAL DAILY
Refills: 0 | Status: COMPLETED | OUTPATIENT
Start: 2025-04-24 | End: 2025-04-26

## 2025-04-15 RX ORDER — MAGNESIUM SULFATE 500 MG/ML
1 SYRINGE (ML) INJECTION ONCE
Refills: 0 | Status: COMPLETED | OUTPATIENT
Start: 2025-04-15 | End: 2025-04-15

## 2025-04-15 RX ADMIN — INSULIN LISPRO 5 UNIT(S): 100 INJECTION, SOLUTION INTRAVENOUS; SUBCUTANEOUS at 17:52

## 2025-04-15 RX ADMIN — DEXTROMETHORPHAN HBR, GUAIFENESIN 200 MILLIGRAM(S): 200 LIQUID ORAL at 01:53

## 2025-04-15 RX ADMIN — MEMANTINE HYDROCHLORIDE 5 MILLIGRAM(S): 21 CAPSULE, EXTENDED RELEASE ORAL at 21:46

## 2025-04-15 RX ADMIN — Medication 50 MILLIGRAM(S): at 09:13

## 2025-04-15 RX ADMIN — Medication 500 MICROGRAM(S): at 07:21

## 2025-04-15 RX ADMIN — DEXTROMETHORPHAN HBR, GUAIFENESIN 200 MILLIGRAM(S): 200 LIQUID ORAL at 09:14

## 2025-04-15 RX ADMIN — Medication 50 MILLIGRAM(S): at 01:53

## 2025-04-15 RX ADMIN — Medication 500 MICROGRAM(S): at 15:25

## 2025-04-15 RX ADMIN — BUDESONIDE 0.5 MILLIGRAM(S): 0.25 SUSPENSION RESPIRATORY (INHALATION) at 22:20

## 2025-04-15 RX ADMIN — SERTRALINE 100 MILLIGRAM(S): 100 TABLET, FILM COATED ORAL at 12:40

## 2025-04-15 RX ADMIN — Medication 100 GRAM(S): at 09:19

## 2025-04-15 RX ADMIN — INSULIN LISPRO 1: 100 INJECTION, SOLUTION INTRAVENOUS; SUBCUTANEOUS at 09:08

## 2025-04-15 RX ADMIN — LEVALBUTEROL HYDROCHLORIDE 0.63 MILLIGRAM(S): 1.25 SOLUTION RESPIRATORY (INHALATION) at 22:19

## 2025-04-15 RX ADMIN — Medication 500 MICROGRAM(S): at 22:20

## 2025-04-15 RX ADMIN — INSULIN LISPRO 3: 100 INJECTION, SOLUTION INTRAVENOUS; SUBCUTANEOUS at 12:34

## 2025-04-15 RX ADMIN — INSULIN GLARGINE-YFGN 11 UNIT(S): 100 INJECTION, SOLUTION SUBCUTANEOUS at 22:49

## 2025-04-15 RX ADMIN — POLYETHYLENE GLYCOL 3350 17 GRAM(S): 17 POWDER, FOR SOLUTION ORAL at 17:54

## 2025-04-15 RX ADMIN — INSULIN LISPRO 5 UNIT(S): 100 INJECTION, SOLUTION INTRAVENOUS; SUBCUTANEOUS at 09:07

## 2025-04-15 RX ADMIN — DEXTROMETHORPHAN HBR, GUAIFENESIN 200 MILLIGRAM(S): 200 LIQUID ORAL at 12:44

## 2025-04-15 RX ADMIN — Medication 40 MILLIGRAM(S): at 05:31

## 2025-04-15 RX ADMIN — LEVALBUTEROL HYDROCHLORIDE 0.63 MILLIGRAM(S): 1.25 SOLUTION RESPIRATORY (INHALATION) at 15:25

## 2025-04-15 RX ADMIN — DEXTROMETHORPHAN HBR, GUAIFENESIN 200 MILLIGRAM(S): 200 LIQUID ORAL at 21:46

## 2025-04-15 RX ADMIN — Medication 1 APPLICATION(S): at 05:37

## 2025-04-15 RX ADMIN — ENOXAPARIN SODIUM 40 MILLIGRAM(S): 100 INJECTION SUBCUTANEOUS at 17:54

## 2025-04-15 RX ADMIN — INSULIN LISPRO 5 UNIT(S): 100 INJECTION, SOLUTION INTRAVENOUS; SUBCUTANEOUS at 12:34

## 2025-04-15 RX ADMIN — METHYLPREDNISOLONE ACETATE 30 MILLIGRAM(S): 80 INJECTION, SUSPENSION INTRA-ARTICULAR; INTRALESIONAL; INTRAMUSCULAR; SOFT TISSUE at 05:30

## 2025-04-15 RX ADMIN — LEVALBUTEROL HYDROCHLORIDE 0.63 MILLIGRAM(S): 1.25 SOLUTION RESPIRATORY (INHALATION) at 07:21

## 2025-04-15 RX ADMIN — Medication 2 TABLET(S): at 21:45

## 2025-04-15 RX ADMIN — Medication 50 MILLIGRAM(S): at 17:54

## 2025-04-15 RX ADMIN — BUDESONIDE 0.5 MILLIGRAM(S): 0.25 SUSPENSION RESPIRATORY (INHALATION) at 07:21

## 2025-04-15 RX ADMIN — OLANZAPINE 2.5 MILLIGRAM(S): 10 TABLET ORAL at 21:45

## 2025-04-15 RX ADMIN — LISINOPRIL 40 MILLIGRAM(S): 5 TABLET ORAL at 05:31

## 2025-04-15 NOTE — PROGRESS NOTE ADULT - PROBLEM SELECTOR PLAN 5
[Mother] : mother might be 2/2 to deconditioning/critical care myopathy  CK WNL  TK on discharge    #Constipation  -Cont senna/miralax. S/p dulcolax/ MoM w/ good response.

## 2025-04-15 NOTE — PROGRESS NOTE ADULT - ASSESSMENT
74F reported asthma with prior intubations and depression initially admitted to MICU with suspected asthma exacerbation with RRT on 3/19 AM for hypoxemia and increased work of breathing with poor air entry. Patient transferred back to ICU for airway evaluation and asthma management and transferred back to floor 3/21, initially requiring BIPAP. Patient had adequate improvement from pulmonary perspective on floor however with episodes reported by team and family of wheezing and cough yesterday. Upon assessment patient is significantly lethargic, still under treatment with Ativan for underlying psychiatric disorder. Patient with likely recurring aspiration due to underlying mood disorder, with now reccurrent asthma exacerbation    #Asthma Exacerbation - Recurred likely in setting of aspiration  #Lethargy - on Ativan TID now off: MS much more improved     - CTA chest 3/8 without evidence of PNA or PE  - repeat CTA negative for PE and clear lungs  - continue Duonebs every 6h, ensure to continue as well on discharge for 7 days   - Continue Pulmicort 0.5mg BID while lethargic  - wheezing improved, please start prednisone 40 taper starting tomorrow: 40 mg for three days, and then down by 10 mg every 3 days  - Levalbuterol nebs every 6h + Ipratropium nebs every 6h: can decrease to q 8 now  - Incentive spirometry  - SLP: puree and thin liquids   - no indication for nocturnal NIV for now : no longer hypercapnic given obstruction and mentation has improved  - Use Home token on DC for OP Pulmonary follow up   no further pulmonary recommendations

## 2025-04-15 NOTE — CHART NOTE - NSCHARTNOTEFT_GEN_A_CORE
Nutrition Follow-Up Chart Note         Source: Patient A&Ox2    Family [ x]  Son   RN [x ]    Chart [x ]     Pt is seen for nutrition follow up due to severe/moderate malnutrition, per protocol.    __________________ Medical Course__________________  74 y.o. Female w/ Hx asthma and depressed presented with AHHRF c/b AMS requiring MICU admission for intubation for respiratory failure i/s/o asthma exacerbation. Extubated 3/12 to face tent and then on BIPAP s/p RRT 3/19 for increased WOB / hypoxia despite aggressive asthma management. Transferred back to MICU for management of severe asthma. Possible aspiration event 3/31. SLP jen appreciated. XR cine performed with improvement in mental status - pureed diet w/ thin liquids recommended.     Diet, Pureed:   Consistent Carbohydrate {No Snacks} (CSTCHO)  No Beef  No Pork  Supplement Feeding Modality:  Oral  Glucerna Shake Cans or Servings Per Day:  1       Frequency:  Three Times a day (04-08-25 @ 12:46)         __________________ Nutrition Course__________________   Patient seen at bedside with decreased cognition. Pt's son by the bedside provided collateral, who reports pt's appetite has improved slightly in hospital, able to consume "2/3 of her meals at dinner last night and breakfast this morning". Pt's diet notable with Glucerna  3x daily (660kcals, 30g protein) as per son, pt is able to drink all three bottles, and however he is trying to wean her off from the nutrition supplement due to son would like to make his own high protein shake from whole foods. Discussed different high protein low carb shake recipes with patient. Pt' labs notable with POCT , hyperglycemia, currently on Consistent Carbohydrate diet and insulin regimen ordered for glycemic control. Son is amenable to trial Ensure Max 1x daily (150cal, 30gm pro) which provides only 1g of sugar and 30 gm pro. Recommend decrease Glucerna to 2x daily (440kcals, 20g protein) and Ensure Max 1x daily (150cal, 30gm pro) to provide optimal nutrition. Pt's son request writer to revisit pt this Friday. RD to remain available for further nutritional interventions as indicated.       __________________ Pertinent Medications__________________   MEDICATIONS  (STANDING):  bisacodyl 10 milliGRAM(s) Oral once  buDESOnide    Inhalation Suspension 0.5 milliGRAM(s) Inhalation every 12 hours  chlorhexidine 2% Cloths 1 Application(s) Topical <User Schedule>  dextrose 5%. 1000 milliLiter(s) (100 mL/Hr) IV Continuous <Continuous>  dextrose 5%. 1000 milliLiter(s) (50 mL/Hr) IV Continuous <Continuous>  dextrose 50% Injectable 25 Gram(s) IV Push once  dextrose 50% Injectable 12.5 Gram(s) IV Push once  dextrose 50% Injectable 25 Gram(s) IV Push once  dextrose Oral Gel 15 Gram(s) Oral once  enoxaparin Injectable 40 milliGRAM(s) SubCutaneous every 24 hours  glucagon  Injectable 1 milliGRAM(s) IntraMuscular once  guaiFENesin Oral Liquid (Sugar-Free) 200 milliGRAM(s) Oral every 6 hours  hydrALAZINE 50 milliGRAM(s) Oral three times a day  hydrochlorothiazide 25 milliGRAM(s) Oral daily  insulin glargine Injectable (LANTUS) 11 Unit(s) SubCutaneous at bedtime  insulin lispro (ADMELOG) corrective regimen sliding scale   SubCutaneous three times a day before meals  insulin lispro (ADMELOG) corrective regimen sliding scale   SubCutaneous at bedtime  insulin lispro Injectable (ADMELOG) 5 Unit(s) SubCutaneous three times a day before meals  ipratropium    for Nebulization 500 MICROGram(s) Nebulizer every 8 hours  levalbuterol Inhalation 0.63 milliGRAM(s) Inhalation every 8 hours  lisinopril 40 milliGRAM(s) Oral daily  memantine 5 milliGRAM(s) Oral <User Schedule>  OLANZapine 2.5 milliGRAM(s) Oral at bedtime  pantoprazole    Tablet 40 milliGRAM(s) Oral before breakfast  polyethylene glycol 3350 17 Gram(s) Oral two times a day  senna 2 Tablet(s) Oral at bedtime  sertraline 100 milliGRAM(s) Oral daily    MEDICATIONS  (PRN):      __________________ Pertinent Labs__________________   04-15    139  |  100  |  20  ----------------------------<  145[H]  4.1   |  26  |  0.61    Ca    9.5      15 Apr 2025 06:45  Phos  3.9     04-15  Mg     1.80     04-15                            11.3   13.48 )-----------( 186      ( 14 Apr 2025 06:12 )             34.1          POCT Blood Glucose.: 282 mg/dL (04-15-25 @ 12:08)  POCT Blood Glucose.: 152 mg/dL (04-15-25 @ 08:23)  POCT Blood Glucose.: 209 mg/dL (04-14-25 @ 22:13)  POCT Blood Glucose.: 124 mg/dL (04-14-25 @ 17:33)  POCT Blood Glucose.: 188 mg/dL (04-14-25 @ 12:08)  POCT Blood Glucose.: 183 mg/dL (04-14-25 @ 08:16)  POCT Blood Glucose.: 207 mg/dL (04-13-25 @ 23:31)  POCT Blood Glucose.: 177 mg/dL (04-13-25 @ 17:24)  POCT Blood Glucose.: 199 mg/dL (04-13-25 @ 12:12)  POCT Blood Glucose.: 109 mg/dL (04-13-25 @ 08:17)  POCT Blood Glucose.: 95 mg/dL (04-12-25 @ 22:29)  POCT Blood Glucose.: 169 mg/dL (04-12-25 @ 17:41)        __________________ Anthropometrics__________________     Anthropometrics: Height (cm): 157.5 (03-16)  Weight (kg): 80.2 (03-16)  BMI (kg/m2): 32.3 (03-16)  IBW: 110  +/- 10%    Weight Assessment: per RN flowsheet in lbs   4/3 164.4   3/30 160.9   3/29 162   3/28 164.4   Pt noted with weight fluctuations likely due to fluid accumulation/loss, pt noted with 1+ generalized edema and 2+ b/l LE edema previously and resolved on 4/14 as per RN flow sheet     Edema: None noted at present as per RN flow sheet.     Skin: None noted at present as per RN flow sheet.     Estimated Needs Assessment:   [ x ] No change in need assessment     Weight Used: IBW- 110lbs/50kg  Estimated Energy: 9107-2376  Kcal/kg/day (30-35  kcal/kg)  Estimated Protein: 60-84 gm/kg/day (1.2-1.4  gm/kg)  Estimated Fluid: per Medical and team discretion.    Nutrition Focused Physical Exam: [ x ] not applicable;        Previous Nutrition Diagnosis:   [ x ] Severe/Moderate malnutrition    Nutrition Diagnosis is : [ x ] not applicable       Education:  [x  ] Given today        Type of education provided: High protein low carb nutrition shake recipes        Recommendations:  [ x ] Recommend decrease Glucerna to 2x daily (440kcals, 20g protein) and Ensure Max 1x daily (150cal, 30gm pro) to provide optimal nutrition.   [ x ] Honor food and fluid preferences within therapeutic diet restrictions as able.  [ x ] RD to remain available for further nutritional interventions as indicated.     Monitoring and Evaluation:   [ x ] Monitor PO intake, skin integrity, bowel regimen, and nutrition pertinent labs.  [ x ] Tolerance to diet prescription [ x ] weights [ x ] follow up per protocol

## 2025-04-15 NOTE — PROGRESS NOTE ADULT - ATTENDING SUPERVISION STATEMENT
Fellow
Resident
Fellow
Resident
Resident/Fellow
Resident/Fellow
Fellow
Fellow
Resident
Resident
Fellow
Resident
Fellow
Resident/Fellow
Resident
Resident

## 2025-04-15 NOTE — PROGRESS NOTE ADULT - ATTENDING COMMENTS
74 year old female with reported asthma w/ prior intubations, depression w/ hx of catatonia presented with asthma exacerbation requiring MICU admission and also with concern for recurrent aspiration    Clinically improved. On room air. No appreciable wheeze.  We are tapering steroids, patient tolerating without issue.     Repeat blood gas without hypercapnia. Likely improved in the setting of improved obstruction    - c/w bronchodilators  - Steroid taper as above  - Encourage IS and OOB as tolerated  - At this time holding NIV  - ongoing BH optimization  - Eventually will need outpatient follow up with Pulmonary and Sleep Medicine  - Rest as above

## 2025-04-15 NOTE — PROGRESS NOTE ADULT - PROBLEM SELECTOR PLAN 2
2/2 to status asthmaticus. Concern this may be occurring in setting of aspiration.   Follow-up aspergillus Ab: negative , ANCA: negative , IgE: negative    s/p intubation and bilevel now on NC  c/w ipratropium + xopenex nebs q8h  s/p course of antibiotics  c/w budesonide BID  -Wean down O2 as tolerated   -No role for nocturnal NIV for now. Will cont to monitor off.   -c/w Solumedrol 30mg BID as per pulm since still wheezing. 2/2 to status asthmaticus. Concern this may be occurring in setting of aspiration.   Follow-up aspergillus Ab: negative , ANCA: negative , IgE: negative    s/p intubation and bilevel now on NC  c/w ipratropium + xopenex nebs q8h  s/p course of antibiotics  c/w budesonide BID  -Wean down O2 as tolerated   -No role for nocturnal NIV for now. Will cont to monitor off.   -d/c  Solumedrol 30mg BID and switch to Prednisone 40mg qdaily x 3 days then 30mg x 3 days then 20mg x 3 days then 10mg x 3 days then off as per pulmonary.

## 2025-04-15 NOTE — PROGRESS NOTE ADULT - SUBJECTIVE AND OBJECTIVE BOX
Patient is a 74y old  Female who presents with a chief complaint of AMS and increased WOB (21 Mar 2025 06:24)      SUBJECTIVE / OVERNIGHT EVENTS:    MEDICATIONS  (STANDING):  bisacodyl 10 milliGRAM(s) Oral once  buDESOnide    Inhalation Suspension 0.5 milliGRAM(s) Inhalation every 12 hours  chlorhexidine 2% Cloths 1 Application(s) Topical <User Schedule>  dextrose 5%. 1000 milliLiter(s) (100 mL/Hr) IV Continuous <Continuous>  dextrose 5%. 1000 milliLiter(s) (50 mL/Hr) IV Continuous <Continuous>  dextrose 50% Injectable 25 Gram(s) IV Push once  dextrose 50% Injectable 12.5 Gram(s) IV Push once  dextrose 50% Injectable 25 Gram(s) IV Push once  dextrose Oral Gel 15 Gram(s) Oral once  enoxaparin Injectable 40 milliGRAM(s) SubCutaneous every 24 hours  glucagon  Injectable 1 milliGRAM(s) IntraMuscular once  guaiFENesin Oral Liquid (Sugar-Free) 200 milliGRAM(s) Oral every 6 hours  hydrALAZINE 50 milliGRAM(s) Oral three times a day  hydrochlorothiazide 25 milliGRAM(s) Oral daily  insulin glargine Injectable (LANTUS) 11 Unit(s) SubCutaneous at bedtime  insulin lispro (ADMELOG) corrective regimen sliding scale   SubCutaneous three times a day before meals  insulin lispro (ADMELOG) corrective regimen sliding scale   SubCutaneous at bedtime  insulin lispro Injectable (ADMELOG) 5 Unit(s) SubCutaneous three times a day before meals  ipratropium    for Nebulization 500 MICROGram(s) Nebulizer every 8 hours  levalbuterol Inhalation 0.63 milliGRAM(s) Inhalation every 8 hours  lisinopril 40 milliGRAM(s) Oral daily  OLANZapine 2.5 milliGRAM(s) Oral at bedtime  pantoprazole    Tablet 40 milliGRAM(s) Oral before breakfast  polyethylene glycol 3350 17 Gram(s) Oral two times a day  senna 2 Tablet(s) Oral at bedtime  sertraline 100 milliGRAM(s) Oral daily    MEDICATIONS  (PRN):      Vital Signs Last 24 Hrs  T(C): 36.8 (15 Apr 2025 04:56), Max: 36.9 (14 Apr 2025 13:07)  T(F): 98.2 (15 Apr 2025 04:56), Max: 98.5 (14 Apr 2025 13:07)  HR: 85 (15 Apr 2025 04:56) (83 - 94)  BP: 112/62 (15 Apr 2025 04:56) (109/67 - 140/74)  BP(mean): --  RR: 19 (15 Apr 2025 04:56) (17 - 19)  SpO2: 98% (15 Apr 2025 04:56) (91% - 100%)    Parameters below as of 15 Apr 2025 04:56  Patient On (Oxygen Delivery Method): nasal cannula  O2 Flow (L/min): 2    CAPILLARY BLOOD GLUCOSE      POCT Blood Glucose.: 152 mg/dL (15 Apr 2025 08:23)  POCT Blood Glucose.: 209 mg/dL (14 Apr 2025 22:13)  POCT Blood Glucose.: 124 mg/dL (14 Apr 2025 17:33)  POCT Blood Glucose.: 188 mg/dL (14 Apr 2025 12:08)    I&O's Summary      PHYSICAL EXAM:  GENERAL: NAD, well-developed  HEAD:  Atraumatic, Normocephalic  EYES: EOMI, PERRLA, conjunctiva and sclera clear  NECK: Supple, No JVD  CHEST/LUNG: Clear to auscultation bilaterally; No wheeze  HEART: Regular rate and rhythm; No murmurs, rubs, or gallops  ABDOMEN: Soft, Nontender, Nondistended; Bowel sounds present  EXTREMITIES:  2+ Peripheral Pulses, No clubbing, cyanosis, or edema  PSYCH: AAOx3  NEUROLOGY: non-focal  SKIN: No rashes or lesions    LABS:                        11.3   13.48 )-----------( 186      ( 14 Apr 2025 06:12 )             34.1     04-15    139  |  100  |  20  ----------------------------<  145[H]  4.1   |  26  |  0.61    Ca    9.5      15 Apr 2025 06:45  Phos  3.9     04-15  Mg     1.80     04-15            Urinalysis Basic - ( 15 Apr 2025 06:45 )    Color: x / Appearance: x / SG: x / pH: x  Gluc: 145 mg/dL / Ketone: x  / Bili: x / Urobili: x   Blood: x / Protein: x / Nitrite: x   Leuk Esterase: x / RBC: x / WBC x   Sq Epi: x / Non Sq Epi: x / Bacteria: x        RADIOLOGY & ADDITIONAL TESTS:    Imaging Personally Reviewed:    Consultant(s) Notes Reviewed:      Care Discussed with Consultants/Other Providers:   Patient is a 74y old  Female who presents with a chief complaint of AMS and increased WOB (21 Mar 2025 06:24)      SUBJECTIVE / OVERNIGHT EVENTS:  Patient was very agitated and anxious when phlebotomy team came to draw blood in a.m. SHe has been very anxious since then pulling off her nasal cannula.     MEDICATIONS  (STANDING):  bisacodyl 10 milliGRAM(s) Oral once  buDESOnide    Inhalation Suspension 0.5 milliGRAM(s) Inhalation every 12 hours  chlorhexidine 2% Cloths 1 Application(s) Topical <User Schedule>  dextrose 5%. 1000 milliLiter(s) (100 mL/Hr) IV Continuous <Continuous>  dextrose 5%. 1000 milliLiter(s) (50 mL/Hr) IV Continuous <Continuous>  dextrose 50% Injectable 25 Gram(s) IV Push once  dextrose 50% Injectable 12.5 Gram(s) IV Push once  dextrose 50% Injectable 25 Gram(s) IV Push once  dextrose Oral Gel 15 Gram(s) Oral once  enoxaparin Injectable 40 milliGRAM(s) SubCutaneous every 24 hours  glucagon  Injectable 1 milliGRAM(s) IntraMuscular once  guaiFENesin Oral Liquid (Sugar-Free) 200 milliGRAM(s) Oral every 6 hours  hydrALAZINE 50 milliGRAM(s) Oral three times a day  hydrochlorothiazide 25 milliGRAM(s) Oral daily  insulin glargine Injectable (LANTUS) 11 Unit(s) SubCutaneous at bedtime  insulin lispro (ADMELOG) corrective regimen sliding scale   SubCutaneous three times a day before meals  insulin lispro (ADMELOG) corrective regimen sliding scale   SubCutaneous at bedtime  insulin lispro Injectable (ADMELOG) 5 Unit(s) SubCutaneous three times a day before meals  ipratropium    for Nebulization 500 MICROGram(s) Nebulizer every 8 hours  levalbuterol Inhalation 0.63 milliGRAM(s) Inhalation every 8 hours  lisinopril 40 milliGRAM(s) Oral daily  OLANZapine 2.5 milliGRAM(s) Oral at bedtime  pantoprazole    Tablet 40 milliGRAM(s) Oral before breakfast  polyethylene glycol 3350 17 Gram(s) Oral two times a day  senna 2 Tablet(s) Oral at bedtime  sertraline 100 milliGRAM(s) Oral daily    MEDICATIONS  (PRN):      Vital Signs Last 24 Hrs  T(C): 36.8 (15 Apr 2025 04:56), Max: 36.9 (14 Apr 2025 13:07)  T(F): 98.2 (15 Apr 2025 04:56), Max: 98.5 (14 Apr 2025 13:07)  HR: 85 (15 Apr 2025 04:56) (83 - 94)  BP: 112/62 (15 Apr 2025 04:56) (109/67 - 140/74)  BP(mean): --  RR: 19 (15 Apr 2025 04:56) (17 - 19)  SpO2: 98% (15 Apr 2025 04:56) (91% - 100%)    Parameters below as of 15 Apr 2025 04:56  Patient On (Oxygen Delivery Method): nasal cannula  O2 Flow (L/min): 2    CAPILLARY BLOOD GLUCOSE      POCT Blood Glucose.: 152 mg/dL (15 Apr 2025 08:23)  POCT Blood Glucose.: 209 mg/dL (14 Apr 2025 22:13)  POCT Blood Glucose.: 124 mg/dL (14 Apr 2025 17:33)  POCT Blood Glucose.: 188 mg/dL (14 Apr 2025 12:08)    I&O's Summary      PHYSICAL EXAM:  GENERAL: NAD, well-developed  HEAD:  Atraumatic, Normocephalic  EYES: EOMI, PERRLA, conjunctiva and sclera clear  NECK: Supple, No JVD  CHEST/LUNG: Clear to auscultation bilaterally; No wheeze  HEART: Regular rate and rhythm; No murmurs, rubs, or gallops  ABDOMEN: Soft, Nontender, Nondistended; Bowel sounds present  EXTREMITIES:  2+ Peripheral Pulses, No clubbing, cyanosis, or edema  PSYCH: AAOx3  NEUROLOGY: non-focal  SKIN: No rashes or lesions    LABS:                        11.3   13.48 )-----------( 186      ( 14 Apr 2025 06:12 )             34.1     04-15    139  |  100  |  20  ----------------------------<  145[H]  4.1   |  26  |  0.61    Ca    9.5      15 Apr 2025 06:45  Phos  3.9     04-15  Mg     1.80     04-15            Urinalysis Basic - ( 15 Apr 2025 06:45 )    Color: x / Appearance: x / SG: x / pH: x  Gluc: 145 mg/dL / Ketone: x  / Bili: x / Urobili: x   Blood: x / Protein: x / Nitrite: x   Leuk Esterase: x / RBC: x / WBC x   Sq Epi: x / Non Sq Epi: x / Bacteria: x        RADIOLOGY & ADDITIONAL TESTS:    Imaging Personally Reviewed:    Consultant(s) Notes Reviewed:      Care Discussed with Consultants/Other Providers:

## 2025-04-15 NOTE — PROGRESS NOTE ADULT - SUBJECTIVE AND OBJECTIVE BOX
CHIEF COMPLAINT:Patient is a 74y old  Female who presents with a chief complaint of AMS and increased WOB (21 Mar 2025 06:24)      Interval Events: patient with episode of SOB overnight, however minimal hypoxemia and hyperoxia seen on gas.     REVIEW OF SYSTEMS:  [x] All other systems negative except per HPI   [ ] Unable to assess ROS because ________    OBJECTIVE:  ICU Vital Signs Last 24 Hrs  T(C): 37.1 (15 Apr 2025 11:38), Max: 37.1 (15 Apr 2025 11:38)  T(F): 98.7 (15 Apr 2025 11:38), Max: 98.7 (15 Apr 2025 11:38)  HR: 84 (15 Apr 2025 11:38) (83 - 94)  BP: 104/47 (15 Apr 2025 11:38) (104/47 - 136/72)  BP(mean): --  ABP: --  ABP(mean): --  RR: 18 (15 Apr 2025 11:38) (17 - 19)  SpO2: 92% (15 Apr 2025 11:38) (91% - 100%)    O2 Parameters below as of 15 Apr 2025 11:38  Patient On (Oxygen Delivery Method): nasal cannula  O2 Flow (L/min): 2              PHYSICAL EXAM:  GENERAL: NAD, well-groomed, well-developed  HEAD:  Atraumatic, Normocephalic  EYES: EOMI, PERRLA, conjunctiva and sclera clear  ENMT: No tonsillar erythema, exudates, or enlargement; Moist mucous membranes, Good dentition, No lesions  NECK: Supple, No JVD, Normal thyroid  CHEST/LUNG: Clear to auscultation bilaterally; No rales, rhonchi, wheezing, or rubs  HEART: Regular rate and rhythm; No murmurs, rubs, or gallops  ABDOMEN: Soft, Nontender, Nondistended; Bowel sounds present  VASCULAR:  2+ Peripheral Pulses, No clubbing, cyanosis, or edema  LYMPH: No lymphadenopathy noted  SKIN: No rashes or lesions  NERVOUS SYSTEM:  Alert & Oriented X3, Good concentration; Motor Strength 5/5 B/L upper and lower extremities; DTRs 2+ intact and symmetric    HOSPITAL MEDICATIONS:  MEDICATIONS  (STANDING):  bisacodyl 10 milliGRAM(s) Oral once  buDESOnide    Inhalation Suspension 0.5 milliGRAM(s) Inhalation every 12 hours  chlorhexidine 2% Cloths 1 Application(s) Topical <User Schedule>  dextrose 5%. 1000 milliLiter(s) (100 mL/Hr) IV Continuous <Continuous>  dextrose 5%. 1000 milliLiter(s) (50 mL/Hr) IV Continuous <Continuous>  dextrose 50% Injectable 25 Gram(s) IV Push once  dextrose 50% Injectable 12.5 Gram(s) IV Push once  dextrose 50% Injectable 25 Gram(s) IV Push once  dextrose Oral Gel 15 Gram(s) Oral once  enoxaparin Injectable 40 milliGRAM(s) SubCutaneous every 24 hours  glucagon  Injectable 1 milliGRAM(s) IntraMuscular once  guaiFENesin Oral Liquid (Sugar-Free) 200 milliGRAM(s) Oral every 6 hours  hydrALAZINE 50 milliGRAM(s) Oral three times a day  hydrochlorothiazide 25 milliGRAM(s) Oral daily  insulin glargine Injectable (LANTUS) 11 Unit(s) SubCutaneous at bedtime  insulin lispro (ADMELOG) corrective regimen sliding scale   SubCutaneous three times a day before meals  insulin lispro (ADMELOG) corrective regimen sliding scale   SubCutaneous at bedtime  insulin lispro Injectable (ADMELOG) 5 Unit(s) SubCutaneous three times a day before meals  ipratropium    for Nebulization 500 MICROGram(s) Nebulizer every 8 hours  levalbuterol Inhalation 0.63 milliGRAM(s) Inhalation every 8 hours  lisinopril 40 milliGRAM(s) Oral daily  memantine 5 milliGRAM(s) Oral <User Schedule>  OLANZapine 2.5 milliGRAM(s) Oral at bedtime  pantoprazole    Tablet 40 milliGRAM(s) Oral before breakfast  polyethylene glycol 3350 17 Gram(s) Oral two times a day  senna 2 Tablet(s) Oral at bedtime  sertraline 100 milliGRAM(s) Oral daily    MEDICATIONS  (PRN):      LABS:    The Labs were reviewed by me   The Radiology was reviewed by me    EKG tracing reviewed by me    04-15    139  |  100  |  20  ----------------------------<  145[H]  4.1   |  26  |  0.61  04-14    139  |  100  |  16  ----------------------------<  166[H]  3.7   |  27  |  0.60    Ca    9.5      15 Apr 2025 06:45  Ca    9.8      14 Apr 2025 06:12  Phos  3.9     04-15  Mg     1.80     04-15      Magnesium: 1.80 mg/dL (04-15-25 @ 06:45)  Magnesium: 1.70 mg/dL (04-14-25 @ 06:12)    Phosphorus: 3.9 mg/dL (04-15-25 @ 06:45)  Phosphorus: 3.1 mg/dL (04-14-25 @ 06:12)                    Urinalysis Basic - ( 15 Apr 2025 06:45 )    Color: x / Appearance: x / SG: x / pH: x  Gluc: 145 mg/dL / Ketone: x  / Bili: x / Urobili: x   Blood: x / Protein: x / Nitrite: x   Leuk Esterase: x / RBC: x / WBC x   Sq Epi: x / Non Sq Epi: x / Bacteria: x                              11.3   13.48 )-----------( 186      ( 14 Apr 2025 06:12 )             34.1     CAPILLARY BLOOD GLUCOSE      POCT Blood Glucose.: 282 mg/dL (15 Apr 2025 12:08)  POCT Blood Glucose.: 152 mg/dL (15 Apr 2025 08:23)  POCT Blood Glucose.: 209 mg/dL (14 Apr 2025 22:13)  POCT Blood Glucose.: 124 mg/dL (14 Apr 2025 17:33)        MICROBIOLOGY:     RADIOLOGY:  [ ] Reviewed and interpreted by me    Point of Care Ultrasound Findings:    PFT:    EKG:

## 2025-04-15 NOTE — CHART NOTE - NSCHARTNOTEFT_GEN_A_CORE
Discussed with Dr. Degroot - patient with MDD with psychosis but also has history of catatonia, already restarted on Zyprexa qHS, will avoid further titration. May benefit from memantine 5mg QD today, then will reassess tomorrow to see if memantine 10mg warranted. Patient very deconditioned which may preclude psych hospitalization. Will re-eval in AM.     - Increase Zoloft to 125mg QD tomorrow AM  - Begin Memantine 5mg QD today (may increase to 10mg tomorrow pending re-eval)  - C/w Zyprexa 2.5mg qHS

## 2025-04-16 LAB
GLUCOSE BLDC GLUCOMTR-MCNC: 105 MG/DL — HIGH (ref 70–99)
GLUCOSE BLDC GLUCOMTR-MCNC: 176 MG/DL — HIGH (ref 70–99)
GLUCOSE BLDC GLUCOMTR-MCNC: 220 MG/DL — HIGH (ref 70–99)
GLUCOSE BLDC GLUCOMTR-MCNC: 235 MG/DL — HIGH (ref 70–99)

## 2025-04-16 PROCEDURE — 99233 SBSQ HOSP IP/OBS HIGH 50: CPT | Mod: FS

## 2025-04-16 PROCEDURE — 99232 SBSQ HOSP IP/OBS MODERATE 35: CPT

## 2025-04-16 RX ADMIN — DEXTROMETHORPHAN HBR, GUAIFENESIN 200 MILLIGRAM(S): 200 LIQUID ORAL at 18:04

## 2025-04-16 RX ADMIN — BUDESONIDE 0.5 MILLIGRAM(S): 0.25 SUSPENSION RESPIRATORY (INHALATION) at 22:25

## 2025-04-16 RX ADMIN — DEXTROMETHORPHAN HBR, GUAIFENESIN 200 MILLIGRAM(S): 200 LIQUID ORAL at 22:58

## 2025-04-16 RX ADMIN — Medication 40 MILLIGRAM(S): at 05:29

## 2025-04-16 RX ADMIN — Medication 500 MICROGRAM(S): at 15:50

## 2025-04-16 RX ADMIN — INSULIN GLARGINE-YFGN 11 UNIT(S): 100 INJECTION, SOLUTION SUBCUTANEOUS at 22:26

## 2025-04-16 RX ADMIN — MEMANTINE HYDROCHLORIDE 5 MILLIGRAM(S): 21 CAPSULE, EXTENDED RELEASE ORAL at 22:58

## 2025-04-16 RX ADMIN — ENOXAPARIN SODIUM 40 MILLIGRAM(S): 100 INJECTION SUBCUTANEOUS at 18:18

## 2025-04-16 RX ADMIN — OLANZAPINE 2.5 MILLIGRAM(S): 10 TABLET ORAL at 22:58

## 2025-04-16 RX ADMIN — LEVALBUTEROL HYDROCHLORIDE 0.63 MILLIGRAM(S): 1.25 SOLUTION RESPIRATORY (INHALATION) at 22:25

## 2025-04-16 RX ADMIN — LEVALBUTEROL HYDROCHLORIDE 0.63 MILLIGRAM(S): 1.25 SOLUTION RESPIRATORY (INHALATION) at 15:51

## 2025-04-16 RX ADMIN — INSULIN LISPRO 5 UNIT(S): 100 INJECTION, SOLUTION INTRAVENOUS; SUBCUTANEOUS at 08:50

## 2025-04-16 RX ADMIN — LISINOPRIL 40 MILLIGRAM(S): 5 TABLET ORAL at 05:29

## 2025-04-16 RX ADMIN — BUDESONIDE 0.5 MILLIGRAM(S): 0.25 SUSPENSION RESPIRATORY (INHALATION) at 08:01

## 2025-04-16 RX ADMIN — DEXTROMETHORPHAN HBR, GUAIFENESIN 200 MILLIGRAM(S): 200 LIQUID ORAL at 08:50

## 2025-04-16 RX ADMIN — INSULIN LISPRO 2: 100 INJECTION, SOLUTION INTRAVENOUS; SUBCUTANEOUS at 12:58

## 2025-04-16 RX ADMIN — INSULIN LISPRO 5 UNIT(S): 100 INJECTION, SOLUTION INTRAVENOUS; SUBCUTANEOUS at 18:01

## 2025-04-16 RX ADMIN — Medication 50 MILLIGRAM(S): at 00:19

## 2025-04-16 RX ADMIN — DEXTROMETHORPHAN HBR, GUAIFENESIN 200 MILLIGRAM(S): 200 LIQUID ORAL at 02:02

## 2025-04-16 RX ADMIN — Medication 50 MILLIGRAM(S): at 18:18

## 2025-04-16 RX ADMIN — SERTRALINE 125 MILLIGRAM(S): 100 TABLET, FILM COATED ORAL at 13:00

## 2025-04-16 RX ADMIN — Medication 1 APPLICATION(S): at 05:27

## 2025-04-16 RX ADMIN — INSULIN LISPRO 2: 100 INJECTION, SOLUTION INTRAVENOUS; SUBCUTANEOUS at 18:02

## 2025-04-16 RX ADMIN — PREDNISONE 40 MILLIGRAM(S): 20 TABLET ORAL at 05:29

## 2025-04-16 RX ADMIN — INSULIN LISPRO 1: 100 INJECTION, SOLUTION INTRAVENOUS; SUBCUTANEOUS at 08:51

## 2025-04-16 RX ADMIN — Medication 50 MILLIGRAM(S): at 08:49

## 2025-04-16 RX ADMIN — INSULIN LISPRO 5 UNIT(S): 100 INJECTION, SOLUTION INTRAVENOUS; SUBCUTANEOUS at 12:58

## 2025-04-16 RX ADMIN — Medication 500 MICROGRAM(S): at 22:25

## 2025-04-16 RX ADMIN — Medication 500 MICROGRAM(S): at 07:41

## 2025-04-16 RX ADMIN — LEVALBUTEROL HYDROCHLORIDE 0.63 MILLIGRAM(S): 1.25 SOLUTION RESPIRATORY (INHALATION) at 07:41

## 2025-04-16 NOTE — BH CONSULTATION LIAISON PROGRESS NOTE - NSBHFUPINTERVALHXFT_PSY_A_CORE
patient with catatonia, still with catalepsy, automatic obedience, staring, verbigeration, intermittent mutism    discussed with  who is HCP - discussed possibility of ECT and/or restarting ativan, would like to avoid both due to concerns for resp. depression, anesthesia, oversedation. R/B/I specifically in context of catatonia discussed, including usage of other adjuncts and their likely inferiority. Amenable to further titration of memantine

## 2025-04-16 NOTE — BH CONSULTATION LIAISON PROGRESS NOTE - NSBHMSERELATED_PSY_A_CORE
----- Message from Catrina Skinner PA-C sent at 5/10/2021  2:36 PM CDT -----  Please verify patient received [facility] message.     Unable to assess

## 2025-04-16 NOTE — PROGRESS NOTE ADULT - PROBLEM SELECTOR PLAN 10
DVT: lovenox  code: full  GI; PPI    Dispo- [place] KE[place]. Pending further psych optimization as above.

## 2025-04-16 NOTE — BH CONSULTATION LIAISON PROGRESS NOTE - NSBHASSESSMENTFT_PSY_ALL_CORE
Patient is a 74F hx asthma, depression, and HTN was admitted to the MICU with AHRF 2/2 asthma exacerbation resulting in acute on chronic HHRF causing AMS and increased WOB that required intubation. During her ICU course, she experienced seizure-like activity .  Additional issues included hyponatremia, acute on chronic metabolic alkalosis, and mild thrombocytopenia,  also having fevers.  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 Patient has h/o hx of major depressive disorder with psychosis, hx of 3 past inpatient psychiatric hospitalizations last in 2016 at [facility], all rehospitalizations were in context non compliance with the treatment. She has no hx of suicide attempts, no hx of substance abuse. Patient w hx of paranoia, disorganization, catatonia, required MOO when in [facility].     3/24----more withdrawn, some stiffness in neck, not communicative. Some concerns for catatonia. Unsure if symptoms attributed to catatonia vs delirium  3/25--- more alert today, trying to following commands. Still with PMR+. Appears to be responding to Ativan  3/26-- BFS 10, will increasing standing ativan to target catatonic symptoms - giving STAT 0.5mg NOW discussed w acp   3/27----will continue ativan dosing for one more day before increasing  3/28: BFS: stupor 1, mutism 2,withdrawan 2, verbigeration 1, rigidity 2 =8.  3/29: BFS: stupor 2, mutism 2, staring 1, posturing 2, rigidity 2, waxy flexibility 3, withdrawal 2 = 14; please monitor nutritional intake, plan to increase ativan by total daily dose of 0.5 mg at bedtime due to persistent catatonia   3/31: BFS: stupor 2, mutism 2, staring 2, rigidity 1, negativism 1, withdrawal 1, waxy flexibility 3 =12, patient with productive cough, advised for medical work up  4/1---tired, speaking more, not rigid, has PMR+. NPO status now.   4/2--sleepy, RRT this afternoon  4/3---better mentation, pmr but no chester catatonic symptoms  4/4: alert, using few words, mild rigidity -  at bedside, updated   4/5: few words, lethargic, fluctuating wakefulness. Mild rigidity.  No overt catatonic sxs.   4/7: no change, remains thought blocked, few words - son at bedside, updated with care  4/8: patient continues to have poor eye contact, uses few words, no rigidity on today's exam.   4/9: patient remains minimally verbal, some posturing, poor Po intake - continues on NC for 02 requirements   4/10: more verbal/interactive, with increased PO intake per family. Some psychotic output. Family agrees with resumption of Zyprexa.   4/14: alert to self with fluctuating wakefulness, intermittent full sentences f/b one-word answers, some rigidity, posturing, denies si, denies ah/vh  4/16: patient more catatonic today, notable catalepsy, waxiness, verbigeration followed by mutism and staring, family against ativan and ECT, would increase memantine as below, not eating     PLAN  - no SI or HI, no need for psychiatric CO  -- antipsychotics can only be given if qtc < 500    - MEDICATIONS:  - INCREASE Memantine to 10mg QD  -- C/W Zoloft 100 mg daily ( home dose 150mg) ( monitor NA- recent hyponatremia)  -- C/W  Zyprexa 2.5mg HS tonight  - ongoing discussion with family about treatment plan    - PRN:  --Ativan 0.5mg q 6hrs prn IM/IV/PO  - DISPO: pending, not eating enough to go to [facility] TBD: inpt psych vs TK depending on recovery

## 2025-04-16 NOTE — PROGRESS NOTE ADULT - PROBLEM SELECTOR PLAN 1
Much improved - more interactive. Eating ok. Much more alert compared to previous days.  -Hx of MDD w/ psychotic features.  -Trialed ativan, but now on hold. Concern that it may have contributed to respiratory decompensation.  -Cont Zoloft 100 qday (home dose was 150).  -Cont Zyprexa 2.5 QHS.  -Further titration as per BH.  -Started Namenda 5mg as per psych but patient now more withdrawn. Notified psych of changes and to F/U

## 2025-04-16 NOTE — PROGRESS NOTE ADULT - PROBLEM SELECTOR PLAN 2
2/2 to status asthmaticus. Concern this may be occurring in setting of aspiration.   Follow-up aspergillus Ab: negative , ANCA: negative , IgE: negative    s/p intubation and bilevel now weaned off of NC to RA  c/w ipratropium + xopenex nebs q8h  s/p course of antibiotics  c/w budesonide BID  -No role for nocturnal NIV for now. Will cont to monitor off.   -d/c'ed  Solumedrol 30mg BID and switched to Prednisone 40mg qdaily x 3 days then 30mg x 3 days then 20mg x 3 days then 10mg x 3 days then off as per pulmonary.

## 2025-04-16 NOTE — PROGRESS NOTE ADULT - SUBJECTIVE AND OBJECTIVE BOX
Patient is a 74y old  Female who presents with a chief complaint of AMS and increased WOB (21 Mar 2025 06:24)      SUBJECTIVE / OVERNIGHT EVENTS:  Patient more withdrawn today. No reported agitation but according to  who has been at the bedside, she is now not eating anything at all and not conversive. Patient denies cp, SOB, abdominal pain, N/V/D     MEDICATIONS  (STANDING):  bisacodyl 10 milliGRAM(s) Oral once  buDESOnide    Inhalation Suspension 0.5 milliGRAM(s) Inhalation every 12 hours  chlorhexidine 2% Cloths 1 Application(s) Topical <User Schedule>  dextrose 5%. 1000 milliLiter(s) (100 mL/Hr) IV Continuous <Continuous>  dextrose 5%. 1000 milliLiter(s) (50 mL/Hr) IV Continuous <Continuous>  dextrose 50% Injectable 25 Gram(s) IV Push once  dextrose 50% Injectable 12.5 Gram(s) IV Push once  dextrose 50% Injectable 25 Gram(s) IV Push once  dextrose Oral Gel 15 Gram(s) Oral once  enoxaparin Injectable 40 milliGRAM(s) SubCutaneous every 24 hours  glucagon  Injectable 1 milliGRAM(s) IntraMuscular once  guaiFENesin Oral Liquid (Sugar-Free) 200 milliGRAM(s) Oral every 6 hours  hydrALAZINE 50 milliGRAM(s) Oral three times a day  hydrochlorothiazide 25 milliGRAM(s) Oral daily  insulin glargine Injectable (LANTUS) 11 Unit(s) SubCutaneous at bedtime  insulin lispro (ADMELOG) corrective regimen sliding scale   SubCutaneous three times a day before meals  insulin lispro (ADMELOG) corrective regimen sliding scale   SubCutaneous at bedtime  insulin lispro Injectable (ADMELOG) 5 Unit(s) SubCutaneous three times a day before meals  ipratropium    for Nebulization 500 MICROGram(s) Nebulizer every 8 hours  levalbuterol Inhalation 0.63 milliGRAM(s) Inhalation every 8 hours  lisinopril 40 milliGRAM(s) Oral daily  memantine 5 milliGRAM(s) Oral at bedtime  OLANZapine 2.5 milliGRAM(s) Oral at bedtime  pantoprazole    Tablet 40 milliGRAM(s) Oral before breakfast  polyethylene glycol 3350 17 Gram(s) Oral two times a day  predniSONE   Tablet 40 milliGRAM(s) Oral daily  senna 2 Tablet(s) Oral at bedtime  sertraline 125 milliGRAM(s) Oral daily    MEDICATIONS  (PRN):      Vital Signs Last 24 Hrs  T(C): 36.2 (16 Apr 2025 05:02), Max: 37.1 (16 Apr 2025 00:10)  T(F): 97.2 (16 Apr 2025 05:02), Max: 98.8 (16 Apr 2025 00:10)  HR: 88 (16 Apr 2025 08:03) (76 - 101)  BP: 131/70 (16 Apr 2025 05:02) (121/69 - 140/74)  BP(mean): --  RR: 18 (16 Apr 2025 05:02) (18 - 18)  SpO2: 100% (16 Apr 2025 08:03) (92% - 100%)    Parameters below as of 16 Apr 2025 08:03  Patient On (Oxygen Delivery Method): room air      CAPILLARY BLOOD GLUCOSE      POCT Blood Glucose.: 235 mg/dL (16 Apr 2025 12:03)  POCT Blood Glucose.: 176 mg/dL (16 Apr 2025 08:24)  POCT Blood Glucose.: 139 mg/dL (15 Apr 2025 22:27)  POCT Blood Glucose.: 142 mg/dL (15 Apr 2025 17:24)    I&O's Summary    15 Apr 2025 07:01  -  16 Apr 2025 07:00  --------------------------------------------------------  IN: 0 mL / OUT: 700 mL / NET: -700 mL        PHYSICAL EXAM:   GENERAL: NAD, well-developed  HEAD:  Atraumatic, Normocephalic  EYES: EOMI, PERRLA, conjunctiva and sclera clear  NECK: Supple, No JVD  CHEST/LUNG: Clear to auscultation bilaterally; No wheeze  HEART: Regular rate and rhythm; No murmurs, rubs, or gallops  ABDOMEN: Soft, Nontender, Nondistended; Bowel sounds present  EXTREMITIES:  2+ Peripheral Pulses, No clubbing, cyanosis, or edema  PSYCH: AAOx3 flat affect. Staring at the walls. Answers yes or no  NEUROLOGY: non-focal  SKIN: No rashes or lesions    LABS:    04-15    139  |  100  |  20  ----------------------------<  145[H]  4.1   |  26  |  0.61    Ca    9.5      15 Apr 2025 06:45  Phos  3.9     04-15  Mg     1.80     04-15            Urinalysis Basic - ( 15 Apr 2025 06:45 )    Color: x / Appearance: x / SG: x / pH: x  Gluc: 145 mg/dL / Ketone: x  / Bili: x / Urobili: x   Blood: x / Protein: x / Nitrite: x   Leuk Esterase: x / RBC: x / WBC x   Sq Epi: x / Non Sq Epi: x / Bacteria: x        RADIOLOGY & ADDITIONAL TESTS:    Imaging Personally Reviewed:    Consultant(s) Notes Reviewed:      Care Discussed with Consultants/Other Providers:

## 2025-04-17 LAB
ALBUMIN SERPL ELPH-MCNC: 3.5 G/DL — SIGNIFICANT CHANGE UP (ref 3.3–5)
ALP SERPL-CCNC: 81 U/L — SIGNIFICANT CHANGE UP (ref 40–120)
ALT FLD-CCNC: 34 U/L — HIGH (ref 4–33)
ANION GAP SERPL CALC-SCNC: 12 MMOL/L — SIGNIFICANT CHANGE UP (ref 7–14)
AST SERPL-CCNC: 18 U/L — SIGNIFICANT CHANGE UP (ref 4–32)
BILIRUB SERPL-MCNC: 0.5 MG/DL — SIGNIFICANT CHANGE UP (ref 0.2–1.2)
BUN SERPL-MCNC: 16 MG/DL — SIGNIFICANT CHANGE UP (ref 7–23)
CALCIUM SERPL-MCNC: 9.4 MG/DL — SIGNIFICANT CHANGE UP (ref 8.4–10.5)
CHLORIDE SERPL-SCNC: 101 MMOL/L — SIGNIFICANT CHANGE UP (ref 98–107)
CO2 SERPL-SCNC: 26 MMOL/L — SIGNIFICANT CHANGE UP (ref 22–31)
CREAT SERPL-MCNC: 0.69 MG/DL — SIGNIFICANT CHANGE UP (ref 0.5–1.3)
EGFR: 91 ML/MIN/1.73M2 — SIGNIFICANT CHANGE UP
EGFR: 91 ML/MIN/1.73M2 — SIGNIFICANT CHANGE UP
GLUCOSE BLDC GLUCOMTR-MCNC: 116 MG/DL — HIGH (ref 70–99)
GLUCOSE BLDC GLUCOMTR-MCNC: 132 MG/DL — HIGH (ref 70–99)
GLUCOSE BLDC GLUCOMTR-MCNC: 146 MG/DL — HIGH (ref 70–99)
GLUCOSE BLDC GLUCOMTR-MCNC: 179 MG/DL — HIGH (ref 70–99)
GLUCOSE BLDC GLUCOMTR-MCNC: 190 MG/DL — HIGH (ref 70–99)
GLUCOSE SERPL-MCNC: 249 MG/DL — HIGH (ref 70–99)
HCT VFR BLD CALC: 34.2 % — LOW (ref 34.5–45)
HGB BLD-MCNC: 11.5 G/DL — SIGNIFICANT CHANGE UP (ref 11.5–15.5)
MAGNESIUM SERPL-MCNC: 1.7 MG/DL — SIGNIFICANT CHANGE UP (ref 1.6–2.6)
MCHC RBC-ENTMCNC: 32.1 PG — SIGNIFICANT CHANGE UP (ref 27–34)
MCHC RBC-ENTMCNC: 33.6 G/DL — SIGNIFICANT CHANGE UP (ref 32–36)
MCV RBC AUTO: 95.5 FL — SIGNIFICANT CHANGE UP (ref 80–100)
NRBC # BLD AUTO: 0 K/UL — SIGNIFICANT CHANGE UP (ref 0–0)
NRBC # FLD: 0 K/UL — SIGNIFICANT CHANGE UP (ref 0–0)
NRBC BLD AUTO-RTO: 0 /100 WBCS — SIGNIFICANT CHANGE UP (ref 0–0)
PHOSPHATE SERPL-MCNC: 2.8 MG/DL — SIGNIFICANT CHANGE UP (ref 2.5–4.5)
PLATELET # BLD AUTO: 200 K/UL — SIGNIFICANT CHANGE UP (ref 150–400)
POTASSIUM SERPL-MCNC: 3.8 MMOL/L — SIGNIFICANT CHANGE UP (ref 3.5–5.3)
POTASSIUM SERPL-SCNC: 3.8 MMOL/L — SIGNIFICANT CHANGE UP (ref 3.5–5.3)
PROT SERPL-MCNC: 6.4 G/DL — SIGNIFICANT CHANGE UP (ref 6–8.3)
RBC # BLD: 3.58 M/UL — LOW (ref 3.8–5.2)
RBC # FLD: 13.3 % — SIGNIFICANT CHANGE UP (ref 10.3–14.5)
SODIUM SERPL-SCNC: 139 MMOL/L — SIGNIFICANT CHANGE UP (ref 135–145)
WBC # BLD: 9.61 K/UL — SIGNIFICANT CHANGE UP (ref 3.8–10.5)
WBC # FLD AUTO: 9.61 K/UL — SIGNIFICANT CHANGE UP (ref 3.8–10.5)

## 2025-04-17 PROCEDURE — 99232 SBSQ HOSP IP/OBS MODERATE 35: CPT

## 2025-04-17 PROCEDURE — 99233 SBSQ HOSP IP/OBS HIGH 50: CPT | Mod: FS

## 2025-04-17 RX ORDER — MEMANTINE HYDROCHLORIDE 21 MG/1
10 CAPSULE, EXTENDED RELEASE ORAL AT BEDTIME
Refills: 0 | Status: DISCONTINUED | OUTPATIENT
Start: 2025-04-17 | End: 2025-05-21

## 2025-04-17 RX ADMIN — Medication 40 MILLIGRAM(S): at 05:47

## 2025-04-17 RX ADMIN — Medication 500 MICROGRAM(S): at 15:35

## 2025-04-17 RX ADMIN — MEMANTINE HYDROCHLORIDE 10 MILLIGRAM(S): 21 CAPSULE, EXTENDED RELEASE ORAL at 22:37

## 2025-04-17 RX ADMIN — DEXTROMETHORPHAN HBR, GUAIFENESIN 200 MILLIGRAM(S): 200 LIQUID ORAL at 13:17

## 2025-04-17 RX ADMIN — LEVALBUTEROL HYDROCHLORIDE 0.63 MILLIGRAM(S): 1.25 SOLUTION RESPIRATORY (INHALATION) at 21:15

## 2025-04-17 RX ADMIN — INSULIN LISPRO 5 UNIT(S): 100 INJECTION, SOLUTION INTRAVENOUS; SUBCUTANEOUS at 12:58

## 2025-04-17 RX ADMIN — DEXTROMETHORPHAN HBR, GUAIFENESIN 200 MILLIGRAM(S): 200 LIQUID ORAL at 05:47

## 2025-04-17 RX ADMIN — SERTRALINE 125 MILLIGRAM(S): 100 TABLET, FILM COATED ORAL at 13:17

## 2025-04-17 RX ADMIN — POLYETHYLENE GLYCOL 3350 17 GRAM(S): 17 POWDER, FOR SOLUTION ORAL at 17:44

## 2025-04-17 RX ADMIN — PREDNISONE 40 MILLIGRAM(S): 20 TABLET ORAL at 05:47

## 2025-04-17 RX ADMIN — Medication 500 MICROGRAM(S): at 09:24

## 2025-04-17 RX ADMIN — LEVALBUTEROL HYDROCHLORIDE 0.63 MILLIGRAM(S): 1.25 SOLUTION RESPIRATORY (INHALATION) at 15:35

## 2025-04-17 RX ADMIN — OLANZAPINE 2.5 MILLIGRAM(S): 10 TABLET ORAL at 22:37

## 2025-04-17 RX ADMIN — ENOXAPARIN SODIUM 40 MILLIGRAM(S): 100 INJECTION SUBCUTANEOUS at 17:44

## 2025-04-17 RX ADMIN — INSULIN LISPRO 1: 100 INJECTION, SOLUTION INTRAVENOUS; SUBCUTANEOUS at 12:58

## 2025-04-17 RX ADMIN — Medication 500 MICROGRAM(S): at 21:14

## 2025-04-17 RX ADMIN — INSULIN GLARGINE-YFGN 11 UNIT(S): 100 INJECTION, SOLUTION SUBCUTANEOUS at 22:41

## 2025-04-17 RX ADMIN — Medication 2 TABLET(S): at 22:36

## 2025-04-17 RX ADMIN — DEXTROMETHORPHAN HBR, GUAIFENESIN 200 MILLIGRAM(S): 200 LIQUID ORAL at 17:44

## 2025-04-17 RX ADMIN — LISINOPRIL 40 MILLIGRAM(S): 5 TABLET ORAL at 05:47

## 2025-04-17 RX ADMIN — INSULIN LISPRO 5 UNIT(S): 100 INJECTION, SOLUTION INTRAVENOUS; SUBCUTANEOUS at 17:45

## 2025-04-17 RX ADMIN — INSULIN LISPRO 5 UNIT(S): 100 INJECTION, SOLUTION INTRAVENOUS; SUBCUTANEOUS at 09:08

## 2025-04-17 RX ADMIN — Medication 50 MILLIGRAM(S): at 10:41

## 2025-04-17 RX ADMIN — BUDESONIDE 0.5 MILLIGRAM(S): 0.25 SUSPENSION RESPIRATORY (INHALATION) at 21:15

## 2025-04-17 RX ADMIN — Medication 10 MILLIGRAM(S): at 22:37

## 2025-04-17 RX ADMIN — Medication 50 MILLIGRAM(S): at 01:13

## 2025-04-17 RX ADMIN — LEVALBUTEROL HYDROCHLORIDE 0.63 MILLIGRAM(S): 1.25 SOLUTION RESPIRATORY (INHALATION) at 09:25

## 2025-04-17 RX ADMIN — INSULIN LISPRO 1: 100 INJECTION, SOLUTION INTRAVENOUS; SUBCUTANEOUS at 17:45

## 2025-04-17 RX ADMIN — Medication 1 APPLICATION(S): at 05:48

## 2025-04-17 RX ADMIN — BUDESONIDE 0.5 MILLIGRAM(S): 0.25 SUSPENSION RESPIRATORY (INHALATION) at 09:24

## 2025-04-17 NOTE — PROGRESS NOTE ADULT - SUBJECTIVE AND OBJECTIVE BOX
Patient is a 74y old  Female who presents with a chief complaint of AMS and increased WOB (21 Mar 2025 06:24)      SUBJECTIVE / OVERNIGHT EVENTS:    MEDICATIONS  (STANDING):  bisacodyl 10 milliGRAM(s) Oral once  buDESOnide    Inhalation Suspension 0.5 milliGRAM(s) Inhalation every 12 hours  chlorhexidine 2% Cloths 1 Application(s) Topical <User Schedule>  dextrose 5%. 1000 milliLiter(s) (100 mL/Hr) IV Continuous <Continuous>  dextrose 5%. 1000 milliLiter(s) (50 mL/Hr) IV Continuous <Continuous>  dextrose 50% Injectable 25 Gram(s) IV Push once  dextrose 50% Injectable 12.5 Gram(s) IV Push once  dextrose 50% Injectable 25 Gram(s) IV Push once  dextrose Oral Gel 15 Gram(s) Oral once  enoxaparin Injectable 40 milliGRAM(s) SubCutaneous every 24 hours  glucagon  Injectable 1 milliGRAM(s) IntraMuscular once  guaiFENesin Oral Liquid (Sugar-Free) 200 milliGRAM(s) Oral every 6 hours  hydrALAZINE 50 milliGRAM(s) Oral three times a day  hydrochlorothiazide 25 milliGRAM(s) Oral daily  insulin glargine Injectable (LANTUS) 11 Unit(s) SubCutaneous at bedtime  insulin lispro (ADMELOG) corrective regimen sliding scale   SubCutaneous three times a day before meals  insulin lispro (ADMELOG) corrective regimen sliding scale   SubCutaneous at bedtime  insulin lispro Injectable (ADMELOG) 5 Unit(s) SubCutaneous three times a day before meals  ipratropium    for Nebulization 500 MICROGram(s) Nebulizer every 8 hours  levalbuterol Inhalation 0.63 milliGRAM(s) Inhalation every 8 hours  lisinopril 40 milliGRAM(s) Oral daily  memantine 10 milliGRAM(s) Oral at bedtime  OLANZapine 2.5 milliGRAM(s) Oral at bedtime  pantoprazole    Tablet 40 milliGRAM(s) Oral before breakfast  polyethylene glycol 3350 17 Gram(s) Oral two times a day  predniSONE   Tablet 40 milliGRAM(s) Oral daily  senna 2 Tablet(s) Oral at bedtime  sertraline 125 milliGRAM(s) Oral daily    MEDICATIONS  (PRN):      Vital Signs Last 24 Hrs  T(C): 36.7 (17 Apr 2025 05:13), Max: 37 (16 Apr 2025 21:00)  T(F): 98 (17 Apr 2025 05:13), Max: 98.6 (16 Apr 2025 21:00)  HR: 82 (17 Apr 2025 05:13) (82 - 112)  BP: 126/65 (17 Apr 2025 05:13) (115/72 - 129/66)  BP(mean): --  RR: 18 (17 Apr 2025 05:13) (18 - 18)  SpO2: 100% (17 Apr 2025 05:13) (95% - 100%)    Parameters below as of 17 Apr 2025 05:13  Patient On (Oxygen Delivery Method): room air      CAPILLARY BLOOD GLUCOSE      POCT Blood Glucose.: 132 mg/dL (17 Apr 2025 08:31)  POCT Blood Glucose.: 116 mg/dL (17 Apr 2025 02:28)  POCT Blood Glucose.: 105 mg/dL (16 Apr 2025 22:14)  POCT Blood Glucose.: 220 mg/dL (16 Apr 2025 17:08)  POCT Blood Glucose.: 235 mg/dL (16 Apr 2025 12:03)    I&O's Summary    PHYSICAL EXAM:   GENERAL: NAD, well-developed  HEAD:  Atraumatic, Normocephalic  EYES: EOMI, PERRLA, conjunctiva and sclera clear  NECK: Supple, No JVD  CHEST/LUNG: Clear to auscultation bilaterally; No wheeze  HEART: Regular rate and rhythm; No murmurs, rubs, or gallops  ABDOMEN: Soft, Nontender, Nondistended; Bowel sounds present  EXTREMITIES:  2+ Peripheral Pulses, No clubbing, cyanosis, or edema  PSYCH: AAOx3 flat affect. Staring at the walls. Answers yes or no  NEUROLOGY: non-focal  SKIN: No rashes or lesions    LABS:                    RADIOLOGY & ADDITIONAL TESTS:    Imaging Personally Reviewed:    Consultant(s) Notes Reviewed:      Care Discussed with Consultants/Other Providers:   Patient is a 74y old  Female who presents with a chief complaint of AMS and increased WOB (21 Mar 2025 06:24)      SUBJECTIVE / OVERNIGHT EVENTS:  Patient seen being fed pureed by son at bedside. Patient still not speaking much. Son says he works in the psych [employer] and expressed concern for ECT treatment and actually wants more details about it before committing to such procedure. He also said that patient was pocketing her hydralazine and requested it to be converted to IV.     MEDICATIONS  (STANDING):  bisacodyl 10 milliGRAM(s) Oral once  buDESOnide    Inhalation Suspension 0.5 milliGRAM(s) Inhalation every 12 hours  chlorhexidine 2% Cloths 1 Application(s) Topical <User Schedule>  dextrose 5%. 1000 milliLiter(s) (100 mL/Hr) IV Continuous <Continuous>  dextrose 5%. 1000 milliLiter(s) (50 mL/Hr) IV Continuous <Continuous>  dextrose 50% Injectable 25 Gram(s) IV Push once  dextrose 50% Injectable 12.5 Gram(s) IV Push once  dextrose 50% Injectable 25 Gram(s) IV Push once  dextrose Oral Gel 15 Gram(s) Oral once  enoxaparin Injectable 40 milliGRAM(s) SubCutaneous every 24 hours  glucagon  Injectable 1 milliGRAM(s) IntraMuscular once  guaiFENesin Oral Liquid (Sugar-Free) 200 milliGRAM(s) Oral every 6 hours  hydrALAZINE 50 milliGRAM(s) Oral three times a day  hydrochlorothiazide 25 milliGRAM(s) Oral daily  insulin glargine Injectable (LANTUS) 11 Unit(s) SubCutaneous at bedtime  insulin lispro (ADMELOG) corrective regimen sliding scale   SubCutaneous three times a day before meals  insulin lispro (ADMELOG) corrective regimen sliding scale   SubCutaneous at bedtime  insulin lispro Injectable (ADMELOG) 5 Unit(s) SubCutaneous three times a day before meals  ipratropium    for Nebulization 500 MICROGram(s) Nebulizer every 8 hours  levalbuterol Inhalation 0.63 milliGRAM(s) Inhalation every 8 hours  lisinopril 40 milliGRAM(s) Oral daily  memantine 10 milliGRAM(s) Oral at bedtime  OLANZapine 2.5 milliGRAM(s) Oral at bedtime  pantoprazole    Tablet 40 milliGRAM(s) Oral before breakfast  polyethylene glycol 3350 17 Gram(s) Oral two times a day  predniSONE   Tablet 40 milliGRAM(s) Oral daily  senna 2 Tablet(s) Oral at bedtime  sertraline 125 milliGRAM(s) Oral daily    MEDICATIONS  (PRN):      Vital Signs Last 24 Hrs  T(C): 36.7 (17 Apr 2025 05:13), Max: 37 (16 Apr 2025 21:00)  T(F): 98 (17 Apr 2025 05:13), Max: 98.6 (16 Apr 2025 21:00)  HR: 82 (17 Apr 2025 05:13) (82 - 112)  BP: 126/65 (17 Apr 2025 05:13) (115/72 - 129/66)  BP(mean): --  RR: 18 (17 Apr 2025 05:13) (18 - 18)  SpO2: 100% (17 Apr 2025 05:13) (95% - 100%)    Parameters below as of 17 Apr 2025 05:13  Patient On (Oxygen Delivery Method): room air      CAPILLARY BLOOD GLUCOSE      POCT Blood Glucose.: 132 mg/dL (17 Apr 2025 08:31)  POCT Blood Glucose.: 116 mg/dL (17 Apr 2025 02:28)  POCT Blood Glucose.: 105 mg/dL (16 Apr 2025 22:14)  POCT Blood Glucose.: 220 mg/dL (16 Apr 2025 17:08)  POCT Blood Glucose.: 235 mg/dL (16 Apr 2025 12:03)    I&O's Summary    PHYSICAL EXAM:   GENERAL: NAD, well-developed  HEAD:  Atraumatic, Normocephalic  EYES: EOMI, PERRLA, conjunctiva and sclera clear  NECK: Supple, No JVD  CHEST/LUNG: Clear to auscultation bilaterally; No wheeze  HEART: Regular rate and rhythm; No murmurs, rubs, or gallops  ABDOMEN: Soft, Nontender, Nondistended; Bowel sounds present  EXTREMITIES:  2+ Peripheral Pulses, No clubbing, cyanosis, or edema  PSYCH: AAOx3 flat affect. Staring at the walls. Answers yes or no  NEUROLOGY: non-focal  SKIN: No rashes or lesions    LABS:                    RADIOLOGY & ADDITIONAL TESTS:    Imaging Personally Reviewed:    Consultant(s) Notes Reviewed:      Care Discussed with Consultants/Other Providers:

## 2025-04-17 NOTE — PROGRESS NOTE ADULT - PROBLEM SELECTOR PLAN 1
Much improved - more interactive. Eating ok. Much more alert compared to previous days.  -Hx of MDD w/ psychotic features.  -Trialed ativan, but now on hold. Concern that it may have contributed to respiratory decompensation.  -Cont Zoloft 100 qday (home dose was 150).  -Cont Zyprexa 2.5 QHS.  -Further titration as per BH.  -Started Namenda 5mg and increased to 10mg qHS as per psych but patient now more withdrawn. Notified psych of changes and to F/U Much improved - more interactive. Eating ok. Much more alert compared to previous days.  -Hx of MDD w/ psychotic features.  -Trialed ativan, but now on hold. Concern that it may have contributed to respiratory decompensation.  -Cont Zoloft 100 qday (home dose was 150).  -Cont Zyprexa 2.5 QHS.  -Further titration as per BH.  -Started Namenda 5mg and increased to 10mg qHs as per psych but patient now more withdrawn. Notified psych of changes and to F/U  -Son requested more explanation of what ECT entailed in regard to his mother.

## 2025-04-17 NOTE — BH CONSULTATION LIAISON PROGRESS NOTE - NSBHFUPINTERVALHXFT_PSY_A_CORE
Chart reviewed. Seen with son present. Pt is catatonic (mute, slowed, only tracking at times, grimacing, and somewhat postured on exam). She does not engage meaningfully. Exam limited. Of note, significant chest secretions/cough noted.     Son acknowledges pt is not at baseline, and is opposed to inpt psych transfer as he is concerned she would have various medical/rehab needs that could not be reliably met there. He continues to voice opposition to both Ativan and ECT, but is more open to the latter if /when memantine trial fails to perk her up. He is open to learning more about ECt to discuss with family.

## 2025-04-17 NOTE — PROGRESS NOTE ADULT - PROBLEM SELECTOR PROBLEM 10
Need for prophylactic measure

## 2025-04-17 NOTE — BH CONSULTATION LIAISON PROGRESS NOTE - MSE UNSTRUCTURED FT
Chart reviewed. Seen with son present. Pt is catatonic (mute, slowed, only tracking at times, grimacing, and somewhat postured on exam). She does not engage meaningfully. Exam limited. Of note, significant chest secretions/cough noted.

## 2025-04-17 NOTE — BH CONSULTATION LIAISON PROGRESS NOTE - NSBHASSESSMENTFT_PSY_ALL_CORE
Patient is a 74F hx asthma, depression, and HTN was admitted to the MICU with AHRF 2/2 asthma exacerbation resulting in acute on chronic HHRF causing AMS and increased WOB that required intubation. During her ICU course, she experienced seizure-like activity .  Additional issues included hyponatremia, acute on chronic metabolic alkalosis, and mild thrombocytopenia,  also having fevers.  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 Patient has h/o hx of major depressive disorder with psychosis, hx of 3 past inpatient psychiatric hospitalizations last in 2016 at [facility], all rehospitalizations were in context non compliance with the treatment. She has no hx of suicide attempts, no hx of substance abuse. Patient w hx of paranoia, disorganization, catatonia, required MOO when in [facility].     3/24----more withdrawn, some stiffness in neck, not communicative. Some concerns for catatonia. Unsure if symptoms attributed to catatonia vs delirium  3/25--- more alert today, trying to following commands. Still with PMR+. Appears to be responding to Ativan  3/26-- BFS 10, will increasing standing ativan to target catatonic symptoms - giving STAT 0.5mg NOW discussed w acp   3/27----will continue ativan dosing for one more day before increasing  3/28: BFS: stupor 1, mutism 2,withdrawan 2, verbigeration 1, rigidity 2 =8.  3/29: BFS: stupor 2, mutism 2, staring 1, posturing 2, rigidity 2, waxy flexibility 3, withdrawal 2 = 14; please monitor nutritional intake, plan to increase ativan by total daily dose of 0.5 mg at bedtime due to persistent catatonia   3/31: BFS: stupor 2, mutism 2, staring 2, rigidity 1, negativism 1, withdrawal 1, waxy flexibility 3 =12, patient with productive cough, advised for medical work up  4/1---tired, speaking more, not rigid, has PMR+. NPO status now.   4/2--sleepy, RRT this afternoon  4/3---better mentation, pmr but no chester catatonic symptoms  4/4: alert, using few words, mild rigidity -  at bedside, updated   4/5: few words, lethargic, fluctuating wakefulness. Mild rigidity.  No overt catatonic sxs.   4/7: no change, remains thought blocked, few words - son at bedside, updated with care  4/8: patient continues to have poor eye contact, uses few words, no rigidity on today's exam.   4/9: patient remains minimally verbal, some posturing, poor Po intake - continues on NC for 02 requirements   4/10: more verbal/interactive, with increased PO intake per family. Some psychotic output. Family agrees with resumption of Zyprexa.   4/14: alert to self with fluctuating wakefulness, intermittent full sentences f/b one-word answers, some rigidity, posturing, denies si, denies ah/vh  4/16: patient more catatonic today, notable catalepsy, waxiness, verbigeration followed by mutism and staring, family against ativan and ECT, would increase memantine as below, not eating   4/17: catatonic (mute, rigid, not engaging). Eating some portions of meals with significant family support. Family opting to see memantine trial to full effect before consideration of ECT    PLAN  - no SI or HI, no need for psychiatric CO  -- antipsychotics can only be given if qtc < 500    - MEDICATIONS:  - C/W Memantine 10mg QD  -- C/W Zoloft 100 mg daily ( home dose 150mg) ( monitor NA- recent hyponatremia)  -- C/W  Zyprexa 2.5mg HS tonight  - ongoing discussion with family about treatment plan    - ECT consideration underway, though against significant family concerns. Will continue to explore and will send info on tx modality to continue to assess its potential.     - PRN:  --Ativan 0.5mg q 6hrs prn IM/IV/PO  - DISPO: pending, not eating enough to go to [facility] TBD: inpt psych vs TK depending on recovery  Routine observation

## 2025-04-17 NOTE — PROGRESS NOTE ADULT - PROBLEM SELECTOR PLAN 6
> Off amlodipine 2' edema -- BP well within goal.  > cont Lisinopril 40mg qD  > cont hydralazine 25mg q8hr  > cont home HCTZ, avoid beta-blockers given asthma > Off amlodipine 2' edema -- BP well within goal.  > cont Lisinopril 40mg qD  > changed  hydralazine 25mg q8hr to 10mg IV q8hr since its the only med she is pocketing in her mouth.   > cont home HCTZ, avoid beta-blockers given asthma

## 2025-04-18 DIAGNOSIS — F33.9 MAJOR DEPRESSIVE DISORDER, RECURRENT, UNSPECIFIED: ICD-10-CM

## 2025-04-18 LAB
ANION GAP SERPL CALC-SCNC: 10 MMOL/L — SIGNIFICANT CHANGE UP (ref 7–14)
BUN SERPL-MCNC: 15 MG/DL — SIGNIFICANT CHANGE UP (ref 7–23)
CALCIUM SERPL-MCNC: 9.4 MG/DL — SIGNIFICANT CHANGE UP (ref 8.4–10.5)
CHLORIDE SERPL-SCNC: 104 MMOL/L — SIGNIFICANT CHANGE UP (ref 98–107)
CO2 SERPL-SCNC: 27 MMOL/L — SIGNIFICANT CHANGE UP (ref 22–31)
CREAT SERPL-MCNC: 0.69 MG/DL — SIGNIFICANT CHANGE UP (ref 0.5–1.3)
EGFR: 91 ML/MIN/1.73M2 — SIGNIFICANT CHANGE UP
EGFR: 91 ML/MIN/1.73M2 — SIGNIFICANT CHANGE UP
GLUCOSE BLDC GLUCOMTR-MCNC: 113 MG/DL — HIGH (ref 70–99)
GLUCOSE BLDC GLUCOMTR-MCNC: 154 MG/DL — HIGH (ref 70–99)
GLUCOSE BLDC GLUCOMTR-MCNC: 162 MG/DL — HIGH (ref 70–99)
GLUCOSE BLDC GLUCOMTR-MCNC: 71 MG/DL — SIGNIFICANT CHANGE UP (ref 70–99)
GLUCOSE SERPL-MCNC: 107 MG/DL — HIGH (ref 70–99)
HCT VFR BLD CALC: 33.1 % — LOW (ref 34.5–45)
HGB BLD-MCNC: 11 G/DL — LOW (ref 11.5–15.5)
MAGNESIUM SERPL-MCNC: 1.9 MG/DL — SIGNIFICANT CHANGE UP (ref 1.6–2.6)
MCHC RBC-ENTMCNC: 32.2 PG — SIGNIFICANT CHANGE UP (ref 27–34)
MCHC RBC-ENTMCNC: 33.2 G/DL — SIGNIFICANT CHANGE UP (ref 32–36)
MCV RBC AUTO: 96.8 FL — SIGNIFICANT CHANGE UP (ref 80–100)
NRBC # BLD AUTO: 0 K/UL — SIGNIFICANT CHANGE UP (ref 0–0)
NRBC # FLD: 0 K/UL — SIGNIFICANT CHANGE UP (ref 0–0)
NRBC BLD AUTO-RTO: 0 /100 WBCS — SIGNIFICANT CHANGE UP (ref 0–0)
PHOSPHATE SERPL-MCNC: 3.2 MG/DL — SIGNIFICANT CHANGE UP (ref 2.5–4.5)
PLATELET # BLD AUTO: 172 K/UL — SIGNIFICANT CHANGE UP (ref 150–400)
POTASSIUM SERPL-MCNC: 3.9 MMOL/L — SIGNIFICANT CHANGE UP (ref 3.5–5.3)
POTASSIUM SERPL-SCNC: 3.9 MMOL/L — SIGNIFICANT CHANGE UP (ref 3.5–5.3)
RBC # BLD: 3.42 M/UL — LOW (ref 3.8–5.2)
RBC # FLD: 13.6 % — SIGNIFICANT CHANGE UP (ref 10.3–14.5)
SODIUM SERPL-SCNC: 141 MMOL/L — SIGNIFICANT CHANGE UP (ref 135–145)
WBC # BLD: 12.71 K/UL — HIGH (ref 3.8–10.5)
WBC # FLD AUTO: 12.71 K/UL — HIGH (ref 3.8–10.5)

## 2025-04-18 PROCEDURE — 99233 SBSQ HOSP IP/OBS HIGH 50: CPT

## 2025-04-18 PROCEDURE — 99232 SBSQ HOSP IP/OBS MODERATE 35: CPT

## 2025-04-18 RX ORDER — INSULIN LISPRO 100 U/ML
3 INJECTION, SOLUTION INTRAVENOUS; SUBCUTANEOUS
Refills: 0 | Status: DISCONTINUED | OUTPATIENT
Start: 2025-04-18 | End: 2025-05-03

## 2025-04-18 RX ORDER — MELATONIN 5 MG
9 TABLET ORAL AT BEDTIME
Refills: 0 | Status: DISCONTINUED | OUTPATIENT
Start: 2025-04-18 | End: 2025-05-21

## 2025-04-18 RX ORDER — LANOLIN/MINERAL OIL/PETROLATUM
1 OINTMENT (GRAM) OPHTHALMIC (EYE) EVERY 8 HOURS
Refills: 0 | Status: DISCONTINUED | OUTPATIENT
Start: 2025-04-18 | End: 2025-05-21

## 2025-04-18 RX ORDER — INSULIN GLARGINE-YFGN 100 [IU]/ML
8 INJECTION, SOLUTION SUBCUTANEOUS AT BEDTIME
Refills: 0 | Status: DISCONTINUED | OUTPATIENT
Start: 2025-04-18 | End: 2025-04-24

## 2025-04-18 RX ORDER — ACETAMINOPHEN 500 MG/5ML
975 LIQUID (ML) ORAL ONCE
Refills: 0 | Status: COMPLETED | OUTPATIENT
Start: 2025-04-18 | End: 2025-04-18

## 2025-04-18 RX ADMIN — Medication 1 APPLICATION(S): at 07:32

## 2025-04-18 RX ADMIN — LEVALBUTEROL HYDROCHLORIDE 0.63 MILLIGRAM(S): 1.25 SOLUTION RESPIRATORY (INHALATION) at 23:32

## 2025-04-18 RX ADMIN — DEXTROMETHORPHAN HBR, GUAIFENESIN 200 MILLIGRAM(S): 200 LIQUID ORAL at 07:30

## 2025-04-18 RX ADMIN — INSULIN LISPRO 5 UNIT(S): 100 INJECTION, SOLUTION INTRAVENOUS; SUBCUTANEOUS at 18:00

## 2025-04-18 RX ADMIN — LEVALBUTEROL HYDROCHLORIDE 0.63 MILLIGRAM(S): 1.25 SOLUTION RESPIRATORY (INHALATION) at 09:35

## 2025-04-18 RX ADMIN — Medication 10 MILLIGRAM(S): at 07:31

## 2025-04-18 RX ADMIN — DEXTROMETHORPHAN HBR, GUAIFENESIN 200 MILLIGRAM(S): 200 LIQUID ORAL at 21:38

## 2025-04-18 RX ADMIN — Medication 2 TABLET(S): at 22:32

## 2025-04-18 RX ADMIN — BUDESONIDE 0.5 MILLIGRAM(S): 0.25 SUSPENSION RESPIRATORY (INHALATION) at 09:35

## 2025-04-18 RX ADMIN — MEMANTINE HYDROCHLORIDE 10 MILLIGRAM(S): 21 CAPSULE, EXTENDED RELEASE ORAL at 22:33

## 2025-04-18 RX ADMIN — POLYETHYLENE GLYCOL 3350 17 GRAM(S): 17 POWDER, FOR SOLUTION ORAL at 07:31

## 2025-04-18 RX ADMIN — Medication 500 MICROGRAM(S): at 09:35

## 2025-04-18 RX ADMIN — LEVALBUTEROL HYDROCHLORIDE 0.63 MILLIGRAM(S): 1.25 SOLUTION RESPIRATORY (INHALATION) at 15:20

## 2025-04-18 RX ADMIN — DEXTROMETHORPHAN HBR, GUAIFENESIN 200 MILLIGRAM(S): 200 LIQUID ORAL at 15:28

## 2025-04-18 RX ADMIN — SERTRALINE 125 MILLIGRAM(S): 100 TABLET, FILM COATED ORAL at 15:29

## 2025-04-18 RX ADMIN — INSULIN LISPRO 5 UNIT(S): 100 INJECTION, SOLUTION INTRAVENOUS; SUBCUTANEOUS at 08:57

## 2025-04-18 RX ADMIN — Medication 40 MILLIGRAM(S): at 07:30

## 2025-04-18 RX ADMIN — ENOXAPARIN SODIUM 40 MILLIGRAM(S): 100 INJECTION SUBCUTANEOUS at 18:00

## 2025-04-18 RX ADMIN — Medication 500 MICROGRAM(S): at 15:20

## 2025-04-18 RX ADMIN — OLANZAPINE 2.5 MILLIGRAM(S): 10 TABLET ORAL at 22:33

## 2025-04-18 RX ADMIN — BUDESONIDE 0.5 MILLIGRAM(S): 0.25 SUSPENSION RESPIRATORY (INHALATION) at 20:44

## 2025-04-18 RX ADMIN — Medication 10 MILLIGRAM(S): at 15:30

## 2025-04-18 RX ADMIN — INSULIN LISPRO 1: 100 INJECTION, SOLUTION INTRAVENOUS; SUBCUTANEOUS at 18:00

## 2025-04-18 RX ADMIN — LISINOPRIL 40 MILLIGRAM(S): 5 TABLET ORAL at 07:30

## 2025-04-18 RX ADMIN — INSULIN GLARGINE-YFGN 8 UNIT(S): 100 INJECTION, SOLUTION SUBCUTANEOUS at 22:45

## 2025-04-18 RX ADMIN — Medication 975 MILLIGRAM(S): at 20:45

## 2025-04-18 RX ADMIN — Medication 9 MILLIGRAM(S): at 22:32

## 2025-04-18 RX ADMIN — Medication 975 MILLIGRAM(S): at 21:45

## 2025-04-18 RX ADMIN — Medication 1 DROP(S): at 21:39

## 2025-04-18 RX ADMIN — PREDNISONE 40 MILLIGRAM(S): 20 TABLET ORAL at 07:29

## 2025-04-18 RX ADMIN — Medication 500 MICROGRAM(S): at 23:31

## 2025-04-18 NOTE — PROGRESS NOTE ADULT - PROBLEM SELECTOR PLAN 1
MDD w/ psychotic features.  - s/p ativan now on hold given concern that it may have contributed to respiratory decompensation  - c/w Zoloft 125 qday (home dose was 150).  - c/w Zyprexa 2.5 qhs  - on Namenda 5mg and increased to 10mg qhs  - psych following  - ECT was offered, family considering

## 2025-04-18 NOTE — PROGRESS NOTE ADULT - PROBLEM SELECTOR PLAN 7
- lantus 11 units, 5 units TID ac   - HARDEEP, DM HbA1c 5.7% c/w pre-DM, s/p steroids likely cause of hyperglycemia  - lantus 11 units, 5 units TID ac-->well controlled, steroids to taper, lower to lantus 8 units and 3 units with meals  - HARDEEP, DM

## 2025-04-18 NOTE — PROGRESS NOTE ADULT - PROBLEM SELECTOR PLAN 9
Lovenox ppx  full code  PT rec TK  pending improvement in catatonia, family declining ECT and IP psych  care d/w son bedside

## 2025-04-18 NOTE — PROGRESS NOTE ADULT - PROBLEM SELECTOR PLAN 2
Due to status asthmaticus s/p intubation and bilevel now weaned off of NC to RA   - aspergillus Ab: negative , ANCA: negative, IgE level 57  - s/p course of antibiotics  - c/w ipratropium + xopenex nebs q8h  - c/w budesonide BID  -No role for nocturnal NIV for now. Will cont to monitor off.   - s/p  Solumedrol 30mg BID and switched to Prednisone 40mg qdaily x 3 days then 30mg x 3 days then 20mg x 3 days then 10mg x 3 days then off as per pulmonary Due to status asthmaticus s/p intubation and bilevel now weaned off of NC to RA   - aspergillus Ab: negative , ANCA: negative, IgE level 57  - s/p course of antibiotics  - c/w ipratropium + xopenex nebs q8h  - c/w budesonide BID  -No role for nocturnal NIV for now. Will cont to monitor off.   - s/p  Solumedrol, followed by Prednisone 40mg qdaily x 3 days, to start 30mg  4/19 x 3 days then 20mg x 3 days then 10mg x 3 days then off as per pulmonary

## 2025-04-18 NOTE — PROGRESS NOTE ADULT - PROBLEM SELECTOR PLAN 3
- c/w ipratropium, xopenex nebs  - steroids as above  - ICS/LABA on discharge  - OP pulm f/u - c/w ipratropium, xopenex nebs  - steroids as above  - ICS/LABA on discharge  - f/u pulm re: if would be cleared for ECT   - OP pulm f/u

## 2025-04-18 NOTE — BH CONSULTATION LIAISON PROGRESS NOTE - NSBHASSESSMENTFT_PSY_ALL_CORE
Patient is a 74F hx asthma, depression, and HTN was admitted to the MICU with AHRF 2/2 asthma exacerbation resulting in acute on chronic HHRF causing AMS and increased WOB that required intubation. During her ICU course, she experienced seizure-like activity .  Additional issues included hyponatremia, acute on chronic metabolic alkalosis, and mild thrombocytopenia,  also having fevers.  Patient is from [place] [place] [facility]; primary language :Twi pronounced as Chi) domiciled with , retired teacher used to work in [facility], she has 6 children. Patient has h/o hx of major depressive disorder with psychosis, hx of 3 past inpatient psychiatric hospitalizations last in 2016 at [facility], all rehospitalizations were in context non compliance with the treatment. She has no hx of suicide attempts, no hx of substance abuse. Patient w hx of paranoia, disorganization, catatonia, required MOO when in [facility].     3/24----more withdrawn, some stiffness in neck, not communicative. Some concerns for catatonia. Unsure if symptoms attributed to catatonia vs delirium  3/25--- more alert today, trying to following commands. Still with PMR+. Appears to be responding to Ativan  3/26-- BFS 10, will increasing standing ativan to target catatonic symptoms - giving STAT 0.5mg NOW discussed w acp   3/27----will continue ativan dosing for one more day before increasing  3/28: BFS: stupor 1, mutism 2,withdrawan 2, verbigeration 1, rigidity 2 =8.  3/29: BFS: stupor 2, mutism 2, staring 1, posturing 2, rigidity 2, waxy flexibility 3, withdrawal 2 = 14; please monitor nutritional intake, plan to increase ativan by total daily dose of 0.5 mg at bedtime due to persistent catatonia   3/31: BFS: stupor 2, mutism 2, staring 2, rigidity 1, negativism 1, withdrawal 1, waxy flexibility 3 =12, patient with productive cough, advised for medical work up  4/1---tired, speaking more, not rigid, has PMR+. NPO status now.   4/2--sleepy, RRT this afternoon  4/3---better mentation, pmr but no chester catatonic symptoms  4/4: alert, using few words, mild rigidity -  at bedside, updated   4/5: few words, lethargic, fluctuating wakefulness. Mild rigidity.  No overt catatonic sxs.   4/7: no change, remains thought blocked, few words - son at bedside, updated with care  4/8: patient continues to have poor eye contact, uses few words, no rigidity on today's exam.   4/9: patient remains minimally verbal, some posturing, poor Po intake - continues on NC for 02 requirements   4/10: more verbal/interactive, with increased PO intake per family. Some psychotic output. Family agrees with resumption of Zyprexa.   4/14: alert to self with fluctuating wakefulness, intermittent full sentences f/b one-word answers, some rigidity, posturing, denies si, denies ah/vh  4/16: patient more catatonic today, notable catalepsy, waxiness, verbigeration followed by mutism and staring, family against ativan and ECT, would increase memantine as below, not eating   4/17: catatonic (mute, rigid, not engaging). Eating some portions of meals with significant family support. Family opting to see memantine trial to full effect before consideration of ECT  4/18: mutism today, odd posturing, unable to engage, d/w spouse and son ECT, family still undecided    PLAN  - no SI or HI, no need for psychiatric CO  -- antipsychotics can only be given if qtc < 500    - MEDICATIONS:  - C/W Memantine 10mg QD  -- C/W Zoloft 100 mg daily ( home dose 150mg) ( monitor NA- recent hyponatremia)  -- C/W  Zyprexa 2.5mg HS tonight  - ongoing discussion with family about treatment plan    - ECT consideration underway, though against significant family concerns. Will continue to explore and will send info on tx modality to continue to assess its potential.     - PRN:  --Ativan 0.5mg q 6hrs prn IM/IV/PO  - DISPO: pending, not eating enough to go to [facility] TBD: inpt psych vs TK depending on recovery  Routine observation

## 2025-04-18 NOTE — PROGRESS NOTE ADULT - PROBLEM SELECTOR PLAN 8
- CTH: no acute changes  - CT spine: no fractures  - 3/8 ON: 6 minutes of seizure-like activity with myoclonic upper jerks -> broke with ativan  - EEG 3/10 showing no epileptiform activity; severe degree of diffuse or multifocal cerebral dysfunction c/w comatose   MRI brain- Multiple chronic lacunar infarcts in the bilateral basal ganglia and thalami. No acute infarct

## 2025-04-18 NOTE — PROGRESS NOTE ADULT - SUBJECTIVE AND OBJECTIVE BOX
Patient is a 74y old  Female who presents with a chief complaint of AMS and increased WOB (21 Mar 2025 06:24)    SUBJECTIVE / OVERNIGHT EVENTS:    seen this am, son present   reports no CP, SOB  per son ate her ensure drink and some food  reports pain in her feet, cannot clarify futher  stares, slow to respond to questions    MEDICATIONS  (STANDING):  bisacodyl 10 milliGRAM(s) Oral once  buDESOnide    Inhalation Suspension 0.5 milliGRAM(s) Inhalation every 12 hours  chlorhexidine 2% Cloths 1 Application(s) Topical <User Schedule>  enoxaparin Injectable 40 milliGRAM(s) SubCutaneous every 24 hours  guaiFENesin Oral Liquid (Sugar-Free) 200 milliGRAM(s) Oral every 6 hours  hydrALAZINE Injectable 10 milliGRAM(s) IV Push every 8 hours  hydrochlorothiazide 25 milliGRAM(s) Oral daily  insulin glargine Injectable (LANTUS) 11 Unit(s) SubCutaneous at bedtime  insulin lispro (ADMELOG) corrective regimen sliding scale   SubCutaneous three times a day before meals  insulin lispro (ADMELOG) corrective regimen sliding scale   SubCutaneous at bedtime  insulin lispro Injectable (ADMELOG) 5 Unit(s) SubCutaneous three times a day before meals  ipratropium    for Nebulization 500 MICROGram(s) Nebulizer every 8 hours  levalbuterol Inhalation 0.63 milliGRAM(s) Inhalation every 8 hours  lisinopril 40 milliGRAM(s) Oral daily  memantine 10 milliGRAM(s) Oral at bedtime  OLANZapine 2.5 milliGRAM(s) Oral at bedtime  pantoprazole    Tablet 40 milliGRAM(s) Oral before breakfast  polyethylene glycol 3350 17 Gram(s) Oral two times a day  senna 2 Tablet(s) Oral at bedtime  sertraline 125 milliGRAM(s) Oral daily    T(C): 36.7 (04-18-25 @ 14:38), Max: 37 (04-17-25 @ 18:20)  HR: 67 (04-18-25 @ 15:22) (67 - 96)  BP: 122/74 (04-18-25 @ 14:38) (109/73 - 150/69)  RR: 18 (04-18-25 @ 14:38) (17 - 18)  SpO2: 96% (04-18-25 @ 15:22) (96% - 100%)    CAPILLARY BLOOD GLUCOSE  POCT Blood Glucose.: 162 mg/dL (18 Apr 2025 17:27)  POCT Blood Glucose.: 71 mg/dL (18 Apr 2025 12:34)  POCT Blood Glucose.: 113 mg/dL (18 Apr 2025 08:33)  POCT Blood Glucose.: 146 mg/dL (17 Apr 2025 22:12)      I&O's Summary    17 Apr 2025 07:01  -  18 Apr 2025 07:00  --------------------------------------------------------  IN: 100 mL / OUT: 670 mL / NET: -570 mL    PHYSICAL EXAM:  GENERAL: NAD   HEAD:  Atraumatic, Normocephalic  EYES: EOMI, PERRLA, conjunctiva and sclera clear  NECK: Supple, No JVD  CHEST/LUNG: Clear to auscultation bilaterally; No wheeze  HEART: Regular rate and rhythm; No murmurs, rubs, or gallops  ABDOMEN: Soft, Nontender, Nondistended; Bowel sounds present  EXTREMITIES:   warm and well perfused, No clubbing, cyanosis, or edema  PSYCH: AAOx   NEUROLOGY: non-focal  SKIN: No rashes or lesions    LABS:                        11.0   12.71 )-----------( 172      ( 18 Apr 2025 07:00 )             33.1     04-18    141  |  104  |  15  ----------------------------<  107[H]  3.9   |  27  |  0.69    Ca    9.4      18 Apr 2025 07:00  Phos  3.2     04-18  Mg     1.90     04-18    TPro  6.4  /  Alb  3.5  /  TBili  0.5  /  DBili  x   /  AST  18  /  ALT  34[H]  /  AlkPhos  81  04-17    Care Discussed with Consultants/Other Providers:   Patient is a 74y old  Female who presents with a chief complaint of AMS and increased WOB (21 Mar 2025 06:24)    SUBJECTIVE / OVERNIGHT EVENTS:    seen this am, son present   reports no CP, SOB  per son ate her ensure drink and some food  reports pain in her feet, cannot clarify futher  stares, slow to respond to questions    MEDICATIONS  (STANDING):  bisacodyl 10 milliGRAM(s) Oral once  buDESOnide    Inhalation Suspension 0.5 milliGRAM(s) Inhalation every 12 hours  chlorhexidine 2% Cloths 1 Application(s) Topical <User Schedule>  enoxaparin Injectable 40 milliGRAM(s) SubCutaneous every 24 hours  guaiFENesin Oral Liquid (Sugar-Free) 200 milliGRAM(s) Oral every 6 hours  hydrALAZINE Injectable 10 milliGRAM(s) IV Push every 8 hours  hydrochlorothiazide 25 milliGRAM(s) Oral daily  insulin glargine Injectable (LANTUS) 11 Unit(s) SubCutaneous at bedtime  insulin lispro (ADMELOG) corrective regimen sliding scale   SubCutaneous three times a day before meals  insulin lispro (ADMELOG) corrective regimen sliding scale   SubCutaneous at bedtime  insulin lispro Injectable (ADMELOG) 5 Unit(s) SubCutaneous three times a day before meals  ipratropium    for Nebulization 500 MICROGram(s) Nebulizer every 8 hours  levalbuterol Inhalation 0.63 milliGRAM(s) Inhalation every 8 hours  lisinopril 40 milliGRAM(s) Oral daily  memantine 10 milliGRAM(s) Oral at bedtime  OLANZapine 2.5 milliGRAM(s) Oral at bedtime  pantoprazole    Tablet 40 milliGRAM(s) Oral before breakfast  polyethylene glycol 3350 17 Gram(s) Oral two times a day  senna 2 Tablet(s) Oral at bedtime  sertraline 125 milliGRAM(s) Oral daily    T(C): 36.7 (04-18-25 @ 14:38), Max: 37 (04-17-25 @ 18:20)  HR: 67 (04-18-25 @ 15:22) (67 - 96)  BP: 122/74 (04-18-25 @ 14:38) (109/73 - 150/69)  RR: 18 (04-18-25 @ 14:38) (17 - 18)  SpO2: 96% (04-18-25 @ 15:22) (96% - 100%)    CAPILLARY BLOOD GLUCOSE  POCT Blood Glucose.: 162 mg/dL (18 Apr 2025 17:27)  POCT Blood Glucose.: 71 mg/dL (18 Apr 2025 12:34)  POCT Blood Glucose.: 113 mg/dL (18 Apr 2025 08:33)  POCT Blood Glucose.: 146 mg/dL (17 Apr 2025 22:12)      I&O's Summary    17 Apr 2025 07:01  -  18 Apr 2025 07:00  --------------------------------------------------------  IN: 100 mL / OUT: 670 mL / NET: -570 mL    PHYSICAL EXAM:  GENERAL: NAD, on RA  CHEST/LUNG: Clear to auscultation bilaterally; intermittent wheeze   HEART: Regular rate and rhythm; No murmurs, rubs, or gallops  ABDOMEN: Soft, Nontender, Nondistended; Bowel sounds present  EXTREMITIES:   warm and well perfused, No clubbing, cyanosis, or edema  PSYCH: AAOx3 (Self, [facility], 2025), geeta  NEUROLOGY: non-focal    LABS:                        11.0   12.71 )-----------( 172      ( 18 Apr 2025 07:00 )             33.1     04-18    141  |  104  |  15  ----------------------------<  107[H]  3.9   |  27  |  0.69    Ca    9.4      18 Apr 2025 07:00  Phos  3.2     04-18  Mg     1.90     04-18    TPro  6.4  /  Alb  3.5  /  TBili  0.5  /  DBili  x   /  AST  18  /  ALT  34[H]  /  AlkPhos  81  04-17    Care Discussed with Consultants/Other Providers:

## 2025-04-18 NOTE — PROGRESS NOTE ADULT - PROBLEM SELECTOR PLAN 4
Likely initially in the setting of AHRF 2' asthma exacerbation.  - Currently mental status more likely driven by her psych condition. Catatonia treatment as discussed above.

## 2025-04-18 NOTE — PROGRESS NOTE ADULT - PROBLEM SELECTOR PLAN 6
- off amlodipine 2' edema -- BP well within goal.  - c/w  Lisinopril 40mg qD  - c/w hydralazine 25mg q8hr to 10mg IV q8hr since its the only med she is pocketing in her mouth.   - c/w HCTZ 25 mg daily   - avoid beta-blockers given asthma

## 2025-04-18 NOTE — PROGRESS NOTE ADULT - ASSESSMENT
74F MDD, asthma  presented with AHHRF c/b AMS requiring MICU admission for intubation for respiratory failure i/s/o asthma exacerbation. Extubated 3/12 to face tent and then on BIPAP s/p RRT 3/19 for increased WOB / hypoxia despite aggressive asthma management s/p return to MICU for management of severe asthma. Possible aspiration event 3/31. SLP eval appreciated. XR cine performed with improvement in mental status - pureed diet w/ thin liquids recommended. Worsening respiratory status 4/2 - placed on NIV, now weaned to nasal cannula and now RA.  ENT eval - no upper airway consideration for structural causes of asthma such as vocal cord dysfunction.   Course c/b MDD w/ catatonia and poor PO, off ativan due to respiratory concerns trialed on Memantine family not ready for ECT but are considering.

## 2025-04-18 NOTE — BH CONSULTATION LIAISON PROGRESS NOTE - NSBHFUPINTERVALHXFT_PSY_A_CORE
Chart reviewed, medication compliant, no prn's given, received with spouse at bedside, eyes open states, "Hi" to writer, noted arm in odd position over head with fingers in contracted position, diminished verbage today with limited engagement with provider today, noted poor tracking, grimacing and odd posturing.     Spouse at bedside states patient is "shabazz today," he endorses less interaction from patient today, discussed and educated spouse on s/s catatonia, verbalized understanding.     Spoke to son Phani Silva MD via telephone in r/t ECT, son states he is not amenable at this time, states he has concerns about patient's respiratory status and prefers to wait and see if patient will improve on her own, he also endorses doing self research regarding ECT and will contact  psychiatry when he and his family have come to a conclusion.

## 2025-04-18 NOTE — PROVIDER CONTACT NOTE (OTHER) - ASSESSMENT
AOx1, pt did not participate in neuro check as well. Overnight RN notified day RN that pt does not keep pulse ox after multiple attempts in multiple areas (toes, ears, fingers). This day RN attempted to put it on but pt pulls lines and does not keep it on. Pt also agitated and aggressive when touched.

## 2025-04-18 NOTE — CHART NOTE - NSCHARTNOTEFT_GEN_A_CORE
Nutrition Follow-Up Chart Note         Source: Patient A&Ox2    Family [x ] Spouse, Son     RN [x ]    Chart [x ]     Pt is seen for nutrition follow up due to severe/moderate malnutrition, per protocol.    __________________ Medical Course__________________  74 y.o. Female w/ Hx asthma and depressed presented with AHHRF c/b AMS requiring MICU admission for intubation for respiratory failure i/s/o asthma exacerbation. Extubated 3/12 to face tent and then on BIPAP s/p RRT 3/19 for increased WOB / hypoxia despite aggressive asthma management. Transferred back to MICU for management of severe asthma. Possible aspiration event 3/31. SLP eval appreciated. XR cine performed with improvement in mental status - pureed diet w/ thin liquids recommended. Worsening respiratory status 4/2 - placed on NIV, now weaned to nasal cannula. Weaning further as tolerated, ENT eval - no upper airway consideration for structural causes of asthma such as vocal cord dysfunction.     Diet, Pureed:   Consistent Carbohydrate {No Snacks} (CSTCHO)  No Beef  No Pork  Supplement Feeding Modality:  Oral  Glucerna Shake Cans or Servings Per Day:  1       Frequency:  Two Times a day  Ensure Max Cans or Servings Per Day:  1       Frequency:  Daily (04-17-25 @ 11:07)         __________________ Nutrition Course__________________     Patient seen at bedside, appears confusion. Spouse at bedside provided collateral, who reports pt's appetite has bene improving, breakfast visibly seen with completion of scrambled eggs and untouched cream of rice. Pt is able to drink Ensure Max 100% at the time of visit. Will cont. to provide Glucerna 2x daily (440kcals, 20g protein), and Ensure Max 1x daily (150cal, 30gm pro). Spouse is amenable to receive double protein at the time of visit. Writer also contacted pt's son Alan at [phone] since son requested to see writer today. Son reports he hasn't try the home shake yet. No other questions nor concerns at this time. Pt continues on steroid taper. Labs notable with improving POCT from 105-190 over the past two days. Recommend cont. with Consistent Carbohydrate diet and insulin regimen to provide glycemic management. RD to remain available for further nutritional interventions as indicated.     __________________ Pertinent Medications__________________   MEDICATIONS  (STANDING):  bisacodyl 10 milliGRAM(s) Oral once  buDESOnide    Inhalation Suspension 0.5 milliGRAM(s) Inhalation every 12 hours  chlorhexidine 2% Cloths 1 Application(s) Topical <User Schedule>  dextrose 5%. 1000 milliLiter(s) (100 mL/Hr) IV Continuous <Continuous>  dextrose 5%. 1000 milliLiter(s) (50 mL/Hr) IV Continuous <Continuous>  dextrose 50% Injectable 25 Gram(s) IV Push once  dextrose 50% Injectable 12.5 Gram(s) IV Push once  dextrose 50% Injectable 25 Gram(s) IV Push once  dextrose Oral Gel 15 Gram(s) Oral once  enoxaparin Injectable 40 milliGRAM(s) SubCutaneous every 24 hours  glucagon  Injectable 1 milliGRAM(s) IntraMuscular once  guaiFENesin Oral Liquid (Sugar-Free) 200 milliGRAM(s) Oral every 6 hours  hydrALAZINE Injectable 10 milliGRAM(s) IV Push every 8 hours  hydrochlorothiazide 25 milliGRAM(s) Oral daily  insulin glargine Injectable (LANTUS) 11 Unit(s) SubCutaneous at bedtime  insulin lispro (ADMELOG) corrective regimen sliding scale   SubCutaneous three times a day before meals  insulin lispro (ADMELOG) corrective regimen sliding scale   SubCutaneous at bedtime  insulin lispro Injectable (ADMELOG) 5 Unit(s) SubCutaneous three times a day before meals  ipratropium    for Nebulization 500 MICROGram(s) Nebulizer every 8 hours  levalbuterol Inhalation 0.63 milliGRAM(s) Inhalation every 8 hours  lisinopril 40 milliGRAM(s) Oral daily  memantine 10 milliGRAM(s) Oral at bedtime  OLANZapine 2.5 milliGRAM(s) Oral at bedtime  pantoprazole    Tablet 40 milliGRAM(s) Oral before breakfast  polyethylene glycol 3350 17 Gram(s) Oral two times a day  senna 2 Tablet(s) Oral at bedtime  sertraline 125 milliGRAM(s) Oral daily    MEDICATIONS  (PRN):      __________________ Pertinent Labs__________________   04-18    141  |  104  |  15  ----------------------------<  107[H]  3.9   |  27  |  0.69    Ca    9.4      18 Apr 2025 07:00  Phos  3.2     04-18  Mg     1.90     04-18    TPro  6.4  /  Alb  3.5  /  TBili  0.5  /  DBili  x   /  AST  18  /  ALT  34[H]  /  AlkPhos  81  04-17                          11.0   12.71 )-----------( 172      ( 18 Apr 2025 07:00 )             33.1          POCT Blood Glucose.: 113 mg/dL (04-18-25 @ 08:33)  POCT Blood Glucose.: 146 mg/dL (04-17-25 @ 22:12)  POCT Blood Glucose.: 179 mg/dL (04-17-25 @ 17:10)  POCT Blood Glucose.: 190 mg/dL (04-17-25 @ 12:42)  POCT Blood Glucose.: 132 mg/dL (04-17-25 @ 08:31)  POCT Blood Glucose.: 116 mg/dL (04-17-25 @ 02:28)  POCT Blood Glucose.: 105 mg/dL (04-16-25 @ 22:14)  POCT Blood Glucose.: 220 mg/dL (04-16-25 @ 17:08)  POCT Blood Glucose.: 235 mg/dL (04-16-25 @ 12:03)  POCT Blood Glucose.: 176 mg/dL (04-16-25 @ 08:24)  POCT Blood Glucose.: 139 mg/dL (04-15-25 @ 22:27)  POCT Blood Glucose.: 142 mg/dL (04-15-25 @ 17:24)        Previous Nutrition Diagnosis:   [ x ] Severe/Moderate malnutrition    Nutrition Diagnosis is : [x  ] ongoing    Education:  [ x ] Not warranted at present    Recommendations:  [ x ] Continue present PO diet as it remains appropriate at this time  [ x ] Honor food and fluid preferences as able. Provide feeding assistance PRN.   [ x ] Will cont. to provide Glucerna 2x daily (440kcals, 20g protein), and Esnure Max 1x daily (150cal, 30gm pro) to provide optimal nutrition.  [ x ] RD to remain available for further nutritional interventions as indicated.      Monitoring and Evaluation:   [ x ] Monitor PO intake, skin integrity, bowel regimen, and nutrition pertinent labs.  [ x ] Tolerance to diet prescription [ x ] weights [ x ] follow up per protocol

## 2025-04-19 LAB
ANION GAP SERPL CALC-SCNC: 12 MMOL/L — SIGNIFICANT CHANGE UP (ref 7–14)
BUN SERPL-MCNC: 15 MG/DL — SIGNIFICANT CHANGE UP (ref 7–23)
CALCIUM SERPL-MCNC: 9.5 MG/DL — SIGNIFICANT CHANGE UP (ref 8.4–10.5)
CHLORIDE SERPL-SCNC: 102 MMOL/L — SIGNIFICANT CHANGE UP (ref 98–107)
CO2 SERPL-SCNC: 27 MMOL/L — SIGNIFICANT CHANGE UP (ref 22–31)
CREAT SERPL-MCNC: 0.71 MG/DL — SIGNIFICANT CHANGE UP (ref 0.5–1.3)
EGFR: 89 ML/MIN/1.73M2 — SIGNIFICANT CHANGE UP
EGFR: 89 ML/MIN/1.73M2 — SIGNIFICANT CHANGE UP
GLUCOSE BLDC GLUCOMTR-MCNC: 105 MG/DL — HIGH (ref 70–99)
GLUCOSE BLDC GLUCOMTR-MCNC: 158 MG/DL — HIGH (ref 70–99)
GLUCOSE BLDC GLUCOMTR-MCNC: 190 MG/DL — HIGH (ref 70–99)
GLUCOSE BLDC GLUCOMTR-MCNC: 190 MG/DL — HIGH (ref 70–99)
GLUCOSE SERPL-MCNC: 94 MG/DL — SIGNIFICANT CHANGE UP (ref 70–99)
HCT VFR BLD CALC: 34.5 % — SIGNIFICANT CHANGE UP (ref 34.5–45)
HGB BLD-MCNC: 11.4 G/DL — LOW (ref 11.5–15.5)
MAGNESIUM SERPL-MCNC: 1.8 MG/DL — SIGNIFICANT CHANGE UP (ref 1.6–2.6)
MCHC RBC-ENTMCNC: 32.2 PG — SIGNIFICANT CHANGE UP (ref 27–34)
MCHC RBC-ENTMCNC: 33 G/DL — SIGNIFICANT CHANGE UP (ref 32–36)
MCV RBC AUTO: 97.5 FL — SIGNIFICANT CHANGE UP (ref 80–100)
NRBC # BLD AUTO: 0 K/UL — SIGNIFICANT CHANGE UP (ref 0–0)
NRBC # FLD: 0 K/UL — SIGNIFICANT CHANGE UP (ref 0–0)
NRBC BLD AUTO-RTO: 0 /100 WBCS — SIGNIFICANT CHANGE UP (ref 0–0)
PHOSPHATE SERPL-MCNC: 3.9 MG/DL — SIGNIFICANT CHANGE UP (ref 2.5–4.5)
PLATELET # BLD AUTO: 179 K/UL — SIGNIFICANT CHANGE UP (ref 150–400)
POTASSIUM SERPL-MCNC: 3.4 MMOL/L — LOW (ref 3.5–5.3)
POTASSIUM SERPL-SCNC: 3.4 MMOL/L — LOW (ref 3.5–5.3)
RBC # BLD: 3.54 M/UL — LOW (ref 3.8–5.2)
RBC # FLD: 13.5 % — SIGNIFICANT CHANGE UP (ref 10.3–14.5)
SODIUM SERPL-SCNC: 141 MMOL/L — SIGNIFICANT CHANGE UP (ref 135–145)
WBC # BLD: 13.32 K/UL — HIGH (ref 3.8–10.5)
WBC # FLD AUTO: 13.32 K/UL — HIGH (ref 3.8–10.5)

## 2025-04-19 PROCEDURE — 99233 SBSQ HOSP IP/OBS HIGH 50: CPT

## 2025-04-19 RX ORDER — LANOLIN/MINERAL OIL/PETROLATUM
1 OINTMENT (GRAM) OPHTHALMIC (EYE)
Refills: 0 | Status: DISCONTINUED | OUTPATIENT
Start: 2025-04-19 | End: 2025-05-21

## 2025-04-19 RX ADMIN — BUDESONIDE 0.5 MILLIGRAM(S): 0.25 SUSPENSION RESPIRATORY (INHALATION) at 09:52

## 2025-04-19 RX ADMIN — POLYETHYLENE GLYCOL 3350 17 GRAM(S): 17 POWDER, FOR SOLUTION ORAL at 17:57

## 2025-04-19 RX ADMIN — ENOXAPARIN SODIUM 40 MILLIGRAM(S): 100 INJECTION SUBCUTANEOUS at 17:56

## 2025-04-19 RX ADMIN — DEXTROMETHORPHAN HBR, GUAIFENESIN 200 MILLIGRAM(S): 200 LIQUID ORAL at 09:21

## 2025-04-19 RX ADMIN — DEXTROMETHORPHAN HBR, GUAIFENESIN 200 MILLIGRAM(S): 200 LIQUID ORAL at 03:10

## 2025-04-19 RX ADMIN — LEVALBUTEROL HYDROCHLORIDE 0.63 MILLIGRAM(S): 1.25 SOLUTION RESPIRATORY (INHALATION) at 16:52

## 2025-04-19 RX ADMIN — Medication 500 MICROGRAM(S): at 16:51

## 2025-04-19 RX ADMIN — OLANZAPINE 2.5 MILLIGRAM(S): 10 TABLET ORAL at 22:14

## 2025-04-19 RX ADMIN — LEVALBUTEROL HYDROCHLORIDE 0.63 MILLIGRAM(S): 1.25 SOLUTION RESPIRATORY (INHALATION) at 09:52

## 2025-04-19 RX ADMIN — Medication 1 DROP(S): at 22:14

## 2025-04-19 RX ADMIN — LEVALBUTEROL HYDROCHLORIDE 0.63 MILLIGRAM(S): 1.25 SOLUTION RESPIRATORY (INHALATION) at 23:58

## 2025-04-19 RX ADMIN — Medication 40 MILLIGRAM(S): at 05:21

## 2025-04-19 RX ADMIN — DEXTROMETHORPHAN HBR, GUAIFENESIN 200 MILLIGRAM(S): 200 LIQUID ORAL at 15:11

## 2025-04-19 RX ADMIN — INSULIN LISPRO 3 UNIT(S): 100 INJECTION, SOLUTION INTRAVENOUS; SUBCUTANEOUS at 13:17

## 2025-04-19 RX ADMIN — LISINOPRIL 40 MILLIGRAM(S): 5 TABLET ORAL at 05:20

## 2025-04-19 RX ADMIN — Medication 40 MILLIEQUIVALENT(S): at 09:18

## 2025-04-19 RX ADMIN — BUDESONIDE 0.5 MILLIGRAM(S): 0.25 SUSPENSION RESPIRATORY (INHALATION) at 23:58

## 2025-04-19 RX ADMIN — Medication 500 MICROGRAM(S): at 09:52

## 2025-04-19 RX ADMIN — POLYETHYLENE GLYCOL 3350 17 GRAM(S): 17 POWDER, FOR SOLUTION ORAL at 05:23

## 2025-04-19 RX ADMIN — SERTRALINE 125 MILLIGRAM(S): 100 TABLET, FILM COATED ORAL at 13:18

## 2025-04-19 RX ADMIN — MEMANTINE HYDROCHLORIDE 10 MILLIGRAM(S): 21 CAPSULE, EXTENDED RELEASE ORAL at 22:14

## 2025-04-19 RX ADMIN — Medication 10 MILLIGRAM(S): at 05:23

## 2025-04-19 RX ADMIN — PREDNISONE 30 MILLIGRAM(S): 20 TABLET ORAL at 05:22

## 2025-04-19 RX ADMIN — Medication 9 MILLIGRAM(S): at 22:26

## 2025-04-19 RX ADMIN — INSULIN LISPRO 1: 100 INJECTION, SOLUTION INTRAVENOUS; SUBCUTANEOUS at 13:17

## 2025-04-19 RX ADMIN — Medication 1 APPLICATION(S): at 05:17

## 2025-04-19 RX ADMIN — INSULIN LISPRO 3 UNIT(S): 100 INJECTION, SOLUTION INTRAVENOUS; SUBCUTANEOUS at 17:55

## 2025-04-19 RX ADMIN — Medication 2 TABLET(S): at 22:13

## 2025-04-19 RX ADMIN — INSULIN LISPRO 1: 100 INJECTION, SOLUTION INTRAVENOUS; SUBCUTANEOUS at 17:55

## 2025-04-19 RX ADMIN — DEXTROMETHORPHAN HBR, GUAIFENESIN 200 MILLIGRAM(S): 200 LIQUID ORAL at 22:19

## 2025-04-19 RX ADMIN — Medication 1 DROP(S): at 06:44

## 2025-04-19 RX ADMIN — Medication 1 DROP(S): at 13:23

## 2025-04-19 RX ADMIN — INSULIN GLARGINE-YFGN 8 UNIT(S): 100 INJECTION, SOLUTION SUBCUTANEOUS at 22:20

## 2025-04-19 RX ADMIN — Medication 1 APPLICATION(S): at 18:26

## 2025-04-19 NOTE — PROGRESS NOTE ADULT - PROBLEM SELECTOR PLAN 3
- c/w ipratropium, xopenex nebs  - steroids as above  - ICS/LABA on discharge  - f/u pulm re: if would be cleared for ECT   - OP pulm f/u

## 2025-04-19 NOTE — PROGRESS NOTE ADULT - ASSESSMENT
74F MDD, asthma, prior CVA presented with AHRF c/b AMS requiring MICU admission for intubation for respiratory failure i/s/o asthma exacerbation. Extubated 3/12 to face tent and then on BIPAP s/p RRT 3/19 for increased WOB / hypoxia despite aggressive asthma management s/p return to MICU for management of severe asthma. Possible aspiration event 3/31. SLP eval appreciated. XR cine performed with improvement in mental status - pureed diet w/ thin liquids recommended. Worsening respiratory status 4/2 - placed on NIV, now weaned to nasal cannula and now RA.  ENT eval - no upper airway consideration for structural causes of asthma such as vocal cord dysfunction.   Course c/b MDD w/ catatonia and poor PO, off ativan due to respiratory concerns trialed on Memantine family not ready for ECT but are considering.

## 2025-04-19 NOTE — PROGRESS NOTE ADULT - PROBLEM SELECTOR PLAN 7
HbA1c 5.7% c/w pre-DM, s/p steroids likely cause of hyperglycemia  - Lantus 8 units and 3 units with meals  - HARDEEP, DM diet

## 2025-04-19 NOTE — PROGRESS NOTE ADULT - PROBLEM SELECTOR PLAN 6
- off amlodipine 2' edema    - c/w  Lisinopril 40mg qD  - c/w hydralazine 25mg q8hr to 10mg IV q8hr since its the only med she is pocketing in her mouth- will hold and monitor off   - c/w HCTZ 25 mg daily   - avoid beta-blockers given asthma

## 2025-04-19 NOTE — PROVIDER CONTACT NOTE (OTHER) - ASSESSMENT
pt A&Ox1. VS 84 HR, 98.1 temp, 122/74 BP, 18 RR, 100 spo2. no complaints of chest pain or shortness of breath, no s/s of chest pain or shortness of breath observed. pt ripping off tele leads and  monitor refusing to keep them on

## 2025-04-19 NOTE — PROGRESS NOTE ADULT - SUBJECTIVE AND OBJECTIVE BOX
Patient is a 74y old  Female who presents with a chief complaint of AMS and increased WOB (21 Mar 2025 06:24)    SUBJECTIVE / OVERNIGHT EVENTS:    pt awake but resistant to anything, will not answer questions  states no to mostly everything, did eat  son states she had eye pain but would not clarify further, pt sits with eyes closed refusing to open them, squeezes them tighter if provider attempts to open them    MEDICATIONS  (STANDING):  artificial tears (preservative free) Ophthalmic Solution 1 Drop(s) Both EYES every 8 hours  bisacodyl 10 milliGRAM(s) Oral once  buDESOnide    Inhalation Suspension 0.5 milliGRAM(s) Inhalation every 12 hours  chlorhexidine 2% Cloths 1 Application(s) Topical <User Schedule>  enoxaparin Injectable 40 milliGRAM(s) SubCutaneous every 24 hours  glucagon  Injectable 1 milliGRAM(s) IntraMuscular once  guaiFENesin Oral Liquid (Sugar-Free) 200 milliGRAM(s) Oral every 6 hours  hydrALAZINE Injectable 10 milliGRAM(s) IV Push every 8 hours  hydrochlorothiazide 25 milliGRAM(s) Oral daily  insulin glargine Injectable (LANTUS) 8 Unit(s) SubCutaneous at bedtime  insulin lispro (ADMELOG) corrective regimen sliding scale   SubCutaneous three times a day before meals  insulin lispro (ADMELOG) corrective regimen sliding scale   SubCutaneous at bedtime  insulin lispro Injectable (ADMELOG) 3 Unit(s) SubCutaneous three times a day before meals  ipratropium    for Nebulization 500 MICROGram(s) Nebulizer every 8 hours  levalbuterol Inhalation 0.63 milliGRAM(s) Inhalation every 8 hours  lisinopril 40 milliGRAM(s) Oral daily  melatonin 9 milliGRAM(s) Oral at bedtime  memantine 10 milliGRAM(s) Oral at bedtime  OLANZapine 2.5 milliGRAM(s) Oral at bedtime  pantoprazole    Tablet 40 milliGRAM(s) Oral before breakfast  petrolatum Ophthalmic Ointment 1 Application(s) Both EYES two times a day  polyethylene glycol 3350 17 Gram(s) Oral two times a day  predniSONE   Tablet 30 milliGRAM(s) Oral daily  senna 2 Tablet(s) Oral at bedtime  sertraline 125 milliGRAM(s) Oral daily    T(C): 36.7 (04-19-25 @ 05:01), Max: 36.9 (04-19-25 @ 01:36)  HR: 122 (04-19-25 @ 09:52) (67 - 122)  BP: 135/95 (04-19-25 @ 05:01) (104/65 - 135/95)  RR: 18 (04-19-25 @ 05:01) (18 - 18)  SpO2: 100% (04-19-25 @ 09:52) (96% - 100%)    CAPILLARY BLOOD GLUCOSE  POCT Blood Glucose.: 190 mg/dL (19 Apr 2025 12:22)  POCT Blood Glucose.: 105 mg/dL (19 Apr 2025 08:12)  POCT Blood Glucose.: 154 mg/dL (18 Apr 2025 22:42)  POCT Blood Glucose.: 162 mg/dL (18 Apr 2025 17:27)    PHYSICAL EXAM:  GENERAL: NAD, on RA  HEENT: keeps eyes close  CHEST/LUNG: Clear to auscultation bilaterally; intermittent wheeze   HEART: Regular rate and rhythm; No murmurs, rubs, or gallops  ABDOMEN: Soft, Nontender, Nondistended; Bowel sounds present  EXTREMITIES:   warm and well perfused, No clubbing, cyanosis, or edema  PSYCH:  will not respond today, repetitive hand motions at times  NEUROLOGY: non-focal    LABS:                        11.4   13.32 )-----------( 179      ( 19 Apr 2025 05:21 )             34.5     04-19    141  |  102  |  15  ----------------------------<  94  3.4[L]   |  27  |  0.71    Ca    9.5      19 Apr 2025 05:21  Phos  3.9     04-19  Mg     1.80     04-19    TPro  6.4  /  Alb  3.5  /  TBili  0.5  /  DBili  x   /  AST  18  /  ALT  34[H]  /  AlkPhos  81  04-17    Consultant(s) Notes Reviewed:    Care Discussed with Consultants/Other Providers:

## 2025-04-19 NOTE — PROVIDER CONTACT NOTE (OTHER) - ACTION/TREATMENT ORDERED:
provider made aware, provider stated to give zyprexa and try to put on the tele leads and  monitor after.

## 2025-04-19 NOTE — PROVIDER CONTACT NOTE (OTHER) - ASSESSMENT
pt A&Ox1. pt stating she is having eye pain. VS 84 HR, 98.1 temp, 122/74 BP, 18 RR, 100 spo2. no complaints of chest pain or shortness of breath, no s/s of chest pain or shortness of breath observed. pt A&Ox1. VS 84 HR, 98.1 temp, 122/74 BP, 18 RR, 100 spo2. no complaints of chest pain or shortness of breath, no s/s of chest pain or shortness of breath observed.

## 2025-04-19 NOTE — PROGRESS NOTE ADULT - PROBLEM SELECTOR PLAN 1
MDD w/ psychotic features.  - s/p ativan now on hold given concern that it may have contributed to respiratory decompensation  - c/w Zoloft 125 qday (home dose was 150).  - c/w Zyprexa 2.5 qhs  - on Namenda 10mg qhs  - psych following  - ECT was offered, family considering, advised we shaylee f/u on 4/21 re: this

## 2025-04-19 NOTE — PROGRESS NOTE ADULT - PROBLEM SELECTOR PLAN 8
- CTH: no acute changes  - CT spine: no fractures  - 3/8 ON: 6 minutes of seizure-like activity with myoclonic upper jerks -> broke with ativan  - EEG 3/10 showing no epileptiform activity; severe degree of diffuse or multifocal cerebral dysfunction c/w comatose   - MRI brain- Multiple chronic lacunar infarcts in the bilateral basal ganglia and thalami. No acute infarct

## 2025-04-19 NOTE — PROVIDER CONTACT NOTE (OTHER) - ACTION/TREATMENT ORDERED:
provider made aware, provider ordered tylenol and eye drops provider made aware, provider ordered Tylenol and eye drops

## 2025-04-19 NOTE — PROGRESS NOTE ADULT - PROBLEM SELECTOR PLAN 2
Due to status asthmaticus s/p intubation and bilevel now weaned off of NC to RA   - aspergillus Ab: negative , ANCA: negative, IgE level 57  - s/p course of antibiotics  - c/w ipratropium + Xopenex nebs q8h  - c/w budesonide BID  - no role for nocturnal NIV   - s/p Solumedrol, followed by Prednisone 40mg qdaily x 3 days, 30mg  4/19 x 3 days then 20mg x 3 days then 10mg x 3 days then off as per pulmonary

## 2025-04-19 NOTE — PROVIDER CONTACT NOTE (OTHER) - ASSESSMENT
pt A&Ox1. VS 98 temp, 105 HR, 135/95 BP, 18 RR, 100 spo2. pt reports no chest pain, shortness of breath or palpations. no s/s of distress observed

## 2025-04-19 NOTE — PROVIDER CONTACT NOTE (OTHER) - ASSESSMENT
pt A&Ox1. pt stating she is having eye pain. VS 84 HR, 98.1 temp, 122/74 BP, 18 RR, 100 spo2 pt A&Ox1. pt stating she is having eye pain. VS 84 HR, 98.1 temp, 122/74 BP, 18 RR, 100 spo2. no complaints of chest pain or shortness of breath, no s/s of chest pain or shortness of breath observed.

## 2025-04-20 LAB
ANION GAP SERPL CALC-SCNC: 11 MMOL/L — SIGNIFICANT CHANGE UP (ref 7–14)
BUN SERPL-MCNC: 18 MG/DL — SIGNIFICANT CHANGE UP (ref 7–23)
CALCIUM SERPL-MCNC: 9.1 MG/DL — SIGNIFICANT CHANGE UP (ref 8.4–10.5)
CHLORIDE SERPL-SCNC: 103 MMOL/L — SIGNIFICANT CHANGE UP (ref 98–107)
CO2 SERPL-SCNC: 26 MMOL/L — SIGNIFICANT CHANGE UP (ref 22–31)
CREAT SERPL-MCNC: 0.75 MG/DL — SIGNIFICANT CHANGE UP (ref 0.5–1.3)
EGFR: 83 ML/MIN/1.73M2 — SIGNIFICANT CHANGE UP
EGFR: 83 ML/MIN/1.73M2 — SIGNIFICANT CHANGE UP
GLUCOSE BLDC GLUCOMTR-MCNC: 129 MG/DL — HIGH (ref 70–99)
GLUCOSE BLDC GLUCOMTR-MCNC: 172 MG/DL — HIGH (ref 70–99)
GLUCOSE BLDC GLUCOMTR-MCNC: 179 MG/DL — HIGH (ref 70–99)
GLUCOSE BLDC GLUCOMTR-MCNC: 182 MG/DL — HIGH (ref 70–99)
GLUCOSE SERPL-MCNC: 116 MG/DL — HIGH (ref 70–99)
HCT VFR BLD CALC: 32.4 % — LOW (ref 34.5–45)
HGB BLD-MCNC: 10.7 G/DL — LOW (ref 11.5–15.5)
MAGNESIUM SERPL-MCNC: 1.8 MG/DL — SIGNIFICANT CHANGE UP (ref 1.6–2.6)
MCHC RBC-ENTMCNC: 32 PG — SIGNIFICANT CHANGE UP (ref 27–34)
MCHC RBC-ENTMCNC: 33 G/DL — SIGNIFICANT CHANGE UP (ref 32–36)
MCV RBC AUTO: 97 FL — SIGNIFICANT CHANGE UP (ref 80–100)
NRBC # BLD AUTO: 0 K/UL — SIGNIFICANT CHANGE UP (ref 0–0)
NRBC # FLD: 0 K/UL — SIGNIFICANT CHANGE UP (ref 0–0)
NRBC BLD AUTO-RTO: 0 /100 WBCS — SIGNIFICANT CHANGE UP (ref 0–0)
PHOSPHATE SERPL-MCNC: 3.1 MG/DL — SIGNIFICANT CHANGE UP (ref 2.5–4.5)
PLATELET # BLD AUTO: 172 K/UL — SIGNIFICANT CHANGE UP (ref 150–400)
POTASSIUM SERPL-MCNC: 3.8 MMOL/L — SIGNIFICANT CHANGE UP (ref 3.5–5.3)
POTASSIUM SERPL-SCNC: 3.8 MMOL/L — SIGNIFICANT CHANGE UP (ref 3.5–5.3)
RBC # BLD: 3.34 M/UL — LOW (ref 3.8–5.2)
RBC # FLD: 13.9 % — SIGNIFICANT CHANGE UP (ref 10.3–14.5)
SARS-COV-2 RNA SPEC QL NAA+PROBE: SIGNIFICANT CHANGE UP
SODIUM SERPL-SCNC: 140 MMOL/L — SIGNIFICANT CHANGE UP (ref 135–145)
WBC # BLD: 12.01 K/UL — HIGH (ref 3.8–10.5)
WBC # FLD AUTO: 12.01 K/UL — HIGH (ref 3.8–10.5)

## 2025-04-20 PROCEDURE — 99233 SBSQ HOSP IP/OBS HIGH 50: CPT

## 2025-04-20 RX ORDER — IPRATROPIUM BROMIDE AND ALBUTEROL SULFATE .5; 2.5 MG/3ML; MG/3ML
3 SOLUTION RESPIRATORY (INHALATION) ONCE
Refills: 0 | Status: COMPLETED | OUTPATIENT
Start: 2025-04-20 | End: 2025-04-20

## 2025-04-20 RX ADMIN — ENOXAPARIN SODIUM 40 MILLIGRAM(S): 100 INJECTION SUBCUTANEOUS at 19:01

## 2025-04-20 RX ADMIN — INSULIN LISPRO 3 UNIT(S): 100 INJECTION, SOLUTION INTRAVENOUS; SUBCUTANEOUS at 08:28

## 2025-04-20 RX ADMIN — LEVALBUTEROL HYDROCHLORIDE 0.63 MILLIGRAM(S): 1.25 SOLUTION RESPIRATORY (INHALATION) at 23:35

## 2025-04-20 RX ADMIN — DEXTROMETHORPHAN HBR, GUAIFENESIN 200 MILLIGRAM(S): 200 LIQUID ORAL at 08:55

## 2025-04-20 RX ADMIN — Medication 500 MICROGRAM(S): at 15:57

## 2025-04-20 RX ADMIN — Medication 1 APPLICATION(S): at 05:56

## 2025-04-20 RX ADMIN — INSULIN LISPRO 3 UNIT(S): 100 INJECTION, SOLUTION INTRAVENOUS; SUBCUTANEOUS at 17:23

## 2025-04-20 RX ADMIN — IPRATROPIUM BROMIDE AND ALBUTEROL SULFATE 3 MILLILITER(S): .5; 2.5 SOLUTION RESPIRATORY (INHALATION) at 22:13

## 2025-04-20 RX ADMIN — Medication 1 DROP(S): at 15:08

## 2025-04-20 RX ADMIN — Medication 1 APPLICATION(S): at 05:46

## 2025-04-20 RX ADMIN — INSULIN LISPRO 1: 100 INJECTION, SOLUTION INTRAVENOUS; SUBCUTANEOUS at 17:21

## 2025-04-20 RX ADMIN — MEMANTINE HYDROCHLORIDE 10 MILLIGRAM(S): 21 CAPSULE, EXTENDED RELEASE ORAL at 23:19

## 2025-04-20 RX ADMIN — Medication 500 MICROGRAM(S): at 23:35

## 2025-04-20 RX ADMIN — POLYETHYLENE GLYCOL 3350 17 GRAM(S): 17 POWDER, FOR SOLUTION ORAL at 17:23

## 2025-04-20 RX ADMIN — Medication 9 MILLIGRAM(S): at 23:17

## 2025-04-20 RX ADMIN — INSULIN GLARGINE-YFGN 8 UNIT(S): 100 INJECTION, SOLUTION SUBCUTANEOUS at 22:31

## 2025-04-20 RX ADMIN — SERTRALINE 125 MILLIGRAM(S): 100 TABLET, FILM COATED ORAL at 12:23

## 2025-04-20 RX ADMIN — INSULIN LISPRO 1: 100 INJECTION, SOLUTION INTRAVENOUS; SUBCUTANEOUS at 12:22

## 2025-04-20 RX ADMIN — PREDNISONE 30 MILLIGRAM(S): 20 TABLET ORAL at 05:40

## 2025-04-20 RX ADMIN — LISINOPRIL 40 MILLIGRAM(S): 5 TABLET ORAL at 05:40

## 2025-04-20 RX ADMIN — DEXTROMETHORPHAN HBR, GUAIFENESIN 200 MILLIGRAM(S): 200 LIQUID ORAL at 03:17

## 2025-04-20 RX ADMIN — INSULIN LISPRO 3 UNIT(S): 100 INJECTION, SOLUTION INTRAVENOUS; SUBCUTANEOUS at 12:23

## 2025-04-20 RX ADMIN — Medication 500 MICROGRAM(S): at 10:36

## 2025-04-20 RX ADMIN — Medication 2 TABLET(S): at 23:18

## 2025-04-20 RX ADMIN — Medication 1 APPLICATION(S): at 17:24

## 2025-04-20 RX ADMIN — BUDESONIDE 0.5 MILLIGRAM(S): 0.25 SUSPENSION RESPIRATORY (INHALATION) at 21:27

## 2025-04-20 RX ADMIN — Medication 1 DROP(S): at 05:39

## 2025-04-20 RX ADMIN — POLYETHYLENE GLYCOL 3350 17 GRAM(S): 17 POWDER, FOR SOLUTION ORAL at 05:41

## 2025-04-20 RX ADMIN — OLANZAPINE 2.5 MILLIGRAM(S): 10 TABLET ORAL at 21:11

## 2025-04-20 RX ADMIN — LEVALBUTEROL HYDROCHLORIDE 0.63 MILLIGRAM(S): 1.25 SOLUTION RESPIRATORY (INHALATION) at 10:36

## 2025-04-20 RX ADMIN — BUDESONIDE 0.5 MILLIGRAM(S): 0.25 SUSPENSION RESPIRATORY (INHALATION) at 10:36

## 2025-04-20 RX ADMIN — Medication 1 DROP(S): at 23:16

## 2025-04-20 RX ADMIN — LEVALBUTEROL HYDROCHLORIDE 0.63 MILLIGRAM(S): 1.25 SOLUTION RESPIRATORY (INHALATION) at 15:57

## 2025-04-20 RX ADMIN — Medication 40 MILLIGRAM(S): at 05:40

## 2025-04-20 NOTE — PROGRESS NOTE ADULT - PROBLEM SELECTOR PLAN 6
- off amlodipine 2' edema    - c/w  Lisinopril 40mg daily   - c/w HCTZ 25 mg daily  - stable off hydralazine   - avoid beta-blockers given asthma

## 2025-04-20 NOTE — PROGRESS NOTE ADULT - PROBLEM SELECTOR PLAN 1
MDD w/ psychotic features.  - s/p ativan now on hold given concern that it may have contributed to respiratory decompensation  - c/w Zoloft 125 qday (home dose was 150).  - c/w Zyprexa 2.5 qhs  - c/w Namenda 10mg qhs  - psych following  - ECT was offered, family considering, advised we shaylee f/u on 4/21 re: this, family to discuss

## 2025-04-20 NOTE — PROGRESS NOTE ADULT - SUBJECTIVE AND OBJECTIVE BOX
Patient is a 74y old  Female who presents with a chief complaint of AMS and increased WOB (21 Mar 2025 06:24)    SUBJECTIVE / OVERNIGHT EVENTS:    patient remains noncooperative, seems to understand but not follow commands  did state she has no eye pain anymore and eyes are now open (ordered for lacrilube yesterday)  per son no issue overnight, ate her food, not sure about bowel movment    MEDICATIONS  (STANDING):  artificial tears (preservative free) Ophthalmic Solution 1 Drop(s) Both EYES every 8 hours  buDESOnide    Inhalation Suspension 0.5 milliGRAM(s) Inhalation every 12 hours  chlorhexidine 2% Cloths 1 Application(s) Topical <User Schedule>  enoxaparin Injectable 40 milliGRAM(s) SubCutaneous every 24 hours  guaiFENesin Oral Liquid (Sugar-Free) 200 milliGRAM(s) Oral every 6 hours  hydrochlorothiazide 25 milliGRAM(s) Oral daily  insulin glargine Injectable (LANTUS) 8 Unit(s) SubCutaneous at bedtime  insulin lispro (ADMELOG) corrective regimen sliding scale   SubCutaneous three times a day before meals  insulin lispro (ADMELOG) corrective regimen sliding scale   SubCutaneous at bedtime  insulin lispro Injectable (ADMELOG) 3 Unit(s) SubCutaneous three times a day before meals  ipratropium    for Nebulization 500 MICROGram(s) Nebulizer every 8 hours  levalbuterol Inhalation 0.63 milliGRAM(s) Inhalation every 8 hours  lisinopril 40 milliGRAM(s) Oral daily  melatonin 9 milliGRAM(s) Oral at bedtime  memantine 10 milliGRAM(s) Oral at bedtime  OLANZapine 2.5 milliGRAM(s) Oral at bedtime  pantoprazole    Tablet 40 milliGRAM(s) Oral before breakfast  petrolatum Ophthalmic Ointment 1 Application(s) Both EYES two times a day  polyethylene glycol 3350 17 Gram(s) Oral two times a day  predniSONE   Tablet 30 milliGRAM(s) Oral daily  senna 2 Tablet(s) Oral at bedtime  sertraline 125 milliGRAM(s) Oral daily    T(C): 36.9 (04-20-25 @ 10:27), Max: 36.9 (04-20-25 @ 10:27)  HR: 90 (04-20-25 @ 10:36) (80 - 99)  BP: 108/65 (04-20-25 @ 10:27) (106/74 - 112/70)  RR: 18 (04-20-25 @ 10:27) (17 - 18)  SpO2: 96% (04-20-25 @ 10:36) (95% - 99%)    CAPILLARY BLOOD GLUCOSE  POCT Blood Glucose.: 182 mg/dL (20 Apr 2025 12:08)  POCT Blood Glucose.: 129 mg/dL (20 Apr 2025 08:11)  POCT Blood Glucose.: 190 mg/dL (19 Apr 2025 22:10)  POCT Blood Glucose.: 158 mg/dL (19 Apr 2025 17:20)    PHYSICAL EXAM:  GENERAL: NAD, on RA  HEENT: eyes appear clear  CHEST/LUNG: Clear to auscultation bilaterally; no wheeze   HEART: Regular rate and rhythm; No murmurs, rubs, or gallops  ABDOMEN: Soft, Nontender, Nondistended; Bowel sounds present  EXTREMITIES:   warm and well perfused, No clubbing, cyanosis, or edema  PSYCH:  still flat and not engaging in conversation   NEUROLOGY: non-focal    LABS:                        10.7   12.01 )-----------( 172      ( 20 Apr 2025 07:28 )             32.4     04-20    140  |  103  |  18  ----------------------------<  116[H]  3.8   |  26  |  0.75    Ca    9.1      20 Apr 2025 07:28  Phos  3.1     04-20  Mg     1.80     04-20    Consultant(s) Notes Reviewed:    Care Discussed with Consultants/Other Providers:

## 2025-04-21 LAB
GLUCOSE BLDC GLUCOMTR-MCNC: 108 MG/DL — HIGH (ref 70–99)
GLUCOSE BLDC GLUCOMTR-MCNC: 118 MG/DL — HIGH (ref 70–99)
GLUCOSE BLDC GLUCOMTR-MCNC: 162 MG/DL — HIGH (ref 70–99)
GLUCOSE BLDC GLUCOMTR-MCNC: 175 MG/DL — HIGH (ref 70–99)

## 2025-04-21 PROCEDURE — 99232 SBSQ HOSP IP/OBS MODERATE 35: CPT

## 2025-04-21 RX ADMIN — LEVALBUTEROL HYDROCHLORIDE 0.63 MILLIGRAM(S): 1.25 SOLUTION RESPIRATORY (INHALATION) at 21:54

## 2025-04-21 RX ADMIN — Medication 500 MICROGRAM(S): at 21:53

## 2025-04-21 RX ADMIN — INSULIN LISPRO 1: 100 INJECTION, SOLUTION INTRAVENOUS; SUBCUTANEOUS at 17:58

## 2025-04-21 RX ADMIN — Medication 1 APPLICATION(S): at 05:53

## 2025-04-21 RX ADMIN — POLYETHYLENE GLYCOL 3350 17 GRAM(S): 17 POWDER, FOR SOLUTION ORAL at 14:38

## 2025-04-21 RX ADMIN — Medication 500 MICROGRAM(S): at 15:55

## 2025-04-21 RX ADMIN — POLYETHYLENE GLYCOL 3350 17 GRAM(S): 17 POWDER, FOR SOLUTION ORAL at 18:01

## 2025-04-21 RX ADMIN — Medication 9 MILLIGRAM(S): at 23:32

## 2025-04-21 RX ADMIN — Medication 2 TABLET(S): at 23:33

## 2025-04-21 RX ADMIN — OLANZAPINE 2.5 MILLIGRAM(S): 10 TABLET ORAL at 23:33

## 2025-04-21 RX ADMIN — LISINOPRIL 40 MILLIGRAM(S): 5 TABLET ORAL at 05:52

## 2025-04-21 RX ADMIN — Medication 1 APPLICATION(S): at 05:50

## 2025-04-21 RX ADMIN — ENOXAPARIN SODIUM 40 MILLIGRAM(S): 100 INJECTION SUBCUTANEOUS at 18:00

## 2025-04-21 RX ADMIN — INSULIN LISPRO 1: 100 INJECTION, SOLUTION INTRAVENOUS; SUBCUTANEOUS at 12:39

## 2025-04-21 RX ADMIN — SERTRALINE 125 MILLIGRAM(S): 100 TABLET, FILM COATED ORAL at 14:38

## 2025-04-21 RX ADMIN — INSULIN LISPRO 3 UNIT(S): 100 INJECTION, SOLUTION INTRAVENOUS; SUBCUTANEOUS at 17:59

## 2025-04-21 RX ADMIN — LEVALBUTEROL HYDROCHLORIDE 0.63 MILLIGRAM(S): 1.25 SOLUTION RESPIRATORY (INHALATION) at 15:55

## 2025-04-21 RX ADMIN — Medication 1 DROP(S): at 23:34

## 2025-04-21 RX ADMIN — INSULIN GLARGINE-YFGN 8 UNIT(S): 100 INJECTION, SOLUTION SUBCUTANEOUS at 23:31

## 2025-04-21 RX ADMIN — Medication 40 MILLIGRAM(S): at 14:43

## 2025-04-21 RX ADMIN — Medication 1 DROP(S): at 05:52

## 2025-04-21 RX ADMIN — Medication 1 DROP(S): at 14:37

## 2025-04-21 RX ADMIN — BUDESONIDE 0.5 MILLIGRAM(S): 0.25 SUSPENSION RESPIRATORY (INHALATION) at 21:53

## 2025-04-21 RX ADMIN — MEMANTINE HYDROCHLORIDE 10 MILLIGRAM(S): 21 CAPSULE, EXTENDED RELEASE ORAL at 23:33

## 2025-04-21 RX ADMIN — Medication 1 APPLICATION(S): at 18:04

## 2025-04-21 NOTE — BH CONSULTATION LIAISON PROGRESS NOTE - NSBHASSESSMENTFT_PSY_ALL_CORE
Patient is a 74F hx asthma, depression, and HTN was admitted to the MICU with AHRF 2/2 asthma exacerbation resulting in acute on chronic HHRF causing AMS and increased WOB that required intubation. During her ICU course, she experienced seizure-like activity .  Additional issues included hyponatremia, acute on chronic metabolic alkalosis, and mild thrombocytopenia,  also having fevers.  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 Patient has h/o hx of major depressive disorder with psychosis, hx of 3 past inpatient psychiatric hospitalizations last in 2016 at [facility], all rehospitalizations were in context non compliance with the treatment. She has no hx of suicide attempts, no hx of substance abuse. Patient w hx of paranoia, disorganization, catatonia, required MOO when in [facility].     3/24----more withdrawn, some stiffness in neck, not communicative. Some concerns for catatonia. Unsure if symptoms attributed to catatonia vs delirium  3/25--- more alert today, trying to following commands. Still with PMR+. Appears to be responding to Ativan  3/26-- BFS 10, will increasing standing ativan to target catatonic symptoms - giving STAT 0.5mg NOW discussed w acp   3/27----will continue ativan dosing for one more day before increasing  3/28: BFS: stupor 1, mutism 2,withdrawan 2, verbigeration 1, rigidity 2 =8.  3/29: BFS: stupor 2, mutism 2, staring 1, posturing 2, rigidity 2, waxy flexibility 3, withdrawal 2 = 14; please monitor nutritional intake, plan to increase ativan by total daily dose of 0.5 mg at bedtime due to persistent catatonia   3/31: BFS: stupor 2, mutism 2, staring 2, rigidity 1, negativism 1, withdrawal 1, waxy flexibility 3 =12, patient with productive cough, advised for medical work up  4/1---tired, speaking more, not rigid, has PMR+. NPO status now.   4/2--sleepy, RRT this afternoon  4/3---better mentation, pmr but no chester catatonic symptoms  4/4: alert, using few words, mild rigidity -  at bedside, updated   4/5: few words, lethargic, fluctuating wakefulness. Mild rigidity.  No overt catatonic sxs.   4/7: no change, remains thought blocked, few words - son at bedside, updated with care  4/8: patient continues to have poor eye contact, uses few words, no rigidity on today's exam.   4/9: patient remains minimally verbal, some posturing, poor Po intake - continues on NC for 02 requirements   4/10: more verbal/interactive, with increased PO intake per family. Some psychotic output. Family agrees with resumption of Zyprexa.   4/14: alert to self with fluctuating wakefulness, intermittent full sentences f/b one-word answers, some rigidity, posturing, denies si, denies ah/vh  4/16: patient more catatonic today, notable catalepsy, waxiness, verbigeration followed by mutism and staring, family against ativan and ECT, would increase memantine as below, not eating   4/17: catatonic (mute, rigid, not engaging). Eating some portions of meals with significant family support. Family opting to see memantine trial to full effect before consideration of ECT  4/18: mutism today, odd posturing, unable to engage, d/w spouse and son ECT, family still undecided  4/21: alert to self, some improvement with verbal engagement, tracking, follow command, denies si, denies ah/vh, awaiting call back from son in regards to ECT consult    PLAN  - no SI or HI, no need for psychiatric CO  -- antipsychotics can only be given if qtc < 500    - MEDICATIONS:  - C/W Memantine 10mg QD  -- C/W Zoloft 100 mg daily ( home dose 150mg) ( monitor NA- recent hyponatremia)  -- C/W  Zyprexa 2.5mg HS tonight  - ongoing discussion with family about treatment plan  - ECT consideration underway, though against significant family concerns. Will continue to explore and will send info on tx modality to continue to assess its potential.   - PRN:  --Ativan 0.5mg q 6hrs prn IM/IV/PO  - DISPO: pending, not eating enough to go to [facility] TBD: inpt psych vs TK depending on recovery  Routine observation

## 2025-04-21 NOTE — PROVIDER CONTACT NOTE (OTHER) - ASSESSMENT
pt A&Ox1. pt stating she cannot breath. VS 98.8 temp, 87 HR, 107/74 BP, 17 RR, 100 spo2. pt reports no chest pain, no s/s of chest pain observed.

## 2025-04-21 NOTE — PROVIDER CONTACT NOTE (OTHER) - ACTION/TREATMENT ORDERED:
provider made aware, provider stated to give 22:00 Zyprexa, provider stated to hold other medications until shortness of breath improves, provider ordered oxygen for comfort and breathing treatments

## 2025-04-21 NOTE — PROGRESS NOTE ADULT - PROBLEM SELECTOR PLAN 1
MDD w/ psychotic features.  - s/p ativan now on hold given concern that it may have contributed to respiratory decompensation  - c/w Zoloft 125 qday (home dose was 150).  - c/w Zyprexa 2.5 qhs  - c/w Namenda 10mg qhs  - psych following  - ECT was offered, family considering  not family contact today for decision regarding ECT  pulm to weigh in about candidacy for ECT from pulm perspective

## 2025-04-21 NOTE — PROVIDER CONTACT NOTE (OTHER) - ACTION/TREATMENT ORDERED:
provider made aware, provider stated medications due can be given at other times during the day, provider stated day nurse just needs to make sure pantoprazole is given before breakfast

## 2025-04-21 NOTE — BH CONSULTATION LIAISON PROGRESS NOTE - NSBHFUPINTERVALHXFT_PSY_A_CORE
Chart reviewed, medication compliant, no prn's given, received sitting semi-right in bed with legs crossed at ankles, alert to self, able to follow command to shake writer's hand, able to verbally engage at times with writer today answering in full sentences, however, other times during interview some questions she does not answer, when asking about spouse patient states, "I don't know where he is," tracking is slightly improved-follows writer from one side of room to other, however, mild posturing still noted with some mutism as well. Patient nods no to suicidal ideation, auditory/visual hallucinations.     Spoke to patient's son Jesus today regarding setting up a time for ECT consult, son to call writer back with definitive time.

## 2025-04-21 NOTE — PROGRESS NOTE ADULT - SUBJECTIVE AND OBJECTIVE BOX
[facility] Division of Hospital Medicine  Em Toure MD  Pager [phone]    Patient is a 74y old  Female who presents with a chief complaint of AMS and increased WOB      SUBJECTIVE / OVERNIGHT EVENTS: pt seen this AM; responded to my greeting and replied good morning to you; answered some questions; cont to face away from my during my eval      MEDICATIONS  (STANDING):  artificial tears (preservative free) Ophthalmic Solution 1 Drop(s) Both EYES every 8 hours  bisacodyl 10 milliGRAM(s) Oral once  buDESOnide    Inhalation Suspension 0.5 milliGRAM(s) Inhalation every 12 hours  chlorhexidine 2% Cloths 1 Application(s) Topical <User Schedule>  dextrose 50% Injectable 25 Gram(s) IV Push once  dextrose 50% Injectable 12.5 Gram(s) IV Push once  dextrose 50% Injectable 25 Gram(s) IV Push once  dextrose Oral Gel 15 Gram(s) Oral once  enoxaparin Injectable 40 milliGRAM(s) SubCutaneous every 24 hours  glucagon  Injectable 1 milliGRAM(s) IntraMuscular once  hydrochlorothiazide 25 milliGRAM(s) Oral daily  insulin glargine Injectable (LANTUS) 8 Unit(s) SubCutaneous at bedtime  insulin lispro (ADMELOG) corrective regimen sliding scale   SubCutaneous three times a day before meals  insulin lispro (ADMELOG) corrective regimen sliding scale   SubCutaneous at bedtime  insulin lispro Injectable (ADMELOG) 3 Unit(s) SubCutaneous three times a day before meals  ipratropium    for Nebulization 500 MICROGram(s) Nebulizer every 8 hours  levalbuterol Inhalation 0.63 milliGRAM(s) Inhalation every 8 hours  lisinopril 40 milliGRAM(s) Oral daily  melatonin 9 milliGRAM(s) Oral at bedtime  memantine 10 milliGRAM(s) Oral at bedtime  OLANZapine 2.5 milliGRAM(s) Oral at bedtime  pantoprazole    Tablet 40 milliGRAM(s) Oral before breakfast  petrolatum Ophthalmic Ointment 1 Application(s) Both EYES two times a day  polyethylene glycol 3350 17 Gram(s) Oral two times a day  senna 2 Tablet(s) Oral at bedtime  sertraline 125 milliGRAM(s) Oral daily        CAPILLARY BLOOD GLUCOSE  POCT Blood Glucose.: 175 mg/dL (21 Apr 2025 17:20)  POCT Blood Glucose.: 162 mg/dL (21 Apr 2025 12:01)  POCT Blood Glucose.: 108 mg/dL (21 Apr 2025 08:22)  POCT Blood Glucose.: 172 mg/dL (20 Apr 2025 22:12)        PHYSICAL EXAM:  Vital Signs Last 24 Hrs  T(F): 97.8 (21 Apr 2025 14:30), Max: 98.8 (20 Apr 2025 21:13)  HR: 85 (21 Apr 2025 15:55) (78 - 98)  BP: 144/75 (21 Apr 2025 14:30) (107/74 - 144/75)  RR: 18 (21 Apr 2025 14:30) (17 - 19)  SpO2: 98% (21 Apr 2025 15:55) (97% - 100%)    Parameters below as of 21 Apr 2025 15:55  Patient On (Oxygen Delivery Method): room air      exam limited due to poor pt engagement  CONSTITUTIONAL: NAD, appears comfortable  EYES: PERRLA; conjunctiva and sclera clear  ENMT: Moist oral mucosa; normal dentition  RESPIRATORY: Normal respiratory effort; grossly b/l AE, no wheeze  CARDIOVASCULAR: Regular rate and rhythm; No lower extremity edema  ABDOMEN: Nontender to palpation, normoactive bowel sounds  MUSCULOSKELETAL:  no clubbing or cyanosis of digits; no joint swelling or tenderness to palpation  PSYCH: calm, poorly engagement; affect flat  NEUROLOGY: unable to assess due to poor engagement  SKIN: No rashes; no palpable lesions    LABS:                        10.7   12.01 )-----------( 172      ( 20 Apr 2025 07:28 )             32.4     04-20    140  |  103  |  18  ----------------------------<  116[H]  3.8   |  26  |  0.75    Ca    9.1      20 Apr 2025 07:28  Phos  3.1     04-20  Mg     1.80     04-20

## 2025-04-21 NOTE — PROVIDER CONTACT NOTE (OTHER) - ASSESSMENT
pt A&Ox1. pt ripping off tele leads and , upon report patient has been ripping off tele leads and  throughout the day. VS 98.2 temp, 93 HR, 119/70 BP, 18 RR, 95 spo2

## 2025-04-21 NOTE — PROVIDER CONTACT NOTE (OTHER) - ASSESSMENT
pt A&Ox1. pt refusing to open her mouth to take morning medications. VS 98.3 temp, 78 HR, 129/79 BP, 18 RR, 100 spo2

## 2025-04-21 NOTE — BH CONSULTATION LIAISON PROGRESS NOTE - OTHER
mutism today improved engagement some tracking noted today slight improvement appears to recognize and respond to writer abnormal posture

## 2025-04-22 LAB
ANION GAP SERPL CALC-SCNC: 13 MMOL/L — SIGNIFICANT CHANGE UP (ref 7–14)
BUN SERPL-MCNC: 13 MG/DL — SIGNIFICANT CHANGE UP (ref 7–23)
CALCIUM SERPL-MCNC: 9 MG/DL — SIGNIFICANT CHANGE UP (ref 8.4–10.5)
CHLORIDE SERPL-SCNC: 102 MMOL/L — SIGNIFICANT CHANGE UP (ref 98–107)
CO2 SERPL-SCNC: 21 MMOL/L — LOW (ref 22–31)
CREAT SERPL-MCNC: 0.61 MG/DL — SIGNIFICANT CHANGE UP (ref 0.5–1.3)
EGFR: 94 ML/MIN/1.73M2 — SIGNIFICANT CHANGE UP
EGFR: 94 ML/MIN/1.73M2 — SIGNIFICANT CHANGE UP
GLUCOSE BLDC GLUCOMTR-MCNC: 130 MG/DL — HIGH (ref 70–99)
GLUCOSE BLDC GLUCOMTR-MCNC: 151 MG/DL — HIGH (ref 70–99)
GLUCOSE BLDC GLUCOMTR-MCNC: 167 MG/DL — HIGH (ref 70–99)
GLUCOSE BLDC GLUCOMTR-MCNC: 307 MG/DL — HIGH (ref 70–99)
GLUCOSE SERPL-MCNC: 141 MG/DL — HIGH (ref 70–99)
HCT VFR BLD CALC: 34 % — LOW (ref 34.5–45)
HGB BLD-MCNC: 11.3 G/DL — LOW (ref 11.5–15.5)
MAGNESIUM SERPL-MCNC: 1.6 MG/DL — SIGNIFICANT CHANGE UP (ref 1.6–2.6)
MCHC RBC-ENTMCNC: 32.3 PG — SIGNIFICANT CHANGE UP (ref 27–34)
MCHC RBC-ENTMCNC: 33.2 G/DL — SIGNIFICANT CHANGE UP (ref 32–36)
MCV RBC AUTO: 97.1 FL — SIGNIFICANT CHANGE UP (ref 80–100)
NRBC # BLD AUTO: 0 K/UL — SIGNIFICANT CHANGE UP (ref 0–0)
NRBC # FLD: 0 K/UL — SIGNIFICANT CHANGE UP (ref 0–0)
NRBC BLD AUTO-RTO: 0 /100 WBCS — SIGNIFICANT CHANGE UP (ref 0–0)
PHOSPHATE SERPL-MCNC: 3.4 MG/DL — SIGNIFICANT CHANGE UP (ref 2.5–4.5)
PLATELET # BLD AUTO: 165 K/UL — SIGNIFICANT CHANGE UP (ref 150–400)
POTASSIUM SERPL-MCNC: 5 MMOL/L — SIGNIFICANT CHANGE UP (ref 3.5–5.3)
POTASSIUM SERPL-SCNC: 5 MMOL/L — SIGNIFICANT CHANGE UP (ref 3.5–5.3)
RBC # BLD: 3.5 M/UL — LOW (ref 3.8–5.2)
RBC # FLD: 13.6 % — SIGNIFICANT CHANGE UP (ref 10.3–14.5)
SODIUM SERPL-SCNC: 136 MMOL/L — SIGNIFICANT CHANGE UP (ref 135–145)
WBC # BLD: 8.92 K/UL — SIGNIFICANT CHANGE UP (ref 3.8–10.5)
WBC # FLD AUTO: 8.92 K/UL — SIGNIFICANT CHANGE UP (ref 3.8–10.5)

## 2025-04-22 PROCEDURE — 99233 SBSQ HOSP IP/OBS HIGH 50: CPT

## 2025-04-22 PROCEDURE — 99232 SBSQ HOSP IP/OBS MODERATE 35: CPT

## 2025-04-22 RX ORDER — MEMANTINE HYDROCHLORIDE 21 MG/1
5 CAPSULE, EXTENDED RELEASE ORAL
Refills: 0 | Status: DISCONTINUED | OUTPATIENT
Start: 2025-04-23 | End: 2025-05-15

## 2025-04-22 RX ADMIN — Medication 1 DROP(S): at 06:40

## 2025-04-22 RX ADMIN — BUDESONIDE 0.5 MILLIGRAM(S): 0.25 SUSPENSION RESPIRATORY (INHALATION) at 08:52

## 2025-04-22 RX ADMIN — LEVALBUTEROL HYDROCHLORIDE 0.63 MILLIGRAM(S): 1.25 SOLUTION RESPIRATORY (INHALATION) at 15:13

## 2025-04-22 RX ADMIN — INSULIN GLARGINE-YFGN 8 UNIT(S): 100 INJECTION, SOLUTION SUBCUTANEOUS at 22:50

## 2025-04-22 RX ADMIN — Medication 1 APPLICATION(S): at 18:07

## 2025-04-22 RX ADMIN — INSULIN LISPRO 4: 100 INJECTION, SOLUTION INTRAVENOUS; SUBCUTANEOUS at 12:47

## 2025-04-22 RX ADMIN — INSULIN LISPRO 3 UNIT(S): 100 INJECTION, SOLUTION INTRAVENOUS; SUBCUTANEOUS at 12:48

## 2025-04-22 RX ADMIN — INSULIN LISPRO 3 UNIT(S): 100 INJECTION, SOLUTION INTRAVENOUS; SUBCUTANEOUS at 17:59

## 2025-04-22 RX ADMIN — Medication 2 TABLET(S): at 22:51

## 2025-04-22 RX ADMIN — Medication 1 DROP(S): at 22:51

## 2025-04-22 RX ADMIN — LEVALBUTEROL HYDROCHLORIDE 0.63 MILLIGRAM(S): 1.25 SOLUTION RESPIRATORY (INHALATION) at 08:52

## 2025-04-22 RX ADMIN — LEVALBUTEROL HYDROCHLORIDE 0.63 MILLIGRAM(S): 1.25 SOLUTION RESPIRATORY (INHALATION) at 22:24

## 2025-04-22 RX ADMIN — POLYETHYLENE GLYCOL 3350 17 GRAM(S): 17 POWDER, FOR SOLUTION ORAL at 06:28

## 2025-04-22 RX ADMIN — MEMANTINE HYDROCHLORIDE 10 MILLIGRAM(S): 21 CAPSULE, EXTENDED RELEASE ORAL at 22:51

## 2025-04-22 RX ADMIN — Medication 500 MICROGRAM(S): at 22:24

## 2025-04-22 RX ADMIN — Medication 1 DROP(S): at 17:59

## 2025-04-22 RX ADMIN — POLYETHYLENE GLYCOL 3350 17 GRAM(S): 17 POWDER, FOR SOLUTION ORAL at 18:06

## 2025-04-22 RX ADMIN — PREDNISONE 20 MILLIGRAM(S): 20 TABLET ORAL at 06:27

## 2025-04-22 RX ADMIN — Medication 1 APPLICATION(S): at 06:40

## 2025-04-22 RX ADMIN — LISINOPRIL 40 MILLIGRAM(S): 5 TABLET ORAL at 06:27

## 2025-04-22 RX ADMIN — Medication 500 MICROGRAM(S): at 08:52

## 2025-04-22 RX ADMIN — Medication 500 MICROGRAM(S): at 15:13

## 2025-04-22 RX ADMIN — INSULIN LISPRO 1: 100 INJECTION, SOLUTION INTRAVENOUS; SUBCUTANEOUS at 09:10

## 2025-04-22 RX ADMIN — ENOXAPARIN SODIUM 40 MILLIGRAM(S): 100 INJECTION SUBCUTANEOUS at 18:06

## 2025-04-22 RX ADMIN — SERTRALINE 125 MILLIGRAM(S): 100 TABLET, FILM COATED ORAL at 12:50

## 2025-04-22 RX ADMIN — INSULIN LISPRO 3 UNIT(S): 100 INJECTION, SOLUTION INTRAVENOUS; SUBCUTANEOUS at 09:11

## 2025-04-22 RX ADMIN — Medication 40 MILLIGRAM(S): at 06:27

## 2025-04-22 RX ADMIN — OLANZAPINE 2.5 MILLIGRAM(S): 10 TABLET ORAL at 22:51

## 2025-04-22 RX ADMIN — Medication 1 APPLICATION(S): at 06:41

## 2025-04-22 RX ADMIN — BUDESONIDE 0.5 MILLIGRAM(S): 0.25 SUSPENSION RESPIRATORY (INHALATION) at 22:01

## 2025-04-22 RX ADMIN — Medication 9 MILLIGRAM(S): at 22:51

## 2025-04-22 NOTE — ECT CONSULT NOTE - OTHER PAST PSYCHIATRIC HISTORY (INCLUDE DETAILS REGARDING ONSET, COURSE OF ILLNESS, INPATIENT/OUTPATIENT TREATMENT)
As per the medical record and interview today with the patient and family (her  Liam Curry, [phone]), Shira Curry is a 74 year old female with a history of asthma, depression, and hypertension was admitted on 3/8/25 to the MICU with Acute Hypoxemic Hypocapnic Respiratory Failure 2/2 asthma exacerbation. Her respiratory distress led to AMS and required intubation.  She developed several medical complications including seizure-like activity, hyponatremia, metabolic alkalosis, thrombocytopenia, and fevers.  Psychiatrically, she has a history of major depressive disorder with psychosis, 3 prior psychiatric hospitalizations related to medication noncompliance, including a medication over object at last [facility] admission in 2016.  She has no history of suicide attempts or substance abuse, but has experienced paranoia, disorganization, and catatonia in the past. She was previously in outpatient treatment at [facility] with Dr. Duarte since 2016 until this hospitalization. [facility] psychiatry team has been following patient since 3/20/25 for psychiatric medication management.  Interventional psychiatry consulted for an ECT trial to target symptoms of catatonia.     Prior to this hospitalization, Mrs. Curry was last seen by her outpatient psychiatrist on 10/11/2024, prescribed Zyprexa 10mg at bedtime, Zoloft 150mg daily, Memantine 10 mg daily for augmentation. Last MSE: “Patient is well groomed, calm and co-operative, mild tremors in b/l hands, speech soft, slow, well articulated, mood: anxious,affect is appropriate,full range, thought process is logical concrete, perseverative, no hallucinations, no delusions, fair attention and registration, poor memory and recall, fair insight and judgement, no SI.” Cognitive decline was suspected, although “problems with recall are not that prominent” per HPI, pt was referred to neurology, recommended to attend a senior center and exercise daily, brina applied for Medicaid with the goals of obtaining a HHA. She was scheduled to return to the clinic in 3 months after her trip to [facility]. The patient was discharged from the clinic on 3/13/25 when she required this medical admission.      On chart review the patient has had fluctuating mental status and catatonic symptoms throughout the last three weeks of hospital course, with notable catalepsy, waxiness, verbigeration, posturing, mutism, and staring. Lugo-Stephan Catatonia Scale (BFCS) scores fluctuated, generally indicating moderate catatonia.  They have made statements about “wishing they were dead” but deny suicidality; they have been difficult to engage in detailed discussion of symptoms given extent of clinical symptoms. Benzodiazepines were given with some efficacy, but they were discontinued as they induced further respiratory compromise. Memantine was titrated to 20mg/day with limited efficacy. Patient’s family had hesitations regarding ECT, but are now considering given patient’s continued catatonic symptoms and lack of improvement with pharmacotherapy. Prior collateral information obtained from the Pt’s son (physician, Dr. Curry, [phone]): "patient was doing well when he last saw her in November. patient has since traveled to [facility] - son spoke with his father who states patient has been compliant with medications when away. As per son, patient tends to "cycle" and decompensate every few years - stops doing ADLSs, paranoia. Also relayed some psychosocial stressors between patient and spouse.”     Of note, the hospital course has been complicated. Relevant study findings for ECT assessment:     While in MICU patient had 6 minutes of observed seizure-like activity with myoclonic upper jerks which resolved with Ativan. EEG on 3/10 showing no epileptiform activity; severe degree of diffuse or multifocal cerebral dysfunction c/w comatose state.     TTE on 3/9 with no acute pathology; showed estimated pulmonary artery systolic pressure of 44 mmHg, consistent with mild pulmonary hypertension.     MR Head without acute intracranial pathology, however it showed multiple patchy confluent nonspecific abnormal white matter foci of T2/FLAIR prolongation statistically favoring microvascular type changes. In addition there were multiple chronic lacunar infarcts in the bilateral basal ganglia and thalami.     On interview, patient is a limited historian given current clinical presentation. She was seen sitting up in hospital bed while receiving nebulizer treatment. Alert and only oriented to her name and her ’s full names. She reported the year as “2075”, did not respond when asked about location, her birthday, number of adult children she has or her children’s names. She did not indicate whether she has any pain. She shook her head when I asked if she felt depressed, paranoid, experienced any hallucinations, suicidal or violent ideation, intent, or plan.     Patient’s family is involved in care and corroborate the above assessment (diagnosis, general history, risk factors, and events leading to current hospitalization). Collateral information obtained from the Pt’s  Liam Curry ([phone]). He confirmed history above, and emphasized that since this hospitalization Pt has been more withdrawn than her baseline. She recognizes him and her children but now have periods of not answering questions, does not eat or take medicines by mouth, not aware of surroundings – he states this is similar to a prior catatonia episode in 2016. He does not think she is overtly paranoid, internally preoccupied, or experiencing hallucinations. She has not been agitated, has not made suicidal or violent statements, has not eloped from the floors. He denies any substance use history.      Patient denies any dental issues. Pt and Pt’s  confirm Pt does not have any mobile teeth, veneers, or bridges. Denies current substance use diathesis.     PPH: history of major depressive disorder with psychosis, 3 prior psychiatric hospitalizations related to medication noncompliance, including a medication over object at last [facility] admission in 2016.  She has no history of suicide attempts or substance abuse, but has experienced paranoia, disorganization, and catatonia in the past. She was previously in outpatient treatment at [facility] with Dr. Duarte since 2016 until this hospitalization.     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  As per the medical record and interview today with the patient and family (her  Liam Curry, [phone]), Shira Curry is a 74 year old female with a history of asthma, depression, and hypertension was admitted on 3/8/25 to the MICU with Acute Hypoxemic Hypocapnic Respiratory Failure 2/2 asthma exacerbation. Her respiratory distress led to AMS and required intubation.  She developed several medical complications including seizure-like activity, hyponatremia, metabolic alkalosis, thrombocytopenia, and fevers.  Psychiatrically, she has a history of major depressive disorder with psychosis, 3 prior psychiatric hospitalizations related to medication noncompliance, including a medication over object at last [facility] admission in 2016.  She has no history of suicide attempts or substance abuse, but has experienced paranoia, disorganization, and catatonia in the past. She was previously in outpatient treatment at [facility] with Dr. Duarte since 2016 until this hospitalization. [facility] psychiatry team has been following patient since 3/20/25 for psychiatric medication management.  Interventional psychiatry consulted for an ECT trial to target symptoms of catatonia.     Prior to this hospitalization, Mrs. Curry was last seen by her outpatient psychiatrist on 10/11/2024, prescribed Zyprexa 10mg at bedtime, Zoloft 150mg daily, Memantine 10 mg daily for augmentation. Last MSE: “Patient is well groomed, calm and co-operative, mild tremors in b/l hands, speech soft, slow, well articulated, mood: anxious,affect is appropriate,full range, thought process is logical concrete, perseverative, no hallucinations, no delusions, fair attention and registration, poor memory and recall, fair insight and judgement, no SI.” Cognitive decline was suspected, although “problems with recall are not that prominent” per HPI, pt was referred to neurology, recommended to attend a senior center and exercise daily, brina applied for Medicaid with the goals of obtaining a HHA. She was scheduled to return to the clinic in 3 months after her trip to [facility]. The patient was discharged from the clinic on 3/13/25 when she required this medical admission.      On chart review the patient has had fluctuating mental status and catatonic symptoms throughout the last three weeks of hospital course, with notable catalepsy, waxiness, verbigeration, posturing, mutism, and staring. Lugo-Stephan Catatonia Scale (BFCS) scores fluctuated, generally indicating moderate catatonia.  They have made statements about “wishing they were dead” but deny suicidality; they have been difficult to engage in detailed discussion of symptoms given extent of clinical symptoms. Benzodiazepines were given with some efficacy, but they were discontinued as they induced further respiratory compromise. Memantine was titrated to 20mg/day with limited efficacy. Patient’s family had hesitations regarding ECT, but are now considering given patient’s continued catatonic symptoms and lack of improvement with pharmacotherapy. Prior collateral information obtained from the Pt’s son (physician, Dr. Curry, [phone]): "patient was doing well when he last saw her in November. patient has since traveled to [facility] - son spoke with his father who states patient has been compliant with medications when away. As per son, patient tends to "cycle" and decompensate every few years - stops doing ADLSs, paranoia. Also relayed some psychosocial stressors between patient and spouse.”     Of note, the hospital course has been complicated. Relevant study findings for ECT assessment:     While in MICU patient had 6 minutes of observed seizure-like activity with myoclonic upper jerks which resolved with Ativan. EEG on 3/10 showing no epileptiform activity; severe degree of diffuse or multifocal cerebral dysfunction c/w comatose state.     TTE on 3/9 with no acute pathology; showed estimated pulmonary artery systolic pressure of 44 mmHg, consistent with mild pulmonary hypertension.     MR Head without acute intracranial pathology, however it showed multiple patchy confluent nonspecific abnormal white matter foci of T2/FLAIR prolongation statistically favoring microvascular type changes. In addition there were multiple chronic lacunar infarcts in the bilateral basal ganglia and thalami.     On interview, patient is a limited historian given current clinical presentation. She was seen sitting up in hospital bed while receiving nebulizer treatment. Alert and only oriented to her name and her ’s full names. She reported the year as “2075”, did not respond when asked about location, her birthday, number of adult children she has or her children’s names. She did not indicate whether she has any pain. She shook her head when I asked if she felt depressed, paranoid, experienced any hallucinations, suicidal or violent ideation, intent, or plan.     Patient’s family is involved in care and corroborate the above assessment (diagnosis, general history, risk factors, and events leading to current hospitalization). Collateral information obtained from the Pt’s  Liam Curry ([phone]). He confirmed history above, and emphasized that since this hospitalization Pt has been more withdrawn than her baseline. She recognizes him and her children but now have periods of not answering questions, does not eat or take medicines by mouth, not aware of surroundings – he states this is similar to a prior catatonia episode in 2016. He does not think she is overtly paranoid, internally preoccupied, or experiencing hallucinations. She has not been agitated, has not made suicidal or violent statements, has not eloped from the floors. Pt has no known history or current substance use.      Patient denies any dental issues. Pt and Pt’s  confirm Pt does not have any mobile teeth, veneers, or bridges.    PPH: history of major depressive disorder with psychosis, 3 prior psychiatric hospitalizations related to medication noncompliance, including a medication over object at last [facility] admission in 2016.  She has no history of suicide attempts or substance abuse, but has experienced paranoia, disorganization, and catatonia in the past. She was previously in outpatient treatment at [facility] with Dr. Duarte since 2016 until this hospitalization.     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

## 2025-04-22 NOTE — BH CONSULTATION LIAISON PROGRESS NOTE - MSE UNSTRUCTURED FT
Seen with  and son present. Withdrawn, mostly mute, negativistic, pulling at sheets and refusing exam. Barely communicative. Rigid in b/l UE. Exam limited.

## 2025-04-22 NOTE — ECT CONSULT NOTE - NSECTASSESSRECOMM_PSY_ALL_CORE
A course of ECT is a reasonable choice for the patient's treatment-refractory catatonia, with additional benefit in treating depression and psychosis should these symptoms become more prominent given the patient's history.     Total time spent on ECT consultation and documentation: 120 minutes     The bulk of consult time was spent in counselling and coordination of care, including but not limited to reviewing available medical records, obtaining collateral information from relevant hospital staff, discussing with the patient and the patient’s family (her spouse) the risks and benefits of ECT, including but not limited to memory side-effects, ECT induced shukri/hypomania, post-ECT delirium; v ious ECT placements, the usual trajectory of response with acute course of ECT, obtaining informed consent for ECT, reviewing ECT fasting guidelines, and reviewing the need for periodic history and physical and lab work. The need for ongoing treatment with other modalities such as medications was emphasized.     There was nothing in the presentation to indicate that the patient's family did not fully comprehend the risks, benefits, and alternatives to ECT as explained; the acute ECT consent form was reviewed in detail including reading line-by-line the potential adverse effects of ECT such as death, permanent brain damage or dense and persistent cognitive impact. The referring psychiatrist was contacted.     Patient will require cardiac clearance prior to the first ECT treatment; reviewed hospitalist notes and pulmonologist consult. No indication for a dental consult.

## 2025-04-22 NOTE — PROGRESS NOTE ADULT - ASSESSMENT
74F reported asthma with prior intubations and depression initially admitted with asthma exacerbation with hypercapnic and hypoxemic respiratory failure hospital course complicated by persistent mood disorder and catatonia planned for ECT    Overall, low risk (by ARISCAT) and optimized for planned procedure.         Recommendations:  - Currently on standing Pulmicort and Duonebs every 8 hours  - Continue prednisone taper, down to 20 mg  - Incentive spirometry  - OOB as tolerated  - No indication for NIV  - Appreciate ongoing BH optimization  - Eventually will need outpatient follow up with Pulmonary and Sleep Medicine

## 2025-04-22 NOTE — BH CONSULTATION LIAISON PROGRESS NOTE - NSBHASSESSMENTFT_PSY_ALL_CORE
Patient is a 74F hx asthma, depression, and HTN was admitted to the MICU with AHRF 2/2 asthma exacerbation resulting in acute on chronic HHRF causing AMS and increased WOB that required intubation. During her ICU course, she experienced seizure-like activity .  Additional issues included hyponatremia, acute on chronic metabolic alkalosis, and mild thrombocytopenia,  also having fevers.  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 Patient has h/o hx of major depressive disorder with psychosis, hx of 3 past inpatient psychiatric hospitalizations last in 2016 at [facility], all rehospitalizations were in context non compliance with the treatment. She has no hx of suicide attempts, no hx of substance abuse. Patient w hx of paranoia, disorganization, catatonia, required MOO when in [facility].     3/24----more withdrawn, some stiffness in neck, not communicative. Some concerns for catatonia. Unsure if symptoms attributed to catatonia vs delirium  3/25--- more alert today, trying to following commands. Still with PMR+. Appears to be responding to Ativan  3/26-- BFS 10, will increasing standing ativan to target catatonic symptoms - giving STAT 0.5mg NOW discussed w acp   3/27----will continue ativan dosing for one more day before increasing  3/28: BFS: stupor 1, mutism 2,withdrawan 2, verbigeration 1, rigidity 2 =8.  3/29: BFS: stupor 2, mutism 2, staring 1, posturing 2, rigidity 2, waxy flexibility 3, withdrawal 2 = 14; please monitor nutritional intake, plan to increase ativan by total daily dose of 0.5 mg at bedtime due to persistent catatonia   3/31: BFS: stupor 2, mutism 2, staring 2, rigidity 1, negativism 1, withdrawal 1, waxy flexibility 3 =12, patient with productive cough, advised for medical work up  4/1---tired, speaking more, not rigid, has PMR+. NPO status now.   4/2--sleepy, RRT this afternoon  4/3---better mentation, pmr but no chester catatonic symptoms  4/4: alert, using few words, mild rigidity -  at bedside, updated   4/5: few words, lethargic, fluctuating wakefulness. Mild rigidity.  No overt catatonic sxs.   4/7: no change, remains thought blocked, few words - son at bedside, updated with care  4/8: patient continues to have poor eye contact, uses few words, no rigidity on today's exam.   4/9: patient remains minimally verbal, some posturing, poor Po intake - continues on NC for 02 requirements   4/10: more verbal/interactive, with increased PO intake per family. Some psychotic output. Family agrees with resumption of Zyprexa.   4/14: alert to self with fluctuating wakefulness, intermittent full sentences f/b one-word answers, some rigidity, posturing, denies si, denies ah/vh  4/16: patient more catatonic today, notable catalepsy, waxiness, verbigeration followed by mutism and staring, family against ativan and ECT, would increase memantine as below, not eating   4/17: catatonic (mute, rigid, not engaging). Eating some portions of meals with significant family support. Family opting to see memantine trial to full effect before consideration of ECT  4/18: mutism today, odd posturing, unable to engage, d/w spouse and son ECT, family still undecided  4/21: alert to self, some improvement with verbal engagement, tracking, follow command, denies si, denies ah/vh, awaiting call back from son in regards to ECT consult  4/22: remains catatonic (mute, postured, rigid, withdrawn, negativistic). Exam limited. Family agrees with formal ECT consult.    PLAN  - no SI or HI, no need for psychiatric CO  -- antipsychotics can only be given if qtc < 500    - MEDICATIONS:  - INCREASE Memantine to 5mg AM and 10mg HS  -- C/W Zoloft 125 mg daily   -- C/W Zyprexa 2.5mg HS   - ECT consult formally placed today 4/22   - PRN:  --Ativan 0.5mg q 6hrs prn IM/IV/PO  - DISPO: pending, not eating enough to go to [facility]  vs [facility] [place] depending on recovery

## 2025-04-22 NOTE — BH CONSULTATION LIAISON PROGRESS NOTE - NSBHFUPINTERVALHXFT_PSY_A_CORE
Chart reviewed. Seen with  and son present. Withdrawn, mostly mute, negativistic, pulling at sheets and refusing exam. Barely communicative. Rigid in b/l UE. Exam limited.    Family agrees she is catatonic and today agreed to formal ECT consult

## 2025-04-22 NOTE — ECT CONSULT NOTE - CURRENT MEDICATION
MEDICATIONS  (STANDING):  artificial tears (preservative free) Ophthalmic Solution 1 Drop(s) Both EYES every 8 hours  bisacodyl 10 milliGRAM(s) Oral once  buDESOnide    Inhalation Suspension 0.5 milliGRAM(s) Inhalation every 12 hours  chlorhexidine 2% Cloths 1 Application(s) Topical <User Schedule>  dextrose 50% Injectable 25 Gram(s) IV Push once  dextrose 50% Injectable 12.5 Gram(s) IV Push once  dextrose 50% Injectable 25 Gram(s) IV Push once  dextrose Oral Gel 15 Gram(s) Oral once  enoxaparin Injectable 40 milliGRAM(s) SubCutaneous every 24 hours  glucagon  Injectable 1 milliGRAM(s) IntraMuscular once  hydrochlorothiazide 25 milliGRAM(s) Oral daily  insulin glargine Injectable (LANTUS) 8 Unit(s) SubCutaneous at bedtime  insulin lispro (ADMELOG) corrective regimen sliding scale   SubCutaneous three times a day before meals  insulin lispro (ADMELOG) corrective regimen sliding scale   SubCutaneous at bedtime  insulin lispro Injectable (ADMELOG) 3 Unit(s) SubCutaneous three times a day before meals  ipratropium    for Nebulization 500 MICROGram(s) Nebulizer every 8 hours  levalbuterol Inhalation 0.63 milliGRAM(s) Inhalation every 8 hours  lisinopril 40 milliGRAM(s) Oral daily  melatonin 9 milliGRAM(s) Oral at bedtime  memantine 10 milliGRAM(s) Oral at bedtime  OLANZapine 2.5 milliGRAM(s) Oral at bedtime  pantoprazole    Tablet 40 milliGRAM(s) Oral before breakfast  petrolatum Ophthalmic Ointment 1 Application(s) Both EYES two times a day  polyethylene glycol 3350 17 Gram(s) Oral two times a day  predniSONE   Tablet 20 milliGRAM(s) Oral daily  senna 2 Tablet(s) Oral at bedtime  sertraline 125 milliGRAM(s) Oral daily    MEDICATIONS  (PRN):

## 2025-04-22 NOTE — ECT CONSULT NOTE - NSECTMENTALSTATUSEXAM_PSY_ALL_CORE
Conscious, cooperative, alert and oriented only to name and ’s name.      +psychomotor retardation      Prolonged eye contact.      Speech: slow rate, hypoproductive, increased latency in speech, nonspontaneous     Mood: not reported. Affect: blunted.      Thought Process: concrete, paucity of thought     Thought Content: Denied any Suicidal ideation/intent/plan and homicidal ideation/intent/plan. No overt delusions elicited.     Perception: Denies hallucinations      Insight and Judgement: limited     Impulse Control: fair at this time.

## 2025-04-22 NOTE — ECT CONSULT NOTE - DESCRIPTION
Recent AHHRF c/b AMS, Asthma, HTN, Steroid-induced hyperglycemia, Hyponatremia (recently resolved), mild pulmonary hypertension (TTE 3/9/2025)

## 2025-04-22 NOTE — PROGRESS NOTE ADULT - SUBJECTIVE AND OBJECTIVE BOX
[facility] Division of Hospital Medicine  mE Toure MD  Pager [phone]    Patient is a 74y old  Female who presents with a chief complaint of resp failure      SUBJECTIVE / OVERNIGHT EVENTS: pt seen this AM; receiving neb rx;  at bedside; inquired to  if the family has decided about ECT, they have been speaking about it; he asked me about long term complications; informed him there could be some memory deficits but the benefits will be much greater as pt willhave a desire to get oob, walk, eat and participate with life which lowers her risks of being bed bound, developing infections/pna, etc;  grateful for info; d/w  that pulm team will also confirm that it is safe for her breathing to get ECT      MEDICATIONS  (STANDING):  artificial tears (preservative free) Ophthalmic Solution 1 Drop(s) Both EYES every 8 hours  bisacodyl 10 milliGRAM(s) Oral once  buDESOnide    Inhalation Suspension 0.5 milliGRAM(s) Inhalation every 12 hours  chlorhexidine 2% Cloths 1 Application(s) Topical <User Schedule>  dextrose 50% Injectable 25 Gram(s) IV Push once  dextrose 50% Injectable 12.5 Gram(s) IV Push once  dextrose 50% Injectable 25 Gram(s) IV Push once  dextrose Oral Gel 15 Gram(s) Oral once  enoxaparin Injectable 40 milliGRAM(s) SubCutaneous every 24 hours  glucagon  Injectable 1 milliGRAM(s) IntraMuscular once  hydrochlorothiazide 25 milliGRAM(s) Oral daily  insulin glargine Injectable (LANTUS) 8 Unit(s) SubCutaneous at bedtime  insulin lispro (ADMELOG) corrective regimen sliding scale   SubCutaneous three times a day before meals  insulin lispro (ADMELOG) corrective regimen sliding scale   SubCutaneous at bedtime  insulin lispro Injectable (ADMELOG) 3 Unit(s) SubCutaneous three times a day before meals  ipratropium    for Nebulization 500 MICROGram(s) Nebulizer every 8 hours  levalbuterol Inhalation 0.63 milliGRAM(s) Inhalation every 8 hours  lisinopril 40 milliGRAM(s) Oral daily  melatonin 9 milliGRAM(s) Oral at bedtime  memantine 10 milliGRAM(s) Oral at bedtime  OLANZapine 2.5 milliGRAM(s) Oral at bedtime  pantoprazole    Tablet 40 milliGRAM(s) Oral before breakfast  petrolatum Ophthalmic Ointment 1 Application(s) Both EYES two times a day  polyethylene glycol 3350 17 Gram(s) Oral two times a day  predniSONE   Tablet 20 milliGRAM(s) Oral daily  senna 2 Tablet(s) Oral at bedtime  sertraline 125 milliGRAM(s) Oral daily    MEDICATIONS  (PRN):      CAPILLARY BLOOD GLUCOSE  POCT Blood Glucose.: 307 mg/dL (22 Apr 2025 12:14)  POCT Blood Glucose.: 167 mg/dL (22 Apr 2025 08:24)  POCT Blood Glucose.: 118 mg/dL (21 Apr 2025 22:34)  POCT Blood Glucose.: 175 mg/dL (21 Apr 2025 17:20)      PHYSICAL EXAM:  Vital Signs Last 24 Hrs  T(F): 98.2 (22 Apr 2025 11:35), Max: 98.4 (21 Apr 2025 17:50)  HR: 98 (22 Apr 2025 11:35) (81 - 98)  BP: 145/80 (22 Apr 2025 11:35) (115/59 - 145/80)  RR: 18 (22 Apr 2025 11:35) (18 - 18)  SpO2: 100% (22 Apr 2025 11:35) (98% - 100%)    Parameters below as of 22 Apr 2025 11:35  Patient On (Oxygen Delivery Method): room air        CONSTITUTIONAL: NAD, appears comfortable  EYES: eyes closed  ENMT: Moist oral mucosa; normal dentition  RESPIRATORY: Normal respiratory effort; grossly b/l AE; no wheezing  CARDIOVASCULAR: Regular rate and rhythm; No lower extremity edema  ABDOMEN: Nontender to palpation, normoactive bowel sounds  MUSCULOSKELETAL:  no clubbing or cyanosis of digits; no joint swelling or tenderness to palpation  PSYCH: poor engagement; eyes closed  NEUROLOGY: CN 2-12 are intact and symmetric; no gross sensory deficits   SKIN: No rashes; no palpable lesions    LABS:                        11.3   8.92  )-----------( 165      ( 22 Apr 2025 07:30 )             34.0     04-22    136  |  102  |  13  ----------------------------<  141[H]  5.0   |  21[L]  |  0.61    Ca    9.0      22 Apr 2025 07:30  Phos  3.4     04-22  Mg     1.60     04-22

## 2025-04-22 NOTE — ECT CONSULT NOTE - NSBFCRSSTARING_PSY_ALL_CORE
1 = Poor eye contact, repeatedly gazes less than 20 s between shifting of attention; decreased blinking

## 2025-04-22 NOTE — PROGRESS NOTE ADULT - SUBJECTIVE AND OBJECTIVE BOX
Interval Events:   At baseline respiratory status    REVIEW OF SYSTEMS:  Denies fevers, chills, chest pain, palpitations, cough, shortness of breath  Negative unless stated above    OBJECTIVE:  ICU Vital Signs Last 24 Hrs  T(C): 36.8 (22 Apr 2025 11:35), Max: 36.9 (21 Apr 2025 17:50)  T(F): 98.2 (22 Apr 2025 11:35), Max: 98.4 (21 Apr 2025 17:50)  HR: 98 (22 Apr 2025 11:35) (81 - 98)  BP: 145/80 (22 Apr 2025 11:35) (115/59 - 145/80)  BP(mean): --  ABP: --  ABP(mean): --  RR: 18 (22 Apr 2025 11:35) (18 - 18)  SpO2: 100% (22 Apr 2025 11:35) (98% - 100%)    O2 Parameters below as of 22 Apr 2025 11:35  Patient On (Oxygen Delivery Method): room air              CAPILLARY BLOOD GLUCOSE      POCT Blood Glucose.: 307 mg/dL (22 Apr 2025 12:14)      PHYSICAL EXAM:  Well appearing  No acute distress  RRR no murmurs  Speaking in full sentences, no accessory muscle use  No wheeze, rales, or rhonchi  No LE edema  No rashes or lesions      HOSPITAL MEDICATIONS:  MEDICATIONS  (STANDING):  artificial tears (preservative free) Ophthalmic Solution 1 Drop(s) Both EYES every 8 hours  bisacodyl 10 milliGRAM(s) Oral once  buDESOnide    Inhalation Suspension 0.5 milliGRAM(s) Inhalation every 12 hours  chlorhexidine 2% Cloths 1 Application(s) Topical <User Schedule>  dextrose 50% Injectable 25 Gram(s) IV Push once  dextrose 50% Injectable 12.5 Gram(s) IV Push once  dextrose 50% Injectable 25 Gram(s) IV Push once  dextrose Oral Gel 15 Gram(s) Oral once  enoxaparin Injectable 40 milliGRAM(s) SubCutaneous every 24 hours  glucagon  Injectable 1 milliGRAM(s) IntraMuscular once  hydrochlorothiazide 25 milliGRAM(s) Oral daily  insulin glargine Injectable (LANTUS) 8 Unit(s) SubCutaneous at bedtime  insulin lispro (ADMELOG) corrective regimen sliding scale   SubCutaneous three times a day before meals  insulin lispro (ADMELOG) corrective regimen sliding scale   SubCutaneous at bedtime  insulin lispro Injectable (ADMELOG) 3 Unit(s) SubCutaneous three times a day before meals  ipratropium    for Nebulization 500 MICROGram(s) Nebulizer every 8 hours  levalbuterol Inhalation 0.63 milliGRAM(s) Inhalation every 8 hours  lisinopril 40 milliGRAM(s) Oral daily  melatonin 9 milliGRAM(s) Oral at bedtime  memantine 10 milliGRAM(s) Oral at bedtime  OLANZapine 2.5 milliGRAM(s) Oral at bedtime  pantoprazole    Tablet 40 milliGRAM(s) Oral before breakfast  petrolatum Ophthalmic Ointment 1 Application(s) Both EYES two times a day  polyethylene glycol 3350 17 Gram(s) Oral two times a day  predniSONE   Tablet 20 milliGRAM(s) Oral daily  senna 2 Tablet(s) Oral at bedtime  sertraline 125 milliGRAM(s) Oral daily    MEDICATIONS  (PRN):      LABS:                        11.3   8.92  )-----------( 165      ( 22 Apr 2025 07:30 )             34.0     Hgb Trend: 11.3<--, 10.7<--, 11.4<--, 11.0<--, 11.5<--  04-22    136  |  102  |  13  ----------------------------<  141[H]  5.0   |  21[L]  |  0.61    Ca    9.0      22 Apr 2025 07:30  Phos  3.4     04-22  Mg     1.60     04-22      Creatinine Trend: 0.61<--, 0.75<--, 0.71<--, 0.69<--, 0.69<--, 0.61<--        MICROBIOLOGY:     RADIOLOGY:  [ x] Reviewed and interpreted by me

## 2025-04-22 NOTE — PROGRESS NOTE ADULT - PROBLEM SELECTOR PLAN 1
MDD w/ psychotic features.  - s/p ativan now on hold given concern that it may have contributed to respiratory decompensation  - c/w Zoloft 125 qday (home dose was 150).  - c/w Zyprexa 2.5 qhs  - c/w Namenda 10mg qhs  - psych following  - ECT was offered, family considering, now agreeable  not family contact today for decision regarding ECT  pulm will document that pt is stable from their perspective for ECT

## 2025-04-22 NOTE — ECT CONSULT NOTE - NSICDXBHSECONDARYDX_PSY_ALL_CORE
Toxic metabolic encephalopathy   G92.8  Acute respiratory failure with hypoxia and hypercapnia   J96.01  Catatonia   F06.1

## 2025-04-23 LAB
GLUCOSE BLDC GLUCOMTR-MCNC: 138 MG/DL — HIGH (ref 70–99)
GLUCOSE BLDC GLUCOMTR-MCNC: 141 MG/DL — HIGH (ref 70–99)
GLUCOSE BLDC GLUCOMTR-MCNC: 172 MG/DL — HIGH (ref 70–99)
GLUCOSE BLDC GLUCOMTR-MCNC: 193 MG/DL — HIGH (ref 70–99)
GLUCOSE BLDC GLUCOMTR-MCNC: 68 MG/DL — LOW (ref 70–99)
GLUCOSE BLDC GLUCOMTR-MCNC: 70 MG/DL — SIGNIFICANT CHANGE UP (ref 70–99)

## 2025-04-23 PROCEDURE — 99231 SBSQ HOSP IP/OBS SF/LOW 25: CPT

## 2025-04-23 PROCEDURE — 99232 SBSQ HOSP IP/OBS MODERATE 35: CPT

## 2025-04-23 PROCEDURE — 99233 SBSQ HOSP IP/OBS HIGH 50: CPT

## 2025-04-23 RX ADMIN — Medication 1 APPLICATION(S): at 06:45

## 2025-04-23 RX ADMIN — Medication 1 APPLICATION(S): at 17:41

## 2025-04-23 RX ADMIN — SERTRALINE 125 MILLIGRAM(S): 100 TABLET, FILM COATED ORAL at 13:21

## 2025-04-23 RX ADMIN — Medication 2 TABLET(S): at 22:16

## 2025-04-23 RX ADMIN — INSULIN GLARGINE-YFGN 8 UNIT(S): 100 INJECTION, SOLUTION SUBCUTANEOUS at 23:12

## 2025-04-23 RX ADMIN — Medication 1 DROP(S): at 13:21

## 2025-04-23 RX ADMIN — Medication 1 DROP(S): at 22:26

## 2025-04-23 RX ADMIN — Medication 9 MILLIGRAM(S): at 22:19

## 2025-04-23 RX ADMIN — OLANZAPINE 2.5 MILLIGRAM(S): 10 TABLET ORAL at 22:16

## 2025-04-23 RX ADMIN — INSULIN LISPRO 1: 100 INJECTION, SOLUTION INTRAVENOUS; SUBCUTANEOUS at 12:06

## 2025-04-23 RX ADMIN — INSULIN LISPRO 3 UNIT(S): 100 INJECTION, SOLUTION INTRAVENOUS; SUBCUTANEOUS at 17:42

## 2025-04-23 RX ADMIN — Medication 1 APPLICATION(S): at 06:44

## 2025-04-23 RX ADMIN — PREDNISONE 20 MILLIGRAM(S): 20 TABLET ORAL at 06:44

## 2025-04-23 RX ADMIN — MEMANTINE HYDROCHLORIDE 10 MILLIGRAM(S): 21 CAPSULE, EXTENDED RELEASE ORAL at 22:17

## 2025-04-23 RX ADMIN — LEVALBUTEROL HYDROCHLORIDE 0.63 MILLIGRAM(S): 1.25 SOLUTION RESPIRATORY (INHALATION) at 21:24

## 2025-04-23 RX ADMIN — INSULIN LISPRO 1: 100 INJECTION, SOLUTION INTRAVENOUS; SUBCUTANEOUS at 17:46

## 2025-04-23 RX ADMIN — POLYETHYLENE GLYCOL 3350 17 GRAM(S): 17 POWDER, FOR SOLUTION ORAL at 06:45

## 2025-04-23 RX ADMIN — BUDESONIDE 0.5 MILLIGRAM(S): 0.25 SUSPENSION RESPIRATORY (INHALATION) at 21:16

## 2025-04-23 RX ADMIN — MEMANTINE HYDROCHLORIDE 5 MILLIGRAM(S): 21 CAPSULE, EXTENDED RELEASE ORAL at 06:44

## 2025-04-23 RX ADMIN — Medication 40 MILLIGRAM(S): at 06:44

## 2025-04-23 RX ADMIN — Medication 1 DROP(S): at 06:45

## 2025-04-23 RX ADMIN — INSULIN LISPRO 3 UNIT(S): 100 INJECTION, SOLUTION INTRAVENOUS; SUBCUTANEOUS at 12:07

## 2025-04-23 RX ADMIN — INSULIN LISPRO 3 UNIT(S): 100 INJECTION, SOLUTION INTRAVENOUS; SUBCUTANEOUS at 08:49

## 2025-04-23 RX ADMIN — LISINOPRIL 40 MILLIGRAM(S): 5 TABLET ORAL at 06:45

## 2025-04-23 RX ADMIN — ENOXAPARIN SODIUM 40 MILLIGRAM(S): 100 INJECTION SUBCUTANEOUS at 17:41

## 2025-04-23 NOTE — BH CONSULTATION LIAISON PROGRESS NOTE - NSBHFUPINTERVALHXFT_PSY_A_CORE
Patient was seen and assessed at bedside. Patient mute, not answering questions with words, but does shake her head when asked if feeling okay. Patient was seen and assessed at bedside. Patient mute, not answering questions with words, but does shake her head when asked if feeling okay. Remains with symptoms of catatonia. BFS: stupor 1, mute2, Rigid 2, negativism 2, withdrawal 2 - 9  ECT consult placed and performed yesterday, awaiting clearances

## 2025-04-23 NOTE — PROGRESS NOTE ADULT - ASSESSMENT
74F reported asthma with prior intubations and depression initially admitted with asthma exacerbation with hypercapnic and hypoxemic respiratory failure hospital course complicated by persistent mood disorder and catatonia planned for ECT    Overall, low risk (by ARISCAT) and optimized for planned procedure.     Recommendations:  - Currently on standing Pulmicort and Duonebs every 8 hours  - Continue prednisone taper  - Incentive spirometry  - OOB as tolerated  - No indication for NIV  - Appreciate ongoing BH optimization  - Eventually will need outpatient follow up with Pulmonary and Sleep Medicine

## 2025-04-23 NOTE — PROVIDER CONTACT NOTE (HYPOGLYCEMIA EVENT) - NS PROVIDER CONTACT BACKGROUND-HYPO
Age: 74y    Gender: Female    POCT Blood Glucose:  70 mg/dL (04-23-25 @ 22:07)  68 mg/dL (04-23-25 @ 22:05)  193 mg/dL (04-23-25 @ 17:06)  172 mg/dL (04-23-25 @ 12:00)  138 mg/dL (04-23-25 @ 08:23)      eMAR:  insulin glargine Injectable (LANTUS)   8 Unit(s) SubCutaneous (04-22-25 @ 22:50)    insulin lispro (ADMELOG) corrective regimen sliding scale   1 Unit(s) SubCutaneous (04-23-25 @ 17:46)   1 Unit(s) SubCutaneous (04-23-25 @ 12:06)    insulin lispro Injectable (ADMELOG)   3 Unit(s) SubCutaneous (04-23-25 @ 17:42)   3 Unit(s) SubCutaneous (04-23-25 @ 12:07)   3 Unit(s) SubCutaneous (04-23-25 @ 08:49)    predniSONE   Tablet   20 milliGRAM(s) Oral (04-23-25 @ 06:44)

## 2025-04-23 NOTE — BH CONSULTATION LIAISON PROGRESS NOTE - NSBHASSESSMENTFT_PSY_ALL_CORE
Patient is a 74F hx asthma, depression, and HTN was admitted to the MICU with AHRF 2/2 asthma exacerbation resulting in acute on chronic HHRF causing AMS and increased WOB that required intubation. During her ICU course, she experienced seizure-like activity .  Additional issues included hyponatremia, acute on chronic metabolic alkalosis, and mild thrombocytopenia,  also having fevers.  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 Patient has h/o hx of major depressive disorder with psychosis, hx of 3 past inpatient psychiatric hospitalizations last in 2016 at [facility], all rehospitalizations were in context non compliance with the treatment. She has no hx of suicide attempts, no hx of substance abuse. Patient w hx of paranoia, disorganization, catatonia, required MOO when in [facility].     3/24----more withdrawn, some stiffness in neck, not communicative. Some concerns for catatonia. Unsure if symptoms attributed to catatonia vs delirium  3/25--- more alert today, trying to following commands. Still with PMR+. Appears to be responding to Ativan  3/26-- BFS 10, will increasing standing ativan to target catatonic symptoms - giving STAT 0.5mg NOW discussed w acp   3/27----will continue ativan dosing for one more day before increasing  3/28: BFS: stupor 1, mutism 2,withdrawan 2, verbigeration 1, rigidity 2 =8.  3/29: BFS: stupor 2, mutism 2, staring 1, posturing 2, rigidity 2, waxy flexibility 3, withdrawal 2 = 14; please monitor nutritional intake, plan to increase ativan by total daily dose of 0.5 mg at bedtime due to persistent catatonia   3/31: BFS: stupor 2, mutism 2, staring 2, rigidity 1, negativism 1, withdrawal 1, waxy flexibility 3 =12, patient with productive cough, advised for medical work up  4/1---tired, speaking more, not rigid, has PMR+. NPO status now.   4/2--sleepy, RRT this afternoon  4/3---better mentation, pmr but no chester catatonic symptoms  4/4: alert, using few words, mild rigidity -  at bedside, updated   4/5: few words, lethargic, fluctuating wakefulness. Mild rigidity.  No overt catatonic sxs.   4/7: no change, remains thought blocked, few words - son at bedside, updated with care  4/8: patient continues to have poor eye contact, uses few words, no rigidity on today's exam.   4/9: patient remains minimally verbal, some posturing, poor Po intake - continues on NC for 02 requirements   4/10: more verbal/interactive, with increased PO intake per family. Some psychotic output. Family agrees with resumption of Zyprexa.   4/14: alert to self with fluctuating wakefulness, intermittent full sentences f/b one-word answers, some rigidity, posturing, denies si, denies ah/vh  4/16: patient more catatonic today, notable catalepsy, waxiness, verbigeration followed by mutism and staring, family against ativan and ECT, would increase memantine as below, not eating   4/17: catatonic (mute, rigid, not engaging). Eating some portions of meals with significant family support. Family opting to see memantine trial to full effect before consideration of ECT  4/18: mutism today, odd posturing, unable to engage, d/w spouse and son ECT, family still undecided  4/21: alert to self, some improvement with verbal engagement, tracking, follow command, denies si, denies ah/vh, awaiting call back from son in regards to ECT consult  4/22: remains catatonic (mute, postured, rigid, withdrawn, negativistic). Exam limited. Family agrees with formal ECT consult.    PLAN  - no SI or HI, no need for psychiatric CO  -- antipsychotics can only be given if qtc < 500    - MEDICATIONS:  - INCREASE Memantine to 5mg AM and 10mg HS  -- C/W Zoloft 125 mg daily   -- C/W Zyprexa 2.5mg HS   - ECT consult formally placed today 4/22   - PRN:  --Ativan 0.5mg q 6hrs prn IM/IV/PO  - DISPO: pending, not eating enough to go to [facility]  vs [facility] depending on recovery     Patient is a 74F hx asthma, depression, and HTN was admitted to the MICU with AHRF 2/2 asthma exacerbation resulting in acute on chronic HHRF causing AMS and increased WOB that required intubation. During her ICU course, she experienced seizure-like activity .  Additional issues included hyponatremia, acute on chronic metabolic alkalosis, and mild thrombocytopenia,  also having fevers.  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 Patient has h/o hx of major depressive disorder with psychosis, hx of 3 past inpatient psychiatric hospitalizations last in 2016 at [facility], all rehospitalizations were in context non compliance with the treatment. She has no hx of suicide attempts, no hx of substance abuse. Patient w hx of paranoia, disorganization, catatonia, required MOO when in [facility].     3/24----more withdrawn, some stiffness in neck, not communicative. Some concerns for catatonia. Unsure if symptoms attributed to catatonia vs delirium  3/25--- more alert today, trying to following commands. Still with PMR+. Appears to be responding to Ativan  3/26-- BFS 10, will increasing standing ativan to target catatonic symptoms - giving STAT 0.5mg NOW discussed w acp   3/27----will continue ativan dosing for one more day before increasing  3/28: BFS: stupor 1, mutism 2,withdrawan 2, verbigeration 1, rigidity 2 =8.  3/29: BFS: stupor 2, mutism 2, staring 1, posturing 2, rigidity 2, waxy flexibility 3, withdrawal 2 = 14; please monitor nutritional intake, plan to increase ativan by total daily dose of 0.5 mg at bedtime due to persistent catatonia   3/31: BFS: stupor 2, mutism 2, staring 2, rigidity 1, negativism 1, withdrawal 1, waxy flexibility 3 =12, patient with productive cough, advised for medical work up  4/1---tired, speaking more, not rigid, has PMR+. NPO status now.   4/2--sleepy, RRT this afternoon  4/3---better mentation, pmr but no chester catatonic symptoms  4/4: alert, using few words, mild rigidity -  at bedside, updated   4/5: few words, lethargic, fluctuating wakefulness. Mild rigidity.  No overt catatonic sxs.   4/7: no change, remains thought blocked, few words - son at bedside, updated with care  4/8: patient continues to have poor eye contact, uses few words, no rigidity on today's exam.   4/9: patient remains minimally verbal, some posturing, poor Po intake - continues on NC for 02 requirements   4/10: more verbal/interactive, with increased PO intake per family. Some psychotic output. Family agrees with resumption of Zyprexa.   4/14: alert to self with fluctuating wakefulness, intermittent full sentences f/b one-word answers, some rigidity, posturing, denies si, denies ah/vh  4/16: patient more catatonic today, notable catalepsy, waxiness, verbigeration followed by mutism and staring, family against ativan and ECT, would increase memantine as below, not eating   4/17: catatonic (mute, rigid, not engaging). Eating some portions of meals with significant family support. Family opting to see memantine trial to full effect before consideration of ECT  4/18: mutism today, odd posturing, unable to engage, d/w spouse and son ECT, family still undecided  4/21: alert to self, some improvement with verbal engagement, tracking, follow command, denies si, denies ah/vh, awaiting call back from son in regards to ECT consult  4/22: remains catatonic (mute, postured, rigid, withdrawn, negativistic). Exam limited. Family agrees with formal ECT consult.  4/23: Remains with symptoms of catatonia. BFS: stupor 1, mute2, Rigid 2, negativism 2, withdrawal 2 - 9 : ECT consult completed. awaiting clearances for ECT    PLAN  - no SI or HI, no need for psychiatric CO  -- antipsychotics can only be given if qtc < 500    - MEDICATIONS:  - Memantine 5mg AM and 10mg HS  -- C/W Zoloft 125 mg daily   -- C/W Zyprexa 2.5mg HS   - ECT consulted on 4/22 - now to obtain clearances for ECT  - PRN:  --Ativan 0.5mg q 6hrs prn IM/IV/PO  - DISPO: pending, not eating enough to go to [facility]  vs [facility] depending on recovery

## 2025-04-23 NOTE — PROGRESS NOTE ADULT - ASSESSMENT
74F MDD, asthma, prior CVA presented with AHRF c/b AMS requiring MICU admission for intubation for respiratory failure i/s/o asthma exacerbation. Extubated 3/12 to face tent and then on BIPAP s/p RRT 3/19 for increased WOB / hypoxia despite aggressive asthma management s/p return to MICU for management of severe asthma. Possible aspiration event 3/31. SLP eval appreciated. XR cine performed with improvement in mental status - pureed diet w/ thin liquids recommended. Worsening respiratory status 4/2 - placed on NIV, now weaned to nasal cannula and now RA.  ENT eval - no upper airway consideration for structural causes of asthma such as vocal cord dysfunction.   Course c/b MDD w/ catatonia and poor PO, off ativan due to respiratory concerns trialed on Memantine family now agreeable to ECT, planned for 4/25

## 2025-04-23 NOTE — CONSULT NOTE ADULT - SUBJECTIVE AND OBJECTIVE BOX
Arnold Uriostegui MD  Interventional Cardiology / Endovascular Specialist  [place] Office : [address] [place] [address] [place] [address]  Tel:   [place] Office : [address] [facility] [place] [address]  Tel: [phone]    HISTORY OF PRESENTING ILLNESS:  74F MDD, asthma, prior CVA presented with AHRF c/b AMS requiring MICU admission for intubation for respiratory failure i/s/o asthma exacerbation. Extubated 3/12 to face tent and then on BIPAP s/p RRT 3/19 for increased WOB / hypoxia despite aggressive asthma management s/p return to MICU for management of severe asthma. Possible aspiration event 3/31. SLP eval appreciated. XR cine performed with improvement in mental status - pureed diet w/ thin liquids recommended. Worsening respiratory status 4/2 - placed on NIV, now weaned to nasal cannula and now RA.  ENT eval - no upper airway consideration for structural causes of asthma such as vocal cord dysfunction.   Course c/b MDD w/ catatonia and poor PO, off ativan due to respiratory concerns trialed on Memantine family now agreeable to ECT, planned for 4/25. Cardiology consult called for cardiac clearance      	  MEDICATIONS:  enoxaparin Injectable 40 milliGRAM(s) SubCutaneous every 24 hours  hydrochlorothiazide 25 milliGRAM(s) Oral daily  lisinopril 40 milliGRAM(s) Oral daily      buDESOnide    Inhalation Suspension 0.5 milliGRAM(s) Inhalation every 12 hours  ipratropium    for Nebulization 500 MICROGram(s) Nebulizer every 8 hours  levalbuterol Inhalation 0.63 milliGRAM(s) Inhalation every 8 hours    melatonin 9 milliGRAM(s) Oral at bedtime  memantine 10 milliGRAM(s) Oral at bedtime  memantine 5 milliGRAM(s) Oral <User Schedule>  OLANZapine 2.5 milliGRAM(s) Oral at bedtime  sertraline 125 milliGRAM(s) Oral daily    bisacodyl 10 milliGRAM(s) Oral once  pantoprazole    Tablet 40 milliGRAM(s) Oral before breakfast  polyethylene glycol 3350 17 Gram(s) Oral two times a day  senna 2 Tablet(s) Oral at bedtime    dextrose 50% Injectable 25 Gram(s) IV Push once  dextrose 50% Injectable 12.5 Gram(s) IV Push once  dextrose 50% Injectable 25 Gram(s) IV Push once  dextrose Oral Gel 15 Gram(s) Oral once  glucagon  Injectable 1 milliGRAM(s) IntraMuscular once  insulin glargine Injectable (LANTUS) 8 Unit(s) SubCutaneous at bedtime  insulin lispro (ADMELOG) corrective regimen sliding scale   SubCutaneous three times a day before meals  insulin lispro (ADMELOG) corrective regimen sliding scale   SubCutaneous at bedtime  insulin lispro Injectable (ADMELOG) 3 Unit(s) SubCutaneous three times a day before meals  predniSONE   Tablet 20 milliGRAM(s) Oral daily    artificial tears (preservative free) Ophthalmic Solution 1 Drop(s) Both EYES every 8 hours  chlorhexidine 2% Cloths 1 Application(s) Topical <User Schedule>  petrolatum Ophthalmic Ointment 1 Application(s) Both EYES two times a day      PAST MEDICAL/SURGICAL HISTORY  PAST MEDICAL & SURGICAL HISTORY:  MDD (major depressive disorder), recurrent, severe, with psychosis      Asthma, mild intermittent, uncomplicated      Essential hypertension      No significant past surgical history          SOCIAL HISTORY: Substance Use (street drugs): ( x ) never used  (  ) other:    FAMILY HISTORY:      PHYSICAL EXAM:  T(C): 37.1 (04-23-25 @ 12:34), Max: 37.3 (04-22-25 @ 22:50)  HR: 75 (04-23-25 @ 12:34) (75 - 98)  BP: 144/68 (04-23-25 @ 12:34) (119/65 - 144/68)  RR: 18 (04-23-25 @ 12:34) (16 - 18)  SpO2: 95% (04-23-25 @ 12:34) (94% - 100%)  Wt(kg): --  I&O's Summary        GENERAL: NAD  EYES: PERRLA  ENMT: Moist mucous membranes  Cardiovascular: Normal S1 S2, No JVD, No edema  Respiratory: Lungs clear to auscultation	  Gastrointestinal:  Soft, Non-tender, + BS	  Extremities: Normal range of motion, No clubbing, cyanosis or edema  NERVOUS SYSTEM:  Alert & Oriented X3                             11.3   8.92  )-----------( 165      ( 22 Apr 2025 07:30 )             34.0     04-22    136  |  102  |  13  ----------------------------<  141[H]  5.0   |  21[L]  |  0.61    Ca    9.0      22 Apr 2025 07:30  Phos  3.4     04-22  Mg     1.60     04-22      proBNP:   Lipid Profile:   HgA1c:   TSH:     Consultant(s) Notes Reviewed:  [x ] YES  [ ] NO    Care Discussed with Consultants/Other Providers [ x] YES  [ ] NO    Imaging Personally Reviewed independently:  [x] YES  [ ] NO    All labs, radiologic studies, vitals, orders and medications list reviewed. Patient is seen and examined at bedside. Case discussed with medical team.

## 2025-04-23 NOTE — PROGRESS NOTE ADULT - PROBLEM SELECTOR PLAN 1
MDD w/ psychotic features.  - s/p ativan now on hold given concern that it may have contributed to respiratory decompensation  - c/w Zoloft 125 qday (home dose was 150).  - c/w Zyprexa 2.5 qhs  - c/w Namenda 10mg qhs  - psych following  - ECT  family now agreeable

## 2025-04-23 NOTE — PROGRESS NOTE ADULT - SUBJECTIVE AND OBJECTIVE BOX
[facility] Division of Hospital Medicine  Em Toure MD  Pager [phone]    Patient is a 74y old  Female who presents with a chief complaint of asthma exac/catatonia       SUBJECTIVE / OVERNIGHT EVENTS: pt did not engage with me this AM but per RN, pt ate well yesterday and walked herself to the bedside commode      MEDICATIONS  (STANDING):  artificial tears (preservative free) Ophthalmic Solution 1 Drop(s) Both EYES every 8 hours  bisacodyl 10 milliGRAM(s) Oral once  buDESOnide    Inhalation Suspension 0.5 milliGRAM(s) Inhalation every 12 hours  chlorhexidine 2% Cloths 1 Application(s) Topical <User Schedule>  dextrose 50% Injectable 25 Gram(s) IV Push once  dextrose 50% Injectable 12.5 Gram(s) IV Push once  dextrose 50% Injectable 25 Gram(s) IV Push once  dextrose Oral Gel 15 Gram(s) Oral once  enoxaparin Injectable 40 milliGRAM(s) SubCutaneous every 24 hours  glucagon  Injectable 1 milliGRAM(s) IntraMuscular once  hydrochlorothiazide 25 milliGRAM(s) Oral daily  insulin glargine Injectable (LANTUS) 8 Unit(s) SubCutaneous at bedtime  insulin lispro (ADMELOG) corrective regimen sliding scale   SubCutaneous three times a day before meals  insulin lispro (ADMELOG) corrective regimen sliding scale   SubCutaneous at bedtime  insulin lispro Injectable (ADMELOG) 3 Unit(s) SubCutaneous three times a day before meals  ipratropium    for Nebulization 500 MICROGram(s) Nebulizer every 8 hours  levalbuterol Inhalation 0.63 milliGRAM(s) Inhalation every 8 hours  lisinopril 40 milliGRAM(s) Oral daily  melatonin 9 milliGRAM(s) Oral at bedtime  memantine 10 milliGRAM(s) Oral at bedtime  memantine 5 milliGRAM(s) Oral <User Schedule>  OLANZapine 2.5 milliGRAM(s) Oral at bedtime  pantoprazole    Tablet 40 milliGRAM(s) Oral before breakfast  petrolatum Ophthalmic Ointment 1 Application(s) Both EYES two times a day  polyethylene glycol 3350 17 Gram(s) Oral two times a day  predniSONE   Tablet 20 milliGRAM(s) Oral daily  senna 2 Tablet(s) Oral at bedtime  sertraline 125 milliGRAM(s) Oral daily    CAPILLARY BLOOD GLUCOSE  POCT Blood Glucose.: 172 mg/dL (23 Apr 2025 12:00)  POCT Blood Glucose.: 138 mg/dL (23 Apr 2025 08:23)  POCT Blood Glucose.: 151 mg/dL (22 Apr 2025 22:09)  POCT Blood Glucose.: 130 mg/dL (22 Apr 2025 17:26)        PHYSICAL EXAM:  Vital Signs Last 24 Hrs  T(F): 98.1 (23 Apr 2025 04:57), Max: 99.1 (22 Apr 2025 22:50)  HR: 86 (23 Apr 2025 04:57) (79 - 98)  BP: 141/77 (23 Apr 2025 04:57) (119/65 - 141/77)  RR: 18 (23 Apr 2025 04:57) (16 - 18)  SpO2: 96% (23 Apr 2025 04:57) (94% - 100%)    Parameters below as of 23 Apr 2025 04:57  Patient On (Oxygen Delivery Method): room air      limited exam, pt pulled up sheet when I tried to listen to her lungs and shook her head no  CONSTITUTIONAL: NAD, appears comfortable  EYES: closed  ENMT: Moist oral mucosa; normal dentition  RESPIRATORY: Normal respiratory effort; grossly b/l AE  CARDIOVASCULAR: No lower extremity edema  ABDOMEN:  normoactive bowel sounds, soft  MUSCULOSKELETAL: no clubbing or cyanosis of digits; no joint swelling or tenderness to palpation  PSYCH: unable to assess  NEUROLOGY: unable to assess      LABS:                        11.3   8.92  )-----------( 165      ( 22 Apr 2025 07:30 )             34.0     04-22    136  |  102  |  13  ----------------------------<  141[H]  5.0   |  21[L]  |  0.61    Ca    9.0      22 Apr 2025 07:30  Phos  3.4     04-22  Mg     1.60     04-22

## 2025-04-23 NOTE — PROGRESS NOTE ADULT - PROBLEM SELECTOR PLAN 9
Lovenox ppx  full code  PT rec TK  pending improvement in catatonia, family declining ECT and IP psych  care d/w  bedside 4/22

## 2025-04-23 NOTE — PROVIDER CONTACT NOTE (HYPOGLYCEMIA EVENT) - NS PROVIDER CONTACT RECOMMEND-HYPO
Provider notified and aware of FS 68 and FS 70. Pt. received one cup of apple juice per provider order.

## 2025-04-23 NOTE — BH CONSULTATION LIAISON PROGRESS NOTE - MSE UNSTRUCTURED FT
Seen with  and son present. Withdrawn, mostly mute, negativistic, pulling at sheets and refusing exam. Barely communicative. Rigid in b/l UE. Exam limited.   patient with limited interaction with provider  mute, withdrawn

## 2025-04-23 NOTE — PROGRESS NOTE ADULT - PROBLEM SELECTOR PLAN 2
Due to status asthmaticus s/p intubation and bilevel now weaned off of NC to RA   - aspergillus Ab: negative , ANCA: negative, IgE level 57  - s/p course of antibiotics  - c/w ipratropium + Xopenex nebs q8h  - c/w budesonide BID  - no role for nocturnal NIV   - s/p Solumedrol, followed by Prednisone 40mg qdaily x 3 days, 30mg  4/19 x 3 days then 20mg x 3 days then 10mg x 3 days then off as per pulmonary  on RA no wheezing  apprec pulm f/u with respect to ECT

## 2025-04-23 NOTE — CONSULT NOTE ADULT - ASSESSMENT
EKG: nonischemic    A/P:  74F MDD, asthma, prior CVA presented with AHRF c/b AMS requiring MICU admission for intubation for respiratory failure i/s/o asthma exacerbation. Extubated 3/12 to face tent and then on BIPAP s/p RRT 3/19 for increased WOB / hypoxia despite aggressive asthma management s/p return to MICU for management of severe asthma. Possible aspiration event 3/31. SLP eval appreciated. XR cine performed with improvement in mental status - pureed diet w/ thin liquids recommended. Worsening respiratory status 4/2 - placed on NIV, now weaned to nasal cannula and now RA.  ENT eval - no upper airway consideration for structural causes of asthma such as vocal cord dysfunction.   Course c/b MDD w/ catatonia and poor PO, off ativan due to respiratory concerns trialed on Memantine family now agreeable to ECT, planned for 4/25    1. SOB   -TTE 3/9 EF 78% no WMA  -LE duplex negative for DVT  -Repeat EKG  -Acute asthma exacerbation  -f/u pulm      2. HTN  - c/w  Lisinopril 40mg daily   - c/w HCTZ 25 mg daily  - no beta-blockers 2/2 asthma    3. DVT ppx  c/w lovenox         EKG: SR nonischemic    A/P:  74F MDD, asthma, prior CVA presented with AHRF c/b AMS requiring MICU admission for intubation for respiratory failure i/s/o asthma exacerbation. Extubated 3/12 to face tent and then on BIPAP s/p RRT 3/19 for increased WOB / hypoxia despite aggressive asthma management s/p return to MICU for management of severe asthma. Possible aspiration event 3/31. SLP eval appreciated. XR cine performed with improvement in mental status - pureed diet w/ thin liquids recommended. Worsening respiratory status 4/2 - placed on NIV, now weaned to nasal cannula and now RA.  ENT eval - no upper airway consideration for structural causes of asthma such as vocal cord dysfunction.   Course c/b MDD w/ catatonia and poor PO, off ativan due to respiratory concerns trialed on Memantine family now agreeable to ECT, planned for 4/25    1. SOB   -TTE 3/9 EF 78% no WMA  -LE duplex negative for DVT  -Repeat EKG  -Acute asthma exacerbation  -f/u pulm      2. HTN  - c/w  Lisinopril 40mg daily   - c/w HCTZ 25 mg daily  - no beta-blockers 2/2 asthma    3. DVT ppx  c/w lovenox         EKG: SR nonischemic    A/P:  74F MDD, asthma, prior CVA presented with AHRF c/b AMS requiring MICU admission for intubation for respiratory failure i/s/o asthma exacerbation. Extubated 3/12 to face tent and then on BIPAP s/p RRT 3/19 for increased WOB / hypoxia despite aggressive asthma management s/p return to MICU for management of severe asthma. Possible aspiration event 3/31. SLP eval appreciated. XR cine performed with improvement in mental status - pureed diet w/ thin liquids recommended. Worsening respiratory status 4/2 - placed on NIV, now weaned to nasal cannula and now RA.  ENT eval - no upper airway consideration for structural causes of asthma such as vocal cord dysfunction.   Course c/b MDD w/ catatonia and poor PO, off ativan due to respiratory concerns trialed on Memantine family now agreeable to ECT, planned for 4/25    1. SOB   -TTE 3/9 EF 78% no WMA  -LE duplex negative for DVT  -Repeat EKG NSR no ischemic changes   -Acute asthma exacerbation  -f/u pulm      2. HTN  - c/w  Lisinopril 40mg daily   - c/w HCTZ 25 mg daily  - no beta-blockers 2/2 asthma    3. Pre ECT Eval   - Denies CP SOB has had long hospitalization  - EKG normal, Echo normal   - optimized for ECT if family agreeable, moderate risk for MACE       DVT ppx  c/w lovenox

## 2025-04-23 NOTE — PROGRESS NOTE ADULT - SUBJECTIVE AND OBJECTIVE BOX
Interval Events:     REVIEW OF SYSTEMS:  Denies fevers, chills, chest pain, palpitations, cough, shortness of breath  Negative unless stated above    OBJECTIVE:  ICU Vital Signs Last 24 Hrs  T(C): 36.7 (23 Apr 2025 04:57), Max: 37.3 (22 Apr 2025 22:50)  T(F): 98.1 (23 Apr 2025 04:57), Max: 99.1 (22 Apr 2025 22:50)  HR: 86 (23 Apr 2025 04:57) (79 - 98)  BP: 141/77 (23 Apr 2025 04:57) (119/65 - 141/77)  BP(mean): --  ABP: --  ABP(mean): --  RR: 18 (23 Apr 2025 04:57) (16 - 18)  SpO2: 96% (23 Apr 2025 04:57) (94% - 100%)    O2 Parameters below as of 23 Apr 2025 04:57  Patient On (Oxygen Delivery Method): room air              CAPILLARY BLOOD GLUCOSE      POCT Blood Glucose.: 172 mg/dL (23 Apr 2025 12:00)      PHYSICAL EXAM:  Well appearing  No acute distress  RRR no murmurs  Speaking in full sentences, no accessory muscle use  No wheeze, rales, or rhonchi  No LE edema  No rashes or lesions      HOSPITAL MEDICATIONS:  MEDICATIONS  (STANDING):  buDESOnide    Inhalation Suspension 0.5 milliGRAM(s) Inhalation every 12 hours  ipratropium    for Nebulization 500 MICROGram(s) Nebulizer every 8 hours  levalbuterol Inhalation 0.63 milliGRAM(s) Inhalation every 8 hours  pantoprazole    Tablet 40 milliGRAM(s) Oral before breakfast  predniSONE   Tablet 20 milliGRAM(s) Oral daily    MEDICATIONS  (PRN):      LABS:                        11.3   8.92  )-----------( 165      ( 22 Apr 2025 07:30 )             34.0     Hgb Trend: 11.3<--, 10.7<--, 11.4<--, 11.0<--, 11.5<--  04-22    136  |  102  |  13  ----------------------------<  141[H]  5.0   |  21[L]  |  0.61    Ca    9.0      22 Apr 2025 07:30  Phos  3.4     04-22  Mg     1.60     04-22      Creatinine Trend: 0.61<--, 0.75<--, 0.71<--, 0.69<--, 0.69<--, 0.61<--        MICROBIOLOGY:     RADIOLOGY:  [x] Reviewed and interpreted by me

## 2025-04-24 LAB
ANION GAP SERPL CALC-SCNC: 9 MMOL/L — SIGNIFICANT CHANGE UP (ref 7–14)
BLOOD GAS ARTERIAL COMPREHENSIVE RESULT: SIGNIFICANT CHANGE UP
BUN SERPL-MCNC: 20 MG/DL — SIGNIFICANT CHANGE UP (ref 7–23)
CALCIUM SERPL-MCNC: 9.4 MG/DL — SIGNIFICANT CHANGE UP (ref 8.4–10.5)
CHLORIDE SERPL-SCNC: 104 MMOL/L — SIGNIFICANT CHANGE UP (ref 98–107)
CO2 SERPL-SCNC: 28 MMOL/L — SIGNIFICANT CHANGE UP (ref 22–31)
CREAT SERPL-MCNC: 0.72 MG/DL — SIGNIFICANT CHANGE UP (ref 0.5–1.3)
EGFR: 88 ML/MIN/1.73M2 — SIGNIFICANT CHANGE UP
EGFR: 88 ML/MIN/1.73M2 — SIGNIFICANT CHANGE UP
FLUAV AG NPH QL: SIGNIFICANT CHANGE UP
FLUBV AG NPH QL: SIGNIFICANT CHANGE UP
GLUCOSE BLDC GLUCOMTR-MCNC: 120 MG/DL — HIGH (ref 70–99)
GLUCOSE BLDC GLUCOMTR-MCNC: 121 MG/DL — HIGH (ref 70–99)
GLUCOSE BLDC GLUCOMTR-MCNC: 166 MG/DL — HIGH (ref 70–99)
GLUCOSE BLDC GLUCOMTR-MCNC: 169 MG/DL — HIGH (ref 70–99)
GLUCOSE BLDC GLUCOMTR-MCNC: 215 MG/DL — HIGH (ref 70–99)
GLUCOSE SERPL-MCNC: 149 MG/DL — HIGH (ref 70–99)
HCT VFR BLD CALC: 33 % — LOW (ref 34.5–45)
HGB BLD-MCNC: 11 G/DL — LOW (ref 11.5–15.5)
MAGNESIUM SERPL-MCNC: 1.7 MG/DL — SIGNIFICANT CHANGE UP (ref 1.6–2.6)
MCHC RBC-ENTMCNC: 32.4 PG — SIGNIFICANT CHANGE UP (ref 27–34)
MCHC RBC-ENTMCNC: 33.3 G/DL — SIGNIFICANT CHANGE UP (ref 32–36)
MCV RBC AUTO: 97.3 FL — SIGNIFICANT CHANGE UP (ref 80–100)
NRBC # BLD AUTO: 0 K/UL — SIGNIFICANT CHANGE UP (ref 0–0)
NRBC # FLD: 0 K/UL — SIGNIFICANT CHANGE UP (ref 0–0)
NRBC BLD AUTO-RTO: 0 /100 WBCS — SIGNIFICANT CHANGE UP (ref 0–0)
PHOSPHATE SERPL-MCNC: 3.6 MG/DL — SIGNIFICANT CHANGE UP (ref 2.5–4.5)
PLATELET # BLD AUTO: 157 K/UL — SIGNIFICANT CHANGE UP (ref 150–400)
POTASSIUM SERPL-MCNC: 3.7 MMOL/L — SIGNIFICANT CHANGE UP (ref 3.5–5.3)
POTASSIUM SERPL-SCNC: 3.7 MMOL/L — SIGNIFICANT CHANGE UP (ref 3.5–5.3)
RBC # BLD: 3.39 M/UL — LOW (ref 3.8–5.2)
RBC # FLD: 14 % — SIGNIFICANT CHANGE UP (ref 10.3–14.5)
RSV RNA NPH QL NAA+NON-PROBE: SIGNIFICANT CHANGE UP
SARS-COV-2 RNA SPEC QL NAA+PROBE: SIGNIFICANT CHANGE UP
SODIUM SERPL-SCNC: 141 MMOL/L — SIGNIFICANT CHANGE UP (ref 135–145)
SOURCE RESPIRATORY: SIGNIFICANT CHANGE UP
WBC # BLD: 8.54 K/UL — SIGNIFICANT CHANGE UP (ref 3.8–10.5)
WBC # FLD AUTO: 8.54 K/UL — SIGNIFICANT CHANGE UP (ref 3.8–10.5)

## 2025-04-24 PROCEDURE — 99232 SBSQ HOSP IP/OBS MODERATE 35: CPT

## 2025-04-24 PROCEDURE — 99233 SBSQ HOSP IP/OBS HIGH 50: CPT

## 2025-04-24 PROCEDURE — 71045 X-RAY EXAM CHEST 1 VIEW: CPT | Mod: 26

## 2025-04-24 RX ORDER — MAGNESIUM SULFATE 500 MG/ML
2 SYRINGE (ML) INJECTION ONCE
Refills: 0 | Status: COMPLETED | OUTPATIENT
Start: 2025-04-24 | End: 2025-04-24

## 2025-04-24 RX ORDER — MAGNESIUM OXIDE 400 MG
400 TABLET ORAL
Refills: 0 | Status: COMPLETED | OUTPATIENT
Start: 2025-04-24 | End: 2025-04-25

## 2025-04-24 RX ORDER — INSULIN GLARGINE-YFGN 100 [IU]/ML
6 INJECTION, SOLUTION SUBCUTANEOUS AT BEDTIME
Refills: 0 | Status: DISCONTINUED | OUTPATIENT
Start: 2025-04-24 | End: 2025-05-01

## 2025-04-24 RX ORDER — ACETAMINOPHEN 500 MG/5ML
650 LIQUID (ML) ORAL ONCE
Refills: 0 | Status: COMPLETED | OUTPATIENT
Start: 2025-04-24 | End: 2025-04-24

## 2025-04-24 RX ADMIN — MEMANTINE HYDROCHLORIDE 10 MILLIGRAM(S): 21 CAPSULE, EXTENDED RELEASE ORAL at 22:10

## 2025-04-24 RX ADMIN — INSULIN LISPRO 1: 100 INJECTION, SOLUTION INTRAVENOUS; SUBCUTANEOUS at 08:46

## 2025-04-24 RX ADMIN — LEVALBUTEROL HYDROCHLORIDE 0.63 MILLIGRAM(S): 1.25 SOLUTION RESPIRATORY (INHALATION) at 14:56

## 2025-04-24 RX ADMIN — Medication 500 MICROGRAM(S): at 23:23

## 2025-04-24 RX ADMIN — PREDNISONE 20 MILLIGRAM(S): 20 TABLET ORAL at 05:34

## 2025-04-24 RX ADMIN — INSULIN LISPRO 2: 100 INJECTION, SOLUTION INTRAVENOUS; SUBCUTANEOUS at 11:42

## 2025-04-24 RX ADMIN — Medication 1 APPLICATION(S): at 05:34

## 2025-04-24 RX ADMIN — ENOXAPARIN SODIUM 40 MILLIGRAM(S): 100 INJECTION SUBCUTANEOUS at 17:55

## 2025-04-24 RX ADMIN — Medication 9 MILLIGRAM(S): at 22:10

## 2025-04-24 RX ADMIN — PREDNISONE 10 MILLIGRAM(S): 20 TABLET ORAL at 05:33

## 2025-04-24 RX ADMIN — Medication 500 MICROGRAM(S): at 14:56

## 2025-04-24 RX ADMIN — Medication 1 DROP(S): at 14:00

## 2025-04-24 RX ADMIN — LEVALBUTEROL HYDROCHLORIDE 0.63 MILLIGRAM(S): 1.25 SOLUTION RESPIRATORY (INHALATION) at 08:14

## 2025-04-24 RX ADMIN — Medication 400 MILLIGRAM(S): at 11:43

## 2025-04-24 RX ADMIN — Medication 40 MILLIGRAM(S): at 05:33

## 2025-04-24 RX ADMIN — SERTRALINE 125 MILLIGRAM(S): 100 TABLET, FILM COATED ORAL at 11:43

## 2025-04-24 RX ADMIN — INSULIN LISPRO 3 UNIT(S): 100 INJECTION, SOLUTION INTRAVENOUS; SUBCUTANEOUS at 08:47

## 2025-04-24 RX ADMIN — INSULIN LISPRO 3 UNIT(S): 100 INJECTION, SOLUTION INTRAVENOUS; SUBCUTANEOUS at 17:55

## 2025-04-24 RX ADMIN — LISINOPRIL 40 MILLIGRAM(S): 5 TABLET ORAL at 05:33

## 2025-04-24 RX ADMIN — INSULIN LISPRO 3 UNIT(S): 100 INJECTION, SOLUTION INTRAVENOUS; SUBCUTANEOUS at 11:42

## 2025-04-24 RX ADMIN — INSULIN LISPRO 1: 100 INJECTION, SOLUTION INTRAVENOUS; SUBCUTANEOUS at 17:54

## 2025-04-24 RX ADMIN — Medication 1 DROP(S): at 22:09

## 2025-04-24 RX ADMIN — POLYETHYLENE GLYCOL 3350 17 GRAM(S): 17 POWDER, FOR SOLUTION ORAL at 05:34

## 2025-04-24 RX ADMIN — BUDESONIDE 0.5 MILLIGRAM(S): 0.25 SUSPENSION RESPIRATORY (INHALATION) at 08:13

## 2025-04-24 RX ADMIN — LEVALBUTEROL HYDROCHLORIDE 0.63 MILLIGRAM(S): 1.25 SOLUTION RESPIRATORY (INHALATION) at 23:23

## 2025-04-24 RX ADMIN — Medication 500 MICROGRAM(S): at 08:13

## 2025-04-24 RX ADMIN — MEMANTINE HYDROCHLORIDE 5 MILLIGRAM(S): 21 CAPSULE, EXTENDED RELEASE ORAL at 06:12

## 2025-04-24 RX ADMIN — Medication 650 MILLIGRAM(S): at 20:10

## 2025-04-24 RX ADMIN — BUDESONIDE 0.5 MILLIGRAM(S): 0.25 SUSPENSION RESPIRATORY (INHALATION) at 23:23

## 2025-04-24 RX ADMIN — OLANZAPINE 2.5 MILLIGRAM(S): 10 TABLET ORAL at 22:10

## 2025-04-24 RX ADMIN — Medication 1 DROP(S): at 05:34

## 2025-04-24 RX ADMIN — Medication 1 APPLICATION(S): at 17:55

## 2025-04-24 RX ADMIN — INSULIN GLARGINE-YFGN 6 UNIT(S): 100 INJECTION, SOLUTION SUBCUTANEOUS at 23:03

## 2025-04-24 RX ADMIN — Medication 500000 UNIT(S): at 22:09

## 2025-04-24 RX ADMIN — Medication 650 MILLIGRAM(S): at 21:10

## 2025-04-24 RX ADMIN — POLYETHYLENE GLYCOL 3350 17 GRAM(S): 17 POWDER, FOR SOLUTION ORAL at 17:56

## 2025-04-24 RX ADMIN — Medication 400 MILLIGRAM(S): at 17:58

## 2025-04-24 NOTE — BH CONSULTATION LIAISON PROGRESS NOTE - NSBHTIMEACTIVITIESPERFORMED_PSY_A_CORE
35 minutes spent on total encounter. The necessity of the time spent during the encounter on this date of service was due to:     I had a face to face encounter with this patient. I spent 35 total minutes on the bedside interview and examination, coordination of care, counseling, chart review, and documentation for this patient. Discussed ECT treatment plan with family in detail.

## 2025-04-24 NOTE — BH CONSULTATION LIAISON PROGRESS NOTE - NSBHASSESSMENTFT_PSY_ALL_CORE
Patient is a 74F hx asthma, depression, and HTN was admitted to the MICU with AHRF 2/2 asthma exacerbation resulting in acute on chronic HHRF causing AMS and increased WOB that required intubation. During her ICU course, she experienced seizure-like activity .  Additional issues included hyponatremia, acute on chronic metabolic alkalosis, and mild thrombocytopenia,  also having fevers.  Patient is from [facility]; primary language :Twi pronounced as Chi) domiciled with , retired teacher used to work in [facility], she has 6 children. Patient has h/o hx of major depressive disorder with psychosis, hx of 3 past inpatient psychiatric hospitalizations last in 2016 at [facility], all rehospitalizations were in context non compliance with the treatment. She has no hx of suicide attempts, no hx of substance abuse. Patient w hx of paranoia, disorganization, catatonia, required MOO when in [facility].     3/24----more withdrawn, some stiffness in neck, not communicative. Some concerns for catatonia. Unsure if symptoms attributed to catatonia vs delirium  3/25--- more alert today, trying to following commands. Still with PMR+. Appears to be responding to Ativan  3/26-- BFS 10, will increasing standing ativan to target catatonic symptoms - giving STAT 0.5mg NOW discussed w acp   3/27----will continue ativan dosing for one more day before increasing  3/28: BFS: stupor 1, mutism 2,withdrawan 2, verbigeration 1, rigidity 2 =8.  3/29: BFS: stupor 2, mutism 2, staring 1, posturing 2, rigidity 2, waxy flexibility 3, withdrawal 2 = 14; please monitor nutritional intake, plan to increase ativan by total daily dose of 0.5 mg at bedtime due to persistent catatonia   3/31: BFS: stupor 2, mutism 2, staring 2, rigidity 1, negativism 1, withdrawal 1, waxy flexibility 3 =12, patient with productive cough, advised for medical work up  4/1---tired, speaking more, not rigid, has PMR+. NPO status now.   4/2--sleepy, RRT this afternoon  4/3---better mentation, pmr but no chester catatonic symptoms  4/4: alert, using few words, mild rigidity -  at bedside, updated   4/5: few words, lethargic, fluctuating wakefulness. Mild rigidity.  No overt catatonic sxs.   4/7: no change, remains thought blocked, few words - son at bedside, updated with care  4/8: patient continues to have poor eye contact, uses few words, no rigidity on today's exam.   4/9: patient remains minimally verbal, some posturing, poor Po intake - continues on NC for 02 requirements   4/10: more verbal/interactive, with increased PO intake per family. Some psychotic output. Family agrees with resumption of Zyprexa.   4/14: alert to self with fluctuating wakefulness, intermittent full sentences f/b one-word answers, some rigidity, posturing, denies si, denies ah/vh  4/16: patient more catatonic today, notable catalepsy, waxiness, verbigeration followed by mutism and staring, family against ativan and ECT, would increase memantine as below, not eating   4/17: catatonic (mute, rigid, not engaging). Eating some portions of meals with significant family support. Family opting to see memantine trial to full effect before consideration of ECT  4/18: mutism today, odd posturing, unable to engage, d/w spouse and son ECT, family still undecided  4/21: alert to self, some improvement with verbal engagement, tracking, follow command, denies si, denies ah/vh, awaiting call back from son in regards to ECT consult  4/22: remains catatonic (mute, postured, rigid, withdrawn, negativistic). Exam limited. Family agrees with formal ECT consult.  4/23: Remains with symptoms of catatonia. BFS: stupor 1, mute2, Rigid 2, negativism 2, withdrawal 2 - 9 : ECT consult completed. awaiting clearances for ECT  4/42: remains with BFS of 9, plan for ECT    PLAN    *** ECT scheduled for 4.25  PATIENT TO BE NPO AT MIDNIGHT    - no SI or HI, no need for psychiatric CO  -- antipsychotics can only be given if qtc < 500    - MEDICATIONS:  - Memantine 5mg AM and 10mg HS  -- C/W Zoloft 125 mg daily   -- C/W Zyprexa 2.5mg HS   - ECT consulted on 4/22 - now to obtain clearances for ECT  - PRN:  --Ativan 0.5mg q 6hrs prn IM/IV/PO  - DISPO: pending, not eating enough to go to [facility]  vs [facility] depending on recovery

## 2025-04-24 NOTE — PROGRESS NOTE ADULT - PROBLEM SELECTOR PLAN 7
HbA1c 5.7% c/w pre-DM, s/p steroids likely cause of hyperglycemia  - Lantus 6 units and 3 units with meals  - HARDEEP, DM diet

## 2025-04-24 NOTE — PROGRESS NOTE ADULT - SUBJECTIVE AND OBJECTIVE BOX
[facility] Division of Hospital Medicine  Em Toure MD  Pager [phone]    Patient is a 74y old  Female who presents with a chief complaint of asthma exac/hypoxic resp failure/ catatonia      SUBJECTIVE / OVERNIGHT EVENTS: pt seen this AM, RN at bedside preparing meds; pt accepted meds; pt did not engage with me but did briefly smile and nod her head when I told her that her lungs sounded good      MEDICATIONS  (STANDING):  artificial tears (preservative free) Ophthalmic Solution 1 Drop(s) Both EYES every 8 hours  bisacodyl 10 milliGRAM(s) Oral once  buDESOnide    Inhalation Suspension 0.5 milliGRAM(s) Inhalation every 12 hours  chlorhexidine 2% Cloths 1 Application(s) Topical <User Schedule>  dextrose 50% Injectable 25 Gram(s) IV Push once  dextrose 50% Injectable 12.5 Gram(s) IV Push once  dextrose 50% Injectable 25 Gram(s) IV Push once  dextrose Oral Gel 15 Gram(s) Oral once  enoxaparin Injectable 40 milliGRAM(s) SubCutaneous every 24 hours  glucagon  Injectable 1 milliGRAM(s) IntraMuscular once  hydrochlorothiazide 25 milliGRAM(s) Oral daily  insulin glargine Injectable (LANTUS) 6 Unit(s) SubCutaneous at bedtime  insulin lispro (ADMELOG) corrective regimen sliding scale   SubCutaneous three times a day before meals  insulin lispro (ADMELOG) corrective regimen sliding scale   SubCutaneous at bedtime  insulin lispro Injectable (ADMELOG) 3 Unit(s) SubCutaneous three times a day before meals  ipratropium    for Nebulization 500 MICROGram(s) Nebulizer every 8 hours  levalbuterol Inhalation 0.63 milliGRAM(s) Inhalation every 8 hours  lisinopril 40 milliGRAM(s) Oral daily  magnesium oxide 400 milliGRAM(s) Oral three times a day with meals  melatonin 9 milliGRAM(s) Oral at bedtime  memantine 10 milliGRAM(s) Oral at bedtime  memantine 5 milliGRAM(s) Oral <User Schedule>  OLANZapine 2.5 milliGRAM(s) Oral at bedtime  pantoprazole    Tablet 40 milliGRAM(s) Oral before breakfast  petrolatum Ophthalmic Ointment 1 Application(s) Both EYES two times a day  polyethylene glycol 3350 17 Gram(s) Oral two times a day  predniSONE   Tablet 10 milliGRAM(s) Oral daily  senna 2 Tablet(s) Oral at bedtime  sertraline 125 milliGRAM(s) Oral daily    MEDICATIONS  (PRN):      CAPILLARY BLOOD GLUCOSE  POCT Blood Glucose.: 215 mg/dL (24 Apr 2025 11:30)  POCT Blood Glucose.: 169 mg/dL (24 Apr 2025 08:18)  POCT Blood Glucose.: 121 mg/dL (24 Apr 2025 03:38)  POCT Blood Glucose.: 141 mg/dL (23 Apr 2025 23:02)  POCT Blood Glucose.: 70 mg/dL (23 Apr 2025 22:07)  POCT Blood Glucose.: 68 mg/dL (23 Apr 2025 22:05)  POCT Blood Glucose.: 193 mg/dL (23 Apr 2025 17:06)      PHYSICAL EXAM:  Vital Signs Last 24 Hrs  T(F): 98.4 (24 Apr 2025 11:53), Max: 98.8 (23 Apr 2025 12:34)  HR: 89 (24 Apr 2025 11:53) (75 - 89)  BP: 126/89 (24 Apr 2025 11:53) (126/75 - 144/68)  RR: 18 (24 Apr 2025 11:53) (18 - 18)  SpO2: 90% (24 Apr 2025 11:53) (90% - 98%)    Parameters below as of 24 Apr 2025 11:53  Patient On (Oxygen Delivery Method): room air        CONSTITUTIONAL: NAD, appears comfortable  EYES: PERRLA; conjunctiva and sclera clear  ENMT: Moist oral mucosa; normal dentition  RESPIRATORY: Normal respiratory effort; grossly b/l AE, no wheeze  CARDIOVASCULAR: Regular rate and rhythm; No lower extremity edema  ABDOMEN: Nontender to palpation, normoactive bowel sounds  MUSCULOSKELETAL:  no clubbing or cyanosis of digits; no joint swelling or tenderness to palpation  PSYCH: calm, coop; affect flat  NEUROLOGY: unable to assess  SKIN: No rashes; no palpable lesions    LABS:                        11.0   8.54  )-----------( 157      ( 24 Apr 2025 07:01 )             33.0     04-24    141  |  104  |  20  ----------------------------<  149[H]  3.7   |  28  |  0.72    Ca    9.4      24 Apr 2025 07:01  Phos  3.6     04-24  Mg     1.70     04-24

## 2025-04-24 NOTE — PROGRESS NOTE ADULT - SUBJECTIVE AND OBJECTIVE BOX
Arnold Uriostegui MD  Interventional Cardiology / Endovascular Specialist  [place] Office : [address] [place] [address] [place] [address]  Tel:   [place] Office : [address] [facility] [place] [address]  Tel: [phone]    Pt lying in bed in [facility]   	  MEDICATIONS:  enoxaparin Injectable 40 milliGRAM(s) SubCutaneous every 24 hours  hydrochlorothiazide 25 milliGRAM(s) Oral daily  lisinopril 40 milliGRAM(s) Oral daily      buDESOnide    Inhalation Suspension 0.5 milliGRAM(s) Inhalation every 12 hours  ipratropium    for Nebulization 500 MICROGram(s) Nebulizer every 8 hours  levalbuterol Inhalation 0.63 milliGRAM(s) Inhalation every 8 hours    melatonin 9 milliGRAM(s) Oral at bedtime  memantine 10 milliGRAM(s) Oral at bedtime  memantine 5 milliGRAM(s) Oral <User Schedule>  OLANZapine 2.5 milliGRAM(s) Oral at bedtime  sertraline 125 milliGRAM(s) Oral daily    bisacodyl 10 milliGRAM(s) Oral once  pantoprazole    Tablet 40 milliGRAM(s) Oral before breakfast  polyethylene glycol 3350 17 Gram(s) Oral two times a day  senna 2 Tablet(s) Oral at bedtime    dextrose 50% Injectable 25 Gram(s) IV Push once  dextrose 50% Injectable 12.5 Gram(s) IV Push once  dextrose 50% Injectable 25 Gram(s) IV Push once  dextrose Oral Gel 15 Gram(s) Oral once  glucagon  Injectable 1 milliGRAM(s) IntraMuscular once  insulin glargine Injectable (LANTUS) 6 Unit(s) SubCutaneous at bedtime  insulin lispro (ADMELOG) corrective regimen sliding scale   SubCutaneous three times a day before meals  insulin lispro (ADMELOG) corrective regimen sliding scale   SubCutaneous at bedtime  insulin lispro Injectable (ADMELOG) 3 Unit(s) SubCutaneous three times a day before meals  predniSONE   Tablet 10 milliGRAM(s) Oral daily    artificial tears (preservative free) Ophthalmic Solution 1 Drop(s) Both EYES every 8 hours  chlorhexidine 2% Cloths 1 Application(s) Topical <User Schedule>  magnesium oxide 400 milliGRAM(s) Oral three times a day with meals  petrolatum Ophthalmic Ointment 1 Application(s) Both EYES two times a day      PAST MEDICAL/SURGICAL HISTORY  PAST MEDICAL & SURGICAL HISTORY:  MDD (major depressive disorder), recurrent, severe, with psychosis      Asthma, mild intermittent, uncomplicated      Essential hypertension      No significant past surgical history          SOCIAL HISTORY: Substance Use (street drugs): ( x ) never used  (  ) other:    FAMILY HISTORY:      REVIEW OF SYSTEMS:  CONSTITUTIONAL: No fever, weight loss, or fatigue  EYES: No eye pain, visual disturbances, or discharge  ENMT:  No difficulty hearing, tinnitus, vertigo; No sinus or throat pain  BREASTS: No pain, masses, or nipple discharge  GASTROINTESTINAL: No abdominal or epigastric pain. No nausea, vomiting, or hematemesis; No diarrhea or constipation. No melena or hematochezia.  GENITOURINARY: No dysuria, frequency, hematuria, or incontinence  NEUROLOGICAL: No headaches, memory loss, loss of strength, numbness, or tremors  ENDOCRINE: No heat or cold intolerance; No hair loss  MUSCULOSKELETAL: No joint pain or swelling; No muscle, back, or extremity pain  PSYCHIATRIC: No depression, anxiety, mood swings, or difficulty sleeping  HEME/LYMPH: No easy bruising, or bleeding gums  All others negative    PHYSICAL EXAM:  T(C): 36.9 (04-24-25 @ 11:53), Max: 36.9 (04-24-25 @ 11:53)  HR: 89 (04-24-25 @ 11:53) (75 - 89)  BP: 126/89 (04-24-25 @ 11:53) (126/75 - 131/84)  RR: 18 (04-24-25 @ 11:53) (18 - 18)  SpO2: 90% (04-24-25 @ 11:53) (90% - 98%)  Wt(kg): --  I&O's Summary    GENERAL: NAD  EYES: PERRLA  ENMT: Moist mucous membranes  Cardiovascular: Normal S1 S2, No JVD, No edema  Respiratory: Lungs clear to auscultation	  Gastrointestinal:  Soft, Non-tender, + BS	  Extremities: Normal range of motion, No clubbing, cyanosis or edema  NERVOUS SYSTEM:  Alert & Oriented X3                              11.0   8.54  )-----------( 157      ( 24 Apr 2025 07:01 )             33.0     04-24    141  |  104  |  20  ----------------------------<  149[H]  3.7   |  28  |  0.72    Ca    9.4      24 Apr 2025 07:01  Phos  3.6     04-24  Mg     1.70     04-24      proBNP:   Lipid Profile:   HgA1c:   TSH:     Consultant(s) Notes Reviewed:  [x ] YES  [ ] NO    Care Discussed with Consultants/Other Providers [ x] YES  [ ] NO    Imaging Personally Reviewed independently:  [x] YES  [ ] NO    All labs, radiologic studies, vitals, orders and medications list reviewed. Patient is seen and examined at bedside. Case discussed with medical team.

## 2025-04-24 NOTE — PROGRESS NOTE ADULT - SUBJECTIVE AND OBJECTIVE BOX
Interval Events:   Episode of hypoxemia, unclear etiology  No wheeze  No cough  No dyspnea    REVIEW OF SYSTEMS:  Denies fevers, chills, chest pain, palpitations, cough, shortness of breath  Negative unless stated above    OBJECTIVE:  ICU Vital Signs Last 24 Hrs  T(C): 36.9 (24 Apr 2025 11:53), Max: 36.9 (24 Apr 2025 11:53)  T(F): 98.4 (24 Apr 2025 11:53), Max: 98.4 (24 Apr 2025 11:53)  HR: 89 (24 Apr 2025 11:53) (75 - 89)  BP: 126/89 (24 Apr 2025 11:53) (126/75 - 131/84)  BP(mean): --  ABP: --  ABP(mean): --  RR: 18 (24 Apr 2025 11:53) (18 - 18)  SpO2: 90% (24 Apr 2025 11:53) (90% - 98%)    O2 Parameters below as of 24 Apr 2025 11:53  Patient On (Oxygen Delivery Method): room air              CAPILLARY BLOOD GLUCOSE      POCT Blood Glucose.: 215 mg/dL (24 Apr 2025 11:30)      PHYSICAL EXAM:  Well appearing  No acute distress  RRR no murmurs  Speaking in full sentences, no accessory muscle use  No wheeze, rales, or rhonchi  No LE edema  No rashes or lesions      HOSPITAL MEDICATIONS:  MEDICATIONS  (STANDING):  artificial tears (preservative free) Ophthalmic Solution 1 Drop(s) Both EYES every 8 hours  bisacodyl 10 milliGRAM(s) Oral once  buDESOnide    Inhalation Suspension 0.5 milliGRAM(s) Inhalation every 12 hours  chlorhexidine 2% Cloths 1 Application(s) Topical <User Schedule>  dextrose 50% Injectable 25 Gram(s) IV Push once  dextrose 50% Injectable 12.5 Gram(s) IV Push once  dextrose 50% Injectable 25 Gram(s) IV Push once  dextrose Oral Gel 15 Gram(s) Oral once  enoxaparin Injectable 40 milliGRAM(s) SubCutaneous every 24 hours  glucagon  Injectable 1 milliGRAM(s) IntraMuscular once  hydrochlorothiazide 25 milliGRAM(s) Oral daily  insulin glargine Injectable (LANTUS) 6 Unit(s) SubCutaneous at bedtime  insulin lispro (ADMELOG) corrective regimen sliding scale   SubCutaneous three times a day before meals  insulin lispro (ADMELOG) corrective regimen sliding scale   SubCutaneous at bedtime  insulin lispro Injectable (ADMELOG) 3 Unit(s) SubCutaneous three times a day before meals  ipratropium    for Nebulization 500 MICROGram(s) Nebulizer every 8 hours  levalbuterol Inhalation 0.63 milliGRAM(s) Inhalation every 8 hours  lisinopril 40 milliGRAM(s) Oral daily  magnesium oxide 400 milliGRAM(s) Oral three times a day with meals  melatonin 9 milliGRAM(s) Oral at bedtime  memantine 10 milliGRAM(s) Oral at bedtime  memantine 5 milliGRAM(s) Oral <User Schedule>  OLANZapine 2.5 milliGRAM(s) Oral at bedtime  pantoprazole    Tablet 40 milliGRAM(s) Oral before breakfast  petrolatum Ophthalmic Ointment 1 Application(s) Both EYES two times a day  polyethylene glycol 3350 17 Gram(s) Oral two times a day  predniSONE   Tablet 10 milliGRAM(s) Oral daily  senna 2 Tablet(s) Oral at bedtime  sertraline 125 milliGRAM(s) Oral daily    MEDICATIONS  (PRN):      LABS:                        11.0   8.54  )-----------( 157      ( 24 Apr 2025 07:01 )             33.0     Hgb Trend: 11.0<--, 11.3<--, 10.7<--, 11.4<--, 11.0<--  04-24    141  |  104  |  20  ----------------------------<  149[H]  3.7   |  28  |  0.72    Ca    9.4      24 Apr 2025 07:01  Phos  3.6     04-24  Mg     1.70     04-24      Creatinine Trend: 0.72<--, 0.61<--, 0.75<--, 0.71<--, 0.69<--, 0.69<--        MICROBIOLOGY:     RADIOLOGY:  [x] Reviewed and interpreted by me

## 2025-04-24 NOTE — CHART NOTE - NSCHARTNOTEFT_GEN_A_CORE
Notified by RN that patient desaturated to low 80s on room air. Patient was placed on 2L NC with improvement in O2 sat to 94%. Patient was then weaned to room air by pulmonology, however desaturated again to 80s on room air and was placed back on 2L NC. O2 sat around 95% on 2L NC. Patient assessed at bedside. Minimal non-productive, wet cough noted.     Discussed case with pulmonology. Recommend CXR, ABG, RVP, and ambulatory sats. Patient currently non-ambulatory as per nursing.     As per pulmonology, patient remains cleared from pulmonology standpoint for ECT tomorrow AM. Discussed case with  NP.     Jessy Porras, [facility]  Department of Medicine  [facility]  [id number]

## 2025-04-24 NOTE — PROGRESS NOTE ADULT - ASSESSMENT
74F reported asthma with prior intubations and depression initially admitted with asthma exacerbation with hypercapnic and hypoxemic respiratory failure hospital course complicated by persistent mood disorder and catatonia planned for ECT    Overall, low risk (by ARISCAT) and optimized for planned procedure.     Recommendations:  - Currently on standing Pulmicort and Duonebs every 8 hours  - Continue prednisone taper  - Episode of hypoxemia this morning. Unclear etiology. Discussed wt bedside nursing and will try and wean oxygen. If persistent, would complete full work up with CXR, ABG, repeat viral panel. But can hold off for now  - Incentive spirometry  - OOB as tolerated  - No indication for NIV  - Appreciate ongoing BH optimization  - Eventually will need outpatient follow up with Pulmonary and Sleep Medicine

## 2025-04-24 NOTE — BH CONSULTATION LIAISON PROGRESS NOTE - NSBHFUPINTERVALHXFT_PSY_A_CORE
Patient was seen and assessed at bedside. Patient mute, not answering questions. Son at bedside as well as on phone with other son. All questions answered about ECT including process, side effect potential and benefits. Expressed worry patient desated earlier today but remains on room air at time of interview, sating well now. Medical team aware and adressing.  BFS: stupor 1, mute2, Rigid 2, negativism 2, withdrawal 2 - 9

## 2025-04-24 NOTE — PROGRESS NOTE ADULT - ASSESSMENT
EKG: SR nonischemic    A/P:  74F MDD, asthma, prior CVA presented with AHRF c/b AMS requiring MICU admission for intubation for respiratory failure i/s/o asthma exacerbation. Extubated 3/12 to face tent and then on BIPAP s/p RRT 3/19 for increased WOB / hypoxia despite aggressive asthma management s/p return to MICU for management of severe asthma. Possible aspiration event 3/31. SLP eval appreciated. XR cine performed with improvement in mental status - pureed diet w/ thin liquids recommended. Worsening respiratory status 4/2 - placed on NIV, now weaned to nasal cannula and now RA.  ENT eval - no upper airway consideration for structural causes of asthma such as vocal cord dysfunction.   Course c/b MDD w/ catatonia and poor PO, off ativan due to respiratory concerns trialed on Memantine family now agreeable to ECT, planned for 4/25    1. SOB   -TTE 3/9 EF 78% no WMA  -LE duplex negative for DVT  -Repeat EKG NSR no ischemic changes   -Acute asthma exacerbation  -f/u pulm      2. HTN  - c/w  Lisinopril 40mg daily   - c/w HCTZ 25 mg daily  - no beta-blockers 2/2 asthma    3. Pre ECT Eval   - Denies CP SOB has had long hospitalization  - EKG normal, Echo normal   - optimized for ECT if family agreeable, moderate risk for MACE       DVT ppx  c/w lovenox

## 2025-04-24 NOTE — PROGRESS NOTE ADULT - PROBLEM SELECTOR PLAN 1
MDD w/ psychotic features.  - s/p ativan now on hold given concern that it may have contributed to respiratory decompensation  - c/w Zoloft 125 qday (home dose was 150).  - c/w Zyprexa 2.5 qhs  - c/w Namenda 10mg qhs  - psych following  - ECT  family now agreeable  pt optimized per cards and pulm for ECT

## 2025-04-24 NOTE — PROGRESS NOTE ADULT - SUBJECTIVE AND OBJECTIVE BOX
Patient is a 74y old  Female who presents with a chief complaint of asthma exac/hypoxic resp failure/ catatonia (23 Apr 2025 12:32)      HPI:  73 yo F with PMH asthma, depression presenting to [facility] with worsening dyspnea and wheezing with BLE edema since returning from 10.5 hour trip to [facility] 6 days ago. Altered in ED and unable to provide history, only responsive to noxious stimulli. History provided by  who states he found her on floor with unwitnessed fall. Hx of 3 asthma attacks last year, no hx of intubation, no clear asthma triggers but felt sick after flight home from [facility] 3/3/25 days where she visited for 3 months and was in usual state of health per .    ED Course: vitals notable for HR 60s, -115 with systolics in 180s, tachypneic to 24 with sPO2 95% on RA. Exam notable for wheezing. Placed on NC 5L then trialed on BiPAP but then required intubation to protect airway due to AMS. Labs notable for WBC 8.5, Hgb 12.6, , coags wnl, Na 126, K 4.3, Cl 85, bicarb 27, Cr 0.69, AST/ALT 59/47, proBNP 480, troponin 9, TSH 2.81, pH 7.29, pCO2 72, bicarb 35, neg RVP, CTA chest with no acute findings. CT head/cspine pending. Treated with 1L NS, Duonebs, solumedrol, and sedated with propofol/fentanyl. Admitted to MICU for AHRF.     73 yo F with PMH notable for asthma presenting with AHRF and hypercapnic respiratory acidosis with AMS requiring intubation for airway protection. Now admitted to MICU on ventilator for further evlauation and management iso asthma exacerbation.  (08 Mar 2025 17:07)      REVIEW OF SYSTEMS  Constitutional - No fever, No weight loss, No fatigue  HEENT - No eye pain, No visual disturbances, No difficulty hearing, No tinnitus, No vertigo, No neck pain  Respiratory - No cough, No wheezing, No shortness of breath  Cardiovascular - No chest pain, No palpitations  Gastrointestinal - No abdominal pain, No nausea, No vomiting, No diarrhea, No constipation  Genitourinary - No dysuria, No frequency, No hematuria, No incontinence  Neurological - No headaches, No memory loss, No loss of strength, No numbness, No tremors  Skin - No itching, No rashes, No lesions   Endocrine - No temperature intolerance  Musculoskeletal - No joint pain, No joint swelling, No muscle pain  Psychiatric - No depression, No anxiety    PAST MEDICAL & SURGICAL HISTORY  MDD (major depressive disorder), recurrent, severe, with psychosis    Asthma, mild intermittent, uncomplicated    Essential hypertension    No significant past surgical history         CURRENT FUNCTIONAL STATUS  4/23 Cognitive/Neuro/Behavioral  Cognitive/Neuro/Behavioral [WDL Definition: Alert; opens eyes spontaneously; arouses to voice or touch; oriented x 4; follows commands; speech spontaneous, logical; purposeful motor response; behavior appropriate to situation]: WDL except  Level of Consciousness: alert;  catatonic at times   Arousal Level: arouses to touch/gentle shaking;  arouses to voice  Orientation: unable to assess, no verbal responses   Speech: unable to assess, no verbal responses   Mood/Behavior: calm    Language Assistance  Preferred Language to Address Healthcare Preferred Language to Address Healthcare: English    Therapeutic Interventions      Therapeutic Exercise  Therapeutic Exercise Rehab Effort: good  Therapeutic Exercise Detail: Patient participated in active-assistive therapeutic exercises 2 x 10 throughout BUE/BLE.           FAMILY HISTORY   No pertinent family history in first degree relatives        RECENT LABS/IMAGING  CBC Full  -  ( 24 Apr 2025 07:01 )  WBC Count : 8.54 K/uL  RBC Count : 3.39 M/uL  Hemoglobin : 11.0 g/dL  Hematocrit : 33.0 %  Platelet Count - Automated : 157 K/uL  Mean Cell Volume : 97.3 fL  Mean Cell Hemoglobin : 32.4 pg  Mean Cell Hemoglobin Concentration : 33.3 g/dL  Auto Neutrophil # : x  Auto Lymphocyte # : x  Auto Monocyte # : x  Auto Eosinophil # : x  Auto Basophil # : x  Auto Neutrophil % : x  Auto Lymphocyte % : x  Auto Monocyte % : x  Auto Eosinophil % : x  Auto Basophil % : x    04-24    141  |  104  |  20  ----------------------------<  149[H]  3.7   |  28  |  0.72    Ca    9.4      24 Apr 2025 07:01  Phos  3.6     04-24  Mg     1.70     04-24      Urinalysis Basic - ( 24 Apr 2025 07:01 )    Color: x / Appearance: x / SG: x / pH: x  Gluc: 149 mg/dL / Ketone: x  / Bili: x / Urobili: x   Blood: x / Protein: x / Nitrite: x   Leuk Esterase: x / RBC: x / WBC x   Sq Epi: x / Non Sq Epi: x / Bacteria: x        VITALS  T(C): 36.4 (04-24-25 @ 05:00), Max: 37.1 (04-23-25 @ 12:34)  HR: 77 (04-24-25 @ 05:00) (75 - 84)  BP: 126/75 (04-24-25 @ 05:00) (126/75 - 144/68)  RR: 18 (04-24-25 @ 05:00) (18 - 18)  SpO2: 98% (04-24-25 @ 05:00) (95% - 98%)  Wt(kg): --    ALLERGIES  No Known Allergies      MEDICATIONS   artificial tears (preservative free) Ophthalmic Solution 1 Drop(s) Both EYES every 8 hours  bisacodyl 10 milliGRAM(s) Oral once  buDESOnide    Inhalation Suspension 0.5 milliGRAM(s) Inhalation every 12 hours  chlorhexidine 2% Cloths 1 Application(s) Topical <User Schedule>  dextrose 50% Injectable 25 Gram(s) IV Push once  dextrose 50% Injectable 12.5 Gram(s) IV Push once  dextrose 50% Injectable 25 Gram(s) IV Push once  dextrose Oral Gel 15 Gram(s) Oral once  enoxaparin Injectable 40 milliGRAM(s) SubCutaneous every 24 hours  glucagon  Injectable 1 milliGRAM(s) IntraMuscular once  hydrochlorothiazide 25 milliGRAM(s) Oral daily  insulin glargine Injectable (LANTUS) 6 Unit(s) SubCutaneous at bedtime  insulin lispro (ADMELOG) corrective regimen sliding scale   SubCutaneous three times a day before meals  insulin lispro (ADMELOG) corrective regimen sliding scale   SubCutaneous at bedtime  insulin lispro Injectable (ADMELOG) 3 Unit(s) SubCutaneous three times a day before meals  ipratropium    for Nebulization 500 MICROGram(s) Nebulizer every 8 hours  levalbuterol Inhalation 0.63 milliGRAM(s) Inhalation every 8 hours  lisinopril 40 milliGRAM(s) Oral daily  magnesium oxide 400 milliGRAM(s) Oral three times a day with meals  melatonin 9 milliGRAM(s) Oral at bedtime  memantine 10 milliGRAM(s) Oral at bedtime  memantine 5 milliGRAM(s) Oral <User Schedule>  OLANZapine 2.5 milliGRAM(s) Oral at bedtime  pantoprazole    Tablet 40 milliGRAM(s) Oral before breakfast  petrolatum Ophthalmic Ointment 1 Application(s) Both EYES two times a day  polyethylene glycol 3350 17 Gram(s) Oral two times a day  predniSONE   Tablet 10 milliGRAM(s) Oral daily  senna 2 Tablet(s) Oral at bedtime  sertraline 125 milliGRAM(s) Oral daily      ----------------------------------------------------------------------------------------   PHYSICAL EXAM  Constitutional - NAD  HEENT - NCAT.  on nasal cannula   Chest - no respiratory distress   Cardiovascular - RRR, S1S2   Abdomen -  Soft, NTND  Extremities - No C/C/E, No calf tenderness   Neurologic Exam -                    Cognitive -alert, oriented x 3     Motor -spontaneous movement noted LUE, not moving extremities to command     Balance - WNL Static  Psychiatric - calm  ----------------------------------------------------------------------------------------  ASSESSMENT/PLAN 74 year old f admitted for respiratory failure in setting of asthma exacerbation.   PT for bed mobility, transfers, ambulation as tolerated  out of bed to chair as tolerated  OT for ADLs   DVT PPX - lovenox  Rehab -  limited command following.  recommend subacute rehab when medically cleared.    Total time spent to review relevant records and imaging results, examine patient, complete documentation, and when applicable discuss the case with the patient, family, , social workers, and medical team:    minutes        Total time spent to review relevant records and imaging results, examine patient, complete documentation, and when applicable discuss the case with the patient, family, , social workers, and medical team:  35 minutes

## 2025-04-25 LAB
ANION GAP SERPL CALC-SCNC: 12 MMOL/L — SIGNIFICANT CHANGE UP (ref 7–14)
BUN SERPL-MCNC: 31 MG/DL — HIGH (ref 7–23)
CALCIUM SERPL-MCNC: 9.3 MG/DL — SIGNIFICANT CHANGE UP (ref 8.4–10.5)
CHLORIDE SERPL-SCNC: 100 MMOL/L — SIGNIFICANT CHANGE UP (ref 98–107)
CO2 SERPL-SCNC: 26 MMOL/L — SIGNIFICANT CHANGE UP (ref 22–31)
CREAT SERPL-MCNC: 0.93 MG/DL — SIGNIFICANT CHANGE UP (ref 0.5–1.3)
EGFR: 64 ML/MIN/1.73M2 — SIGNIFICANT CHANGE UP
EGFR: 64 ML/MIN/1.73M2 — SIGNIFICANT CHANGE UP
GLUCOSE BLDC GLUCOMTR-MCNC: 109 MG/DL — HIGH (ref 70–99)
GLUCOSE BLDC GLUCOMTR-MCNC: 151 MG/DL — HIGH (ref 70–99)
GLUCOSE BLDC GLUCOMTR-MCNC: 157 MG/DL — HIGH (ref 70–99)
GLUCOSE BLDC GLUCOMTR-MCNC: 167 MG/DL — HIGH (ref 70–99)
GLUCOSE BLDC GLUCOMTR-MCNC: 171 MG/DL — HIGH (ref 70–99)
GLUCOSE SERPL-MCNC: 132 MG/DL — HIGH (ref 70–99)
HCT VFR BLD CALC: 32.5 % — LOW (ref 34.5–45)
HGB BLD-MCNC: 10.7 G/DL — LOW (ref 11.5–15.5)
MAGNESIUM SERPL-MCNC: 1.9 MG/DL — SIGNIFICANT CHANGE UP (ref 1.6–2.6)
MCHC RBC-ENTMCNC: 31.9 PG — SIGNIFICANT CHANGE UP (ref 27–34)
MCHC RBC-ENTMCNC: 32.9 G/DL — SIGNIFICANT CHANGE UP (ref 32–36)
MCV RBC AUTO: 97 FL — SIGNIFICANT CHANGE UP (ref 80–100)
NRBC # BLD AUTO: 0 K/UL — SIGNIFICANT CHANGE UP (ref 0–0)
NRBC # FLD: 0 K/UL — SIGNIFICANT CHANGE UP (ref 0–0)
NRBC BLD AUTO-RTO: 0 /100 WBCS — SIGNIFICANT CHANGE UP (ref 0–0)
PHOSPHATE SERPL-MCNC: 2.7 MG/DL — SIGNIFICANT CHANGE UP (ref 2.5–4.5)
PLATELET # BLD AUTO: 156 K/UL — SIGNIFICANT CHANGE UP (ref 150–400)
POTASSIUM SERPL-MCNC: 3.9 MMOL/L — SIGNIFICANT CHANGE UP (ref 3.5–5.3)
POTASSIUM SERPL-SCNC: 3.9 MMOL/L — SIGNIFICANT CHANGE UP (ref 3.5–5.3)
RBC # BLD: 3.35 M/UL — LOW (ref 3.8–5.2)
RBC # FLD: 13.8 % — SIGNIFICANT CHANGE UP (ref 10.3–14.5)
SODIUM SERPL-SCNC: 138 MMOL/L — SIGNIFICANT CHANGE UP (ref 135–145)
WBC # BLD: 10.36 K/UL — SIGNIFICANT CHANGE UP (ref 3.8–10.5)
WBC # FLD AUTO: 10.36 K/UL — SIGNIFICANT CHANGE UP (ref 3.8–10.5)

## 2025-04-25 PROCEDURE — 99233 SBSQ HOSP IP/OBS HIGH 50: CPT

## 2025-04-25 PROCEDURE — 99231 SBSQ HOSP IP/OBS SF/LOW 25: CPT

## 2025-04-25 PROCEDURE — 99232 SBSQ HOSP IP/OBS MODERATE 35: CPT

## 2025-04-25 PROCEDURE — 90870 ELECTROCONVULSIVE THERAPY: CPT

## 2025-04-25 RX ORDER — IPRATROPIUM BROMIDE AND ALBUTEROL SULFATE .5; 2.5 MG/3ML; MG/3ML
3 SOLUTION RESPIRATORY (INHALATION) EVERY 6 HOURS
Refills: 0 | Status: DISCONTINUED | OUTPATIENT
Start: 2025-04-25 | End: 2025-05-21

## 2025-04-25 RX ADMIN — POLYETHYLENE GLYCOL 3350 17 GRAM(S): 17 POWDER, FOR SOLUTION ORAL at 05:20

## 2025-04-25 RX ADMIN — LEVALBUTEROL HYDROCHLORIDE 0.63 MILLIGRAM(S): 1.25 SOLUTION RESPIRATORY (INHALATION) at 15:42

## 2025-04-25 RX ADMIN — Medication 1 APPLICATION(S): at 05:29

## 2025-04-25 RX ADMIN — Medication 1 DROP(S): at 05:25

## 2025-04-25 RX ADMIN — MEMANTINE HYDROCHLORIDE 10 MILLIGRAM(S): 21 CAPSULE, EXTENDED RELEASE ORAL at 21:44

## 2025-04-25 RX ADMIN — Medication 9 MILLIGRAM(S): at 21:44

## 2025-04-25 RX ADMIN — INSULIN LISPRO 1: 100 INJECTION, SOLUTION INTRAVENOUS; SUBCUTANEOUS at 10:23

## 2025-04-25 RX ADMIN — Medication 1 APPLICATION(S): at 06:40

## 2025-04-25 RX ADMIN — OLANZAPINE 2.5 MILLIGRAM(S): 10 TABLET ORAL at 21:45

## 2025-04-25 RX ADMIN — PREDNISONE 10 MILLIGRAM(S): 20 TABLET ORAL at 05:12

## 2025-04-25 RX ADMIN — Medication 500000 UNIT(S): at 13:19

## 2025-04-25 RX ADMIN — INSULIN LISPRO 1: 100 INJECTION, SOLUTION INTRAVENOUS; SUBCUTANEOUS at 13:16

## 2025-04-25 RX ADMIN — Medication 400 MILLIGRAM(S): at 10:25

## 2025-04-25 RX ADMIN — MEMANTINE HYDROCHLORIDE 5 MILLIGRAM(S): 21 CAPSULE, EXTENDED RELEASE ORAL at 06:30

## 2025-04-25 RX ADMIN — Medication 500000 UNIT(S): at 21:41

## 2025-04-25 RX ADMIN — INSULIN GLARGINE-YFGN 6 UNIT(S): 100 INJECTION, SOLUTION SUBCUTANEOUS at 23:17

## 2025-04-25 RX ADMIN — Medication 1 DROP(S): at 13:18

## 2025-04-25 RX ADMIN — Medication 1 APPLICATION(S): at 17:53

## 2025-04-25 RX ADMIN — Medication 40 MILLIGRAM(S): at 05:12

## 2025-04-25 RX ADMIN — Medication 1 DROP(S): at 21:56

## 2025-04-25 RX ADMIN — SERTRALINE 125 MILLIGRAM(S): 100 TABLET, FILM COATED ORAL at 13:18

## 2025-04-25 RX ADMIN — Medication 500000 UNIT(S): at 05:20

## 2025-04-25 RX ADMIN — Medication 1 DOSE(S): at 21:56

## 2025-04-25 RX ADMIN — ENOXAPARIN SODIUM 40 MILLIGRAM(S): 100 INJECTION SUBCUTANEOUS at 17:51

## 2025-04-25 RX ADMIN — Medication 500 MICROGRAM(S): at 15:43

## 2025-04-25 NOTE — PROGRESS NOTE ADULT - PROBLEM SELECTOR PLAN 1
MDD w/ psychotic features.  - s/p ativan now on hold given concern that it may have contributed to respiratory decompensation  - c/w Zoloft 125 qday (home dose was 150).  - c/w Zyprexa 2.5 qhs  - c/w Namenda 10mg qhs  - psych following  - ECT  family now agreeable, first ECT 4/25m next 4/28  pt optimized per cards and pulm for ECT

## 2025-04-25 NOTE — PROGRESS NOTE ADULT - SUBJECTIVE AND OBJECTIVE BOX
Arnold Uriostegui MD  Interventional Cardiology / Endovascular Specialist  [place] Office : [phone] [place] [address] [place] [address]  Tel:   [place] Office : [address] [facility] [place] [address]  Tel: [phone]    Pt OOB to chair, NAD  	  MEDICATIONS:  enoxaparin Injectable 40 milliGRAM(s) SubCutaneous every 24 hours  hydrochlorothiazide 25 milliGRAM(s) Oral daily  lisinopril 40 milliGRAM(s) Oral daily    nystatin    Suspension 990783 Unit(s) Oral three times a day    fluticasone propionate/ salmeterol 250-50 MICROgram(s) Diskus 1 Dose(s) Inhalation two times a day  ipratropium    for Nebulization 500 MICROGram(s) Nebulizer every 8 hours  levalbuterol Inhalation 0.63 milliGRAM(s) Inhalation every 8 hours    melatonin 9 milliGRAM(s) Oral at bedtime  memantine 10 milliGRAM(s) Oral at bedtime  memantine 5 milliGRAM(s) Oral <User Schedule>  OLANZapine 2.5 milliGRAM(s) Oral at bedtime  sertraline 125 milliGRAM(s) Oral daily    bisacodyl 10 milliGRAM(s) Oral once  pantoprazole    Tablet 40 milliGRAM(s) Oral before breakfast  polyethylene glycol 3350 17 Gram(s) Oral two times a day  senna 2 Tablet(s) Oral at bedtime    dextrose 50% Injectable 25 Gram(s) IV Push once  dextrose 50% Injectable 12.5 Gram(s) IV Push once  dextrose 50% Injectable 25 Gram(s) IV Push once  dextrose Oral Gel 15 Gram(s) Oral once  glucagon  Injectable 1 milliGRAM(s) IntraMuscular once  insulin glargine Injectable (LANTUS) 6 Unit(s) SubCutaneous at bedtime  insulin lispro (ADMELOG) corrective regimen sliding scale   SubCutaneous three times a day before meals  insulin lispro (ADMELOG) corrective regimen sliding scale   SubCutaneous at bedtime  insulin lispro Injectable (ADMELOG) 3 Unit(s) SubCutaneous three times a day before meals  predniSONE   Tablet 10 milliGRAM(s) Oral daily    artificial tears (preservative free) Ophthalmic Solution 1 Drop(s) Both EYES every 8 hours  chlorhexidine 2% Cloths 1 Application(s) Topical <User Schedule>  petrolatum Ophthalmic Ointment 1 Application(s) Both EYES two times a day      PAST MEDICAL/SURGICAL HISTORY  PAST MEDICAL & SURGICAL HISTORY:  MDD (major depressive disorder), recurrent, severe, with psychosis      Asthma, mild intermittent, uncomplicated      Essential hypertension      No significant past surgical history          SOCIAL HISTORY: Substance Use (street drugs): ( x ) never used  (  ) other:    FAMILY HISTORY:      PHYSICAL EXAM:  T(C): 36.4 (04-25-25 @ 12:10), Max: 36.9 (04-24-25 @ 21:45)  HR: 91 (04-25-25 @ 12:10) (65 - 112)  BP: 129/71 (04-25-25 @ 12:10) (100/64 - 195/111)  RR: 18 (04-25-25 @ 12:10) (16 - 23)  SpO2: 100% (04-25-25 @ 12:10) (90% - 100%)  Wt(kg): --  I&O's Summary      GENERAL: NAD  EYES: PERRLA  ENMT: Moist mucous membranes  Cardiovascular: Normal S1 S2, No JVD, No edema  Respiratory: Lungs clear to auscultation	  Gastrointestinal:  Soft, Non-tender, + BS	  Extremities: Normal range of motion, No clubbing, cyanosis or edema  NERVOUS SYSTEM:  Alert & Oriented X3                                10.7   10.36 )-----------( 156      ( 25 Apr 2025 05:59 )             32.5     04-25    138  |  100  |  31[H]  ----------------------------<  132[H]  3.9   |  26  |  0.93    Ca    9.3      25 Apr 2025 05:59  Phos  2.7     04-25  Mg     1.90     04-25      proBNP:   Lipid Profile:   HgA1c:   TSH:     Consultant(s) Notes Reviewed:  [x ] YES  [ ] NO    Care Discussed with Consultants/Other Providers [ x] YES  [ ] NO    Imaging Personally Reviewed independently:  [x] YES  [ ] NO    All labs, radiologic studies, vitals, orders and medications list reviewed. Patient is seen and examined at bedside. Case discussed with medical team.

## 2025-04-25 NOTE — PROGRESS NOTE ADULT - SUBJECTIVE AND OBJECTIVE BOX
[facility] Division of Hospital Medicine  Em Toure MD  Pager [phone]    Patient is a 74y old  Female who presents with a chief complaint of asthma exacerbation       SUBJECTIVE / OVERNIGHT EVENTS: pt seen after ECT, sitting in chair; much more spont speech      MEDICATIONS  (STANDING):  artificial tears (preservative free) Ophthalmic Solution 1 Drop(s) Both EYES every 8 hours  bisacodyl 10 milliGRAM(s) Oral once  chlorhexidine 2% Cloths 1 Application(s) Topical <User Schedule>  dextrose 50% Injectable 25 Gram(s) IV Push once  dextrose 50% Injectable 12.5 Gram(s) IV Push once  dextrose 50% Injectable 25 Gram(s) IV Push once  dextrose Oral Gel 15 Gram(s) Oral once  enoxaparin Injectable 40 milliGRAM(s) SubCutaneous every 24 hours  fluticasone propionate/ salmeterol 250-50 MICROgram(s) Diskus 1 Dose(s) Inhalation two times a day  glucagon  Injectable 1 milliGRAM(s) IntraMuscular once  hydrochlorothiazide 25 milliGRAM(s) Oral daily  insulin glargine Injectable (LANTUS) 6 Unit(s) SubCutaneous at bedtime  insulin lispro (ADMELOG) corrective regimen sliding scale   SubCutaneous three times a day before meals  insulin lispro (ADMELOG) corrective regimen sliding scale   SubCutaneous at bedtime  insulin lispro Injectable (ADMELOG) 3 Unit(s) SubCutaneous three times a day before meals  ipratropium    for Nebulization 500 MICROGram(s) Nebulizer every 8 hours  levalbuterol Inhalation 0.63 milliGRAM(s) Inhalation every 8 hours  lisinopril 40 milliGRAM(s) Oral daily  melatonin 9 milliGRAM(s) Oral at bedtime  memantine 10 milliGRAM(s) Oral at bedtime  memantine 5 milliGRAM(s) Oral <User Schedule>  nystatin    Suspension 134666 Unit(s) Oral three times a day  OLANZapine 2.5 milliGRAM(s) Oral at bedtime  pantoprazole    Tablet 40 milliGRAM(s) Oral before breakfast  petrolatum Ophthalmic Ointment 1 Application(s) Both EYES two times a day  polyethylene glycol 3350 17 Gram(s) Oral two times a day  predniSONE   Tablet 10 milliGRAM(s) Oral daily  senna 2 Tablet(s) Oral at bedtime  sertraline 125 milliGRAM(s) Oral daily        CAPILLARY BLOOD GLUCOSE  POCT Blood Glucose.: 157 mg/dL (25 Apr 2025 12:23)  POCT Blood Glucose.: 167 mg/dL (25 Apr 2025 09:48)  POCT Blood Glucose.: 151 mg/dL (25 Apr 2025 06:44)  POCT Blood Glucose.: 120 mg/dL (24 Apr 2025 22:16)  POCT Blood Glucose.: 166 mg/dL (24 Apr 2025 17:42)        PHYSICAL EXAM:  Vital Signs Last 24 Hrs  T(F): 98.5 (25 Apr 2025 08:31), Max: 98.5 (24 Apr 2025 21:45)  HR: 85 (25 Apr 2025 09:35) (65 - 112)  BP: 133/91 (25 Apr 2025 09:35) (100/64 - 195/111)  RR: 21 (25 Apr 2025 09:35) (16 - 23)  SpO2: 95% (25 Apr 2025 09:35) (90% - 99%)    Parameters below as of 25 Apr 2025 10:00  Patient On (Oxygen Delivery Method): room air        CONSTITUTIONAL: NAD, appears comfortable  EYES: PERRLA; conjunctiva and sclera clear  ENMT: Moist oral mucosa; normal dentition  RESPIRATORY: Normal respiratory effort; good AE no wheeze  CARDIOVASCULAR: Regular rate and rhythm; No lower extremity edema  ABDOMEN: Nontender to palpation, normoactive bowel sounds  MUSCULOSKELETAL:  no clubbing or cyanosis of digits; no joint swelling or tenderness to palpation  PSYCH: calm, coop; affect appropriate  NEUROLOGY: CN 2-12 are intact and symmetric; no gross sensory deficits   SKIN: No rashes; no palpable lesions    LABS:                        10.7   10.36 )-----------( 156      ( 25 Apr 2025 05:59 )             32.5     04-25    138  |  100  |  31[H]  ----------------------------<  132[H]  3.9   |  26  |  0.93    Ca    9.3      25 Apr 2025 05:59  Phos  2.7     04-25  Mg     1.90     04-25

## 2025-04-25 NOTE — ECT TREATMENT NOTE - NSECTPOSTTXCOMMENTS_PSY_ALL_CORE
Prior to treatment patient was resting but rousable, greeted writer appropriately, oriented only to person and year, recalls having 4 children, staring/posturing/grimacing/mutism. No abnormalities of tone, no palmar grasp, no echophenomena. Patient did not answer queries re. depressed mood.

## 2025-04-25 NOTE — PROGRESS NOTE ADULT - SUBJECTIVE AND OBJECTIVE BOX
Interval Events:   No acute events  Well appearing overall  Back on room air    REVIEW OF SYSTEMS:  Denies fevers, chills, chest pain, palpitations, cough, shortness of breath  Negative unless stated above    OBJECTIVE:  ICU Vital Signs Last 24 Hrs  T(C): 36.9 (25 Apr 2025 08:31), Max: 36.9 (24 Apr 2025 11:53)  T(F): 98.5 (25 Apr 2025 08:31), Max: 98.5 (24 Apr 2025 21:45)  HR: 85 (25 Apr 2025 09:35) (65 - 112)  BP: 133/91 (25 Apr 2025 09:35) (100/64 - 195/111)  BP(mean): --  ABP: --  ABP(mean): --  RR: 21 (25 Apr 2025 09:35) (16 - 23)  SpO2: 95% (25 Apr 2025 09:35) (90% - 99%)    O2 Parameters below as of 25 Apr 2025 09:35  Patient On (Oxygen Delivery Method): nasal cannula  O2 Flow (L/min): 2            CAPILLARY BLOOD GLUCOSE      POCT Blood Glucose.: 167 mg/dL (25 Apr 2025 09:48)      PHYSICAL EXAM:  Well appearing  No acute distress  RRR no murmurs  Speaking in full sentences, no accessory muscle use  No wheeze, rales, or rhonchi  No LE edema  No rashes or lesions      HOSPITAL MEDICATIONS:  MEDICATIONS  (STANDING):  buDESOnide    Inhalation Suspension 0.5 milliGRAM(s) Inhalation every 12 hours  ipratropium    for Nebulization 500 MICROGram(s) Nebulizer every 8 hours  levalbuterol Inhalation 0.63 milliGRAM(s) Inhalation every 8 hours  predniSONE   Tablet 10 milliGRAM(s) Oral daily    MEDICATIONS  (PRN):      LABS:                        10.7   10.36 )-----------( 156      ( 25 Apr 2025 05:59 )             32.5     Hgb Trend: 10.7<--, 11.0<--, 11.3<--, 10.7<--, 11.4<--  04-25    138  |  100  |  31[H]  ----------------------------<  132[H]  3.9   |  26  |  0.93    Ca    9.3      25 Apr 2025 05:59  Phos  2.7     04-25  Mg     1.90     04-25      Creatinine Trend: 0.93<--, 0.72<--, 0.61<--, 0.75<--, 0.71<--, 0.69<--    Arterial Blood Gas:  04-24 @ 15:07  7.38/47/83/28/97.0/2.1  ABG lactate: --      MICROBIOLOGY:     RADIOLOGY:  [ ] Reviewed and interpreted by me

## 2025-04-25 NOTE — ECT TREATMENT NOTE - NSECTPOSTTXSUMMARY_PSY_ALL_CORE
The patient had a well modified grand mal seizure under general anesthesia and muscle relaxant. The patient is alert, responsive, in no acute distress.  Recovery uneventful.     Anesthesia medications: methohexital 60 mg, succinylcholine 40 mg

## 2025-04-25 NOTE — PROGRESS NOTE ADULT - PROBLEM SELECTOR PLAN 2
Due to status asthmaticus s/p intubation and bilevel now weaned off of NC to RA   - aspergillus Ab: negative , ANCA: negative, IgE level 57  - s/p course of antibiotics  - c/w ipratropium + Xopenex nebs q8h  - change to advair per pulm  - s/p Solumedrol, followed by Prednisone 40mg qdaily x 3 days, 30mg  4/19 x 3 days then 20mg x 3 days then 10mg x 3 days then off as per pulmonary  on RA no wheezing  apprec pulm f/u with respect to ECT

## 2025-04-25 NOTE — PROGRESS NOTE ADULT - ASSESSMENT
74F reported asthma with prior intubations and depression initially admitted with asthma exacerbation with hypercapnic and hypoxemic respiratory failure hospital course complicated by persistent mood disorder and catatonia planned for ECT    Overall, low risk (by ARISCAT) and optimized for planned procedure.   Had some mild hypoxemia ajith-procedurally. Suspect related to atalectasis.    Recommendations:  - Currently on standing Pulmicort and Duonebs every 8 hours. Can switch to Advair and stop pulmicort. Making Duonebs PRN.  - Continue prednisone taper, on 10 mg without wheeze  - Incentive spirometry  - OOB as tolerated  - No indication for NIV  - Appreciate ongoing BH optimization  - Eventually will need outpatient follow up with Pulmonary and Sleep Medicine

## 2025-04-25 NOTE — BH CONSULTATION LIAISON PROGRESS NOTE - NSBHFUPINTERVALHXFT_PSY_A_CORE
Chart reviewed, seen for follow up s/p ECT today, patient alert and oriented to self today, has not recollection to month/year or having ECT treatment today, states to writer when asked where she is, "I came to go shopping and they put me here," when asked where spouse was today she states, "I don't know where he is." Patient noted to be holding right arm over her head in an odd position, loose on exam. As writer was leaving patient stated loudly, "Hey where are you going, I can't stay here."     Staff report patient improved, talking and cooperative with care.

## 2025-04-25 NOTE — BH CONSULTATION LIAISON PROGRESS NOTE - NSBHASSESSMENTFT_PSY_ALL_CORE
Patient is a 74F hx asthma, depression, and HTN was admitted to the MICU with AHRF 2/2 asthma exacerbation resulting in acute on chronic HHRF causing AMS and increased WOB that required intubation. During her ICU course, she experienced seizure-like activity .  Additional issues included hyponatremia, acute on chronic metabolic alkalosis, and mild thrombocytopenia,  also having fevers.  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 Patient has h/o hx of major depressive disorder with psychosis, hx of 3 past inpatient psychiatric hospitalizations last in 2016 at [facility], all rehospitalizations were in context non compliance with the treatment. She has no hx of suicide attempts, no hx of substance abuse. Patient w hx of paranoia, disorganization, catatonia, required MOO when in [facility].     3/24----more withdrawn, some stiffness in neck, not communicative. Some concerns for catatonia. Unsure if symptoms attributed to catatonia vs delirium  3/25--- more alert today, trying to following commands. Still with PMR+. Appears to be responding to Ativan  3/26-- BFS 10, will increasing standing ativan to target catatonic symptoms - giving STAT 0.5mg NOW discussed w acp   3/27----will continue ativan dosing for one more day before increasing  3/28: BFS: stupor 1, mutism 2,withdrawan 2, verbigeration 1, rigidity 2 =8.  3/29: BFS: stupor 2, mutism 2, staring 1, posturing 2, rigidity 2, waxy flexibility 3, withdrawal 2 = 14; please monitor nutritional intake, plan to increase ativan by total daily dose of 0.5 mg at bedtime due to persistent catatonia   3/31: BFS: stupor 2, mutism 2, staring 2, rigidity 1, negativism 1, withdrawal 1, waxy flexibility 3 =12, patient with productive cough, advised for medical work up  4/1---tired, speaking more, not rigid, has PMR+. NPO status now.   4/2--sleepy, RRT this afternoon  4/3---better mentation, pmr but no chester catatonic symptoms  4/4: alert, using few words, mild rigidity -  at bedside, updated   4/5: few words, lethargic, fluctuating wakefulness. Mild rigidity.  No overt catatonic sxs.   4/7: no change, remains thought blocked, few words - son at bedside, updated with care  4/8: patient continues to have poor eye contact, uses few words, no rigidity on today's exam.   4/9: patient remains minimally verbal, some posturing, poor Po intake - continues on NC for 02 requirements   4/10: more verbal/interactive, with increased PO intake per family. Some psychotic output. Family agrees with resumption of Zyprexa.   4/14: alert to self with fluctuating wakefulness, intermittent full sentences f/b one-word answers, some rigidity, posturing, denies si, denies ah/vh  4/16: patient more catatonic today, notable catalepsy, waxiness, verbigeration followed by mutism and staring, family against ativan and ECT, would increase memantine as below, not eating   4/17: catatonic (mute, rigid, not engaging). Eating some portions of meals with significant family support. Family opting to see memantine trial to full effect before consideration of ECT  4/18: mutism today, odd posturing, unable to engage, d/w spouse and son ECT, family still undecided  4/21: alert to self, some improvement with verbal engagement, tracking, follow command, denies si, denies ah/vh, awaiting call back from son in regards to ECT consult  4/22: remains catatonic (mute, postured, rigid, withdrawn, negativistic). Exam limited. Family agrees with formal ECT consult.  4/23: Remains with symptoms of catatonia. BFS: stupor 1, mute2, Rigid 2, negativism 2, withdrawal 2 - 9 : ECT consult completed. awaiting clearances for ECT  4/42: remains with BFS of 9, plan for ECT  4/25: alert to self, able to engage in some conversation, however, confused, loose on  exam, odd posturing right upper arm    PLAN    *** ECT scheduled for 4.25  PATIENT TO BE NPO AT MIDNIGHT    - no SI or HI, no need for psychiatric CO  -- antipsychotics can only be given if qtc < 500    - MEDICATIONS:  - Memantine 5mg AM and 10mg HS  -- C/W Zoloft 125 mg daily   -- C/W Zyprexa 2.5mg HS   - ECT consulted on 4/22 - now to obtain clearances for ECT  - PRN:  --Ativan 0.5mg q 6hrs prn IM/IV/PO  - DISPO: pending, not eating enough to go to [facility]  vs [facility] depending on recovery     Patient is a 74F hx asthma, depression, and HTN was admitted to the MICU with AHRF 2/2 asthma exacerbation resulting in acute on chronic HHRF causing AMS and increased WOB that required intubation. During her ICU course, she experienced seizure-like activity .  Additional issues included hyponatremia, acute on chronic metabolic alkalosis, and mild thrombocytopenia,  also having fevers.  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 Patient has h/o hx of major depressive disorder with psychosis, hx of 3 past inpatient psychiatric hospitalizations last in 2016 at [facility], all rehospitalizations were in context non compliance with the treatment. She has no hx of suicide attempts, no hx of substance abuse. Patient w hx of paranoia, disorganization, catatonia, required MOO when in [facility].     3/24----more withdrawn, some stiffness in neck, not communicative. Some concerns for catatonia. Unsure if symptoms attributed to catatonia vs delirium  3/25--- more alert today, trying to following commands. Still with PMR+. Appears to be responding to Ativan  3/26-- BFS 10, will increasing standing ativan to target catatonic symptoms - giving STAT 0.5mg NOW discussed w acp   3/27----will continue ativan dosing for one more day before increasing  3/28: BFS: stupor 1, mutism 2,withdrawan 2, verbigeration 1, rigidity 2 =8.  3/29: BFS: stupor 2, mutism 2, staring 1, posturing 2, rigidity 2, waxy flexibility 3, withdrawal 2 = 14; please monitor nutritional intake, plan to increase ativan by total daily dose of 0.5 mg at bedtime due to persistent catatonia   3/31: BFS: stupor 2, mutism 2, staring 2, rigidity 1, negativism 1, withdrawal 1, waxy flexibility 3 =12, patient with productive cough, advised for medical work up  4/1---tired, speaking more, not rigid, has PMR+. NPO status now.   4/2--sleepy, RRT this afternoon  4/3---better mentation, pmr but no chester catatonic symptoms  4/4: alert, using few words, mild rigidity -  at bedside, updated   4/5: few words, lethargic, fluctuating wakefulness. Mild rigidity.  No overt catatonic sxs.   4/7: no change, remains thought blocked, few words - son at bedside, updated with care  4/8: patient continues to have poor eye contact, uses few words, no rigidity on today's exam.   4/9: patient remains minimally verbal, some posturing, poor Po intake - continues on NC for 02 requirements   4/10: more verbal/interactive, with increased PO intake per family. Some psychotic output. Family agrees with resumption of Zyprexa.   4/14: alert to self with fluctuating wakefulness, intermittent full sentences f/b one-word answers, some rigidity, posturing, denies si, denies ah/vh  4/16: patient more catatonic today, notable catalepsy, waxiness, verbigeration followed by mutism and staring, family against ativan and ECT, would increase memantine as below, not eating   4/17: catatonic (mute, rigid, not engaging). Eating some portions of meals with significant family support. Family opting to see memantine trial to full effect before consideration of ECT  4/18: mutism today, odd posturing, unable to engage, d/w spouse and son ECT, family still undecided  4/21: alert to self, some improvement with verbal engagement, tracking, follow command, denies si, denies ah/vh, awaiting call back from son in regards to ECT consult  4/22: remains catatonic (mute, postured, rigid, withdrawn, negativistic). Exam limited. Family agrees with formal ECT consult.  4/23: Remains with symptoms of catatonia. BFS: stupor 1, mute2, Rigid 2, negativism 2, withdrawal 2 - 9 : ECT consult completed. awaiting clearances for ECT  4/42: remains with BFS of 9, plan for ECT  4/25: alert to self, able to engage in some conversation, however, confused, loose on  exam, odd posturing right upper arm    PLAN    *** ECT scheduled for Monday 4.28  PATIENT TO BE NPO AT MIDNIGHT    - no SI or HI, no need for psychiatric CO  -- antipsychotics can only be given if qtc < 500    - MEDICATIONS:  - Memantine 5mg AM and 10mg HS  -- C/W Zoloft 125 mg daily   -- C/W Zyprexa 2.5mg HS   - ECT consulted on 4/22 - now to obtain clearances for ECT  - PRN:  --Ativan 0.5mg q 6hrs prn IM/IV/PO  - DISPO: pending, not eating enough to go to [facility]  vs [facility] depending on recovery     Patient is a 74F hx asthma, depression, and HTN was admitted to the MICU with AHRF 2/2 asthma exacerbation resulting in acute on chronic HHRF causing AMS and increased WOB that required intubation. During her ICU course, she experienced seizure-like activity .  Additional issues included hyponatremia, acute on chronic metabolic alkalosis, and mild thrombocytopenia,  also having fevers.  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 Patient has h/o hx of major depressive disorder with psychosis, hx of 3 past inpatient psychiatric hospitalizations last in 2016 at [facility], all rehospitalizations were in context non compliance with the treatment. She has no hx of suicide attempts, no hx of substance abuse. Patient w hx of paranoia, disorganization, catatonia, required MOO when in [facility].     3/24----more withdrawn, some stiffness in neck, not communicative. Some concerns for catatonia. Unsure if symptoms attributed to catatonia vs delirium  3/25--- more alert today, trying to following commands. Still with PMR+. Appears to be responding to Ativan  3/26-- BFS 10, will increasing standing ativan to target catatonic symptoms - giving STAT 0.5mg NOW discussed w acp   3/27----will continue ativan dosing for one more day before increasing  3/28: BFS: stupor 1, mutism 2,withdrawan 2, verbigeration 1, rigidity 2 =8.  3/29: BFS: stupor 2, mutism 2, staring 1, posturing 2, rigidity 2, waxy flexibility 3, withdrawal 2 = 14; please monitor nutritional intake, plan to increase ativan by total daily dose of 0.5 mg at bedtime due to persistent catatonia   3/31: BFS: stupor 2, mutism 2, staring 2, rigidity 1, negativism 1, withdrawal 1, waxy flexibility 3 =12, patient with productive cough, advised for medical work up  4/1---tired, speaking more, not rigid, has PMR+. NPO status now.   4/2--sleepy, RRT this afternoon  4/3---better mentation, pmr but no chester catatonic symptoms  4/4: alert, using few words, mild rigidity -  at bedside, updated   4/5: few words, lethargic, fluctuating wakefulness. Mild rigidity.  No overt catatonic sxs.   4/7: no change, remains thought blocked, few words - son at bedside, updated with care  4/8: patient continues to have poor eye contact, uses few words, no rigidity on today's exam.   4/9: patient remains minimally verbal, some posturing, poor Po intake - continues on NC for 02 requirements   4/10: more verbal/interactive, with increased PO intake per family. Some psychotic output. Family agrees with resumption of Zyprexa.   4/14: alert to self with fluctuating wakefulness, intermittent full sentences f/b one-word answers, some rigidity, posturing, denies si, denies ah/vh  4/16: patient more catatonic today, notable catalepsy, waxiness, verbigeration followed by mutism and staring, family against ativan and ECT, would increase memantine as below, not eating   4/17: catatonic (mute, rigid, not engaging). Eating some portions of meals with significant family support. Family opting to see memantine trial to full effect before consideration of ECT  4/18: mutism today, odd posturing, unable to engage, d/w spouse and son ECT, family still undecided  4/21: alert to self, some improvement with verbal engagement, tracking, follow command, denies si, denies ah/vh, awaiting call back from son in regards to ECT consult  4/22: remains catatonic (mute, postured, rigid, withdrawn, negativistic). Exam limited. Family agrees with formal ECT consult.  4/23: Remains with symptoms of catatonia. BFS: stupor 1, mute2, Rigid 2, negativism 2, withdrawal 2 - 9 : ECT consult completed. awaiting clearances for ECT  4/42: remains with BFS of 9, plan for ECT  4/25: alert to self, able to engage in some conversation, however, confused, loose on  exam, odd posturing right upper arm, Dr. Toure made aware of schedule for ECT Monday    PLAN    *** ECT scheduled for Monday 4.28  PATIENT TO BE NPO AT MIDNIGHT    - no SI or HI, no need for psychiatric CO  -- antipsychotics can only be given if qtc < 500    - MEDICATIONS:  - Memantine 5mg AM and 10mg HS  -- C/W Zoloft 125 mg daily   -- C/W Zyprexa 2.5mg HS   - ECT consulted on 4/22 - now to obtain clearances for ECT  - PRN:  --Ativan 0.5mg q 6hrs prn IM/IV/PO  - DISPO: pending, not eating enough to go to [facility]  vs [facility] depending on recovery

## 2025-04-25 NOTE — CHART NOTE - NSCHARTNOTEFT_GEN_A_CORE
NUTRITION FOLLOW UP NOTE    Pt seen for Moderate nutrition follow-up.     SOURCE: [] Patient [X] Medical record [X] RN/PCA [] Family/Caregiver []Patient unavailable []Patient inappropriate (disoriented, nonverbal, intubated/sedated) [] Other:    Medical Course: Per chart, Pt is 74F MDD, asthma, prior CVA presented with AHRF c/b AMS requiring MICU admission for intubation for respiratory failure i/s/o asthma exacerbation. Extubated 3/12 to face tent and then on BIPAP s/p RRT 3/19 for increased WOB / hypoxia despite aggressive asthma management s/p return to MICU for management of severe asthma. Possible aspiration event 3/31. SLP eval appreciated. XR cine performed with improvement in mental status - pureed diet w/ thin liquids recommended. Worsening respiratory status 4/2 - placed on NIV, now weaned to nasal cannula and now RA.  ENT eval - no upper airway consideration for structural causes of asthma such as vocal cord dysfunction.   Course c/b MDD w/ catatonia and poor PO, off ativan due to respiratory concerns trialed on Memantine family now agreeable to ECT, planned for 4/25      Diet Prescription: Diet, Pureed:   Consistent Carbohydrate {No Snacks} (CSTCHO)  No Beef  No Pork  Supplement Feeding Modality:  Oral  Glucerna Shake Cans or Servings Per Day:  1       Frequency:  Two Times a day  Ensure Max Cans or Servings Per Day:  1       Frequency:  Daily (04-15-25 @ 15:52)      Nutrition Course: Pt seen at chair-side. Pt provided limited information. Unable to conduct nutrition interview 2/2 decreased cognition. Information obtained from comprehensive chart review and per RN today. Per RN today, patient with poor PO intake </25% of breakfast today. Per RN flowsheet, patient with variable PO intake 0-75% noted. No food preferences verbalized at this time. Pt ordered for Glucerna shake twice daily and Ensure max once daily with fair PO intake reported. No chewing or swallowing difficulties reported. No GI distress noted at present; fecal incontinence with last BM today 4/25, per RN. Generalized edema 1+ noted, no PI/DTI noted, per RN flowsheet. No new weight to assess. Recommend to obtain daily wts to monitor wt trend. RD remains available upon request and will follow up per protocol.         Pertinent Medications: MEDICATIONS  (STANDING):  artificial tears (preservative free) Ophthalmic Solution 1 Drop(s) Both EYES every 8 hours  bisacodyl 10 milliGRAM(s) Oral once  chlorhexidine 2% Cloths 1 Application(s) Topical <User Schedule>  dextrose 50% Injectable 25 Gram(s) IV Push once  dextrose 50% Injectable 12.5 Gram(s) IV Push once  dextrose 50% Injectable 25 Gram(s) IV Push once  dextrose Oral Gel 15 Gram(s) Oral once  enoxaparin Injectable 40 milliGRAM(s) SubCutaneous every 24 hours  fluticasone propionate/ salmeterol 250-50 MICROgram(s) Diskus 1 Dose(s) Inhalation two times a day  glucagon  Injectable 1 milliGRAM(s) IntraMuscular once  hydrochlorothiazide 25 milliGRAM(s) Oral daily  insulin glargine Injectable (LANTUS) 6 Unit(s) SubCutaneous at bedtime  insulin lispro (ADMELOG) corrective regimen sliding scale   SubCutaneous three times a day before meals  insulin lispro (ADMELOG) corrective regimen sliding scale   SubCutaneous at bedtime  insulin lispro Injectable (ADMELOG) 3 Unit(s) SubCutaneous three times a day before meals  ipratropium    for Nebulization 500 MICROGram(s) Nebulizer every 8 hours  levalbuterol Inhalation 0.63 milliGRAM(s) Inhalation every 8 hours  lisinopril 40 milliGRAM(s) Oral daily  melatonin 9 milliGRAM(s) Oral at bedtime  memantine 10 milliGRAM(s) Oral at bedtime  memantine 5 milliGRAM(s) Oral <User Schedule>  nystatin    Suspension 940059 Unit(s) Oral three times a day  OLANZapine 2.5 milliGRAM(s) Oral at bedtime  pantoprazole    Tablet 40 milliGRAM(s) Oral before breakfast  petrolatum Ophthalmic Ointment 1 Application(s) Both EYES two times a day  polyethylene glycol 3350 17 Gram(s) Oral two times a day  predniSONE   Tablet 10 milliGRAM(s) Oral daily  senna 2 Tablet(s) Oral at bedtime  sertraline 125 milliGRAM(s) Oral daily    Pertinent Labs: 04-25 Na138 mmol/L Glu 132 mg/dL[H] K+ 3.9 mmol/L Cr  0.93 mg/dL BUN 31 mg/dL[H] 04-25 Phos 2.7 mg/dL    A1C with Estimated Average Glucose Result: 5.7 % (03-09-25 @ 07:10)    CAPILLARY BLOOD GLUCOSE  POCT Blood Glucose.: 157 mg/dL (25 Apr 2025 12:23)  POCT Blood Glucose.: 167 mg/dL (25 Apr 2025 09:48)  POCT Blood Glucose.: 151 mg/dL (25 Apr 2025 06:44)  POCT Blood Glucose.: 120 mg/dL (24 Apr 2025 22:16)  POCT Blood Glucose.: 166 mg/dL (24 Apr 2025 17:42)      Weight: 80.2kg (Dosing wt 3/16).  Daily wt 74.6kg (4/3  Weight Assessment: Questionable dosing wt Recommend to obtain new dosing wt using tared bed scale.   Height: 62 in / 157.5cm  IBW: 110lbs / 50kg +/-10%  BMI: 32.3kg/m^2 based on dosing wt. 30.0kg/m^2 based on daily wt.     Physical Assessment, per flowsheets:  Edema: Generalized edema 1+ noted.   Pressure Injury: No PI/DTI noted.       Estimated Needs:   [X] No change since previous assessment, based on IBW 50kg  1500 - 1750  kcal daily @ 30 - 35 kcal/kg,  60 - 84 gm protein daily @ 1.2 - 1.4 gm/kg       Previous Nutrition Diagnosis:   [X] Malnutrition   Nutrition Diagnosis is [X] ongoing  [ ] resolved [ ] not applicable   New Nutrition Diagnosis: [X] not applicable     Education:  [ ] Provided pt with verbal / written education on   [ ] Provided on previous assessment by RD  [X] Not applicable   [ ] Pt refused     Interventions:   1) - Continue current diet order, which remains appropriate at this time. Defer food and fluid consistency to SLP/Team.   2) - Continue Glucerna 2x daily (provides 220 kcal, 10 gm protein per 8oz serving), Ensure Max 1x daily (provides 150 kcal, 30 gm protein per 11oz serving)   3) - Encourage and assist Pt with meals, Monitor PO diet tolerance.   4) - RDN services remain available as needed.     Monitor & Evaluate:  PO intake, tolerance to diet/supplement, nutrition related lab values, weight trends, BMs/GI distress, hydration status, skin integrity.    Ambrocio Stratton RD, CDN. Available on [facility] teams,  Pager #[phone]

## 2025-04-26 LAB
ANION GAP SERPL CALC-SCNC: 9 MMOL/L — SIGNIFICANT CHANGE UP (ref 7–14)
BUN SERPL-MCNC: 16 MG/DL — SIGNIFICANT CHANGE UP (ref 7–23)
CALCIUM SERPL-MCNC: 9.2 MG/DL — SIGNIFICANT CHANGE UP (ref 8.4–10.5)
CHLORIDE SERPL-SCNC: 101 MMOL/L — SIGNIFICANT CHANGE UP (ref 98–107)
CO2 SERPL-SCNC: 29 MMOL/L — SIGNIFICANT CHANGE UP (ref 22–31)
CREAT SERPL-MCNC: 0.72 MG/DL — SIGNIFICANT CHANGE UP (ref 0.5–1.3)
EGFR: 88 ML/MIN/1.73M2 — SIGNIFICANT CHANGE UP
EGFR: 88 ML/MIN/1.73M2 — SIGNIFICANT CHANGE UP
GLUCOSE BLDC GLUCOMTR-MCNC: 161 MG/DL — HIGH (ref 70–99)
GLUCOSE BLDC GLUCOMTR-MCNC: 162 MG/DL — HIGH (ref 70–99)
GLUCOSE BLDC GLUCOMTR-MCNC: 223 MG/DL — HIGH (ref 70–99)
GLUCOSE BLDC GLUCOMTR-MCNC: 93 MG/DL — SIGNIFICANT CHANGE UP (ref 70–99)
GLUCOSE SERPL-MCNC: 150 MG/DL — HIGH (ref 70–99)
HCT VFR BLD CALC: 34.7 % — SIGNIFICANT CHANGE UP (ref 34.5–45)
HGB BLD-MCNC: 11.4 G/DL — LOW (ref 11.5–15.5)
MAGNESIUM SERPL-MCNC: 1.8 MG/DL — SIGNIFICANT CHANGE UP (ref 1.6–2.6)
MCHC RBC-ENTMCNC: 32.2 PG — SIGNIFICANT CHANGE UP (ref 27–34)
MCHC RBC-ENTMCNC: 32.9 G/DL — SIGNIFICANT CHANGE UP (ref 32–36)
MCV RBC AUTO: 98 FL — SIGNIFICANT CHANGE UP (ref 80–100)
NRBC # BLD AUTO: 0 K/UL — SIGNIFICANT CHANGE UP (ref 0–0)
NRBC # FLD: 0 K/UL — SIGNIFICANT CHANGE UP (ref 0–0)
NRBC BLD AUTO-RTO: 0 /100 WBCS — SIGNIFICANT CHANGE UP (ref 0–0)
PHOSPHATE SERPL-MCNC: 3.6 MG/DL — SIGNIFICANT CHANGE UP (ref 2.5–4.5)
PLATELET # BLD AUTO: 158 K/UL — SIGNIFICANT CHANGE UP (ref 150–400)
POTASSIUM SERPL-MCNC: 4.3 MMOL/L — SIGNIFICANT CHANGE UP (ref 3.5–5.3)
POTASSIUM SERPL-SCNC: 4.3 MMOL/L — SIGNIFICANT CHANGE UP (ref 3.5–5.3)
RBC # BLD: 3.54 M/UL — LOW (ref 3.8–5.2)
RBC # FLD: 13.8 % — SIGNIFICANT CHANGE UP (ref 10.3–14.5)
SODIUM SERPL-SCNC: 139 MMOL/L — SIGNIFICANT CHANGE UP (ref 135–145)
WBC # BLD: 10.87 K/UL — HIGH (ref 3.8–10.5)
WBC # FLD AUTO: 10.87 K/UL — HIGH (ref 3.8–10.5)

## 2025-04-26 PROCEDURE — 99232 SBSQ HOSP IP/OBS MODERATE 35: CPT

## 2025-04-26 RX ORDER — DEXTROMETHORPHAN HBR, GUAIFENESIN 200 MG/10ML
600 LIQUID ORAL EVERY 12 HOURS
Refills: 0 | Status: DISCONTINUED | OUTPATIENT
Start: 2025-04-26 | End: 2025-05-13

## 2025-04-26 RX ADMIN — LEVALBUTEROL HYDROCHLORIDE 0.63 MILLIGRAM(S): 1.25 SOLUTION RESPIRATORY (INHALATION) at 22:47

## 2025-04-26 RX ADMIN — INSULIN LISPRO 2: 100 INJECTION, SOLUTION INTRAVENOUS; SUBCUTANEOUS at 12:14

## 2025-04-26 RX ADMIN — INSULIN LISPRO 1: 100 INJECTION, SOLUTION INTRAVENOUS; SUBCUTANEOUS at 08:48

## 2025-04-26 RX ADMIN — Medication 500000 UNIT(S): at 22:52

## 2025-04-26 RX ADMIN — INSULIN GLARGINE-YFGN 6 UNIT(S): 100 INJECTION, SOLUTION SUBCUTANEOUS at 22:52

## 2025-04-26 RX ADMIN — POLYETHYLENE GLYCOL 3350 17 GRAM(S): 17 POWDER, FOR SOLUTION ORAL at 05:30

## 2025-04-26 RX ADMIN — Medication 500 MICROGRAM(S): at 22:48

## 2025-04-26 RX ADMIN — Medication 1 APPLICATION(S): at 18:32

## 2025-04-26 RX ADMIN — Medication 1 DROP(S): at 22:54

## 2025-04-26 RX ADMIN — OLANZAPINE 2.5 MILLIGRAM(S): 10 TABLET ORAL at 22:53

## 2025-04-26 RX ADMIN — Medication 500 MICROGRAM(S): at 15:33

## 2025-04-26 RX ADMIN — Medication 40 MILLIGRAM(S): at 05:30

## 2025-04-26 RX ADMIN — PREDNISONE 10 MILLIGRAM(S): 20 TABLET ORAL at 05:29

## 2025-04-26 RX ADMIN — SERTRALINE 125 MILLIGRAM(S): 100 TABLET, FILM COATED ORAL at 12:15

## 2025-04-26 RX ADMIN — ENOXAPARIN SODIUM 40 MILLIGRAM(S): 100 INJECTION SUBCUTANEOUS at 18:04

## 2025-04-26 RX ADMIN — Medication 1 DOSE(S): at 08:49

## 2025-04-26 RX ADMIN — INSULIN LISPRO 3 UNIT(S): 100 INJECTION, SOLUTION INTRAVENOUS; SUBCUTANEOUS at 12:14

## 2025-04-26 RX ADMIN — INSULIN LISPRO 3 UNIT(S): 100 INJECTION, SOLUTION INTRAVENOUS; SUBCUTANEOUS at 18:06

## 2025-04-26 RX ADMIN — DEXTROMETHORPHAN HBR, GUAIFENESIN 600 MILLIGRAM(S): 200 LIQUID ORAL at 12:11

## 2025-04-26 RX ADMIN — Medication 1 APPLICATION(S): at 05:30

## 2025-04-26 RX ADMIN — Medication 500 MICROGRAM(S): at 07:23

## 2025-04-26 RX ADMIN — INSULIN LISPRO 3 UNIT(S): 100 INJECTION, SOLUTION INTRAVENOUS; SUBCUTANEOUS at 08:49

## 2025-04-26 RX ADMIN — Medication 500000 UNIT(S): at 17:14

## 2025-04-26 RX ADMIN — LEVALBUTEROL HYDROCHLORIDE 0.63 MILLIGRAM(S): 1.25 SOLUTION RESPIRATORY (INHALATION) at 15:32

## 2025-04-26 RX ADMIN — Medication 1 DROP(S): at 05:31

## 2025-04-26 RX ADMIN — LEVALBUTEROL HYDROCHLORIDE 0.63 MILLIGRAM(S): 1.25 SOLUTION RESPIRATORY (INHALATION) at 07:23

## 2025-04-26 RX ADMIN — MEMANTINE HYDROCHLORIDE 10 MILLIGRAM(S): 21 CAPSULE, EXTENDED RELEASE ORAL at 22:52

## 2025-04-26 RX ADMIN — DEXTROMETHORPHAN HBR, GUAIFENESIN 600 MILLIGRAM(S): 200 LIQUID ORAL at 18:07

## 2025-04-26 RX ADMIN — Medication 9 MILLIGRAM(S): at 22:52

## 2025-04-26 RX ADMIN — Medication 1 DOSE(S): at 22:53

## 2025-04-26 RX ADMIN — Medication 500000 UNIT(S): at 05:29

## 2025-04-26 RX ADMIN — MEMANTINE HYDROCHLORIDE 5 MILLIGRAM(S): 21 CAPSULE, EXTENDED RELEASE ORAL at 06:30

## 2025-04-26 RX ADMIN — Medication 1 APPLICATION(S): at 05:31

## 2025-04-26 RX ADMIN — LISINOPRIL 40 MILLIGRAM(S): 5 TABLET ORAL at 05:29

## 2025-04-26 RX ADMIN — POLYETHYLENE GLYCOL 3350 17 GRAM(S): 17 POWDER, FOR SOLUTION ORAL at 18:04

## 2025-04-26 RX ADMIN — Medication 1 DROP(S): at 14:14

## 2025-04-26 NOTE — PROGRESS NOTE ADULT - PROBLEM SELECTOR PLAN 2
Due to status asthmaticus s/p intubation and bilevel now weaned off of NC to RA   - aspergillus Ab: negative , ANCA: negative, IgE level 57  - s/p course of antibiotics  - c/w ipratropium + Xopenex nebs q8h  - change to advair per pulm  - s/p Solumedrol, followed by Prednisone 40mg qdaily x 3 days, 30mg  4/19 x 3 days then 20mg x 3 days then 10mg x 3 days then off as per pulmonary  on RA no wheezing  apprec pulm f/u with respect to ECT  mucinex started  cont airway clearance

## 2025-04-26 NOTE — PROGRESS NOTE ADULT - PROBLEM SELECTOR PLAN 1
MDD w/ psychotic features.  - s/p ativan now on hold given concern that it may have contributed to respiratory decompensation  - c/w Zoloft 125 qday (home dose was 150).  - c/w Zyprexa 2.5 qhs  - c/w Namenda 10mg qhs  - psych following  - ECT  family now agreeable, first ECT 4/25m next 4/28  pt optimized per cards and pulm for ECT    seems to have good response to first ECT

## 2025-04-26 NOTE — PROGRESS NOTE ADULT - SUBJECTIVE AND OBJECTIVE BOX
[facility] Division of Hospital Medicine  Em Toure MD  Pager [phone]    Patient is a 74y old  Female who presents with a chief complaint of asthma exac/resp failure/catatonia       SUBJECTIVE / OVERNIGHT EVENTS: resting this AM on my eval; per PCA pt ate all of her breakfast; more responsive to questions       MEDICATIONS  (STANDING):  artificial tears (preservative free) Ophthalmic Solution 1 Drop(s) Both EYES every 8 hours  bisacodyl 10 milliGRAM(s) Oral once  chlorhexidine 2% Cloths 1 Application(s) Topical <User Schedule>  dextrose 50% Injectable 25 Gram(s) IV Push once  dextrose 50% Injectable 12.5 Gram(s) IV Push once  dextrose 50% Injectable 25 Gram(s) IV Push once  dextrose Oral Gel 15 Gram(s) Oral once  enoxaparin Injectable 40 milliGRAM(s) SubCutaneous every 24 hours  fluticasone propionate/ salmeterol 250-50 MICROgram(s) Diskus 1 Dose(s) Inhalation two times a day  glucagon  Injectable 1 milliGRAM(s) IntraMuscular once  guaiFENesin  milliGRAM(s) Oral every 12 hours  hydrochlorothiazide 25 milliGRAM(s) Oral daily  insulin glargine Injectable (LANTUS) 6 Unit(s) SubCutaneous at bedtime  insulin lispro (ADMELOG) corrective regimen sliding scale   SubCutaneous three times a day before meals  insulin lispro (ADMELOG) corrective regimen sliding scale   SubCutaneous at bedtime  insulin lispro Injectable (ADMELOG) 3 Unit(s) SubCutaneous three times a day before meals  ipratropium    for Nebulization 500 MICROGram(s) Nebulizer every 8 hours  levalbuterol Inhalation 0.63 milliGRAM(s) Inhalation every 8 hours  lisinopril 40 milliGRAM(s) Oral daily  melatonin 9 milliGRAM(s) Oral at bedtime  memantine 10 milliGRAM(s) Oral at bedtime  memantine 5 milliGRAM(s) Oral <User Schedule>  nystatin    Suspension 651946 Unit(s) Oral three times a day  OLANZapine 2.5 milliGRAM(s) Oral at bedtime  pantoprazole    Tablet 40 milliGRAM(s) Oral before breakfast  petrolatum Ophthalmic Ointment 1 Application(s) Both EYES two times a day  polyethylene glycol 3350 17 Gram(s) Oral two times a day  senna 2 Tablet(s) Oral at bedtime  sertraline 125 milliGRAM(s) Oral daily    MEDICATIONS  (PRN):  albuterol/ipratropium for Nebulization 3 milliLiter(s) Nebulizer every 6 hours PRN Shortness of Breath and/or Wheezing      CAPILLARY BLOOD GLUCOSE  POCT Blood Glucose.: 223 mg/dL (26 Apr 2025 12:03)  POCT Blood Glucose.: 162 mg/dL (26 Apr 2025 08:35)  POCT Blood Glucose.: 171 mg/dL (25 Apr 2025 22:50)  POCT Blood Glucose.: 109 mg/dL (25 Apr 2025 17:40)        PHYSICAL EXAM:  Vital Signs Last 24 Hrs  T(F): 97.3 (26 Apr 2025 05:36), Max: 97.9 (25 Apr 2025 20:05)  HR: 73 (26 Apr 2025 05:36) (73 - 91)  BP: 154/80 (26 Apr 2025 05:36) (127/63 - 154/80)  RR: 18 (26 Apr 2025 05:36) (18 - 19)  SpO2: 97% (26 Apr 2025 05:36) (84% - 100%)    Parameters below as of 26 Apr 2025 05:36  Patient On (Oxygen Delivery Method): nasal cannula  O2 Flow (L/min): 2      CONSTITUTIONAL: NAD, appears comfortable  EYES: PERRLA; conjunctiva and sclera clear  ENMT: Moist oral mucosa; normal dentition  RESPIRATORY: Normal respiratory effort; grossly b/l AE; no wheeze  CARDIOVASCULAR: Regular rate and rhythm; No lower extremity edema  ABDOMEN: Nontender to palpation, normoactive bowel sounds  MUSCULOSKELETAL: no clubbing or cyanosis of digits; no joint swelling or tenderness to palpation  PSYCH: calm, coop; more engaging  NEUROLOGY: CN 2-12 are intact and symmetric; no gross sensory deficits   SKIN: No rashes; no palpable lesions    LABS:                        11.4   10.87 )-----------( 158      ( 26 Apr 2025 07:30 )             34.7     04-26    139  |  101  |  16  ----------------------------<  150[H]  4.3   |  29  |  0.72    Ca    9.2      26 Apr 2025 07:30  Phos  3.6     04-26  Mg     1.80     04-26

## 2025-04-27 LAB
GLUCOSE BLDC GLUCOMTR-MCNC: 137 MG/DL — HIGH (ref 70–99)
GLUCOSE BLDC GLUCOMTR-MCNC: 138 MG/DL — HIGH (ref 70–99)
GLUCOSE BLDC GLUCOMTR-MCNC: 140 MG/DL — HIGH (ref 70–99)
GLUCOSE BLDC GLUCOMTR-MCNC: 95 MG/DL — SIGNIFICANT CHANGE UP (ref 70–99)

## 2025-04-27 PROCEDURE — 99232 SBSQ HOSP IP/OBS MODERATE 35: CPT

## 2025-04-27 RX ADMIN — Medication 1 DROP(S): at 13:08

## 2025-04-27 RX ADMIN — SERTRALINE 125 MILLIGRAM(S): 100 TABLET, FILM COATED ORAL at 13:09

## 2025-04-27 RX ADMIN — ENOXAPARIN SODIUM 40 MILLIGRAM(S): 100 INJECTION SUBCUTANEOUS at 17:40

## 2025-04-27 RX ADMIN — Medication 1 DOSE(S): at 13:06

## 2025-04-27 RX ADMIN — MEMANTINE HYDROCHLORIDE 10 MILLIGRAM(S): 21 CAPSULE, EXTENDED RELEASE ORAL at 23:14

## 2025-04-27 RX ADMIN — Medication 500000 UNIT(S): at 13:07

## 2025-04-27 RX ADMIN — Medication 1 APPLICATION(S): at 17:40

## 2025-04-27 RX ADMIN — OLANZAPINE 2.5 MILLIGRAM(S): 10 TABLET ORAL at 23:14

## 2025-04-27 RX ADMIN — Medication 9 MILLIGRAM(S): at 23:14

## 2025-04-27 RX ADMIN — Medication 500 MICROGRAM(S): at 16:07

## 2025-04-27 RX ADMIN — LEVALBUTEROL HYDROCHLORIDE 0.63 MILLIGRAM(S): 1.25 SOLUTION RESPIRATORY (INHALATION) at 07:50

## 2025-04-27 RX ADMIN — Medication 500 MICROGRAM(S): at 07:50

## 2025-04-27 RX ADMIN — LEVALBUTEROL HYDROCHLORIDE 0.63 MILLIGRAM(S): 1.25 SOLUTION RESPIRATORY (INHALATION) at 16:07

## 2025-04-27 RX ADMIN — INSULIN LISPRO 3 UNIT(S): 100 INJECTION, SOLUTION INTRAVENOUS; SUBCUTANEOUS at 17:41

## 2025-04-27 RX ADMIN — MEMANTINE HYDROCHLORIDE 5 MILLIGRAM(S): 21 CAPSULE, EXTENDED RELEASE ORAL at 06:11

## 2025-04-27 RX ADMIN — Medication 1 DOSE(S): at 23:12

## 2025-04-27 RX ADMIN — LEVALBUTEROL HYDROCHLORIDE 0.63 MILLIGRAM(S): 1.25 SOLUTION RESPIRATORY (INHALATION) at 23:29

## 2025-04-27 RX ADMIN — DEXTROMETHORPHAN HBR, GUAIFENESIN 600 MILLIGRAM(S): 200 LIQUID ORAL at 17:40

## 2025-04-27 RX ADMIN — Medication 500000 UNIT(S): at 06:11

## 2025-04-27 RX ADMIN — INSULIN LISPRO 3 UNIT(S): 100 INJECTION, SOLUTION INTRAVENOUS; SUBCUTANEOUS at 08:37

## 2025-04-27 RX ADMIN — Medication 1 DROP(S): at 23:12

## 2025-04-27 RX ADMIN — LISINOPRIL 40 MILLIGRAM(S): 5 TABLET ORAL at 06:11

## 2025-04-27 RX ADMIN — Medication 40 MILLIGRAM(S): at 06:11

## 2025-04-27 RX ADMIN — Medication 1 APPLICATION(S): at 06:12

## 2025-04-27 RX ADMIN — INSULIN LISPRO 3 UNIT(S): 100 INJECTION, SOLUTION INTRAVENOUS; SUBCUTANEOUS at 13:07

## 2025-04-27 RX ADMIN — POLYETHYLENE GLYCOL 3350 17 GRAM(S): 17 POWDER, FOR SOLUTION ORAL at 17:40

## 2025-04-27 RX ADMIN — POLYETHYLENE GLYCOL 3350 17 GRAM(S): 17 POWDER, FOR SOLUTION ORAL at 06:13

## 2025-04-27 RX ADMIN — Medication 1 DROP(S): at 06:12

## 2025-04-27 RX ADMIN — INSULIN GLARGINE-YFGN 6 UNIT(S): 100 INJECTION, SOLUTION SUBCUTANEOUS at 23:22

## 2025-04-27 RX ADMIN — DEXTROMETHORPHAN HBR, GUAIFENESIN 600 MILLIGRAM(S): 200 LIQUID ORAL at 06:13

## 2025-04-27 RX ADMIN — Medication 500 MICROGRAM(S): at 23:29

## 2025-04-27 RX ADMIN — Medication 500000 UNIT(S): at 23:13

## 2025-04-27 NOTE — PROGRESS NOTE ADULT - SUBJECTIVE AND OBJECTIVE BOX
[facility] Division of Hospital Medicine  Em Toure MD  Pager [phone]    Patient is a 74y old  Female who presents with a chief complaint of asthma exac/resp failure/catatonia       SUBJECTIVE / OVERNIGHT EVENTS: pt seen this AM,  had just arrived; pt doing well, eating better; more engaging      MEDICATIONS  (STANDING):  artificial tears (preservative free) Ophthalmic Solution 1 Drop(s) Both EYES every 8 hours  bisacodyl 10 milliGRAM(s) Oral once  chlorhexidine 2% Cloths 1 Application(s) Topical <User Schedule>  dextrose 50% Injectable 25 Gram(s) IV Push once  dextrose 50% Injectable 12.5 Gram(s) IV Push once  dextrose 50% Injectable 25 Gram(s) IV Push once  dextrose Oral Gel 15 Gram(s) Oral once  enoxaparin Injectable 40 milliGRAM(s) SubCutaneous every 24 hours  fluticasone propionate/ salmeterol 250-50 MICROgram(s) Diskus 1 Dose(s) Inhalation two times a day  glucagon  Injectable 1 milliGRAM(s) IntraMuscular once  guaiFENesin  milliGRAM(s) Oral every 12 hours  hydrochlorothiazide 25 milliGRAM(s) Oral daily  insulin glargine Injectable (LANTUS) 6 Unit(s) SubCutaneous at bedtime  insulin lispro (ADMELOG) corrective regimen sliding scale   SubCutaneous three times a day before meals  insulin lispro (ADMELOG) corrective regimen sliding scale   SubCutaneous at bedtime  insulin lispro Injectable (ADMELOG) 3 Unit(s) SubCutaneous three times a day before meals  ipratropium    for Nebulization 500 MICROGram(s) Nebulizer every 8 hours  levalbuterol Inhalation 0.63 milliGRAM(s) Inhalation every 8 hours  lisinopril 40 milliGRAM(s) Oral daily  melatonin 9 milliGRAM(s) Oral at bedtime  memantine 10 milliGRAM(s) Oral at bedtime  memantine 5 milliGRAM(s) Oral <User Schedule>  nystatin    Suspension 782178 Unit(s) Oral three times a day  OLANZapine 2.5 milliGRAM(s) Oral at bedtime  pantoprazole    Tablet 40 milliGRAM(s) Oral before breakfast  petrolatum Ophthalmic Ointment 1 Application(s) Both EYES two times a day  polyethylene glycol 3350 17 Gram(s) Oral two times a day  senna 2 Tablet(s) Oral at bedtime  sertraline 125 milliGRAM(s) Oral daily    MEDICATIONS  (PRN):  albuterol/ipratropium for Nebulization 3 milliLiter(s) Nebulizer every 6 hours PRN Shortness of Breath and/or Wheezing      CAPILLARY BLOOD GLUCOSE  POCT Blood Glucose.: 137 mg/dL (27 Apr 2025 08:14)  POCT Blood Glucose.: 161 mg/dL (26 Apr 2025 22:17)  POCT Blood Glucose.: 93 mg/dL (26 Apr 2025 17:56)  POCT Blood Glucose.: 223 mg/dL (26 Apr 2025 12:03)        PHYSICAL EXAM:  Vital Signs Last 24 Hrs  T(F): 97.9 (27 Apr 2025 06:13), Max: 98.8 (26 Apr 2025 23:01)  HR: 77 (27 Apr 2025 06:13) (72 - 89)  BP: 130/62 (27 Apr 2025 06:13) (110/63 - 134/65)  RR: 18 (27 Apr 2025 06:13) (18 - 20)  SpO2: 100% (27 Apr 2025 06:13) (93% - 100%)    Parameters below as of 27 Apr 2025 06:13  Patient On (Oxygen Delivery Method): nasal cannula  O2 Flow (L/min): 2      CONSTITUTIONAL: NAD, appears comfortable  EYES: PERRLA; conjunctiva and sclera clear  ENMT: Moist oral mucosa; normal dentition  RESPIRATORY: Normal respiratory effort; grossly b/l AE  CARDIOVASCULAR: Regular rate and rhythm; No lower extremity edema  ABDOMEN: Nontender to palpation, normoactive bowel sounds  MUSCULOSKELETAL: no clubbing or cyanosis of digits; no joint swelling or tenderness to palpation  PSYCH: calm, coop; affect appropriate  NEUROLOGY: CN 2-12 are intact and symmetric; no gross sensory deficits   SKIN: No rashes; no palpable lesions    LABS:

## 2025-04-27 NOTE — CHART NOTE - NSCHARTNOTEFT_GEN_A_CORE
Chart reviewed. VSS. Plan for ECT treatment #2 tomorrow.    RECOMMENDATIONS:  - scheduled for ECT #2 tomorrow  - NPO after midnight Chart reviewed. VSS. Plan for ECT treatment #2 tomorrow.    RECOMMENDATIONS:  - scheduled for ECT #2 tomorrow  - NPO after midnight    Discussed with DANAE Sanchez.

## 2025-04-28 LAB
ANION GAP SERPL CALC-SCNC: 10 MMOL/L — SIGNIFICANT CHANGE UP (ref 7–14)
BUN SERPL-MCNC: 13 MG/DL — SIGNIFICANT CHANGE UP (ref 7–23)
CALCIUM SERPL-MCNC: 9.5 MG/DL — SIGNIFICANT CHANGE UP (ref 8.4–10.5)
CHLORIDE SERPL-SCNC: 100 MMOL/L — SIGNIFICANT CHANGE UP (ref 98–107)
CO2 SERPL-SCNC: 31 MMOL/L — SIGNIFICANT CHANGE UP (ref 22–31)
CREAT SERPL-MCNC: 0.62 MG/DL — SIGNIFICANT CHANGE UP (ref 0.5–1.3)
EGFR: 93 ML/MIN/1.73M2 — SIGNIFICANT CHANGE UP
EGFR: 93 ML/MIN/1.73M2 — SIGNIFICANT CHANGE UP
GLUCOSE BLDC GLUCOMTR-MCNC: 130 MG/DL — HIGH (ref 70–99)
GLUCOSE BLDC GLUCOMTR-MCNC: 133 MG/DL — HIGH (ref 70–99)
GLUCOSE BLDC GLUCOMTR-MCNC: 142 MG/DL — HIGH (ref 70–99)
GLUCOSE BLDC GLUCOMTR-MCNC: 152 MG/DL — HIGH (ref 70–99)
GLUCOSE SERPL-MCNC: 115 MG/DL — HIGH (ref 70–99)
HCT VFR BLD CALC: 34.2 % — LOW (ref 34.5–45)
HGB BLD-MCNC: 11.3 G/DL — LOW (ref 11.5–15.5)
MAGNESIUM SERPL-MCNC: 1.8 MG/DL — SIGNIFICANT CHANGE UP (ref 1.6–2.6)
MCHC RBC-ENTMCNC: 32.3 PG — SIGNIFICANT CHANGE UP (ref 27–34)
MCHC RBC-ENTMCNC: 33 G/DL — SIGNIFICANT CHANGE UP (ref 32–36)
MCV RBC AUTO: 97.7 FL — SIGNIFICANT CHANGE UP (ref 80–100)
NRBC # BLD AUTO: 0 K/UL — SIGNIFICANT CHANGE UP (ref 0–0)
NRBC # FLD: 0 K/UL — SIGNIFICANT CHANGE UP (ref 0–0)
NRBC BLD AUTO-RTO: 0 /100 WBCS — SIGNIFICANT CHANGE UP (ref 0–0)
PHOSPHATE SERPL-MCNC: 3.5 MG/DL — SIGNIFICANT CHANGE UP (ref 2.5–4.5)
PLATELET # BLD AUTO: 159 K/UL — SIGNIFICANT CHANGE UP (ref 150–400)
POTASSIUM SERPL-MCNC: 3.9 MMOL/L — SIGNIFICANT CHANGE UP (ref 3.5–5.3)
POTASSIUM SERPL-SCNC: 3.9 MMOL/L — SIGNIFICANT CHANGE UP (ref 3.5–5.3)
RBC # BLD: 3.5 M/UL — LOW (ref 3.8–5.2)
RBC # FLD: 14 % — SIGNIFICANT CHANGE UP (ref 10.3–14.5)
SODIUM SERPL-SCNC: 141 MMOL/L — SIGNIFICANT CHANGE UP (ref 135–145)
WBC # BLD: 8.27 K/UL — SIGNIFICANT CHANGE UP (ref 3.8–10.5)
WBC # FLD AUTO: 8.27 K/UL — SIGNIFICANT CHANGE UP (ref 3.8–10.5)

## 2025-04-28 PROCEDURE — 99232 SBSQ HOSP IP/OBS MODERATE 35: CPT

## 2025-04-28 PROCEDURE — 99231 SBSQ HOSP IP/OBS SF/LOW 25: CPT

## 2025-04-28 PROCEDURE — 90870 ELECTROCONVULSIVE THERAPY: CPT

## 2025-04-28 RX ORDER — INSULIN LISPRO 100 U/ML
INJECTION, SOLUTION INTRAVENOUS; SUBCUTANEOUS EVERY 6 HOURS
Refills: 0 | Status: DISCONTINUED | OUTPATIENT
Start: 2025-04-28 | End: 2025-04-30

## 2025-04-28 RX ADMIN — Medication 500000 UNIT(S): at 13:17

## 2025-04-28 RX ADMIN — Medication 500 MICROGRAM(S): at 15:57

## 2025-04-28 RX ADMIN — Medication 9 MILLIGRAM(S): at 21:38

## 2025-04-28 RX ADMIN — Medication 1 DROP(S): at 13:18

## 2025-04-28 RX ADMIN — LEVALBUTEROL HYDROCHLORIDE 0.63 MILLIGRAM(S): 1.25 SOLUTION RESPIRATORY (INHALATION) at 22:37

## 2025-04-28 RX ADMIN — LEVALBUTEROL HYDROCHLORIDE 0.63 MILLIGRAM(S): 1.25 SOLUTION RESPIRATORY (INHALATION) at 07:18

## 2025-04-28 RX ADMIN — INSULIN LISPRO 3 UNIT(S): 100 INJECTION, SOLUTION INTRAVENOUS; SUBCUTANEOUS at 13:16

## 2025-04-28 RX ADMIN — MEMANTINE HYDROCHLORIDE 10 MILLIGRAM(S): 21 CAPSULE, EXTENDED RELEASE ORAL at 21:37

## 2025-04-28 RX ADMIN — Medication 1 APPLICATION(S): at 18:15

## 2025-04-28 RX ADMIN — Medication 1 APPLICATION(S): at 07:25

## 2025-04-28 RX ADMIN — INSULIN LISPRO 3 UNIT(S): 100 INJECTION, SOLUTION INTRAVENOUS; SUBCUTANEOUS at 18:14

## 2025-04-28 RX ADMIN — INSULIN LISPRO 1: 100 INJECTION, SOLUTION INTRAVENOUS; SUBCUTANEOUS at 13:15

## 2025-04-28 RX ADMIN — DEXTROMETHORPHAN HBR, GUAIFENESIN 600 MILLIGRAM(S): 200 LIQUID ORAL at 18:16

## 2025-04-28 RX ADMIN — INSULIN GLARGINE-YFGN 6 UNIT(S): 100 INJECTION, SOLUTION SUBCUTANEOUS at 22:58

## 2025-04-28 RX ADMIN — Medication 500000 UNIT(S): at 21:37

## 2025-04-28 RX ADMIN — Medication 1 DROP(S): at 07:23

## 2025-04-28 RX ADMIN — Medication 500 MICROGRAM(S): at 22:37

## 2025-04-28 RX ADMIN — Medication 1 DOSE(S): at 21:36

## 2025-04-28 RX ADMIN — LISINOPRIL 40 MILLIGRAM(S): 5 TABLET ORAL at 07:24

## 2025-04-28 RX ADMIN — Medication 1 DOSE(S): at 07:26

## 2025-04-28 RX ADMIN — Medication 1 DROP(S): at 21:37

## 2025-04-28 RX ADMIN — ENOXAPARIN SODIUM 40 MILLIGRAM(S): 100 INJECTION SUBCUTANEOUS at 18:15

## 2025-04-28 RX ADMIN — MEMANTINE HYDROCHLORIDE 5 MILLIGRAM(S): 21 CAPSULE, EXTENDED RELEASE ORAL at 13:18

## 2025-04-28 RX ADMIN — SERTRALINE 125 MILLIGRAM(S): 100 TABLET, FILM COATED ORAL at 13:18

## 2025-04-28 RX ADMIN — Medication 500 MICROGRAM(S): at 07:18

## 2025-04-28 RX ADMIN — OLANZAPINE 2.5 MILLIGRAM(S): 10 TABLET ORAL at 21:38

## 2025-04-28 NOTE — PROGRESS NOTE ADULT - SUBJECTIVE AND OBJECTIVE BOX
[facility] Division of Hospital Medicine  Carlos SummersDO  Available via MS Teams    SUBJECTIVE / OVERNIGHT EVENTS:  Went for ECT this AM.  Does not answer most questions.  Denies acute complaints.    Tells me her full name.  Thinks the year is 1985.     ADDITIONAL REVIEW OF SYSTEMS:    MEDICATIONS  (STANDING):  artificial tears (preservative free) Ophthalmic Solution 1 Drop(s) Both EYES every 8 hours  bisacodyl 10 milliGRAM(s) Oral once  chlorhexidine 2% Cloths 1 Application(s) Topical <User Schedule>  dextrose 50% Injectable 25 Gram(s) IV Push once  dextrose 50% Injectable 12.5 Gram(s) IV Push once  dextrose 50% Injectable 25 Gram(s) IV Push once  dextrose Oral Gel 15 Gram(s) Oral once  enoxaparin Injectable 40 milliGRAM(s) SubCutaneous every 24 hours  fluticasone propionate/ salmeterol 250-50 MICROgram(s) Diskus 1 Dose(s) Inhalation two times a day  glucagon  Injectable 1 milliGRAM(s) IntraMuscular once  guaiFENesin  milliGRAM(s) Oral every 12 hours  hydrochlorothiazide 25 milliGRAM(s) Oral daily  insulin glargine Injectable (LANTUS) 6 Unit(s) SubCutaneous at bedtime  insulin lispro (ADMELOG) corrective regimen sliding scale   SubCutaneous every 6 hours  insulin lispro Injectable (ADMELOG) 3 Unit(s) SubCutaneous three times a day before meals  ipratropium    for Nebulization 500 MICROGram(s) Nebulizer every 8 hours  levalbuterol Inhalation 0.63 milliGRAM(s) Inhalation every 8 hours  lisinopril 40 milliGRAM(s) Oral daily  melatonin 9 milliGRAM(s) Oral at bedtime  memantine 10 milliGRAM(s) Oral at bedtime  memantine 5 milliGRAM(s) Oral <User Schedule>  nystatin    Suspension 265997 Unit(s) Oral three times a day  OLANZapine 2.5 milliGRAM(s) Oral at bedtime  pantoprazole    Tablet 40 milliGRAM(s) Oral before breakfast  petrolatum Ophthalmic Ointment 1 Application(s) Both EYES two times a day  polyethylene glycol 3350 17 Gram(s) Oral two times a day  senna 2 Tablet(s) Oral at bedtime  sertraline 125 milliGRAM(s) Oral daily    MEDICATIONS  (PRN):  albuterol/ipratropium for Nebulization 3 milliLiter(s) Nebulizer every 6 hours PRN Shortness of Breath and/or Wheezing      I&O's Summary      PHYSICAL EXAM:  Vital Signs Last 24 Hrs  T(C): 37.1 (28 Apr 2025 09:10), Max: 37.1 (27 Apr 2025 22:31)  T(F): 98.8 (28 Apr 2025 09:10), Max: 98.8 (28 Apr 2025 08:14)  HR: 94 (28 Apr 2025 09:40) (72 - 99)  BP: 145/88 (28 Apr 2025 09:40) (122/71 - 166/73)  BP(mean): --  RR: 16 (28 Apr 2025 09:40) (16 - 20)  SpO2: 94% (28 Apr 2025 09:40) (93% - 100%)    Parameters below as of 28 Apr 2025 09:20  Patient On (Oxygen Delivery Method): nasal cannula      LABS:                        11.3   8.27  )-----------( 159      ( 28 Apr 2025 06:25 )             34.2     04-28    141  |  100  |  13  ----------------------------<  115[H]  3.9   |  31  |  0.62    Ca    9.5      28 Apr 2025 06:25  Phos  3.5     04-28  Mg     1.80     04-28            Urinalysis Basic - ( 28 Apr 2025 06:25 )    Color: x / Appearance: x / SG: x / pH: x  Gluc: 115 mg/dL / Ketone: x  / Bili: x / Urobili: x   Blood: x / Protein: x / Nitrite: x   Leuk Esterase: x / RBC: x / WBC x   Sq Epi: x / Non Sq Epi: x / Bacteria: x        COVID-19 PCR: NotDetec (20 Apr 2025 05:54)  SARS-CoV-2: NotDetec (19 Mar 2025 09:05)  SARS-CoV-2: NotDetec (08 Mar 2025 14:40)

## 2025-04-28 NOTE — PROGRESS NOTE ADULT - ASSESSMENT
EKG: SR nonischemic    A/P:  74F MDD, asthma, prior CVA presented with AHRF c/b AMS requiring MICU admission for intubation for respiratory failure i/s/o asthma exacerbation. Extubated 3/12 to face tent and then on BIPAP s/p RRT 3/19 for increased WOB / hypoxia despite aggressive asthma management s/p return to MICU for management of severe asthma. Possible aspiration event 3/31. SLP eval appreciated. XR cine performed with improvement in mental status - pureed diet w/ thin liquids recommended. Worsening respiratory status 4/2 - placed on NIV, now weaned to nasal cannula and now RA.  ENT eval - no upper airway consideration for structural causes of asthma such as vocal cord dysfunction.   Course c/b MDD w/ catatonia and poor PO, off ativan due to respiratory concerns trialed on Memantine family now agreeable to ECT, planned for 4/25    1. SOB   -TTE 3/9 EF 78% no WMA  -LE duplex negative for DVT  -Repeat EKG NSR no ischemic changes   -Acute asthma exacerbation  -f/u pulm    2. HTN  - c/w  Lisinopril 40mg daily   - c/w HCTZ 25 mg daily  - no beta-blockers 2/2 asthma    3. Pre ECT Eval   - Denies CP SOB has had long hospitalization  - EKG normal, Echo normal   - optimized for ECT- s/p treatment on 4/28    4. DVT ppx  c/w lovenox

## 2025-04-28 NOTE — BH CONSULTATION LIAISON PROGRESS NOTE - NSBHASSESSMENTFT_PSY_ALL_CORE
Patient is a 74F hx asthma, depression, and HTN was admitted to the MICU with AHRF 2/2 asthma exacerbation resulting in acute on chronic HHRF causing AMS and increased WOB that required intubation. During her ICU course, she experienced seizure-like activity .  Additional issues included hyponatremia, acute on chronic metabolic alkalosis, and mild thrombocytopenia,  also having fevers.  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 Patient has h/o hx of major depressive disorder with psychosis, hx of 3 past inpatient psychiatric hospitalizations last in 2016 at [facility], all rehospitalizations were in context non compliance with the treatment. She has no hx of suicide attempts, no hx of substance abuse. Patient w hx of paranoia, disorganization, catatonia, required MOO when in [facility].     3/24----more withdrawn, some stiffness in neck, not communicative. Some concerns for catatonia. Unsure if symptoms attributed to catatonia vs delirium  3/25--- more alert today, trying to following commands. Still with PMR+. Appears to be responding to Ativan  3/26-- BFS 10, will increasing standing ativan to target catatonic symptoms - giving STAT 0.5mg NOW discussed w acp   3/27----will continue ativan dosing for one more day before increasing  3/28: BFS: stupor 1, mutism 2,withdrawan 2, verbigeration 1, rigidity 2 =8.  3/29: BFS: stupor 2, mutism 2, staring 1, posturing 2, rigidity 2, waxy flexibility 3, withdrawal 2 = 14; please monitor nutritional intake, plan to increase ativan by total daily dose of 0.5 mg at bedtime due to persistent catatonia   3/31: BFS: stupor 2, mutism 2, staring 2, rigidity 1, negativism 1, withdrawal 1, waxy flexibility 3 =12, patient with productive cough, advised for medical work up  4/1---tired, speaking more, not rigid, has PMR+. NPO status now.   4/2--sleepy, RRT this afternoon  4/3---better mentation, pmr but no chester catatonic symptoms  4/4: alert, using few words, mild rigidity -  at bedside, updated   4/5: few words, lethargic, fluctuating wakefulness. Mild rigidity.  No overt catatonic sxs.   4/7: no change, remains thought blocked, few words - son at bedside, updated with care  4/8: patient continues to have poor eye contact, uses few words, no rigidity on today's exam.   4/9: patient remains minimally verbal, some posturing, poor Po intake - continues on NC for 02 requirements   4/10: more verbal/interactive, with increased PO intake per family. Some psychotic output. Family agrees with resumption of Zyprexa.   4/14: alert to self with fluctuating wakefulness, intermittent full sentences f/b one-word answers, some rigidity, posturing, denies si, denies ah/vh  4/16: patient more catatonic today, notable catalepsy, waxiness, verbigeration followed by mutism and staring, family against ativan and ECT, would increase memantine as below, not eating   4/17: catatonic (mute, rigid, not engaging). Eating some portions of meals with significant family support. Family opting to see memantine trial to full effect before consideration of ECT  4/18: mutism today, odd posturing, unable to engage, d/w spouse and son ECT, family still undecided  4/21: alert to self, some improvement with verbal engagement, tracking, follow command, denies si, denies ah/vh, awaiting call back from son in regards to ECT consult  4/22: remains catatonic (mute, postured, rigid, withdrawn, negativistic). Exam limited. Family agrees with formal ECT consult.  4/23: Remains with symptoms of catatonia. BFS: stupor 1, mute2, Rigid 2, negativism 2, withdrawal 2 - 9 : ECT consult completed. awaiting clearances for ECT  4/42: remains with BFS of 9, plan for ECT  4/25: alert to self, able to engage in some conversation, however, confused, loose on  exam, odd posturing right upper arm, Dr. Toure made aware of schedule for ECT Monday 4/28: s/p ECT #2. more spontaneous speech, cooperative with exam. improved eye contact. loose with no rigidity.     PLAN    *** ECT scheduled for WEDNESDAY 4.30  PATIENT TO BE NPO AT MIDNIGHT on 4.29    - no SI or HI, no need for psychiatric CO  -- antipsychotics can only be given if qtc < 500    - MEDICATIONS:  - Memantine 5mg AM and 10mg HS  -- C/W Zoloft 125 mg daily   -- C/W Zyprexa 2.5mg HS   - PRN: Ativan 0.5mg q 6hrs prn IM/IV/PO    - DISPO: pending. patient with acute course of ECT currently

## 2025-04-28 NOTE — PROGRESS NOTE ADULT - SUBJECTIVE AND OBJECTIVE BOX
Arnold Uriostegui MD  Interventional Cardiology / Advance Heart Failure and Cardiac Transplant Specialist  [place] Office : [phone] [place] [address] [place]. [address]  Tel:   [place] Office : [address] [facility] [place] [address]  Tel: [phone]       Pt is lying in bed comfortable not in distress, no chest pains no SOB no palpitations  	  MEDICATIONS:  enoxaparin Injectable 40 milliGRAM(s) SubCutaneous every 24 hours  hydrochlorothiazide 25 milliGRAM(s) Oral daily  lisinopril 40 milliGRAM(s) Oral daily    nystatin    Suspension 644433 Unit(s) Oral three times a day    albuterol/ipratropium for Nebulization 3 milliLiter(s) Nebulizer every 6 hours PRN  fluticasone propionate/ salmeterol 250-50 MICROgram(s) Diskus 1 Dose(s) Inhalation two times a day  guaiFENesin  milliGRAM(s) Oral every 12 hours  ipratropium    for Nebulization 500 MICROGram(s) Nebulizer every 8 hours  levalbuterol Inhalation 0.63 milliGRAM(s) Inhalation every 8 hours    melatonin 9 milliGRAM(s) Oral at bedtime  memantine 10 milliGRAM(s) Oral at bedtime  memantine 5 milliGRAM(s) Oral <User Schedule>  OLANZapine 2.5 milliGRAM(s) Oral at bedtime  sertraline 125 milliGRAM(s) Oral daily    bisacodyl 10 milliGRAM(s) Oral once  pantoprazole    Tablet 40 milliGRAM(s) Oral before breakfast  polyethylene glycol 3350 17 Gram(s) Oral two times a day  senna 2 Tablet(s) Oral at bedtime    dextrose 50% Injectable 25 Gram(s) IV Push once  dextrose 50% Injectable 12.5 Gram(s) IV Push once  dextrose 50% Injectable 25 Gram(s) IV Push once  dextrose Oral Gel 15 Gram(s) Oral once  glucagon  Injectable 1 milliGRAM(s) IntraMuscular once  insulin glargine Injectable (LANTUS) 6 Unit(s) SubCutaneous at bedtime  insulin lispro (ADMELOG) corrective regimen sliding scale   SubCutaneous every 6 hours  insulin lispro Injectable (ADMELOG) 3 Unit(s) SubCutaneous three times a day before meals    artificial tears (preservative free) Ophthalmic Solution 1 Drop(s) Both EYES every 8 hours  chlorhexidine 2% Cloths 1 Application(s) Topical <User Schedule>  petrolatum Ophthalmic Ointment 1 Application(s) Both EYES two times a day      PAST MEDICAL/SURGICAL HISTORY  PAST MEDICAL & SURGICAL HISTORY:  MDD (major depressive disorder), recurrent, severe, with psychosis      Asthma, mild intermittent, uncomplicated      Essential hypertension      No significant past surgical history          SOCIAL HISTORY: Substance Use (street drugs): ( x ) never used  (  ) other:    FAMILY HISTORY:         PHYSICAL EXAM:  T(C): 36.9 (04-28-25 @ 18:01), Max: 37.1 (04-27-25 @ 22:31)  HR: 87 (04-28-25 @ 18:01) (72 - 99)  BP: 116/62 (04-28-25 @ 18:01) (116/62 - 166/73)  RR: 18 (04-28-25 @ 18:01) (16 - 20)  SpO2: 100% (04-28-25 @ 18:01) (93% - 100%)  Wt(kg): --  I&O's Summary    Height (cm): 157 (04-28 @ 08:14)  Weight (kg): 80 (04-28 @ 08:14)  BMI (kg/m2): 32.5 (04-28 @ 08:14)  BSA (m2): 1.81 (04-28 @ 08:14)         EYES:   PERRLA   ENMT:   Moist mucous membranes, Good dentition, No lesions  Cardiovascular: Normal S1 S2, No JVD, No murmurs, No edema  Respiratory: Lungs clear to auscultation	  Gastrointestinal:  Soft, Non-tender, + BS	  Extremities: no edema                                    11.3   8.27  )-----------( 159      ( 28 Apr 2025 06:25 )             34.2     04-28    141  |  100  |  13  ----------------------------<  115[H]  3.9   |  31  |  0.62    Ca    9.5      28 Apr 2025 06:25  Phos  3.5     04-28  Mg     1.80     04-28      proBNP:   Lipid Profile:   HgA1c:   TSH:     Consultant(s) Notes Reviewed:  [x ] YES  [ ] NO    Care Discussed with Consultants/Other Providers [ x] YES  [ ] NO    Imaging Personally Reviewed independently:  [x] YES  [ ] NO    All labs, radiologic studies, vitals, orders and medications list reviewed. Patient is seen and examined at bedside. Case discussed with medical team.

## 2025-04-28 NOTE — ECT TREATMENT NOTE - NSECTPOSTTXCOMMENTS_PSY_ALL_CORE
Ms. Silva is doing better compared to when she started ECT. She is more spontaneous in interaction. Her appetite has improved. She is not oriented to place, year, cannot answer how many sons she has. Psychomotor activity and eye contact has improved. Mild rigidity on left wrist (? cogwheeling). She has TD present in the lips. Denied hallucinations. No posturing. No mitgehen. No stereotypy/echolalia. Denied hallucinations or paranoia. Easy irritability about the set up.     Frequency of ECT to be adjusted based on the response to today's treatment. Ms. Silva is doing better compared to when she started ECT. She is more spontaneous in interaction. Her appetite has improved. She is not oriented to place, year, cannot answer how many sons she has. Psychomotor activity and eye contact has improved. Mild rigidity on left wrist (? cogwheeling). She has TD present in the lips. Denied hallucinations. No posturing. No mitgehen. No stereotypy/echolalia. Denied hallucinations or paranoia. Easy irritability about the set up. Some negativism present.      Frequency of ECT to be adjusted based on the response to today's treatment.

## 2025-04-28 NOTE — PROGRESS NOTE ADULT - PROBLEM SELECTOR PLAN 1
MDD w/ psychotic features.  - s/p ativan now on hold given concern that it may have contributed to respiratory decompensation  - c/w Zoloft 125 qday (home dose was 150).  - c/w Zyprexa 2.5 qhs  - c/w Namenda 10mg qhs  - psych following  - ECT  family now agreeable, s/p 2 sessions  pt optimized per cards and pulm for ECT

## 2025-04-28 NOTE — PROGRESS NOTE ADULT - SUBJECTIVE AND OBJECTIVE BOX
Arnold Uriostegui MD  Interventional Cardiology / Advance Heart Failure and Cardiac Transplant Specialist  [place] Office : [address] [place] [address] [place]. [address]  Tel:   [place] Office : [address] [facility] [place] [address]  Tel: [phone]     Pt is lying in bed comfortable not in distress, no chest pains no SOB no palpitations  	  MEDICATIONS:  enoxaparin Injectable 40 milliGRAM(s) SubCutaneous every 24 hours  hydrochlorothiazide 25 milliGRAM(s) Oral daily  lisinopril 40 milliGRAM(s) Oral daily    nystatin    Suspension 464598 Unit(s) Oral three times a day    albuterol/ipratropium for Nebulization 3 milliLiter(s) Nebulizer every 6 hours PRN  fluticasone propionate/ salmeterol 250-50 MICROgram(s) Diskus 1 Dose(s) Inhalation two times a day  guaiFENesin  milliGRAM(s) Oral every 12 hours  ipratropium    for Nebulization 500 MICROGram(s) Nebulizer every 8 hours  levalbuterol Inhalation 0.63 milliGRAM(s) Inhalation every 8 hours    melatonin 9 milliGRAM(s) Oral at bedtime  memantine 10 milliGRAM(s) Oral at bedtime  memantine 5 milliGRAM(s) Oral <User Schedule>  OLANZapine 2.5 milliGRAM(s) Oral at bedtime  sertraline 125 milliGRAM(s) Oral daily    bisacodyl 10 milliGRAM(s) Oral once  pantoprazole    Tablet 40 milliGRAM(s) Oral before breakfast  polyethylene glycol 3350 17 Gram(s) Oral two times a day  senna 2 Tablet(s) Oral at bedtime    dextrose 50% Injectable 25 Gram(s) IV Push once  dextrose 50% Injectable 12.5 Gram(s) IV Push once  dextrose 50% Injectable 25 Gram(s) IV Push once  dextrose Oral Gel 15 Gram(s) Oral once  glucagon  Injectable 1 milliGRAM(s) IntraMuscular once  insulin glargine Injectable (LANTUS) 6 Unit(s) SubCutaneous at bedtime  insulin lispro (ADMELOG) corrective regimen sliding scale   SubCutaneous every 6 hours  insulin lispro Injectable (ADMELOG) 3 Unit(s) SubCutaneous three times a day before meals    artificial tears (preservative free) Ophthalmic Solution 1 Drop(s) Both EYES every 8 hours  chlorhexidine 2% Cloths 1 Application(s) Topical <User Schedule>  petrolatum Ophthalmic Ointment 1 Application(s) Both EYES two times a day      PAST MEDICAL/SURGICAL HISTORY  PAST MEDICAL & SURGICAL HISTORY:  MDD (major depressive disorder), recurrent, severe, with psychosis      Asthma, mild intermittent, uncomplicated      Essential hypertension      No significant past surgical history          SOCIAL HISTORY: Substance Use (street drugs): ( x ) never used  (  ) other:    FAMILY HISTORY:    PHYSICAL EXAM:  T(C): 37.1 (04-28-25 @ 09:10), Max: 37.1 (04-27-25 @ 22:31)  HR: 94 (04-28-25 @ 09:40) (72 - 99)  BP: 145/88 (04-28-25 @ 09:40) (122/71 - 166/73)  RR: 16 (04-28-25 @ 09:40) (16 - 20)  SpO2: 94% (04-28-25 @ 09:40) (93% - 100%)  Wt(kg): --  I&O's Summary    Height (cm): 157 (04-28 @ 08:14)  Weight (kg): 80 (04-28 @ 08:14)  BMI (kg/m2): 32.5 (04-28 @ 08:14)  BSA (m2): 1.81 (04-28 @ 08:14)        GENERAL: NAD  EYES: PERRLA  ENMT: Moist mucous membranes  Cardiovascular: Normal S1 S2, No JVD, No edema  Respiratory: Lungs clear to auscultation	  Gastrointestinal:  Soft, Non-tender, + BS	  Extremities: Normal range of motion, No clubbing, cyanosis or edema  NERVOUS SYSTEM:  Alert & Oriented X3                                11.3   8.27  )-----------( 159      ( 28 Apr 2025 06:25 )             34.2     04-28    141  |  100  |  13  ----------------------------<  115[H]  3.9   |  31  |  0.62    Ca    9.5      28 Apr 2025 06:25  Phos  3.5     04-28  Mg     1.80     04-28      proBNP:   Lipid Profile:   HgA1c:   TSH:     Consultant(s) Notes Reviewed:  [x ] YES  [ ] NO    Care Discussed with Consultants/Other Providers [ x] YES  [ ] NO    Imaging Personally Reviewed independently:  [x] YES  [ ] NO    All labs, radiologic studies, vitals, orders and medications list reviewed. Patient is seen and examined at bedside. Case discussed with medical team.

## 2025-04-28 NOTE — BH CONSULTATION LIAISON PROGRESS NOTE - NSBHFUPINTERVALHXFT_PSY_A_CORE
patient was seen and assessed at bedside. patient s/p ECT #2 today. Patient is awake, alert, makes good eye contact and is more spontaneous in speech. cooperating with requests on exam.  patient aware of her name and is able to state she has a  and his name as well. Some confusion around number of sons and their names.  patient reports feeling well, and asks why she has to stay here ( refers to staying her as being in retirement) but is also smiling when told she is doing well and will return home when she has improved.   patient allows for physical exam with no negativism and is loose with no rigidity. States she is eating but no family in  room at time of exam to confirm.

## 2025-04-29 LAB
GLUCOSE BLDC GLUCOMTR-MCNC: 111 MG/DL — HIGH (ref 70–99)
GLUCOSE BLDC GLUCOMTR-MCNC: 118 MG/DL — HIGH (ref 70–99)
GLUCOSE BLDC GLUCOMTR-MCNC: 144 MG/DL — HIGH (ref 70–99)
GLUCOSE BLDC GLUCOMTR-MCNC: 172 MG/DL — HIGH (ref 70–99)

## 2025-04-29 PROCEDURE — 99232 SBSQ HOSP IP/OBS MODERATE 35: CPT

## 2025-04-29 RX ADMIN — Medication 1 DOSE(S): at 23:06

## 2025-04-29 RX ADMIN — POLYETHYLENE GLYCOL 3350 17 GRAM(S): 17 POWDER, FOR SOLUTION ORAL at 23:09

## 2025-04-29 RX ADMIN — DEXTROMETHORPHAN HBR, GUAIFENESIN 600 MILLIGRAM(S): 200 LIQUID ORAL at 05:55

## 2025-04-29 RX ADMIN — Medication 500000 UNIT(S): at 05:54

## 2025-04-29 RX ADMIN — INSULIN GLARGINE-YFGN 6 UNIT(S): 100 INJECTION, SOLUTION SUBCUTANEOUS at 23:09

## 2025-04-29 RX ADMIN — Medication 1 DROP(S): at 05:54

## 2025-04-29 RX ADMIN — Medication 500000 UNIT(S): at 14:08

## 2025-04-29 RX ADMIN — ENOXAPARIN SODIUM 40 MILLIGRAM(S): 100 INJECTION SUBCUTANEOUS at 18:09

## 2025-04-29 RX ADMIN — Medication 1 APPLICATION(S): at 05:54

## 2025-04-29 RX ADMIN — Medication 500 MICROGRAM(S): at 07:47

## 2025-04-29 RX ADMIN — LEVALBUTEROL HYDROCHLORIDE 0.63 MILLIGRAM(S): 1.25 SOLUTION RESPIRATORY (INHALATION) at 15:30

## 2025-04-29 RX ADMIN — MEMANTINE HYDROCHLORIDE 5 MILLIGRAM(S): 21 CAPSULE, EXTENDED RELEASE ORAL at 05:55

## 2025-04-29 RX ADMIN — MEMANTINE HYDROCHLORIDE 10 MILLIGRAM(S): 21 CAPSULE, EXTENDED RELEASE ORAL at 23:08

## 2025-04-29 RX ADMIN — Medication 500000 UNIT(S): at 23:07

## 2025-04-29 RX ADMIN — INSULIN LISPRO 3 UNIT(S): 100 INJECTION, SOLUTION INTRAVENOUS; SUBCUTANEOUS at 12:57

## 2025-04-29 RX ADMIN — LEVALBUTEROL HYDROCHLORIDE 0.63 MILLIGRAM(S): 1.25 SOLUTION RESPIRATORY (INHALATION) at 07:47

## 2025-04-29 RX ADMIN — Medication 2 TABLET(S): at 23:09

## 2025-04-29 RX ADMIN — INSULIN LISPRO 1: 100 INJECTION, SOLUTION INTRAVENOUS; SUBCUTANEOUS at 18:06

## 2025-04-29 RX ADMIN — Medication 40 MILLIGRAM(S): at 05:56

## 2025-04-29 RX ADMIN — INSULIN LISPRO 3 UNIT(S): 100 INJECTION, SOLUTION INTRAVENOUS; SUBCUTANEOUS at 18:05

## 2025-04-29 RX ADMIN — Medication 1 APPLICATION(S): at 18:09

## 2025-04-29 RX ADMIN — Medication 500 MICROGRAM(S): at 15:31

## 2025-04-29 RX ADMIN — Medication 1 DROP(S): at 15:07

## 2025-04-29 RX ADMIN — SERTRALINE 125 MILLIGRAM(S): 100 TABLET, FILM COATED ORAL at 12:56

## 2025-04-29 RX ADMIN — Medication 1 DOSE(S): at 12:55

## 2025-04-29 RX ADMIN — Medication 1 APPLICATION(S): at 05:56

## 2025-04-29 RX ADMIN — LISINOPRIL 40 MILLIGRAM(S): 5 TABLET ORAL at 05:55

## 2025-04-29 RX ADMIN — OLANZAPINE 2.5 MILLIGRAM(S): 10 TABLET ORAL at 23:07

## 2025-04-29 RX ADMIN — DEXTROMETHORPHAN HBR, GUAIFENESIN 600 MILLIGRAM(S): 200 LIQUID ORAL at 18:10

## 2025-04-29 RX ADMIN — Medication 1 DROP(S): at 23:07

## 2025-04-29 RX ADMIN — Medication 9 MILLIGRAM(S): at 23:08

## 2025-04-29 NOTE — BH CONSULTATION LIAISON PROGRESS NOTE - NSBHASSESSMENTFT_PSY_ALL_CORE
Patient is a 74F hx asthma, depression, and HTN was admitted to the MICU with AHRF 2/2 asthma exacerbation resulting in acute on chronic HHRF causing AMS and increased WOB that required intubation. During her ICU course, she experienced seizure-like activity .  Additional issues included hyponatremia, acute on chronic metabolic alkalosis, and mild thrombocytopenia,  also having fevers.  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 Patient has h/o hx of major depressive disorder with psychosis, hx of 3 past inpatient psychiatric hospitalizations last in 2016 at [facility], all rehospitalizations were in context non compliance with the treatment. She has no hx of suicide attempts, no hx of substance abuse. Patient w hx of paranoia, disorganization, catatonia, required MOO when in [facility].     3/24----more withdrawn, some stiffness in neck, not communicative. Some concerns for catatonia. Unsure if symptoms attributed to catatonia vs delirium  3/25--- more alert today, trying to following commands. Still with PMR+. Appears to be responding to Ativan  3/26-- BFS 10, will increasing standing ativan to target catatonic symptoms - giving STAT 0.5mg NOW discussed w acp   3/27----will continue ativan dosing for one more day before increasing  3/28: BFS: stupor 1, mutism 2,withdrawan 2, verbigeration 1, rigidity 2 =8.  3/29: BFS: stupor 2, mutism 2, staring 1, posturing 2, rigidity 2, waxy flexibility 3, withdrawal 2 = 14; please monitor nutritional intake, plan to increase ativan by total daily dose of 0.5 mg at bedtime due to persistent catatonia   3/31: BFS: stupor 2, mutism 2, staring 2, rigidity 1, negativism 1, withdrawal 1, waxy flexibility 3 =12, patient with productive cough, advised for medical work up  4/1---tired, speaking more, not rigid, has PMR+. NPO status now.   4/2--sleepy, RRT this afternoon  4/3---better mentation, pmr but no chester catatonic symptoms  4/4: alert, using few words, mild rigidity -  at bedside, updated   4/5: few words, lethargic, fluctuating wakefulness. Mild rigidity.  No overt catatonic sxs.   4/7: no change, remains thought blocked, few words - son at bedside, updated with care  4/8: patient continues to have poor eye contact, uses few words, no rigidity on today's exam.   4/9: patient remains minimally verbal, some posturing, poor Po intake - continues on NC for 02 requirements   4/10: more verbal/interactive, with increased PO intake per family. Some psychotic output. Family agrees with resumption of Zyprexa.   4/14: alert to self with fluctuating wakefulness, intermittent full sentences f/b one-word answers, some rigidity, posturing, denies si, denies ah/vh  4/16: patient more catatonic today, notable catalepsy, waxiness, verbigeration followed by mutism and staring, family against ativan and ECT, would increase memantine as below, not eating   4/17: catatonic (mute, rigid, not engaging). Eating some portions of meals with significant family support. Family opting to see memantine trial to full effect before consideration of ECT  4/18: mutism today, odd posturing, unable to engage, d/w spouse and son ECT, family still undecided  4/21: alert to self, some improvement with verbal engagement, tracking, follow command, denies si, denies ah/vh, awaiting call back from son in regards to ECT consult  4/22: remains catatonic (mute, postured, rigid, withdrawn, negativistic). Exam limited. Family agrees with formal ECT consult.  4/23: Remains with symptoms of catatonia. BFS: stupor 1, mute2, Rigid 2, negativism 2, withdrawal 2 - 9 : ECT consult completed. awaiting clearances for ECT  4/42: remains with BFS of 9, plan for ECT  4/25: alert to self, able to engage in some conversation, however, confused, loose on  exam, odd posturing right upper arm, Dr. Toure made aware of schedule for ECT Monday 4/28: s/p ECT #2. more spontaneous speech, cooperative with exam. improved eye contact. loose with no rigidity.   4/29: displays more s/s of catatonia since yesterdays exam- negativism, withdrawal, rigidity. family and ect in agreement to proceed with treatment 3 tomorrow.    PLAN    *** ECT scheduled for WEDNESDAY 4.30  PATIENT TO BE NPO AT MIDNIGHT on 4.29    - no SI or HI, no need for psychiatric CO  -- antipsychotics can only be given if qtc < 500    - MEDICATIONS:  - Memantine 5mg AM and 10mg HS  -- C/W Zoloft 125 mg daily   -- C/W Zyprexa 2.5mg HS   - PRN: Ativan 0.5mg q 6hrs prn IM/IV/PO    - please ensure PT involved in patient care    - DISPO: pending. patient with acute course of ECT currently

## 2025-04-29 NOTE — PROGRESS NOTE ADULT - PROBLEM SELECTOR PLAN 1
MDD w/ psychotic features.  - s/p ativan now on hold given concern that it may have contributed to respiratory decompensation  - c/w Zoloft 125 qday (home dose was 150).  - c/w Zyprexa 2.5 qhs  - c/w Namenda 10mg qhs  - psych following  - ECT ongoing, 4/25 + 4/28  pt optimized per cards and pulm for ECT

## 2025-04-29 NOTE — BH CONSULTATION LIAISON PROGRESS NOTE - NSBHFUPINTERVALHXFT_PSY_A_CORE
patient was seen and assessed at bedside. patient completed 2 ECT treatments with plans for 3rd tomorrow 4/30. Patient is awake, some spontaneity in speech however decreased from day of ECT. Eye contact worsening from yesterday, less engaged. Presenting with negativism on exam, increase in rigidity.   Spoke with family - as per sons - patient with noted improvement since ECT started - patient attempting to feed self, increase in conversation. Did report patient c/o headache - information shared with ECT team.

## 2025-04-29 NOTE — PROGRESS NOTE ADULT - SUBJECTIVE AND OBJECTIVE BOX
Interval Events: Patient was seen and examined at bedside.     No acute issues overnight. S/P ECT.   Patient today with beds sheet over her head. Declined exam and interview.     REVIEW OF SYSTEMS:  Constitutional: [ ] fevers [ ] chills [ ] weight loss [ ] weight gain  CV: [ ] chest pain [ ] orthopnea [ ] palpitations [ ] murmur  Resp: [ ] cough [ ] shortness of breath [ ] dyspnea [ ] wheezing [ ] sputum [ ] hemoptysis  [ ] All other systems negative  [x ] Unable to assess ROS because patient declined interview.     OBJECTIVE:  ICU Vital Signs Last 24 Hrs  T(C): 36.9 (29 Apr 2025 11:35), Max: 36.9 (28 Apr 2025 18:01)  T(F): 98.5 (29 Apr 2025 11:35), Max: 98.5 (29 Apr 2025 11:35)  HR: 74 (29 Apr 2025 11:35) (74 - 94)  BP: 129/71 (29 Apr 2025 11:35) (116/62 - 154/95)  BP(mean): --  ABP: --  ABP(mean): --  RR: 18 (29 Apr 2025 11:35) (16 - 18)  SpO2: 100% (29 Apr 2025 11:35) (96% - 100%)    O2 Parameters below as of 29 Apr 2025 04:55  Patient On (Oxygen Delivery Method): room air    CAPILLARY BLOOD GLUCOSE    POCT Blood Glucose.: 118 mg/dL (29 Apr 2025 12:00)      PHYSICAL EXAM:  Patient Declines but appears to be comfortable on room air.     HOSPITAL MEDICATIONS:  MEDICATIONS  (STANDING):  artificial tears (preservative free) Ophthalmic Solution 1 Drop(s) Both EYES every 8 hours  bisacodyl 10 milliGRAM(s) Oral once  chlorhexidine 2% Cloths 1 Application(s) Topical <User Schedule>  dextrose 50% Injectable 25 Gram(s) IV Push once  dextrose 50% Injectable 12.5 Gram(s) IV Push once  dextrose 50% Injectable 25 Gram(s) IV Push once  dextrose Oral Gel 15 Gram(s) Oral once  enoxaparin Injectable 40 milliGRAM(s) SubCutaneous every 24 hours  fluticasone propionate/ salmeterol 250-50 MICROgram(s) Diskus 1 Dose(s) Inhalation two times a day  glucagon  Injectable 1 milliGRAM(s) IntraMuscular once  guaiFENesin  milliGRAM(s) Oral every 12 hours  hydrochlorothiazide 25 milliGRAM(s) Oral daily  insulin glargine Injectable (LANTUS) 6 Unit(s) SubCutaneous at bedtime  insulin lispro (ADMELOG) corrective regimen sliding scale   SubCutaneous every 6 hours  insulin lispro Injectable (ADMELOG) 3 Unit(s) SubCutaneous three times a day before meals  ipratropium    for Nebulization 500 MICROGram(s) Nebulizer every 8 hours  levalbuterol Inhalation 0.63 milliGRAM(s) Inhalation every 8 hours  lisinopril 40 milliGRAM(s) Oral daily  melatonin 9 milliGRAM(s) Oral at bedtime  memantine 10 milliGRAM(s) Oral at bedtime  memantine 5 milliGRAM(s) Oral <User Schedule>  nystatin    Suspension 230770 Unit(s) Oral three times a day  OLANZapine 2.5 milliGRAM(s) Oral at bedtime  pantoprazole    Tablet 40 milliGRAM(s) Oral before breakfast  petrolatum Ophthalmic Ointment 1 Application(s) Both EYES two times a day  polyethylene glycol 3350 17 Gram(s) Oral two times a day  senna 2 Tablet(s) Oral at bedtime  sertraline 125 milliGRAM(s) Oral daily    MEDICATIONS  (PRN):  albuterol/ipratropium for Nebulization 3 milliLiter(s) Nebulizer every 6 hours PRN Shortness of Breath and/or Wheezing      LABS:                        11.3   8.27  )-----------( 159      ( 28 Apr 2025 06:25 )             34.2     Hgb Trend: 11.3<--, 11.4<--, 10.7<--, 11.0<--  04-28    141  |  100  |  13  ----------------------------<  115[H]  3.9   |  31  |  0.62    Ca    9.5      28 Apr 2025 06:25  Phos  3.5     04-28  Mg     1.80     04-28      Creatinine Trend: 0.62<--, 0.72<--, 0.93<--, 0.72<--, 0.61<--, 0.75<--    Urinalysis Basic - ( 28 Apr 2025 06:25 )    Color: x / Appearance: x / SG: x / pH: x  Gluc: 115 mg/dL / Ketone: x  / Bili: x / Urobili: x   Blood: x / Protein: x / Nitrite: x   Leuk Esterase: x / RBC: x / WBC x   Sq Epi: x / Non Sq Epi: x / Bacteria: x          MICROBIOLOGY:     RADIOLOGY & EKG:  [x ] Reviewed and interpreted by me

## 2025-04-29 NOTE — PROGRESS NOTE ADULT - SUBJECTIVE AND OBJECTIVE BOX
[facility] Division of Hospital Medicine  Carlos Summers DO  Available via MS Teams    SUBJECTIVE / OVERNIGHT EVENTS:  No acute events overnight. Patient seen and examined at bedside this morning.  This morning trying to sleep, avoided efforts to have a conversation, covered herself with the sheets on multiple attempts.    ADDITIONAL REVIEW OF SYSTEMS:    MEDICATIONS  (STANDING):  artificial tears (preservative free) Ophthalmic Solution 1 Drop(s) Both EYES every 8 hours  bisacodyl 10 milliGRAM(s) Oral once  chlorhexidine 2% Cloths 1 Application(s) Topical <User Schedule>  dextrose 50% Injectable 25 Gram(s) IV Push once  dextrose 50% Injectable 12.5 Gram(s) IV Push once  dextrose 50% Injectable 25 Gram(s) IV Push once  dextrose Oral Gel 15 Gram(s) Oral once  enoxaparin Injectable 40 milliGRAM(s) SubCutaneous every 24 hours  fluticasone propionate/ salmeterol 250-50 MICROgram(s) Diskus 1 Dose(s) Inhalation two times a day  glucagon  Injectable 1 milliGRAM(s) IntraMuscular once  guaiFENesin  milliGRAM(s) Oral every 12 hours  hydrochlorothiazide 25 milliGRAM(s) Oral daily  insulin glargine Injectable (LANTUS) 6 Unit(s) SubCutaneous at bedtime  insulin lispro (ADMELOG) corrective regimen sliding scale   SubCutaneous every 6 hours  insulin lispro Injectable (ADMELOG) 3 Unit(s) SubCutaneous three times a day before meals  ipratropium    for Nebulization 500 MICROGram(s) Nebulizer every 8 hours  levalbuterol Inhalation 0.63 milliGRAM(s) Inhalation every 8 hours  lisinopril 40 milliGRAM(s) Oral daily  melatonin 9 milliGRAM(s) Oral at bedtime  memantine 10 milliGRAM(s) Oral at bedtime  memantine 5 milliGRAM(s) Oral <User Schedule>  nystatin    Suspension 125008 Unit(s) Oral three times a day  OLANZapine 2.5 milliGRAM(s) Oral at bedtime  pantoprazole    Tablet 40 milliGRAM(s) Oral before breakfast  petrolatum Ophthalmic Ointment 1 Application(s) Both EYES two times a day  polyethylene glycol 3350 17 Gram(s) Oral two times a day  senna 2 Tablet(s) Oral at bedtime  sertraline 125 milliGRAM(s) Oral daily    MEDICATIONS  (PRN):  albuterol/ipratropium for Nebulization 3 milliLiter(s) Nebulizer every 6 hours PRN Shortness of Breath and/or Wheezing      I&O's Summary      PHYSICAL EXAM:  Vital Signs Last 24 Hrs  T(C): 36.9 (29 Apr 2025 11:35), Max: 36.9 (28 Apr 2025 18:01)  T(F): 98.5 (29 Apr 2025 11:35), Max: 98.5 (29 Apr 2025 11:35)  HR: 74 (29 Apr 2025 11:35) (74 - 94)  BP: 129/71 (29 Apr 2025 11:35) (116/62 - 154/95)  BP(mean): --  RR: 18 (29 Apr 2025 11:35) (16 - 18)  SpO2: 100% (29 Apr 2025 11:35) (96% - 100%)    Parameters below as of 29 Apr 2025 04:55  Patient On (Oxygen Delivery Method): room air          LABS:                        11.3   8.27  )-----------( 159      ( 28 Apr 2025 06:25 )             34.2     04-28    141  |  100  |  13  ----------------------------<  115[H]  3.9   |  31  |  0.62    Ca    9.5      28 Apr 2025 06:25  Phos  3.5     04-28  Mg     1.80     04-28            Urinalysis Basic - ( 28 Apr 2025 06:25 )    Color: x / Appearance: x / SG: x / pH: x  Gluc: 115 mg/dL / Ketone: x  / Bili: x / Urobili: x   Blood: x / Protein: x / Nitrite: x   Leuk Esterase: x / RBC: x / WBC x   Sq Epi: x / Non Sq Epi: x / Bacteria: x        COVID-19 PCR: NotDetec (20 Apr 2025 05:54)  SARS-CoV-2: NotDetec (19 Mar 2025 09:05)  SARS-CoV-2: NotDetec (08 Mar 2025 14:40)

## 2025-04-29 NOTE — BH CONSULTATION LIAISON PROGRESS NOTE - NSBHMSEKNOW_PSY_A_CORE
Normal
Unable to assess
Normal
Unable to assess
Normal
Unable to assess
Normal
Normal

## 2025-04-29 NOTE — BH CONSULTATION LIAISON PROGRESS NOTE - NSBHMSEREMMEM_PSY_A_CORE
Unable to assess
Impaired
Unable to assess
Unable to assess
Impaired
Impaired
Unable to assess
Impaired
Impaired
Unable to assess

## 2025-04-29 NOTE — BH CONSULTATION LIAISON PROGRESS NOTE - NSBHMSEATTEN_PSY_A_CORE
Impaired
Unable to assess
Impaired
Other
Impaired
Unable to assess
Impaired
Impaired
Unable to assess
Impaired
Unable to assess

## 2025-04-29 NOTE — PROGRESS NOTE ADULT - ASSESSMENT
74F reported asthma with prior intubations and depression initially admitted with asthma exacerbation with hypercapnic and hypoxemic respiratory failure hospital course complicated by persistent mood disorder and catatonia planned for ECT    Tolerated ECT well yesterday. Currently on room air.     # asthma- currently not in exacerbation.     Recommendations:  -  C/W Duonebs PRN, advair BID, and spiriva.   - Completed steroids, monitor off for now.   - Incentive spirometry  - OOB as tolerated  - Appreciate ongoing BH optimization  - Eventually will need outpatient follow up with Pulmonary and Sleep Medicine.   - Please use HOME token on discharge.   - Will sign off at this time. Please call back with questions or change in clinical status.    74F reported asthma with prior intubations and depression initially admitted with asthma exacerbation with hypercapnic and hypoxemic respiratory failure hospital course complicated by persistent mood disorder and catatonia planned for ECT    Tolerated ECT well yesterday. Currently on room air.     # asthma- currently not in exacerbation.     Recommendations:  -  C/W Duonebs PRN, advair BID, and spiriva.   - Completed steroids, monitor off for now.   - Incentive spirometry  - OOB as tolerated  - Appreciate ongoing BH optimization  - Eventually will need outpatient follow up with Pulmonary and Sleep Medicine.   - Please use HOME token on discharge.

## 2025-04-29 NOTE — BH CONSULTATION LIAISON PROGRESS NOTE - NSBHTIMEACTIVITIESPERFORMED_PSY_A_CORE
35 minutes spent on total encounter. The necessity of the time spent during the encounter on this date of service was due to:     I had a face to face encounter with this patient. I spent 35 total minutes on the bedside interview and examination, coordination of care, counseling, chart review, and documentation for this patient. Calls made to family as well as treatment team

## 2025-04-29 NOTE — PROGRESS NOTE ADULT - SUBJECTIVE AND OBJECTIVE BOX
Arnold Uriostegui MD  Interventional Cardiology / Advance Heart Failure and Cardiac Transplant Specialist  [place] Office : [address] [place] [address] [place]. [address]  Tel:   [place] Office : [address] [facility] [place] [address]  Tel: [phone]       Pt is lying in bed comfortable not in distress, no chest pains no SOB no palpitations  	  MEDICATIONS:  enoxaparin Injectable 40 milliGRAM(s) SubCutaneous every 24 hours  hydrochlorothiazide 25 milliGRAM(s) Oral daily  lisinopril 40 milliGRAM(s) Oral daily    nystatin    Suspension 646932 Unit(s) Oral three times a day    albuterol/ipratropium for Nebulization 3 milliLiter(s) Nebulizer every 6 hours PRN  fluticasone propionate/ salmeterol 250-50 MICROgram(s) Diskus 1 Dose(s) Inhalation two times a day  guaiFENesin  milliGRAM(s) Oral every 12 hours  ipratropium    for Nebulization 500 MICROGram(s) Nebulizer every 8 hours  levalbuterol Inhalation 0.63 milliGRAM(s) Inhalation every 8 hours    melatonin 9 milliGRAM(s) Oral at bedtime  memantine 10 milliGRAM(s) Oral at bedtime  memantine 5 milliGRAM(s) Oral <User Schedule>  OLANZapine 2.5 milliGRAM(s) Oral at bedtime  sertraline 125 milliGRAM(s) Oral daily    bisacodyl 10 milliGRAM(s) Oral once  pantoprazole    Tablet 40 milliGRAM(s) Oral before breakfast  polyethylene glycol 3350 17 Gram(s) Oral two times a day  senna 2 Tablet(s) Oral at bedtime    dextrose 50% Injectable 25 Gram(s) IV Push once  dextrose 50% Injectable 12.5 Gram(s) IV Push once  dextrose 50% Injectable 25 Gram(s) IV Push once  dextrose Oral Gel 15 Gram(s) Oral once  glucagon  Injectable 1 milliGRAM(s) IntraMuscular once  insulin glargine Injectable (LANTUS) 6 Unit(s) SubCutaneous at bedtime  insulin lispro (ADMELOG) corrective regimen sliding scale   SubCutaneous every 6 hours  insulin lispro Injectable (ADMELOG) 3 Unit(s) SubCutaneous three times a day before meals    artificial tears (preservative free) Ophthalmic Solution 1 Drop(s) Both EYES every 8 hours  chlorhexidine 2% Cloths 1 Application(s) Topical <User Schedule>  petrolatum Ophthalmic Ointment 1 Application(s) Both EYES two times a day      PAST MEDICAL/SURGICAL HISTORY  PAST MEDICAL & SURGICAL HISTORY:  MDD (major depressive disorder), recurrent, severe, with psychosis      Asthma, mild intermittent, uncomplicated      Essential hypertension      No significant past surgical history          SOCIAL HISTORY: Substance Use (street drugs): ( x ) never used  (  ) other:    FAMILY HISTORY:        PHYSICAL EXAM:  T(C): 36.9 (04-29-25 @ 11:35), Max: 36.9 (04-28-25 @ 18:01)  HR: 74 (04-29-25 @ 11:35) (74 - 94)  BP: 129/71 (04-29-25 @ 11:35) (116/62 - 154/95)  RR: 18 (04-29-25 @ 11:35) (16 - 18)  SpO2: 100% (04-29-25 @ 11:35) (96% - 100%)  Wt(kg): --  I&O's Summary        GENERAL: NAD  EYES:   PERRLA   ENMT:   Moist mucous membranes,  Cardiovascular: Normal S1 S2, No JVD, No murmurs, No edema  Respiratory: Lungs clear to auscultation	  Gastrointestinal:  Soft, Non-tender, + BS	  Extremities: no edema                                    11.3   8.27  )-----------( 159      ( 28 Apr 2025 06:25 )             34.2     04-28    141  |  100  |  13  ----------------------------<  115[H]  3.9   |  31  |  0.62    Ca    9.5      28 Apr 2025 06:25  Phos  3.5     04-28  Mg     1.80     04-28      proBNP:   Lipid Profile:   HgA1c:   TSH:     Consultant(s) Notes Reviewed:  [x ] YES  [ ] NO    Care Discussed with Consultants/Other Providers [ x] YES  [ ] NO    Imaging Personally Reviewed independently:  [x] YES  [ ] NO    All labs, radiologic studies, vitals, orders and medications list reviewed. Patient is seen and examined at bedside. Case discussed with medical team.

## 2025-04-29 NOTE — BH CONSULTATION LIAISON PROGRESS NOTE - OTHER
worsened from 4/29 however overall improved since ECT  limited at this time confusion noted more spontaneous since ECT calm

## 2025-04-29 NOTE — PROGRESS NOTE ADULT - ASSESSMENT
EKG: SR nonischemic    A/P:  74F MDD, asthma, prior CVA presented with AHRF c/b AMS requiring MICU admission for intubation for respiratory failure i/s/o asthma exacerbation. Extubated 3/12 to face tent and then on BIPAP s/p RRT 3/19 for increased WOB / hypoxia despite aggressive asthma management s/p return to MICU for management of severe asthma. Possible aspiration event 3/31. SLP eval appreciated. XR cine performed with improvement in mental status - pureed diet w/ thin liquids recommended. Worsening respiratory status 4/2 - placed on NIV, now weaned to nasal cannula and now RA.  ENT eval - no upper airway consideration for structural causes of asthma such as vocal cord dysfunction.   Course c/b MDD w/ catatonia and poor PO, off ativan due to respiratory concerns trialed on Memantine family now agreeable to ECT, planned for 4/25    1. SOB   -TTE 3/9 EF 78% no WMA  -LE duplex negative for DVT  -Repeat EKG NSR no ischemic changes   -Acute asthma exacerbation  -f/u pulm    2. HTN  - c/w  Lisinopril 40mg daily   - c/w HCTZ 25 mg daily  - no beta-blockers 2/2 asthma    3. Pre ECT Eval   - Denies CP SOB has had long hospitalization  - EKG normal, Echo normal   - optimized for ECT, moderate risk for MACE - s/p ECT sessions    4. DVT ppx  c/w lovenox

## 2025-04-29 NOTE — BH CONSULTATION LIAISON PROGRESS NOTE - NSBHATTESTATTENDCOLLABFT_PSY_A_CORE
spencer sanford
jordyn crowell 
spencer sanford
Dr. Lopes
Dr. Palma
spencer sanford
Dr. Palma
Dr. Palma
spencer sanford
coby almendarez

## 2025-04-30 LAB
ANION GAP SERPL CALC-SCNC: 12 MMOL/L — SIGNIFICANT CHANGE UP (ref 7–14)
BUN SERPL-MCNC: 8 MG/DL — SIGNIFICANT CHANGE UP (ref 7–23)
CALCIUM SERPL-MCNC: 9.6 MG/DL — SIGNIFICANT CHANGE UP (ref 8.4–10.5)
CHLORIDE SERPL-SCNC: 104 MMOL/L — SIGNIFICANT CHANGE UP (ref 98–107)
CO2 SERPL-SCNC: 28 MMOL/L — SIGNIFICANT CHANGE UP (ref 22–31)
CREAT SERPL-MCNC: 0.65 MG/DL — SIGNIFICANT CHANGE UP (ref 0.5–1.3)
EGFR: 92 ML/MIN/1.73M2 — SIGNIFICANT CHANGE UP
EGFR: 92 ML/MIN/1.73M2 — SIGNIFICANT CHANGE UP
GLUCOSE BLDC GLUCOMTR-MCNC: 101 MG/DL — HIGH (ref 70–99)
GLUCOSE BLDC GLUCOMTR-MCNC: 113 MG/DL — HIGH (ref 70–99)
GLUCOSE BLDC GLUCOMTR-MCNC: 122 MG/DL — HIGH (ref 70–99)
GLUCOSE BLDC GLUCOMTR-MCNC: 132 MG/DL — HIGH (ref 70–99)
GLUCOSE BLDC GLUCOMTR-MCNC: 137 MG/DL — HIGH (ref 70–99)
GLUCOSE SERPL-MCNC: 102 MG/DL — HIGH (ref 70–99)
HCT VFR BLD CALC: 35.1 % — SIGNIFICANT CHANGE UP (ref 34.5–45)
HGB BLD-MCNC: 11.2 G/DL — LOW (ref 11.5–15.5)
MAGNESIUM SERPL-MCNC: 1.8 MG/DL — SIGNIFICANT CHANGE UP (ref 1.6–2.6)
MCHC RBC-ENTMCNC: 31.9 G/DL — LOW (ref 32–36)
MCHC RBC-ENTMCNC: 32.3 PG — SIGNIFICANT CHANGE UP (ref 27–34)
MCV RBC AUTO: 101.2 FL — HIGH (ref 80–100)
NRBC # BLD AUTO: 0 K/UL — SIGNIFICANT CHANGE UP (ref 0–0)
NRBC # FLD: 0 K/UL — SIGNIFICANT CHANGE UP (ref 0–0)
NRBC BLD AUTO-RTO: 0 /100 WBCS — SIGNIFICANT CHANGE UP (ref 0–0)
PHOSPHATE SERPL-MCNC: 4.3 MG/DL — SIGNIFICANT CHANGE UP (ref 2.5–4.5)
PLATELET # BLD AUTO: 156 K/UL — SIGNIFICANT CHANGE UP (ref 150–400)
POTASSIUM SERPL-MCNC: 4.2 MMOL/L — SIGNIFICANT CHANGE UP (ref 3.5–5.3)
POTASSIUM SERPL-SCNC: 4.2 MMOL/L — SIGNIFICANT CHANGE UP (ref 3.5–5.3)
RBC # BLD: 3.47 M/UL — LOW (ref 3.8–5.2)
RBC # FLD: 14.4 % — SIGNIFICANT CHANGE UP (ref 10.3–14.5)
SODIUM SERPL-SCNC: 144 MMOL/L — SIGNIFICANT CHANGE UP (ref 135–145)
WBC # BLD: 7.73 K/UL — SIGNIFICANT CHANGE UP (ref 3.8–10.5)
WBC # FLD AUTO: 7.73 K/UL — SIGNIFICANT CHANGE UP (ref 3.8–10.5)

## 2025-04-30 PROCEDURE — 99232 SBSQ HOSP IP/OBS MODERATE 35: CPT

## 2025-04-30 PROCEDURE — 99233 SBSQ HOSP IP/OBS HIGH 50: CPT | Mod: 25

## 2025-04-30 PROCEDURE — 90870 ELECTROCONVULSIVE THERAPY: CPT

## 2025-04-30 RX ORDER — INSULIN LISPRO 100 U/ML
INJECTION, SOLUTION INTRAVENOUS; SUBCUTANEOUS AT BEDTIME
Refills: 0 | Status: DISCONTINUED | OUTPATIENT
Start: 2025-04-30 | End: 2025-05-02

## 2025-04-30 RX ORDER — INSULIN LISPRO 100 U/ML
INJECTION, SOLUTION INTRAVENOUS; SUBCUTANEOUS
Refills: 0 | Status: DISCONTINUED | OUTPATIENT
Start: 2025-04-30 | End: 2025-05-02

## 2025-04-30 RX ADMIN — Medication 1 APPLICATION(S): at 06:58

## 2025-04-30 RX ADMIN — DEXTROMETHORPHAN HBR, GUAIFENESIN 600 MILLIGRAM(S): 200 LIQUID ORAL at 06:57

## 2025-04-30 RX ADMIN — OLANZAPINE 2.5 MILLIGRAM(S): 10 TABLET ORAL at 23:02

## 2025-04-30 RX ADMIN — Medication 500000 UNIT(S): at 14:13

## 2025-04-30 RX ADMIN — Medication 1 DROP(S): at 23:01

## 2025-04-30 RX ADMIN — Medication 500000 UNIT(S): at 23:01

## 2025-04-30 RX ADMIN — DEXTROMETHORPHAN HBR, GUAIFENESIN 600 MILLIGRAM(S): 200 LIQUID ORAL at 17:49

## 2025-04-30 RX ADMIN — LEVALBUTEROL HYDROCHLORIDE 0.63 MILLIGRAM(S): 1.25 SOLUTION RESPIRATORY (INHALATION) at 07:52

## 2025-04-30 RX ADMIN — Medication 1 DROP(S): at 06:58

## 2025-04-30 RX ADMIN — Medication 40 MILLIGRAM(S): at 06:57

## 2025-04-30 RX ADMIN — LISINOPRIL 40 MILLIGRAM(S): 5 TABLET ORAL at 06:58

## 2025-04-30 RX ADMIN — ENOXAPARIN SODIUM 40 MILLIGRAM(S): 100 INJECTION SUBCUTANEOUS at 17:50

## 2025-04-30 RX ADMIN — MEMANTINE HYDROCHLORIDE 5 MILLIGRAM(S): 21 CAPSULE, EXTENDED RELEASE ORAL at 06:57

## 2025-04-30 RX ADMIN — Medication 1 DOSE(S): at 23:01

## 2025-04-30 RX ADMIN — SERTRALINE 125 MILLIGRAM(S): 100 TABLET, FILM COATED ORAL at 14:12

## 2025-04-30 RX ADMIN — Medication 500000 UNIT(S): at 06:59

## 2025-04-30 RX ADMIN — INSULIN GLARGINE-YFGN 6 UNIT(S): 100 INJECTION, SOLUTION SUBCUTANEOUS at 23:00

## 2025-04-30 RX ADMIN — INSULIN LISPRO 3 UNIT(S): 100 INJECTION, SOLUTION INTRAVENOUS; SUBCUTANEOUS at 12:38

## 2025-04-30 RX ADMIN — POLYETHYLENE GLYCOL 3350 17 GRAM(S): 17 POWDER, FOR SOLUTION ORAL at 17:49

## 2025-04-30 RX ADMIN — Medication 1 APPLICATION(S): at 07:01

## 2025-04-30 RX ADMIN — Medication 1 APPLICATION(S): at 17:50

## 2025-04-30 RX ADMIN — Medication 9 MILLIGRAM(S): at 23:02

## 2025-04-30 RX ADMIN — Medication 1 DROP(S): at 14:13

## 2025-04-30 RX ADMIN — MEMANTINE HYDROCHLORIDE 10 MILLIGRAM(S): 21 CAPSULE, EXTENDED RELEASE ORAL at 23:02

## 2025-04-30 RX ADMIN — INSULIN LISPRO 3 UNIT(S): 100 INJECTION, SOLUTION INTRAVENOUS; SUBCUTANEOUS at 17:48

## 2025-04-30 RX ADMIN — Medication 500 MICROGRAM(S): at 07:52

## 2025-04-30 RX ADMIN — POLYETHYLENE GLYCOL 3350 17 GRAM(S): 17 POWDER, FOR SOLUTION ORAL at 06:59

## 2025-04-30 NOTE — PROGRESS NOTE ADULT - SUBJECTIVE AND OBJECTIVE BOX
Arnold Uriostegui MD  Interventional Cardiology / Advance Heart Failure and Cardiac Transplant Specialist  [place] Office : [phone] [place] [address] [place]. [address]  Tel:   [place] Office : [address] [facility] [place] [address]  Tel: [phone]       Pt is lying in bed comfortable not in distress, no chest pains no SOB no palpitations  	  MEDICATIONS:  enoxaparin Injectable 40 milliGRAM(s) SubCutaneous every 24 hours  hydrochlorothiazide 25 milliGRAM(s) Oral daily  lisinopril 40 milliGRAM(s) Oral daily    nystatin    Suspension 244979 Unit(s) Oral three times a day    albuterol/ipratropium for Nebulization 3 milliLiter(s) Nebulizer every 6 hours PRN  fluticasone propionate/ salmeterol 250-50 MICROgram(s) Diskus 1 Dose(s) Inhalation two times a day  guaiFENesin  milliGRAM(s) Oral every 12 hours  ipratropium    for Nebulization 500 MICROGram(s) Nebulizer every 8 hours  levalbuterol Inhalation 0.63 milliGRAM(s) Inhalation every 8 hours    melatonin 9 milliGRAM(s) Oral at bedtime  memantine 10 milliGRAM(s) Oral at bedtime  memantine 5 milliGRAM(s) Oral <User Schedule>  OLANZapine 2.5 milliGRAM(s) Oral at bedtime  sertraline 125 milliGRAM(s) Oral daily    bisacodyl 10 milliGRAM(s) Oral once  pantoprazole    Tablet 40 milliGRAM(s) Oral before breakfast  polyethylene glycol 3350 17 Gram(s) Oral two times a day  senna 2 Tablet(s) Oral at bedtime    dextrose 50% Injectable 25 Gram(s) IV Push once  dextrose 50% Injectable 12.5 Gram(s) IV Push once  dextrose 50% Injectable 25 Gram(s) IV Push once  dextrose Oral Gel 15 Gram(s) Oral once  glucagon  Injectable 1 milliGRAM(s) IntraMuscular once  insulin glargine Injectable (LANTUS) 6 Unit(s) SubCutaneous at bedtime  insulin lispro (ADMELOG) corrective regimen sliding scale   SubCutaneous every 6 hours  insulin lispro Injectable (ADMELOG) 3 Unit(s) SubCutaneous three times a day before meals    artificial tears (preservative free) Ophthalmic Solution 1 Drop(s) Both EYES every 8 hours  chlorhexidine 2% Cloths 1 Application(s) Topical <User Schedule>  petrolatum Ophthalmic Ointment 1 Application(s) Both EYES two times a day      PAST MEDICAL/SURGICAL HISTORY  PAST MEDICAL & SURGICAL HISTORY:  MDD (major depressive disorder), recurrent, severe, with psychosis      Asthma, mild intermittent, uncomplicated      Essential hypertension      No significant past surgical history          SOCIAL HISTORY: Substance Use (street drugs): ( x ) never used  (  ) other:    FAMILY HISTORY:           PHYSICAL EXAM:  T(C): 37.1 (04-30-25 @ 14:27), Max: 37.2 (04-29-25 @ 22:43)  HR: 93 (04-30-25 @ 14:27) (73 - 114)  BP: 107/61 (04-30-25 @ 14:27) (104/56 - 184/113)  RR: 17 (04-30-25 @ 14:27) (14 - 18)  SpO2: 96% (04-30-25 @ 14:27) (87% - 100%)  Wt(kg): --  I&O's Summary    Height (cm): 157 (04-30 @ 08:30)  Weight (kg): 80 (04-30 @ 08:30)  BMI (kg/m2): 32.5 (04-30 @ 08:30)  BSA (m2): 1.81 (04-30 @ 08:30)    GENERAL: NAD  EYES:   PERRLA   ENMT:   Moist mucous membranes, Good dentition, No lesions  Cardiovascular: Normal S1 S2, No JVD, No murmurs, No edema  Respiratory: Lungs clear to auscultation	  Gastrointestinal:  Soft, Non-tender, + BS	  Extremities: no edema                                    11.2   7.73  )-----------( 156      ( 30 Apr 2025 06:45 )             35.1     04-30    144  |  104  |  8   ----------------------------<  102[H]  4.2   |  28  |  0.65    Ca    9.6      30 Apr 2025 06:45  Phos  4.3     04-30  Mg     1.80     04-30      proBNP:   Lipid Profile:   HgA1c:   TSH:     Consultant(s) Notes Reviewed:  [x ] YES  [ ] NO    Care Discussed with Consultants/Other Providers [ x] YES  [ ] NO    Imaging Personally Reviewed independently:  [x] YES  [ ] NO    All labs, radiologic studies, vitals, orders and medications list reviewed. Patient is seen and examined at bedside. Case discussed with medical team.

## 2025-04-30 NOTE — PROGRESS NOTE ADULT - PROBLEM SELECTOR PLAN 1
MDD w/ psychotic features.  - s/p ativan now on hold given concern that it may have contributed to respiratory decompensation  - c/w Zoloft 125 qday (home dose was 150).  - c/w Zyprexa 2.5 qhs  - c/w Namenda 10mg qhs  - psych following  - ECT ongoing  pt optimized per cards and pulm for ECT

## 2025-04-30 NOTE — ECT TREATMENT NOTE - NSECTPOSTTXSUMMARY_PSY_ALL_CORE
Patient scheduled to come into office next Thursday 4/30/2020. Patient aware. The patient had a well modified grand mal seizure under general anesthesia and muscle relaxant. The patient is alert, responsive, in no acute distress.  Recovery uneventful.    Anesthesia meds: Brevital 60 mg, Succinylcholine 40mg The patient had a well modified grand mal seizure under general anesthesia and muscle relaxant. The patient is alert, responsive, in no acute distress.  Recovery uneventful.    Anesthesia meds: Brevital 60 mg, Succinylcholine 40mg    Motor seizure was not visible as the cuff on ankle had deflated. Patient had tachycardia associated with seizure, suggestive of generalized seizure

## 2025-04-30 NOTE — ECT TREATMENT NOTE - NSECTPOSTTXCOMMENTS_PSY_ALL_CORE
Patient examined, chart reviewed. S Patient examined, chart reviewed. Patient had shown improvement after Monday's treatment as noted by being more interactive, improved appetite, making attempts to it. As per [facility] team, she was showing regression yesterday with decreased psychomotor activity and negativism.   Today, she was initially pleasant on approach and greeted back to the writer and smiled. She was not oriented to place or time. Later, she was distrustful of staff when they put BP cuff on the ankle. No motor signs of catatonia. Psychomotor retardation present. She preferred to answer by nodding/turning her head and occasional yes/no and preferred to sleep with eyes closed. Denied any paranoia/hallucinations.

## 2025-04-30 NOTE — BH CONSULTATION LIAISON PROGRESS NOTE - NSBHASSESSMENTFT_PSY_ALL_CORE
Patient is a 74F hx asthma, depression, and HTN was admitted to the MICU with AHRF 2/2 asthma exacerbation resulting in acute on chronic HHRF causing AMS and increased WOB that required intubation. During her ICU course, she experienced seizure-like activity .  Additional issues included hyponatremia, acute on chronic metabolic alkalosis, and mild thrombocytopenia,  also having fevers.  Patient is from [place] [facility]; primary language :Twi pronounced as Chi) domiciled with , retired teacher used to work in [facility], she has 6 children. Patient has h/o hx of major depressive disorder with psychosis, hx of 3 past inpatient psychiatric hospitalizations last in 2016 at [facility], all rehospitalizations were in context non compliance with the treatment. She has no hx of suicide attempts, no hx of substance abuse. Patient w hx of paranoia, disorganization, catatonia, required MOO when in [facility].     3/24----more withdrawn, some stiffness in neck, not communicative. Some concerns for catatonia. Unsure if symptoms attributed to catatonia vs delirium  3/25--- more alert today, trying to following commands. Still with PMR+. Appears to be responding to Ativan  3/26-- BFS 10, will increasing standing ativan to target catatonic symptoms - giving STAT 0.5mg NOW discussed w acp   3/27----will continue ativan dosing for one more day before increasing  3/28: BFS: stupor 1, mutism 2,withdrawan 2, verbigeration 1, rigidity 2 =8.  3/29: BFS: stupor 2, mutism 2, staring 1, posturing 2, rigidity 2, waxy flexibility 3, withdrawal 2 = 14; please monitor nutritional intake, plan to increase ativan by total daily dose of 0.5 mg at bedtime due to persistent catatonia   3/31: BFS: stupor 2, mutism 2, staring 2, rigidity 1, negativism 1, withdrawal 1, waxy flexibility 3 =12, patient with productive cough, advised for medical work up  4/1---tired, speaking more, not rigid, has PMR+. NPO status now.   4/2--sleepy, RRT this afternoon  4/3---better mentation, pmr but no chester catatonic symptoms  4/4: alert, using few words, mild rigidity -  at bedside, updated   4/5: few words, lethargic, fluctuating wakefulness. Mild rigidity.  No overt catatonic sxs.   4/7: no change, remains thought blocked, few words - son at bedside, updated with care  4/8: patient continues to have poor eye contact, uses few words, no rigidity on today's exam.   4/9: patient remains minimally verbal, some posturing, poor Po intake - continues on NC for 02 requirements   4/10: more verbal/interactive, with increased PO intake per family. Some psychotic output. Family agrees with resumption of Zyprexa.   4/14: alert to self with fluctuating wakefulness, intermittent full sentences f/b one-word answers, some rigidity, posturing, denies si, denies ah/vh  4/16: patient more catatonic today, notable catalepsy, waxiness, verbigeration followed by mutism and staring, family against ativan and ECT, would increase memantine as below, not eating   4/17: catatonic (mute, rigid, not engaging). Eating some portions of meals with significant family support. Family opting to see memantine trial to full effect before consideration of ECT  4/18: mutism today, odd posturing, unable to engage, d/w spouse and son ECT, family still undecided  4/21: alert to self, some improvement with verbal engagement, tracking, follow command, denies si, denies ah/vh, awaiting call back from son in regards to ECT consult  4/22: remains catatonic (mute, postured, rigid, withdrawn, negativistic). Exam limited. Family agrees with formal ECT consult.  4/23: Remains with symptoms of catatonia. BFS: stupor 1, mute2, Rigid 2, negativism 2, withdrawal 2 - 9 : ECT consult completed. awaiting clearances for ECT  4/42: remains with BFS of 9, plan for ECT  4/25: alert to self, able to engage in some conversation, however, confused, loose on  exam, odd posturing right upper arm, Dr. Toure made aware of schedule for ECT Monday 4/28: s/p ECT #2. more spontaneous speech, cooperative with exam. improved eye contact. loose with no rigidity.   4/29: displays more s/s of catatonia since yesterdays exam- negativism, withdrawal, rigidity. family and ect in agreement to proceed with treatment 3 tomorrow.  4/30--some confusion today. Will assess tomm     PLAN  -- ECT planned for Friday---will finalize after assessment tomorrow  - no SI or HI, no need for psychiatric CO  -- antipsychotics can only be given if qtc < 500    - MEDICATIONS:  - Memantine 5mg AM and 10mg HS  -- C/W Zoloft 125 mg daily   -- C/W Zyprexa 2.5mg HS   - PRN: Ativan 0.5mg q 6hrs prn IM/IV/PO    - please ensure PT involved in patient care    - DISPO: pending. patient with acute course of ECT currently

## 2025-04-30 NOTE — BH CONSULTATION LIAISON PROGRESS NOTE - NSBHFUPINTERVALCCFT_PSY_A_CORE
ECT# 3 this morning  Reviewed ect notes  Hand off  received from previous cl team-- discussed admission course and below plan

## 2025-04-30 NOTE — PROGRESS NOTE ADULT - ASSESSMENT
74F reported asthma with prior intubations and depression initially admitted with asthma exacerbation with hypercapnic and hypoxemic respiratory failure hospital course complicated by persistent mood disorder and catatonia planned for ECT    Tolerated ECT well yesterday. Currently on room air.     # asthma- currently not in exacerbation  # Intermittent hypoxemia    Recommendations:  -  C/W Duonebs PRN, advair BID, and spiriva.   - Completed steroids, monitor off for now.   - Intermittently hypoxic ajith-ECT, now recovered on room air.   - Incentive spirometry  - OOB as tolerated  - Appreciate ongoing BH optimization  - Will need outpatient PFT on discharge

## 2025-04-30 NOTE — BH CONSULTATION LIAISON PROGRESS NOTE - NSBHFUPINTERVALHXFT_PSY_A_CORE
Met with the patient. Sitting in bed, eating lunch. Son at bedside to assist. States that while patient is much improved, some symptoms of catatonia remain---slow to respond, trailing off mid sentence, staring at times. Otherwise overall son feels patient is improving with ECT.   Patient smiles when questions are asked, and can be rather slow to respond and concrete in TP. No distress. Follows simple commands, tracks MD. Denies any si or hi, or ah or vh or paranoia. Can be noted to be laughing to self-- responding to internal stimuli? Is disoriented, could not tell me her .     Examined---mild increased tone in UE, tracking md, following commands, grasp reflex+. No posturing, or catalepsy, or echolalia, or echopraxia.   Coordinated at length with son--- he knows the assessment above, plan below. He knows that disposition is not finalized yet-- [facility] vs [facility].

## 2025-04-30 NOTE — CHART NOTE - NSCHARTNOTEFT_GEN_A_CORE
Nutrition Follow-Up Chart Note         Source: Patient A&Ox2    Chart [x ]     Pt is seen for nutrition follow up due to severe/moderate malnutrition, per protocol.    __________________ Medical Course__________________  74F MDD, asthma, prior CVA presented with AHRF c/b AMS requiring MICU admission for intubation for respiratory failure i/s/o asthma exacerbation. Extubated 3/12 to face tent and then on BIPAP s/p RRT 3/19 for increased WOB / hypoxia despite aggressive asthma management s/p return to MICU for management of severe asthma. Possible aspiration event 3/31. SLP eval appreciated. XR cine performed with improvement in mental status - pureed diet w/ thin liquids recommended. Worsening respiratory status 4/2 - placed on NIV, now weaned to nasal cannula and now RA.  ENT eval - no upper airway consideration for structural causes of asthma such as vocal cord dysfunction.   Course c/b MDD w/ catatonia and poor PO, off ativan due to respiratory concerns trialed on Memantine family now agreeable to ECT, planned for 4/25    Diet, Pureed:   Consistent Carbohydrate {No Snacks} (CSTCHO)  No Beef  No Pork  Supplement Feeding Modality:  Oral  Glucerna Shake Cans or Servings Per Day:  1       Frequency:  Two Times a day  Ensure Max Cans or Servings Per Day:  1       Frequency:  Daily (04-15-25 @ 15:52)         __________________ Nutrition Course__________________       __________________ Pertinent Medications__________________   MEDICATIONS  (STANDING):  artificial tears (preservative free) Ophthalmic Solution 1 Drop(s) Both EYES every 8 hours  bisacodyl 10 milliGRAM(s) Oral once  chlorhexidine 2% Cloths 1 Application(s) Topical <User Schedule>  dextrose 50% Injectable 25 Gram(s) IV Push once  dextrose 50% Injectable 12.5 Gram(s) IV Push once  dextrose 50% Injectable 25 Gram(s) IV Push once  dextrose Oral Gel 15 Gram(s) Oral once  enoxaparin Injectable 40 milliGRAM(s) SubCutaneous every 24 hours  fluticasone propionate/ salmeterol 250-50 MICROgram(s) Diskus 1 Dose(s) Inhalation two times a day  glucagon  Injectable 1 milliGRAM(s) IntraMuscular once  guaiFENesin  milliGRAM(s) Oral every 12 hours  hydrochlorothiazide 25 milliGRAM(s) Oral daily  insulin glargine Injectable (LANTUS) 6 Unit(s) SubCutaneous at bedtime  insulin lispro (ADMELOG) corrective regimen sliding scale   SubCutaneous every 6 hours  insulin lispro Injectable (ADMELOG) 3 Unit(s) SubCutaneous three times a day before meals  ipratropium    for Nebulization 500 MICROGram(s) Nebulizer every 8 hours  levalbuterol Inhalation 0.63 milliGRAM(s) Inhalation every 8 hours  lisinopril 40 milliGRAM(s) Oral daily  melatonin 9 milliGRAM(s) Oral at bedtime  memantine 10 milliGRAM(s) Oral at bedtime  memantine 5 milliGRAM(s) Oral <User Schedule>  nystatin    Suspension 755411 Unit(s) Oral three times a day  OLANZapine 2.5 milliGRAM(s) Oral at bedtime  pantoprazole    Tablet 40 milliGRAM(s) Oral before breakfast  petrolatum Ophthalmic Ointment 1 Application(s) Both EYES two times a day  polyethylene glycol 3350 17 Gram(s) Oral two times a day  senna 2 Tablet(s) Oral at bedtime  sertraline 125 milliGRAM(s) Oral daily    MEDICATIONS  (PRN):  albuterol/ipratropium for Nebulization 3 milliLiter(s) Nebulizer every 6 hours PRN Shortness of Breath and/or Wheezing      __________________ Pertinent Labs__________________   04-30    144  |  104  |  8   ----------------------------<  102[H]  4.2   |  28  |  0.65    Ca    9.6      30 Apr 2025 06:45  Phos  4.3     04-30  Mg     1.80     04-30                            11.2   7.73  )-----------( 156      ( 30 Apr 2025 06:45 )             35.1          POCT Blood Glucose.: 113 mg/dL (04-30-25 @ 08:23)  POCT Blood Glucose.: 122 mg/dL (04-30-25 @ 05:21)  POCT Blood Glucose.: 111 mg/dL (04-29-25 @ 22:25)  POCT Blood Glucose.: 172 mg/dL (04-29-25 @ 17:25)  POCT Blood Glucose.: 118 mg/dL (04-29-25 @ 12:00)  POCT Blood Glucose.: 144 mg/dL (04-29-25 @ 09:06)  POCT Blood Glucose.: 142 mg/dL (04-28-25 @ 22:05)  POCT Blood Glucose.: 133 mg/dL (04-28-25 @ 17:26)  POCT Blood Glucose.: 152 mg/dL (04-28-25 @ 12:32)  POCT Blood Glucose.: 130 mg/dL (04-28-25 @ 07:19)  POCT Blood Glucose.: 95 mg/dL (04-27-25 @ 23:19)  POCT Blood Glucose.: 140 mg/dL (04-27-25 @ 17:20)  POCT Blood Glucose.: 138 mg/dL (04-27-25 @ 12:22)        __________________ Anthropometrics__________________     Anthropometrics: Height (cm): 157 (04-30)  Weight (kg): 80 (04-30)  BMI (kg/m2): 32.5 (04-30)  IBW:   +/- 10%    Weight Assessment: per RN flowsheet in KG  Daily Height in cm: 157 (30 Apr 2025 08:30)    Daily   % weight change :   Daily Height/Weight    Edema:    Skin:     Estimated Needs Assessment:   [  ] No change in need assessment    [  ] recalculated,   Weight Used:   Estimated Energy:  Kcal/kg/day (  kcal/kg)  Estimated Protein:  gm/kg/day (  gm/kg)  Estimated Fluid: per Medical and team discretion.    Nutrition Focused Physical Exam: [  ] conducted  [  ] deferred  [  ] not applicable;    Muscle wasting [  ] temporal [  ] clavicle [  ] shoulder [  ] scapula [  ] thigh [  ] calf [  ] dorsal hand    Fat Loss [  ] buccal [  ] orbital [  ] triceps [  ] ribs     Previous Nutrition Diagnosis:   [  ] Severe/Moderate malnutrition  [  ] Increased Nutrient Needs  [  ] Altered Nutrition Related Labs  [  ] Unintentional Weight Loss  [  ] Inadequate Oral Intake  [  ] Inadequate Protein Energy Intake  [  ] Inadequate Energy Intake  [  ] Food and Nutrition Knowledge Deficit    Nutrition Diagnosis is : [  ] ongoing  [  ] resolved [  ] not applicable     New Nutrition Diagnosis : [  ] ongoing  [  ] resolved [  ] not applicable     Education:  [  ] Given today        Type of education provided:   [  ] Given on previous assessment by RD   [  ] Not applicable 2/2 cognitive deficit  [  ] Pt refusal of education offered   [  ] Not applicable 2/2 current prognosis  [  ] Not warranted at present    Recommendations:  [ x ] Continue present PO diet/EN order as it remains appropriate at this time  [ x ] Honor food and fluid preferences as able.  [ x ] Please consider adding    Monitoring and Evaluation:   [ x ] Monitor PO intake, skin integrity, bowel regimen, and nutrition pertinent labs.  [ x ] Tolerance to diet prescription [ x ] weights [ x ] follow up per protocol Nutrition Follow-Up Chart Note         Source: Patient A&Ox2    RN [x ] Chart [x ]     Pt is seen for nutrition follow up due to severe/moderate malnutrition, per protocol.    __________________ Medical Course__________________  74F MDD, asthma, prior CVA presented with AHRF c/b AMS requiring MICU admission for intubation for respiratory failure i/s/o asthma exacerbation. Extubated 3/12 to face tent and then on BIPAP s/p RRT 3/19 for increased WOB / hypoxia despite aggressive asthma management s/p return to MICU for management of severe asthma. Possible aspiration event 3/31. SLP eval appreciated. XR cine performed with improvement in mental status - pureed diet w/ thin liquids recommended. Worsening respiratory status 4/2 - placed on NIV, now weaned to nasal cannula and now RA.  ENT eval - no upper airway consideration for structural causes of asthma such as vocal cord dysfunction.   Course c/b MDD w/ catatonia and poor PO, off ativan due to respiratory concerns trialed on Memantine family now agreeable to ECT, planned for 4/25    Diet, Pureed:   Consistent Carbohydrate {No Snacks} (CSTCHO)  No Beef  No Pork  Supplement Feeding Modality:  Oral  Glucerna Shake Cans or Servings Per Day:  1       Frequency:  Two Times a day  Ensure Max Cans or Servings Per Day:  1       Frequency:  Daily (04-15-25 @ 15:52)         __________________ Nutrition Course__________________   Met with patient at the time of visit. Pt with decreased cognition unable to obtain any nutrition related info. RN provided collateral, who reports pt "combats with staff when we trying to feed her". RN reports pt NPO this morning, s/p ECT today. Pt with previously diet placed for Consistent Carbohydrate. Recommend once diet resumed, consider to resume Glucerna 2x daily (440kcals, 20g protein), and Ensure Max 1x daily (150cal, 30gm pro) to provide optimal nutrition. Pt's labs notable with POCT 113-172 noted with steroid in use. Cont. on insulin regimen for glycemic management.  No GI distress reported (nausea/vomiting/diarrhea/constipation.) LBM 4.28 as per RN flow sheet. RD to remain available for further nutritional interventions as indicated.        __________________ Pertinent Medications__________________   MEDICATIONS  (STANDING):  artificial tears (preservative free) Ophthalmic Solution 1 Drop(s) Both EYES every 8 hours  bisacodyl 10 milliGRAM(s) Oral once  chlorhexidine 2% Cloths 1 Application(s) Topical <User Schedule>  dextrose 50% Injectable 25 Gram(s) IV Push once  dextrose 50% Injectable 12.5 Gram(s) IV Push once  dextrose 50% Injectable 25 Gram(s) IV Push once  dextrose Oral Gel 15 Gram(s) Oral once  enoxaparin Injectable 40 milliGRAM(s) SubCutaneous every 24 hours  fluticasone propionate/ salmeterol 250-50 MICROgram(s) Diskus 1 Dose(s) Inhalation two times a day  glucagon  Injectable 1 milliGRAM(s) IntraMuscular once  guaiFENesin  milliGRAM(s) Oral every 12 hours  hydrochlorothiazide 25 milliGRAM(s) Oral daily  insulin glargine Injectable (LANTUS) 6 Unit(s) SubCutaneous at bedtime  insulin lispro (ADMELOG) corrective regimen sliding scale   SubCutaneous every 6 hours  insulin lispro Injectable (ADMELOG) 3 Unit(s) SubCutaneous three times a day before meals  ipratropium    for Nebulization 500 MICROGram(s) Nebulizer every 8 hours  levalbuterol Inhalation 0.63 milliGRAM(s) Inhalation every 8 hours  lisinopril 40 milliGRAM(s) Oral daily  melatonin 9 milliGRAM(s) Oral at bedtime  memantine 10 milliGRAM(s) Oral at bedtime  memantine 5 milliGRAM(s) Oral <User Schedule>  nystatin    Suspension 521005 Unit(s) Oral three times a day  OLANZapine 2.5 milliGRAM(s) Oral at bedtime  pantoprazole    Tablet 40 milliGRAM(s) Oral before breakfast  petrolatum Ophthalmic Ointment 1 Application(s) Both EYES two times a day  polyethylene glycol 3350 17 Gram(s) Oral two times a day  senna 2 Tablet(s) Oral at bedtime  sertraline 125 milliGRAM(s) Oral daily    MEDICATIONS  (PRN):  albuterol/ipratropium for Nebulization 3 milliLiter(s) Nebulizer every 6 hours PRN Shortness of Breath and/or Wheezing      __________________ Pertinent Labs__________________   04-30    144  |  104  |  8   ----------------------------<  102[H]  4.2   |  28  |  0.65    Ca    9.6      30 Apr 2025 06:45  Phos  4.3     04-30  Mg     1.80     04-30                            11.2   7.73  )-----------( 156      ( 30 Apr 2025 06:45 )             35.1          POCT Blood Glucose.: 113 mg/dL (04-30-25 @ 08:23)  POCT Blood Glucose.: 122 mg/dL (04-30-25 @ 05:21)  POCT Blood Glucose.: 111 mg/dL (04-29-25 @ 22:25)  POCT Blood Glucose.: 172 mg/dL (04-29-25 @ 17:25)  POCT Blood Glucose.: 118 mg/dL (04-29-25 @ 12:00)  POCT Blood Glucose.: 144 mg/dL (04-29-25 @ 09:06)  POCT Blood Glucose.: 142 mg/dL (04-28-25 @ 22:05)  POCT Blood Glucose.: 133 mg/dL (04-28-25 @ 17:26)  POCT Blood Glucose.: 152 mg/dL (04-28-25 @ 12:32)  POCT Blood Glucose.: 130 mg/dL (04-28-25 @ 07:19)  POCT Blood Glucose.: 95 mg/dL (04-27-25 @ 23:19)  POCT Blood Glucose.: 140 mg/dL (04-27-25 @ 17:20)  POCT Blood Glucose.: 138 mg/dL (04-27-25 @ 12:22)        __________________ Anthropometrics__________________     Anthropometrics: Height (cm): 157 (04-30)  Weight (kg): 80 (04-30)  BMI (kg/m2): 32.5 (04-30)  IBW: 109  +/- 10%    Weight Assessment: no updated weight available as per RN flow sheet.     Edema: None noted at present as per RN flow sheet.     Skin: None noted at present as per RN flow sheet.     Estimated Needs Assessment:   [ x ] No change in need assessment    Weight Used:  IBW- 110lbs/50kg  Estimated Energy: 1445-0479  Kcal/kg/day (30-35  kcal/kg)  Estimated Protein: 60-84 gm/kg/day (1.2-1.4  gm/kg)  Estimated Fluid: per Medical and team discretion.    Nutrition Focused Physical Exam:  [ x ] not applicable;        Previous Nutrition Diagnosis:   [ x ] Severe/Moderate malnutrition    Nutrition Diagnosis is : [ x ] ongoing     Education:  [ x ] Not warranted at present    Recommendations:  [ x ] Recommend resume PO diet when medically feasible. Resume Ensure Max 1x daily (150cal, 30gm pro), and Glucerna 2x daily (440kcals, 20g protein) to provide optimal once PO diet resumed.   [ x ] RD to remain available for further nutritional interventions as indicated.     Monitoring and Evaluation:   [ x ] Monitor PO intake, skin integrity, bowel regimen, and nutrition pertinent labs.  [ x ] Tolerance to diet prescription [ x ] weights [ x ] follow up per protocol

## 2025-04-30 NOTE — PROGRESS NOTE ADULT - ASSESSMENT
74F MDD, asthma, prior CVA presented with AHRF c/b AMS requiring MICU admission for intubation for respiratory failure i/s/o asthma exacerbation. Extubated 3/12 to face tent and then on BIPAP s/p RRT 3/19 for increased WOB / hypoxia despite aggressive asthma management s/p return to MICU for management of severe asthma. Possible aspiration event 3/31. SLP eval appreciated. XR cine performed with improvement in mental status - pureed diet w/ thin liquids recommended. Worsening respiratory status 4/2 - placed on NIV, now weaned to nasal cannula and now RA.  ENT eval - no upper airway consideration for structural causes of asthma such as vocal cord dysfunction.   Course c/b MDD w/ catatonia and poor PO, off ativan due to respiratory concerns trialed on Memantine family now agreeable to ECT

## 2025-04-30 NOTE — PROGRESS NOTE ADULT - SUBJECTIVE AND OBJECTIVE BOX
[facility] Division of Hospital Medicine  Carlos MelinaDO  Available via MS Teams    SUBJECTIVE / OVERNIGHT EVENTS:  No acute events overnight. Patient seen and examined at bedside this morning.  Awake this morning. Answering most questions, does not know she is in a hospital or the year. Tells me her name.  Encouraged po intake.    ADDITIONAL REVIEW OF SYSTEMS:    MEDICATIONS  (STANDING):  artificial tears (preservative free) Ophthalmic Solution 1 Drop(s) Both EYES every 8 hours  bisacodyl 10 milliGRAM(s) Oral once  chlorhexidine 2% Cloths 1 Application(s) Topical <User Schedule>  dextrose 50% Injectable 25 Gram(s) IV Push once  dextrose 50% Injectable 12.5 Gram(s) IV Push once  dextrose 50% Injectable 25 Gram(s) IV Push once  dextrose Oral Gel 15 Gram(s) Oral once  enoxaparin Injectable 40 milliGRAM(s) SubCutaneous every 24 hours  fluticasone propionate/ salmeterol 250-50 MICROgram(s) Diskus 1 Dose(s) Inhalation two times a day  glucagon  Injectable 1 milliGRAM(s) IntraMuscular once  guaiFENesin  milliGRAM(s) Oral every 12 hours  hydrochlorothiazide 25 milliGRAM(s) Oral daily  insulin glargine Injectable (LANTUS) 6 Unit(s) SubCutaneous at bedtime  insulin lispro (ADMELOG) corrective regimen sliding scale   SubCutaneous every 6 hours  insulin lispro Injectable (ADMELOG) 3 Unit(s) SubCutaneous three times a day before meals  ipratropium    for Nebulization 500 MICROGram(s) Nebulizer every 8 hours  levalbuterol Inhalation 0.63 milliGRAM(s) Inhalation every 8 hours  lisinopril 40 milliGRAM(s) Oral daily  melatonin 9 milliGRAM(s) Oral at bedtime  memantine 10 milliGRAM(s) Oral at bedtime  memantine 5 milliGRAM(s) Oral <User Schedule>  nystatin    Suspension 786520 Unit(s) Oral three times a day  OLANZapine 2.5 milliGRAM(s) Oral at bedtime  pantoprazole    Tablet 40 milliGRAM(s) Oral before breakfast  petrolatum Ophthalmic Ointment 1 Application(s) Both EYES two times a day  polyethylene glycol 3350 17 Gram(s) Oral two times a day  senna 2 Tablet(s) Oral at bedtime  sertraline 125 milliGRAM(s) Oral daily    MEDICATIONS  (PRN):  albuterol/ipratropium for Nebulization 3 milliLiter(s) Nebulizer every 6 hours PRN Shortness of Breath and/or Wheezing      I&O's Summary      PHYSICAL EXAM:  Vital Signs Last 24 Hrs  T(C): 37.2 (30 Apr 2025 08:36), Max: 37.2 (29 Apr 2025 22:43)  T(F): 99 (30 Apr 2025 08:36), Max: 99 (29 Apr 2025 22:43)  HR: 78 (30 Apr 2025 10:00) (73 - 114)  BP: 124/56 (30 Apr 2025 10:00) (104/56 - 184/113)  BP(mean): 65 (30 Apr 2025 05:13) (65 - 85)  RR: 14 (30 Apr 2025 10:00) (14 - 18)  SpO2: 100% (30 Apr 2025 08:36) (87% - 100%)    Parameters below as of 30 Apr 2025 10:00  Patient On (Oxygen Delivery Method): nasal cannula        LABS:                        11.2   7.73  )-----------( 156      ( 30 Apr 2025 06:45 )             35.1     04-30    144  |  104  |  8   ----------------------------<  102[H]  4.2   |  28  |  0.65    Ca    9.6      30 Apr 2025 06:45  Phos  4.3     04-30  Mg     1.80     04-30            Urinalysis Basic - ( 30 Apr 2025 06:45 )    Color: x / Appearance: x / SG: x / pH: x  Gluc: 102 mg/dL / Ketone: x  / Bili: x / Urobili: x   Blood: x / Protein: x / Nitrite: x   Leuk Esterase: x / RBC: x / WBC x   Sq Epi: x / Non Sq Epi: x / Bacteria: x        COVID-19 PCR: NotDetec (20 Apr 2025 05:54)  SARS-CoV-2: NotDetec (19 Mar 2025 09:05)  SARS-CoV-2: NotDetec (08 Mar 2025 14:40)

## 2025-04-30 NOTE — PROGRESS NOTE ADULT - SUBJECTIVE AND OBJECTIVE BOX
Interval Events: Patient was seen and examined at bedside.    More awake today. Still not able to participate in interview but states her breathing is okay. Briefly required o2 but now back on room air. Comfortable. Clear lungs on ascultation.    REVIEW OF SYSTEMS:    [ ] Unable to assess ROS because catatonia    OBJECTIVE:  ICU Vital Signs Last 24 Hrs  T(C): 37.2 (30 Apr 2025 08:36), Max: 37.2 (29 Apr 2025 22:43)  T(F): 99 (30 Apr 2025 08:36), Max: 99 (29 Apr 2025 22:43)  HR: 78 (30 Apr 2025 10:00) (73 - 114)  BP: 124/56 (30 Apr 2025 10:00) (104/56 - 184/113)  BP(mean): 65 (30 Apr 2025 05:13) (65 - 85)  ABP: --  ABP(mean): --  RR: 14 (30 Apr 2025 10:00) (14 - 18)  SpO2: 100% (30 Apr 2025 08:36) (87% - 100%)    O2 Parameters below as of 30 Apr 2025 10:00  Patient On (Oxygen Delivery Method): nasal cannula    CAPILLARY BLOOD GLUCOSE      POCT Blood Glucose.: 132 mg/dL (30 Apr 2025 12:12)    PHYSICAL EXAM:  Constitutional: well-developed; well-groomed; well-nourished; no distress,  Eyes: PERRL; EOMI  ENMT: Normal oropharnxy, no oral lesions, no erythema, no exudates  Neck:  Supple; no JVD  Respiratory: airway patent; breath sounds equal; good air movement, no wheezing, no crackles, no rhonchi. no increase in WOB  Cardiovascular: regular rate and rhythm  no rub , no murmur, no gallops.   Gastrointestinal: soft; no distention, normal BS, no TTP, no organomegaly, no ascites  Extremities: no clubbing; no cyanosis; no pedal edema,   Neurological: alert and oriented x 0  Skin: warm and dry; color normal: no rash: no ulcers  Psychiatric: Calm, no SI/HI        HOSPITAL MEDICATIONS:  MEDICATIONS  (STANDING):  artificial tears (preservative free) Ophthalmic Solution 1 Drop(s) Both EYES every 8 hours  bisacodyl 10 milliGRAM(s) Oral once  chlorhexidine 2% Cloths 1 Application(s) Topical <User Schedule>  dextrose 50% Injectable 25 Gram(s) IV Push once  dextrose 50% Injectable 12.5 Gram(s) IV Push once  dextrose 50% Injectable 25 Gram(s) IV Push once  dextrose Oral Gel 15 Gram(s) Oral once  enoxaparin Injectable 40 milliGRAM(s) SubCutaneous every 24 hours  fluticasone propionate/ salmeterol 250-50 MICROgram(s) Diskus 1 Dose(s) Inhalation two times a day  glucagon  Injectable 1 milliGRAM(s) IntraMuscular once  guaiFENesin  milliGRAM(s) Oral every 12 hours  hydrochlorothiazide 25 milliGRAM(s) Oral daily  insulin glargine Injectable (LANTUS) 6 Unit(s) SubCutaneous at bedtime  insulin lispro (ADMELOG) corrective regimen sliding scale   SubCutaneous every 6 hours  insulin lispro Injectable (ADMELOG) 3 Unit(s) SubCutaneous three times a day before meals  ipratropium    for Nebulization 500 MICROGram(s) Nebulizer every 8 hours  levalbuterol Inhalation 0.63 milliGRAM(s) Inhalation every 8 hours  lisinopril 40 milliGRAM(s) Oral daily  melatonin 9 milliGRAM(s) Oral at bedtime  memantine 10 milliGRAM(s) Oral at bedtime  memantine 5 milliGRAM(s) Oral <User Schedule>  nystatin    Suspension 460173 Unit(s) Oral three times a day  OLANZapine 2.5 milliGRAM(s) Oral at bedtime  pantoprazole    Tablet 40 milliGRAM(s) Oral before breakfast  petrolatum Ophthalmic Ointment 1 Application(s) Both EYES two times a day  polyethylene glycol 3350 17 Gram(s) Oral two times a day  senna 2 Tablet(s) Oral at bedtime  sertraline 125 milliGRAM(s) Oral daily    MEDICATIONS  (PRN):  albuterol/ipratropium for Nebulization 3 milliLiter(s) Nebulizer every 6 hours PRN Shortness of Breath and/or Wheezing      LABS:                        11.2   7.73  )-----------( 156      ( 30 Apr 2025 06:45 )             35.1     Hgb Trend: 11.2<--, 11.3<--, 11.4<--, 10.7<--, 11.0<--  04-30    144  |  104  |  8   ----------------------------<  102[H]  4.2   |  28  |  0.65    Ca    9.6      30 Apr 2025 06:45  Phos  4.3     04-30  Mg     1.80     04-30      Creatinine Trend: 0.65<--, 0.62<--, 0.72<--, 0.93<--, 0.72<--, 0.61<--    Urinalysis Basic - ( 30 Apr 2025 06:45 )    Color: x / Appearance: x / SG: x / pH: x  Gluc: 102 mg/dL / Ketone: x  / Bili: x / Urobili: x   Blood: x / Protein: x / Nitrite: x   Leuk Esterase: x / RBC: x / WBC x   Sq Epi: x / Non Sq Epi: x / Bacteria: x    MICROBIOLOGY:     RADIOLOGY & EKG:  [x ] Reviewed and interpreted by me

## 2025-05-01 LAB
ANION GAP SERPL CALC-SCNC: 14 MMOL/L — SIGNIFICANT CHANGE UP (ref 7–14)
BUN SERPL-MCNC: 13 MG/DL — SIGNIFICANT CHANGE UP (ref 7–23)
CALCIUM SERPL-MCNC: 9.2 MG/DL — SIGNIFICANT CHANGE UP (ref 8.4–10.5)
CHLORIDE SERPL-SCNC: 100 MMOL/L — SIGNIFICANT CHANGE UP (ref 98–107)
CO2 SERPL-SCNC: 22 MMOL/L — SIGNIFICANT CHANGE UP (ref 22–31)
CREAT SERPL-MCNC: 0.68 MG/DL — SIGNIFICANT CHANGE UP (ref 0.5–1.3)
EGFR: 91 ML/MIN/1.73M2 — SIGNIFICANT CHANGE UP
EGFR: 91 ML/MIN/1.73M2 — SIGNIFICANT CHANGE UP
GLUCOSE BLDC GLUCOMTR-MCNC: 138 MG/DL — HIGH (ref 70–99)
GLUCOSE BLDC GLUCOMTR-MCNC: 172 MG/DL — HIGH (ref 70–99)
GLUCOSE BLDC GLUCOMTR-MCNC: 184 MG/DL — HIGH (ref 70–99)
GLUCOSE BLDC GLUCOMTR-MCNC: 202 MG/DL — HIGH (ref 70–99)
GLUCOSE SERPL-MCNC: 147 MG/DL — HIGH (ref 70–99)
HCT VFR BLD CALC: 34.7 % — SIGNIFICANT CHANGE UP (ref 34.5–45)
HGB BLD-MCNC: 11.2 G/DL — LOW (ref 11.5–15.5)
MAGNESIUM SERPL-MCNC: 1.8 MG/DL — SIGNIFICANT CHANGE UP (ref 1.6–2.6)
MCHC RBC-ENTMCNC: 32.3 G/DL — SIGNIFICANT CHANGE UP (ref 32–36)
MCHC RBC-ENTMCNC: 32.5 PG — SIGNIFICANT CHANGE UP (ref 27–34)
MCV RBC AUTO: 100.6 FL — HIGH (ref 80–100)
NRBC # BLD AUTO: 0 K/UL — SIGNIFICANT CHANGE UP (ref 0–0)
NRBC # FLD: 0 K/UL — SIGNIFICANT CHANGE UP (ref 0–0)
NRBC BLD AUTO-RTO: 0 /100 WBCS — SIGNIFICANT CHANGE UP (ref 0–0)
PHOSPHATE SERPL-MCNC: 4 MG/DL — SIGNIFICANT CHANGE UP (ref 2.5–4.5)
PLATELET # BLD AUTO: 149 K/UL — LOW (ref 150–400)
POTASSIUM SERPL-MCNC: 4.8 MMOL/L — SIGNIFICANT CHANGE UP (ref 3.5–5.3)
POTASSIUM SERPL-SCNC: 4.8 MMOL/L — SIGNIFICANT CHANGE UP (ref 3.5–5.3)
RBC # BLD: 3.45 M/UL — LOW (ref 3.8–5.2)
RBC # FLD: 14.6 % — HIGH (ref 10.3–14.5)
SODIUM SERPL-SCNC: 136 MMOL/L — SIGNIFICANT CHANGE UP (ref 135–145)
WBC # BLD: 6.74 K/UL — SIGNIFICANT CHANGE UP (ref 3.8–10.5)
WBC # FLD AUTO: 6.74 K/UL — SIGNIFICANT CHANGE UP (ref 3.8–10.5)

## 2025-05-01 PROCEDURE — 99232 SBSQ HOSP IP/OBS MODERATE 35: CPT

## 2025-05-01 PROCEDURE — 99233 SBSQ HOSP IP/OBS HIGH 50: CPT

## 2025-05-01 RX ORDER — INSULIN GLARGINE-YFGN 100 [IU]/ML
3 INJECTION, SOLUTION SUBCUTANEOUS AT BEDTIME
Refills: 0 | Status: DISCONTINUED | OUTPATIENT
Start: 2025-05-01 | End: 2025-05-21

## 2025-05-01 RX ADMIN — LISINOPRIL 40 MILLIGRAM(S): 5 TABLET ORAL at 05:54

## 2025-05-01 RX ADMIN — MEMANTINE HYDROCHLORIDE 5 MILLIGRAM(S): 21 CAPSULE, EXTENDED RELEASE ORAL at 05:51

## 2025-05-01 RX ADMIN — INSULIN LISPRO 1: 100 INJECTION, SOLUTION INTRAVENOUS; SUBCUTANEOUS at 18:31

## 2025-05-01 RX ADMIN — Medication 500000 UNIT(S): at 16:33

## 2025-05-01 RX ADMIN — Medication 500000 UNIT(S): at 05:51

## 2025-05-01 RX ADMIN — Medication 500 MICROGRAM(S): at 16:19

## 2025-05-01 RX ADMIN — Medication 1 APPLICATION(S): at 06:02

## 2025-05-01 RX ADMIN — INSULIN LISPRO 2: 100 INJECTION, SOLUTION INTRAVENOUS; SUBCUTANEOUS at 12:54

## 2025-05-01 RX ADMIN — Medication 500 MICROGRAM(S): at 08:04

## 2025-05-01 RX ADMIN — Medication 1 APPLICATION(S): at 18:32

## 2025-05-01 RX ADMIN — LEVALBUTEROL HYDROCHLORIDE 0.63 MILLIGRAM(S): 1.25 SOLUTION RESPIRATORY (INHALATION) at 16:17

## 2025-05-01 RX ADMIN — Medication 1 DOSE(S): at 08:40

## 2025-05-01 RX ADMIN — Medication 1 DROP(S): at 05:50

## 2025-05-01 RX ADMIN — Medication 1 APPLICATION(S): at 05:51

## 2025-05-01 RX ADMIN — Medication 1 DOSE(S): at 23:16

## 2025-05-01 RX ADMIN — INSULIN LISPRO 3 UNIT(S): 100 INJECTION, SOLUTION INTRAVENOUS; SUBCUTANEOUS at 18:30

## 2025-05-01 RX ADMIN — DEXTROMETHORPHAN HBR, GUAIFENESIN 600 MILLIGRAM(S): 200 LIQUID ORAL at 19:30

## 2025-05-01 RX ADMIN — MEMANTINE HYDROCHLORIDE 10 MILLIGRAM(S): 21 CAPSULE, EXTENDED RELEASE ORAL at 23:42

## 2025-05-01 RX ADMIN — Medication 1 DROP(S): at 23:17

## 2025-05-01 RX ADMIN — SERTRALINE 125 MILLIGRAM(S): 100 TABLET, FILM COATED ORAL at 16:27

## 2025-05-01 RX ADMIN — INSULIN LISPRO 3 UNIT(S): 100 INJECTION, SOLUTION INTRAVENOUS; SUBCUTANEOUS at 12:54

## 2025-05-01 RX ADMIN — Medication 40 MILLIGRAM(S): at 05:51

## 2025-05-01 RX ADMIN — Medication 1 DROP(S): at 16:27

## 2025-05-01 RX ADMIN — Medication 9 MILLIGRAM(S): at 23:42

## 2025-05-01 RX ADMIN — LEVALBUTEROL HYDROCHLORIDE 0.63 MILLIGRAM(S): 1.25 SOLUTION RESPIRATORY (INHALATION) at 08:04

## 2025-05-01 RX ADMIN — POLYETHYLENE GLYCOL 3350 17 GRAM(S): 17 POWDER, FOR SOLUTION ORAL at 05:54

## 2025-05-01 RX ADMIN — ENOXAPARIN SODIUM 40 MILLIGRAM(S): 100 INJECTION SUBCUTANEOUS at 18:36

## 2025-05-01 RX ADMIN — DEXTROMETHORPHAN HBR, GUAIFENESIN 600 MILLIGRAM(S): 200 LIQUID ORAL at 05:49

## 2025-05-01 RX ADMIN — INSULIN LISPRO 0: 100 INJECTION, SOLUTION INTRAVENOUS; SUBCUTANEOUS at 23:45

## 2025-05-01 RX ADMIN — INSULIN LISPRO 3 UNIT(S): 100 INJECTION, SOLUTION INTRAVENOUS; SUBCUTANEOUS at 08:39

## 2025-05-01 RX ADMIN — Medication 500 MICROGRAM(S): at 21:55

## 2025-05-01 RX ADMIN — OLANZAPINE 2.5 MILLIGRAM(S): 10 TABLET ORAL at 23:41

## 2025-05-01 RX ADMIN — INSULIN GLARGINE-YFGN 3 UNIT(S): 100 INJECTION, SOLUTION SUBCUTANEOUS at 23:43

## 2025-05-01 RX ADMIN — LEVALBUTEROL HYDROCHLORIDE 0.63 MILLIGRAM(S): 1.25 SOLUTION RESPIRATORY (INHALATION) at 21:55

## 2025-05-01 NOTE — BH CONSULTATION LIAISON PROGRESS NOTE - NSBHFUPINTERVALHXFT_PSY_A_CORE
Patient observed sitting in bed and having blood sugar checked.  at bedside and continues to express that patient is more interactive after ECT. Reports that patient ate dinner yesterday, was conversant, and even attempted to get up and use the restroom. Denies any concerning statements made by patient.    Patient appeared tired and minimally engaged on interview (eyes mostly closed); states she is "fine." No distress. Able to state name, but not date or place ("[facility]"). Uncooperative/resisting on motor exam, possible negativism. No posturing, catalepsy, echolalia, or echopraxia noted.    Patient observed sitting in bed and having blood sugar checked.  at bedside and continues to express that patient is more interactive after ECT. Reports that patient ate dinner yesterday, was conversant, and even attempted to get up and use the restroom. Denies any concerning statements made by patient.    Patient was withdrawn and minimally engaged on interview (eyes mostly closed); states she is "fine." No distress. Able to state name, but not date or place ("[employer]"). Uncooperative/resisting on motor exam- negativism, increased tone on UE exam, posturing at fingers, decreased engagement in conversation, and some staring.     Discussed below plan in detail with --- all questions answered  Coordinated with ect--- ect scheduling done for tomorrow

## 2025-05-01 NOTE — PROGRESS NOTE ADULT - SUBJECTIVE AND OBJECTIVE BOX
[facility] Division of Hospital Medicine  Carlos MelianDO  Available via MS Teams    SUBJECTIVE / OVERNIGHT EVENTS:  No acute events overnight. Patient seen and examined at bedside this morning.   at bedside.  Patient answers her name, tells me her 's pet name. Does not answer the year or where she is.  Denies acute complaints.   states she looks better, eating, talkative.    ADDITIONAL REVIEW OF SYSTEMS:    MEDICATIONS  (STANDING):  artificial tears (preservative free) Ophthalmic Solution 1 Drop(s) Both EYES every 8 hours  bisacodyl 10 milliGRAM(s) Oral once  chlorhexidine 2% Cloths 1 Application(s) Topical <User Schedule>  dextrose 50% Injectable 25 Gram(s) IV Push once  dextrose 50% Injectable 12.5 Gram(s) IV Push once  dextrose 50% Injectable 25 Gram(s) IV Push once  dextrose Oral Gel 15 Gram(s) Oral once  enoxaparin Injectable 40 milliGRAM(s) SubCutaneous every 24 hours  fluticasone propionate/ salmeterol 250-50 MICROgram(s) Diskus 1 Dose(s) Inhalation two times a day  glucagon  Injectable 1 milliGRAM(s) IntraMuscular once  guaiFENesin  milliGRAM(s) Oral every 12 hours  hydrochlorothiazide 25 milliGRAM(s) Oral daily  insulin glargine Injectable (LANTUS) 6 Unit(s) SubCutaneous at bedtime  insulin lispro (ADMELOG) corrective regimen sliding scale   SubCutaneous at bedtime  insulin lispro (ADMELOG) corrective regimen sliding scale   SubCutaneous three times a day before meals  insulin lispro Injectable (ADMELOG) 3 Unit(s) SubCutaneous three times a day before meals  ipratropium    for Nebulization 500 MICROGram(s) Nebulizer every 8 hours  levalbuterol Inhalation 0.63 milliGRAM(s) Inhalation every 8 hours  lisinopril 40 milliGRAM(s) Oral daily  melatonin 9 milliGRAM(s) Oral at bedtime  memantine 10 milliGRAM(s) Oral at bedtime  memantine 5 milliGRAM(s) Oral <User Schedule>  nystatin    Suspension 636376 Unit(s) Oral three times a day  OLANZapine 2.5 milliGRAM(s) Oral at bedtime  pantoprazole    Tablet 40 milliGRAM(s) Oral before breakfast  petrolatum Ophthalmic Ointment 1 Application(s) Both EYES two times a day  polyethylene glycol 3350 17 Gram(s) Oral two times a day  senna 2 Tablet(s) Oral at bedtime  sertraline 125 milliGRAM(s) Oral daily    MEDICATIONS  (PRN):  albuterol/ipratropium for Nebulization 3 milliLiter(s) Nebulizer every 6 hours PRN Shortness of Breath and/or Wheezing      I&O's Summary      PHYSICAL EXAM:  Vital Signs Last 24 Hrs  T(C): 36.3 (01 May 2025 04:40), Max: 37.1 (30 Apr 2025 14:27)  T(F): 97.3 (01 May 2025 04:40), Max: 98.8 (30 Apr 2025 22:00)  HR: 87 (01 May 2025 08:04) (82 - 93)  BP: 137/85 (01 May 2025 04:40) (107/61 - 157/90)  BP(mean): --  RR: 17 (01 May 2025 04:40) (17 - 17)  SpO2: 96% (01 May 2025 04:40) (93% - 96%)    Parameters below as of 01 May 2025 04:40  Patient On (Oxygen Delivery Method): room air        LABS:                        11.2   6.74  )-----------( 149      ( 01 May 2025 07:15 )             34.7     05-01    136  |  100  |  13  ----------------------------<  147[H]  4.8   |  22  |  0.68    Ca    9.2      01 May 2025 07:15  Phos  4.0     05-01  Mg     1.80     05-01            Urinalysis Basic - ( 01 May 2025 07:15 )    Color: x / Appearance: x / SG: x / pH: x  Gluc: 147 mg/dL / Ketone: x  / Bili: x / Urobili: x   Blood: x / Protein: x / Nitrite: x   Leuk Esterase: x / RBC: x / WBC x   Sq Epi: x / Non Sq Epi: x / Bacteria: x        COVID-19 PCR: NotDetec (20 Apr 2025 05:54)  SARS-CoV-2: NotDetec (19 Mar 2025 09:05)  SARS-CoV-2: NotDetec (08 Mar 2025 14:40)

## 2025-05-01 NOTE — PROGRESS NOTE ADULT - ASSESSMENT
EKG: SR nonischemic    A/P:  74F MDD, asthma, prior CVA presented with AHRF c/b AMS requiring MICU admission for intubation for respiratory failure i/s/o asthma exacerbation. Extubated 3/12 to face tent and then on BIPAP s/p RRT 3/19 for increased WOB / hypoxia despite aggressive asthma management s/p return to MICU for management of severe asthma. Possible aspiration event 3/31. SLP eval appreciated. XR cine performed with improvement in mental status - pureed diet w/ thin liquids recommended. Worsening respiratory status 4/2 - placed on NIV, now weaned to nasal cannula and now RA.  ENT eval - no upper airway consideration for structural causes of asthma such as vocal cord dysfunction.   Course c/b MDD w/ catatonia and poor PO, off ativan due to respiratory concerns trialed on Memantine family now agreeable to ECT, planned for 4/25    1. SOB   -TTE 3/9 EF 78% no WMA  -LE duplex negative for DVT  -Repeat EKG NSR no ischemic changes   -Acute asthma exacerbation  -f/u pulm    2. HTN  - c/w  Lisinopril 40mg daily   - c/w HCTZ 25 mg daily  - no beta-blockers 2/2 asthma    3. Pre ECT Eval   - Denies CP SOB has had long hospitalization  - EKG normal, Echo normal   - optimized for ECT, moderate risk for MACE - s/p ECT sessions    4. DVT ppx  c/w lovenox     [phone]

## 2025-05-01 NOTE — BH CONSULTATION LIAISON PROGRESS NOTE - NSBHASSESSMENTFT_PSY_ALL_CORE
Patient is a 74F hx asthma, depression, and HTN was admitted to the MICU with AHRF 2/2 asthma exacerbation resulting in acute on chronic HHRF causing AMS and increased WOB that required intubation. During her ICU course, she experienced seizure-like activity .  Additional issues included hyponatremia, acute on chronic metabolic alkalosis, and mild thrombocytopenia,  also having fevers.  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 Patient has h/o hx of major depressive disorder with psychosis, hx of 3 past inpatient psychiatric hospitalizations last in 2016 at [facility], all rehospitalizations were in context non compliance with the treatment. She has no hx of suicide attempts, no hx of substance abuse. Patient w hx of paranoia, disorganization, catatonia, required MOO when in [facility].     3/24----more withdrawn, some stiffness in neck, not communicative. Some concerns for catatonia. Unsure if symptoms attributed to catatonia vs delirium  3/25--- more alert today, trying to following commands. Still with PMR+. Appears to be responding to Ativan  3/26-- BFS 10, will increasing standing ativan to target catatonic symptoms - giving STAT 0.5mg NOW discussed w acp   3/27----will continue ativan dosing for one more day before increasing  3/28: BFS: stupor 1, mutism 2,withdrawan 2, verbigeration 1, rigidity 2 =8.  3/29: BFS: stupor 2, mutism 2, staring 1, posturing 2, rigidity 2, waxy flexibility 3, withdrawal 2 = 14; please monitor nutritional intake, plan to increase ativan by total daily dose of 0.5 mg at bedtime due to persistent catatonia   3/31: BFS: stupor 2, mutism 2, staring 2, rigidity 1, negativism 1, withdrawal 1, waxy flexibility 3 =12, patient with productive cough, advised for medical work up  4/1---tired, speaking more, not rigid, has PMR+. NPO status now.   4/2--sleepy, RRT this afternoon  4/3---better mentation, pmr but no chester catatonic symptoms  4/4: alert, using few words, mild rigidity -  at bedside, updated   4/5: few words, lethargic, fluctuating wakefulness. Mild rigidity.  No overt catatonic sxs.   4/7: no change, remains thought blocked, few words - son at bedside, updated with care  4/8: patient continues to have poor eye contact, uses few words, no rigidity on today's exam.   4/9: patient remains minimally verbal, some posturing, poor Po intake - continues on NC for 02 requirements   4/10: more verbal/interactive, with increased PO intake per family. Some psychotic output. Family agrees with resumption of Zyprexa.   4/14: alert to self with fluctuating wakefulness, intermittent full sentences f/b one-word answers, some rigidity, posturing, denies si, denies ah/vh  4/16: patient more catatonic today, notable catalepsy, waxiness, verbigeration followed by mutism and staring, family against ativan and ECT, would increase memantine as below, not eating   4/17: catatonic (mute, rigid, not engaging). Eating some portions of meals with significant family support. Family opting to see memantine trial to full effect before consideration of ECT  4/18: mutism today, odd posturing, unable to engage, d/w spouse and son ECT, family still undecided  4/21: alert to self, some improvement with verbal engagement, tracking, follow command, denies si, denies ah/vh, awaiting call back from son in regards to ECT consult  4/22: remains catatonic (mute, postured, rigid, withdrawn, negativistic). Exam limited. Family agrees with formal ECT consult.  4/23: Remains with symptoms of catatonia. BFS: stupor 1, mute2, Rigid 2, negativism 2, withdrawal 2 - 9 : ECT consult completed. awaiting clearances for ECT  4/42: remains with BFS of 9, plan for ECT  4/25: alert to self, able to engage in some conversation, however, confused, loose on  exam, odd posturing right upper arm, Dr. Toure made aware of schedule for ECT Monday 4/28: s/p ECT #2. more spontaneous speech, cooperative with exam. improved eye contact. loose with no rigidity.   4/29: displays more s/s of catatonia since yesterdays exam- negativism, withdrawal, rigidity. family and ect in agreement to proceed with treatment 3 tomorrow.  4/30: some confusion today. Will assess tomm  5/1: mutism and negativism on exam and not fully oriented; will discuss ECT frequency with team today    PLAN  -- ECT planned for Friday---will discuss further with ECT team to minimize cognitive burden  - no SI or HI, no need for psychiatric CO  -- antipsychotics can only be given if qtc < 500    - MEDICATIONS:  - Memantine 5mg AM and 10mg HS  -- C/W Zoloft 125 mg daily   -- C/W Zyprexa 2.5mg HS   - PRN: Ativan 0.5mg q 6hrs prn IM/IV/PO    - please ensure PT involved in patient care    - DISPO: pending. patient with acute course of ECT currently   Patient is a 74F hx asthma, depression, and HTN was admitted to the MICU with AHRF 2/2 asthma exacerbation resulting in acute on chronic HHRF causing AMS and increased WOB that required intubation. During her ICU course, she experienced seizure-like activity .  Additional issues included hyponatremia, acute on chronic metabolic alkalosis, and mild thrombocytopenia,  also having fevers.  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 Patient has h/o hx of major depressive disorder with psychosis, hx of 3 past inpatient psychiatric hospitalizations last in 2016 at [facility], all rehospitalizations were in context non compliance with the treatment. She has no hx of suicide attempts, no hx of substance abuse. Patient w hx of paranoia, disorganization, catatonia, required MOO when in [facility].     3/24----more withdrawn, some stiffness in neck, not communicative. Some concerns for catatonia. Unsure if symptoms attributed to catatonia vs delirium  3/25--- more alert today, trying to following commands. Still with PMR+. Appears to be responding to Ativan  3/26-- BFS 10, will increasing standing ativan to target catatonic symptoms - giving STAT 0.5mg NOW discussed w acp   3/27----will continue ativan dosing for one more day before increasing  3/28: BFS: stupor 1, mutism 2,withdrawan 2, verbigeration 1, rigidity 2 =8.  3/29: BFS: stupor 2, mutism 2, staring 1, posturing 2, rigidity 2, waxy flexibility 3, withdrawal 2 = 14; please monitor nutritional intake, plan to increase ativan by total daily dose of 0.5 mg at bedtime due to persistent catatonia   3/31: BFS: stupor 2, mutism 2, staring 2, rigidity 1, negativism 1, withdrawal 1, waxy flexibility 3 =12, patient with productive cough, advised for medical work up  4/1---tired, speaking more, not rigid, has PMR+. NPO status now.   4/2--sleepy, RRT this afternoon  4/3---better mentation, pmr but no chester catatonic symptoms  4/4: alert, using few words, mild rigidity -  at bedside, updated   4/5: few words, lethargic, fluctuating wakefulness. Mild rigidity.  No overt catatonic sxs.   4/7: no change, remains thought blocked, few words - son at bedside, updated with care  4/8: patient continues to have poor eye contact, uses few words, no rigidity on today's exam.   4/9: patient remains minimally verbal, some posturing, poor Po intake - continues on NC for 02 requirements   4/10: more verbal/interactive, with increased PO intake per family. Some psychotic output. Family agrees with resumption of Zyprexa.   4/14: alert to self with fluctuating wakefulness, intermittent full sentences f/b one-word answers, some rigidity, posturing, denies si, denies ah/vh  4/16: patient more catatonic today, notable catalepsy, waxiness, verbigeration followed by mutism and staring, family against ativan and ECT, would increase memantine as below, not eating   4/17: catatonic (mute, rigid, not engaging). Eating some portions of meals with significant family support. Family opting to see memantine trial to full effect before consideration of ECT  4/18: mutism today, odd posturing, unable to engage, d/w spouse and son ECT, family still undecided  4/21: alert to self, some improvement with verbal engagement, tracking, follow command, denies si, denies ah/vh, awaiting call back from son in regards to ECT consult  4/22: remains catatonic (mute, postured, rigid, withdrawn, negativistic). Exam limited. Family agrees with formal ECT consult.  4/23: Remains with symptoms of catatonia. BFS: stupor 1, mute2, Rigid 2, negativism 2, withdrawal 2 - 9 : ECT consult completed. awaiting clearances for ECT  4/42: remains with BFS of 9, plan for ECT  4/25: alert to self, able to engage in some conversation, however, confused, loose on  exam, odd posturing right upper arm, Dr. Toure made aware of schedule for ECT Monday 4/28: s/p ECT #2. more spontaneous speech, cooperative with exam. improved eye contact. loose with no rigidity.   4/29: displays more s/s of catatonia since yesterdays exam- negativism, withdrawal, rigidity. family and ect in agreement to proceed with treatment 3 tomorrow.  4/30: some confusion today. Will assess tomm  5/1: mutism and negativism on exam     PLAN  -- ECT planned for tomorrow. NPO after midnight today  - no SI or HI, no need for psychiatric CO  -- antipsychotics can only be given if qtc < 500    - MEDICATIONS:  - Memantine 5mg AM and 10mg HS  -- C/W Zoloft 125 mg daily   -- C/W Zyprexa 2.5mg HS   - PRN: Ativan 0.5mg q 6hrs prn IM/IV/PO    - please ensure PT involved in patient care    - DISPO: pending. patient with acute course of ECT currently

## 2025-05-01 NOTE — PROGRESS NOTE ADULT - SUBJECTIVE AND OBJECTIVE BOX
Arnold Uriostegui MD  Interventional Cardiology / Advance Heart Failure and Cardiac Transplant Specialist  [place] Office : [phone] [place] [address] [place]. [address]  Tel:   [place] Office : [address] [facility] [place] [address]  Tel: [phone]       Pt is lying in bed comfortable not in distress, no chest pains no SOB no palpitations  	  MEDICATIONS:  enoxaparin Injectable 40 milliGRAM(s) SubCutaneous every 24 hours  hydrochlorothiazide 25 milliGRAM(s) Oral daily  lisinopril 40 milliGRAM(s) Oral daily      albuterol/ipratropium for Nebulization 3 milliLiter(s) Nebulizer every 6 hours PRN  fluticasone propionate/ salmeterol 250-50 MICROgram(s) Diskus 1 Dose(s) Inhalation two times a day  guaiFENesin  milliGRAM(s) Oral every 12 hours  ipratropium    for Nebulization 500 MICROGram(s) Nebulizer every 8 hours  levalbuterol Inhalation 0.63 milliGRAM(s) Inhalation every 8 hours    melatonin 9 milliGRAM(s) Oral at bedtime  memantine 10 milliGRAM(s) Oral at bedtime  memantine 5 milliGRAM(s) Oral <User Schedule>  OLANZapine 2.5 milliGRAM(s) Oral at bedtime  sertraline 125 milliGRAM(s) Oral daily    bisacodyl 10 milliGRAM(s) Oral once  pantoprazole    Tablet 40 milliGRAM(s) Oral before breakfast  polyethylene glycol 3350 17 Gram(s) Oral two times a day  senna 2 Tablet(s) Oral at bedtime    dextrose 50% Injectable 25 Gram(s) IV Push once  dextrose 50% Injectable 12.5 Gram(s) IV Push once  dextrose 50% Injectable 25 Gram(s) IV Push once  dextrose Oral Gel 15 Gram(s) Oral once  glucagon  Injectable 1 milliGRAM(s) IntraMuscular once  insulin glargine Injectable (LANTUS) 3 Unit(s) SubCutaneous at bedtime  insulin lispro (ADMELOG) corrective regimen sliding scale   SubCutaneous at bedtime  insulin lispro (ADMELOG) corrective regimen sliding scale   SubCutaneous three times a day before meals  insulin lispro Injectable (ADMELOG) 3 Unit(s) SubCutaneous three times a day before meals    artificial tears (preservative free) Ophthalmic Solution 1 Drop(s) Both EYES every 8 hours  chlorhexidine 2% Cloths 1 Application(s) Topical <User Schedule>  petrolatum Ophthalmic Ointment 1 Application(s) Both EYES two times a day      PAST MEDICAL/SURGICAL HISTORY  PAST MEDICAL & SURGICAL HISTORY:  MDD (major depressive disorder), recurrent, severe, with psychosis      Asthma, mild intermittent, uncomplicated      Essential hypertension      No significant past surgical history          SOCIAL HISTORY: Substance Use (street drugs): ( x ) never used  (  ) other:    FAMILY HISTORY:         PHYSICAL EXAM:  T(C): 36.9 (05-01-25 @ 14:14), Max: 37.1 (04-30-25 @ 22:00)  HR: 88 (05-01-25 @ 16:19) (82 - 92)  BP: 116/71 (05-01-25 @ 14:14) (116/71 - 157/90)  RR: 18 (05-01-25 @ 14:14) (17 - 18)  SpO2: 98% (05-01-25 @ 14:14) (93% - 98%)  Wt(kg): --  I&O's Summary        GENERAL: NAD  EYES:   PERRLA   ENMT:   Moist mucous membranes, Good dentition, No lesions  Cardiovascular: Normal S1 S2, No JVD, No murmurs, No edema  Respiratory: Lungs clear to auscultation	  Gastrointestinal:  Soft, Non-tender, + BS	  Extremities: no edema                                    11.2   6.74  )-----------( 149      ( 01 May 2025 07:15 )             34.7     05-01    136  |  100  |  13  ----------------------------<  147[H]  4.8   |  22  |  0.68    Ca    9.2      01 May 2025 07:15  Phos  4.0     05-01  Mg     1.80     05-01      proBNP:   Lipid Profile:   HgA1c:   TSH:     Consultant(s) Notes Reviewed:  [x ] YES  [ ] NO    Care Discussed with Consultants/Other Providers [ x] YES  [ ] NO    Imaging Personally Reviewed independently:  [x] YES  [ ] NO    All labs, radiologic studies, vitals, orders and medications list reviewed. Patient is seen and examined at bedside. Case discussed with medical team.

## 2025-05-02 LAB
ANION GAP SERPL CALC-SCNC: 13 MMOL/L — SIGNIFICANT CHANGE UP (ref 7–14)
ANISOCYTOSIS BLD QL: SLIGHT — SIGNIFICANT CHANGE UP
BASOPHILS # BLD AUTO: 0 K/UL — SIGNIFICANT CHANGE UP (ref 0–0.2)
BASOPHILS NFR BLD AUTO: 0 % — SIGNIFICANT CHANGE UP (ref 0–2)
BUN SERPL-MCNC: 16 MG/DL — SIGNIFICANT CHANGE UP (ref 7–23)
CALCIUM SERPL-MCNC: 9.4 MG/DL — SIGNIFICANT CHANGE UP (ref 8.4–10.5)
CHLORIDE SERPL-SCNC: 105 MMOL/L — SIGNIFICANT CHANGE UP (ref 98–107)
CO2 SERPL-SCNC: 25 MMOL/L — SIGNIFICANT CHANGE UP (ref 22–31)
CREAT SERPL-MCNC: 0.6 MG/DL — SIGNIFICANT CHANGE UP (ref 0.5–1.3)
DACRYOCYTES BLD QL SMEAR: SLIGHT — SIGNIFICANT CHANGE UP
EGFR: 94 ML/MIN/1.73M2 — SIGNIFICANT CHANGE UP
EGFR: 94 ML/MIN/1.73M2 — SIGNIFICANT CHANGE UP
EOSINOPHIL # BLD AUTO: 0.19 K/UL — SIGNIFICANT CHANGE UP (ref 0–0.5)
EOSINOPHIL NFR BLD AUTO: 2.6 % — SIGNIFICANT CHANGE UP (ref 0–6)
GIANT PLATELETS BLD QL SMEAR: PRESENT — SIGNIFICANT CHANGE UP
GLUCOSE BLDC GLUCOMTR-MCNC: 124 MG/DL — HIGH (ref 70–99)
GLUCOSE BLDC GLUCOMTR-MCNC: 142 MG/DL — HIGH (ref 70–99)
GLUCOSE BLDC GLUCOMTR-MCNC: 148 MG/DL — HIGH (ref 70–99)
GLUCOSE BLDC GLUCOMTR-MCNC: 206 MG/DL — HIGH (ref 70–99)
GLUCOSE SERPL-MCNC: 138 MG/DL — HIGH (ref 70–99)
HCT VFR BLD CALC: 33.2 % — LOW (ref 34.5–45)
HGB BLD-MCNC: 10.8 G/DL — LOW (ref 11.5–15.5)
HYPOCHROMIA BLD QL: SLIGHT — SIGNIFICANT CHANGE UP
IANC: 1.75 K/UL — LOW (ref 1.8–7.4)
LYMPHOCYTES # BLD AUTO: 3.86 K/UL — HIGH (ref 1–3.3)
LYMPHOCYTES # BLD AUTO: 52.6 % — HIGH (ref 13–44)
MACROCYTES BLD QL: SLIGHT — SIGNIFICANT CHANGE UP
MAGNESIUM SERPL-MCNC: 1.8 MG/DL — SIGNIFICANT CHANGE UP (ref 1.6–2.6)
MANUAL SMEAR VERIFICATION: SIGNIFICANT CHANGE UP
MCHC RBC-ENTMCNC: 32 PG — SIGNIFICANT CHANGE UP (ref 27–34)
MCHC RBC-ENTMCNC: 32.5 G/DL — SIGNIFICANT CHANGE UP (ref 32–36)
MCV RBC AUTO: 98.5 FL — SIGNIFICANT CHANGE UP (ref 80–100)
METAMYELOCYTES # FLD: 0.9 % — SIGNIFICANT CHANGE UP (ref 0–1)
METAMYELOCYTES NFR BLD: 0.9 % — SIGNIFICANT CHANGE UP (ref 0–1)
MONOCYTES # BLD AUTO: 0.51 K/UL — SIGNIFICANT CHANGE UP (ref 0–0.9)
MONOCYTES NFR BLD AUTO: 7 % — SIGNIFICANT CHANGE UP (ref 2–14)
NEUTROPHILS # BLD AUTO: 1.74 K/UL — LOW (ref 1.8–7.4)
NEUTROPHILS NFR BLD AUTO: 23.7 % — LOW (ref 43–77)
OVALOCYTES BLD QL SMEAR: SLIGHT — SIGNIFICANT CHANGE UP
PHOSPHATE SERPL-MCNC: 3 MG/DL — SIGNIFICANT CHANGE UP (ref 2.5–4.5)
PLAT MORPH BLD: NORMAL — SIGNIFICANT CHANGE UP
PLATELET # BLD AUTO: 146 K/UL — LOW (ref 150–400)
PLATELET COUNT - ESTIMATE: ABNORMAL
POLYCHROMASIA BLD QL SMEAR: SLIGHT — SIGNIFICANT CHANGE UP
POTASSIUM SERPL-MCNC: 4.5 MMOL/L — SIGNIFICANT CHANGE UP (ref 3.5–5.3)
POTASSIUM SERPL-SCNC: 4.5 MMOL/L — SIGNIFICANT CHANGE UP (ref 3.5–5.3)
RBC # BLD: 3.37 M/UL — LOW (ref 3.8–5.2)
RBC # FLD: 14.3 % — SIGNIFICANT CHANGE UP (ref 10.3–14.5)
RBC BLD AUTO: ABNORMAL
SMUDGE CELLS # BLD: PRESENT — SIGNIFICANT CHANGE UP
SODIUM SERPL-SCNC: 143 MMOL/L — SIGNIFICANT CHANGE UP (ref 135–145)
VARIANT LYMPHS # BLD: 13.2 % — HIGH (ref 0–6)
VARIANT LYMPHS NFR BLD MANUAL: 13.2 % — HIGH (ref 0–6)
WBC # BLD: 7.34 K/UL — SIGNIFICANT CHANGE UP (ref 3.8–10.5)
WBC # FLD AUTO: 7.34 K/UL — SIGNIFICANT CHANGE UP (ref 3.8–10.5)

## 2025-05-02 PROCEDURE — 90870 ELECTROCONVULSIVE THERAPY: CPT

## 2025-05-02 PROCEDURE — 99233 SBSQ HOSP IP/OBS HIGH 50: CPT

## 2025-05-02 PROCEDURE — 99233 SBSQ HOSP IP/OBS HIGH 50: CPT | Mod: 25

## 2025-05-02 PROCEDURE — 99232 SBSQ HOSP IP/OBS MODERATE 35: CPT

## 2025-05-02 RX ORDER — SODIUM CHLORIDE 9 G/1000ML
1000 INJECTION, SOLUTION INTRAVENOUS
Refills: 0 | Status: DISCONTINUED | OUTPATIENT
Start: 2025-05-02 | End: 2025-05-21

## 2025-05-02 RX ORDER — INSULIN LISPRO 100 U/ML
INJECTION, SOLUTION INTRAVENOUS; SUBCUTANEOUS AT BEDTIME
Refills: 0 | Status: DISCONTINUED | OUTPATIENT
Start: 2025-05-02 | End: 2025-05-09

## 2025-05-02 RX ORDER — INSULIN LISPRO 100 U/ML
INJECTION, SOLUTION INTRAVENOUS; SUBCUTANEOUS
Refills: 0 | Status: DISCONTINUED | OUTPATIENT
Start: 2025-05-02 | End: 2025-05-09

## 2025-05-02 RX ORDER — INSULIN LISPRO 100 U/ML
INJECTION, SOLUTION INTRAVENOUS; SUBCUTANEOUS EVERY 6 HOURS
Refills: 0 | Status: DISCONTINUED | OUTPATIENT
Start: 2025-05-02 | End: 2025-05-02

## 2025-05-02 RX ADMIN — Medication 9 MILLIGRAM(S): at 21:19

## 2025-05-02 RX ADMIN — SERTRALINE 125 MILLIGRAM(S): 100 TABLET, FILM COATED ORAL at 12:42

## 2025-05-02 RX ADMIN — INSULIN LISPRO 2: 100 INJECTION, SOLUTION INTRAVENOUS; SUBCUTANEOUS at 12:40

## 2025-05-02 RX ADMIN — INSULIN LISPRO 3 UNIT(S): 100 INJECTION, SOLUTION INTRAVENOUS; SUBCUTANEOUS at 12:39

## 2025-05-02 RX ADMIN — DEXTROMETHORPHAN HBR, GUAIFENESIN 600 MILLIGRAM(S): 200 LIQUID ORAL at 06:15

## 2025-05-02 RX ADMIN — MEMANTINE HYDROCHLORIDE 10 MILLIGRAM(S): 21 CAPSULE, EXTENDED RELEASE ORAL at 21:18

## 2025-05-02 RX ADMIN — DEXTROMETHORPHAN HBR, GUAIFENESIN 600 MILLIGRAM(S): 200 LIQUID ORAL at 21:16

## 2025-05-02 RX ADMIN — Medication 500 MICROGRAM(S): at 15:19

## 2025-05-02 RX ADMIN — INSULIN GLARGINE-YFGN 3 UNIT(S): 100 INJECTION, SOLUTION SUBCUTANEOUS at 21:27

## 2025-05-02 RX ADMIN — Medication 1 DROP(S): at 06:12

## 2025-05-02 RX ADMIN — Medication 1 DOSE(S): at 12:41

## 2025-05-02 RX ADMIN — Medication 500 MICROGRAM(S): at 21:18

## 2025-05-02 RX ADMIN — Medication 40 MILLIGRAM(S): at 06:12

## 2025-05-02 RX ADMIN — INSULIN LISPRO 3 UNIT(S): 100 INJECTION, SOLUTION INTRAVENOUS; SUBCUTANEOUS at 17:49

## 2025-05-02 RX ADMIN — Medication 1 APPLICATION(S): at 06:20

## 2025-05-02 RX ADMIN — Medication 1 DROP(S): at 21:17

## 2025-05-02 RX ADMIN — MEMANTINE HYDROCHLORIDE 5 MILLIGRAM(S): 21 CAPSULE, EXTENDED RELEASE ORAL at 06:14

## 2025-05-02 RX ADMIN — Medication 1 DROP(S): at 12:43

## 2025-05-02 RX ADMIN — ENOXAPARIN SODIUM 40 MILLIGRAM(S): 100 INJECTION SUBCUTANEOUS at 17:50

## 2025-05-02 RX ADMIN — Medication 1 APPLICATION(S): at 17:50

## 2025-05-02 RX ADMIN — LEVALBUTEROL HYDROCHLORIDE 0.63 MILLIGRAM(S): 1.25 SOLUTION RESPIRATORY (INHALATION) at 21:16

## 2025-05-02 RX ADMIN — LEVALBUTEROL HYDROCHLORIDE 0.63 MILLIGRAM(S): 1.25 SOLUTION RESPIRATORY (INHALATION) at 15:19

## 2025-05-02 RX ADMIN — Medication 1 DOSE(S): at 21:17

## 2025-05-02 RX ADMIN — OLANZAPINE 2.5 MILLIGRAM(S): 10 TABLET ORAL at 21:18

## 2025-05-02 RX ADMIN — Medication 1 APPLICATION(S): at 06:14

## 2025-05-02 RX ADMIN — Medication 2 TABLET(S): at 21:17

## 2025-05-02 RX ADMIN — LISINOPRIL 40 MILLIGRAM(S): 5 TABLET ORAL at 06:12

## 2025-05-02 NOTE — ECT TREATMENT NOTE - NSECTPOSTTXCOMMENTS_PSY_ALL_CORE
Patient seen and examined, chart reviewed. Improvement noted post-ECT yesterday by  at bedside per CL note. This morning the patient was curled in fetal position, keeps eyes closed despite commands to open, mute, negativism present, gegenhalten (+) X 4, no grimacing/echophenomena/palmar grasp. Groans and squints when manipulated.  Patient seen and examined, chart reviewed. Improvement noted post-ECT yesterday by  at bedside per CL note. This morning the patient was curled in fetal position, keeps eyes closed despite commands to open, mute, negativism present, gegenhalten (+) X 4, no grimacing/echophenomena/palmar grasp. Groans and squints when manipulated. Later opened eyes and nodded in response to provider's requests, became somewhat more cooperative, but remained nonverbal.

## 2025-05-02 NOTE — ECT TREATMENT NOTE - NSECTPOSTTXSUMMARY_PSY_ALL_CORE
The patient had a well modified grand mal seizure under general anesthesia and muscle relaxant. The patient is alert, responsive, in no acute distress.  Recovery uneventful.    Anesthesia meds: Brevital 60 mg, Succinylcholine 30mg    Motor seizure was not visible as the cuff on ankle had deflated. Patient had tachycardia associated with seizure, suggestive of generalized seizure   The patient had a well modified grand mal seizure under general anesthesia and muscle relaxant. The patient is alert, responsive, in no acute distress.  Recovery uneventful.    Anesthesia meds: Brevital 60 mg, Succinylcholine 30mg

## 2025-05-02 NOTE — PROGRESS NOTE ADULT - SUBJECTIVE AND OBJECTIVE BOX
[facility] Division of Hospital Medicine  Carlos Summers DO  Available via MS Teams    SUBJECTIVE / OVERNIGHT EVENTS:  No acute events overnight. Patient seen and examined at bedside this morning.  Walked with PT yesterday.  Eating breakfast this morning (finished up her plate as I walked in).  Speaking full sentences, very engaged, tells me she is in the hospital, tells me her name, and tells me it is 2025.  Says she feels good.    ADDITIONAL REVIEW OF SYSTEMS:    MEDICATIONS  (STANDING):  artificial tears (preservative free) Ophthalmic Solution 1 Drop(s) Both EYES every 8 hours  bisacodyl 10 milliGRAM(s) Oral once  chlorhexidine 2% Cloths 1 Application(s) Topical <User Schedule>  dextrose 5%. 1000 milliLiter(s) (50 mL/Hr) IV Continuous <Continuous>  dextrose 5%. 1000 milliLiter(s) (100 mL/Hr) IV Continuous <Continuous>  dextrose 50% Injectable 25 Gram(s) IV Push once  dextrose 50% Injectable 12.5 Gram(s) IV Push once  dextrose 50% Injectable 25 Gram(s) IV Push once  dextrose Oral Gel 15 Gram(s) Oral once  enoxaparin Injectable 40 milliGRAM(s) SubCutaneous every 24 hours  fluticasone propionate/ salmeterol 250-50 MICROgram(s) Diskus 1 Dose(s) Inhalation two times a day  glucagon  Injectable 1 milliGRAM(s) IntraMuscular once  guaiFENesin  milliGRAM(s) Oral every 12 hours  hydrochlorothiazide 25 milliGRAM(s) Oral daily  insulin glargine Injectable (LANTUS) 3 Unit(s) SubCutaneous at bedtime  insulin lispro (ADMELOG) corrective regimen sliding scale   SubCutaneous every 6 hours  insulin lispro Injectable (ADMELOG) 3 Unit(s) SubCutaneous three times a day before meals  ipratropium    for Nebulization 500 MICROGram(s) Nebulizer every 8 hours  levalbuterol Inhalation 0.63 milliGRAM(s) Inhalation every 8 hours  lisinopril 40 milliGRAM(s) Oral daily  melatonin 9 milliGRAM(s) Oral at bedtime  memantine 10 milliGRAM(s) Oral at bedtime  memantine 5 milliGRAM(s) Oral <User Schedule>  OLANZapine 2.5 milliGRAM(s) Oral at bedtime  pantoprazole    Tablet 40 milliGRAM(s) Oral before breakfast  petrolatum Ophthalmic Ointment 1 Application(s) Both EYES two times a day  polyethylene glycol 3350 17 Gram(s) Oral two times a day  senna 2 Tablet(s) Oral at bedtime  sertraline 125 milliGRAM(s) Oral daily    MEDICATIONS  (PRN):  albuterol/ipratropium for Nebulization 3 milliLiter(s) Nebulizer every 6 hours PRN Shortness of Breath and/or Wheezing      I&O's Summary      PHYSICAL EXAM:  Vital Signs Last 24 Hrs  T(C): 37.4 (02 May 2025 12:05), Max: 37.4 (02 May 2025 12:05)  T(F): 99.3 (02 May 2025 12:05), Max: 99.3 (02 May 2025 12:05)  HR: 82 (02 May 2025 12:05) (73 - 96)  BP: 107/83 (02 May 2025 12:05) (107/83 - 161/90)  BP(mean): --  RR: 18 (02 May 2025 12:05) (14 - 20)  SpO2: 95% (02 May 2025 12:05) (95% - 99%)    Parameters below as of 02 May 2025 12:05  Patient On (Oxygen Delivery Method): room air        LABS:                        10.8   7.34  )-----------( 146      ( 02 May 2025 05:58 )             33.2     05-02    143  |  105  |  16  ----------------------------<  138[H]  4.5   |  25  |  0.60    Ca    9.4      02 May 2025 05:58  Phos  3.0     05-02  Mg     1.80     05-02            Urinalysis Basic - ( 02 May 2025 05:58 )    Color: x / Appearance: x / SG: x / pH: x  Gluc: 138 mg/dL / Ketone: x  / Bili: x / Urobili: x   Blood: x / Protein: x / Nitrite: x   Leuk Esterase: x / RBC: x / WBC x   Sq Epi: x / Non Sq Epi: x / Bacteria: x        COVID-19 PCR: NotDetec (20 Apr 2025 05:54)  SARS-CoV-2: NotDetec (19 Mar 2025 09:05)  SARS-CoV-2: [facility] (08 Mar 2025 14:40)

## 2025-05-02 NOTE — BH CONSULTATION LIAISON PROGRESS NOTE - NSBHFUPINTERVALHXFT_PSY_A_CORE
Met with the patient. Confused, has some thought blocking, but better engaged than yesterday. Has some mild increased tone on UE B/L, but not rigid. No negativism, or posturing or catalepsy today. Eating her bk by self. Is disoriented. Denies any AH, but is thought blocked, and seemed pre-occupied. No agitation  Inpatient psychiatry- its rationale was discussed with /hcp at bedside yesterday-- he is in agreement  Called Son (physician, Dr. Phani Silva) - [phone] today. Discussed assessment done, residual catatonia in between ECT days, the need for psychiatric stabilization, and the risk for worsening symptoms+ catatonia if prematurely discharged to [facility] or home from [facility]. Son understands. Discussed the rationale for inpatient psychiatric admission to ensure treatment for catatonia, psychosis. Son in agreement. All questions answered.

## 2025-05-02 NOTE — BH CONSULTATION LIAISON PROGRESS NOTE - NSBHFUPINTERVALCCFT_PSY_A_CORE
ECT# 4 today. Reviewed ect note.  Discussed with medicine team--- below discussed. Medically cleared at this time.

## 2025-05-02 NOTE — BH CONSULTATION LIAISON PROGRESS NOTE - NSBHCONSULTPRIMARYDISCUSSYES_PSY_A_CORE FT
carlos ACP
DR CARSON
discussed w teresa uribe acp
albin brewer made aware of needed clearances 
discussed w osman bates 
mitchell zamora
rod

## 2025-05-02 NOTE — BH CONSULTATION LIAISON PROGRESS NOTE - NSBHASSESSMENTFT_PSY_ALL_CORE
Patient is a 74F hx asthma, depression, and HTN was admitted to the MICU with AHRF 2/2 asthma exacerbation resulting in acute on chronic HHRF causing AMS and increased WOB that required intubation. During her ICU course, she experienced seizure-like activity .  Additional issues included hyponatremia, acute on chronic metabolic alkalosis, and mild thrombocytopenia,  also having fevers.  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 Patient has h/o hx of major depressive disorder with psychosis, hx of 3 past inpatient psychiatric hospitalizations last in 2016 at [facility], all rehospitalizations were in context non compliance with the treatment. She has no hx of suicide attempts, no hx of substance abuse. Patient w hx of paranoia, disorganization, catatonia, required MOO when in [facility].     3/24----more withdrawn, some stiffness in neck, not communicative. Some concerns for catatonia. Unsure if symptoms attributed to catatonia vs delirium  3/25--- more alert today, trying to following commands. Still with PMR+. Appears to be responding to Ativan  3/26-- BFS 10, will increasing standing ativan to target catatonic symptoms - giving STAT 0.5mg NOW discussed w acp   3/27----will continue ativan dosing for one more day before increasing  3/28: BFS: stupor 1, mutism 2,withdrawan 2, verbigeration 1, rigidity 2 =8.  3/29: BFS: stupor 2, mutism 2, staring 1, posturing 2, rigidity 2, waxy flexibility 3, withdrawal 2 = 14; please monitor nutritional intake, plan to increase ativan by total daily dose of 0.5 mg at bedtime due to persistent catatonia   3/31: BFS: stupor 2, mutism 2, staring 2, rigidity 1, negativism 1, withdrawal 1, waxy flexibility 3 =12, patient with productive cough, advised for medical work up  4/1---tired, speaking more, not rigid, has PMR+. NPO status now.   4/2--sleepy, RRT this afternoon  4/3---better mentation, pmr but no chester catatonic symptoms  4/4: alert, using few words, mild rigidity -  at bedside, updated   4/5: few words, lethargic, fluctuating wakefulness. Mild rigidity.  No overt catatonic sxs.   4/7: no change, remains thought blocked, few words - son at bedside, updated with care  4/8: patient continues to have poor eye contact, uses few words, no rigidity on today's exam.   4/9: patient remains minimally verbal, some posturing, poor Po intake - continues on NC for 02 requirements   4/10: more verbal/interactive, with increased PO intake per family. Some psychotic output. Family agrees with resumption of Zyprexa.   4/14: alert to self with fluctuating wakefulness, intermittent full sentences f/b one-word answers, some rigidity, posturing, denies si, denies ah/vh  4/16: patient more catatonic today, notable catalepsy, waxiness, verbigeration followed by mutism and staring, family against ativan and ECT, would increase memantine as below, not eating   4/17: catatonic (mute, rigid, not engaging). Eating some portions of meals with significant family support. Family opting to see memantine trial to full effect before consideration of ECT  4/18: mutism today, odd posturing, unable to engage, d/w spouse and son ECT, family still undecided  4/21: alert to self, some improvement with verbal engagement, tracking, follow command, denies si, denies ah/vh, awaiting call back from son in regards to ECT consult  4/22: remains catatonic (mute, postured, rigid, withdrawn, negativistic). Exam limited. Family agrees with formal ECT consult.  4/23: Remains with symptoms of catatonia. BFS: stupor 1, mute2, Rigid 2, negativism 2, withdrawal 2 - 9 : ECT consult completed. awaiting clearances for ECT  4/42: remains with BFS of 9, plan for ECT  4/25: alert to self, able to engage in some conversation, however, confused, loose on  exam, odd posturing right upper arm, Dr. Toure made aware of schedule for ECT Monday 4/28: s/p ECT #2. more spontaneous speech, cooperative with exam. improved eye contact. loose with no rigidity.   4/29: displays more s/s of catatonia since yesterdays exam- negativism, withdrawal, rigidity. family and ect in agreement to proceed with treatment 3 tomorrow.  4/30: some confusion today. Will assess tomm  5/1: mutism and negativism on exam     5/2---improved catatonic symptoms. Confused, suspecting AH    PLAN  -- ECT planned for Monday 5/5/2025. NPO after midnight on Sunday  - no SI or HI, no need for psychiatric CO  -- antipsychotics can only be given if qtc < 500    - MEDICATIONS:  - Memantine 5mg AM and 10mg HS  -- C/W Zoloft 125 mg daily   -- C/W Zyprexa 2.5mg HS   - PRN: Ativan 0.5mg q 6hrs prn IM/IV/PO    - please ensure PT involved in patient care    - DISPO: Inpatient psychiatry. 2pc will be signed

## 2025-05-02 NOTE — PROGRESS NOTE ADULT - PROBLEM SELECTOR PLAN 1
MDD w/ psychotic features.  - s/p ativan now on hold given concern that it may have contributed to respiratory decompensation  - c/w Zoloft 125 qday (home dose was 150).  - c/w Zyprexa 2.5 qhs  - c/w Namenda 10mg qhs  - psych following  - ECT ongoing  pt optimized per cards and pulm for ECT    Pending transfer to inpatient psychiatric unit at [facility].

## 2025-05-02 NOTE — PROGRESS NOTE ADULT - ASSESSMENT
74F reported asthma with prior intubations and depression initially admitted with asthma exacerbation with hypercapnic and hypoxemic respiratory failure hospital course complicated by persistent mood disorder and catatonia planned for ECT    Tolerated ECT well again today. Currently on room air.     # asthma- currently not in exacerbation  # Intermittent hypoxemia    Recommendations:  -  C/W Duonebs PRN, advair BID, and spiriva.   - Completed steroids, monitor off for now.   - Intermittently hypoxic ajith-ECT, now recovered on room air  - Incentive spirometry  - OOB as tolerated  - Appreciate ongoing BH optimization  - Will need outpatient PFT on discharge

## 2025-05-02 NOTE — PROGRESS NOTE ADULT - PROBLEM SELECTOR PLAN 9
Lovenox ppx  full code  PT rec TK  Will need transfer to [facility] for psychiatric optimization    Ambulatory with PT. Eating consistently now. 2PC per psychiatry.

## 2025-05-02 NOTE — PROGRESS NOTE ADULT - SUBJECTIVE AND OBJECTIVE BOX
Interval Events: Patient was seen and examined at bedside.     Doing well today. more awake, no respiratory complaints.     REVIEW OF SYSTEMS:  Constitutional: [ -] fevers [ -] chills [ -] weight loss [- ] weight gain  CV: [ -] chest pain [ -] orthopnea [ -] palpitations [- ] murmur Resp: [- ] cough [ -] shortness of breath [- ] dyspnea [ -] wheezing [- ] sputum [- ] hemoptysis  [x ] All other systems negative  [ ] Unable to assess ROS because ________    OBJECTIVE:  ICU Vital Signs Last 24 Hrs  T(C): 37.4 (02 May 2025 12:05), Max: 37.4 (02 May 2025 12:05)  T(F): 99.3 (02 May 2025 12:05), Max: 99.3 (02 May 2025 12:05)  HR: 82 (02 May 2025 12:05) (73 - 96)  BP: 107/83 (02 May 2025 12:05) (107/83 - 161/90)  BP(mean): --  ABP: --  ABP(mean): --  RR: 18 (02 May 2025 12:05) (14 - 20)  SpO2: 95% (02 May 2025 12:05) (95% - 99%)    O2 Parameters below as of 02 May 2025 12:05  Patient On (Oxygen Delivery Method): room air              CAPILLARY BLOOD GLUCOSE      POCT Blood Glucose.: 206 mg/dL (02 May 2025 12:06)  PHYSICAL EXAM:  Constitutional: well-developed; well-groomed; well-nourished; no distress,  Eyes: PERRL; EOMI  ENMT: Normal oropharnxy, no oral lesions, no erythema, no exudates  Neck:  Supple; no JVD  Respiratory: airway patent; breath sounds equal; good air movement, no wheezing, no crackles, no rhonchi. no increase in WOB  Cardiovascular: regular rate and rhythm  no rub , no murmur, no gallops.   Gastrointestinal: soft; no distention, normal BS, no TTP, no organomegaly, no ascites  Extremities: no clubbing; no cyanosis; no pedal edema,   Neurological: alert and oriented x 0  Skin: warm and dry; color normal: no rash: no ulcers  Psychiatric: Calm, no SI/HI      HOSPITAL MEDICATIONS:  MEDICATIONS  (STANDING):  artificial tears (preservative free) Ophthalmic Solution 1 Drop(s) Both EYES every 8 hours  bisacodyl 10 milliGRAM(s) Oral once  chlorhexidine 2% Cloths 1 Application(s) Topical <User Schedule>  dextrose 5%. 1000 milliLiter(s) (50 mL/Hr) IV Continuous <Continuous>  dextrose 5%. 1000 milliLiter(s) (100 mL/Hr) IV Continuous <Continuous>  dextrose 50% Injectable 25 Gram(s) IV Push once  dextrose 50% Injectable 12.5 Gram(s) IV Push once  dextrose 50% Injectable 25 Gram(s) IV Push once  dextrose Oral Gel 15 Gram(s) Oral once  enoxaparin Injectable 40 milliGRAM(s) SubCutaneous every 24 hours  fluticasone propionate/ salmeterol 250-50 MICROgram(s) Diskus 1 Dose(s) Inhalation two times a day  glucagon  Injectable 1 milliGRAM(s) IntraMuscular once  guaiFENesin  milliGRAM(s) Oral every 12 hours  hydrochlorothiazide 25 milliGRAM(s) Oral daily  insulin glargine Injectable (LANTUS) 3 Unit(s) SubCutaneous at bedtime  insulin lispro (ADMELOG) corrective regimen sliding scale   SubCutaneous every 6 hours  insulin lispro Injectable (ADMELOG) 3 Unit(s) SubCutaneous three times a day before meals  ipratropium    for Nebulization 500 MICROGram(s) Nebulizer every 8 hours  levalbuterol Inhalation 0.63 milliGRAM(s) Inhalation every 8 hours  lisinopril 40 milliGRAM(s) Oral daily  melatonin 9 milliGRAM(s) Oral at bedtime  memantine 10 milliGRAM(s) Oral at bedtime  memantine 5 milliGRAM(s) Oral <User Schedule>  OLANZapine 2.5 milliGRAM(s) Oral at bedtime  pantoprazole    Tablet 40 milliGRAM(s) Oral before breakfast  petrolatum Ophthalmic Ointment 1 Application(s) Both EYES two times a day  polyethylene glycol 3350 17 Gram(s) Oral two times a day  senna 2 Tablet(s) Oral at bedtime  sertraline 125 milliGRAM(s) Oral daily    MEDICATIONS  (PRN):  albuterol/ipratropium for Nebulization 3 milliLiter(s) Nebulizer every 6 hours PRN Shortness of Breath and/or Wheezing      LABS:                        10.8   7.34  )-----------( 146      ( 02 May 2025 05:58 )             33.2     Hgb Trend: 10.8<--, 11.2<--, 11.2<--, 11.3<--, 11.4<--  05-02    143  |  105  |  16  ----------------------------<  138[H]  4.5   |  25  |  0.60    Ca    9.4      02 May 2025 05:58  Phos  3.0     05-02  Mg     1.80     05-02      Creatinine Trend: 0.60<--, 0.68<--, 0.65<--, 0.62<--, 0.72<--, 0.93<--    Urinalysis Basic - ( 02 May 2025 05:58 )    Color: x / Appearance: x / SG: x / pH: x  Gluc: 138 mg/dL / Ketone: x  / Bili: x / Urobili: x   Blood: x / Protein: x / Nitrite: x   Leuk Esterase: x / RBC: x / WBC x   Sq Epi: x / Non Sq Epi: x / Bacteria: x          MICROBIOLOGY:     RADIOLOGY & EKG:  [x ] Reviewed and interpreted by me

## 2025-05-02 NOTE — PROGRESS NOTE ADULT - PROBLEM SELECTOR PLAN 2
Due to status asthmaticus s/p intubation and bilevel now weaned off of NC to RA   - aspergillus Ab: negative , ANCA: negative, IgE level 57  - s/p course of antibiotics  - c/w ipratropium + Xopenex nebs q8h  - change to advair per pulm  - s/p Solumedrol and prednisone taper.  - on RA no wheezing  - pulmonology following - will need outpatient PFTs  - mucinex started  - cont airway clearance

## 2025-05-03 LAB
ANION GAP SERPL CALC-SCNC: 14 MMOL/L — SIGNIFICANT CHANGE UP (ref 7–14)
BUN SERPL-MCNC: 16 MG/DL — SIGNIFICANT CHANGE UP (ref 7–23)
CALCIUM SERPL-MCNC: 9.1 MG/DL — SIGNIFICANT CHANGE UP (ref 8.4–10.5)
CHLORIDE SERPL-SCNC: 103 MMOL/L — SIGNIFICANT CHANGE UP (ref 98–107)
CO2 SERPL-SCNC: 25 MMOL/L — SIGNIFICANT CHANGE UP (ref 22–31)
CREAT SERPL-MCNC: 0.68 MG/DL — SIGNIFICANT CHANGE UP (ref 0.5–1.3)
EGFR: 91 ML/MIN/1.73M2 — SIGNIFICANT CHANGE UP
EGFR: 91 ML/MIN/1.73M2 — SIGNIFICANT CHANGE UP
GLUCOSE BLDC GLUCOMTR-MCNC: 147 MG/DL — HIGH (ref 70–99)
GLUCOSE BLDC GLUCOMTR-MCNC: 158 MG/DL — HIGH (ref 70–99)
GLUCOSE BLDC GLUCOMTR-MCNC: 188 MG/DL — HIGH (ref 70–99)
GLUCOSE BLDC GLUCOMTR-MCNC: 84 MG/DL — SIGNIFICANT CHANGE UP (ref 70–99)
GLUCOSE SERPL-MCNC: 89 MG/DL — SIGNIFICANT CHANGE UP (ref 70–99)
HCT VFR BLD CALC: 32.1 % — LOW (ref 34.5–45)
HGB BLD-MCNC: 10.7 G/DL — LOW (ref 11.5–15.5)
MAGNESIUM SERPL-MCNC: 1.8 MG/DL — SIGNIFICANT CHANGE UP (ref 1.6–2.6)
MCHC RBC-ENTMCNC: 32.7 PG — SIGNIFICANT CHANGE UP (ref 27–34)
MCHC RBC-ENTMCNC: 33.3 G/DL — SIGNIFICANT CHANGE UP (ref 32–36)
MCV RBC AUTO: 98.2 FL — SIGNIFICANT CHANGE UP (ref 80–100)
NRBC # BLD AUTO: 0 K/UL — SIGNIFICANT CHANGE UP (ref 0–0)
NRBC # FLD: 0 K/UL — SIGNIFICANT CHANGE UP (ref 0–0)
NRBC BLD AUTO-RTO: 0 /100 WBCS — SIGNIFICANT CHANGE UP (ref 0–0)
PHOSPHATE SERPL-MCNC: 3.4 MG/DL — SIGNIFICANT CHANGE UP (ref 2.5–4.5)
PLATELET # BLD AUTO: 180 K/UL — SIGNIFICANT CHANGE UP (ref 150–400)
POTASSIUM SERPL-MCNC: 4 MMOL/L — SIGNIFICANT CHANGE UP (ref 3.5–5.3)
POTASSIUM SERPL-SCNC: 4 MMOL/L — SIGNIFICANT CHANGE UP (ref 3.5–5.3)
RBC # BLD: 3.27 M/UL — LOW (ref 3.8–5.2)
RBC # FLD: 14.8 % — HIGH (ref 10.3–14.5)
SODIUM SERPL-SCNC: 142 MMOL/L — SIGNIFICANT CHANGE UP (ref 135–145)
WBC # BLD: 7.53 K/UL — SIGNIFICANT CHANGE UP (ref 3.8–10.5)
WBC # FLD AUTO: 7.53 K/UL — SIGNIFICANT CHANGE UP (ref 3.8–10.5)

## 2025-05-03 PROCEDURE — 99232 SBSQ HOSP IP/OBS MODERATE 35: CPT

## 2025-05-03 RX ORDER — INSULIN LISPRO 100 U/ML
2 INJECTION, SOLUTION INTRAVENOUS; SUBCUTANEOUS
Refills: 0 | Status: DISCONTINUED | OUTPATIENT
Start: 2025-05-03 | End: 2025-05-21

## 2025-05-03 RX ADMIN — POLYETHYLENE GLYCOL 3350 17 GRAM(S): 17 POWDER, FOR SOLUTION ORAL at 06:54

## 2025-05-03 RX ADMIN — Medication 1 DROP(S): at 21:53

## 2025-05-03 RX ADMIN — MEMANTINE HYDROCHLORIDE 10 MILLIGRAM(S): 21 CAPSULE, EXTENDED RELEASE ORAL at 21:52

## 2025-05-03 RX ADMIN — Medication 9 MILLIGRAM(S): at 21:53

## 2025-05-03 RX ADMIN — LEVALBUTEROL HYDROCHLORIDE 0.63 MILLIGRAM(S): 1.25 SOLUTION RESPIRATORY (INHALATION) at 07:46

## 2025-05-03 RX ADMIN — OLANZAPINE 2.5 MILLIGRAM(S): 10 TABLET ORAL at 21:52

## 2025-05-03 RX ADMIN — Medication 40 MILLIGRAM(S): at 06:52

## 2025-05-03 RX ADMIN — LISINOPRIL 40 MILLIGRAM(S): 5 TABLET ORAL at 06:53

## 2025-05-03 RX ADMIN — INSULIN GLARGINE-YFGN 3 UNIT(S): 100 INJECTION, SOLUTION SUBCUTANEOUS at 22:27

## 2025-05-03 RX ADMIN — INSULIN LISPRO 3 UNIT(S): 100 INJECTION, SOLUTION INTRAVENOUS; SUBCUTANEOUS at 12:50

## 2025-05-03 RX ADMIN — Medication 1 DOSE(S): at 21:53

## 2025-05-03 RX ADMIN — Medication 1 DROP(S): at 12:52

## 2025-05-03 RX ADMIN — SERTRALINE 125 MILLIGRAM(S): 100 TABLET, FILM COATED ORAL at 12:51

## 2025-05-03 RX ADMIN — Medication 1 APPLICATION(S): at 06:54

## 2025-05-03 RX ADMIN — INSULIN LISPRO 2 UNIT(S): 100 INJECTION, SOLUTION INTRAVENOUS; SUBCUTANEOUS at 18:17

## 2025-05-03 RX ADMIN — Medication 1 DROP(S): at 06:50

## 2025-05-03 RX ADMIN — ENOXAPARIN SODIUM 40 MILLIGRAM(S): 100 INJECTION SUBCUTANEOUS at 18:19

## 2025-05-03 RX ADMIN — Medication 2 TABLET(S): at 21:52

## 2025-05-03 RX ADMIN — Medication 500 MICROGRAM(S): at 15:17

## 2025-05-03 RX ADMIN — Medication 1 APPLICATION(S): at 07:18

## 2025-05-03 RX ADMIN — INSULIN LISPRO 3 UNIT(S): 100 INJECTION, SOLUTION INTRAVENOUS; SUBCUTANEOUS at 09:06

## 2025-05-03 RX ADMIN — Medication 500 MICROGRAM(S): at 07:46

## 2025-05-03 RX ADMIN — MEMANTINE HYDROCHLORIDE 5 MILLIGRAM(S): 21 CAPSULE, EXTENDED RELEASE ORAL at 06:52

## 2025-05-03 RX ADMIN — LEVALBUTEROL HYDROCHLORIDE 0.63 MILLIGRAM(S): 1.25 SOLUTION RESPIRATORY (INHALATION) at 15:16

## 2025-05-03 RX ADMIN — INSULIN LISPRO 1: 100 INJECTION, SOLUTION INTRAVENOUS; SUBCUTANEOUS at 12:51

## 2025-05-03 RX ADMIN — Medication 1 DOSE(S): at 09:06

## 2025-05-03 NOTE — PROGRESS NOTE ADULT - PROBLEM SELECTOR PLAN 9
Lovenox ppx  full code  PT rec TK  Will need transfer to [facility] for psychiatric optimization    Ambulatory with PT. Eating consistently now. 2PC per psychiatry.

## 2025-05-03 NOTE — PROGRESS NOTE ADULT - SUBJECTIVE AND OBJECTIVE BOX
[facility] Division of Hospital Medicine  Carlos Summers DO  Available via MS Teams    SUBJECTIVE / OVERNIGHT EVENTS:  No acute events overnight. Patient seen and examined at bedside this morning.  Son Jesus at bedside today.  Patient tells me her name, his name, but does not know the year and tells me she is in a store.   She does slowly tell us Jesus's birthday    ADDITIONAL REVIEW OF SYSTEMS:    MEDICATIONS  (STANDING):  artificial tears (preservative free) Ophthalmic Solution 1 Drop(s) Both EYES every 8 hours  bisacodyl 10 milliGRAM(s) Oral once  chlorhexidine 2% Cloths 1 Application(s) Topical <User Schedule>  dextrose 5%. 1000 milliLiter(s) (50 mL/Hr) IV Continuous <Continuous>  dextrose 5%. 1000 milliLiter(s) (100 mL/Hr) IV Continuous <Continuous>  dextrose 50% Injectable 25 Gram(s) IV Push once  dextrose 50% Injectable 12.5 Gram(s) IV Push once  dextrose 50% Injectable 25 Gram(s) IV Push once  dextrose Oral Gel 15 Gram(s) Oral once  enoxaparin Injectable 40 milliGRAM(s) SubCutaneous every 24 hours  fluticasone propionate/ salmeterol 250-50 MICROgram(s) Diskus 1 Dose(s) Inhalation two times a day  glucagon  Injectable 1 milliGRAM(s) IntraMuscular once  guaiFENesin  milliGRAM(s) Oral every 12 hours  hydrochlorothiazide 25 milliGRAM(s) Oral daily  insulin glargine Injectable (LANTUS) 3 Unit(s) SubCutaneous at bedtime  insulin lispro (ADMELOG) corrective regimen sliding scale   SubCutaneous three times a day before meals  insulin lispro (ADMELOG) corrective regimen sliding scale   SubCutaneous at bedtime  insulin lispro Injectable (ADMELOG) 3 Unit(s) SubCutaneous three times a day before meals  ipratropium    for Nebulization 500 MICROGram(s) Nebulizer every 8 hours  levalbuterol Inhalation 0.63 milliGRAM(s) Inhalation every 8 hours  lisinopril 40 milliGRAM(s) Oral daily  melatonin 9 milliGRAM(s) Oral at bedtime  memantine 10 milliGRAM(s) Oral at bedtime  memantine 5 milliGRAM(s) Oral <User Schedule>  OLANZapine 2.5 milliGRAM(s) Oral at bedtime  pantoprazole    Tablet 40 milliGRAM(s) Oral before breakfast  petrolatum Ophthalmic Ointment 1 Application(s) Both EYES two times a day  polyethylene glycol 3350 17 Gram(s) Oral two times a day  senna 2 Tablet(s) Oral at bedtime  sertraline 125 milliGRAM(s) Oral daily    MEDICATIONS  (PRN):  albuterol/ipratropium for Nebulization 3 milliLiter(s) Nebulizer every 6 hours PRN Shortness of Breath and/or Wheezing      I&O's Summary      PHYSICAL EXAM:  Vital Signs Last 24 Hrs  T(C): 36.6 (03 May 2025 10:30), Max: 37.5 (02 May 2025 21:12)  T(F): 97.8 (03 May 2025 10:30), Max: 99.5 (02 May 2025 21:12)  HR: 85 (03 May 2025 10:30) (78 - 87)  BP: 106/70 (03 May 2025 10:30) (106/70 - 133/65)  BP(mean): --  RR: 18 (03 May 2025 10:30) (18 - 18)  SpO2: 100% (03 May 2025 10:30) (95% - 100%)    Parameters below as of 03 May 2025 10:30  Patient On (Oxygen Delivery Method): room air      LABS:                        10.7   7.53  )-----------( 180      ( 03 May 2025 07:10 )             32.1     05-03    142  |  103  |  16  ----------------------------<  89  4.0   |  25  |  0.68    Ca    9.1      03 May 2025 07:10  Phos  3.4     05-03  Mg     1.80     05-03            Urinalysis Basic - ( 03 May 2025 07:10 )    Color: x / Appearance: x / SG: x / pH: x  Gluc: 89 mg/dL / Ketone: x  / Bili: x / Urobili: x   Blood: x / Protein: x / Nitrite: x   Leuk Esterase: x / RBC: x / WBC x   Sq Epi: x / Non Sq Epi: x / Bacteria: x        COVID-19 PCR: NotDetec (20 Apr 2025 05:54)  SARS-CoV-2: NotDetec (19 Mar 2025 09:05)  SARS-CoV-2: NotDetec (08 Mar 2025 14:40)

## 2025-05-03 NOTE — PROGRESS NOTE ADULT - PROBLEM SELECTOR PLAN 1
MDD w/ psychotic features.  - s/p ativan now on hold given concern that it may have contributed to respiratory decompensation  - c/w Zoloft 125 qday (home dose was 150).  - c/w Zyprexa 2.5 qhs  - c/w Namenda 10mg qhs  - psych following  - ECT ongoing  pt optimized per cards and pulm for ECT    Pending transfer to inpatient psychiatric unit at [facility].

## 2025-05-04 LAB
ANION GAP SERPL CALC-SCNC: 13 MMOL/L — SIGNIFICANT CHANGE UP (ref 7–14)
BUN SERPL-MCNC: 12 MG/DL — SIGNIFICANT CHANGE UP (ref 7–23)
CALCIUM SERPL-MCNC: 9.6 MG/DL — SIGNIFICANT CHANGE UP (ref 8.4–10.5)
CHLORIDE SERPL-SCNC: 104 MMOL/L — SIGNIFICANT CHANGE UP (ref 98–107)
CO2 SERPL-SCNC: 27 MMOL/L — SIGNIFICANT CHANGE UP (ref 22–31)
CREAT SERPL-MCNC: 0.61 MG/DL — SIGNIFICANT CHANGE UP (ref 0.5–1.3)
EGFR: 94 ML/MIN/1.73M2 — SIGNIFICANT CHANGE UP
EGFR: 94 ML/MIN/1.73M2 — SIGNIFICANT CHANGE UP
GLUCOSE BLDC GLUCOMTR-MCNC: 129 MG/DL — HIGH (ref 70–99)
GLUCOSE BLDC GLUCOMTR-MCNC: 130 MG/DL — HIGH (ref 70–99)
GLUCOSE BLDC GLUCOMTR-MCNC: 148 MG/DL — HIGH (ref 70–99)
GLUCOSE BLDC GLUCOMTR-MCNC: 177 MG/DL — HIGH (ref 70–99)
GLUCOSE SERPL-MCNC: 127 MG/DL — HIGH (ref 70–99)
HCT VFR BLD CALC: 35.5 % — SIGNIFICANT CHANGE UP (ref 34.5–45)
HGB BLD-MCNC: 11.4 G/DL — LOW (ref 11.5–15.5)
MAGNESIUM SERPL-MCNC: 1.8 MG/DL — SIGNIFICANT CHANGE UP (ref 1.6–2.6)
MCHC RBC-ENTMCNC: 32.1 G/DL — SIGNIFICANT CHANGE UP (ref 32–36)
MCHC RBC-ENTMCNC: 32.1 PG — SIGNIFICANT CHANGE UP (ref 27–34)
MCV RBC AUTO: 100 FL — SIGNIFICANT CHANGE UP (ref 80–100)
NRBC # BLD AUTO: 0 K/UL — SIGNIFICANT CHANGE UP (ref 0–0)
NRBC # FLD: 0 K/UL — SIGNIFICANT CHANGE UP (ref 0–0)
NRBC BLD AUTO-RTO: 0 /100 WBCS — SIGNIFICANT CHANGE UP (ref 0–0)
PHOSPHATE SERPL-MCNC: 3.2 MG/DL — SIGNIFICANT CHANGE UP (ref 2.5–4.5)
PLATELET # BLD AUTO: 203 K/UL — SIGNIFICANT CHANGE UP (ref 150–400)
POTASSIUM SERPL-MCNC: 4.4 MMOL/L — SIGNIFICANT CHANGE UP (ref 3.5–5.3)
POTASSIUM SERPL-SCNC: 4.4 MMOL/L — SIGNIFICANT CHANGE UP (ref 3.5–5.3)
RBC # BLD: 3.55 M/UL — LOW (ref 3.8–5.2)
RBC # FLD: 14.5 % — SIGNIFICANT CHANGE UP (ref 10.3–14.5)
SODIUM SERPL-SCNC: 144 MMOL/L — SIGNIFICANT CHANGE UP (ref 135–145)
WBC # BLD: 9.74 K/UL — SIGNIFICANT CHANGE UP (ref 3.8–10.5)
WBC # FLD AUTO: 9.74 K/UL — SIGNIFICANT CHANGE UP (ref 3.8–10.5)

## 2025-05-04 PROCEDURE — 99232 SBSQ HOSP IP/OBS MODERATE 35: CPT

## 2025-05-04 RX ADMIN — Medication 1 DROP(S): at 06:19

## 2025-05-04 RX ADMIN — Medication 1 DROP(S): at 21:43

## 2025-05-04 RX ADMIN — Medication 9 MILLIGRAM(S): at 21:44

## 2025-05-04 RX ADMIN — LEVALBUTEROL HYDROCHLORIDE 0.63 MILLIGRAM(S): 1.25 SOLUTION RESPIRATORY (INHALATION) at 17:13

## 2025-05-04 RX ADMIN — LEVALBUTEROL HYDROCHLORIDE 0.63 MILLIGRAM(S): 1.25 SOLUTION RESPIRATORY (INHALATION) at 23:05

## 2025-05-04 RX ADMIN — SERTRALINE 125 MILLIGRAM(S): 100 TABLET, FILM COATED ORAL at 13:08

## 2025-05-04 RX ADMIN — Medication 40 MILLIGRAM(S): at 06:20

## 2025-05-04 RX ADMIN — Medication 1 APPLICATION(S): at 06:21

## 2025-05-04 RX ADMIN — ENOXAPARIN SODIUM 40 MILLIGRAM(S): 100 INJECTION SUBCUTANEOUS at 17:37

## 2025-05-04 RX ADMIN — INSULIN LISPRO 2 UNIT(S): 100 INJECTION, SOLUTION INTRAVENOUS; SUBCUTANEOUS at 17:37

## 2025-05-04 RX ADMIN — INSULIN GLARGINE-YFGN 3 UNIT(S): 100 INJECTION, SOLUTION SUBCUTANEOUS at 22:22

## 2025-05-04 RX ADMIN — Medication 500 MICROGRAM(S): at 09:51

## 2025-05-04 RX ADMIN — MEMANTINE HYDROCHLORIDE 5 MILLIGRAM(S): 21 CAPSULE, EXTENDED RELEASE ORAL at 06:30

## 2025-05-04 RX ADMIN — LISINOPRIL 40 MILLIGRAM(S): 5 TABLET ORAL at 06:20

## 2025-05-04 RX ADMIN — INSULIN LISPRO 2 UNIT(S): 100 INJECTION, SOLUTION INTRAVENOUS; SUBCUTANEOUS at 09:13

## 2025-05-04 RX ADMIN — Medication 1 DOSE(S): at 21:42

## 2025-05-04 RX ADMIN — MEMANTINE HYDROCHLORIDE 10 MILLIGRAM(S): 21 CAPSULE, EXTENDED RELEASE ORAL at 21:50

## 2025-05-04 RX ADMIN — Medication 1 APPLICATION(S): at 06:13

## 2025-05-04 RX ADMIN — OLANZAPINE 2.5 MILLIGRAM(S): 10 TABLET ORAL at 21:43

## 2025-05-04 RX ADMIN — LEVALBUTEROL HYDROCHLORIDE 0.63 MILLIGRAM(S): 1.25 SOLUTION RESPIRATORY (INHALATION) at 09:51

## 2025-05-04 RX ADMIN — Medication 500 MICROGRAM(S): at 23:05

## 2025-05-04 RX ADMIN — INSULIN LISPRO 1: 100 INJECTION, SOLUTION INTRAVENOUS; SUBCUTANEOUS at 17:36

## 2025-05-04 RX ADMIN — Medication 500 MICROGRAM(S): at 17:12

## 2025-05-04 NOTE — PROGRESS NOTE ADULT - PROBLEM SELECTOR PLAN 1
MDD w/ psychotic features.  - s/p ativan now on hold given concern that it may have contributed to respiratory decompensation  - c/w Zoloft 125 qday (home dose was 150).  - c/w Zyprexa 2.5 qhs  - c/w Namenda 10mg qhs  - psych following  - ECT ongoing  pt optimized per cards and pulm for ECT    Pending transfer to inpatient psychiatric unit at [facility].

## 2025-05-04 NOTE — PROGRESS NOTE ADULT - CONSTITUTIONAL
normal/well-groomed/no distress

## 2025-05-04 NOTE — PROGRESS NOTE ADULT - SUBJECTIVE AND OBJECTIVE BOX
[facility] Division of Hospital Medicine  Carlos MelinaDO  Available via MS Teams    SUBJECTIVE / OVERNIGHT EVENTS:  No acute events overnight. Patient seen and examined at bedside this morning.   at bedside.  Patient offers no acute complaints.  Initially identified her  as a  but then corrected herself.    ADDITIONAL REVIEW OF SYSTEMS:    MEDICATIONS  (STANDING):  artificial tears (preservative free) Ophthalmic Solution 1 Drop(s) Both EYES every 8 hours  bisacodyl 10 milliGRAM(s) Oral once  chlorhexidine 2% Cloths 1 Application(s) Topical <User Schedule>  dextrose 5%. 1000 milliLiter(s) (50 mL/Hr) IV Continuous <Continuous>  dextrose 5%. 1000 milliLiter(s) (100 mL/Hr) IV Continuous <Continuous>  dextrose 50% Injectable 25 Gram(s) IV Push once  dextrose 50% Injectable 12.5 Gram(s) IV Push once  dextrose 50% Injectable 25 Gram(s) IV Push once  dextrose Oral Gel 15 Gram(s) Oral once  enoxaparin Injectable 40 milliGRAM(s) SubCutaneous every 24 hours  fluticasone propionate/ salmeterol 250-50 MICROgram(s) Diskus 1 Dose(s) Inhalation two times a day  glucagon  Injectable 1 milliGRAM(s) IntraMuscular once  guaiFENesin  milliGRAM(s) Oral every 12 hours  hydrochlorothiazide 25 milliGRAM(s) Oral daily  insulin glargine Injectable (LANTUS) 3 Unit(s) SubCutaneous at bedtime  insulin lispro (ADMELOG) corrective regimen sliding scale   SubCutaneous three times a day before meals  insulin lispro (ADMELOG) corrective regimen sliding scale   SubCutaneous at bedtime  insulin lispro Injectable (ADMELOG) 2 Unit(s) SubCutaneous three times a day before meals  ipratropium    for Nebulization 500 MICROGram(s) Nebulizer every 8 hours  levalbuterol Inhalation 0.63 milliGRAM(s) Inhalation every 8 hours  lisinopril 40 milliGRAM(s) Oral daily  melatonin 9 milliGRAM(s) Oral at bedtime  memantine 10 milliGRAM(s) Oral at bedtime  memantine 5 milliGRAM(s) Oral <User Schedule>  OLANZapine 2.5 milliGRAM(s) Oral at bedtime  pantoprazole    Tablet 40 milliGRAM(s) Oral before breakfast  petrolatum Ophthalmic Ointment 1 Application(s) Both EYES two times a day  polyethylene glycol 3350 17 Gram(s) Oral two times a day  senna 2 Tablet(s) Oral at bedtime  sertraline 125 milliGRAM(s) Oral daily    MEDICATIONS  (PRN):  albuterol/ipratropium for Nebulization 3 milliLiter(s) Nebulizer every 6 hours PRN Shortness of Breath and/or Wheezing      I&O's Summary    03 May 2025 07:01  -  04 May 2025 07:00  --------------------------------------------------------  IN: 50 mL / OUT: 600 mL / NET: -550 mL        PHYSICAL EXAM:  Vital Signs Last 24 Hrs  T(C): 37 (04 May 2025 10:16), Max: 37 (04 May 2025 05:30)  T(F): 98.6 (04 May 2025 10:16), Max: 98.6 (04 May 2025 05:30)  HR: 92 (04 May 2025 10:16) (80 - 92)  BP: 135/64 (04 May 2025 10:16) (130/89 - 148/87)  BP(mean): --  RR: 17 (04 May 2025 10:16) (17 - 18)  SpO2: 100% (04 May 2025 10:16) (95% - 100%)    Parameters below as of 04 May 2025 10:16  Patient On (Oxygen Delivery Method): room air    LABS:                        11.4   9.74  )-----------( 203      ( 04 May 2025 06:30 )             35.5     05-04    144  |  104  |  12  ----------------------------<  127[H]  4.4   |  27  |  0.61    Ca    9.6      04 May 2025 06:30  Phos  3.2     05-04  Mg     1.80     05-04            Urinalysis Basic - ( 04 May 2025 06:30 )    Color: x / Appearance: x / SG: x / pH: x  Gluc: 127 mg/dL / Ketone: x  / Bili: x / Urobili: x   Blood: x / Protein: x / Nitrite: x   Leuk Esterase: x / RBC: x / WBC x   Sq Epi: x / Non Sq Epi: x / Bacteria: x        COVID-19 PCR: NotDetec (20 Apr 2025 05:54)  SARS-CoV-2: NotDetec (19 Mar 2025 09:05)  SARS-CoV-2: NotDetec (08 Mar 2025 14:40)

## 2025-05-04 NOTE — PROGRESS NOTE ADULT - CARDIOVASCULAR
normal/regular rate and rhythm/S1 S2 present/no gallops/no rub/no murmur

## 2025-05-04 NOTE — PROGRESS NOTE ADULT - PSYCHIATRIC
normal affect/alert and oriented x3/normal behavior
normal affect/alert and oriented x3/normal behavior
normal/normal affect/alert and oriented x3/normal behavior
not answering questions
normal/normal affect/normal behavior
AAOx2/normal/normal affect/normal behavior
normal/alert and oriented x3/normal behavior/flat affect
AAOx2-3/normal/normal affect/normal behavior
normal/normal affect/alert and oriented x3/normal behavior/anxious
normal/normal affect/normal behavior/anxious
normal affect/alert and oriented x3/normal behavior
AAOx1
AAOx1
AAOX1/normal affect/normal behavior

## 2025-05-04 NOTE — PROGRESS NOTE ADULT - RESPIRATORY
normal/clear to auscultation bilaterally/no wheezes/no rales/no rhonchi

## 2025-05-04 NOTE — PROGRESS NOTE ADULT - NEUROLOGICAL
cranial nerves II-XII intact

## 2025-05-04 NOTE — PROGRESS NOTE ADULT - GASTROINTESTINAL
normal/soft/nontender/nondistended/normal active bowel sounds

## 2025-05-04 NOTE — PROGRESS NOTE ADULT - PROBLEM SELECTOR PLAN 9
Lovenox ppx  full code  PT rec TK  Will need transfer to [facility] for psychiatric optimization    Ambulatory with PT. Eating consistently now. 2PC per psychiatry.

## 2025-05-05 LAB
ANION GAP SERPL CALC-SCNC: 11 MMOL/L — SIGNIFICANT CHANGE UP (ref 7–14)
BUN SERPL-MCNC: 13 MG/DL — SIGNIFICANT CHANGE UP (ref 7–23)
CALCIUM SERPL-MCNC: 9.1 MG/DL — SIGNIFICANT CHANGE UP (ref 8.4–10.5)
CHLORIDE SERPL-SCNC: 100 MMOL/L — SIGNIFICANT CHANGE UP (ref 98–107)
CO2 SERPL-SCNC: 28 MMOL/L — SIGNIFICANT CHANGE UP (ref 22–31)
CREAT SERPL-MCNC: 0.63 MG/DL — SIGNIFICANT CHANGE UP (ref 0.5–1.3)
EGFR: 93 ML/MIN/1.73M2 — SIGNIFICANT CHANGE UP
EGFR: 93 ML/MIN/1.73M2 — SIGNIFICANT CHANGE UP
GLUCOSE BLDC GLUCOMTR-MCNC: 116 MG/DL — HIGH (ref 70–99)
GLUCOSE BLDC GLUCOMTR-MCNC: 117 MG/DL — HIGH (ref 70–99)
GLUCOSE BLDC GLUCOMTR-MCNC: 119 MG/DL — HIGH (ref 70–99)
GLUCOSE BLDC GLUCOMTR-MCNC: 166 MG/DL — HIGH (ref 70–99)
GLUCOSE BLDC GLUCOMTR-MCNC: 198 MG/DL — HIGH (ref 70–99)
GLUCOSE SERPL-MCNC: 116 MG/DL — HIGH (ref 70–99)
HCT VFR BLD CALC: 34.4 % — LOW (ref 34.5–45)
HGB BLD-MCNC: 11.5 G/DL — SIGNIFICANT CHANGE UP (ref 11.5–15.5)
MAGNESIUM SERPL-MCNC: 1.7 MG/DL — SIGNIFICANT CHANGE UP (ref 1.6–2.6)
MCHC RBC-ENTMCNC: 33.4 G/DL — SIGNIFICANT CHANGE UP (ref 32–36)
MCHC RBC-ENTMCNC: 33.4 PG — SIGNIFICANT CHANGE UP (ref 27–34)
MCV RBC AUTO: 100 FL — SIGNIFICANT CHANGE UP (ref 80–100)
NRBC # BLD AUTO: 0 K/UL — SIGNIFICANT CHANGE UP (ref 0–0)
NRBC # FLD: 0 K/UL — SIGNIFICANT CHANGE UP (ref 0–0)
NRBC BLD AUTO-RTO: 0 /100 WBCS — SIGNIFICANT CHANGE UP (ref 0–0)
PHOSPHATE SERPL-MCNC: 3.2 MG/DL — SIGNIFICANT CHANGE UP (ref 2.5–4.5)
PLATELET # BLD AUTO: 194 K/UL — SIGNIFICANT CHANGE UP (ref 150–400)
POTASSIUM SERPL-MCNC: 3.3 MMOL/L — LOW (ref 3.5–5.3)
POTASSIUM SERPL-SCNC: 3.3 MMOL/L — LOW (ref 3.5–5.3)
RBC # BLD: 3.44 M/UL — LOW (ref 3.8–5.2)
RBC # FLD: 14.7 % — HIGH (ref 10.3–14.5)
SODIUM SERPL-SCNC: 139 MMOL/L — SIGNIFICANT CHANGE UP (ref 135–145)
WBC # BLD: 8.41 K/UL — SIGNIFICANT CHANGE UP (ref 3.8–10.5)
WBC # FLD AUTO: 8.41 K/UL — SIGNIFICANT CHANGE UP (ref 3.8–10.5)

## 2025-05-05 PROCEDURE — 99233 SBSQ HOSP IP/OBS HIGH 50: CPT

## 2025-05-05 PROCEDURE — 90870 ELECTROCONVULSIVE THERAPY: CPT

## 2025-05-05 PROCEDURE — 99232 SBSQ HOSP IP/OBS MODERATE 35: CPT

## 2025-05-05 PROCEDURE — 99233 SBSQ HOSP IP/OBS HIGH 50: CPT | Mod: 25

## 2025-05-05 RX ORDER — MAGNESIUM SULFATE 500 MG/ML
1 SYRINGE (ML) INJECTION ONCE
Refills: 0 | Status: COMPLETED | OUTPATIENT
Start: 2025-05-05 | End: 2025-05-05

## 2025-05-05 RX ADMIN — INSULIN LISPRO 2 UNIT(S): 100 INJECTION, SOLUTION INTRAVENOUS; SUBCUTANEOUS at 12:54

## 2025-05-05 RX ADMIN — INSULIN LISPRO 2 UNIT(S): 100 INJECTION, SOLUTION INTRAVENOUS; SUBCUTANEOUS at 18:16

## 2025-05-05 RX ADMIN — Medication 1 DROP(S): at 21:21

## 2025-05-05 RX ADMIN — LISINOPRIL 40 MILLIGRAM(S): 5 TABLET ORAL at 05:07

## 2025-05-05 RX ADMIN — Medication 1 APPLICATION(S): at 05:17

## 2025-05-05 RX ADMIN — LEVALBUTEROL HYDROCHLORIDE 0.63 MILLIGRAM(S): 1.25 SOLUTION RESPIRATORY (INHALATION) at 10:48

## 2025-05-05 RX ADMIN — MEMANTINE HYDROCHLORIDE 5 MILLIGRAM(S): 21 CAPSULE, EXTENDED RELEASE ORAL at 10:46

## 2025-05-05 RX ADMIN — Medication 1 DOSE(S): at 10:47

## 2025-05-05 RX ADMIN — OLANZAPINE 2.5 MILLIGRAM(S): 10 TABLET ORAL at 21:20

## 2025-05-05 RX ADMIN — ENOXAPARIN SODIUM 40 MILLIGRAM(S): 100 INJECTION SUBCUTANEOUS at 18:17

## 2025-05-05 RX ADMIN — INSULIN LISPRO 1: 100 INJECTION, SOLUTION INTRAVENOUS; SUBCUTANEOUS at 12:54

## 2025-05-05 RX ADMIN — Medication 500 MICROGRAM(S): at 22:06

## 2025-05-05 RX ADMIN — Medication 9 MILLIGRAM(S): at 21:20

## 2025-05-05 RX ADMIN — Medication 500 MICROGRAM(S): at 17:29

## 2025-05-05 RX ADMIN — Medication 100 GRAM(S): at 10:45

## 2025-05-05 RX ADMIN — SERTRALINE 125 MILLIGRAM(S): 100 TABLET, FILM COATED ORAL at 10:46

## 2025-05-05 RX ADMIN — Medication 40 MILLIEQUIVALENT(S): at 10:45

## 2025-05-05 RX ADMIN — MEMANTINE HYDROCHLORIDE 10 MILLIGRAM(S): 21 CAPSULE, EXTENDED RELEASE ORAL at 21:27

## 2025-05-05 RX ADMIN — LEVALBUTEROL HYDROCHLORIDE 0.63 MILLIGRAM(S): 1.25 SOLUTION RESPIRATORY (INHALATION) at 17:28

## 2025-05-05 RX ADMIN — Medication 1 DROP(S): at 05:08

## 2025-05-05 RX ADMIN — Medication 1 DOSE(S): at 21:20

## 2025-05-05 RX ADMIN — Medication 1 APPLICATION(S): at 05:08

## 2025-05-05 RX ADMIN — INSULIN GLARGINE-YFGN 3 UNIT(S): 100 INJECTION, SOLUTION SUBCUTANEOUS at 22:24

## 2025-05-05 NOTE — BH CONSULTATION LIAISON PROGRESS NOTE - NSBHFUPINTERVALHXFT_PSY_A_CORE
Met with the patient. Happy, smiles, and engages, but confused, disoriented. No insight into reasons for admission, or current treatment with ECT. No evident mood symptoms, denies any si or hi, or psychosis. Examined--- no catatonic symptoms. BFRS=0

## 2025-05-05 NOTE — BH CONSULTATION LIAISON PROGRESS NOTE - NSBHFUPINTERVALCCFT_PSY_A_CORE
Received ect this morning. Coordinated with ect---discussed below plan. Concerns for confusion-- need to r/o deliriogenic causes  Coordinated with medicine-- below discussed

## 2025-05-05 NOTE — PROGRESS NOTE ADULT - SUBJECTIVE AND OBJECTIVE BOX
Arnold Uriostegui MD  Interventional Cardiology / Endovascular Specialist  [place] Office : [phone] [place] [address] [place] [address]  Tel:   [place] Office : [address] [facility] [place] [address]  Tel: [phone]    Pt is lying in bed comfortable not in distress, no chest pains no SOB no palpitations    	  MEDICATIONS:  enoxaparin Injectable 40 milliGRAM(s) SubCutaneous every 24 hours  hydrochlorothiazide 25 milliGRAM(s) Oral daily  lisinopril 40 milliGRAM(s) Oral daily      albuterol/ipratropium for Nebulization 3 milliLiter(s) Nebulizer every 6 hours PRN  fluticasone propionate/ salmeterol 250-50 MICROgram(s) Diskus 1 Dose(s) Inhalation two times a day  guaiFENesin  milliGRAM(s) Oral every 12 hours  ipratropium    for Nebulization 500 MICROGram(s) Nebulizer every 8 hours  levalbuterol Inhalation 0.63 milliGRAM(s) Inhalation every 8 hours    melatonin 9 milliGRAM(s) Oral at bedtime  memantine 10 milliGRAM(s) Oral at bedtime  memantine 5 milliGRAM(s) Oral <User Schedule>  OLANZapine 2.5 milliGRAM(s) Oral at bedtime  sertraline 125 milliGRAM(s) Oral daily    bisacodyl 10 milliGRAM(s) Oral once  pantoprazole    Tablet 40 milliGRAM(s) Oral before breakfast  polyethylene glycol 3350 17 Gram(s) Oral two times a day  senna 2 Tablet(s) Oral at bedtime    dextrose 50% Injectable 25 Gram(s) IV Push once  dextrose 50% Injectable 12.5 Gram(s) IV Push once  dextrose 50% Injectable 25 Gram(s) IV Push once  dextrose Oral Gel 15 Gram(s) Oral once  glucagon  Injectable 1 milliGRAM(s) IntraMuscular once  insulin glargine Injectable (LANTUS) 3 Unit(s) SubCutaneous at bedtime  insulin lispro (ADMELOG) corrective regimen sliding scale   SubCutaneous three times a day before meals  insulin lispro (ADMELOG) corrective regimen sliding scale   SubCutaneous at bedtime  insulin lispro Injectable (ADMELOG) 2 Unit(s) SubCutaneous three times a day before meals    artificial tears (preservative free) Ophthalmic Solution 1 Drop(s) Both EYES every 8 hours  chlorhexidine 2% Cloths 1 Application(s) Topical <User Schedule>  dextrose 5%. 1000 milliLiter(s) IV Continuous <Continuous>  dextrose 5%. 1000 milliLiter(s) IV Continuous <Continuous>  petrolatum Ophthalmic Ointment 1 Application(s) Both EYES two times a day      PAST MEDICAL/SURGICAL HISTORY  PAST MEDICAL & SURGICAL HISTORY:  MDD (major depressive disorder), recurrent, severe, with psychosis      Asthma, mild intermittent, uncomplicated      Essential hypertension      No significant past surgical history          SOCIAL HISTORY: Substance Use (street drugs): ( x ) never used  (  ) other:    FAMILY HISTORY:      PHYSICAL EXAM:  T(C): 36.7 (05-05-25 @ 10:32), Max: 37.2 (05-04-25 @ 21:55)  HR: 80 (05-05-25 @ 10:32) (70 - 104)  BP: 146/75 (05-05-25 @ 10:32) (121/58 - 174/79)  RR: 18 (05-05-25 @ 10:32) (18 - 20)  SpO2: 95% (05-05-25 @ 10:32) (91% - 99%)  Wt(kg): --  I&O's Summary    Height (cm): 157 (05-05 @ 07:33)  Weight (kg): 80 (05-05 @ 07:33)  BMI (kg/m2): 32.5 (05-05 @ 07:33)  BSA (m2): 1.81 (05-05 @ 07:33)        GENERAL: NAD  EYES:   PERRLA   ENMT:   Moist mucous membranes, Good dentition, No lesions  Cardiovascular: Normal S1 S2, No JVD, No murmurs, No edema  Respiratory: Lungs clear to auscultation	  Gastrointestinal:  Soft, Non-tender, + BS	  Extremities: no edema                          11.5   8.41  )-----------( 194      ( 05 May 2025 05:27 )             34.4     05-05    139  |  100  |  13  ----------------------------<  116[H]  3.3[L]   |  28  |  0.63    Ca    9.1      05 May 2025 05:27  Phos  3.2     05-05  Mg     1.70     05-05      proBNP:   Lipid Profile:   HgA1c:   TSH:     Consultant(s) Notes Reviewed:  [x ] YES  [ ] NO    Care Discussed with Consultants/Other Providers [ x] YES  [ ] NO    Imaging Personally Reviewed independently:  [x] YES  [ ] NO    All labs, radiologic studies, vitals, orders and medications list reviewed. Patient is seen and examined at bedside. Case discussed with medical team.

## 2025-05-05 NOTE — PROGRESS NOTE ADULT - PROBLEM SELECTOR PLAN 2
Due to status asthmaticus s/p intubation and bilevel now weaned off of NC to RA   - aspergillus Ab: negative , ANCA: negative, IgE level 57  - s/p course of antibiotics  - c/w ipratropium + Xopenex nebs q8h  - cont advair per pulm  - s/p Solumedrol and prednisone taper.  - on RA no wheezing  - pulmonology following - will need outpatient PFTs  - cont mucinex   - cont airway clearance

## 2025-05-05 NOTE — PROGRESS NOTE ADULT - PROBLEM SELECTOR PLAN 1
MDD w/ psychotic features.  - s/p ativan now on hold given concern that it may have contributed to respiratory decompensation  - c/w Zoloft 125 qday (home dose was 150).  - c/w Zyprexa 2.5 qhs  - c/w Namenda 10mg qhs  - psych following  - ECT ongoing  pt optimized per cards and pulm for ECT    Pending transfer to inpatient psychiatric unit at [facility]  some concern this AM by ECT team, that pt more lethargic on arrive to them, however, pt pleasant and alert and conversant with me post ECT; will try to collect UA for poss etiology of lethargy this AM however upon my eval pt back to her baseline since ECT started

## 2025-05-05 NOTE — ECT TREATMENT NOTE - NSECTPOSTTXCOMMENTS_PSY_ALL_CORE
Ms. Silva is doing better compared to when she started ECT. Not cooperative with interview. Allowed for physical examination. No posturing/rigidity/[place]. She preferred to sleep and did not want to participate in interview. When given option of where she is, chose "hospital".  Appetite has improved. No motor signs of catatonia.

## 2025-05-05 NOTE — ECT TREATMENT NOTE - NSECTPOSTTXSUMMARY_PSY_ALL_CORE
The patient had a well modified grand mal seizure under general anesthesia and muscle relaxant. The patient is alert, responsive, in no acute distress.  Recovery uneventful.    Anesthesia meds: Brevital 60 mg, Succinylcholine 30 mg, Toradol 15 mg. Hyperventilation and delayed stimulus used

## 2025-05-05 NOTE — PROGRESS NOTE ADULT - SUBJECTIVE AND OBJECTIVE BOX
[facility] Division of Hospital Medicine  Em Toure MD  Pager [phone]    Patient is a 74y old  Female who presents with a chief complaint of asthma exac/resp failure/catatonia     SUBJECTIVE / OVERNIGHT EVENTS: pt seen this AM after ECT; conversant, pleasant, calm; when asked if she was hungry, she said "I could eat"      MEDICATIONS  (STANDING):  artificial tears (preservative free) Ophthalmic Solution 1 Drop(s) Both EYES every 8 hours  bisacodyl 10 milliGRAM(s) Oral once  chlorhexidine 2% Cloths 1 Application(s) Topical <User Schedule>  dextrose 5%. 1000 milliLiter(s) (50 mL/Hr) IV Continuous <Continuous>  dextrose 5%. 1000 milliLiter(s) (100 mL/Hr) IV Continuous <Continuous>  dextrose 50% Injectable 25 Gram(s) IV Push once  dextrose 50% Injectable 12.5 Gram(s) IV Push once  dextrose 50% Injectable 25 Gram(s) IV Push once  dextrose Oral Gel 15 Gram(s) Oral once  enoxaparin Injectable 40 milliGRAM(s) SubCutaneous every 24 hours  fluticasone propionate/ salmeterol 250-50 MICROgram(s) Diskus 1 Dose(s) Inhalation two times a day  glucagon  Injectable 1 milliGRAM(s) IntraMuscular once  guaiFENesin  milliGRAM(s) Oral every 12 hours  hydrochlorothiazide 25 milliGRAM(s) Oral daily  insulin glargine Injectable (LANTUS) 3 Unit(s) SubCutaneous at bedtime  insulin lispro (ADMELOG) corrective regimen sliding scale   SubCutaneous three times a day before meals  insulin lispro (ADMELOG) corrective regimen sliding scale   SubCutaneous at bedtime  insulin lispro Injectable (ADMELOG) 2 Unit(s) SubCutaneous three times a day before meals  ipratropium    for Nebulization 500 MICROGram(s) Nebulizer every 8 hours  levalbuterol Inhalation 0.63 milliGRAM(s) Inhalation every 8 hours  lisinopril 40 milliGRAM(s) Oral daily  melatonin 9 milliGRAM(s) Oral at bedtime  memantine 10 milliGRAM(s) Oral at bedtime  memantine 5 milliGRAM(s) Oral <User Schedule>  OLANZapine 2.5 milliGRAM(s) Oral at bedtime  pantoprazole    Tablet 40 milliGRAM(s) Oral before breakfast  petrolatum Ophthalmic Ointment 1 Application(s) Both EYES two times a day  polyethylene glycol 3350 17 Gram(s) Oral two times a day  senna 2 Tablet(s) Oral at bedtime  sertraline 125 milliGRAM(s) Oral daily    MEDICATIONS  (PRN):  albuterol/ipratropium for Nebulization 3 milliLiter(s) Nebulizer every 6 hours PRN Shortness of Breath and/or Wheezing      CAPILLARY BLOOD GLUCOSE  POCT Blood Glucose.: 119 mg/dL (05 May 2025 17:38)  POCT Blood Glucose.: 198 mg/dL (05 May 2025 12:36)  POCT Blood Glucose.: 116 mg/dL (05 May 2025 07:50)  POCT Blood Glucose.: 117 mg/dL (05 May 2025 05:21)  POCT Blood Glucose.: 130 mg/dL (04 May 2025 22:18)      PHYSICAL EXAM:  Vital Signs Last 24 Hrs  T(F): 98 (05 May 2025 10:32), Max: 98.9 (04 May 2025 21:55)  HR: 80 (05 May 2025 17:25) (70 - 104)  BP: 146/75 (05 May 2025 10:32) (121/58 - 174/79)  RR: 18 (05 May 2025 10:32) (18 - 20)  SpO2: 98% (05 May 2025 17:25) (91% - 99%)    Parameters below as of 05 May 2025 17:25  Patient On (Oxygen Delivery Method): room air        CONSTITUTIONAL: NAD, appears comfortable  EYES: PERRLA; conjunctiva and sclera clear  ENMT: Moist oral mucosa; normal dentition  RESPIRATORY: Normal respiratory effort; grossly b/l AE; no wheeze  CARDIOVASCULAR: Regular rate and rhythm; No lower extremity edema  ABDOMEN: Nontender to palpation, normoactive bowel sounds  MUSCULOSKELETAL:  no clubbing or cyanosis of digits; no joint swelling or tenderness to palpation  PSYCH: calm, coop, conversant, pleasant; affect appropriate  NEUROLOGY: CN 2-12 are intact and symmetric; no gross sensory deficits   SKIN: No rashes; no palpable lesions    LABS:                        11.5   8.41  )-----------( 194      ( 05 May 2025 05:27 )             34.4     05-05    139  |  100  |  13  ----------------------------<  116[H]  3.3[L]   |  28  |  0.63    Ca    9.1      05 May 2025 05:27  Phos  3.2     05-05  Mg     1.70     05-05

## 2025-05-05 NOTE — PROGRESS NOTE ADULT - ASSESSMENT
EKG: SR nonischemic    A/P:  74F MDD, asthma, prior CVA presented with AHRF c/b AMS requiring MICU admission for intubation for respiratory failure i/s/o asthma exacerbation. Extubated 3/12 to face tent and then on BIPAP s/p RRT 3/19 for increased WOB / hypoxia despite aggressive asthma management s/p return to MICU for management of severe asthma. Possible aspiration event 3/31. SLP eval appreciated. XR cine performed with improvement in mental status - pureed diet w/ thin liquids recommended. Worsening respiratory status 4/2 - placed on NIV, now weaned to nasal cannula and now RA.  ENT eval - no upper airway consideration for structural causes of asthma such as vocal cord dysfunction.  Course c/b MDD w/ catatonia and poor PO, off ativan due to respiratory concerns trialed on Memantine family now agreeable to ECT      1. SOB   -TTE 3/9 EF 78% no WMA  -LE duplex negative for DVT  -Repeat EKG NSR no ischemic changes   -Acute asthma exacerbation  -f/u pulm    2. HTN  - c/w  Lisinopril 40mg daily   - c/w HCTZ 25 mg daily  - no beta-blockers 2/2 asthma    3. Pre ECT Eval   - Denies CP SOB has had long hospitalization  - EKG normal, Echo normal   - optimized for ECT, moderate risk for MACE - ECT ongoing    4. DVT ppx  c/w lovenox

## 2025-05-05 NOTE — BH CONSULTATION LIAISON PROGRESS NOTE - NSBHASSESSMENTFT_PSY_ALL_CORE
Patient is a 74F hx asthma, depression, and HTN was admitted to the MICU with AHRF 2/2 asthma exacerbation resulting in acute on chronic HHRF causing AMS and increased WOB that required intubation. During her ICU course, she experienced seizure-like activity .  Additional issues included hyponatremia, acute on chronic metabolic alkalosis, and mild thrombocytopenia,  also having fevers.  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 Patient has h/o hx of major depressive disorder with psychosis, hx of 3 past inpatient psychiatric hospitalizations last in 2016 at [facility], all rehospitalizations were in context non compliance with the treatment. She has no hx of suicide attempts, no hx of substance abuse. Patient w hx of paranoia, disorganization, catatonia, required MOO when in [facility].     3/24----more withdrawn, some stiffness in neck, not communicative. Some concerns for catatonia. Unsure if symptoms attributed to catatonia vs delirium  3/25--- more alert today, trying to following commands. Still with PMR+. Appears to be responding to Ativan  3/26-- BFS 10, will increasing standing ativan to target catatonic symptoms - giving STAT 0.5mg NOW discussed w acp   3/27----will continue ativan dosing for one more day before increasing  3/28: BFS: stupor 1, mutism 2,withdrawan 2, verbigeration 1, rigidity 2 =8.  3/29: BFS: stupor 2, mutism 2, staring 1, posturing 2, rigidity 2, waxy flexibility 3, withdrawal 2 = 14; please monitor nutritional intake, plan to increase ativan by total daily dose of 0.5 mg at bedtime due to persistent catatonia   3/31: BFS: stupor 2, mutism 2, staring 2, rigidity 1, negativism 1, withdrawal 1, waxy flexibility 3 =12, patient with productive cough, advised for medical work up  4/1---tired, speaking more, not rigid, has PMR+. NPO status now.   4/2--sleepy, RRT this afternoon  4/3---better mentation, pmr but no chester catatonic symptoms  4/4: alert, using few words, mild rigidity -  at bedside, updated   4/5: few words, lethargic, fluctuating wakefulness. Mild rigidity.  No overt catatonic sxs.   4/7: no change, remains thought blocked, few words - son at bedside, updated with care  4/8: patient continues to have poor eye contact, uses few words, no rigidity on today's exam.   4/9: patient remains minimally verbal, some posturing, poor Po intake - continues on NC for 02 requirements   4/10: more verbal/interactive, with increased PO intake per family. Some psychotic output. Family agrees with resumption of Zyprexa.   4/14: alert to self with fluctuating wakefulness, intermittent full sentences f/b one-word answers, some rigidity, posturing, denies si, denies ah/vh  4/16: patient more catatonic today, notable catalepsy, waxiness, verbigeration followed by mutism and staring, family against ativan and ECT, would increase memantine as below, not eating   4/17: catatonic (mute, rigid, not engaging). Eating some portions of meals with significant family support. Family opting to see memantine trial to full effect before consideration of ECT  4/18: mutism today, odd posturing, unable to engage, d/w spouse and son ECT, family still undecided  4/21: alert to self, some improvement with verbal engagement, tracking, follow command, denies si, denies ah/vh, awaiting call back from son in regards to ECT consult  4/22: remains catatonic (mute, postured, rigid, withdrawn, negativistic). Exam limited. Family agrees with formal ECT consult.  4/23: Remains with symptoms of catatonia. BFS: stupor 1, mute2, Rigid 2, negativism 2, withdrawal 2 - 9 : ECT consult completed. awaiting clearances for ECT  4/42: remains with BFS of 9, plan for ECT  4/25: alert to self, able to engage in some conversation, however, confused, loose on  exam, odd posturing right upper arm, Dr. Toure made aware of schedule for ECT Monday 4/28: s/p ECT #2. more spontaneous speech, cooperative with exam. improved eye contact. loose with no rigidity.   4/29: displays more s/s of catatonia since yesterdays exam- negativism, withdrawal, rigidity. family and ect in agreement to proceed with treatment 3 tomorrow.  4/30: some confusion today. Will assess tomm  5/1: mutism and negativism on exam   5/2: improved catatonic symptoms. Confused, suspecting AH    5/5: ***    PLAN  -- ECT planned for Monday 5/5/2025  -- no SI/HI, no need for psychiatric CO  -- antipsychotics can only be given if qtc < 500    - MEDICATIONS:  - Memantine 5mg AM and 10mg HS  -- C/W Zoloft 125 mg daily   -- C/W Zyprexa 2.5mg HS   - PRN: Ativan 0.5mg q 6hrs prn IM/IV/PO    - please ensure PT involved in patient care    - DISPO: Inpatient psychiatry. 2pc will be signed   Patient is a 74F hx asthma, depression, and HTN was admitted to the MICU with AHRF 2/2 asthma exacerbation resulting in acute on chronic HHRF causing AMS and increased WOB that required intubation. During her ICU course, she experienced seizure-like activity .  Additional issues included hyponatremia, acute on chronic metabolic alkalosis, and mild thrombocytopenia,  also having fevers.  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 Patient has h/o hx of major depressive disorder with psychosis, hx of 3 past inpatient psychiatric hospitalizations last in 2016 at [facility], all rehospitalizations were in context non compliance with the treatment. She has no hx of suicide attempts, no hx of substance abuse. Patient w hx of paranoia, disorganization, catatonia, required MOO when in [facility].     3/24----more withdrawn, some stiffness in neck, not communicative. Some concerns for catatonia. Unsure if symptoms attributed to catatonia vs delirium  3/25--- more alert today, trying to following commands. Still with PMR+. Appears to be responding to Ativan  3/26-- BFS 10, will increasing standing ativan to target catatonic symptoms - giving STAT 0.5mg NOW discussed w acp   3/27----will continue ativan dosing for one more day before increasing  3/28: BFS: stupor 1, mutism 2,withdrawan 2, verbigeration 1, rigidity 2 =8.  3/29: BFS: stupor 2, mutism 2, staring 1, posturing 2, rigidity 2, waxy flexibility 3, withdrawal 2 = 14; please monitor nutritional intake, plan to increase ativan by total daily dose of 0.5 mg at bedtime due to persistent catatonia   3/31: BFS: stupor 2, mutism 2, staring 2, rigidity 1, negativism 1, withdrawal 1, waxy flexibility 3 =12, patient with productive cough, advised for medical work up  4/1---tired, speaking more, not rigid, has PMR+. NPO status now.   4/2--sleepy, RRT this afternoon  4/3---better mentation, pmr but no chester catatonic symptoms  4/4: alert, using few words, mild rigidity -  at bedside, updated   4/5: few words, lethargic, fluctuating wakefulness. Mild rigidity.  No overt catatonic sxs.   4/7: no change, remains thought blocked, few words - son at bedside, updated with care  4/8: patient continues to have poor eye contact, uses few words, no rigidity on today's exam.   4/9: patient remains minimally verbal, some posturing, poor Po intake - continues on NC for 02 requirements   4/10: more verbal/interactive, with increased PO intake per family. Some psychotic output. Family agrees with resumption of Zyprexa.   4/14: alert to self with fluctuating wakefulness, intermittent full sentences f/b one-word answers, some rigidity, posturing, denies si, denies ah/vh  4/16: patient more catatonic today, notable catalepsy, waxiness, verbigeration followed by mutism and staring, family against ativan and ECT, would increase memantine as below, not eating   4/17: catatonic (mute, rigid, not engaging). Eating some portions of meals with significant family support. Family opting to see memantine trial to full effect before consideration of ECT  4/18: mutism today, odd posturing, unable to engage, d/w spouse and son ECT, family still undecided  4/21: alert to self, some improvement with verbal engagement, tracking, follow command, denies si, denies ah/vh, awaiting call back from son in regards to ECT consult  4/22: remains catatonic (mute, postured, rigid, withdrawn, negativistic). Exam limited. Family agrees with formal ECT consult.  4/23: Remains with symptoms of catatonia. BFS: stupor 1, mute2, Rigid 2, negativism 2, withdrawal 2 - 9 : ECT consult completed. awaiting clearances for ECT  4/42: remains with BFS of 9, plan for ECT  4/25: alert to self, able to engage in some conversation, however, confused, loose on  exam, odd posturing right upper arm, Dr. Toure made aware of schedule for ECT Monday 4/28: s/p ECT #2. more spontaneous speech, cooperative with exam. improved eye contact. loose with no rigidity.   4/29: displays more s/s of catatonia since yesterdays exam- negativism, withdrawal, rigidity. family and ect in agreement to proceed with treatment 3 tomorrow.  4/30: some confusion today. Will assess tomm  5/1: mutism and negativism on exam   5/2: improved catatonic symptoms. Confused, suspecting AH    5/5: No catatonic symptoms. Will assess tomm for any catatonic symptoms and decide if to hold off on treatment on wednesday    PLAN  -- ECT planned for wednesday vs friday----depending on assessment tomorrow   -- no SI/HI, no need for psychiatric CO  -- antipsychotics can only be given if qtc < 500  -- Check UA    - MEDICATIONS:  - Memantine 5mg AM and 10mg HS  -- C/W Zoloft 125 mg daily   -- C/W Zyprexa 2.5mg HS   - PRN: Ativan 0.5mg q 6hrs prn IM/IV/PO    - please ensure PT involved in patient care    - DISPO: Inpatient psychiatry. 2pc signed

## 2025-05-05 NOTE — PROGRESS NOTE ADULT - PROBLEM SELECTOR PLAN 9
Lovenox ppx  full code  PT rec TK  Will need transfer to [facility] for psychiatric optimization    Ambulatory with PT. Eating consistently now. 2PC per psychiatry.

## 2025-05-05 NOTE — PROGRESS NOTE ADULT - ASSESSMENT
This is a 74-year-old female with a history of asthma with prior intubations, HTN, prediabetes, and depression who initially presented with acute hypoxemic and hypercapnic respiratory failure now improved with hospital course complicated by persistent mood disorder and catatonia s/p ECT with improvement.    ASSESSMENT:  Severe persistent asthma  Mood disorder with catatonia    PLAN:  - Continue fluticasone-salmeterol 250-50 mcg 1 inhalation twice daily, rinse after use  - Levalbuterol + Ipratropium nebs q8h  - Currently off systemic steroids  - Has wheezing on exam that improves with deep breathing and coughing, but denies any dyspnea, wheezing, cough, or sputum production  - Oxygen saturation on room air is >94%  - Incentive spirometry, out of bed to chair  - Physical therapy eval  - Close follow-up with   - Close outpatient pulmonary follow-up upon discharge  - Discussed with patient at bedside

## 2025-05-06 LAB
GLUCOSE BLDC GLUCOMTR-MCNC: 102 MG/DL — HIGH (ref 70–99)
GLUCOSE BLDC GLUCOMTR-MCNC: 124 MG/DL — HIGH (ref 70–99)
GLUCOSE BLDC GLUCOMTR-MCNC: 145 MG/DL — HIGH (ref 70–99)
GLUCOSE BLDC GLUCOMTR-MCNC: 204 MG/DL — HIGH (ref 70–99)

## 2025-05-06 PROCEDURE — 99233 SBSQ HOSP IP/OBS HIGH 50: CPT

## 2025-05-06 PROCEDURE — 99232 SBSQ HOSP IP/OBS MODERATE 35: CPT

## 2025-05-06 RX ADMIN — Medication 2 TABLET(S): at 22:34

## 2025-05-06 RX ADMIN — SERTRALINE 125 MILLIGRAM(S): 100 TABLET, FILM COATED ORAL at 13:02

## 2025-05-06 RX ADMIN — POLYETHYLENE GLYCOL 3350 17 GRAM(S): 17 POWDER, FOR SOLUTION ORAL at 18:24

## 2025-05-06 RX ADMIN — LISINOPRIL 40 MILLIGRAM(S): 5 TABLET ORAL at 05:35

## 2025-05-06 RX ADMIN — Medication 500 MICROGRAM(S): at 07:34

## 2025-05-06 RX ADMIN — INSULIN LISPRO 2: 100 INJECTION, SOLUTION INTRAVENOUS; SUBCUTANEOUS at 12:58

## 2025-05-06 RX ADMIN — LEVALBUTEROL HYDROCHLORIDE 0.63 MILLIGRAM(S): 1.25 SOLUTION RESPIRATORY (INHALATION) at 07:34

## 2025-05-06 RX ADMIN — Medication 1 DROP(S): at 13:10

## 2025-05-06 RX ADMIN — MEMANTINE HYDROCHLORIDE 5 MILLIGRAM(S): 21 CAPSULE, EXTENDED RELEASE ORAL at 09:27

## 2025-05-06 RX ADMIN — Medication 9 MILLIGRAM(S): at 22:34

## 2025-05-06 RX ADMIN — Medication 40 MILLIGRAM(S): at 06:56

## 2025-05-06 RX ADMIN — LEVALBUTEROL HYDROCHLORIDE 0.63 MILLIGRAM(S): 1.25 SOLUTION RESPIRATORY (INHALATION) at 15:54

## 2025-05-06 RX ADMIN — MEMANTINE HYDROCHLORIDE 10 MILLIGRAM(S): 21 CAPSULE, EXTENDED RELEASE ORAL at 22:33

## 2025-05-06 RX ADMIN — Medication 1 DROP(S): at 22:34

## 2025-05-06 RX ADMIN — DEXTROMETHORPHAN HBR, GUAIFENESIN 600 MILLIGRAM(S): 200 LIQUID ORAL at 05:35

## 2025-05-06 RX ADMIN — Medication 500 MICROGRAM(S): at 15:54

## 2025-05-06 RX ADMIN — ENOXAPARIN SODIUM 40 MILLIGRAM(S): 100 INJECTION SUBCUTANEOUS at 18:24

## 2025-05-06 RX ADMIN — Medication 1 APPLICATION(S): at 05:33

## 2025-05-06 RX ADMIN — OLANZAPINE 2.5 MILLIGRAM(S): 10 TABLET ORAL at 22:33

## 2025-05-06 RX ADMIN — Medication 1 DOSE(S): at 22:34

## 2025-05-06 RX ADMIN — INSULIN GLARGINE-YFGN 3 UNIT(S): 100 INJECTION, SOLUTION SUBCUTANEOUS at 22:33

## 2025-05-06 RX ADMIN — INSULIN LISPRO 2 UNIT(S): 100 INJECTION, SOLUTION INTRAVENOUS; SUBCUTANEOUS at 09:25

## 2025-05-06 RX ADMIN — DEXTROMETHORPHAN HBR, GUAIFENESIN 600 MILLIGRAM(S): 200 LIQUID ORAL at 18:24

## 2025-05-06 RX ADMIN — Medication 1 DOSE(S): at 09:27

## 2025-05-06 RX ADMIN — Medication 1 APPLICATION(S): at 18:25

## 2025-05-06 RX ADMIN — Medication 1 DROP(S): at 05:35

## 2025-05-06 RX ADMIN — Medication 1 APPLICATION(S): at 05:36

## 2025-05-06 RX ADMIN — INSULIN LISPRO 2 UNIT(S): 100 INJECTION, SOLUTION INTRAVENOUS; SUBCUTANEOUS at 12:59

## 2025-05-06 NOTE — PROGRESS NOTE ADULT - PROBLEM SELECTOR PLAN 9
Lovenox ppx  full code  PT rec TK  Will need transfer to [facility] for psychiatric optimization    Ambulatory with PT. Eating consistently now. 2PC per psychiatry.

## 2025-05-06 NOTE — PROGRESS NOTE ADULT - SUBJECTIVE AND OBJECTIVE BOX
[facility] Division of Hospital Medicine  Em Toure MD  Pager [phone]    Patient is a 74y old  Female who presents with a chief complaint of asthma exac/resp failure/catatonia       SUBJECTIVE / OVERNIGHT EVENTS: doing well, eating all meals completely; offers no complaints    MEDICATIONS  (STANDING):  artificial tears (preservative free) Ophthalmic Solution 1 Drop(s) Both EYES every 8 hours  bisacodyl 10 milliGRAM(s) Oral once  chlorhexidine 2% Cloths 1 Application(s) Topical <User Schedule>  dextrose 5%. 1000 milliLiter(s) (50 mL/Hr) IV Continuous <Continuous>  dextrose 5%. 1000 milliLiter(s) (100 mL/Hr) IV Continuous <Continuous>  dextrose 50% Injectable 25 Gram(s) IV Push once  dextrose 50% Injectable 12.5 Gram(s) IV Push once  dextrose 50% Injectable 25 Gram(s) IV Push once  dextrose Oral Gel 15 Gram(s) Oral once  enoxaparin Injectable 40 milliGRAM(s) SubCutaneous every 24 hours  fluticasone propionate/ salmeterol 250-50 MICROgram(s) Diskus 1 Dose(s) Inhalation two times a day  glucagon  Injectable 1 milliGRAM(s) IntraMuscular once  guaiFENesin  milliGRAM(s) Oral every 12 hours  hydrochlorothiazide 25 milliGRAM(s) Oral daily  insulin glargine Injectable (LANTUS) 3 Unit(s) SubCutaneous at bedtime  insulin lispro (ADMELOG) corrective regimen sliding scale   SubCutaneous three times a day before meals  insulin lispro (ADMELOG) corrective regimen sliding scale   SubCutaneous at bedtime  insulin lispro Injectable (ADMELOG) 2 Unit(s) SubCutaneous three times a day before meals  ipratropium    for Nebulization 500 MICROGram(s) Nebulizer every 8 hours  levalbuterol Inhalation 0.63 milliGRAM(s) Inhalation every 8 hours  lisinopril 40 milliGRAM(s) Oral daily  melatonin 9 milliGRAM(s) Oral at bedtime  memantine 10 milliGRAM(s) Oral at bedtime  memantine 5 milliGRAM(s) Oral <User Schedule>  OLANZapine 2.5 milliGRAM(s) Oral at bedtime  pantoprazole    Tablet 40 milliGRAM(s) Oral before breakfast  petrolatum Ophthalmic Ointment 1 Application(s) Both EYES two times a day  polyethylene glycol 3350 17 Gram(s) Oral two times a day  senna 2 Tablet(s) Oral at bedtime  sertraline 125 milliGRAM(s) Oral daily    MEDICATIONS  (PRN):  albuterol/ipratropium for Nebulization 3 milliLiter(s) Nebulizer every 6 hours PRN Shortness of Breath and/or Wheezing      CAPILLARY BLOOD GLUCOSE  POCT Blood Glucose.: 204 mg/dL (06 May 2025 12:52)  POCT Blood Glucose.: 124 mg/dL (06 May 2025 08:11)  POCT Blood Glucose.: 166 mg/dL (05 May 2025 22:21)  POCT Blood Glucose.: 119 mg/dL (05 May 2025 17:38)      PHYSICAL EXAM:  Vital Signs Last 24 Hrs  T(F): 99.3 (06 May 2025 12:19), Max: 99.3 (06 May 2025 12:19)  HR: 80 (06 May 2025 12:19) (71 - 89)  BP: 113/62 (06 May 2025 12:19) (113/62 - 138/67)  RR: 18 (06 May 2025 12:19) (18 - 18)  SpO2: 99% (06 May 2025 12:19) (95% - 100%)    Parameters below as of 06 May 2025 12:19  Patient On (Oxygen Delivery Method): room air        CONSTITUTIONAL: NAD, appears comfortable  EYES: PERRLA; conjunctiva and sclera clear  ENMT: Moist oral mucosa; normal dentition  RESPIRATORY: Normal respiratory effort; grossly b/l AE, no wheeze  CARDIOVASCULAR: Regular rate and rhythm; No lower extremity edema  ABDOMEN: Nontender to palpation, normoactive bowel sounds  MUSCULOSKELETAL:  No clubbing or cyanosis of digits; no joint swelling or tenderness to palpation  PSYCH: calm, coop; affect appropriate  NEUROLOGY: CN 2-12 are intact and symmetric; no gross sensory deficits   SKIN: No rashes; no palpable lesions    LABS:

## 2025-05-06 NOTE — PROGRESS NOTE ADULT - SUBJECTIVE AND OBJECTIVE BOX
Arnold Uriostegui MD  Interventional Cardiology / Endovascular Specialist  [place] Office : [phone] [place] [address] [place] [address]  Tel:   [place] Office : [address] [facility] [place] [address]  Tel: [phone]    Pt is lying in bed comfortable not in distress, no chest pains no SOB no palpitations  	  MEDICATIONS:  enoxaparin Injectable 40 milliGRAM(s) SubCutaneous every 24 hours  hydrochlorothiazide 25 milliGRAM(s) Oral daily  lisinopril 40 milliGRAM(s) Oral daily      albuterol/ipratropium for Nebulization 3 milliLiter(s) Nebulizer every 6 hours PRN  fluticasone propionate/ salmeterol 250-50 MICROgram(s) Diskus 1 Dose(s) Inhalation two times a day  guaiFENesin  milliGRAM(s) Oral every 12 hours  ipratropium    for Nebulization 500 MICROGram(s) Nebulizer every 8 hours  levalbuterol Inhalation 0.63 milliGRAM(s) Inhalation every 8 hours    melatonin 9 milliGRAM(s) Oral at bedtime  memantine 10 milliGRAM(s) Oral at bedtime  memantine 5 milliGRAM(s) Oral <User Schedule>  OLANZapine 2.5 milliGRAM(s) Oral at bedtime  sertraline 125 milliGRAM(s) Oral daily    bisacodyl 10 milliGRAM(s) Oral once  pantoprazole    Tablet 40 milliGRAM(s) Oral before breakfast  polyethylene glycol 3350 17 Gram(s) Oral two times a day  senna 2 Tablet(s) Oral at bedtime    dextrose 50% Injectable 25 Gram(s) IV Push once  dextrose 50% Injectable 12.5 Gram(s) IV Push once  dextrose 50% Injectable 25 Gram(s) IV Push once  dextrose Oral Gel 15 Gram(s) Oral once  glucagon  Injectable 1 milliGRAM(s) IntraMuscular once  insulin glargine Injectable (LANTUS) 3 Unit(s) SubCutaneous at bedtime  insulin lispro (ADMELOG) corrective regimen sliding scale   SubCutaneous three times a day before meals  insulin lispro (ADMELOG) corrective regimen sliding scale   SubCutaneous at bedtime  insulin lispro Injectable (ADMELOG) 2 Unit(s) SubCutaneous three times a day before meals    artificial tears (preservative free) Ophthalmic Solution 1 Drop(s) Both EYES every 8 hours  chlorhexidine 2% Cloths 1 Application(s) Topical <User Schedule>  dextrose 5%. 1000 milliLiter(s) IV Continuous <Continuous>  dextrose 5%. 1000 milliLiter(s) IV Continuous <Continuous>  petrolatum Ophthalmic Ointment 1 Application(s) Both EYES two times a day      PAST MEDICAL/SURGICAL HISTORY  PAST MEDICAL & SURGICAL HISTORY:  MDD (major depressive disorder), recurrent, severe, with psychosis      Asthma, mild intermittent, uncomplicated      Essential hypertension      No significant past surgical history          SOCIAL HISTORY: Substance Use (street drugs): ( x ) never used  (  ) other:    FAMILY HISTORY:        PHYSICAL EXAM:  T(C): 37.4 (05-06-25 @ 12:19), Max: 37.4 (05-06-25 @ 12:19)  HR: 80 (05-06-25 @ 15:54) (71 - 89)  BP: 113/62 (05-06-25 @ 12:19) (113/62 - 138/67)  RR: 18 (05-06-25 @ 12:19) (18 - 18)  SpO2: 99% (05-06-25 @ 12:19) (95% - 100%)  Wt(kg): --  I&O's Summary      GENERAL: NAD  EYES:   PERRLA   ENMT:   Moist mucous membranes, Good dentition, No lesions  Cardiovascular: Normal S1 S2, No JVD, No murmurs, No edema  Respiratory: Lungs clear to auscultation	  Gastrointestinal:  Soft, Non-tender, + BS	  Extremities: no edema                            11.5   8.41  )-----------( 194      ( 05 May 2025 05:27 )             34.4     05-05    139  |  100  |  13  ----------------------------<  116[H]  3.3[L]   |  28  |  0.63    Ca    9.1      05 May 2025 05:27  Phos  3.2     05-05  Mg     1.70     05-05      proBNP:   Lipid Profile:   HgA1c:   TSH:     Consultant(s) Notes Reviewed:  [x ] YES  [ ] NO    Care Discussed with Consultants/Other Providers [ x] YES  [ ] NO    Imaging Personally Reviewed independently:  [x] YES  [ ] NO    All labs, radiologic studies, vitals, orders and medications list reviewed. Patient is seen and examined at bedside. Case discussed with medical team.

## 2025-05-06 NOTE — PROGRESS NOTE ADULT - SUBJECTIVE AND OBJECTIVE BOX
Interval Events: No acute events overnight. No fevers or chills. No chest pain, dyspnea, wheezing, chest tightness, or coughing.    REVIEW OF SYSTEMS:  Constitutional: no fever, chills, fatigue  Neuro: no headache, numbness, weakness  Resp: no cough, wheezing, shortness of breath  CVS: no chest pain, palpitations, leg swelling  GI: no abdominal pain, nausea, vomiting, diarrhea   : no dysuria, frequency, incontinence  Skin: no itching, burning, rashes, or lesions   Msk: no joint pain or swelling  Psych: no depression, anxiety  [x] All other systems negative    OBJECTIVE:  ICU Vital Signs Last 24 Hrs  T(C): 37.4 (06 May 2025 12:19), Max: 37.4 (06 May 2025 12:19)  T(F): 99.3 (06 May 2025 12:19), Max: 99.3 (06 May 2025 12:19)  HR: 80 (06 May 2025 12:19) (71 - 89)  BP: 113/62 (06 May 2025 12:19) (113/62 - 138/67)  RR: 18 (06 May 2025 12:19) (18 - 18)  SpO2: 99% (06 May 2025 12:19) (95% - 100%)    O2 Parameters below as of 06 May 2025 12:19  Patient On (Oxygen Delivery Method): room air      CAPILLARY BLOOD GLUCOSE      POCT Blood Glucose.: 204 mg/dL (06 May 2025 12:52)      PHYSICAL EXAM:    Gen: NAD; AAOx3  Neuro: CN II-XII grossly intact; moving all extremities   HEENT: NC/AT; EOMI; MMM  CV: normal S1 & S2; regular rate and rhythm   Pulm: clear to auscultation bilaterally; no wheezing, rales, or rhonchi  GI: soft; NT/ND  Ext: no edema; pulses intact  Skin: warm and well perfused      HOSPITAL MEDICATIONS:  MEDICATIONS  (STANDING):  artificial tears (preservative free) Ophthalmic Solution 1 Drop(s) Both EYES every 8 hours  bisacodyl 10 milliGRAM(s) Oral once  chlorhexidine 2% Cloths 1 Application(s) Topical <User Schedule>  enoxaparin Injectable 40 milliGRAM(s) SubCutaneous every 24 hours  fluticasone propionate/ salmeterol 250-50 MICROgram(s) Diskus 1 Dose(s) Inhalation two times a day  glucagon  Injectable 1 milliGRAM(s) IntraMuscular once  guaiFENesin  milliGRAM(s) Oral every 12 hours  hydrochlorothiazide 25 milliGRAM(s) Oral daily  insulin glargine Injectable (LANTUS) 3 Unit(s) SubCutaneous at bedtime  insulin lispro (ADMELOG) corrective regimen sliding scale   SubCutaneous three times a day before meals  insulin lispro (ADMELOG) corrective regimen sliding scale   SubCutaneous at bedtime  insulin lispro Injectable (ADMELOG) 2 Unit(s) SubCutaneous three times a day before meals  ipratropium    for Nebulization 500 MICROGram(s) Nebulizer every 8 hours  levalbuterol Inhalation 0.63 milliGRAM(s) Inhalation every 8 hours  lisinopril 40 milliGRAM(s) Oral daily  melatonin 9 milliGRAM(s) Oral at bedtime  memantine 10 milliGRAM(s) Oral at bedtime  memantine 5 milliGRAM(s) Oral <User Schedule>  OLANZapine 2.5 milliGRAM(s) Oral at bedtime  pantoprazole    Tablet 40 milliGRAM(s) Oral before breakfast  petrolatum Ophthalmic Ointment 1 Application(s) Both EYES two times a day  polyethylene glycol 3350 17 Gram(s) Oral two times a day  senna 2 Tablet(s) Oral at bedtime  sertraline 125 milliGRAM(s) Oral daily    MEDICATIONS  (PRN):  albuterol/ipratropium for Nebulization 3 milliLiter(s) Nebulizer every 6 hours PRN Shortness of Breath and/or Wheezing      LABS:                        11.5   8.41  )-----------( 194      ( 05 May 2025 05:27 )             34.4     Hgb Trend: 11.5<--, 11.4<--, 10.7<--, 10.8<--, 11.2<--  05-05    139  |  100  |  13  ----------------------------<  116[H]  3.3[L]   |  28  |  0.63    Ca    9.1      05 May 2025 05:27  Phos  3.2     05-05  Mg     1.70     05-05      Creatinine Trend: 0.63<--, 0.61<--, 0.68<--, 0.60<--, 0.68<--, 0.65<--    Urinalysis Basic - ( 05 May 2025 05:27 )    Color: x / Appearance: x / SG: x / pH: x  Gluc: 116 mg/dL / Ketone: x  / Bili: x / Urobili: x   Blood: x / Protein: x / Nitrite: x   Leuk Esterase: x / RBC: x / WBC x   Sq Epi: x / Non Sq Epi: x / Bacteria: x

## 2025-05-06 NOTE — BH CONSULTATION LIAISON PROGRESS NOTE - NSBHASSESSMENTFT_PSY_ALL_CORE
Patient is a 74F hx asthma, depression, and HTN was admitted to the MICU with AHRF 2/2 asthma exacerbation resulting in acute on chronic HHRF causing AMS and increased WOB that required intubation. During her ICU course, she experienced seizure-like activity .  Additional issues included hyponatremia, acute on chronic metabolic alkalosis, and mild thrombocytopenia,  also having fevers.  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 Patient has h/o hx of major depressive disorder with psychosis, hx of 3 past inpatient psychiatric hospitalizations last in 2016 at [facility], all rehospitalizations were in context non compliance with the treatment. She has no hx of suicide attempts, no hx of substance abuse. Patient w hx of paranoia, disorganization, catatonia, required MOO when in [facility].     3/24----more withdrawn, some stiffness in neck, not communicative. Some concerns for catatonia. Unsure if symptoms attributed to catatonia vs delirium  3/25--- more alert today, trying to following commands. Still with PMR+. Appears to be responding to Ativan  3/26-- BFS 10, will increasing standing ativan to target catatonic symptoms - giving STAT 0.5mg NOW discussed w acp   3/27----will continue ativan dosing for one more day before increasing  3/28: BFS: stupor 1, mutism 2,withdrawan 2, verbigeration 1, rigidity 2 =8.  3/29: BFS: stupor 2, mutism 2, staring 1, posturing 2, rigidity 2, waxy flexibility 3, withdrawal 2 = 14; please monitor nutritional intake, plan to increase ativan by total daily dose of 0.5 mg at bedtime due to persistent catatonia   3/31: BFS: stupor 2, mutism 2, staring 2, rigidity 1, negativism 1, withdrawal 1, waxy flexibility 3 =12, patient with productive cough, advised for medical work up  4/1---tired, speaking more, not rigid, has PMR+. NPO status now.   4/2--sleepy, RRT this afternoon  4/3---better mentation, pmr but no chester catatonic symptoms  4/4: alert, using few words, mild rigidity -  at bedside, updated   4/5: few words, lethargic, fluctuating wakefulness. Mild rigidity.  No overt catatonic sxs.   4/7: no change, remains thought blocked, few words - son at bedside, updated with care  4/8: patient continues to have poor eye contact, uses few words, no rigidity on today's exam.   4/9: patient remains minimally verbal, some posturing, poor Po intake - continues on NC for 02 requirements   4/10: more verbal/interactive, with increased PO intake per family. Some psychotic output. Family agrees with resumption of Zyprexa.   4/14: alert to self with fluctuating wakefulness, intermittent full sentences f/b one-word answers, some rigidity, posturing, denies si, denies ah/vh  4/16: patient more catatonic today, notable catalepsy, waxiness, verbigeration followed by mutism and staring, family against ativan and ECT, would increase memantine as below, not eating   4/17: catatonic (mute, rigid, not engaging). Eating some portions of meals with significant family support. Family opting to see memantine trial to full effect before consideration of ECT  4/18: mutism today, odd posturing, unable to engage, d/w spouse and son ECT, family still undecided  4/21: alert to self, some improvement with verbal engagement, tracking, follow command, denies si, denies ah/vh, awaiting call back from son in regards to ECT consult  4/22: remains catatonic (mute, postured, rigid, withdrawn, negativistic). Exam limited. Family agrees with formal ECT consult.  4/23: Remains with symptoms of catatonia. BFS: stupor 1, mute2, Rigid 2, negativism 2, withdrawal 2 - 9 : ECT consult completed. awaiting clearances for ECT  4/42: remains with BFS of 9, plan for ECT  4/25: alert to self, able to engage in some conversation, however, confused, loose on  exam, odd posturing right upper arm, Dr. Toure made aware of schedule for ECT Monday 4/28: s/p ECT #2. more spontaneous speech, cooperative with exam. improved eye contact. loose with no rigidity.   4/29: displays more s/s of catatonia since yesterdays exam- negativism, withdrawal, rigidity. family and ect in agreement to proceed with treatment 3 tomorrow.  4/30: some confusion today. Will assess tomm  5/1: mutism and negativism on exam   5/2: improved catatonic symptoms. Confused, suspecting AH  5/5: No catatonic symptoms. Will assess tomm for any catatonic symptoms and decide if to hold off on treatment on wednesday 5/5: Continues to exhibit improvement in catatonic sx. Will recommend holding Wed ECT and having next tx on Fri 5/9    PLAN  -- ECT planned for Fri   -- no SI/HI, no need for psychiatric CO  -- antipsychotics can only be given if qtc < 500  -- Check UA    - MEDICATIONS:  - Memantine 5mg AM and 10mg HS  -- C/W Zoloft 125 mg daily   -- C/W Zyprexa 2.5mg HS   - PRN: Ativan 0.5mg q 6hrs prn IM/IV/PO    - please ensure PT involved in patient care    - DISPO: Inpatient psychiatry. 2pc signed    Routine observation   Patient is a 74F hx asthma, depression, and HTN was admitted to the MICU with AHRF 2/2 asthma exacerbation resulting in acute on chronic HHRF causing AMS and increased WOB that required intubation. During her ICU course, she experienced seizure-like activity .  Additional issues included hyponatremia, acute on chronic metabolic alkalosis, and mild thrombocytopenia,  also having fevers.  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 Patient has h/o hx of major depressive disorder with psychosis, hx of 3 past inpatient psychiatric hospitalizations last in 2016 at [facility], all rehospitalizations were in context non compliance with the treatment. She has no hx of suicide attempts, no hx of substance abuse. Patient w hx of paranoia, disorganization, catatonia, required MOO when in [facility].     3/24----more withdrawn, some stiffness in neck, not communicative. Some concerns for catatonia. Unsure if symptoms attributed to catatonia vs delirium  3/25--- more alert today, trying to following commands. Still with PMR+. Appears to be responding to Ativan  3/26-- BFS 10, will increasing standing ativan to target catatonic symptoms - giving STAT 0.5mg NOW discussed w acp   3/27----will continue ativan dosing for one more day before increasing  3/28: BFS: stupor 1, mutism 2,withdrawan 2, verbigeration 1, rigidity 2 =8.  3/29: BFS: stupor 2, mutism 2, staring 1, posturing 2, rigidity 2, waxy flexibility 3, withdrawal 2 = 14; please monitor nutritional intake, plan to increase ativan by total daily dose of 0.5 mg at bedtime due to persistent catatonia   3/31: BFS: stupor 2, mutism 2, staring 2, rigidity 1, negativism 1, withdrawal 1, waxy flexibility 3 =12, patient with productive cough, advised for medical work up  4/1---tired, speaking more, not rigid, has PMR+. NPO status now.   4/2--sleepy, RRT this afternoon  4/3---better mentation, pmr but no chester catatonic symptoms  4/4: alert, using few words, mild rigidity -  at bedside, updated   4/5: few words, lethargic, fluctuating wakefulness. Mild rigidity.  No overt catatonic sxs.   4/7: no change, remains thought blocked, few words - son at bedside, updated with care  4/8: patient continues to have poor eye contact, uses few words, no rigidity on today's exam.   4/9: patient remains minimally verbal, some posturing, poor Po intake - continues on NC for 02 requirements   4/10: more verbal/interactive, with increased PO intake per family. Some psychotic output. Family agrees with resumption of Zyprexa.   4/14: alert to self with fluctuating wakefulness, intermittent full sentences f/b one-word answers, some rigidity, posturing, denies si, denies ah/vh  4/16: patient more catatonic today, notable catalepsy, waxiness, verbigeration followed by mutism and staring, family against ativan and ECT, would increase memantine as below, not eating   4/17: catatonic (mute, rigid, not engaging). Eating some portions of meals with significant family support. Family opting to see memantine trial to full effect before consideration of ECT  4/18: mutism today, odd posturing, unable to engage, d/w spouse and son ECT, family still undecided  4/21: alert to self, some improvement with verbal engagement, tracking, follow command, denies si, denies ah/vh, awaiting call back from son in regards to ECT consult  4/22: remains catatonic (mute, postured, rigid, withdrawn, negativistic). Exam limited. Family agrees with formal ECT consult.  4/23: Remains with symptoms of catatonia. BFS: stupor 1, mute2, Rigid 2, negativism 2, withdrawal 2 - 9 : ECT consult completed. awaiting clearances for ECT  4/42: remains with BFS of 9, plan for ECT  4/25: alert to self, able to engage in some conversation, however, confused, loose on  exam, odd posturing right upper arm, Dr. Toure made aware of schedule for ECT Monday 4/28: s/p ECT #2. more spontaneous speech, cooperative with exam. improved eye contact. loose with no rigidity.   4/29: displays more s/s of catatonia since yesterdays exam- negativism, withdrawal, rigidity. family and ect in agreement to proceed with treatment 3 tomorrow.  4/30: some confusion today. Will assess tomm  5/1: mutism and negativism on exam   5/2: improved catatonic symptoms. Confused, suspecting AH  5/5: No catatonic symptoms. Will assess tomm for any catatonic symptoms and decide if to hold off on treatment on wednesday 5/6: Continues to exhibit improvement in catatonic sx. Will recommend holding Wed ECT and having next tx on Fri 5/9    PLAN  -- ECT planned for Fri   -- no SI/HI, no need for psychiatric CO  -- antipsychotics can only be given if qtc < 500  -- Check UA    - MEDICATIONS:  - Memantine 5mg AM and 10mg HS  -- C/W Zoloft 125 mg daily   -- C/W Zyprexa 2.5mg HS   - PRN: Ativan 0.5mg q 6hrs prn IM/IV/PO    - please ensure PT involved in patient care    - DISPO: Inpatient psychiatry. 2pc signed    Routine observation

## 2025-05-06 NOTE — PROGRESS NOTE ADULT - PROBLEM SELECTOR PLAN 1
MDD w/ psychotic features.  - s/p ativan now on hold given concern that it may have contributed to respiratory decompensation  - c/w Zoloft 125 qday (home dose was 150).  - c/w Zyprexa 2.5 qhs  - c/w Namenda 10mg qhs  - psych following  - ECT ongoing  pt optimized per cards and pulm for ECT    Pending transfer to inpatient psychiatric unit at [facility]  some concern this AM by ECT team, that pt more lethargic on arrive to them, however, pt pleasant and alert and conversant with me post ECT; will try to collect UA for poss etiology of lethargy this AM however upon my eval pt back to her baseline since ECT started

## 2025-05-06 NOTE — PROGRESS NOTE ADULT - ASSESSMENT
This is a 74-year-old female with a history of asthma with prior intubations, HTN, prediabetes, and depression who initially presented with acute hypoxemic and hypercapnic respiratory failure now improved with hospital course complicated by persistent mood disorder and catatonia s/p ECT with improvement.    ASSESSMENT:  Severe persistent asthma  Mood disorder with catatonia    PLAN:  - Continue fluticasone-salmeterol 250-50 mcg 1 inhalation twice daily, rinse after use  - Levalbuterol + Ipratropium nebs q8h  - Currently off systemic steroids  - No further wheezing on exam today and she remains asymptomatic without dyspnea, cough, wheezing, or chest tightness  - Oxygen saturation on room air is >94%  - Incentive spirometry, out of bed to chair  - Physical therapy eval  - Close follow-up with   - Discussed with patient at bedside, will require close outpatient pulmonary follow-up upon discharge

## 2025-05-06 NOTE — BH CONSULTATION LIAISON PROGRESS NOTE - NSBHFUPINTERVALHXFT_PSY_A_CORE
Patient observed sitting up in bed eating breakfast. She is pleasant and reports feeling fine and sleeping well last night. Does not recognize writer and AOx1 to person only. Reports she is 22 years old. Denies AH/VH and denies SI/safety concerns. No rigidity on exam.

## 2025-05-07 LAB
ANION GAP SERPL CALC-SCNC: 13 MMOL/L — SIGNIFICANT CHANGE UP (ref 7–14)
BUN SERPL-MCNC: 12 MG/DL — SIGNIFICANT CHANGE UP (ref 7–23)
CALCIUM SERPL-MCNC: 9.9 MG/DL — SIGNIFICANT CHANGE UP (ref 8.4–10.5)
CHLORIDE SERPL-SCNC: 105 MMOL/L — SIGNIFICANT CHANGE UP (ref 98–107)
CO2 SERPL-SCNC: 26 MMOL/L — SIGNIFICANT CHANGE UP (ref 22–31)
CREAT SERPL-MCNC: 0.58 MG/DL — SIGNIFICANT CHANGE UP (ref 0.5–1.3)
EGFR: 95 ML/MIN/1.73M2 — SIGNIFICANT CHANGE UP
EGFR: 95 ML/MIN/1.73M2 — SIGNIFICANT CHANGE UP
GLUCOSE BLDC GLUCOMTR-MCNC: 117 MG/DL — HIGH (ref 70–99)
GLUCOSE BLDC GLUCOMTR-MCNC: 163 MG/DL — HIGH (ref 70–99)
GLUCOSE BLDC GLUCOMTR-MCNC: 171 MG/DL — HIGH (ref 70–99)
GLUCOSE BLDC GLUCOMTR-MCNC: 187 MG/DL — HIGH (ref 70–99)
GLUCOSE SERPL-MCNC: 109 MG/DL — HIGH (ref 70–99)
HCT VFR BLD CALC: 35.3 % — SIGNIFICANT CHANGE UP (ref 34.5–45)
HGB BLD-MCNC: 11.4 G/DL — LOW (ref 11.5–15.5)
MAGNESIUM SERPL-MCNC: 1.8 MG/DL — SIGNIFICANT CHANGE UP (ref 1.6–2.6)
MCHC RBC-ENTMCNC: 32.3 G/DL — SIGNIFICANT CHANGE UP (ref 32–36)
MCHC RBC-ENTMCNC: 32.9 PG — SIGNIFICANT CHANGE UP (ref 27–34)
MCV RBC AUTO: 101.7 FL — HIGH (ref 80–100)
NRBC # BLD AUTO: 0 K/UL — SIGNIFICANT CHANGE UP (ref 0–0)
NRBC # FLD: 0 K/UL — SIGNIFICANT CHANGE UP (ref 0–0)
NRBC BLD AUTO-RTO: 0 /100 WBCS — SIGNIFICANT CHANGE UP (ref 0–0)
PHOSPHATE SERPL-MCNC: 4.1 MG/DL — SIGNIFICANT CHANGE UP (ref 2.5–4.5)
PLATELET # BLD AUTO: 215 K/UL — SIGNIFICANT CHANGE UP (ref 150–400)
POTASSIUM SERPL-MCNC: 4.2 MMOL/L — SIGNIFICANT CHANGE UP (ref 3.5–5.3)
POTASSIUM SERPL-SCNC: 4.2 MMOL/L — SIGNIFICANT CHANGE UP (ref 3.5–5.3)
RBC # BLD: 3.47 M/UL — LOW (ref 3.8–5.2)
RBC # FLD: 15.3 % — HIGH (ref 10.3–14.5)
SODIUM SERPL-SCNC: 144 MMOL/L — SIGNIFICANT CHANGE UP (ref 135–145)
WBC # BLD: 8.78 K/UL — SIGNIFICANT CHANGE UP (ref 3.8–10.5)
WBC # FLD AUTO: 8.78 K/UL — SIGNIFICANT CHANGE UP (ref 3.8–10.5)

## 2025-05-07 PROCEDURE — 99233 SBSQ HOSP IP/OBS HIGH 50: CPT

## 2025-05-07 PROCEDURE — 99232 SBSQ HOSP IP/OBS MODERATE 35: CPT

## 2025-05-07 RX ORDER — TIOTROPIUM BROMIDE INHALATION SPRAY 3.12 UG/1
2 SPRAY, METERED RESPIRATORY (INHALATION) DAILY
Refills: 0 | Status: DISCONTINUED | OUTPATIENT
Start: 2025-05-07 | End: 2025-05-21

## 2025-05-07 RX ADMIN — Medication 40 MILLIGRAM(S): at 06:47

## 2025-05-07 RX ADMIN — Medication 1 DROP(S): at 22:27

## 2025-05-07 RX ADMIN — LEVALBUTEROL HYDROCHLORIDE 0.63 MILLIGRAM(S): 1.25 SOLUTION RESPIRATORY (INHALATION) at 23:54

## 2025-05-07 RX ADMIN — DEXTROMETHORPHAN HBR, GUAIFENESIN 600 MILLIGRAM(S): 200 LIQUID ORAL at 06:47

## 2025-05-07 RX ADMIN — DEXTROMETHORPHAN HBR, GUAIFENESIN 600 MILLIGRAM(S): 200 LIQUID ORAL at 17:50

## 2025-05-07 RX ADMIN — LISINOPRIL 40 MILLIGRAM(S): 5 TABLET ORAL at 06:46

## 2025-05-07 RX ADMIN — Medication 1 DOSE(S): at 08:55

## 2025-05-07 RX ADMIN — INSULIN LISPRO 1: 100 INJECTION, SOLUTION INTRAVENOUS; SUBCUTANEOUS at 12:57

## 2025-05-07 RX ADMIN — POLYETHYLENE GLYCOL 3350 17 GRAM(S): 17 POWDER, FOR SOLUTION ORAL at 06:48

## 2025-05-07 RX ADMIN — MEMANTINE HYDROCHLORIDE 5 MILLIGRAM(S): 21 CAPSULE, EXTENDED RELEASE ORAL at 06:49

## 2025-05-07 RX ADMIN — OLANZAPINE 2.5 MILLIGRAM(S): 10 TABLET ORAL at 22:26

## 2025-05-07 RX ADMIN — Medication 1 APPLICATION(S): at 06:48

## 2025-05-07 RX ADMIN — Medication 1 APPLICATION(S): at 06:46

## 2025-05-07 RX ADMIN — INSULIN LISPRO 1: 100 INJECTION, SOLUTION INTRAVENOUS; SUBCUTANEOUS at 17:48

## 2025-05-07 RX ADMIN — INSULIN LISPRO 2 UNIT(S): 100 INJECTION, SOLUTION INTRAVENOUS; SUBCUTANEOUS at 08:55

## 2025-05-07 RX ADMIN — TIOTROPIUM BROMIDE INHALATION SPRAY 2 PUFF(S): 3.12 SPRAY, METERED RESPIRATORY (INHALATION) at 17:53

## 2025-05-07 RX ADMIN — Medication 1 APPLICATION(S): at 17:51

## 2025-05-07 RX ADMIN — INSULIN LISPRO 2 UNIT(S): 100 INJECTION, SOLUTION INTRAVENOUS; SUBCUTANEOUS at 12:57

## 2025-05-07 RX ADMIN — MEMANTINE HYDROCHLORIDE 10 MILLIGRAM(S): 21 CAPSULE, EXTENDED RELEASE ORAL at 22:26

## 2025-05-07 RX ADMIN — INSULIN GLARGINE-YFGN 3 UNIT(S): 100 INJECTION, SOLUTION SUBCUTANEOUS at 22:49

## 2025-05-07 RX ADMIN — Medication 1 DROP(S): at 15:30

## 2025-05-07 RX ADMIN — Medication 500 MICROGRAM(S): at 08:39

## 2025-05-07 RX ADMIN — Medication 1 DOSE(S): at 22:25

## 2025-05-07 RX ADMIN — Medication 9 MILLIGRAM(S): at 22:26

## 2025-05-07 RX ADMIN — ENOXAPARIN SODIUM 40 MILLIGRAM(S): 100 INJECTION SUBCUTANEOUS at 17:50

## 2025-05-07 RX ADMIN — Medication 1 DROP(S): at 06:48

## 2025-05-07 RX ADMIN — LEVALBUTEROL HYDROCHLORIDE 0.63 MILLIGRAM(S): 1.25 SOLUTION RESPIRATORY (INHALATION) at 08:38

## 2025-05-07 RX ADMIN — SERTRALINE 125 MILLIGRAM(S): 100 TABLET, FILM COATED ORAL at 11:28

## 2025-05-07 RX ADMIN — INSULIN LISPRO 2 UNIT(S): 100 INJECTION, SOLUTION INTRAVENOUS; SUBCUTANEOUS at 17:49

## 2025-05-07 NOTE — SWALLOW BEDSIDE ASSESSMENT ADULT - SPECIFY REASON(S)
to assess the swallow function
to clinically re-assess swallow function
to clinically assess swallow function
to clinically assess swallow function

## 2025-05-07 NOTE — BH CONSULTATION LIAISON PROGRESS NOTE - NSBHFUPINTERVALHXFT_PSY_A_CORE
Patient interviewed with  at bedside. Reports she is "good" this morning and endorses eating and sleeping well. Alert and oriented to name, location ([place], but not [facility]) and month. Denies AH/VH and denies SI or paranoid themes. No rigidity on exam.

## 2025-05-07 NOTE — PROGRESS NOTE ADULT - ASSESSMENT
74F MDD, asthma, prior CVA presented with AHRF c/b AMS requiring MICU admission for intubation for respiratory failure i/s/o asthma exacerbation. Extubated 3/12 to face tent and then on BIPAP s/p RRT 3/19 for increased WOB / hypoxia despite aggressive asthma management s/p return to MICU for management of severe asthma. Possible aspiration event 3/31. SLP eval appreciated. XR cine performed with improvement in mental status - pureed diet w/ thin liquids recommended. Worsening respiratory status 4/2 - placed on NIV, now weaned to nasal cannula and now RA.  ENT eval - no upper airway consideration for structural causes of asthma such as vocal cord dysfunction.   Course c/b MDD w/ catatonia and poor PO, off ativan due to respiratory concerns trialed on Memantine family now agreeable to ECT Spontaneous, unlabored and symmetrical

## 2025-05-07 NOTE — PROGRESS NOTE ADULT - PROBLEM SELECTOR PLAN 4
Likely initially in the setting of AHRF 2' asthma exacerbation.  - Currently mental status more likely driven by her psych condition. Catatonia treatment as discussed above.  clinically improved with ECT  doing well

## 2025-05-07 NOTE — SWALLOW BEDSIDE ASSESSMENT ADULT - COMMENTS
5/6 Hospitalist Note: "74F MDD, asthma, prior CVA presented with AHRF c/b AMS requiring MICU admission for intubation for respiratory failure i/s/o asthma exacerbation. Extubated 3/12 to face tent and then on BIPAP s/p RRT 3/19 for increased WOB / hypoxia despite aggressive asthma management s/p return to MICU for management of severe asthma. Possible aspiration event 3/31. SLP eval appreciated. XR cine performed with improvement in mental status - pureed diet w/ thin liquids recommended. Worsening respiratory status 4/2 - placed on NIV, now weaned to nasal cannula and now RA.  ENT eval - no upper airway consideration for structural causes of asthma such as vocal cord dysfunction.   Course c/b MDD w/ catatonia and poor PO, off ativan due to respiratory concerns trialed on Memantine family now agreeable to ECT."    4/24 CXR: "IMPRESSION: Clear lungs."    Patient is known to this service from 5/6 Hospitalist Note: "74F MDD, asthma, prior CVA presented with AHRF c/b AMS requiring MICU admission for intubation for respiratory failure i/s/o asthma exacerbation. Extubated 3/12 to face tent and then on BIPAP s/p RRT 3/19 for increased WOB / hypoxia despite aggressive asthma management s/p return to MICU for management of severe asthma. Possible aspiration event 3/31. SLP eval appreciated. XR cine performed with improvement in mental status - pureed diet w/ thin liquids recommended. Worsening respiratory status 4/2 - placed on NIV, now weaned to nasal cannula and now RA.  ENT eval - no upper airway consideration for structural causes of asthma such as vocal cord dysfunction.   Course c/b MDD w/ catatonia and poor PO, off ativan due to respiratory concerns trialed on Memantine family now agreeable to ECT."    4/24 CXR: "IMPRESSION: Clear lungs."    Patient is well known to this service from this admission. Clinical Swallow Evaluations completed on 3/13, 3/14, 3/20, and 4/1  (See consults for details). Cinesophagram completed 4/4/25 with recommendations: "Puree and Thin liquids". There were no pharyngeal clearance deficits and there was No Laryngeal Penetration or Aspiration for Puree/Thin Liquids (See consult for details).     Order received for Repeat Clinical Swallow Evaluation with indication for "diet advance". Patient received sitting upright in bed, awake and alert. Appears in NAD on room air. Patient's  present at bedside. Patient agreeable to PO trials for evaluation.

## 2025-05-07 NOTE — PROGRESS NOTE ADULT - SUBJECTIVE AND OBJECTIVE BOX
[facility] Division of Hospital Medicine  Em Toure MD  Pager [phone]    Patient is a 74y old  Female who presents with a chief complaint of asthma exac/ resp failure/catatonia       SUBJECTIVE / OVERNIGHT EVENTS: doing well; eating at all meals;  at bedside this AM, happy with pt's progress      MEDICATIONS  (STANDING):  artificial tears (preservative free) Ophthalmic Solution 1 Drop(s) Both EYES every 8 hours  bisacodyl 10 milliGRAM(s) Oral once  chlorhexidine 2% Cloths 1 Application(s) Topical <User Schedule>  dextrose 5%. 1000 milliLiter(s) (50 mL/Hr) IV Continuous <Continuous>  dextrose 5%. 1000 milliLiter(s) (100 mL/Hr) IV Continuous <Continuous>  dextrose 50% Injectable 25 Gram(s) IV Push once  dextrose 50% Injectable 12.5 Gram(s) IV Push once  dextrose 50% Injectable 25 Gram(s) IV Push once  dextrose Oral Gel 15 Gram(s) Oral once  enoxaparin Injectable 40 milliGRAM(s) SubCutaneous every 24 hours  fluticasone propionate/ salmeterol 250-50 MICROgram(s) Diskus 1 Dose(s) Inhalation two times a day  glucagon  Injectable 1 milliGRAM(s) IntraMuscular once  guaiFENesin  milliGRAM(s) Oral every 12 hours  hydrochlorothiazide 25 milliGRAM(s) Oral daily  insulin glargine Injectable (LANTUS) 3 Unit(s) SubCutaneous at bedtime  insulin lispro (ADMELOG) corrective regimen sliding scale   SubCutaneous three times a day before meals  insulin lispro (ADMELOG) corrective regimen sliding scale   SubCutaneous at bedtime  insulin lispro Injectable (ADMELOG) 2 Unit(s) SubCutaneous three times a day before meals  levalbuterol Inhalation 0.63 milliGRAM(s) Inhalation every 8 hours  lisinopril 40 milliGRAM(s) Oral daily  melatonin 9 milliGRAM(s) Oral at bedtime  memantine 10 milliGRAM(s) Oral at bedtime  memantine 5 milliGRAM(s) Oral <User Schedule>  OLANZapine 2.5 milliGRAM(s) Oral at bedtime  pantoprazole    Tablet 40 milliGRAM(s) Oral before breakfast  petrolatum Ophthalmic Ointment 1 Application(s) Both EYES two times a day  polyethylene glycol 3350 17 Gram(s) Oral two times a day  senna 2 Tablet(s) Oral at bedtime  sertraline 125 milliGRAM(s) Oral daily  tiotropium 2.5 MICROgram(s) Inhaler 2 Puff(s) Inhalation daily    MEDICATIONS  (PRN):  albuterol/ipratropium for Nebulization 3 milliLiter(s) Nebulizer every 6 hours PRN Shortness of Breath and/or Wheezing      CAPILLARY BLOOD GLUCOSE  POCT Blood Glucose.: 187 mg/dL (07 May 2025 12:20)  POCT Blood Glucose.: 117 mg/dL (07 May 2025 08:19)  POCT Blood Glucose.: 145 mg/dL (06 May 2025 21:37)  POCT Blood Glucose.: 102 mg/dL (06 May 2025 18:05)        PHYSICAL EXAM:  Vital Signs Last 24 Hrs  T(F): 99 (07 May 2025 05:10), Max: 99.6 (06 May 2025 22:00)  HR: 77 (07 May 2025 08:39) (77 - 87)  BP: 149/78 (07 May 2025 05:10) (125/65 - 149/78)  RR: 18 (07 May 2025 05:10) (18 - 18)  SpO2: 99% (07 May 2025 08:39) (97% - 100%)    Parameters below as of 07 May 2025 05:10  Patient On (Oxygen Delivery Method): room air        CONSTITUTIONAL: NAD, appears comfortable  EYES: PERRLA; conjunctiva and sclera clear  ENMT: Moist oral mucosa; normal dentition  RESPIRATORY: Normal respiratory effort; grossly b/l AE; no wheezing  CARDIOVASCULAR: Regular rate and rhythm; No lower extremity edema  ABDOMEN: Nontender to palpation, normoactive bowel sounds  MUSCULOSKELETAL:  no clubbing or cyanosis of digits; no joint swelling or tenderness to palpation  PSYCH: calm, coop; affect appropriate  NEUROLOGY: CN 2-12 are intact and symmetric; no gross sensory deficits   SKIN: No rashes; no palpable lesions    LABS:                        11.4   8.78  )-----------( 215      ( 07 May 2025 06:30 )             35.3     05-07    144  |  105  |  12  ----------------------------<  109[H]  4.2   |  26  |  0.58    Ca    9.9      07 May 2025 06:30  Phos  4.1     05-07  Mg     1.80     05-07

## 2025-05-07 NOTE — SWALLOW BEDSIDE ASSESSMENT ADULT - NS ASR SWALLOW FINDINGS DISCUS
MICU Team/Patient
MICU team on unit/Nursing/Patient
7.) Discussed with DANAE Chase.
ACP via TEAMS/Patient/Family

## 2025-05-07 NOTE — PROGRESS NOTE ADULT - PROBLEM SELECTOR PLAN 9
Lovenox ppx  full code  PT rec TK  Will need transfer to [facility] for psychiatric optimization    Ambulatory with PT. Eating consistently now. 2PC per psychiatry.

## 2025-05-07 NOTE — PROGRESS NOTE ADULT - ASSESSMENT
This is a 74-year-old female with a history of asthma with prior intubations, HTN, prediabetes, and depression who initially presented with acute hypoxemic and hypercapnic respiratory failure now improved with hospital course complicated by persistent mood disorder and catatonia s/p ECT with improvement.    ASSESSMENT:  Severe persistent asthma  Mood disorder with catatonia    PLAN:  - Continue fluticasone-salmeterol 250-50 mcg 1 inhalation twice daily, rinse after use  - Please discontinue ipratropium nebs and start Spiriva Respimat 2.5 mcg 2 inhalations once daily   - Continue Levalbuterol nebs q8h  - Currently off systemic steroids  - Resolved wheezing on exam and she remains asymptomatic without dyspnea, cough, wheezing, or chest tightness  - Oxygen saturation on room air is >94%  - Incentive spirometry, out of bed to chair  - Physical therapy eval  - Close follow-up with   - Discussed with patient at bedside, will require close outpatient pulmonary follow-up upon discharge

## 2025-05-07 NOTE — SWALLOW BEDSIDE ASSESSMENT ADULT - ASR SWALLOW RECOMMEND DIAG
Cinesophagram is warranted to objectively assess swallow function and to determine if cough/wheezing is dysphagia/aspiration related. Pulmonology team with concerns for "recurrent aspiration".
Objective testing not warranted given no overt clinical s/s impaired airway protection at bedside.
Objective assessment is not warranted due to no overt signs/symptoms of penetration/aspiration seen at bedside.
2.) Objective assessment is not warranted at this time given presence of overt s/s penetration/aspiration for initial large sip of Thin Liquids, which was not reduplicated for single, small sips of Thin Liquids and solids. Chest imaging is not concerning for infection.

## 2025-05-07 NOTE — SWALLOW BEDSIDE ASSESSMENT ADULT - SWALLOW EVAL: RECOMMENDED DIET
From An Oral/Pharyngeal Standpoint: Regular & Thin Liquids however patient reports preference for Soft Bite Size & Thin at this time
Soft and Bite Sized with Thin Liquids
1.) Easy to Chew with Thin Liquids via single, small sips.
Continue NPO with consideration for short term nonoral means of nutrition/hydration

## 2025-05-07 NOTE — SWALLOW BEDSIDE ASSESSMENT ADULT - SWALLOW EVAL: RECOMMENDED FEEDING/EATING TECHNIQUES
monitor pulmonary status during/after PO intake/allow for swallow between intakes/alternate food with liquid/maintain upright posture during/after eating for 30 mins/small sips/bites
allow for swallow between intakes/alternate food with liquid/maintain upright posture during/after eating for 30 mins/position upright (90 degrees)/small sips/bites
3.) Upright position with PO and 20-30 min after, Eat slowly, take small, single sips of Thin Liquids. Patient and patient's  expressed full understanding.

## 2025-05-07 NOTE — PROGRESS NOTE ADULT - PROBLEM SELECTOR PLAN 1
MDD w/ psychotic features.  - s/p ativan now on hold given concern that it may have contributed to respiratory decompensation  - c/w Zoloft 125 qday (home dose was 150).  - c/w Zyprexa 2.5 qhs  - c/w Namenda 10mg qhs  - psych following  - ECT ongoing  pt optimized per cards and pulm for ECT    Pending transfer to inpatient psychiatric unit at [facility]  some concern this AM by ECT team, that pt more lethargic on arrive to them, however, pt pleasant and alert and conversant with me post ECT; will try to collect UA for poss etiology of lethargy this AM however upon my eval pt back to her baseline since ECT started

## 2025-05-07 NOTE — BH CONSULTATION LIAISON PROGRESS NOTE - NSBHASSESSMENTFT_PSY_ALL_CORE
Patient is a 74F hx asthma, depression, and HTN was admitted to the MICU with AHRF 2/2 asthma exacerbation resulting in acute on chronic HHRF causing AMS and increased WOB that required intubation. During her ICU course, she experienced seizure-like activity .  Additional issues included hyponatremia, acute on chronic metabolic alkalosis, and mild thrombocytopenia,  also having fevers.  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 Patient has h/o hx of major depressive disorder with psychosis, hx of 3 past inpatient psychiatric hospitalizations last in 2016 at [facility], all rehospitalizations were in context non compliance with the treatment. She has no hx of suicide attempts, no hx of substance abuse. Patient w hx of paranoia, disorganization, catatonia, required MOO when in [facility].     3/24----more withdrawn, some stiffness in neck, not communicative. Some concerns for catatonia. Unsure if symptoms attributed to catatonia vs delirium  3/25--- more alert today, trying to following commands. Still with PMR+. Appears to be responding to Ativan  3/26-- BFS 10, will increasing standing ativan to target catatonic symptoms - giving STAT 0.5mg NOW discussed w acp   3/27----will continue ativan dosing for one more day before increasing  3/28: BFS: stupor 1, mutism 2,withdrawan 2, verbigeration 1, rigidity 2 =8.  3/29: BFS: stupor 2, mutism 2, staring 1, posturing 2, rigidity 2, waxy flexibility 3, withdrawal 2 = 14; please monitor nutritional intake, plan to increase ativan by total daily dose of 0.5 mg at bedtime due to persistent catatonia   3/31: BFS: stupor 2, mutism 2, staring 2, rigidity 1, negativism 1, withdrawal 1, waxy flexibility 3 =12, patient with productive cough, advised for medical work up  4/1---tired, speaking more, not rigid, has PMR+. NPO status now.   4/2--sleepy, RRT this afternoon  4/3---better mentation, pmr but no chester catatonic symptoms  4/4: alert, using few words, mild rigidity -  at bedside, updated   4/5: few words, lethargic, fluctuating wakefulness. Mild rigidity.  No overt catatonic sxs.   4/7: no change, remains thought blocked, few words - son at bedside, updated with care  4/8: patient continues to have poor eye contact, uses few words, no rigidity on today's exam.   4/9: patient remains minimally verbal, some posturing, poor Po intake - continues on NC for 02 requirements   4/10: more verbal/interactive, with increased PO intake per family. Some psychotic output. Family agrees with resumption of Zyprexa.   4/14: alert to self with fluctuating wakefulness, intermittent full sentences f/b one-word answers, some rigidity, posturing, denies si, denies ah/vh  4/16: patient more catatonic today, notable catalepsy, waxiness, verbigeration followed by mutism and staring, family against ativan and ECT, would increase memantine as below, not eating   4/17: catatonic (mute, rigid, not engaging). Eating some portions of meals with significant family support. Family opting to see memantine trial to full effect before consideration of ECT  4/18: mutism today, odd posturing, unable to engage, d/w spouse and son ECT, family still undecided  4/21: alert to self, some improvement with verbal engagement, tracking, follow command, denies si, denies ah/vh, awaiting call back from son in regards to ECT consult  4/22: remains catatonic (mute, postured, rigid, withdrawn, negativistic). Exam limited. Family agrees with formal ECT consult.  4/23: Remains with symptoms of catatonia. BFS: stupor 1, mute2, Rigid 2, negativism 2, withdrawal 2 - 9 : ECT consult completed. awaiting clearances for ECT  4/42: remains with BFS of 9, plan for ECT  4/25: alert to self, able to engage in some conversation, however, confused, loose on  exam, odd posturing right upper arm, Dr. Toure made aware of schedule for ECT Monday 4/28: s/p ECT #2. more spontaneous speech, cooperative with exam. improved eye contact. loose with no rigidity.   4/29: displays more s/s of catatonia since yesterdays exam- negativism, withdrawal, rigidity. family and ect in agreement to proceed with treatment 3 tomorrow.  4/30: some confusion today. Will assess tomm  5/1: mutism and negativism on exam   5/2: improved catatonic symptoms. Confused, suspecting AH  5/5: No catatonic symptoms. Will assess tomm for any catatonic symptoms and decide if to hold off on treatment on wednesday 5/6: Continues to exhibit improvement in catatonic sx. Will recommend holding Wed ECT and having next tx on Fri 5/9 5/7: No catatonic sx; exam consistent with past 2 days. Still planning for next ECT 5/9. Pending inpt psych placement.    PLAN  -- ECT planned for Fri 5/9  -- no SI/HI, no need for psychiatric CO  -- antipsychotics can only be given if qtc < 500  -- Check UA    - MEDICATIONS:  - Memantine 5mg AM and 10mg HS  -- C/W Zoloft 125 mg daily   -- C/W Zyprexa 2.5mg HS   - PRN: Ativan 0.5mg q 6hrs prn IM/IV/PO    - please ensure PT involved in patient care    - DISPO: Inpatient psychiatry. 2pc signed, pending beds    Routine observation   Patient is a 74F hx asthma, depression, and HTN was admitted to the MICU with AHRF 2/2 asthma exacerbation resulting in acute on chronic HHRF causing AMS and increased WOB that required intubation. During her ICU course, she experienced seizure-like activity .  Additional issues included hyponatremia, acute on chronic metabolic alkalosis, and mild thrombocytopenia,  also having fevers.  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 Patient has h/o hx of major depressive disorder with psychosis, hx of 3 past inpatient psychiatric hospitalizations last in 2016 at [facility], all rehospitalizations were in context non compliance with the treatment. She has no hx of suicide attempts, no hx of substance abuse. Patient w hx of paranoia, disorganization, catatonia, required MOO when in [facility].     3/24----more withdrawn, some stiffness in neck, not communicative. Some concerns for catatonia. Unsure if symptoms attributed to catatonia vs delirium  3/25--- more alert today, trying to following commands. Still with PMR+. Appears to be responding to Ativan  3/26-- BFS 10, will increasing standing ativan to target catatonic symptoms - giving STAT 0.5mg NOW discussed w acp   3/27----will continue ativan dosing for one more day before increasing  3/28: BFS: stupor 1, mutism 2,withdrawan 2, verbigeration 1, rigidity 2 =8.  3/29: BFS: stupor 2, mutism 2, staring 1, posturing 2, rigidity 2, waxy flexibility 3, withdrawal 2 = 14; please monitor nutritional intake, plan to increase ativan by total daily dose of 0.5 mg at bedtime due to persistent catatonia   3/31: BFS: stupor 2, mutism 2, staring 2, rigidity 1, negativism 1, withdrawal 1, waxy flexibility 3 =12, patient with productive cough, advised for medical work up  4/1---tired, speaking more, not rigid, has PMR+. NPO status now.   4/2--sleepy, RRT this afternoon  4/3---better mentation, pmr but no chester catatonic symptoms  4/4: alert, using few words, mild rigidity -  at bedside, updated   4/5: few words, lethargic, fluctuating wakefulness. Mild rigidity.  No overt catatonic sxs.   4/7: no change, remains thought blocked, few words - son at bedside, updated with care  4/8: patient continues to have poor eye contact, uses few words, no rigidity on today's exam.   4/9: patient remains minimally verbal, some posturing, poor Po intake - continues on NC for 02 requirements   4/10: more verbal/interactive, with increased PO intake per family. Some psychotic output. Family agrees with resumption of Zyprexa.   4/14: alert to self with fluctuating wakefulness, intermittent full sentences f/b one-word answers, some rigidity, posturing, denies si, denies ah/vh  4/16: patient more catatonic today, notable catalepsy, waxiness, verbigeration followed by mutism and staring, family against ativan and ECT, would increase memantine as below, not eating   4/17: catatonic (mute, rigid, not engaging). Eating some portions of meals with significant family support. Family opting to see memantine trial to full effect before consideration of ECT  4/18: mutism today, odd posturing, unable to engage, d/w spouse and son ECT, family still undecided  4/21: alert to self, some improvement with verbal engagement, tracking, follow command, denies si, denies ah/vh, awaiting call back from son in regards to ECT consult  4/22: remains catatonic (mute, postured, rigid, withdrawn, negativistic). Exam limited. Family agrees with formal ECT consult.  4/23: Remains with symptoms of catatonia. BFS: stupor 1, mute2, Rigid 2, negativism 2, withdrawal 2 - 9 : ECT consult completed. awaiting clearances for ECT  4/42: remains with BFS of 9, plan for ECT  4/25: alert to self, able to engage in some conversation, however, confused, loose on  exam, odd posturing right upper arm, Dr. Toure made aware of schedule for ECT Monday 4/28: s/p ECT #2. more spontaneous speech, cooperative with exam. improved eye contact. loose with no rigidity.   4/29: displays more s/s of catatonia since yesterdays exam- negativism, withdrawal, rigidity. family and ect in agreement to proceed with treatment 3 tomorrow.  4/30: some confusion today. Will assess tomm  5/1: mutism and negativism on exam   5/2: improved catatonic symptoms. Confused, suspecting AH  5/5: No catatonic symptoms. Will assess tomm for any catatonic symptoms and decide if to hold off on treatment on wednesday 5/6: Continues to exhibit improvement in catatonic sx. Will recommend holding Wed ECT and having next tx on Fri 5/9 5/7: No catatonic sx; exam consistent with past 2 days. Still planning for next ECT 5/9. Pending inpt psych placement.    PLAN  -- ECT planned for Fri 5/9  -- no SI/HI, no need for psychiatric CO  -- antipsychotics can only be given if qtc < 500      - MEDICATIONS:  - Memantine 5mg AM and 10mg HS  -- C/W Zoloft 125 mg daily   -- C/W Zyprexa 2.5mg HS   - PRN: Ativan 0.5mg q 6hrs prn IM/IV/PO    - please ensure PT involved in patient care    - DISPO: Inpatient psychiatry. 2pc signed, pending beds    Routine observation

## 2025-05-07 NOTE — PROGRESS NOTE ADULT - SUBJECTIVE AND OBJECTIVE BOX
Interval Events: Patient was seen and examined at bedside. No overnight events. Feels well this morning. Denies any cough, wheezing, chest tightness, or dyspnea.    REVIEW OF SYSTEMS:  Constitutional: no fever, chills, fatigue  Neuro: no headache, numbness, weakness  Resp: no cough, wheezing, shortness of breath  CVS: no chest pain, palpitations, leg swelling  GI: no abdominal pain, nausea, vomiting, diarrhea   : no dysuria, frequency, incontinence  Skin: no itching, burning, rashes, or lesions   Msk: no joint pain or swelling  Psych: no depression, anxiety  [x] All other systems negative    OBJECTIVE:  ICU Vital Signs Last 24 Hrs  T(C): 37.2 (07 May 2025 05:10), Max: 37.6 (06 May 2025 22:00)  T(F): 99 (07 May 2025 05:10), Max: 99.6 (06 May 2025 22:00)  HR: 77 (07 May 2025 08:39) (77 - 87)  BP: 149/78 (07 May 2025 05:10) (125/65 - 149/78)  RR: 18 (07 May 2025 05:10) (18 - 18)  SpO2: 99% (07 May 2025 08:39) (97% - 100%)    O2 Parameters below as of 07 May 2025 05:10  Patient On (Oxygen Delivery Method): room air    CAPILLARY BLOOD GLUCOSE    POCT Blood Glucose.: 187 mg/dL (07 May 2025 12:20)      PHYSICAL EXAM:    Gen: NAD; AAOx3  Neuro: CN II-XII grossly intact; moving all extremities   HEENT: NC/AT; EOMI; MMM  CV: normal S1 & S2; regular rate and rhythm   Pulm: clear to auscultation bilaterally; no wheezing, rales, or rhonchi  GI: soft; NT/ND  Ext: no edema; pulses intact  Skin: warm and well perfused      HOSPITAL MEDICATIONS:  MEDICATIONS  (STANDING):  artificial tears (preservative free) Ophthalmic Solution 1 Drop(s) Both EYES every 8 hours  bisacodyl 10 milliGRAM(s) Oral once  chlorhexidine 2% Cloths 1 Application(s) Topical <User Schedule>  enoxaparin Injectable 40 milliGRAM(s) SubCutaneous every 24 hours  fluticasone propionate/ salmeterol 250-50 MICROgram(s) Diskus 1 Dose(s) Inhalation two times a day  glucagon  Injectable 1 milliGRAM(s) IntraMuscular once  guaiFENesin  milliGRAM(s) Oral every 12 hours  hydrochlorothiazide 25 milliGRAM(s) Oral daily  insulin glargine Injectable (LANTUS) 3 Unit(s) SubCutaneous at bedtime  insulin lispro (ADMELOG) corrective regimen sliding scale   SubCutaneous three times a day before meals  insulin lispro (ADMELOG) corrective regimen sliding scale   SubCutaneous at bedtime  insulin lispro Injectable (ADMELOG) 2 Unit(s) SubCutaneous three times a day before meals  ipratropium    for Nebulization 500 MICROGram(s) Nebulizer every 8 hours  levalbuterol Inhalation 0.63 milliGRAM(s) Inhalation every 8 hours  lisinopril 40 milliGRAM(s) Oral daily  melatonin 9 milliGRAM(s) Oral at bedtime  memantine 10 milliGRAM(s) Oral at bedtime  memantine 5 milliGRAM(s) Oral <User Schedule>  OLANZapine 2.5 milliGRAM(s) Oral at bedtime  pantoprazole    Tablet 40 milliGRAM(s) Oral before breakfast  petrolatum Ophthalmic Ointment 1 Application(s) Both EYES two times a day  polyethylene glycol 3350 17 Gram(s) Oral two times a day  senna 2 Tablet(s) Oral at bedtime  sertraline 125 milliGRAM(s) Oral daily    MEDICATIONS  (PRN):  albuterol/ipratropium for Nebulization 3 milliLiter(s) Nebulizer every 6 hours PRN Shortness of Breath and/or Wheezing      LABS:                        11.4   8.78  )-----------( 215      ( 07 May 2025 06:30 )             35.3     Hgb Trend: 11.4<--, 11.5<--, 11.4<--, 10.7<--, 10.8<--  05-07    144  |  105  |  12  ----------------------------<  109[H]  4.2   |  26  |  0.58    Ca    9.9      07 May 2025 06:30  Phos  4.1     05-07  Mg     1.80     05-07      Creatinine Trend: 0.58<--, 0.63<--, 0.61<--, 0.68<--, 0.60<--, 0.68<--    Urinalysis Basic - ( 07 May 2025 06:30 )    Color: x / Appearance: x / SG: x / pH: x  Gluc: 109 mg/dL / Ketone: x  / Bili: x / Urobili: x   Blood: x / Protein: x / Nitrite: x   Leuk Esterase: x / RBC: x / WBC x   Sq Epi: x / Non Sq Epi: x / Bacteria: x

## 2025-05-07 NOTE — PROGRESS NOTE ADULT - SUBJECTIVE AND OBJECTIVE BOX
Arnold Uriostegui MD  Interventional Cardiology / Endovascular Specialist  [place] Office : [address] [place] [address] [place] [address]  Tel:   [place] Office : [address] [facility] [place] [address]  Tel: [phone]    Pt is lying in bed comfortable not in distress, no chest pains no SOB no palpitations  	  MEDICATIONS:  enoxaparin Injectable 40 milliGRAM(s) SubCutaneous every 24 hours  hydrochlorothiazide 25 milliGRAM(s) Oral daily  lisinopril 40 milliGRAM(s) Oral daily      albuterol/ipratropium for Nebulization 3 milliLiter(s) Nebulizer every 6 hours PRN  fluticasone propionate/ salmeterol 250-50 MICROgram(s) Diskus 1 Dose(s) Inhalation two times a day  guaiFENesin  milliGRAM(s) Oral every 12 hours  levalbuterol Inhalation 0.63 milliGRAM(s) Inhalation every 8 hours  tiotropium 2.5 MICROgram(s) Inhaler 2 Puff(s) Inhalation daily    melatonin 9 milliGRAM(s) Oral at bedtime  memantine 10 milliGRAM(s) Oral at bedtime  memantine 5 milliGRAM(s) Oral <User Schedule>  OLANZapine 2.5 milliGRAM(s) Oral at bedtime  sertraline 125 milliGRAM(s) Oral daily    bisacodyl 10 milliGRAM(s) Oral once  pantoprazole    Tablet 40 milliGRAM(s) Oral before breakfast  polyethylene glycol 3350 17 Gram(s) Oral two times a day  senna 2 Tablet(s) Oral at bedtime    dextrose 50% Injectable 25 Gram(s) IV Push once  dextrose 50% Injectable 12.5 Gram(s) IV Push once  dextrose 50% Injectable 25 Gram(s) IV Push once  dextrose Oral Gel 15 Gram(s) Oral once  glucagon  Injectable 1 milliGRAM(s) IntraMuscular once  insulin glargine Injectable (LANTUS) 3 Unit(s) SubCutaneous at bedtime  insulin lispro (ADMELOG) corrective regimen sliding scale   SubCutaneous three times a day before meals  insulin lispro (ADMELOG) corrective regimen sliding scale   SubCutaneous at bedtime  insulin lispro Injectable (ADMELOG) 2 Unit(s) SubCutaneous three times a day before meals    artificial tears (preservative free) Ophthalmic Solution 1 Drop(s) Both EYES every 8 hours  chlorhexidine 2% Cloths 1 Application(s) Topical <User Schedule>  dextrose 5%. 1000 milliLiter(s) IV Continuous <Continuous>  dextrose 5%. 1000 milliLiter(s) IV Continuous <Continuous>  petrolatum Ophthalmic Ointment 1 Application(s) Both EYES two times a day      PAST MEDICAL/SURGICAL HISTORY  PAST MEDICAL & SURGICAL HISTORY:  MDD (major depressive disorder), recurrent, severe, with psychosis      Asthma, mild intermittent, uncomplicated      Essential hypertension      No significant past surgical history          SOCIAL HISTORY: Substance Use (street drugs): ( x ) never used  (  ) other:    FAMILY HISTORY:    PHYSICAL EXAM:  T(C): 37.2 (05-07-25 @ 05:10), Max: 37.6 (05-06-25 @ 22:00)  HR: 77 (05-07-25 @ 08:39) (77 - 87)  BP: 149/78 (05-07-25 @ 05:10) (125/65 - 149/78)  RR: 18 (05-07-25 @ 05:10) (18 - 18)  SpO2: 99% (05-07-25 @ 08:39) (97% - 100%)  Wt(kg): --  I&O's Summary        GENERAL: NAD  EYES:   PERRLA   ENMT:   Moist mucous membranes, Good dentition, No lesions  Cardiovascular: Normal S1 S2, No JVD, No murmurs, No edema  Respiratory: Lungs clear to auscultation	  Gastrointestinal:  Soft, Non-tender, + BS	  Extremities: no edema                          11.4   8.78  )-----------( 215      ( 07 May 2025 06:30 )             35.3     05-07    144  |  105  |  12  ----------------------------<  109[H]  4.2   |  26  |  0.58    Ca    9.9      07 May 2025 06:30  Phos  4.1     05-07  Mg     1.80     05-07      proBNP:   Lipid Profile:   HgA1c:   TSH:     Consultant(s) Notes Reviewed:  [x ] YES  [ ] NO    Care Discussed with Consultants/Other Providers [ x] YES  [ ] NO    Imaging Personally Reviewed independently:  [x] YES  [ ] NO    All labs, radiologic studies, vitals, orders and medications list reviewed. Patient is seen and examined at bedside. Case discussed with medical team.

## 2025-05-07 NOTE — PROGRESS NOTE ADULT - PROBLEM SELECTOR PLAN 5
2/2 to deconditioning/critical care myopathy, CK WNL  doing well  will get PT services at [facility]

## 2025-05-07 NOTE — SWALLOW BEDSIDE ASSESSMENT ADULT - PHARYNGEAL PHASE
Within functional limits Reflexive cough for initial large sip, which was not reduplicated for single, small sips.

## 2025-05-07 NOTE — SWALLOW BEDSIDE ASSESSMENT ADULT - SWALLOW EVAL: DIAGNOSIS
Patient presents with Mild Oral stage and Mild Pharyngeal stage dysphagia. Oral stage is marked by adequate oral containment, adequate bolus manipulation, mildly slowed mastication for Regular solids, and adequate anterior posterior transfer of bolus. Adequate oral clearance for Soft and Bite Sized/Regular solids and Thin Liquids. Pharyngeal stage marked by swallow initiation with hyolaryngeal elevation/excursion evidenced via digital palpation. One instance of reflexive cough response observed for initial, large sip of Thin Liquids. This was not reduplicated for single, small sips of Thin Liquids and Soft Bite Sized/Regular solids. Patient presents with Mild Oral stage and Mild Pharyngeal stage dysphagia. Oral stage is marked by adequate oral containment, adequate bolus manipulation, mildly slowed mastication for Regular solids, and adequate anterior posterior transfer of bolus. Adequate oral clearance for Soft and Bite Sized/Regular solids and Thin Liquids. Pharyngeal stage marked by swallow initiation with hyolaryngeal elevation/excursion evidenced via digital palpation. One instance of reflexive cough response observed for initial, large sip of Thin Liquids. This was not reduplicated for single, small sips of Thin Liquids and Soft Bite Sized/Regular solids. Patient denied globus "stuck" sensation.

## 2025-05-08 LAB
APPEARANCE UR: CLEAR — SIGNIFICANT CHANGE UP
BACTERIA # UR AUTO: ABNORMAL /HPF
BILIRUB UR-MCNC: NEGATIVE — SIGNIFICANT CHANGE UP
CAST: 0 /LPF — SIGNIFICANT CHANGE UP (ref 0–4)
COLOR SPEC: YELLOW — SIGNIFICANT CHANGE UP
DIFF PNL FLD: NEGATIVE — SIGNIFICANT CHANGE UP
GLUCOSE BLDC GLUCOMTR-MCNC: 128 MG/DL — HIGH (ref 70–99)
GLUCOSE BLDC GLUCOMTR-MCNC: 140 MG/DL — HIGH (ref 70–99)
GLUCOSE BLDC GLUCOMTR-MCNC: 180 MG/DL — HIGH (ref 70–99)
GLUCOSE BLDC GLUCOMTR-MCNC: 294 MG/DL — HIGH (ref 70–99)
GLUCOSE UR QL: NEGATIVE MG/DL — SIGNIFICANT CHANGE UP
KETONES UR-MCNC: NEGATIVE MG/DL — SIGNIFICANT CHANGE UP
LEUKOCYTE ESTERASE UR-ACNC: ABNORMAL
NITRITE UR-MCNC: NEGATIVE — SIGNIFICANT CHANGE UP
PH UR: 6 — SIGNIFICANT CHANGE UP (ref 5–8)
PROT UR-MCNC: NEGATIVE MG/DL — SIGNIFICANT CHANGE UP
RBC CASTS # UR COMP ASSIST: 1 /HPF — SIGNIFICANT CHANGE UP (ref 0–4)
REVIEW: SIGNIFICANT CHANGE UP
SP GR SPEC: 1.03 — SIGNIFICANT CHANGE UP (ref 1–1.03)
SQUAMOUS # UR AUTO: 1 /HPF — SIGNIFICANT CHANGE UP (ref 0–5)
UROBILINOGEN FLD QL: 0.2 MG/DL — SIGNIFICANT CHANGE UP (ref 0.2–1)
WBC UR QL: 4 /HPF — SIGNIFICANT CHANGE UP (ref 0–5)

## 2025-05-08 PROCEDURE — 99232 SBSQ HOSP IP/OBS MODERATE 35: CPT

## 2025-05-08 PROCEDURE — 99233 SBSQ HOSP IP/OBS HIGH 50: CPT

## 2025-05-08 RX ADMIN — Medication 1 APPLICATION(S): at 17:58

## 2025-05-08 RX ADMIN — TIOTROPIUM BROMIDE INHALATION SPRAY 2 PUFF(S): 3.12 SPRAY, METERED RESPIRATORY (INHALATION) at 12:50

## 2025-05-08 RX ADMIN — LEVALBUTEROL HYDROCHLORIDE 0.63 MILLIGRAM(S): 1.25 SOLUTION RESPIRATORY (INHALATION) at 16:25

## 2025-05-08 RX ADMIN — ENOXAPARIN SODIUM 40 MILLIGRAM(S): 100 INJECTION SUBCUTANEOUS at 17:57

## 2025-05-08 RX ADMIN — INSULIN LISPRO 1: 100 INJECTION, SOLUTION INTRAVENOUS; SUBCUTANEOUS at 23:10

## 2025-05-08 RX ADMIN — LEVALBUTEROL HYDROCHLORIDE 0.63 MILLIGRAM(S): 1.25 SOLUTION RESPIRATORY (INHALATION) at 07:44

## 2025-05-08 RX ADMIN — Medication 1 DOSE(S): at 08:35

## 2025-05-08 RX ADMIN — INSULIN LISPRO 2 UNIT(S): 100 INJECTION, SOLUTION INTRAVENOUS; SUBCUTANEOUS at 17:59

## 2025-05-08 RX ADMIN — INSULIN LISPRO 2 UNIT(S): 100 INJECTION, SOLUTION INTRAVENOUS; SUBCUTANEOUS at 08:34

## 2025-05-08 RX ADMIN — Medication 1 DROP(S): at 23:12

## 2025-05-08 RX ADMIN — LISINOPRIL 40 MILLIGRAM(S): 5 TABLET ORAL at 05:05

## 2025-05-08 RX ADMIN — OLANZAPINE 2.5 MILLIGRAM(S): 10 TABLET ORAL at 23:13

## 2025-05-08 RX ADMIN — MEMANTINE HYDROCHLORIDE 10 MILLIGRAM(S): 21 CAPSULE, EXTENDED RELEASE ORAL at 23:12

## 2025-05-08 RX ADMIN — Medication 1 DROP(S): at 15:04

## 2025-05-08 RX ADMIN — DEXTROMETHORPHAN HBR, GUAIFENESIN 600 MILLIGRAM(S): 200 LIQUID ORAL at 05:07

## 2025-05-08 RX ADMIN — Medication 1 DOSE(S): at 23:11

## 2025-05-08 RX ADMIN — INSULIN GLARGINE-YFGN 3 UNIT(S): 100 INJECTION, SOLUTION SUBCUTANEOUS at 23:11

## 2025-05-08 RX ADMIN — Medication 9 MILLIGRAM(S): at 23:12

## 2025-05-08 RX ADMIN — Medication 1 APPLICATION(S): at 05:06

## 2025-05-08 RX ADMIN — Medication 2 TABLET(S): at 23:13

## 2025-05-08 RX ADMIN — Medication 1 DROP(S): at 05:06

## 2025-05-08 RX ADMIN — Medication 40 MILLIGRAM(S): at 05:05

## 2025-05-08 RX ADMIN — INSULIN LISPRO 2 UNIT(S): 100 INJECTION, SOLUTION INTRAVENOUS; SUBCUTANEOUS at 12:55

## 2025-05-08 RX ADMIN — SERTRALINE 125 MILLIGRAM(S): 100 TABLET, FILM COATED ORAL at 12:51

## 2025-05-08 RX ADMIN — INSULIN LISPRO 1: 100 INJECTION, SOLUTION INTRAVENOUS; SUBCUTANEOUS at 12:55

## 2025-05-08 RX ADMIN — LEVALBUTEROL HYDROCHLORIDE 0.63 MILLIGRAM(S): 1.25 SOLUTION RESPIRATORY (INHALATION) at 22:49

## 2025-05-08 RX ADMIN — DEXTROMETHORPHAN HBR, GUAIFENESIN 600 MILLIGRAM(S): 200 LIQUID ORAL at 17:57

## 2025-05-08 RX ADMIN — MEMANTINE HYDROCHLORIDE 5 MILLIGRAM(S): 21 CAPSULE, EXTENDED RELEASE ORAL at 05:05

## 2025-05-08 NOTE — PROGRESS NOTE ADULT - SUBJECTIVE AND OBJECTIVE BOX
Interval Events: Patient was seen and examined at bedside. No overnight events. Feels well today without cough, wheezing, chest tightness, or dyspnea.    REVIEW OF SYSTEMS:  Constitutional: no fever, chills, fatigue  Neuro: no headache, numbness, weakness  Resp: no cough, wheezing, shortness of breath  CVS: no chest pain, palpitations, leg swelling  GI: no abdominal pain, nausea, vomiting, diarrhea   : no dysuria, frequency, incontinence  Skin: no itching, burning, rashes, or lesions   Msk: no joint pain or swelling  Psych: no depression, anxiety  [x] All other systems negative    OBJECTIVE:  ICU Vital Signs Last 24 Hrs  T(C): 36.9 (08 May 2025 11:15), Max: 37.2 (07 May 2025 14:50)  T(F): 98.5 (08 May 2025 11:15), Max: 99 (07 May 2025 14:50)  HR: 81 (08 May 2025 11:15) (77 - 96)  BP: 139/69 (08 May 2025 11:15) (129/64 - 139/69)  RR: 18 (08 May 2025 11:15) (18 - 18)  SpO2: 96% (08 May 2025 11:15) (96% - 98%)    O2 Parameters below as of 08 May 2025 05:00  Patient On (Oxygen Delivery Method): room air      05-07 @ 07:01  -  08 @ 07:00  --------------------------------------------------------  IN: 900 mL / OUT: 0 mL / NET: 900 mL      CAPILLARY BLOOD GLUCOSE      POCT Blood Glucose.: 180 mg/dL (08 May 2025 12:01)      PHYSICAL EXAM:    Gen: NAD; AAOx3  Neuro: CN II-XII grossly intact; moving all extremities   HEENT: NC/AT; EOMI; MMM  CV: normal S1 & S2; regular rate and rhythm   Pulm: clear to auscultation bilaterally; no wheezing, rales, or rhonchi  GI: soft; NT/ND  Ext: no edema; pulses intact  Skin: warm and well perfused      HOSPITAL MEDICATIONS:  MEDICATIONS  (STANDING):  artificial tears (preservative free) Ophthalmic Solution 1 Drop(s) Both EYES every 8 hours  bisacodyl 10 milliGRAM(s) Oral once  chlorhexidine 2% Cloths 1 Application(s) Topical <User Schedule>  enoxaparin Injectable 40 milliGRAM(s) SubCutaneous every 24 hours  fluticasone propionate/ salmeterol 250-50 MICROgram(s) Diskus 1 Dose(s) Inhalation two times a day  glucagon  Injectable 1 milliGRAM(s) IntraMuscular once  guaiFENesin  milliGRAM(s) Oral every 12 hours  hydrochlorothiazide 25 milliGRAM(s) Oral daily  insulin glargine Injectable (LANTUS) 3 Unit(s) SubCutaneous at bedtime  insulin lispro (ADMELOG) corrective regimen sliding scale   SubCutaneous three times a day before meals  insulin lispro (ADMELOG) corrective regimen sliding scale   SubCutaneous at bedtime  insulin lispro Injectable (ADMELOG) 2 Unit(s) SubCutaneous three times a day before meals  levalbuterol Inhalation 0.63 milliGRAM(s) Inhalation every 8 hours  lisinopril 40 milliGRAM(s) Oral daily  melatonin 9 milliGRAM(s) Oral at bedtime  memantine 10 milliGRAM(s) Oral at bedtime  memantine 5 milliGRAM(s) Oral <User Schedule>  OLANZapine 2.5 milliGRAM(s) Oral at bedtime  pantoprazole    Tablet 40 milliGRAM(s) Oral before breakfast  petrolatum Ophthalmic Ointment 1 Application(s) Both EYES two times a day  polyethylene glycol 3350 17 Gram(s) Oral two times a day  senna 2 Tablet(s) Oral at bedtime  sertraline 125 milliGRAM(s) Oral daily  tiotropium 2.5 MICROgram(s) Inhaler 2 Puff(s) Inhalation daily    MEDICATIONS  (PRN):  albuterol/ipratropium for Nebulization 3 milliLiter(s) Nebulizer every 6 hours PRN Shortness of Breath and/or Wheezing      LABS:                        11.4   8.78  )-----------( 215      ( 07 May 2025 06:30 )             35.3     Hgb Trend: 11.4<--, 11.5<--, 11.4<--, 10.7<--, 10.8<--  05-07    144  |  105  |  12  ----------------------------<  109[H]  4.2   |  26  |  0.58    Ca    9.9      07 May 2025 06:30  Phos  4.1     05-  Mg     1.80     05-      Creatinine Trend: 0.58<--, 0.63<--, 0.61<--, 0.68<--, 0.60<--, 0.68<--    Urinalysis Basic - ( 08 May 2025 10:59 )    Color: Yellow / Appearance: Clear / S.026 / pH: x  Gluc: x / Ketone: Negative mg/dL  / Bili: Negative / Urobili: 0.2 mg/dL   Blood: x / Protein: Negative mg/dL / Nitrite: Negative   Leuk Esterase: Moderate / RBC: 1 /HPF / WBC 4 /HPF   Sq Epi: x / Non Sq Epi: 1 /HPF / Bacteria: Occasional /HPF          MICROBIOLOGY:     RADIOLOGY:  [ ] Reviewed and interpreted by me

## 2025-05-08 NOTE — PROGRESS NOTE ADULT - PROBLEM SELECTOR PLAN 1
MDD w/ psychotic features.  - s/p ativan now on hold given concern that it may have contributed to respiratory decompensation  - c/w Zoloft 125 qday (home dose was 150).  - c/w Zyprexa 2.5 qhs  - c/w Namenda 10mg qhs  - psych following  - ECT ongoing  pt optimized per cards and pulm for ECT    Pending transfer to inpatient psychiatric unit at [facility]  some concern this AM by ECT team, that pt more lethargic on arrive to them, however, pt pleasant and alert and conversant with me post ECT; will try to collect UA for poss etiology of lethargy this AM however upon my eval pt back to her baseline since ECT started

## 2025-05-08 NOTE — PROGRESS NOTE ADULT - SUBJECTIVE AND OBJECTIVE BOX
[facility] Division of Hospital Medicine  Em Toure MD  Pager [phone]    Patient is a 74y old  Female who presents with a chief complaint of asthma exac/resp failure/catatonia       SUBJECTIVE / OVERNIGHT EVENTS: sleepy this AM; wants to rest more      MEDICATIONS  (STANDING):  artificial tears (preservative free) Ophthalmic Solution 1 Drop(s) Both EYES every 8 hours  bisacodyl 10 milliGRAM(s) Oral once  chlorhexidine 2% Cloths 1 Application(s) Topical <User Schedule>  dextrose 5%. 1000 milliLiter(s) (50 mL/Hr) IV Continuous <Continuous>  dextrose 5%. 1000 milliLiter(s) (100 mL/Hr) IV Continuous <Continuous>  dextrose 50% Injectable 25 Gram(s) IV Push once  dextrose 50% Injectable 12.5 Gram(s) IV Push once  dextrose 50% Injectable 25 Gram(s) IV Push once  dextrose Oral Gel 15 Gram(s) Oral once  enoxaparin Injectable 40 milliGRAM(s) SubCutaneous every 24 hours  fluticasone propionate/ salmeterol 250-50 MICROgram(s) Diskus 1 Dose(s) Inhalation two times a day  glucagon  Injectable 1 milliGRAM(s) IntraMuscular once  guaiFENesin  milliGRAM(s) Oral every 12 hours  hydrochlorothiazide 25 milliGRAM(s) Oral daily  insulin glargine Injectable (LANTUS) 3 Unit(s) SubCutaneous at bedtime  insulin lispro (ADMELOG) corrective regimen sliding scale   SubCutaneous three times a day before meals  insulin lispro (ADMELOG) corrective regimen sliding scale   SubCutaneous at bedtime  insulin lispro Injectable (ADMELOG) 2 Unit(s) SubCutaneous three times a day before meals  levalbuterol Inhalation 0.63 milliGRAM(s) Inhalation every 8 hours  lisinopril 40 milliGRAM(s) Oral daily  melatonin 9 milliGRAM(s) Oral at bedtime  memantine 10 milliGRAM(s) Oral at bedtime  memantine 5 milliGRAM(s) Oral <User Schedule>  OLANZapine 2.5 milliGRAM(s) Oral at bedtime  pantoprazole    Tablet 40 milliGRAM(s) Oral before breakfast  petrolatum Ophthalmic Ointment 1 Application(s) Both EYES two times a day  polyethylene glycol 3350 17 Gram(s) Oral two times a day  senna 2 Tablet(s) Oral at bedtime  sertraline 125 milliGRAM(s) Oral daily  tiotropium 2.5 MICROgram(s) Inhaler 2 Puff(s) Inhalation daily    MEDICATIONS  (PRN):  albuterol/ipratropium for Nebulization 3 milliLiter(s) Nebulizer every 6 hours PRN Shortness of Breath and/or Wheezing      CAPILLARY BLOOD GLUCOSE  POCT Blood Glucose.: 180 mg/dL (08 May 2025 12:01)  POCT Blood Glucose.: 140 mg/dL (08 May 2025 08:19)  POCT Blood Glucose.: 171 mg/dL (07 May 2025 22:41)  POCT Blood Glucose.: 163 mg/dL (07 May 2025 17:33)      PHYSICAL EXAM:  Vital Signs Last 24 Hrs  T(F): 98.5 (08 May 2025 11:15), Max: 99 (07 May 2025 14:50)  HR: 81 (08 May 2025 11:15) (77 - 96)  BP: 139/69 (08 May 2025 11:15) (129/64 - 139/69)  RR: 18 (08 May 2025 11:15) (18 - 18)  SpO2: 96% (08 May 2025 11:15) (96% - 98%)    Parameters below as of 08 May 2025 05:00  Patient On (Oxygen Delivery Method): room air      limited engagement today  CONSTITUTIONAL: NAD, appears comfortable  EYES: PERRLA; conjunctiva and sclera clear  ENMT: Moist oral mucosa; normal dentition  RESPIRATORY: Normal respiratory effort;  CARDIOVASCULAR: No lower extremity edema  MUSCULOSKELETAL:  no clubbing or cyanosis of digits; no joint swelling or tenderness to palpation  PSYCH: calm, coop; affect appropriate  NEUROLOGY: CN 2-12 are intact and symmetric; no gross sensory deficits   SKIN: No rashes; no palpable lesions    LABS:

## 2025-05-08 NOTE — BH CONSULTATION LIAISON PROGRESS NOTE - NSBHFUPINTERVALHXFT_PSY_A_CORE
Patient sleeping in room on arrival. Opens eyes on command and states she is "fine," otherwise minimally engaged with interview. No rigidity on exam. Compliant with psychotropic meds, no PRNs required.

## 2025-05-08 NOTE — PROGRESS NOTE ADULT - PROBLEM SELECTOR PLAN 9
Lovenox ppx  full code  PT rec TK  Will need transfer to [facility] for psychiatric optimization    Ambulatory with PT. Eating consistently now. 2PC per psychiatry.

## 2025-05-08 NOTE — PROGRESS NOTE ADULT - PROBLEM SELECTOR PLAN 5
2/2 to deconditioning/critical care myopathy, CK WNL  doing well  will get PT services at [facility]

## 2025-05-08 NOTE — PROGRESS NOTE ADULT - SUBJECTIVE AND OBJECTIVE BOX
Arnold Uriostegui MD  Interventional Cardiology / Advance Heart Failure and Cardiac Transplant Specialist  [place] Office : [address] [place] [address] [place] [address]  Tel:   [place] Office : [address] [facility] [place] [address]  Tel: [phone]       Pt is lying in bed comfortable not in distress, no chest pains no SOB no palpitations  	  MEDICATIONS:  enoxaparin Injectable 40 milliGRAM(s) SubCutaneous every 24 hours  hydrochlorothiazide 25 milliGRAM(s) Oral daily  lisinopril 40 milliGRAM(s) Oral daily      albuterol/ipratropium for Nebulization 3 milliLiter(s) Nebulizer every 6 hours PRN  fluticasone propionate/ salmeterol 250-50 MICROgram(s) Diskus 1 Dose(s) Inhalation two times a day  guaiFENesin  milliGRAM(s) Oral every 12 hours  levalbuterol Inhalation 0.63 milliGRAM(s) Inhalation every 8 hours  tiotropium 2.5 MICROgram(s) Inhaler 2 Puff(s) Inhalation daily    melatonin 9 milliGRAM(s) Oral at bedtime  memantine 5 milliGRAM(s) Oral <User Schedule>  memantine 10 milliGRAM(s) Oral at bedtime  OLANZapine 2.5 milliGRAM(s) Oral at bedtime  sertraline 125 milliGRAM(s) Oral daily    bisacodyl 10 milliGRAM(s) Oral once  pantoprazole    Tablet 40 milliGRAM(s) Oral before breakfast  polyethylene glycol 3350 17 Gram(s) Oral two times a day  senna 2 Tablet(s) Oral at bedtime    dextrose 50% Injectable 25 Gram(s) IV Push once  dextrose 50% Injectable 12.5 Gram(s) IV Push once  dextrose 50% Injectable 25 Gram(s) IV Push once  dextrose Oral Gel 15 Gram(s) Oral once  glucagon  Injectable 1 milliGRAM(s) IntraMuscular once  insulin glargine Injectable (LANTUS) 3 Unit(s) SubCutaneous at bedtime  insulin lispro (ADMELOG) corrective regimen sliding scale   SubCutaneous three times a day before meals  insulin lispro (ADMELOG) corrective regimen sliding scale   SubCutaneous at bedtime  insulin lispro Injectable (ADMELOG) 2 Unit(s) SubCutaneous three times a day before meals    artificial tears (preservative free) Ophthalmic Solution 1 Drop(s) Both EYES every 8 hours  chlorhexidine 2% Cloths 1 Application(s) Topical <User Schedule>  dextrose 5%. 1000 milliLiter(s) IV Continuous <Continuous>  dextrose 5%. 1000 milliLiter(s) IV Continuous <Continuous>  petrolatum Ophthalmic Ointment 1 Application(s) Both EYES two times a day      PAST MEDICAL/SURGICAL HISTORY  PAST MEDICAL & SURGICAL HISTORY:  MDD (major depressive disorder), recurrent, severe, with psychosis      Asthma, mild intermittent, uncomplicated      Essential hypertension      No significant past surgical history          SOCIAL HISTORY: Substance Use (street drugs): ( x ) never used  (  ) other:    FAMILY HISTORY:        PHYSICAL EXAM:  T(C): 36.9 (05-08-25 @ 11:15), Max: 36.9 (05-08-25 @ 11:15)  HR: 81 (05-08-25 @ 11:15) (77 - 96)  BP: 139/69 (05-08-25 @ 11:15) (129/64 - 139/69)  RR: 18 (05-08-25 @ 11:15) (18 - 18)  SpO2: 96% (05-08-25 @ 11:15) (96% - 98%)  Wt(kg): --  I&O's Summary    07 May 2025 07:01  -  08 May 2025 07:00  --------------------------------------------------------  IN: 900 mL / OUT: 0 mL / NET: 900 mL          GENERAL: NAD  EYES:   PERRLA   ENMT:   Moist mucous membranes, Good dentition, No lesions  Cardiovascular: Normal S1 S2, No JVD, No murmurs, No edema  Respiratory: Lungs clear to auscultation	  Gastrointestinal:  Soft, Non-tender, + BS	  Extremities: no edema                                    11.4   8.78  )-----------( 215      ( 07 May 2025 06:30 )             35.3     05-07    144  |  105  |  12  ----------------------------<  109[H]  4.2   |  26  |  0.58    Ca    9.9      07 May 2025 06:30  Phos  4.1     05-07  Mg     1.80     05-07      proBNP:   Lipid Profile:   HgA1c:   TSH:     Consultant(s) Notes Reviewed:  [x ] YES  [ ] NO    Care Discussed with Consultants/Other Providers [ x] YES  [ ] NO    Imaging Personally Reviewed independently:  [x] YES  [ ] NO    All labs, radiologic studies, vitals, orders and medications list reviewed. Patient is seen and examined at bedside. Case discussed with medical team.

## 2025-05-08 NOTE — BH CONSULTATION LIAISON PROGRESS NOTE - NSBHASSESSMENTFT_PSY_ALL_CORE
Patient is a 74F hx asthma, depression, and HTN was admitted to the MICU with AHRF 2/2 asthma exacerbation resulting in acute on chronic HHRF causing AMS and increased WOB that required intubation. During her ICU course, she experienced seizure-like activity .  Additional issues included hyponatremia, acute on chronic metabolic alkalosis, and mild thrombocytopenia,  also having fevers.  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 Patient has h/o hx of major depressive disorder with psychosis, hx of 3 past inpatient psychiatric hospitalizations last in 2016 at [facility], all rehospitalizations were in context non compliance with the treatment. She has no hx of suicide attempts, no hx of substance abuse. Patient w hx of paranoia, disorganization, catatonia, required MOO when in [facility].     3/24----more withdrawn, some stiffness in neck, not communicative. Some concerns for catatonia. Unsure if symptoms attributed to catatonia vs delirium  3/25--- more alert today, trying to following commands. Still with PMR+. Appears to be responding to Ativan  3/26-- BFS 10, will increasing standing ativan to target catatonic symptoms - giving STAT 0.5mg NOW discussed w acp   3/27----will continue ativan dosing for one more day before increasing  3/28: BFS: stupor 1, mutism 2,withdrawan 2, verbigeration 1, rigidity 2 =8.  3/29: BFS: stupor 2, mutism 2, staring 1, posturing 2, rigidity 2, waxy flexibility 3, withdrawal 2 = 14; please monitor nutritional intake, plan to increase ativan by total daily dose of 0.5 mg at bedtime due to persistent catatonia   3/31: BFS: stupor 2, mutism 2, staring 2, rigidity 1, negativism 1, withdrawal 1, waxy flexibility 3 =12, patient with productive cough, advised for medical work up  4/1---tired, speaking more, not rigid, has PMR+. NPO status now.   4/2--sleepy, RRT this afternoon  4/3---better mentation, pmr but no chester catatonic symptoms  4/4: alert, using few words, mild rigidity -  at bedside, updated   4/5: few words, lethargic, fluctuating wakefulness. Mild rigidity.  No overt catatonic sxs.   4/7: no change, remains thought blocked, few words - son at bedside, updated with care  4/8: patient continues to have poor eye contact, uses few words, no rigidity on today's exam.   4/9: patient remains minimally verbal, some posturing, poor Po intake - continues on NC for 02 requirements   4/10: more verbal/interactive, with increased PO intake per family. Some psychotic output. Family agrees with resumption of Zyprexa.   4/14: alert to self with fluctuating wakefulness, intermittent full sentences f/b one-word answers, some rigidity, posturing, denies si, denies ah/vh  4/16: patient more catatonic today, notable catalepsy, waxiness, verbigeration followed by mutism and staring, family against ativan and ECT, would increase memantine as below, not eating   4/17: catatonic (mute, rigid, not engaging). Eating some portions of meals with significant family support. Family opting to see memantine trial to full effect before consideration of ECT  4/18: mutism today, odd posturing, unable to engage, d/w spouse and son ECT, family still undecided  4/21: alert to self, some improvement with verbal engagement, tracking, follow command, denies si, denies ah/vh, awaiting call back from son in regards to ECT consult  4/22: remains catatonic (mute, postured, rigid, withdrawn, negativistic). Exam limited. Family agrees with formal ECT consult.  4/23: Remains with symptoms of catatonia. BFS: stupor 1, mute2, Rigid 2, negativism 2, withdrawal 2 - 9 : ECT consult completed. awaiting clearances for ECT  4/42: remains with BFS of 9, plan for ECT  4/25: alert to self, able to engage in some conversation, however, confused, loose on  exam, odd posturing right upper arm, Dr. Toure made aware of schedule for ECT Monday 4/28: s/p ECT #2. more spontaneous speech, cooperative with exam. improved eye contact. loose with no rigidity.   4/29: displays more s/s of catatonia since yesterdays exam- negativism, withdrawal, rigidity. family and ect in agreement to proceed with treatment 3 tomorrow.  4/30: some confusion today. Will assess tomm  5/1: mutism and negativism on exam   5/2: improved catatonic symptoms. Confused, suspecting AH  5/5: No catatonic symptoms. Will assess tomm for any catatonic symptoms and decide if to hold off on treatment on wednesday 5/6: Continues to exhibit improvement in catatonic sx. Will recommend holding Wed ECT and having next tx on Fri 5/9 5/7: No catatonic sx; exam consistent with past 2 days. Still planning for next ECT 5/9. Pending inpt psych placement.  5/8: Pt tired/minimal engaged, but responds to commands and no rigidity. Next ECT tomorrow.    PLAN  -- ECT planned tomorrow (Fri 5/9)  -- no SI/HI, no need for psychiatric CO  -- antipsychotics can only be given if qtc < 500      - MEDICATIONS:  - Memantine 5mg AM and 10mg HS  -- C/W Zoloft 125 mg daily   -- C/W Zyprexa 2.5mg HS   - PRN: Ativan 0.5mg q 6hrs prn IM/IV/PO    - please ensure PT involved in patient care    - DISPO: Inpatient psychiatry. 2pc signed, pending beds    Routine observation   Patient is a 74F hx asthma, depression, and HTN was admitted to the MICU with AHRF 2/2 asthma exacerbation resulting in acute on chronic HHRF causing AMS and increased WOB that required intubation. During her ICU course, she experienced seizure-like activity .  Additional issues included hyponatremia, acute on chronic metabolic alkalosis, and mild thrombocytopenia,  also having fevers.  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 Patient has h/o hx of major depressive disorder with psychosis, hx of 3 past inpatient psychiatric hospitalizations last in 2016 at [facility], all rehospitalizations were in context non compliance with the treatment. She has no hx of suicide attempts, no hx of substance abuse. Patient w hx of paranoia, disorganization, catatonia, required MOO when in [facility].     3/24----more withdrawn, some stiffness in neck, not communicative. Some concerns for catatonia. Unsure if symptoms attributed to catatonia vs delirium  3/25--- more alert today, trying to following commands. Still with PMR+. Appears to be responding to Ativan  3/26-- BFS 10, will increasing standing ativan to target catatonic symptoms - giving STAT 0.5mg NOW discussed w acp   3/27----will continue ativan dosing for one more day before increasing  3/28: BFS: stupor 1, mutism 2,withdrawan 2, verbigeration 1, rigidity 2 =8.  3/29: BFS: stupor 2, mutism 2, staring 1, posturing 2, rigidity 2, waxy flexibility 3, withdrawal 2 = 14; please monitor nutritional intake, plan to increase ativan by total daily dose of 0.5 mg at bedtime due to persistent catatonia   3/31: BFS: stupor 2, mutism 2, staring 2, rigidity 1, negativism 1, withdrawal 1, waxy flexibility 3 =12, patient with productive cough, advised for medical work up  4/1---tired, speaking more, not rigid, has PMR+. NPO status now.   4/2--sleepy, RRT this afternoon  4/3---better mentation, pmr but no chester catatonic symptoms  4/4: alert, using few words, mild rigidity -  at bedside, updated   4/5: few words, lethargic, fluctuating wakefulness. Mild rigidity.  No overt catatonic sxs.   4/7: no change, remains thought blocked, few words - son at bedside, updated with care  4/8: patient continues to have poor eye contact, uses few words, no rigidity on today's exam.   4/9: patient remains minimally verbal, some posturing, poor Po intake - continues on NC for 02 requirements   4/10: more verbal/interactive, with increased PO intake per family. Some psychotic output. Family agrees with resumption of Zyprexa.   4/14: alert to self with fluctuating wakefulness, intermittent full sentences f/b one-word answers, some rigidity, posturing, denies si, denies ah/vh  4/16: patient more catatonic today, notable catalepsy, waxiness, verbigeration followed by mutism and staring, family against ativan and ECT, would increase memantine as below, not eating   4/17: catatonic (mute, rigid, not engaging). Eating some portions of meals with significant family support. Family opting to see memantine trial to full effect before consideration of ECT  4/18: mutism today, odd posturing, unable to engage, d/w spouse and son ECT, family still undecided  4/21: alert to self, some improvement with verbal engagement, tracking, follow command, denies si, denies ah/vh, awaiting call back from son in regards to ECT consult  4/22: remains catatonic (mute, postured, rigid, withdrawn, negativistic). Exam limited. Family agrees with formal ECT consult.  4/23: Remains with symptoms of catatonia. BFS: stupor 1, mute2, Rigid 2, negativism 2, withdrawal 2 - 9 : ECT consult completed. awaiting clearances for ECT  4/42: remains with BFS of 9, plan for ECT  4/25: alert to self, able to engage in some conversation, however, confused, loose on  exam, odd posturing right upper arm, Dr. Toure made aware of schedule for ECT Monday 4/28: s/p ECT #2. more spontaneous speech, cooperative with exam. improved eye contact. loose with no rigidity.   4/29: displays more s/s of catatonia since yesterdays exam- negativism, withdrawal, rigidity. family and ect in agreement to proceed with treatment 3 tomorrow.  4/30: some confusion today. Will assess tomm  5/1: mutism and negativism on exam   5/2: improved catatonic symptoms. Confused, suspecting AH  5/5: No catatonic symptoms. Will assess tomm for any catatonic symptoms and decide if to hold off on treatment on wednesday 5/6: Continues to exhibit improvement in catatonic sx. Will recommend holding Wed ECT and having next tx on Fri 5/9 5/7: No catatonic sx; exam consistent with past 2 days. Still planning for next ECT 5/9. Pending inpt psych placement.  5/8: Pt tired/minimal engaged, but responds to commands and no rigidity. Next ECT tomorrow.    PLAN  -- ECT planned tomorrow (Fri 5/9). NPO after midnight  -- no SI/HI, no need for psychiatric CO  -- antipsychotics can only be given if qtc < 500      - MEDICATIONS:  - Memantine 5mg AM and 10mg HS  -- C/W Zoloft 125 mg daily   -- C/W Zyprexa 2.5mg HS   - PRN: Ativan 0.5mg q 6hrs prn IM/IV/PO    - please ensure PT involved in patient care    - DISPO: Inpatient psychiatry. 2pc signed, pending beds    Routine observation

## 2025-05-08 NOTE — PROGRESS NOTE ADULT - ASSESSMENT
This is a 74-year-old female with a history of asthma with prior intubations, HTN, prediabetes, and depression who initially presented with acute hypoxemic and hypercapnic respiratory failure now improved with hospital course complicated by persistent mood disorder and catatonia s/p ECT with improvement.    ASSESSMENT:  Severe persistent asthma  Mood disorder with catatonia    PLAN:  - Continue fluticasone-salmeterol 250-50 mcg 1 inhalation twice daily, rinse after use  - Doing well with Spiriva Respimat 2.5 mcg 2 inhalations once daily   - Continue Levalbuterol nebs q8h  - Keep off systemic steroids  - Resolved wheezing on exam and she remains asymptomatic without dyspnea, cough, wheezing, or chest tightness  - Oxygen saturation on room air is >94%  - Incentive spirometry, out of bed to chair  - Physical therapy eval  - Close follow-up with   - Discussed with patient at bedside, will require close outpatient pulmonary follow-up upon discharge ([phone])  - Will sign off, please call back with any questions or concerns

## 2025-05-09 LAB
CULTURE RESULTS: SIGNIFICANT CHANGE UP
GLUCOSE BLDC GLUCOMTR-MCNC: 105 MG/DL — HIGH (ref 70–99)
GLUCOSE BLDC GLUCOMTR-MCNC: 108 MG/DL — HIGH (ref 70–99)
GLUCOSE BLDC GLUCOMTR-MCNC: 122 MG/DL — HIGH (ref 70–99)
GLUCOSE BLDC GLUCOMTR-MCNC: 124 MG/DL — HIGH (ref 70–99)
GLUCOSE BLDC GLUCOMTR-MCNC: 135 MG/DL — HIGH (ref 70–99)
GLUCOSE BLDC GLUCOMTR-MCNC: 201 MG/DL — HIGH (ref 70–99)
SPECIMEN SOURCE: SIGNIFICANT CHANGE UP

## 2025-05-09 PROCEDURE — 99232 SBSQ HOSP IP/OBS MODERATE 35: CPT

## 2025-05-09 PROCEDURE — 99233 SBSQ HOSP IP/OBS HIGH 50: CPT | Mod: 25

## 2025-05-09 PROCEDURE — 90870 ELECTROCONVULSIVE THERAPY: CPT

## 2025-05-09 RX ORDER — INSULIN LISPRO 100 U/ML
INJECTION, SOLUTION INTRAVENOUS; SUBCUTANEOUS EVERY 6 HOURS
Refills: 0 | Status: DISCONTINUED | OUTPATIENT
Start: 2025-05-09 | End: 2025-05-10

## 2025-05-09 RX ADMIN — LEVALBUTEROL HYDROCHLORIDE 0.63 MILLIGRAM(S): 1.25 SOLUTION RESPIRATORY (INHALATION) at 16:26

## 2025-05-09 RX ADMIN — MEMANTINE HYDROCHLORIDE 5 MILLIGRAM(S): 21 CAPSULE, EXTENDED RELEASE ORAL at 05:32

## 2025-05-09 RX ADMIN — INSULIN LISPRO 2 UNIT(S): 100 INJECTION, SOLUTION INTRAVENOUS; SUBCUTANEOUS at 12:53

## 2025-05-09 RX ADMIN — MEMANTINE HYDROCHLORIDE 10 MILLIGRAM(S): 21 CAPSULE, EXTENDED RELEASE ORAL at 23:17

## 2025-05-09 RX ADMIN — DEXTROMETHORPHAN HBR, GUAIFENESIN 600 MILLIGRAM(S): 200 LIQUID ORAL at 17:44

## 2025-05-09 RX ADMIN — Medication 1 DROP(S): at 13:00

## 2025-05-09 RX ADMIN — INSULIN LISPRO 2: 100 INJECTION, SOLUTION INTRAVENOUS; SUBCUTANEOUS at 17:43

## 2025-05-09 RX ADMIN — INSULIN LISPRO 2 UNIT(S): 100 INJECTION, SOLUTION INTRAVENOUS; SUBCUTANEOUS at 10:10

## 2025-05-09 RX ADMIN — Medication 1 APPLICATION(S): at 17:45

## 2025-05-09 RX ADMIN — POLYETHYLENE GLYCOL 3350 17 GRAM(S): 17 POWDER, FOR SOLUTION ORAL at 05:41

## 2025-05-09 RX ADMIN — OLANZAPINE 2.5 MILLIGRAM(S): 10 TABLET ORAL at 23:01

## 2025-05-09 RX ADMIN — DEXTROMETHORPHAN HBR, GUAIFENESIN 600 MILLIGRAM(S): 200 LIQUID ORAL at 05:32

## 2025-05-09 RX ADMIN — Medication 1 DROP(S): at 05:33

## 2025-05-09 RX ADMIN — Medication 1 APPLICATION(S): at 05:42

## 2025-05-09 RX ADMIN — Medication 9 MILLIGRAM(S): at 23:01

## 2025-05-09 RX ADMIN — SERTRALINE 125 MILLIGRAM(S): 100 TABLET, FILM COATED ORAL at 12:51

## 2025-05-09 RX ADMIN — ENOXAPARIN SODIUM 40 MILLIGRAM(S): 100 INJECTION SUBCUTANEOUS at 17:44

## 2025-05-09 RX ADMIN — Medication 1 DROP(S): at 23:01

## 2025-05-09 RX ADMIN — LISINOPRIL 40 MILLIGRAM(S): 5 TABLET ORAL at 05:33

## 2025-05-09 RX ADMIN — LEVALBUTEROL HYDROCHLORIDE 0.63 MILLIGRAM(S): 1.25 SOLUTION RESPIRATORY (INHALATION) at 23:45

## 2025-05-09 RX ADMIN — Medication 40 MILLIGRAM(S): at 05:31

## 2025-05-09 RX ADMIN — Medication 1 DOSE(S): at 10:11

## 2025-05-09 RX ADMIN — Medication 1 APPLICATION(S): at 05:41

## 2025-05-09 RX ADMIN — INSULIN LISPRO 2 UNIT(S): 100 INJECTION, SOLUTION INTRAVENOUS; SUBCUTANEOUS at 17:43

## 2025-05-09 RX ADMIN — Medication 1 DOSE(S): at 23:01

## 2025-05-09 RX ADMIN — INSULIN GLARGINE-YFGN 3 UNIT(S): 100 INJECTION, SOLUTION SUBCUTANEOUS at 23:00

## 2025-05-09 RX ADMIN — TIOTROPIUM BROMIDE INHALATION SPRAY 2 PUFF(S): 3.12 SPRAY, METERED RESPIRATORY (INHALATION) at 10:11

## 2025-05-09 RX ADMIN — Medication 2 TABLET(S): at 23:02

## 2025-05-09 NOTE — PROGRESS NOTE ADULT - SUBJECTIVE AND OBJECTIVE BOX
[facility] Division of Hospital Medicine  Em Toure MD  Pager [phone]    Patient is a 74y old  Female who presents with a chief complaint of asthma exac/ resp failure/catatonia       SUBJECTIVE / OVERNIGHT EVENTS: s/p ECT this AM      MEDICATIONS  (STANDING):  artificial tears (preservative free) Ophthalmic Solution 1 Drop(s) Both EYES every 8 hours  bisacodyl 10 milliGRAM(s) Oral once  chlorhexidine 2% Cloths 1 Application(s) Topical <User Schedule>  dextrose 5%. 1000 milliLiter(s) (50 mL/Hr) IV Continuous <Continuous>  dextrose 5%. 1000 milliLiter(s) (100 mL/Hr) IV Continuous <Continuous>  dextrose 50% Injectable 25 Gram(s) IV Push once  dextrose 50% Injectable 12.5 Gram(s) IV Push once  dextrose 50% Injectable 25 Gram(s) IV Push once  dextrose Oral Gel 15 Gram(s) Oral once  enoxaparin Injectable 40 milliGRAM(s) SubCutaneous every 24 hours  fluticasone propionate/ salmeterol 250-50 MICROgram(s) Diskus 1 Dose(s) Inhalation two times a day  glucagon  Injectable 1 milliGRAM(s) IntraMuscular once  guaiFENesin  milliGRAM(s) Oral every 12 hours  hydrochlorothiazide 25 milliGRAM(s) Oral daily  insulin glargine Injectable (LANTUS) 3 Unit(s) SubCutaneous at bedtime  insulin lispro (ADMELOG) corrective regimen sliding scale   SubCutaneous every 6 hours  insulin lispro Injectable (ADMELOG) 2 Unit(s) SubCutaneous three times a day before meals  levalbuterol Inhalation 0.63 milliGRAM(s) Inhalation every 8 hours  lisinopril 40 milliGRAM(s) Oral daily  melatonin 9 milliGRAM(s) Oral at bedtime  memantine 10 milliGRAM(s) Oral at bedtime  memantine 5 milliGRAM(s) Oral <User Schedule>  OLANZapine 2.5 milliGRAM(s) Oral at bedtime  pantoprazole    Tablet 40 milliGRAM(s) Oral before breakfast  petrolatum Ophthalmic Ointment 1 Application(s) Both EYES two times a day  polyethylene glycol 3350 17 Gram(s) Oral two times a day  senna 2 Tablet(s) Oral at bedtime  sertraline 125 milliGRAM(s) Oral daily  tiotropium 2.5 MICROgram(s) Inhaler 2 Puff(s) Inhalation daily    MEDICATIONS  (PRN):  albuterol/ipratropium for Nebulization 3 milliLiter(s) Nebulizer every 6 hours PRN Shortness of Breath and/or Wheezing      CAPILLARY BLOOD GLUCOSE  POCT Blood Glucose.: 108 mg/dL (09 May 2025 11:59)  POCT Blood Glucose.: 135 mg/dL (09 May 2025 09:58)  POCT Blood Glucose.: 122 mg/dL (09 May 2025 06:02)  POCT Blood Glucose.: 105 mg/dL (09 May 2025 05:29)  POCT Blood Glucose.: 294 mg/dL (08 May 2025 22:16)  POCT Blood Glucose.: 128 mg/dL (08 May 2025 17:25)      PHYSICAL EXAM:  Vital Signs Last 24 Hrs  T(F): 98.6 (09 May 2025 10:00), Max: 98.6 (09 May 2025 10:00)  HR: 89 (09 May 2025 10:00) (77 - 97)  BP: 142/71 (09 May 2025 10:00) (106/59 - 152/64)  RR: 18 (09 May 2025 10:00) (14 - 20)  SpO2: 97% (09 May 2025 10:00) (94% - 100%)    Parameters below as of 09 May 2025 10:00  Patient On (Oxygen Delivery Method): room air        CONSTITUTIONAL: NAD, appears comfortable  EYES: PERRLA; conjunctiva and sclera clear  ENMT: Moist oral mucosa normal dentition  RESPIRATORY: Normal respiratory effort;   CARDIOVASCULAR:  No lower extremity edema  ABDOMEN: Nontender to palpation, normoactive bowel sounds  MUSCULOSKELETAL:  no clubbing or cyanosis of digits; no joint swelling or tenderness to palpation  PSYCH: calm, coop; affect appropriate  NEUROLOGY: CN 2-12 are intact and symmetric; no gross sensory deficits   SKIN: No rashes; no palpable lesions    LABS:                Urinalysis Basic - ( 08 May 2025 10:59 )    Color: Yellow / Appearance: Clear / S.026 / pH: x  Gluc: x / Ketone: Negative mg/dL  / Bili: Negative / Urobili: 0.2 mg/dL   Blood: x / Protein: Negative mg/dL / Nitrite: Negative   Leuk Esterase: Moderate / RBC: 1 /HPF / WBC 4 /HPF   Sq Epi: x / Non Sq Epi: 1 /HPF / Bacteria: Occasional /HPF

## 2025-05-09 NOTE — ECT TREATMENT NOTE - NSECTPOSTTXSUMMARY_PSY_ALL_CORE
The patient had a well modified grand mal seizure under general anesthesia and muscle relaxant. The patient is alert, responsive, in no acute distress.  Recovery uneventful.     Induction: methohexital 60 mg; Muscle relaxant: succinylcholine 30 mg

## 2025-05-09 NOTE — ECT TREATMENT NOTE - NSECTPOSTTXCOMMENTS_PSY_ALL_CORE
Prior to treatmnet, patient appeared to be sleeping with head under sheets. Rousable but reluctant to remove sheets. Refused setup with agitation. Minimally verbal, to greetings only, subsequently mute. Social smile and giggles when LE's examined for tone. Mild gegenhalten in BUE but reduced when patient distracted. No palmar grasp or ambitendency. No catalepsy or grimacing.

## 2025-05-09 NOTE — BH CONSULTATION LIAISON PROGRESS NOTE - NSBHASSESSMENTFT_PSY_ALL_CORE
Patient is a 74F hx asthma, depression, and HTN was admitted to the MICU with AHRF 2/2 asthma exacerbation resulting in acute on chronic HHRF causing AMS and increased WOB that required intubation. During her ICU course, she experienced seizure-like activity .  Additional issues included hyponatremia, acute on chronic metabolic alkalosis, and mild thrombocytopenia,  also having fevers.  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 Patient has h/o hx of major depressive disorder with psychosis, hx of 3 past inpatient psychiatric hospitalizations last in 2016 at [facility], all rehospitalizations were in context non compliance with the treatment. She has no hx of suicide attempts, no hx of substance abuse. Patient w hx of paranoia, disorganization, catatonia, required MOO when in [facility].     3/24----more withdrawn, some stiffness in neck, not communicative. Some concerns for catatonia. Unsure if symptoms attributed to catatonia vs delirium  3/25--- more alert today, trying to following commands. Still with PMR+. Appears to be responding to Ativan  3/26-- BFS 10, will increasing standing ativan to target catatonic symptoms - giving STAT 0.5mg NOW discussed w acp   3/27----will continue ativan dosing for one more day before increasing  3/28: BFS: stupor 1, mutism 2,withdrawan 2, verbigeration 1, rigidity 2 =8.  3/29: BFS: stupor 2, mutism 2, staring 1, posturing 2, rigidity 2, waxy flexibility 3, withdrawal 2 = 14; please monitor nutritional intake, plan to increase ativan by total daily dose of 0.5 mg at bedtime due to persistent catatonia   3/31: BFS: stupor 2, mutism 2, staring 2, rigidity 1, negativism 1, withdrawal 1, waxy flexibility 3 =12, patient with productive cough, advised for medical work up  4/1---tired, speaking more, not rigid, has PMR+. NPO status now.   4/2--sleepy, RRT this afternoon  4/3---better mentation, pmr but no chester catatonic symptoms  4/4: alert, using few words, mild rigidity -  at bedside, updated   4/5: few words, lethargic, fluctuating wakefulness. Mild rigidity.  No overt catatonic sxs.   4/7: no change, remains thought blocked, few words - son at bedside, updated with care  4/8: patient continues to have poor eye contact, uses few words, no rigidity on today's exam.   4/9: patient remains minimally verbal, some posturing, poor Po intake - continues on NC for 02 requirements   4/10: more verbal/interactive, with increased PO intake per family. Some psychotic output. Family agrees with resumption of Zyprexa.   4/14: alert to self with fluctuating wakefulness, intermittent full sentences f/b one-word answers, some rigidity, posturing, denies si, denies ah/vh  4/16: patient more catatonic today, notable catalepsy, waxiness, verbigeration followed by mutism and staring, family against ativan and ECT, would increase memantine as below, not eating   4/17: catatonic (mute, rigid, not engaging). Eating some portions of meals with significant family support. Family opting to see memantine trial to full effect before consideration of ECT  4/18: mutism today, odd posturing, unable to engage, d/w spouse and son ECT, family still undecided  4/21: alert to self, some improvement with verbal engagement, tracking, follow command, denies si, denies ah/vh, awaiting call back from son in regards to ECT consult  4/22: remains catatonic (mute, postured, rigid, withdrawn, negativistic). Exam limited. Family agrees with formal ECT consult.  4/23: Remains with symptoms of catatonia. BFS: stupor 1, mute2, Rigid 2, negativism 2, withdrawal 2 - 9 : ECT consult completed. awaiting clearances for ECT  4/42: remains with BFS of 9, plan for ECT  4/25: alert to self, able to engage in some conversation, however, confused, loose on  exam, odd posturing right upper arm, Dr. Toure made aware of schedule for ECT Monday 4/28: s/p ECT #2. more spontaneous speech, cooperative with exam. improved eye contact. loose with no rigidity.   4/29: displays more s/s of catatonia since yesterdays exam- negativism, withdrawal, rigidity. family and ect in agreement to proceed with treatment 3 tomorrow.  4/30: some confusion today. Will assess tomm  5/1: mutism and negativism on exam   5/2: improved catatonic symptoms. Confused, suspecting AH  5/5: No catatonic symptoms. Will assess tomm for any catatonic symptoms and decide if to hold off on treatment on wednesday 5/6: Continues to exhibit improvement in catatonic sx. Will recommend holding Wed ECT and having next tx on Fri 5/9  5/7: No catatonic sx; exam consistent with past 2 days. Still planning for next ECT 5/9. Pending inpt psych placement.  5/8: Pt tired/minimal engaged, but responds to commands and no rigidity. Next ECT tomorrow.  5/9: Significantly improved after ECT and AOx2. Will plan for next ECT on Mon 5/12 given return of catatonic sx between treatments when spacing increased to M/F this week.    PLAN  -- ECT planned for Mon 5/12. NPO after midnight  -- no SI/HI, no need for psychiatric CO  -- antipsychotics can only be given if qtc < 500      - MEDICATIONS:  - Memantine 5mg AM and 10mg HS  -- C/W Zoloft 125 mg daily   -- C/W Zyprexa 2.5mg HS   - PRN: Ativan 0.5mg q 6hrs prn IM/IV/PO    - please ensure PT involved in patient care    - DISPO: Inpatient psychiatry. 2pc signed, pending beds    Routine observation   Patient is a 74F hx asthma, depression, and HTN was admitted to the MICU with AHRF 2/2 asthma exacerbation resulting in acute on chronic HHRF causing AMS and increased WOB that required intubation. During her ICU course, she experienced seizure-like activity .  Additional issues included hyponatremia, acute on chronic metabolic alkalosis, and mild thrombocytopenia,  also having fevers.  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 Patient has h/o hx of major depressive disorder with psychosis, hx of 3 past inpatient psychiatric hospitalizations last in 2016 at [facility], all rehospitalizations were in context non compliance with the treatment. She has no hx of suicide attempts, no hx of substance abuse. Patient w hx of paranoia, disorganization, catatonia, required MOO when in [facility].     3/24----more withdrawn, some stiffness in neck, not communicative. Some concerns for catatonia. Unsure if symptoms attributed to catatonia vs delirium  3/25--- more alert today, trying to following commands. Still with PMR+. Appears to be responding to Ativan  3/26-- BFS 10, will increasing standing ativan to target catatonic symptoms - giving STAT 0.5mg NOW discussed w acp   3/27----will continue ativan dosing for one more day before increasing  3/28: BFS: stupor 1, mutism 2,withdrawan 2, verbigeration 1, rigidity 2 =8.  3/29: BFS: stupor 2, mutism 2, staring 1, posturing 2, rigidity 2, waxy flexibility 3, withdrawal 2 = 14; please monitor nutritional intake, plan to increase ativan by total daily dose of 0.5 mg at bedtime due to persistent catatonia   3/31: BFS: stupor 2, mutism 2, staring 2, rigidity 1, negativism 1, withdrawal 1, waxy flexibility 3 =12, patient with productive cough, advised for medical work up  4/1---tired, speaking more, not rigid, has PMR+. NPO status now.   4/2--sleepy, RRT this afternoon  4/3---better mentation, pmr but no chester catatonic symptoms  4/4: alert, using few words, mild rigidity -  at bedside, updated   4/5: few words, lethargic, fluctuating wakefulness. Mild rigidity.  No overt catatonic sxs.   4/7: no change, remains thought blocked, few words - son at bedside, updated with care  4/8: patient continues to have poor eye contact, uses few words, no rigidity on today's exam.   4/9: patient remains minimally verbal, some posturing, poor Po intake - continues on NC for 02 requirements   4/10: more verbal/interactive, with increased PO intake per family. Some psychotic output. Family agrees with resumption of Zyprexa.   4/14: alert to self with fluctuating wakefulness, intermittent full sentences f/b one-word answers, some rigidity, posturing, denies si, denies ah/vh  4/16: patient more catatonic today, notable catalepsy, waxiness, verbigeration followed by mutism and staring, family against ativan and ECT, would increase memantine as below, not eating   4/17: catatonic (mute, rigid, not engaging). Eating some portions of meals with significant family support. Family opting to see memantine trial to full effect before consideration of ECT  4/18: mutism today, odd posturing, unable to engage, d/w spouse and son ECT, family still undecided  4/21: alert to self, some improvement with verbal engagement, tracking, follow command, denies si, denies ah/vh, awaiting call back from son in regards to ECT consult  4/22: remains catatonic (mute, postured, rigid, withdrawn, negativistic). Exam limited. Family agrees with formal ECT consult.  4/23: Remains with symptoms of catatonia. BFS: stupor 1, mute2, Rigid 2, negativism 2, withdrawal 2 - 9 : ECT consult completed. awaiting clearances for ECT  4/42: remains with BFS of 9, plan for ECT  4/25: alert to self, able to engage in some conversation, however, confused, loose on  exam, odd posturing right upper arm, Dr. Toure made aware of schedule for ECT Monday 4/28: s/p ECT #2. more spontaneous speech, cooperative with exam. improved eye contact. loose with no rigidity.   4/29: displays more s/s of catatonia since yesterdays exam- negativism, withdrawal, rigidity. family and ect in agreement to proceed with treatment 3 tomorrow.  4/30: some confusion today. Will assess tomm  5/1: mutism and negativism on exam   5/2: improved catatonic symptoms. Confused, suspecting AH  5/5: No catatonic symptoms. Will assess tomm for any catatonic symptoms and decide if to hold off on treatment on wednesday 5/6: Continues to exhibit improvement in catatonic sx. Will recommend holding Wed ECT and having next tx on Fri 5/9  5/7: No catatonic sx; exam consistent with past 2 days. Still planning for next ECT 5/9. Pending inpt psych placement.  5/8: Pt tired/minimal engaged, but responds to commands and no rigidity. Next ECT tomorrow.  5/9: Significantly improved after ECT today and AOx2. Will plan for next ECT on Mon 5/12     PLAN  -- ECT planned for Mon 5/12. NPO after midnight on sunday  -- no SI/HI, no need for psychiatric CO  -- antipsychotics can only be given if qtc < 500      - MEDICATIONS:  - Memantine 5mg AM and 10mg HS  -- C/W Zoloft 125 mg daily   -- C/W Zyprexa 2.5mg HS   - PRN: Ativan 0.5mg q 6hrs prn IM/IV/PO    - please ensure PT involved in patient care    - DISPO: Inpatient psychiatry. 2pc signed, pending beds    Routine observation

## 2025-05-09 NOTE — PROGRESS NOTE ADULT - SUBJECTIVE AND OBJECTIVE BOX
Arnold Uriostegui MD  Interventional Cardiology / Advance Heart Failure and Cardiac Transplant Specialist  [place] Office : [phone] [place] [address] [place]. [address]  Tel:   [place] Office : [address] [facility] [place] [address]  Tel: [phone]       Pt is lying in bed comfortable not in distress, no chest pains no SOB no palpitations  	  MEDICATIONS:  enoxaparin Injectable 40 milliGRAM(s) SubCutaneous every 24 hours  hydrochlorothiazide 25 milliGRAM(s) Oral daily  lisinopril 40 milliGRAM(s) Oral daily      albuterol/ipratropium for Nebulization 3 milliLiter(s) Nebulizer every 6 hours PRN  fluticasone propionate/ salmeterol 250-50 MICROgram(s) Diskus 1 Dose(s) Inhalation two times a day  guaiFENesin  milliGRAM(s) Oral every 12 hours  levalbuterol Inhalation 0.63 milliGRAM(s) Inhalation every 8 hours  tiotropium 2.5 MICROgram(s) Inhaler 2 Puff(s) Inhalation daily    melatonin 9 milliGRAM(s) Oral at bedtime  memantine 10 milliGRAM(s) Oral at bedtime  memantine 5 milliGRAM(s) Oral <User Schedule>  OLANZapine 2.5 milliGRAM(s) Oral at bedtime  sertraline 125 milliGRAM(s) Oral daily    bisacodyl 10 milliGRAM(s) Oral once  pantoprazole    Tablet 40 milliGRAM(s) Oral before breakfast  polyethylene glycol 3350 17 Gram(s) Oral two times a day  senna 2 Tablet(s) Oral at bedtime    dextrose 50% Injectable 25 Gram(s) IV Push once  dextrose 50% Injectable 12.5 Gram(s) IV Push once  dextrose 50% Injectable 25 Gram(s) IV Push once  dextrose Oral Gel 15 Gram(s) Oral once  glucagon  Injectable 1 milliGRAM(s) IntraMuscular once  insulin glargine Injectable (LANTUS) 3 Unit(s) SubCutaneous at bedtime  insulin lispro (ADMELOG) corrective regimen sliding scale   SubCutaneous every 6 hours  insulin lispro Injectable (ADMELOG) 2 Unit(s) SubCutaneous three times a day before meals    artificial tears (preservative free) Ophthalmic Solution 1 Drop(s) Both EYES every 8 hours  chlorhexidine 2% Cloths 1 Application(s) Topical <User Schedule>  dextrose 5%. 1000 milliLiter(s) IV Continuous <Continuous>  dextrose 5%. 1000 milliLiter(s) IV Continuous <Continuous>  petrolatum Ophthalmic Ointment 1 Application(s) Both EYES two times a day      PAST MEDICAL/SURGICAL HISTORY  PAST MEDICAL & SURGICAL HISTORY:  MDD (major depressive disorder), recurrent, severe, with psychosis      Asthma, mild intermittent, uncomplicated      Essential hypertension      No significant past surgical history          SOCIAL HISTORY: Substance Use (street drugs): ( x ) never used  (  ) other:    FAMILY HISTORY:      PHYSICAL EXAM:  T(C): 37 (05-09-25 @ 10:00), Max: 37 (05-09-25 @ 10:00)  HR: 89 (05-09-25 @ 10:00) (77 - 97)  BP: 142/71 (05-09-25 @ 10:00) (106/59 - 152/64)  RR: 18 (05-09-25 @ 10:00) (14 - 20)  SpO2: 97% (05-09-25 @ 10:00) (94% - 100%)  Wt(kg): --  I&O's Summary    Height (cm): 157 (05-09 @ 07:13)  Weight (kg): 80 (05-09 @ 07:13)  BMI (kg/m2): 32.5 (05-09 @ 07:13)  BSA (m2): 1.81 (05-09 @ 07:13)    GENERAL: NAD  EYES:   PERRLA   ENMT:   Moist mucous membranes, Good dentition, No lesions  Cardiovascular: Normal S1 S2, No JVD, No murmurs, No edema  Respiratory: Lungs clear to auscultation	  Gastrointestinal:  Soft, Non-tender, + BS	  Extremities: no edema        proBNP:   Lipid Profile:   HgA1c:   TSH:     Consultant(s) Notes Reviewed:  [x ] YES  [ ] NO    Care Discussed with Consultants/Other Providers [ x] YES  [ ] NO    Imaging Personally Reviewed independently:  [x] YES  [ ] NO    All labs, radiologic studies, vitals, orders and medications list reviewed. Patient is seen and examined at bedside. Case discussed with medical team.

## 2025-05-09 NOTE — PROGRESS NOTE ADULT - PROBLEM SELECTOR PLAN 9
Lovenox ppx  full code  PT rec TK  Will need transfer to [facility] for psychiatric optimization    Ambulatory with PT. Eating consistently now. 2PC per psychiatry.

## 2025-05-09 NOTE — BH CONSULTATION LIAISON PROGRESS NOTE - NSBHFUPINTERVALHXFT_PSY_A_CORE
Patient appears bright and happy, observed seated awake in bed with  at bedside. Received ECT earlier this morning. She states she is "good" and is AOx2 (person, place). Denies AH/VH. Denies safety concerns. States her age is 50. She recognizes her . On exam, increased tone, + grasp reflex, no rigidity. Compliant with psychotropic meds, no behavioral PRNs overnight.

## 2025-05-09 NOTE — PROGRESS NOTE ADULT - PROBLEM SELECTOR PLAN 5
2/2 to deconditioning/critical care myopathy, CK WNL  doing well  will get PT services at [facility]

## 2025-05-09 NOTE — PROGRESS NOTE ADULT - PROBLEM SELECTOR PLAN 1
MDD w/ psychotic features.  - s/p ativan now on hold given concern that it may have contributed to respiratory decompensation  - c/w Zoloft 125 qday (home dose was 150).  - c/w Zyprexa 2.5 qhs  - c/w Namenda 10mg qhs  - psych following  - ECT ongoing  pt optimized per cards and pulm for ECT    Pending transfer to inpatient psychiatric unit at [facility]  some concern this AM by ECT team, that pt more lethargic on arrive to them, however, pt pleasant and alert and conversant with me post ECT; will try to collect UA for poss etiology of lethargy this AM however upon my eval pt back to her baseline since ECT started

## 2025-05-10 LAB
GLUCOSE BLDC GLUCOMTR-MCNC: 125 MG/DL — HIGH (ref 70–99)
GLUCOSE BLDC GLUCOMTR-MCNC: 145 MG/DL — HIGH (ref 70–99)
GLUCOSE BLDC GLUCOMTR-MCNC: 181 MG/DL — HIGH (ref 70–99)
GLUCOSE BLDC GLUCOMTR-MCNC: 293 MG/DL — HIGH (ref 70–99)

## 2025-05-10 PROCEDURE — 99232 SBSQ HOSP IP/OBS MODERATE 35: CPT

## 2025-05-10 RX ORDER — INSULIN LISPRO 100 U/ML
INJECTION, SOLUTION INTRAVENOUS; SUBCUTANEOUS AT BEDTIME
Refills: 0 | Status: DISCONTINUED | OUTPATIENT
Start: 2025-05-10 | End: 2025-05-16

## 2025-05-10 RX ORDER — INSULIN LISPRO 100 U/ML
INJECTION, SOLUTION INTRAVENOUS; SUBCUTANEOUS
Refills: 0 | Status: DISCONTINUED | OUTPATIENT
Start: 2025-05-10 | End: 2025-05-16

## 2025-05-10 RX ADMIN — Medication 1 DOSE(S): at 21:54

## 2025-05-10 RX ADMIN — INSULIN GLARGINE-YFGN 3 UNIT(S): 100 INJECTION, SOLUTION SUBCUTANEOUS at 23:07

## 2025-05-10 RX ADMIN — Medication 1 APPLICATION(S): at 05:45

## 2025-05-10 RX ADMIN — Medication 40 MILLIGRAM(S): at 05:44

## 2025-05-10 RX ADMIN — LISINOPRIL 40 MILLIGRAM(S): 5 TABLET ORAL at 05:44

## 2025-05-10 RX ADMIN — LEVALBUTEROL HYDROCHLORIDE 0.63 MILLIGRAM(S): 1.25 SOLUTION RESPIRATORY (INHALATION) at 09:14

## 2025-05-10 RX ADMIN — INSULIN LISPRO 2 UNIT(S): 100 INJECTION, SOLUTION INTRAVENOUS; SUBCUTANEOUS at 12:32

## 2025-05-10 RX ADMIN — LEVALBUTEROL HYDROCHLORIDE 0.63 MILLIGRAM(S): 1.25 SOLUTION RESPIRATORY (INHALATION) at 16:10

## 2025-05-10 RX ADMIN — Medication 2 TABLET(S): at 21:52

## 2025-05-10 RX ADMIN — Medication 1 DROP(S): at 05:44

## 2025-05-10 RX ADMIN — SERTRALINE 125 MILLIGRAM(S): 100 TABLET, FILM COATED ORAL at 12:33

## 2025-05-10 RX ADMIN — LEVALBUTEROL HYDROCHLORIDE 0.63 MILLIGRAM(S): 1.25 SOLUTION RESPIRATORY (INHALATION) at 21:24

## 2025-05-10 RX ADMIN — INSULIN LISPRO 1: 100 INJECTION, SOLUTION INTRAVENOUS; SUBCUTANEOUS at 12:32

## 2025-05-10 RX ADMIN — INSULIN LISPRO 2 UNIT(S): 100 INJECTION, SOLUTION INTRAVENOUS; SUBCUTANEOUS at 08:38

## 2025-05-10 RX ADMIN — MEMANTINE HYDROCHLORIDE 5 MILLIGRAM(S): 21 CAPSULE, EXTENDED RELEASE ORAL at 05:44

## 2025-05-10 RX ADMIN — INSULIN LISPRO 2 UNIT(S): 100 INJECTION, SOLUTION INTRAVENOUS; SUBCUTANEOUS at 17:44

## 2025-05-10 RX ADMIN — Medication 1 DOSE(S): at 08:39

## 2025-05-10 RX ADMIN — TIOTROPIUM BROMIDE INHALATION SPRAY 2 PUFF(S): 3.12 SPRAY, METERED RESPIRATORY (INHALATION) at 11:29

## 2025-05-10 RX ADMIN — OLANZAPINE 2.5 MILLIGRAM(S): 10 TABLET ORAL at 21:52

## 2025-05-10 RX ADMIN — Medication 9 MILLIGRAM(S): at 21:52

## 2025-05-10 RX ADMIN — DEXTROMETHORPHAN HBR, GUAIFENESIN 600 MILLIGRAM(S): 200 LIQUID ORAL at 05:44

## 2025-05-10 RX ADMIN — Medication 1 DROP(S): at 21:53

## 2025-05-10 RX ADMIN — DEXTROMETHORPHAN HBR, GUAIFENESIN 600 MILLIGRAM(S): 200 LIQUID ORAL at 17:46

## 2025-05-10 RX ADMIN — POLYETHYLENE GLYCOL 3350 17 GRAM(S): 17 POWDER, FOR SOLUTION ORAL at 05:44

## 2025-05-10 RX ADMIN — MEMANTINE HYDROCHLORIDE 10 MILLIGRAM(S): 21 CAPSULE, EXTENDED RELEASE ORAL at 21:53

## 2025-05-10 RX ADMIN — Medication 1 APPLICATION(S): at 17:46

## 2025-05-10 RX ADMIN — Medication 1 DROP(S): at 13:39

## 2025-05-10 RX ADMIN — INSULIN LISPRO 1: 100 INJECTION, SOLUTION INTRAVENOUS; SUBCUTANEOUS at 23:08

## 2025-05-10 RX ADMIN — ENOXAPARIN SODIUM 40 MILLIGRAM(S): 100 INJECTION SUBCUTANEOUS at 17:46

## 2025-05-10 RX ADMIN — POLYETHYLENE GLYCOL 3350 17 GRAM(S): 17 POWDER, FOR SOLUTION ORAL at 17:45

## 2025-05-10 NOTE — PROGRESS NOTE ADULT - PROBLEM SELECTOR PLAN 5
2/2 to deconditioning/critical care myopathy, CK WNL  doing well  will get PT services at [facility]

## 2025-05-10 NOTE — PROGRESS NOTE ADULT - SUBJECTIVE AND OBJECTIVE BOX
[facility] Division of Hospital Medicine  Em Toure MD  Pager [phone]    Patient is a 74y old  Female who presents with a chief complaint of asthma exac/resp failure/catatonia      SUBJECTIVE / OVERNIGHT EVENTS: s/p ECT yesterday; doing well; eating all meals; OOB      MEDICATIONS  (STANDING):  artificial tears (preservative free) Ophthalmic Solution 1 Drop(s) Both EYES every 8 hours  bisacodyl 10 milliGRAM(s) Oral once  chlorhexidine 2% Cloths 1 Application(s) Topical <User Schedule>  dextrose 5%. 1000 milliLiter(s) (50 mL/Hr) IV Continuous <Continuous>  dextrose 5%. 1000 milliLiter(s) (100 mL/Hr) IV Continuous <Continuous>  dextrose 50% Injectable 25 Gram(s) IV Push once  dextrose 50% Injectable 12.5 Gram(s) IV Push once  dextrose 50% Injectable 25 Gram(s) IV Push once  dextrose Oral Gel 15 Gram(s) Oral once  enoxaparin Injectable 40 milliGRAM(s) SubCutaneous every 24 hours  fluticasone propionate/ salmeterol 250-50 MICROgram(s) Diskus 1 Dose(s) Inhalation two times a day  glucagon  Injectable 1 milliGRAM(s) IntraMuscular once  guaiFENesin  milliGRAM(s) Oral every 12 hours  hydrochlorothiazide 25 milliGRAM(s) Oral daily  insulin glargine Injectable (LANTUS) 3 Unit(s) SubCutaneous at bedtime  insulin lispro (ADMELOG) corrective regimen sliding scale   SubCutaneous at bedtime  insulin lispro (ADMELOG) corrective regimen sliding scale   SubCutaneous three times a day before meals  insulin lispro Injectable (ADMELOG) 2 Unit(s) SubCutaneous three times a day before meals  levalbuterol Inhalation 0.63 milliGRAM(s) Inhalation every 8 hours  lisinopril 40 milliGRAM(s) Oral daily  melatonin 9 milliGRAM(s) Oral at bedtime  memantine 10 milliGRAM(s) Oral at bedtime  memantine 5 milliGRAM(s) Oral <User Schedule>  OLANZapine 2.5 milliGRAM(s) Oral at bedtime  pantoprazole    Tablet 40 milliGRAM(s) Oral before breakfast  petrolatum Ophthalmic Ointment 1 Application(s) Both EYES two times a day  polyethylene glycol 3350 17 Gram(s) Oral two times a day  senna 2 Tablet(s) Oral at bedtime  sertraline 125 milliGRAM(s) Oral daily  tiotropium 2.5 MICROgram(s) Inhaler 2 Puff(s) Inhalation daily    MEDICATIONS  (PRN):  albuterol/ipratropium for Nebulization 3 milliLiter(s) Nebulizer every 6 hours PRN Shortness of Breath and/or Wheezing      CAPILLARY BLOOD GLUCOSE  POCT Blood Glucose.: 125 mg/dL (10 May 2025 08:30)  POCT Blood Glucose.: 124 mg/dL (09 May 2025 22:05)  POCT Blood Glucose.: 201 mg/dL (09 May 2025 17:03)  POCT Blood Glucose.: 108 mg/dL (09 May 2025 11:59)    PHYSICAL EXAM:  Vital Signs Last 24 Hrs  T(F): 98.3 (10 May 2025 05:06), Max: 98.4 (09 May 2025 20:58)  HR: 74 (10 May 2025 09:14) (74 - 94)  BP: 140/84 (10 May 2025 05:06) (136/68 - 140/84)  RR: 17 (10 May 2025 05:06) (17 - 18)  SpO2: 97% (10 May 2025 09:14) (95% - 97%)    Parameters below as of 10 May 2025 09:14  Patient On (Oxygen Delivery Method): room air        CONSTITUTIONAL: NAD, appears comfortable  EYES: PERRLA; conjunctiva and sclera clear  ENMT: Moist oral mucosa; normal dentition  RESPIRATORY: Normal respiratory effort; grossly b/l AE; no wheeze  CARDIOVASCULAR: Regular rate and rhythm; No lower extremity edema  ABDOMEN: Nontender to palpation, normoactive bowel sounds  MUSCULOSKELETAL:  no clubbing or cyanosis of digits; no joint swelling or tenderness to palpation  PSYCH: calm, coop; affect appropriate  NEUROLOGY: CN 2-12 are intact and symmetric; no gross sensory deficits   SKIN: No rashes; no palpable lesions    LABS:

## 2025-05-10 NOTE — PROGRESS NOTE ADULT - PROBLEM SELECTOR PLAN 1
MDD w/ psychotic features.  - s/p ativan now on hold given concern that it may have contributed to respiratory decompensation  - c/w Zoloft 125 qday (home dose was 150).  - c/w Zyprexa 2.5 qhs  - c/w Namenda 10mg qhs  - psych following  - ECT ongoing  pt optimized per cards and pulm for ECT    Pending transfer to inpatient psychiatric unit at [facility]  some concern this AM by ECT team, that pt more lethargic on arrive to them, however, pt pleasant and alert and conversant with me post ECT; will try to collect UA for poss etiology of lethargy this AM however upon my eval pt back to her baseline since ECT started; UA generally unremakable, Ucx grossly contain, pt remains at baseline and afebrile, nl WBC; will not pursue further unless clinical signs of infection revealed

## 2025-05-11 LAB
GLUCOSE BLDC GLUCOMTR-MCNC: 147 MG/DL — HIGH (ref 70–99)
GLUCOSE BLDC GLUCOMTR-MCNC: 164 MG/DL — HIGH (ref 70–99)
GLUCOSE BLDC GLUCOMTR-MCNC: 175 MG/DL — HIGH (ref 70–99)
GLUCOSE BLDC GLUCOMTR-MCNC: 207 MG/DL — HIGH (ref 70–99)

## 2025-05-11 PROCEDURE — 99232 SBSQ HOSP IP/OBS MODERATE 35: CPT

## 2025-05-11 RX ADMIN — LEVALBUTEROL HYDROCHLORIDE 0.63 MILLIGRAM(S): 1.25 SOLUTION RESPIRATORY (INHALATION) at 09:07

## 2025-05-11 RX ADMIN — MEMANTINE HYDROCHLORIDE 5 MILLIGRAM(S): 21 CAPSULE, EXTENDED RELEASE ORAL at 06:31

## 2025-05-11 RX ADMIN — MEMANTINE HYDROCHLORIDE 10 MILLIGRAM(S): 21 CAPSULE, EXTENDED RELEASE ORAL at 21:33

## 2025-05-11 RX ADMIN — INSULIN LISPRO 1: 100 INJECTION, SOLUTION INTRAVENOUS; SUBCUTANEOUS at 13:08

## 2025-05-11 RX ADMIN — Medication 1 APPLICATION(S): at 06:31

## 2025-05-11 RX ADMIN — Medication 1 DROP(S): at 13:07

## 2025-05-11 RX ADMIN — Medication 1 DROP(S): at 21:32

## 2025-05-11 RX ADMIN — ENOXAPARIN SODIUM 40 MILLIGRAM(S): 100 INJECTION SUBCUTANEOUS at 18:04

## 2025-05-11 RX ADMIN — TIOTROPIUM BROMIDE INHALATION SPRAY 2 PUFF(S): 3.12 SPRAY, METERED RESPIRATORY (INHALATION) at 09:03

## 2025-05-11 RX ADMIN — LEVALBUTEROL HYDROCHLORIDE 0.63 MILLIGRAM(S): 1.25 SOLUTION RESPIRATORY (INHALATION) at 16:19

## 2025-05-11 RX ADMIN — INSULIN LISPRO 2 UNIT(S): 100 INJECTION, SOLUTION INTRAVENOUS; SUBCUTANEOUS at 13:15

## 2025-05-11 RX ADMIN — SERTRALINE 125 MILLIGRAM(S): 100 TABLET, FILM COATED ORAL at 13:06

## 2025-05-11 RX ADMIN — Medication 1 APPLICATION(S): at 06:34

## 2025-05-11 RX ADMIN — INSULIN LISPRO 2 UNIT(S): 100 INJECTION, SOLUTION INTRAVENOUS; SUBCUTANEOUS at 09:01

## 2025-05-11 RX ADMIN — POLYETHYLENE GLYCOL 3350 17 GRAM(S): 17 POWDER, FOR SOLUTION ORAL at 06:31

## 2025-05-11 RX ADMIN — Medication 9 MILLIGRAM(S): at 21:33

## 2025-05-11 RX ADMIN — Medication 1 APPLICATION(S): at 18:06

## 2025-05-11 RX ADMIN — Medication 1 DROP(S): at 06:32

## 2025-05-11 RX ADMIN — LISINOPRIL 40 MILLIGRAM(S): 5 TABLET ORAL at 06:31

## 2025-05-11 RX ADMIN — Medication 2 TABLET(S): at 21:32

## 2025-05-11 RX ADMIN — OLANZAPINE 2.5 MILLIGRAM(S): 10 TABLET ORAL at 21:33

## 2025-05-11 RX ADMIN — INSULIN LISPRO 1: 100 INJECTION, SOLUTION INTRAVENOUS; SUBCUTANEOUS at 18:07

## 2025-05-11 RX ADMIN — INSULIN LISPRO 2 UNIT(S): 100 INJECTION, SOLUTION INTRAVENOUS; SUBCUTANEOUS at 18:08

## 2025-05-11 RX ADMIN — LEVALBUTEROL HYDROCHLORIDE 0.63 MILLIGRAM(S): 1.25 SOLUTION RESPIRATORY (INHALATION) at 20:03

## 2025-05-11 RX ADMIN — Medication 1 DOSE(S): at 21:32

## 2025-05-11 RX ADMIN — Medication 40 MILLIGRAM(S): at 06:31

## 2025-05-11 RX ADMIN — Medication 1 DOSE(S): at 09:02

## 2025-05-11 RX ADMIN — INSULIN GLARGINE-YFGN 3 UNIT(S): 100 INJECTION, SOLUTION SUBCUTANEOUS at 22:54

## 2025-05-11 NOTE — PROVIDER CONTACT NOTE (MEDICATION) - ACTION/TREATMENT ORDERED:
Provider aware, will reschedule hydrochlorothiazide and lisinopril. will augusto off hydralazine
no new orders at this time. plan of care continues.

## 2025-05-11 NOTE — PROVIDER CONTACT NOTE (MEDICATION) - SITUATION
Pt BP 91/62 with Bp meds ordered in the AM
unable to administer Mucinex due to pt not able to swallow medication whole.

## 2025-05-11 NOTE — PROGRESS NOTE ADULT - SUBJECTIVE AND OBJECTIVE BOX
[facility] Division of Hospital Medicine  Em Toure MD  Pager [phone]    Patient is a 74y old  Female who presents with a chief complaint of asthma exac/resp failure/ catatonia       SUBJECTIVE / OVERNIGHT EVENTS: doing well; pt seen this AM; offers no complaints; wished pt happy mother's day to which she said thank you and I hope the same for you      MEDICATIONS  (STANDING):  artificial tears (preservative free) Ophthalmic Solution 1 Drop(s) Both EYES every 8 hours  bisacodyl 10 milliGRAM(s) Oral once  chlorhexidine 2% Cloths 1 Application(s) Topical <User Schedule>  dextrose 5%. 1000 milliLiter(s) (50 mL/Hr) IV Continuous <Continuous>  dextrose 5%. 1000 milliLiter(s) (100 mL/Hr) IV Continuous <Continuous>  dextrose 50% Injectable 25 Gram(s) IV Push once  dextrose 50% Injectable 12.5 Gram(s) IV Push once  dextrose 50% Injectable 25 Gram(s) IV Push once  dextrose Oral Gel 15 Gram(s) Oral once  enoxaparin Injectable 40 milliGRAM(s) SubCutaneous every 24 hours  fluticasone propionate/ salmeterol 250-50 MICROgram(s) Diskus 1 Dose(s) Inhalation two times a day  glucagon  Injectable 1 milliGRAM(s) IntraMuscular once  guaiFENesin  milliGRAM(s) Oral every 12 hours  hydrochlorothiazide 25 milliGRAM(s) Oral daily  insulin glargine Injectable (LANTUS) 3 Unit(s) SubCutaneous at bedtime  insulin lispro (ADMELOG) corrective regimen sliding scale   SubCutaneous at bedtime  insulin lispro (ADMELOG) corrective regimen sliding scale   SubCutaneous three times a day before meals  insulin lispro Injectable (ADMELOG) 2 Unit(s) SubCutaneous three times a day before meals  levalbuterol Inhalation 0.63 milliGRAM(s) Inhalation every 8 hours  lisinopril 40 milliGRAM(s) Oral daily  melatonin 9 milliGRAM(s) Oral at bedtime  memantine 10 milliGRAM(s) Oral at bedtime  memantine 5 milliGRAM(s) Oral <User Schedule>  OLANZapine 2.5 milliGRAM(s) Oral at bedtime  pantoprazole    Tablet 40 milliGRAM(s) Oral before breakfast  petrolatum Ophthalmic Ointment 1 Application(s) Both EYES two times a day  polyethylene glycol 3350 17 Gram(s) Oral two times a day  senna 2 Tablet(s) Oral at bedtime  sertraline 125 milliGRAM(s) Oral daily  tiotropium 2.5 MICROgram(s) Inhaler 2 Puff(s) Inhalation daily    MEDICATIONS  (PRN):  albuterol/ipratropium for Nebulization 3 milliLiter(s) Nebulizer every 6 hours PRN Shortness of Breath and/or Wheezing      CAPILLARY BLOOD GLUCOSE  POCT Blood Glucose.: 175 mg/dL (11 May 2025 12:22)  POCT Blood Glucose.: 147 mg/dL (11 May 2025 08:12)  POCT Blood Glucose.: 293 mg/dL (10 May 2025 22:43)  POCT Blood Glucose.: 145 mg/dL (10 May 2025 17:25)  POCT Blood Glucose.: 181 mg/dL (10 May 2025 12:29)        PHYSICAL EXAM:  Vital Signs Last 24 Hrs  T(F): 98.4 (11 May 2025 05:07), Max: 98.7 (10 May 2025 21:50)  HR: 72 (11 May 2025 05:07) (72 - 92)  BP: 157/81 (11 May 2025 05:07) (145/80 - 159/90)  RR: 18 (11 May 2025 05:07) (17 - 18)  SpO2: 94% (11 May 2025 05:07) (94% - 98%)    Parameters below as of 11 May 2025 05:07  Patient On (Oxygen Delivery Method): room air        CONSTITUTIONAL: NAD, appears comfortable  EYES: PERRLA; conjunctiva and sclera clear  ENMT: Moist oral mucosa; normal dentition  RESPIRATORY: Normal respiratory effort; grossly b/l AE  CARDIOVASCULAR: Regular rate and rhythm; No lower extremity edema  ABDOMEN: Nontender to palpation, normoactive bowel sounds  MUSCULOSKELETAL:  no clubbing or cyanosis of digits; no joint swelling or tenderness to palpation  PSYCH: A+O to person, place, and time; affect appropriate  NEUROLOGY: CN 2-12 are intact and symmetric; no gross sensory deficits   SKIN: No rashes; no palpable lesions    LABS:

## 2025-05-11 NOTE — PROGRESS NOTE ADULT - PROBLEM SELECTOR PLAN 1
MDD w/ psychotic features.  - s/p ativan now on hold given concern that it may have contributed to respiratory decompensation  - c/w Zoloft 125 qday (home dose was 150).  - c/w Zyprexa 2.5 qhs  - c/w Namenda 10mg qhs  - psych following  - ECT ongoing  pt optimized per cards and pulm for ECT    Pending transfer to inpatient psychiatric unit at [facility]  some concern this AM by ECT team, that pt more lethargic on arrive to them, however, pt pleasant and alert and conversant with me post ECT; will try to collect UA for poss etiology of lethargy this AM however upon my eval pt back to her baseline since ECT started; UA generally unremakable, Ucx grossly contain, pt remains at baseline and afebrile, nl WBC; will not pursue further unless clinical signs of infection revealed

## 2025-05-11 NOTE — PROGRESS NOTE ADULT - PROBLEM SELECTOR PLAN 5
2/2 to deconditioning/critical care myopathy, CK WNL  doing well  will get PT services at [facility]

## 2025-05-11 NOTE — PROGRESS NOTE ADULT - PROBLEM SELECTOR PLAN 9
Lovenox ppx  full code  PT rec TK  Will need transfer to [facility] for psychiatric optimization    Ambulatory with PT. Eating consistently now. 2PC per psychiatry.

## 2025-05-11 NOTE — PROVIDER CONTACT NOTE (MEDICATION) - ASSESSMENT
pt is A&Ox1, difficulty swallow medication whole
Pt is AxO1-2, all other VSS, sinus tachy at times on tele. NAD noted at this time. Pt is minimally verbal. No s/s or c/o chest pain, shortness of breath, Nausea/vomiting.

## 2025-05-12 LAB
ANION GAP SERPL CALC-SCNC: 12 MMOL/L — SIGNIFICANT CHANGE UP (ref 7–14)
BUN SERPL-MCNC: 23 MG/DL — SIGNIFICANT CHANGE UP (ref 7–23)
CALCIUM SERPL-MCNC: 9.7 MG/DL — SIGNIFICANT CHANGE UP (ref 8.4–10.5)
CHLORIDE SERPL-SCNC: 104 MMOL/L — SIGNIFICANT CHANGE UP (ref 98–107)
CO2 SERPL-SCNC: 27 MMOL/L — SIGNIFICANT CHANGE UP (ref 22–31)
CREAT SERPL-MCNC: 0.61 MG/DL — SIGNIFICANT CHANGE UP (ref 0.5–1.3)
EGFR: 94 ML/MIN/1.73M2 — SIGNIFICANT CHANGE UP
EGFR: 94 ML/MIN/1.73M2 — SIGNIFICANT CHANGE UP
GLUCOSE BLDC GLUCOMTR-MCNC: 112 MG/DL — HIGH (ref 70–99)
GLUCOSE BLDC GLUCOMTR-MCNC: 145 MG/DL — HIGH (ref 70–99)
GLUCOSE BLDC GLUCOMTR-MCNC: 147 MG/DL — HIGH (ref 70–99)
GLUCOSE SERPL-MCNC: 113 MG/DL — HIGH (ref 70–99)
HCT VFR BLD CALC: 35.7 % — SIGNIFICANT CHANGE UP (ref 34.5–45)
HGB BLD-MCNC: 11.6 G/DL — SIGNIFICANT CHANGE UP (ref 11.5–15.5)
MAGNESIUM SERPL-MCNC: 1.8 MG/DL — SIGNIFICANT CHANGE UP (ref 1.6–2.6)
MCHC RBC-ENTMCNC: 32.5 G/DL — SIGNIFICANT CHANGE UP (ref 32–36)
MCHC RBC-ENTMCNC: 32.8 PG — SIGNIFICANT CHANGE UP (ref 27–34)
MCV RBC AUTO: 100.8 FL — HIGH (ref 80–100)
NRBC # BLD AUTO: 0.02 K/UL — HIGH (ref 0–0)
NRBC # FLD: 0.02 K/UL — HIGH (ref 0–0)
NRBC BLD AUTO-RTO: 0 /100 WBCS — SIGNIFICANT CHANGE UP (ref 0–0)
PHOSPHATE SERPL-MCNC: 4.2 MG/DL — SIGNIFICANT CHANGE UP (ref 2.5–4.5)
PLATELET # BLD AUTO: 190 K/UL — SIGNIFICANT CHANGE UP (ref 150–400)
POTASSIUM SERPL-MCNC: 3.7 MMOL/L — SIGNIFICANT CHANGE UP (ref 3.5–5.3)
POTASSIUM SERPL-SCNC: 3.7 MMOL/L — SIGNIFICANT CHANGE UP (ref 3.5–5.3)
RBC # BLD: 3.54 M/UL — LOW (ref 3.8–5.2)
RBC # FLD: 15.3 % — HIGH (ref 10.3–14.5)
SODIUM SERPL-SCNC: 143 MMOL/L — SIGNIFICANT CHANGE UP (ref 135–145)
WBC # BLD: 9.81 K/UL — SIGNIFICANT CHANGE UP (ref 3.8–10.5)
WBC # FLD AUTO: 9.81 K/UL — SIGNIFICANT CHANGE UP (ref 3.8–10.5)

## 2025-05-12 PROCEDURE — 90870 ELECTROCONVULSIVE THERAPY: CPT

## 2025-05-12 PROCEDURE — 99233 SBSQ HOSP IP/OBS HIGH 50: CPT | Mod: FS

## 2025-05-12 RX ADMIN — MEMANTINE HYDROCHLORIDE 10 MILLIGRAM(S): 21 CAPSULE, EXTENDED RELEASE ORAL at 22:49

## 2025-05-12 RX ADMIN — LEVALBUTEROL HYDROCHLORIDE 0.63 MILLIGRAM(S): 1.25 SOLUTION RESPIRATORY (INHALATION) at 15:46

## 2025-05-12 RX ADMIN — Medication 1 APPLICATION(S): at 17:58

## 2025-05-12 RX ADMIN — Medication 1 DROP(S): at 22:48

## 2025-05-12 RX ADMIN — SERTRALINE 125 MILLIGRAM(S): 100 TABLET, FILM COATED ORAL at 11:34

## 2025-05-12 RX ADMIN — Medication 1 APPLICATION(S): at 06:23

## 2025-05-12 RX ADMIN — Medication 9 MILLIGRAM(S): at 22:48

## 2025-05-12 RX ADMIN — MEMANTINE HYDROCHLORIDE 5 MILLIGRAM(S): 21 CAPSULE, EXTENDED RELEASE ORAL at 11:08

## 2025-05-12 RX ADMIN — LISINOPRIL 40 MILLIGRAM(S): 5 TABLET ORAL at 11:07

## 2025-05-12 RX ADMIN — Medication 1 DOSE(S): at 22:47

## 2025-05-12 RX ADMIN — OLANZAPINE 2.5 MILLIGRAM(S): 10 TABLET ORAL at 22:48

## 2025-05-12 RX ADMIN — Medication 1 DROP(S): at 17:58

## 2025-05-12 RX ADMIN — Medication 1 DROP(S): at 06:23

## 2025-05-12 RX ADMIN — Medication 1 APPLICATION(S): at 06:22

## 2025-05-12 RX ADMIN — INSULIN GLARGINE-YFGN 3 UNIT(S): 100 INJECTION, SOLUTION SUBCUTANEOUS at 22:48

## 2025-05-12 RX ADMIN — TIOTROPIUM BROMIDE INHALATION SPRAY 2 PUFF(S): 3.12 SPRAY, METERED RESPIRATORY (INHALATION) at 11:08

## 2025-05-12 RX ADMIN — INSULIN LISPRO 2 UNIT(S): 100 INJECTION, SOLUTION INTRAVENOUS; SUBCUTANEOUS at 12:45

## 2025-05-12 RX ADMIN — Medication 1 DOSE(S): at 11:04

## 2025-05-12 RX ADMIN — INSULIN LISPRO 2 UNIT(S): 100 INJECTION, SOLUTION INTRAVENOUS; SUBCUTANEOUS at 17:57

## 2025-05-12 RX ADMIN — ENOXAPARIN SODIUM 40 MILLIGRAM(S): 100 INJECTION SUBCUTANEOUS at 17:59

## 2025-05-12 RX ADMIN — LEVALBUTEROL HYDROCHLORIDE 0.63 MILLIGRAM(S): 1.25 SOLUTION RESPIRATORY (INHALATION) at 19:32

## 2025-05-12 NOTE — PROGRESS NOTE ADULT - PROBLEM SELECTOR PLAN 1
MDD w/ psychotic features.  - s/p ativan now on hold given concern that it may have contributed to respiratory decompensation  - c/w Zoloft 125 qday (home dose was 150).  - c/w Zyprexa 2.5 qhs  - c/w Namenda 10mg qhs  - psych following  - ECT ongoing  pt optimized per cards and pulm for ECT    Pending transfer to inpatient psychiatric unit at [facility]  Patient for ECT on 5/12/25 MDD w/ psychotic features.  - s/p ativan now on hold given concern that it may have contributed to respiratory decompensation  - c/w Zoloft 125 qday (home dose was 150).  - c/w Zyprexa 2.5 qhs  - c/w Namenda 10mg qhs  - psych following  - ECT ongoing  pt optimized per cards and pulm for ECT    Pending transfer to inpatient psychiatric unit at [facility]  Patient s/p ECT on 5/12/25

## 2025-05-12 NOTE — PACU DISCHARGE NOTE - COMMENTS
No apparent anesthesia complications
VSS - 161/89, 98.8F, O2sat - 97% with blow by oxygen, RR-21, HR-95
no apparent anesthesia complications
no anesthesia complications
no apparent anesthesia complications

## 2025-05-12 NOTE — PROGRESS NOTE ADULT - PROBLEM SELECTOR PLAN 5
2/2 to deconditioning/critical care myopathy, CK WNL  doing well  will get PT services at [facility]

## 2025-05-12 NOTE — PROVIDER CONTACT NOTE (OTHER) - BACKGROUND
74yof admitting dx sob, pmhx mdd, asthma, htn, hyponatremia
Pt admitted for shortness of breath.
dementia Asthma
pt admitted for shortness of breath
pt admitted for shortness of breath
Patient admitted for shortness of breath
Pt admitted with SOB. PMH of asthma, MDD, and catatonia.
pt admitted for shortness of breath
pt admitted for shortness of breath
pt is admitted with shortness of breath
pt is admitted with shortness of breath
74yof admitting dx shortness of breath, pmhx mdd, asthma, htn, hyponatremia
74yof admitting dx sob, pmhx mdd, htn, asthma
Patient admitted for SOB
Patient admitted for shortness of breath
Patient admitted for shortness of breath
Pt is admitted due to SOB with asthma exacerbation that resulted in AMS and increased WOB requiring intubation and admission into MICU. PMH of MDD, HTN, catatonia, and diabetic.
pt admitted for shortness of breath
pt came in for SOB. pt is NPO for ECT treatment
Pt admitted for SOB, MICU downgrade, s/p extubation, pmhx of MDD, HTN,
74 Y.O F admitted for SOB, PMHx MDD, Asthma, HTN
74F hx asthma, depression, and HTN was admitted to the MICU with AHRF 2/2 asthma exacerbation resulting in acute on chronic HHRF causing AMS and increased WOB that required intubation
74F hx asthma, depression, and HTN was admitted to the MICU with AHRF 2/2 asthma exacerbation resulting in acute on chronic HHRF causing AMS and increased WOB that required intubation
Pt admitted due to SOB admitted to MICU with AHRF 2/2 asthma exarcebtaion resulting in AMS and increase WOB requiring intubation. PMH of MDD, HTN, and asthma.
pt admitted for shortness of breath
Pt admitted due to SOB admitted to MICU with AHRF 2/2 asthma exacerbation resulting in AMS and increase WOB requiring intubation. PMH of MDD, HTN, and asthma.
Pt admitted due to SOB admitted to MICU with AHRF 2/2 asthma exarcebtaion resulting in AMS and increase WOB requiring intubation. PMH of MDD, HTN, and asthma.
pt admitted for shortness of breath
pt is admitted with shortness of breath
Pt admitted for SOB. Admitted to MICU with AHRF 2/2 to asthma exacerbation resulting in chronic HHRF causing AMS and WOB that resulted in intubation. PMH MDD, asthma, HTN.
pt admitted for shortness of breath
pt admitted for shortness of breath
74yof admitting dx sob, pmhx asthma, htn, pna, anxiety
Pt admitted with SOB
pt admitted for shortness of breath

## 2025-05-12 NOTE — PACU DISCHARGE NOTE - NSPTMEETSDISCHCRITERIADT_GEN_A_CORE
30-Apr-2025 09:12
02-May-2025 09:16
09-May-2025 08:30
12-May-2025 09:49
28-Apr-2025 09:14
25-Apr-2025 08:46
05-May-2025 09:01

## 2025-05-12 NOTE — BH CONSULTATION LIAISON PROGRESS NOTE - NSBHASSESSMENTFT_PSY_ALL_CORE
Patient is a 74F hx asthma, depression, and HTN was admitted to the MICU with AHRF 2/2 asthma exacerbation resulting in acute on chronic HHRF causing AMS and increased WOB that required intubation. During her ICU course, she experienced seizure-like activity .  Additional issues included hyponatremia, acute on chronic metabolic alkalosis, and mild thrombocytopenia,  also having fevers.  Patient is from [place] [facility]; primary language :Twi pronounced as Chi) domiciled with , retired teacher used to work in [facility], she has 6 children. Patient has h/o hx of major depressive disorder with psychosis, hx of 3 past inpatient psychiatric hospitalizations last in 2016 at [facility], all rehospitalizations were in context non compliance with the treatment. She has no hx of suicide attempts, no hx of substance abuse. Patient w hx of paranoia, disorganization, catatonia, required MOO when in [facility].   3/24----more withdrawn, some stiffness in neck, not communicative. Some concerns for catatonia. Unsure if symptoms attributed to catatonia vs delirium  3/25--- more alert today, trying to following commands. Still with PMR+. Appears to be responding to Ativan  3/26-- BFS 10, will increasing standing ativan to target catatonic symptoms - giving STAT 0.5mg NOW discussed w acp   3/27----will continue ativan dosing for one more day before increasing  3/28: BFS: stupor 1, mutism 2,withdrawan 2, verbigeration 1, rigidity 2 =8.  3/29: BFS: stupor 2, mutism 2, staring 1, posturing 2, rigidity 2, waxy flexibility 3, withdrawal 2 = 14; please monitor nutritional intake, plan to increase ativan by total daily dose of 0.5 mg at bedtime due to persistent catatonia   3/31: BFS: stupor 2, mutism 2, staring 2, rigidity 1, negativism 1, withdrawal 1, waxy flexibility 3 =12, patient with productive cough, advised for medical work up  4/1---tired, speaking more, not rigid, has PMR+. NPO status now.   4/2--sleepy, RRT this afternoon  4/3---better mentation, pmr but no chester catatonic symptoms  4/4: alert, using few words, mild rigidity -  at bedside, updated   4/5: few words, lethargic, fluctuating wakefulness. Mild rigidity.  No overt catatonic sxs.   4/7: no change, remains thought blocked, few words - son at bedside, updated with care  4/8: patient continues to have poor eye contact, uses few words, no rigidity on today's exam.   4/9: patient remains minimally verbal, some posturing, poor Po intake - continues on NC for 02 requirements   4/10: more verbal/interactive, with increased PO intake per family. Some psychotic output. Family agrees with resumption of Zyprexa.   4/14: alert to self with fluctuating wakefulness, intermittent full sentences f/b one-word answers, some rigidity, posturing, denies si, denies ah/vh  4/16: patient more catatonic today, notable catalepsy, waxiness, verbigeration followed by mutism and staring, family against ativan and ECT, would increase memantine as below, not eating   4/17: catatonic (mute, rigid, not engaging). Eating some portions of meals with significant family support. Family opting to see memantine trial to full effect before consideration of ECT  4/18: mutism today, odd posturing, unable to engage, d/w spouse and son ECT, family still undecided  4/21: alert to self, some improvement with verbal engagement, tracking, follow command, denies si, denies ah/vh, awaiting call back from son in regards to ECT consult  4/22: remains catatonic (mute, postured, rigid, withdrawn, negativistic). Exam limited. Family agrees with formal ECT consult.  4/23: Remains with symptoms of catatonia. BFS: stupor 1, mute2, Rigid 2, negativism 2, withdrawal 2 - 9 : ECT consult completed. awaiting clearances for ECT  4/42: remains with BFS of 9, plan for ECT  4/25: alert to self, able to engage in some conversation, however, confused, loose on  exam, odd posturing right upper arm, Dr. Toure made aware of schedule for ECT Monday 4/28: s/p ECT #2. more spontaneous speech, cooperative with exam. improved eye contact. loose with no rigidity.   4/29: displays more s/s of catatonia since yesterdays exam- negativism, withdrawal, rigidity. family and ect in agreement to proceed with treatment 3 tomorrow.  4/30: some confusion today. Will assess tomm  5/1: mutism and negativism on exam   5/2: improved catatonic symptoms. Confused, suspecting AH  5/5: No catatonic symptoms. Will assess tomm for any catatonic symptoms and decide if to hold off on treatment on wednesday 5/6: Continues to exhibit improvement in catatonic sx. Will recommend holding Wed ECT and having next tx on Fri 5/9 5/7: No catatonic sx; exam consistent with past 2 days. Still planning for next ECT 5/9. Pending inpt psych placement.  5/8: Pt tired/minimal engaged, but responds to commands and no rigidity. Next ECT tomorrow.  5/9: Significantly improved after ECT today and AOx2. Will plan for next ECT on Mon 5/12 5/12: Had ECT this morning. Improved catatonic sx. Denied any mood or psychotic sx. Next ECT on Friday- May 16    PLAN  -- Routine observation  -- ECT planned for Friday 5/16. NPO after midnight on Thursday  -- no SI/HI, no need for psychiatric CO  -- antipsychotics can only be given if qtc < 500    - MEDICATIONS:  - Memantine 5mg AM and 10mg HS  -- C/W Zoloft 125 mg daily   -- C/W Zyprexa 2.5mg HS   - PRN: Ativan 0.5mg q 6hrs prn IM/IV/PO    - please ensure PT involved in patient care    - DISPO: Inpatient psychiatry. 2pc signed, pending beds

## 2025-05-12 NOTE — ECT TREATMENT NOTE - NSECTPOSTTXSUMMARY_PSY_ALL_CORE
The patient had a well modified grand mal seizure under general anesthesia and muscle relaxant. The patient is alert, responsive, in no acute distress.  Recovery uneventful.     Anesthesia meds: methohexital 60 mg; succinylcholine 30 mg, Toradol: 15 mg

## 2025-05-12 NOTE — PROGRESS NOTE ADULT - PROBLEM SELECTOR PLAN 9
Lovenox ppx  full code  PT rec TK  Will need transfer to [facility] for psychiatric optimization    Ambulatory with PT. Eating consistently now. 2PC per psychiatry.

## 2025-05-12 NOTE — PROGRESS NOTE ADULT - SUBJECTIVE AND OBJECTIVE BOX
Patient is a 74y old  Female who presents with a chief complaint of asthma exac/resp failure/catatonia (11 May 2025 12:22)      SUBJECTIVE / OVERNIGHT EVENTS:    MEDICATIONS  (STANDING):  artificial tears (preservative free) Ophthalmic Solution 1 Drop(s) Both EYES every 8 hours  bisacodyl 10 milliGRAM(s) Oral once  chlorhexidine 2% Cloths 1 Application(s) Topical <User Schedule>  dextrose 5%. 1000 milliLiter(s) (50 mL/Hr) IV Continuous <Continuous>  dextrose 5%. 1000 milliLiter(s) (100 mL/Hr) IV Continuous <Continuous>  dextrose 50% Injectable 25 Gram(s) IV Push once  dextrose 50% Injectable 12.5 Gram(s) IV Push once  dextrose 50% Injectable 25 Gram(s) IV Push once  dextrose Oral Gel 15 Gram(s) Oral once  enoxaparin Injectable 40 milliGRAM(s) SubCutaneous every 24 hours  fluticasone propionate/ salmeterol 250-50 MICROgram(s) Diskus 1 Dose(s) Inhalation two times a day  glucagon  Injectable 1 milliGRAM(s) IntraMuscular once  guaiFENesin  milliGRAM(s) Oral every 12 hours  hydrochlorothiazide 25 milliGRAM(s) Oral daily  insulin glargine Injectable (LANTUS) 3 Unit(s) SubCutaneous at bedtime  insulin lispro (ADMELOG) corrective regimen sliding scale   SubCutaneous at bedtime  insulin lispro (ADMELOG) corrective regimen sliding scale   SubCutaneous three times a day before meals  insulin lispro Injectable (ADMELOG) 2 Unit(s) SubCutaneous three times a day before meals  levalbuterol Inhalation 0.63 milliGRAM(s) Inhalation every 8 hours  lisinopril 40 milliGRAM(s) Oral daily  melatonin 9 milliGRAM(s) Oral at bedtime  memantine 10 milliGRAM(s) Oral at bedtime  memantine 5 milliGRAM(s) Oral <User Schedule>  OLANZapine 2.5 milliGRAM(s) Oral at bedtime  pantoprazole    Tablet 40 milliGRAM(s) Oral before breakfast  petrolatum Ophthalmic Ointment 1 Application(s) Both EYES two times a day  polyethylene glycol 3350 17 Gram(s) Oral two times a day  senna 2 Tablet(s) Oral at bedtime  sertraline 125 milliGRAM(s) Oral daily  tiotropium 2.5 MICROgram(s) Inhaler 2 Puff(s) Inhalation daily    MEDICATIONS  (PRN):  albuterol/ipratropium for Nebulization 3 milliLiter(s) Nebulizer every 6 hours PRN Shortness of Breath and/or Wheezing      Vital Signs Last 24 Hrs  T(C): 36.9 (12 May 2025 08:21), Max: 37 (11 May 2025 13:15)  T(F): 98.4 (12 May 2025 08:21), Max: 98.6 (11 May 2025 13:15)  HR: 78 (12 May 2025 09:15) (75 - 93)  BP: 142/74 (12 May 2025 09:15) (140/74 - 170/114)  BP(mean): --  RR: 19 (12 May 2025 09:15) (15 - 20)  SpO2: 93% (12 May 2025 09:15) (92% - 100%)    Parameters below as of 12 May 2025 09:15  Patient On (Oxygen Delivery Method): room air      CAPILLARY BLOOD GLUCOSE      POCT Blood Glucose.: 112 mg/dL (12 May 2025 06:12)  POCT Blood Glucose.: 207 mg/dL (11 May 2025 22:09)  POCT Blood Glucose.: 164 mg/dL (11 May 2025 17:19)  POCT Blood Glucose.: 175 mg/dL (11 May 2025 12:22)    I&O's Summary    11 May 2025 07:01  -  12 May 2025 07:00  --------------------------------------------------------  IN: 120 mL / OUT: 600 mL / NET: -480 mL        PHYSICAL EXAM:   GENERAL: NAD, well-developed  HEAD:  Atraumatic, Normocephalic  EYES: EOMI, PERRLA, conjunctiva and sclera clear  NECK: Supple, No JVD  CHEST/LUNG: Clear to auscultation bilaterally; No wheeze  HEART: Regular rate and rhythm; No murmurs, rubs, or gallops  ABDOMEN: Soft, Nontender, Nondistended; Bowel sounds present  EXTREMITIES:  2+ Peripheral Pulses, No clubbing, cyanosis, or edema  PSYCH: AAOx3  NEUROLOGY: non-focal  SKIN: No rashes or lesions    LABS:                        11.6   9.81  )-----------( 190      ( 12 May 2025 06:20 )             35.7     05-12    143  |  104  |  23  ----------------------------<  113[H]  3.7   |  27  |  0.61    Ca    9.7      12 May 2025 06:20  Phos  4.2     05-12  Mg     1.80     05-12            Urinalysis Basic - ( 12 May 2025 06:20 )    Color: x / Appearance: x / SG: x / pH: x  Gluc: 113 mg/dL / Ketone: x  / Bili: x / Urobili: x   Blood: x / Protein: x / Nitrite: x   Leuk Esterase: x / RBC: x / WBC x   Sq Epi: x / Non Sq Epi: x / Bacteria: x        RADIOLOGY & ADDITIONAL TESTS:    Imaging Personally Reviewed:    Consultant(s) Notes Reviewed:      Care Discussed with Consultants/Other Providers:   Patient is a 74y old  Female who presents with a chief complaint of asthma exac/resp failure/catatonia (11 May 2025 12:22)      SUBJECTIVE / OVERNIGHT EVENTS:  Patient awake , alert, and in good spirits.     MEDICATIONS  (STANDING):  artificial tears (preservative free) Ophthalmic Solution 1 Drop(s) Both EYES every 8 hours  bisacodyl 10 milliGRAM(s) Oral once  chlorhexidine 2% Cloths 1 Application(s) Topical <User Schedule>  dextrose 5%. 1000 milliLiter(s) (50 mL/Hr) IV Continuous <Continuous>  dextrose 5%. 1000 milliLiter(s) (100 mL/Hr) IV Continuous <Continuous>  dextrose 50% Injectable 25 Gram(s) IV Push once  dextrose 50% Injectable 12.5 Gram(s) IV Push once  dextrose 50% Injectable 25 Gram(s) IV Push once  dextrose Oral Gel 15 Gram(s) Oral once  enoxaparin Injectable 40 milliGRAM(s) SubCutaneous every 24 hours  fluticasone propionate/ salmeterol 250-50 MICROgram(s) Diskus 1 Dose(s) Inhalation two times a day  glucagon  Injectable 1 milliGRAM(s) IntraMuscular once  guaiFENesin  milliGRAM(s) Oral every 12 hours  hydrochlorothiazide 25 milliGRAM(s) Oral daily  insulin glargine Injectable (LANTUS) 3 Unit(s) SubCutaneous at bedtime  insulin lispro (ADMELOG) corrective regimen sliding scale   SubCutaneous at bedtime  insulin lispro (ADMELOG) corrective regimen sliding scale   SubCutaneous three times a day before meals  insulin lispro Injectable (ADMELOG) 2 Unit(s) SubCutaneous three times a day before meals  levalbuterol Inhalation 0.63 milliGRAM(s) Inhalation every 8 hours  lisinopril 40 milliGRAM(s) Oral daily  melatonin 9 milliGRAM(s) Oral at bedtime  memantine 10 milliGRAM(s) Oral at bedtime  memantine 5 milliGRAM(s) Oral <User Schedule>  OLANZapine 2.5 milliGRAM(s) Oral at bedtime  pantoprazole    Tablet 40 milliGRAM(s) Oral before breakfast  petrolatum Ophthalmic Ointment 1 Application(s) Both EYES two times a day  polyethylene glycol 3350 17 Gram(s) Oral two times a day  senna 2 Tablet(s) Oral at bedtime  sertraline 125 milliGRAM(s) Oral daily  tiotropium 2.5 MICROgram(s) Inhaler 2 Puff(s) Inhalation daily    MEDICATIONS  (PRN):  albuterol/ipratropium for Nebulization 3 milliLiter(s) Nebulizer every 6 hours PRN Shortness of Breath and/or Wheezing      Vital Signs Last 24 Hrs  T(C): 36.9 (12 May 2025 08:21), Max: 37 (11 May 2025 13:15)  T(F): 98.4 (12 May 2025 08:21), Max: 98.6 (11 May 2025 13:15)  HR: 78 (12 May 2025 09:15) (75 - 93)  BP: 142/74 (12 May 2025 09:15) (140/74 - 170/114)  BP(mean): --  RR: 19 (12 May 2025 09:15) (15 - 20)  SpO2: 93% (12 May 2025 09:15) (92% - 100%)    Parameters below as of 12 May 2025 09:15  Patient On (Oxygen Delivery Method): room air      CAPILLARY BLOOD GLUCOSE      POCT Blood Glucose.: 112 mg/dL (12 May 2025 06:12)  POCT Blood Glucose.: 207 mg/dL (11 May 2025 22:09)  POCT Blood Glucose.: 164 mg/dL (11 May 2025 17:19)  POCT Blood Glucose.: 175 mg/dL (11 May 2025 12:22)    I&O's Summary    11 May 2025 07:01  -  12 May 2025 07:00  --------------------------------------------------------  IN: 120 mL / OUT: 600 mL / NET: -480 mL        PHYSICAL EXAM:   GENERAL: NAD, well-developed  HEAD:  Atraumatic, Normocephalic  EYES: EOMI, PERRLA, conjunctiva and sclera clear  NECK: Supple, No JVD  CHEST/LUNG: Clear to auscultation bilaterally; No wheeze  HEART: Regular rate and rhythm; No murmurs, rubs, or gallops  ABDOMEN: Soft, Nontender, Nondistended; Bowel sounds present  EXTREMITIES:  2+ Peripheral Pulses, No clubbing, cyanosis, or edema  PSYCH: AAOx3  NEUROLOGY: non-focal  SKIN: No rashes or lesions    LABS:                        11.6   9.81  )-----------( 190      ( 12 May 2025 06:20 )             35.7     05-12    143  |  104  |  23  ----------------------------<  113[H]  3.7   |  27  |  0.61    Ca    9.7      12 May 2025 06:20  Phos  4.2     05-12  Mg     1.80     05-12            Urinalysis Basic - ( 12 May 2025 06:20 )    Color: x / Appearance: x / SG: x / pH: x  Gluc: 113 mg/dL / Ketone: x  / Bili: x / Urobili: x   Blood: x / Protein: x / Nitrite: x   Leuk Esterase: x / RBC: x / WBC x   Sq Epi: x / Non Sq Epi: x / Bacteria: x        RADIOLOGY & ADDITIONAL TESTS:    Imaging Personally Reviewed:    Consultant(s) Notes Reviewed:      Care Discussed with Consultants/Other Providers:

## 2025-05-12 NOTE — PROVIDER CONTACT NOTE (OTHER) - RECOMMENDATIONS
Notify MD
Notify MD
Notify provider
notify MD
ACP made aware, O2 NC 2L placed,
Continue to monitor vital signs q4h as ordered.
notify MD
Collect CBC with labs tomorrow?
Notify provider.
Notify provider. continue to monitor.
can pt gets medications this morning or should I reschedule for after ECT?
notify MD
recheck vitals
Notify MD
Reattempt later
ACP paged to come assess the patient at bedside. Pt assisted to sit up straight and performed chest PT.
Administer 12 units of corrective insulin and premeal. Recheck blood glucose: 193 post coverage and meal.
Notify provider.
Pt placed on 2L NC with sats at 96%.
notify md
notify md
order for tylenol?
try to wean?

## 2025-05-12 NOTE — PROVIDER CONTACT NOTE (OTHER) - NAME OF MD/NP/PA/DO NOTIFIED:
ACP Criselda Castillo
Kenya Niño #[id number]
Morgan De Dios
Santana Gastelum
sylvia mims md
Dr.Kumar Dyson
Karon Landry
Milly Matos
herberth mims md
sylvia mims md
Dannie Ramirez, PA
Dede Pena
Dede Pena
Morgan De Dios
Morgan De Dios
Durga Dyson
Geetha Cowiche
Geetha [place]ford
Kenya Abbasi, ACP
Kiel Calixto
Omi Kohli #[id number]
ACP Jeannie Nowak
Chris Arita
Funmi Moura
Dede Pena
Funmi Moura
Jamilah ROCHA
Morgan De Dios, NP
DANAE De Dios
Geetha [place]
Geetha [place]
Kei Andersen ACP
Noemi Alcala
ACP Criselda Castillo
Dr.Kumar Dyson
Durga Dyson
Kenya Niño #[id number]
sylvia mims md

## 2025-05-12 NOTE — BH CONSULTATION LIAISON PROGRESS NOTE - NSBHFUPINTERVALHXFT_PSY_A_CORE
Patient had ECT this morning. At bedside, she appeared bright. Said she feels fine. Denied any depression or anxiety when asked, saying "I am not depressed or anxious". Denied issues with sleep or appetite. Denies AH/VH or paranoia. She didn't appear to be internally preoccupied. Denies safety concerns. On exam, no rigidity or catalepsy. Compliant with psychotropic meds, no behavioral PRNs overnight. She didn't remember getting ECT few hours prior and wasn't fully oriented to time- likely post ECT confusion.

## 2025-05-12 NOTE — ECT TREATMENT NOTE - NSECTPOSTTXCOMMENTS_PSY_ALL_CORE
Ms. Silva appeared significantly better compared to writer's previous examinations. She was oriented to be in the hospital, but could not name the hospital or city/state/country. She reports she has 5 sons. She was pleasant on interactions, cooperative with setup and did not demonstrate any paranoia. On examination, no motor signs of catatonia.

## 2025-05-12 NOTE — PROGRESS NOTE ADULT - SUBJECTIVE AND OBJECTIVE BOX
Arnold Uriostegui MD  Interventional Cardiology / Advance Heart Failure and Cardiac Transplant Specialist  [place] Office : [address] [place] [address] [place]. [address]  Tel:   [place] Office : [address] [facility] [place] [address]  Tel: [phone]       Pt is lying in bed comfortable not in distress, no chest pains no SOB no palpitations  	  MEDICATIONS:  enoxaparin Injectable 40 milliGRAM(s) SubCutaneous every 24 hours  hydrochlorothiazide 25 milliGRAM(s) Oral daily  lisinopril 40 milliGRAM(s) Oral daily      albuterol/ipratropium for Nebulization 3 milliLiter(s) Nebulizer every 6 hours PRN  fluticasone propionate/ salmeterol 250-50 MICROgram(s) Diskus 1 Dose(s) Inhalation two times a day  guaiFENesin  milliGRAM(s) Oral every 12 hours  levalbuterol Inhalation 0.63 milliGRAM(s) Inhalation every 8 hours  tiotropium 2.5 MICROgram(s) Inhaler 2 Puff(s) Inhalation daily    melatonin 9 milliGRAM(s) Oral at bedtime  memantine 5 milliGRAM(s) Oral <User Schedule>  memantine 10 milliGRAM(s) Oral at bedtime  OLANZapine 2.5 milliGRAM(s) Oral at bedtime  sertraline 125 milliGRAM(s) Oral daily    bisacodyl 10 milliGRAM(s) Oral once  pantoprazole    Tablet 40 milliGRAM(s) Oral before breakfast  polyethylene glycol 3350 17 Gram(s) Oral two times a day  senna 2 Tablet(s) Oral at bedtime    dextrose 50% Injectable 25 Gram(s) IV Push once  dextrose 50% Injectable 12.5 Gram(s) IV Push once  dextrose 50% Injectable 25 Gram(s) IV Push once  dextrose Oral Gel 15 Gram(s) Oral once  glucagon  Injectable 1 milliGRAM(s) IntraMuscular once  insulin glargine Injectable (LANTUS) 3 Unit(s) SubCutaneous at bedtime  insulin lispro (ADMELOG) corrective regimen sliding scale   SubCutaneous at bedtime  insulin lispro (ADMELOG) corrective regimen sliding scale   SubCutaneous three times a day before meals  insulin lispro Injectable (ADMELOG) 2 Unit(s) SubCutaneous three times a day before meals    artificial tears (preservative free) Ophthalmic Solution 1 Drop(s) Both EYES every 8 hours  chlorhexidine 2% Cloths 1 Application(s) Topical <User Schedule>  dextrose 5%. 1000 milliLiter(s) IV Continuous <Continuous>  dextrose 5%. 1000 milliLiter(s) IV Continuous <Continuous>  petrolatum Ophthalmic Ointment 1 Application(s) Both EYES two times a day      PAST MEDICAL/SURGICAL HISTORY  PAST MEDICAL & SURGICAL HISTORY:  MDD (major depressive disorder), recurrent, severe, with psychosis      Asthma, mild intermittent, uncomplicated      Essential hypertension      No significant past surgical history          SOCIAL HISTORY: Substance Use (street drugs): ( x ) never used  (  ) other:    FAMILY HISTORY:         PHYSICAL EXAM:  T(C): 36.9 (05-12-25 @ 11:00), Max: 36.9 (05-11-25 @ 21:30)  HR: 80 (05-12-25 @ 15:46) (75 - 93)  BP: 152/75 (05-12-25 @ 11:00) (140/74 - 170/114)  RR: 17 (05-12-25 @ 11:00) (15 - 20)  SpO2: 96% (05-12-25 @ 15:46) (93% - 100%)  Wt(kg): --  I&O's Summary    11 May 2025 07:01  -  12 May 2025 07:00  --------------------------------------------------------  IN: 120 mL / OUT: 600 mL / NET: -480 mL      Height (cm): 157 (05-12 @ 06:54)  Weight (kg): 80 (05-12 @ 06:54)  BMI (kg/m2): 32.5 (05-12 @ 06:54)  BSA (m2): 1.81 (05-12 @ 06:54)    GENERAL: NAD  EYES:   PERRLA   ENMT:   Moist mucous membranes, Good dentition, No lesions  Cardiovascular: Normal S1 S2, No JVD, No murmurs, No edema  Respiratory: Lungs clear to auscultation	  Gastrointestinal:  Soft, Non-tender, + BS	  Extremities: no edema                                    11.6   9.81  )-----------( 190      ( 12 May 2025 06:20 )             35.7     05-12    143  |  104  |  23  ----------------------------<  113[H]  3.7   |  27  |  0.61    Ca    9.7      12 May 2025 06:20  Phos  4.2     05-12  Mg     1.80     05-12      proBNP:   Lipid Profile:   HgA1c:   TSH:     Consultant(s) Notes Reviewed:  [x ] YES  [ ] NO    Care Discussed with Consultants/Other Providers [ x] YES  [ ] NO    Imaging Personally Reviewed independently:  [x] YES  [ ] NO    All labs, radiologic studies, vitals, orders and medications list reviewed. Patient is seen and examined at bedside. Case discussed with medical team.

## 2025-05-13 LAB
GLUCOSE BLDC GLUCOMTR-MCNC: 133 MG/DL — HIGH (ref 70–99)
GLUCOSE BLDC GLUCOMTR-MCNC: 134 MG/DL — HIGH (ref 70–99)
GLUCOSE BLDC GLUCOMTR-MCNC: 139 MG/DL — HIGH (ref 70–99)
GLUCOSE BLDC GLUCOMTR-MCNC: 165 MG/DL — HIGH (ref 70–99)

## 2025-05-13 PROCEDURE — 99232 SBSQ HOSP IP/OBS MODERATE 35: CPT | Mod: FS

## 2025-05-13 PROCEDURE — 99232 SBSQ HOSP IP/OBS MODERATE 35: CPT

## 2025-05-13 RX ORDER — DEXTROMETHORPHAN HBR, GUAIFENESIN 200 MG/10ML
100 LIQUID ORAL EVERY 6 HOURS
Refills: 0 | Status: DISCONTINUED | OUTPATIENT
Start: 2025-05-13 | End: 2025-05-21

## 2025-05-13 RX ADMIN — MEMANTINE HYDROCHLORIDE 10 MILLIGRAM(S): 21 CAPSULE, EXTENDED RELEASE ORAL at 21:43

## 2025-05-13 RX ADMIN — Medication 40 MILLIGRAM(S): at 06:37

## 2025-05-13 RX ADMIN — INSULIN GLARGINE-YFGN 3 UNIT(S): 100 INJECTION, SOLUTION SUBCUTANEOUS at 21:47

## 2025-05-13 RX ADMIN — Medication 9 MILLIGRAM(S): at 21:42

## 2025-05-13 RX ADMIN — SERTRALINE 125 MILLIGRAM(S): 100 TABLET, FILM COATED ORAL at 12:49

## 2025-05-13 RX ADMIN — INSULIN LISPRO 2 UNIT(S): 100 INJECTION, SOLUTION INTRAVENOUS; SUBCUTANEOUS at 08:26

## 2025-05-13 RX ADMIN — INSULIN LISPRO 2 UNIT(S): 100 INJECTION, SOLUTION INTRAVENOUS; SUBCUTANEOUS at 17:21

## 2025-05-13 RX ADMIN — TIOTROPIUM BROMIDE INHALATION SPRAY 2 PUFF(S): 3.12 SPRAY, METERED RESPIRATORY (INHALATION) at 10:46

## 2025-05-13 RX ADMIN — INSULIN LISPRO 2 UNIT(S): 100 INJECTION, SOLUTION INTRAVENOUS; SUBCUTANEOUS at 12:46

## 2025-05-13 RX ADMIN — Medication 1 DROP(S): at 21:42

## 2025-05-13 RX ADMIN — ENOXAPARIN SODIUM 40 MILLIGRAM(S): 100 INJECTION SUBCUTANEOUS at 19:34

## 2025-05-13 RX ADMIN — Medication 1 APPLICATION(S): at 06:35

## 2025-05-13 RX ADMIN — Medication 1 DOSE(S): at 08:27

## 2025-05-13 RX ADMIN — LISINOPRIL 40 MILLIGRAM(S): 5 TABLET ORAL at 06:37

## 2025-05-13 RX ADMIN — Medication 1 DOSE(S): at 21:44

## 2025-05-13 RX ADMIN — Medication 1 DROP(S): at 06:36

## 2025-05-13 RX ADMIN — LEVALBUTEROL HYDROCHLORIDE 0.63 MILLIGRAM(S): 1.25 SOLUTION RESPIRATORY (INHALATION) at 07:18

## 2025-05-13 RX ADMIN — MEMANTINE HYDROCHLORIDE 5 MILLIGRAM(S): 21 CAPSULE, EXTENDED RELEASE ORAL at 06:37

## 2025-05-13 RX ADMIN — Medication 1 APPLICATION(S): at 17:17

## 2025-05-13 RX ADMIN — OLANZAPINE 2.5 MILLIGRAM(S): 10 TABLET ORAL at 21:43

## 2025-05-13 RX ADMIN — LEVALBUTEROL HYDROCHLORIDE 0.63 MILLIGRAM(S): 1.25 SOLUTION RESPIRATORY (INHALATION) at 21:56

## 2025-05-13 RX ADMIN — Medication 1 DROP(S): at 14:37

## 2025-05-13 RX ADMIN — LEVALBUTEROL HYDROCHLORIDE 0.63 MILLIGRAM(S): 1.25 SOLUTION RESPIRATORY (INHALATION) at 15:25

## 2025-05-13 NOTE — PROGRESS NOTE ADULT - SUBJECTIVE AND OBJECTIVE BOX
Arnold Uirostegui MD  Interventional Cardiology / Advance Heart Failure and Cardiac Transplant Specialist  [place] Office : [address] [place] [address] [place]. [address]  Tel:   [place] Office : [address] [facility] [place] [address]  Tel: [phone]       Pt is lying in bed comfortable not in distress, no chest pains no SOB no palpitations  	  MEDICATIONS:  enoxaparin Injectable 40 milliGRAM(s) SubCutaneous every 24 hours  hydrochlorothiazide 25 milliGRAM(s) Oral daily  lisinopril 40 milliGRAM(s) Oral daily      albuterol/ipratropium for Nebulization 3 milliLiter(s) Nebulizer every 6 hours PRN  fluticasone propionate/ salmeterol 250-50 MICROgram(s) Diskus 1 Dose(s) Inhalation two times a day  guaiFENesin Oral Liquid (Sugar-Free) 100 milliGRAM(s) Oral every 6 hours PRN  levalbuterol Inhalation 0.63 milliGRAM(s) Inhalation every 8 hours  tiotropium 2.5 MICROgram(s) Inhaler 2 Puff(s) Inhalation daily    melatonin 9 milliGRAM(s) Oral at bedtime  memantine 10 milliGRAM(s) Oral at bedtime  memantine 5 milliGRAM(s) Oral <User Schedule>  OLANZapine 2.5 milliGRAM(s) Oral at bedtime  sertraline 125 milliGRAM(s) Oral daily    bisacodyl 10 milliGRAM(s) Oral once  pantoprazole    Tablet 40 milliGRAM(s) Oral before breakfast  polyethylene glycol 3350 17 Gram(s) Oral two times a day  senna 2 Tablet(s) Oral at bedtime    dextrose 50% Injectable 25 Gram(s) IV Push once  dextrose 50% Injectable 12.5 Gram(s) IV Push once  dextrose 50% Injectable 25 Gram(s) IV Push once  dextrose Oral Gel 15 Gram(s) Oral once  glucagon  Injectable 1 milliGRAM(s) IntraMuscular once  insulin glargine Injectable (LANTUS) 3 Unit(s) SubCutaneous at bedtime  insulin lispro (ADMELOG) corrective regimen sliding scale   SubCutaneous at bedtime  insulin lispro (ADMELOG) corrective regimen sliding scale   SubCutaneous three times a day before meals  insulin lispro Injectable (ADMELOG) 2 Unit(s) SubCutaneous three times a day before meals    artificial tears (preservative free) Ophthalmic Solution 1 Drop(s) Both EYES every 8 hours  chlorhexidine 2% Cloths 1 Application(s) Topical <User Schedule>  dextrose 5%. 1000 milliLiter(s) IV Continuous <Continuous>  dextrose 5%. 1000 milliLiter(s) IV Continuous <Continuous>  petrolatum Ophthalmic Ointment 1 Application(s) Both EYES two times a day      PAST MEDICAL/SURGICAL HISTORY  PAST MEDICAL & SURGICAL HISTORY:  MDD (major depressive disorder), recurrent, severe, with psychosis      Asthma, mild intermittent, uncomplicated      Essential hypertension      No significant past surgical history          SOCIAL HISTORY: Substance Use (street drugs): ( x ) never used  (  ) other:    FAMILY HISTORY:      PHYSICAL EXAM:  T(C): 36.9 (05-13-25 @ 13:10), Max: 36.9 (05-13-25 @ 13:10)  HR: 81 (05-13-25 @ 13:10) (79 - 94)  BP: 139/88 (05-13-25 @ 13:10) (139/88 - 157/87)  RR: 18 (05-13-25 @ 13:10) (18 - 18)  SpO2: 94% (05-13-25 @ 13:10) (94% - 99%)  Wt(kg): --  I&O's Summary    12 May 2025 07:01  -  13 May 2025 07:00  --------------------------------------------------------  IN: 200 mL / OUT: 400 mL / NET: -200 mL          GENERAL: NAD  EYES:   PERRLA   ENMT:   Moist mucous membranes, Good dentition, No lesions  Cardiovascular: Normal S1 S2, No JVD, No murmurs, No edema  Respiratory: Lungs clear to auscultation	  Gastrointestinal:  Soft, Non-tender, + BS	  Extremities: no edema                                    11.6   9.81  )-----------( 190      ( 12 May 2025 06:20 )             35.7     05-12    143  |  104  |  23  ----------------------------<  113[H]  3.7   |  27  |  0.61    Ca    9.7      12 May 2025 06:20  Phos  4.2     05-12  Mg     1.80     05-12      proBNP:   Lipid Profile:   HgA1c:   TSH:     Consultant(s) Notes Reviewed:  [x ] YES  [ ] NO    Care Discussed with Consultants/Other Providers [ x] YES  [ ] NO    Imaging Personally Reviewed independently:  [x] YES  [ ] NO    All labs, radiologic studies, vitals, orders and medications list reviewed. Patient is seen and examined at bedside. Case discussed with medical team.

## 2025-05-13 NOTE — PROGRESS NOTE ADULT - ASSESSMENT
74 y.o. Female w/ hx  MDD, asthma, prior CVA presented with AHRF c/b AMS requiring MICU admission for intubation for respiratory failure i/s/o asthma exacerbation. Extubated 3/12 to face tent and then on BIPAP s/p RRT 3/19 for increased WOB / hypoxia despite aggressive asthma management s/p return to MICU for management of severe asthma. Possible aspiration event 3/31. SLP eval appreciated. XR cine performed with improvement in mental status - pureed diet w/ thin liquids recommended. Worsening respiratory status 4/2 - placed on NIV, now weaned to nasal cannula and now RA.  ENT eval - no upper airway consideration for structural causes of asthma such as vocal cord dysfunction.   Course c/b MDD w/ catatonia and poor PO, off ativan due to respiratory concerns trialed on Memantine family now agreeable to ECT

## 2025-05-13 NOTE — PROGRESS NOTE ADULT - PROBLEM SELECTOR PLAN 9
Lovenox ppx  full code  PT rec TK  Will need transfer to [facility] for psychiatric optimization    Ambulatory with PT. Eating consistently now. 2PC per psychiatry.

## 2025-05-13 NOTE — BH CONSULTATION LIAISON PROGRESS NOTE - NSBHASSESSMENTFT_PSY_ALL_CORE
Patient is a 74F hx asthma, depression, and HTN was admitted to the MICU with AHRF 2/2 asthma exacerbation resulting in acute on chronic HHRF causing AMS and increased WOB that required intubation. During her ICU course, she experienced seizure-like activity .  Additional issues included hyponatremia, acute on chronic metabolic alkalosis, and mild thrombocytopenia,  also having fevers.  Patient is from [facility]; primary language :Twi pronounced as Chi) domiciled with , retired teacher used to work in [facility], she has 6 children. Patient has h/o hx of major depressive disorder with psychosis, hx of 3 past inpatient psychiatric hospitalizations last in 2016 at [facility], all rehospitalizations were in context non compliance with the treatment. She has no hx of suicide attempts, no hx of substance abuse. Patient w hx of paranoia, disorganization, catatonia, required MOO when in [facility].   3/24----more withdrawn, some stiffness in neck, not communicative. Some concerns for catatonia. Unsure if symptoms attributed to catatonia vs delirium  3/25--- more alert today, trying to following commands. Still with PMR+. Appears to be responding to Ativan  3/26-- BFS 10, will increasing standing ativan to target catatonic symptoms - giving STAT 0.5mg NOW discussed w acp   3/27----will continue ativan dosing for one more day before increasing  3/28: BFS: stupor 1, mutism 2,withdrawan 2, verbigeration 1, rigidity 2 =8.  3/29: BFS: stupor 2, mutism 2, staring 1, posturing 2, rigidity 2, waxy flexibility 3, withdrawal 2 = 14; please monitor nutritional intake, plan to increase ativan by total daily dose of 0.5 mg at bedtime due to persistent catatonia   3/31: BFS: stupor 2, mutism 2, staring 2, rigidity 1, negativism 1, withdrawal 1, waxy flexibility 3 =12, patient with productive cough, advised for medical work up  4/1---tired, speaking more, not rigid, has PMR+. NPO status now.   4/2--sleepy, RRT this afternoon  4/3---better mentation, pmr but no chester catatonic symptoms  4/4: alert, using few words, mild rigidity -  at bedside, updated   4/5: few words, lethargic, fluctuating wakefulness. Mild rigidity.  No overt catatonic sxs.   4/7: no change, remains thought blocked, few words - son at bedside, updated with care  4/8: patient continues to have poor eye contact, uses few words, no rigidity on today's exam.   4/9: patient remains minimally verbal, some posturing, poor Po intake - continues on NC for 02 requirements   4/10: more verbal/interactive, with increased PO intake per family. Some psychotic output. Family agrees with resumption of Zyprexa.   4/14: alert to self with fluctuating wakefulness, intermittent full sentences f/b one-word answers, some rigidity, posturing, denies si, denies ah/vh  4/16: patient more catatonic today, notable catalepsy, waxiness, verbigeration followed by mutism and staring, family against ativan and ECT, would increase memantine as below, not eating   4/17: catatonic (mute, rigid, not engaging). Eating some portions of meals with significant family support. Family opting to see memantine trial to full effect before consideration of ECT  4/18: mutism today, odd posturing, unable to engage, d/w spouse and son ECT, family still undecided  4/21: alert to self, some improvement with verbal engagement, tracking, follow command, denies si, denies ah/vh, awaiting call back from son in regards to ECT consult  4/22: remains catatonic (mute, postured, rigid, withdrawn, negativistic). Exam limited. Family agrees with formal ECT consult.  4/23: Remains with symptoms of catatonia. BFS: stupor 1, mute2, Rigid 2, negativism 2, withdrawal 2 - 9 : ECT consult completed. awaiting clearances for ECT  4/42: remains with BFS of 9, plan for ECT  4/25: alert to self, able to engage in some conversation, however, confused, loose on  exam, odd posturing right upper arm, Dr. Toure made aware of schedule for ECT Monday 4/28: s/p ECT #2. more spontaneous speech, cooperative with exam. improved eye contact. loose with no rigidity.   4/29: displays more s/s of catatonia since yesterdays exam- negativism, withdrawal, rigidity. family and ect in agreement to proceed with treatment 3 tomorrow.  4/30: some confusion today. Will assess tomm  5/1: mutism and negativism on exam   5/2: improved catatonic symptoms. Confused, suspecting AH  5/5: No catatonic symptoms. Will assess tomm for any catatonic symptoms and decide if to hold off on treatment on wednesday 5/6: Continues to exhibit improvement in catatonic sx. Will recommend holding Wed ECT and having next tx on Fri 5/9 5/7: No catatonic sx; exam consistent with past 2 days. Still planning for next ECT 5/9. Pending inpt psych placement.  5/8: Pt tired/minimal engaged, but responds to commands and no rigidity. Next ECT tomorrow.  5/9: Significantly improved after ECT today and AOx2. Will plan for next ECT on Mon 5/12 5/12: Had ECT this morning. Improved catatonic sx. Denied any mood or psychotic sx. Next ECT on Friday- May 16  5/13: Improved catatonic symptoms. Denied psychosis. A & O X 1    PLAN  -- Routine observation  -- ECT planned for Friday 5/16. NPO after midnight on Thursday  -- no SI/HI, no need for psychiatric CO  -- antipsychotics can only be given if qtc < 500    - MEDICATIONS:  - Memantine 5 mg AM and 10 mg HS  -- C/W Zoloft 125 mg daily   -- C/W Zyprexa 2.5mg HS   - PRN: Ativan 0.5mg q 6hrs prn IM/IV/PO    - please ensure PT involved in patient care    - DISPO: Inpatient psychiatry. 2pc signed, pending beds

## 2025-05-13 NOTE — PROGRESS NOTE ADULT - PROBLEM SELECTOR PLAN 5
2/2 to deconditioning/critical care myopathy, CK WNL  doing well  will get PT services at [facility]

## 2025-05-13 NOTE — PROGRESS NOTE ADULT - SUBJECTIVE AND OBJECTIVE BOX
Patient is a 74y old  Female who presents with a chief complaint of asthma exac/resp failure/catatonia (11 May 2025 12:22)      SUBJECTIVE / OVERNIGHT EVENTS:    MEDICATIONS  (STANDING):  artificial tears (preservative free) Ophthalmic Solution 1 Drop(s) Both EYES every 8 hours  bisacodyl 10 milliGRAM(s) Oral once  chlorhexidine 2% Cloths 1 Application(s) Topical <User Schedule>  dextrose 5%. 1000 milliLiter(s) (50 mL/Hr) IV Continuous <Continuous>  dextrose 5%. 1000 milliLiter(s) (100 mL/Hr) IV Continuous <Continuous>  dextrose 50% Injectable 25 Gram(s) IV Push once  dextrose 50% Injectable 12.5 Gram(s) IV Push once  dextrose 50% Injectable 25 Gram(s) IV Push once  dextrose Oral Gel 15 Gram(s) Oral once  enoxaparin Injectable 40 milliGRAM(s) SubCutaneous every 24 hours  fluticasone propionate/ salmeterol 250-50 MICROgram(s) Diskus 1 Dose(s) Inhalation two times a day  glucagon  Injectable 1 milliGRAM(s) IntraMuscular once  hydrochlorothiazide 25 milliGRAM(s) Oral daily  insulin glargine Injectable (LANTUS) 3 Unit(s) SubCutaneous at bedtime  insulin lispro (ADMELOG) corrective regimen sliding scale   SubCutaneous at bedtime  insulin lispro (ADMELOG) corrective regimen sliding scale   SubCutaneous three times a day before meals  insulin lispro Injectable (ADMELOG) 2 Unit(s) SubCutaneous three times a day before meals  levalbuterol Inhalation 0.63 milliGRAM(s) Inhalation every 8 hours  lisinopril 40 milliGRAM(s) Oral daily  melatonin 9 milliGRAM(s) Oral at bedtime  memantine 10 milliGRAM(s) Oral at bedtime  memantine 5 milliGRAM(s) Oral <User Schedule>  OLANZapine 2.5 milliGRAM(s) Oral at bedtime  pantoprazole    Tablet 40 milliGRAM(s) Oral before breakfast  petrolatum Ophthalmic Ointment 1 Application(s) Both EYES two times a day  polyethylene glycol 3350 17 Gram(s) Oral two times a day  senna 2 Tablet(s) Oral at bedtime  sertraline 125 milliGRAM(s) Oral daily  tiotropium 2.5 MICROgram(s) Inhaler 2 Puff(s) Inhalation daily    MEDICATIONS  (PRN):  albuterol/ipratropium for Nebulization 3 milliLiter(s) Nebulizer every 6 hours PRN Shortness of Breath and/or Wheezing  guaiFENesin Oral Liquid (Sugar-Free) 100 milliGRAM(s) Oral every 6 hours PRN Cough      Vital Signs Last 24 Hrs  T(C): 36.4 (13 May 2025 05:07), Max: 36.9 (12 May 2025 11:00)  T(F): 97.5 (13 May 2025 05:07), Max: 98.4 (12 May 2025 11:00)  HR: 82 (13 May 2025 07:18) (76 - 94)  BP: 157/87 (13 May 2025 05:07) (152/75 - 157/87)  BP(mean): --  RR: 18 (13 May 2025 05:07) (17 - 18)  SpO2: 96% (13 May 2025 07:18) (95% - 99%)    Parameters below as of 13 May 2025 07:18  Patient On (Oxygen Delivery Method): room air      CAPILLARY BLOOD GLUCOSE      POCT Blood Glucose.: 133 mg/dL (13 May 2025 08:18)  POCT Blood Glucose.: 145 mg/dL (12 May 2025 22:37)  POCT Blood Glucose.: 145 mg/dL (12 May 2025 17:37)  POCT Blood Glucose.: 147 mg/dL (12 May 2025 12:30)  POCT Blood Glucose.: 145 mg/dL (12 May 2025 10:07)    I&O's Summary    12 May 2025 07:01  -  13 May 2025 07:00  --------------------------------------------------------  IN: 200 mL / OUT: 400 mL / NET: -200 mL        PHYSICAL EXAM:  GENERAL: NAD, well-developed  HEAD:  Atraumatic, Normocephalic  EYES: EOMI, PERRLA, conjunctiva and sclera clear  NECK: Supple, No JVD  CHEST/LUNG: Clear to auscultation bilaterally; No wheeze  HEART: Regular rate and rhythm; No murmurs, rubs, or gallops  ABDOMEN: Soft, Nontender, Nondistended; Bowel sounds present  EXTREMITIES:  2+ Peripheral Pulses, No clubbing, cyanosis, or edema  PSYCH: AAOx3  NEUROLOGY: non-focal  SKIN: No rashes or lesions    LABS:                        11.6   9.81  )-----------( 190      ( 12 May 2025 06:20 )             35.7     05-12    143  |  104  |  23  ----------------------------<  113[H]  3.7   |  27  |  0.61    Ca    9.7      12 May 2025 06:20  Phos  4.2     05-12  Mg     1.80     05-12            Urinalysis Basic - ( 12 May 2025 06:20 )    Color: x / Appearance: x / SG: x / pH: x  Gluc: 113 mg/dL / Ketone: x  / Bili: x / Urobili: x   Blood: x / Protein: x / Nitrite: x   Leuk Esterase: x / RBC: x / WBC x   Sq Epi: x / Non Sq Epi: x / Bacteria: x        RADIOLOGY & ADDITIONAL TESTS:    Imaging Personally Reviewed:    Consultant(s) Notes Reviewed:      Care Discussed with Consultants/Other Providers:   Patient is a 74y old  Female who presents with a chief complaint of asthma exac/resp failure/catatonia (11 May 2025 12:22)      SUBJECTIVE / OVERNIGHT EVENTS:  Patient has no new complaints. Denies cp, SOB, abdominal pain, N/V/D     MEDICATIONS  (STANDING):  artificial tears (preservative free) Ophthalmic Solution 1 Drop(s) Both EYES every 8 hours  bisacodyl 10 milliGRAM(s) Oral once  chlorhexidine 2% Cloths 1 Application(s) Topical <User Schedule>  dextrose 5%. 1000 milliLiter(s) (50 mL/Hr) IV Continuous <Continuous>  dextrose 5%. 1000 milliLiter(s) (100 mL/Hr) IV Continuous <Continuous>  dextrose 50% Injectable 25 Gram(s) IV Push once  dextrose 50% Injectable 12.5 Gram(s) IV Push once  dextrose 50% Injectable 25 Gram(s) IV Push once  dextrose Oral Gel 15 Gram(s) Oral once  enoxaparin Injectable 40 milliGRAM(s) SubCutaneous every 24 hours  fluticasone propionate/ salmeterol 250-50 MICROgram(s) Diskus 1 Dose(s) Inhalation two times a day  glucagon  Injectable 1 milliGRAM(s) IntraMuscular once  hydrochlorothiazide 25 milliGRAM(s) Oral daily  insulin glargine Injectable (LANTUS) 3 Unit(s) SubCutaneous at bedtime  insulin lispro (ADMELOG) corrective regimen sliding scale   SubCutaneous at bedtime  insulin lispro (ADMELOG) corrective regimen sliding scale   SubCutaneous three times a day before meals  insulin lispro Injectable (ADMELOG) 2 Unit(s) SubCutaneous three times a day before meals  levalbuterol Inhalation 0.63 milliGRAM(s) Inhalation every 8 hours  lisinopril 40 milliGRAM(s) Oral daily  melatonin 9 milliGRAM(s) Oral at bedtime  memantine 10 milliGRAM(s) Oral at bedtime  memantine 5 milliGRAM(s) Oral <User Schedule>  OLANZapine 2.5 milliGRAM(s) Oral at bedtime  pantoprazole    Tablet 40 milliGRAM(s) Oral before breakfast  petrolatum Ophthalmic Ointment 1 Application(s) Both EYES two times a day  polyethylene glycol 3350 17 Gram(s) Oral two times a day  senna 2 Tablet(s) Oral at bedtime  sertraline 125 milliGRAM(s) Oral daily  tiotropium 2.5 MICROgram(s) Inhaler 2 Puff(s) Inhalation daily    MEDICATIONS  (PRN):  albuterol/ipratropium for Nebulization 3 milliLiter(s) Nebulizer every 6 hours PRN Shortness of Breath and/or Wheezing  guaiFENesin Oral Liquid (Sugar-Free) 100 milliGRAM(s) Oral every 6 hours PRN Cough      Vital Signs Last 24 Hrs  T(C): 36.4 (13 May 2025 05:07), Max: 36.9 (12 May 2025 11:00)  T(F): 97.5 (13 May 2025 05:07), Max: 98.4 (12 May 2025 11:00)  HR: 82 (13 May 2025 07:18) (76 - 94)  BP: 157/87 (13 May 2025 05:07) (152/75 - 157/87)  BP(mean): --  RR: 18 (13 May 2025 05:07) (17 - 18)  SpO2: 96% (13 May 2025 07:18) (95% - 99%)    Parameters below as of 13 May 2025 07:18  Patient On (Oxygen Delivery Method): room air      CAPILLARY BLOOD GLUCOSE      POCT Blood Glucose.: 133 mg/dL (13 May 2025 08:18)  POCT Blood Glucose.: 145 mg/dL (12 May 2025 22:37)  POCT Blood Glucose.: 145 mg/dL (12 May 2025 17:37)  POCT Blood Glucose.: 147 mg/dL (12 May 2025 12:30)  POCT Blood Glucose.: 145 mg/dL (12 May 2025 10:07)    I&O's Summary    12 May 2025 07:01  -  13 May 2025 07:00  --------------------------------------------------------  IN: 200 mL / OUT: 400 mL / NET: -200 mL        PHYSICAL EXAM:  GENERAL: NAD, well-developed  HEAD:  Atraumatic, Normocephalic  EYES: EOMI, PERRLA, conjunctiva and sclera clear  NECK: Supple, No JVD  CHEST/LUNG: Clear to auscultation bilaterally; No wheeze  HEART: Regular rate and rhythm; No murmurs, rubs, or gallops  ABDOMEN: Soft, Nontender, Nondistended; Bowel sounds present  EXTREMITIES:  2+ Peripheral Pulses, No clubbing, cyanosis, or edema  PSYCH: AAOx3  NEUROLOGY: non-focal  SKIN: No rashes or lesions    LABS:                        11.6   9.81  )-----------( 190      ( 12 May 2025 06:20 )             35.7     05-12    143  |  104  |  23  ----------------------------<  113[H]  3.7   |  27  |  0.61    Ca    9.7      12 May 2025 06:20  Phos  4.2     05-12  Mg     1.80     05-12            Urinalysis Basic - ( 12 May 2025 06:20 )    Color: x / Appearance: x / SG: x / pH: x  Gluc: 113 mg/dL / Ketone: x  / Bili: x / Urobili: x   Blood: x / Protein: x / Nitrite: x   Leuk Esterase: x / RBC: x / WBC x   Sq Epi: x / Non Sq Epi: x / Bacteria: x        RADIOLOGY & ADDITIONAL TESTS:    Imaging Personally Reviewed:    Consultant(s) Notes Reviewed:      Care Discussed with Consultants/Other Providers:

## 2025-05-13 NOTE — BH CONSULTATION LIAISON PROGRESS NOTE - NSBHFUPINTERVALHXFT_PSY_A_CORE
Patient had ECT yesterday. Patient seen.  present at bedside. Said she feels fine. Denied any depression or anxiety when asked, saying "I am not depressed or anxious". Denied issues with sleep or appetite. Denies AH/VH or paranoia. She didn't appear to be internally preoccupied. Denies safety concerns. On exam, no rigidity or catalepsy. Compliant with psychotropic meds, no behavioral PRNs overnight. She didn't remember getting ECT yesterday, appeared confused. She recalled the date as Feb 1993 and wasn't oriented to the location.   said she is doing little better, talking more but was concerned about her cognition. All concerns addressed.

## 2025-05-13 NOTE — PROGRESS NOTE ADULT - PROBLEM SELECTOR PLAN 1
MDD w/ psychotic features.  - s/p ativan now on hold given concern that it may have contributed to respiratory decompensation  - c/w Zoloft 125 qday (home dose was 150).  - c/w Zyprexa 2.5 qhs  - c/w Namenda 10mg qhs  - psych following  - ECT ongoing  pt optimized per cards and pulm for ECT    Pending transfer to inpatient psychiatric unit at [facility]  Patient s/p ECT on 5/12/25 MDD w/ psychotic features.  - s/p ativan now on hold given concern that it may have contributed to respiratory decompensation  - c/w Zoloft 125 qday (home dose was 150).  - c/w Zyprexa 2.5 qhs  - c/w Namenda 10mg qhs  - psych following  - ECT ongoing  pt optimized per cards and pulm for ECT    Pending transfer to inpatient psychiatric unit at [facility]  Patient s/p ECT on 5/12/25 next ECT is friday 5/16

## 2025-05-14 LAB
GLUCOSE BLDC GLUCOMTR-MCNC: 110 MG/DL — HIGH (ref 70–99)
GLUCOSE BLDC GLUCOMTR-MCNC: 129 MG/DL — HIGH (ref 70–99)
GLUCOSE BLDC GLUCOMTR-MCNC: 147 MG/DL — HIGH (ref 70–99)
GLUCOSE BLDC GLUCOMTR-MCNC: 177 MG/DL — HIGH (ref 70–99)

## 2025-05-14 PROCEDURE — 99232 SBSQ HOSP IP/OBS MODERATE 35: CPT | Mod: FS

## 2025-05-14 RX ADMIN — TIOTROPIUM BROMIDE INHALATION SPRAY 2 PUFF(S): 3.12 SPRAY, METERED RESPIRATORY (INHALATION) at 10:14

## 2025-05-14 RX ADMIN — INSULIN GLARGINE-YFGN 3 UNIT(S): 100 INJECTION, SOLUTION SUBCUTANEOUS at 22:00

## 2025-05-14 RX ADMIN — Medication 1 DROP(S): at 13:54

## 2025-05-14 RX ADMIN — LEVALBUTEROL HYDROCHLORIDE 0.63 MILLIGRAM(S): 1.25 SOLUTION RESPIRATORY (INHALATION) at 21:55

## 2025-05-14 RX ADMIN — INSULIN LISPRO 2 UNIT(S): 100 INJECTION, SOLUTION INTRAVENOUS; SUBCUTANEOUS at 13:25

## 2025-05-14 RX ADMIN — ENOXAPARIN SODIUM 40 MILLIGRAM(S): 100 INJECTION SUBCUTANEOUS at 17:49

## 2025-05-14 RX ADMIN — LISINOPRIL 40 MILLIGRAM(S): 5 TABLET ORAL at 05:43

## 2025-05-14 RX ADMIN — Medication 1 APPLICATION(S): at 17:20

## 2025-05-14 RX ADMIN — SERTRALINE 125 MILLIGRAM(S): 100 TABLET, FILM COATED ORAL at 10:14

## 2025-05-14 RX ADMIN — LEVALBUTEROL HYDROCHLORIDE 0.63 MILLIGRAM(S): 1.25 SOLUTION RESPIRATORY (INHALATION) at 14:13

## 2025-05-14 RX ADMIN — Medication 1 DOSE(S): at 21:57

## 2025-05-14 RX ADMIN — MEMANTINE HYDROCHLORIDE 5 MILLIGRAM(S): 21 CAPSULE, EXTENDED RELEASE ORAL at 06:28

## 2025-05-14 RX ADMIN — Medication 1 DROP(S): at 21:58

## 2025-05-14 RX ADMIN — INSULIN LISPRO 2 UNIT(S): 100 INJECTION, SOLUTION INTRAVENOUS; SUBCUTANEOUS at 09:06

## 2025-05-14 RX ADMIN — Medication 1 APPLICATION(S): at 05:52

## 2025-05-14 RX ADMIN — INSULIN LISPRO 2 UNIT(S): 100 INJECTION, SOLUTION INTRAVENOUS; SUBCUTANEOUS at 17:48

## 2025-05-14 RX ADMIN — Medication 1 DOSE(S): at 10:14

## 2025-05-14 RX ADMIN — Medication 9 MILLIGRAM(S): at 21:58

## 2025-05-14 RX ADMIN — OLANZAPINE 2.5 MILLIGRAM(S): 10 TABLET ORAL at 21:59

## 2025-05-14 RX ADMIN — MEMANTINE HYDROCHLORIDE 10 MILLIGRAM(S): 21 CAPSULE, EXTENDED RELEASE ORAL at 21:56

## 2025-05-14 RX ADMIN — Medication 1 APPLICATION(S): at 05:43

## 2025-05-14 RX ADMIN — Medication 40 MILLIGRAM(S): at 05:43

## 2025-05-14 RX ADMIN — Medication 1 DROP(S): at 05:44

## 2025-05-14 RX ADMIN — LEVALBUTEROL HYDROCHLORIDE 0.63 MILLIGRAM(S): 1.25 SOLUTION RESPIRATORY (INHALATION) at 07:54

## 2025-05-14 NOTE — PROGRESS NOTE ADULT - ASSESSMENT
74 y.o. Female w/ hx  MDD, asthma, prior CVA presented with AHRF c/b AMS requiring MICU admission for intubation for respiratory failure i/s/o asthma exacerbation. Extubated 3/12 to face tent and then on BIPAP s/p RRT 3/19 for increased WOB / hypoxia despite aggressive asthma management s/p return to MICU for management of severe asthma. Possible aspiration event 3/31. SLP eval appreciated. XR cine performed with improvement in mental status - pureed diet w/ thin liquids recommended. Worsening respiratory status 4/2 - placed on NIV, now weaned to nasal cannula and now RA.  ENT eval - no upper airway consideration for structural causes of asthma such as vocal cord dysfunction.   Course c/b MDD w/ catatonia and poor PO, off ativan due to respiratory concerns trialed on Memantine family now undergoing ECT.

## 2025-05-14 NOTE — PROGRESS NOTE ADULT - PROBLEM SELECTOR PLAN 9
Lovenox ppx  full code  PT rec TK  Will need transfer to [facility] for psychiatric optimization    Ambulatory with PT. Eating consistently now. 2PC per psychiatry.

## 2025-05-14 NOTE — PROGRESS NOTE ADULT - PROBLEM SELECTOR PLAN 5
2/2 to deconditioning/critical care myopathy, CK WNL  doing well  will get PT services at [facility]

## 2025-05-14 NOTE — PROGRESS NOTE ADULT - SUBJECTIVE AND OBJECTIVE BOX
Patient is a 74y old  Female who presents with a chief complaint of asthma exac/resp failure/catatonia (11 May 2025 12:22)      SUBJECTIVE / OVERNIGHT EVENTS:  Patient has no new complaints. Denies cp, SOB, abdominal pain, N/V/D     MEDICATIONS  (STANDING):  artificial tears (preservative free) Ophthalmic Solution 1 Drop(s) Both EYES every 8 hours  bisacodyl 10 milliGRAM(s) Oral once  chlorhexidine 2% Cloths 1 Application(s) Topical <User Schedule>  dextrose 5%. 1000 milliLiter(s) (50 mL/Hr) IV Continuous <Continuous>  dextrose 5%. 1000 milliLiter(s) (100 mL/Hr) IV Continuous <Continuous>  dextrose 50% Injectable 25 Gram(s) IV Push once  dextrose 50% Injectable 12.5 Gram(s) IV Push once  dextrose 50% Injectable 25 Gram(s) IV Push once  dextrose Oral Gel 15 Gram(s) Oral once  enoxaparin Injectable 40 milliGRAM(s) SubCutaneous every 24 hours  fluticasone propionate/ salmeterol 250-50 MICROgram(s) Diskus 1 Dose(s) Inhalation two times a day  glucagon  Injectable 1 milliGRAM(s) IntraMuscular once  hydrochlorothiazide 25 milliGRAM(s) Oral daily  insulin glargine Injectable (LANTUS) 3 Unit(s) SubCutaneous at bedtime  insulin lispro (ADMELOG) corrective regimen sliding scale   SubCutaneous at bedtime  insulin lispro (ADMELOG) corrective regimen sliding scale   SubCutaneous three times a day before meals  insulin lispro Injectable (ADMELOG) 2 Unit(s) SubCutaneous three times a day before meals  levalbuterol Inhalation 0.63 milliGRAM(s) Inhalation every 8 hours  lisinopril 40 milliGRAM(s) Oral daily  melatonin 9 milliGRAM(s) Oral at bedtime  memantine 10 milliGRAM(s) Oral at bedtime  memantine 5 milliGRAM(s) Oral <User Schedule>  OLANZapine 2.5 milliGRAM(s) Oral at bedtime  pantoprazole    Tablet 40 milliGRAM(s) Oral before breakfast  petrolatum Ophthalmic Ointment 1 Application(s) Both EYES two times a day  polyethylene glycol 3350 17 Gram(s) Oral two times a day  senna 2 Tablet(s) Oral at bedtime  sertraline 125 milliGRAM(s) Oral daily  tiotropium 2.5 MICROgram(s) Inhaler 2 Puff(s) Inhalation daily    MEDICATIONS  (PRN):  albuterol/ipratropium for Nebulization 3 milliLiter(s) Nebulizer every 6 hours PRN Shortness of Breath and/or Wheezing  guaiFENesin Oral Liquid (Sugar-Free) 100 milliGRAM(s) Oral every 6 hours PRN Cough      Vital Signs Last 24 Hrs  T(C): 36.5 (14 May 2025 10:12), Max: 37.4 (13 May 2025 21:52)  T(F): 97.7 (14 May 2025 10:12), Max: 99.3 (13 May 2025 21:52)  HR: 77 (14 May 2025 10:12) (75 - 86)  BP: 134/68 (14 May 2025 10:12) (134/68 - 163/87)  BP(mean): --  RR: 18 (14 May 2025 10:12) (17 - 18)  SpO2: 94% (14 May 2025 10:12) (94% - 98%)    Parameters below as of 14 May 2025 10:12  Patient On (Oxygen Delivery Method): room air      CAPILLARY BLOOD GLUCOSE      POCT Blood Glucose.: 110 mg/dL (14 May 2025 12:20)  POCT Blood Glucose.: 129 mg/dL (14 May 2025 08:18)  POCT Blood Glucose.: 165 mg/dL (13 May 2025 21:25)  POCT Blood Glucose.: 139 mg/dL (13 May 2025 17:19)    I&O's Summary      PHYSICAL EXAM:  GENERAL: NAD  HEAD:  Atraumatic, Normocephalic  EYES: EOMI, PERRLA, conjunctiva and sclera clear  NECK: Supple, No JVD  CHEST/LUNG: Clear to auscultation bilaterally; No wheeze  HEART: Regular rate and rhythm; No murmurs, rubs, or gallops  ABDOMEN: Soft, Nontender, Nondistended; Bowel sounds present  EXTREMITIES:  2+ Peripheral Pulses, No clubbing, cyanosis, or edema  PSYCH: AAOx3  NEUROLOGY: non-focal  SKIN: No rashes or lesions    LABS:                    RADIOLOGY & ADDITIONAL TESTS:    Imaging Personally Reviewed:    Consultant(s) Notes Reviewed:      Care Discussed with Consultants/Other Providers:

## 2025-05-14 NOTE — PROGRESS NOTE ADULT - SUBJECTIVE AND OBJECTIVE BOX
Arnold rUiostegui MD  Interventional Cardiology / Advance Heart Failure and Cardiac Transplant Specialist  [place] Office : [address] [place] [address] [place]. [address]  Tel:   [place] Office : [phone] [facility] [place] [address]  Tel: [phone]       Pt is lying in bed comfortable not in distress, no chest pains no SOB no palpitations  	  MEDICATIONS:  enoxaparin Injectable 40 milliGRAM(s) SubCutaneous every 24 hours  hydrochlorothiazide 25 milliGRAM(s) Oral daily  lisinopril 40 milliGRAM(s) Oral daily      albuterol/ipratropium for Nebulization 3 milliLiter(s) Nebulizer every 6 hours PRN  fluticasone propionate/ salmeterol 250-50 MICROgram(s) Diskus 1 Dose(s) Inhalation two times a day  guaiFENesin Oral Liquid (Sugar-Free) 100 milliGRAM(s) Oral every 6 hours PRN  levalbuterol Inhalation 0.63 milliGRAM(s) Inhalation every 8 hours  tiotropium 2.5 MICROgram(s) Inhaler 2 Puff(s) Inhalation daily    melatonin 9 milliGRAM(s) Oral at bedtime  memantine 10 milliGRAM(s) Oral at bedtime  memantine 5 milliGRAM(s) Oral <User Schedule>  OLANZapine 2.5 milliGRAM(s) Oral at bedtime  sertraline 125 milliGRAM(s) Oral daily    bisacodyl 10 milliGRAM(s) Oral once  pantoprazole    Tablet 40 milliGRAM(s) Oral before breakfast  polyethylene glycol 3350 17 Gram(s) Oral two times a day  senna 2 Tablet(s) Oral at bedtime    dextrose 50% Injectable 25 Gram(s) IV Push once  dextrose 50% Injectable 12.5 Gram(s) IV Push once  dextrose 50% Injectable 25 Gram(s) IV Push once  dextrose Oral Gel 15 Gram(s) Oral once  glucagon  Injectable 1 milliGRAM(s) IntraMuscular once  insulin glargine Injectable (LANTUS) 3 Unit(s) SubCutaneous at bedtime  insulin lispro (ADMELOG) corrective regimen sliding scale   SubCutaneous at bedtime  insulin lispro (ADMELOG) corrective regimen sliding scale   SubCutaneous three times a day before meals  insulin lispro Injectable (ADMELOG) 2 Unit(s) SubCutaneous three times a day before meals    artificial tears (preservative free) Ophthalmic Solution 1 Drop(s) Both EYES every 8 hours  chlorhexidine 2% Cloths 1 Application(s) Topical <User Schedule>  dextrose 5%. 1000 milliLiter(s) IV Continuous <Continuous>  dextrose 5%. 1000 milliLiter(s) IV Continuous <Continuous>  petrolatum Ophthalmic Ointment 1 Application(s) Both EYES two times a day      PAST MEDICAL/SURGICAL HISTORY  PAST MEDICAL & SURGICAL HISTORY:  MDD (major depressive disorder), recurrent, severe, with psychosis      Asthma, mild intermittent, uncomplicated      Essential hypertension      No significant past surgical history          SOCIAL HISTORY: Substance Use (street drugs): ( x ) never used  (  ) other:    FAMILY HISTORY:      PHYSICAL EXAM:  T(C): 36.5 (05-14-25 @ 10:12), Max: 37.4 (05-13-25 @ 21:52)  HR: 81 (05-14-25 @ 14:13) (75 - 86)  BP: 134/68 (05-14-25 @ 10:12) (134/68 - 163/87)  RR: 18 (05-14-25 @ 10:12) (17 - 18)  SpO2: 98% (05-14-25 @ 14:13) (94% - 98%)  Wt(kg): --  I&O's Summary        GENERAL: NAD  EYES: EOMI  ENMT:   Moist mucous membranes, Good dentition, No lesions  Cardiovascular: Normal S1 S2, No JVD, No murmurs, No edema  Respiratory: Lungs clear to auscultation	  Gastrointestinal:  Soft, Non-tender, + BS	  Extremities: no edema        proBNP:   Lipid Profile:   HgA1c:   TSH:     Consultant(s) Notes Reviewed:  [x ] YES  [ ] NO    Care Discussed with Consultants/Other Providers [ x] YES  [ ] NO    Imaging Personally Reviewed independently:  [x] YES  [ ] NO    All labs, radiologic studies, vitals, orders and medications list reviewed. Patient is seen and examined at bedside. Case discussed with medical team.

## 2025-05-14 NOTE — PROGRESS NOTE ADULT - PROBLEM SELECTOR PLAN 1
MDD w/ psychotic features.  - s/p ativan now on hold given concern that it may have contributed to respiratory decompensation  - c/w Zoloft 125 qday (home dose was 150).  - c/w Zyprexa 2.5 qhs  - c/w Namenda 10mg qhs  - psych following  - ECT ongoing  pt optimized per cards and pulm for ECT    Pending transfer to inpatient psychiatric unit at [facility]  Patient s/p ECT on 5/12/25 next ECT is friday 5/16

## 2025-05-14 NOTE — PROGRESS NOTE ADULT - PROBLEM SELECTOR PROBLEM 6
Hypertension

## 2025-05-15 LAB
APPEARANCE UR: ABNORMAL
BACTERIA # UR AUTO: ABNORMAL /HPF
BILIRUB UR-MCNC: NEGATIVE — SIGNIFICANT CHANGE UP
CAST: 0 /LPF — SIGNIFICANT CHANGE UP (ref 0–4)
COLOR SPEC: YELLOW — SIGNIFICANT CHANGE UP
DIFF PNL FLD: NEGATIVE — SIGNIFICANT CHANGE UP
GLUCOSE BLDC GLUCOMTR-MCNC: 104 MG/DL — HIGH (ref 70–99)
GLUCOSE BLDC GLUCOMTR-MCNC: 109 MG/DL — HIGH (ref 70–99)
GLUCOSE BLDC GLUCOMTR-MCNC: 133 MG/DL — HIGH (ref 70–99)
GLUCOSE BLDC GLUCOMTR-MCNC: 175 MG/DL — HIGH (ref 70–99)
GLUCOSE UR QL: NEGATIVE MG/DL — SIGNIFICANT CHANGE UP
KETONES UR QL: NEGATIVE MG/DL — SIGNIFICANT CHANGE UP
LEUKOCYTE ESTERASE UR-ACNC: ABNORMAL
NITRITE UR-MCNC: NEGATIVE — SIGNIFICANT CHANGE UP
PH UR: 6 — SIGNIFICANT CHANGE UP (ref 5–8)
PROT UR-MCNC: NEGATIVE MG/DL — SIGNIFICANT CHANGE UP
RBC CASTS # UR COMP ASSIST: 2 /HPF — SIGNIFICANT CHANGE UP (ref 0–4)
REVIEW: SIGNIFICANT CHANGE UP
SP GR SPEC: 1.02 — SIGNIFICANT CHANGE UP (ref 1–1.03)
SQUAMOUS # UR AUTO: 1 /HPF — SIGNIFICANT CHANGE UP (ref 0–5)
UROBILINOGEN FLD QL: 0.2 MG/DL — SIGNIFICANT CHANGE UP (ref 0.2–1)
WBC UR QL: 2 /HPF — SIGNIFICANT CHANGE UP (ref 0–5)

## 2025-05-15 PROCEDURE — 99232 SBSQ HOSP IP/OBS MODERATE 35: CPT | Mod: FS

## 2025-05-15 PROCEDURE — 99232 SBSQ HOSP IP/OBS MODERATE 35: CPT

## 2025-05-15 RX ORDER — MEMANTINE HYDROCHLORIDE 21 MG/1
10 CAPSULE, EXTENDED RELEASE ORAL
Refills: 0 | Status: DISCONTINUED | OUTPATIENT
Start: 2025-05-16 | End: 2025-05-21

## 2025-05-15 RX ADMIN — TIOTROPIUM BROMIDE INHALATION SPRAY 2 PUFF(S): 3.12 SPRAY, METERED RESPIRATORY (INHALATION) at 08:40

## 2025-05-15 RX ADMIN — LEVALBUTEROL HYDROCHLORIDE 0.63 MILLIGRAM(S): 1.25 SOLUTION RESPIRATORY (INHALATION) at 07:09

## 2025-05-15 RX ADMIN — SERTRALINE 125 MILLIGRAM(S): 100 TABLET, FILM COATED ORAL at 12:31

## 2025-05-15 RX ADMIN — INSULIN LISPRO 2 UNIT(S): 100 INJECTION, SOLUTION INTRAVENOUS; SUBCUTANEOUS at 18:03

## 2025-05-15 RX ADMIN — MEMANTINE HYDROCHLORIDE 5 MILLIGRAM(S): 21 CAPSULE, EXTENDED RELEASE ORAL at 07:05

## 2025-05-15 RX ADMIN — OLANZAPINE 2.5 MILLIGRAM(S): 10 TABLET ORAL at 21:58

## 2025-05-15 RX ADMIN — Medication 1 DOSE(S): at 08:40

## 2025-05-15 RX ADMIN — INSULIN LISPRO 2 UNIT(S): 100 INJECTION, SOLUTION INTRAVENOUS; SUBCUTANEOUS at 08:41

## 2025-05-15 RX ADMIN — LEVALBUTEROL HYDROCHLORIDE 0.63 MILLIGRAM(S): 1.25 SOLUTION RESPIRATORY (INHALATION) at 14:26

## 2025-05-15 RX ADMIN — POLYETHYLENE GLYCOL 3350 17 GRAM(S): 17 POWDER, FOR SOLUTION ORAL at 05:46

## 2025-05-15 RX ADMIN — Medication 1 DROP(S): at 21:56

## 2025-05-15 RX ADMIN — LISINOPRIL 40 MILLIGRAM(S): 5 TABLET ORAL at 05:45

## 2025-05-15 RX ADMIN — Medication 1 DROP(S): at 12:29

## 2025-05-15 RX ADMIN — MEMANTINE HYDROCHLORIDE 10 MILLIGRAM(S): 21 CAPSULE, EXTENDED RELEASE ORAL at 21:58

## 2025-05-15 RX ADMIN — Medication 1 APPLICATION(S): at 05:46

## 2025-05-15 RX ADMIN — Medication 1 APPLICATION(S): at 05:47

## 2025-05-15 RX ADMIN — DEXTROMETHORPHAN HBR, GUAIFENESIN 100 MILLIGRAM(S): 200 LIQUID ORAL at 00:52

## 2025-05-15 RX ADMIN — Medication 1 DROP(S): at 05:45

## 2025-05-15 RX ADMIN — Medication 1 DOSE(S): at 21:55

## 2025-05-15 RX ADMIN — INSULIN GLARGINE-YFGN 3 UNIT(S): 100 INJECTION, SOLUTION SUBCUTANEOUS at 22:01

## 2025-05-15 RX ADMIN — LEVALBUTEROL HYDROCHLORIDE 0.63 MILLIGRAM(S): 1.25 SOLUTION RESPIRATORY (INHALATION) at 21:09

## 2025-05-15 RX ADMIN — INSULIN LISPRO 2 UNIT(S): 100 INJECTION, SOLUTION INTRAVENOUS; SUBCUTANEOUS at 12:29

## 2025-05-15 RX ADMIN — Medication 9 MILLIGRAM(S): at 21:57

## 2025-05-15 RX ADMIN — Medication 1 APPLICATION(S): at 18:04

## 2025-05-15 RX ADMIN — ENOXAPARIN SODIUM 40 MILLIGRAM(S): 100 INJECTION SUBCUTANEOUS at 18:04

## 2025-05-15 RX ADMIN — Medication 40 MILLIGRAM(S): at 05:45

## 2025-05-15 NOTE — BH CONSULTATION LIAISON PROGRESS NOTE - NSBHFUPMEDSE_PSY_A_CORE
None known
Unable to assess

## 2025-05-15 NOTE — PROGRESS NOTE ADULT - SUBJECTIVE AND OBJECTIVE BOX
Patient is a 74y old  Female who presents with a chief complaint of asthma exac/resp failure/catatonia (11 May 2025 12:22)      SUBJECTIVE / OVERNIGHT EVENTS:  Patient has no new complaints. Still very happy but was seen lying in her own feces not wanting to be cleaned as per nursing. Denies cp, SOB, abdominal pain, N/V/D     MEDICATIONS  (STANDING):  artificial tears (preservative free) Ophthalmic Solution 1 Drop(s) Both EYES every 8 hours  bisacodyl 10 milliGRAM(s) Oral once  chlorhexidine 2% Cloths 1 Application(s) Topical <User Schedule>  dextrose 5%. 1000 milliLiter(s) (50 mL/Hr) IV Continuous <Continuous>  dextrose 5%. 1000 milliLiter(s) (100 mL/Hr) IV Continuous <Continuous>  dextrose 50% Injectable 25 Gram(s) IV Push once  dextrose 50% Injectable 12.5 Gram(s) IV Push once  dextrose 50% Injectable 25 Gram(s) IV Push once  dextrose Oral Gel 15 Gram(s) Oral once  enoxaparin Injectable 40 milliGRAM(s) SubCutaneous every 24 hours  fluticasone propionate/ salmeterol 250-50 MICROgram(s) Diskus 1 Dose(s) Inhalation two times a day  glucagon  Injectable 1 milliGRAM(s) IntraMuscular once  hydrochlorothiazide 25 milliGRAM(s) Oral daily  insulin glargine Injectable (LANTUS) 3 Unit(s) SubCutaneous at bedtime  insulin lispro (ADMELOG) corrective regimen sliding scale   SubCutaneous at bedtime  insulin lispro (ADMELOG) corrective regimen sliding scale   SubCutaneous three times a day before meals  insulin lispro Injectable (ADMELOG) 2 Unit(s) SubCutaneous three times a day before meals  levalbuterol Inhalation 0.63 milliGRAM(s) Inhalation every 8 hours  lisinopril 40 milliGRAM(s) Oral daily  melatonin 9 milliGRAM(s) Oral at bedtime  memantine 10 milliGRAM(s) Oral at bedtime  memantine 5 milliGRAM(s) Oral <User Schedule>  OLANZapine 2.5 milliGRAM(s) Oral at bedtime  pantoprazole    Tablet 40 milliGRAM(s) Oral before breakfast  petrolatum Ophthalmic Ointment 1 Application(s) Both EYES two times a day  polyethylene glycol 3350 17 Gram(s) Oral two times a day  senna 2 Tablet(s) Oral at bedtime  sertraline 125 milliGRAM(s) Oral daily  tiotropium 2.5 MICROgram(s) Inhaler 2 Puff(s) Inhalation daily    MEDICATIONS  (PRN):  albuterol/ipratropium for Nebulization 3 milliLiter(s) Nebulizer every 6 hours PRN Shortness of Breath and/or Wheezing  guaiFENesin Oral Liquid (Sugar-Free) 100 milliGRAM(s) Oral every 6 hours PRN Cough      Vital Signs Last 24 Hrs  T(C): 36.5 (15 May 2025 13:08), Max: 36.9 (14 May 2025 16:41)  T(F): 97.7 (15 May 2025 13:08), Max: 98.4 (14 May 2025 16:41)  HR: 84 (15 May 2025 13:08) (72 - 88)  BP: 159/88 (15 May 2025 13:08) (140/68 - 159/88)  BP(mean): --  RR: 17 (15 May 2025 13:08) (17 - 18)  SpO2: 99% (15 May 2025 13:08) (94% - 99%)    Parameters below as of 15 May 2025 13:08  Patient On (Oxygen Delivery Method): room air      CAPILLARY BLOOD GLUCOSE      POCT Blood Glucose.: 109 mg/dL (15 May 2025 12:14)  POCT Blood Glucose.: 133 mg/dL (15 May 2025 08:22)  POCT Blood Glucose.: 177 mg/dL (14 May 2025 21:38)  POCT Blood Glucose.: 147 mg/dL (14 May 2025 17:24)    I&O's Summary    14 May 2025 07:01  -  15 May 2025 07:00  --------------------------------------------------------  IN: 0 mL / OUT: 500 mL / NET: -500 mL    15 May 2025 07:01  -  15 May 2025 13:43  --------------------------------------------------------  IN: 0 mL / OUT: 200 mL / NET: -200 mL        PHYSICAL EXAM:  GENERAL: NAD  HEAD:  Atraumatic, Normocephalic  EYES: EOMI, PERRLA, conjunctiva and sclera clear  NECK: Supple, No JVD  CHEST/LUNG: Clear to auscultation bilaterally; No wheeze  HEART: Regular rate and rhythm; No murmurs, rubs, or gallops  ABDOMEN: Soft, Nontender, Nondistended; Bowel sounds present  EXTREMITIES:  2+ Peripheral Pulses, No clubbing, cyanosis, or edema  PSYCH: AAOx3  NEUROLOGY: non-focal  SKIN: No rashes or lesions    LABS:                    RADIOLOGY & ADDITIONAL TESTS:    Imaging Personally Reviewed:    Consultant(s) Notes Reviewed:      Care Discussed with Consultants/Other Providers:

## 2025-05-15 NOTE — PROGRESS NOTE ADULT - PROBLEM SELECTOR PLAN 1
MDD w/ psychotic features.  - s/p ativan now on hold given concern that it may have contributed to respiratory decompensation  - c/w Zoloft 125 qday (home dose was 150).  - c/w Zyprexa 2.5 qhs  - c/w Namenda 10mg qhs  - psych following  - ECT ongoing  pt optimized per cards and pulm for ECT    Pending transfer to inpatient psychiatric unit at [facility]  Patient s/p ECT on 5/12/25 next ECT is friday 5/16

## 2025-05-15 NOTE — PROGRESS NOTE ADULT - PROBLEM SELECTOR PLAN 9
Lovenox ppx  full code  PT rec TK  Will need transfer to [facility] for psychiatric optimization    Ambulatory with PT. Eating consistently now. 2PC per psychiatry.

## 2025-05-15 NOTE — BH CONSULTATION LIAISON PROGRESS NOTE - NSBHHPIREASONCLOTHER_PSY_A_CORE FT
catatonia

## 2025-05-15 NOTE — BH CONSULTATION LIAISON PROGRESS NOTE - NSBHFUPINTERVALCCFT_PSY_A_CORE
"11/18/2020       RE: Hilaria Bonner  [address]  [address]  [facility] [address]     Dear Colleague,    Thank you for referring your patient, Hilaria Bonner, to the [facility]OCR[facility] [place] at [facility]. Please see a copy of my visit note below.    Hilaria Bonner is a 29 year old female who is being evaluated via a billable video visit.      The patient has been notified of following:     \"This video visit will be conducted via a call between you and your physician/provider. We have found that certain health care needs can be provided without the need for an in-person physical exam.  This service lets us provide the care you need with a video conversation.  If a prescription is necessary we can send it directly to your pharmacy.  If lab work is needed we can place an order for that and you can then stop by our lab to have the test done at a later time.    Video visits are billed at different rates depending on your insurance coverage.  Please reach out to your insurance provider with any questions.    If during the course of the call the physician/provider feels a video visit is not appropriate, you will not be charged for this service.\"    Patient has given verbal consent for Video visit? Yes  How would you like to obtain your AVS? MyChart  If you are dropped from the video visit, the video invite should be resent to: Text to cell phone: [phone]  Will anyone else be joining your video visit? No        Video-Visit Details    Type of service:  Video Visit    Video Start Time: 11:21 AM  Video End Time: 11:38 AM    Originating Location (pt. Location): Other Car    Distant Location (provider location):  [facility] ENDOCRIN[facility]     Platform used for Video Visit: Aura Medina       The patient is seen in consultation at the request of Dr. Crissy Madrigal.     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 About a month ago, she started having hot flashes at night. She uses a fan to sleep, but still wakes up about 6 times a night feeling hot and sweaty. It has significantly interrupted her ability to sleep. She does not get the symptoms during the day unless she takes a nap. She has had several recent medication changes. About a month ago her cymbalta dose was increased. She also recently started infusions for her neuropathy, lyrica, and xarelto. Pt has a history of normal menstrual cycles. She has been on depot shot for the last year (last shot in Oct). About a month ago she had breakthrough bleeding for the entire month straight. Prior to being on the depot shot she tried OCPs for several months. No problems with menstruation prior to contraception use. Pt also endorses decreased libido and vaginal dryness. She has chronic headaches. Some nausea, but no vommiting, stomach pain, constipation or diarrhea. Occasional palpitations, but she has an upcoming appt with cardiology. No changes in ring or shoe size, changes in hair or nails, changes in vision, or muscle aches. Pt has already had an extensive workup which has included an unremarkable CT CAP, pelvic US showing thickened endometrial stripe, endometrial biopsy on 10/15/2020 which showed no hyperplasia or atypia, normal thyroid function labs, normal MRI of brain, normal UA, and negative TB.    Past Medical History   Past Medical History:   Diagnosis Date     Acute kidney injury (H) 05/13/2019     Cardiac arrest (H) 05/13/2019     Earache or other ear, nose, or throat complaint 12/15    tyroid     Pulmonary embolus (H) 05/13/2019       Past Surgical History   Past Surgical History:   Procedure Laterality Date     GYN SURGERY      2 C-sections     KNEE SURGERY   most recently - [id number]    3 surgeries in [place]     LAPAROSCOPIC GASTRIC SLEEVE N/A 3/26/2019    Procedure: Laparoscopic Sleeve Gastrectomy;  Surgeon: Luan Lopez MD;  Location: [facility] OR     ORTHOPEDIC SURGERY      2 knee meniscus surgery       Current Medications    Current Outpatient Medications:      capsaicin (ZOSTRIX) 0.025 % external cream, Apply 1 g topically 3 times daily as needed (pain), Disp: 50 g, Rfl: 1     cholecalciferol 50 MCG (2000 UT) tablet, Take 1 tablet by mouth daily, Disp: 90 tablet, Rfl: 3     diclofenac (VOLTAREN) 1 % topical gel, Place 2 g onto the skin 4 times daily, Disp: 100 g, Rfl: 1     diphenhydrAMINE (BENADRYL) 50 MG/ML injection, , Disp: , Rfl:      DULoxetine (CYMBALTA) 30 MG capsule, , Disp: , Rfl:      DULoxetine (CYMBALTA) 60 MG capsule, Take 1 capsule (60 mg) by mouth 2 times daily, Disp: 60 capsule, Rfl: 1     EPINEPHrine (ANY BX GENERIC EQUIV) 0.3 MG/0.3ML injection 2-pack, , Disp: , Rfl:      folic acid (FOLVITE) 1 MG tablet, Take 1 tablet (1 mg) by mouth daily, Disp: 30 tablet, Rfl: 11     HYDROcodone-acetaminophen (NORCO) 5-325 MG tablet, Take 1-2 tablets by mouth every 6 hours as needed, Disp: , Rfl:      hydrOXYzine (ATARAX) 25 MG tablet, Take 1-2 tablets (25-50 mg) by mouth every 6 hours as needed for anxiety or other (adjuvant pain), Disp: 90 tablet, Rfl: 1     ibuprofen (ADVIL/MOTRIN) 600 MG tablet, Take 600 mg by mouth every 6 hours as needed, Disp: , Rfl:      lisinopril (ZESTRIL) 10 MG tablet, Take 1 tablet (10 mg) by mouth daily, Disp: 90 tablet, Rfl: 1     melatonin 1 MG TABS tablet, Take 1 tablet (1 mg) by mouth nightly as needed for sleep, Disp:  , Rfl:      Multiple Vitamins-Minerals (MULTIVITAMIN ADULTS) TABS, Take 1 tablet by mouth daily, Disp: , Rfl:      ondansetron (ZOFRAN-ODT) 4 MG ODT tab, Take 4 mg by mouth every 8 hours as needed, Disp: , Rfl:      phenylephrine-shark liver oil-mineral oil-petrolatum (PREPARATION H) 0.25-14-74.9 % rectal  ointment, Place rectally 2 times daily as needed for hemorrhoids, Disp:  , Rfl:      polyethylene glycol (MIRALAX) 17 GM/SCOOP powder, Take 17 g (1 capful) by mouth daily, Disp: 850 g, Rfl: 3     Pregabalin (LYRICA) 200 MG capsule, Take 1 capsule (200 mg) by mouth 3 times daily, Disp: 90 capsule, Rfl: 3     PRIVIGEN 20 GM/200ML SOLN, , Disp: , Rfl:      PRIVIGEN 40 GM/400ML SOLN, , Disp: , Rfl:      QUEtiapine (SEROQUEL) 25 MG tablet, Take 2 tablets (50 mg) by mouth At Bedtime, Disp: 60 tablet, Rfl: 1     rivaroxaban ANTICOAGULANT (XARELTO ANTICOAGULANT) 20 MG TABS tablet, Take 1 tablet (20 mg) by mouth daily (with dinner), Disp: 30 tablet, Rfl: 0     SOLU-CORTEF 100 MG injection, , Disp: , Rfl:      topiramate (TOPAMAX) 25 MG tablet, , Disp: , Rfl:      topiramate (TOPAMAX) 50 MG tablet, Take 2 tablets (100 mg) by mouth 2 times daily, Disp: 60 tablet, Rfl: 1     vitamin (B COMPLEX) tablet, Take 1 tablet by mouth daily, Disp: 90 tablet, Rfl: 3     vitamin C (ASCORBIC ACID) 1000 MG TABS, Take 1 tablet (1,000 mg) by mouth daily, Disp: 30 tablet, Rfl: 0     vitamin D3 (CHOLECALCIFEROL) 2000 units (50 mcg) tablet, Take 1 tablet (2,000 Units) by mouth daily, Disp: 30 tablet, Rfl: 0    Current Facility-Administered Medications:      cyanocobalamin injection 1,000 mcg, 1,000 mcg, Intramuscular, Q30 Days, Crissy Madrigal PA-C, 1,000 mcg at 11/10/20 1043     medroxyPROGESTERone (DEPO-PROVERA) syringe 150 mg, 150 mg, Intramuscular, Q90 Days, Crissy Madrigal PA-C, 150 mg at 09/30/20 1726    Family History   Problem Relation Age of Onset     Diabetes Father      Hypertension Father      Breast Cancer Sister 26        surgery only     Cancer Sister      Coronary Artery Disease Other         paternal aunt     Thyroid Disease Other         neice     Coronary Artery Disease Other      Cerebrovascular Disease Other      Breast Cancer Sister      Thyroid Disease Other      Cerebrovascular Disease No family hx of         Social History  She denies smoking, drinking alcohol or using illicit drugs.  Social History     Socioeconomic History     Marital status: Single     Spouse name: Not on file     Number of children: 2   Occupational History   Social Needs     Non-medical: Not on file   Tobacco Use     Smoking status: Heavy Tobacco Smoker     Packs/day: 0.30     Years: 1.00     Pack years: 0.30     Types: Cigarettes     Start date: 2016     Last attempt to quit: 2018     Years since quittin.8     Smokeless tobacco: Never Used   Substance and Sexual Activity     Alcohol use: Not Currently     Alcohol/week: 0.0 standard drinks     Comment: occasional     Drug use: No     Sexual activity: Yes     Partners: Male     Birth control/protection: Injection   Lifestyle       Review of Systems   A comprehensive ROS was negative except as indicated in the HPI.               Vital Signs     Previous Weights:    Wt Readings from Last 10 Encounters:   10/22/20 117.9 kg (260 lb)   10/15/20 122.1 kg (269 lb 1.6 oz)   20 107 kg (236 lb)   20 112 kg (247 lb)   20 113.7 kg (250 lb 9.6 oz)   20 113.7 kg (250 lb 9.6 oz)   20 113.9 kg (251 lb)   20 107 kg (236 lb)   20 106.8 kg (235 lb 8 oz)   20 112.5 kg (248 lb)        Physical Exam  GENERAL: Healthy, alert and no distress  EYES: Eyes grossly normal to inspection.  No discharge or erythema, or obvious scleral/conjunctival abnormalities.  RESP: No audible wheeze, cough, or visible cyanosis.    SKIN: Visible skin clear.   NEURO: Alert and oriented.  Mentation and speech appropriate for age.  PSYCH: Mentation appears normal, affect normal/bright, judgement and insight intact, normal speech and appearance well-groomed.     Lab Results  I reviewed prior lab results documented in Epic.  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 A broad differential diagnosis includes premature ovarian insufficiency, thyroid pathology, growth hormone excess, prolactinoma, malignancy, and medication change. Pt has already had extensive workup with negative MRI of the head, normal thyroid function tests, CT CAP, endometrial biopsy and UA. This rules out/decreases likelihood of  thyroid pathology, malignancy, pheochromocytoma  and prolactinoma.  Given that her cymbalta was increased around the same time she first developed hot flashes/night sweats, it is possible that this could be the underlying etiology for her symptoms. Premature ovarian insufficiency is also possible, however she is on the depo provera injection q 3 months right now and thus we cannot order labs assessing her HPG hormone axis at this time. For now, I recommend that pt discusses reducing the dose of her cymbalta slightly with pain clinic and see if this makes a difference.  Depo Provera injection has also risk of hot flashes more than 30% of the time therefore this could be also the underlying cause for the hot flashes.  If symptoms persist after the reduction of Cymbalta; looking into a different contraception method might be helpful.  Her last Depo-Provera injection was in October 2020.  We will follow-up with patient in a couple of months to assess symptoms.  Assessment for premature ovarian failure can be also considered once patient is off of Depo-Provera.   Patient is agreeable with plan at this time.        Physician  Attestation   I, Joseph Kinney, was present with the medical student who participated in the service and in the documentation of the note.  I have verified the history and personally performed the physical exam and medical decision making.  I agree with the assessment and plan of care as documented in the note.      I personally reviewed vital signs, medications, labs and imaging.    29-year-old female patient presenting with night sweats and hot flash of about 1 month duration.  Symptoms happen only at night and wakes her up to 5-6 times at night.  Extensive work-up has been performed as summarized above.  Reviewed previously done lab results imaging studies including MRI of the brain and CT abdomen pelvis.  Imaging studies revealed normal adrenal gland and pituitary gland which makes conditions like pheochromocytoma or prolactin producing tumors less likely.  Work-up for malignancy or infectious causes has been negative. Hyperthyroidism has been ruled out.     In terms of medications, patient is currently on 2 medications that can potentially cause night sweats/hot flash.  Depo-Provera inhibits pituitary gonadotropins and this medication has a side effect of hot flash up to 30% of the time and could be the likely underlying cause for her symptoms.  Cymbalta can also cause flushing and diaphoresis.  Dose adjustment of Cymbalta was made a month ago which corresponds to her symptoms.  As a first step it might be reasonable to slightly lower the dose of Cymbalta and see if it makes a difference.  If dose adjustment of Cymbalta Is not helpful then the next step would be looking at Depo-Provera and considering other contraception method.  We will follow-up with patient clinically in a couple of months to assess next steps.    Joseph Kinney MD  Date of Service (when I saw the patient): 11/18/20     "I am fine".

## 2025-05-15 NOTE — BH CONSULTATION LIAISON PROGRESS NOTE - NSBHMSEMOOD_PSY_A_CORE
Normal/Unable to assess
"Fine"
Unable to assess
Normal
Other
Unable to assess
Unable to assess
Normal
Unable to assess
Normal/Unable to assess
Normal/Unable to assess
Other
Normal/Unable to assess
Normal
Other
Unable to assess

## 2025-05-15 NOTE — BH CONSULTATION LIAISON PROGRESS NOTE - NSBHMSERECMEM_PSY_A_CORE
Unable to assess
Impaired
Impaired
Unable to assess
Impaired
Unable to assess
Impaired
Unable to assess
Unable to assess
Impaired

## 2025-05-15 NOTE — PROGRESS NOTE ADULT - PROBLEM SELECTOR PLAN 5
2/2 to deconditioning/critical care myopathy, CK WNL  doing well  will get PT services at [facility]

## 2025-05-15 NOTE — BH CONSULTATION LIAISON PROGRESS NOTE - NSBHFUPINTERVALHXFT_PSY_A_CORE
Chart reviewed, no PRN's. Compliant with medications.  Patient said that she feels fine. Denied any depression or anxiety when asked, saying "I am not depressed or anxious". Denied issues with sleep or appetite. Denies AH/VH or paranoia. She didn't appear to be internally preoccupied. Denies safety concerns. On exam, some mild increased tone, echopraxia and staring noted. She recalled the month as "September". Said she doesn't know date or year.  Chart reviewed, no PRN's. Compliant with medications.  Patient said that she feels fine. Denied any depression or anxiety when asked, saying "I am not depressed or anxious". Denied issues with sleep or appetite. Denies AH/VH or paranoia. She didn't appear to be internally preoccupied. Denies safety concerns. On exam, some mild increased tone on b/l UE, echopraxia today and staring noted. Delayed responses to questions. She recalled the month as "September". Said she doesn't know date or year.   Attempted to do a formal MMSE--- but patient could not fully participate---knows she is in a hospital, but otherwise disoriented, attention-- poor, distracted.     Discussed with medicine acp---discussed ongoing confusion. Team will repeat a UA, ensure other deliriogenic causes ruled out. Reviewed UA done on 3/8

## 2025-05-15 NOTE — PROGRESS NOTE ADULT - SUBJECTIVE AND OBJECTIVE BOX
Arnold Uriostegui MD  Interventional Cardiology / Advance Heart Failure and Cardiac Transplant Specialist  [place] Office : [phone] [place] [address] [place]. [address]  Tel:   [place] Office : [address] [facility] [place] [address]  Tel: [phone]       Pt is lying in bed comfortable not in distress, no chest pains no SOB no palpitations  	  MEDICATIONS:  enoxaparin Injectable 40 milliGRAM(s) SubCutaneous every 24 hours  hydrochlorothiazide 25 milliGRAM(s) Oral daily  lisinopril 40 milliGRAM(s) Oral daily      albuterol/ipratropium for Nebulization 3 milliLiter(s) Nebulizer every 6 hours PRN  fluticasone propionate/ salmeterol 250-50 MICROgram(s) Diskus 1 Dose(s) Inhalation two times a day  guaiFENesin Oral Liquid (Sugar-Free) 100 milliGRAM(s) Oral every 6 hours PRN  levalbuterol Inhalation 0.63 milliGRAM(s) Inhalation every 8 hours  tiotropium 2.5 MICROgram(s) Inhaler 2 Puff(s) Inhalation daily    melatonin 9 milliGRAM(s) Oral at bedtime  memantine 10 milliGRAM(s) Oral at bedtime  OLANZapine 2.5 milliGRAM(s) Oral at bedtime  sertraline 125 milliGRAM(s) Oral daily    bisacodyl 10 milliGRAM(s) Oral once  pantoprazole    Tablet 40 milliGRAM(s) Oral before breakfast  polyethylene glycol 3350 17 Gram(s) Oral two times a day  senna 2 Tablet(s) Oral at bedtime    dextrose 50% Injectable 25 Gram(s) IV Push once  dextrose 50% Injectable 12.5 Gram(s) IV Push once  dextrose 50% Injectable 25 Gram(s) IV Push once  dextrose Oral Gel 15 Gram(s) Oral once  glucagon  Injectable 1 milliGRAM(s) IntraMuscular once  insulin glargine Injectable (LANTUS) 3 Unit(s) SubCutaneous at bedtime  insulin lispro (ADMELOG) corrective regimen sliding scale   SubCutaneous at bedtime  insulin lispro (ADMELOG) corrective regimen sliding scale   SubCutaneous three times a day before meals  insulin lispro Injectable (ADMELOG) 2 Unit(s) SubCutaneous three times a day before meals    artificial tears (preservative free) Ophthalmic Solution 1 Drop(s) Both EYES every 8 hours  chlorhexidine 2% Cloths 1 Application(s) Topical <User Schedule>  dextrose 5%. 1000 milliLiter(s) IV Continuous <Continuous>  dextrose 5%. 1000 milliLiter(s) IV Continuous <Continuous>  petrolatum Ophthalmic Ointment 1 Application(s) Both EYES two times a day      PAST MEDICAL/SURGICAL HISTORY  PAST MEDICAL & SURGICAL HISTORY:  MDD (major depressive disorder), recurrent, severe, with psychosis      Asthma, mild intermittent, uncomplicated      Essential hypertension      No significant past surgical history          SOCIAL HISTORY: Substance Use (street drugs): ( x ) never used  (  ) other:    FAMILY HISTORY:           PHYSICAL EXAM:  T(C): 36.5 (05-15-25 @ 13:08), Max: 36.9 (05-14-25 @ 16:41)  HR: 77 (05-15-25 @ 14:28) (72 - 88)  BP: 159/88 (05-15-25 @ 13:08) (140/68 - 159/88)  RR: 17 (05-15-25 @ 13:08) (17 - 18)  SpO2: 99% (05-15-25 @ 14:28) (94% - 99%)  Wt(kg): --  I&O's Summary    14 May 2025 07:01  -  15 May 2025 07:00  --------------------------------------------------------  IN: 0 mL / OUT: 500 mL / NET: -500 mL    15 May 2025 07:01  -  15 May 2025 15:23  --------------------------------------------------------  IN: 0 mL / OUT: 520 mL / NET: -520 mL          GENERAL: NAD  EYES:   PERRLA   ENMT:   Moist mucous membranes, Good dentition, No lesions  Cardiovascular: Normal S1 S2, No JVD, No murmurs, No edema  Respiratory: Lungs clear to auscultation	  Gastrointestinal:  Soft, Non-tender, + BS	  Extremities: no edema            proBNP:   Lipid Profile:   HgA1c:   TSH:     Consultant(s) Notes Reviewed:  [x ] YES  [ ] NO    Care Discussed with Consultants/Other Providers [ x] YES  [ ] NO    Imaging Personally Reviewed independently:  [x] YES  [ ] NO    All labs, radiologic studies, vitals, orders and medications list reviewed. Patient is seen and examined at bedside. Case discussed with medical team.

## 2025-05-15 NOTE — BH CONSULTATION LIAISON PROGRESS NOTE - NSBHASSESSMENTFT_PSY_ALL_CORE
Patient is a 74F hx asthma, depression, and HTN was admitted to the MICU with AHRF 2/2 asthma exacerbation resulting in acute on chronic HHRF causing AMS and increased WOB that required intubation. During her ICU course, she experienced seizure-like activity .  Additional issues included hyponatremia, acute on chronic metabolic alkalosis, and mild thrombocytopenia,  also having fevers.  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 Patient has h/o hx of major depressive disorder with psychosis, hx of 3 past inpatient psychiatric hospitalizations last in 2016 at [facility], all rehospitalizations were in context non compliance with the treatment. She has no hx of suicide attempts, no hx of substance abuse. Patient w hx of paranoia, disorganization, catatonia, required MOO when in [facility].   3/24----more withdrawn, some stiffness in neck, not communicative. Some concerns for catatonia. Unsure if symptoms attributed to catatonia vs delirium  3/25--- more alert today, trying to following commands. Still with PMR+. Appears to be responding to Ativan  3/26-- BFS 10, will increasing standing ativan to target catatonic symptoms - giving STAT 0.5mg NOW discussed w acp   3/27----will continue ativan dosing for one more day before increasing  3/28: BFS: stupor 1, mutism 2,withdrawan 2, verbigeration 1, rigidity 2 =8.  3/29: BFS: stupor 2, mutism 2, staring 1, posturing 2, rigidity 2, waxy flexibility 3, withdrawal 2 = 14; please monitor nutritional intake, plan to increase ativan by total daily dose of 0.5 mg at bedtime due to persistent catatonia   3/31: BFS: stupor 2, mutism 2, staring 2, rigidity 1, negativism 1, withdrawal 1, waxy flexibility 3 =12, patient with productive cough, advised for medical work up  4/1---tired, speaking more, not rigid, has PMR+. NPO status now.   4/2--sleepy, RRT this afternoon  4/3---better mentation, pmr but no chester catatonic symptoms  4/4: alert, using few words, mild rigidity -  at bedside, updated   4/5: few words, lethargic, fluctuating wakefulness. Mild rigidity.  No overt catatonic sxs.   4/7: no change, remains thought blocked, few words - son at bedside, updated with care  4/8: patient continues to have poor eye contact, uses few words, no rigidity on today's exam.   4/9: patient remains minimally verbal, some posturing, poor Po intake - continues on NC for 02 requirements   4/10: more verbal/interactive, with increased PO intake per family. Some psychotic output. Family agrees with resumption of Zyprexa.   4/14: alert to self with fluctuating wakefulness, intermittent full sentences f/b one-word answers, some rigidity, posturing, denies si, denies ah/vh  4/16: patient more catatonic today, notable catalepsy, waxiness, verbigeration followed by mutism and staring, family against ativan and ECT, would increase memantine as below, not eating   4/17: catatonic (mute, rigid, not engaging). Eating some portions of meals with significant family support. Family opting to see memantine trial to full effect before consideration of ECT  4/18: mutism today, odd posturing, unable to engage, d/w spouse and son ECT, family still undecided  4/21: alert to self, some improvement with verbal engagement, tracking, follow command, denies si, denies ah/vh, awaiting call back from son in regards to ECT consult  4/22: remains catatonic (mute, postured, rigid, withdrawn, negativistic). Exam limited. Family agrees with formal ECT consult.  4/23: Remains with symptoms of catatonia. BFS: stupor 1, mute2, Rigid 2, negativism 2, withdrawal 2 - 9 : ECT consult completed. awaiting clearances for ECT  4/42: remains with BFS of 9, plan for ECT  4/25: alert to self, able to engage in some conversation, however, confused, loose on  exam, odd posturing right upper arm, Dr. Toure made aware of schedule for ECT Monday 4/28: s/p ECT #2. more spontaneous speech, cooperative with exam. improved eye contact. loose with no rigidity.   4/29: displays more s/s of catatonia since yesterdays exam- negativism, withdrawal, rigidity. family and ect in agreement to proceed with treatment 3 tomorrow.  4/30: some confusion today. Will assess tomm  5/1: mutism and negativism on exam   5/2: improved catatonic symptoms. Confused, suspecting AH  5/5: No catatonic symptoms. Will assess tomm for any catatonic symptoms and decide if to hold off on treatment on wednesday 5/6: Continues to exhibit improvement in catatonic sx. Will recommend holding Wed ECT and having next tx on Fri 5/9 5/7: No catatonic sx; exam consistent with past 2 days. Still planning for next ECT 5/9. Pending inpt psych placement.  5/8: Pt tired/minimal engaged, but responds to commands and no rigidity. Next ECT tomorrow.  5/9: Significantly improved after ECT today and AOx2. Will plan for next ECT on Mon 5/12 5/12: Had ECT this morning. Improved catatonic sx. Denied any mood or psychotic sx. Next ECT on Friday- May 16  5/13: Improved catatonic symptoms. Denied psychosis. A & O X 1    5/15: On exam, some mild increased tone in UL, echopraxia, automatic obedience and staring noted. Schedule for ECT tomorrow.  BFCRS: 1 + 2+ 2 +1= 6    PLAN  -- Routine observation  -- ECT planned for Friday 5/16. NPO after midnight on Thursday  -- no SI/HI, no need for psychiatric CO  -- antipsychotics can only be given if qtc < 500    - MEDICATIONS:  - Memantine 5 mg AM and 10 mg HS  -- C/W Zoloft 125 mg daily   -- C/W Zyprexa 2.5mg HS   - PRN: Ativan 0.5mg q 6hrs prn IM/IV/PO    - please ensure PT involved in patient care    - DISPO: Inpatient psychiatry. 2pc signed, pending beds   Patient is a 74F hx asthma, depression, and HTN was admitted to the MICU with AHRF 2/2 asthma exacerbation resulting in acute on chronic HHRF causing AMS and increased WOB that required intubation. During her ICU course, she experienced seizure-like activity .  Additional issues included hyponatremia, acute on chronic metabolic alkalosis, and mild thrombocytopenia,  also having fevers.  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 Patient has h/o hx of major depressive disorder with psychosis, hx of 3 past inpatient psychiatric hospitalizations last in 2016 at [facility], all rehospitalizations were in context non compliance with the treatment. She has no hx of suicide attempts, no hx of substance abuse. Patient w hx of paranoia, disorganization, catatonia, required MOO when in [facility].   3/24----more withdrawn, some stiffness in neck, not communicative. Some concerns for catatonia. Unsure if symptoms attributed to catatonia vs delirium  3/25--- more alert today, trying to following commands. Still with PMR+. Appears to be responding to Ativan  3/26-- BFS 10, will increasing standing ativan to target catatonic symptoms - giving STAT 0.5mg NOW discussed w acp   3/27----will continue ativan dosing for one more day before increasing  3/28: BFS: stupor 1, mutism 2,withdrawan 2, verbigeration 1, rigidity 2 =8.  3/29: BFS: stupor 2, mutism 2, staring 1, posturing 2, rigidity 2, waxy flexibility 3, withdrawal 2 = 14; please monitor nutritional intake, plan to increase ativan by total daily dose of 0.5 mg at bedtime due to persistent catatonia   3/31: BFS: stupor 2, mutism 2, staring 2, rigidity 1, negativism 1, withdrawal 1, waxy flexibility 3 =12, patient with productive cough, advised for medical work up  4/1---tired, speaking more, not rigid, has PMR+. NPO status now.   4/2--sleepy, RRT this afternoon  4/3---better mentation, pmr but no chester catatonic symptoms  4/4: alert, using few words, mild rigidity -  at bedside, updated   4/5: few words, lethargic, fluctuating wakefulness. Mild rigidity.  No overt catatonic sxs.   4/7: no change, remains thought blocked, few words - son at bedside, updated with care  4/8: patient continues to have poor eye contact, uses few words, no rigidity on today's exam.   4/9: patient remains minimally verbal, some posturing, poor Po intake - continues on NC for 02 requirements   4/10: more verbal/interactive, with increased PO intake per family. Some psychotic output. Family agrees with resumption of Zyprexa.   4/14: alert to self with fluctuating wakefulness, intermittent full sentences f/b one-word answers, some rigidity, posturing, denies si, denies ah/vh  4/16: patient more catatonic today, notable catalepsy, waxiness, verbigeration followed by mutism and staring, family against ativan and ECT, would increase memantine as below, not eating   4/17: catatonic (mute, rigid, not engaging). Eating some portions of meals with significant family support. Family opting to see memantine trial to full effect before consideration of ECT  4/18: mutism today, odd posturing, unable to engage, d/w spouse and son ECT, family still undecided  4/21: alert to self, some improvement with verbal engagement, tracking, follow command, denies si, denies ah/vh, awaiting call back from son in regards to ECT consult  4/22: remains catatonic (mute, postured, rigid, withdrawn, negativistic). Exam limited. Family agrees with formal ECT consult.  4/23: Remains with symptoms of catatonia. BFS: stupor 1, mute2, Rigid 2, negativism 2, withdrawal 2 - 9 : ECT consult completed. awaiting clearances for ECT  4/42: remains with BFS of 9, plan for ECT  4/25: alert to self, able to engage in some conversation, however, confused, loose on  exam, odd posturing right upper arm, Dr. Toure made aware of schedule for ECT Monday 4/28: s/p ECT #2. more spontaneous speech, cooperative with exam. improved eye contact. loose with no rigidity.   4/29: displays more s/s of catatonia since yesterdays exam- negativism, withdrawal, rigidity. family and ect in agreement to proceed with treatment 3 tomorrow.  4/30: some confusion today. Will assess tomm  5/1: mutism and negativism on exam   5/2: improved catatonic symptoms. Confused, suspecting AH  5/5: No catatonic symptoms. Will assess tomm for any catatonic symptoms and decide if to hold off on treatment on wednesday 5/6: Continues to exhibit improvement in catatonic sx. Will recommend holding Wed ECT and having next tx on Fri 5/9 5/7: No catatonic sx; exam consistent with past 2 days. Still planning for next ECT 5/9. Pending inpt psych placement.  5/8: Pt tired/minimal engaged, but responds to commands and no rigidity. Next ECT tomorrow.  5/9: Significantly improved after ECT today and AOx2. Will plan for next ECT on Mon 5/12 5/12: Had ECT this morning. Improved catatonic sx. Denied any mood or psychotic sx. Next ECT on Friday- May 16  5/13: Improved catatonic symptoms. Denied psychosis. A & O X 1    5/15: On exam, some mild increased tone in UL, echopraxia, automatic obedience and staring noted. Schedule for ECT tomorrow.  BFCRS: 1 + 2+ 2 +1= 6    PLAN  -- Routine observation  -- ECT planned for Friday 5/16. NPO after midnight on today-Thursday  -- no SI/HI, no need for psychiatric CO  -- antipsychotics can only be given if qtc < 500    - MEDICATIONS:  - As an adjunct to catatonia treatment, will increase dose of Memantine to 10 mg bid po  -- C/W Zoloft 125 mg daily   -- C/W Zyprexa 2.5mg HS   -- Discussed with medicine acp---discussed ongoing confusion. Team will repeat a UA, ensure other deliriogenic causes ruled out. Reviewed UA done on 3/8  - PRN: Ativan 0.5mg q 6hrs prn IM/IV/PO    - please ensure PT involved in patient care    - DISPO: Inpatient psychiatry. 2pc signed, pending beds

## 2025-05-16 LAB
ANION GAP SERPL CALC-SCNC: 11 MMOL/L — SIGNIFICANT CHANGE UP (ref 7–14)
BUN SERPL-MCNC: 20 MG/DL — SIGNIFICANT CHANGE UP (ref 7–23)
CALCIUM SERPL-MCNC: 9.9 MG/DL — SIGNIFICANT CHANGE UP (ref 8.4–10.5)
CHLORIDE SERPL-SCNC: 103 MMOL/L — SIGNIFICANT CHANGE UP (ref 98–107)
CO2 SERPL-SCNC: 26 MMOL/L — SIGNIFICANT CHANGE UP (ref 22–31)
CREAT SERPL-MCNC: 0.61 MG/DL — SIGNIFICANT CHANGE UP (ref 0.5–1.3)
CULTURE RESULTS: SIGNIFICANT CHANGE UP
EGFR: 94 ML/MIN/1.73M2 — SIGNIFICANT CHANGE UP
EGFR: 94 ML/MIN/1.73M2 — SIGNIFICANT CHANGE UP
GLUCOSE BLDC GLUCOMTR-MCNC: 104 MG/DL — HIGH (ref 70–99)
GLUCOSE BLDC GLUCOMTR-MCNC: 135 MG/DL — HIGH (ref 70–99)
GLUCOSE BLDC GLUCOMTR-MCNC: 135 MG/DL — HIGH (ref 70–99)
GLUCOSE BLDC GLUCOMTR-MCNC: 159 MG/DL — HIGH (ref 70–99)
GLUCOSE SERPL-MCNC: 112 MG/DL — HIGH (ref 70–99)
HCT VFR BLD CALC: 37.9 % — SIGNIFICANT CHANGE UP (ref 34.5–45)
HGB BLD-MCNC: 12.1 G/DL — SIGNIFICANT CHANGE UP (ref 11.5–15.5)
MAGNESIUM SERPL-MCNC: 1.8 MG/DL — SIGNIFICANT CHANGE UP (ref 1.6–2.6)
MCHC RBC-ENTMCNC: 31.9 G/DL — LOW (ref 32–36)
MCHC RBC-ENTMCNC: 32.7 PG — SIGNIFICANT CHANGE UP (ref 27–34)
MCV RBC AUTO: 102.4 FL — HIGH (ref 80–100)
NRBC # BLD AUTO: 0 K/UL — SIGNIFICANT CHANGE UP (ref 0–0)
NRBC # FLD: 0 K/UL — SIGNIFICANT CHANGE UP (ref 0–0)
NRBC BLD AUTO-RTO: 0 /100 WBCS — SIGNIFICANT CHANGE UP (ref 0–0)
PHOSPHATE SERPL-MCNC: 4.1 MG/DL — SIGNIFICANT CHANGE UP (ref 2.5–4.5)
PLATELET # BLD AUTO: 188 K/UL — SIGNIFICANT CHANGE UP (ref 150–400)
POTASSIUM SERPL-MCNC: 4.4 MMOL/L — SIGNIFICANT CHANGE UP (ref 3.5–5.3)
POTASSIUM SERPL-SCNC: 4.4 MMOL/L — SIGNIFICANT CHANGE UP (ref 3.5–5.3)
RBC # BLD: 3.7 M/UL — LOW (ref 3.8–5.2)
RBC # FLD: 14.6 % — HIGH (ref 10.3–14.5)
SODIUM SERPL-SCNC: 140 MMOL/L — SIGNIFICANT CHANGE UP (ref 135–145)
SPECIMEN SOURCE: SIGNIFICANT CHANGE UP
WBC # BLD: 10.53 K/UL — HIGH (ref 3.8–10.5)
WBC # FLD AUTO: 10.53 K/UL — HIGH (ref 3.8–10.5)

## 2025-05-16 PROCEDURE — 99233 SBSQ HOSP IP/OBS HIGH 50: CPT | Mod: FS

## 2025-05-16 PROCEDURE — 90870 ELECTROCONVULSIVE THERAPY: CPT

## 2025-05-16 PROCEDURE — 99233 SBSQ HOSP IP/OBS HIGH 50: CPT | Mod: 25

## 2025-05-16 RX ORDER — INSULIN LISPRO 100 U/ML
INJECTION, SOLUTION INTRAVENOUS; SUBCUTANEOUS EVERY 6 HOURS
Refills: 0 | Status: DISCONTINUED | OUTPATIENT
Start: 2025-05-16 | End: 2025-05-16

## 2025-05-16 RX ORDER — INSULIN LISPRO 100 U/ML
INJECTION, SOLUTION INTRAVENOUS; SUBCUTANEOUS AT BEDTIME
Refills: 0 | Status: DISCONTINUED | OUTPATIENT
Start: 2025-05-16 | End: 2025-05-21

## 2025-05-16 RX ORDER — INSULIN LISPRO 100 U/ML
INJECTION, SOLUTION INTRAVENOUS; SUBCUTANEOUS
Refills: 0 | Status: DISCONTINUED | OUTPATIENT
Start: 2025-05-16 | End: 2025-05-21

## 2025-05-16 RX ORDER — INSULIN LISPRO 100 U/ML
INJECTION, SOLUTION INTRAVENOUS; SUBCUTANEOUS
Refills: 0 | Status: DISCONTINUED | OUTPATIENT
Start: 2025-05-16 | End: 2025-05-16

## 2025-05-16 RX ADMIN — Medication 1 APPLICATION(S): at 05:54

## 2025-05-16 RX ADMIN — Medication 1 DROP(S): at 12:27

## 2025-05-16 RX ADMIN — Medication 1 DROP(S): at 22:43

## 2025-05-16 RX ADMIN — LISINOPRIL 40 MILLIGRAM(S): 5 TABLET ORAL at 05:52

## 2025-05-16 RX ADMIN — LEVALBUTEROL HYDROCHLORIDE 0.63 MILLIGRAM(S): 1.25 SOLUTION RESPIRATORY (INHALATION) at 14:34

## 2025-05-16 RX ADMIN — INSULIN GLARGINE-YFGN 3 UNIT(S): 100 INJECTION, SOLUTION SUBCUTANEOUS at 23:09

## 2025-05-16 RX ADMIN — OLANZAPINE 2.5 MILLIGRAM(S): 10 TABLET ORAL at 21:33

## 2025-05-16 RX ADMIN — ENOXAPARIN SODIUM 40 MILLIGRAM(S): 100 INJECTION SUBCUTANEOUS at 17:33

## 2025-05-16 RX ADMIN — Medication 9 MILLIGRAM(S): at 21:33

## 2025-05-16 RX ADMIN — SERTRALINE 125 MILLIGRAM(S): 100 TABLET, FILM COATED ORAL at 12:28

## 2025-05-16 RX ADMIN — Medication 1 APPLICATION(S): at 06:06

## 2025-05-16 RX ADMIN — Medication 1 DOSE(S): at 21:07

## 2025-05-16 RX ADMIN — MEMANTINE HYDROCHLORIDE 10 MILLIGRAM(S): 21 CAPSULE, EXTENDED RELEASE ORAL at 06:04

## 2025-05-16 RX ADMIN — Medication 40 MILLIGRAM(S): at 06:04

## 2025-05-16 RX ADMIN — Medication 1 APPLICATION(S): at 17:33

## 2025-05-16 RX ADMIN — Medication 1 DROP(S): at 06:00

## 2025-05-16 RX ADMIN — MEMANTINE HYDROCHLORIDE 10 MILLIGRAM(S): 21 CAPSULE, EXTENDED RELEASE ORAL at 21:33

## 2025-05-16 RX ADMIN — LEVALBUTEROL HYDROCHLORIDE 0.63 MILLIGRAM(S): 1.25 SOLUTION RESPIRATORY (INHALATION) at 21:07

## 2025-05-16 RX ADMIN — Medication 1 DOSE(S): at 10:07

## 2025-05-16 RX ADMIN — INSULIN LISPRO 2 UNIT(S): 100 INJECTION, SOLUTION INTRAVENOUS; SUBCUTANEOUS at 17:32

## 2025-05-16 RX ADMIN — TIOTROPIUM BROMIDE INHALATION SPRAY 2 PUFF(S): 3.12 SPRAY, METERED RESPIRATORY (INHALATION) at 10:07

## 2025-05-16 RX ADMIN — INSULIN LISPRO 2 UNIT(S): 100 INJECTION, SOLUTION INTRAVENOUS; SUBCUTANEOUS at 12:27

## 2025-05-16 NOTE — BH CONSULTATION LIAISON PROGRESS NOTE - NSBHFUPINTERVALCCFT_PSY_A_CORE
ECT# 8 TODAY- BIFRONTAL  Medicine team aware of plan below  Coordinated with ECT-- discussed about frequency of ect moving forward

## 2025-05-16 NOTE — PROGRESS NOTE ADULT - SUBJECTIVE AND OBJECTIVE BOX
Arnold Uriostegui MD  Interventional Cardiology / Advance Heart Failure and Cardiac Transplant Specialist  [place] Office : [phone] [place] [address] [place]. [address]  Tel:   [place] Office : [address] [facility] [place] [address]  Tel: [phone]       Pt is lying in bed comfortable not in distress, no chest pains no SOB no palpitations  	  MEDICATIONS:  enoxaparin Injectable 40 milliGRAM(s) SubCutaneous every 24 hours  hydrochlorothiazide 25 milliGRAM(s) Oral daily  lisinopril 40 milliGRAM(s) Oral daily      albuterol/ipratropium for Nebulization 3 milliLiter(s) Nebulizer every 6 hours PRN  fluticasone propionate/ salmeterol 250-50 MICROgram(s) Diskus 1 Dose(s) Inhalation two times a day  guaiFENesin Oral Liquid (Sugar-Free) 100 milliGRAM(s) Oral every 6 hours PRN  levalbuterol Inhalation 0.63 milliGRAM(s) Inhalation every 8 hours  tiotropium 2.5 MICROgram(s) Inhaler 2 Puff(s) Inhalation daily    melatonin 9 milliGRAM(s) Oral at bedtime  memantine 10 milliGRAM(s) Oral at bedtime  memantine 10 milliGRAM(s) Oral <User Schedule>  OLANZapine 2.5 milliGRAM(s) Oral at bedtime  sertraline 125 milliGRAM(s) Oral daily    bisacodyl 10 milliGRAM(s) Oral once  pantoprazole    Tablet 40 milliGRAM(s) Oral before breakfast  polyethylene glycol 3350 17 Gram(s) Oral two times a day  senna 2 Tablet(s) Oral at bedtime    dextrose 50% Injectable 25 Gram(s) IV Push once  dextrose 50% Injectable 12.5 Gram(s) IV Push once  dextrose 50% Injectable 25 Gram(s) IV Push once  dextrose Oral Gel 15 Gram(s) Oral once  glucagon  Injectable 1 milliGRAM(s) IntraMuscular once  insulin glargine Injectable (LANTUS) 3 Unit(s) SubCutaneous at bedtime  insulin lispro (ADMELOG) corrective regimen sliding scale   SubCutaneous Before meals and at bedtime  insulin lispro Injectable (ADMELOG) 2 Unit(s) SubCutaneous three times a day before meals    artificial tears (preservative free) Ophthalmic Solution 1 Drop(s) Both EYES every 8 hours  chlorhexidine 2% Cloths 1 Application(s) Topical <User Schedule>  dextrose 5%. 1000 milliLiter(s) IV Continuous <Continuous>  dextrose 5%. 1000 milliLiter(s) IV Continuous <Continuous>  petrolatum Ophthalmic Ointment 1 Application(s) Both EYES two times a day      PAST MEDICAL/SURGICAL HISTORY  PAST MEDICAL & SURGICAL HISTORY:  MDD (major depressive disorder), recurrent, severe, with psychosis      Asthma, mild intermittent, uncomplicated      Essential hypertension      No significant past surgical history          SOCIAL HISTORY: Substance Use (street drugs): ( x ) never used  (  ) other:    FAMILY HISTORY:       PHYSICAL EXAM:  T(C): 36.2 (05-16-25 @ 13:21), Max: 36.9 (05-16-25 @ 05:02)  HR: 84 (05-16-25 @ 13:21) (75 - 84)  BP: 157/79 (05-16-25 @ 13:21) (110/82 - 169/89)  RR: 18 (05-16-25 @ 13:21) (18 - 20)  SpO2: 97% (05-16-25 @ 13:21) (93% - 98%)  Wt(kg): --  I&O's Summary    15 May 2025 07:01  -  16 May 2025 07:00  --------------------------------------------------------  IN: 0 mL / OUT: 680 mL / NET: -680 mL      Height (cm): 157 (05-16 @ 07:50)  Weight (kg): 80 (05-16 @ 07:50)  BMI (kg/m2): 32.5 (05-16 @ 07:50)  BSA (m2): 1.81 (05-16 @ 07:50)    GENERAL: NAD  EYES:   PERRLA   ENMT:   Moist mucous membranes  Cardiovascular: Normal S1 S2, No JVD, No murmurs, No edema  Respiratory: Lungs clear to auscultation	  Gastrointestinal:  Soft, Non-tender, + BS	  Extremities: no edema                                    12.1   10.53 )-----------( 188      ( 16 May 2025 05:02 )             37.9     05-16    140  |  103  |  20  ----------------------------<  112[H]  4.4   |  26  |  0.61    Ca    9.9      16 May 2025 05:02  Phos  4.1     05-16  Mg     1.80     05-16      proBNP:   Lipid Profile:   HgA1c:   TSH:     Consultant(s) Notes Reviewed:  [x ] YES  [ ] NO    Care Discussed with Consultants/Other Providers [ x] YES  [ ] NO    Imaging Personally Reviewed independently:  [x] YES  [ ] NO    All labs, radiologic studies, vitals, orders and medications list reviewed. Patient is seen and examined at bedside. Case discussed with medical team.

## 2025-05-16 NOTE — ECT TREATMENT NOTE - NSECTNEUROCOMMENT_PSY_ALL_CORE
Possible seizure 3/25

## 2025-05-16 NOTE — PROGRESS NOTE ADULT - SUBJECTIVE AND OBJECTIVE BOX
Patient is a 74y old  Female who presents with a chief complaint of asthma exac/resp failure/catatonia (11 May 2025 12:22)      SUBJECTIVE / OVERNIGHT EVENTS:  Patient has no new complaints. She is seen happy with son at bedside. Denies cp, SOB, abdominal pain, N/V/D     MEDICATIONS  (STANDING):  artificial tears (preservative free) Ophthalmic Solution 1 Drop(s) Both EYES every 8 hours  bisacodyl 10 milliGRAM(s) Oral once  chlorhexidine 2% Cloths 1 Application(s) Topical <User Schedule>  dextrose 5%. 1000 milliLiter(s) (50 mL/Hr) IV Continuous <Continuous>  dextrose 5%. 1000 milliLiter(s) (100 mL/Hr) IV Continuous <Continuous>  dextrose 50% Injectable 25 Gram(s) IV Push once  dextrose 50% Injectable 12.5 Gram(s) IV Push once  dextrose 50% Injectable 25 Gram(s) IV Push once  dextrose Oral Gel 15 Gram(s) Oral once  enoxaparin Injectable 40 milliGRAM(s) SubCutaneous every 24 hours  fluticasone propionate/ salmeterol 250-50 MICROgram(s) Diskus 1 Dose(s) Inhalation two times a day  glucagon  Injectable 1 milliGRAM(s) IntraMuscular once  hydrochlorothiazide 25 milliGRAM(s) Oral daily  insulin glargine Injectable (LANTUS) 3 Unit(s) SubCutaneous at bedtime  insulin lispro (ADMELOG) corrective regimen sliding scale   SubCutaneous Before meals and at bedtime  insulin lispro Injectable (ADMELOG) 2 Unit(s) SubCutaneous three times a day before meals  levalbuterol Inhalation 0.63 milliGRAM(s) Inhalation every 8 hours  lisinopril 40 milliGRAM(s) Oral daily  melatonin 9 milliGRAM(s) Oral at bedtime  memantine 10 milliGRAM(s) Oral at bedtime  memantine 10 milliGRAM(s) Oral <User Schedule>  OLANZapine 2.5 milliGRAM(s) Oral at bedtime  pantoprazole    Tablet 40 milliGRAM(s) Oral before breakfast  petrolatum Ophthalmic Ointment 1 Application(s) Both EYES two times a day  polyethylene glycol 3350 17 Gram(s) Oral two times a day  senna 2 Tablet(s) Oral at bedtime  sertraline 125 milliGRAM(s) Oral daily  tiotropium 2.5 MICROgram(s) Inhaler 2 Puff(s) Inhalation daily    MEDICATIONS  (PRN):  albuterol/ipratropium for Nebulization 3 milliLiter(s) Nebulizer every 6 hours PRN Shortness of Breath and/or Wheezing  guaiFENesin Oral Liquid (Sugar-Free) 100 milliGRAM(s) Oral every 6 hours PRN Cough      Vital Signs Last 24 Hrs  T(C): 36.6 (16 May 2025 09:04), Max: 36.9 (16 May 2025 05:02)  T(F): 97.9 (16 May 2025 09:04), Max: 98.4 (16 May 2025 05:02)  HR: 76 (16 May 2025 09:30) (75 - 79)  BP: 120/73 (16 May 2025 09:30) (110/82 - 169/89)  BP(mean): --  RR: 18 (16 May 2025 09:30) (18 - 20)  SpO2: 93% (16 May 2025 09:30) (93% - 99%)    Parameters below as of 16 May 2025 09:00  Patient On (Oxygen Delivery Method): room air      CAPILLARY BLOOD GLUCOSE      POCT Blood Glucose.: 135 mg/dL (16 May 2025 12:18)  POCT Blood Glucose.: 104 mg/dL (16 May 2025 05:33)  POCT Blood Glucose.: 175 mg/dL (15 May 2025 21:54)  POCT Blood Glucose.: 104 mg/dL (15 May 2025 17:42)    I&O's Summary    15 May 2025 07:01  -  16 May 2025 07:00  --------------------------------------------------------  IN: 0 mL / OUT: 680 mL / NET: -680 mL        PHYSICAL EXAM:  GENERAL: NAD, well-developed  HEAD:  Atraumatic, Normocephalic  EYES: EOMI, PERRLA, conjunctiva and sclera clear  NECK: Supple, No JVD  CHEST/LUNG: Clear to auscultation bilaterally; No wheeze  HEART: Regular rate and rhythm; No murmurs, rubs, or gallops  ABDOMEN: Soft, Nontender, Nondistended; Bowel sounds present  EXTREMITIES:  2+ Peripheral Pulses, No clubbing, cyanosis, or edema  PSYCH: AAOx3  NEUROLOGY: non-focal  SKIN: No rashes or lesions    LABS:                        12.1   10.53 )-----------( 188      ( 16 May 2025 05:02 )             37.9     05-16    140  |  103  |  20  ----------------------------<  112[H]  4.4   |  26  |  0.61    Ca    9.9      16 May 2025 05:02  Phos  4.1     05-16  Mg     1.80     05-16            Urinalysis Basic - ( 16 May 2025 05:02 )    Color: x / Appearance: x / SG: x / pH: x  Gluc: 112 mg/dL / Ketone: x  / Bili: x / Urobili: x   Blood: x / Protein: x / Nitrite: x   Leuk Esterase: x / RBC: x / WBC x   Sq Epi: x / Non Sq Epi: x / Bacteria: x        RADIOLOGY & ADDITIONAL TESTS:    Imaging Personally Reviewed:    Consultant(s) Notes Reviewed:      Care Discussed with Consultants/Other Providers:

## 2025-05-16 NOTE — ECT TREATMENT NOTE - NSECTCPTCODE_PSY_ALL_CORE
38198 (ECT-Electroconvulsive Therapy)
14346 (ECT-Electroconvulsive Therapy)
33145 (ECT-Electroconvulsive Therapy)
47099 (ECT-Electroconvulsive Therapy)
47359 (ECT-Electroconvulsive Therapy)
84335 (ECT-Electroconvulsive Therapy)
24924 (ECT-Electroconvulsive Therapy)
53361 (ECT-Electroconvulsive Therapy)

## 2025-05-16 NOTE — PROGRESS NOTE ADULT - PROBLEM SELECTOR PLAN 5
2/2 to deconditioning/critical care myopathy, CK WNL  doing well  will get PT services at [facility]

## 2025-05-16 NOTE — ECT TREATMENT NOTE - NSICDXBHPRIMARYDX_PSY_ALL_CORE
Major depressive disorder with psychotic features   F32.3  

## 2025-05-16 NOTE — ECT TREATMENT NOTE - NSECTPOSTTXCOMMENTS_PSY_ALL_CORE
Patient calm, smiling, pleasant and sociable. Oriented to person only, believed year is "25... no, '85! '85!" Laughs at humor, notes she has been eating fish/rice/broccoli brought in by her second son but does not recall his name or the specialty of her physician son. Limited responses. No abnormalities of tone or echophenomena or grimacing or palmar grasp. Suggest further spacing of tx.

## 2025-05-16 NOTE — PRE-OP CHECKLIST - NOTHING BY MOUTH SINCE
01-May-2025 23:59
08-May-2025 22:00
27-Apr-2025 12:00
04-May-2025 23:59
29-Apr-2025 19:30
16-May-2025 00:00
11-May-2025 21:00

## 2025-05-16 NOTE — PROGRESS NOTE ADULT - PROBLEM SELECTOR PLAN 1
MDD w/ psychotic features.  - s/p ativan now on hold given concern that it may have contributed to respiratory decompensation  - c/w Zoloft 125 qday (home dose was 150).  - c/w Zyprexa 2.5 qhs  - c/w Namenda 10mg qhs  - psych following  - ECT ongoing  pt optimized per cards and pulm for ECT    Pending transfer to inpatient psychiatric unit at [facility]  Patient s/p ECT on 5/12 and 5/16

## 2025-05-16 NOTE — PROGRESS NOTE ADULT - NSPROGADDITIONALINFOA_GEN_ALL_CORE
once more stable will either go to [facility] vs [facility]
I reviewed the overnight course of events on the unit, re-confirming the patient history. I discussed the care with the patient and their family. The plan of care was discussed with the ACP team and modifications were made to the notation where appropriate. Differential diagnosis and plan of care discussed with patient after the evaluation. Advanced care planning was discussed with patient and family.  Advanced care planning forms were reviewed and discussed.  Risks, benefits and alternatives of cardiac procedures were discussed in detail and all questions were answered. 35 minutes spent on total encounter of which more than fifty percent of the encounter was spent counseling and/or coordinating care by the attending physician.
I reviewed the overnight course of events on the unit, re-confirming the patient history. I discussed the care with the patient and their family. The plan of care was discussed with the ACP team and modifications were made to the notation where appropriate. Differential diagnosis and plan of care discussed with patient after the evaluation. Advanced care planning was discussed with patient and family.  Advanced care planning forms were reviewed and discussed.  Risks, benefits and alternatives of cardiac procedures were discussed in detail and all questions were answered. 35 minutes spent on total encounter of which more than fifty percent of the encounter was spent counseling and/or coordinating care by the attending physician.
await SAKSHI
I reviewed the overnight course of events on the unit, re-confirming the patient history. I discussed the care with the patient and their family. The plan of care was discussed with the ACP team and modifications were made to the notation where appropriate. Differential diagnosis and plan of care discussed with patient after the evaluation. Advanced care planning was discussed with patient and family.  Advanced care planning forms were reviewed and discussed.  Risks, benefits and alternatives of cardiac procedures were discussed in detail and all questions were answered. 35 minutes spent on total encounter of which more than fifty percent of the encounter was spent counseling and/or coordinating care by the attending physician.
once more stable will either go to [facility] vs [facility]
remains medically stable for tx to [facility] when bed available
remains stable for [facility] bed
once more stable will either go to [facility] vs [facility]
Discussed during interdisciplinary rounds with case management, social work, nursing, and nursing manager.  Pulmonology consulted.  Discussed with son.  CXR personally reviewed, clear lungs.
once more stable will either go to [facility] vs [facility]
once more stable will either go to [facility] vs [facility]

## 2025-05-16 NOTE — PROGRESS NOTE ADULT - PROBLEM SELECTOR PLAN 9
Lovenox ppx  full code  PT rec [facility]  Will need transfer to [facility] for psychiatric optimization    Ambulatory with PT. Eating consistently now. 2PC per psychiatry.

## 2025-05-16 NOTE — ECT TREATMENT NOTE - NSECTREVSYS_PSY_ALL_CORE
Cardiovascular/Neurological/Pulmonary/Other

## 2025-05-16 NOTE — ECT TREATMENT NOTE - NSECTRECTXSCHED_PSY_ALL_CORE
Acute Course 3X per week
Acute Course 2X per week
Acute Course 2X per week
Weekly
Acute Course 3X per week

## 2025-05-16 NOTE — BH CONSULTATION LIAISON PROGRESS NOTE - NSBHFUPINTERVALHXFT_PSY_A_CORE
Met with the patient. Lying in bed, calm, no agitation or behavioral issues. Disoriented. No evidence of si or hi, or ah or delusions.   Discussed with son at bedside----states that catatonic symptoms have improved, still intermittent episodes of responding to internal stimuli--'laughing to self'. Knows the below plan in detail. All questions answered  Coordinated with sw----bed search at [facility] coordinated

## 2025-05-16 NOTE — ECT TREATMENT NOTE - NSICDXBHSECONDARYDX_PSY_ALL_CORE
Acute asthma exacerbation   J45.901  Pneumonia   J18.9  Hypertension   I10  Toxic metabolic encephalopathy   G92.8  Pre-diabetes   R73.03  Edema of both legs   R60.0  Need for prophylactic measure   Z29.9  Seizure-like activity   R56.9  Acute respiratory failure with hypoxia and hypercapnia   J96.01  Physical deconditioning   R53.81  Steroid-induced hyperglycemia   R73.9  Catatonia   F06.1  MDD (major depressive disorder), recurrent, with catatonic features   F33.9  

## 2025-05-16 NOTE — BH CONSULTATION LIAISON PROGRESS NOTE - NSBHASSESSMENTFT_PSY_ALL_CORE
Patient is a 74F hx asthma, depression, and HTN was admitted to the MICU with AHRF 2/2 asthma exacerbation resulting in acute on chronic HHRF causing AMS and increased WOB that required intubation. During her ICU course, she experienced seizure-like activity .  Additional issues included hyponatremia, acute on chronic metabolic alkalosis, and mild thrombocytopenia,  also having fevers.  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 Patient has h/o hx of major depressive disorder with psychosis, hx of 3 past inpatient psychiatric hospitalizations last in 2016 at [facility], all rehospitalizations were in context non compliance with the treatment. She has no hx of suicide attempts, no hx of substance abuse. Patient w hx of paranoia, disorganization, catatonia, required MOO when in [facility].   3/24----more withdrawn, some stiffness in neck, not communicative. Some concerns for catatonia. Unsure if symptoms attributed to catatonia vs delirium  3/25--- more alert today, trying to following commands. Still with PMR+. Appears to be responding to Ativan  3/26-- BFS 10, will increasing standing ativan to target catatonic symptoms - giving STAT 0.5mg NOW discussed w acp   3/27----will continue ativan dosing for one more day before increasing  3/28: BFS: stupor 1, mutism 2,withdrawan 2, verbigeration 1, rigidity 2 =8.  3/29: BFS: stupor 2, mutism 2, staring 1, posturing 2, rigidity 2, waxy flexibility 3, withdrawal 2 = 14; please monitor nutritional intake, plan to increase ativan by total daily dose of 0.5 mg at bedtime due to persistent catatonia   3/31: BFS: stupor 2, mutism 2, staring 2, rigidity 1, negativism 1, withdrawal 1, waxy flexibility 3 =12, patient with productive cough, advised for medical work up  4/1---tired, speaking more, not rigid, has PMR+. NPO status now.   4/2--sleepy, RRT this afternoon  4/3---better mentation, pmr but no chester catatonic symptoms  4/4: alert, using few words, mild rigidity -  at bedside, updated   4/5: few words, lethargic, fluctuating wakefulness. Mild rigidity.  No overt catatonic sxs.   4/7: no change, remains thought blocked, few words - son at bedside, updated with care  4/8: patient continues to have poor eye contact, uses few words, no rigidity on today's exam.   4/9: patient remains minimally verbal, some posturing, poor Po intake - continues on NC for 02 requirements   4/10: more verbal/interactive, with increased PO intake per family. Some psychotic output. Family agrees with resumption of Zyprexa.   4/14: alert to self with fluctuating wakefulness, intermittent full sentences f/b one-word answers, some rigidity, posturing, denies si, denies ah/vh  4/16: patient more catatonic today, notable catalepsy, waxiness, verbigeration followed by mutism and staring, family against ativan and ECT, would increase memantine as below, not eating   4/17: catatonic (mute, rigid, not engaging). Eating some portions of meals with significant family support. Family opting to see memantine trial to full effect before consideration of ECT  4/18: mutism today, odd posturing, unable to engage, d/w spouse and son ECT, family still undecided  4/21: alert to self, some improvement with verbal engagement, tracking, follow command, denies si, denies ah/vh, awaiting call back from son in regards to ECT consult  4/22: remains catatonic (mute, postured, rigid, withdrawn, negativistic). Exam limited. Family agrees with formal ECT consult.  4/23: Remains with symptoms of catatonia. BFS: stupor 1, mute2, Rigid 2, negativism 2, withdrawal 2 - 9 : ECT consult completed. awaiting clearances for ECT  4/42: remains with BFS of 9, plan for ECT  4/25: alert to self, able to engage in some conversation, however, confused, loose on  exam, odd posturing right upper arm, Dr. Toure made aware of schedule for ECT Monday 4/28: s/p ECT #2. more spontaneous speech, cooperative with exam. improved eye contact. loose with no rigidity.   4/29: displays more s/s of catatonia since yesterdays exam- negativism, withdrawal, rigidity. family and ect in agreement to proceed with treatment 3 tomorrow.  4/30: some confusion today. Will assess tomm  5/1: mutism and negativism on exam   5/2: improved catatonic symptoms. Confused, suspecting AH  5/5: No catatonic symptoms. Will assess tomm for any catatonic symptoms and decide if to hold off on treatment on wednesday 5/6: Continues to exhibit improvement in catatonic sx. Will recommend holding Wed ECT and having next tx on Fri 5/9  5/7: No catatonic sx; exam consistent with past 2 days. Still planning for next ECT 5/9. Pending inpt psych placement.  5/8: Pt tired/minimal engaged, but responds to commands and no rigidity. Next ECT tomorrow.  5/9: Significantly improved after ECT today and AOx2. Will plan for next ECT on Mon 5/12 5/12: Had ECT this morning. Improved catatonic sx. Denied any mood or psychotic sx. Next ECT on Friday- May 16  5/13: Improved catatonic symptoms. Denied psychosis. A & O X 1    5/15: On exam, some mild increased tone in UL, echopraxia, automatic obedience and staring noted. Schedule for ECT tomorrow.  BFCRS: 1 + 2+ 2 +1= 6    5/16-----no catatonic symptoms. ECT# 8 today. Disoriented. H/o strokes-- possible cognitive decline now    PLAN  -- Routine observation  -- ECT planned for weekly  -- no SI/HI, no need for psychiatric CO  -- antipsychotics can only be given if qtc < 500    - MEDICATIONS:  - As an adjunct to catatonia treatment, continue Memantine 10 mg bid po  -- C/W Zoloft 125 mg daily   -- C/W Zyprexa 2.5mg HS     - PRN: Ativan 0.5mg q 6hrs prn IM/IV/PO    - please ensure PT involved in patient care    - DISPO: Inpatient psychiatry. 2pc signed, pending beds

## 2025-05-17 LAB
GLUCOSE BLDC GLUCOMTR-MCNC: 113 MG/DL — HIGH (ref 70–99)
GLUCOSE BLDC GLUCOMTR-MCNC: 116 MG/DL — HIGH (ref 70–99)
GLUCOSE BLDC GLUCOMTR-MCNC: 129 MG/DL — HIGH (ref 70–99)
GLUCOSE BLDC GLUCOMTR-MCNC: 146 MG/DL — HIGH (ref 70–99)

## 2025-05-17 PROCEDURE — 99232 SBSQ HOSP IP/OBS MODERATE 35: CPT | Mod: FS

## 2025-05-17 RX ADMIN — MEMANTINE HYDROCHLORIDE 10 MILLIGRAM(S): 21 CAPSULE, EXTENDED RELEASE ORAL at 08:03

## 2025-05-17 RX ADMIN — LEVALBUTEROL HYDROCHLORIDE 0.63 MILLIGRAM(S): 1.25 SOLUTION RESPIRATORY (INHALATION) at 07:16

## 2025-05-17 RX ADMIN — INSULIN LISPRO 2 UNIT(S): 100 INJECTION, SOLUTION INTRAVENOUS; SUBCUTANEOUS at 09:00

## 2025-05-17 RX ADMIN — INSULIN GLARGINE-YFGN 3 UNIT(S): 100 INJECTION, SOLUTION SUBCUTANEOUS at 22:23

## 2025-05-17 RX ADMIN — INSULIN LISPRO 2 UNIT(S): 100 INJECTION, SOLUTION INTRAVENOUS; SUBCUTANEOUS at 12:55

## 2025-05-17 RX ADMIN — Medication 1 APPLICATION(S): at 08:03

## 2025-05-17 RX ADMIN — TIOTROPIUM BROMIDE INHALATION SPRAY 2 PUFF(S): 3.12 SPRAY, METERED RESPIRATORY (INHALATION) at 09:03

## 2025-05-17 RX ADMIN — Medication 1 APPLICATION(S): at 18:24

## 2025-05-17 RX ADMIN — OLANZAPINE 2.5 MILLIGRAM(S): 10 TABLET ORAL at 22:16

## 2025-05-17 RX ADMIN — Medication 1 DOSE(S): at 09:01

## 2025-05-17 RX ADMIN — Medication 40 MILLIGRAM(S): at 05:46

## 2025-05-17 RX ADMIN — LISINOPRIL 40 MILLIGRAM(S): 5 TABLET ORAL at 05:45

## 2025-05-17 RX ADMIN — MEMANTINE HYDROCHLORIDE 10 MILLIGRAM(S): 21 CAPSULE, EXTENDED RELEASE ORAL at 22:15

## 2025-05-17 RX ADMIN — Medication 1 DROP(S): at 22:16

## 2025-05-17 RX ADMIN — Medication 9 MILLIGRAM(S): at 22:15

## 2025-05-17 RX ADMIN — INSULIN LISPRO 2 UNIT(S): 100 INJECTION, SOLUTION INTRAVENOUS; SUBCUTANEOUS at 18:13

## 2025-05-17 RX ADMIN — ENOXAPARIN SODIUM 40 MILLIGRAM(S): 100 INJECTION SUBCUTANEOUS at 18:24

## 2025-05-17 RX ADMIN — LEVALBUTEROL HYDROCHLORIDE 0.63 MILLIGRAM(S): 1.25 SOLUTION RESPIRATORY (INHALATION) at 14:49

## 2025-05-17 RX ADMIN — SERTRALINE 125 MILLIGRAM(S): 100 TABLET, FILM COATED ORAL at 12:57

## 2025-05-17 RX ADMIN — LEVALBUTEROL HYDROCHLORIDE 0.63 MILLIGRAM(S): 1.25 SOLUTION RESPIRATORY (INHALATION) at 22:14

## 2025-05-17 RX ADMIN — POLYETHYLENE GLYCOL 3350 17 GRAM(S): 17 POWDER, FOR SOLUTION ORAL at 18:23

## 2025-05-17 RX ADMIN — Medication 1 APPLICATION(S): at 08:05

## 2025-05-17 RX ADMIN — Medication 2 TABLET(S): at 22:15

## 2025-05-17 RX ADMIN — Medication 1 DROP(S): at 15:09

## 2025-05-17 RX ADMIN — Medication 1 DOSE(S): at 22:15

## 2025-05-17 RX ADMIN — Medication 1 DROP(S): at 05:46

## 2025-05-17 NOTE — PROGRESS NOTE ADULT - PROBLEM SELECTOR PLAN 5
2/2 to deconditioning/critical care myopathy, CK WNL  doing well  will get PT services at [facility]

## 2025-05-17 NOTE — PROGRESS NOTE ADULT - ASSESSMENT
74 y.o. Female w/ hx  MDD, asthma, prior CVA presented with AHRF c/b AMS requiring MICU admission for intubation for respiratory failure i/s/o asthma exacerbation. Extubated 3/12 to face tent and then on BIPAP s/p RRT 3/19 for increased WOB / hypoxia despite aggressive asthma management s/p return to MICU for management of severe asthma. Possible aspiration event 3/31. SLP eval appreciated. XR cine performed with improvement in mental status - pureed diet w/ thin liquids recommended. Worsening respiratory status 4/2 - placed on NIV, now weaned to nasal cannula and now RA.  ENT eval - no upper airway consideration for structural causes of asthma such as vocal cord dysfunction.   Course c/b MDD w/ catatonia and poor PO, off ativan due to respiratory concerns trialed on Memantine family now undergoing ECT qweekly.

## 2025-05-17 NOTE — PROGRESS NOTE ADULT - SUBJECTIVE AND OBJECTIVE BOX
Patient is a 74y old  Female who presents with a chief complaint of asthma exac/resp failure/catatonia (11 May 2025 12:22)      SUBJECTIVE / OVERNIGHT EVENTS:  Patient has no new complaints. Denies cp, SOB, abdominal pain, N/V/D     MEDICATIONS  (STANDING):  artificial tears (preservative free) Ophthalmic Solution 1 Drop(s) Both EYES every 8 hours  bisacodyl 10 milliGRAM(s) Oral once  chlorhexidine 2% Cloths 1 Application(s) Topical <User Schedule>  dextrose 5%. 1000 milliLiter(s) (50 mL/Hr) IV Continuous <Continuous>  dextrose 5%. 1000 milliLiter(s) (100 mL/Hr) IV Continuous <Continuous>  dextrose 50% Injectable 25 Gram(s) IV Push once  dextrose 50% Injectable 12.5 Gram(s) IV Push once  dextrose 50% Injectable 25 Gram(s) IV Push once  dextrose Oral Gel 15 Gram(s) Oral once  enoxaparin Injectable 40 milliGRAM(s) SubCutaneous every 24 hours  fluticasone propionate/ salmeterol 250-50 MICROgram(s) Diskus 1 Dose(s) Inhalation two times a day  glucagon  Injectable 1 milliGRAM(s) IntraMuscular once  hydrochlorothiazide 25 milliGRAM(s) Oral daily  insulin glargine Injectable (LANTUS) 3 Unit(s) SubCutaneous at bedtime  insulin lispro (ADMELOG) corrective regimen sliding scale   SubCutaneous at bedtime  insulin lispro (ADMELOG) corrective regimen sliding scale   SubCutaneous three times a day before meals  insulin lispro Injectable (ADMELOG) 2 Unit(s) SubCutaneous three times a day before meals  levalbuterol Inhalation 0.63 milliGRAM(s) Inhalation every 8 hours  lisinopril 40 milliGRAM(s) Oral daily  melatonin 9 milliGRAM(s) Oral at bedtime  memantine 10 milliGRAM(s) Oral at bedtime  memantine 10 milliGRAM(s) Oral <User Schedule>  OLANZapine 2.5 milliGRAM(s) Oral at bedtime  pantoprazole    Tablet 40 milliGRAM(s) Oral before breakfast  petrolatum Ophthalmic Ointment 1 Application(s) Both EYES two times a day  polyethylene glycol 3350 17 Gram(s) Oral two times a day  senna 2 Tablet(s) Oral at bedtime  sertraline 125 milliGRAM(s) Oral daily  tiotropium 2.5 MICROgram(s) Inhaler 2 Puff(s) Inhalation daily    MEDICATIONS  (PRN):  albuterol/ipratropium for Nebulization 3 milliLiter(s) Nebulizer every 6 hours PRN Shortness of Breath and/or Wheezing  guaiFENesin Oral Liquid (Sugar-Free) 100 milliGRAM(s) Oral every 6 hours PRN Cough      Vital Signs Last 24 Hrs  T(C): 36.7 (17 May 2025 05:05), Max: 37.2 (16 May 2025 21:44)  T(F): 98.1 (17 May 2025 05:05), Max: 99 (16 May 2025 21:44)  HR: 76 (17 May 2025 07:24) (71 - 90)  BP: 148/67 (17 May 2025 05:05) (110/82 - 169/89)  BP(mean): --  RR: 17 (17 May 2025 05:05) (17 - 20)  SpO2: 98% (17 May 2025 07:24) (93% - 98%)    Parameters below as of 17 May 2025 05:05  Patient On (Oxygen Delivery Method): room air      CAPILLARY BLOOD GLUCOSE      POCT Blood Glucose.: 113 mg/dL (17 May 2025 08:19)  POCT Blood Glucose.: 159 mg/dL (16 May 2025 22:23)  POCT Blood Glucose.: 135 mg/dL (16 May 2025 17:28)  POCT Blood Glucose.: 135 mg/dL (16 May 2025 12:18)    I&O's Summary      PHYSICAL EXAM:  GENERAL: NAD, well-developed  HEAD:  Atraumatic, Normocephalic  EYES: EOMI, PERRLA, conjunctiva and sclera clear  NECK: Supple, No JVD  CHEST/LUNG: Clear to auscultation bilaterally; No wheeze  HEART: Regular rate and rhythm; No murmurs, rubs, or gallops  ABDOMEN: Soft, Nontender, Nondistended; Bowel sounds present  EXTREMITIES:  2+ Peripheral Pulses, No clubbing, cyanosis, or edema  PSYCH: AAOx3  NEUROLOGY: non-focal  SKIN: No rashes or lesions    LABS:                        12.1   10.53 )-----------( 188      ( 16 May 2025 05:02 )             37.9     05-16    140  |  103  |  20  ----------------------------<  112[H]  4.4   |  26  |  0.61    Ca    9.9      16 May 2025 05:02  Phos  4.1     05-16  Mg     1.80     05-16            Urinalysis Basic - ( 16 May 2025 05:02 )    Color: x / Appearance: x / SG: x / pH: x  Gluc: 112 mg/dL / Ketone: x  / Bili: x / Urobili: x   Blood: x / Protein: x / Nitrite: x   Leuk Esterase: x / RBC: x / WBC x   Sq Epi: x / Non Sq Epi: x / Bacteria: x        RADIOLOGY & ADDITIONAL TESTS:    Imaging Personally Reviewed:    Consultant(s) Notes Reviewed:      Care Discussed with Consultants/Other Providers:

## 2025-05-17 NOTE — PROGRESS NOTE ADULT - PROBLEM SELECTOR PLAN 1
MDD w/ psychotic features.  - s/p ativan now on hold given concern that it may have contributed to respiratory decompensation  - c/w Zoloft 125 qday (home dose was 150).  - c/w Zyprexa 2.5 qhs  - c/w Namenda 10mg qhs  - psych following  - ECT qweekly  pt optimized per cards and pulm for ECT    Pending transfer to inpatient psychiatric unit at [facility]  Patient s/p ECT on 5/12 and 5/16

## 2025-05-18 LAB
ANION GAP SERPL CALC-SCNC: 9 MMOL/L — SIGNIFICANT CHANGE UP (ref 7–14)
BUN SERPL-MCNC: 26 MG/DL — HIGH (ref 7–23)
CALCIUM SERPL-MCNC: 9.4 MG/DL — SIGNIFICANT CHANGE UP (ref 8.4–10.5)
CHLORIDE SERPL-SCNC: 107 MMOL/L — SIGNIFICANT CHANGE UP (ref 98–107)
CO2 SERPL-SCNC: 27 MMOL/L — SIGNIFICANT CHANGE UP (ref 22–31)
CREAT SERPL-MCNC: 0.66 MG/DL — SIGNIFICANT CHANGE UP (ref 0.5–1.3)
EGFR: 92 ML/MIN/1.73M2 — SIGNIFICANT CHANGE UP
EGFR: 92 ML/MIN/1.73M2 — SIGNIFICANT CHANGE UP
GLUCOSE BLDC GLUCOMTR-MCNC: 116 MG/DL — HIGH (ref 70–99)
GLUCOSE BLDC GLUCOMTR-MCNC: 129 MG/DL — HIGH (ref 70–99)
GLUCOSE BLDC GLUCOMTR-MCNC: 153 MG/DL — HIGH (ref 70–99)
GLUCOSE BLDC GLUCOMTR-MCNC: 153 MG/DL — HIGH (ref 70–99)
GLUCOSE SERPL-MCNC: 121 MG/DL — HIGH (ref 70–99)
HCT VFR BLD CALC: 34.8 % — SIGNIFICANT CHANGE UP (ref 34.5–45)
HGB BLD-MCNC: 11.4 G/DL — LOW (ref 11.5–15.5)
MAGNESIUM SERPL-MCNC: 1.9 MG/DL — SIGNIFICANT CHANGE UP (ref 1.6–2.6)
MCHC RBC-ENTMCNC: 32.8 G/DL — SIGNIFICANT CHANGE UP (ref 32–36)
MCHC RBC-ENTMCNC: 33.3 PG — SIGNIFICANT CHANGE UP (ref 27–34)
MCV RBC AUTO: 101.8 FL — HIGH (ref 80–100)
NRBC # BLD AUTO: 0 K/UL — SIGNIFICANT CHANGE UP (ref 0–0)
NRBC # FLD: 0 K/UL — SIGNIFICANT CHANGE UP (ref 0–0)
NRBC BLD AUTO-RTO: 0 /100 WBCS — SIGNIFICANT CHANGE UP (ref 0–0)
PHOSPHATE SERPL-MCNC: 3.7 MG/DL — SIGNIFICANT CHANGE UP (ref 2.5–4.5)
PLATELET # BLD AUTO: 167 K/UL — SIGNIFICANT CHANGE UP (ref 150–400)
POTASSIUM SERPL-MCNC: 3.7 MMOL/L — SIGNIFICANT CHANGE UP (ref 3.5–5.3)
POTASSIUM SERPL-SCNC: 3.7 MMOL/L — SIGNIFICANT CHANGE UP (ref 3.5–5.3)
RBC # BLD: 3.42 M/UL — LOW (ref 3.8–5.2)
RBC # FLD: 14.8 % — HIGH (ref 10.3–14.5)
SODIUM SERPL-SCNC: 143 MMOL/L — SIGNIFICANT CHANGE UP (ref 135–145)
WBC # BLD: 8.53 K/UL — SIGNIFICANT CHANGE UP (ref 3.8–10.5)
WBC # FLD AUTO: 8.53 K/UL — SIGNIFICANT CHANGE UP (ref 3.8–10.5)

## 2025-05-18 PROCEDURE — 99232 SBSQ HOSP IP/OBS MODERATE 35: CPT | Mod: FS

## 2025-05-18 RX ADMIN — LISINOPRIL 40 MILLIGRAM(S): 5 TABLET ORAL at 05:45

## 2025-05-18 RX ADMIN — POLYETHYLENE GLYCOL 3350 17 GRAM(S): 17 POWDER, FOR SOLUTION ORAL at 05:46

## 2025-05-18 RX ADMIN — MEMANTINE HYDROCHLORIDE 10 MILLIGRAM(S): 21 CAPSULE, EXTENDED RELEASE ORAL at 06:54

## 2025-05-18 RX ADMIN — OLANZAPINE 2.5 MILLIGRAM(S): 10 TABLET ORAL at 22:06

## 2025-05-18 RX ADMIN — INSULIN GLARGINE-YFGN 3 UNIT(S): 100 INJECTION, SOLUTION SUBCUTANEOUS at 22:44

## 2025-05-18 RX ADMIN — Medication 1 DOSE(S): at 09:03

## 2025-05-18 RX ADMIN — INSULIN LISPRO 1: 100 INJECTION, SOLUTION INTRAVENOUS; SUBCUTANEOUS at 17:31

## 2025-05-18 RX ADMIN — INSULIN LISPRO 2 UNIT(S): 100 INJECTION, SOLUTION INTRAVENOUS; SUBCUTANEOUS at 17:31

## 2025-05-18 RX ADMIN — Medication 40 MILLIGRAM(S): at 05:45

## 2025-05-18 RX ADMIN — ENOXAPARIN SODIUM 40 MILLIGRAM(S): 100 INJECTION SUBCUTANEOUS at 18:41

## 2025-05-18 RX ADMIN — INSULIN LISPRO 2 UNIT(S): 100 INJECTION, SOLUTION INTRAVENOUS; SUBCUTANEOUS at 12:38

## 2025-05-18 RX ADMIN — Medication 9 MILLIGRAM(S): at 22:06

## 2025-05-18 RX ADMIN — Medication 1 APPLICATION(S): at 05:54

## 2025-05-18 RX ADMIN — Medication 1 DROP(S): at 05:46

## 2025-05-18 RX ADMIN — LEVALBUTEROL HYDROCHLORIDE 0.63 MILLIGRAM(S): 1.25 SOLUTION RESPIRATORY (INHALATION) at 22:14

## 2025-05-18 RX ADMIN — POLYETHYLENE GLYCOL 3350 17 GRAM(S): 17 POWDER, FOR SOLUTION ORAL at 17:31

## 2025-05-18 RX ADMIN — Medication 1 DOSE(S): at 22:05

## 2025-05-18 RX ADMIN — INSULIN LISPRO 2 UNIT(S): 100 INJECTION, SOLUTION INTRAVENOUS; SUBCUTANEOUS at 09:07

## 2025-05-18 RX ADMIN — TIOTROPIUM BROMIDE INHALATION SPRAY 2 PUFF(S): 3.12 SPRAY, METERED RESPIRATORY (INHALATION) at 09:04

## 2025-05-18 RX ADMIN — INSULIN LISPRO 1: 100 INJECTION, SOLUTION INTRAVENOUS; SUBCUTANEOUS at 12:41

## 2025-05-18 RX ADMIN — Medication 1 DROP(S): at 22:06

## 2025-05-18 RX ADMIN — Medication 1 APPLICATION(S): at 05:53

## 2025-05-18 RX ADMIN — LEVALBUTEROL HYDROCHLORIDE 0.63 MILLIGRAM(S): 1.25 SOLUTION RESPIRATORY (INHALATION) at 14:56

## 2025-05-18 RX ADMIN — Medication 1 DROP(S): at 15:26

## 2025-05-18 RX ADMIN — Medication 1 APPLICATION(S): at 17:33

## 2025-05-18 RX ADMIN — SERTRALINE 125 MILLIGRAM(S): 100 TABLET, FILM COATED ORAL at 12:44

## 2025-05-18 RX ADMIN — MEMANTINE HYDROCHLORIDE 10 MILLIGRAM(S): 21 CAPSULE, EXTENDED RELEASE ORAL at 22:06

## 2025-05-18 NOTE — PROGRESS NOTE ADULT - PROBLEM SELECTOR PLAN 9
Lovenox ppx  full code  PT rec [facility]  Will need transfer to [facility] for psychiatric optimization    Ambulatory with PT. Eating consistently now. 2PC per psychiatry.

## 2025-05-18 NOTE — PROGRESS NOTE ADULT - PROBLEM SELECTOR PLAN 7
HbA1c 5.7% c/w pre-DM, s/p steroids likely cause of hyperglycemia  - Lantus 6 units and 3 units with meals  - HARDEEP, DM diet No

## 2025-05-18 NOTE — PROGRESS NOTE ADULT - PROBLEM SELECTOR PLAN 1
MDD w/ psychotic features.  - s/p ativan now on hold given concern that it may have contributed to respiratory decompensation  - c/w Zoloft 125 qday (home dose was 150).  - c/w Zyprexa 2.5 qhs  - c/w Namenda 10mg qhs  - psych following  - ECT qweekly  pt optimized per cards and pulm for ECT    Pending transfer to inpatient psychiatric unit at [facility]  Patient s/p ECT on 5/12 and 5/16

## 2025-05-18 NOTE — PROGRESS NOTE ADULT - TIME-BASED
50
37
50
55
50
51
55
50
55
37
50
51
50
51
55
36
50
50
52
50
50
55
36
36
55
36
48
50
35
36
36
35
35
51
51
48
50
51
37
50
35
35
51
50
35
36
51
51
50
36
35
51
50
50

## 2025-05-18 NOTE — PROGRESS NOTE ADULT - TIME BILLING
Preparing to see the patient including review of tests and other providers' notes, confirming history with patient/family member, performing medical examination and evaluation, counseling and educating the patient/family/caregiver, ordering medications, tests and procedures, communicating with other health care professionals, documenting clinical information in the EMR, independently interpreting results and communicating results to the patient/family/caregiver, care coordination
Reviewed lab data, radiology results, consultants' recommendations, documentation in [place], discussed with family, ACP, interdisciplinary staff and/or intervention were performed.
Time-based billing (NON-critical care).     37 minutes spent on total encounter; more than 50% of the visit was spent counseling and / or coordinating care by the attending physician.  The necessity of the time spent during the encounter on this date of service was due to:     review of laboratory data, radiology results, consultants' recommendations, documentation in [place], discussion with patient and interdisciplinary staff (such as , social workers, etc). Interventions were performed as documented above.
Review of patient records, including history, laboratory data, imaging. Patient evaluation and assessment. Coordination of care. Time excludes teaching
Preparing to see the patient including review of tests and other providers' notes, confirming history with patient/family member, performing medical examination and evaluation, counseling and educating the patient/family/caregiver, ordering medications, tests and procedures, communicating with other health care professionals, documenting clinical information in the EMR, independently interpreting results and communicating results to the patient/family/caregiver, care coordination
Reviewed images and labs. Clinical decision making, changes in medication regimen. Discussion with primary team, and patient. This excludes any time spent on separate procedures or teaching.
Reviewed lab data, radiology results, consultants' recommendations, documentation in [place], discussed with family, ACP, interdisciplinary staff and/or intervention were performed.
Reviewing the EMR, vitals, imaging, medication list, recent labs, prior records and coordinating care with medical providers. This time excludes procedures and teaching.
Preparing to see the patient including review of tests and other providers' notes, confirming history with patient/family member, performing medical examination and evaluation, counseling and educating the patient/family/caregiver, ordering medications, tests and procedures, communicating with other health care professionals, documenting clinical information in the EMR, independently interpreting results and communicating results to the patient/family/caregiver, care coordination
Review of patient records, including history, laboratory data, imaging. Patient evaluation and assessment. Coordination of care. Time excludes teaching
Reviewed images and labs. Clinical decision making, changes in medication regimen. Discussion with primary team, and patient. This excludes any time spent on separate procedures or teaching.
Reviewed lab data, radiology results, consultants' recommendations, documentation in [place], discussed with family, ACP, interdisciplinary staff and/or intervention were performed.
Review of patient records, including history, laboratory data, imaging. Patient evaluation and assessment. Coordination of care. Time excludes teaching
Reviewed images and labs. Clinical decision making, changes in medication regimen. Discussion with primary team, and patient. This excludes any time spent on separate procedures or teaching.
Reviewing the EMR, vitals, imaging, medication list, recent labs, prior records and coordinating care with medical providers. This time excludes procedures and teaching.
Reviewing the EMR, vitals, imaging, medication list, recent labs, prior records and coordinating care with medical providers. This time excludes procedures and teaching.
- preparing to see the patient (eg, review of tests, documents, and imaging)  - performing a medically appropriate evaluation and examination  - speaking with patient and/or family as appropriate  - referring and communicating with other health care professionals  - documenting clinical information in the electronic or other health record  - care coordination.    time spent does not include teaching services
Review of patient records, including history, laboratory data, imaging. Patient evaluation and assessment. Coordination of care. Time excludes teaching
Preparing to see the patient including review of tests and other providers' notes, confirming history with patient/family member, performing medical examination and evaluation, counseling and educating the patient/family/caregiver, ordering medications, tests and procedures, communicating with other health care professionals, documenting clinical information in the EMR, independently interpreting results and communicating results to the patient/family/caregiver, care coordination
Reviewed images and labs. Clinical decision making, changes in medication regimen. Discussion with primary team, and patient. This excludes any time spent on separate procedures or teaching.
Reviewed images and labs. Clinical decision making, changes in medication regimen. Discussion with primary team, and patient. This excludes any time spent on separate procedures or teaching.
Preparing to see the patient including review of tests and other providers' notes, confirming history with patient/family member, performing medical examination and evaluation, counseling and educating the patient/family/caregiver, ordering medications, tests and procedures, communicating with other health care professionals, documenting clinical information in the EMR, independently interpreting results and communicating results to the patient/family/caregiver, care coordination
Reviewing the EMR, vitals, imaging, medication list recent labs, prior records, and coordinating care with medical providers. This time excludes procedures and teaching.
Reviewing the EMR, vitals, imaging, medication list recent labs, prior records, and coordinating care with medical providers. This time excludes procedures and teaching.
Preparing to see the patient including review of tests and other providers' notes, confirming history with patient/family member, performing medical examination and evaluation, counseling and educating the patient/family/caregiver, ordering medications, tests and procedures, communicating with other health care professionals, documenting clinical information in the EMR, independently interpreting results and communicating results to the patient/family/caregiver, care coordination
Reviewing the EMR, vitals, imaging, medication list recent labs, prior records, and coordinating care with medical providers. This time excludes procedures and teaching.
Time-based billing (NON-critical care).     35 minutes spent on total encounter; more than 50% of the visit was spent counseling and / or coordinating care by the attending physician.  The necessity of the time spent during the encounter on this date of service was due to:     review of laboratory data, radiology results, consultants' recommendations, documentation in [place], discussion with patient and 
- preparing to see the patient (eg, review of tests, documents, and imaging)  - performing a medically appropriate evaluation and examination  - speaking with patient and/or family as appropriate  - referring and communicating with other health care professionals  - documenting clinical information in the electronic or other health record  - care coordination.    time spent does not include teaching services
Reviewed lab data, radiology results, consultants' recommendations, documentation in [place], discussed with family, ACP, interdisciplinary staff and/or intervention were performed.
Reviewed lab data, radiology results, consultants' recommendations, documentation in [place], discussed with family, ACP, interdisciplinary staff and/or intervention were performed.
Reviewed lab data, radiology results, consultants' recommendations, documentation in [place], discussed with family, ACP, interdisciplinary staff and/or intervention were performed.
Time-based billing (NON-critical care).     36 minutes spent on total encounter; more than 50% of the visit was spent counseling and / or coordinating care by the attending physician.  The necessity of the time spent during the encounter on this date of service was due to:     review of laboratory data, radiology results, consultants' recommendations, documentation in [place], discussion with patient and interdisciplinary staff (such as , social workers, etc). Interventions were performed as documented above.
Preparing to see the patient including review of tests and other providers' notes, confirming history with patient/family member, performing medical examination and evaluation, counseling and educating the patient/family/caregiver, ordering medications, tests and procedures, communicating with other health care professionals, documenting clinical information in the EMR, independently interpreting results and communicating results to the patient/family/caregiver, care coordination
Time-based billing (NON-critical care).     50 minutes spent on total encounter; more than 50% of the visit was spent counseling and / or coordinating care by the attending physician.  The necessity of the time spent during the encounter on this date of service was due to:     review of laboratory data, radiology results, consultants' recommendations, documentation in [place], discussion with patient/son
Reviewed lab data, radiology results, consultants' recommendations, documentation in [place], discussed with family, ACP, interdisciplinary staff and/or intervention were performed.
Time-based billing (NON-critical care).     35 minutes spent on total encounter; more than 50% of the visit was spent counseling and / or coordinating care by the attending physician.  The necessity of the time spent during the encounter on this date of service was due to:     review of laboratory data, radiology results, consultants' recommendations, documentation in [place], discussion with patient and interdisciplinary staff (such as , social workers, etc). Interventions were performed as documented above.
Preparing to see the patient including review of tests and other providers' notes, confirming history with patient/family member, performing medical examination and evaluation, counseling and educating the patient/family/caregiver, ordering medications, tests and procedures, communicating with other health care professionals, documenting clinical information in the EMR, independently interpreting results and communicating results to the patient/family/caregiver, care coordination
Time-based billing (NON-critical care).     50 minutes spent on total encounter; more than 50% of the visit was spent counseling and / or coordinating care by the attending physician.  The necessity of the time spent during the encounter on this date of service was due to:     review of laboratory data, radiology results, consultants' recommendations, documentation in [place], discussion with patient/, pulmonology, and interdisciplinary staff (such as , social workers, etc). Interventions were performed as documented above.
Preparing to see the patient including review of tests and other providers' notes, confirming history with patient/family member, performing medical examination and evaluation, counseling and educating the patient/family/caregiver, ordering medications, tests and procedures, communicating with other health care professionals, documenting clinical information in the EMR, independently interpreting results and communicating results to the patient/family/caregiver, care coordination
Reviewed lab data, radiology results, consultants' recommendations, documentation in [place], discussed with family, ACP, interdisciplinary staff and/or intervention were performed.
Reviewed lab data, radiology results, consultants' recommendations, documentation in [place], discussed with family, ACP, interdisciplinary staff and/or intervention were performed.
Time-based billing (NON-critical care).     35 minutes spent on total encounter; more than 50% of the visit was spent counseling and / or coordinating care by the attending physician.  The necessity of the time spent during the encounter on this date of service was due to:     review of laboratory data, radiology results, consultants' recommendations, documentation in [place], discussion with patient and interdisciplinary staff (such as , social workers, etc). Interventions were performed as documented above.
Time-based billing (NON-critical care).     35 minutes spent on total encounter; more than 50% of the visit was spent counseling and / or coordinating care by the attending physician.  The necessity of the time spent during the encounter on this date of service was due to:     review of laboratory data, radiology results, consultants' recommendations, documentation in [place], discussion with patient and son
Reviewed lab data, radiology results, consultants' recommendations, documentation in [place], discussed with family, ACP, interdisciplinary staff and/or intervention were performed.
Reviewed lab data, radiology results, consultants' recommendations, documentation in [place], discussed with family, ACP, interdisciplinary staff and/or intervention were performed.
Reviewed lab data, radiology results, consultants' recommendations, documentation in [place], discussed with family, ACP, interdisciplinary staff and/or intervention were performed.
Time-based billing (NON-critical care).     36 minutes spent on total encounter; more than 50% of the visit was spent counseling and / or coordinating care by the attending physician.  The necessity of the time spent during the encounter on this date of service was due to:     review of laboratory data, radiology results, consultants' recommendations, documentation in [place], discussion with patient/ and interdisciplinary staff (such as , social workers, etc). Interventions were performed as documented above.
Time-based billing (NON-critical care).     50 minutes spent on total encounter; more than 50% of the visit was spent counseling and / or coordinating care by the attending physician.  The necessity of the time spent during the encounter on this date of service was due to:     review of laboratory data, radiology results, consultants' recommendations, documentation in [place], discussion with patient/son and interdisciplinary staff (such as , social workers, etc). Interventions were performed as documented above.
Reviewed lab data, radiology results, consultants' recommendations, documentation in [place], discussed with family, ACP, interdisciplinary staff and/or intervention were performed.
- Ordering, reviewing, and interpreting labs, testing, and imaging.  - Independently obtaining a review of systems and performing a physical exam  - Reviewing consultant documentation/recommendations in addition to discussing plan of care with consultants.  - Counselling and educating patient and family regarding interpretation of aforementioned items and plan of care.
Reviewed lab data, radiology results, consultants' recommendations, documentation in [place], discussed with family, ACP, interdisciplinary staff and/or intervention were performed.
Time-based billing (NON-critical care).     35 minutes spent on total encounter; more than 50% of the visit was spent counseling and / or coordinating care by the attending physician.  The necessity of the time spent during the encounter on this date of service was due to:     review of laboratory data, radiology results, consultants' recommendations, documentation in [place], discussion with patient, psychiatry, and interdisciplinary staff (such as , social workers, etc). Interventions were performed as documented above.
Time-based billing (NON-critical care).     35 minutes spent on total encounter; more than 50% of the visit was spent counseling and / or coordinating care by the attending physician.  The necessity of the time spent during the encounter on this date of service was due to:     review of laboratory data, radiology results, consultants' recommendations, documentation in [place], discussion with patient, , and interdisciplinary staff (such as , social workers, etc). Interventions were performed as documented above.
- Ordering, reviewing, and interpreting labs, testing, and imaging.  - Independently obtaining a review of systems and performing a physical exam  - Reviewing consultant documentation/recommendations in addition to discussing plan of care with consultants.  - Counselling and educating patient and family regarding interpretation of aforementioned items and plan of care.
Time-based billing (NON-critical care).     35 minutes spent on total encounter; more than 50% of the visit was spent counseling and / or coordinating care by the attending physician.  The necessity of the time spent during the encounter on this date of service was due to:     review of laboratory data, radiology results, consultants' recommendations, documentation in [place], discussion with patient/
- Ordering, reviewing, and interpreting labs, testing, and imaging.  - Independently obtaining a review of systems and performing a physical exam  - Reviewing consultant documentation/recommendations in addition to discussing plan of care with consultants.  - Counselling and educating patient and family regarding interpretation of aforementioned items and plan of care.

## 2025-05-18 NOTE — PROGRESS NOTE ADULT - PROBLEM SELECTOR PLAN 5
2/2 to deconditioning/critical care myopathy, CK WNL  doing well  will get PT services at [facility]

## 2025-05-18 NOTE — PROGRESS NOTE ADULT - SUBJECTIVE AND OBJECTIVE BOX
Date: 2023  OR 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  Anesthesia Staff: Anesthesiologist: Pete Trivedi MD  C-AA: QUIRINO Aponte  Estimated Blood Loss: 100mL  Intra-op Medications:   Medication Name Total Dose   HYDROmorphone (Dilaudid) injection 0.2 mg 0.2 mg              Anesthesia Record               Intraprocedure I/O Totals          Intake    Propofol Drip 0.00 mL    The total shown is the total volume documented since Anesthesia Start was filed.    LR 1000.00 mL    Autologous Blood 0 mL    Cell Saver 0 mL    Total Intake 1000 mL       Output    Urine 0 mL    Est. Blood Loss 100 mL    Total Output 100 mL       Net    Net Volume 900 mL          Specimen: No specimens collected     Staff:   Circulator: Maryjo Hanna RN  Relief Circulator: Jeanne Lowery RN  Relief Scrub: Jeanne Lowery RN  Scrub Person: Linda Cullen          Findings: see post surgical/procedural note     Complications:  None; patient tolerated the procedure well.     Disposition:  general floor for pain control   Condition: stable  Specimens Collected: No specimens collected  Attending Attestation: I was present and scrubbed for the entire procedure.    Matthew Gutierrez  Phone Number: [phone]   Patient is a 74y old  Female who presents with a chief complaint of asthma exac/resp failure/catatonia (11 May 2025 12:22)      SUBJECTIVE / OVERNIGHT EVENTS:    MEDICATIONS  (STANDING):  artificial tears (preservative free) Ophthalmic Solution 1 Drop(s) Both EYES every 8 hours  bisacodyl 10 milliGRAM(s) Oral once  chlorhexidine 2% Cloths 1 Application(s) Topical <User Schedule>  dextrose 5%. 1000 milliLiter(s) (50 mL/Hr) IV Continuous <Continuous>  dextrose 5%. 1000 milliLiter(s) (100 mL/Hr) IV Continuous <Continuous>  dextrose 50% Injectable 25 Gram(s) IV Push once  dextrose 50% Injectable 12.5 Gram(s) IV Push once  dextrose 50% Injectable 25 Gram(s) IV Push once  dextrose Oral Gel 15 Gram(s) Oral once  enoxaparin Injectable 40 milliGRAM(s) SubCutaneous every 24 hours  fluticasone propionate/ salmeterol 250-50 MICROgram(s) Diskus 1 Dose(s) Inhalation two times a day  glucagon  Injectable 1 milliGRAM(s) IntraMuscular once  hydrochlorothiazide 25 milliGRAM(s) Oral daily  insulin glargine Injectable (LANTUS) 3 Unit(s) SubCutaneous at bedtime  insulin lispro (ADMELOG) corrective regimen sliding scale   SubCutaneous at bedtime  insulin lispro (ADMELOG) corrective regimen sliding scale   SubCutaneous three times a day before meals  insulin lispro Injectable (ADMELOG) 2 Unit(s) SubCutaneous three times a day before meals  levalbuterol Inhalation 0.63 milliGRAM(s) Inhalation every 8 hours  lisinopril 40 milliGRAM(s) Oral daily  melatonin 9 milliGRAM(s) Oral at bedtime  memantine 10 milliGRAM(s) Oral at bedtime  memantine 10 milliGRAM(s) Oral <User Schedule>  OLANZapine 2.5 milliGRAM(s) Oral at bedtime  pantoprazole    Tablet 40 milliGRAM(s) Oral before breakfast  petrolatum Ophthalmic Ointment 1 Application(s) Both EYES two times a day  polyethylene glycol 3350 17 Gram(s) Oral two times a day  senna 2 Tablet(s) Oral at bedtime  sertraline 125 milliGRAM(s) Oral daily  tiotropium 2.5 MICROgram(s) Inhaler 2 Puff(s) Inhalation daily    MEDICATIONS  (PRN):  albuterol/ipratropium for Nebulization 3 milliLiter(s) Nebulizer every 6 hours PRN Shortness of Breath and/or Wheezing  guaiFENesin Oral Liquid (Sugar-Free) 100 milliGRAM(s) Oral every 6 hours PRN Cough      Vital Signs Last 24 Hrs  T(C): 36.6 (18 May 2025 05:05), Max: 37.3 (17 May 2025 12:34)  T(F): 97.9 (18 May 2025 05:05), Max: 99.1 (17 May 2025 12:34)  HR: 75 (18 May 2025 05:05) (75 - 87)  BP: 140/87 (18 May 2025 05:05) (116/55 - 146/84)  BP(mean): --  RR: 17 (18 May 2025 05:05) (17 - 17)  SpO2: 96% (18 May 2025 05:05) (94% - 100%)    Parameters below as of 18 May 2025 05:05  Patient On (Oxygen Delivery Method): room air      CAPILLARY BLOOD GLUCOSE      POCT Blood Glucose.: 116 mg/dL (18 May 2025 08:28)  POCT Blood Glucose.: 146 mg/dL (17 May 2025 21:46)  POCT Blood Glucose.: 116 mg/dL (17 May 2025 17:16)  POCT Blood Glucose.: 129 mg/dL (17 May 2025 12:10)    I&O's Summary    17 May 2025 07:01  -  18 May 2025 07:00  --------------------------------------------------------  IN: 0 mL / OUT: 600 mL / NET: -600 mL        PHYSICAL EXAM:  GENERAL: NAD, well-developed  HEAD:  Atraumatic, Normocephalic  EYES: EOMI, PERRLA, conjunctiva and sclera clear  NECK: Supple, No JVD  CHEST/LUNG: Clear to auscultation bilaterally; No wheeze  HEART: Regular rate and rhythm; No murmurs, rubs, or gallops  ABDOMEN: Soft, Nontender, Nondistended; Bowel sounds present  EXTREMITIES:  2+ Peripheral Pulses, No clubbing, cyanosis, or edema  PSYCH: AAOx3  NEUROLOGY: non-focal  SKIN: No rashes or lesions    LABS:                        11.4   8.53  )-----------( 167      ( 18 May 2025 07:40 )             34.8     05-18    143  |  107  |  26[H]  ----------------------------<  121[H]  3.7   |  27  |  0.66    Ca    9.4      18 May 2025 07:40  Phos  3.7     05-18  Mg     1.90     05-18            Urinalysis Basic - ( 18 May 2025 07:40 )    Color: x / Appearance: x / SG: x / pH: x  Gluc: 121 mg/dL / Ketone: x  / Bili: x / Urobili: x   Blood: x / Protein: x / Nitrite: x   Leuk Esterase: x / RBC: x / WBC x   Sq Epi: x / Non Sq Epi: x / Bacteria: x        RADIOLOGY & ADDITIONAL TESTS:    Imaging Personally Reviewed:    Consultant(s) Notes Reviewed:      Care Discussed with Consultants/Other Providers:   Patient is a 74y old  Female who presents with a chief complaint of asthma exac/resp failure/catatonia (11 May 2025 12:22)      SUBJECTIVE / OVERNIGHT EVENTS:  Patient has no new complaints. Denies cp, SOB, abdominal pain, N/V/D     MEDICATIONS  (STANDING):  artificial tears (preservative free) Ophthalmic Solution 1 Drop(s) Both EYES every 8 hours  bisacodyl 10 milliGRAM(s) Oral once  chlorhexidine 2% Cloths 1 Application(s) Topical <User Schedule>  dextrose 5%. 1000 milliLiter(s) (50 mL/Hr) IV Continuous <Continuous>  dextrose 5%. 1000 milliLiter(s) (100 mL/Hr) IV Continuous <Continuous>  dextrose 50% Injectable 25 Gram(s) IV Push once  dextrose 50% Injectable 12.5 Gram(s) IV Push once  dextrose 50% Injectable 25 Gram(s) IV Push once  dextrose Oral Gel 15 Gram(s) Oral once  enoxaparin Injectable 40 milliGRAM(s) SubCutaneous every 24 hours  fluticasone propionate/ salmeterol 250-50 MICROgram(s) Diskus 1 Dose(s) Inhalation two times a day  glucagon  Injectable 1 milliGRAM(s) IntraMuscular once  hydrochlorothiazide 25 milliGRAM(s) Oral daily  insulin glargine Injectable (LANTUS) 3 Unit(s) SubCutaneous at bedtime  insulin lispro (ADMELOG) corrective regimen sliding scale   SubCutaneous at bedtime  insulin lispro (ADMELOG) corrective regimen sliding scale   SubCutaneous three times a day before meals  insulin lispro Injectable (ADMELOG) 2 Unit(s) SubCutaneous three times a day before meals  levalbuterol Inhalation 0.63 milliGRAM(s) Inhalation every 8 hours  lisinopril 40 milliGRAM(s) Oral daily  melatonin 9 milliGRAM(s) Oral at bedtime  memantine 10 milliGRAM(s) Oral at bedtime  memantine 10 milliGRAM(s) Oral <User Schedule>  OLANZapine 2.5 milliGRAM(s) Oral at bedtime  pantoprazole    Tablet 40 milliGRAM(s) Oral before breakfast  petrolatum Ophthalmic Ointment 1 Application(s) Both EYES two times a day  polyethylene glycol 3350 17 Gram(s) Oral two times a day  senna 2 Tablet(s) Oral at bedtime  sertraline 125 milliGRAM(s) Oral daily  tiotropium 2.5 MICROgram(s) Inhaler 2 Puff(s) Inhalation daily    MEDICATIONS  (PRN):  albuterol/ipratropium for Nebulization 3 milliLiter(s) Nebulizer every 6 hours PRN Shortness of Breath and/or Wheezing  guaiFENesin Oral Liquid (Sugar-Free) 100 milliGRAM(s) Oral every 6 hours PRN Cough      Vital Signs Last 24 Hrs  T(C): 36.6 (18 May 2025 05:05), Max: 37.3 (17 May 2025 12:34)  T(F): 97.9 (18 May 2025 05:05), Max: 99.1 (17 May 2025 12:34)  HR: 75 (18 May 2025 05:05) (75 - 87)  BP: 140/87 (18 May 2025 05:05) (116/55 - 146/84)  BP(mean): --  RR: 17 (18 May 2025 05:05) (17 - 17)  SpO2: 96% (18 May 2025 05:05) (94% - 100%)    Parameters below as of 18 May 2025 05:05  Patient On (Oxygen Delivery Method): room air      CAPILLARY BLOOD GLUCOSE      POCT Blood Glucose.: 116 mg/dL (18 May 2025 08:28)  POCT Blood Glucose.: 146 mg/dL (17 May 2025 21:46)  POCT Blood Glucose.: 116 mg/dL (17 May 2025 17:16)  POCT Blood Glucose.: 129 mg/dL (17 May 2025 12:10)    I&O's Summary    17 May 2025 07:01  -  18 May 2025 07:00  --------------------------------------------------------  IN: 0 mL / OUT: 600 mL / NET: -600 mL        PHYSICAL EXAM:  GENERAL: NAD, well-developed  HEAD:  Atraumatic, Normocephalic  EYES: EOMI, PERRLA, conjunctiva and sclera clear  NECK: Supple, No JVD  CHEST/LUNG: Clear to auscultation bilaterally; No wheeze  HEART: Regular rate and rhythm; No murmurs, rubs, or gallops  ABDOMEN: Soft, Nontender, Nondistended; Bowel sounds present  EXTREMITIES:  2+ Peripheral Pulses, No clubbing, cyanosis, or edema  PSYCH: AAOx3  NEUROLOGY: non-focal  SKIN: No rashes or lesions    LABS:                        11.4   8.53  )-----------( 167      ( 18 May 2025 07:40 )             34.8     05-18    143  |  107  |  26[H]  ----------------------------<  121[H]  3.7   |  27  |  0.66    Ca    9.4      18 May 2025 07:40  Phos  3.7     05-18  Mg     1.90     05-18            Urinalysis Basic - ( 18 May 2025 07:40 )    Color: x / Appearance: x / SG: x / pH: x  Gluc: 121 mg/dL / Ketone: x  / Bili: x / Urobili: x   Blood: x / Protein: x / Nitrite: x   Leuk Esterase: x / RBC: x / WBC x   Sq Epi: x / Non Sq Epi: x / Bacteria: x        RADIOLOGY & ADDITIONAL TESTS:    Imaging Personally Reviewed:    Consultant(s) Notes Reviewed:      Care Discussed with Consultants/Other Providers:

## 2025-05-18 NOTE — PROGRESS NOTE ADULT - TIME-BASED BILLING (NON-CRITICAL CARE)
Time-based billing (NON-critical care)

## 2025-05-19 LAB
ANION GAP SERPL CALC-SCNC: 11 MMOL/L — SIGNIFICANT CHANGE UP (ref 7–14)
BUN SERPL-MCNC: 31 MG/DL — HIGH (ref 7–23)
CALCIUM SERPL-MCNC: 9.2 MG/DL — SIGNIFICANT CHANGE UP (ref 8.4–10.5)
CHLORIDE SERPL-SCNC: 109 MMOL/L — HIGH (ref 98–107)
CO2 SERPL-SCNC: 25 MMOL/L — SIGNIFICANT CHANGE UP (ref 22–31)
CREAT SERPL-MCNC: 0.62 MG/DL — SIGNIFICANT CHANGE UP (ref 0.5–1.3)
EGFR: 93 ML/MIN/1.73M2 — SIGNIFICANT CHANGE UP
EGFR: 93 ML/MIN/1.73M2 — SIGNIFICANT CHANGE UP
GLUCOSE BLDC GLUCOMTR-MCNC: 115 MG/DL — HIGH (ref 70–99)
GLUCOSE BLDC GLUCOMTR-MCNC: 116 MG/DL — HIGH (ref 70–99)
GLUCOSE BLDC GLUCOMTR-MCNC: 181 MG/DL — HIGH (ref 70–99)
GLUCOSE BLDC GLUCOMTR-MCNC: 182 MG/DL — HIGH (ref 70–99)
GLUCOSE SERPL-MCNC: 102 MG/DL — HIGH (ref 70–99)
HCT VFR BLD CALC: 34.9 % — SIGNIFICANT CHANGE UP (ref 34.5–45)
HGB BLD-MCNC: 11.4 G/DL — LOW (ref 11.5–15.5)
MAGNESIUM SERPL-MCNC: 1.8 MG/DL — SIGNIFICANT CHANGE UP (ref 1.6–2.6)
MCHC RBC-ENTMCNC: 32.7 G/DL — SIGNIFICANT CHANGE UP (ref 32–36)
MCHC RBC-ENTMCNC: 32.9 PG — SIGNIFICANT CHANGE UP (ref 27–34)
MCV RBC AUTO: 100.6 FL — HIGH (ref 80–100)
NRBC # BLD AUTO: 0 K/UL — SIGNIFICANT CHANGE UP (ref 0–0)
NRBC # FLD: 0 K/UL — SIGNIFICANT CHANGE UP (ref 0–0)
NRBC BLD AUTO-RTO: 0 /100 WBCS — SIGNIFICANT CHANGE UP (ref 0–0)
PHOSPHATE SERPL-MCNC: 3.5 MG/DL — SIGNIFICANT CHANGE UP (ref 2.5–4.5)
PLATELET # BLD AUTO: 160 K/UL — SIGNIFICANT CHANGE UP (ref 150–400)
POTASSIUM SERPL-MCNC: 3.7 MMOL/L — SIGNIFICANT CHANGE UP (ref 3.5–5.3)
POTASSIUM SERPL-SCNC: 3.7 MMOL/L — SIGNIFICANT CHANGE UP (ref 3.5–5.3)
RBC # BLD: 3.47 M/UL — LOW (ref 3.8–5.2)
RBC # FLD: 14.6 % — HIGH (ref 10.3–14.5)
SARS-COV-2 RNA SPEC QL NAA+PROBE: SIGNIFICANT CHANGE UP
SODIUM SERPL-SCNC: 145 MMOL/L — SIGNIFICANT CHANGE UP (ref 135–145)
WBC # BLD: 8.45 K/UL — SIGNIFICANT CHANGE UP (ref 3.8–10.5)
WBC # FLD AUTO: 8.45 K/UL — SIGNIFICANT CHANGE UP (ref 3.8–10.5)

## 2025-05-19 PROCEDURE — 99232 SBSQ HOSP IP/OBS MODERATE 35: CPT

## 2025-05-19 RX ADMIN — INSULIN LISPRO 2 UNIT(S): 100 INJECTION, SOLUTION INTRAVENOUS; SUBCUTANEOUS at 17:38

## 2025-05-19 RX ADMIN — INSULIN GLARGINE-YFGN 3 UNIT(S): 100 INJECTION, SOLUTION SUBCUTANEOUS at 21:26

## 2025-05-19 RX ADMIN — Medication 40 MILLIGRAM(S): at 05:19

## 2025-05-19 RX ADMIN — Medication 1 APPLICATION(S): at 05:20

## 2025-05-19 RX ADMIN — Medication 1 DOSE(S): at 09:14

## 2025-05-19 RX ADMIN — OLANZAPINE 2.5 MILLIGRAM(S): 10 TABLET ORAL at 21:25

## 2025-05-19 RX ADMIN — INSULIN LISPRO 2 UNIT(S): 100 INJECTION, SOLUTION INTRAVENOUS; SUBCUTANEOUS at 12:51

## 2025-05-19 RX ADMIN — Medication 1 DROP(S): at 15:40

## 2025-05-19 RX ADMIN — LEVALBUTEROL HYDROCHLORIDE 0.63 MILLIGRAM(S): 1.25 SOLUTION RESPIRATORY (INHALATION) at 09:15

## 2025-05-19 RX ADMIN — LEVALBUTEROL HYDROCHLORIDE 0.63 MILLIGRAM(S): 1.25 SOLUTION RESPIRATORY (INHALATION) at 15:44

## 2025-05-19 RX ADMIN — INSULIN LISPRO 2 UNIT(S): 100 INJECTION, SOLUTION INTRAVENOUS; SUBCUTANEOUS at 09:13

## 2025-05-19 RX ADMIN — Medication 1 DROP(S): at 05:23

## 2025-05-19 RX ADMIN — Medication 1 DROP(S): at 21:26

## 2025-05-19 RX ADMIN — INSULIN LISPRO 1: 100 INJECTION, SOLUTION INTRAVENOUS; SUBCUTANEOUS at 12:51

## 2025-05-19 RX ADMIN — ENOXAPARIN SODIUM 40 MILLIGRAM(S): 100 INJECTION SUBCUTANEOUS at 17:40

## 2025-05-19 RX ADMIN — SERTRALINE 125 MILLIGRAM(S): 100 TABLET, FILM COATED ORAL at 12:50

## 2025-05-19 RX ADMIN — TIOTROPIUM BROMIDE INHALATION SPRAY 2 PUFF(S): 3.12 SPRAY, METERED RESPIRATORY (INHALATION) at 09:14

## 2025-05-19 RX ADMIN — MEMANTINE HYDROCHLORIDE 10 MILLIGRAM(S): 21 CAPSULE, EXTENDED RELEASE ORAL at 21:25

## 2025-05-19 RX ADMIN — LEVALBUTEROL HYDROCHLORIDE 0.63 MILLIGRAM(S): 1.25 SOLUTION RESPIRATORY (INHALATION) at 20:52

## 2025-05-19 RX ADMIN — Medication 2 TABLET(S): at 21:24

## 2025-05-19 RX ADMIN — Medication 1 DOSE(S): at 21:24

## 2025-05-19 RX ADMIN — MEMANTINE HYDROCHLORIDE 10 MILLIGRAM(S): 21 CAPSULE, EXTENDED RELEASE ORAL at 06:03

## 2025-05-19 RX ADMIN — Medication 9 MILLIGRAM(S): at 21:25

## 2025-05-19 RX ADMIN — LISINOPRIL 40 MILLIGRAM(S): 5 TABLET ORAL at 05:19

## 2025-05-19 RX ADMIN — Medication 1 APPLICATION(S): at 17:48

## 2025-05-19 NOTE — PROGRESS NOTE ADULT - PROBLEM SELECTOR PROBLEM 1
Acute respiratory failure with hypoxia and hypercapnia
MDD (major depressive disorder), recurrent, with catatonic features
MDD (major depressive disorder), recurrent, with catatonic features
Acute respiratory failure with hypoxia and hypercapnia
Anxiety and depression
MDD (major depressive disorder), recurrent, with catatonic features
Acute respiratory failure with hypoxia and hypercapnia
MDD (major depressive disorder), recurrent, with catatonic features
Acute asthma exacerbation
Acute respiratory failure with hypoxia and hypercapnia
MDD (major depressive disorder), recurrent, with catatonic features
Acute asthma exacerbation
Acute respiratory failure with hypoxia and hypercapnia
Acute respiratory failure with hypoxia and hypercapnia
MDD (major depressive disorder), recurrent, with catatonic features
Acute asthma exacerbation
Anxiety and depression
MDD (major depressive disorder), recurrent, with catatonic features
Anxiety and depression
Anxiety and depression
MDD (major depressive disorder), recurrent, with catatonic features
Acute respiratory failure with hypoxia and hypercapnia
MDD (major depressive disorder), recurrent, with catatonic features
MDD (major depressive disorder), recurrent, with catatonic features
Acute respiratory failure with hypoxia and hypercapnia
MDD (major depressive disorder), recurrent, with catatonic features
MDD (major depressive disorder), recurrent, with catatonic features
Acute respiratory failure with hypoxia and hypercapnia
MDD (major depressive disorder), recurrent, with catatonic features
MDD (major depressive disorder), recurrent, with catatonic features
Acute respiratory failure with hypoxia and hypercapnia
Acute respiratory failure with hypoxia and hypercapnia
Anxiety and depression
Anxiety and depression
MDD (major depressive disorder), recurrent, with catatonic features
MDD (major depressive disorder), recurrent, with catatonic features
Acute respiratory failure with hypoxia and hypercapnia
Acute respiratory failure with hypoxia and hypercapnia
MDD (major depressive disorder), recurrent, with catatonic features
MDD (major depressive disorder), recurrent, with catatonic features
Acute respiratory failure with hypoxia and hypercapnia
MDD (major depressive disorder), recurrent, with catatonic features
Acute respiratory failure with hypoxia and hypercapnia
MDD (major depressive disorder), recurrent, with catatonic features
Acute respiratory failure with hypoxia and hypercapnia
MDD (major depressive disorder), recurrent, with catatonic features
Acute respiratory failure with hypoxia and hypercapnia
MDD (major depressive disorder), recurrent, with catatonic features
Acute respiratory failure with hypoxia and hypercapnia
Anxiety and depression
MDD (major depressive disorder), recurrent, with catatonic features
MDD (major depressive disorder), recurrent, with catatonic features
Acute respiratory failure with hypoxia and hypercapnia
MDD (major depressive disorder), recurrent, with catatonic features
Acute respiratory failure with hypoxia and hypercapnia

## 2025-05-19 NOTE — PROGRESS NOTE ADULT - ASSESSMENT
74 y.o. Female w/ hx  MDD, asthma, prior CVA presented with AHRF c/b AMS requiring MICU admission for intubation for respiratory failure i/s/o asthma exacerbation. Extubated 3/12 to face tent and then on BIPAP s/p RRT 3/19 for increased WOB / hypoxia despite aggressive asthma management s/p return to MICU for management of severe asthma. Possible aspiration event 3/31. SLP eval appreciated. XR cine performed with improvement in mental status - pureed diet w/ thin liquids recommended. Worsening respiratory status 4/2 - placed on NIV, now weaned to nasal cannula and now RA.  ENT eval - no upper airway consideration for structural causes of asthma such as vocal cord dysfunction.   Course c/b MDD w/ catatonia and poor PO, off ativan due to respiratory concerns trialed on Memantine family now undergoing ECT q weekly.

## 2025-05-19 NOTE — PROGRESS NOTE ADULT - REASON FOR ADMISSION
asthma exac/ resp failure/catatonia
asthma exac/resp failure/catatonia
Asthma exacerbation
asthma exac/resp failure/catatonia
asthma exac/resp failure/catatonia
ECT
asthma exacerbation
resp failure
ECT
asthma exac/catatonia
asthma exac/hypoxic resp failure/ catatonia
asthma exac/hypoxic resp failure/catatonia
asthma exac/resp failure/catatonia
AMS and increased WOB
asthma exac/resp failure/catatonia
asthma exac/resp failure/ catatonia
asthma exac/ resp failure/catatonia
asthma exac/resp failure/catatonia
Asthma exacerbation
asthma exac/resp failure/catatonia

## 2025-05-19 NOTE — BH CONSULTATION LIAISON PROGRESS NOTE - NSBHFUPINTERVALHXFT_PSY_A_CORE
Met with the patient. Lying in bed, watching TV, calm, no agitation or behavioral issues. Disoriented- thinks it's June 1996. Otherwise, pleasant, bright affect. Denied any mood symptoms when asked. Denied AH, VH, paranoia, SI/HI when asked. Meridian thought process.

## 2025-05-19 NOTE — PROGRESS NOTE ADULT - SUBJECTIVE AND OBJECTIVE BOX
[facility] Division of Hospital Medicine  Em Toure MD  Pager [phone]    Patient is a 74y old  Female who presents with a chief complaint of asthma exac/resp failure/catatonia       SUBJECTIVE / OVERNIGHT EVENTS: pt seen this AM; sleepy; offers no complaints      MEDICATIONS  (STANDING):  artificial tears (preservative free) Ophthalmic Solution 1 Drop(s) Both EYES every 8 hours  bisacodyl 10 milliGRAM(s) Oral once  chlorhexidine 2% Cloths 1 Application(s) Topical <User Schedule>  dextrose 5%. 1000 milliLiter(s) (50 mL/Hr) IV Continuous <Continuous>  dextrose 5%. 1000 milliLiter(s) (100 mL/Hr) IV Continuous <Continuous>  dextrose 50% Injectable 25 Gram(s) IV Push once  dextrose 50% Injectable 12.5 Gram(s) IV Push once  dextrose 50% Injectable 25 Gram(s) IV Push once  dextrose Oral Gel 15 Gram(s) Oral once  enoxaparin Injectable 40 milliGRAM(s) SubCutaneous every 24 hours  fluticasone propionate/ salmeterol 250-50 MICROgram(s) Diskus 1 Dose(s) Inhalation two times a day  glucagon  Injectable 1 milliGRAM(s) IntraMuscular once  hydrochlorothiazide 25 milliGRAM(s) Oral daily  insulin glargine Injectable (LANTUS) 3 Unit(s) SubCutaneous at bedtime  insulin lispro (ADMELOG) corrective regimen sliding scale   SubCutaneous at bedtime  insulin lispro (ADMELOG) corrective regimen sliding scale   SubCutaneous three times a day before meals  insulin lispro Injectable (ADMELOG) 2 Unit(s) SubCutaneous three times a day before meals  levalbuterol Inhalation 0.63 milliGRAM(s) Inhalation every 8 hours  lisinopril 40 milliGRAM(s) Oral daily  melatonin 9 milliGRAM(s) Oral at bedtime  memantine 10 milliGRAM(s) Oral at bedtime  memantine 10 milliGRAM(s) Oral <User Schedule>  OLANZapine 2.5 milliGRAM(s) Oral at bedtime  pantoprazole    Tablet 40 milliGRAM(s) Oral before breakfast  petrolatum Ophthalmic Ointment 1 Application(s) Both EYES two times a day  polyethylene glycol 3350 17 Gram(s) Oral two times a day  senna 2 Tablet(s) Oral at bedtime  sertraline 125 milliGRAM(s) Oral daily  tiotropium 2.5 MICROgram(s) Inhaler 2 Puff(s) Inhalation daily    MEDICATIONS  (PRN):  albuterol/ipratropium for Nebulization 3 milliLiter(s) Nebulizer every 6 hours PRN Shortness of Breath and/or Wheezing  guaiFENesin Oral Liquid (Sugar-Free) 100 milliGRAM(s) Oral every 6 hours PRN Cough      CAPILLARY BLOOD GLUCOSE  POCT Blood Glucose.: 115 mg/dL (19 May 2025 08:24)  POCT Blood Glucose.: 129 mg/dL (18 May 2025 22:17)  POCT Blood Glucose.: 153 mg/dL (18 May 2025 17:01)        PHYSICAL EXAM:  Vital Signs Last 24 Hrs  T(F): 97.5 (19 May 2025 04:54), Max: 98.7 (18 May 2025 22:06)  HR: 75 (19 May 2025 09:43) (73 - 82)  BP: 146/72 (19 May 2025 04:54) (146/72 - 156/75)  RR: 18 (19 May 2025 04:54) (17 - 18)  SpO2: 98% (19 May 2025 09:43) (94% - 100%)    Parameters below as of 19 May 2025 09:43  Patient On (Oxygen Delivery Method): room air        CONSTITUTIONAL: NAD, appears comfortable  EYES: PERRLA; conjunctiva and sclera clear  ENMT: Moist oral mucosa; normal dentition  RESPIRATORY: Normal respiratory effort; lungs are clear to auscultation bilaterally  CARDIOVASCULAR: Regular rate and rhythm; No lower extremity edema  ABDOMEN: Nontender to palpation, normoactive bowel sounds  MUSCULOSKELETAL:  no clubbing or cyanosis of digits; no joint swelling or tenderness to palpation  PSYCH: calm, coop; affect appropriate  NEUROLOGY: CN 2-12 are intact and symmetric; no gross sensory deficits   SKIN: No rashes; no palpable lesions    LABS:                        11.4   8.45  )-----------( 160      ( 19 May 2025 06:04 )             34.9     05-19    145  |  109[H]  |  31[H]  ----------------------------<  102[H]  3.7   |  25  |  0.62    Ca    9.2      19 May 2025 06:04  Phos  3.5     05-19  Mg     1.80     05-19

## 2025-05-19 NOTE — BH CONSULTATION LIAISON PROGRESS NOTE - ATTENDING COMMENTS
Discussed case with fellow md, impression and plan discussed and agreed upon
discussed case with fellow md, impression and plan discussed and agreed upon
discussed with resident md, impression and plan discussed and agreed upon
Handoff received  from Dr. Riddle, chart reviewed, case d/w resident, agree with above assessment/recs. Will follow
Chart reviewed. Discussed with trainee. Agree with above assessment/recs. Will continue to follow.

## 2025-05-19 NOTE — PROGRESS NOTE ADULT - PROBLEM SELECTOR PROBLEM 9
Hyponatremia
Need for prophylactic measure
Hyponatremia
Need for prophylactic measure
Hyponatremia
Need for prophylactic measure
Hyponatremia
Need for prophylactic measure
Need for prophylactic measure
Hyponatremia
Need for prophylactic measure
Hyponatremia
Need for prophylactic measure
Need for prophylactic measure
Hyponatremia
Need for prophylactic measure
Hyponatremia
Need for prophylactic measure
Hyponatremia
Hyponatremia
Need for prophylactic measure
Hyponatremia
Need for prophylactic measure
Need for prophylactic measure
Hyponatremia
Need for prophylactic measure
Need for prophylactic measure
Hyponatremia
Need for prophylactic measure
Hyponatremia
Need for prophylactic measure

## 2025-05-19 NOTE — PROGRESS NOTE ADULT - SUBJECTIVE AND OBJECTIVE BOX
Arnold Uriostegui MD  Interventional Cardiology / Endovascular Specialist  [place] Office : [phone] [place] [address] [place] [address]  Tel:   [place] Office : [phone] [facility] [place] [address]  Tel: [phone]    Pt is lying in bed comfortable not in distress, no chest pains no SOB no palpitations  	  MEDICATIONS:  enoxaparin Injectable 40 milliGRAM(s) SubCutaneous every 24 hours  hydrochlorothiazide 25 milliGRAM(s) Oral daily  lisinopril 40 milliGRAM(s) Oral daily      albuterol/ipratropium for Nebulization 3 milliLiter(s) Nebulizer every 6 hours PRN  fluticasone propionate/ salmeterol 250-50 MICROgram(s) Diskus 1 Dose(s) Inhalation two times a day  guaiFENesin Oral Liquid (Sugar-Free) 100 milliGRAM(s) Oral every 6 hours PRN  levalbuterol Inhalation 0.63 milliGRAM(s) Inhalation every 8 hours  tiotropium 2.5 MICROgram(s) Inhaler 2 Puff(s) Inhalation daily    melatonin 9 milliGRAM(s) Oral at bedtime  memantine 10 milliGRAM(s) Oral at bedtime  memantine 10 milliGRAM(s) Oral <User Schedule>  OLANZapine 2.5 milliGRAM(s) Oral at bedtime  sertraline 125 milliGRAM(s) Oral daily    bisacodyl 10 milliGRAM(s) Oral once  pantoprazole    Tablet 40 milliGRAM(s) Oral before breakfast  polyethylene glycol 3350 17 Gram(s) Oral two times a day  senna 2 Tablet(s) Oral at bedtime    dextrose 50% Injectable 25 Gram(s) IV Push once  dextrose 50% Injectable 12.5 Gram(s) IV Push once  dextrose 50% Injectable 25 Gram(s) IV Push once  dextrose Oral Gel 15 Gram(s) Oral once  glucagon  Injectable 1 milliGRAM(s) IntraMuscular once  insulin glargine Injectable (LANTUS) 3 Unit(s) SubCutaneous at bedtime  insulin lispro (ADMELOG) corrective regimen sliding scale   SubCutaneous at bedtime  insulin lispro (ADMELOG) corrective regimen sliding scale   SubCutaneous three times a day before meals  insulin lispro Injectable (ADMELOG) 2 Unit(s) SubCutaneous three times a day before meals    artificial tears (preservative free) Ophthalmic Solution 1 Drop(s) Both EYES every 8 hours  chlorhexidine 2% Cloths 1 Application(s) Topical <User Schedule>  dextrose 5%. 1000 milliLiter(s) IV Continuous <Continuous>  dextrose 5%. 1000 milliLiter(s) IV Continuous <Continuous>  petrolatum Ophthalmic Ointment 1 Application(s) Both EYES two times a day      PAST MEDICAL/SURGICAL HISTORY  PAST MEDICAL & SURGICAL HISTORY:  MDD (major depressive disorder), recurrent, severe, with psychosis      Asthma, mild intermittent, uncomplicated      Essential hypertension      No significant past surgical history          SOCIAL HISTORY: Substance Use (street drugs): ( x ) never used  (  ) other:    FAMILY HISTORY:        PHYSICAL EXAM:  T(C): 36.9 (05-19-25 @ 13:38), Max: 37.1 (05-18-25 @ 22:06)  HR: 90 (05-19-25 @ 13:38) (73 - 90)  BP: 155/82 (05-19-25 @ 13:38) (146/72 - 156/75)  RR: 18 (05-19-25 @ 13:38) (17 - 18)  SpO2: 93% (05-19-25 @ 13:38) (93% - 100%)  Wt(kg): --  I&O's Summary    GENERAL: NAD  EYES:   PERRLA   ENMT:   Moist mucous membranes  Cardiovascular: Normal S1 S2, No JVD, No murmurs, No edema  Respiratory: Lungs clear to auscultation	  Gastrointestinal:  Soft, Non-tender, + BS	  Extremities: no edema                            11.4   8.45  )-----------( 160      ( 19 May 2025 06:04 )             34.9     05-19    145  |  109[H]  |  31[H]  ----------------------------<  102[H]  3.7   |  25  |  0.62    Ca    9.2      19 May 2025 06:04  Phos  3.5     05-19  Mg     1.80     05-19      proBNP:   Lipid Profile:   HgA1c:   TSH:     Consultant(s) Notes Reviewed:  [x ] YES  [ ] NO    Care Discussed with Consultants/Other Providers [ x] YES  [ ] NO    Imaging Personally Reviewed independently:  [x] YES  [ ] NO    All labs, radiologic studies, vitals, orders and medications list reviewed. Patient is seen and examined at bedside. Case discussed with medical team.

## 2025-05-19 NOTE — PROGRESS NOTE ADULT - NUTRITIONAL ASSESSMENT
This patient has been assessed with a concern for Malnutrition and has been determined to have a diagnosis/diagnoses of Moderate protein-calorie malnutrition.    This patient is being managed with:   Diet NPO-  Entered: Mar 19 2025  7:46AM  
This patient has been assessed with a concern for Malnutrition and has been determined to have a diagnosis/diagnoses of Moderate protein-calorie malnutrition.    This patient is being managed with:   Diet Pureed-  Consistent Carbohydrate {No Snacks} (CSTCHO)  No Beef  No Pork  Supplement Feeding Modality:  Oral  Glucerna Shake Cans or Servings Per Day:  1       Frequency:  Three Times a day  Entered: Apr 8 2025 12:47PM  
This patient has been assessed with a concern for Malnutrition and has been determined to have a diagnosis/diagnoses of Moderate protein-calorie malnutrition.    This patient is being managed with:   Diet Pureed-  Consistent Carbohydrate {No Snacks} (CSTCHO)  No Beef  No Pork  Supplement Feeding Modality:  Oral  Glucerna Shake Cans or Servings Per Day:  1       Frequency:  Two Times a day  Ensure Max Cans or Servings Per Day:  1       Frequency:  Daily  Entered: Apr 15 2025  3:52PM  
This patient has been assessed with a concern for Malnutrition and has been determined to have a diagnosis/diagnoses of Moderate protein-calorie malnutrition.    This patient is being managed with:   Diet Regular-  Consistent Carbohydrate {Evening Snack} (CSTCHOSN)  Easy to Chew (EASYTOCHEW)  No Beef  No Pork  Supplement Feeding Modality:  Oral  Ensure Max Cans or Servings Per Day:  1       Frequency:  Daily  Entered: May 14 2025  6:13PM    The following pending diet order is being considered for treatment of Moderate protein-calorie malnutrition:  Diet Easy to Chew-  Consistent Carbohydrate {Evening Snack} (CSTCHOSN)  No Beef  No Pork  Supplement Feeding Modality:  Oral  Ensure Max Cans or Servings Per Day:  1       Frequency:  Daily  Entered: May 14 2025 12:30PM  
This patient has been assessed with a concern for Malnutrition and has been determined to have a diagnosis/diagnoses of Moderate protein-calorie malnutrition.    This patient is being managed with:   Diet Soft and Bite Sized-  Consistent Carbohydrate {Evening Snack} (CSTCHOSN)  DASH/TLC {Sodium & Cholesterol Restricted} (DASH)  Entered: Mar 20 2025 11:16AM  
This patient has been assessed with a concern for Malnutrition and has been determined to have a diagnosis/diagnoses of Moderate protein-calorie malnutrition.    This patient is being managed with:   Diet Soft and Bite Sized-  Consistent Carbohydrate {Evening Snack} (CSTCHOSN)  DASH/TLC {Sodium & Cholesterol Restricted} (DASH)  Supplement Feeding Modality:  Oral  Glucerna Shake Cans or Servings Per Day:  1       Frequency:  Two Times a day  Entered: Mar 25 2025  7:19PM  
This patient has been assessed with a concern for Malnutrition and has been determined to have a diagnosis/diagnoses of Moderate protein-calorie malnutrition.    This patient is being managed with:   Diet NPO after Midnight-     NPO Start Date: 08-May-2025   NPO Start Time: 23:59  Except Medications  Entered: May  8 2025 11:12AM    Diet Regular-  Consistent Carbohydrate {Evening Snack} (CSTCHOSN)  Easy to Chew (EASYTOCHEW)  No Beef  No Pork  Supplement Feeding Modality:  Oral  Glucerna Shake Cans or Servings Per Day:  1       Frequency:  Two Times a day  Ensure Max Cans or Servings Per Day:  1       Frequency:  Daily  Entered: May  7 2025 12:57PM  
This patient has been assessed with a concern for Malnutrition and has been determined to have a diagnosis/diagnoses of Moderate protein-calorie malnutrition.    This patient is being managed with:   Diet Pureed-  Consistent Carbohydrate {No Snacks} (CSTCHO)  No Beef  No Pork  Supplement Feeding Modality:  Oral  Glucerna Shake Cans or Servings Per Day:  1       Frequency:  Three Times a day  Entered: Apr 8 2025 12:47PM  
This patient has been assessed with a concern for Malnutrition and has been determined to have a diagnosis/diagnoses of Moderate protein-calorie malnutrition.    This patient is being managed with:   Diet Pureed-  Consistent Carbohydrate {No Snacks} (CSTCHO)  No Beef  No Pork  Supplement Feeding Modality:  Oral  Glucerna Shake Cans or Servings Per Day:  1       Frequency:  Two Times a day  Ensure Max Cans or Servings Per Day:  1       Frequency:  Daily  Entered: Apr 15 2025  3:52PM  
This patient has been assessed with a concern for Malnutrition and has been determined to have a diagnosis/diagnoses of Moderate protein-calorie malnutrition.    This patient is being managed with:   Diet Pureed-  Consistent Carbohydrate {No Snacks} (CSTCHO)  No Beef  No Pork  Supplement Feeding Modality:  Oral  Glucerna Shake Cans or Servings Per Day:  1       Frequency:  Three Times a day  Entered: Apr 8 2025 12:47PM  
This patient has been assessed with a concern for Malnutrition and has been determined to have a diagnosis/diagnoses of Moderate protein-calorie malnutrition.    This patient is being managed with:   Diet Pureed-  Consistent Carbohydrate {No Snacks} (CSTCHO)  No Beef  No Pork  Supplement Feeding Modality:  Oral  Glucerna Shake Cans or Servings Per Day:  1       Frequency:  Two Times a day  Ensure Max Cans or Servings Per Day:  1       Frequency:  Daily  Entered: Apr 15 2025  3:52PM  
This patient has been assessed with a concern for Malnutrition and has been determined to have a diagnosis/diagnoses of Moderate protein-calorie malnutrition.    This patient is being managed with:   Diet NPO-  Entered: Apr 2 2025  9:07PM  
This patient has been assessed with a concern for Malnutrition and has been determined to have a diagnosis/diagnoses of Moderate protein-calorie malnutrition.    This patient is being managed with:   Diet Pureed-  Consistent Carbohydrate {No Snacks} (CSTCHO)  No Beef  No Pork  Supplement Feeding Modality:  Oral  Glucerna Shake Cans or Servings Per Day:  1       Frequency:  Three Times a day  Entered: Apr 8 2025 12:47PM  
This patient has been assessed with a concern for Malnutrition and has been determined to have a diagnosis/diagnoses of Moderate protein-calorie malnutrition.    This patient is being managed with:   Diet Pureed-  Consistent Carbohydrate {No Snacks} (CSTCHO)  No Beef  No Pork  Supplement Feeding Modality:  Oral  Glucerna Shake Cans or Servings Per Day:  1       Frequency:  Two Times a day  Ensure Max Cans or Servings Per Day:  1       Frequency:  Daily  Entered: Apr 15 2025  3:52PM  
This patient has been assessed with a concern for Malnutrition and has been determined to have a diagnosis/diagnoses of Moderate protein-calorie malnutrition.    This patient is being managed with:   Diet Pureed-  Consistent Carbohydrate {No Snacks} (CSTCHO)  No Beef  No Pork  Supplement Feeding Modality:  Oral  Glucerna Shake Cans or Servings Per Day:  1       Frequency:  Two Times a day  Ensure Max Cans or Servings Per Day:  1       Frequency:  Daily  Entered: Apr 15 2025  3:52PM  
This patient has been assessed with a concern for Malnutrition and has been determined to have a diagnosis/diagnoses of Moderate protein-calorie malnutrition.    This patient is being managed with:   Diet NPO after Midnight-     NPO Start Date: 27-Apr-2025   NPO Start Time: 23:59  Except Medications  Entered: Apr 27 2025  9:21AM    Diet Pureed-  Consistent Carbohydrate {No Snacks} (CSTCHO)  No Beef  No Pork  Supplement Feeding Modality:  Oral  Glucerna Shake Cans or Servings Per Day:  1       Frequency:  Two Times a day  Ensure Max Cans or Servings Per Day:  1       Frequency:  Daily  Entered: Apr 15 2025  3:52PM  
This patient has been assessed with a concern for Malnutrition and has been determined to have a diagnosis/diagnoses of Moderate protein-calorie malnutrition.    This patient is being managed with:   Diet NPO after Midnight-     NPO Start Date: 29-Apr-2025   NPO Start Time: 23:59  Except Medications  Entered: Apr 29 2025  1:27PM    Diet Pureed-  Consistent Carbohydrate {No Snacks} (CSTCHO)  No Beef  No Pork  Supplement Feeding Modality:  Oral  Glucerna Shake Cans or Servings Per Day:  1       Frequency:  Two Times a day  Ensure Max Cans or Servings Per Day:  1       Frequency:  Daily  Entered: Apr 15 2025  3:52PM  
This patient has been assessed with a concern for Malnutrition and has been determined to have a diagnosis/diagnoses of Moderate protein-calorie malnutrition.    This patient is being managed with:   Diet Regular-  Consistent Carbohydrate {Evening Snack} (CSTCHOSN)  Easy to Chew (EASYTOCHEW)  No Beef  No Pork  Supplement Feeding Modality:  Oral  Glucerna Shake Cans or Servings Per Day:  1       Frequency:  Two Times a day  Ensure Max Cans or Servings Per Day:  1       Frequency:  Daily  Entered: May  7 2025 12:57PM    The following pending diet order is being considered for treatment of Moderate protein-calorie malnutrition:  Diet Easy to Chew-  Consistent Carbohydrate {Evening Snack} (CSTCHOSN)  No Beef  No Pork  Supplement Feeding Modality:  Oral  Ensure Max Cans or Servings Per Day:  1       Frequency:  Daily  Entered: May 14 2025 12:30PM  
This patient has been assessed with a concern for Malnutrition and has been determined to have a diagnosis/diagnoses of Moderate protein-calorie malnutrition.    This patient is being managed with:   Diet Regular-  Consistent Carbohydrate {Evening Snack} (CSTCHOSN)  Easy to Chew (EASYTOCHEW)  No Beef  No Pork  Supplement Feeding Modality:  Oral  Ensure Max Cans or Servings Per Day:  1       Frequency:  Daily  Entered: May 14 2025  6:13PM    The following pending diet order is being considered for treatment of Moderate protein-calorie malnutrition:  Diet Easy to Chew-  Consistent Carbohydrate {Evening Snack} (CSTCHOSN)  No Beef  No Pork  Supplement Feeding Modality:  Oral  Ensure Max Cans or Servings Per Day:  1       Frequency:  Daily  Entered: May 14 2025 12:30PM  
This patient has been assessed with a concern for Malnutrition and has been determined to have a diagnosis/diagnoses of Moderate protein-calorie malnutrition.    This patient is being managed with:   Diet NPO-  NPO for Procedure/Test     NPO Start Date: 11-May-2025   NPO Start Time: 23:59  Entered: May  9 2025  3:24PM    Diet Regular-  Consistent Carbohydrate {Evening Snack} (CSTCHOSN)  Easy to Chew (EASYTOCHEW)  No Beef  No Pork  Supplement Feeding Modality:  Oral  Glucerna Shake Cans or Servings Per Day:  1       Frequency:  Two Times a day  Ensure Max Cans or Servings Per Day:  1       Frequency:  Daily  Entered: May  7 2025 12:57PM  
This patient has been assessed with a concern for Malnutrition and has been determined to have a diagnosis/diagnoses of Moderate protein-calorie malnutrition.    This patient is being managed with:   Diet Pureed-  Consistent Carbohydrate {No Snacks} (CSTCHO)  No Beef  No Pork  Supplement Feeding Modality:  Oral  Glucerna Shake Cans or Servings Per Day:  1       Frequency:  Two Times a day  Ensure Max Cans or Servings Per Day:  1       Frequency:  Daily  Entered: Apr 15 2025  3:52PM  
This patient has been assessed with a concern for Malnutrition and has been determined to have a diagnosis/diagnoses of Moderate protein-calorie malnutrition.    This patient is being managed with:   Diet Pureed-  Consistent Carbohydrate {No Snacks} (CSTCHO)  No Beef  No Pork  Supplement Feeding Modality:  Oral  Glucerna Shake Cans or Servings Per Day:  1       Frequency:  Two Times a day  Entered: Apr 5 2025  6:45PM  
This patient has been assessed with a concern for Malnutrition and has been determined to have a diagnosis/diagnoses of Moderate protein-calorie malnutrition.    This patient is being managed with:   Diet NPO-  NPO for Procedure/Test     NPO Start Date: 11-May-2025   NPO Start Time: 23:59  Entered: May  9 2025  3:24PM    Diet Regular-  Consistent Carbohydrate {Evening Snack} (CSTCHOSN)  Easy to Chew (EASYTOCHEW)  No Beef  No Pork  Supplement Feeding Modality:  Oral  Glucerna Shake Cans or Servings Per Day:  1       Frequency:  Two Times a day  Ensure Max Cans or Servings Per Day:  1       Frequency:  Daily  Entered: May  7 2025 12:57PM  
This patient has been assessed with a concern for Malnutrition and has been determined to have a diagnosis/diagnoses of Moderate protein-calorie malnutrition.    This patient is being managed with:   Diet Pureed-  Consistent Carbohydrate {No Snacks} (CSTCHO)  No Beef  No Pork  Supplement Feeding Modality:  Oral  Glucerna Shake Cans or Servings Per Day:  1       Frequency:  Two Times a day  Ensure Max Cans or Servings Per Day:  1       Frequency:  Daily  Entered: Apr 15 2025  3:52PM  
This patient has been assessed with a concern for Malnutrition and has been determined to have a diagnosis/diagnoses of Moderate protein-calorie malnutrition.    This patient is being managed with:   Diet Soft and Bite Sized-  Consistent Carbohydrate {Evening Snack} (CSTCHOSN)  DASH/TLC {Sodium & Cholesterol Restricted} (DASH)  Supplement Feeding Modality:  Oral  Glucerna Shake Cans or Servings Per Day:  1       Frequency:  Two Times a day  Entered: Mar 25 2025  7:19PM  
This patient has been assessed with a concern for Malnutrition and has been determined to have a diagnosis/diagnoses of Moderate protein-calorie malnutrition.    This patient is being managed with:   Diet Soft and Bite Sized-  Consistent Carbohydrate {Evening Snack} (CSTCHOSN)  DASH/TLC {Sodium & Cholesterol Restricted} (DASH)  No Beef  No Pork  Supplement Feeding Modality:  Oral  Glucerna Shake Cans or Servings Per Day:  1       Frequency:  Two Times a day  Entered: Mar 30 2025  8:11PM  
This patient has been assessed with a concern for Malnutrition and has been determined to have a diagnosis/diagnoses of Moderate protein-calorie malnutrition.    This patient is being managed with:   Diet Soft and Bite Sized-  Consistent Carbohydrate {Evening Snack} (CSTCHOSN)  DASH/TLC {Sodium & Cholesterol Restricted} (DASH)  Supplement Feeding Modality:  Oral  Glucerna Shake Cans or Servings Per Day:  1       Frequency:  Two Times a day  Entered: Mar 25 2025  7:19PM  
This patient has been assessed with a concern for Malnutrition and has been determined to have a diagnosis/diagnoses of Moderate protein-calorie malnutrition.    This patient is being managed with:   Diet Pureed-  Consistent Carbohydrate {No Snacks} (CSTCHO)  No Beef  No Pork  Supplement Feeding Modality:  Oral  Glucerna Shake Cans or Servings Per Day:  1       Frequency:  Three Times a day  Entered: Apr 8 2025 12:47PM  
This patient has been assessed with a concern for Malnutrition and has been determined to have a diagnosis/diagnoses of Moderate protein-calorie malnutrition.    This patient is being managed with:   Diet Soft and Bite Sized-  Consistent Carbohydrate {Evening Snack} (CSTCHOSN)  DASH/TLC {Sodium & Cholesterol Restricted} (DASH)  Supplement Feeding Modality:  Oral  Glucerna Shake Cans or Servings Per Day:  1       Frequency:  Two Times a day  Entered: Mar 25 2025  7:19PM  
This patient has been assessed with a concern for Malnutrition and has been determined to have a diagnosis/diagnoses of Moderate protein-calorie malnutrition.    This patient is being managed with:   Diet Soft and Bite Sized-  Consistent Carbohydrate {Evening Snack} (CSTCHOSN)  DASH/TLC {Sodium & Cholesterol Restricted} (DASH)  Entered: Mar 20 2025 11:16AM  
This patient has been assessed with a concern for Malnutrition and has been determined to have a diagnosis/diagnoses of Moderate protein-calorie malnutrition.    This patient is being managed with:   Diet Soft and Bite Sized-  Consistent Carbohydrate {Evening Snack} (CSTCHOSN)  DASH/TLC {Sodium & Cholesterol Restricted} (DASH)  Supplement Feeding Modality:  Oral  Glucerna Shake Cans or Servings Per Day:  1       Frequency:  Two Times a day  Entered: Mar 25 2025  7:19PM  
This patient has been assessed with a concern for Malnutrition and has been determined to have a diagnosis/diagnoses of Moderate protein-calorie malnutrition.    This patient is being managed with:   Diet NPO-  Entered: Apr 2 2025  9:07PM  
This patient has been assessed with a concern for Malnutrition and has been determined to have a diagnosis/diagnoses of Moderate protein-calorie malnutrition.    This patient is being managed with:   Diet Pureed-  Consistent Carbohydrate {No Snacks} (CSTCHO)  No Beef  No Pork  Supplement Feeding Modality:  Oral  Glucerna Shake Cans or Servings Per Day:  1       Frequency:  Two Times a day  Ensure Max Cans or Servings Per Day:  1       Frequency:  Daily  Entered: Apr 15 2025  3:52PM  
This patient has been assessed with a concern for Malnutrition and has been determined to have a diagnosis/diagnoses of Moderate protein-calorie malnutrition.    This patient is being managed with:   Diet Pureed-  Consistent Carbohydrate {No Snacks} (CSTCHO)  No Beef  No Pork  Supplement Feeding Modality:  Oral  Glucerna Shake Cans or Servings Per Day:  1       Frequency:  Two Times a day  Ensure Max Cans or Servings Per Day:  1       Frequency:  Daily  Entered: Apr 15 2025  3:52PM  
This patient has been assessed with a concern for Malnutrition and has been determined to have a diagnosis/diagnoses of Moderate protein-calorie malnutrition.    This patient is being managed with:   Diet Regular-  Consistent Carbohydrate {Evening Snack} (CSTCHOSN)  Easy to Chew (EASYTOCHEW)  No Beef  No Pork  Supplement Feeding Modality:  Oral  Glucerna Shake Cans or Servings Per Day:  1       Frequency:  Two Times a day  Ensure Max Cans or Servings Per Day:  1       Frequency:  Daily  Entered: May  7 2025 12:57PM  
This patient has been assessed with a concern for Malnutrition and has been determined to have a diagnosis/diagnoses of Moderate protein-calorie malnutrition.    This patient is being managed with:   Diet Regular-  Consistent Carbohydrate {Evening Snack} (CSTCHOSN)  Easy to Chew (EASYTOCHEW)  No Beef  No Pork  Supplement Feeding Modality:  Oral  Glucerna Shake Cans or Servings Per Day:  1       Frequency:  Two Times a day  Ensure Max Cans or Servings Per Day:  1       Frequency:  Daily  Entered: May  7 2025 12:57PM  
This patient has been assessed with a concern for Malnutrition and has been determined to have a diagnosis/diagnoses of Moderate protein-calorie malnutrition.    This patient is being managed with:   Diet Pureed-  Consistent Carbohydrate {No Snacks} (CSTCHO)  No Beef  No Pork  Supplement Feeding Modality:  Oral  Glucerna Shake Cans or Servings Per Day:  1       Frequency:  Three Times a day  Entered: Apr 8 2025 12:47PM  
This patient has been assessed with a concern for Malnutrition and has been determined to have a diagnosis/diagnoses of Moderate protein-calorie malnutrition.    This patient is being managed with:   Diet Soft and Bite Sized-  Consistent Carbohydrate {Evening Snack} (CSTCHOSN)  DASH/TLC {Sodium & Cholesterol Restricted} (DASH)  Entered: Mar 20 2025 11:16AM  
This patient has been assessed with a concern for Malnutrition and has been determined to have a diagnosis/diagnoses of Moderate protein-calorie malnutrition.    This patient is being managed with:   Diet Pureed-  Consistent Carbohydrate {No Snacks} (CSTCHO)  No Beef  No Pork  Supplement Feeding Modality:  Oral  Glucerna Shake Cans or Servings Per Day:  1       Frequency:  Two Times a day  Ensure Max Cans or Servings Per Day:  1       Frequency:  Daily  Entered: Apr 15 2025  3:52PM  
This patient has been assessed with a concern for Malnutrition and has been determined to have a diagnosis/diagnoses of Moderate protein-calorie malnutrition.    This patient is being managed with:   Diet NPO-  Except Medications  Entered: Apr 1 2025  7:51AM  
This patient has been assessed with a concern for Malnutrition and has been determined to have a diagnosis/diagnoses of Moderate protein-calorie malnutrition.    This patient is being managed with:   Diet Soft and Bite Sized-  Consistent Carbohydrate {Evening Snack} (CSTCHOSN)  DASH/TLC {Sodium & Cholesterol Restricted} (DASH)  Entered: Mar 20 2025 11:16AM  
This patient has been assessed with a concern for Malnutrition and has been determined to have a diagnosis/diagnoses of Moderate protein-calorie malnutrition.    This patient is being managed with:   Diet Regular-  Consistent Carbohydrate {Evening Snack} (CSTCHOSN)  Easy to Chew (EASYTOCHEW)  No Beef  No Pork  Supplement Feeding Modality:  Oral  Ensure Max Cans or Servings Per Day:  1       Frequency:  Daily  Entered: May 14 2025  6:13PM    The following pending diet order is being considered for treatment of Moderate protein-calorie malnutrition:  Diet Easy to Chew-  Consistent Carbohydrate {Evening Snack} (CSTCHOSN)  No Beef  No Pork  Supplement Feeding Modality:  Oral  Ensure Max Cans or Servings Per Day:  1       Frequency:  Daily  Entered: May 14 2025 12:30PM  
This patient has been assessed with a concern for Malnutrition and has been determined to have a diagnosis/diagnoses of Moderate protein-calorie malnutrition.    This patient is being managed with:   Diet Regular-  Consistent Carbohydrate {Evening Snack} (CSTCHOSN)  Easy to Chew (EASYTOCHEW)  No Beef  No Pork  Supplement Feeding Modality:  Oral  Ensure Max Cans or Servings Per Day:  1       Frequency:  Daily  Entered: May 14 2025  6:13PM    The following pending diet order is being considered for treatment of Moderate protein-calorie malnutrition:  Diet Easy to Chew-  Consistent Carbohydrate {Evening Snack} (CSTCHOSN)  No Beef  No Pork  Supplement Feeding Modality:  Oral  Ensure Max Cans or Servings Per Day:  1       Frequency:  Daily  Entered: May 14 2025 12:30PM  
This patient has been assessed with a concern for Malnutrition and has been determined to have a diagnosis/diagnoses of Moderate protein-calorie malnutrition.    This patient is being managed with:   Diet Regular-  Consistent Carbohydrate {Evening Snack} (CSTCHOSN)  Easy to Chew (EASYTOCHEW)  No Beef  No Pork  Supplement Feeding Modality:  Oral  Glucerna Shake Cans or Servings Per Day:  1       Frequency:  Two Times a day  Ensure Max Cans or Servings Per Day:  1       Frequency:  Daily  Entered: May  7 2025 12:57PM  
This patient has been assessed with a concern for Malnutrition and has been determined to have a diagnosis/diagnoses of Moderate protein-calorie malnutrition.    This patient is being managed with:   Diet NPO-  NPO for Procedure/Test     NPO Start Date: 11-May-2025   NPO Start Time: 23:59  Entered: May  9 2025  3:24PM    Diet Regular-  Consistent Carbohydrate {Evening Snack} (CSTCHOSN)  Easy to Chew (EASYTOCHEW)  No Beef  No Pork  Supplement Feeding Modality:  Oral  Glucerna Shake Cans or Servings Per Day:  1       Frequency:  Two Times a day  Ensure Max Cans or Servings Per Day:  1       Frequency:  Daily  Entered: May  7 2025 12:57PM  
This patient has been assessed with a concern for Malnutrition and has been determined to have a diagnosis/diagnoses of Moderate protein-calorie malnutrition.    This patient is being managed with:   Diet NPO after Midnight-     NPO Start Date: 04-May-2025   NPO Start Time: 23:59  Entered: May  4 2025  7:22AM    Diet Pureed-  Consistent Carbohydrate {No Snacks} (CSTCHO)  No Beef  No Pork  Supplement Feeding Modality:  Oral  Glucerna Shake Cans or Servings Per Day:  1       Frequency:  Two Times a day  Ensure Max Cans or Servings Per Day:  1       Frequency:  Daily  Entered: Apr 15 2025  3:52PM  
This patient has been assessed with a concern for Malnutrition and has been determined to have a diagnosis/diagnoses of Moderate protein-calorie malnutrition.    This patient is being managed with:   Diet Pureed-  Consistent Carbohydrate {No Snacks} (CSTCHO)  No Beef  No Pork  Supplement Feeding Modality:  Oral  Glucerna Shake Cans or Servings Per Day:  1       Frequency:  Three Times a day  Entered: Apr 8 2025 12:47PM    The following pending diet order is being considered for treatment of Moderate protein-calorie malnutrition:  Diet Pureed-  Consistent Carbohydrate {No Snacks} (CSTCHO)  No Beef  No Pork  Supplement Feeding Modality:  Oral  Glucerna Shake Cans or Servings Per Day:  1       Frequency:  Two Times a day  Ensure Max Cans or Servings Per Day:  1       Frequency:  Daily  Entered: Apr 15 2025  3:52PM  
This patient has been assessed with a concern for Malnutrition and has been determined to have a diagnosis/diagnoses of Moderate protein-calorie malnutrition.    This patient is being managed with:   Diet Pureed-  Consistent Carbohydrate {No Snacks} (CSTCHO)  No Beef  No Pork  Supplement Feeding Modality:  Oral  Glucerna Shake Cans or Servings Per Day:  1       Frequency:  Two Times a day  Ensure Max Cans or Servings Per Day:  1       Frequency:  Daily  Entered: Apr 15 2025  3:52PM  
This patient has been assessed with a concern for Malnutrition and has been determined to have a diagnosis/diagnoses of Moderate protein-calorie malnutrition.    This patient is being managed with:   Diet Soft and Bite Sized-  Consistent Carbohydrate {Evening Snack} (CSTCHOSN)  DASH/TLC {Sodium & Cholesterol Restricted} (DASH)  Entered: Mar 20 2025 11:16AM  
This patient has been assessed with a concern for Malnutrition and has been determined to have a diagnosis/diagnoses of Moderate protein-calorie malnutrition.    This patient is being managed with:   Diet Pureed-  Consistent Carbohydrate {No Snacks} (CSTCHO)  No Beef  No Pork  Supplement Feeding Modality:  Oral  Glucerna Shake Cans or Servings Per Day:  1       Frequency:  Three Times a day  Entered: Apr 8 2025 12:47PM  
This patient has been assessed with a concern for Malnutrition and has been determined to have a diagnosis/diagnoses of Moderate protein-calorie malnutrition.    This patient is being managed with:   Diet Pureed-  Consistent Carbohydrate {No Snacks} (CSTCHO)  No Beef  No Pork  Supplement Feeding Modality:  Oral  Glucerna Shake Cans or Servings Per Day:  1       Frequency:  Three Times a day  Entered: Apr 8 2025 12:47PM  
This patient has been assessed with a concern for Malnutrition and has been determined to have a diagnosis/diagnoses of Moderate protein-calorie malnutrition.    This patient is being managed with:   Diet Pureed-  Consistent Carbohydrate {No Snacks} (CSTCHO)  No Beef  No Pork  Supplement Feeding Modality:  Oral  Glucerna Shake Cans or Servings Per Day:  1       Frequency:  Two Times a day  Ensure Max Cans or Servings Per Day:  1       Frequency:  Daily  Entered: Apr 15 2025  3:52PM  
This patient has been assessed with a concern for Malnutrition and has been determined to have a diagnosis/diagnoses of Moderate protein-calorie malnutrition.    This patient is being managed with:   Diet Pureed-  Consistent Carbohydrate {No Snacks} (CSTCHO)  No Beef  No Pork  Supplement Feeding Modality:  Oral  Glucerna Shake Cans or Servings Per Day:  1       Frequency:  Three Times a day  Entered: Apr 8 2025 12:47PM    The following pending diet order is being considered for treatment of Moderate protein-calorie malnutrition:  Diet Pureed-  Consistent Carbohydrate {No Snacks} (CSTCHO)  No Beef  No Pork  Supplement Feeding Modality:  Oral  Glucerna Shake Cans or Servings Per Day:  1       Frequency:  Two Times a day  Ensure Max Cans or Servings Per Day:  1       Frequency:  Daily  Entered: Apr 15 2025  3:52PM  
This patient has been assessed with a concern for Malnutrition and has been determined to have a diagnosis/diagnoses of Moderate protein-calorie malnutrition.    This patient is being managed with:   Diet Pureed-  Consistent Carbohydrate {No Snacks} (CSTCHO)  No Beef  No Pork  Supplement Feeding Modality:  Oral  Glucerna Shake Cans or Servings Per Day:  1       Frequency:  Two Times a day  Entered: Apr 5 2025  6:45PM  
This patient has been assessed with a concern for Malnutrition and has been determined to have a diagnosis/diagnoses of Moderate protein-calorie malnutrition.    This patient is being managed with:   Diet Pureed-  Consistent Carbohydrate {No Snacks} (CSTCHO)  Supplement Feeding Modality:  Oral  Glucerna Shake Cans or Servings Per Day:  1       Frequency:  Two Times a day  Entered: Apr 4 2025  3:13PM  
This patient has been assessed with a concern for Malnutrition and has been determined to have a diagnosis/diagnoses of Moderate protein-calorie malnutrition.    This patient is being managed with:   Diet Pureed-  Consistent Carbohydrate {No Snacks} (CSTCHO)  No Beef  No Pork  Supplement Feeding Modality:  Oral  Glucerna Shake Cans or Servings Per Day:  1       Frequency:  Two Times a day  Ensure Max Cans or Servings Per Day:  1       Frequency:  Daily  Entered: Apr 15 2025  3:52PM  
This patient has been assessed with a concern for Malnutrition and has been determined to have a diagnosis/diagnoses of Moderate protein-calorie malnutrition.    This patient is being managed with:   Diet Regular-  Consistent Carbohydrate {Evening Snack} (CSTCHOSN)  Easy to Chew (EASYTOCHEW)  No Beef  No Pork  Supplement Feeding Modality:  Oral  Ensure Max Cans or Servings Per Day:  1       Frequency:  Daily  Entered: May 14 2025  6:13PM    The following pending diet order is being considered for treatment of Moderate protein-calorie malnutrition:  Diet Easy to Chew-  Consistent Carbohydrate {Evening Snack} (CSTCHOSN)  No Beef  No Pork  Supplement Feeding Modality:  Oral  Ensure Max Cans or Servings Per Day:  1       Frequency:  Daily  Entered: May 14 2025 12:30PM  
This patient has been assessed with a concern for Malnutrition and has been determined to have a diagnosis/diagnoses of Moderate protein-calorie malnutrition.    This patient is being managed with:   Diet NPO after Midnight-     NPO Start Date: 29-Apr-2025   NPO Start Time: 23:59  Except Medications  Entered: Apr 29 2025  1:27PM    Diet Pureed-  Consistent Carbohydrate {No Snacks} (CSTCHO)  No Beef  No Pork  Supplement Feeding Modality:  Oral  Glucerna Shake Cans or Servings Per Day:  1       Frequency:  Two Times a day  Ensure Max Cans or Servings Per Day:  1       Frequency:  Daily  Entered: Apr 15 2025  3:52PM  
This patient has been assessed with a concern for Malnutrition and has been determined to have a diagnosis/diagnoses of Moderate protein-calorie malnutrition.    This patient is being managed with:   Diet NPO-  Except Medications  Entered: Apr 1 2025  7:51AM

## 2025-05-19 NOTE — PROGRESS NOTE ADULT - PROBLEM SELECTOR PLAN 1
MDD w/ psychotic features.  - s/p ativan now on hold given concern that it may have contributed to respiratory decompensation  - c/w Zoloft 125 qday (home dose was 150).  - c/w Zyprexa 2.5 qhs  - c/w Namenda 10mg qhs  - psych following  - ECT qweekly  pt optimized per cards and pulm for ECT    Pending transfer to inpatient psychiatric unit at [facility]  lat ECT 5/16, now plans for q weekly

## 2025-05-19 NOTE — PROGRESS NOTE ADULT - ASSESSMENT
[facility]  Pulmonary, Critical Care, and Sleep Medicine      Chief complaint:  Shortness of breath    HPI:    Katty Candelario    is 67years old and returns the office today for follow-up concerning shortness of breath and relates that she is walking more that she does not note the significant shortness of breath except with activity such as walking upstairs and this is less frequently noted now. She denies a chronic cough chest pain leg swelling and her main complaint is related to her cervical spine disease      Allergies   Allergen Reactions    Baclofen Shortness of Breath    Celebrex [Celecoxib] Other (comments)     Tiredness      Current Outpatient Medications   Medication Sig    HYDROcodone-acetaminophen (NORCO) 5-325 mg per tablet Take 1 Tab by mouth.  busPIRone (BUSPAR) 7.5 mg tablet take 1 tablet by mouth twice a day    methocarbamol (ROBAXIN) 500 mg tablet Take 1 Tab by mouth three (3) times daily as needed for Pain.  fluticasone propionate (FLONASE) 50 mcg/actuation nasal spray instill 2 sprays into each nostril once daily    montelukast (SINGULAIR) 10 mg tablet take 1 tablet by mouth once daily    naproxen sodium (ALEVE) 220 mg cap Take 2 Caps by mouth two (2) times a day.  doxylamine succinate (UNISOM) 25 mg tablet Take 25 mg by mouth nightly as needed for Sleep.  denosumab (PROLIA) 60 mg/mL injection 60 mg by SubCUTAneous route once.  turmeric 400 mg cap Take 1 Cap by mouth daily.  atorvastatin (LIPITOR) 20 mg tablet take 1 tablet by mouth once daily    omeprazole (PRILOSEC) 20 mg capsule Take 20 mg by mouth daily.  aspirin 81 mg chewable tablet Take 1 Tab by mouth daily.     baclofen (LIORESAL) 10 mg tablet take 1 tablet by mouth three times a day if needed for pain    methylPREDNISolone (MEDROL DOSEPACK) 4 mg tablet take as directed INSIDE DOSE PACK for 6 days    gabapentin (NEURONTIN) 100 mg capsule Take 1 Tab QHS x 1 wk, then 1 Tab BID x1 wk, EKG: SR nonischemic    A/P:  74F MDD, asthma, prior CVA presented with AHRF c/b AMS requiring MICU admission for intubation for respiratory failure i/s/o asthma exacerbation. Extubated 3/12 to face tent and then on BIPAP s/p RRT 3/19 for increased WOB / hypoxia despite aggressive asthma management s/p return to MICU for management of severe asthma. Possible aspiration event 3/31. SLP eval appreciated. XR cine performed with improvement in mental status - pureed diet w/ thin liquids recommended. Worsening respiratory status 4/2 - placed on NIV, now weaned to nasal cannula and now RA.  ENT eval - no upper airway consideration for structural causes of asthma such as vocal cord dysfunction.  Course c/b MDD w/ catatonia and poor PO, off ativan due to respiratory concerns trialed on Memantine family now agreeable to ECT      1. SOB   -TTE 3/9 EF 78% no WMA  -LE duplex negative for DVT  -Repeat EKG NSR no ischemic changes   -Acute asthma exacerbation  -f/u pulm    2. HTN  - c/w  Lisinopril 40mg daily   - c/w HCTZ 25 mg daily  - no beta-blockers 2/2 asthma    3. Pre ECT Eval   - Denies CP SOB has had long hospitalization  - EKG normal, Echo normal   - optimized for ECT, moderate risk for MACE - ECT ongoing    4. DVT ppx  c/w lovenox then 1 tab TID x1 wk    loratadine (CLARITIN) 10 mg tablet Take 1 Tab by mouth daily. No current facility-administered medications for this visit.       Past Medical History:   Diagnosis Date    Abnormal Pap smear     Dr. Negrita Vasquez    Allergic rhinitis     Arthritis     Cerebrovascular small vessel disease 3/18/2019    CT 3/17/2019    Cervical spinal cord compression (HCC)     Chronic pain     Concentric left ventricular hypertrophy 3/18/2019    With left ventricular diastolic dysfunction by echocardiogram 3/18/2019    Hypercholesterolemia     Neck pain 5/17/2010    Stroke ([facility] [address] 10/02/2017    TIA- legs weak memory issues     Past Surgical History:   Procedure Laterality Date    COLONOSCOPY N/A 6/4/2018    COLONOSCOPY / polypectomy performed by Tonia Kennedy MD at [facility] ENDOSCOPY    HX GYN      Total Hyst    HX LAP CHOLECYSTECTOMY      HX OTHER SURGICAL  2017    Throat widened     Social History     Socioeconomic History    Marital status:      Spouse name: Not on file    Number of children: Not on file    Years of education: Not on file    Highest education level: Not on file   Occupational History    Not on file   Social Needs    Financial resource strain: Not on file    Food insecurity:     Worry: Not on file     Inability: Not on file    Transportation needs:     Medical: Not on file     Non-medical: Not on file   Tobacco Use    Smoking status: Never Smoker    Smokeless tobacco: Never Used   Substance and Sexual Activity    Alcohol use: No    Drug use: No    Sexual activity: Never   Lifestyle    Physical activity:     Days per week: Not on file     Minutes per session: Not on file    Stress: Not on file   Relationships    Social connections:     Talks on phone: Not on file     Gets together: Not on file     Attends Druze service: Not on file     Active member of club or organization: Not on file     Attends meetings of clubs or organizations: Not on file     Relationship status: Not on file    Intimate partner violence:     Fear of current or ex partner: Not on file     Emotionally abused: Not on file     Physically abused: Not on file     Forced sexual activity: Not on file   Other Topics Concern    Not on file   Social History Narrative    Not on file     Family History   Problem Relation Age of Onset    Cancer Mother         breast    Heart Disease Father        Review of systems:  She denies fever chills poor appetite weight loss    Physical Exam:  Visit Vitals  /84 (BP 1 Location: Right arm, BP Patient Position: Sitting)   Pulse 66   Temp 97.6 °F (36.4 °C) (Oral)   Resp 18   Ht 5' (1.524 m)   Wt 51.1 kg (112 lb 9.6 oz)   LMP  (LMP Unknown)   SpO2 97%   BMI 21.99 kg/m²       Well-developed well-nourished  HEENT: WNL  Lymph node exam: Supraclavicular cervical lymph nodes negative  Chest: Equal symmetrical expansion no dullness no wheezes rales rubs  Heart: Regular rhythm no gallop no murmur no JVD no peripheral edema  Extremities: No cyanosis clubbing or calf tenderness  Neurological: Alert and oriented    Labs:    O2 sat room air at rest 97%    Impression:     Shortness of breath probably related to deconditioning and the symptoms have significantly resolved    Plan:     Encourage activity  Follow-up on a as needed basis    Maverick Batres MD , [facility]    CC: Colby Hsieh NP     [address] [place], [address] [address]     P: [phone]     F: [phone]    Addendum 10/15/2019: As requested by Dr. Deepak Valencia based on the history and physical exam from 10/1/2019 and review of the patient's spirometry chest x-ray 3/17/2019 and CT of the chest 1/8/2019 I find no contraindication for the planned procedure. Manuela Crisostomo.  Valentien Mohr MD

## 2025-05-19 NOTE — PROGRESS NOTE ADULT - PROBLEM SELECTOR PLAN 9
Lovenox ppx  full code  PT rec TK  Will need transfer to [facility] for psychiatric optimization    Ambulatory with PT. Eating consistently now. 2PC per psychiatry.

## 2025-05-19 NOTE — PROGRESS NOTE ADULT - PROBLEM SELECTOR PROBLEM 2
Acute respiratory failure with hypoxia and hypercapnia
Acute asthma exacerbation
Acute respiratory failure with hypoxia and hypercapnia
Acute respiratory failure with hypoxia and hypercapnia
Acute asthma exacerbation
Acute respiratory failure with hypoxia and hypercapnia
Acute asthma exacerbation
Acute respiratory failure with hypoxia and hypercapnia
Acute asthma exacerbation
Acute respiratory failure with hypoxia and hypercapnia
Acute asthma exacerbation
Acute respiratory failure with hypoxia and hypercapnia
Acute asthma exacerbation
Acute respiratory failure with hypoxia and hypercapnia
Acute asthma exacerbation
Acute asthma exacerbation
Pneumonia
Acute respiratory failure with hypoxia and hypercapnia
Acute asthma exacerbation
Acute respiratory failure with hypoxia and hypercapnia
Acute asthma exacerbation
Acute asthma exacerbation
Acute respiratory failure with hypoxia and hypercapnia
Pneumonia
Acute asthma exacerbation
Acute respiratory failure with hypoxia and hypercapnia
Acute respiratory failure with hypoxia and hypercapnia
Acute asthma exacerbation
Pneumonia
Acute asthma exacerbation
Acute respiratory failure with hypoxia and hypercapnia
Acute asthma exacerbation
Acute respiratory failure with hypoxia and hypercapnia
Acute asthma exacerbation
Acute respiratory failure with hypoxia and hypercapnia

## 2025-05-19 NOTE — BH CONSULTATION LIAISON PROGRESS NOTE - NSBHASSESSMENTFT_PSY_ALL_CORE
Patient is a 74F hx asthma, depression, and HTN was admitted to the MICU with AHRF 2/2 asthma exacerbation resulting in acute on chronic HHRF causing AMS and increased WOB that required intubation. During her ICU course, she experienced seizure-like activity .  Additional issues included hyponatremia, acute on chronic metabolic alkalosis, and mild thrombocytopenia,  also having fevers.  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 Patient has h/o hx of major depressive disorder with psychosis, hx of 3 past inpatient psychiatric hospitalizations last in 2016 at [facility], all rehospitalizations were in context non compliance with the treatment. She has no hx of suicide attempts, no hx of substance abuse. Patient w hx of paranoia, disorganization, catatonia, required MOO when in [facility].   3/24----more withdrawn, some stiffness in neck, not communicative. Some concerns for catatonia. Unsure if symptoms attributed to catatonia vs delirium  3/25--- more alert today, trying to following commands. Still with PMR+. Appears to be responding to Ativan  3/26-- BFS 10, will increasing standing ativan to target catatonic symptoms - giving STAT 0.5mg NOW discussed w acp   3/27----will continue ativan dosing for one more day before increasing  3/28: BFS: stupor 1, mutism 2,withdrawan 2, verbigeration 1, rigidity 2 =8.  3/29: BFS: stupor 2, mutism 2, staring 1, posturing 2, rigidity 2, waxy flexibility 3, withdrawal 2 = 14; please monitor nutritional intake, plan to increase ativan by total daily dose of 0.5 mg at bedtime due to persistent catatonia   3/31: BFS: stupor 2, mutism 2, staring 2, rigidity 1, negativism 1, withdrawal 1, waxy flexibility 3 =12, patient with productive cough, advised for medical work up  4/1---tired, speaking more, not rigid, has PMR+. NPO status now.   4/2--sleepy, RRT this afternoon  4/3---better mentation, pmr but no chester catatonic symptoms  4/4: alert, using few words, mild rigidity -  at bedside, updated   4/5: few words, lethargic, fluctuating wakefulness. Mild rigidity.  No overt catatonic sxs.   4/7: no change, remains thought blocked, few words - son at bedside, updated with care  4/8: patient continues to have poor eye contact, uses few words, no rigidity on today's exam.   4/9: patient remains minimally verbal, some posturing, poor Po intake - continues on NC for 02 requirements   4/10: more verbal/interactive, with increased PO intake per family. Some psychotic output. Family agrees with resumption of Zyprexa.   4/14: alert to self with fluctuating wakefulness, intermittent full sentences f/b one-word answers, some rigidity, posturing, denies si, denies ah/vh  4/16: patient more catatonic today, notable catalepsy, waxiness, verbigeration followed by mutism and staring, family against ativan and ECT, would increase memantine as below, not eating   4/17: catatonic (mute, rigid, not engaging). Eating some portions of meals with significant family support. Family opting to see memantine trial to full effect before consideration of ECT  4/18: mutism today, odd posturing, unable to engage, d/w spouse and son ECT, family still undecided  4/21: alert to self, some improvement with verbal engagement, tracking, follow command, denies si, denies ah/vh, awaiting call back from son in regards to ECT consult  4/22: remains catatonic (mute, postured, rigid, withdrawn, negativistic). Exam limited. Family agrees with formal ECT consult.  4/23: Remains with symptoms of catatonia. BFS: stupor 1, mute2, Rigid 2, negativism 2, withdrawal 2 - 9 : ECT consult completed. awaiting clearances for ECT  4/42: remains with BFS of 9, plan for ECT  4/25: alert to self, able to engage in some conversation, however, confused, loose on  exam, odd posturing right upper arm, Dr. Toure made aware of schedule for ECT Monday 4/28: s/p ECT #2. more spontaneous speech, cooperative with exam. improved eye contact. loose with no rigidity.   4/29: displays more s/s of catatonia since yesterdays exam- negativism, withdrawal, rigidity. family and ect in agreement to proceed with treatment 3 tomorrow.  4/30: some confusion today. Will assess tomm  5/1: mutism and negativism on exam   5/2: improved catatonic symptoms. Confused, suspecting AH  5/5: No catatonic symptoms. Will assess tomm for any catatonic symptoms and decide if to hold off on treatment on wednesday 5/6: Continues to exhibit improvement in catatonic sx. Will recommend holding Wed ECT and having next tx on Fri 5/9 5/7: No catatonic sx; exam consistent with past 2 days. Still planning for next ECT 5/9. Pending inpt psych placement.  5/8: Pt tired/minimal engaged, but responds to commands and no rigidity. Next ECT tomorrow.  5/9: Significantly improved after ECT today and AOx2. Will plan for next ECT on Mon 5/12 5/12: Had ECT this morning. Improved catatonic sx. Denied any mood or psychotic sx. Next ECT on Friday- May 16  5/13: Improved catatonic symptoms. Denied psychosis. A & O X 1  5/15: On exam, some mild increased tone in UL, echopraxia, automatic obedience and staring noted. Schedule for ECT tomorrow.  BFCRS: 1 + 2+ 2 +1= 6  5/16-----no catatonic symptoms. ECT# 8 today. Disoriented. H/o strokes-- possible cognitive decline now  5/19: Not catatonic. Remains disoriented. H/o strokes-- possible cognitive decline emerging now    PLAN  -- Routine observation  -- ECT planned for weekly, may be Friday May 23rd   -- no SI/HI, no need for psychiatric CO  -- antipsychotics can only be given if qtc < 500    - MEDICATIONS:  - As an adjunct to catatonia treatment, continue Memantine 10 mg bid po  -- C/W Zoloft 125 mg daily   -- C/W Zyprexa 2.5mg HS     - PRN: Ativan 0.5mg q 6hrs prn IM/IV/PO    - please ensure PT involved in patient care    - DISPO: Inpatient psychiatry. 2pc signed, pending beds

## 2025-05-19 NOTE — PROGRESS NOTE ADULT - PROBLEM SELECTOR PLAN 5
2/2 to deconditioning/critical care myopathy, CK WNL  doing well  will get PT services at [facility]

## 2025-05-20 ENCOUNTER — TRANSCRIPTION ENCOUNTER (OUTPATIENT)
Age: 75
End: 2025-05-20

## 2025-05-20 VITALS
DIASTOLIC BLOOD PRESSURE: 85 MMHG | TEMPERATURE: 98 F | RESPIRATION RATE: 18 BRPM | SYSTOLIC BLOOD PRESSURE: 154 MMHG | OXYGEN SATURATION: 96 % | HEART RATE: 80 BPM

## 2025-05-20 LAB
ANION GAP SERPL CALC-SCNC: 12 MMOL/L — SIGNIFICANT CHANGE UP (ref 7–14)
BASOPHILS # BLD AUTO: 0 K/UL — SIGNIFICANT CHANGE UP (ref 0–0.2)
BASOPHILS NFR BLD AUTO: 0 % — SIGNIFICANT CHANGE UP (ref 0–2)
BUN SERPL-MCNC: 28 MG/DL — HIGH (ref 7–23)
CALCIUM SERPL-MCNC: 9.5 MG/DL — SIGNIFICANT CHANGE UP (ref 8.4–10.5)
CHLORIDE SERPL-SCNC: 105 MMOL/L — SIGNIFICANT CHANGE UP (ref 98–107)
CO2 SERPL-SCNC: 25 MMOL/L — SIGNIFICANT CHANGE UP (ref 22–31)
CREAT SERPL-MCNC: 0.52 MG/DL — SIGNIFICANT CHANGE UP (ref 0.5–1.3)
EGFR: 97 ML/MIN/1.73M2 — SIGNIFICANT CHANGE UP
EGFR: 97 ML/MIN/1.73M2 — SIGNIFICANT CHANGE UP
EOSINOPHIL # BLD AUTO: 0.19 K/UL — SIGNIFICANT CHANGE UP (ref 0–0.5)
EOSINOPHIL NFR BLD AUTO: 2 % — SIGNIFICANT CHANGE UP (ref 0–6)
GLUCOSE BLDC GLUCOMTR-MCNC: 122 MG/DL — HIGH (ref 70–99)
GLUCOSE BLDC GLUCOMTR-MCNC: 147 MG/DL — HIGH (ref 70–99)
GLUCOSE BLDC GLUCOMTR-MCNC: 163 MG/DL — HIGH (ref 70–99)
GLUCOSE BLDC GLUCOMTR-MCNC: 184 MG/DL — HIGH (ref 70–99)
GLUCOSE SERPL-MCNC: 112 MG/DL — HIGH (ref 70–99)
HCT VFR BLD CALC: 37 % — SIGNIFICANT CHANGE UP (ref 34.5–45)
HGB BLD-MCNC: 12 G/DL — SIGNIFICANT CHANGE UP (ref 11.5–15.5)
IANC: 2.27 K/UL — SIGNIFICANT CHANGE UP (ref 1.8–7.4)
LYMPHOCYTES # BLD AUTO: 4.92 K/UL — HIGH (ref 1–3.3)
LYMPHOCYTES # BLD AUTO: 53 % — HIGH (ref 13–44)
MAGNESIUM SERPL-MCNC: 1.8 MG/DL — SIGNIFICANT CHANGE UP (ref 1.6–2.6)
MANUAL SMEAR VERIFICATION: SIGNIFICANT CHANGE UP
MCHC RBC-ENTMCNC: 32.3 PG — SIGNIFICANT CHANGE UP (ref 27–34)
MCHC RBC-ENTMCNC: 32.4 G/DL — SIGNIFICANT CHANGE UP (ref 32–36)
MCV RBC AUTO: 99.5 FL — SIGNIFICANT CHANGE UP (ref 80–100)
MONOCYTES # BLD AUTO: 0.65 K/UL — SIGNIFICANT CHANGE UP (ref 0–0.9)
MONOCYTES NFR BLD AUTO: 7 % — SIGNIFICANT CHANGE UP (ref 2–14)
NEUTROPHILS # BLD AUTO: 2.6 K/UL — SIGNIFICANT CHANGE UP (ref 1.8–7.4)
NEUTROPHILS NFR BLD AUTO: 28 % — LOW (ref 43–77)
NRBC # BLD: 0 /100 WBCS — SIGNIFICANT CHANGE UP (ref 0–0)
NRBC BLD-RTO: 0 /100 WBCS — SIGNIFICANT CHANGE UP (ref 0–0)
PHOSPHATE SERPL-MCNC: 3.7 MG/DL — SIGNIFICANT CHANGE UP (ref 2.5–4.5)
PLAT MORPH BLD: NORMAL — SIGNIFICANT CHANGE UP
PLATELET # BLD AUTO: 160 K/UL — SIGNIFICANT CHANGE UP (ref 150–400)
PLATELET COUNT - ESTIMATE: NORMAL — SIGNIFICANT CHANGE UP
POTASSIUM SERPL-MCNC: 3.4 MMOL/L — LOW (ref 3.5–5.3)
POTASSIUM SERPL-SCNC: 3.4 MMOL/L — LOW (ref 3.5–5.3)
RBC # BLD: 3.72 M/UL — LOW (ref 3.8–5.2)
RBC # FLD: 14.4 % — SIGNIFICANT CHANGE UP (ref 10.3–14.5)
RBC BLD AUTO: NORMAL — SIGNIFICANT CHANGE UP
SODIUM SERPL-SCNC: 142 MMOL/L — SIGNIFICANT CHANGE UP (ref 135–145)
VARIANT LYMPHS # BLD: 10 % — HIGH (ref 0–6)
VARIANT LYMPHS NFR BLD MANUAL: 10 % — HIGH (ref 0–6)
WBC # BLD: 9.29 K/UL — SIGNIFICANT CHANGE UP (ref 3.8–10.5)
WBC # FLD AUTO: 9.29 K/UL — SIGNIFICANT CHANGE UP (ref 3.8–10.5)

## 2025-05-20 PROCEDURE — 99233 SBSQ HOSP IP/OBS HIGH 50: CPT

## 2025-05-20 PROCEDURE — 99239 HOSP IP/OBS DSCHRG MGMT >30: CPT

## 2025-05-20 RX ORDER — SERTRALINE 100 MG/1
5 TABLET, FILM COATED ORAL
Qty: 0 | Refills: 0 | DISCHARGE
Start: 2025-05-20

## 2025-05-20 RX ORDER — POLYETHYLENE GLYCOL 3350 17 G/17G
17 POWDER, FOR SOLUTION ORAL
Qty: 0 | Refills: 0 | DISCHARGE
Start: 2025-05-20

## 2025-05-20 RX ORDER — SENNA 187 MG
2 TABLET ORAL
Qty: 0 | Refills: 0 | DISCHARGE
Start: 2025-05-20

## 2025-05-20 RX ORDER — MELATONIN 5 MG
3 TABLET ORAL
Qty: 0 | Refills: 0 | DISCHARGE
Start: 2025-05-20

## 2025-05-20 RX ORDER — MEMANTINE HYDROCHLORIDE 21 MG/1
1 CAPSULE, EXTENDED RELEASE ORAL
Qty: 0 | Refills: 0 | DISCHARGE
Start: 2025-05-20

## 2025-05-20 RX ORDER — LANOLIN/MINERAL OIL/PETROLATUM
1 OINTMENT (GRAM) OPHTHALMIC (EYE)
Qty: 0 | Refills: 0 | DISCHARGE
Start: 2025-05-20

## 2025-05-20 RX ORDER — LEVALBUTEROL HYDROCHLORIDE 1.25 MG/3ML
3 SOLUTION RESPIRATORY (INHALATION)
Qty: 0 | Refills: 0 | DISCHARGE
Start: 2025-05-20

## 2025-05-20 RX ORDER — OLANZAPINE 10 MG/1
1 TABLET ORAL
Refills: 0 | DISCHARGE

## 2025-05-20 RX ORDER — AMLODIPINE BESYLATE 10 MG/1
1 TABLET ORAL
Refills: 0 | DISCHARGE

## 2025-05-20 RX ORDER — INSULIN GLARGINE-YFGN 100 [IU]/ML
3 INJECTION, SOLUTION SUBCUTANEOUS
Qty: 0 | Refills: 0 | DISCHARGE
Start: 2025-05-20

## 2025-05-20 RX ORDER — INSULIN LISPRO 100 U/ML
2 INJECTION, SOLUTION INTRAVENOUS; SUBCUTANEOUS
Qty: 0 | Refills: 0 | DISCHARGE
Start: 2025-05-20

## 2025-05-20 RX ORDER — HYDROCHLOROTHIAZIDE 50 MG/1
1 TABLET ORAL
Qty: 0 | Refills: 0 | DISCHARGE
Start: 2025-05-20

## 2025-05-20 RX ORDER — TIOTROPIUM BROMIDE INHALATION SPRAY 3.12 UG/1
2 SPRAY, METERED RESPIRATORY (INHALATION)
Qty: 0 | Refills: 0 | DISCHARGE
Start: 2025-05-20

## 2025-05-20 RX ORDER — OLANZAPINE 10 MG/1
1 TABLET ORAL
Qty: 0 | Refills: 0 | DISCHARGE
Start: 2025-05-20

## 2025-05-20 RX ORDER — LISINOPRIL 5 MG/1
1 TABLET ORAL
Qty: 0 | Refills: 0 | DISCHARGE
Start: 2025-05-20

## 2025-05-20 RX ADMIN — INSULIN LISPRO 2 UNIT(S): 100 INJECTION, SOLUTION INTRAVENOUS; SUBCUTANEOUS at 12:20

## 2025-05-20 RX ADMIN — LEVALBUTEROL HYDROCHLORIDE 0.63 MILLIGRAM(S): 1.25 SOLUTION RESPIRATORY (INHALATION) at 07:12

## 2025-05-20 RX ADMIN — POLYETHYLENE GLYCOL 3350 17 GRAM(S): 17 POWDER, FOR SOLUTION ORAL at 06:17

## 2025-05-20 RX ADMIN — Medication 1 DOSE(S): at 22:56

## 2025-05-20 RX ADMIN — Medication 1 DROP(S): at 12:21

## 2025-05-20 RX ADMIN — MEMANTINE HYDROCHLORIDE 10 MILLIGRAM(S): 21 CAPSULE, EXTENDED RELEASE ORAL at 06:17

## 2025-05-20 RX ADMIN — INSULIN GLARGINE-YFGN 3 UNIT(S): 100 INJECTION, SOLUTION SUBCUTANEOUS at 22:55

## 2025-05-20 RX ADMIN — INSULIN LISPRO 2 UNIT(S): 100 INJECTION, SOLUTION INTRAVENOUS; SUBCUTANEOUS at 08:40

## 2025-05-20 RX ADMIN — INSULIN LISPRO 2 UNIT(S): 100 INJECTION, SOLUTION INTRAVENOUS; SUBCUTANEOUS at 17:14

## 2025-05-20 RX ADMIN — OLANZAPINE 2.5 MILLIGRAM(S): 10 TABLET ORAL at 22:55

## 2025-05-20 RX ADMIN — LEVALBUTEROL HYDROCHLORIDE 0.63 MILLIGRAM(S): 1.25 SOLUTION RESPIRATORY (INHALATION) at 21:53

## 2025-05-20 RX ADMIN — Medication 1 DOSE(S): at 08:41

## 2025-05-20 RX ADMIN — SERTRALINE 125 MILLIGRAM(S): 100 TABLET, FILM COATED ORAL at 12:24

## 2025-05-20 RX ADMIN — LEVALBUTEROL HYDROCHLORIDE 0.63 MILLIGRAM(S): 1.25 SOLUTION RESPIRATORY (INHALATION) at 15:06

## 2025-05-20 RX ADMIN — INSULIN LISPRO 1: 100 INJECTION, SOLUTION INTRAVENOUS; SUBCUTANEOUS at 12:20

## 2025-05-20 RX ADMIN — Medication 1 APPLICATION(S): at 17:14

## 2025-05-20 RX ADMIN — LISINOPRIL 40 MILLIGRAM(S): 5 TABLET ORAL at 06:16

## 2025-05-20 RX ADMIN — Medication 9 MILLIGRAM(S): at 22:55

## 2025-05-20 RX ADMIN — Medication 40 MILLIEQUIVALENT(S): at 09:09

## 2025-05-20 RX ADMIN — TIOTROPIUM BROMIDE INHALATION SPRAY 2 PUFF(S): 3.12 SPRAY, METERED RESPIRATORY (INHALATION) at 08:42

## 2025-05-20 RX ADMIN — Medication 1 APPLICATION(S): at 06:27

## 2025-05-20 RX ADMIN — Medication 1 APPLICATION(S): at 06:25

## 2025-05-20 RX ADMIN — Medication 1 DROP(S): at 22:56

## 2025-05-20 RX ADMIN — ENOXAPARIN SODIUM 40 MILLIGRAM(S): 100 INJECTION SUBCUTANEOUS at 17:16

## 2025-05-20 RX ADMIN — Medication 40 MILLIGRAM(S): at 06:17

## 2025-05-20 RX ADMIN — Medication 1 DROP(S): at 06:20

## 2025-05-20 RX ADMIN — MEMANTINE HYDROCHLORIDE 10 MILLIGRAM(S): 21 CAPSULE, EXTENDED RELEASE ORAL at 22:56

## 2025-05-20 NOTE — DISCHARGE NOTE NURSING/CASE MANAGEMENT/SOCIAL WORK - NSDCPEFALRISK_GEN_ALL_CORE
For information on Fall & Injury Prevention, visit: [email or web address] OR  [email or web address] OR  https://www.cdc.gov/steadi/patient.html

## 2025-05-20 NOTE — BH CONSULTATION LIAISON PROGRESS NOTE - NS ED BHA REVIEW OF ED CHART AVAILABLE LABS REVIEWED
Yes
Yes
None available
Yes
None available
Yes
None available
Yes
Yes

## 2025-05-20 NOTE — BH CONSULTATION LIAISON PROGRESS NOTE - NSBHCONSFOLLOWNEEDS_PSY_ALL_CORE
Needs further psych safety assessment prior to discharge
No psychiatric contraindications to discharge
Needs further psych safety assessment prior to discharge
No psychiatric contraindications to discharge
Needs further psych safety assessment prior to discharge
No psychiatric contraindications to discharge
Needs further psych safety assessment prior to discharge
No psychiatric contraindications to discharge
Needs further psych safety assessment prior to discharge
No psychiatric contraindications to discharge
Needs further psych safety assessment prior to discharge
No psychiatric contraindications to discharge
Needs further psych safety assessment prior to discharge

## 2025-05-20 NOTE — BH CONSULTATION LIAISON PROGRESS NOTE - NSBHPSYCHOLCOGORIENT_PSY_A_CORE
Unable to assess
Not fully oriented...
Unable to assess
Not fully oriented...
Unable to assess
Unable to assess
Not fully oriented...

## 2025-05-20 NOTE — BH CONSULTATION LIAISON PROGRESS NOTE - NSBHMSETHTPROC_PSY_A_CORE
Other
Thought blocking
Other
Thought blocking/Other
Other
Thought blocking
Thought blocking
Unable to assess
Other
Linear/Other
Other
Linear
Other
Unable to assess
Other
Other
Unable to assess
Linear/Other
Other
Thought blocking
Other
Unable to assess

## 2025-05-20 NOTE — BH CONSULTATION LIAISON PROGRESS NOTE - NSBHADMITIPOBS_PSY_A_CORE
Routine observation

## 2025-05-20 NOTE — BH CONSULTATION LIAISON PROGRESS NOTE - NSBHCHARTREVIEWVS_PSY_A_CORE FT
Vital Signs Last 24 Hrs  T(C): 36.9 (24 Apr 2025 11:53), Max: 36.9 (24 Apr 2025 11:53)  T(F): 98.4 (24 Apr 2025 11:53), Max: 98.4 (24 Apr 2025 11:53)  HR: 89 (24 Apr 2025 11:53) (75 - 89)  BP: 126/89 (24 Apr 2025 11:53) (126/75 - 131/84)  BP(mean): --  RR: 18 (24 Apr 2025 11:53) (18 - 18)  SpO2: 90% (24 Apr 2025 11:53) (90% - 98%)    Parameters below as of 24 Apr 2025 11:53  Patient On (Oxygen Delivery Method): room air    
Vital Signs Last 24 Hrs  T(C): 36.7 (23 Apr 2025 04:57), Max: 37.3 (22 Apr 2025 22:50)  T(F): 98.1 (23 Apr 2025 04:57), Max: 99.1 (22 Apr 2025 22:50)  HR: 86 (23 Apr 2025 04:57) (79 - 98)  BP: 141/77 (23 Apr 2025 04:57) (119/65 - 141/77)  BP(mean): --  RR: 18 (23 Apr 2025 04:57) (16 - 18)  SpO2: 96% (23 Apr 2025 04:57) (94% - 100%)    Parameters below as of 23 Apr 2025 04:57  Patient On (Oxygen Delivery Method): room air    
Vital Signs Last 24 Hrs  T(C): 36.2 (22 Apr 2025 04:45), Max: 36.9 (21 Apr 2025 17:50)  T(F): 97.2 (22 Apr 2025 04:45), Max: 98.4 (21 Apr 2025 17:50)  HR: 81 (22 Apr 2025 04:45) (81 - 96)  BP: 143/72 (22 Apr 2025 04:45) (115/59 - 144/75)  BP(mean): --  RR: 18 (22 Apr 2025 04:45) (18 - 18)  SpO2: 100% (22 Apr 2025 04:45) (98% - 100%)    Parameters below as of 22 Apr 2025 04:45  Patient On (Oxygen Delivery Method): room air    
Vital Signs Last 24 Hrs  T(C): 36.4 (13 May 2025 05:07), Max: 36.8 (12 May 2025 22:20)  T(F): 97.5 (13 May 2025 05:07), Max: 98.3 (12 May 2025 22:20)  HR: 82 (13 May 2025 07:18) (79 - 94)  BP: 157/87 (13 May 2025 05:07) (155/79 - 157/87)  BP(mean): --  RR: 18 (13 May 2025 05:07) (18 - 18)  SpO2: 96% (13 May 2025 07:18) (95% - 99%)    Parameters below as of 13 May 2025 07:18  Patient On (Oxygen Delivery Method): room air    
Vital Signs Last 24 Hrs  T(C): 37 (09 May 2025 10:00), Max: 37 (09 May 2025 10:00)  T(F): 98.6 (09 May 2025 10:00), Max: 98.6 (09 May 2025 10:00)  HR: 89 (09 May 2025 10:00) (77 - 97)  BP: 142/71 (09 May 2025 10:00) (106/59 - 152/64)  BP(mean): --  RR: 18 (09 May 2025 10:00) (14 - 20)  SpO2: 97% (09 May 2025 10:00) (94% - 100%)    Parameters below as of 09 May 2025 10:00  Patient On (Oxygen Delivery Method): room air    
Vital Signs Last 24 Hrs  T(C): 36.7 (18 Apr 2025 09:52), Max: 37 (17 Apr 2025 18:20)  T(F): 98 (18 Apr 2025 09:52), Max: 98.6 (17 Apr 2025 18:20)  HR: 88 (18 Apr 2025 09:52) (82 - 98)  BP: 150/69 (18 Apr 2025 09:52) (105/76 - 150/69)  BP(mean): --  RR: 18 (18 Apr 2025 09:52) (17 - 18)  SpO2: 100% (18 Apr 2025 09:52) (94% - 100%)    Parameters below as of 18 Apr 2025 09:52  Patient On (Oxygen Delivery Method): room air    
Vital Signs Last 24 Hrs  T(C): 36.9 (17 Apr 2025 10:20), Max: 37 (16 Apr 2025 21:00)  T(F): 98.4 (17 Apr 2025 10:20), Max: 98.6 (16 Apr 2025 21:00)  HR: 100 (17 Apr 2025 10:20) (82 - 112)  BP: 125/76 (17 Apr 2025 10:20) (115/72 - 129/66)  BP(mean): --  RR: 18 (17 Apr 2025 10:20) (18 - 18)  SpO2: 100% (17 Apr 2025 10:20) (95% - 100%)    Parameters below as of 17 Apr 2025 10:20  Patient On (Oxygen Delivery Method): room air    
Vital Signs Last 24 Hrs  T(C): 36.9 (02 Apr 2025 13:20), Max: 37.2 (02 Apr 2025 05:10)  T(F): 98.4 (02 Apr 2025 13:20), Max: 99 (02 Apr 2025 05:10)  HR: 120 (02 Apr 2025 14:57) (88 - 120)  BP: 113/65 (02 Apr 2025 13:20) (106/63 - 161/89)  BP(mean): --  RR: 18 (02 Apr 2025 13:20) (18 - 18)  SpO2: 100% (02 Apr 2025 13:20) (95% - 100%)    Parameters below as of 02 Apr 2025 09:20  Patient On (Oxygen Delivery Method): nasal cannula  O2 Flow (L/min): 3  
Vital Signs Last 24 Hrs  T(C): 37.4 (10 Apr 2025 09:00), Max: 37.6 (10 Apr 2025 01:15)  T(F): 99.3 (10 Apr 2025 09:00), Max: 99.6 (10 Apr 2025 01:15)  HR: 82 (10 Apr 2025 15:25) (78 - 104)  BP: 148/81 (10 Apr 2025 09:00) (127/72 - 159/78)  BP(mean): --  RR: 17 (10 Apr 2025 09:00) (17 - 18)  SpO2: 100% (10 Apr 2025 15:25) (95% - 100%)    Parameters below as of 10 Apr 2025 15:25  Patient On (Oxygen Delivery Method): nasal cannula    
Vital Signs Last 24 Hrs  T(C): 36.4 (15 May 2025 05:08), Max: 36.9 (14 May 2025 16:41)  T(F): 97.6 (15 May 2025 05:08), Max: 98.4 (14 May 2025 16:41)  HR: 72 (15 May 2025 07:09) (72 - 88)  BP: 140/68 (15 May 2025 05:08) (140/68 - 158/71)  BP(mean): --  RR: 17 (15 May 2025 05:08) (17 - 18)  SpO2: 95% (15 May 2025 07:09) (94% - 98%)    Parameters below as of 15 May 2025 05:08  Patient On (Oxygen Delivery Method): room air    
Vital Signs Last 24 Hrs  T(C): 37.1 (04 Apr 2025 10:09), Max: 37.4 (04 Apr 2025 05:00)  T(F): 98.7 (04 Apr 2025 10:09), Max: 99.3 (04 Apr 2025 05:00)  HR: 93 (04 Apr 2025 10:09) (82 - 93)  BP: 169/90 (04 Apr 2025 10:09) (128/78 - 169/90)  BP(mean): --  RR: 19 (04 Apr 2025 10:09) (18 - 19)  SpO2: 100% (04 Apr 2025 10:09) (97% - 100%)    Parameters below as of 04 Apr 2025 10:09  Patient On (Oxygen Delivery Method): nasal cannula  O2 Flow (L/min): 3  
Vital Signs Last 24 Hrs  T(C): 37.1 (27 Mar 2025 20:00), Max: 37.3 (27 Mar 2025 02:00)  T(F): 98.7 (27 Mar 2025 20:00), Max: 99.2 (27 Mar 2025 02:00)  HR: 100 (27 Mar 2025 20:00) (85 - 100)  BP: 103/63 (27 Mar 2025 20:00) (103/63 - 145/76)  BP(mean): --  RR: 17 (27 Mar 2025 20:00) (17 - 18)  SpO2: 96% (27 Mar 2025 20:00) (96% - 100%)    Parameters below as of 27 Mar 2025 20:00  Patient On (Oxygen Delivery Method): nasal cannula    
Vital Signs Last 24 Hrs  T(C): 36.6 (16 May 2025 09:04), Max: 36.9 (16 May 2025 05:02)  T(F): 97.9 (16 May 2025 09:04), Max: 98.4 (16 May 2025 05:02)  HR: 76 (16 May 2025 09:30) (75 - 79)  BP: 120/73 (16 May 2025 09:30) (110/82 - 169/89)  BP(mean): --  RR: 18 (16 May 2025 09:30) (18 - 20)  SpO2: 93% (16 May 2025 09:30) (93% - 99%)    Parameters below as of 16 May 2025 09:00  Patient On (Oxygen Delivery Method): room air    
Vital Signs Last 24 Hrs  T(C): 37.2 (05 May 2025 08:16), Max: 37.2 (04 May 2025 21:55)  T(F): 98.9 (05 May 2025 08:16), Max: 98.9 (04 May 2025 21:55)  HR: 70 (05 May 2025 09:00) (70 - 104)  BP: 136/60 (05 May 2025 09:00) (121/58 - 174/79)  BP(mean): --  RR: 18 (05 May 2025 09:00) (17 - 20)  SpO2: 98% (05 May 2025 09:00) (91% - 100%)    Parameters below as of 05 May 2025 09:00  Patient On (Oxygen Delivery Method): room air    
Vital Signs Last 24 Hrs  T(C): 36.9 (31 Mar 2025 04:56), Max: 37.2 (30 Mar 2025 12:03)  T(F): 98.4 (31 Mar 2025 04:56), Max: 99 (30 Mar 2025 12:03)  HR: 96 (31 Mar 2025 04:56) (73 - 116)  BP: 121/70 (31 Mar 2025 04:56) (117/56 - 140/73)  BP(mean): --  RR: 18 (31 Mar 2025 04:56) (17 - 18)  SpO2: 92% (31 Mar 2025 04:56) (92% - 100%)    Parameters below as of 31 Mar 2025 04:56  Patient On (Oxygen Delivery Method): room air    
Vital Signs Last 24 Hrs  T(C): 37.1 (24 Mar 2025 14:06), Max: 37.2 (24 Mar 2025 12:23)  T(F): 98.8 (24 Mar 2025 14:06), Max: 99 (24 Mar 2025 12:23)  HR: 101 (24 Mar 2025 14:06) (81 - 112)  BP: 107/67 (24 Mar 2025 14:06) (92/58 - 144/88)  BP(mean): --  RR: 18 (24 Mar 2025 14:06) (16 - 20)  SpO2: 100% (24 Mar 2025 14:06) (95% - 100%)    Parameters below as of 24 Mar 2025 14:06  Patient On (Oxygen Delivery Method): nasal cannula  O2 Flow (L/min): 2  
Vital Signs Last 24 Hrs  T(C): 36.4 (06 May 2025 05:00), Max: 37.2 (05 May 2025 21:00)  T(F): 97.5 (06 May 2025 05:00), Max: 98.9 (05 May 2025 21:00)  HR: 79 (06 May 2025 07:33) (71 - 89)  BP: 138/67 (06 May 2025 05:00) (125/50 - 146/75)  BP(mean): --  RR: 18 (06 May 2025 05:00) (18 - 18)  SpO2: 100% (06 May 2025 05:00) (95% - 100%)    Parameters below as of 06 May 2025 05:00  Patient On (Oxygen Delivery Method): room air    
Vital Signs Last 24 Hrs  T(C): 36.4 (19 May 2025 04:54), Max: 37.1 (18 May 2025 22:06)  T(F): 97.5 (19 May 2025 04:54), Max: 98.7 (18 May 2025 22:06)  HR: 75 (19 May 2025 09:43) (73 - 82)  BP: 146/72 (19 May 2025 04:54) (146/72 - 156/75)  BP(mean): --  RR: 18 (19 May 2025 04:54) (17 - 18)  SpO2: 98% (19 May 2025 09:43) (94% - 100%)    Parameters below as of 19 May 2025 09:43  Patient On (Oxygen Delivery Method): room air    
Vital Signs Last 24 Hrs  T(C): 36.7 (26 Mar 2025 09:38), Max: 37.3 (25 Mar 2025 22:00)  T(F): 98 (26 Mar 2025 09:38), Max: 99.1 (25 Mar 2025 22:00)  HR: 115 (26 Mar 2025 09:38) (88 - 115)  BP: 152/85 (26 Mar 2025 09:38) (135/72 - 159/93)  BP(mean): --  RR: 18 (26 Mar 2025 09:38) (18 - 18)  SpO2: 95% (26 Mar 2025 09:38) (92% - 99%)    Parameters below as of 26 Mar 2025 09:38  Patient On (Oxygen Delivery Method): nasal cannula  O2 Flow (L/min): 2  
Vital Signs Last 24 Hrs  T(C): 37.1 (04 Apr 2025 10:09), Max: 37.4 (04 Apr 2025 05:00)  T(F): 98.7 (04 Apr 2025 10:09), Max: 99.3 (04 Apr 2025 05:00)  HR: 93 (04 Apr 2025 10:09) (82 - 93)  BP: 169/90 (04 Apr 2025 10:09) (128/78 - 169/90)  BP(mean): --  RR: 19 (04 Apr 2025 10:09) (18 - 19)  SpO2: 100% (04 Apr 2025 10:09) (97% - 100%)    Parameters below as of 04 Apr 2025 10:09  Patient On (Oxygen Delivery Method): nasal cannula  O2 Flow (L/min): 3  
Vital Signs Last 24 Hrs  T(C): 37 (20 May 2025 05:06), Max: 37.4 (19 May 2025 21:35)  T(F): 98.6 (20 May 2025 05:06), Max: 99.4 (19 May 2025 21:35)  HR: 77 (20 May 2025 07:12) (71 - 90)  BP: 140/87 (20 May 2025 05:06) (138/64 - 155/82)  BP(mean): --  RR: 17 (20 May 2025 05:06) (17 - 18)  SpO2: 94% (20 May 2025 07:12) (92% - 99%)    Parameters below as of 20 May 2025 07:12  Patient On (Oxygen Delivery Method): room air    
Vital Signs Last 24 Hrs  T(C): 37.3 (28 Mar 2025 08:00), Max: 37.3 (28 Mar 2025 08:00)  T(F): 99.1 (28 Mar 2025 08:00), Max: 99.1 (28 Mar 2025 08:00)  HR: 81 (28 Mar 2025 09:52) (81 - 103)  BP: 112/64 (28 Mar 2025 08:00) (103/63 - 145/76)  BP(mean): --  RR: 18 (28 Mar 2025 08:00) (17 - 18)  SpO2: 100% (28 Mar 2025 09:52) (94% - 100%)    Parameters below as of 28 Mar 2025 09:52  Patient On (Oxygen Delivery Method): nasal cannula    
Vital Signs Last 24 Hrs  T(C): 36.3 (01 May 2025 04:40), Max: 37.1 (30 Apr 2025 14:27)  T(F): 97.3 (01 May 2025 04:40), Max: 98.8 (30 Apr 2025 22:00)  HR: 90 (01 May 2025 04:40) (76 - 93)  BP: 137/85 (01 May 2025 04:40) (107/61 - 157/90)  BP(mean): --  RR: 17 (01 May 2025 04:40) (14 - 17)  SpO2: 96% (01 May 2025 04:40) (93% - 96%)    Parameters below as of 01 May 2025 04:40  Patient On (Oxygen Delivery Method): room air    
Vital Signs Last 24 Hrs  T(C): 36.8 (09 Apr 2025 11:31), Max: 37 (09 Apr 2025 09:00)  T(F): 98.3 (09 Apr 2025 11:31), Max: 98.6 (09 Apr 2025 09:00)  HR: 86 (09 Apr 2025 11:31) (82 - 100)  BP: 157/78 (09 Apr 2025 11:31) (124/70 - 157/78)  BP(mean): --  RR: 18 (09 Apr 2025 11:31) (18 - 18)  SpO2: 94% (09 Apr 2025 11:31) (94% - 100%)    Parameters below as of 09 Apr 2025 11:31  Patient On (Oxygen Delivery Method): nasal cannula  O2 Flow (L/min): 2  
Vital Signs Last 24 Hrs  T(C): 36.8 (21 Apr 2025 05:05), Max: 37.1 (20 Apr 2025 21:13)  T(F): 98.3 (21 Apr 2025 05:05), Max: 98.8 (20 Apr 2025 21:13)  HR: 78 (21 Apr 2025 05:05) (78 - 98)  BP: 129/79 (21 Apr 2025 05:05) (107/74 - 129/79)  BP(mean): --  RR: 18 (21 Apr 2025 05:05) (17 - 19)  SpO2: 100% (21 Apr 2025 05:05) (95% - 100%)    Parameters below as of 21 Apr 2025 05:05  Patient On (Oxygen Delivery Method): nasal cannula  O2 Flow (L/min): 2  
Vital Signs Last 24 Hrs  T(C): 37.1 (28 Apr 2025 09:10), Max: 37.1 (27 Apr 2025 22:31)  T(F): 98.8 (28 Apr 2025 09:10), Max: 98.8 (28 Apr 2025 08:14)  HR: 94 (28 Apr 2025 09:40) (72 - 99)  BP: 145/88 (28 Apr 2025 09:40) (122/71 - 166/73)  BP(mean): --  RR: 16 (28 Apr 2025 09:40) (16 - 20)  SpO2: 94% (28 Apr 2025 09:40) (93% - 100%)    Parameters below as of 28 Apr 2025 09:20  Patient On (Oxygen Delivery Method): nasal cannula    
Vital Signs Last 24 Hrs  T(C): 37.1 (30 Apr 2025 14:27), Max: 37.2 (29 Apr 2025 22:43)  T(F): 98.7 (30 Apr 2025 14:27), Max: 99 (29 Apr 2025 22:43)  HR: 93 (30 Apr 2025 14:27) (73 - 114)  BP: 107/61 (30 Apr 2025 14:27) (104/56 - 184/113)  BP(mean): 65 (30 Apr 2025 05:13) (65 - 85)  RR: 17 (30 Apr 2025 14:27) (14 - 18)  SpO2: 96% (30 Apr 2025 14:27) (87% - 100%)    Parameters below as of 30 Apr 2025 14:27  Patient On (Oxygen Delivery Method): room air    
Vital Signs Last 24 Hrs  T(C): 37.4 (02 May 2025 12:05), Max: 37.4 (02 May 2025 12:05)  T(F): 99.3 (02 May 2025 12:05), Max: 99.3 (02 May 2025 12:05)  HR: 82 (02 May 2025 12:05) (73 - 96)  BP: 107/83 (02 May 2025 12:05) (107/83 - 161/90)  BP(mean): --  RR: 18 (02 May 2025 12:05) (14 - 20)  SpO2: 95% (02 May 2025 12:05) (95% - 99%)    Parameters below as of 02 May 2025 12:05  Patient On (Oxygen Delivery Method): room air    
Vital Signs Last 24 Hrs  T(C): 36.9 (25 Apr 2025 08:31), Max: 36.9 (24 Apr 2025 11:53)  T(F): 98.5 (25 Apr 2025 08:31), Max: 98.5 (24 Apr 2025 21:45)  HR: 85 (25 Apr 2025 09:35) (65 - 112)  BP: 133/91 (25 Apr 2025 09:35) (100/64 - 195/111)  BP(mean): --  RR: 21 (25 Apr 2025 09:35) (16 - 23)  SpO2: 95% (25 Apr 2025 09:35) (90% - 99%)    Parameters below as of 25 Apr 2025 10:00  Patient On (Oxygen Delivery Method): room air    
Vital Signs Last 24 Hrs  T(C): 37.2 (01 Apr 2025 11:50), Max: 37.4 (31 Mar 2025 14:28)  T(F): 99 (01 Apr 2025 11:50), Max: 99.4 (31 Mar 2025 14:28)  HR: 93 (01 Apr 2025 11:50) (77 - 115)  BP: 141/81 (01 Apr 2025 11:50) (125/62 - 153/74)  BP(mean): --  RR: 18 (01 Apr 2025 11:50) (17 - 18)  SpO2: 98% (01 Apr 2025 11:50) (93% - 100%)    Parameters below as of 01 Apr 2025 10:21  Patient On (Oxygen Delivery Method): room air    
Vital Signs Last 24 Hrs  T(C): 37.2 (07 May 2025 05:10), Max: 37.6 (06 May 2025 22:00)  T(F): 99 (07 May 2025 05:10), Max: 99.6 (06 May 2025 22:00)  HR: 77 (07 May 2025 08:39) (77 - 87)  BP: 149/78 (07 May 2025 05:10) (113/62 - 149/78)  BP(mean): --  RR: 18 (07 May 2025 05:10) (18 - 18)  SpO2: 99% (07 May 2025 08:39) (97% - 100%)    Parameters below as of 07 May 2025 05:10  Patient On (Oxygen Delivery Method): room air    
Vital Signs Last 24 Hrs  T(C): 36.7 (03 Apr 2025 16:00), Max: 37 (02 Apr 2025 21:48)  T(F): 98 (03 Apr 2025 16:00), Max: 98.6 (02 Apr 2025 21:48)  HR: 86 (03 Apr 2025 16:00) (85 - 94)  BP: 128/78 (03 Apr 2025 16:00) (114/58 - 138/83)  BP(mean): --  RR: 18 (03 Apr 2025 16:47) (18 - 18)  SpO2: 100% (03 Apr 2025 16:47) (95% - 100%)    Parameters below as of 03 Apr 2025 16:47  Patient On (Oxygen Delivery Method): nasal cannula  O2 Flow (L/min): 3  
Vital Signs Last 24 Hrs  T(C): 36.7 (25 Mar 2025 14:45), Max: 37.2 (24 Mar 2025 18:01)  T(F): 98 (25 Mar 2025 14:45), Max: 99 (24 Mar 2025 18:01)  HR: 100 (25 Mar 2025 14:45) (79 - 101)  BP: 135/72 (25 Mar 2025 14:45) (105/56 - 139/71)  BP(mean): --  RR: 18 (25 Mar 2025 14:45) (17 - 18)  SpO2: 94% (25 Mar 2025 14:45) (93% - 100%)    Parameters below as of 25 Mar 2025 14:45  Patient On (Oxygen Delivery Method): nasal cannula  O2 Flow (L/min): 2  
Vital Signs Last 24 Hrs  T(C): 36.4 (08 May 2025 05:00), Max: 37.2 (07 May 2025 14:50)  T(F): 97.5 (08 May 2025 05:00), Max: 99 (07 May 2025 14:50)  HR: 77 (08 May 2025 07:48) (77 - 96)  BP: 129/64 (08 May 2025 05:00) (129/64 - 138/70)  BP(mean): --  RR: 18 (08 May 2025 05:00) (18 - 18)  SpO2: 98% (08 May 2025 07:48) (96% - 98%)    Parameters below as of 08 May 2025 05:00  Patient On (Oxygen Delivery Method): room air    
Vital Signs Last 24 Hrs  T(C): 36.2 (29 Apr 2025 04:55), Max: 37.1 (28 Apr 2025 13:00)  T(F): 97.1 (29 Apr 2025 04:55), Max: 98.7 (28 Apr 2025 13:00)  HR: 88 (29 Apr 2025 04:55) (83 - 94)  BP: 154/95 (29 Apr 2025 04:55) (116/62 - 154/95)  BP(mean): --  RR: 17 (29 Apr 2025 04:55) (16 - 18)  SpO2: 96% (29 Apr 2025 04:55) (93% - 100%)    Parameters below as of 29 Apr 2025 04:55  Patient On (Oxygen Delivery Method): room air    
Vital Signs Last 24 Hrs  T(C): 36.9 (14 Apr 2025 08:50), Max: 37.1 (13 Apr 2025 17:54)  T(F): 98.4 (14 Apr 2025 08:50), Max: 98.7 (13 Apr 2025 17:54)  HR: 82 (14 Apr 2025 08:50) (74 - 99)  BP: 151/89 (14 Apr 2025 08:50) (118/66 - 154/79)  BP(mean): --  RR: 18 (14 Apr 2025 08:50) (17 - 18)  SpO2: 98% (14 Apr 2025 08:50) (95% - 100%)    Parameters below as of 14 Apr 2025 08:50  Patient On (Oxygen Delivery Method): room air    
Vital Signs Last 24 Hrs  T(C): 36.9 (12 May 2025 08:21), Max: 37 (11 May 2025 13:15)  T(F): 98.4 (12 May 2025 08:21), Max: 98.6 (11 May 2025 13:15)  HR: 78 (12 May 2025 09:40) (75 - 93)  BP: 142/63 (12 May 2025 09:40) (140/74 - 170/114)  BP(mean): --  RR: 20 (12 May 2025 09:40) (15 - 20)  SpO2: 96% (12 May 2025 09:40) (92% - 100%)    Parameters below as of 12 May 2025 09:30  Patient On (Oxygen Delivery Method): room air    
Vital Signs Last 24 Hrs  T(C): 37 (29 Mar 2025 09:02), Max: 37.2 (29 Mar 2025 01:30)  T(F): 98.6 (29 Mar 2025 09:02), Max: 98.9 (29 Mar 2025 01:30)  HR: 104 (29 Mar 2025 09:02) (91 - 109)  BP: 156/79 (29 Mar 2025 09:02) (91/62 - 156/79)  BP(mean): --  RR: 18 (29 Mar 2025 09:02) (18 - 18)  SpO2: 93% (29 Mar 2025 09:02) (93% - 99%)    Parameters below as of 29 Mar 2025 09:02  Patient On (Oxygen Delivery Method): room air    
Vital Signs Last 24 Hrs  T(C): 36.2 (16 Apr 2025 05:02), Max: 37.1 (16 Apr 2025 00:10)  T(F): 97.2 (16 Apr 2025 05:02), Max: 98.8 (16 Apr 2025 00:10)  HR: 88 (16 Apr 2025 08:03) (76 - 101)  BP: 131/70 (16 Apr 2025 05:02) (121/69 - 140/74)  BP(mean): --  RR: 18 (16 Apr 2025 05:02) (18 - 18)  SpO2: 100% (16 Apr 2025 08:03) (92% - 100%)    Parameters below as of 16 Apr 2025 08:03  Patient On (Oxygen Delivery Method): room air    
Vital Signs Last 24 Hrs  T(C): 37 (08 Apr 2025 11:05), Max: 37.7 (07 Apr 2025 21:40)  T(F): 98.6 (08 Apr 2025 11:05), Max: 99.8 (07 Apr 2025 21:40)  HR: 98 (08 Apr 2025 11:05) (76 - 113)  BP: 117/56 (08 Apr 2025 11:05) (114/63 - 161/74)  BP(mean): --  RR: 18 (08 Apr 2025 11:05) (17 - 18)  SpO2: 97% (08 Apr 2025 11:05) (95% - 100%)    Parameters below as of 08 Apr 2025 11:05  Patient On (Oxygen Delivery Method): nasal cannula  O2 Flow (L/min): 2  
Vital Signs Last 24 Hrs  T(C): 37 (07 Apr 2025 10:37), Max: 37.6 (06 Apr 2025 21:38)  T(F): 98.6 (07 Apr 2025 10:37), Max: 99.7 (06 Apr 2025 21:38)  HR: 81 (07 Apr 2025 10:37) (81 - 98)  BP: 146/76 (07 Apr 2025 10:37) (130/80 - 146/76)  BP(mean): --  RR: 18 (07 Apr 2025 10:37) (17 - 18)  SpO2: 98% (07 Apr 2025 10:37) (98% - 100%)    Parameters below as of 07 Apr 2025 10:37  Patient On (Oxygen Delivery Method): nasal cannula

## 2025-05-20 NOTE — BH CONSULTATION LIAISON PROGRESS NOTE - NSBHMSEJUDGE_PSY_A_CORE
Poor
Unable to assess
Other
Unable to assess
Poor
Poor
Unable to assess
Other
Poor
Unable to assess
Poor
Other
Poor
Poor
Unable to assess
Other
Poor
Unable to assess
Other
Poor
Other
Other
Poor
Poor
Other
Poor
Other

## 2025-05-20 NOTE — BH CONSULTATION LIAISON PROGRESS NOTE - NSBHASSESSMENTFT_PSY_ALL_CORE
Patient is a 74F hx asthma, depression, and HTN was admitted to the MICU with AHRF 2/2 asthma exacerbation resulting in acute on chronic HHRF causing AMS and increased WOB that required intubation. During her ICU course, she experienced seizure-like activity .  Additional issues included hyponatremia, acute on chronic metabolic alkalosis, and mild thrombocytopenia,  also having fevers.  Patient is from 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 Patient has h/o hx of major depressive disorder with psychosis, hx of 3 past inpatient psychiatric hospitalizations last in 2016 at [facility], all rehospitalizations were in context non compliance with the treatment. She has no hx of suicide attempts, no hx of substance abuse. Patient w hx of paranoia, disorganization, catatonia, required MOO when in [facility].   3/24----more withdrawn, some stiffness in neck, not communicative. Some concerns for catatonia. Unsure if symptoms attributed to catatonia vs delirium  3/25--- more alert today, trying to following commands. Still with PMR+. Appears to be responding to Ativan  3/26-- BFS 10, will increasing standing ativan to target catatonic symptoms - giving STAT 0.5mg NOW discussed w acp   3/27----will continue ativan dosing for one more day before increasing  3/28: BFS: stupor 1, mutism 2,withdrawan 2, verbigeration 1, rigidity 2 =8.  3/29: BFS: stupor 2, mutism 2, staring 1, posturing 2, rigidity 2, waxy flexibility 3, withdrawal 2 = 14; please monitor nutritional intake, plan to increase ativan by total daily dose of 0.5 mg at bedtime due to persistent catatonia   3/31: BFS: stupor 2, mutism 2, staring 2, rigidity 1, negativism 1, withdrawal 1, waxy flexibility 3 =12, patient with productive cough, advised for medical work up  4/1---tired, speaking more, not rigid, has PMR+. NPO status now.   4/2--sleepy, RRT this afternoon  4/3---better mentation, pmr but no chester catatonic symptoms  4/4: alert, using few words, mild rigidity -  at bedside, updated   4/5: few words, lethargic, fluctuating wakefulness. Mild rigidity.  No overt catatonic sxs.   4/7: no change, remains thought blocked, few words - son at bedside, updated with care  4/8: patient continues to have poor eye contact, uses few words, no rigidity on today's exam.   4/9: patient remains minimally verbal, some posturing, poor Po intake - continues on NC for 02 requirements   4/10: more verbal/interactive, with increased PO intake per family. Some psychotic output. Family agrees with resumption of Zyprexa.   4/14: alert to self with fluctuating wakefulness, intermittent full sentences f/b one-word answers, some rigidity, posturing, denies si, denies ah/vh  4/16: patient more catatonic today, notable catalepsy, waxiness, verbigeration followed by mutism and staring, family against ativan and ECT, would increase memantine as below, not eating   4/17: catatonic (mute, rigid, not engaging). Eating some portions of meals with significant family support. Family opting to see memantine trial to full effect before consideration of ECT  4/18: mutism today, odd posturing, unable to engage, d/w spouse and son ECT, family still undecided  4/21: alert to self, some improvement with verbal engagement, tracking, follow command, denies si, denies ah/vh, awaiting call back from son in regards to ECT consult  4/22: remains catatonic (mute, postured, rigid, withdrawn, negativistic). Exam limited. Family agrees with formal ECT consult.  4/23: Remains with symptoms of catatonia. BFS: stupor 1, mute2, Rigid 2, negativism 2, withdrawal 2 - 9 : ECT consult completed. awaiting clearances for ECT  4/42: remains with BFS of 9, plan for ECT  4/25: alert to self, able to engage in some conversation, however, confused, loose on  exam, odd posturing right upper arm, Dr. Toure made aware of schedule for ECT Monday 4/28: s/p ECT #2. more spontaneous speech, cooperative with exam. improved eye contact. loose with no rigidity.   4/29: displays more s/s of catatonia since yesterdays exam- negativism, withdrawal, rigidity. family and ect in agreement to proceed with treatment 3 tomorrow.  4/30: some confusion today. Will assess tomm  5/1: mutism and negativism on exam   5/2: improved catatonic symptoms. Confused, suspecting AH  5/5: No catatonic symptoms. Will assess tomm for any catatonic symptoms and decide if to hold off on treatment on wednesday 5/6: Continues to exhibit improvement in catatonic sx. Will recommend holding Wed ECT and having next tx on Fri 5/9 5/7: No catatonic sx; exam consistent with past 2 days. Still planning for next ECT 5/9. Pending inpt psych placement.  5/8: Pt tired/minimal engaged, but responds to commands and no rigidity. Next ECT tomorrow.  5/9: Significantly improved after ECT today and AOx2. Will plan for next ECT on Mon 5/12 5/12: Had ECT this morning. Improved catatonic sx. Denied any mood or psychotic sx. Next ECT on Friday- May 16  5/13: Improved catatonic symptoms. Denied psychosis. A & O X 1  5/15: On exam, some mild increased tone in UL, echopraxia, automatic obedience and staring noted. Schedule for ECT tomorrow.  BFCRS: 1 + 2+ 2 +1= 6  5/16-----no catatonic symptoms. ECT# 8 today. Disoriented. H/o strokes-- possible cognitive decline now  5/19: Not catatonic. Remains disoriented. H/o strokes-- possible cognitive decline emerging now  5/20: Not catatonic. Remains disoriented. Last UA showed some bacteria and LE. Can repeat UA    PLAN  -- Routine observation  -- ECT planned for weekly, may be Friday May 23rd   -- no SI/HI, no need for psychiatric CO  -- antipsychotics can only be given if qtc < 500    - MEDICATIONS:  - As an adjunct to catatonia treatment, continue Memantine 10 mg bid po  -- C/W Zoloft 125 mg daily   -- C/W Zyprexa 2.5mg HS     - PRN: Ativan 0.5mg q 6hrs prn IM/IV/PO    - please ensure PT involved in patient care    - DISPO: Inpatient psychiatry. 2pc signed, pending beds   Patient is a 74F hx asthma, depression, and HTN was admitted to the MICU with AHRF 2/2 asthma exacerbation resulting in acute on chronic HHRF causing AMS and increased WOB that required intubation. During her ICU course, she experienced seizure-like activity .  Additional issues included hyponatremia, acute on chronic metabolic alkalosis, and mild thrombocytopenia,  also having fevers.  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 Patient has h/o hx of major depressive disorder with psychosis, hx of 3 past inpatient psychiatric hospitalizations last in 2016 at [facility], all rehospitalizations were in context non compliance with the treatment. She has no hx of suicide attempts, no hx of substance abuse. Patient w hx of paranoia, disorganization, catatonia, required MOO when in [facility].   3/24----more withdrawn, some stiffness in neck, not communicative. Some concerns for catatonia. Unsure if symptoms attributed to catatonia vs delirium  3/25--- more alert today, trying to following commands. Still with PMR+. Appears to be responding to Ativan  3/26-- BFS 10, will increasing standing ativan to target catatonic symptoms - giving STAT 0.5mg NOW discussed w acp   3/27----will continue ativan dosing for one more day before increasing  3/28: BFS: stupor 1, mutism 2,withdrawan 2, verbigeration 1, rigidity 2 =8.  3/29: BFS: stupor 2, mutism 2, staring 1, posturing 2, rigidity 2, waxy flexibility 3, withdrawal 2 = 14; please monitor nutritional intake, plan to increase ativan by total daily dose of 0.5 mg at bedtime due to persistent catatonia   3/31: BFS: stupor 2, mutism 2, staring 2, rigidity 1, negativism 1, withdrawal 1, waxy flexibility 3 =12, patient with productive cough, advised for medical work up  4/1---tired, speaking more, not rigid, has PMR+. NPO status now.   4/2--sleepy, RRT this afternoon  4/3---better mentation, pmr but no chester catatonic symptoms  4/4: alert, using few words, mild rigidity -  at bedside, updated   4/5: few words, lethargic, fluctuating wakefulness. Mild rigidity.  No overt catatonic sxs.   4/7: no change, remains thought blocked, few words - son at bedside, updated with care  4/8: patient continues to have poor eye contact, uses few words, no rigidity on today's exam.   4/9: patient remains minimally verbal, some posturing, poor Po intake - continues on NC for 02 requirements   4/10: more verbal/interactive, with increased PO intake per family. Some psychotic output. Family agrees with resumption of Zyprexa.   4/14: alert to self with fluctuating wakefulness, intermittent full sentences f/b one-word answers, some rigidity, posturing, denies si, denies ah/vh  4/16: patient more catatonic today, notable catalepsy, waxiness, verbigeration followed by mutism and staring, family against ativan and ECT, would increase memantine as below, not eating   4/17: catatonic (mute, rigid, not engaging). Eating some portions of meals with significant family support. Family opting to see memantine trial to full effect before consideration of ECT  4/18: mutism today, odd posturing, unable to engage, d/w spouse and son ECT, family still undecided  4/21: alert to self, some improvement with verbal engagement, tracking, follow command, denies si, denies ah/vh, awaiting call back from son in regards to ECT consult  4/22: remains catatonic (mute, postured, rigid, withdrawn, negativistic). Exam limited. Family agrees with formal ECT consult.  4/23: Remains with symptoms of catatonia. BFS: stupor 1, mute2, Rigid 2, negativism 2, withdrawal 2 - 9 : ECT consult completed. awaiting clearances for ECT  4/42: remains with BFS of 9, plan for ECT  4/25: alert to self, able to engage in some conversation, however, confused, loose on  exam, odd posturing right upper arm, Dr. Toure made aware of schedule for ECT Monday 4/28: s/p ECT #2. more spontaneous speech, cooperative with exam. improved eye contact. loose with no rigidity.   4/29: displays more s/s of catatonia since yesterdays exam- negativism, withdrawal, rigidity. family and ect in agreement to proceed with treatment 3 tomorrow.  4/30: some confusion today. Will assess tomm  5/1: mutism and negativism on exam   5/2: improved catatonic symptoms. Confused, suspecting AH  5/5: No catatonic symptoms. Will assess tomm for any catatonic symptoms and decide if to hold off on treatment on wednesday 5/6: Continues to exhibit improvement in catatonic sx. Will recommend holding Wed ECT and having next tx on Fri 5/9 5/7: No catatonic sx; exam consistent with past 2 days. Still planning for next ECT 5/9. Pending inpt psych placement.  5/8: Pt tired/minimal engaged, but responds to commands and no rigidity. Next ECT tomorrow.  5/9: Significantly improved after ECT today and AOx2. Will plan for next ECT on Mon 5/12 5/12: Had ECT this morning. Improved catatonic sx. Denied any mood or psychotic sx. Next ECT on Friday- May 16  5/13: Improved catatonic symptoms. Denied psychosis. A & O X 1  5/15: On exam, some mild increased tone in UL, echopraxia, automatic obedience and staring noted. Schedule for ECT tomorrow.  BFCRS: 1 + 2+ 2 +1= 6  5/16-----no catatonic symptoms. ECT# 8 today. Disoriented. H/o strokes-- possible cognitive decline now  5/19: Not catatonic. Remains disoriented. H/o strokes-- possible cognitive decline emerging now  5/20: Not catatonic. Remains disoriented. Last UA showed some bacteria and LE. Can repeat UA. Plan is to monitor for catatonic symptoms on weekly ECT for this week, assess need to titrate dose of olanzapine to target possible emerging psychosis.     PLAN  -- Routine observation  -- ECT planned for weekly, may be Friday May 23rd   -- no SI/HI, no need for psychiatric CO  -- antipsychotics can only be given if qtc < 500    - MEDICATIONS:  - As an adjunct to catatonia treatment, continue Memantine 10 mg bid po  -- C/W Zoloft 125 mg daily   -- C/W Zyprexa 2.5mg HS     - PRN: Ativan 0.5mg q 6hrs prn IM/IV/PO    - please ensure PT involved in patient care    - DISPO: Inpatient psychiatry. 2pc signed, pending beds

## 2025-05-20 NOTE — BH CONSULTATION LIAISON PROGRESS NOTE - LEVEL OF CONSCIOUSNESS
Alert
Lethargic, arousable to verbal stimulus
Alert
Lethargic, arousable to verbal stimulus
Alert
Alert
Lethargic, arousable to verbal stimulus
Alert
Lethargic, arousable to verbal stimulus
Lethargic, arousable to verbal stimulus
Alert
Lethargic, arousable to verbal stimulus
Lethargic, arousable to verbal stimulus
Alert

## 2025-05-20 NOTE — DISCHARGE NOTE NURSING/CASE MANAGEMENT/SOCIAL WORK - PATIENT PORTAL LINK FT
You can access the FollowMyHealth Patient Portal offered by [facility] by registering at the following website: [email or web address]. By joining [facility]’s FollowMyHealth portal, you will also be able to view your health information using other applications (apps) compatible with our system.

## 2025-05-20 NOTE — PROGRESS NOTE ADULT - NS ATTEND AMEND GEN_ALL_CORE FT
I reviewed the overnight course of events on the unit, re-confirming the patient history. I discussed the care with the patient and their family. The plan of care was discussed with the ACP team and modifications were made to the notation where appropriate. Differential diagnosis and plan of care discussed with patient after the evaluation. Advanced care planning was discussed with patient and family.  Advanced care planning forms were reviewed and discussed.  Risks, benefits and alternatives of cardiac procedures were discussed in detail and all questions were answered. 35 minutes spent on total encounter of which more than fifty percent of the encounter was spent counseling and/or coordinating care by the attending physician.
74F reported asthma and depression initially admitted to MICU with suspected asthma exacerbation with RRT on 3/19 AM for hypoxemia and increased work of breathing with poor air entry. Patient transferred back to ICU for airway evaluation and asthma management in this setting.    #Neuro - non-focal exam this AM  - Patient awake, alert, and appropriately interactive this AM. No focal neurologic deficits on exam  - Continue Olanzapine  - BH evaluation  - Patient is pleasant and in no acute distress without any focal neurologic deficits but reportedly had gotten back to her baseline after her initial intubation  - Initial EEG without evidence of seizures  #Cardiovascular - not in shock   - Monitor BP and HR - antihypertensives adjusted after d/w patient's son previously who was concerned about the Amlodipine and her prior LE edema therefore this was stopped and HCTZ started.  #Pulmonary - acute hypoxemic and hypercapnic respiratory failure in setting of asthma exacerbation  - Unclear what happened 3/19 AM during RRT but patient now breathing comfortably on NC. She did not require BIPAP overnight x 48 hours and will d/c this going forward for now  - She does have a poor effort with diminished breath sounds on basal breathing but this does improve with deep breaths. Will have patient try to use Incentive spirometer to prevent atelectasis   - Maintain O2 sat > 90%  - Continue Duoneb Q4 and Pulmicort BID  - Will start steroid taper with IV Solumedrol x 2 days and then transition to Prednisone until off  - Continue airway clearance  - Patient would benefit from outpatient pulmonary follow-up  - Follow-up aspergillus Ab, ANCA, IgE   - Initially had eosinophils of ~ 130  #Gastro - Oropharyngeal dysphagia - swallow evaluation noted and diet started 3/20  - Protonix while on steroids  #Nephro - normal renal function  - monitor urine output  - Prior hyponatremia improved  #Infectious Disease - monitor off antibiotics  - RVP negative  #Endo - hyperglycemia in setting of steroids  - Will start a low dose of long acting insulin given persistent hyperglycemia likely in setting of steroids - but goal FS < 200  - Hb A1c 5.7 earlier this stay  #DVT proph - Lovenox  #GOC - Full Code    Patient does not presently requiring ICU/critical care interventions. Patient is medically stable for transfer to the medical floors for further monitoring and care.  LA paperwork filled out for patient's son today.
I reviewed the overnight course of events on the unit, re-confirming the patient history. I discussed the care with the patient and their family. The plan of care was discussed with the ACP team and modifications were made to the notation where appropriate. Differential diagnosis and plan of care discussed with patient after the evaluation. Advanced care planning was discussed with patient and family.  Advanced care planning forms were reviewed and discussed.  Risks, benefits and alternatives of cardiac procedures were discussed in detail and all questions were answered. 35 minutes spent on total encounter of which more than fifty percent of the encounter was spent counseling and/or coordinating care by the attending physician.
I reviewed the overnight course of events on the unit, re-confirming the patient history. I discussed the care with the patient and their family. The plan of care was discussed with the ACP team and modifications were made to the notation where appropriate. Differential diagnosis and plan of care discussed with patient after the evaluation. Advanced care planning was discussed with patient and family.  Advanced care planning forms were reviewed and discussed.  Risks, benefits and alternatives of cardiac procedures were discussed in detail and all questions were answered. 35 minutes spent on total encounter of which more than fifty percent of the encounter was spent counseling and/or coordinating care by the attending physician.
74F reported asthma and depression initially admitted to MICU with suspected asthma exacerbation with RRT on 3/19 AM for hypoxemia and increased work of breathing with poor air entry. Patient transferred back to ICU for airway evaluation and asthma management in this setting.    #Neuro - non-focal exam this AM  - Patient awake, alert, and appropriately interactive this AM. No focal neurologic deficits on exam  - Restart Olanzapine when able to take PO  - BH evaluation  - Patient is pleasant and in no acute distress without any focal neurologic deficits but reportedly had gotten back to her baseline after her initial intubation  - Initial EEG without evidence of seizures  #Cardiovascular - not in shock   - Monitor BP and HR - antihypertensives adjusted after d/w patient's son yesterday who was concerned about the Amlodipine and her prior LE edema therefore this was stopped and HCTZ started.  #Pulmonary - acute hypoxemic and hypercapnic respiratory failure in setting of asthma exacerbation  - Unclear what happened 3/19 AM during RRT but patient now breathing comfortably on NC. She did not require BIPAP overnight.   - She does have a poor effort with diminished breath sounds on basal breathing but this does improve with deep breaths. Will have patient try to use Incentive spirometer to prevent atelectasis   - Maintain O2 sat > 90%  - Continue Duoneb Q4 and Pulmicort BID  - Continue Solumedrol 20mg IV Q8 for now  - Continue airway clearance  - Check aspergillus Ab, ANCA, IgE   - Initially had eosinophils of ~ 130  #Gastro - Oropharyngeal dysphagia - swallow evaluation today to determine appropriate PO diet. If she fails will need NGT  - Protonix while on steroids  #Nephro - normal renal function  - monitor urine output  - Prior hyponatremia improved  #Infectious Disease - monitor off antibiotics  - RVP negative  #Endo - hyperglycemia in setting of steroids  - Increase sliding scale insulin  - Hb A1c 5.7 earlier this stay  #DVT proph - Lovenox  #GOC - Full Code    Patient does not presently requiring ICU/critical care interventions.

## 2025-05-20 NOTE — BH CONSULTATION LIAISON PROGRESS NOTE - NSBHMSEMUSCLE_PSY_A_CORE
Other
Normal muscle tone/strength
Normal muscle tone/strength
Other
Abnormal muscle tone/strength
Normal muscle tone/strength
Normal muscle tone/strength
Other
Normal muscle tone/strength
Normal muscle tone/strength
Abnormal muscle tone/strength
increased tone, positive grasp reflex, no rigidity/Normal muscle tone/strength
increased tone, positive grasp reflex, no rigidity/Abnormal muscle tone/strength
Normal muscle tone/strength
increased tone, positive grasp reflex, no rigidity/Normal muscle tone/strength
Normal muscle tone/strength
increased tone, positive grasp reflex, no rigidity/Abnormal muscle tone/strength

## 2025-05-20 NOTE — BH CONSULTATION LIAISON PROGRESS NOTE - NSBHATTESTBILLBASEDTIME_PSY_A_CORE
Time based billing

## 2025-05-20 NOTE — BH CONSULTATION LIAISON PROGRESS NOTE - NSBHMSEBODY_PSY_A_CORE
Overweight
Average build
Overweight
Average build
Overweight
Average build
Overweight
Average build
Overweight
Underweight
Average build
Overweight
Average build
Overweight
Average build

## 2025-05-20 NOTE — PROGRESS NOTE ADULT - SUBJECTIVE AND OBJECTIVE BOX
Arnold Uriostegui MD  Interventional Cardiology / Endovascular Specialist  [place] Office : [phone] [place] [address] [place] [address]  Tel:   [place] Office : [address] [facility] [place] [address]  Tel: [phone]    Pt is lying in bed comfortable not in distress, no chest pains no SOB no palpitations    	  MEDICATIONS:  enoxaparin Injectable 40 milliGRAM(s) SubCutaneous every 24 hours  hydrochlorothiazide 25 milliGRAM(s) Oral daily  lisinopril 40 milliGRAM(s) Oral daily      albuterol/ipratropium for Nebulization 3 milliLiter(s) Nebulizer every 6 hours PRN  fluticasone propionate/ salmeterol 250-50 MICROgram(s) Diskus 1 Dose(s) Inhalation two times a day  guaiFENesin Oral Liquid (Sugar-Free) 100 milliGRAM(s) Oral every 6 hours PRN  levalbuterol Inhalation 0.63 milliGRAM(s) Inhalation every 8 hours  tiotropium 2.5 MICROgram(s) Inhaler 2 Puff(s) Inhalation daily    melatonin 9 milliGRAM(s) Oral at bedtime  memantine 10 milliGRAM(s) Oral at bedtime  memantine 10 milliGRAM(s) Oral <User Schedule>  OLANZapine 2.5 milliGRAM(s) Oral at bedtime  sertraline 125 milliGRAM(s) Oral daily    bisacodyl 10 milliGRAM(s) Oral once  pantoprazole    Tablet 40 milliGRAM(s) Oral before breakfast  polyethylene glycol 3350 17 Gram(s) Oral two times a day  senna 2 Tablet(s) Oral at bedtime    dextrose 50% Injectable 25 Gram(s) IV Push once  dextrose 50% Injectable 12.5 Gram(s) IV Push once  dextrose 50% Injectable 25 Gram(s) IV Push once  dextrose Oral Gel 15 Gram(s) Oral once  glucagon  Injectable 1 milliGRAM(s) IntraMuscular once  insulin glargine Injectable (LANTUS) 3 Unit(s) SubCutaneous at bedtime  insulin lispro (ADMELOG) corrective regimen sliding scale   SubCutaneous at bedtime  insulin lispro (ADMELOG) corrective regimen sliding scale   SubCutaneous three times a day before meals  insulin lispro Injectable (ADMELOG) 2 Unit(s) SubCutaneous three times a day before meals    artificial tears (preservative free) Ophthalmic Solution 1 Drop(s) Both EYES every 8 hours  chlorhexidine 2% Cloths 1 Application(s) Topical <User Schedule>  dextrose 5%. 1000 milliLiter(s) IV Continuous <Continuous>  dextrose 5%. 1000 milliLiter(s) IV Continuous <Continuous>  petrolatum Ophthalmic Ointment 1 Application(s) Both EYES two times a day      PAST MEDICAL/SURGICAL HISTORY  PAST MEDICAL & SURGICAL HISTORY:  MDD (major depressive disorder), recurrent, severe, with psychosis      Asthma, mild intermittent, uncomplicated      Essential hypertension      No significant past surgical history          SOCIAL HISTORY: Substance Use (street drugs): ( x ) never used  (  ) other:    FAMILY HISTORY:      PHYSICAL EXAM:  T(C): 36.7 (05-20-25 @ 12:44), Max: 37.4 (05-19-25 @ 21:35)  HR: 79 (05-20-25 @ 12:44) (71 - 89)  BP: 156/79 (05-20-25 @ 12:44) (138/64 - 156/79)  RR: 18 (05-20-25 @ 12:44) (17 - 18)  SpO2: 98% (05-20-25 @ 12:44) (92% - 99%)  Wt(kg): --  I&O's Summary    20 May 2025 07:01  -  20 May 2025 14:27  --------------------------------------------------------  IN: 0 mL / OUT: 200 mL / NET: -200 mL      GENERAL: NAD  EYES:   PERRLA   ENMT:   Moist mucous membranes  Cardiovascular: Normal S1 S2, No JVD, No murmurs, No edema  Respiratory: Lungs clear to auscultation	  Gastrointestinal:  Soft, Non-tender, + BS	  Extremities: no edema                          12.0   9.29  )-----------( 160      ( 20 May 2025 06:30 )             37.0     05-20    142  |  105  |  28[H]  ----------------------------<  112[H]  3.4[L]   |  25  |  0.52    Ca    9.5      20 May 2025 06:30  Phos  3.7     05-20  Mg     1.80     05-20      proBNP:   Lipid Profile:   HgA1c:   TSH:     Consultant(s) Notes Reviewed:  [x ] YES  [ ] NO    Care Discussed with Consultants/Other Providers [ x] YES  [ ] NO    Imaging Personally Reviewed independently:  [x] YES  [ ] NO    All labs, radiologic studies, vitals, orders and medications list reviewed. Patient is seen and examined at bedside. Case discussed with medical team.

## 2025-05-20 NOTE — BH CONSULTATION LIAISON PROGRESS NOTE - NSBHMSEAFFQUAL_PSY_A_CORE
Euthymic/Unable to assess
Unable to assess
Euthymic
Unable to assess
Euthymic
Euthymic/Unable to assess
Unable to assess
Anxious
Euthymic
Euthymic
Euthymic/Unable to assess
Unable to assess
Euthymic/Unable to assess
Euthymic
Euthymic

## 2025-05-20 NOTE — BH CONSULTATION LIAISON PROGRESS NOTE - NSICDXBHPRIMARYDX_PSY_ALL_CORE
Major depressive disorder with psychotic features   F32.3  

## 2025-05-20 NOTE — BH CONSULTATION LIAISON PROGRESS NOTE - NSBHCHARTREVIEWLAB_PSY_A_CORE FT
11.2   7.73  )-----------( 156      ( 30 Apr 2025 06:45 )             35.1   
  04-24    141  |  104  |  20  ----------------------------<  149[H]  3.7   |  28  |  0.72    Ca    9.4      24 Apr 2025 07:01  Phos  3.6     04-24  Mg     1.70     04-24                          11.0   8.54  )-----------( 157      ( 24 Apr 2025 07:01 )             33.0   
                      11.3   8.27  )-----------( 159      ( 28 Apr 2025 06:25 )             34.2   04-28    141  |  100  |  13  ----------------------------<  115[H]  3.9   |  31  |  0.62    Ca    9.5      28 Apr 2025 06:25  Phos  3.5     04-28  Mg     1.80     04-28        
CBC Full  -  ( 25 Apr 2025 05:59 )  WBC Count : 10.36 K/uL  RBC Count : 3.35 M/uL  Hemoglobin : 10.7 g/dL  Hematocrit : 32.5 %  Platelet Count - Automated : 156 K/uL  Mean Cell Volume : 97.0 fL  Mean Cell Hemoglobin : 31.9 pg  Mean Cell Hemoglobin Concentration : 32.9 g/dL  Auto Neutrophil # : x  Auto Lymphocyte # : x  Auto Monocyte # : x  Auto Eosinophil # : x  Auto Basophil # : x  Auto Neutrophil % : x  Auto Lymphocyte % : x  Auto Monocyte % : x  Auto Eosinophil % : x  Auto Basophil % : x  04-25    138  |  100  |  31[H]  ----------------------------<  132[H]  3.9   |  26  |  0.93    Ca    9.3      25 Apr 2025 05:59  Phos  2.7     04-25  Mg     1.90     04-25    
  LABS:                          11.4   8.45  )-----------( 160      ( 19 May 2025 06:04 )             34.9     05-19    145  |  109[H]  |  31[H]  ----------------------------<  102[H]  3.7   |  25  |  0.62    Ca    9.2      19 May 2025 06:04  Phos  3.5     05-19  Mg     1.80     05-19          Urinalysis Basic - ( 19 May 2025 06:04 )    Color: x / Appearance: x / SG: x / pH: x  Gluc: 102 mg/dL / Ketone: x  / Bili: x / Urobili: x   Blood: x / Protein: x / Nitrite: x   Leuk Esterase: x / RBC: x / WBC x   Sq Epi: x / Non Sq Epi: x / Bacteria: x      
                      11.5   9.98  )-----------( 176      ( 28 Mar 2025 07:13 )             35.8     03-28    141  |  103  |  40[H]  ----------------------------<  217[H]  3.7   |  26  |  1.20    Ca    10.1      28 Mar 2025 07:13  Phos  4.7     03-28  Mg     2.10     03-28      Urinalysis Basic - ( 28 Mar 2025 07:13 )    Color: x / Appearance: x / SG: x / pH: x  Gluc: 217 mg/dL / Ketone: x  / Bili: x / Urobili: x   Blood: x / Protein: x / Nitrite: x   Leuk Esterase: x / RBC: x / WBC x   Sq Epi: x / Non Sq Epi: x / Bacteria: x    
CBC Full  -  ( 20 Apr 2025 07:28 )  WBC Count : 12.01 K/uL  RBC Count : 3.34 M/uL  Hemoglobin : 10.7 g/dL  Hematocrit : 32.4 %  Platelet Count - Automated : 172 K/uL  Mean Cell Volume : 97.0 fL  Mean Cell Hemoglobin : 32.0 pg  Mean Cell Hemoglobin Concentration : 33.0 g/dL  Auto Neutrophil # : x  Auto Lymphocyte # : x  Auto Monocyte # : x  Auto Eosinophil # : x  Auto Basophil # : x  Auto Neutrophil % : x  Auto Lymphocyte % : x  Auto Monocyte % : x  Auto Eosinophil % : x  Auto Basophil % : x  04-20    140  |  103  |  18  ----------------------------<  116[H]  3.8   |  26  |  0.75    Ca    9.1      20 Apr 2025 07:28  Phos  3.1     04-20  Mg     1.80     04-20    
                      11.2   6.74  )-----------( 149      ( 01 May 2025 07:15 )             34.7     05-01    136  |  100  |  13  ----------------------------<  147[H]  4.8   |  22  |  0.68    Ca    9.2      01 May 2025 07:15  Phos  4.0     05-01  Mg     1.80     05-01    Urinalysis Basic - ( 01 May 2025 07:15 )  Color: x / Appearance: x / SG: x / pH: x  Gluc: 147 mg/dL / Ketone: x  / Bili: x / Urobili: x   Blood: x / Protein: x / Nitrite: x   Leuk Esterase: x / RBC: x / WBC x   Sq Epi: x / Non Sq Epi: x / Bacteria: x    
  LABS:                          11.6   9.81  )-----------( 190      ( 12 May 2025 06:20 )             35.7     05-12    143  |  104  |  23  ----------------------------<  113[H]  3.7   |  27  |  0.61    Ca    9.7      12 May 2025 06:20  Phos  4.2     05-12  Mg     1.80     05-12          Urinalysis Basic - ( 12 May 2025 06:20 )    Color: x / Appearance: x / SG: x / pH: x  Gluc: 113 mg/dL / Ketone: x  / Bili: x / Urobili: x   Blood: x / Protein: x / Nitrite: x   Leuk Esterase: x / RBC: x / WBC x   Sq Epi: x / Non Sq Epi: x / Bacteria: x      
                      12.5   11.62 )-----------( 237      ( 25 Mar 2025 06:30 )             39.4   03-26    139  |  102  |  43[H]  ----------------------------<  181[H]  3.4[L]   |  25  |  0.82    Ca    10.0      26 Mar 2025 06:40  Phos  3.3     03-26  Mg     1.80     03-26    
  LABS:                          11.4   8.45  )-----------( 160      ( 19 May 2025 06:04 )             34.9     05-19    145  |  109[H]  |  31[H]  ----------------------------<  102[H]  3.7   |  25  |  0.62    Ca    9.2      19 May 2025 06:04  Phos  3.5     05-19  Mg     1.80     05-19          Urinalysis Basic - ( 19 May 2025 06:04 )    Color: x / Appearance: x / SG: x / pH: x  Gluc: 102 mg/dL / Ketone: x  / Bili: x / Urobili: x   Blood: x / Protein: x / Nitrite: x   Leuk Esterase: x / RBC: x / WBC x   Sq Epi: x / Non Sq Epi: x / Bacteria: x      
                      10.8   7.34  )-----------( 146      ( 02 May 2025 05:58 )             33.2   
                      11.1   10.49 )-----------( 147      ( 08 Apr 2025 07:10 )             33.5   04-08    138  |  97[L]  |  20  ----------------------------<  132[H]  3.4[L]   |  29  |  0.58    Ca    9.8      08 Apr 2025 07:10  Phos  2.4     04-08  Mg     1.70     04-08      
                      11.5   8.41  )-----------( 194      ( 05 May 2025 05:27 )             34.4     05-05    139  |  100  |  13  ----------------------------<  116[H]  3.3[L]   |  28  |  0.63    Ca    9.1      05 May 2025 05:27  Phos  3.2     05-05  Mg     1.70     05-05    Urinalysis Basic - ( 05 May 2025 05:27 )  Color: x / Appearance: x / SG: x / pH: x  Gluc: 116 mg/dL / Ketone: x  / Bili: x / Urobili: x   Blood: x / Protein: x / Nitrite: x   Leuk Esterase: x / RBC: x / WBC x   Sq Epi: x / Non Sq Epi: x / Bacteria: x    
  LABS: No new labs today              
                      13.1   11.60 )-----------( 172      ( 06 Apr 2025 10:35 )             39.9   04-07    140  |  98  |  21  ----------------------------<  95  3.6   |  28  |  0.59    Ca    9.9      07 Apr 2025 11:00  Phos  2.7     04-07  Mg     1.70     04-07    TPro  6.9  /  Alb  3.7  /  TBili  0.8  /  DBili  x   /  AST  17  /  ALT  24  /  AlkPhos  76  04-06    
                      11.9   7.04  )-----------( 130      ( 03 Apr 2025 06:50 )             36.2   
                      11.5   9.98  )-----------( 176      ( 28 Mar 2025 07:13 )             35.8   03-28    141  |  103  |  40[H]  ----------------------------<  217[H]  3.7   |  26  |  1.20    Ca    10.1      28 Mar 2025 07:13  Phos  4.7     03-28  Mg     2.10     03-28    
                      12.1   10.53 )-----------( 188      ( 16 May 2025 05:02 )             37.9   
                      11.5   8.41  )-----------( 194      ( 05 May 2025 05:27 )             34.4     05-05    139  |  100  |  13  ----------------------------<  116[H]  3.3[L]   |  28  |  0.63    Ca    9.1      05 May 2025 05:27  Phos  3.2     05-05  Mg     1.70     05-05      Urinalysis Basic - ( 05 May 2025 05:27 )  Color: x / Appearance: x / SG: x / pH: x  Gluc: 116 mg/dL / Ketone: x  / Bili: x / Urobili: x   Blood: x / Protein: x / Nitrite: x   Leuk Esterase: x / RBC: x / WBC x   Sq Epi: x / Non Sq Epi: x / Bacteria: x    
                      11.4   8.78  )-----------( 215      ( 07 May 2025 06:30 )             35.3     05-07    144  |  105  |  12  ----------------------------<  109[H]  4.2   |  26  |  0.58    Ca    9.9      07 May 2025 06:30  Phos  4.1     05-07  Mg     1.80     05-07    Urinalysis Basic - ( 07 May 2025 06:30 )  Color: x / Appearance: x / SG: x / pH: x  Gluc: 109 mg/dL / Ketone: x  / Bili: x / Urobili: x   Blood: x / Protein: x / Nitrite: x   Leuk Esterase: x / RBC: x / WBC x   Sq Epi: x / Non Sq Epi: x / Bacteria: x    
                      11.6   7.01  )-----------( 146      ( 02 Apr 2025 14:30 )             35.9   
                      11.3   8.92  )-----------( 165      ( 22 Apr 2025 07:30 )             34.0   04-22    136  |  102  |  13  ----------------------------<  141[H]  5.0   |  21[L]  |  0.61    Ca    9.0      22 Apr 2025 07:30  Phos  3.4     04-22  Mg     1.60     04-22    
03-31    138  |  101  |  30[H]  ----------------------------<  174[H]  3.3[L]   |  24  |  0.64    Ca    9.7      31 Mar 2025 06:26  Phos  3.1     03-31  Mg     1.70     03-31    
                      11.3   8.27  )-----------( 159      ( 28 Apr 2025 06:25 )             34.2   04-28    141  |  100  |  13  ----------------------------<  115[H]  3.9   |  31  |  0.62    Ca    9.5      28 Apr 2025 06:25  Phos  3.5     04-28  Mg     1.80     04-28        
CBC Full  -  ( 14 Apr 2025 06:12 )  WBC Count : 13.48 K/uL  RBC Count : 3.56 M/uL  Hemoglobin : 11.3 g/dL  Hematocrit : 34.1 %  Platelet Count - Automated : 186 K/uL  Mean Cell Volume : 95.8 fL  Mean Cell Hemoglobin : 31.7 pg  Mean Cell Hemoglobin Concentration : 33.1 g/dL  Auto Neutrophil # : x  Auto Lymphocyte # : x  Auto Monocyte # : x  Auto Eosinophil # : x  Auto Basophil # : x  Auto Neutrophil % : x  Auto Lymphocyte % : x  Auto Monocyte % : x  Auto Eosinophil % : x  Auto Basophil % : x  04-14    139  |  100  |  16  ----------------------------<  166[H]  3.7   |  27  |  0.60    Ca    9.8      14 Apr 2025 06:12  Phos  3.1     04-14  Mg     1.70     04-14    
                      12.5   11.62 )-----------( 237      ( 25 Mar 2025 06:30 )             39.4   
                      11.3   11.09 )-----------( 142      ( 01 Apr 2025 06:14 )             34.1   
                      11.9   7.04  )-----------( 130      ( 03 Apr 2025 06:50 )             36.2   04-03    136  |  99  |  24[H]  ----------------------------<  168[H]  4.2   |  23  |  0.71    Ca    9.9      03 Apr 2025 06:50  Phos  3.6     04-03  Mg     1.70     04-03    TPro  6.5  /  Alb  3.3  /  TBili  0.6  /  DBili  x   /  AST  26  /  ALT  27  /  AlkPhos  70  04-02    
                      11.9   7.04  )-----------( 130      ( 03 Apr 2025 06:50 )             36.2   04-03    136  |  99  |  24[H]  ----------------------------<  168[H]  4.2   |  23  |  0.71    Ca    9.9      03 Apr 2025 06:50  Phos  3.6     04-03  Mg     1.70     04-03    TPro  6.5  /  Alb  3.3  /  TBili  0.6  /  DBili  x   /  AST  26  /  ALT  27  /  AlkPhos  70  04-02    
CBC Full  -  ( 14 Apr 2025 06:12 )  WBC Count : 13.48 K/uL  RBC Count : 3.56 M/uL  Hemoglobin : 11.3 g/dL  Hematocrit : 34.1 %  Platelet Count - Automated : 186 K/uL  Mean Cell Volume : 95.8 fL  Mean Cell Hemoglobin : 31.7 pg  Mean Cell Hemoglobin Concentration : 33.1 g/dL  Auto Neutrophil # : x  Auto Lymphocyte # : x  Auto Monocyte # : x  Auto Eosinophil # : x  Auto Basophil # : x  Auto Neutrophil % : x  Auto Lymphocyte % : x  Auto Monocyte % : x  Auto Eosinophil % : x  Auto Basophil % : x  04-14    139  |  100  |  16  ----------------------------<  166[H]  3.7   |  27  |  0.60    Ca    9.8      14 Apr 2025 06:12  Phos  3.1     04-14  Mg     1.70     04-14    
                      11.1   10.49 )-----------( 147      ( 08 Apr 2025 07:10 )             33.5   04-09    140  |  99  |  17  ----------------------------<  126[H]  4.0   |  31  |  0.54    Ca    9.8      09 Apr 2025 07:30  Phos  2.6     04-09  Mg     1.80     04-09        
                      11.4   8.78  )-----------( 215      ( 07 May 2025 06:30 )             35.3   
  LABS:                          11.6   9.81  )-----------( 190      ( 12 May 2025 06:20 )             35.7     05-12    143  |  104  |  23  ----------------------------<  113[H]  3.7   |  27  |  0.61    Ca    9.7      12 May 2025 06:20  Phos  4.2     05-12  Mg     1.80     05-12          Urinalysis Basic - ( 12 May 2025 06:20 )    Color: x / Appearance: x / SG: x / pH: x  Gluc: 113 mg/dL / Ketone: x  / Bili: x / Urobili: x   Blood: x / Protein: x / Nitrite: x   Leuk Esterase: x / RBC: x / WBC x   Sq Epi: x / Non Sq Epi: x / Bacteria: x

## 2025-05-20 NOTE — BH CONSULTATION LIAISON PROGRESS NOTE - CURRENT MEDICATION
MEDICATIONS  (STANDING):  artificial tears (preservative free) Ophthalmic Solution 1 Drop(s) Both EYES every 8 hours  bisacodyl 10 milliGRAM(s) Oral once  buDESOnide    Inhalation Suspension 0.5 milliGRAM(s) Inhalation every 12 hours  chlorhexidine 2% Cloths 1 Application(s) Topical <User Schedule>  dextrose 50% Injectable 25 Gram(s) IV Push once  dextrose 50% Injectable 12.5 Gram(s) IV Push once  dextrose 50% Injectable 25 Gram(s) IV Push once  dextrose Oral Gel 15 Gram(s) Oral once  enoxaparin Injectable 40 milliGRAM(s) SubCutaneous every 24 hours  glucagon  Injectable 1 milliGRAM(s) IntraMuscular once  hydrochlorothiazide 25 milliGRAM(s) Oral daily  insulin glargine Injectable (LANTUS) 8 Unit(s) SubCutaneous at bedtime  insulin lispro (ADMELOG) corrective regimen sliding scale   SubCutaneous three times a day before meals  insulin lispro (ADMELOG) corrective regimen sliding scale   SubCutaneous at bedtime  insulin lispro Injectable (ADMELOG) 3 Unit(s) SubCutaneous three times a day before meals  ipratropium    for Nebulization 500 MICROGram(s) Nebulizer every 8 hours  levalbuterol Inhalation 0.63 milliGRAM(s) Inhalation every 8 hours  lisinopril 40 milliGRAM(s) Oral daily  melatonin 9 milliGRAM(s) Oral at bedtime  memantine 10 milliGRAM(s) Oral at bedtime  OLANZapine 2.5 milliGRAM(s) Oral at bedtime  pantoprazole    Tablet 40 milliGRAM(s) Oral before breakfast  petrolatum Ophthalmic Ointment 1 Application(s) Both EYES two times a day  polyethylene glycol 3350 17 Gram(s) Oral two times a day  predniSONE   Tablet 20 milliGRAM(s) Oral daily  senna 2 Tablet(s) Oral at bedtime  sertraline 125 milliGRAM(s) Oral daily    MEDICATIONS  (PRN):  
MEDICATIONS  (STANDING):  buDESOnide    Inhalation Suspension 0.5 milliGRAM(s) Inhalation every 12 hours  chlorhexidine 2% Cloths 1 Application(s) Topical <User Schedule>  dextrose 5%. 1000 milliLiter(s) (100 mL/Hr) IV Continuous <Continuous>  dextrose 5%. 1000 milliLiter(s) (50 mL/Hr) IV Continuous <Continuous>  dextrose 50% Injectable 25 Gram(s) IV Push once  dextrose 50% Injectable 12.5 Gram(s) IV Push once  dextrose 50% Injectable 25 Gram(s) IV Push once  dextrose Oral Gel 15 Gram(s) Oral once  enoxaparin Injectable 40 milliGRAM(s) SubCutaneous every 24 hours  glucagon  Injectable 1 milliGRAM(s) IntraMuscular once  guaiFENesin  milliGRAM(s) Oral every 12 hours  hydrALAZINE 25 milliGRAM(s) Oral three times a day  hydrochlorothiazide 25 milliGRAM(s) Oral daily  insulin glargine Injectable (LANTUS) 15 Unit(s) SubCutaneous at bedtime  insulin lispro (ADMELOG) corrective regimen sliding scale   SubCutaneous three times a day before meals  insulin lispro (ADMELOG) corrective regimen sliding scale   SubCutaneous at bedtime  insulin lispro Injectable (ADMELOG) 5 Unit(s) SubCutaneous three times a day before meals  levalbuterol Inhalation 0.63 milliGRAM(s) Inhalation every 6 hours  lisinopril 40 milliGRAM(s) Oral daily  LORazepam   Injectable 0.5 milliGRAM(s) IV Push <User Schedule>  LORazepam   Injectable 0.5 milliGRAM(s) IV Push once  pantoprazole    Tablet 40 milliGRAM(s) Oral before breakfast  polyethylene glycol 3350 17 Gram(s) Oral two times a day  predniSONE   Tablet   Oral   predniSONE   Tablet 30 milliGRAM(s) Oral daily  senna 2 Tablet(s) Oral at bedtime  sertraline 50 milliGRAM(s) Oral daily  sodium chloride 0.9%. 1000 milliLiter(s) (125 mL/Hr) IV Continuous <Continuous>    MEDICATIONS  (PRN):  
MEDICATIONS  (STANDING):  artificial tears (preservative free) Ophthalmic Solution 1 Drop(s) Both EYES every 8 hours  bisacodyl 10 milliGRAM(s) Oral once  chlorhexidine 2% Cloths 1 Application(s) Topical <User Schedule>  dextrose 5%. 1000 milliLiter(s) (50 mL/Hr) IV Continuous <Continuous>  dextrose 5%. 1000 milliLiter(s) (100 mL/Hr) IV Continuous <Continuous>  dextrose 50% Injectable 25 Gram(s) IV Push once  dextrose 50% Injectable 12.5 Gram(s) IV Push once  dextrose 50% Injectable 25 Gram(s) IV Push once  dextrose Oral Gel 15 Gram(s) Oral once  enoxaparin Injectable 40 milliGRAM(s) SubCutaneous every 24 hours  fluticasone propionate/ salmeterol 250-50 MICROgram(s) Diskus 1 Dose(s) Inhalation two times a day  glucagon  Injectable 1 milliGRAM(s) IntraMuscular once  guaiFENesin  milliGRAM(s) Oral every 12 hours  hydrochlorothiazide 25 milliGRAM(s) Oral daily  insulin glargine Injectable (LANTUS) 3 Unit(s) SubCutaneous at bedtime  insulin lispro (ADMELOG) corrective regimen sliding scale   SubCutaneous three times a day before meals  insulin lispro (ADMELOG) corrective regimen sliding scale   SubCutaneous at bedtime  insulin lispro Injectable (ADMELOG) 2 Unit(s) SubCutaneous three times a day before meals  ipratropium    for Nebulization 500 MICROGram(s) Nebulizer every 8 hours  levalbuterol Inhalation 0.63 milliGRAM(s) Inhalation every 8 hours  lisinopril 40 milliGRAM(s) Oral daily  melatonin 9 milliGRAM(s) Oral at bedtime  memantine 5 milliGRAM(s) Oral <User Schedule>  memantine 10 milliGRAM(s) Oral at bedtime  OLANZapine 2.5 milliGRAM(s) Oral at bedtime  pantoprazole    Tablet 40 milliGRAM(s) Oral before breakfast  petrolatum Ophthalmic Ointment 1 Application(s) Both EYES two times a day  polyethylene glycol 3350 17 Gram(s) Oral two times a day  senna 2 Tablet(s) Oral at bedtime  sertraline 125 milliGRAM(s) Oral daily    MEDICATIONS  (PRN):  albuterol/ipratropium for Nebulization 3 milliLiter(s) Nebulizer every 6 hours PRN Shortness of Breath and/or Wheezing  
MEDICATIONS  (STANDING):  artificial tears (preservative free) Ophthalmic Solution 1 Drop(s) Both EYES every 8 hours  bisacodyl 10 milliGRAM(s) Oral once  chlorhexidine 2% Cloths 1 Application(s) Topical <User Schedule>  dextrose 5%. 1000 milliLiter(s) (50 mL/Hr) IV Continuous <Continuous>  dextrose 5%. 1000 milliLiter(s) (100 mL/Hr) IV Continuous <Continuous>  dextrose 50% Injectable 25 Gram(s) IV Push once  dextrose 50% Injectable 12.5 Gram(s) IV Push once  dextrose 50% Injectable 25 Gram(s) IV Push once  dextrose Oral Gel 15 Gram(s) Oral once  enoxaparin Injectable 40 milliGRAM(s) SubCutaneous every 24 hours  fluticasone propionate/ salmeterol 250-50 MICROgram(s) Diskus 1 Dose(s) Inhalation two times a day  glucagon  Injectable 1 milliGRAM(s) IntraMuscular once  hydrochlorothiazide 25 milliGRAM(s) Oral daily  insulin glargine Injectable (LANTUS) 3 Unit(s) SubCutaneous at bedtime  insulin lispro (ADMELOG) corrective regimen sliding scale   SubCutaneous at bedtime  insulin lispro (ADMELOG) corrective regimen sliding scale   SubCutaneous three times a day before meals  insulin lispro Injectable (ADMELOG) 2 Unit(s) SubCutaneous three times a day before meals  levalbuterol Inhalation 0.63 milliGRAM(s) Inhalation every 8 hours  lisinopril 40 milliGRAM(s) Oral daily  melatonin 9 milliGRAM(s) Oral at bedtime  memantine 10 milliGRAM(s) Oral at bedtime  memantine 10 milliGRAM(s) Oral <User Schedule>  OLANZapine 2.5 milliGRAM(s) Oral at bedtime  pantoprazole    Tablet 40 milliGRAM(s) Oral before breakfast  petrolatum Ophthalmic Ointment 1 Application(s) Both EYES two times a day  polyethylene glycol 3350 17 Gram(s) Oral two times a day  senna 2 Tablet(s) Oral at bedtime  sertraline 125 milliGRAM(s) Oral daily  tiotropium 2.5 MICROgram(s) Inhaler 2 Puff(s) Inhalation daily    MEDICATIONS  (PRN):  albuterol/ipratropium for Nebulization 3 milliLiter(s) Nebulizer every 6 hours PRN Shortness of Breath and/or Wheezing  guaiFENesin Oral Liquid (Sugar-Free) 100 milliGRAM(s) Oral every 6 hours PRN Cough  
MEDICATIONS  (STANDING):  buDESOnide    Inhalation Suspension 0.5 milliGRAM(s) Inhalation every 12 hours  chlorhexidine 2% Cloths 1 Application(s) Topical <User Schedule>  dextrose 5% + lactated ringers. 1000 milliLiter(s) (75 mL/Hr) IV Continuous <Continuous>  dextrose 5% + sodium chloride 0.9%. 1000 milliLiter(s) (50 mL/Hr) IV Continuous <Continuous>  dextrose 5%. 1000 milliLiter(s) (100 mL/Hr) IV Continuous <Continuous>  dextrose 5%. 1000 milliLiter(s) (50 mL/Hr) IV Continuous <Continuous>  dextrose 50% Injectable 25 Gram(s) IV Push once  dextrose 50% Injectable 12.5 Gram(s) IV Push once  dextrose 50% Injectable 25 Gram(s) IV Push once  dextrose Oral Gel 15 Gram(s) Oral once  enoxaparin Injectable 40 milliGRAM(s) SubCutaneous every 24 hours  glucagon  Injectable 1 milliGRAM(s) IntraMuscular once  guaiFENesin Oral Liquid (Sugar-Free) 200 milliGRAM(s) Oral every 6 hours  hydrALAZINE 50 milliGRAM(s) Oral three times a day  hydrochlorothiazide 25 milliGRAM(s) Oral daily  insulin glargine Injectable (LANTUS) 11 Unit(s) SubCutaneous at bedtime  insulin lispro (ADMELOG) corrective regimen sliding scale   SubCutaneous every 6 hours  insulin lispro Injectable (ADMELOG) 5 Unit(s) SubCutaneous three times a day before meals  ipratropium    for Nebulization 500 MICROGram(s) Nebulizer every 6 hours  levalbuterol Inhalation 0.63 milliGRAM(s) Inhalation every 6 hours  lisinopril 40 milliGRAM(s) Oral daily  methylPREDNISolone sodium succinate Injectable 40 milliGRAM(s) IV Push every 24 hours  pantoprazole    Tablet 40 milliGRAM(s) Oral before breakfast  polyethylene glycol 3350 17 Gram(s) Oral two times a day  senna 2 Tablet(s) Oral at bedtime  sertraline 50 milliGRAM(s) Oral daily  sodium chloride 0.9%. 1000 milliLiter(s) (125 mL/Hr) IV Continuous <Continuous>    MEDICATIONS  (PRN):  
MEDICATIONS  (STANDING):  buDESOnide    Inhalation Suspension 0.5 milliGRAM(s) Inhalation every 12 hours  chlorhexidine 2% Cloths 1 Application(s) Topical <User Schedule>  dextrose 5% + lactated ringers. 1000 milliLiter(s) (75 mL/Hr) IV Continuous <Continuous>  dextrose 5% + sodium chloride 0.9%. 1000 milliLiter(s) (50 mL/Hr) IV Continuous <Continuous>  dextrose 5%. 1000 milliLiter(s) (100 mL/Hr) IV Continuous <Continuous>  dextrose 5%. 1000 milliLiter(s) (50 mL/Hr) IV Continuous <Continuous>  dextrose 50% Injectable 25 Gram(s) IV Push once  dextrose 50% Injectable 12.5 Gram(s) IV Push once  dextrose 50% Injectable 25 Gram(s) IV Push once  dextrose Oral Gel 15 Gram(s) Oral once  enoxaparin Injectable 40 milliGRAM(s) SubCutaneous every 24 hours  glucagon  Injectable 1 milliGRAM(s) IntraMuscular once  guaiFENesin Oral Liquid (Sugar-Free) 200 milliGRAM(s) Oral every 6 hours  hydrALAZINE 50 milliGRAM(s) Oral three times a day  hydrochlorothiazide 25 milliGRAM(s) Oral daily  insulin glargine Injectable (LANTUS) 11 Unit(s) SubCutaneous at bedtime  insulin lispro (ADMELOG) corrective regimen sliding scale   SubCutaneous every 6 hours  insulin lispro Injectable (ADMELOG) 5 Unit(s) SubCutaneous three times a day before meals  ipratropium    for Nebulization 500 MICROGram(s) Nebulizer every 6 hours  levalbuterol Inhalation 0.63 milliGRAM(s) Inhalation every 6 hours  lisinopril 40 milliGRAM(s) Oral daily  methylPREDNISolone sodium succinate Injectable 40 milliGRAM(s) IV Push every 24 hours  pantoprazole    Tablet 40 milliGRAM(s) Oral before breakfast  polyethylene glycol 3350 17 Gram(s) Oral two times a day  senna 2 Tablet(s) Oral at bedtime  sertraline 50 milliGRAM(s) Oral daily  sodium chloride 0.9%. 1000 milliLiter(s) (125 mL/Hr) IV Continuous <Continuous>    MEDICATIONS  (PRN):  
MEDICATIONS  (STANDING):  artificial tears (preservative free) Ophthalmic Solution 1 Drop(s) Both EYES every 8 hours  bisacodyl 10 milliGRAM(s) Oral once  chlorhexidine 2% Cloths 1 Application(s) Topical <User Schedule>  dextrose 5%. 1000 milliLiter(s) (50 mL/Hr) IV Continuous <Continuous>  dextrose 5%. 1000 milliLiter(s) (100 mL/Hr) IV Continuous <Continuous>  dextrose 50% Injectable 25 Gram(s) IV Push once  dextrose 50% Injectable 12.5 Gram(s) IV Push once  dextrose 50% Injectable 25 Gram(s) IV Push once  dextrose Oral Gel 15 Gram(s) Oral once  enoxaparin Injectable 40 milliGRAM(s) SubCutaneous every 24 hours  fluticasone propionate/ salmeterol 250-50 MICROgram(s) Diskus 1 Dose(s) Inhalation two times a day  glucagon  Injectable 1 milliGRAM(s) IntraMuscular once  hydrochlorothiazide 25 milliGRAM(s) Oral daily  insulin glargine Injectable (LANTUS) 3 Unit(s) SubCutaneous at bedtime  insulin lispro (ADMELOG) corrective regimen sliding scale   SubCutaneous at bedtime  insulin lispro (ADMELOG) corrective regimen sliding scale   SubCutaneous three times a day before meals  insulin lispro Injectable (ADMELOG) 2 Unit(s) SubCutaneous three times a day before meals  levalbuterol Inhalation 0.63 milliGRAM(s) Inhalation every 8 hours  lisinopril 40 milliGRAM(s) Oral daily  melatonin 9 milliGRAM(s) Oral at bedtime  memantine 10 milliGRAM(s) Oral at bedtime  memantine 5 milliGRAM(s) Oral <User Schedule>  OLANZapine 2.5 milliGRAM(s) Oral at bedtime  pantoprazole    Tablet 40 milliGRAM(s) Oral before breakfast  petrolatum Ophthalmic Ointment 1 Application(s) Both EYES two times a day  polyethylene glycol 3350 17 Gram(s) Oral two times a day  senna 2 Tablet(s) Oral at bedtime  sertraline 125 milliGRAM(s) Oral daily  tiotropium 2.5 MICROgram(s) Inhaler 2 Puff(s) Inhalation daily    MEDICATIONS  (PRN):  albuterol/ipratropium for Nebulization 3 milliLiter(s) Nebulizer every 6 hours PRN Shortness of Breath and/or Wheezing  guaiFENesin Oral Liquid (Sugar-Free) 100 milliGRAM(s) Oral every 6 hours PRN Cough  
MEDICATIONS  (STANDING):  buDESOnide    Inhalation Suspension 0.5 milliGRAM(s) Inhalation every 12 hours  chlorhexidine 2% Cloths 1 Application(s) Topical <User Schedule>  dextrose 5%. 1000 milliLiter(s) (100 mL/Hr) IV Continuous <Continuous>  dextrose 5%. 1000 milliLiter(s) (50 mL/Hr) IV Continuous <Continuous>  dextrose 50% Injectable 25 Gram(s) IV Push once  dextrose 50% Injectable 12.5 Gram(s) IV Push once  dextrose 50% Injectable 25 Gram(s) IV Push once  dextrose Oral Gel 15 Gram(s) Oral once  enoxaparin Injectable 40 milliGRAM(s) SubCutaneous every 24 hours  glucagon  Injectable 1 milliGRAM(s) IntraMuscular once  guaiFENesin  milliGRAM(s) Oral every 12 hours  hydrALAZINE 25 milliGRAM(s) Oral three times a day  hydrochlorothiazide 25 milliGRAM(s) Oral daily  insulin glargine Injectable (LANTUS) 10 Unit(s) SubCutaneous at bedtime  insulin lispro (ADMELOG) corrective regimen sliding scale   SubCutaneous three times a day before meals  insulin lispro (ADMELOG) corrective regimen sliding scale   SubCutaneous at bedtime  insulin lispro Injectable (ADMELOG) 3 Unit(s) SubCutaneous three times a day before meals  levalbuterol Inhalation 0.63 milliGRAM(s) Inhalation every 6 hours  lisinopril 40 milliGRAM(s) Oral daily  OLANZapine 10 milliGRAM(s) Oral at bedtime  pantoprazole    Tablet 40 milliGRAM(s) Oral before breakfast  polyethylene glycol 3350 17 Gram(s) Oral two times a day  predniSONE   Tablet   Oral   predniSONE   Tablet 40 milliGRAM(s) Oral daily  senna 2 Tablet(s) Oral at bedtime  sertraline 50 milliGRAM(s) Oral daily    MEDICATIONS  (PRN):  
MEDICATIONS  (STANDING):  artificial tears (preservative free) Ophthalmic Solution 1 Drop(s) Both EYES every 8 hours  bisacodyl 10 milliGRAM(s) Oral once  chlorhexidine 2% Cloths 1 Application(s) Topical <User Schedule>  dextrose 50% Injectable 25 Gram(s) IV Push once  dextrose 50% Injectable 12.5 Gram(s) IV Push once  dextrose 50% Injectable 25 Gram(s) IV Push once  dextrose Oral Gel 15 Gram(s) Oral once  enoxaparin Injectable 40 milliGRAM(s) SubCutaneous every 24 hours  fluticasone propionate/ salmeterol 250-50 MICROgram(s) Diskus 1 Dose(s) Inhalation two times a day  glucagon  Injectable 1 milliGRAM(s) IntraMuscular once  guaiFENesin  milliGRAM(s) Oral every 12 hours  hydrochlorothiazide 25 milliGRAM(s) Oral daily  insulin glargine Injectable (LANTUS) 6 Unit(s) SubCutaneous at bedtime  insulin lispro (ADMELOG) corrective regimen sliding scale   SubCutaneous every 6 hours  insulin lispro Injectable (ADMELOG) 3 Unit(s) SubCutaneous three times a day before meals  ipratropium    for Nebulization 500 MICROGram(s) Nebulizer every 8 hours  levalbuterol Inhalation 0.63 milliGRAM(s) Inhalation every 8 hours  lisinopril 40 milliGRAM(s) Oral daily  melatonin 9 milliGRAM(s) Oral at bedtime  memantine 10 milliGRAM(s) Oral at bedtime  memantine 5 milliGRAM(s) Oral <User Schedule>  nystatin    Suspension [id number] Unit(s) Oral three times a day  OLANZapine 2.5 milliGRAM(s) Oral at bedtime  pantoprazole    Tablet 40 milliGRAM(s) Oral before breakfast  petrolatum Ophthalmic Ointment 1 Application(s) Both EYES two times a day  polyethylene glycol 3350 17 Gram(s) Oral two times a day  senna 2 Tablet(s) Oral at bedtime  sertraline 125 milliGRAM(s) Oral daily    MEDICATIONS  (PRN):  albuterol/ipratropium for Nebulization 3 milliLiter(s) Nebulizer every 6 hours PRN Shortness of Breath and/or Wheezing  
MEDICATIONS  (STANDING):  artificial tears (preservative free) Ophthalmic Solution 1 Drop(s) Both EYES every 8 hours  bisacodyl 10 milliGRAM(s) Oral once  chlorhexidine 2% Cloths 1 Application(s) Topical <User Schedule>  dextrose 5%. 1000 milliLiter(s) (50 mL/Hr) IV Continuous <Continuous>  dextrose 5%. 1000 milliLiter(s) (100 mL/Hr) IV Continuous <Continuous>  dextrose 50% Injectable 25 Gram(s) IV Push once  dextrose 50% Injectable 12.5 Gram(s) IV Push once  dextrose 50% Injectable 25 Gram(s) IV Push once  dextrose Oral Gel 15 Gram(s) Oral once  enoxaparin Injectable 40 milliGRAM(s) SubCutaneous every 24 hours  fluticasone propionate/ salmeterol 250-50 MICROgram(s) Diskus 1 Dose(s) Inhalation two times a day  glucagon  Injectable 1 milliGRAM(s) IntraMuscular once  hydrochlorothiazide 25 milliGRAM(s) Oral daily  insulin glargine Injectable (LANTUS) 3 Unit(s) SubCutaneous at bedtime  insulin lispro (ADMELOG) corrective regimen sliding scale   SubCutaneous Before meals and at bedtime  insulin lispro Injectable (ADMELOG) 2 Unit(s) SubCutaneous three times a day before meals  levalbuterol Inhalation 0.63 milliGRAM(s) Inhalation every 8 hours  lisinopril 40 milliGRAM(s) Oral daily  melatonin 9 milliGRAM(s) Oral at bedtime  memantine 10 milliGRAM(s) Oral at bedtime  memantine 10 milliGRAM(s) Oral <User Schedule>  OLANZapine 2.5 milliGRAM(s) Oral at bedtime  pantoprazole    Tablet 40 milliGRAM(s) Oral before breakfast  petrolatum Ophthalmic Ointment 1 Application(s) Both EYES two times a day  polyethylene glycol 3350 17 Gram(s) Oral two times a day  senna 2 Tablet(s) Oral at bedtime  sertraline 125 milliGRAM(s) Oral daily  tiotropium 2.5 MICROgram(s) Inhaler 2 Puff(s) Inhalation daily    MEDICATIONS  (PRN):  albuterol/ipratropium for Nebulization 3 milliLiter(s) Nebulizer every 6 hours PRN Shortness of Breath and/or Wheezing  guaiFENesin Oral Liquid (Sugar-Free) 100 milliGRAM(s) Oral every 6 hours PRN Cough  
MEDICATIONS  (STANDING):  artificial tears (preservative free) Ophthalmic Solution 1 Drop(s) Both EYES every 8 hours  bisacodyl 10 milliGRAM(s) Oral once  chlorhexidine 2% Cloths 1 Application(s) Topical <User Schedule>  dextrose 5%. 1000 milliLiter(s) (50 mL/Hr) IV Continuous <Continuous>  dextrose 5%. 1000 milliLiter(s) (100 mL/Hr) IV Continuous <Continuous>  dextrose 50% Injectable 25 Gram(s) IV Push once  dextrose 50% Injectable 12.5 Gram(s) IV Push once  dextrose 50% Injectable 25 Gram(s) IV Push once  dextrose Oral Gel 15 Gram(s) Oral once  enoxaparin Injectable 40 milliGRAM(s) SubCutaneous every 24 hours  fluticasone propionate/ salmeterol 250-50 MICROgram(s) Diskus 1 Dose(s) Inhalation two times a day  glucagon  Injectable 1 milliGRAM(s) IntraMuscular once  guaiFENesin  milliGRAM(s) Oral every 12 hours  hydrochlorothiazide 25 milliGRAM(s) Oral daily  insulin glargine Injectable (LANTUS) 3 Unit(s) SubCutaneous at bedtime  insulin lispro (ADMELOG) corrective regimen sliding scale   SubCutaneous three times a day before meals  insulin lispro (ADMELOG) corrective regimen sliding scale   SubCutaneous at bedtime  insulin lispro Injectable (ADMELOG) 2 Unit(s) SubCutaneous three times a day before meals  ipratropium    for Nebulization 500 MICROGram(s) Nebulizer every 8 hours  levalbuterol Inhalation 0.63 milliGRAM(s) Inhalation every 8 hours  lisinopril 40 milliGRAM(s) Oral daily  magnesium sulfate  IVPB 1 Gram(s) IV Intermittent once  melatonin 9 milliGRAM(s) Oral at bedtime  memantine 10 milliGRAM(s) Oral at bedtime  memantine 5 milliGRAM(s) Oral <User Schedule>  OLANZapine 2.5 milliGRAM(s) Oral at bedtime  pantoprazole    Tablet 40 milliGRAM(s) Oral before breakfast  petrolatum Ophthalmic Ointment 1 Application(s) Both EYES two times a day  polyethylene glycol 3350 17 Gram(s) Oral two times a day  potassium chloride   Powder 40 milliEquivalent(s) Oral once  senna 2 Tablet(s) Oral at bedtime  sertraline 125 milliGRAM(s) Oral daily    MEDICATIONS  (PRN):  albuterol/ipratropium for Nebulization 3 milliLiter(s) Nebulizer every 6 hours PRN Shortness of Breath and/or Wheezing  
MEDICATIONS  (STANDING):  bisacodyl 10 milliGRAM(s) Oral once  buDESOnide    Inhalation Suspension 0.5 milliGRAM(s) Inhalation every 12 hours  chlorhexidine 2% Cloths 1 Application(s) Topical <User Schedule>  dextrose 5%. 1000 milliLiter(s) (100 mL/Hr) IV Continuous <Continuous>  dextrose 5%. 1000 milliLiter(s) (50 mL/Hr) IV Continuous <Continuous>  dextrose 50% Injectable 25 Gram(s) IV Push once  dextrose 50% Injectable 12.5 Gram(s) IV Push once  dextrose 50% Injectable 25 Gram(s) IV Push once  dextrose Oral Gel 15 Gram(s) Oral once  enoxaparin Injectable 40 milliGRAM(s) SubCutaneous every 24 hours  glucagon  Injectable 1 milliGRAM(s) IntraMuscular once  guaiFENesin Oral Liquid (Sugar-Free) 200 milliGRAM(s) Oral every 6 hours  hydrALAZINE 50 milliGRAM(s) Oral three times a day  hydrochlorothiazide 25 milliGRAM(s) Oral daily  insulin glargine Injectable (LANTUS) 11 Unit(s) SubCutaneous at bedtime  insulin lispro (ADMELOG) corrective regimen sliding scale   SubCutaneous three times a day before meals  insulin lispro (ADMELOG) corrective regimen sliding scale   SubCutaneous at bedtime  insulin lispro Injectable (ADMELOG) 5 Unit(s) SubCutaneous three times a day before meals  ipratropium    for Nebulization 500 MICROGram(s) Nebulizer every 8 hours  levalbuterol Inhalation 0.63 milliGRAM(s) Inhalation every 8 hours  lisinopril 40 milliGRAM(s) Oral daily  methylPREDNISolone sodium succinate Injectable 30 milliGRAM(s) IV Push every 12 hours  pantoprazole    Tablet 40 milliGRAM(s) Oral before breakfast  polyethylene glycol 3350 17 Gram(s) Oral two times a day  senna 2 Tablet(s) Oral at bedtime  sertraline 100 milliGRAM(s) Oral daily    MEDICATIONS  (PRN):  
MEDICATIONS  (STANDING):  buDESOnide    Inhalation Suspension 0.5 milliGRAM(s) Inhalation every 12 hours  chlorhexidine 2% Cloths 1 Application(s) Topical <User Schedule>  dextrose 5%. 1000 milliLiter(s) (50 mL/Hr) IV Continuous <Continuous>  dextrose 5%. 1000 milliLiter(s) (100 mL/Hr) IV Continuous <Continuous>  dextrose 50% Injectable 12.5 Gram(s) IV Push once  dextrose 50% Injectable 25 Gram(s) IV Push once  dextrose 50% Injectable 25 Gram(s) IV Push once  dextrose Oral Gel 15 Gram(s) Oral once  enoxaparin Injectable 40 milliGRAM(s) SubCutaneous every 24 hours  glucagon  Injectable 1 milliGRAM(s) IntraMuscular once  guaiFENesin Oral Liquid (Sugar-Free) 200 milliGRAM(s) Oral every 6 hours  hydrALAZINE 50 milliGRAM(s) Oral three times a day  hydrochlorothiazide 25 milliGRAM(s) Oral daily  insulin glargine Injectable (LANTUS) 11 Unit(s) SubCutaneous at bedtime  insulin lispro (ADMELOG) corrective regimen sliding scale   SubCutaneous three times a day before meals  insulin lispro (ADMELOG) corrective regimen sliding scale   SubCutaneous at bedtime  insulin lispro Injectable (ADMELOG) 5 Unit(s) SubCutaneous three times a day before meals  ipratropium    for Nebulization 500 MICROGram(s) Nebulizer every 6 hours  levalbuterol Inhalation 0.63 milliGRAM(s) Inhalation every 6 hours  lisinopril 40 milliGRAM(s) Oral daily  methylPREDNISolone sodium succinate Injectable 40 milliGRAM(s) IV Push every 24 hours  pantoprazole    Tablet 40 milliGRAM(s) Oral before breakfast  polyethylene glycol 3350 17 Gram(s) Oral two times a day  potassium chloride   Powder 40 milliEquivalent(s) Oral once  potassium phosphate / sodium phosphate Powder (PHOS-NaK) 1 Packet(s) Oral once  senna 2 Tablet(s) Oral at bedtime  sertraline 75 milliGRAM(s) Oral daily    MEDICATIONS  (PRN):  
MEDICATIONS  (STANDING):  artificial tears (preservative free) Ophthalmic Solution 1 Drop(s) Both EYES every 8 hours  bisacodyl 10 milliGRAM(s) Oral once  buDESOnide    Inhalation Suspension 0.5 milliGRAM(s) Inhalation every 12 hours  chlorhexidine 2% Cloths 1 Application(s) Topical <User Schedule>  dextrose 50% Injectable 25 Gram(s) IV Push once  dextrose 50% Injectable 12.5 Gram(s) IV Push once  dextrose 50% Injectable 25 Gram(s) IV Push once  dextrose Oral Gel 15 Gram(s) Oral once  enoxaparin Injectable 40 milliGRAM(s) SubCutaneous every 24 hours  glucagon  Injectable 1 milliGRAM(s) IntraMuscular once  hydrochlorothiazide 25 milliGRAM(s) Oral daily  insulin glargine Injectable (LANTUS) 8 Unit(s) SubCutaneous at bedtime  insulin lispro (ADMELOG) corrective regimen sliding scale   SubCutaneous three times a day before meals  insulin lispro (ADMELOG) corrective regimen sliding scale   SubCutaneous at bedtime  insulin lispro Injectable (ADMELOG) 3 Unit(s) SubCutaneous three times a day before meals  ipratropium    for Nebulization 500 MICROGram(s) Nebulizer every 8 hours  levalbuterol Inhalation 0.63 milliGRAM(s) Inhalation every 8 hours  lisinopril 40 milliGRAM(s) Oral daily  melatonin 9 milliGRAM(s) Oral at bedtime  memantine 10 milliGRAM(s) Oral at bedtime  OLANZapine 2.5 milliGRAM(s) Oral at bedtime  pantoprazole    Tablet 40 milliGRAM(s) Oral before breakfast  petrolatum Ophthalmic Ointment 1 Application(s) Both EYES two times a day  polyethylene glycol 3350 17 Gram(s) Oral two times a day  senna 2 Tablet(s) Oral at bedtime  sertraline 125 milliGRAM(s) Oral daily    MEDICATIONS  (PRN):  
MEDICATIONS  (STANDING):  bisacodyl 10 milliGRAM(s) Oral once  buDESOnide    Inhalation Suspension 0.5 milliGRAM(s) Inhalation every 12 hours  chlorhexidine 2% Cloths 1 Application(s) Topical <User Schedule>  dextrose 5%. 1000 milliLiter(s) (100 mL/Hr) IV Continuous <Continuous>  dextrose 5%. 1000 milliLiter(s) (50 mL/Hr) IV Continuous <Continuous>  dextrose 50% Injectable 25 Gram(s) IV Push once  dextrose 50% Injectable 12.5 Gram(s) IV Push once  dextrose 50% Injectable 25 Gram(s) IV Push once  dextrose Oral Gel 15 Gram(s) Oral once  enoxaparin Injectable 40 milliGRAM(s) SubCutaneous every 24 hours  glucagon  Injectable 1 milliGRAM(s) IntraMuscular once  guaiFENesin Oral Liquid (Sugar-Free) 200 milliGRAM(s) Oral every 6 hours  hydrALAZINE Injectable 10 milliGRAM(s) IV Push every 8 hours  hydrochlorothiazide 25 milliGRAM(s) Oral daily  insulin glargine Injectable (LANTUS) 11 Unit(s) SubCutaneous at bedtime  insulin lispro (ADMELOG) corrective regimen sliding scale   SubCutaneous three times a day before meals  insulin lispro (ADMELOG) corrective regimen sliding scale   SubCutaneous at bedtime  insulin lispro Injectable (ADMELOG) 5 Unit(s) SubCutaneous three times a day before meals  ipratropium    for Nebulization 500 MICROGram(s) Nebulizer every 8 hours  levalbuterol Inhalation 0.63 milliGRAM(s) Inhalation every 8 hours  lisinopril 40 milliGRAM(s) Oral daily  memantine 10 milliGRAM(s) Oral at bedtime  OLANZapine 2.5 milliGRAM(s) Oral at bedtime  pantoprazole    Tablet 40 milliGRAM(s) Oral before breakfast  polyethylene glycol 3350 17 Gram(s) Oral two times a day  senna 2 Tablet(s) Oral at bedtime  sertraline 125 milliGRAM(s) Oral daily    MEDICATIONS  (PRN):  
MEDICATIONS  (STANDING):  artificial tears (preservative free) Ophthalmic Solution 1 Drop(s) Both EYES every 8 hours  bisacodyl 10 milliGRAM(s) Oral once  buDESOnide    Inhalation Suspension 0.5 milliGRAM(s) Inhalation every 12 hours  chlorhexidine 2% Cloths 1 Application(s) Topical <User Schedule>  dextrose 50% Injectable 25 Gram(s) IV Push once  dextrose 50% Injectable 12.5 Gram(s) IV Push once  dextrose 50% Injectable 25 Gram(s) IV Push once  dextrose Oral Gel 15 Gram(s) Oral once  enoxaparin Injectable 40 milliGRAM(s) SubCutaneous every 24 hours  glucagon  Injectable 1 milliGRAM(s) IntraMuscular once  hydrochlorothiazide 25 milliGRAM(s) Oral daily  insulin glargine Injectable (LANTUS) 8 Unit(s) SubCutaneous at bedtime  insulin lispro (ADMELOG) corrective regimen sliding scale   SubCutaneous three times a day before meals  insulin lispro (ADMELOG) corrective regimen sliding scale   SubCutaneous at bedtime  insulin lispro Injectable (ADMELOG) 3 Unit(s) SubCutaneous three times a day before meals  ipratropium    for Nebulization 500 MICROGram(s) Nebulizer every 8 hours  levalbuterol Inhalation 0.63 milliGRAM(s) Inhalation every 8 hours  lisinopril 40 milliGRAM(s) Oral daily  melatonin 9 milliGRAM(s) Oral at bedtime  memantine 10 milliGRAM(s) Oral at bedtime  memantine 5 milliGRAM(s) Oral <User Schedule>  OLANZapine 2.5 milliGRAM(s) Oral at bedtime  pantoprazole    Tablet 40 milliGRAM(s) Oral before breakfast  petrolatum Ophthalmic Ointment 1 Application(s) Both EYES two times a day  polyethylene glycol 3350 17 Gram(s) Oral two times a day  predniSONE   Tablet 20 milliGRAM(s) Oral daily  senna 2 Tablet(s) Oral at bedtime  sertraline 125 milliGRAM(s) Oral daily    MEDICATIONS  (PRN):  
MEDICATIONS  (STANDING):  artificial tears (preservative free) Ophthalmic Solution 1 Drop(s) Both EYES every 8 hours  bisacodyl 10 milliGRAM(s) Oral once  buDESOnide    Inhalation Suspension 0.5 milliGRAM(s) Inhalation every 12 hours  chlorhexidine 2% Cloths 1 Application(s) Topical <User Schedule>  dextrose 50% Injectable 25 Gram(s) IV Push once  dextrose 50% Injectable 12.5 Gram(s) IV Push once  dextrose 50% Injectable 25 Gram(s) IV Push once  dextrose Oral Gel 15 Gram(s) Oral once  enoxaparin Injectable 40 milliGRAM(s) SubCutaneous every 24 hours  glucagon  Injectable 1 milliGRAM(s) IntraMuscular once  hydrochlorothiazide 25 milliGRAM(s) Oral daily  insulin glargine Injectable (LANTUS) 6 Unit(s) SubCutaneous at bedtime  insulin lispro (ADMELOG) corrective regimen sliding scale   SubCutaneous three times a day before meals  insulin lispro (ADMELOG) corrective regimen sliding scale   SubCutaneous at bedtime  insulin lispro Injectable (ADMELOG) 3 Unit(s) SubCutaneous three times a day before meals  ipratropium    for Nebulization 500 MICROGram(s) Nebulizer every 8 hours  levalbuterol Inhalation 0.63 milliGRAM(s) Inhalation every 8 hours  lisinopril 40 milliGRAM(s) Oral daily  magnesium oxide 400 milliGRAM(s) Oral three times a day with meals  melatonin 9 milliGRAM(s) Oral at bedtime  memantine 10 milliGRAM(s) Oral at bedtime  memantine 5 milliGRAM(s) Oral <User Schedule>  OLANZapine 2.5 milliGRAM(s) Oral at bedtime  pantoprazole    Tablet 40 milliGRAM(s) Oral before breakfast  petrolatum Ophthalmic Ointment 1 Application(s) Both EYES two times a day  polyethylene glycol 3350 17 Gram(s) Oral two times a day  predniSONE   Tablet 10 milliGRAM(s) Oral daily  senna 2 Tablet(s) Oral at bedtime  sertraline 125 milliGRAM(s) Oral daily    MEDICATIONS  (PRN):  
MEDICATIONS  (STANDING):  bisacodyl 10 milliGRAM(s) Oral once  buDESOnide    Inhalation Suspension 0.5 milliGRAM(s) Inhalation every 12 hours  chlorhexidine 2% Cloths 1 Application(s) Topical <User Schedule>  dextrose 5%. 1000 milliLiter(s) (100 mL/Hr) IV Continuous <Continuous>  dextrose 5%. 1000 milliLiter(s) (50 mL/Hr) IV Continuous <Continuous>  dextrose 50% Injectable 25 Gram(s) IV Push once  dextrose 50% Injectable 12.5 Gram(s) IV Push once  dextrose 50% Injectable 25 Gram(s) IV Push once  dextrose Oral Gel 15 Gram(s) Oral once  enoxaparin Injectable 40 milliGRAM(s) SubCutaneous every 24 hours  glucagon  Injectable 1 milliGRAM(s) IntraMuscular once  guaiFENesin Oral Liquid (Sugar-Free) 200 milliGRAM(s) Oral every 6 hours  hydrALAZINE 50 milliGRAM(s) Oral three times a day  hydrochlorothiazide 25 milliGRAM(s) Oral daily  insulin glargine Injectable (LANTUS) 11 Unit(s) SubCutaneous at bedtime  insulin lispro (ADMELOG) corrective regimen sliding scale   SubCutaneous three times a day before meals  insulin lispro (ADMELOG) corrective regimen sliding scale   SubCutaneous at bedtime  insulin lispro Injectable (ADMELOG) 5 Unit(s) SubCutaneous three times a day before meals  ipratropium    for Nebulization 500 MICROGram(s) Nebulizer every 8 hours  levalbuterol Inhalation 0.63 milliGRAM(s) Inhalation every 8 hours  lisinopril 40 milliGRAM(s) Oral daily  memantine 10 milliGRAM(s) Oral at bedtime  OLANZapine 2.5 milliGRAM(s) Oral at bedtime  pantoprazole    Tablet 40 milliGRAM(s) Oral before breakfast  polyethylene glycol 3350 17 Gram(s) Oral two times a day  predniSONE   Tablet 40 milliGRAM(s) Oral daily  senna 2 Tablet(s) Oral at bedtime  sertraline 125 milliGRAM(s) Oral daily    MEDICATIONS  (PRN):  
MEDICATIONS  (STANDING):  buDESOnide    Inhalation Suspension 0.5 milliGRAM(s) Inhalation every 12 hours  chlorhexidine 2% Cloths 1 Application(s) Topical <User Schedule>  dextrose 5% + lactated ringers. 1000 milliLiter(s) (75 mL/Hr) IV Continuous <Continuous>  dextrose 5%. 1000 milliLiter(s) (100 mL/Hr) IV Continuous <Continuous>  dextrose 5%. 1000 milliLiter(s) (50 mL/Hr) IV Continuous <Continuous>  dextrose 50% Injectable 25 Gram(s) IV Push once  dextrose 50% Injectable 12.5 Gram(s) IV Push once  dextrose 50% Injectable 25 Gram(s) IV Push once  dextrose Oral Gel 15 Gram(s) Oral once  enoxaparin Injectable 40 milliGRAM(s) SubCutaneous every 24 hours  glucagon  Injectable 1 milliGRAM(s) IntraMuscular once  guaiFENesin Oral Liquid (Sugar-Free) 200 milliGRAM(s) Oral every 6 hours  hydrALAZINE 50 milliGRAM(s) Oral three times a day  hydrochlorothiazide 25 milliGRAM(s) Oral daily  insulin glargine Injectable (LANTUS) 12 Unit(s) SubCutaneous at bedtime  insulin lispro (ADMELOG) corrective regimen sliding scale   SubCutaneous three times a day before meals  insulin lispro (ADMELOG) corrective regimen sliding scale   SubCutaneous at bedtime  insulin lispro Injectable (ADMELOG) 5 Unit(s) SubCutaneous three times a day before meals  levalbuterol Inhalation 0.63 milliGRAM(s) Inhalation every 6 hours  lisinopril 40 milliGRAM(s) Oral daily  LORazepam   Injectable 0.75 milliGRAM(s) IV Push <User Schedule>  methylPREDNISolone sodium succinate Injectable 125 milliGRAM(s) IV Push once  pantoprazole    Tablet 40 milliGRAM(s) Oral before breakfast  polyethylene glycol 3350 17 Gram(s) Oral two times a day  predniSONE   Tablet 10 milliGRAM(s) Oral daily  predniSONE   Tablet   Oral   senna 2 Tablet(s) Oral at bedtime  sertraline 50 milliGRAM(s) Oral daily  sodium chloride 0.9%. 1000 milliLiter(s) (125 mL/Hr) IV Continuous <Continuous>    MEDICATIONS  (PRN):  
MEDICATIONS  (STANDING):  artificial tears (preservative free) Ophthalmic Solution 1 Drop(s) Both EYES every 8 hours  bisacodyl 10 milliGRAM(s) Oral once  chlorhexidine 2% Cloths 1 Application(s) Topical <User Schedule>  dextrose 5%. 1000 milliLiter(s) (50 mL/Hr) IV Continuous <Continuous>  dextrose 5%. 1000 milliLiter(s) (100 mL/Hr) IV Continuous <Continuous>  dextrose 50% Injectable 25 Gram(s) IV Push once  dextrose 50% Injectable 12.5 Gram(s) IV Push once  dextrose 50% Injectable 25 Gram(s) IV Push once  dextrose Oral Gel 15 Gram(s) Oral once  enoxaparin Injectable 40 milliGRAM(s) SubCutaneous every 24 hours  fluticasone propionate/ salmeterol 250-50 MICROgram(s) Diskus 1 Dose(s) Inhalation two times a day  glucagon  Injectable 1 milliGRAM(s) IntraMuscular once  guaiFENesin  milliGRAM(s) Oral every 12 hours  hydrochlorothiazide 25 milliGRAM(s) Oral daily  insulin glargine Injectable (LANTUS) 3 Unit(s) SubCutaneous at bedtime  insulin lispro (ADMELOG) corrective regimen sliding scale   SubCutaneous three times a day before meals  insulin lispro (ADMELOG) corrective regimen sliding scale   SubCutaneous at bedtime  insulin lispro Injectable (ADMELOG) 2 Unit(s) SubCutaneous three times a day before meals  levalbuterol Inhalation 0.63 milliGRAM(s) Inhalation every 8 hours  lisinopril 40 milliGRAM(s) Oral daily  melatonin 9 milliGRAM(s) Oral at bedtime  memantine 10 milliGRAM(s) Oral at bedtime  memantine 5 milliGRAM(s) Oral <User Schedule>  OLANZapine 2.5 milliGRAM(s) Oral at bedtime  pantoprazole    Tablet 40 milliGRAM(s) Oral before breakfast  petrolatum Ophthalmic Ointment 1 Application(s) Both EYES two times a day  polyethylene glycol 3350 17 Gram(s) Oral two times a day  senna 2 Tablet(s) Oral at bedtime  sertraline 125 milliGRAM(s) Oral daily  tiotropium 2.5 MICROgram(s) Inhaler 2 Puff(s) Inhalation daily    MEDICATIONS  (PRN):  albuterol/ipratropium for Nebulization 3 milliLiter(s) Nebulizer every 6 hours PRN Shortness of Breath and/or Wheezing  
MEDICATIONS  (STANDING):  artificial tears (preservative free) Ophthalmic Solution 1 Drop(s) Both EYES every 8 hours  bisacodyl 10 milliGRAM(s) Oral once  chlorhexidine 2% Cloths 1 Application(s) Topical <User Schedule>  dextrose 5%. 1000 milliLiter(s) (50 mL/Hr) IV Continuous <Continuous>  dextrose 5%. 1000 milliLiter(s) (100 mL/Hr) IV Continuous <Continuous>  dextrose 50% Injectable 25 Gram(s) IV Push once  dextrose 50% Injectable 12.5 Gram(s) IV Push once  dextrose 50% Injectable 25 Gram(s) IV Push once  dextrose Oral Gel 15 Gram(s) Oral once  enoxaparin Injectable 40 milliGRAM(s) SubCutaneous every 24 hours  fluticasone propionate/ salmeterol 250-50 MICROgram(s) Diskus 1 Dose(s) Inhalation two times a day  glucagon  Injectable 1 milliGRAM(s) IntraMuscular once  hydrochlorothiazide 25 milliGRAM(s) Oral daily  insulin glargine Injectable (LANTUS) 3 Unit(s) SubCutaneous at bedtime  insulin lispro (ADMELOG) corrective regimen sliding scale   SubCutaneous at bedtime  insulin lispro (ADMELOG) corrective regimen sliding scale   SubCutaneous three times a day before meals  insulin lispro Injectable (ADMELOG) 2 Unit(s) SubCutaneous three times a day before meals  levalbuterol Inhalation 0.63 milliGRAM(s) Inhalation every 8 hours  lisinopril 40 milliGRAM(s) Oral daily  melatonin 9 milliGRAM(s) Oral at bedtime  memantine 10 milliGRAM(s) Oral at bedtime  memantine 10 milliGRAM(s) Oral <User Schedule>  OLANZapine 2.5 milliGRAM(s) Oral at bedtime  pantoprazole    Tablet 40 milliGRAM(s) Oral before breakfast  petrolatum Ophthalmic Ointment 1 Application(s) Both EYES two times a day  polyethylene glycol 3350 17 Gram(s) Oral two times a day  senna 2 Tablet(s) Oral at bedtime  sertraline 125 milliGRAM(s) Oral daily  tiotropium 2.5 MICROgram(s) Inhaler 2 Puff(s) Inhalation daily    MEDICATIONS  (PRN):  albuterol/ipratropium for Nebulization 3 milliLiter(s) Nebulizer every 6 hours PRN Shortness of Breath and/or Wheezing  guaiFENesin Oral Liquid (Sugar-Free) 100 milliGRAM(s) Oral every 6 hours PRN Cough  
MEDICATIONS  (STANDING):  bisacodyl 10 milliGRAM(s) Oral once  buDESOnide    Inhalation Suspension 0.5 milliGRAM(s) Inhalation every 12 hours  chlorhexidine 2% Cloths 1 Application(s) Topical <User Schedule>  dextrose 5%. 1000 milliLiter(s) (100 mL/Hr) IV Continuous <Continuous>  dextrose 5%. 1000 milliLiter(s) (50 mL/Hr) IV Continuous <Continuous>  dextrose 50% Injectable 25 Gram(s) IV Push once  dextrose 50% Injectable 12.5 Gram(s) IV Push once  dextrose 50% Injectable 25 Gram(s) IV Push once  dextrose Oral Gel 15 Gram(s) Oral once  enoxaparin Injectable 40 milliGRAM(s) SubCutaneous every 24 hours  glucagon  Injectable 1 milliGRAM(s) IntraMuscular once  guaiFENesin Oral Liquid (Sugar-Free) 200 milliGRAM(s) Oral every 6 hours  hydrALAZINE 50 milliGRAM(s) Oral three times a day  hydrochlorothiazide 25 milliGRAM(s) Oral daily  insulin glargine Injectable (LANTUS) 11 Unit(s) SubCutaneous at bedtime  insulin lispro (ADMELOG) corrective regimen sliding scale   SubCutaneous three times a day before meals  insulin lispro (ADMELOG) corrective regimen sliding scale   SubCutaneous at bedtime  insulin lispro Injectable (ADMELOG) 5 Unit(s) SubCutaneous three times a day before meals  ipratropium    for Nebulization 500 MICROGram(s) Nebulizer every 8 hours  levalbuterol Inhalation 0.63 milliGRAM(s) Inhalation every 8 hours  lisinopril 40 milliGRAM(s) Oral daily  memantine 5 milliGRAM(s) Oral at bedtime  OLANZapine 2.5 milliGRAM(s) Oral at bedtime  pantoprazole    Tablet 40 milliGRAM(s) Oral before breakfast  polyethylene glycol 3350 17 Gram(s) Oral two times a day  predniSONE   Tablet 40 milliGRAM(s) Oral daily  senna 2 Tablet(s) Oral at bedtime  sertraline 125 milliGRAM(s) Oral daily    MEDICATIONS  (PRN):  
MEDICATIONS  (STANDING):  buDESOnide    Inhalation Suspension 0.5 milliGRAM(s) Inhalation every 12 hours  chlorhexidine 2% Cloths 1 Application(s) Topical <User Schedule>  dextrose 5%. 1000 milliLiter(s) (100 mL/Hr) IV Continuous <Continuous>  dextrose 5%. 1000 milliLiter(s) (50 mL/Hr) IV Continuous <Continuous>  dextrose 50% Injectable 25 Gram(s) IV Push once  dextrose 50% Injectable 12.5 Gram(s) IV Push once  dextrose 50% Injectable 25 Gram(s) IV Push once  dextrose Oral Gel 15 Gram(s) Oral once  enoxaparin Injectable 40 milliGRAM(s) SubCutaneous every 24 hours  glucagon  Injectable 1 milliGRAM(s) IntraMuscular once  guaiFENesin  milliGRAM(s) Oral every 12 hours  hydrALAZINE 50 milliGRAM(s) Oral three times a day  hydrochlorothiazide 25 milliGRAM(s) Oral daily  insulin glargine Injectable (LANTUS) 12 Unit(s) SubCutaneous at bedtime  insulin lispro (ADMELOG) corrective regimen sliding scale   SubCutaneous three times a day before meals  insulin lispro (ADMELOG) corrective regimen sliding scale   SubCutaneous at bedtime  insulin lispro Injectable (ADMELOG) 3 Unit(s) SubCutaneous three times a day before meals  levalbuterol Inhalation 0.63 milliGRAM(s) Inhalation every 6 hours  lisinopril 40 milliGRAM(s) Oral daily  LORazepam   Injectable 0.5 milliGRAM(s) IV Push <User Schedule>  pantoprazole    Tablet 40 milliGRAM(s) Oral before breakfast  polyethylene glycol 3350 17 Gram(s) Oral two times a day  predniSONE   Tablet   Oral   predniSONE   Tablet 30 milliGRAM(s) Oral daily  senna 2 Tablet(s) Oral at bedtime  sertraline 50 milliGRAM(s) Oral daily  sodium chloride 0.9%. 1000 milliLiter(s) (125 mL/Hr) IV Continuous <Continuous>    MEDICATIONS  (PRN):  
MEDICATIONS  (STANDING):  artificial tears (preservative free) Ophthalmic Solution 1 Drop(s) Both EYES every 8 hours  bisacodyl 10 milliGRAM(s) Oral once  chlorhexidine 2% Cloths 1 Application(s) Topical <User Schedule>  dextrose 5%. 1000 milliLiter(s) (50 mL/Hr) IV Continuous <Continuous>  dextrose 5%. 1000 milliLiter(s) (100 mL/Hr) IV Continuous <Continuous>  dextrose 50% Injectable 25 Gram(s) IV Push once  dextrose 50% Injectable 12.5 Gram(s) IV Push once  dextrose 50% Injectable 25 Gram(s) IV Push once  dextrose Oral Gel 15 Gram(s) Oral once  enoxaparin Injectable 40 milliGRAM(s) SubCutaneous every 24 hours  fluticasone propionate/ salmeterol 250-50 MICROgram(s) Diskus 1 Dose(s) Inhalation two times a day  glucagon  Injectable 1 milliGRAM(s) IntraMuscular once  guaiFENesin  milliGRAM(s) Oral every 12 hours  hydrochlorothiazide 25 milliGRAM(s) Oral daily  insulin glargine Injectable (LANTUS) 3 Unit(s) SubCutaneous at bedtime  insulin lispro (ADMELOG) corrective regimen sliding scale   SubCutaneous every 6 hours  insulin lispro Injectable (ADMELOG) 2 Unit(s) SubCutaneous three times a day before meals  levalbuterol Inhalation 0.63 milliGRAM(s) Inhalation every 8 hours  lisinopril 40 milliGRAM(s) Oral daily  melatonin 9 milliGRAM(s) Oral at bedtime  memantine 10 milliGRAM(s) Oral at bedtime  memantine 5 milliGRAM(s) Oral <User Schedule>  OLANZapine 2.5 milliGRAM(s) Oral at bedtime  pantoprazole    Tablet 40 milliGRAM(s) Oral before breakfast  petrolatum Ophthalmic Ointment 1 Application(s) Both EYES two times a day  polyethylene glycol 3350 17 Gram(s) Oral two times a day  senna 2 Tablet(s) Oral at bedtime  sertraline 125 milliGRAM(s) Oral daily  tiotropium 2.5 MICROgram(s) Inhaler 2 Puff(s) Inhalation daily    MEDICATIONS  (PRN):  albuterol/ipratropium for Nebulization 3 milliLiter(s) Nebulizer every 6 hours PRN Shortness of Breath and/or Wheezing  
MEDICATIONS  (STANDING):  buDESOnide    Inhalation Suspension 0.5 milliGRAM(s) Inhalation every 12 hours  chlorhexidine 2% Cloths 1 Application(s) Topical <User Schedule>  dextrose 5% + lactated ringers. 1000 milliLiter(s) (75 mL/Hr) IV Continuous <Continuous>  dextrose 5% + sodium chloride 0.9%. 1000 milliLiter(s) (50 mL/Hr) IV Continuous <Continuous>  dextrose 5%. 1000 milliLiter(s) (100 mL/Hr) IV Continuous <Continuous>  dextrose 5%. 1000 milliLiter(s) (50 mL/Hr) IV Continuous <Continuous>  dextrose 50% Injectable 25 Gram(s) IV Push once  dextrose 50% Injectable 12.5 Gram(s) IV Push once  dextrose 50% Injectable 25 Gram(s) IV Push once  dextrose Oral Gel 15 Gram(s) Oral once  enoxaparin Injectable 40 milliGRAM(s) SubCutaneous every 24 hours  glucagon  Injectable 1 milliGRAM(s) IntraMuscular once  guaiFENesin Oral Liquid (Sugar-Free) 200 milliGRAM(s) Oral every 6 hours  hydrALAZINE 50 milliGRAM(s) Oral three times a day  hydrochlorothiazide 25 milliGRAM(s) Oral daily  insulin glargine Injectable (LANTUS) 11 Unit(s) SubCutaneous at bedtime  insulin lispro (ADMELOG) corrective regimen sliding scale   SubCutaneous every 6 hours  insulin lispro Injectable (ADMELOG) 5 Unit(s) SubCutaneous three times a day before meals  ipratropium    for Nebulization 500 MICROGram(s) Nebulizer every 6 hours  levalbuterol Inhalation 0.63 milliGRAM(s) Inhalation every 6 hours  lisinopril 40 milliGRAM(s) Oral daily  methylPREDNISolone sodium succinate Injectable 40 milliGRAM(s) IV Push every 24 hours  pantoprazole    Tablet 40 milliGRAM(s) Oral before breakfast  polyethylene glycol 3350 17 Gram(s) Oral two times a day  senna 2 Tablet(s) Oral at bedtime  sertraline 50 milliGRAM(s) Oral daily  sodium chloride 0.9%. 1000 milliLiter(s) (125 mL/Hr) IV Continuous <Continuous>    MEDICATIONS  (PRN):  
MEDICATIONS  (STANDING):  artificial tears (preservative free) Ophthalmic Solution 1 Drop(s) Both EYES every 8 hours  bisacodyl 10 milliGRAM(s) Oral once  chlorhexidine 2% Cloths 1 Application(s) Topical <User Schedule>  dextrose 5%. 1000 milliLiter(s) (50 mL/Hr) IV Continuous <Continuous>  dextrose 5%. 1000 milliLiter(s) (100 mL/Hr) IV Continuous <Continuous>  dextrose 50% Injectable 25 Gram(s) IV Push once  dextrose 50% Injectable 12.5 Gram(s) IV Push once  dextrose 50% Injectable 25 Gram(s) IV Push once  dextrose Oral Gel 15 Gram(s) Oral once  enoxaparin Injectable 40 milliGRAM(s) SubCutaneous every 24 hours  fluticasone propionate/ salmeterol 250-50 MICROgram(s) Diskus 1 Dose(s) Inhalation two times a day  glucagon  Injectable 1 milliGRAM(s) IntraMuscular once  guaiFENesin  milliGRAM(s) Oral every 12 hours  hydrochlorothiazide 25 milliGRAM(s) Oral daily  insulin glargine Injectable (LANTUS) 3 Unit(s) SubCutaneous at bedtime  insulin lispro (ADMELOG) corrective regimen sliding scale   SubCutaneous every 6 hours  insulin lispro Injectable (ADMELOG) 3 Unit(s) SubCutaneous three times a day before meals  ipratropium    for Nebulization 500 MICROGram(s) Nebulizer every 8 hours  levalbuterol Inhalation 0.63 milliGRAM(s) Inhalation every 8 hours  lisinopril 40 milliGRAM(s) Oral daily  melatonin 9 milliGRAM(s) Oral at bedtime  memantine 10 milliGRAM(s) Oral at bedtime  memantine 5 milliGRAM(s) Oral <User Schedule>  OLANZapine 2.5 milliGRAM(s) Oral at bedtime  pantoprazole    Tablet 40 milliGRAM(s) Oral before breakfast  petrolatum Ophthalmic Ointment 1 Application(s) Both EYES two times a day  polyethylene glycol 3350 17 Gram(s) Oral two times a day  senna 2 Tablet(s) Oral at bedtime  sertraline 125 milliGRAM(s) Oral daily    MEDICATIONS  (PRN):  albuterol/ipratropium for Nebulization 3 milliLiter(s) Nebulizer every 6 hours PRN Shortness of Breath and/or Wheezing  
MEDICATIONS  (STANDING):  artificial tears (preservative free) Ophthalmic Solution 1 Drop(s) Both EYES every 8 hours  bisacodyl 10 milliGRAM(s) Oral once  chlorhexidine 2% Cloths 1 Application(s) Topical <User Schedule>  dextrose 5%. 1000 milliLiter(s) (50 mL/Hr) IV Continuous <Continuous>  dextrose 5%. 1000 milliLiter(s) (100 mL/Hr) IV Continuous <Continuous>  dextrose 50% Injectable 25 Gram(s) IV Push once  dextrose 50% Injectable 12.5 Gram(s) IV Push once  dextrose 50% Injectable 25 Gram(s) IV Push once  dextrose Oral Gel 15 Gram(s) Oral once  enoxaparin Injectable 40 milliGRAM(s) SubCutaneous every 24 hours  fluticasone propionate/ salmeterol 250-50 MICROgram(s) Diskus 1 Dose(s) Inhalation two times a day  glucagon  Injectable 1 milliGRAM(s) IntraMuscular once  guaiFENesin  milliGRAM(s) Oral every 12 hours  hydrochlorothiazide 25 milliGRAM(s) Oral daily  insulin glargine Injectable (LANTUS) 3 Unit(s) SubCutaneous at bedtime  insulin lispro (ADMELOG) corrective regimen sliding scale   SubCutaneous three times a day before meals  insulin lispro (ADMELOG) corrective regimen sliding scale   SubCutaneous at bedtime  insulin lispro Injectable (ADMELOG) 2 Unit(s) SubCutaneous three times a day before meals  ipratropium    for Nebulization 500 MICROGram(s) Nebulizer every 8 hours  levalbuterol Inhalation 0.63 milliGRAM(s) Inhalation every 8 hours  lisinopril 40 milliGRAM(s) Oral daily  melatonin 9 milliGRAM(s) Oral at bedtime  memantine 10 milliGRAM(s) Oral at bedtime  memantine 5 milliGRAM(s) Oral <User Schedule>  OLANZapine 2.5 milliGRAM(s) Oral at bedtime  pantoprazole    Tablet 40 milliGRAM(s) Oral before breakfast  petrolatum Ophthalmic Ointment 1 Application(s) Both EYES two times a day  polyethylene glycol 3350 17 Gram(s) Oral two times a day  senna 2 Tablet(s) Oral at bedtime  sertraline 125 milliGRAM(s) Oral daily    MEDICATIONS  (PRN):  albuterol/ipratropium for Nebulization 3 milliLiter(s) Nebulizer every 6 hours PRN Shortness of Breath and/or Wheezing  
MEDICATIONS  (STANDING):  artificial tears (preservative free) Ophthalmic Solution 1 Drop(s) Both EYES every 8 hours  bisacodyl 10 milliGRAM(s) Oral once  chlorhexidine 2% Cloths 1 Application(s) Topical <User Schedule>  dextrose 50% Injectable 25 Gram(s) IV Push once  dextrose 50% Injectable 12.5 Gram(s) IV Push once  dextrose 50% Injectable 25 Gram(s) IV Push once  dextrose Oral Gel 15 Gram(s) Oral once  enoxaparin Injectable 40 milliGRAM(s) SubCutaneous every 24 hours  fluticasone propionate/ salmeterol 250-50 MICROgram(s) Diskus 1 Dose(s) Inhalation two times a day  glucagon  Injectable 1 milliGRAM(s) IntraMuscular once  guaiFENesin  milliGRAM(s) Oral every 12 hours  hydrochlorothiazide 25 milliGRAM(s) Oral daily  insulin glargine Injectable (LANTUS) 6 Unit(s) SubCutaneous at bedtime  insulin lispro (ADMELOG) corrective regimen sliding scale   SubCutaneous at bedtime  insulin lispro (ADMELOG) corrective regimen sliding scale   SubCutaneous three times a day before meals  insulin lispro Injectable (ADMELOG) 3 Unit(s) SubCutaneous three times a day before meals  ipratropium    for Nebulization 500 MICROGram(s) Nebulizer every 8 hours  levalbuterol Inhalation 0.63 milliGRAM(s) Inhalation every 8 hours  lisinopril 40 milliGRAM(s) Oral daily  melatonin 9 milliGRAM(s) Oral at bedtime  memantine 10 milliGRAM(s) Oral at bedtime  memantine 5 milliGRAM(s) Oral <User Schedule>  nystatin    Suspension 753676 Unit(s) Oral three times a day  OLANZapine 2.5 milliGRAM(s) Oral at bedtime  pantoprazole    Tablet 40 milliGRAM(s) Oral before breakfast  petrolatum Ophthalmic Ointment 1 Application(s) Both EYES two times a day  polyethylene glycol 3350 17 Gram(s) Oral two times a day  senna 2 Tablet(s) Oral at bedtime  sertraline 125 milliGRAM(s) Oral daily    MEDICATIONS  (PRN):  albuterol/ipratropium for Nebulization 3 milliLiter(s) Nebulizer every 6 hours PRN Shortness of Breath and/or Wheezing  
MEDICATIONS  (STANDING):  bisacodyl 10 milliGRAM(s) Oral once  buDESOnide    Inhalation Suspension 0.5 milliGRAM(s) Inhalation every 12 hours  chlorhexidine 2% Cloths 1 Application(s) Topical <User Schedule>  dextrose 5%. 1000 milliLiter(s) (50 mL/Hr) IV Continuous <Continuous>  dextrose 5%. 1000 milliLiter(s) (100 mL/Hr) IV Continuous <Continuous>  dextrose 50% Injectable 25 Gram(s) IV Push once  dextrose 50% Injectable 12.5 Gram(s) IV Push once  dextrose 50% Injectable 25 Gram(s) IV Push once  dextrose Oral Gel 15 Gram(s) Oral once  enoxaparin Injectable 40 milliGRAM(s) SubCutaneous every 24 hours  glucagon  Injectable 1 milliGRAM(s) IntraMuscular once  guaiFENesin Oral Liquid (Sugar-Free) 200 milliGRAM(s) Oral every 6 hours  hydrALAZINE 50 milliGRAM(s) Oral three times a day  hydrochlorothiazide 25 milliGRAM(s) Oral daily  insulin glargine Injectable (LANTUS) 11 Unit(s) SubCutaneous at bedtime  insulin lispro (ADMELOG) corrective regimen sliding scale   SubCutaneous three times a day before meals  insulin lispro (ADMELOG) corrective regimen sliding scale   SubCutaneous at bedtime  insulin lispro Injectable (ADMELOG) 5 Unit(s) SubCutaneous three times a day before meals  ipratropium    for Nebulization 500 MICROGram(s) Nebulizer every 8 hours  levalbuterol Inhalation 0.63 milliGRAM(s) Inhalation every 8 hours  lisinopril 40 milliGRAM(s) Oral daily  pantoprazole    Tablet 40 milliGRAM(s) Oral before breakfast  polyethylene glycol 3350 17 Gram(s) Oral two times a day  senna 2 Tablet(s) Oral at bedtime  sertraline 75 milliGRAM(s) Oral daily    MEDICATIONS  (PRN):  
MEDICATIONS  (STANDING):  artificial tears (preservative free) Ophthalmic Solution 1 Drop(s) Both EYES every 8 hours  bisacodyl 10 milliGRAM(s) Oral once  chlorhexidine 2% Cloths 1 Application(s) Topical <User Schedule>  dextrose 50% Injectable 25 Gram(s) IV Push once  dextrose 50% Injectable 12.5 Gram(s) IV Push once  dextrose 50% Injectable 25 Gram(s) IV Push once  dextrose Oral Gel 15 Gram(s) Oral once  enoxaparin Injectable 40 milliGRAM(s) SubCutaneous every 24 hours  fluticasone propionate/ salmeterol 250-50 MICROgram(s) Diskus 1 Dose(s) Inhalation two times a day  glucagon  Injectable 1 milliGRAM(s) IntraMuscular once  guaiFENesin  milliGRAM(s) Oral every 12 hours  hydrochlorothiazide 25 milliGRAM(s) Oral daily  insulin glargine Injectable (LANTUS) 6 Unit(s) SubCutaneous at bedtime  insulin lispro (ADMELOG) corrective regimen sliding scale   SubCutaneous every 6 hours  insulin lispro Injectable (ADMELOG) 3 Unit(s) SubCutaneous three times a day before meals  ipratropium    for Nebulization 500 MICROGram(s) Nebulizer every 8 hours  levalbuterol Inhalation 0.63 milliGRAM(s) Inhalation every 8 hours  lisinopril 40 milliGRAM(s) Oral daily  melatonin 9 milliGRAM(s) Oral at bedtime  memantine 10 milliGRAM(s) Oral at bedtime  memantine 5 milliGRAM(s) Oral <User Schedule>  nystatin    Suspension 372802 Unit(s) Oral three times a day  OLANZapine 2.5 milliGRAM(s) Oral at bedtime  pantoprazole    Tablet 40 milliGRAM(s) Oral before breakfast  petrolatum Ophthalmic Ointment 1 Application(s) Both EYES two times a day  polyethylene glycol 3350 17 Gram(s) Oral two times a day  senna 2 Tablet(s) Oral at bedtime  sertraline 125 milliGRAM(s) Oral daily    MEDICATIONS  (PRN):  albuterol/ipratropium for Nebulization 3 milliLiter(s) Nebulizer every 6 hours PRN Shortness of Breath and/or Wheezing  
MEDICATIONS  (STANDING):  artificial tears (preservative free) Ophthalmic Solution 1 Drop(s) Both EYES every 8 hours  bisacodyl 10 milliGRAM(s) Oral once  chlorhexidine 2% Cloths 1 Application(s) Topical <User Schedule>  dextrose 5%. 1000 milliLiter(s) (50 mL/Hr) IV Continuous <Continuous>  dextrose 5%. 1000 milliLiter(s) (100 mL/Hr) IV Continuous <Continuous>  dextrose 50% Injectable 25 Gram(s) IV Push once  dextrose 50% Injectable 12.5 Gram(s) IV Push once  dextrose 50% Injectable 25 Gram(s) IV Push once  dextrose Oral Gel 15 Gram(s) Oral once  enoxaparin Injectable 40 milliGRAM(s) SubCutaneous every 24 hours  fluticasone propionate/ salmeterol 250-50 MICROgram(s) Diskus 1 Dose(s) Inhalation two times a day  glucagon  Injectable 1 milliGRAM(s) IntraMuscular once  hydrochlorothiazide 25 milliGRAM(s) Oral daily  insulin glargine Injectable (LANTUS) 3 Unit(s) SubCutaneous at bedtime  insulin lispro (ADMELOG) corrective regimen sliding scale   SubCutaneous at bedtime  insulin lispro (ADMELOG) corrective regimen sliding scale   SubCutaneous three times a day before meals  insulin lispro Injectable (ADMELOG) 2 Unit(s) SubCutaneous three times a day before meals  levalbuterol Inhalation 0.63 milliGRAM(s) Inhalation every 8 hours  lisinopril 40 milliGRAM(s) Oral daily  melatonin 9 milliGRAM(s) Oral at bedtime  memantine 5 milliGRAM(s) Oral <User Schedule>  memantine 10 milliGRAM(s) Oral at bedtime  OLANZapine 2.5 milliGRAM(s) Oral at bedtime  pantoprazole    Tablet 40 milliGRAM(s) Oral before breakfast  petrolatum Ophthalmic Ointment 1 Application(s) Both EYES two times a day  polyethylene glycol 3350 17 Gram(s) Oral two times a day  senna 2 Tablet(s) Oral at bedtime  sertraline 125 milliGRAM(s) Oral daily  tiotropium 2.5 MICROgram(s) Inhaler 2 Puff(s) Inhalation daily    MEDICATIONS  (PRN):  albuterol/ipratropium for Nebulization 3 milliLiter(s) Nebulizer every 6 hours PRN Shortness of Breath and/or Wheezing  guaiFENesin Oral Liquid (Sugar-Free) 100 milliGRAM(s) Oral every 6 hours PRN Cough  
MEDICATIONS  (STANDING):  bisacodyl 10 milliGRAM(s) Oral once  buDESOnide    Inhalation Suspension 0.5 milliGRAM(s) Inhalation every 12 hours  chlorhexidine 2% Cloths 1 Application(s) Topical <User Schedule>  dextrose 5%. 1000 milliLiter(s) (100 mL/Hr) IV Continuous <Continuous>  dextrose 5%. 1000 milliLiter(s) (50 mL/Hr) IV Continuous <Continuous>  dextrose 50% Injectable 25 Gram(s) IV Push once  dextrose 50% Injectable 12.5 Gram(s) IV Push once  dextrose 50% Injectable 25 Gram(s) IV Push once  dextrose Oral Gel 15 Gram(s) Oral once  enoxaparin Injectable 40 milliGRAM(s) SubCutaneous every 24 hours  glucagon  Injectable 1 milliGRAM(s) IntraMuscular once  guaiFENesin Oral Liquid (Sugar-Free) 200 milliGRAM(s) Oral every 6 hours  hydrALAZINE 50 milliGRAM(s) Oral three times a day  hydrochlorothiazide 25 milliGRAM(s) Oral daily  insulin glargine Injectable (LANTUS) 11 Unit(s) SubCutaneous at bedtime  insulin lispro (ADMELOG) corrective regimen sliding scale   SubCutaneous three times a day before meals  insulin lispro (ADMELOG) corrective regimen sliding scale   SubCutaneous at bedtime  insulin lispro Injectable (ADMELOG) 5 Unit(s) SubCutaneous three times a day before meals  ipratropium    for Nebulization 500 MICROGram(s) Nebulizer every 8 hours  levalbuterol Inhalation 0.63 milliGRAM(s) Inhalation every 8 hours  lisinopril 40 milliGRAM(s) Oral daily  methylPREDNISolone sodium succinate Injectable 30 milliGRAM(s) IV Push every 12 hours  OLANZapine 2.5 milliGRAM(s) Oral at bedtime  pantoprazole    Tablet 40 milliGRAM(s) Oral before breakfast  polyethylene glycol 3350 17 Gram(s) Oral two times a day  senna 2 Tablet(s) Oral at bedtime  sertraline 100 milliGRAM(s) Oral daily    MEDICATIONS  (PRN):  
MEDICATIONS  (STANDING):  buDESOnide    Inhalation Suspension 0.5 milliGRAM(s) Inhalation every 12 hours  chlorhexidine 2% Cloths 1 Application(s) Topical <User Schedule>  dextrose 5%. 1000 milliLiter(s) (100 mL/Hr) IV Continuous <Continuous>  dextrose 5%. 1000 milliLiter(s) (50 mL/Hr) IV Continuous <Continuous>  dextrose 50% Injectable 25 Gram(s) IV Push once  dextrose 50% Injectable 12.5 Gram(s) IV Push once  dextrose 50% Injectable 25 Gram(s) IV Push once  dextrose Oral Gel 15 Gram(s) Oral once  enoxaparin Injectable 40 milliGRAM(s) SubCutaneous every 24 hours  glucagon  Injectable 1 milliGRAM(s) IntraMuscular once  guaiFENesin  milliGRAM(s) Oral every 12 hours  hydrALAZINE 25 milliGRAM(s) Oral three times a day  hydrochlorothiazide 25 milliGRAM(s) Oral daily  insulin glargine Injectable (LANTUS) 15 Unit(s) SubCutaneous at bedtime  insulin lispro (ADMELOG) corrective regimen sliding scale   SubCutaneous three times a day before meals  insulin lispro (ADMELOG) corrective regimen sliding scale   SubCutaneous at bedtime  insulin lispro Injectable (ADMELOG) 5 Unit(s) SubCutaneous three times a day before meals  levalbuterol Inhalation 0.63 milliGRAM(s) Inhalation every 6 hours  lisinopril 40 milliGRAM(s) Oral daily  LORazepam   Injectable 0.5 milliGRAM(s) IV Push <User Schedule>  pantoprazole    Tablet 40 milliGRAM(s) Oral before breakfast  polyethylene glycol 3350 17 Gram(s) Oral two times a day  predniSONE   Tablet   Oral   senna 2 Tablet(s) Oral at bedtime  sertraline 50 milliGRAM(s) Oral daily    MEDICATIONS  (PRN):  
MEDICATIONS  (STANDING):  buDESOnide    Inhalation Suspension 0.5 milliGRAM(s) Inhalation every 12 hours  chlorhexidine 2% Cloths 1 Application(s) Topical <User Schedule>  dextrose 5%. 1000 milliLiter(s) (100 mL/Hr) IV Continuous <Continuous>  dextrose 5%. 1000 milliLiter(s) (50 mL/Hr) IV Continuous <Continuous>  dextrose 50% Injectable 25 Gram(s) IV Push once  dextrose 50% Injectable 12.5 Gram(s) IV Push once  dextrose 50% Injectable 25 Gram(s) IV Push once  dextrose Oral Gel 15 Gram(s) Oral once  enoxaparin Injectable 40 milliGRAM(s) SubCutaneous every 24 hours  glucagon  Injectable 1 milliGRAM(s) IntraMuscular once  guaiFENesin  milliGRAM(s) Oral every 12 hours  hydrALAZINE 50 milliGRAM(s) Oral three times a day  hydrochlorothiazide 25 milliGRAM(s) Oral daily  insulin glargine Injectable (LANTUS) 12 Unit(s) SubCutaneous at bedtime  insulin lispro (ADMELOG) corrective regimen sliding scale   SubCutaneous three times a day before meals  insulin lispro (ADMELOG) corrective regimen sliding scale   SubCutaneous at bedtime  insulin lispro Injectable (ADMELOG) 5 Unit(s) SubCutaneous three times a day before meals  levalbuterol Inhalation 0.63 milliGRAM(s) Inhalation every 6 hours  lisinopril 40 milliGRAM(s) Oral daily  LORazepam   Injectable 0.75 milliGRAM(s) IV Push <User Schedule>  pantoprazole    Tablet 40 milliGRAM(s) Oral before breakfast  polyethylene glycol 3350 17 Gram(s) Oral two times a day  predniSONE   Tablet   Oral   senna 2 Tablet(s) Oral at bedtime  sertraline 50 milliGRAM(s) Oral daily  sodium chloride 0.9%. 1000 milliLiter(s) (125 mL/Hr) IV Continuous <Continuous>    MEDICATIONS  (PRN):  
MEDICATIONS  (STANDING):  buDESOnide    Inhalation Suspension 0.5 milliGRAM(s) Inhalation every 12 hours  chlorhexidine 2% Cloths 1 Application(s) Topical <User Schedule>  dextrose 5% + lactated ringers. 1000 milliLiter(s) (75 mL/Hr) IV Continuous <Continuous>  dextrose 5%. 1000 milliLiter(s) (100 mL/Hr) IV Continuous <Continuous>  dextrose 5%. 1000 milliLiter(s) (50 mL/Hr) IV Continuous <Continuous>  dextrose 50% Injectable 25 Gram(s) IV Push once  dextrose 50% Injectable 12.5 Gram(s) IV Push once  dextrose 50% Injectable 25 Gram(s) IV Push once  dextrose Oral Gel 15 Gram(s) Oral once  enoxaparin Injectable 40 milliGRAM(s) SubCutaneous every 24 hours  glucagon  Injectable 1 milliGRAM(s) IntraMuscular once  guaiFENesin  milliGRAM(s) Oral every 12 hours  hydrALAZINE 50 milliGRAM(s) Oral three times a day  hydrochlorothiazide 25 milliGRAM(s) Oral daily  insulin glargine Injectable (LANTUS) 12 Unit(s) SubCutaneous at bedtime  insulin lispro (ADMELOG) corrective regimen sliding scale   SubCutaneous three times a day before meals  insulin lispro (ADMELOG) corrective regimen sliding scale   SubCutaneous at bedtime  insulin lispro Injectable (ADMELOG) 5 Unit(s) SubCutaneous three times a day before meals  levalbuterol Inhalation 0.63 milliGRAM(s) Inhalation every 6 hours  lisinopril 40 milliGRAM(s) Oral daily  LORazepam   Injectable 0.75 milliGRAM(s) IV Push <User Schedule>  pantoprazole    Tablet 40 milliGRAM(s) Oral before breakfast  polyethylene glycol 3350 17 Gram(s) Oral two times a day  predniSONE   Tablet 10 milliGRAM(s) Oral daily  predniSONE   Tablet   Oral   senna 2 Tablet(s) Oral at bedtime  sertraline 50 milliGRAM(s) Oral daily  sodium chloride 0.9%. 1000 milliLiter(s) (125 mL/Hr) IV Continuous <Continuous>    MEDICATIONS  (PRN):  
MEDICATIONS  (STANDING):  artificial tears (preservative free) Ophthalmic Solution 1 Drop(s) Both EYES every 8 hours  bisacodyl 10 milliGRAM(s) Oral once  buDESOnide    Inhalation Suspension 0.5 milliGRAM(s) Inhalation every 12 hours  chlorhexidine 2% Cloths 1 Application(s) Topical <User Schedule>  dextrose 50% Injectable 25 Gram(s) IV Push once  dextrose 50% Injectable 12.5 Gram(s) IV Push once  dextrose 50% Injectable 25 Gram(s) IV Push once  dextrose Oral Gel 15 Gram(s) Oral once  enoxaparin Injectable 40 milliGRAM(s) SubCutaneous every 24 hours  glucagon  Injectable 1 milliGRAM(s) IntraMuscular once  hydrochlorothiazide 25 milliGRAM(s) Oral daily  insulin glargine Injectable (LANTUS) 6 Unit(s) SubCutaneous at bedtime  insulin lispro (ADMELOG) corrective regimen sliding scale   SubCutaneous three times a day before meals  insulin lispro (ADMELOG) corrective regimen sliding scale   SubCutaneous at bedtime  insulin lispro Injectable (ADMELOG) 3 Unit(s) SubCutaneous three times a day before meals  ipratropium    for Nebulization 500 MICROGram(s) Nebulizer every 8 hours  levalbuterol Inhalation 0.63 milliGRAM(s) Inhalation every 8 hours  lisinopril 40 milliGRAM(s) Oral daily  melatonin 9 milliGRAM(s) Oral at bedtime  memantine 10 milliGRAM(s) Oral at bedtime  memantine 5 milliGRAM(s) Oral <User Schedule>  nystatin    Suspension 750854 Unit(s) Oral three times a day  OLANZapine 2.5 milliGRAM(s) Oral at bedtime  pantoprazole    Tablet 40 milliGRAM(s) Oral before breakfast  petrolatum Ophthalmic Ointment 1 Application(s) Both EYES two times a day  polyethylene glycol 3350 17 Gram(s) Oral two times a day  predniSONE   Tablet 10 milliGRAM(s) Oral daily  senna 2 Tablet(s) Oral at bedtime  sertraline 125 milliGRAM(s) Oral daily    MEDICATIONS  (PRN):  
MEDICATIONS  (STANDING):  buDESOnide    Inhalation Suspension 0.5 milliGRAM(s) Inhalation every 12 hours  chlorhexidine 2% Cloths 1 Application(s) Topical <User Schedule>  dextrose 5%. 1000 milliLiter(s) (100 mL/Hr) IV Continuous <Continuous>  dextrose 5%. 1000 milliLiter(s) (50 mL/Hr) IV Continuous <Continuous>  dextrose 50% Injectable 25 Gram(s) IV Push once  dextrose 50% Injectable 12.5 Gram(s) IV Push once  dextrose 50% Injectable 25 Gram(s) IV Push once  dextrose Oral Gel 15 Gram(s) Oral once  enoxaparin Injectable 40 milliGRAM(s) SubCutaneous every 24 hours  glucagon  Injectable 1 milliGRAM(s) IntraMuscular once  guaiFENesin  milliGRAM(s) Oral every 12 hours  hydrALAZINE 50 milliGRAM(s) Oral three times a day  hydrochlorothiazide 25 milliGRAM(s) Oral daily  insulin glargine Injectable (LANTUS) 12 Unit(s) SubCutaneous at bedtime  insulin lispro (ADMELOG) corrective regimen sliding scale   SubCutaneous three times a day before meals  insulin lispro (ADMELOG) corrective regimen sliding scale   SubCutaneous at bedtime  insulin lispro Injectable (ADMELOG) 3 Unit(s) SubCutaneous three times a day before meals  levalbuterol Inhalation 0.63 milliGRAM(s) Inhalation every 6 hours  lisinopril 40 milliGRAM(s) Oral daily  LORazepam   Injectable 0.5 milliGRAM(s) IV Push <User Schedule>  LORazepam   Injectable 0.25 milliGRAM(s) IV Push <User Schedule>  pantoprazole    Tablet 40 milliGRAM(s) Oral before breakfast  polyethylene glycol 3350 17 Gram(s) Oral two times a day  predniSONE   Tablet   Oral   predniSONE   Tablet 20 milliGRAM(s) Oral daily  senna 2 Tablet(s) Oral at bedtime  sertraline 50 milliGRAM(s) Oral daily  sodium chloride 0.9%. 1000 milliLiter(s) (125 mL/Hr) IV Continuous <Continuous>    MEDICATIONS  (PRN):  
MEDICATIONS  (STANDING):  buDESOnide    Inhalation Suspension 0.5 milliGRAM(s) Inhalation every 12 hours  chlorhexidine 2% Cloths 1 Application(s) Topical <User Schedule>  dextrose 5%. 1000 milliLiter(s) (100 mL/Hr) IV Continuous <Continuous>  dextrose 5%. 1000 milliLiter(s) (50 mL/Hr) IV Continuous <Continuous>  dextrose 50% Injectable 25 Gram(s) IV Push once  dextrose 50% Injectable 12.5 Gram(s) IV Push once  dextrose 50% Injectable 25 Gram(s) IV Push once  dextrose Oral Gel 15 Gram(s) Oral once  enoxaparin Injectable 40 milliGRAM(s) SubCutaneous every 24 hours  glucagon  Injectable 1 milliGRAM(s) IntraMuscular once  guaiFENesin Oral Liquid (Sugar-Free) 200 milliGRAM(s) Oral every 6 hours  hydrALAZINE 50 milliGRAM(s) Oral three times a day  hydrochlorothiazide 25 milliGRAM(s) Oral daily  insulin glargine Injectable (LANTUS) 11 Unit(s) SubCutaneous at bedtime  insulin lispro (ADMELOG) corrective regimen sliding scale   SubCutaneous three times a day before meals  insulin lispro (ADMELOG) corrective regimen sliding scale   SubCutaneous at bedtime  insulin lispro Injectable (ADMELOG) 5 Unit(s) SubCutaneous three times a day before meals  ipratropium    for Nebulization 500 MICROGram(s) Nebulizer every 6 hours  levalbuterol Inhalation 0.63 milliGRAM(s) Inhalation every 6 hours  lisinopril 40 milliGRAM(s) Oral daily  methylPREDNISolone sodium succinate Injectable 40 milliGRAM(s) IV Push every 24 hours  pantoprazole    Tablet 40 milliGRAM(s) Oral before breakfast  polyethylene glycol 3350 17 Gram(s) Oral two times a day  senna 2 Tablet(s) Oral at bedtime  sertraline 50 milliGRAM(s) Oral daily    MEDICATIONS  (PRN):  
MEDICATIONS  (STANDING):  artificial tears (preservative free) Ophthalmic Solution 1 Drop(s) Both EYES every 8 hours  bisacodyl 10 milliGRAM(s) Oral once  chlorhexidine 2% Cloths 1 Application(s) Topical <User Schedule>  dextrose 5%. 1000 milliLiter(s) (50 mL/Hr) IV Continuous <Continuous>  dextrose 5%. 1000 milliLiter(s) (100 mL/Hr) IV Continuous <Continuous>  dextrose 50% Injectable 25 Gram(s) IV Push once  dextrose 50% Injectable 12.5 Gram(s) IV Push once  dextrose 50% Injectable 25 Gram(s) IV Push once  dextrose Oral Gel 15 Gram(s) Oral once  enoxaparin Injectable 40 milliGRAM(s) SubCutaneous every 24 hours  fluticasone propionate/ salmeterol 250-50 MICROgram(s) Diskus 1 Dose(s) Inhalation two times a day  glucagon  Injectable 1 milliGRAM(s) IntraMuscular once  guaiFENesin  milliGRAM(s) Oral every 12 hours  hydrochlorothiazide 25 milliGRAM(s) Oral daily  insulin glargine Injectable (LANTUS) 3 Unit(s) SubCutaneous at bedtime  insulin lispro (ADMELOG) corrective regimen sliding scale   SubCutaneous at bedtime  insulin lispro (ADMELOG) corrective regimen sliding scale   SubCutaneous three times a day before meals  insulin lispro Injectable (ADMELOG) 2 Unit(s) SubCutaneous three times a day before meals  levalbuterol Inhalation 0.63 milliGRAM(s) Inhalation every 8 hours  lisinopril 40 milliGRAM(s) Oral daily  melatonin 9 milliGRAM(s) Oral at bedtime  memantine 10 milliGRAM(s) Oral at bedtime  memantine 5 milliGRAM(s) Oral <User Schedule>  OLANZapine 2.5 milliGRAM(s) Oral at bedtime  pantoprazole    Tablet 40 milliGRAM(s) Oral before breakfast  petrolatum Ophthalmic Ointment 1 Application(s) Both EYES two times a day  polyethylene glycol 3350 17 Gram(s) Oral two times a day  senna 2 Tablet(s) Oral at bedtime  sertraline 125 milliGRAM(s) Oral daily  tiotropium 2.5 MICROgram(s) Inhaler 2 Puff(s) Inhalation daily    MEDICATIONS  (PRN):  albuterol/ipratropium for Nebulization 3 milliLiter(s) Nebulizer every 6 hours PRN Shortness of Breath and/or Wheezing  
MEDICATIONS  (STANDING):  buDESOnide    Inhalation Suspension 0.5 milliGRAM(s) Inhalation every 12 hours  chlorhexidine 2% Cloths 1 Application(s) Topical <User Schedule>  dextrose 5%. 1000 milliLiter(s) (100 mL/Hr) IV Continuous <Continuous>  dextrose 5%. 1000 milliLiter(s) (50 mL/Hr) IV Continuous <Continuous>  dextrose 50% Injectable 25 Gram(s) IV Push once  dextrose 50% Injectable 12.5 Gram(s) IV Push once  dextrose 50% Injectable 25 Gram(s) IV Push once  dextrose Oral Gel 15 Gram(s) Oral once  enoxaparin Injectable 40 milliGRAM(s) SubCutaneous every 24 hours  glucagon  Injectable 1 milliGRAM(s) IntraMuscular once  guaiFENesin  milliGRAM(s) Oral every 12 hours  hydrALAZINE 50 milliGRAM(s) Oral three times a day  hydrochlorothiazide 25 milliGRAM(s) Oral daily  insulin glargine Injectable (LANTUS) 12 Unit(s) SubCutaneous at bedtime  insulin lispro (ADMELOG) corrective regimen sliding scale   SubCutaneous three times a day before meals  insulin lispro (ADMELOG) corrective regimen sliding scale   SubCutaneous at bedtime  insulin lispro Injectable (ADMELOG) 3 Unit(s) SubCutaneous three times a day before meals  levalbuterol Inhalation 0.63 milliGRAM(s) Inhalation every 6 hours  lisinopril 40 milliGRAM(s) Oral daily  LORazepam   Injectable 0.5 milliGRAM(s) IV Push <User Schedule>  LORazepam   Injectable 0.25 milliGRAM(s) IV Push <User Schedule>  pantoprazole    Tablet 40 milliGRAM(s) Oral before breakfast  polyethylene glycol 3350 17 Gram(s) Oral two times a day  predniSONE   Tablet   Oral   senna 2 Tablet(s) Oral at bedtime  sertraline 50 milliGRAM(s) Oral daily  sodium chloride 0.9%. 1000 milliLiter(s) (125 mL/Hr) IV Continuous <Continuous>    MEDICATIONS  (PRN):  
MEDICATIONS  (STANDING):  artificial tears (preservative free) Ophthalmic Solution 1 Drop(s) Both EYES every 8 hours  bisacodyl 10 milliGRAM(s) Oral once  chlorhexidine 2% Cloths 1 Application(s) Topical <User Schedule>  dextrose 50% Injectable 25 Gram(s) IV Push once  dextrose 50% Injectable 12.5 Gram(s) IV Push once  dextrose 50% Injectable 25 Gram(s) IV Push once  dextrose Oral Gel 15 Gram(s) Oral once  enoxaparin Injectable 40 milliGRAM(s) SubCutaneous every 24 hours  fluticasone propionate/ salmeterol 250-50 MICROgram(s) Diskus 1 Dose(s) Inhalation two times a day  glucagon  Injectable 1 milliGRAM(s) IntraMuscular once  guaiFENesin  milliGRAM(s) Oral every 12 hours  hydrochlorothiazide 25 milliGRAM(s) Oral daily  insulin glargine Injectable (LANTUS) 6 Unit(s) SubCutaneous at bedtime  insulin lispro (ADMELOG) corrective regimen sliding scale   SubCutaneous every 6 hours  insulin lispro Injectable (ADMELOG) 3 Unit(s) SubCutaneous three times a day before meals  ipratropium    for Nebulization 500 MICROGram(s) Nebulizer every 8 hours  levalbuterol Inhalation 0.63 milliGRAM(s) Inhalation every 8 hours  lisinopril 40 milliGRAM(s) Oral daily  melatonin 9 milliGRAM(s) Oral at bedtime  memantine 10 milliGRAM(s) Oral at bedtime  memantine 5 milliGRAM(s) Oral <User Schedule>  nystatin    Suspension 930639 Unit(s) Oral three times a day  OLANZapine 2.5 milliGRAM(s) Oral at bedtime  pantoprazole    Tablet 40 milliGRAM(s) Oral before breakfast  petrolatum Ophthalmic Ointment 1 Application(s) Both EYES two times a day  polyethylene glycol 3350 17 Gram(s) Oral two times a day  senna 2 Tablet(s) Oral at bedtime  sertraline 125 milliGRAM(s) Oral daily    MEDICATIONS  (PRN):  albuterol/ipratropium for Nebulization 3 milliLiter(s) Nebulizer every 6 hours PRN Shortness of Breath and/or Wheezing

## 2025-05-20 NOTE — BH CONSULTATION LIAISON PROGRESS NOTE - NSBHMSEEYE_PSY_A_CORE
Good
Poor
Fair
Poor
Fair
Fair
Poor
Poor
Fair
Fair
Poor
Poor
Fair
Poor
Fair
Poor
Fair
Fair
Poor
Fair
Fair
Poor
Fair
Fair
Poor
Fair
Poor
Fair
Other
Fair
Other

## 2025-05-20 NOTE — BH CONSULTATION LIAISON PROGRESS NOTE - NSBHMSEPERCEPT_PSY_A_CORE
No abnormalities
No overt AH/VH
Unable to assess
Auditory hallucinations/Unable to assess
No overt AH/VH/No abnormalities
Unable to assess
No abnormalities
No overt AH/VH
No overt AH/VH
No abnormalities
Auditory hallucinations
Unable to assess
No abnormalities
No overt AH/VH
Not overtly responding to internal stimuli
Unable to assess
No abnormalities
Unable to assess
No overt AH/VH/No abnormalities
Unable to assess
No abnormalities
No abnormalities
No abnormalities/Unable to assess
No overt AH/VH/No abnormalities
Unable to assess

## 2025-05-20 NOTE — BH CONSULTATION LIAISON PROGRESS NOTE - NSBHFUPINTERVALHXFT_PSY_A_CORE
Met with the patient. Sitting in a chair, watching TV, calm, no agitation or behavioral issues. Disoriented- thinks it's February. Otherwise, pleasant, bright affect. Denied any mood symptoms when asked. Denied AH, VH, paranoia, SI/HI when asked. [place] thought process.   No PRN's needed. No signs of catatonia. Met with the patient. Sitting in a chair, watching TV, calm, no agitation or behavioral issues. Disoriented- thinks it's February. Otherwise, pleasant, bright affect. Denied any mood symptoms when asked. Denied AH, VH, paranoia, SI/HI when asked. [place] thought process.   No PRN's needed. No signs of catatonia.  Intermittent episodes of what appears to be patient responding to internal stimuli has been noted by family.

## 2025-05-20 NOTE — BH CONSULTATION LIAISON PROGRESS NOTE - NSBHMSESPEECH_PSY_A_CORE
Abnormal as indicated, otherwise normal...
hypoverbal, normal volume/Abnormal as indicated, otherwise normal...
Abnormal as indicated, otherwise normal...
hypoverbal, normal volume/Abnormal as indicated, otherwise normal...
Abnormal as indicated, otherwise normal...
Normal volume, rate, productivity, spontaneity and articulation
hypoverbal, normal volume
Abnormal as indicated, otherwise normal...
Abnormal as indicated, otherwise normal...
hypoverbal, normal volume/Abnormal as indicated, otherwise normal...
Abnormal as indicated, otherwise normal...
Abnormal as indicated, otherwise normal...

## 2025-05-20 NOTE — DISCHARGE NOTE NURSING/CASE MANAGEMENT/SOCIAL WORK - FINANCIAL ASSISTANCE
[facility] provides services at a reduced cost to those who are determined to be eligible through [facility]’s financial assistance program. Information regarding [facility]’s financial assistance program can be found by going to [email or web address] or by calling [phone].

## 2025-05-20 NOTE — BH CONSULTATION LIAISON PROGRESS NOTE - NSBHMSEAFFRANGE_PSY_A_CORE
Constricted
Blunted
Constricted
Constricted
Constricted/Other
Constricted
Blunted
Constricted
Constricted/Other
Constricted
Constricted
Constricted/Other
Constricted
Constricted/Other
Blunted
Constricted
Constricted/Other
Blunted
Constricted/Other
Constricted
Blunted
Constricted
Full

## 2025-05-20 NOTE — BH CONSULTATION LIAISON PROGRESS NOTE - NSBHMSETHTCONTENT_PSY_A_CORE
Unremarkable
Other
Unremarkable
Denied SI/HI
Denied SI/HI
Unable to assess
Unremarkable
Unable to assess
Unremarkable
Unable to assess
Unremarkable
Unremarkable
Unable to assess
Unable to assess
Unremarkable
Unremarkable
Unable to assess
Unremarkable

## 2025-05-20 NOTE — BH CONSULTATION LIAISON PROGRESS NOTE - NSBHMSEINSIGHT_PSY_A_CORE
Poor
Poor
Other
Poor
Other
Poor
Poor
Unable to assess
Other
Poor
Other
Other
Poor
Poor
Unable to assess
Other
Poor
Other
Poor
Poor
Other
Poor
Poor
Unable to assess
Other
Poor
Unable to assess
Other
Poor
Other
Unable to assess
Unable to assess

## 2025-05-20 NOTE — BH CONSULTATION LIAISON PROGRESS NOTE - NSBHMSEIMPULSE_PSY_A_CORE
Normal
Unable to assess
Normal

## 2025-05-20 NOTE — PROGRESS NOTE ADULT - PROVIDER SPECIALTY LIST ADULT
Cardiology
Hospitalist
Hospitalist
MICU
Pulmonology
Anticoag Management
Cardiology
Cardiology
Critical Care
ENT
Hospitalist
Hospitalist
MICU
Pulmonology
Rehab Medicine
Cardiology
Hospitalist
MICU
Pulmonology
Cardiology
Cardiology
Critical Care
Hospitalist
Pulmonology
Pulmonology
Hospitalist
Hospitalist
Pulmonology
Hospitalist
Internal Medicine
Pulmonology
Hospitalist
Internal Medicine
Hospitalist
36.7
Hospitalist
Internal Medicine
Hospitalist

## 2025-05-20 NOTE — BH CONSULTATION LIAISON PROGRESS NOTE - NSBHMSEBEHAV_PSY_A_CORE
Other
Cooperative
Other
Cooperative
Other
Cooperative
Other
Cooperative
Cooperative/Unable to assess
Other
Uncooperative
Cooperative
Cooperative
Other
Cooperative
Cooperative
Other
Cooperative
Other
Cooperative
Unable to assess
Cooperative
Cooperative

## 2025-05-20 NOTE — BH CONSULTATION LIAISON PROGRESS NOTE - NSICDXBHSECONDARYDX_PSY_ALL_CORE
Catatonia   F06.1  

## 2025-05-20 NOTE — BH CONSULTATION LIAISON PROGRESS NOTE - NSBHPSYCHOLCOGABN_PSY_A_CORE
disoriented to time/disoriented to place/disoriented to situation
disoriented to time
disoriented to time/disoriented to place/disoriented to situation
disoriented to time/disoriented to place
disoriented to time/disoriented to place/disoriented to situation
disoriented to time/disoriented to place
disoriented to time/disoriented to place/disoriented to situation
disoriented to time/disoriented to place
disoriented to time/disoriented to place/disoriented to situation

## 2025-05-20 NOTE — BH CONSULTATION LIAISON PROGRESS NOTE - NSBHATTESTCOMMENTATTENDFT_PSY_A_CORE
Chart reviewed. Seen with ACP. Calm, accepting of food/meds presented to her mouth, though not speaking much, still a bit catatonic; exam limited. Discussed with primary med team. Agree with above assessment/recs. Will continue to follow. 
Met with the patient this afternoon. Knows that she is in a hospital, was able to answer a few factual information correctly, but otherwise disoriented. Denies any si or hi. While patient denies any ah or paranoia, family has noted intermittent episodes of what appears to be patient responding to internal stimuli     Plan is to monitor for catatonic symptoms on weekly ECT for this week, assess need to titrate dose of olanzapine to target possible emerging psychosis. Bed obtained on 2 south--- will give hand off.   Medicine team aware of above plan  Coordinated with ect-- re: transfer to [facility] and plan to resume ect there  Coordinated bed search with lul
Met with the patient. Agree with above catatonic symptoms on exam. Rigidity+, negativism+, mutism, staring.   Coordinated above plan with medicine, 
Chart reviewed. Seen separately from ACP. Remains catatonic with automatic obedience, mutism, grimacing. Per family is eating more which is encouraging. Agree with above assessment/recs. Will continue to follow.
Chart reviewed. Seen w/ ACP. Presents as catatonic (mute/blocked, slowed, grimacing), though interactive w/ family. Agree w/ above assessment. Will continue to follow. 
Met with the patient this afternoon. Awake, alert, still confused, but today recognizes her son at bedside. No catatonic symptoms except for an episode of what seemed like ambitendency towards the end of the interview.   Son at bedside-- discussed above assessment and plan in detail. Knows the plan for inpatient psych admission as well. States that patient can be noted to be responding to internal stimuli at times 'laughing to her self'.   medicine team knows plan above  Coordinated with ect this morning-- aware of plan for Friday ect
Met with the patient along with resident md, impression and plan discussed and agreed upon.   Agree with above catatonia exam.   Coordinated at length with  at bedside--- discussed above assessment and plan. All questions answered  Coordinated with ect-- above discussed
Met with the patient. Agree with the BFRS as above.   Catatonic symptoms improving with ECT, but still with emergence of symptoms on day 4- day 5. Noticed the confusion, disorientation.   Will coordinate with ECT about next week's frequency---once weekly?
Met with the patient on 5/13/2025 along with fellow md, impression and plan discussed and agreed upon  Coordinated with medicine team--- aware of plan  Coordinated with  at bedside----aware of plan above. Answered all his questions
Met with the patient. Discussed with resident md, impression and plan discussed and agreed upon.     Last ECT was this Monday. We monitored her over the week. No catatonic symptoms.   Today though she is notably catatonic—--mute, withdrawn, negative, rigid, gegenhalten. Vitals stable.     She is scheduled for ECT tomorrow    Care coordinated at length with son at bedside---discussed above assessment, and the plan above agreed upon. Concerned about the emergence of catatonic symptoms today.   Coordinated with ECT--discussed above
Met with the patient along with acp, impression and plan discussed and agreed upon. Care coordinated with medicine team-- above plan aware  Coordinated with son
Met with the patient along with acp, impression and plan discussed and agreed upon

## 2025-05-20 NOTE — BH CONSULTATION LIAISON PROGRESS NOTE - NSBHMSEGROOM_PSY_A_CORE
Poor
Good
Poor
Fair
Fair
Poor
Fair
Poor
Poor
Fair
Poor
Fair
Fair
Good
Fair
Poor
Fair
Poor
Poor
Fair
Poor
Fair
Good
Poor
Poor
Fair
Fair

## 2025-05-20 NOTE — BH CONSULTATION LIAISON PROGRESS NOTE - NSBHMSESPABN_PSY_A_CORE
Soft volume/Decreased productivity/Impaired articulation
Soft volume/Increased latency
Decreased productivity/Increased latency/Impaired articulation
Soft volume/Slowed rate/Decreased productivity
Soft volume/Slowed rate/Decreased productivity/Increased latency
Soft volume/Slowed rate/Decreased productivity/Increased latency
Soft volume/Decreased productivity
Decreased productivity/Increased latency/Other
Soft volume/Decreased productivity
Decreased productivity/Increased latency/Other
Decreased productivity/Other
Soft volume/Slowed rate/Decreased productivity
Soft volume/Slowed rate/Decreased productivity
Decreased productivity/Increased latency/Impaired articulation
Soft volume
Decreased productivity/Increased latency
Soft volume/Decreased productivity
Soft volume/Slowed rate/Decreased productivity
Soft volume/Slowed rate/Decreased productivity
Soft volume/Slowed rate/Decreased productivity/Increased latency
Decreased productivity/Other
Soft volume/Decreased productivity
Soft volume/Slowed rate/Decreased productivity
Soft volume/Decreased productivity
Soft volume/Slowed rate/Increased latency
Soft volume
Soft volume/Slowed rate/Decreased productivity
Soft volume/Slowed rate/Decreased productivity/Increased latency
Soft volume
Soft volume/Slowed rate/Decreased productivity
Soft volume/Slowed rate/Decreased productivity
Soft volume/Slowed rate/Decreased productivity/Increased latency

## 2025-05-20 NOTE — BH CONSULTATION LIAISON PROGRESS NOTE - NSBHMSEMOVE_PSY_A_CORE
Other
Other
No abnormal movements
Rigidity
Other
Other
Rigidity
Other
Rigidity
No abnormal movements
Rigidity
No abnormal movements
No abnormal movements
Other
Other
Rigidity
No abnormal movements
Other
Other
No abnormal movements
Other
No abnormal movements
No abnormal movements
Rigidity
Rigidity

## 2025-05-20 NOTE — BH CONSULTATION LIAISON PROGRESS NOTE - NSBHPTASSESSDT_PSY_A_CORE
18-Apr-2025 12:52
09-Apr-2025 12:45
15-May-2025 11:03
04-Apr-2025 11:42
07-May-2025 11:05
08-Apr-2025 12:49
10-Apr-2025 15:47
12-May-2025 10:59
22-Apr-2025 11:43
24-Apr-2025 13:05
28-Mar-2025 11:48
16-Apr-2025 14:20
23-Apr-2025 11:38
21-Apr-2025 11:41
05-May-2025 09:34
26-Mar-2025 13:22
17-Apr-2025 13:21
20-May-2025 10:38
24-Mar-2025 14:10
27-Mar-2025 20:30
29-Mar-2025 10:24
28-Apr-2025 12:06
06-May-2025 09:26
19-May-2025 11:46
30-Apr-2025 15:01
31-Mar-2025 11:32
02-Apr-2025 15:41
01-May-2025 08:54
13-May-2025 12:37
08-May-2025 09:41
25-Mar-2025 15:10
09-May-2025 12:58
16-May-2025 14:05
25-Apr-2025 11:29
03-Apr-2025 21:32
01-Apr-2025 14:14
02-May-2025 14:25
07-Apr-2025 12:04
05-Apr-2025 11:32
14-Apr-2025 11:22
29-Apr-2025 12:15

## 2025-05-21 ENCOUNTER — INPATIENT (INPATIENT)
Facility: HOSPITAL | Age: 75
LOS: 40 days | Discharge: HOME | End: 2025-07-01
Attending: PSYCHIATRY & NEUROLOGY | Admitting: PSYCHIATRY & NEUROLOGY
Payer: MEDICARE

## 2025-05-21 VITALS
RESPIRATION RATE: 16 BRPM | SYSTOLIC BLOOD PRESSURE: 157 MMHG | HEIGHT: 61 IN | WEIGHT: 149.91 LBS | DIASTOLIC BLOOD PRESSURE: 90 MMHG | TEMPERATURE: 98 F | OXYGEN SATURATION: 94 % | HEART RATE: 78 BPM

## 2025-05-21 DIAGNOSIS — F33.9 MAJOR DEPRESSIVE DISORDER, RECURRENT, UNSPECIFIED: ICD-10-CM

## 2025-05-21 DIAGNOSIS — F32.9 MAJOR DEPRESSIVE DISORDER, SINGLE EPISODE, UNSPECIFIED: ICD-10-CM

## 2025-05-21 LAB
GLUCOSE BLDC GLUCOMTR-MCNC: 112 MG/DL — HIGH (ref 70–99)
GLUCOSE BLDC GLUCOMTR-MCNC: 119 MG/DL — HIGH (ref 70–99)
GLUCOSE BLDC GLUCOMTR-MCNC: 128 MG/DL — HIGH (ref 70–99)
GLUCOSE BLDC GLUCOMTR-MCNC: 133 MG/DL — HIGH (ref 70–99)

## 2025-05-21 PROCEDURE — 99233 SBSQ HOSP IP/OBS HIGH 50: CPT

## 2025-05-21 PROCEDURE — 99223 1ST HOSP IP/OBS HIGH 75: CPT

## 2025-05-21 RX ORDER — MELATONIN 5 MG
9 TABLET ORAL AT BEDTIME
Refills: 0 | Status: DISCONTINUED | OUTPATIENT
Start: 2025-05-21 | End: 2025-07-01

## 2025-05-21 RX ORDER — SENNA 187 MG
2 TABLET ORAL AT BEDTIME
Refills: 0 | Status: DISCONTINUED | OUTPATIENT
Start: 2025-05-21 | End: 2025-07-01

## 2025-05-21 RX ORDER — MELATONIN 5 MG
9 TABLET ORAL ONCE
Refills: 0 | Status: DISCONTINUED | OUTPATIENT
Start: 2025-05-21 | End: 2025-05-21

## 2025-05-21 RX ORDER — INSULIN LISPRO 100 U/ML
INJECTION, SOLUTION INTRAVENOUS; SUBCUTANEOUS AT BEDTIME
Refills: 0 | Status: DISCONTINUED | OUTPATIENT
Start: 2025-05-21 | End: 2025-06-13

## 2025-05-21 RX ORDER — TIOTROPIUM BROMIDE INHALATION SPRAY 3.12 UG/1
2 SPRAY, METERED RESPIRATORY (INHALATION) DAILY
Refills: 0 | Status: DISCONTINUED | OUTPATIENT
Start: 2025-05-21 | End: 2025-07-01

## 2025-05-21 RX ORDER — OLANZAPINE 10 MG/1
1.25 TABLET ORAL EVERY 6 HOURS
Refills: 0 | Status: DISCONTINUED | OUTPATIENT
Start: 2025-05-21 | End: 2025-07-01

## 2025-05-21 RX ORDER — INSULIN GLARGINE-YFGN 100 [IU]/ML
3 INJECTION, SOLUTION SUBCUTANEOUS ONCE
Refills: 0 | Status: DISCONTINUED | OUTPATIENT
Start: 2025-05-21 | End: 2025-05-21

## 2025-05-21 RX ORDER — LEVALBUTEROL HYDROCHLORIDE 1.25 MG/3ML
1 SOLUTION RESPIRATORY (INHALATION)
Qty: 1 | Refills: 0
Start: 2025-05-21 | End: 2025-06-19

## 2025-05-21 RX ORDER — DEXTROSE 50 % IN WATER 50 %
15 SYRINGE (ML) INTRAVENOUS ONCE
Refills: 0 | Status: DISCONTINUED | OUTPATIENT
Start: 2025-05-21 | End: 2025-06-13

## 2025-05-21 RX ORDER — MELATONIN 5 MG
95 TABLET ORAL AT BEDTIME
Refills: 0 | Status: DISCONTINUED | OUTPATIENT
Start: 2025-05-21 | End: 2025-05-21

## 2025-05-21 RX ORDER — LANOLIN/MINERAL OIL/PETROLATUM
1 OINTMENT (GRAM) OPHTHALMIC (EYE) EVERY 8 HOURS
Refills: 0 | Status: DISCONTINUED | OUTPATIENT
Start: 2025-05-21 | End: 2025-07-01

## 2025-05-21 RX ORDER — MEMANTINE HYDROCHLORIDE 21 MG/1
10 CAPSULE, EXTENDED RELEASE ORAL ONCE
Refills: 0 | Status: DISCONTINUED | OUTPATIENT
Start: 2025-05-21 | End: 2025-05-21

## 2025-05-21 RX ORDER — HALOPERIDOL 10 MG/1
1 TABLET ORAL EVERY 6 HOURS
Refills: 0 | Status: DISCONTINUED | OUTPATIENT
Start: 2025-05-21 | End: 2025-05-21

## 2025-05-21 RX ORDER — SERTRALINE 100 MG/1
125 TABLET, FILM COATED ORAL DAILY
Refills: 0 | Status: DISCONTINUED | OUTPATIENT
Start: 2025-05-21 | End: 2025-07-01

## 2025-05-21 RX ORDER — GLUCAGON 3 MG/1
1 POWDER NASAL ONCE
Refills: 0 | Status: DISCONTINUED | OUTPATIENT
Start: 2025-05-21 | End: 2025-07-01

## 2025-05-21 RX ORDER — INSULIN GLARGINE-YFGN 100 [IU]/ML
3 INJECTION, SOLUTION SUBCUTANEOUS AT BEDTIME
Refills: 0 | Status: DISCONTINUED | OUTPATIENT
Start: 2025-05-21 | End: 2025-06-04

## 2025-05-21 RX ORDER — LEVALBUTEROL HYDROCHLORIDE 1.25 MG/3ML
3 SOLUTION RESPIRATORY (INHALATION)
Qty: 90 | Refills: 0
Start: 2025-05-21 | End: 2025-06-19

## 2025-05-21 RX ORDER — MEMANTINE HYDROCHLORIDE 21 MG/1
10 CAPSULE, EXTENDED RELEASE ORAL
Refills: 0 | Status: DISCONTINUED | OUTPATIENT
Start: 2025-05-21 | End: 2025-07-01

## 2025-05-21 RX ORDER — INSULIN LISPRO 100 U/ML
INJECTION, SOLUTION INTRAVENOUS; SUBCUTANEOUS
Refills: 0 | Status: DISCONTINUED | OUTPATIENT
Start: 2025-05-21 | End: 2025-06-13

## 2025-05-21 RX ORDER — OLANZAPINE 10 MG/1
2.5 TABLET ORAL ONCE
Refills: 0 | Status: DISCONTINUED | OUTPATIENT
Start: 2025-05-21 | End: 2025-05-21

## 2025-05-21 RX ORDER — LORAZEPAM 4 MG/ML
0.5 VIAL (ML) INJECTION ONCE
Refills: 0 | Status: DISCONTINUED | OUTPATIENT
Start: 2025-05-21 | End: 2025-05-21

## 2025-05-21 RX ORDER — ALBUTEROL SULFATE 2.5 MG/3ML
2 VIAL, NEBULIZER (ML) INHALATION EVERY 6 HOURS
Refills: 0 | Status: DISCONTINUED | OUTPATIENT
Start: 2025-05-21 | End: 2025-05-23

## 2025-05-21 RX ORDER — SENNA 187 MG
2 TABLET ORAL ONCE
Refills: 0 | Status: DISCONTINUED | OUTPATIENT
Start: 2025-05-21 | End: 2025-05-21

## 2025-05-21 RX ORDER — ATORVASTATIN CALCIUM 80 MG/1
20 TABLET, FILM COATED ORAL AT BEDTIME
Refills: 0 | Status: DISCONTINUED | OUTPATIENT
Start: 2025-05-21 | End: 2025-05-22

## 2025-05-21 RX ORDER — INSULIN LISPRO 100 U/ML
2 INJECTION, SOLUTION INTRAVENOUS; SUBCUTANEOUS
Refills: 0 | Status: DISCONTINUED | OUTPATIENT
Start: 2025-05-21 | End: 2025-05-27

## 2025-05-21 RX ORDER — LANOLIN/MINERAL OIL/PETROLATUM
1 OINTMENT (GRAM) OPHTHALMIC (EYE)
Refills: 0 | Status: DISCONTINUED | OUTPATIENT
Start: 2025-05-21 | End: 2025-07-01

## 2025-05-21 RX ORDER — OLANZAPINE 10 MG/1
2.5 TABLET ORAL AT BEDTIME
Refills: 0 | Status: DISCONTINUED | OUTPATIENT
Start: 2025-05-21 | End: 2025-05-27

## 2025-05-21 RX ORDER — MEMANTINE HYDROCHLORIDE 21 MG/1
10 CAPSULE, EXTENDED RELEASE ORAL AT BEDTIME
Refills: 0 | Status: DISCONTINUED | OUTPATIENT
Start: 2025-05-21 | End: 2025-07-01

## 2025-05-21 RX ORDER — LISINOPRIL 5 MG/1
40 TABLET ORAL DAILY
Refills: 0 | Status: DISCONTINUED | OUTPATIENT
Start: 2025-05-21 | End: 2025-07-01

## 2025-05-21 RX ADMIN — Medication 9 MILLIGRAM(S): at 20:24

## 2025-05-21 RX ADMIN — Medication 2 TABLET(S): at 20:25

## 2025-05-21 RX ADMIN — ATORVASTATIN CALCIUM 20 MILLIGRAM(S): 80 TABLET, FILM COATED ORAL at 20:24

## 2025-05-21 RX ADMIN — OLANZAPINE 2.5 MILLIGRAM(S): 10 TABLET ORAL at 20:24

## 2025-05-21 RX ADMIN — LISINOPRIL 40 MILLIGRAM(S): 5 TABLET ORAL at 08:46

## 2025-05-21 RX ADMIN — MEMANTINE HYDROCHLORIDE 10 MILLIGRAM(S): 21 CAPSULE, EXTENDED RELEASE ORAL at 20:24

## 2025-05-21 RX ADMIN — SERTRALINE 125 MILLIGRAM(S): 100 TABLET, FILM COATED ORAL at 08:46

## 2025-05-21 RX ADMIN — MEMANTINE HYDROCHLORIDE 10 MILLIGRAM(S): 21 CAPSULE, EXTENDED RELEASE ORAL at 08:46

## 2025-05-21 RX ADMIN — Medication 1 DROP(S): at 13:01

## 2025-05-21 RX ADMIN — TIOTROPIUM BROMIDE INHALATION SPRAY 2 PUFF(S): 3.12 SPRAY, METERED RESPIRATORY (INHALATION) at 08:47

## 2025-05-21 RX ADMIN — Medication 1 DROP(S): at 21:11

## 2025-05-21 RX ADMIN — Medication 1 DOSE(S): at 20:27

## 2025-05-21 RX ADMIN — INSULIN LISPRO 2 UNIT(S): 100 INJECTION, SOLUTION INTRAVENOUS; SUBCUTANEOUS at 07:50

## 2025-05-21 RX ADMIN — INSULIN GLARGINE-YFGN 3 UNIT(S): 100 INJECTION, SOLUTION SUBCUTANEOUS at 21:10

## 2025-05-21 RX ADMIN — Medication 1 DOSE(S): at 08:46

## 2025-05-21 NOTE — BH INPATIENT PSYCHIATRY ASSESSMENT NOTE - NSBHCHARTREVIEWVS_PSY_A_CORE FT
Vital Signs Last 24 Hrs  T(C): 36.9 (05-21-25 @ 07:46), Max: 36.9 (05-21-25 @ 00:57)  T(F): 98.4 (05-21-25 @ 07:46), Max: 98.4 (05-21-25 @ 00:57)  HR: 78 (05-21-25 @ 00:57) (78 - 81)  BP: 157/90 (05-21-25 @ 00:57) (154/85 - 157/90)  BP(mean): --  RR: 16 (05-21-25 @ 00:57) (16 - 18)  SpO2: 94% (05-21-25 @ 00:57) (94% - 96%)    Orthostatic VS  05-21-25 @ 07:46  Lying BP: --/-- HR: --  Sitting BP: 167/85 HR: 79  Standing BP: 153/88 HR: 87  Site: --  Mode: --  Orthostatic VS  05-21-25 @ 00:57  Lying BP: --/-- HR: --  Sitting BP: 153/95 HR: 75  Standing BP: --/-- HR: --  Site: --  Mode: --

## 2025-05-21 NOTE — BH PATIENT PROFILE - HOME MEDICATIONS
ocular lubricant ophthalmic ointment , 1 application to each affected eye 2 times a day  ocular lubricant preservative-free ophthalmic solution , 1 drop(s) to each affected eye every 8 hours  memantine 10 mg oral tablet , 1 tab(s) orally once a day at 7 AM  memantine 10 mg oral tablet , 1 tab(s) orally once a day (at bedtime)  polyethylene glycol 3350 oral powder for reconstitution , 17 gram(s) orally 2 times a day  senna leaf extract oral tablet , 2 tab(s) orally once a day (at bedtime)  hydroCHLOROthiazide 25 mg oral tablet , 1 tab(s) orally once a day  fluticasone-salmeterol , 1 puff(s) inhaled 2 times a day  tiotropium 2.5 mcg/inh inhalation aerosol , 2 puff(s) inhaled once a day  levalbuterol 0.63 mg/3 mL inhalation solution , 3 milliliter(s) inhaled every 8 hours  melatonin 3 mg oral tablet , 3 tab(s) orally once a day (at bedtime)  OLANZapine 2.5 mg oral tablet , 1 tab(s) orally once a day (at bedtime)  insulin lispro 100 units/mL injectable solution , 2 unit(s) injectable 3 times a day (before meals)  insulin glargine 100 units/mL subcutaneous solution , 3 unit(s) subcutaneous once a day (at bedtime)  sertraline 25 mg oral tablet , 5 tab(s) orally once a day  lisinopril 40 mg oral tablet , 1 tab(s) orally once a day

## 2025-05-21 NOTE — CONSULT NOTE ADULT - SUBJECTIVE AND OBJECTIVE BOX
HPI:     PAST MEDICAL & SURGICAL HISTORY:  MDD (major depressive disorder), recurrent, severe, with psychosis      Asthma, mild intermittent, uncomplicated      Essential hypertension      No significant past surgical history          Review of Systems:   CONSTITUTIONAL: No fever, weight loss, or fatigue  EYES: No eye pain, visual disturbances, or discharge  ENMT:  No difficulty hearing, tinnitus, vertigo; No sinus or throat pain  NECK: No pain or stiffness  RESPIRATORY: No cough, wheezing, chills or hemoptysis; No shortness of breath  CARDIOVASCULAR: No chest pain, palpitations, dizziness, or leg swelling  GASTROINTESTINAL: No abdominal or epigastric pain. No nausea, vomiting, or hematemesis; No diarrhea or constipation. No melena or hematochezia.  GENITOURINARY: No dysuria, frequency, hematuria, or incontinence  NEUROLOGICAL: No headaches, memory loss, loss of strength, numbness, or tremors  SKIN: No itching, burning, rashes, or lesions   LYMPH NODES: No enlarged glands  ENDOCRINE: No heat or cold intolerance; No hair loss  MUSCULOSKELETAL: No joint pain or swelling; No muscle, back, or extremity pain  HEME/LYMPH: No easy bruising, or bleeding gums  ALLERY AND IMMUNOLOGIC: No hives or eczema    Allergies    No Known Allergies    Intolerances        Social History:     FAMILY HISTORY:      MEDICATIONS  (STANDING):  artificial tears (preservative free) Ophthalmic Solution 1 Drop(s) Both EYES every 8 hours  dextrose Oral Gel 15 Gram(s) Oral once  fluticasone propionate/ salmeterol 250-50 MICROgram(s) Diskus 1 Dose(s) Inhalation two times a day  glucagon  Injectable 1 milliGRAM(s) IntraMuscular once  hydrochlorothiazide 25 milliGRAM(s) Oral daily  insulin glargine Injectable (LANTUS) 3 Unit(s) SubCutaneous at bedtime  insulin lispro (ADMELOG) corrective regimen sliding scale   SubCutaneous at bedtime  insulin lispro (ADMELOG) corrective regimen sliding scale   SubCutaneous three times a day before meals  insulin lispro Injectable (ADMELOG) 2 Unit(s) SubCutaneous three times a day before meals  lisinopril 40 milliGRAM(s) Oral daily  melatonin. 9 milliGRAM(s) Oral at bedtime  memantine 10 milliGRAM(s) Oral <User Schedule>  memantine 10 milliGRAM(s) Oral at bedtime  OLANZapine 2.5 milliGRAM(s) Oral at bedtime  petrolatum Ophthalmic Ointment 1 Application(s) Both EYES two times a day  senna 2 Tablet(s) Oral at bedtime  sertraline 125 milliGRAM(s) Oral daily  tiotropium 2.5 MICROgram(s) Inhaler 2 Puff(s) Inhalation daily    MEDICATIONS  (PRN):  haloperidol     Tablet 1 milliGRAM(s) Oral every 6 hours PRN Mild-moderate agitation, secondary to psychosis, shukri, or poor impulse control  LORazepam   Injectable 0.5 milliGRAM(s) IntraMuscular once PRN Severe agitation secondary to psychosis, shukri or poor impulse control. Severe anxiety      Vital Signs Last 24 Hrs  T(C): 36.9 (21 May 2025 07:46), Max: 36.9 (21 May 2025 00:57)  T(F): 98.4 (21 May 2025 07:46), Max: 98.4 (21 May 2025 00:57)  HR: 78 (21 May 2025 00:57) (78 - 81)  BP: 157/90 (21 May 2025 00:57) (154/85 - 157/90)  BP(mean): --  RR: 16 (21 May 2025 00:57) (16 - 18)  SpO2: 94% (21 May 2025 00:57) (94% - 98%)    Parameters below as of 21 May 2025 00:57  Patient On (Oxygen Delivery Method): room air      CAPILLARY BLOOD GLUCOSE      POCT Blood Glucose.: 133 mg/dL (21 May 2025 07:33)  POCT Blood Glucose.: 184 mg/dL (20 May 2025 22:16)  POCT Blood Glucose.: 147 mg/dL (20 May 2025 17:03)  POCT Blood Glucose.: 163 mg/dL (20 May 2025 12:11)        PHYSICAL EXAM:  GENERAL: NAD, well-developed  HEAD:  Atraumatic, Normocephalic  EYES: EOMI, conjunctiva and sclera clear  NECK: Supple, No JVD  CHEST/LUNG: Clear to auscultation bilaterally; No wheeze  HEART: Regular rate and rhythm; No murmurs, rubs, or gallops  ABDOMEN: Soft, Nontender, Nondistended; Bowel sounds present  EXTREMITIES:  2+ Peripheral Pulses, No clubbing, cyanosis, or edema  NEUROLOGY: non-focal  SKIN: No rashes or lesions    LABS:                        12.0   9.29  )-----------( 160      ( 20 May 2025 06:30 )             37.0     05-20    142  |  105  |  28[H]  ----------------------------<  112[H]  3.4[L]   |  25  |  0.52    Ca    9.5      20 May 2025 06:30  Phos  3.7     05-20  Mg     1.80     05-20            Urinalysis Basic - ( 20 May 2025 06:30 )    Color: x / Appearance: x / SG: x / pH: x  Gluc: 112 mg/dL / Ketone: x  / Bili: x / Urobili: x   Blood: x / Protein: x / Nitrite: x   Leuk Esterase: x / RBC: x / WBC x   Sq Epi: x / Non Sq Epi: x / Bacteria: x        EKG(personally reviewed):    RADIOLOGY & ADDITIONAL TESTS:    Imaging Personally Reviewed:    Consultant(s) Notes Reviewed:      Care Discussed with Consultants/Other Providers:   HPI:   74Fw/ hx MDD, asthma, prior CVA presented to [facility] with AHRF c/b AMS requiring MICU admission for intubation for respiratory failure i/s/o asthma exacerbation. Extubated 3/12 to face tent and then on BIPAP s/p RRT 3/19 for increased WOB/hypoxia despite aggressive asthma management s/p return to MICU for management of severe asthma. Possible aspiration event 3/31. Worsening respiratory status 4/2 - placed on NIV, weaned to nasal cannula and now RA.  ENT eval - no upper airway consideration for structural causes of asthma such as vocal cord dysfunction. Course c/b MDD w/ catatonia and poor PO, off ativan due to respiratory concerns trialed on Memantine, now undergoing ECT q weekly.  Transferred to [facility] for further management of catatonia and continued ECT.    PAST MEDICAL & SURGICAL HISTORY:  MDD (major depressive disorder), recurrent, severe, with psychosis  Asthma, mild intermittent, uncomplicated  Essential hypertension  No significant past surgical history    Review of Systems:   CONSTITUTIONAL: As HPI  EYES: No eye pain, visual disturbances, or discharge  ENMT:  No difficulty hearing, tinnitus, vertigo; No sinus or throat pain  NECK: No pain or stiffness  RESPIRATORY: No cough, wheezing, chills or hemoptysis; No shortness of breath  CARDIOVASCULAR: No chest pain, palpitations, dizziness, or leg swelling  GASTROINTESTINAL: No abdominal or epigastric pain. No nausea, vomiting, or hematemesis; No diarrhea or constipation. No melena or hematochezia.  GENITOURINARY: No dysuria, frequency, hematuria, or incontinence  NEUROLOGICAL: As HPI  SKIN: No itching, burning, rashes, or lesions   LYMPH NODES: No enlarged glands  ENDOCRINE: No heat or cold intolerance; No hair loss  MUSCULOSKELETAL: No joint pain or swelling; No muscle, back, or extremity pain  HEME/LYMPH: No easy bruising, or bleeding gums  ALLERY AND IMMUNOLOGIC: No hives or eczema    Allergies  No Known Allergies    Social History:   No substance use      MEDICATIONS  (STANDING):  artificial tears (preservative free) Ophthalmic Solution 1 Drop(s) Both EYES every 8 hours  dextrose Oral Gel 15 Gram(s) Oral once  fluticasone propionate/ salmeterol 250-50 MICROgram(s) Diskus 1 Dose(s) Inhalation two times a day  glucagon  Injectable 1 milliGRAM(s) IntraMuscular once  hydrochlorothiazide 25 milliGRAM(s) Oral daily  insulin glargine Injectable (LANTUS) 3 Unit(s) SubCutaneous at bedtime  insulin lispro (ADMELOG) corrective regimen sliding scale   SubCutaneous at bedtime  insulin lispro (ADMELOG) corrective regimen sliding scale   SubCutaneous three times a day before meals  insulin lispro Injectable (ADMELOG) 2 Unit(s) SubCutaneous three times a day before meals  lisinopril 40 milliGRAM(s) Oral daily  melatonin. 9 milliGRAM(s) Oral at bedtime  memantine 10 milliGRAM(s) Oral <User Schedule>  memantine 10 milliGRAM(s) Oral at bedtime  OLANZapine 2.5 milliGRAM(s) Oral at bedtime  petrolatum Ophthalmic Ointment 1 Application(s) Both EYES two times a day  senna 2 Tablet(s) Oral at bedtime  sertraline 125 milliGRAM(s) Oral daily  tiotropium 2.5 MICROgram(s) Inhaler 2 Puff(s) Inhalation daily    MEDICATIONS  (PRN):  haloperidol     Tablet 1 milliGRAM(s) Oral every 6 hours PRN Mild-moderate agitation, secondary to psychosis, shukri, or poor impulse control  LORazepam   Injectable 0.5 milliGRAM(s) IntraMuscular once PRN Severe agitation secondary to psychosis, shukri or poor impulse control. Severe anxiety      Vital Signs Last 24 Hrs  T(C): 36.9 (21 May 2025 07:46), Max: 36.9 (21 May 2025 00:57)  T(F): 98.4 (21 May 2025 07:46), Max: 98.4 (21 May 2025 00:57)  HR: 78 (21 May 2025 00:57) (78 - 81)  BP: 157/90 (21 May 2025 00:57) (154/85 - 157/90)  RR: 16 (21 May 2025 00:57) (16 - 18)  SpO2: 94% (21 May 2025 00:57) (94% - 98%)    Parameters below as of 21 May 2025 00:57  Patient On (Oxygen Delivery Method): room air      CAPILLARY BLOOD GLUCOSE      POCT Blood Glucose.: 133 mg/dL (21 May 2025 07:33)  POCT Blood Glucose.: 184 mg/dL (20 May 2025 22:16)  POCT Blood Glucose.: 147 mg/dL (20 May 2025 17:03)  POCT Blood Glucose.: 163 mg/dL (20 May 2025 12:11)        PHYSICAL EXAM:  GENERAL: NAD, well-developed  HEAD:  Atraumatic, Normocephalic  EYES: EOMI, conjunctiva and sclera clear  NECK: Supple, No JVD  CHEST/LUNG: Clear to auscultation bilaterally; No wheeze  HEART: Regular rate and rhythm; No murmurs, rubs, or gallops  ABDOMEN: Soft, Nontender, Nondistended; Bowel sounds present  EXTREMITIES:  2+ Peripheral Pulses, No clubbing, cyanosis, or edema  NEUROLOGY: non-focal  SKIN: No rashes or lesions    LABS:                        12.0   9.29  )-----------( 160      ( 20 May 2025 06:30 )             37.0     05-20    142  |  105  |  28[H]  ----------------------------<  112[H]  3.4[L]   |  25  |  0.52    Ca    9.5      20 May 2025 06:30  Phos  3.7     05-20  Mg     1.80     05-20            Urinalysis Basic - ( 20 May 2025 06:30 )    Color: x / Appearance: x / SG: x / pH: x  Gluc: 112 mg/dL / Ketone: x  / Bili: x / Urobili: x   Blood: x / Protein: x / Nitrite: x   Leuk Esterase: x / RBC: x / WBC x   Sq Epi: x / Non Sq Epi: x / Bacteria: x        EKG(personally reviewed):    RADIOLOGY & ADDITIONAL TESTS:    Imaging Personally Reviewed:    Consultant(s) Notes Reviewed:      Care Discussed with Consultants/Other Providers:   HPI:   74Fw/ hx MDD, asthma, prior CVA presented to [facility] with AHRF c/b AMS requiring MICU admission for intubation for respiratory failure i/s/o asthma exacerbation. Extubated 3/12 to face tent and then on BIPAP s/p RRT 3/19 for increased WOB/hypoxia despite aggressive asthma management s/p return to MICU for management of severe asthma. Possible aspiration event 3/31. Worsening respiratory status 4/2 - placed on NIV, weaned to nasal cannula and now RA.  ENT eval - no upper airway consideration for structural causes of asthma such as vocal cord dysfunction. Course c/b MDD w/ catatonia and poor PO, off ativan due to respiratory concerns trialed on Memantine, now undergoing ECT q weekly.  Transferred to [facility] for further management of catatonia and continued ECT.    Seen by me this morning, sitting in recliner in TV area. Had a BM right before my visit, soft. No complaints, denies SOB or chest pain o dizziness or cough. Appetite is good.    PAST MEDICAL & SURGICAL HISTORY:  MDD (major depressive disorder), recurrent, severe, with psychosis  Asthma, mild intermittent, uncomplicated  Essential hypertension  No significant past surgical history    Review of Systems:   CONSTITUTIONAL: As HPI  EYES: No eye pain, visual disturbances, or discharge  ENMT:  No difficulty hearing, tinnitus, vertigo; No sinus or throat pain  NECK: No pain or stiffness  RESPIRATORY: No cough, wheezing, chills or hemoptysis; No shortness of breath  CARDIOVASCULAR: No chest pain, palpitations, dizziness, or leg swelling  GASTROINTESTINAL: No abdominal or epigastric pain. No nausea, vomiting, or hematemesis; No diarrhea or constipation. No melena or hematochezia.  GENITOURINARY: No dysuria, frequency, hematuria, or incontinence  NEUROLOGICAL: As HPI  SKIN: No itching, burning, rashes, or lesions   LYMPH NODES: No enlarged glands  ENDOCRINE: No heat or cold intolerance; No hair loss  MUSCULOSKELETAL: No joint pain or swelling; No muscle, back, or extremity pain  HEME/LYMPH: No easy bruising, or bleeding gums  ALLERY AND IMMUNOLOGIC: No hives or eczema    Allergies  No Known Allergies    Social History:   No substance use      MEDICATIONS  (STANDING):  artificial tears (preservative free) Ophthalmic Solution 1 Drop(s) Both EYES every 8 hours  dextrose Oral Gel 15 Gram(s) Oral once  fluticasone propionate/ salmeterol 250-50 MICROgram(s) Diskus 1 Dose(s) Inhalation two times a day  glucagon  Injectable 1 milliGRAM(s) IntraMuscular once  hydrochlorothiazide 25 milliGRAM(s) Oral daily  insulin glargine Injectable (LANTUS) 3 Unit(s) SubCutaneous at bedtime  insulin lispro (ADMELOG) corrective regimen sliding scale   SubCutaneous at bedtime  insulin lispro (ADMELOG) corrective regimen sliding scale   SubCutaneous three times a day before meals  insulin lispro Injectable (ADMELOG) 2 Unit(s) SubCutaneous three times a day before meals  lisinopril 40 milliGRAM(s) Oral daily  melatonin. 9 milliGRAM(s) Oral at bedtime  memantine 10 milliGRAM(s) Oral <User Schedule>  memantine 10 milliGRAM(s) Oral at bedtime  OLANZapine 2.5 milliGRAM(s) Oral at bedtime  petrolatum Ophthalmic Ointment 1 Application(s) Both EYES two times a day  senna 2 Tablet(s) Oral at bedtime  sertraline 125 milliGRAM(s) Oral daily  tiotropium 2.5 MICROgram(s) Inhaler 2 Puff(s) Inhalation daily    MEDICATIONS  (PRN):  haloperidol     Tablet 1 milliGRAM(s) Oral every 6 hours PRN Mild-moderate agitation, secondary to psychosis, shukri, or poor impulse control  LORazepam   Injectable 0.5 milliGRAM(s) IntraMuscular once PRN Severe agitation secondary to psychosis, shukri or poor impulse control. Severe anxiety      Vital Signs Last 24 Hrs  T(C): 36.9 (21 May 2025 07:46), Max: 36.9 (21 May 2025 00:57)  T(F): 98.4 (21 May 2025 07:46), Max: 98.4 (21 May 2025 00:57)  HR: 78 (21 May 2025 00:57) (78 - 81)  BP: 157/90 (21 May 2025 00:57) (154/85 - 157/90)  RR: 16 (21 May 2025 00:57) (16 - 18)  SpO2: 94% (21 May 2025 00:57) (94% - 98%)    Parameters below as of 21 May 2025 00:57  Patient On (Oxygen Delivery Method): room air      CAPILLARY BLOOD GLUCOSE      POCT Blood Glucose.: 133 mg/dL (21 May 2025 07:33)  POCT Blood Glucose.: 184 mg/dL (20 May 2025 22:16)  POCT Blood Glucose.: 147 mg/dL (20 May 2025 17:03)  POCT Blood Glucose.: 163 mg/dL (20 May 2025 12:11)        PHYSICAL EXAM:  GENERAL: NAD, well-developed  HEAD:  Atraumatic, Normocephalic  EYES: EOMI, conjunctiva and sclera clear  NECK: Supple, No JVD  CHEST/LUNG: Clear to auscultation bilaterally; No wheeze  HEART: Regular rate and rhythm; No murmurs, rubs, or gallops  ABDOMEN: Soft, Nontender, Nondistended; Bowel sounds present  EXTREMITIES:  2+ Peripheral Pulses, No clubbing, cyanosis, or edema  NEUROLOGY: non-focal  SKIN: No rashes or lesions    LABS:                        12.0   9.29  )-----------( 160      ( 20 May 2025 06:30 )             37.0     05-20    142  |  105  |  28[H]  ----------------------------<  112[H]  3.4[L]   |  25  |  0.52    Ca    9.5      20 May 2025 06:30  Phos  3.7     05-20  Mg     1.80     05-20            Urinalysis Basic - ( 20 May 2025 06:30 )    Color: x / Appearance: x / SG: x / pH: x  Gluc: 112 mg/dL / Ketone: x  / Bili: x / Urobili: x   Blood: x / Protein: x / Nitrite: x   Leuk Esterase: x / RBC: x / WBC x   Sq Epi: x / Non Sq Epi: x / Bacteria: x        EKG(personally reviewed):    RADIOLOGY & ADDITIONAL TESTS:    Imaging Personally Reviewed:    Consultant(s) Notes Reviewed:      Care Discussed with Consultants/Other Providers:

## 2025-05-21 NOTE — BH INPATIENT PSYCHIATRY ASSESSMENT NOTE - NSBHMETABOLIC_PSY_ALL_CORE_FT
BMI: BMI (kg/m2): 33.3 (05-21-25 @ 00:57)  HbA1c: A1C with Estimated Average Glucose Result: 5.7 % (03-09-25 @ 07:10)    Glucose: POCT Blood Glucose.: 119 mg/dL (05-21-25 @ 11:50)    BP: 157/90 (05-21-25 @ 00:57) (157/90 - 157/90)Vital Signs Last 24 Hrs  T(C): 36.9 (05-21-25 @ 07:46), Max: 36.9 (05-21-25 @ 00:57)  T(F): 98.4 (05-21-25 @ 07:46), Max: 98.4 (05-21-25 @ 00:57)  HR: 78 (05-21-25 @ 00:57) (78 - 81)  BP: 157/90 (05-21-25 @ 00:57) (154/85 - 157/90)  BP(mean): --  RR: 16 (05-21-25 @ 00:57) (16 - 18)  SpO2: 94% (05-21-25 @ 00:57) (94% - 96%)    Orthostatic VS  05-21-25 @ 07:46  Lying BP: --/-- HR: --  Sitting BP: 167/85 HR: 79  Standing BP: 153/88 HR: 87  Site: --  Mode: --  Orthostatic VS  05-21-25 @ 00:57  Lying BP: --/-- HR: --  Sitting BP: 153/95 HR: 75  Standing BP: --/-- HR: --  Site: --  Mode: --    Lipid Panel:  BMI: BMI (kg/m2): 28.3 (05-21-25 @ 00:57)  HbA1c: A1C with Estimated Average Glucose Result: 5.7 % (03-09-25 @ 07:10)    Glucose: POCT Blood Glucose.: 119 mg/dL (05-21-25 @ 11:50)    BP: 157/90 (05-21-25 @ 00:57) (157/90 - 157/90)Vital Signs Last 24 Hrs  T(C): 36.9 (05-21-25 @ 07:46), Max: 36.9 (05-21-25 @ 00:57)  T(F): 98.4 (05-21-25 @ 07:46), Max: 98.4 (05-21-25 @ 00:57)  HR: 78 (05-21-25 @ 00:57) (78 - 81)  BP: 157/90 (05-21-25 @ 00:57) (154/85 - 157/90)  BP(mean): --  RR: 16 (05-21-25 @ 00:57) (16 - 18)  SpO2: 94% (05-21-25 @ 00:57) (94% - 96%)    Orthostatic VS  05-21-25 @ 07:46  Lying BP: --/-- HR: --  Sitting BP: 167/85 HR: 79  Standing BP: 153/88 HR: 87  Site: --  Mode: --  Orthostatic VS  05-21-25 @ 00:57  Lying BP: --/-- HR: --  Sitting BP: 153/95 HR: 75  Standing BP: --/-- HR: --  Site: --  Mode: --    Lipid Panel:

## 2025-05-21 NOTE — DIETITIAN INITIAL EVALUATION ADULT - NS FNS DIET ORDER
Diet, Regular:   Consistent Carbohydrate {Evening Snack} (CSTCHOSN)  Easy to Chew (EASYTOCHEW)  No Beef  No Pork  Supplement Feeding Modality:  Oral  Ensure Max Cans or Servings Per Day:  1       Frequency:  Daily (05-21-25 @ 02:06)

## 2025-05-21 NOTE — DIETITIAN INITIAL EVALUATION ADULT - PERTINENT LABORATORY DATA
CAPILLARY BLOOD GLUCOSE  POCT Blood Glucose.: 119 mg/dL (21 May 2025 11:50)  POCT Blood Glucose.: 133 mg/dL (21 May 2025 07:33)  POCT Blood Glucose.: 184 mg/dL (20 May 2025 22:16)  POCT Blood Glucose.: 147 mg/dL (20 May 2025 17:03)    A1C with Estimated Average Glucose Result: 5.7 % (03-09-25 @ 07:10)

## 2025-05-21 NOTE — BH INPATIENT PSYCHIATRY ASSESSMENT NOTE - HPI (INCLUDE ILLNESS QUALITY, SEVERITY, DURATION, TIMING, CONTEXT, MODIFYING FACTORS, ASSOCIATED SIGNS AND SYMPTOMS)
per  assessment " Patient is a 74F hx asthma, depression, and HTN was admitted to the MICU with AHRF 2/2 asthma exacerbation resulting in acute on chronic HHRF causing AMS and increased WOB that required intubation. During her ICU course, she experienced seizure-like activity .  Additional issues included hyponatremia, acute on chronic metabolic alkalosis, and mild thrombocytopenia,  also having fevers.  Patient is from [place] [facility] [facility] [place]; primary language (Twi pronounced as Chi) domiciled with , retired teacher used to work in [facility], she has 6 children. Patient has h/o hx of major depressive disorder with psychosis, hx of 3 past inpatient psychiatric hospitalizations last in 2016 at [facility], all rehospitalizations were in context non compliance with the treatment. She has no hx of suicide attempts, no hx of substance abuse. Patient w hx of paranoia, disorganization, catatonia, required MOO when in [facility].   "    Patient evaluated by MIA, confirms the above findings. On assessment, patient is calm, cooperative, however minimally responsive to questioning (interpretive not used due to availability), States no SI/I/P, no HI/I/P, no safety concerns    On encounter this morning pt was observed sitting in the dayroom, awake and alert. She took her medications. Encounter carried in English, pt was able to answer close ended questions but was unable to engage in a full conversation. Denied being in any pain, denied SI/HI and reported feeling safe in the unit. Eye contact was intermittent. Later patient was able to speak in olivarez sentences with [facility] staff who speaks her language. Confirmed pt is able to communicate in Akan, for which we do have interpreting services through [employer]. Pt verbalized feeling more comfortable speaking Akan, will use  for our next meetings.

## 2025-05-21 NOTE — DIETITIAN INITIAL EVALUATION ADULT - PERTINENT MEDS FT
MEDICATIONS  (STANDING):  artificial tears (preservative free) Ophthalmic Solution 1 Drop(s) Both EYES every 8 hours  dextrose Oral Gel 15 Gram(s) Oral once  fluticasone propionate/ salmeterol 250-50 MICROgram(s) Diskus 1 Dose(s) Inhalation two times a day  glucagon  Injectable 1 milliGRAM(s) IntraMuscular once  hydrochlorothiazide 25 milliGRAM(s) Oral daily  insulin glargine Injectable (LANTUS) 3 Unit(s) SubCutaneous at bedtime  insulin lispro (ADMELOG) corrective regimen sliding scale   SubCutaneous at bedtime  insulin lispro (ADMELOG) corrective regimen sliding scale   SubCutaneous three times a day before meals  insulin lispro Injectable (ADMELOG) 2 Unit(s) SubCutaneous three times a day before meals  lisinopril 40 milliGRAM(s) Oral daily  melatonin. 9 milliGRAM(s) Oral at bedtime  memantine 10 milliGRAM(s) Oral <User Schedule>  memantine 10 milliGRAM(s) Oral at bedtime  OLANZapine 2.5 milliGRAM(s) Oral at bedtime  petrolatum Ophthalmic Ointment 1 Application(s) Both EYES two times a day  senna 2 Tablet(s) Oral at bedtime  sertraline 125 milliGRAM(s) Oral daily  tiotropium 2.5 MICROgram(s) Inhaler 2 Puff(s) Inhalation daily    MEDICATIONS  (PRN):  OLANZapine 1.25 milliGRAM(s) Oral every 6 hours PRN agitation

## 2025-05-21 NOTE — BH INPATIENT PSYCHIATRY ASSESSMENT NOTE - NSBHASSESSSUMMFT_PSY_ALL_CORE
74F hx asthma, depression, and HTN was admitted to the MICU with AHRF 2/2 asthma exacerbation resulting in acute on chronic HHRF causing AMS and increased WOB that required intubation. During her ICU course, she experienced seizure-like activity .  Additional issues included hyponatremia, acute on chronic metabolic alkalosis, and mild thrombocytopenia,  also having fevers.  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 Patient has h/o hx of major depressive disorder with psychosis, hx of 3 past inpatient psychiatric hospitalizations last in 2016 at [facility], all rehospitalizations were in context non compliance with the treatment. She has no hx of suicide attempts, no hx of substance abuse.    Pt currently requires inpatient level of care for ongoing treatment for catatonia.     -Admit to [facility] 2S  -2PC  -Routine checks  -Continue Namenda 10mg BID  -Continue Sertraline 125mg QD, titrate up to lowest effective dose  -Continue Olanzapine 2.5mg QHS, titrate up to lowest effective dose  -Would avoid Benzodiazepines since pt was recently unable to tolerate them at [facility], becoming extremely sedated  -Ordered Levalbuterol inhaler to [facility], to be delivered to 2S since [facility] pharmacy doesn't carry it  -Continue Advair and Spiriva for Asthma and COPD  -Continue Lisinopril 40mg QD and HCTZ 25mg QD for HTN  -Bowel regimen: Senna 2 tabs QHS  -Diet: Easy to chew, consistent carbohydrates. No Beef No Pork 74F hx asthma, depression, and HTN was admitted to the MICU with AHRF 2/2 asthma exacerbation resulting in acute on chronic HHRF causing AMS and increased WOB that required intubation. During her ICU course, she experienced seizure-like activity .  Additional issues included hyponatremia, acute on chronic metabolic alkalosis, and mild thrombocytopenia,  also having fevers.  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 Patient has h/o hx of major depressive disorder with psychosis, hx of 3 past inpatient psychiatric hospitalizations last in 2016 at [facility], all rehospitalizations were in context non compliance with the treatment. She has no hx of suicide attempts, no hx of substance abuse.    Pt currently requires inpatient level of care for ongoing treatment for catatonia.     -Admit to [facility] 2S  -2PC  -Routine checks  -Continue Namenda 10mg BID  -Continue Sertraline 125mg QD, titrate up to lowest effective dose  -Continue Olanzapine 2.5mg QHS, titrate up to lowest effective dose  -Would avoid Benzodiazepines since pt was recently unable to tolerate them at [facility], becoming extremely sedated  -Ordered Levalbuterol inhaler to [facility], to be delivered to 2S since [facility] pharmacy doesn't carry it  -Continue Advair and Spiriva for Asthma and COPD  -Continue Lisinopril 40mg QD and HCTZ 25mg QD for HTN  -Bowel regimen: Senna 2 tabs QHS  -Diet: Easy to chew, consistent carbohydrates. No Beef No Pork  -Pt is at risk for falls given deconditioning. Seen by PT, recommended PRN walker with 1 staff assists. Pt is in a room with peer who has CO for added supervision and redirection during nighttime. During the daytime she should be either in the dayroom or the dining area with staff monitoring her at all times to provide redirection if pt tries to get up or ambulate unassisted.

## 2025-05-21 NOTE — PHYSICAL THERAPY INITIAL EVALUATION ADULT - PERTINENT HX OF CURRENT PROBLEM, REHAB EVAL
Patient is a 74F hx asthma, depression, and HTN was admitted to the MICU with AHRF 2/2 asthma exacerbation resulting in acute on chronic HHRF causing AMS and increased WOB that required intubation. During her ICU course, she experienced seizure-like activity .  Additional issues included hyponatremia, acute on chronic metabolic alkalosis, and mild thrombocytopenia,  also having fevers.  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 Patient has h/o hx of major depressive disorder with psychosis, hx of 3 past inpatient psychiatric hospitalizations last in 2016 at [facility], all rehospitalizations were in context non compliance with the treatment. She has no hx of suicide attempts, no hx of substance abuse. Patient w hx of paranoia, disorganization, catatonia, required MOO when in [facility]

## 2025-05-21 NOTE — BH PATIENT PROFILE - FALL HARM RISK - DEVICES
As per [facility] report, patient utilizes a walker with person assist. Arrived with EMS in stretcher and assisted in brandon chair. Weakness and poor balance noted upon standing./Walker

## 2025-05-21 NOTE — BH PATIENT PROFILE - NSPROPTRIGHTNOTIFYOBTAINDET_GEN_A_NUR
Patient is unable and/or unwilling to answer due to disorientation and/or thought blocking.  Miralax/Yes

## 2025-05-21 NOTE — DIETITIAN INITIAL EVALUATION ADULT - OTHER INFO
Pt is a 73 y/o female with PMHx: asthma, depression, and HTN was admitted to the MICU with AHRF 2/2 asthma exacerbation resulting in acute on chronic HHRF causing AMS and increased WOB that required intubation. During her ICU course, she experienced seizure-like activity .  Additional issues included hyponatremia, acute on chronic metabolic alkalosis, and mild thrombocytopenia,  also having fevers.  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 Patient has h/o hx of major depressive disorder with psychosis, hx of 3 past inpatient psychiatric hospitalizations last in 2016 at [facility], all re-hospitalizations were in context non compliance with the treatment. She has no hx of suicide attempts, no hx of substance abuse. Patient with hx of paranoia, disorganization, catatonia.   Transferred to [address].  Met Pt in day room. Able to communicate with Pt in English. However, limited information obtained from Pt. Per [facility], Pt had some hot cereal for breakfast. No GI distress noted.   Pt seen at [facility] by SLP on 5/7 with recommendations for Easy to chew diet. Pt able to state has no food allergies and avoids red meat. Food preferences implemented. Noted elevated finger sticks (Pt on corrective sliding scale -Humalog and Lantus) Amenable for Glucerna Shake x 3 daily (660 kcal and 30 g protein)  Weight hx per HIE: 5/16 176.6 lbs (80 kg), 3/8 176.6 lbs (80 kg) Current weight 5/21 --150 lbs (68 kg)

## 2025-05-21 NOTE — BH INPATIENT PSYCHIATRY ASSESSMENT NOTE - CURRENT MEDICATION
MEDICATIONS  (STANDING):  artificial tears (preservative free) Ophthalmic Solution 1 Drop(s) Both EYES every 8 hours  dextrose Oral Gel 15 Gram(s) Oral once  fluticasone propionate/ salmeterol 250-50 MICROgram(s) Diskus 1 Dose(s) Inhalation two times a day  glucagon  Injectable 1 milliGRAM(s) IntraMuscular once  hydrochlorothiazide 25 milliGRAM(s) Oral daily  insulin glargine Injectable (LANTUS) 3 Unit(s) SubCutaneous at bedtime  insulin lispro (ADMELOG) corrective regimen sliding scale   SubCutaneous at bedtime  insulin lispro (ADMELOG) corrective regimen sliding scale   SubCutaneous three times a day before meals  insulin lispro Injectable (ADMELOG) 2 Unit(s) SubCutaneous three times a day before meals  lisinopril 40 milliGRAM(s) Oral daily  melatonin. 9 milliGRAM(s) Oral at bedtime  memantine 10 milliGRAM(s) Oral <User Schedule>  memantine 10 milliGRAM(s) Oral at bedtime  OLANZapine 2.5 milliGRAM(s) Oral at bedtime  petrolatum Ophthalmic Ointment 1 Application(s) Both EYES two times a day  senna 2 Tablet(s) Oral at bedtime  sertraline 125 milliGRAM(s) Oral daily  tiotropium 2.5 MICROgram(s) Inhaler 2 Puff(s) Inhalation daily    MEDICATIONS  (PRN):  haloperidol     Tablet 1 milliGRAM(s) Oral every 6 hours PRN Mild-moderate agitation, secondary to psychosis, shukri, or poor impulse control  LORazepam   Injectable 0.5 milliGRAM(s) IntraMuscular once PRN Severe agitation secondary to psychosis, shukri or poor impulse control. Severe anxiety   MEDICATIONS  (STANDING):  artificial tears (preservative free) Ophthalmic Solution 1 Drop(s) Both EYES every 8 hours  dextrose Oral Gel 15 Gram(s) Oral once  fluticasone propionate/ salmeterol 250-50 MICROgram(s) Diskus 1 Dose(s) Inhalation two times a day  glucagon  Injectable 1 milliGRAM(s) IntraMuscular once  hydrochlorothiazide 25 milliGRAM(s) Oral daily  insulin glargine Injectable (LANTUS) 3 Unit(s) SubCutaneous at bedtime  insulin lispro (ADMELOG) corrective regimen sliding scale   SubCutaneous at bedtime  insulin lispro (ADMELOG) corrective regimen sliding scale   SubCutaneous three times a day before meals  insulin lispro Injectable (ADMELOG) 2 Unit(s) SubCutaneous three times a day before meals  lisinopril 40 milliGRAM(s) Oral daily  melatonin. 9 milliGRAM(s) Oral at bedtime  memantine 10 milliGRAM(s) Oral <User Schedule>  memantine 10 milliGRAM(s) Oral at bedtime  OLANZapine 2.5 milliGRAM(s) Oral at bedtime  petrolatum Ophthalmic Ointment 1 Application(s) Both EYES two times a day  senna 2 Tablet(s) Oral at bedtime  sertraline 125 milliGRAM(s) Oral daily  tiotropium 2.5 MICROgram(s) Inhaler 2 Puff(s) Inhalation daily    MEDICATIONS  (PRN):  OLANZapine 1.25 milliGRAM(s) Oral every 6 hours PRN agitation

## 2025-05-21 NOTE — BH CHART NOTE - NSEVENTNOTEFT_PSY_ALL_CORE
HPI: per  assessment " Patient is a 74F hx asthma, depression, and HTN was admitted to the MICU with AHRF 2/2 asthma exacerbation resulting in acute on chronic HHRF causing AMS and increased WOB that required intubation. During her ICU course, she experienced seizure-like activity .  Additional issues included hyponatremia, acute on chronic metabolic alkalosis, and mild thrombocytopenia,  also having fevers.  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 Patient has h/o hx of major depressive disorder with psychosis, hx of 3 past inpatient psychiatric hospitalizations last in 2016 at [facility], all rehospitalizations were in context non compliance with the treatment. She has no hx of suicide attempts, no hx of substance abuse. Patient w hx of paranoia, disorganization, catatonia, required MOO when in [facility].   "    Patient evaluated by MIA, confirms the above findings. On assessment, patient is calm, cooperative, however minimally responsive to questioning (interpretive not used due to availability), States no SI/I/P, no HI/I/P, no safety concerns    PPH: see HPI    PMH: see HPI    Medications: fluticasone-salmeterol: 1 puff(s) inhaled 2 times a day  hydroCHLOROthiazide 25 mg oral tablet: 1 tab(s) orally once a day  insulin glargine 100 units/mL subcutaneous solution: 3 unit(s) subcutaneous once a day (at bedtime)  insulin lispro 100 units/mL injectable solution: 2 unit(s) injectable 3 times a day (before meals)  levalbuterol 0.63 mg/3 mL inhalation solution: 3 milliliter(s) inhaled every 8 hours  lisinopril 40 mg oral tablet: 1 tab(s) orally once a day  melatonin 3 mg oral tablet: 3 tab(s) orally once a day (at bedtime)  memantine 10 mg oral tablet: 1 tab(s) orally once a day (at bedtime)  memantine 10 mg oral tablet: 1 tab(s) orally once a day at 7 AM  ocular lubricant ophthalmic ointment: 1 application to each affected eye 2 times a day  ocular lubricant preservative-free ophthalmic solution: 1 drop(s) to each affected eye every 8 hours  OLANZapine 2.5 mg oral tablet: 1 tab(s) orally once a day (at bedtime)  polyethylene glycol 3350 oral powder for reconstitution: 17 gram(s) orally 2 times a day  senna leaf extract oral tablet: 2 tab(s) orally once a day (at bedtime)  sertraline 25 mg oral tablet: 5 tab(s) orally once a day  tiotropium 2.5 mcg/inh inhalation aerosol: 2 puff(s) inhaled once a day    Allergies: NKDA    Substance: none known    Family Hx: unknown    Social Hx: see hpi    Access to weapons/guns: none known    Vitals:  T(F): 98.4 (05-21-25 @ 00:57), Max: 98.6 (05-20-25 @ 05:06)  HR: 78 (05-21-25 @ 00:57) (75 - 81)  BP: 157/90 (05-21-25 @ 00:57) (140/87 - 157/90)  RR: 16 (05-21-25 @ 00:57) (16 - 18)  SpO2: 94% (05-21-25 @ 00:57) (94% - 98%)    MSE:  Level of Consciousness	Alert  General Appearance	No deformities present  Body Habitus	Average build  Hygiene	Fair  Grooming	Fair  Behavior	Cooperative  Eye Contact	Fair  Relatedness	Poor  Impulse Control	Normal  Muscle Tone/Strength	Normal muscle tone/strength  Abnormal Movements	No abnormal movements  Gait/Station	Unable to assess  Speech	Abnormal as indicated, otherwise normal...  Speech Abnormality	Soft volume, Slowed rate, Decreased productivity  Affect Quality	Euthymic  Affect Range	Constricted  Thought Process	Linear, Other  Other	very concrete  Thought Associations	Normal  Thought Content	Unremarkable  Perceptions	No abnormalities  Orientation	Not fully oriented...  Orientation Details	disoriented to time; disoriented to place; disoriented to situation  Judgment	Poor  Insight	Poor      Labs:                        12.0   9.29  )-----------( 160      ( 20 May 2025 06:30 )             37.0     05-20    142  |  105  |  28[H]  ----------------------------<  112[H]  3.4[L]   |  25  |  0.52    Ca    9.5      20 May 2025 06:30  Phos  3.7     05-20  Mg     1.80     05-20      Urinalysis Basic - ( 20 May 2025 06:30 )    Color: x / Appearance: x / SG: x / pH: x  Gluc: 112 mg/dL / Ketone: x  / Bili: x / Urobili: x   Blood: x / Protein: x / Nitrite: x   Leuk Esterase: x / RBC: x / WBC x   Sq Epi: x / Non Sq Epi: x / Bacteria: x      COVID-19 PCR: NotDetec (05-19-25 @ 19:27)      Risk Assessment: The patient is at an elevated imminent risk of harm to self, and an elevated chronic risk of harm to self    Modifiable risk factors include: active psychosis, active mood episode, limited insight into symptoms, unable to care for self secondary to psychiatric illness.        Static risk factors for harm to self or others include: h/o prior psychiatric admissions    Immediate risk is minimized by inpatient admission to safe environment with appropriate supervision. Future risk to be minimized by treatment of acute psychiatric symptoms, maximizing outpatient supports, providing patient education, discussing emergency procedures, and ensuring close follow-up.    Assessment/Plan:    [One-liner] The patient is a AGE, SEX , DOMICILE, EMPLOYMENT, PPHx, PMHx, DAYANA Hx, BIB EMS/SELF/FAMILY/[employer] due to CHIEF COMPLAINT.     Given the initial evaluation, the patient's sx (list sx) could be due to a WHAT IS HIGHEST ON DDX. Also under consideration is a OTHER DDX. Per the information, this is likely due to SUPPORTING DATA. Overall, the patient's current combination of factors necessitates an inpatient psychiatric level of care for safety, stabilization, treatment and safe discharge planning.        Plan:  1. Legals: admit to 2S on 9.27  2. Safety: routine observation, denies SI/HI/I/P on the unit.   3. Psychiatry:  melatonin 3 mg oral tablet: 3 tab(s) orally once a day (at bedtime)  memantine 10 mg oral tablet: 1 tab(s) orally once a day (at bedtime)  memantine 10 mg oral tablet: 1 tab(s) orally once a day at 7 AM  sertraline 25 mg oral tablet: 5 tab(s) orally once a day  -PRNs for agitation: Haldol 1mg PO q6hrs / Ativan 0.5mgIM  4. Group, milieu, individual therapy as appropriate.  5. Medical: fluticasone-salmeterol: 1 puff(s) inhaled 2 times a day  hydroCHLOROthiazide 25 mg oral tablet: 1 tab(s) orally once a day  insulin glargine 100 units/mL subcutaneous solution: 3 unit(s) subcutaneous once a day (at bedtime)  insulin lispro 100 units/mL injectable solution: 2 unit(s) injectable 3 times a day (before meals)  levalbuterol 0.63 mg/3 mL inhalation solution: 3 milliliter(s) inhaled every 8 hours  lisinopril 40 mg oral tablet: 1 tab(s) orally once a day  ocular lubricant ophthalmic ointment: 1 application to each affected eye 2 times a day  ocular lubricant preservative-free ophthalmic solution: 1 drop(s) to each affected eye every 8 hours  polyethylene glycol 3350 oral powder for reconstitution: 17 gram(s) orally 2 times a day  senna leaf extract oral tablet: 2 tab(s) orally once a day (at bedtime)  tiotropium 2.5 mcg/inh inhalation aerosol: 2 puff(s) inhaled once a day    6. Dispo: pending clinical improvement.  Patient continues to require inpatient hospitalization for stabilization and safety.

## 2025-05-21 NOTE — PHYSICAL THERAPY INITIAL EVALUATION ADULT - PLANNED THERAPY INTERVENTIONS, PT EVAL
balance training/gait training/manual therapy techniques/neuromuscular re-education/strengthening/transfer training

## 2025-05-21 NOTE — BH INPATIENT PSYCHIATRY ASSESSMENT NOTE - RISK ASSESSMENT
The patient is at an elevated imminent risk of harm to self, and an elevated chronic risk of harm to self    Modifiable risk factors include: active psychosis, active mood episode, limited insight into symptoms, unable to care for self secondary to psychiatric illness.        Static risk factors for harm to self or others include: h/o prior psychiatric admissions    Immediate risk is minimized by inpatient admission to safe environment with appropriate supervision. Future risk to be minimized by treatment of acute psychiatric symptoms, maximizing outpatient supports, providing patient education, discussing emergency procedures, and ensuring close follow-up.

## 2025-05-21 NOTE — BH INPATIENT PSYCHIATRY ASSESSMENT NOTE - NS ED BHA REVIEW OF ED CHART VITAL SIGNS REVIEWED
"Weight Loss Surgery  Presurgical Nutrition Assessment     Karlee De La Cruz  10/09/2018  [id number]  [id number]  1985  female    Surgery desired: Sleeve Gastrectomy    Ht 166.4 cm (65.5\"); Wt 115 kg (253 #); BMI 41.5  Past Medical History:   Diagnosis Date   • Anxiety    • Dyspepsia    • Dyspnea on exertion    • Fatigue    • Former smoker     quit 2010   • Heartburn     prn Rolaids, denies prior eval   • Hypertension    • Iron deficiency anemia     r/t heavy menstrual bleeding   • Morbid obesity (CMS/HCC)      Past Surgical History:   Procedure Laterality Date   • LAPAROSCOPIC APPENDECTOMY  2015   • LAPAROSCOPIC CHOLECYSTECTOMY  2015    for stones     No Known Allergies    Current Outpatient Prescriptions:   •  cephalexin (KEFLEX) 500 MG capsule, Take 500 mg by mouth 2 (Two) Times a Day., Disp: , Rfl:   •  clonazePAM (KlonoPIN) 0.25 MG disintegrating tablet, Take 0.25 mg by mouth 2 (Two) Times a Day As Needed for Anxiety., Disp: , Rfl:   •  losartan (COZAAR) 25 MG tablet, Take 25 mg by mouth Daily., Disp: , Rfl:       Nutrition Assessment    Estimated energy needs:  1870 kcal    Estimated calories for weight loss:  1350 kcal    IBW (Pounds):  150 #        Excess body weight (Pounds):  105 #       Nutrition Recall  24 Hour recall: (B) (L) (D) -  Reviewed and discussed with patient.  Ingesting ~90 g protein qd.  Three meals ingested before 1 pm.  Ate chicken noodle soup/crackers, 2 Slim Jims, a handful of cheese its & 1 can jolene sausages between 3 pm & 10 pm.  Diet high in processed foods.  Brkfast @ 8 am = Christos[employer] saus-egg-cheese McMuffin; 10:30 am double cheeseburger /x med sandhu; Noon meal = chicken sand /c ketchup.          Exercise  never      Education    Provided information packet re:  Sleeve Gastrectomy  1. Reviewed guidelines for higher protein, limited carbohydrate diet to promote weight loss.  Encouraged patient to incorporate these principles of healthy eating from now until approximately 2 weeks " prior to bariatric surgery date, when an even lower carbohydrate “liver-shrinking” regimen will be followed. (Information sheet re pre-op diet given).  Explained that after recovery from surgery this diet will again be followed to ensure further loss and for weight maintenance.    2. Encouraged patient to choose an acceptable protein supplement powder or shake for post-surgery liquid diet.  Provided product guidelines and examples.    3. Explained importance of goal setting to help in changing eating behaviors that are not conducive to weight loss.  Targeted several on a worksheet which also included spaces for patient to work on issues specific to them.  4. Provided follow-up options for support, including contact information for dietitians here, if desired.  Web-based support information and apps for smart phones and computers given.  Noted that monthly support group is offered at this clinic, and that support is associated with successful weight loss.    Recommend that team proceed with surgery and follow per protocol.      Nutrition Goals   Dietary Guidelines per information packet as described above  Protein goal:  grams per day   Carbohydrate goal:  100-140 grams per day  Eliminate soda, sweet tea, etc.     Exercise Goals  Continue current exercise routine   Add 15-30 minutes of activity per day as tolerated      Alcira Calderon, JOSEY  10/09/2018  1:15 PM   Yes

## 2025-05-21 NOTE — BH PATIENT PROFILE - NSPROHMRESPMGMTSTRAT_GEN_A_NUR
Problem: Artificial Airway Management  Goal: Verbalizes understanding of artifical airway function and care  Description: Document on Patient Education Activity  Outcome: Outcome Not Met, Plan Adjusted     Problem: Artificial Airway Management  Goal: # Maintains effective artificial airway  Outcome: Outcome Met, Complete Goal  Goal: # Maintains skin integrity around the airway  Outcome: Outcome Met, Complete Goal  Goal: # Tolerates Activity/ADL without s/s of intolerance  Description: Monitor patient tolerance of the artificial airway while they perform activities and ADLs include bathing, showering, shaving, and eating.  Outcome: Outcome Met, Complete Goal   Pt. Extubated at 1815  with RT and RN per hospital policy and procedure.  Pt. Tolerated well and placed on 5L NC with sats 100%. Pt. Has weak cough but able to use yankuer suction . Pt. Not able to talk after extubation but has slight whisper and able to mouth words. See flowsheets for continued trends.   medication therapy

## 2025-05-21 NOTE — CONSULT NOTE ADULT - ASSESSMENT
Problem: MDD (major depressive disorder), recurrent, with catatonic features.   ·  Plan: MDD w/ psychotic features.  - s/p ativan now on hold given concern that it may have contributed to respiratory decompensation  - c/w Zoloft 125 qday (home dose was 150).  - c/w Zyprexa 2.5 qhs  - c/w Namenda 10mg qhs  - psych following  - ECT qweekly  pt optimized per cards and pulm for ECT    Pending transfer to inpatient psychiatric unit at [facility]  lat ECT 5/16, now plans for q weekly.    Problem/Plan - 2:  ·  Problem: Acute respiratory failure with hypoxia and hypercapnia.   ·  Plan: Due to status asthmaticus s/p intubation and bilevel now weaned off of NC to RA   - aspergillus Ab: negative , ANCA: negative, IgE level 57  - s/p course of antibiotics  - c/w ipratropium + Xopenex nebs q8h  - cont advair per pulm  - s/p Solumedrol and prednisone taper.  - on RA no wheezing  - pulmonology following - will need outpatient PFTs  - cont mucinex   - cont airway clearance.    Problem/Plan - 3:  ·  Problem: Acute asthma exacerbation.   ·  Plan: - c/w ipratropium, Xopenex nebs  - steroids as above  - ICS/LABA on discharge  - OP pulm f/u.    Problem/Plan - 4:  ·  Problem: Toxic metabolic encephalopathy.   ·  Plan: Likely initially in the setting of AHRF 2' asthma exacerbation.  - Currently mental status more likely driven by her psych condition. Catatonia treatment as discussed above.  clinically improved with ECT  doing well.    Problem/Plan - 5:  ·  Problem: Physical deconditioning.   ·  Plan: 2/2 to deconditioning/critical care myopathy, CK WNL  doing well  will get PT services at [facility].    Problem/Plan - 6:  ·  Problem: Hypertension.   ·  Plan: - off amlodipine 2' edema    - c/w  Lisinopril 40mg daily   - c/w HCTZ 25 mg daily  - stable off hydralazine   - avoid beta-blockers given asthma.    Problem/Plan - 7:  ·  Problem: Steroid-induced hyperglycemia.   ·  Plan: HbA1c 5.7% c/w pre-DM, s/p steroids likely cause of hyperglycemia  - Lantus 6 units and 3 units with meals  - HARDEEP, DM diet.    Problem/Plan - 8:  ·  Problem: Seizure-like activity.   ·  Plan: - CTH: no acute changes  - CT spine: no fractures  - 3/8 ON: 6 minutes of seizure-like activity with myoclonic upper jerks -> broke with ativan  - EEG 3/10 showing no epileptiform activity; severe degree of diffuse or multifocal cerebral dysfunction c/w comatose   - MRI brain- Multiple chronic lacunar infarcts in the bilateral basal ganglia and thalami. No acute infarct.    Problem/Plan - 9:  ·  Problem: Need for prophylactic measure.   ·  Plan: Lovenox ppx  full code  PT rec TK  Will need transfer to [facility] for psychiatric optimization   74Fw/ hx MDD, asthma, prior CVA presented to [facility] on 3/8/2025 with AHRF c/b AMS requiring MICU admission for intubation for respiratory failure i/s/o asthma exacerbation. Extubated 3/12 to face tent and then on BIPAP s/p RRT 3/19 for increased WOB/hypoxia despite aggressive asthma management s/p return to MICU for management of severe asthma. Possible aspiration event 3/31. Worsening respiratory status 4/2 - placed on NIV, weaned to nasal cannula, then RA.  ENT eval - no upper airway consideration for structural causes of asthma such as vocal cord dysfunction. Course c/b MDD w/ catatonia and poor PO, off ativan due to respiratory concerns trialed on Memantine, now undergoing ECT q weekly.  Transferred to [facility] for further management of catatonia and continued ECT.    # Acute respiratory failure with hypoxia and hypercapnia.   Due to status asthmaticus s/p intubation and bilevel, weaned off of NC to RA   - s/p course of antibiotics  - Albuterol MDI as needed  - C/w advair/spiriva  - Albuterol MDI prn  - s/p Solumedrol and prednisone taper  - on RA no wheezing  - Needs outpatient PFTs    # Acute asthma exacerbation  Resolved  - C/w ICS/LABA (advair)  - Albuterol MDI prn  - OP pulm f/up    # Toxic metabolic encephalopathy.   Likely initially in the setting of AHRF 2' asthma exacerbation.  - Currently mental status more likely driven by her psych condition (Catatonia)  - Clinically improved with ECT    # Physical deconditioning.   2/2 to deconditioning/critical care myopathy, CK WNL  - Doing better  - Continue PT while at [facility], recs are for TK    # Hypertension.   Off amlodipine 2' edema    - c/w Lisinopril 40mg daily   - c/w HCTZ 25 mg daily  - stable off hydralazine   - avoid beta-blockers given asthma  - Monitor BP, acceptable readings    # Steroid-induced hyperglycemia.   HbA1c 5.7% c/w pre-DM, s/p steroids likely cause of hyperglycemia  - Lantus 3 units qhs and 2 units with meals  - HARDEEP, DM diet  - Monitor FS"s, might be able to lower insulin as off steroids    # Seizure-like activity.   CTH: no acute changes  - CT spine: no fractures  - 3/8 ON: 6 minutes of seizure-like activity with myoclonic upper jerks -> broke with ativan  - EEG 3/10 showing no epileptiform activity; severe degree of diffuse or multifocal cerebral dysfunction c/w comatose   - MRI brain- Multiple chronic lacunar infarcts in the bilateral basal ganglia and thalami. No acute infarct  - Will start lipitor, f/up lipid panel in AM    # MDD (major depressive disorder), recurrent, with catatonic features.   MDD w/ psychotic features.s  - c/w Namenda 10mg qhs  - ECT qweekly  - Care per Primary Team

## 2025-05-22 LAB
A1C WITH ESTIMATED AVERAGE GLUCOSE RESULT: 5.9 % — HIGH (ref 4–5.6)
CHOLEST SERPL-MCNC: 310 MG/DL — HIGH
ESTIMATED AVERAGE GLUCOSE: 123 — SIGNIFICANT CHANGE UP
GLUCOSE BLDC GLUCOMTR-MCNC: 129 MG/DL — HIGH (ref 70–99)
GLUCOSE BLDC GLUCOMTR-MCNC: 144 MG/DL — HIGH (ref 70–99)
GLUCOSE BLDC GLUCOMTR-MCNC: 149 MG/DL — HIGH (ref 70–99)
GLUCOSE BLDC GLUCOMTR-MCNC: 218 MG/DL — HIGH (ref 70–99)
HDLC SERPL-MCNC: 67 MG/DL — SIGNIFICANT CHANGE UP
LDLC SERPL-MCNC: 234 MG/DL — HIGH
LIPID PNL WITH DIRECT LDL SERPL: 234 MG/DL — HIGH
NONHDLC SERPL-MCNC: 243 MG/DL — HIGH
TRIGL SERPL-MCNC: 64 MG/DL — SIGNIFICANT CHANGE UP

## 2025-05-22 RX ORDER — ATORVASTATIN CALCIUM 80 MG/1
40 TABLET, FILM COATED ORAL AT BEDTIME
Refills: 0 | Status: DISCONTINUED | OUTPATIENT
Start: 2025-05-22 | End: 2025-06-04

## 2025-05-22 RX ORDER — ACETAMINOPHEN 500 MG/5ML
650 LIQUID (ML) ORAL EVERY 6 HOURS
Refills: 0 | Status: DISCONTINUED | OUTPATIENT
Start: 2025-05-22 | End: 2025-07-01

## 2025-05-22 RX ORDER — LIDOCAINE HYDROCHLORIDE 20 MG/ML
1 JELLY TOPICAL DAILY
Refills: 0 | Status: DISCONTINUED | OUTPATIENT
Start: 2025-05-22 | End: 2025-07-01

## 2025-05-22 RX ORDER — LEVALBUTEROL HYDROCHLORIDE 1.25 MG/3ML
3 SOLUTION RESPIRATORY (INHALATION)
Qty: 90 | Refills: 0
Start: 2025-05-22 | End: 2025-06-20

## 2025-05-22 RX ADMIN — Medication 1 DROP(S): at 21:34

## 2025-05-22 RX ADMIN — Medication 1 DROP(S): at 06:21

## 2025-05-22 RX ADMIN — Medication 9 MILLIGRAM(S): at 20:42

## 2025-05-22 RX ADMIN — OLANZAPINE 2.5 MILLIGRAM(S): 10 TABLET ORAL at 20:42

## 2025-05-22 RX ADMIN — MEMANTINE HYDROCHLORIDE 10 MILLIGRAM(S): 21 CAPSULE, EXTENDED RELEASE ORAL at 06:21

## 2025-05-22 RX ADMIN — MEMANTINE HYDROCHLORIDE 10 MILLIGRAM(S): 21 CAPSULE, EXTENDED RELEASE ORAL at 20:42

## 2025-05-22 RX ADMIN — LISINOPRIL 40 MILLIGRAM(S): 5 TABLET ORAL at 08:17

## 2025-05-22 RX ADMIN — SERTRALINE 125 MILLIGRAM(S): 100 TABLET, FILM COATED ORAL at 08:18

## 2025-05-22 RX ADMIN — TIOTROPIUM BROMIDE INHALATION SPRAY 2 PUFF(S): 3.12 SPRAY, METERED RESPIRATORY (INHALATION) at 08:18

## 2025-05-22 RX ADMIN — INSULIN LISPRO 2 UNIT(S): 100 INJECTION, SOLUTION INTRAVENOUS; SUBCUTANEOUS at 11:48

## 2025-05-22 RX ADMIN — INSULIN GLARGINE-YFGN 3 UNIT(S): 100 INJECTION, SOLUTION SUBCUTANEOUS at 21:19

## 2025-05-22 RX ADMIN — INSULIN LISPRO 2 UNIT(S): 100 INJECTION, SOLUTION INTRAVENOUS; SUBCUTANEOUS at 16:32

## 2025-05-22 RX ADMIN — INSULIN LISPRO 2 UNIT(S): 100 INJECTION, SOLUTION INTRAVENOUS; SUBCUTANEOUS at 07:46

## 2025-05-22 RX ADMIN — Medication 1 DOSE(S): at 21:33

## 2025-05-22 RX ADMIN — Medication 1 DOSE(S): at 08:18

## 2025-05-22 RX ADMIN — Medication 2 TABLET(S): at 20:42

## 2025-05-22 RX ADMIN — ATORVASTATIN CALCIUM 40 MILLIGRAM(S): 80 TABLET, FILM COATED ORAL at 20:42

## 2025-05-22 RX ADMIN — Medication 1 DROP(S): at 13:14

## 2025-05-22 RX ADMIN — INSULIN LISPRO 2: 100 INJECTION, SOLUTION INTRAVENOUS; SUBCUTANEOUS at 11:48

## 2025-05-22 NOTE — CHART NOTE - NSCHARTNOTEFT_GEN_A_CORE
Lipid panel reviewed: , Chol 310  Will increase lipitor to 40mg qhs  Continue rest of care, monitor BP/FS's

## 2025-05-22 NOTE — BH INPATIENT PSYCHIATRY PROGRESS NOTE - PRN MEDS
MEDICATIONS  (PRN):  albuterol    90 MICROgram(s) HFA Inhaler 2 Puff(s) Inhalation every 6 hours PRN Shortness of Breath and/or Wheezing  OLANZapine 1.25 milliGRAM(s) Oral every 6 hours PRN agitation   MEDICATIONS  (PRN):  acetaminophen     Tablet .. 650 milliGRAM(s) Oral every 6 hours PRN Temp greater or equal to 38C (100.4F), Mild Pain (1 - 3), Moderate Pain (4 - 6)  albuterol    90 MICROgram(s) HFA Inhaler 2 Puff(s) Inhalation every 6 hours PRN Shortness of Breath and/or Wheezing  lidocaine   4% Patch 1 Patch Transdermal daily PRN chronic back pain  OLANZapine 1.25 milliGRAM(s) Oral every 6 hours PRN agitation

## 2025-05-22 NOTE — BH INPATIENT PSYCHIATRY PROGRESS NOTE - NSBHFUPINTERVALHXFT_PSY_A_CORE
Pt seen for f/u mood symptoms and catatonia. Chart reviewed. VSS. No acute events overnight. Pt adherent with and tolerating all standing meds.     Pt seen in day room with OdinOtvet Awa  ID [id number]. Pt reports mood as "good." Reports some lower back pain, rated 5/10 in severity. Agrees to PRN lidocaine patches and Tylenol. Otherwise denies any changes in mood. No signs or symptoms of catatonia noted. Per staff, pt has been eating all meals. Pt reports no acute changes in sleep or appetite. Pt denies manic symptoms or depression. Pt denies any passive or active suicidal ideation, intent, or plan. Denies urges to self-harm. Denies aggressive/homicidal ideation, intent, or plan. Denies any auditory or visual hallucinations, paranoia, or grandiosity; no delusional content elicited. Pt states that in the past, she experienced auditory hallucinations (voices), but no longer at this time.    Pt was oriented only to place ("hospital"). Not oriented to time or situation. Believed that she was in the hospital for a "meeting." However, able to recall the name of her son who visited her on the unit recently.    Writer called pt's  Liam ([phone]) with pt's verbal consent. Updated on patient progress. All questions answered.

## 2025-05-22 NOTE — BH INPATIENT PSYCHIATRY PROGRESS NOTE - NSBHCHARTREVIEWVS_PSY_A_CORE FT
Vital Signs Last 24 Hrs  T(C): 36.5 (05-22-25 @ 07:49), Max: 36.5 (05-22-25 @ 07:49)  T(F): 97.7 (05-22-25 @ 07:49), Max: 97.7 (05-22-25 @ 07:49)  HR: --  BP: --  BP(mean): --  RR: --  SpO2: --    Orthostatic VS  05-22-25 @ 07:49  Lying BP: --/-- HR: --  Sitting BP: 126/72 HR: 76  Standing BP: 150/79 HR: 90  Site: --  Mode: --  Orthostatic VS  05-21-25 @ 07:46  Lying BP: --/-- HR: --  Sitting BP: 167/85 HR: 79  Standing BP: 153/88 HR: 87  Site: --  Mode: --  Orthostatic VS  05-21-25 @ 00:57  Lying BP: --/-- HR: --  Sitting BP: 153/95 HR: 75  Standing BP: --/-- HR: --  Site: --  Mode: --   Vital Signs Last 24 Hrs  T(C): 36.9 (05-23-25 @ 05:54), Max: 36.9 (05-23-25 @ 05:54)  T(F): 98.4 (05-23-25 @ 05:54), Max: 98.4 (05-23-25 @ 05:54)  HR: --  BP: --  BP(mean): --  RR: --  SpO2: --    Orthostatic VS  05-23-25 @ 05:54  Lying BP: --/-- HR: --  Sitting BP: 163/87 HR: 82  Standing BP: 157/90 HR: 88  Site: --  Mode: --  Orthostatic VS  05-22-25 @ 07:49  Lying BP: --/-- HR: --  Sitting BP: 126/72 HR: 76  Standing BP: 150/79 HR: 90  Site: --  Mode: --

## 2025-05-22 NOTE — BH INPATIENT PSYCHIATRY PROGRESS NOTE - CURRENT MEDICATION
MEDICATIONS  (STANDING):  artificial tears (preservative free) Ophthalmic Solution 1 Drop(s) Both EYES every 8 hours  atorvastatin 20 milliGRAM(s) Oral at bedtime  dextrose Oral Gel 15 Gram(s) Oral once  fluticasone propionate/ salmeterol 250-50 MICROgram(s) Diskus 1 Dose(s) Inhalation two times a day  glucagon  Injectable 1 milliGRAM(s) IntraMuscular once  hydrochlorothiazide 25 milliGRAM(s) Oral daily  insulin glargine Injectable (LANTUS) 3 Unit(s) SubCutaneous at bedtime  insulin lispro (ADMELOG) corrective regimen sliding scale   SubCutaneous at bedtime  insulin lispro (ADMELOG) corrective regimen sliding scale   SubCutaneous three times a day before meals  insulin lispro Injectable (ADMELOG) 2 Unit(s) SubCutaneous three times a day before meals  lisinopril 40 milliGRAM(s) Oral daily  melatonin. 9 milliGRAM(s) Oral at bedtime  memantine 10 milliGRAM(s) Oral <User Schedule>  memantine 10 milliGRAM(s) Oral at bedtime  OLANZapine 2.5 milliGRAM(s) Oral at bedtime  petrolatum Ophthalmic Ointment 1 Application(s) Both EYES two times a day  senna 2 Tablet(s) Oral at bedtime  sertraline 125 milliGRAM(s) Oral daily  tiotropium 2.5 MICROgram(s) Inhaler 2 Puff(s) Inhalation daily    MEDICATIONS  (PRN):  albuterol    90 MICROgram(s) HFA Inhaler 2 Puff(s) Inhalation every 6 hours PRN Shortness of Breath and/or Wheezing  OLANZapine 1.25 milliGRAM(s) Oral every 6 hours PRN agitation   MEDICATIONS  (STANDING):  artificial tears (preservative free) Ophthalmic Solution 1 Drop(s) Both EYES every 8 hours  atorvastatin 40 milliGRAM(s) Oral at bedtime  dextrose Oral Gel 15 Gram(s) Oral once  fluticasone propionate/ salmeterol 250-50 MICROgram(s) Diskus 1 Dose(s) Inhalation two times a day  glucagon  Injectable 1 milliGRAM(s) IntraMuscular once  hydrochlorothiazide 25 milliGRAM(s) Oral daily  insulin glargine Injectable (LANTUS) 3 Unit(s) SubCutaneous at bedtime  insulin lispro (ADMELOG) corrective regimen sliding scale   SubCutaneous at bedtime  insulin lispro (ADMELOG) corrective regimen sliding scale   SubCutaneous three times a day before meals  insulin lispro Injectable (ADMELOG) 2 Unit(s) SubCutaneous three times a day before meals  lisinopril 40 milliGRAM(s) Oral daily  melatonin. 9 milliGRAM(s) Oral at bedtime  memantine 10 milliGRAM(s) Oral <User Schedule>  memantine 10 milliGRAM(s) Oral at bedtime  OLANZapine 2.5 milliGRAM(s) Oral at bedtime  petrolatum Ophthalmic Ointment 1 Application(s) Both EYES two times a day  senna 2 Tablet(s) Oral at bedtime  sertraline 125 milliGRAM(s) Oral daily  tiotropium 2.5 MICROgram(s) Inhaler 2 Puff(s) Inhalation daily    MEDICATIONS  (PRN):  acetaminophen     Tablet .. 650 milliGRAM(s) Oral every 6 hours PRN Temp greater or equal to 38C (100.4F), Mild Pain (1 - 3), Moderate Pain (4 - 6)  albuterol    90 MICROgram(s) HFA Inhaler 2 Puff(s) Inhalation every 6 hours PRN Shortness of Breath and/or Wheezing  lidocaine   4% Patch 1 Patch Transdermal daily PRN chronic back pain  OLANZapine 1.25 milliGRAM(s) Oral every 6 hours PRN agitation

## 2025-05-22 NOTE — BH INPATIENT PSYCHIATRY PROGRESS NOTE - NSBHMETABOLIC_PSY_ALL_CORE_FT
BMI: BMI (kg/m2): 28.3 (05-21-25 @ 00:57)  HbA1c: A1C with Estimated Average Glucose Result: 5.9 % (05-22-25 @ 08:09)    Glucose: POCT Blood Glucose.: 129 mg/dL (05-22-25 @ 07:29)    BP: 157/90 (05-21-25 @ 00:57) (157/90 - 157/90)Vital Signs Last 24 Hrs  T(C): 36.5 (05-22-25 @ 07:49), Max: 36.5 (05-22-25 @ 07:49)  T(F): 97.7 (05-22-25 @ 07:49), Max: 97.7 (05-22-25 @ 07:49)  HR: --  BP: --  BP(mean): --  RR: --  SpO2: --    Orthostatic VS  05-22-25 @ 07:49  Lying BP: --/-- HR: --  Sitting BP: 126/72 HR: 76  Standing BP: 150/79 HR: 90  Site: --  Mode: --  Orthostatic VS  05-21-25 @ 07:46  Lying BP: --/-- HR: --  Sitting BP: 167/85 HR: 79  Standing BP: 153/88 HR: 87  Site: --  Mode: --  Orthostatic VS  05-21-25 @ 00:57  Lying BP: --/-- HR: --  Sitting BP: 153/95 HR: 75  Standing BP: --/-- HR: --  Site: --  Mode: --    Lipid Panel:  BMI: BMI (kg/m2): 28.3 (05-21-25 @ 00:57)  HbA1c: A1C with Estimated Average Glucose Result: 5.9 % (05-22-25 @ 08:09)    Glucose: POCT Blood Glucose.: 108 mg/dL (05-23-25 @ 07:39)    BP: 157/90 (05-21-25 @ 00:57) (157/90 - 157/90)Vital Signs Last 24 Hrs  T(C): 36.9 (05-23-25 @ 05:54), Max: 36.9 (05-23-25 @ 05:54)  T(F): 98.4 (05-23-25 @ 05:54), Max: 98.4 (05-23-25 @ 05:54)  HR: --  BP: --  BP(mean): --  RR: --  SpO2: --    Orthostatic VS  05-23-25 @ 05:54  Lying BP: --/-- HR: --  Sitting BP: 163/87 HR: 82  Standing BP: 157/90 HR: 88  Site: --  Mode: --  Orthostatic VS  05-22-25 @ 07:49  Lying BP: --/-- HR: --  Sitting BP: 126/72 HR: 76  Standing BP: 150/79 HR: 90  Site: --  Mode: --    Lipid Panel: Date/Time: 05-22-25 @ 08:09  Cholesterol, Serum: 310  LDL Cholesterol Calculated: 234  HDL Cholesterol, Serum: 67  Total Cholesterol/HDL Ration Measurement: --  Triglycerides, Serum: 64

## 2025-05-22 NOTE — BH INPATIENT PSYCHIATRY PROGRESS NOTE - NSBHASSESSSUMMFT_PSY_ALL_CORE
74F hx asthma, depression, and HTN was admitted to the MICU with AHRF 2/2 asthma exacerbation resulting in acute on chronic HHRF causing AMS and increased WOB that required intubation. During her ICU course, she experienced seizure-like activity .  Additional issues included hyponatremia, acute on chronic metabolic alkalosis, and mild thrombocytopenia,  also having fevers.  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 Patient has h/o hx of major depressive disorder with psychosis, hx of 3 past inpatient psychiatric hospitalizations last in 2016 at [facility], all rehospitalizations were in context non compliance with the treatment. She has no hx of suicide attempts, no hx of substance abuse.    Pt currently requires inpatient level of care for ongoing treatment for catatonia.     5/22 - calm, cooperative, not oriented, though remembers name of son. No s/s catatonia. Denies mood symptoms. Eating and sleeping on unit.    PLAN:  1. Admit to [facility] 2S on 9.27  2. Routine checks, as pt denying SI/HI/SIB  3. Psychiatric:  - Continue Namenda 10mg BID  - Continue Sertraline 125mg QD, titrate up to lowest effective dose  - Continue olanzapine 2.5mg QHS, titrate up to lowest effective dose  - Would avoid benzodiazepines since pt was recently unable to tolerate them at [facility], becoming extremely sedated    4. Medical:   - Ordered Levalbuterol inhaler to [facility], to be delivered to 2S since [facility] pharmacy doesn't carry it  - Continue Advair and Spiriva for Asthma and COPD  - Continue Lisinopril 40mg QD and HCTZ 25mg QD for HTN  - Lidocaine patches PRN daily + Tylenol PRN for lower back pain  - Bowel regimen: Senna 2 tabs QHS  - Diet: Easy to chew, consistent carbohydrates. No Beef No Pork  - Pt is at risk for falls given deconditioning. Seen by PT, recommended PRN walker with 1 staff assists. Pt is in a room with peer who has CO for added supervision and redirection during nighttime. During the daytime she should be either in the day room or the dining area with staff monitoring her at all times to provide redirection if pt tries to get up or ambulate unassisted.     5. Collateral: spoke with pt's  Liam on phone to update on progress 5/22.  6. Disposition: pending clinical course 74F hx asthma, depression, and HTN was admitted to the MICU with AHRF 2/2 asthma exacerbation resulting in acute on chronic HHRF causing AMS and increased WOB that required intubation. During her ICU course, she experienced seizure-like activity .  Additional issues included hyponatremia, acute on chronic metabolic alkalosis, and mild thrombocytopenia,  also having fevers.  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 Patient has h/o hx of major depressive disorder with psychosis, hx of 3 past inpatient psychiatric hospitalizations last in 2016 at [facility], all rehospitalizations were in context non compliance with the treatment. She has no hx of suicide attempts, no hx of substance abuse.    Pt currently requires inpatient level of care for ongoing treatment for catatonia.     5/22 - calm, cooperative, not oriented, though remembers name of son. No s/s catatonia. Denies mood symptoms. Eating and sleeping on unit.    PLAN:  1. Admit to [facility] 2S on 9.27  2. Routine checks, as pt denying SI/HI/SIB  3. Psychiatric:  - Continue Namenda 10mg BID  - Continue Sertraline 125mg QD, titrate up to lowest effective dose  - Continue olanzapine 2.5mg QHS, titrate up to lowest effective dose  - Would avoid benzodiazepines since pt was recently unable to tolerate them at [facility], becoming extremely sedated  - Continue ECT once weekly for catatonia (on Fridays)    4. Medical:   - Ordered Levalbuterol inhaler to [facility], to be delivered to 2S since [facility] pharmacy doesn't carry it  - Continue Advair and Spiriva for Asthma and COPD  - Continue Lisinopril 40mg QD and HCTZ 25mg QD for HTN  - Lidocaine patches PRN daily + Tylenol PRN for lower back pain  - Bowel regimen: Senna 2 tabs QHS  - Diet: Easy to chew, consistent carbohydrates. No Beef No Pork  - Pt is at risk for falls given deconditioning. Seen by PT, recommended PRN walker with 1 staff assists. Pt is in a room with peer who has CO for added supervision and redirection during nighttime. During the daytime she should be either in the day room or the dining area with staff monitoring her at all times to provide redirection if pt tries to get up or ambulate unassisted.     5. Collateral: spoke with pt's  Liam on phone to update on progress 5/22.  6. Disposition: pending clinical course

## 2025-05-22 NOTE — BH SOCIAL WORK INITIAL PSYCHOSOCIAL EVALUATION - OTHER PAST PSYCHIATRIC HISTORY (INCLUDE DETAILS REGARDING ONSET, COURSE OF ILLNESS, INPATIENT/OUTPATIENT TREATMENT)
Pt has a history of formal psychiatric treatment for MDD with psychosis with at least three prior psychiatric admissions, the last in [facility] in 2015.  Pt received medication over objection order during her 2015 admission.  Pt followed by the [facility] since 2016, history of f/u at [facility] AOPD, and [facility] OPD.  Medication non-compliance has been an issue for pt, with decompensation.  Pt admitted to [facility] 3/08-5/21/25 related to asthma exacerbation.  Pt was in ICU, intubated at some point.  Pt reportedly experienced seizure activity while at [facility].  Pt exhibited catatonic symptoms, and started an ECT course at [facility] on 4/25/25.  Pt has had eight ECT at [facility], with plan to continue her inpatient course here.  No prior suicide attempts or substance abuse

## 2025-05-22 NOTE — BH INPATIENT PSYCHIATRY PROGRESS NOTE - NSBHATTESTCOMMENTATTENDFT_PSY_A_CORE
Agree with assessment above. Pt recently tapered down from twice weekly ECT to once a week. Will monitor for catatonia symptoms.

## 2025-05-22 NOTE — PSYCHIATRIC REHAB INITIAL EVALUATION - NSBHPRRECOMMEND_PSY_ALL_CORE
Writer met with the patient to orient her to the psych rehab services and to establish therapeutic goals. Patient stated her goal is to return home. According to the patient her  had her hospitalized. Patient denied any symptoms of depression, anxiety, confusion or paranoia. However, according to the chart patient was hospitalized due to increased symptoms of paranoia, decreased daily functioning, depression and non-compliance with medications. According to the chart patient has a history of mental illness, and three prior psychiatric hospitalization.

## 2025-05-22 NOTE — BH INPATIENT PSYCHIATRY PROGRESS NOTE - MSE UNSTRUCTURED FT
Appearance: Adult woman who appears stated age, in no apparent distress. Dressed appropriately in hospital gown. Fair grooming and hygiene.  Behavior: Calm and cooperative. Good eye contact. Fairly related.  Motor: No psychomotor slowing or activation. No tremor. No abnormal movements.  Speech: Mild speech latency. Slow rate, soft volume, decreased production.  Mood: "Good."  Affect: Euthymic, full range, stable.  Thought Process: Linear and goal-directed.  Thought Content: Denies any passive thoughts of death. Denies any active suicidal ideation, intent, or plan. Denies homicidal ideation, intent, or plan.    Perception: Denies auditory, visual, or tactile hallucinations.  Insight: Poor  Judgment: Fair  Impulse Control: Intact on unit.  Attention: Fair.   Language: Fluent.  Orientation: Oriented only to person and grossly to place Not oriented to time or situation.  Gait: Slow unsteady gait. Ambulates with walker.

## 2025-05-23 LAB
GLUCOSE BLDC GLUCOMTR-MCNC: 104 MG/DL — HIGH (ref 70–99)
GLUCOSE BLDC GLUCOMTR-MCNC: 108 MG/DL — HIGH (ref 70–99)
GLUCOSE BLDC GLUCOMTR-MCNC: 121 MG/DL — HIGH (ref 70–99)
GLUCOSE BLDC GLUCOMTR-MCNC: 139 MG/DL — HIGH (ref 70–99)

## 2025-05-23 PROCEDURE — 99232 SBSQ HOSP IP/OBS MODERATE 35: CPT

## 2025-05-23 PROCEDURE — 90870 ELECTROCONVULSIVE THERAPY: CPT

## 2025-05-23 RX ORDER — LEVALBUTEROL HYDROCHLORIDE 1.25 MG/3ML
1 SOLUTION RESPIRATORY (INHALATION) EVERY 8 HOURS
Refills: 0 | Status: DISCONTINUED | OUTPATIENT
Start: 2025-05-23 | End: 2025-06-02

## 2025-05-23 RX ADMIN — Medication 1 DROP(S): at 06:29

## 2025-05-23 RX ADMIN — Medication 1 DROP(S): at 21:11

## 2025-05-23 RX ADMIN — Medication 1 DOSE(S): at 21:21

## 2025-05-23 RX ADMIN — Medication 0.1 MILLIGRAM(S): at 12:54

## 2025-05-23 RX ADMIN — LISINOPRIL 40 MILLIGRAM(S): 5 TABLET ORAL at 09:03

## 2025-05-23 RX ADMIN — INSULIN LISPRO 2 UNIT(S): 100 INJECTION, SOLUTION INTRAVENOUS; SUBCUTANEOUS at 16:30

## 2025-05-23 RX ADMIN — MEMANTINE HYDROCHLORIDE 10 MILLIGRAM(S): 21 CAPSULE, EXTENDED RELEASE ORAL at 06:29

## 2025-05-23 RX ADMIN — ATORVASTATIN CALCIUM 40 MILLIGRAM(S): 80 TABLET, FILM COATED ORAL at 21:10

## 2025-05-23 RX ADMIN — OLANZAPINE 2.5 MILLIGRAM(S): 10 TABLET ORAL at 21:10

## 2025-05-23 RX ADMIN — Medication 1 DOSE(S): at 09:05

## 2025-05-23 RX ADMIN — SERTRALINE 125 MILLIGRAM(S): 100 TABLET, FILM COATED ORAL at 09:03

## 2025-05-23 RX ADMIN — TIOTROPIUM BROMIDE INHALATION SPRAY 2 PUFF(S): 3.12 SPRAY, METERED RESPIRATORY (INHALATION) at 09:06

## 2025-05-23 RX ADMIN — Medication 9 MILLIGRAM(S): at 21:10

## 2025-05-23 RX ADMIN — MEMANTINE HYDROCHLORIDE 10 MILLIGRAM(S): 21 CAPSULE, EXTENDED RELEASE ORAL at 21:12

## 2025-05-23 RX ADMIN — Medication 2 TABLET(S): at 21:10

## 2025-05-23 RX ADMIN — INSULIN GLARGINE-YFGN 3 UNIT(S): 100 INJECTION, SOLUTION SUBCUTANEOUS at 21:10

## 2025-05-23 RX ADMIN — Medication 1 DROP(S): at 15:21

## 2025-05-23 NOTE — BH INPATIENT PSYCHIATRY PROGRESS NOTE - NSBHMETABOLIC_PSY_ALL_CORE_FT
BMI: BMI (kg/m2): 28.3 (05-21-25 @ 00:57)  HbA1c: A1C with Estimated Average Glucose Result: 5.9 % (05-22-25 @ 08:09)    Glucose: POCT Blood Glucose.: 108 mg/dL (05-23-25 @ 07:39)    BP: 157/90 (05-21-25 @ 00:57) (157/90 - 157/90)Vital Signs Last 24 Hrs  T(C): 36.9 (05-23-25 @ 05:54), Max: 36.9 (05-23-25 @ 05:54)  T(F): 98.4 (05-23-25 @ 05:54), Max: 98.4 (05-23-25 @ 05:54)  HR: --  BP: --  BP(mean): --  RR: --  SpO2: --    Orthostatic VS  05-23-25 @ 05:54  Lying BP: --/-- HR: --  Sitting BP: 163/87 HR: 82  Standing BP: 157/90 HR: 88  Site: --  Mode: --  Orthostatic VS  05-22-25 @ 07:49  Lying BP: --/-- HR: --  Sitting BP: 126/72 HR: 76  Standing BP: 150/79 HR: 90  Site: --  Mode: --    Lipid Panel: Date/Time: 05-22-25 @ 08:09  Cholesterol, Serum: 310  LDL Cholesterol Calculated: 234  HDL Cholesterol, Serum: 67  Total Cholesterol/HDL Ration Measurement: --  Triglycerides, Serum: 64   BMI: BMI (kg/m2): 28.3 (05-21-25 @ 00:57)  HbA1c: A1C with Estimated Average Glucose Result: 5.9 % (05-22-25 @ 08:09)    Glucose: POCT Blood Glucose.: 121 mg/dL (05-23-25 @ 11:54)    BP: 143/85 (05-23-25 @ 14:55) (115/67 - 157/90)Vital Signs Last 24 Hrs  T(C): 36.2 (05-23-25 @ 14:55), Max: 36.9 (05-23-25 @ 05:54)  T(F): 97.1 (05-23-25 @ 14:55), Max: 98.4 (05-23-25 @ 05:54)  HR: 76 (05-23-25 @ 14:55) (69 - 89)  BP: 143/85 (05-23-25 @ 14:55) (115/67 - 155/82)  BP(mean): --  RR: 15 (05-23-25 @ 14:55) (15 - 18)  SpO2: 96% (05-23-25 @ 14:55) (93% - 98%)    Orthostatic VS  05-23-25 @ 05:54  Lying BP: --/-- HR: --  Sitting BP: 163/87 HR: 82  Standing BP: 157/90 HR: 88  Site: --  Mode: --  Orthostatic VS  05-22-25 @ 07:49  Lying BP: --/-- HR: --  Sitting BP: 126/72 HR: 76  Standing BP: 150/79 HR: 90  Site: --  Mode: --    Lipid Panel: Date/Time: 05-22-25 @ 08:09  Cholesterol, Serum: 310  LDL Cholesterol Calculated: 234  HDL Cholesterol, Serum: 67  Total Cholesterol/HDL Ration Measurement: --  Triglycerides, Serum: 64

## 2025-05-23 NOTE — BH INPATIENT PSYCHIATRY PROGRESS NOTE - NSBHCHARTREVIEWVS_PSY_A_CORE FT
Vital Signs Last 24 Hrs  T(C): 36.9 (05-23-25 @ 05:54), Max: 36.9 (05-23-25 @ 05:54)  T(F): 98.4 (05-23-25 @ 05:54), Max: 98.4 (05-23-25 @ 05:54)  HR: --  BP: --  BP(mean): --  RR: --  SpO2: --    Orthostatic VS  05-23-25 @ 05:54  Lying BP: --/-- HR: --  Sitting BP: 163/87 HR: 82  Standing BP: 157/90 HR: 88  Site: --  Mode: --  Orthostatic VS  05-22-25 @ 07:49  Lying BP: --/-- HR: --  Sitting BP: 126/72 HR: 76  Standing BP: 150/79 HR: 90  Site: --  Mode: --   Vital Signs Last 24 Hrs  T(C): 36.2 (05-23-25 @ 14:55), Max: 36.9 (05-23-25 @ 05:54)  T(F): 97.1 (05-23-25 @ 14:55), Max: 98.4 (05-23-25 @ 05:54)  HR: 76 (05-23-25 @ 14:55) (69 - 89)  BP: 143/85 (05-23-25 @ 14:55) (115/67 - 155/82)  BP(mean): --  RR: 15 (05-23-25 @ 14:55) (15 - 18)  SpO2: 96% (05-23-25 @ 14:55) (93% - 98%)    Orthostatic VS  05-23-25 @ 05:54  Lying BP: --/-- HR: --  Sitting BP: 163/87 HR: 82  Standing BP: 157/90 HR: 88  Site: --  Mode: --  Orthostatic VS  05-22-25 @ 07:49  Lying BP: --/-- HR: --  Sitting BP: 126/72 HR: 76  Standing BP: 150/79 HR: 90  Site: --  Mode: --

## 2025-05-23 NOTE — BH INPATIENT PSYCHIATRY PROGRESS NOTE - NSBHASSESSSUMMFT_PSY_ALL_CORE
74F hx asthma, depression, and HTN was admitted to the MICU with AHRF 2/2 asthma exacerbation resulting in acute on chronic HHRF causing AMS and increased WOB that required intubation. During her ICU course, she experienced seizure-like activity .  Additional issues included hyponatremia, acute on chronic metabolic alkalosis, and mild thrombocytopenia,  also having fevers.  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 Patient has h/o hx of major depressive disorder with psychosis, hx of 3 past inpatient psychiatric hospitalizations last in 2016 at [facility], all rehospitalizations were in context non compliance with the treatment. She has no hx of suicide attempts, no hx of substance abuse.    Pt currently requires inpatient level of care for ongoing treatment for catatonia.     5/22 - calm, cooperative, not oriented, though remembers name of son. No s/s catatonia. Denies mood symptoms. Eating and sleeping on unit.    PLAN:  1. Admit to [facility] 2S on 9.27  2. Routine checks, as pt denying SI/HI/SIB  3. Psychiatric:  - Continue Namenda 10mg BID  - Continue Sertraline 125mg QD, titrate up to lowest effective dose  - Continue olanzapine 2.5mg QHS, titrate up to lowest effective dose  - Would avoid benzodiazepines since pt was recently unable to tolerate them at [facility], becoming extremely sedated  - Continue ECT once weekly for catatonia (on Fridays)    4. Medical:   - Ordered Levalbuterol inhaler to [facility], to be delivered to 2S since [facility] pharmacy doesn't carry it  - Continue Advair and Spiriva for Asthma and COPD  - Continue Lisinopril 40mg QD and HCTZ 25mg QD for HTN  - Lidocaine patches PRN daily + Tylenol PRN for lower back pain  - Bowel regimen: Senna 2 tabs QHS  - Diet: Easy to chew, consistent carbohydrates. No Beef No Pork  - Pt is at risk for falls given deconditioning. Seen by PT, recommended PRN walker with 1 staff assists. Pt is in a room with peer who has CO for added supervision and redirection during nighttime. During the daytime she should be either in the day room or the dining area with staff monitoring her at all times to provide redirection if pt tries to get up or ambulate unassisted.     5. Collateral: spoke with pt's  Liam on phone to update on progress 5/22.  6. Disposition: pending clinical course 74F hx asthma, depression, and HTN was admitted to the MICU with AHRF 2/2 asthma exacerbation resulting in acute on chronic HHRF causing AMS and increased WOB that required intubation. During her ICU course, she experienced seizure-like activity .  Additional issues included hyponatremia, acute on chronic metabolic alkalosis, and mild thrombocytopenia,  also having fevers.  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 Patient has h/o hx of major depressive disorder with psychosis, hx of 3 past inpatient psychiatric hospitalizations last in 2016 at [facility], all rehospitalizations were in context non compliance with the treatment. She has no hx of suicide attempts, no hx of substance abuse.    Pt currently requires inpatient level of care for ongoing treatment for catatonia.     5/22 - calm, cooperative, not oriented, though remembers name of son. No s/s catatonia. Denies mood symptoms. Eating and sleeping on unit.  5/23 - No signs of catatonia. Eating and ambulating on unit. For ECT this morning, has been NPO.     PLAN:  1. Admit to [facility] 2S on 9.27  2. Routine checks, as pt denying SI/HI/SIB  3. Psychiatric:  - Continue Namenda 10mg BID  - Continue Sertraline 125mg QD, titrate up to lowest effective dose  - Continue olanzapine 2.5mg QHS, titrate up to lowest effective dose  - Would avoid benzodiazepines since pt was recently unable to tolerate them at [facility], becoming extremely sedated  - Continue ECT once weekly for catatonia (on Fridays)    4. Medical:   - Ordered Levalbuterol inhaler to [facility], to be delivered to 2S since [facility] pharmacy doesn't carry it  - Continue Advair and Spiriva for Asthma and COPD  - Continue Lisinopril 40mg QD and HCTZ 25mg QD for HTN  - Lidocaine patches PRN daily + Tylenol PRN for lower back pain  - Bowel regimen: Senna 2 tabs QHS  - Diet: Easy to chew, consistent carbohydrates. No Beef No Pork  - Pt is at risk for falls given deconditioning. Seen by PT, recommended PRN walker with 1 staff assists. Pt is in a room with peer who has CO for added supervision and redirection during nighttime. During the daytime she should be either in the day room or the dining area with staff monitoring her at all times to provide redirection if pt tries to get up or ambulate unassisted.     5. Collateral: spoke with pt's  Liam on phone to update on progress 5/22.  6. Disposition: pending clinical course 74F hx asthma, depression, and HTN was admitted to the MICU with AHRF 2/2 asthma exacerbation resulting in acute on chronic HHRF causing AMS and increased WOB that required intubation. During her ICU course, she experienced seizure-like activity .  Additional issues included hyponatremia, acute on chronic metabolic alkalosis, and mild thrombocytopenia,  also having fevers.  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 Patient has h/o hx of major depressive disorder with psychosis, hx of 3 past inpatient psychiatric hospitalizations last in 2016 at [facility], all rehospitalizations were in context non compliance with the treatment. She has no hx of suicide attempts, no hx of substance abuse.    Pt currently requires inpatient level of care for ongoing treatment for catatonia.     5/22 - calm, cooperative, not oriented, though remembers name of son. No s/s catatonia. Denies mood symptoms. Eating and sleeping on unit.  5/23 - No signs of catatonia. Eating and ambulating on unit. For ECT this morning. Not oriented to time, place, or situation. Has been NPO. Spoke to , who believes pt is returning to baseline, but is concerned about pt's memory issues.    PLAN:  1. Admit to [facility] 2S on 9.27  2. Routine checks, as pt denying SI/HI/SIB  3. Psychiatric:  - Continue Namenda 10mg BID  - Continue Sertraline 125mg QD, titrate up to lowest effective dose  - Continue olanzapine 2.5mg QHS, titrate up to lowest effective dose  - Would avoid benzodiazepines since pt was recently unable to tolerate them at [facility], becoming extremely sedated  - Continue ECT once weekly for catatonia (on Fridays)    4. Medical:   - Ordered Levalbuterol inhaler to [facility], to be delivered to 2S since [facility] pharmacy doesn't carry it  - Continue Advair and Spiriva for Asthma and COPD  - Continue Lisinopril 40mg QD and HCTZ 25mg QD for HTN  - Lidocaine patches PRN daily + Tylenol PRN for lower back pain  - Bowel regimen: Senna 2 tabs QHS  - Diet: Easy to chew, consistent carbohydrates. No Beef No Pork  - Pt is at risk for falls given deconditioning. Seen by PT, recommended PRN walker with 1 staff assists. Pt is in a room with peer who has CO for added supervision and redirection during nighttime. During the daytime she should be either in the day room or the dining area with staff monitoring her at all times to provide redirection if pt tries to get up or ambulate unassisted.     5. Collateral: spoke with pt's  Liam on phone to update on progress 5/22 and 5/23.  6. Disposition: pending clinical course

## 2025-05-23 NOTE — BH INPATIENT PSYCHIATRY PROGRESS NOTE - CURRENT MEDICATION
MEDICATIONS  (STANDING):  artificial tears (preservative free) Ophthalmic Solution 1 Drop(s) Both EYES every 8 hours  atorvastatin 40 milliGRAM(s) Oral at bedtime  dextrose Oral Gel 15 Gram(s) Oral once  fluticasone propionate/ salmeterol 250-50 MICROgram(s) Diskus 1 Dose(s) Inhalation two times a day  glucagon  Injectable 1 milliGRAM(s) IntraMuscular once  hydrochlorothiazide 25 milliGRAM(s) Oral daily  insulin glargine Injectable (LANTUS) 3 Unit(s) SubCutaneous at bedtime  insulin lispro (ADMELOG) corrective regimen sliding scale   SubCutaneous at bedtime  insulin lispro (ADMELOG) corrective regimen sliding scale   SubCutaneous three times a day before meals  insulin lispro Injectable (ADMELOG) 2 Unit(s) SubCutaneous three times a day before meals  lisinopril 40 milliGRAM(s) Oral daily  melatonin. 9 milliGRAM(s) Oral at bedtime  memantine 10 milliGRAM(s) Oral <User Schedule>  memantine 10 milliGRAM(s) Oral at bedtime  OLANZapine 2.5 milliGRAM(s) Oral at bedtime  petrolatum Ophthalmic Ointment 1 Application(s) Both EYES two times a day  senna 2 Tablet(s) Oral at bedtime  sertraline 125 milliGRAM(s) Oral daily  tiotropium 2.5 MICROgram(s) Inhaler 2 Puff(s) Inhalation daily    MEDICATIONS  (PRN):  acetaminophen     Tablet .. 650 milliGRAM(s) Oral every 6 hours PRN Temp greater or equal to 38C (100.4F), Mild Pain (1 - 3), Moderate Pain (4 - 6)  albuterol    90 MICROgram(s) HFA Inhaler 2 Puff(s) Inhalation every 6 hours PRN Shortness of Breath and/or Wheezing  lidocaine   4% Patch 1 Patch Transdermal daily PRN chronic back pain  OLANZapine 1.25 milliGRAM(s) Oral every 6 hours PRN agitation   MEDICATIONS  (STANDING):  artificial tears (preservative free) Ophthalmic Solution 1 Drop(s) Both EYES every 8 hours  atorvastatin 40 milliGRAM(s) Oral at bedtime  cloNIDine 0.1 milliGRAM(s) Oral daily  dextrose Oral Gel 15 Gram(s) Oral once  fluticasone propionate/ salmeterol 250-50 MICROgram(s) Diskus 1 Dose(s) Inhalation two times a day  glucagon  Injectable 1 milliGRAM(s) IntraMuscular once  hydrochlorothiazide 25 milliGRAM(s) Oral daily  insulin glargine Injectable (LANTUS) 3 Unit(s) SubCutaneous at bedtime  insulin lispro (ADMELOG) corrective regimen sliding scale   SubCutaneous at bedtime  insulin lispro (ADMELOG) corrective regimen sliding scale   SubCutaneous three times a day before meals  insulin lispro Injectable (ADMELOG) 2 Unit(s) SubCutaneous three times a day before meals  lisinopril 40 milliGRAM(s) Oral daily  melatonin. 9 milliGRAM(s) Oral at bedtime  memantine 10 milliGRAM(s) Oral <User Schedule>  memantine 10 milliGRAM(s) Oral at bedtime  OLANZapine 2.5 milliGRAM(s) Oral at bedtime  petrolatum Ophthalmic Ointment 1 Application(s) Both EYES two times a day  senna 2 Tablet(s) Oral at bedtime  sertraline 125 milliGRAM(s) Oral daily  tiotropium 2.5 MICROgram(s) Inhaler 2 Puff(s) Inhalation daily    MEDICATIONS  (PRN):  acetaminophen     Tablet .. 650 milliGRAM(s) Oral every 6 hours PRN Temp greater or equal to 38C (100.4F), Mild Pain (1 - 3), Moderate Pain (4 - 6)  lidocaine   4% Patch 1 Patch Transdermal daily PRN chronic back pain  OLANZapine 1.25 milliGRAM(s) Oral every 6 hours PRN agitation

## 2025-05-23 NOTE — BH INPATIENT PSYCHIATRY PROGRESS NOTE - MSE UNSTRUCTURED FT
Appearance: Adult woman who appears stated age, in no apparent distress. Dressed appropriately in hospital gown. Fair grooming and hygiene.  Behavior: Calm and cooperative. Good eye contact. Fairly related.  Motor: No psychomotor slowing or activation. No tremor. No abnormal movements.  Speech: Mild speech latency. Slow rate, soft volume, decreased production.  Mood: "Good."  Affect: Euthymic, full range, stable.  Thought Process: Linear and goal-directed.  Thought Content: Denies any passive thoughts of death. Denies any active suicidal ideation, intent, or plan. Denies homicidal ideation, intent, or plan.    Perception: Denies auditory, visual, or tactile hallucinations.  Insight: Poor  Judgment: Fair  Impulse Control: Intact on unit.  Attention: Fair.   Language: Fluent.  Orientation: Oriented only to person and grossly to place Not oriented to time or situation.  Gait: Slow unsteady gait. Ambulates with walker.   Appearance: Adult woman who appears stated age, in no apparent distress. Dressed appropriately in hospital gown. Fair grooming and hygiene.  Behavior: Pleasant, calm and cooperative. Good eye contact. Fairly related.  Motor: No psychomotor slowing or activation. No tremor. No abnormal movements.  Speech: Mild speech latency. Slow rate, soft volume, decreased production.  Mood: "Depressed"  Affect: Neutral, appropriately reactive, full range, stable.  Thought Process: Linear and goal-directed.  Thought Content: Denies any passive thoughts of death. Denies any active suicidal ideation, intent, or plan. Denies homicidal ideation, intent, or plan.    Perception: Denies auditory, visual, or tactile hallucinations.  Insight: Poor  Judgment: Fair  Impulse Control: Intact on unit.  Attention: Fair.   Language: Fluent.  Orientation: Oriented only to person and grossly to situation.  Gait: Slow unsteady gait. Ambulates with walker.

## 2025-05-23 NOTE — PROGRESS NOTE ADULT - SUBJECTIVE AND OBJECTIVE BOX
CC/Reason for Consult:     SUBJECTIVE / OVERNIGHT EVENTS:  Seen by me this afternoon, awaiting ECT at time of visit. No complaints.    MEDICATIONS  (STANDING):  artificial tears (preservative free) Ophthalmic Solution 1 Drop(s) Both EYES every 8 hours  atorvastatin 40 milliGRAM(s) Oral at bedtime  cloNIDine 0.1 milliGRAM(s) Oral daily  dextrose Oral Gel 15 Gram(s) Oral once  fluticasone propionate/ salmeterol 250-50 MICROgram(s) Diskus 1 Dose(s) Inhalation two times a day  glucagon  Injectable 1 milliGRAM(s) IntraMuscular once  hydrochlorothiazide 25 milliGRAM(s) Oral daily  insulin glargine Injectable (LANTUS) 3 Unit(s) SubCutaneous at bedtime  insulin lispro (ADMELOG) corrective regimen sliding scale   SubCutaneous at bedtime  insulin lispro (ADMELOG) corrective regimen sliding scale   SubCutaneous three times a day before meals  insulin lispro Injectable (ADMELOG) 2 Unit(s) SubCutaneous three times a day before meals  lisinopril 40 milliGRAM(s) Oral daily  melatonin. 9 milliGRAM(s) Oral at bedtime  memantine 10 milliGRAM(s) Oral <User Schedule>  memantine 10 milliGRAM(s) Oral at bedtime  OLANZapine 2.5 milliGRAM(s) Oral at bedtime  petrolatum Ophthalmic Ointment 1 Application(s) Both EYES two times a day  senna 2 Tablet(s) Oral at bedtime  sertraline 125 milliGRAM(s) Oral daily  tiotropium 2.5 MICROgram(s) Inhaler 2 Puff(s) Inhalation daily    MEDICATIONS  (PRN):  acetaminophen     Tablet .. 650 milliGRAM(s) Oral every 6 hours PRN Temp greater or equal to 38C (100.4F), Mild Pain (1 - 3), Moderate Pain (4 - 6)  albuterol    90 MICROgram(s) HFA Inhaler 2 Puff(s) Inhalation every 6 hours PRN Shortness of Breath and/or Wheezing  lidocaine   4% Patch 1 Patch Transdermal daily PRN chronic back pain  OLANZapine 1.25 milliGRAM(s) Oral every 6 hours PRN agitation      Vital Signs Last 24 Hrs  T(C): 36.9 (23 May 2025 14:09), Max: 36.9 (23 May 2025 05:54)  T(F): 98.4 (23 May 2025 14:09), Max: 98.4 (23 May 2025 05:54)  HR: 79 (23 May 2025 14:25) (79 - 89)  BP: 150/80 (23 May 2025 14:25) (115/67 - 155/82)  RR: 18 (23 May 2025 14:25) (16 - 18)  SpO2: 93% (23 May 2025 14:25) (93% - 98%)      CAPILLARY BLOOD GLUCOSE      POCT Blood Glucose.: 121 mg/dL (23 May 2025 11:54)  POCT Blood Glucose.: 108 mg/dL (23 May 2025 07:39)  POCT Blood Glucose.: 149 mg/dL (22 May 2025 20:22)  POCT Blood Glucose.: 144 mg/dL (22 May 2025 15:36)        PHYSICAL EXAM:  GENERAL: NAD, well-developed  HEAD:  Atraumatic, Normocephalic  EYES: EOMI, conjunctiva and sclera clear  NECK: Supple, No JVD  CHEST/LUNG: Clear to auscultation bilaterally; No wheeze  HEART: Regular rate and rhythm; No murmurs, rubs, or gallops  ABDOMEN: Soft, Nontender, Nondistended; Bowel sounds present  EXTREMITIES:  2+ Peripheral Pulses, No clubbing, cyanosis, or edema  PSYCH: AAOx1  NEUROLOGY: non-focal  SKIN: No rashes or lesions    LABS:                    RADIOLOGY & ADDITIONAL TESTS:    Imaging Personally Reviewed:    Consultant(s) Notes Reviewed:      Care Discussed with Consultants/Other Providers:

## 2025-05-23 NOTE — BH INPATIENT PSYCHIATRY PROGRESS NOTE - NSBHFUPINTERVALHXFT_PSY_A_CORE
Pt seen for f/u mood symptoms and catatonia. Chart reviewed. VSS. No acute events overnight. Pt adherent with and tolerating all standing meds.     Pt seen in day room with [employer] Awa  ID [id number]. Pt reports mood as "good." Reports some lower back pain, rated 5/10 in severity. Agrees to PRN lidocaine patches and Tylenol. Otherwise denies any changes in mood. No signs or symptoms of catatonia noted. Per staff, pt has been eating all meals. Pt reports no acute changes in sleep or appetite. Pt denies manic symptoms or depression. Pt denies any passive or active suicidal ideation, intent, or plan. Denies urges to self-harm. Denies aggressive/homicidal ideation, intent, or plan. Denies any auditory or visual hallucinations, paranoia, or grandiosity; no delusional content elicited. Pt states that in the past, she experienced auditory hallucinations (voices), but no longer at this time.    Pt was oriented only to place ("hospital"). Not oriented to time or situation. Believed that she was in the hospital for a "meeting." However, able to recall the name of her son who visited her on the unit recently. Pt seen for f/u mood symptoms and catatonia. Chart reviewed. VSS. No acute events overnight. Pt adherent with and tolerating all standing meds.     Pt seen in day room with [employer] Awa  ID [id number]. Pt reports mood as "good." Denies any physical symptoms, pain, or discomfort. Pt reports "depressed" mood. No signs or symptoms of catatonia noted. Pt reports no acute changes in sleep or appetite. Pt denies manic symptoms or depression. Pt denies any passive or active suicidal ideation, intent, or plan. Denies urges to self-harm. Denies aggressive/homicidal ideation, intent, or plan. Denies any auditory or visual hallucinations, paranoia, or grandiosity; no delusional content elicited. Pt states that in the past, she experienced auditory hallucinations (voices), but no longer at this time.    Pt was not oriented to place today. When asked why she was here, pt states, "I was sick with depression." Pt not able to state month, date, or year. Believed it was the year 1996.  Pt seen for f/u mood symptoms and catatonia. Chart reviewed. VSS. No acute events overnight. Pt adherent with and tolerating all standing meds.     Pt seen in day room with [employer] Awa  ID [id number]. Pt reports mood as "good." Denies any physical symptoms, pain, or discomfort. Pt reports "depressed" mood. No signs or symptoms of catatonia noted. Pt reports no acute changes in sleep or appetite. Pt denies manic symptoms or depression. Pt denies any passive or active suicidal ideation, intent, or plan. Denies urges to self-harm. Denies aggressive/homicidal ideation, intent, or plan. Denies any auditory or visual hallucinations, paranoia, or grandiosity; no delusional content elicited. Pt states that in the past, she experienced auditory hallucinations (voices), but no longer at this time.    Pt was not oriented to place today. When asked why she was here, pt states, "I was sick with depression." Pt not able to state month, date, or year. Believed it was the year 1996.     Writer spoke with pt's  Liam ([phone]) over the phone.  believes that pt is improving, stating, "She's coming back a little." He has been visiting her on the unit and she is interactive and on her way to returning to her baseline in terms of mood and behavior. However, he is concerned about pt's memory. Prior to this illness episode, pt was oriented to place, time, and situation, and had no memory issues. Now, he is worried that she often does not know the time or place, and would sometimes forget the names of her sons. He inquires about length of ECT treatment, and writer explains that it is currently tapered to weekly frequency, with further treatments to be assessed by ECT team at each treatment session.  is agreeable with treatment plan.

## 2025-05-23 NOTE — BH INPATIENT PSYCHIATRY PROGRESS NOTE - PRN MEDS
MEDICATIONS  (PRN):  acetaminophen     Tablet .. 650 milliGRAM(s) Oral every 6 hours PRN Temp greater or equal to 38C (100.4F), Mild Pain (1 - 3), Moderate Pain (4 - 6)  albuterol    90 MICROgram(s) HFA Inhaler 2 Puff(s) Inhalation every 6 hours PRN Shortness of Breath and/or Wheezing  lidocaine   4% Patch 1 Patch Transdermal daily PRN chronic back pain  OLANZapine 1.25 milliGRAM(s) Oral every 6 hours PRN agitation   MEDICATIONS  (PRN):  acetaminophen     Tablet .. 650 milliGRAM(s) Oral every 6 hours PRN Temp greater or equal to 38C (100.4F), Mild Pain (1 - 3), Moderate Pain (4 - 6)  lidocaine   4% Patch 1 Patch Transdermal daily PRN chronic back pain  OLANZapine 1.25 milliGRAM(s) Oral every 6 hours PRN agitation

## 2025-05-24 LAB
GLUCOSE BLDC GLUCOMTR-MCNC: 106 MG/DL — HIGH (ref 70–99)
GLUCOSE BLDC GLUCOMTR-MCNC: 120 MG/DL — HIGH (ref 70–99)
GLUCOSE BLDC GLUCOMTR-MCNC: 134 MG/DL — HIGH (ref 70–99)
GLUCOSE BLDC GLUCOMTR-MCNC: 170 MG/DL — HIGH (ref 70–99)

## 2025-05-24 PROCEDURE — 99231 SBSQ HOSP IP/OBS SF/LOW 25: CPT

## 2025-05-24 RX ADMIN — INSULIN LISPRO 2 UNIT(S): 100 INJECTION, SOLUTION INTRAVENOUS; SUBCUTANEOUS at 16:15

## 2025-05-24 RX ADMIN — Medication 1 DROP(S): at 22:01

## 2025-05-24 RX ADMIN — INSULIN LISPRO 1: 100 INJECTION, SOLUTION INTRAVENOUS; SUBCUTANEOUS at 16:00

## 2025-05-24 RX ADMIN — Medication 9 MILLIGRAM(S): at 21:14

## 2025-05-24 RX ADMIN — Medication 1 DOSE(S): at 22:01

## 2025-05-24 RX ADMIN — LISINOPRIL 40 MILLIGRAM(S): 5 TABLET ORAL at 09:06

## 2025-05-24 RX ADMIN — OLANZAPINE 2.5 MILLIGRAM(S): 10 TABLET ORAL at 21:14

## 2025-05-24 RX ADMIN — Medication 0.1 MILLIGRAM(S): at 09:06

## 2025-05-24 RX ADMIN — INSULIN GLARGINE-YFGN 3 UNIT(S): 100 INJECTION, SOLUTION SUBCUTANEOUS at 21:04

## 2025-05-24 RX ADMIN — MEMANTINE HYDROCHLORIDE 10 MILLIGRAM(S): 21 CAPSULE, EXTENDED RELEASE ORAL at 06:32

## 2025-05-24 RX ADMIN — SERTRALINE 125 MILLIGRAM(S): 100 TABLET, FILM COATED ORAL at 09:05

## 2025-05-24 RX ADMIN — INSULIN LISPRO 2 UNIT(S): 100 INJECTION, SOLUTION INTRAVENOUS; SUBCUTANEOUS at 11:59

## 2025-05-24 RX ADMIN — Medication 1 DOSE(S): at 09:00

## 2025-05-24 RX ADMIN — MEMANTINE HYDROCHLORIDE 10 MILLIGRAM(S): 21 CAPSULE, EXTENDED RELEASE ORAL at 21:16

## 2025-05-24 RX ADMIN — Medication 1 DROP(S): at 06:31

## 2025-05-24 RX ADMIN — Medication 2 TABLET(S): at 21:13

## 2025-05-24 RX ADMIN — TIOTROPIUM BROMIDE INHALATION SPRAY 2 PUFF(S): 3.12 SPRAY, METERED RESPIRATORY (INHALATION) at 09:00

## 2025-05-24 RX ADMIN — ATORVASTATIN CALCIUM 40 MILLIGRAM(S): 80 TABLET, FILM COATED ORAL at 21:13

## 2025-05-24 NOTE — BH INPATIENT PSYCHIATRY PROGRESS NOTE - MSE UNSTRUCTURED FT
Awake and alert. In day room, sitting by herself. Affect bright, superficial. Speech fluent. TP is concrete, coherent, understands and communicates in English (prefers today). Good impulse control. No suicidal ideations.

## 2025-05-24 NOTE — BH INPATIENT PSYCHIATRY PROGRESS NOTE - NSBHFUPINTERVALHXFT_PSY_A_CORE
Pt seen for f/u mood symptoms and catatonia. Reports that she is doing "well," does not elaborate, only says "everything is fine." EZEQUIEL

## 2025-05-24 NOTE — BH INPATIENT PSYCHIATRY PROGRESS NOTE - PRN MEDS
MEDICATIONS  (PRN):  acetaminophen     Tablet .. 650 milliGRAM(s) Oral every 6 hours PRN Temp greater or equal to 38C (100.4F), Mild Pain (1 - 3), Moderate Pain (4 - 6)  levalbuterol 45 MICROgram(s) HFA Inhaler 1 Puff(s) Inhalation every 8 hours PRN asthma  lidocaine   4% Patch 1 Patch Transdermal daily PRN chronic back pain  OLANZapine 1.25 milliGRAM(s) Oral every 6 hours PRN agitation

## 2025-05-24 NOTE — BH INPATIENT PSYCHIATRY PROGRESS NOTE - NSBHMETABOLIC_PSY_ALL_CORE_FT
BMI: BMI (kg/m2): 28.3 (05-21-25 @ 00:57)  HbA1c: A1C with Estimated Average Glucose Result: 5.9 % (05-22-25 @ 08:09)    Glucose: POCT Blood Glucose.: 170 mg/dL (05-24-25 @ 15:50)    BP: 128/64 (05-23-25 @ 15:33) (115/67 - 155/82)Vital Signs Last 24 Hrs  T(C): 36.7 (05-24-25 @ 07:37), Max: 36.7 (05-24-25 @ 07:37)  T(F): 98 (05-24-25 @ 07:37), Max: 98 (05-24-25 @ 07:37)  HR: --  BP: --  BP(mean): --  RR: 18 (05-24-25 @ 07:37) (18 - 18)  SpO2: --    Orthostatic VS  05-24-25 @ 07:37  Lying BP: --/-- HR: --  Sitting BP: 140/64 HR: 78  Standing BP: 132/82 HR: 86  Site: --  Mode: --  Orthostatic VS  05-23-25 @ 05:54  Lying BP: --/-- HR: --  Sitting BP: 163/87 HR: 82  Standing BP: 157/90 HR: 88  Site: --  Mode: --    Lipid Panel: Date/Time: 05-22-25 @ 08:09  Cholesterol, Serum: 310  LDL Cholesterol Calculated: 234  HDL Cholesterol, Serum: 67  Total Cholesterol/HDL Ration Measurement: --  Triglycerides, Serum: 64

## 2025-05-24 NOTE — BH INPATIENT PSYCHIATRY PROGRESS NOTE - CURRENT MEDICATION
MEDICATIONS  (STANDING):  artificial tears (preservative free) Ophthalmic Solution 1 Drop(s) Both EYES every 8 hours  atorvastatin 40 milliGRAM(s) Oral at bedtime  cloNIDine 0.1 milliGRAM(s) Oral daily  dextrose Oral Gel 15 Gram(s) Oral once  fluticasone propionate/ salmeterol 250-50 MICROgram(s) Diskus 1 Dose(s) Inhalation two times a day  glucagon  Injectable 1 milliGRAM(s) IntraMuscular once  hydrochlorothiazide 25 milliGRAM(s) Oral daily  insulin glargine Injectable (LANTUS) 3 Unit(s) SubCutaneous at bedtime  insulin lispro (ADMELOG) corrective regimen sliding scale   SubCutaneous at bedtime  insulin lispro (ADMELOG) corrective regimen sliding scale   SubCutaneous three times a day before meals  insulin lispro Injectable (ADMELOG) 2 Unit(s) SubCutaneous three times a day before meals  lisinopril 40 milliGRAM(s) Oral daily  melatonin. 9 milliGRAM(s) Oral at bedtime  memantine 10 milliGRAM(s) Oral <User Schedule>  memantine 10 milliGRAM(s) Oral at bedtime  OLANZapine 2.5 milliGRAM(s) Oral at bedtime  petrolatum Ophthalmic Ointment 1 Application(s) Both EYES two times a day  senna 2 Tablet(s) Oral at bedtime  sertraline 125 milliGRAM(s) Oral daily  tiotropium 2.5 MICROgram(s) Inhaler 2 Puff(s) Inhalation daily    MEDICATIONS  (PRN):  acetaminophen     Tablet .. 650 milliGRAM(s) Oral every 6 hours PRN Temp greater or equal to 38C (100.4F), Mild Pain (1 - 3), Moderate Pain (4 - 6)  levalbuterol 45 MICROgram(s) HFA Inhaler 1 Puff(s) Inhalation every 8 hours PRN asthma  lidocaine   4% Patch 1 Patch Transdermal daily PRN chronic back pain  OLANZapine 1.25 milliGRAM(s) Oral every 6 hours PRN agitation

## 2025-05-24 NOTE — BH INPATIENT PSYCHIATRY PROGRESS NOTE - NSBHASSESSSUMMFT_PSY_ALL_CORE
74F hx asthma, depression, and HTN was admitted to the MICU with AHRF 2/2 asthma exacerbation resulting in acute on chronic HHRF causing AMS and increased WOB that required intubation. During her ICU course, she experienced seizure-like activity .  Additional issues included hyponatremia, acute on chronic metabolic alkalosis, and mild thrombocytopenia,  also having fevers.  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 Patient has h/o hx of major depressive disorder with psychosis, hx of 3 past inpatient psychiatric hospitalizations last in 2016 at [facility], all re-hospitalizations were in context non compliance with the treatment. She has no hx of suicide attempts, no hx of substance abuse.    Pt currently requires inpatient level of care for ongoing treatment for catatonia.     5/22 - calm, cooperative, not oriented, though remembers name of son. No s/s catatonia. Denies mood symptoms. Eating and sleeping on unit.  5/23 - No signs of catatonia. Eating and ambulating on unit. For ECT this morning. Not oriented to time, place, or situation. Has been NPO. Spoke to , who believes pt is returning to baseline, but is concerned about pt's memory issues.  5/24: Catatonia improving, eating, communicating, plan is ECT    PLAN:  1. Admit to [facility] 2S on 9.27  2. Routine checks, as pt denying SI/HI/SIB  3. Psychiatric:  - Continue Namenda 10mg BID  - Continue Sertraline 125mg QD, titrate up to lowest effective dose  - Continue olanzapine 2.5mg QHS, titrate up to lowest effective dose  - Would avoid benzodiazepines since pt was recently unable to tolerate them at [facility], becoming extremely sedated  - Continue ECT once weekly for catatonia (on Fridays)    4. Medical:   - Ordered Levalbuterol inhaler to [facility], to be delivered to 2S since [facility] pharmacy doesn't carry it  - Continue Advair and Spiriva for Asthma and COPD  - Continue Lisinopril 40mg QD and HCTZ 25mg QD for HTN  - Lidocaine patches PRN daily + Tylenol PRN for lower back pain  - Bowel regimen: Senna 2 tabs QHS  - Diet: Easy to chew, consistent carbohydrates. No Beef No Pork  - Pt is at risk for falls given deconditioning. Seen by PT, recommended PRN walker with 1 staff assists. Pt is in a room with peer who has CO for added supervision and redirection during nighttime. During the daytime she should be either in the day room or the dining area with staff monitoring her at all times to provide redirection if pt tries to get up or ambulate unassisted.     5. Collateral: spoke with pt's  Liam on phone to update on progress 5/22 and 5/23.  6. Disposition: pending clinical course

## 2025-05-24 NOTE — BH INPATIENT PSYCHIATRY PROGRESS NOTE - NSBHCHARTREVIEWVS_PSY_A_CORE FT
Vital Signs Last 24 Hrs  T(C): 36.7 (05-24-25 @ 07:37), Max: 36.7 (05-24-25 @ 07:37)  T(F): 98 (05-24-25 @ 07:37), Max: 98 (05-24-25 @ 07:37)  HR: --  BP: --  BP(mean): --  RR: 18 (05-24-25 @ 07:37) (18 - 18)  SpO2: --    Orthostatic VS  05-24-25 @ 07:37  Lying BP: --/-- HR: --  Sitting BP: 140/64 HR: 78  Standing BP: 132/82 HR: 86  Site: --  Mode: --  Orthostatic VS  05-23-25 @ 05:54  Lying BP: --/-- HR: --  Sitting BP: 163/87 HR: 82  Standing BP: 157/90 HR: 88  Site: --  Mode: --

## 2025-05-25 LAB
GLUCOSE BLDC GLUCOMTR-MCNC: 118 MG/DL — HIGH (ref 70–99)
GLUCOSE BLDC GLUCOMTR-MCNC: 119 MG/DL — HIGH (ref 70–99)
GLUCOSE BLDC GLUCOMTR-MCNC: 122 MG/DL — HIGH (ref 70–99)
GLUCOSE BLDC GLUCOMTR-MCNC: 195 MG/DL — HIGH (ref 70–99)
GLUCOSE BLDC GLUCOMTR-MCNC: 81 MG/DL — SIGNIFICANT CHANGE UP (ref 70–99)

## 2025-05-25 PROCEDURE — 99231 SBSQ HOSP IP/OBS SF/LOW 25: CPT

## 2025-05-25 RX ADMIN — Medication 1 DOSE(S): at 09:23

## 2025-05-25 RX ADMIN — Medication 1 DROP(S): at 21:52

## 2025-05-25 RX ADMIN — Medication 9 MILLIGRAM(S): at 21:01

## 2025-05-25 RX ADMIN — Medication 1 DROP(S): at 17:00

## 2025-05-25 RX ADMIN — Medication 0.1 MILLIGRAM(S): at 09:22

## 2025-05-25 RX ADMIN — ATORVASTATIN CALCIUM 40 MILLIGRAM(S): 80 TABLET, FILM COATED ORAL at 21:03

## 2025-05-25 RX ADMIN — INSULIN GLARGINE-YFGN 3 UNIT(S): 100 INJECTION, SOLUTION SUBCUTANEOUS at 20:59

## 2025-05-25 RX ADMIN — MEMANTINE HYDROCHLORIDE 10 MILLIGRAM(S): 21 CAPSULE, EXTENDED RELEASE ORAL at 06:44

## 2025-05-25 RX ADMIN — MEMANTINE HYDROCHLORIDE 10 MILLIGRAM(S): 21 CAPSULE, EXTENDED RELEASE ORAL at 21:02

## 2025-05-25 RX ADMIN — Medication 2 TABLET(S): at 21:02

## 2025-05-25 RX ADMIN — LISINOPRIL 40 MILLIGRAM(S): 5 TABLET ORAL at 09:22

## 2025-05-25 RX ADMIN — Medication 1 DOSE(S): at 21:53

## 2025-05-25 RX ADMIN — OLANZAPINE 2.5 MILLIGRAM(S): 10 TABLET ORAL at 21:02

## 2025-05-25 RX ADMIN — INSULIN LISPRO 1: 100 INJECTION, SOLUTION INTRAVENOUS; SUBCUTANEOUS at 16:17

## 2025-05-25 RX ADMIN — INSULIN LISPRO 2 UNIT(S): 100 INJECTION, SOLUTION INTRAVENOUS; SUBCUTANEOUS at 11:27

## 2025-05-25 RX ADMIN — SERTRALINE 125 MILLIGRAM(S): 100 TABLET, FILM COATED ORAL at 09:22

## 2025-05-25 RX ADMIN — TIOTROPIUM BROMIDE INHALATION SPRAY 2 PUFF(S): 3.12 SPRAY, METERED RESPIRATORY (INHALATION) at 09:23

## 2025-05-25 RX ADMIN — Medication 1 DROP(S): at 06:13

## 2025-05-25 RX ADMIN — INSULIN LISPRO 2 UNIT(S): 100 INJECTION, SOLUTION INTRAVENOUS; SUBCUTANEOUS at 16:17

## 2025-05-25 NOTE — BH INPATIENT PSYCHIATRY PROGRESS NOTE - NSBHMETABOLIC_PSY_ALL_CORE_FT
BMI: BMI (kg/m2): 28.3 (05-21-25 @ 00:57)  HbA1c: A1C with Estimated Average Glucose Result: 5.9 % (05-22-25 @ 08:09)    Glucose: POCT Blood Glucose.: 122 mg/dL (05-25-25 @ 11:13)    BP: 145/69 (05-25-25 @ 07:53) (115/67 - 155/82)Vital Signs Last 24 Hrs  T(C): 36.7 (05-25-25 @ 07:53), Max: 36.7 (05-25-25 @ 07:53)  T(F): 98.1 (05-25-25 @ 07:53), Max: 98.1 (05-25-25 @ 07:53)  HR: --  BP: 145/69 (05-25-25 @ 07:53) (145/69 - 145/69)  BP(mean): 74 (05-25-25 @ 07:53) (74 - 74)  RR: --  SpO2: --    Orthostatic VS  05-24-25 @ 07:37  Lying BP: --/-- HR: --  Sitting BP: 140/64 HR: 78  Standing BP: 132/82 HR: 86  Site: --  Mode: --    Lipid Panel: Date/Time: 05-22-25 @ 08:09  Cholesterol, Serum: 310  LDL Cholesterol Calculated: 234  HDL Cholesterol, Serum: 67  Total Cholesterol/HDL Ration Measurement: --  Triglycerides, Serum: 64

## 2025-05-25 NOTE — BH INPATIENT PSYCHIATRY PROGRESS NOTE - NSBHFUPINTERVALHXFT_PSY_A_CORE
Pt seen for f/u mood symptoms and catatonia. First insists that everything is "very good doctor" but then stops responding to questions and stares at examiner. EZEQUIEL

## 2025-05-25 NOTE — BH INPATIENT PSYCHIATRY PROGRESS NOTE - NSBHCHARTREVIEWVS_PSY_A_CORE FT
Vital Signs Last 24 Hrs  T(C): 36.7 (05-25-25 @ 07:53), Max: 36.7 (05-25-25 @ 07:53)  T(F): 98.1 (05-25-25 @ 07:53), Max: 98.1 (05-25-25 @ 07:53)  HR: --  BP: 145/69 (05-25-25 @ 07:53) (145/69 - 145/69)  BP(mean): 74 (05-25-25 @ 07:53) (74 - 74)  RR: --  SpO2: --    Orthostatic VS  05-24-25 @ 07:37  Lying BP: --/-- HR: --  Sitting BP: 140/64 HR: 78  Standing BP: 132/82 HR: 86  Site: --  Mode: --

## 2025-05-25 NOTE — BH INPATIENT PSYCHIATRY PROGRESS NOTE - MSE UNSTRUCTURED FT
Awake and alert. In bed. Affect bright, superficial. Speech fluent at first, then stops talking and does not respond to questions, or commands, moves her lips with no speech. TP initially concrete, coherent, understands and communicates in English.  No suicidal ideations expressed.

## 2025-05-25 NOTE — BH INPATIENT PSYCHIATRY PROGRESS NOTE - NSBHASSESSSUMMFT_PSY_ALL_CORE
74F hx asthma, depression, and HTN was admitted to the MICU with AHRF 2/2 asthma exacerbation resulting in acute on chronic HHRF causing AMS and increased WOB that required intubation. During her ICU course, she experienced seizure-like activity .  Additional issues included hyponatremia, acute on chronic metabolic alkalosis, and mild thrombocytopenia,  also having fevers.  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 Patient has h/o hx of major depressive disorder with psychosis, hx of 3 past inpatient psychiatric hospitalizations last in 2016 at [facility], all re-hospitalizations were in context non compliance with the treatment. She has no hx of suicide attempts, no hx of substance abuse.    Pt currently requires inpatient level of care for ongoing treatment for catatonia.     5/22 - calm, cooperative, not oriented, though remembers name of son. No s/s catatonia. Denies mood symptoms. Eating and sleeping on unit.  5/23 - No signs of catatonia. Eating and ambulating on unit. For ECT this morning. Not oriented to time, place, or situation. Has been NPO. Spoke to , who believes pt is returning to baseline, but is concerned about pt's memory issues.  5/24-5/25: Catatonia improving, eating, communicating, today talking then became mute moving lips but no speech, plan is ECT,  remains on constant obs     PLAN:  1. Admit to [facility] 2S on 9.27  2. Routine checks, as pt denying SI/HI/SIB  3. Psychiatric:  - Continue Namenda 10mg BID  - Continue Sertraline 125mg QD, titrate up to lowest effective dose  - Continue olanzapine 2.5mg QHS, titrate up to lowest effective dose  - Would avoid benzodiazepines since pt was recently unable to tolerate them at [facility], becoming extremely sedated  - Continue ECT once weekly for catatonia (on Fridays)    4. Medical:   - Ordered Levalbuterol inhaler to [facility], to be delivered to 2S since [facility] pharmacy doesn't carry it  - Continue Advair and Spiriva for Asthma and COPD  - Continue Lisinopril 40mg QD and HCTZ 25mg QD for HTN  - Lidocaine patches PRN daily + Tylenol PRN for lower back pain  - Bowel regimen: Senna 2 tabs QHS  - Diet: Easy to chew, consistent carbohydrates. No Beef No Pork  - Pt is at risk for falls given deconditioning. Seen by PT, recommended PRN walker with 1 staff assists. Pt is in a room with peer who has CO for added supervision and redirection during nighttime. During the daytime she should be either in the day room or the dining area with staff monitoring her at all times to provide redirection if pt tries to get up or ambulate unassisted.     5. Collateral: spoke with pt's  iLam on phone to update on progress 5/22 and 5/23.  6. Disposition: pending clinical course

## 2025-05-26 LAB
GLUCOSE BLDC GLUCOMTR-MCNC: 102 MG/DL — HIGH (ref 70–99)
GLUCOSE BLDC GLUCOMTR-MCNC: 103 MG/DL — HIGH (ref 70–99)
GLUCOSE BLDC GLUCOMTR-MCNC: 173 MG/DL — HIGH (ref 70–99)
GLUCOSE BLDC GLUCOMTR-MCNC: 391 MG/DL — HIGH (ref 70–99)
GLUCOSE BLDC GLUCOMTR-MCNC: 57 MG/DL — LOW (ref 70–99)

## 2025-05-26 PROCEDURE — 99231 SBSQ HOSP IP/OBS SF/LOW 25: CPT

## 2025-05-26 RX ADMIN — Medication 9 MILLIGRAM(S): at 20:35

## 2025-05-26 RX ADMIN — ATORVASTATIN CALCIUM 40 MILLIGRAM(S): 80 TABLET, FILM COATED ORAL at 20:36

## 2025-05-26 RX ADMIN — Medication 1 DROP(S): at 06:04

## 2025-05-26 RX ADMIN — INSULIN LISPRO 2 UNIT(S): 100 INJECTION, SOLUTION INTRAVENOUS; SUBCUTANEOUS at 08:03

## 2025-05-26 RX ADMIN — INSULIN LISPRO 2 UNIT(S): 100 INJECTION, SOLUTION INTRAVENOUS; SUBCUTANEOUS at 12:04

## 2025-05-26 RX ADMIN — Medication 1 DOSE(S): at 21:37

## 2025-05-26 RX ADMIN — OLANZAPINE 2.5 MILLIGRAM(S): 10 TABLET ORAL at 21:33

## 2025-05-26 RX ADMIN — MEMANTINE HYDROCHLORIDE 10 MILLIGRAM(S): 21 CAPSULE, EXTENDED RELEASE ORAL at 06:37

## 2025-05-26 RX ADMIN — MEMANTINE HYDROCHLORIDE 10 MILLIGRAM(S): 21 CAPSULE, EXTENDED RELEASE ORAL at 21:33

## 2025-05-26 RX ADMIN — TIOTROPIUM BROMIDE INHALATION SPRAY 2 PUFF(S): 3.12 SPRAY, METERED RESPIRATORY (INHALATION) at 10:06

## 2025-05-26 RX ADMIN — INSULIN GLARGINE-YFGN 3 UNIT(S): 100 INJECTION, SOLUTION SUBCUTANEOUS at 21:45

## 2025-05-26 RX ADMIN — INSULIN LISPRO 2 UNIT(S): 100 INJECTION, SOLUTION INTRAVENOUS; SUBCUTANEOUS at 16:17

## 2025-05-26 RX ADMIN — Medication 0.1 MILLIGRAM(S): at 08:09

## 2025-05-26 RX ADMIN — Medication 1 DROP(S): at 21:33

## 2025-05-26 RX ADMIN — SERTRALINE 125 MILLIGRAM(S): 100 TABLET, FILM COATED ORAL at 08:09

## 2025-05-26 RX ADMIN — INSULIN LISPRO 5: 100 INJECTION, SOLUTION INTRAVENOUS; SUBCUTANEOUS at 12:04

## 2025-05-26 RX ADMIN — Medication 1 DROP(S): at 13:46

## 2025-05-26 RX ADMIN — LISINOPRIL 40 MILLIGRAM(S): 5 TABLET ORAL at 08:08

## 2025-05-26 RX ADMIN — Medication 1 DOSE(S): at 10:05

## 2025-05-26 RX ADMIN — Medication 2 TABLET(S): at 20:35

## 2025-05-26 NOTE — PROVIDER CONTACT NOTE (HYPOGLYCEMIA EVENT) - NS PROVIDER CONTACT BACKGROUND-HYPO
Age: 74y    Gender: Female    POCT Blood Glucose:  57 mg/dL (05-26-25 @ 15:37)  391 mg/dL (05-26-25 @ 11:31)  102 mg/dL (05-26-25 @ 07:51)  118 mg/dL (05-25-25 @ 20:26)  195 mg/dL (05-25-25 @ 16:00)      eMAR:  atorvastatin   40 milliGRAM(s) Oral (05-25-25 @ 21:03)    insulin glargine Injectable (LANTUS)   3 Unit(s) SubCutaneous (05-25-25 @ 20:59)    insulin lispro (ADMELOG) corrective regimen sliding scale   5 Unit(s) SubCutaneous (05-26-25 @ 12:04)   1 Unit(s) SubCutaneous (05-25-25 @ 16:17)    insulin lispro Injectable (ADMELOG)   2 Unit(s) SubCutaneous (05-26-25 @ 12:04)   2 Unit(s) SubCutaneous (05-26-25 @ 08:03)   2 Unit(s) SubCutaneous (05-25-25 @ 16:17)

## 2025-05-26 NOTE — BH INPATIENT PSYCHIATRY PROGRESS NOTE - NSBHFUPINTERVALHXFT_PSY_A_CORE
Pt seen for f/u mood symptoms and catatonia. Bright, pleasant affect, overall decreased speech production. AVSS

## 2025-05-26 NOTE — BH INPATIENT PSYCHIATRY PROGRESS NOTE - NSBHASSESSSUMMFT_PSY_ALL_CORE
74F hx asthma, depression, and HTN was admitted to the MICU with AHRF 2/2 asthma exacerbation resulting in acute on chronic HHRF causing AMS and increased WOB that required intubation. During her ICU course, she experienced seizure-like activity .  Additional issues included hyponatremia, acute on chronic metabolic alkalosis, and mild thrombocytopenia,  also having fevers.  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 Patient has h/o hx of major depressive disorder with psychosis, hx of 3 past inpatient psychiatric hospitalizations last in 2016 at [facility], all re-hospitalizations were in context non compliance with the treatment. She has no hx of suicide attempts, no hx of substance abuse.    Pt currently requires inpatient level of care for ongoing treatment for catatonia.     5/22 - calm, cooperative, not oriented, though remembers name of son. No s/s catatonia. Denies mood symptoms. Eating and sleeping on unit.  5/23 - No signs of catatonia. Eating and ambulating on unit. For ECT this morning. Not oriented to time, place, or situation. Has been NPO. Spoke to , who believes pt is returning to baseline, but is concerned about pt's memory issues.  5/24-5/26: Catatonia improving, eating, communicating, some reduced speech production, plan is ECT    PLAN:  1. Admit to [facility] 2S on 9.27  2. Routine checks, as pt denying SI/HI/SIB  3. Psychiatric:  - Continue Namenda 10mg BID  - Continue Sertraline 125mg QD, titrate up to lowest effective dose  - Continue olanzapine 2.5mg QHS, titrate up to lowest effective dose  - Would avoid benzodiazepines since pt was recently unable to tolerate them at [facility], becoming extremely sedated  - Continue ECT once weekly for catatonia (on Fridays)    4. Medical:   - Ordered Levalbuterol inhaler to [facility], to be delivered to 2S since [facility] pharmacy doesn't carry it  - Continue Advair and Spiriva for Asthma and COPD  - Continue Lisinopril 40mg QD and HCTZ 25mg QD for HTN  - Lidocaine patches PRN daily + Tylenol PRN for lower back pain  - Bowel regimen: Senna 2 tabs QHS  - Diet: Easy to chew, consistent carbohydrates. No Beef No Pork  - Pt is at risk for falls given deconditioning. Seen by PT, recommended PRN walker with 1 staff assists. Pt is in a room with peer who has CO for added supervision and redirection during nighttime. During the daytime she should be either in the day room or the dining area with staff monitoring her at all times to provide redirection if pt tries to get up or ambulate unassisted.     5. Collateral: spoke with pt's  Liam on phone to update on progress 5/22 and 5/23.  6. Disposition: pending clinical course

## 2025-05-26 NOTE — BH INPATIENT PSYCHIATRY PROGRESS NOTE - NSBHMETABOLIC_PSY_ALL_CORE_FT
BMI: BMI (kg/m2): 28.3 (05-21-25 @ 00:57)  HbA1c: A1C with Estimated Average Glucose Result: 5.9 % (05-22-25 @ 08:09)    Glucose: POCT Blood Glucose.: 57 mg/dL (05-26-25 @ 15:37)    BP: 145/69 (05-25-25 @ 07:53) (145/69 - 145/69)Vital Signs Last 24 Hrs  T(C): --  T(F): --  HR: --  BP: --  BP(mean): --  RR: --  SpO2: --      Lipid Panel: Date/Time: 05-22-25 @ 08:09  Cholesterol, Serum: 310  LDL Cholesterol Calculated: 234  HDL Cholesterol, Serum: 67  Total Cholesterol/HDL Ration Measurement: --  Triglycerides, Serum: 64

## 2025-05-26 NOTE — BH INPATIENT PSYCHIATRY PROGRESS NOTE - MSE UNSTRUCTURED FT
Awake and alert. In bed. Affect bright, superficial. Speech fluent but decreased production. Good eye contact, some increased latency of response. TP concrete.  No suicidal ideations expressed.

## 2025-05-27 LAB
GLUCOSE BLDC GLUCOMTR-MCNC: 116 MG/DL — HIGH (ref 70–99)
GLUCOSE BLDC GLUCOMTR-MCNC: 129 MG/DL — HIGH (ref 70–99)
GLUCOSE BLDC GLUCOMTR-MCNC: 143 MG/DL — HIGH (ref 70–99)
GLUCOSE BLDC GLUCOMTR-MCNC: 152 MG/DL — HIGH (ref 70–99)

## 2025-05-27 PROCEDURE — 99232 SBSQ HOSP IP/OBS MODERATE 35: CPT

## 2025-05-27 RX ADMIN — Medication 1 DOSE(S): at 08:23

## 2025-05-27 RX ADMIN — MEMANTINE HYDROCHLORIDE 10 MILLIGRAM(S): 21 CAPSULE, EXTENDED RELEASE ORAL at 06:37

## 2025-05-27 RX ADMIN — Medication 0.1 MILLIGRAM(S): at 08:22

## 2025-05-27 RX ADMIN — TIOTROPIUM BROMIDE INHALATION SPRAY 2 PUFF(S): 3.12 SPRAY, METERED RESPIRATORY (INHALATION) at 08:23

## 2025-05-27 RX ADMIN — MEMANTINE HYDROCHLORIDE 10 MILLIGRAM(S): 21 CAPSULE, EXTENDED RELEASE ORAL at 21:23

## 2025-05-27 RX ADMIN — ATORVASTATIN CALCIUM 40 MILLIGRAM(S): 80 TABLET, FILM COATED ORAL at 21:22

## 2025-05-27 RX ADMIN — LISINOPRIL 40 MILLIGRAM(S): 5 TABLET ORAL at 08:22

## 2025-05-27 RX ADMIN — Medication 1 DROP(S): at 06:36

## 2025-05-27 RX ADMIN — Medication 9 MILLIGRAM(S): at 21:23

## 2025-05-27 RX ADMIN — INSULIN GLARGINE-YFGN 3 UNIT(S): 100 INJECTION, SOLUTION SUBCUTANEOUS at 21:22

## 2025-05-27 RX ADMIN — Medication 1 DROP(S): at 21:23

## 2025-05-27 RX ADMIN — Medication 1 DROP(S): at 16:18

## 2025-05-27 RX ADMIN — Medication 2 TABLET(S): at 21:22

## 2025-05-27 RX ADMIN — SERTRALINE 125 MILLIGRAM(S): 100 TABLET, FILM COATED ORAL at 08:22

## 2025-05-27 RX ADMIN — INSULIN LISPRO 1: 100 INJECTION, SOLUTION INTRAVENOUS; SUBCUTANEOUS at 15:49

## 2025-05-27 RX ADMIN — INSULIN LISPRO 2 UNIT(S): 100 INJECTION, SOLUTION INTRAVENOUS; SUBCUTANEOUS at 07:53

## 2025-05-27 RX ADMIN — INSULIN LISPRO 2 UNIT(S): 100 INJECTION, SOLUTION INTRAVENOUS; SUBCUTANEOUS at 11:37

## 2025-05-27 RX ADMIN — Medication 1 DOSE(S): at 21:24

## 2025-05-27 NOTE — BH INPATIENT PSYCHIATRY PROGRESS NOTE - NSBHCHARTREVIEWVS_PSY_A_CORE FT
Vital Signs Last 24 Hrs  T(C): 36.6 (05-27-25 @ 07:45), Max: 36.6 (05-27-25 @ 07:45)  T(F): 97.8 (05-27-25 @ 07:45), Max: 97.8 (05-27-25 @ 07:45)  HR: 73 (05-27-25 @ 07:45) (73 - 73)  BP: 152/65 (05-27-25 @ 07:45) (152/65 - 152/65)  BP(mean): --  RR: --  SpO2: --

## 2025-05-27 NOTE — BH INPATIENT PSYCHIATRY PROGRESS NOTE - NSBHFUPINTERVALHXFT_PSY_A_CORE
Pt seen for f/u mood symptoms and catatonia. Chart reviewed. VSS. Hypoglycemic event (POCT glucose 57) yesterday afternoon (5/26) at 15:37. Patient fingerstick was 391 before lunch, was given 2 U standing lispro in addition to 5 U insulin per sliding scale. Per staff, pt eating all meals. Pt adherent with and tolerating all standing meds.     Pt seen in day room with [employer] [facility]  ID [id number]. Pt reports mood as "good." Denies any physical symptoms, pain, or discomfort. Pt appears somewhat withdrawn with underproductive speech, though still with reactive affect, fair eye contact, and verbally responsive. Pt not oriented to place or situation, though generally knows that she is in a hospital. Believes the year is 1975. Remembers her 's full name. However, does not recall ECT treatment on Friday. Pt reports no acute changes in sleep or appetite. States she has been eating all meals. Pt denies manic symptoms or depression. Pt denies any passive or active suicidal ideation, intent, or plan. Denies urges to self-harm. Denies aggressive/homicidal ideation, intent, or plan. Denies any auditory or visual hallucinations, paranoia, or grandiosity; no delusional content elicited. Discussed with patient plan to

## 2025-05-27 NOTE — BH INPATIENT PSYCHIATRY PROGRESS NOTE - NSBHASSESSSUMMFT_PSY_ALL_CORE
74F hx asthma, depression, and HTN was admitted to the MICU with AHRF 2/2 asthma exacerbation resulting in acute on chronic HHRF causing AMS and increased WOB that required intubation. During her ICU course, she experienced seizure-like activity .  Additional issues included hyponatremia, acute on chronic metabolic alkalosis, and mild thrombocytopenia,  also having fevers.  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 Patient has h/o hx of major depressive disorder with psychosis, hx of 3 past inpatient psychiatric hospitalizations last in 2016 at [facility], all rehospitalizations were in context non compliance with the treatment. She has no hx of suicide attempts, no hx of substance abuse.    Pt currently requires inpatient level of care for ongoing treatment for catatonia.     5/22 - calm, cooperative, not oriented, though remembers name of son. No s/s catatonia. Denies mood symptoms. Eating and sleeping on unit.  5/23 - No signs of catatonia. Eating and ambulating on unit. For ECT this morning. Not oriented to time, place, or situation. Has been NPO. Spoke to , who believes pt is returning to baseline, but is concerned about pt's memory issues.  5/27 - Withdrawn, non-spontaneous speech, not oriented to time, place, or situation, though interactive and recalls her 's name. Hypoglycemic episode yesterday. D/w hospitalist, discontinued standing pre-meal Admelog of 2U TID, will keep on bedtime Lantus and sliding scale.     PLAN:  1. Admit to [facility] 2S on 9.27  2. Routine checks, as pt denying SI/HI/SIB  3. Psychiatric:  - Continue Namenda 10mg BID  - Continue Sertraline 125mg QD, titrate up to lowest effective dose  - Will HOLD olanzapine 2.5mg QHS for now, given concern for anticholinergic effects on cognition  - Would avoid benzodiazepines since pt was recently unable to tolerate them at [facility], becoming extremely sedated  - Continue ECT - ordered for Wednesday 5/28 and Friday 5/30 for treatment of residual catatonia.     4. Medical:   - Ordered Levalbuterol inhaler to [facility], to be delivered to 2S since [facility] pharmacy doesn't carry it  - Continue Advair and Spiriva for Asthma and COPD  - Continue Lisinopril 40mg QD and HCTZ 25mg QD for HTN  - Diabetes type 2: Lantus 3U at bedtime + ISS  - Lidocaine patches PRN daily + Tylenol PRN for lower back pain  - Bowel regimen: Senna 2 tabs QHS  - Diet: Easy to chew, consistent carbohydrates. No Beef No Pork  - Pt is at risk for falls given deconditioning. Seen by PT, recommended PRN walker with 1 staff assists. Pt is in a room with peer who has CO for added supervision and redirection during nighttime. During the daytime she should be either in the day room or the dining area with staff monitoring her at all times to provide redirection if pt tries to get up or ambulate unassisted.     5. Collateral: spoke with pt's  Liam on phone to update on progress 5/22 and 5/23.  6. Disposition: pending clinical course

## 2025-05-27 NOTE — PROGRESS NOTE ADULT - SUBJECTIVE AND OBJECTIVE BOX
CC/Reason for Consult:     SUBJECTIVE / OVERNIGHT EVENTS:  NAEO. sitting up in a chair. denies having sob or chest pain. denies any complaints.   one episode of hypoglycemia yesterday due to getting addtl lispro ss.     MEDICATIONS  (STANDING):  artificial tears (preservative free) Ophthalmic Solution 1 Drop(s) Both EYES every 8 hours  atorvastatin 40 milliGRAM(s) Oral at bedtime  cloNIDine 0.1 milliGRAM(s) Oral daily  dextrose Oral Gel 15 Gram(s) Oral once  fluticasone propionate/ salmeterol 250-50 MICROgram(s) Diskus 1 Dose(s) Inhalation two times a day  glucagon  Injectable 1 milliGRAM(s) IntraMuscular once  hydrochlorothiazide 25 milliGRAM(s) Oral daily  insulin glargine Injectable (LANTUS) 3 Unit(s) SubCutaneous at bedtime  insulin lispro (ADMELOG) corrective regimen sliding scale   SubCutaneous at bedtime  insulin lispro (ADMELOG) corrective regimen sliding scale   SubCutaneous three times a day before meals  lisinopril 40 milliGRAM(s) Oral daily  melatonin. 9 milliGRAM(s) Oral at bedtime  memantine 10 milliGRAM(s) Oral <User Schedule>  memantine 10 milliGRAM(s) Oral at bedtime  petrolatum Ophthalmic Ointment 1 Application(s) Both EYES two times a day  senna 2 Tablet(s) Oral at bedtime  sertraline 125 milliGRAM(s) Oral daily  tiotropium 2.5 MICROgram(s) Inhaler 2 Puff(s) Inhalation daily    MEDICATIONS  (PRN):  acetaminophen     Tablet .. 650 milliGRAM(s) Oral every 6 hours PRN Temp greater or equal to 38C (100.4F), Mild Pain (1 - 3), Moderate Pain (4 - 6)  levalbuterol 45 MICROgram(s) HFA Inhaler 1 Puff(s) Inhalation every 8 hours PRN asthma  lidocaine   4% Patch 1 Patch Transdermal daily PRN chronic back pain  OLANZapine 1.25 milliGRAM(s) Oral every 6 hours PRN agitation    Vital Signs Last 24 Hrs  T(C): 36.6 (27 May 2025 07:45), Max: 36.6 (27 May 2025 07:45)  T(F): 97.8 (27 May 2025 07:45), Max: 97.8 (27 May 2025 07:45)  HR: 73 (27 May 2025 07:45) (73 - 73)  BP: 152/65 (27 May 2025 07:45) (152/65 - 152/65)  BP(mean): --  RR: --  SpO2: --      PHYSICAL EXAM:  GENERAL: NAD  CHEST/LUNG: Clear to auscultation bilaterally; No wheeze  HEART: Regular rate and rhythm; No murmurs, rubs, or gallops  ABDOMEN: Soft, Nontender, Nondistended; Bowel sounds present  EXTREMITIES:  2+ Peripheral Pulses, No clubbing, cyanosis, or edema  PSYCH: AAOx1  NEUROLOGY: non-focal  SKIN: No rashes or lesions    LABS:                    RADIOLOGY & ADDITIONAL TESTS:    Imaging Personally Reviewed:    Consultant(s) Notes Reviewed:      Care Discussed with Consultants/Other Providers:

## 2025-05-27 NOTE — BH INPATIENT PSYCHIATRY PROGRESS NOTE - NSBHMETABOLIC_PSY_ALL_CORE_FT
BMI: BMI (kg/m2): 28.3 (05-21-25 @ 00:57)  HbA1c: A1C with Estimated Average Glucose Result: 5.9 % (05-22-25 @ 08:09)    Glucose: POCT Blood Glucose.: 143 mg/dL (05-27-25 @ 11:24)    BP: 152/65 (05-27-25 @ 07:45) (145/69 - 152/65)Vital Signs Last 24 Hrs  T(C): 36.6 (05-27-25 @ 07:45), Max: 36.6 (05-27-25 @ 07:45)  T(F): 97.8 (05-27-25 @ 07:45), Max: 97.8 (05-27-25 @ 07:45)  HR: 73 (05-27-25 @ 07:45) (73 - 73)  BP: 152/65 (05-27-25 @ 07:45) (152/65 - 152/65)  BP(mean): --  RR: --  SpO2: --      Lipid Panel: Date/Time: 05-22-25 @ 08:09  Cholesterol, Serum: 310  LDL Cholesterol Calculated: 234  HDL Cholesterol, Serum: 67  Total Cholesterol/HDL Ration Measurement: --  Triglycerides, Serum: 64

## 2025-05-28 LAB
GLUCOSE BLDC GLUCOMTR-MCNC: 125 MG/DL — HIGH (ref 70–99)
GLUCOSE BLDC GLUCOMTR-MCNC: 224 MG/DL — HIGH (ref 70–99)
GLUCOSE BLDC GLUCOMTR-MCNC: 81 MG/DL — SIGNIFICANT CHANGE UP (ref 70–99)

## 2025-05-28 PROCEDURE — 90870 ELECTROCONVULSIVE THERAPY: CPT

## 2025-05-28 PROCEDURE — 99232 SBSQ HOSP IP/OBS MODERATE 35: CPT | Mod: 25

## 2025-05-28 RX ADMIN — SERTRALINE 125 MILLIGRAM(S): 100 TABLET, FILM COATED ORAL at 09:17

## 2025-05-28 RX ADMIN — Medication 2 TABLET(S): at 21:14

## 2025-05-28 RX ADMIN — Medication 1 DROP(S): at 06:27

## 2025-05-28 RX ADMIN — Medication 0.1 MILLIGRAM(S): at 08:23

## 2025-05-28 RX ADMIN — Medication 1 DROP(S): at 16:29

## 2025-05-28 RX ADMIN — LISINOPRIL 40 MILLIGRAM(S): 5 TABLET ORAL at 08:22

## 2025-05-28 RX ADMIN — INSULIN LISPRO 2: 100 INJECTION, SOLUTION INTRAVENOUS; SUBCUTANEOUS at 16:31

## 2025-05-28 RX ADMIN — Medication 1 DOSE(S): at 21:49

## 2025-05-28 RX ADMIN — INSULIN GLARGINE-YFGN 3 UNIT(S): 100 INJECTION, SOLUTION SUBCUTANEOUS at 21:14

## 2025-05-28 RX ADMIN — Medication 9 MILLIGRAM(S): at 21:14

## 2025-05-28 RX ADMIN — Medication 1 DOSE(S): at 08:23

## 2025-05-28 RX ADMIN — TIOTROPIUM BROMIDE INHALATION SPRAY 2 PUFF(S): 3.12 SPRAY, METERED RESPIRATORY (INHALATION) at 08:24

## 2025-05-28 RX ADMIN — MEMANTINE HYDROCHLORIDE 10 MILLIGRAM(S): 21 CAPSULE, EXTENDED RELEASE ORAL at 21:14

## 2025-05-28 RX ADMIN — ATORVASTATIN CALCIUM 40 MILLIGRAM(S): 80 TABLET, FILM COATED ORAL at 21:14

## 2025-05-28 RX ADMIN — Medication 1 DROP(S): at 21:15

## 2025-05-28 NOTE — BH INPATIENT PSYCHIATRY PROGRESS NOTE - NSBHCHARTREVIEWVS_PSY_A_CORE FT
Vital Signs Last 24 Hrs  T(C): 36.6 (05-28-25 @ 12:15), Max: 36.8 (05-28-25 @ 05:55)  T(F): 97.8 (05-28-25 @ 12:15), Max: 98.2 (05-28-25 @ 05:55)  HR: 72 (05-28-25 @ 12:15) (71 - 88)  BP: 171/85 (05-28-25 @ 12:15) (137/78 - 182/100)  BP(mean): --  RR: 14 (05-28-25 @ 12:15) (14 - 20)  SpO2: 95% (05-28-25 @ 12:15) (94% - 100%)    Orthostatic VS  05-28-25 @ 05:55  Lying BP: --/-- HR: --  Sitting BP: 161/87 HR: 83  Standing BP: 145/78 HR: 77  Site: --  Mode: --

## 2025-05-28 NOTE — BH INPATIENT PSYCHIATRY PROGRESS NOTE - CURRENT MEDICATION
MEDICATIONS  (STANDING):  artificial tears (preservative free) Ophthalmic Solution 1 Drop(s) Both EYES every 8 hours  atorvastatin 40 milliGRAM(s) Oral at bedtime  cloNIDine 0.1 milliGRAM(s) Oral daily  dextrose Oral Gel 15 Gram(s) Oral once  fluticasone propionate/ salmeterol 250-50 MICROgram(s) Diskus 1 Dose(s) Inhalation two times a day  glucagon  Injectable 1 milliGRAM(s) IntraMuscular once  hydrochlorothiazide 25 milliGRAM(s) Oral daily  insulin glargine Injectable (LANTUS) 3 Unit(s) SubCutaneous at bedtime  insulin lispro (ADMELOG) corrective regimen sliding scale   SubCutaneous at bedtime  insulin lispro (ADMELOG) corrective regimen sliding scale   SubCutaneous three times a day before meals  lisinopril 40 milliGRAM(s) Oral daily  melatonin. 9 milliGRAM(s) Oral at bedtime  memantine 10 milliGRAM(s) Oral <User Schedule>  memantine 10 milliGRAM(s) Oral at bedtime  petrolatum Ophthalmic Ointment 1 Application(s) Both EYES two times a day  senna 2 Tablet(s) Oral at bedtime  sertraline 125 milliGRAM(s) Oral daily  tiotropium 2.5 MICROgram(s) Inhaler 2 Puff(s) Inhalation daily    MEDICATIONS  (PRN):  acetaminophen     Tablet .. 650 milliGRAM(s) Oral every 6 hours PRN Temp greater or equal to 38C (100.4F), Mild Pain (1 - 3), Moderate Pain (4 - 6)  levalbuterol 45 MICROgram(s) HFA Inhaler 1 Puff(s) Inhalation every 8 hours PRN asthma  lidocaine   4% Patch 1 Patch Transdermal daily PRN chronic back pain  OLANZapine 1.25 milliGRAM(s) Oral every 6 hours PRN agitation

## 2025-05-28 NOTE — BH INPATIENT PSYCHIATRY PROGRESS NOTE - NSBHMETABOLIC_PSY_ALL_CORE_FT
BMI: BMI (kg/m2): 28.3 (05-21-25 @ 00:57)  HbA1c: A1C with Estimated Average Glucose Result: 5.9 % (05-22-25 @ 08:09)    Glucose: POCT Blood Glucose.: 125 mg/dL (05-28-25 @ 07:43)    BP: 171/85 (05-28-25 @ 12:15) (137/78 - 182/100)Vital Signs Last 24 Hrs  T(C): 36.6 (05-28-25 @ 12:15), Max: 36.8 (05-28-25 @ 05:55)  T(F): 97.8 (05-28-25 @ 12:15), Max: 98.2 (05-28-25 @ 05:55)  HR: 72 (05-28-25 @ 12:15) (71 - 88)  BP: 171/85 (05-28-25 @ 12:15) (137/78 - 182/100)  BP(mean): --  RR: 14 (05-28-25 @ 12:15) (14 - 20)  SpO2: 95% (05-28-25 @ 12:15) (94% - 100%)    Orthostatic VS  05-28-25 @ 05:55  Lying BP: --/-- HR: --  Sitting BP: 161/87 HR: 83  Standing BP: 145/78 HR: 77  Site: --  Mode: --    Lipid Panel: Date/Time: 05-22-25 @ 08:09  Cholesterol, Serum: 310  LDL Cholesterol Calculated: 234  HDL Cholesterol, Serum: 67  Total Cholesterol/HDL Ration Measurement: --  Triglycerides, Serum: 64

## 2025-05-28 NOTE — BH INPATIENT PSYCHIATRY PROGRESS NOTE - NSBHASSESSSUMMFT_PSY_ALL_CORE
74F hx asthma, depression, and HTN was admitted to the MICU with AHRF 2/2 asthma exacerbation resulting in acute on chronic HHRF causing AMS and increased WOB that required intubation. During her ICU course, she experienced seizure-like activity .  Additional issues included hyponatremia, acute on chronic metabolic alkalosis, and mild thrombocytopenia,  also having fevers.  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 Patient has h/o hx of major depressive disorder with psychosis, hx of 3 past inpatient psychiatric hospitalizations last in 2016 at [facility], all rehospitalizations were in context non compliance with the treatment. She has no hx of suicide attempts, no hx of substance abuse.    Pt currently requires inpatient level of care for ongoing treatment for catatonia.     5/22 - calm, cooperative, not oriented, though remembers name of son. No s/s catatonia. Denies mood symptoms. Eating and sleeping on unit.  5/23 - No signs of catatonia. Eating and ambulating on unit. For ECT this morning. Not oriented to time, place, or situation. Has been NPO. Spoke to , who believes pt is returning to baseline, but is concerned about pt's memory issues.  5/27 - Withdrawn, non-spontaneous speech, not oriented to time, place, or situation, though interactive and recalls her 's name. Hypoglycemic episode yesterday. D/w hospitalist, discontinued standing pre-meal Admelog of 2U TID, will keep on bedtime Lantus and sliding scale.   5/28: More engaged today, establishes good eye contact. Received ECT today, will monitor, if catatonia symptoms remain in control will continue with weekly ECT next week, if pt appears to be relapsing may order ECT for Friday.   PLAN:  1. Admit to [facility] 2S on 9.27  2. Routine checks, as pt denying SI/HI/SIB  3. Psychiatric:  - Continue Namenda 10mg BID  - Continue Sertraline 125mg QD, titrate up to lowest effective dose  - Will HOLD olanzapine 2.5mg QHS for now, given concern for anticholinergic effects on cognition  - Would avoid benzodiazepines since pt was recently unable to tolerate them at [facility], becoming extremely sedated  - Continue ECT - ordered for Wednesday 5/28 and Friday 5/30 for treatment of residual catatonia.     4. Medical:   - Ordered Levalbuterol inhaler to [facility], to be delivered to 2S since [facility] pharmacy doesn't carry it  - Continue Advair and Spiriva for Asthma and COPD  - Continue Lisinopril 40mg QD and HCTZ 25mg QD for HTN  - Diabetes type 2: Lantus 3U at bedtime + ISS  - Lidocaine patches PRN daily + Tylenol PRN for lower back pain  - Bowel regimen: Senna 2 tabs QHS  - Diet: Easy to chew, consistent carbohydrates. No Beef No Pork  - Pt is at risk for falls given deconditioning. Seen by PT, recommended PRN walker with 1 staff assists. Pt is in a room with peer who has CO for added supervision and redirection during nighttime. During the daytime she should be either in the day room or the dining area with staff monitoring her at all times to provide redirection if pt tries to get up or ambulate unassisted.     5. Collateral: spoke with pt's  Liam on phone to update on progress 5/22 and 5/23.  6. Disposition: pending clinical course

## 2025-05-28 NOTE — BH INPATIENT PSYCHIATRY PROGRESS NOTE - NSBHFUPINTERVALHXFT_PSY_A_CORE
Pt seen for f/u mood symptoms and catatonia. Chart reviewed. VSS. Hospitalist recs appreciated, will continue Lantus, premeal Lispro discontinued. Fingerstick was 125 this morning.  Pt is seen sitting in the dayroom during rounds. She was awake, alert and responsive. Established good eye contact throughout encounter and remained engaged. Was off on the date and day by 1 (said it was Thursday 27th) but could not recall the month or year (started off saying "17.."). Remembered where she was and who writer is. Recalls being very sick recently. Reports feeling "fine" today, denies any physical complaints. Agreed that she does not feel fully back to her baseline yet and has felt confused lately.

## 2025-05-29 LAB
GLUCOSE BLDC GLUCOMTR-MCNC: 125 MG/DL — HIGH (ref 70–99)
GLUCOSE BLDC GLUCOMTR-MCNC: 126 MG/DL — HIGH (ref 70–99)
GLUCOSE BLDC GLUCOMTR-MCNC: 158 MG/DL — HIGH (ref 70–99)
GLUCOSE BLDC GLUCOMTR-MCNC: 198 MG/DL — HIGH (ref 70–99)

## 2025-05-29 PROCEDURE — 99232 SBSQ HOSP IP/OBS MODERATE 35: CPT

## 2025-05-29 RX ORDER — LOPERAMIDE HCL 1 MG/7.5ML
2 SOLUTION ORAL ONCE
Refills: 0 | Status: DISCONTINUED | OUTPATIENT
Start: 2025-05-29 | End: 2025-07-01

## 2025-05-29 RX ADMIN — INSULIN LISPRO 1: 100 INJECTION, SOLUTION INTRAVENOUS; SUBCUTANEOUS at 15:45

## 2025-05-29 RX ADMIN — Medication 1 DOSE(S): at 09:12

## 2025-05-29 RX ADMIN — INSULIN LISPRO 1: 100 INJECTION, SOLUTION INTRAVENOUS; SUBCUTANEOUS at 12:15

## 2025-05-29 RX ADMIN — MEMANTINE HYDROCHLORIDE 10 MILLIGRAM(S): 21 CAPSULE, EXTENDED RELEASE ORAL at 06:28

## 2025-05-29 RX ADMIN — Medication 2 TABLET(S): at 20:26

## 2025-05-29 RX ADMIN — Medication 1 DROP(S): at 21:03

## 2025-05-29 RX ADMIN — LISINOPRIL 40 MILLIGRAM(S): 5 TABLET ORAL at 09:12

## 2025-05-29 RX ADMIN — SERTRALINE 125 MILLIGRAM(S): 100 TABLET, FILM COATED ORAL at 09:12

## 2025-05-29 RX ADMIN — Medication 1 DROP(S): at 06:26

## 2025-05-29 RX ADMIN — INSULIN GLARGINE-YFGN 3 UNIT(S): 100 INJECTION, SOLUTION SUBCUTANEOUS at 21:05

## 2025-05-29 RX ADMIN — MEMANTINE HYDROCHLORIDE 10 MILLIGRAM(S): 21 CAPSULE, EXTENDED RELEASE ORAL at 20:23

## 2025-05-29 RX ADMIN — Medication 1 DOSE(S): at 20:22

## 2025-05-29 RX ADMIN — Medication 0.1 MILLIGRAM(S): at 09:12

## 2025-05-29 RX ADMIN — TIOTROPIUM BROMIDE INHALATION SPRAY 2 PUFF(S): 3.12 SPRAY, METERED RESPIRATORY (INHALATION) at 09:12

## 2025-05-29 RX ADMIN — Medication 9 MILLIGRAM(S): at 20:23

## 2025-05-29 RX ADMIN — ATORVASTATIN CALCIUM 40 MILLIGRAM(S): 80 TABLET, FILM COATED ORAL at 20:23

## 2025-05-29 NOTE — BH INPATIENT PSYCHIATRY PROGRESS NOTE - NSBHMETABOLIC_PSY_ALL_CORE_FT
BMI: BMI (kg/m2): 28.3 (05-21-25 @ 00:57)  HbA1c: A1C with Estimated Average Glucose Result: 5.9 % (05-22-25 @ 08:09)    Glucose: POCT Blood Glucose.: 158 mg/dL (05-29-25 @ 11:40)    BP: 171/85 (05-28-25 @ 12:15) (137/78 - 182/100)Vital Signs Last 24 Hrs  T(C): 37.1 (05-29-25 @ 08:04), Max: 37.1 (05-29-25 @ 08:04)  T(F): 98.7 (05-29-25 @ 08:04), Max: 98.7 (05-29-25 @ 08:04)  HR: --  BP: --  BP(mean): --  RR: --  SpO2: --    Orthostatic VS  05-29-25 @ 10:23  Lying BP: --/-- HR: --  Sitting BP: 134/78 HR: 89  Standing BP: 122/86 HR: 96  Site: --  Mode: --  Orthostatic VS  05-29-25 @ 08:04  Lying BP: --/-- HR: --  Sitting BP: 151/77 HR: 89  Standing BP: 172/91 HR: 91  Site: --  Mode: --  Orthostatic VS  05-28-25 @ 05:55  Lying BP: --/-- HR: --  Sitting BP: 161/87 HR: 83  Standing BP: 145/78 HR: 77  Site: --  Mode: --    Lipid Panel: Date/Time: 05-22-25 @ 08:09  Cholesterol, Serum: 310  LDL Cholesterol Calculated: 234  HDL Cholesterol, Serum: 67  Total Cholesterol/HDL Ration Measurement: --  Triglycerides, Serum: 64

## 2025-05-29 NOTE — BH INPATIENT PSYCHIATRY PROGRESS NOTE - NSBHCHARTREVIEWVS_PSY_A_CORE FT
Vital Signs Last 24 Hrs  T(C): 37.1 (05-29-25 @ 08:04), Max: 37.1 (05-29-25 @ 08:04)  T(F): 98.7 (05-29-25 @ 08:04), Max: 98.7 (05-29-25 @ 08:04)  HR: --  BP: --  BP(mean): --  RR: --  SpO2: --    Orthostatic VS  05-29-25 @ 10:23  Lying BP: --/-- HR: --  Sitting BP: 134/78 HR: 89  Standing BP: 122/86 HR: 96  Site: --  Mode: --  Orthostatic VS  05-29-25 @ 08:04  Lying BP: --/-- HR: --  Sitting BP: 151/77 HR: 89  Standing BP: 172/91 HR: 91  Site: --  Mode: --  Orthostatic VS  05-28-25 @ 05:55  Lying BP: --/-- HR: --  Sitting BP: 161/87 HR: 83  Standing BP: 145/78 HR: 77  Site: --  Mode: --

## 2025-05-29 NOTE — BH INPATIENT PSYCHIATRY PROGRESS NOTE - NSBHFUPINTERVALHXFT_PSY_A_CORE
Pt seen for f/u mood symptoms and catatonia. Chart reviewed. VSS. Hospitalist recs appreciated, will continue Lantus, premeal Lispro discontinued. Fingerstick was 125 this morning.  Pt is seen sitting in the dayroom during rounds. She was awake, alert and responsive. Established good eye contact throughout encounter and remained engaged. Was off on the date and day by 1 (said it was Thursday 27th) but could not recall the month or year (started off saying "17.."). Remembered where she was and who writer is. Recalls being very sick recently. Reports feeling "fine" today, denies any physical complaints. Agreed that she does not feel fully back to her baseline yet and has felt confused lately.  Pt seen for f/u mood symptoms and catatonia. Chart reviewed. VSS. Fingerstick was 125 this morning, 158 after breakfast.  Pt is seen sitting in the dayroom during rounds. She was awake, alert and responsive. Established good eye contact throughout encounter and remained engaged. Reports feeling well today in terms of mood but needing to use the bathroom frequently. As per staff assisting her, patient's stools are regular consistency but have been more frequent today. Pt denies GI pain or discomfort, denies nausea or vomiting.

## 2025-05-29 NOTE — BH INPATIENT PSYCHIATRY PROGRESS NOTE - NSBHASSESSSUMMFT_PSY_ALL_CORE
74F hx asthma, depression, and HTN was admitted to the MICU with AHRF 2/2 asthma exacerbation resulting in acute on chronic HHRF causing AMS and increased WOB that required intubation. During her ICU course, she experienced seizure-like activity .  Additional issues included hyponatremia, acute on chronic metabolic alkalosis, and mild thrombocytopenia,  also having fevers.  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 Patient has h/o hx of major depressive disorder with psychosis, hx of 3 past inpatient psychiatric hospitalizations last in 2016 at [facility], all rehospitalizations were in context non compliance with the treatment. She has no hx of suicide attempts, no hx of substance abuse.    Pt currently requires inpatient level of care for ongoing treatment for catatonia.     5/22 - calm, cooperative, not oriented, though remembers name of son. No s/s catatonia. Denies mood symptoms. Eating and sleeping on unit.  5/23 - No signs of catatonia. Eating and ambulating on unit. For ECT this morning. Not oriented to time, place, or situation. Has been NPO. Spoke to , who believes pt is returning to baseline, but is concerned about pt's memory issues.  5/27 - Withdrawn, non-spontaneous speech, not oriented to time, place, or situation, though interactive and recalls her 's name. Hypoglycemic episode yesterday. D/w hospitalist, discontinued standing pre-meal Admelog of 2U TID, will keep on bedtime Lantus and sliding scale.   5/28: More engaged today, establishes good eye contact. Received ECT today, will monitor, if catatonia symptoms remain in control will continue with weekly ECT next week, if pt appears to be relapsing may order ECT for Friday.   PLAN:  1. Admit to [facility] 2S on 9.27  2. Routine checks, as pt denying SI/HI/SIB  3. Psychiatric:  - Continue Namenda 10mg BID  - Continue Sertraline 125mg QD, titrate up to lowest effective dose  - Will HOLD olanzapine 2.5mg QHS for now, given concern for anticholinergic effects on cognition  - Would avoid benzodiazepines since pt was recently unable to tolerate them at [facility], becoming extremely sedated  - Continue ECT - ordered for Wednesday 5/28 and Friday 5/30 for treatment of residual catatonia.     4. Medical:   - Ordered Levalbuterol inhaler to [facility], to be delivered to 2S since [facility] pharmacy doesn't carry it  - Continue Advair and Spiriva for Asthma and COPD  - Continue Lisinopril 40mg QD and HCTZ 25mg QD for HTN  - Diabetes type 2: Lantus 3U at bedtime + ISS  - Lidocaine patches PRN daily + Tylenol PRN for lower back pain  - Bowel regimen: Senna 2 tabs QHS  - Diet: Easy to chew, consistent carbohydrates. No Beef No Pork  - Pt is at risk for falls given deconditioning. Seen by PT, recommended PRN walker with 1 staff assists. Pt is in a room with peer who has CO for added supervision and redirection during nighttime. During the daytime she should be either in the day room or the dining area with staff monitoring her at all times to provide redirection if pt tries to get up or ambulate unassisted.     5. Collateral: spoke with pt's  Liam on phone to update on progress 5/22 and 5/23.  6. Disposition: pending clinical course 74F hx asthma, depression, and HTN was admitted to the MICU with AHRF 2/2 asthma exacerbation resulting in acute on chronic HHRF causing AMS and increased WOB that required intubation. During her ICU course, she experienced seizure-like activity .  Additional issues included hyponatremia, acute on chronic metabolic alkalosis, and mild thrombocytopenia,  also having fevers.  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 Patient has h/o hx of major depressive disorder with psychosis, hx of 3 past inpatient psychiatric hospitalizations last in 2016 at [facility], all rehospitalizations were in context non compliance with the treatment. She has no hx of suicide attempts, no hx of substance abuse.    Pt currently requires inpatient level of care for ongoing treatment for catatonia.     5/22 - calm, cooperative, not oriented, though remembers name of son. No s/s catatonia. Denies mood symptoms. Eating and sleeping on unit.  5/23 - No signs of catatonia. Eating and ambulating on unit. For ECT this morning. Not oriented to time, place, or situation. Has been NPO. Spoke to , who believes pt is returning to baseline, but is concerned about pt's memory issues.  5/27 - Withdrawn, non-spontaneous speech, not oriented to time, place, or situation, though interactive and recalls her 's name. Hypoglycemic episode yesterday. D/w hospitalist, discontinued standing pre-meal Admelog of 2U TID, will keep on bedtime Lantus and sliding scale.   5/28: More engaged today, establishes good eye contact. Received ECT today, will monitor, if catatonia symptoms remain in control will continue with weekly ECT next week, if pt appears to be relapsing may order ECT for Friday.   5/29: Pt presenting with increased BM frequency, regular consistency. Will order imodium PRN for diarrhea.   PLAN:  1. Admit to [facility] 2S on 9.27  2. Routine checks, as pt denying SI/HI/SIB  3. Psychiatric:  - Continue Namenda 10mg BID  - Continue Sertraline 125mg QD, titrate up to lowest effective dose  - Will HOLD olanzapine 2.5mg QHS for now, given concern for anticholinergic effects on cognition  - Would avoid benzodiazepines since pt was recently unable to tolerate them at [facility], becoming extremely sedated  - Continue ECT - ordered for Wednesday 5/28 and Friday 5/30 for treatment of residual catatonia.     4. Medical:   - Ordered Levalbuterol inhaler to [facility], to be delivered to 2S since [facility] pharmacy doesn't carry it  - Continue Advair and Spiriva for Asthma and COPD  - Continue Lisinopril 40mg QD and HCTZ 25mg QD for HTN  - Diabetes type 2: Lantus 3U at bedtime + ISS  - Lidocaine patches PRN daily + Tylenol PRN for lower back pain  - Bowel regimen: Senna 2 tabs QHS  - Diet: Easy to chew, consistent carbohydrates. No Beef No Pork  - Pt is at risk for falls given deconditioning. Seen by PT, recommended PRN walker with 1 staff assists. Pt is in a room with peer who has CO for added supervision and redirection during nighttime. During the daytime she should be either in the day room or the dining area with staff monitoring her at all times to provide redirection if pt tries to get up or ambulate unassisted.     5. Collateral: spoke with pt's  Liam on phone to update on progress 5/22 and 5/23.  6. Disposition: pending clinical course

## 2025-05-30 LAB
GLUCOSE BLDC GLUCOMTR-MCNC: 123 MG/DL — HIGH (ref 70–99)
GLUCOSE BLDC GLUCOMTR-MCNC: 179 MG/DL — HIGH (ref 70–99)
GLUCOSE BLDC GLUCOMTR-MCNC: 188 MG/DL — HIGH (ref 70–99)
GLUCOSE BLDC GLUCOMTR-MCNC: 204 MG/DL — HIGH (ref 70–99)

## 2025-05-30 PROCEDURE — 99232 SBSQ HOSP IP/OBS MODERATE 35: CPT | Mod: 25

## 2025-05-30 PROCEDURE — 90870 ELECTROCONVULSIVE THERAPY: CPT

## 2025-05-30 RX ADMIN — Medication 1 DROP(S): at 06:31

## 2025-05-30 RX ADMIN — INSULIN GLARGINE-YFGN 3 UNIT(S): 100 INJECTION, SOLUTION SUBCUTANEOUS at 20:42

## 2025-05-30 RX ADMIN — Medication 1 DOSE(S): at 22:10

## 2025-05-30 RX ADMIN — MEMANTINE HYDROCHLORIDE 10 MILLIGRAM(S): 21 CAPSULE, EXTENDED RELEASE ORAL at 20:42

## 2025-05-30 RX ADMIN — SERTRALINE 125 MILLIGRAM(S): 100 TABLET, FILM COATED ORAL at 10:22

## 2025-05-30 RX ADMIN — TIOTROPIUM BROMIDE INHALATION SPRAY 2 PUFF(S): 3.12 SPRAY, METERED RESPIRATORY (INHALATION) at 10:23

## 2025-05-30 RX ADMIN — Medication 1 DOSE(S): at 10:23

## 2025-05-30 RX ADMIN — Medication 0.1 MILLIGRAM(S): at 10:22

## 2025-05-30 RX ADMIN — Medication 1 DROP(S): at 20:43

## 2025-05-30 RX ADMIN — LISINOPRIL 40 MILLIGRAM(S): 5 TABLET ORAL at 10:22

## 2025-05-30 RX ADMIN — INSULIN LISPRO 2: 100 INJECTION, SOLUTION INTRAVENOUS; SUBCUTANEOUS at 16:28

## 2025-05-30 RX ADMIN — Medication 1 DROP(S): at 18:03

## 2025-05-30 RX ADMIN — ATORVASTATIN CALCIUM 40 MILLIGRAM(S): 80 TABLET, FILM COATED ORAL at 20:41

## 2025-05-30 RX ADMIN — Medication 9 MILLIGRAM(S): at 20:41

## 2025-05-30 RX ADMIN — Medication 2 TABLET(S): at 20:41

## 2025-05-30 RX ADMIN — MEMANTINE HYDROCHLORIDE 10 MILLIGRAM(S): 21 CAPSULE, EXTENDED RELEASE ORAL at 06:31

## 2025-05-30 RX ADMIN — INSULIN LISPRO 1: 100 INJECTION, SOLUTION INTRAVENOUS; SUBCUTANEOUS at 12:12

## 2025-05-30 NOTE — BH INPATIENT PSYCHIATRY PROGRESS NOTE - PRN MEDS
MEDICATIONS  (PRN):  acetaminophen     Tablet .. 650 milliGRAM(s) Oral every 6 hours PRN Temp greater or equal to 38C (100.4F), Mild Pain (1 - 3), Moderate Pain (4 - 6)  levalbuterol 45 MICROgram(s) HFA Inhaler 1 Puff(s) Inhalation every 8 hours PRN asthma  lidocaine   4% Patch 1 Patch Transdermal daily PRN chronic back pain  loperamide 2 milliGRAM(s) Oral once PRN Diarrhea  OLANZapine 1.25 milliGRAM(s) Oral every 6 hours PRN agitation

## 2025-05-30 NOTE — BH INPATIENT PSYCHIATRY PROGRESS NOTE - NSCGIIMPROVESX_PSY_ALL_CORE
4 = No change - symptoms remain essentially unchanged

## 2025-05-30 NOTE — BH INPATIENT PSYCHIATRY PROGRESS NOTE - NSBHASSESSSUMMFT_PSY_ALL_CORE
74F hx asthma, depression, and HTN was admitted to the MICU with AHRF 2/2 asthma exacerbation resulting in acute on chronic HHRF causing AMS and increased WOB that required intubation. During her ICU course, she experienced seizure-like activity .  Additional issues included hyponatremia, acute on chronic metabolic alkalosis, and mild thrombocytopenia,  also having fevers.  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 Patient has h/o hx of major depressive disorder with psychosis, hx of 3 past inpatient psychiatric hospitalizations last in 2016 at [facility], all rehospitalizations were in context non compliance with the treatment. She has no hx of suicide attempts, no hx of substance abuse.    Pt currently requires inpatient level of care for ongoing treatment for catatonia.     5/22 - calm, cooperative, not oriented, though remembers name of son. No s/s catatonia. Denies mood symptoms. Eating and sleeping on unit.  5/23 - No signs of catatonia. Eating and ambulating on unit. For ECT this morning. Not oriented to time, place, or situation. Has been NPO. Spoke to , who believes pt is returning to baseline, but is concerned about pt's memory issues.  5/27 - Withdrawn, non-spontaneous speech, not oriented to time, place, or situation, though interactive and recalls her 's name. Hypoglycemic episode yesterday. D/w hospitalist, discontinued standing pre-meal Admelog of 2U TID, will keep on bedtime Lantus and sliding scale.   5/28: More engaged today, establishes good eye contact. Received ECT today, will monitor, if catatonia symptoms remain in control will continue with weekly ECT next week, if pt appears to be relapsing may order ECT for Friday.   5/29: Pt presenting with increased BM frequency, regular consistency. Will order imodium PRN for diarrhea.   PLAN:  1. Admit to [facility] 2S on 9.27  2. Routine checks, as pt denying SI/HI/SIB  3. Psychiatric:  - Continue Namenda 10mg BID  - Continue Sertraline 125mg QD, titrate up to lowest effective dose  - Will HOLD olanzapine 2.5mg QHS for now, given concern for anticholinergic effects on cognition  - Would avoid benzodiazepines since pt was recently unable to tolerate them at [facility], becoming extremely sedated  - Continue ECT - ordered for Wednesday 5/28 and Friday 5/30 for treatment of residual catatonia.     4. Medical:   - Ordered Levalbuterol inhaler to [facility], to be delivered to 2S since [facility] pharmacy doesn't carry it  - Continue Advair and Spiriva for Asthma and COPD  - Continue Lisinopril 40mg QD and HCTZ 25mg QD for HTN  - Diabetes type 2: Lantus 3U at bedtime + ISS  - Lidocaine patches PRN daily + Tylenol PRN for lower back pain  - Bowel regimen: Senna 2 tabs QHS  - Diet: Easy to chew, consistent carbohydrates. No Beef No Pork  - Pt is at risk for falls given deconditioning. Seen by PT, recommended PRN walker with 1 staff assists. Pt is in a room with peer who has CO for added supervision and redirection during nighttime. During the daytime she should be either in the day room or the dining area with staff monitoring her at all times to provide redirection if pt tries to get up or ambulate unassisted.     5. Collateral: spoke with pt's  Liam on phone to update on progress 5/22 and 5/23.  6. Disposition: pending clinical course 74F hx asthma, depression, and HTN was admitted to the MICU with AHRF 2/2 asthma exacerbation resulting in acute on chronic HHRF causing AMS and increased WOB that required intubation. During her ICU course, she experienced seizure-like activity .  Additional issues included hyponatremia, acute on chronic metabolic alkalosis, and mild thrombocytopenia,  also having fevers.  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 Patient has h/o hx of major depressive disorder with psychosis, hx of 3 past inpatient psychiatric hospitalizations last in 2016 at [facility], all rehospitalizations were in context non compliance with the treatment. She has no hx of suicide attempts, no hx of substance abuse.    Pt currently requires inpatient level of care for ongoing treatment for catatonia.     5/22 - calm, cooperative, not oriented, though remembers name of son. No s/s catatonia. Denies mood symptoms. Eating and sleeping on unit.  5/23 - No signs of catatonia. Eating and ambulating on unit. For ECT this morning. Not oriented to time, place, or situation. Has been NPO. Spoke to , who believes pt is returning to baseline, but is concerned about pt's memory issues.  5/27 - Withdrawn, non-spontaneous speech, not oriented to time, place, or situation, though interactive and recalls her 's name. Hypoglycemic episode yesterday. D/w hospitalist, discontinued standing pre-meal Admelog of 2U TID, will keep on bedtime Lantus and sliding scale.   5/28: More engaged today, establishes good eye contact. Received ECT today, will monitor, if catatonia symptoms remain in control will continue with weekly ECT next week, if pt appears to be relapsing may order ECT for Friday.   5/29: Pt presenting with increased BM frequency, regular consistency. Will order imodium PRN for diarrhea.   5/30: Will observe pt over the weekend and next week for confusion. Family has brought up concern ECT may be worsening confusion and it is a possibility. Will observe off Zyprexa and space out ECT, may treat next Friday.     PLAN:  1. Admit to [facility] 2S on 9.27  2. Routine checks, as pt denying SI/HI/SIB  3. Psychiatric:  - Continue Namenda 10mg BID  - Continue Sertraline 125mg QD, titrate up to lowest effective dose  - Will HOLD olanzapine 2.5mg QHS for now, given concern for anticholinergic effects on cognition  - Would avoid benzodiazepines since pt was recently unable to tolerate them at [facility], becoming extremely sedated  - Continue ECT - ordered for Wednesday 5/28 and Friday 5/30 for treatment of residual catatonia.     4. Medical:   - Ordered Levalbuterol inhaler to [facility], to be delivered to 2S since [facility] pharmacy doesn't carry it  - Continue Advair and Spiriva for Asthma and COPD  - Continue Lisinopril 40mg QD and HCTZ 25mg QD for HTN  - Diabetes type 2: Lantus 3U at bedtime + ISS  - Lidocaine patches PRN daily + Tylenol PRN for lower back pain  - Bowel regimen: Senna 2 tabs QHS  - Diet: Easy to chew, consistent carbohydrates. No Beef No Pork  - Pt is at risk for falls given deconditioning. Seen by PT, recommended PRN walker with 1 staff assists. Pt is in a room with peer who has CO for added supervision and redirection during nighttime. During the daytime she should be either in the day room or the dining area with staff monitoring her at all times to provide redirection if pt tries to get up or ambulate unassisted.     5. Collateral: spoke with pt's  Liam on phone to update on progress 5/22 and 5/23.  6. Disposition: pending clinical course

## 2025-05-30 NOTE — BH INPATIENT PSYCHIATRY PROGRESS NOTE - NSBHCHARTREVIEWVS_PSY_A_CORE FT
Vital Signs Last 24 Hrs  T(C): 36.1 (05-30-25 @ 08:22), Max: 36.9 (05-30-25 @ 07:49)  T(F): 97 (05-30-25 @ 08:22), Max: 98.5 (05-30-25 @ 07:49)  HR: 85 (05-30-25 @ 08:40) (85 - 117)  BP: 163/94 (05-30-25 @ 08:40) (161/81 - 185/89)  BP(mean): 84 (05-30-25 @ 05:46) (84 - 84)  RR: 16 (05-30-25 @ 08:40) (16 - 24)  SpO2: 95% (05-30-25 @ 08:40) (93% - 100%)    Orthostatic VS  05-29-25 @ 10:23  Lying BP: --/-- HR: --  Sitting BP: 134/78 HR: 89  Standing BP: 122/86 HR: 96  Site: --  Mode: --  Orthostatic VS  05-29-25 @ 08:04  Lying BP: --/-- HR: --  Sitting BP: 151/77 HR: 89  Standing BP: 172/91 HR: 91  Site: --  Mode: --   Vital Signs Last 24 Hrs  T(C): 36.7 (05-30-25 @ 08:55), Max: 36.9 (05-30-25 @ 07:49)  T(F): 98 (05-30-25 @ 08:55), Max: 98.5 (05-30-25 @ 07:49)  HR: 84 (05-30-25 @ 08:55) (84 - 117)  BP: 168/95 (05-30-25 @ 08:55) (161/81 - 185/89)  BP(mean): 84 (05-30-25 @ 05:46) (84 - 84)  RR: 18 (05-30-25 @ 08:55) (16 - 24)  SpO2: 95% (05-30-25 @ 08:55) (93% - 100%)    Orthostatic VS  05-30-25 @ 10:14  Lying BP: --/-- HR: --  Sitting BP: 150/70 HR: 88  Standing BP: 138/72 HR: 90  Site: upper left arm  Mode: electronic  Orthostatic VS  05-29-25 @ 10:23  Lying BP: --/-- HR: --  Sitting BP: 134/78 HR: 89  Standing BP: 122/86 HR: 96  Site: --  Mode: --  Orthostatic VS  05-29-25 @ 08:04  Lying BP: --/-- HR: --  Sitting BP: 151/77 HR: 89  Standing BP: 172/91 HR: 91  Site: --  Mode: --

## 2025-05-30 NOTE — BH INPATIENT PSYCHIATRY PROGRESS NOTE - NSBHMETABOLIC_PSY_ALL_CORE_FT
BMI: BMI (kg/m2): 28.3 (05-21-25 @ 00:57)  HbA1c: A1C with Estimated Average Glucose Result: 5.9 % (05-22-25 @ 08:09)    Glucose: POCT Blood Glucose.: 123 mg/dL (05-30-25 @ 07:38)    BP: 163/94 (05-30-25 @ 08:40) (137/78 - 185/89)Vital Signs Last 24 Hrs  T(C): 36.1 (05-30-25 @ 08:22), Max: 36.9 (05-30-25 @ 07:49)  T(F): 97 (05-30-25 @ 08:22), Max: 98.5 (05-30-25 @ 07:49)  HR: 85 (05-30-25 @ 08:40) (85 - 117)  BP: 163/94 (05-30-25 @ 08:40) (161/81 - 185/89)  BP(mean): 84 (05-30-25 @ 05:46) (84 - 84)  RR: 16 (05-30-25 @ 08:40) (16 - 24)  SpO2: 95% (05-30-25 @ 08:40) (93% - 100%)    Orthostatic VS  05-29-25 @ 10:23  Lying BP: --/-- HR: --  Sitting BP: 134/78 HR: 89  Standing BP: 122/86 HR: 96  Site: --  Mode: --  Orthostatic VS  05-29-25 @ 08:04  Lying BP: --/-- HR: --  Sitting BP: 151/77 HR: 89  Standing BP: 172/91 HR: 91  Site: --  Mode: --    Lipid Panel: Date/Time: 05-22-25 @ 08:09  Cholesterol, Serum: 310  LDL Cholesterol Calculated: 234  HDL Cholesterol, Serum: 67  Total Cholesterol/HDL Ration Measurement: --  Triglycerides, Serum: 64   BMI: BMI (kg/m2): 28.3 (05-21-25 @ 00:57)  HbA1c: A1C with Estimated Average Glucose Result: 5.9 % (05-22-25 @ 08:09)    Glucose: POCT Blood Glucose.: 179 mg/dL (05-30-25 @ 11:49)    BP: 168/95 (05-30-25 @ 08:55) (137/78 - 185/89)Vital Signs Last 24 Hrs  T(C): 36.7 (05-30-25 @ 08:55), Max: 36.9 (05-30-25 @ 07:49)  T(F): 98 (05-30-25 @ 08:55), Max: 98.5 (05-30-25 @ 07:49)  HR: 84 (05-30-25 @ 08:55) (84 - 117)  BP: 168/95 (05-30-25 @ 08:55) (161/81 - 185/89)  BP(mean): 84 (05-30-25 @ 05:46) (84 - 84)  RR: 18 (05-30-25 @ 08:55) (16 - 24)  SpO2: 95% (05-30-25 @ 08:55) (93% - 100%)    Orthostatic VS  05-30-25 @ 10:14  Lying BP: --/-- HR: --  Sitting BP: 150/70 HR: 88  Standing BP: 138/72 HR: 90  Site: upper left arm  Mode: electronic  Orthostatic VS  05-29-25 @ 10:23  Lying BP: --/-- HR: --  Sitting BP: 134/78 HR: 89  Standing BP: 122/86 HR: 96  Site: --  Mode: --  Orthostatic VS  05-29-25 @ 08:04  Lying BP: --/-- HR: --  Sitting BP: 151/77 HR: 89  Standing BP: 172/91 HR: 91  Site: --  Mode: --    Lipid Panel: Date/Time: 05-22-25 @ 08:09  Cholesterol, Serum: 310  LDL Cholesterol Calculated: 234  HDL Cholesterol, Serum: 67  Total Cholesterol/HDL Ration Measurement: --  Triglycerides, Serum: 64

## 2025-05-30 NOTE — BH INPATIENT PSYCHIATRY PROGRESS NOTE - NSBHFUPINTERVALHXFT_PSY_A_CORE
Pt seen for f/u mood symptoms and catatonia. Chart reviewed. VSS. Pt slept through the night as per log. Fingerstick was 123 this morning. Pt completed ECT #11 today.    Pt seen for f/u mood symptoms and catatonia. Chart reviewed. VSS. Pt slept through the night as per log. Fingerstick was 123 this morning. Pt completed ECT #11 today. Seen in the dayroom after ECT. Affect was bright and reactive. Pt remembered writer and where she is but did not recall the day. Encounter occurred right after pt returned from ECT. She reported feeling well, denies any GI complaints today. Accepted breakfast. Will observe pt over the weekend and next week for confusion. Family has brought up concern ECT may be worsening confusion and it is a possibility. Will observe off Zyprexa and space out ECT, may treat next Friday.

## 2025-05-30 NOTE — BH INPATIENT PSYCHIATRY PROGRESS NOTE - CURRENT MEDICATION
MEDICATIONS  (STANDING):  artificial tears (preservative free) Ophthalmic Solution 1 Drop(s) Both EYES every 8 hours  atorvastatin 40 milliGRAM(s) Oral at bedtime  cloNIDine 0.1 milliGRAM(s) Oral daily  dextrose Oral Gel 15 Gram(s) Oral once  fluticasone propionate/ salmeterol 250-50 MICROgram(s) Diskus 1 Dose(s) Inhalation two times a day  glucagon  Injectable 1 milliGRAM(s) IntraMuscular once  hydrochlorothiazide 25 milliGRAM(s) Oral daily  insulin glargine Injectable (LANTUS) 3 Unit(s) SubCutaneous at bedtime  insulin lispro (ADMELOG) corrective regimen sliding scale   SubCutaneous at bedtime  insulin lispro (ADMELOG) corrective regimen sliding scale   SubCutaneous three times a day before meals  lisinopril 40 milliGRAM(s) Oral daily  melatonin. 9 milliGRAM(s) Oral at bedtime  memantine 10 milliGRAM(s) Oral <User Schedule>  memantine 10 milliGRAM(s) Oral at bedtime  petrolatum Ophthalmic Ointment 1 Application(s) Both EYES two times a day  senna 2 Tablet(s) Oral at bedtime  sertraline 125 milliGRAM(s) Oral daily  tiotropium 2.5 MICROgram(s) Inhaler 2 Puff(s) Inhalation daily    MEDICATIONS  (PRN):  acetaminophen     Tablet .. 650 milliGRAM(s) Oral every 6 hours PRN Temp greater or equal to 38C (100.4F), Mild Pain (1 - 3), Moderate Pain (4 - 6)  levalbuterol 45 MICROgram(s) HFA Inhaler 1 Puff(s) Inhalation every 8 hours PRN asthma  lidocaine   4% Patch 1 Patch Transdermal daily PRN chronic back pain  loperamide 2 milliGRAM(s) Oral once PRN Diarrhea  OLANZapine 1.25 milliGRAM(s) Oral every 6 hours PRN agitation

## 2025-05-30 NOTE — ECT TREATMENT NOTE - NSICDXBHSECONDARYDX_PSY_ALL_CORE
MDD (major depressive disorder), recurrent, with catatonic features   F33.9  

## 2025-05-31 LAB
GLUCOSE BLDC GLUCOMTR-MCNC: 133 MG/DL — HIGH (ref 70–99)
GLUCOSE BLDC GLUCOMTR-MCNC: 164 MG/DL — HIGH (ref 70–99)
GLUCOSE BLDC GLUCOMTR-MCNC: 170 MG/DL — HIGH (ref 70–99)
GLUCOSE BLDC GLUCOMTR-MCNC: 189 MG/DL — HIGH (ref 70–99)

## 2025-05-31 PROCEDURE — 99232 SBSQ HOSP IP/OBS MODERATE 35: CPT

## 2025-05-31 RX ADMIN — Medication 1 DROP(S): at 21:45

## 2025-05-31 RX ADMIN — TIOTROPIUM BROMIDE INHALATION SPRAY 2 PUFF(S): 3.12 SPRAY, METERED RESPIRATORY (INHALATION) at 09:22

## 2025-05-31 RX ADMIN — Medication 1 DROP(S): at 06:08

## 2025-05-31 RX ADMIN — MEMANTINE HYDROCHLORIDE 10 MILLIGRAM(S): 21 CAPSULE, EXTENDED RELEASE ORAL at 21:44

## 2025-05-31 RX ADMIN — LISINOPRIL 40 MILLIGRAM(S): 5 TABLET ORAL at 09:22

## 2025-05-31 RX ADMIN — SERTRALINE 125 MILLIGRAM(S): 100 TABLET, FILM COATED ORAL at 09:22

## 2025-05-31 RX ADMIN — ATORVASTATIN CALCIUM 40 MILLIGRAM(S): 80 TABLET, FILM COATED ORAL at 21:45

## 2025-05-31 RX ADMIN — Medication 1 DROP(S): at 14:12

## 2025-05-31 RX ADMIN — Medication 1 DOSE(S): at 21:45

## 2025-05-31 RX ADMIN — MEMANTINE HYDROCHLORIDE 10 MILLIGRAM(S): 21 CAPSULE, EXTENDED RELEASE ORAL at 06:08

## 2025-05-31 RX ADMIN — INSULIN GLARGINE-YFGN 3 UNIT(S): 100 INJECTION, SOLUTION SUBCUTANEOUS at 21:12

## 2025-05-31 RX ADMIN — Medication 9 MILLIGRAM(S): at 21:44

## 2025-05-31 RX ADMIN — Medication 0.1 MILLIGRAM(S): at 09:22

## 2025-05-31 RX ADMIN — Medication 1 DOSE(S): at 09:22

## 2025-05-31 RX ADMIN — Medication 2 TABLET(S): at 21:45

## 2025-05-31 NOTE — BH INPATIENT PSYCHIATRY PROGRESS NOTE - NSBHMETABOLIC_PSY_ALL_CORE_FT
BMI: BMI (kg/m2): 28.3 (05-21-25 @ 00:57)  HbA1c: A1C with Estimated Average Glucose Result: 5.9 % (05-22-25 @ 08:09)    Glucose: POCT Blood Glucose.: 164 mg/dL (05-31-25 @ 15:30)    BP: 171/83 (05-31-25 @ 07:50) (161/81 - 185/89)Vital Signs Last 24 Hrs  T(C): 37 (05-31-25 @ 07:50), Max: 37 (05-31-25 @ 07:50)  T(F): 98.6 (05-31-25 @ 07:50), Max: 98.6 (05-31-25 @ 07:50)  HR: 79 (05-31-25 @ 07:50) (79 - 79)  BP: 171/83 (05-31-25 @ 07:50) (171/83 - 171/83)  BP(mean): --  RR: --  SpO2: --    Orthostatic VS  05-30-25 @ 10:14  Lying BP: --/-- HR: --  Sitting BP: 150/70 HR: 88  Standing BP: 138/72 HR: 90  Site: upper left arm  Mode: electronic    Lipid Panel: Date/Time: 05-22-25 @ 08:09  Cholesterol, Serum: 310  LDL Cholesterol Calculated: 234  HDL Cholesterol, Serum: 67  Total Cholesterol/HDL Ration Measurement: --  Triglycerides, Serum: 64

## 2025-05-31 NOTE — BH INPATIENT PSYCHIATRY PROGRESS NOTE - NSBHASSESSSUMMFT_PSY_ALL_CORE
74F hx asthma, depression, and HTN was admitted to the MICU with AHRF 2/2 asthma exacerbation resulting in acute on chronic HHRF causing AMS and increased WOB that required intubation. During her ICU course, she experienced seizure-like activity .  Additional issues included hyponatremia, acute on chronic metabolic alkalosis, and mild thrombocytopenia,  also having fevers.  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 Patient has h/o hx of major depressive disorder with psychosis, hx of 3 past inpatient psychiatric hospitalizations last in 2016 at [facility], all rehospitalizations were in context non compliance with the treatment. She has no hx of suicide attempts, no hx of substance abuse.    Pt currently requires inpatient level of care for ongoing treatment for catatonia.     5/22 - calm, cooperative, not oriented, though remembers name of son. No s/s catatonia. Denies mood symptoms. Eating and sleeping on unit.  5/23 - No signs of catatonia. Eating and ambulating on unit. For ECT this morning. Not oriented to time, place, or situation. Has been NPO. Spoke to , who believes pt is returning to baseline, but is concerned about pt's memory issues.  5/27 - Withdrawn, non-spontaneous speech, not oriented to time, place, or situation, though interactive and recalls her 's name. Hypoglycemic episode yesterday. D/w hospitalist, discontinued standing pre-meal Admelog of 2U TID, will keep on bedtime Lantus and sliding scale.   5/28: More engaged today, establishes good eye contact. Received ECT today, will monitor, if catatonia symptoms remain in control will continue with weekly ECT next week, if pt appears to be relapsing may order ECT for Friday.   5/29: Pt presenting with increased BM frequency, regular consistency. Will order imodium PRN for diarrhea.   5/30: Will observe pt over the weekend and next week for confusion. Family has brought up concern ECT may be worsening confusion and it is a possibility. Will observe off Zyprexa and space out ECT, may treat next Friday.   5/31: Remains confused but calm. Unclear if due to ECT or residual catatonia.    PLAN:  1. Admit to [facility] 2S on 9.27  2. Routine checks, as pt denying SI/HI/SIB  3. Psychiatric:  - Continue Namenda 10mg BID  - Continue Sertraline 125mg QD, titrate up to lowest effective dose  - Will HOLD olanzapine 2.5mg QHS for now, given concern for anticholinergic effects on cognition  - Would avoid benzodiazepines since pt was recently unable to tolerate them at [facility], becoming extremely sedated  - Continue ECT - ordered for Wednesday 5/28 and Friday 5/30 for treatment of residual catatonia.     4. Medical:   - Ordered Levalbuterol inhaler to [facility], to be delivered to  since [facility] pharmacy doesn't carry it  - Continue Advair and Spiriva for Asthma and COPD  - Continue Lisinopril 40mg QD and HCTZ 25mg QD for HTN  - Diabetes type 2: Lantus 3U at bedtime + ISS  - Lidocaine patches PRN daily + Tylenol PRN for lower back pain  - Bowel regimen: Senna 2 tabs QHS  - Diet: Easy to chew, consistent carbohydrates. No Beef No Pork  - Pt is at risk for falls given deconditioning. Seen by PT, recommended PRN walker with 1 staff assists. Pt is in a room with peer who has CO for added supervision and redirection during nighttime. During the daytime she should be either in the day room or the dining area with staff monitoring her at all times to provide redirection if pt tries to get up or ambulate unassisted.     5. Collateral: spoke with pt's  Liam on phone to update on progress 5/22 and 5/23.  6. Disposition: pending clinical course

## 2025-05-31 NOTE — BH INPATIENT PSYCHIATRY PROGRESS NOTE - NSBHFUPINTERVALHXFT_PSY_A_CORE
Patient denies all symptoms. Son was visiting and noted she remains confused. She knows she's in the hospital. Unable to provide year, states she is 35 years old. Son states she thinks he's a teenager. No overt signs of catatonia.

## 2025-05-31 NOTE — BH INPATIENT PSYCHIATRY PROGRESS NOTE - MSE UNSTRUCTURED FT
Awake and alert. Calm, pleasant, good eye contact. Affect bright, superficial. Denies mood complaints. Speech fluent but decreased production. TP concrete. No delusions elicited. No suicidal ideations expressed. Limited insight, judgement.

## 2025-05-31 NOTE — BH INPATIENT PSYCHIATRY PROGRESS NOTE - NSBHCHARTREVIEWVS_PSY_A_CORE FT
Vital Signs Last 24 Hrs  T(C): 37 (05-31-25 @ 07:50), Max: 37 (05-31-25 @ 07:50)  T(F): 98.6 (05-31-25 @ 07:50), Max: 98.6 (05-31-25 @ 07:50)  HR: 79 (05-31-25 @ 07:50) (79 - 79)  BP: 171/83 (05-31-25 @ 07:50) (171/83 - 171/83)  BP(mean): --  RR: --  SpO2: --    Orthostatic VS  05-30-25 @ 10:14  Lying BP: --/-- HR: --  Sitting BP: 150/70 HR: 88  Standing BP: 138/72 HR: 90  Site: upper left arm  Mode: electronic

## 2025-06-01 LAB
GLUCOSE BLDC GLUCOMTR-MCNC: 107 MG/DL — HIGH (ref 70–99)
GLUCOSE BLDC GLUCOMTR-MCNC: 127 MG/DL — HIGH (ref 70–99)
GLUCOSE BLDC GLUCOMTR-MCNC: 134 MG/DL — HIGH (ref 70–99)
GLUCOSE BLDC GLUCOMTR-MCNC: 188 MG/DL — HIGH (ref 70–99)
GLUCOSE BLDC GLUCOMTR-MCNC: 209 MG/DL — HIGH (ref 70–99)

## 2025-06-01 RX ADMIN — Medication 1 DROP(S): at 15:46

## 2025-06-01 RX ADMIN — Medication 0.1 MILLIGRAM(S): at 08:26

## 2025-06-01 RX ADMIN — LISINOPRIL 40 MILLIGRAM(S): 5 TABLET ORAL at 08:26

## 2025-06-01 RX ADMIN — MEMANTINE HYDROCHLORIDE 10 MILLIGRAM(S): 21 CAPSULE, EXTENDED RELEASE ORAL at 06:15

## 2025-06-01 RX ADMIN — Medication 1 DOSE(S): at 08:28

## 2025-06-01 RX ADMIN — ATORVASTATIN CALCIUM 40 MILLIGRAM(S): 80 TABLET, FILM COATED ORAL at 20:48

## 2025-06-01 RX ADMIN — MEMANTINE HYDROCHLORIDE 10 MILLIGRAM(S): 21 CAPSULE, EXTENDED RELEASE ORAL at 20:48

## 2025-06-01 RX ADMIN — Medication 1 DROP(S): at 22:03

## 2025-06-01 RX ADMIN — Medication 1 DROP(S): at 06:15

## 2025-06-01 RX ADMIN — INSULIN LISPRO 2: 100 INJECTION, SOLUTION INTRAVENOUS; SUBCUTANEOUS at 16:48

## 2025-06-01 RX ADMIN — Medication 9 MILLIGRAM(S): at 20:48

## 2025-06-01 RX ADMIN — LEVALBUTEROL HYDROCHLORIDE 1 PUFF(S): 1.25 SOLUTION RESPIRATORY (INHALATION) at 22:44

## 2025-06-01 RX ADMIN — TIOTROPIUM BROMIDE INHALATION SPRAY 2 PUFF(S): 3.12 SPRAY, METERED RESPIRATORY (INHALATION) at 08:27

## 2025-06-01 RX ADMIN — Medication 1 DOSE(S): at 20:48

## 2025-06-01 RX ADMIN — Medication 2 TABLET(S): at 20:48

## 2025-06-01 RX ADMIN — SERTRALINE 125 MILLIGRAM(S): 100 TABLET, FILM COATED ORAL at 08:26

## 2025-06-01 RX ADMIN — INSULIN GLARGINE-YFGN 3 UNIT(S): 100 INJECTION, SOLUTION SUBCUTANEOUS at 20:49

## 2025-06-02 LAB
GLUCOSE BLDC GLUCOMTR-MCNC: 105 MG/DL — HIGH (ref 70–99)
GLUCOSE BLDC GLUCOMTR-MCNC: 138 MG/DL — HIGH (ref 70–99)
GLUCOSE BLDC GLUCOMTR-MCNC: 176 MG/DL — HIGH (ref 70–99)

## 2025-06-02 PROCEDURE — 99232 SBSQ HOSP IP/OBS MODERATE 35: CPT

## 2025-06-02 RX ORDER — LEVALBUTEROL HYDROCHLORIDE 1.25 MG/3ML
1 SOLUTION RESPIRATORY (INHALATION) EVERY 8 HOURS
Refills: 0 | Status: DISCONTINUED | OUTPATIENT
Start: 2025-06-02 | End: 2025-07-01

## 2025-06-02 RX ADMIN — Medication 1 DOSE(S): at 09:13

## 2025-06-02 RX ADMIN — LISINOPRIL 40 MILLIGRAM(S): 5 TABLET ORAL at 08:34

## 2025-06-02 RX ADMIN — Medication 1 DOSE(S): at 20:21

## 2025-06-02 RX ADMIN — Medication 0.1 MILLIGRAM(S): at 08:35

## 2025-06-02 RX ADMIN — Medication 1 DROP(S): at 16:48

## 2025-06-02 RX ADMIN — Medication 1 DROP(S): at 06:24

## 2025-06-02 RX ADMIN — ATORVASTATIN CALCIUM 40 MILLIGRAM(S): 80 TABLET, FILM COATED ORAL at 20:20

## 2025-06-02 RX ADMIN — INSULIN LISPRO 0: 100 INJECTION, SOLUTION INTRAVENOUS; SUBCUTANEOUS at 20:48

## 2025-06-02 RX ADMIN — MEMANTINE HYDROCHLORIDE 10 MILLIGRAM(S): 21 CAPSULE, EXTENDED RELEASE ORAL at 20:20

## 2025-06-02 RX ADMIN — Medication 2 TABLET(S): at 20:21

## 2025-06-02 RX ADMIN — TIOTROPIUM BROMIDE INHALATION SPRAY 2 PUFF(S): 3.12 SPRAY, METERED RESPIRATORY (INHALATION) at 09:14

## 2025-06-02 RX ADMIN — INSULIN GLARGINE-YFGN 3 UNIT(S): 100 INJECTION, SOLUTION SUBCUTANEOUS at 20:48

## 2025-06-02 RX ADMIN — Medication 9 MILLIGRAM(S): at 20:20

## 2025-06-02 RX ADMIN — SERTRALINE 125 MILLIGRAM(S): 100 TABLET, FILM COATED ORAL at 08:35

## 2025-06-02 RX ADMIN — MEMANTINE HYDROCHLORIDE 10 MILLIGRAM(S): 21 CAPSULE, EXTENDED RELEASE ORAL at 06:23

## 2025-06-02 RX ADMIN — Medication 1 DROP(S): at 20:55

## 2025-06-02 NOTE — BH INPATIENT PSYCHIATRY PROGRESS NOTE - NSBHFUPINTERVALHXFT_PSY_A_CORE
Patient seen for follow up of catatonia. No events over the weekend, pt was reported to be confused and needing assistance from staff for ADLs. Fingerstick 105 this morning, BP is stable, no orthostatic hypotension. Pt is observed sitting in the dayroom this morning during rounds. Her affect is bright and reactive. She reports stable mood, denies SI/HI/AH/VH, reports feeling well and having no complaints. Remains disoriented to time, recalled it was Monday but did not remember the month, date, year. Remembers she is in the hospital "because I was sick" but is unable to elaborate beyond this and appears unclear on the reason she is at [facility]. As per staff she continues to require assistance for ADLs. Pt reports, similarly, that Ms. Silva requires supervision during ambulation, not because she is lacking in strength, but because she is confused and needs someone to direct her where she is going. According to family, prior to her hospitalization in [facility] pt was independent and there were no concerns for cognitive decline. Will continue holding Zyprexa and ECT this week. Presentation is suggestive of protracted delirium.

## 2025-06-02 NOTE — BH INPATIENT PSYCHIATRY PROGRESS NOTE - PRN MEDS
MEDICATIONS  (PRN):  acetaminophen     Tablet .. 650 milliGRAM(s) Oral every 6 hours PRN Temp greater or equal to 38C (100.4F), Mild Pain (1 - 3), Moderate Pain (4 - 6)  lidocaine   4% Patch 1 Patch Transdermal daily PRN chronic back pain  loperamide 2 milliGRAM(s) Oral once PRN Diarrhea  OLANZapine 1.25 milliGRAM(s) Oral every 6 hours PRN agitation   WDL

## 2025-06-02 NOTE — CHART NOTE - NSCHARTNOTEFT_GEN_A_CORE
74F admitted to [facility] for major depressive disorder with catatonic features and a PMHx of HNT, and asthma including an episode of asthma exacerbation resulting in acute on chronic hypercapnic respiratory failure causing increased work of breathing that required intubation.   Notified by RN that patient was wheezing and desaturated to 88% on room air. Levalbuterol HFA inhaler PRN administered with good effect. Patient assessed at bedside. Pt is lying in bed comfortable, not in distress, denies chest pains, shortness of breath, and palpitations. On exam, lungs are clear to auscultation bilaterally; no wheeze. Secretions noted in the posterior oropharynx; the patient is unable to expectorate effectively. She is currently on Spiriva daily, Advair BID, and Levalbuterol PRN.   Plan:   - Continue Spiriva daily, Advair BID and Levalbuterol q8h PRN   - Chest PT q8h   - Monitor for any signs of respiratory distress      Chyna Velasquez, PMEMILIP, AGACNP-BC, FNP-C  Department of Medicine  [facility]  [phone] [place] [address]  In-Hospital Pager#: [phone] 74F admitted to [facility] for major depressive disorder with catatonic features and a PMHx of HNT, and asthma including an episode of asthma exacerbation resulting in acute on chronic hypercapnic respiratory failure causing increased work of breathing that required intubation.   Notified by RN that patient was wheezing with increased work of breathing and sweating on her forehead.  Finger stick ~ 188 and patient sating 88% on RA. Levalbuterol HFA inhaler PRN administered with good effect. Patient assessed at bedside. Pt is lying in bed comfortable, not in distress, denies chest pains, shortness of breath, and palpitations. On exam, lungs are clear to auscultation bilaterally; no wheeze. Secretions noted in the posterior oropharynx; the patient is unable to expectorate effectively. She is currently on Spiriva daily, Advair BID, and Levalbuterol PRN.   Plan:   - Continue Spiriva daily, Advair BID and Levalbuterol q8h PRN   - Chest PT q8h   - Monitor for any signs of respiratory distress     Chyna Velasquez, PMHNP, AGACNP-BC, FNP-C  Department of Medicine  [facility]  [phone] [place], [place] [id number]  In-Hospital Pager#: [phone] 74F admitted to [facility] for major depressive disorder with catatonic features and a PMHx of HNT, and asthma including an episode of asthma exacerbation resulting in acute on chronic hypercapnic respiratory failure causing increased work of breathing that required intubation.   Notified by RN that patient was wheezing with increased work of breathing and sweating on her forehead.  Finger stick ~ 188 and patient sating 88% on RA. Levalbuterol HFA inhaler PRN administered with good effect. Patient assessed at bedside. Pt is lying in bed comfortable, not in distress, denies chest pains, shortness of breath, and palpitations. On exam, lungs are clear to auscultation bilaterally; no wheeze. Secretions noted in the posterior oropharynx; the patient is unable to expectorate effectively. She is currently on Spiriva daily, Advair BID, and Levalbuterol PRN.     Plan:   - Continue Spiriva daily, Advair BID and Levalbuterol q8h PRN   - Chest PT q8h   - Monitor for any signs of respiratory distress     Chyna Velasquez, PMHNP, AGACNP-BC, FNP-C  Department of Medicine  [facility]  [phone] [place], [place] [id number]  In-Hospital Pager#: [phone]

## 2025-06-02 NOTE — BH INPATIENT PSYCHIATRY PROGRESS NOTE - CURRENT MEDICATION
MEDICATIONS  (STANDING):  artificial tears (preservative free) Ophthalmic Solution 1 Drop(s) Both EYES every 8 hours  atorvastatin 40 milliGRAM(s) Oral at bedtime  cloNIDine 0.1 milliGRAM(s) Oral daily  dextrose Oral Gel 15 Gram(s) Oral once  fluticasone propionate/ salmeterol 250-50 MICROgram(s) Diskus 1 Dose(s) Inhalation two times a day  glucagon  Injectable 1 milliGRAM(s) IntraMuscular once  hydrochlorothiazide 25 milliGRAM(s) Oral daily  insulin glargine Injectable (LANTUS) 3 Unit(s) SubCutaneous at bedtime  insulin lispro (ADMELOG) corrective regimen sliding scale   SubCutaneous at bedtime  insulin lispro (ADMELOG) corrective regimen sliding scale   SubCutaneous three times a day before meals  levalbuterol 45 MICROgram(s) HFA Inhaler 1 Puff(s) Inhalation every 8 hours  lisinopril 40 milliGRAM(s) Oral daily  melatonin. 9 milliGRAM(s) Oral at bedtime  memantine 10 milliGRAM(s) Oral <User Schedule>  memantine 10 milliGRAM(s) Oral at bedtime  petrolatum Ophthalmic Ointment 1 Application(s) Both EYES two times a day  senna 2 Tablet(s) Oral at bedtime  sertraline 125 milliGRAM(s) Oral daily  tiotropium 2.5 MICROgram(s) Inhaler 2 Puff(s) Inhalation daily    MEDICATIONS  (PRN):  acetaminophen     Tablet .. 650 milliGRAM(s) Oral every 6 hours PRN Temp greater or equal to 38C (100.4F), Mild Pain (1 - 3), Moderate Pain (4 - 6)  lidocaine   4% Patch 1 Patch Transdermal daily PRN chronic back pain  loperamide 2 milliGRAM(s) Oral once PRN Diarrhea  OLANZapine 1.25 milliGRAM(s) Oral every 6 hours PRN agitation

## 2025-06-02 NOTE — BH INPATIENT PSYCHIATRY PROGRESS NOTE - NSBHASSESSSUMMFT_PSY_ALL_CORE
74F hx asthma, depression, and HTN was admitted to the MICU with AHRF 2/2 asthma exacerbation resulting in acute on chronic HHRF causing AMS and increased WOB that required intubation. During her ICU course, she experienced seizure-like activity .  Additional issues included hyponatremia, acute on chronic metabolic alkalosis, and mild thrombocytopenia,  also having fevers.  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 Patient has h/o hx of major depressive disorder with psychosis, hx of 3 past inpatient psychiatric hospitalizations last in 2016 at [facility], all rehospitalizations were in context non compliance with the treatment. She has no hx of suicide attempts, no hx of substance abuse.    Pt currently requires inpatient level of care for ongoing treatment for catatonia.     5/22 - calm, cooperative, not oriented, though remembers name of son. No s/s catatonia. Denies mood symptoms. Eating and sleeping on unit.  5/23 - No signs of catatonia. Eating and ambulating on unit. For ECT this morning. Not oriented to time, place, or situation. Has been NPO. Spoke to , who believes pt is returning to baseline, but is concerned about pt's memory issues.  5/27 - Withdrawn, non-spontaneous speech, not oriented to time, place, or situation, though interactive and recalls her 's name. Hypoglycemic episode yesterday. D/w hospitalist, discontinued standing pre-meal Admelog of 2U TID, will keep on bedtime Lantus and sliding scale.   5/28: More engaged today, establishes good eye contact. Received ECT today, will monitor, if catatonia symptoms remain in control will continue with weekly ECT next week, if pt appears to be relapsing may order ECT for Friday.   5/29: Pt presenting with increased BM frequency, regular consistency. Will order imodium PRN for diarrhea.   5/30: Will observe pt over the weekend and next week for confusion. Family has brought up concern ECT may be worsening confusion and it is a possibility. Will observe off Zyprexa and space out ECT, may treat next Friday.   5/31: Remains confused but calm. Unclear if due to ECT or residual catatonia.  6/2: Will continue holding Zyprexa and ECT this week. Presentation is suggestive of protracted delirium.     PLAN:  1. Admit to [facility] 2S on 9.27  2. Routine checks, as pt denying SI/HI/SIB  3. Psychiatric:  - Continue Namenda 10mg BID  - Continue Sertraline 125mg QD, titrate up to lowest effective dose  - Will HOLD olanzapine 2.5mg QHS for now, given concern for anticholinergic effects on cognition  - Would avoid benzodiazepines since pt was recently unable to tolerate them at [facility], becoming extremely sedated  - Continue ECT once weekly, last treatment occurred Friday 5/30.     4. Medical:   - Ordered Levalbuterol inhaler to [facility], to be delivered to 2S since [facility] pharmacy doesn't carry it  - Continue Advair and Spiriva for Asthma and COPD  - Continue Lisinopril 40mg QD and HCTZ 25mg QD for HTN  - Diabetes type 2: Lantus 3U at bedtime + ISS  - Lidocaine patches PRN daily + Tylenol PRN for lower back pain  - Bowel regimen: Senna 2 tabs QHS  - Diet: Easy to chew, consistent carbohydrates. No Beef No Pork  - Pt is at risk for falls given deconditioning. Seen by PT, recommended PRN walker with 1 staff assists. Pt is in a room with peer who has CO for added supervision and redirection during nighttime. During the daytime she should be either in the day room or the dining area with staff monitoring her at all times to provide redirection if pt tries to get up or ambulate unassisted.     5. Collateral: spoke with pt's  Liam on phone to update on progress 5/22 and 5/23.  6. Disposition: pending clinical course

## 2025-06-02 NOTE — DIETITIAN INITIAL EVALUATION ADULT - ETIOLOGY-BASIS
Detail Level: Detailed Depth Of Biopsy: dermis Was A Bandage Applied: Yes Size Of Lesion In Cm: 0 Biopsy Type: H and E Biopsy Method: Dermablade Anesthesia Type: 1% lidocaine with epinephrine Anesthesia Volume In Cc: 0.5 Hemostasis: Drysol Wound Care: Petrolatum Dressing: bandage Destruction After The Procedure: No Type Of Destruction Used: Curettage Curettage Text: The wound bed was treated with curettage after the biopsy was performed. Cryotherapy Text: The wound bed was treated with cryotherapy after the biopsy was performed. Electrodesiccation Text: The wound bed was treated with electrodesiccation after the biopsy was performed. Electrodesiccation And Curettage Text: The wound bed was treated with electrodesiccation and curettage after the biopsy was performed. Silver Nitrate Text: The wound bed was treated with silver nitrate after the biopsy was performed. Lab: [id number] Lab Facility: [id number] Medical Necessity Information: It is in your best interest to select a reason for this procedure from the list below. All of these items fulfill various CMS LCD requirements except the new and changing color options. Consent: Written consent was obtained and risks were reviewed including but not limited to scarring, infection, bleeding, scabbing, incomplete removal, nerve damage and allergy to anesthesia. Post-Care Instructions: I reviewed with the patient in detail post-care instructions. Patient is to keep the biopsy site dry overnight, and then apply bacitracin twice daily until healed. Patient may apply hydrogen peroxide soaks to remove any crusting. Notification Instructions: Patient will be notified of biopsy results. However, patient instructed to call the office if not contacted within 2 weeks. Billing Type: Third-Party Bill Chronic illness Information: Selecting Yes will display possible errors in your note based on the variables you have selected. This validation is only offered as a suggestion for you. PLEASE NOTE THAT THE VALIDATION TEXT WILL BE REMOVED WHEN YOU FINALIZE YOUR NOTE. IF YOU WANT TO FAX A PRELIMINARY NOTE YOU WILL NEED TO TOGGLE THIS TO 'NO' IF YOU DO NOT WANT IT IN YOUR FAXED NOTE. Lab: [id number] Lab Facility: [id number]

## 2025-06-02 NOTE — BH INPATIENT PSYCHIATRY PROGRESS NOTE - NSBHMETABOLIC_PSY_ALL_CORE_FT
BMI: BMI (kg/m2): 28.3 (05-21-25 @ 00:57)  HbA1c: A1C with Estimated Average Glucose Result: 5.9 % (05-22-25 @ 08:09)    Glucose: POCT Blood Glucose.: 105 mg/dL (06-02-25 @ 07:51)    BP: 171/83 (05-31-25 @ 07:50) (171/83 - 171/83)Vital Signs Last 24 Hrs  T(C): 36.4 (06-02-25 @ 07:47), Max: 36.4 (06-02-25 @ 07:47)  T(F): 97.5 (06-02-25 @ 07:47), Max: 97.5 (06-02-25 @ 07:47)  HR: --  BP: --  BP(mean): --  RR: --  SpO2: --    Orthostatic VS  06-02-25 @ 07:47  Lying BP: --/-- HR: --  Sitting BP: 123/44 HR: 75  Standing BP: 137/55 HR: 81  Site: --  Mode: electronic  Orthostatic VS  06-01-25 @ 08:16  Lying BP: --/-- HR: --  Sitting BP: 148/92 HR: 88  Standing BP: 158/86 HR: 88  Site: upper right arm  Mode: auscultated w/ stethoscope    Lipid Panel: Date/Time: 05-22-25 @ 08:09  Cholesterol, Serum: 310  LDL Cholesterol Calculated: 234  HDL Cholesterol, Serum: 67  Total Cholesterol/HDL Ration Measurement: --  Triglycerides, Serum: 64

## 2025-06-02 NOTE — BH INPATIENT PSYCHIATRY PROGRESS NOTE - NSBHCHARTREVIEWVS_PSY_A_CORE FT
Vital Signs Last 24 Hrs  T(C): 36.4 (06-02-25 @ 07:47), Max: 36.4 (06-02-25 @ 07:47)  T(F): 97.5 (06-02-25 @ 07:47), Max: 97.5 (06-02-25 @ 07:47)  HR: --  BP: --  BP(mean): --  RR: --  SpO2: --    Orthostatic VS  06-02-25 @ 07:47  Lying BP: --/-- HR: --  Sitting BP: 123/44 HR: 75  Standing BP: 137/55 HR: 81  Site: --  Mode: electronic  Orthostatic VS  06-01-25 @ 08:16  Lying BP: --/-- HR: --  Sitting BP: 148/92 HR: 88  Standing BP: 158/86 HR: 88  Site: upper right arm  Mode: auscultated w/ stethoscope

## 2025-06-03 LAB
GLUCOSE BLDC GLUCOMTR-MCNC: 121 MG/DL — HIGH (ref 70–99)
GLUCOSE BLDC GLUCOMTR-MCNC: 126 MG/DL — HIGH (ref 70–99)
GLUCOSE BLDC GLUCOMTR-MCNC: 163 MG/DL — HIGH (ref 70–99)
GLUCOSE BLDC GLUCOMTR-MCNC: 175 MG/DL — HIGH (ref 70–99)

## 2025-06-03 PROCEDURE — 99232 SBSQ HOSP IP/OBS MODERATE 35: CPT

## 2025-06-03 PROCEDURE — 99233 SBSQ HOSP IP/OBS HIGH 50: CPT

## 2025-06-03 RX ADMIN — SERTRALINE 125 MILLIGRAM(S): 100 TABLET, FILM COATED ORAL at 08:47

## 2025-06-03 RX ADMIN — LEVALBUTEROL HYDROCHLORIDE 1 PUFF(S): 1.25 SOLUTION RESPIRATORY (INHALATION) at 06:58

## 2025-06-03 RX ADMIN — LEVALBUTEROL HYDROCHLORIDE 1 PUFF(S): 1.25 SOLUTION RESPIRATORY (INHALATION) at 14:45

## 2025-06-03 RX ADMIN — LEVALBUTEROL HYDROCHLORIDE 1 PUFF(S): 1.25 SOLUTION RESPIRATORY (INHALATION) at 22:23

## 2025-06-03 RX ADMIN — ATORVASTATIN CALCIUM 40 MILLIGRAM(S): 80 TABLET, FILM COATED ORAL at 21:59

## 2025-06-03 RX ADMIN — MEMANTINE HYDROCHLORIDE 10 MILLIGRAM(S): 21 CAPSULE, EXTENDED RELEASE ORAL at 21:59

## 2025-06-03 RX ADMIN — Medication 9 MILLIGRAM(S): at 21:58

## 2025-06-03 RX ADMIN — MEMANTINE HYDROCHLORIDE 10 MILLIGRAM(S): 21 CAPSULE, EXTENDED RELEASE ORAL at 06:56

## 2025-06-03 RX ADMIN — Medication 1 DOSE(S): at 08:51

## 2025-06-03 RX ADMIN — Medication 1 DOSE(S): at 22:02

## 2025-06-03 RX ADMIN — Medication 1 DROP(S): at 14:42

## 2025-06-03 RX ADMIN — INSULIN LISPRO 1: 100 INJECTION, SOLUTION INTRAVENOUS; SUBCUTANEOUS at 12:09

## 2025-06-03 RX ADMIN — LEVALBUTEROL HYDROCHLORIDE 1 PUFF(S): 1.25 SOLUTION RESPIRATORY (INHALATION) at 06:56

## 2025-06-03 RX ADMIN — Medication 0.1 MILLIGRAM(S): at 08:47

## 2025-06-03 RX ADMIN — INSULIN GLARGINE-YFGN 3 UNIT(S): 100 INJECTION, SOLUTION SUBCUTANEOUS at 21:25

## 2025-06-03 RX ADMIN — Medication 2 TABLET(S): at 22:00

## 2025-06-03 RX ADMIN — Medication 1 DROP(S): at 22:04

## 2025-06-03 RX ADMIN — LISINOPRIL 40 MILLIGRAM(S): 5 TABLET ORAL at 08:47

## 2025-06-03 RX ADMIN — TIOTROPIUM BROMIDE INHALATION SPRAY 2 PUFF(S): 3.12 SPRAY, METERED RESPIRATORY (INHALATION) at 08:52

## 2025-06-03 RX ADMIN — Medication 1 DROP(S): at 06:57

## 2025-06-03 RX ADMIN — Medication 25 MILLIGRAM(S): at 21:59

## 2025-06-03 NOTE — BH INPATIENT PSYCHIATRY PROGRESS NOTE - PRN MEDS
MEDICATIONS  (PRN):  acetaminophen     Tablet .. 650 milliGRAM(s) Oral every 6 hours PRN Temp greater or equal to 38C (100.4F), Mild Pain (1 - 3), Moderate Pain (4 - 6)  lidocaine   4% Patch 1 Patch Transdermal daily PRN chronic back pain  loperamide 2 milliGRAM(s) Oral once PRN Diarrhea  OLANZapine 1.25 milliGRAM(s) Oral every 6 hours PRN agitation

## 2025-06-03 NOTE — BH INPATIENT PSYCHIATRY PROGRESS NOTE - NSBHFUPINTERVALHXFT_PSY_A_CORE
Patient seen for follow up of catatonia. No events over the weekend, pt was reported to be confused and needing assistance from staff for ADLs. Fingerstick 121 this morning, BP is on the higher end at 163/86 this morning. Pt refused orthostatic vitals today. Patient's blood pressure has varied over the past week but has trended towards the higher end. Will discuss Hypertension management with the hospitalist. Pt was previously on Hydralazine at [facility] but was switched to Clonidine to avoid TID dosing.     Pt is observed sitting in the dayroom this morning. She is wearing her own clothes, grooming and hygiene are good. She is pleasant on approach. Remains confused and disoriented. Unable to recall the date or who writer is. Reports feeling good in terms of mood, denies any physical or emotional complaints. Denies visual or auditory hallucinations.     Spoke with son Dr. Silva yesterday on the phone ([phone]) and provided treatment updates. Family agrees with holding ECT this week given memory issues and confusion. Family is leaning towards taking patient home soon, hoping a familiar environment and family support will help patient.

## 2025-06-03 NOTE — BH INPATIENT PSYCHIATRY PROGRESS NOTE - CURRENT MEDICATION
MEDICATIONS  (STANDING):  artificial tears (preservative free) Ophthalmic Solution 1 Drop(s) Both EYES every 8 hours  atorvastatin 40 milliGRAM(s) Oral at bedtime  cloNIDine 0.1 milliGRAM(s) Oral daily  dextrose Oral Gel 15 Gram(s) Oral once  fluticasone propionate/ salmeterol 250-50 MICROgram(s) Diskus 1 Dose(s) Inhalation two times a day  glucagon  Injectable 1 milliGRAM(s) IntraMuscular once  hydrochlorothiazide 25 milliGRAM(s) Oral daily  insulin glargine Injectable (LANTUS) 3 Unit(s) SubCutaneous at bedtime  insulin lispro (ADMELOG) corrective regimen sliding scale   SubCutaneous at bedtime  insulin lispro (ADMELOG) corrective regimen sliding scale   SubCutaneous three times a day before meals  levalbuterol 45 MICROgram(s) HFA Inhaler 1 Puff(s) Inhalation every 8 hours  lisinopril 40 milliGRAM(s) Oral daily  melatonin. 9 milliGRAM(s) Oral at bedtime  memantine 10 milliGRAM(s) Oral at bedtime  memantine 10 milliGRAM(s) Oral <User Schedule>  petrolatum Ophthalmic Ointment 1 Application(s) Both EYES two times a day  senna 2 Tablet(s) Oral at bedtime  sertraline 125 milliGRAM(s) Oral daily  tiotropium 2.5 MICROgram(s) Inhaler 2 Puff(s) Inhalation daily    MEDICATIONS  (PRN):  acetaminophen     Tablet .. 650 milliGRAM(s) Oral every 6 hours PRN Temp greater or equal to 38C (100.4F), Mild Pain (1 - 3), Moderate Pain (4 - 6)  lidocaine   4% Patch 1 Patch Transdermal daily PRN chronic back pain  loperamide 2 milliGRAM(s) Oral once PRN Diarrhea  OLANZapine 1.25 milliGRAM(s) Oral every 6 hours PRN agitation

## 2025-06-03 NOTE — BH INPATIENT PSYCHIATRY PROGRESS NOTE - NSBHASSESSSUMMFT_PSY_ALL_CORE
74F hx asthma, depression, and HTN was admitted to the MICU with AHRF 2/2 asthma exacerbation resulting in acute on chronic HHRF causing AMS and increased WOB that required intubation. During her ICU course, she experienced seizure-like activity .  Additional issues included hyponatremia, acute on chronic metabolic alkalosis, and mild thrombocytopenia,  also having fevers.  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 Patient has h/o hx of major depressive disorder with psychosis, hx of 3 past inpatient psychiatric hospitalizations last in 2016 at [facility], all rehospitalizations were in context non compliance with the treatment. She has no hx of suicide attempts, no hx of substance abuse.    Pt currently requires inpatient level of care for ongoing treatment for catatonia.     5/22 - calm, cooperative, not oriented, though remembers name of son. No s/s catatonia. Denies mood symptoms. Eating and sleeping on unit.  5/23 - No signs of catatonia. Eating and ambulating on unit. For ECT this morning. Not oriented to time, place, or situation. Has been NPO. Spoke to , who believes pt is returning to baseline, but is concerned about pt's memory issues.  5/27 - Withdrawn, non-spontaneous speech, not oriented to time, place, or situation, though interactive and recalls her 's name. Hypoglycemic episode yesterday. D/w hospitalist, discontinued standing pre-meal Admelog of 2U TID, will keep on bedtime Lantus and sliding scale.   5/28: More engaged today, establishes good eye contact. Received ECT today, will monitor, if catatonia symptoms remain in control will continue with weekly ECT next week, if pt appears to be relapsing may order ECT for Friday.   5/29: Pt presenting with increased BM frequency, regular consistency. Will order imodium PRN for diarrhea.   5/30: Will observe pt over the weekend and next week for confusion. Family has brought up concern ECT may be worsening confusion and it is a possibility. Will observe off Zyprexa and space out ECT, may treat next Friday.   5/31: Remains confused but calm. Unclear if due to ECT or residual catatonia.  6/2: Will continue holding Zyprexa and ECT this week. Presentation is suggestive of protracted delirium.   6/3: Pt remains confused and disoriented. Continue holding Zyprexa and ECT. Ordered urinalysis and routine bloodwork.     PLAN:  1. Admit to [facility] 2S on 9.27  2. Routine checks, as pt denying SI/HI/SIB  3. Psychiatric:  - Continue Namenda 10mg BID  - Continue Sertraline 125mg QD, titrate up to lowest effective dose  - Will HOLD olanzapine 2.5mg QHS for now, given concern for anticholinergic effects on cognition  - Would avoid benzodiazepines since pt was recently unable to tolerate them at [facility], becoming extremely sedated  - Continue ECT once weekly, last treatment occurred Friday 5/30.     4. Medical:   - Ordered Levalbuterol inhaler to [facility], to be delivered to 2S since [facility] pharmacy doesn't carry it  - Continue Advair and Spiriva for Asthma and COPD  - Continue Lisinopril 40mg QD and HCTZ 25mg QD for HTN  - Diabetes type 2: Lantus 3U at bedtime + ISS  - Lidocaine patches PRN daily + Tylenol PRN for lower back pain  - Bowel regimen: Senna 2 tabs QHS  - Diet: Easy to chew, consistent carbohydrates. No Beef No Pork  - Pt is at risk for falls given deconditioning. Seen by PT, recommended PRN walker with 1 staff assists. Pt is in a room with peer who has CO for added supervision and redirection during nighttime. During the daytime she should be either in the day room or the dining area with staff monitoring her at all times to provide redirection if pt tries to get up or ambulate unassisted.     5. Collateral: spoke with pt's  Liam on phone to update on progress 5/22 and 5/23.  6. Disposition: pending clinical course

## 2025-06-03 NOTE — BH INPATIENT PSYCHIATRY PROGRESS NOTE - NSBHMETABOLIC_PSY_ALL_CORE_FT
BMI: BMI (kg/m2): 28.3 (05-21-25 @ 00:57)  HbA1c: A1C with Estimated Average Glucose Result: 5.9 % (05-22-25 @ 08:09)    Glucose: POCT Blood Glucose.: 121 mg/dL (06-03-25 @ 07:46)    BP: 163/86 (06-03-25 @ 07:50) (163/86 - 163/86)Vital Signs Last 24 Hrs  T(C): 36.9 (06-03-25 @ 07:50), Max: 36.9 (06-03-25 @ 07:50)  T(F): 98.4 (06-03-25 @ 07:50), Max: 98.4 (06-03-25 @ 07:50)  HR: 80 (06-03-25 @ 07:50) (80 - 80)  BP: 163/86 (06-03-25 @ 07:50) (163/86 - 163/86)  BP(mean): --  RR: --  SpO2: 95% (06-02-25 @ 15:49) (95% - 95%)    Orthostatic VS  06-02-25 @ 07:47  Lying BP: --/-- HR: --  Sitting BP: 123/44 HR: 75  Standing BP: 137/55 HR: 81  Site: --  Mode: electronic    Lipid Panel: Date/Time: 05-22-25 @ 08:09  Cholesterol, Serum: 310  LDL Cholesterol Calculated: 234  HDL Cholesterol, Serum: 67  Total Cholesterol/HDL Ration Measurement: --  Triglycerides, Serum: 64

## 2025-06-03 NOTE — BH INPATIENT PSYCHIATRY PROGRESS NOTE - NSBHCHARTREVIEWVS_PSY_A_CORE FT
Vital Signs Last 24 Hrs  T(C): 36.9 (06-03-25 @ 07:50), Max: 36.9 (06-03-25 @ 07:50)  T(F): 98.4 (06-03-25 @ 07:50), Max: 98.4 (06-03-25 @ 07:50)  HR: 80 (06-03-25 @ 07:50) (80 - 80)  BP: 163/86 (06-03-25 @ 07:50) (163/86 - 163/86)  BP(mean): --  RR: --  SpO2: 95% (06-02-25 @ 15:49) (95% - 95%)    Orthostatic VS  06-02-25 @ 07:47  Lying BP: --/-- HR: --  Sitting BP: 123/44 HR: 75  Standing BP: 137/55 HR: 81  Site: --  Mode: electronic

## 2025-06-03 NOTE — PROGRESS NOTE ADULT - SUBJECTIVE AND OBJECTIVE BOX
CC/Reason for Consult:     SUBJECTIVE / OVERNIGHT EVENTS:    MEDICATIONS  (STANDING):  artificial tears (preservative free) Ophthalmic Solution 1 Drop(s) Both EYES every 8 hours  atorvastatin 40 milliGRAM(s) Oral at bedtime  dextrose Oral Gel 15 Gram(s) Oral once  fluticasone propionate/ salmeterol 250-50 MICROgram(s) Diskus 1 Dose(s) Inhalation two times a day  glucagon  Injectable 1 milliGRAM(s) IntraMuscular once  hydrALAZINE 25 milliGRAM(s) Oral three times a day  hydrochlorothiazide 25 milliGRAM(s) Oral daily  insulin glargine Injectable (LANTUS) 3 Unit(s) SubCutaneous at bedtime  insulin lispro (ADMELOG) corrective regimen sliding scale   SubCutaneous at bedtime  insulin lispro (ADMELOG) corrective regimen sliding scale   SubCutaneous three times a day before meals  levalbuterol 45 MICROgram(s) HFA Inhaler 1 Puff(s) Inhalation every 8 hours  lisinopril 40 milliGRAM(s) Oral daily  melatonin. 9 milliGRAM(s) Oral at bedtime  memantine 10 milliGRAM(s) Oral <User Schedule>  memantine 10 milliGRAM(s) Oral at bedtime  petrolatum Ophthalmic Ointment 1 Application(s) Both EYES two times a day  senna 2 Tablet(s) Oral at bedtime  sertraline 125 milliGRAM(s) Oral daily  tiotropium 2.5 MICROgram(s) Inhaler 2 Puff(s) Inhalation daily    MEDICATIONS  (PRN):  acetaminophen     Tablet .. 650 milliGRAM(s) Oral every 6 hours PRN Temp greater or equal to 38C (100.4F), Mild Pain (1 - 3), Moderate Pain (4 - 6)  lidocaine   4% Patch 1 Patch Transdermal daily PRN chronic back pain  loperamide 2 milliGRAM(s) Oral once PRN Diarrhea  OLANZapine 1.25 milliGRAM(s) Oral every 6 hours PRN agitation      Vital Signs Last 24 Hrs  T(C): 36.9 (03 Jun 2025 07:50), Max: 36.9 (03 Jun 2025 07:50)  T(F): 98.4 (03 Jun 2025 07:50), Max: 98.4 (03 Jun 2025 07:50)  HR: 80 (03 Jun 2025 07:50) (80 - 80)  BP: 163/86 (03 Jun 2025 07:50) (163/86 - 163/86)  BP(mean): --  RR: --  SpO2: --      CAPILLARY BLOOD GLUCOSE      POCT Blood Glucose.: 126 mg/dL (03 Jun 2025 15:43)  POCT Blood Glucose.: 175 mg/dL (03 Jun 2025 11:33)  POCT Blood Glucose.: 121 mg/dL (03 Jun 2025 07:46)  POCT Blood Glucose.: 176 mg/dL (02 Jun 2025 20:36)        PHYSICAL EXAM:  GENERAL: NAD  HEAD:  Atraumatic, Normocephalic  EYES: EOMI, conjunctiva and sclera clear  NECK: Supple, No JVD  CHEST/LUNG: Clear to auscultation bilaterally; No wheeze  HEART: Regular rate and rhythm; No murmurs, rubs, or gallops  ABDOMEN: Soft, Nontender, Nondistended; Bowel sounds present  EXTREMITIES:  2+ Peripheral Pulses, No clubbing, cyanosis, or edema  NEUROLOGY: non-focal  SKIN: No rashes or lesions    LABS:                    RADIOLOGY & ADDITIONAL TESTS:    Imaging Personally Reviewed:    Consultant(s) Notes Reviewed:      Care Discussed with Consultants/Other Providers:   CC/Reason for Consult: HTN    SUBJECTIVE / OVERNIGHT EVENTS: Pt comfortable denies complaints    MEDICATIONS  (STANDING):  artificial tears (preservative free) Ophthalmic Solution 1 Drop(s) Both EYES every 8 hours  atorvastatin 40 milliGRAM(s) Oral at bedtime  dextrose Oral Gel 15 Gram(s) Oral once  fluticasone propionate/ salmeterol 250-50 MICROgram(s) Diskus 1 Dose(s) Inhalation two times a day  glucagon  Injectable 1 milliGRAM(s) IntraMuscular once  hydrALAZINE 25 milliGRAM(s) Oral three times a day  hydrochlorothiazide 25 milliGRAM(s) Oral daily  insulin glargine Injectable (LANTUS) 3 Unit(s) SubCutaneous at bedtime  insulin lispro (ADMELOG) corrective regimen sliding scale   SubCutaneous at bedtime  insulin lispro (ADMELOG) corrective regimen sliding scale   SubCutaneous three times a day before meals  levalbuterol 45 MICROgram(s) HFA Inhaler 1 Puff(s) Inhalation every 8 hours  lisinopril 40 milliGRAM(s) Oral daily  melatonin. 9 milliGRAM(s) Oral at bedtime  memantine 10 milliGRAM(s) Oral <User Schedule>  memantine 10 milliGRAM(s) Oral at bedtime  petrolatum Ophthalmic Ointment 1 Application(s) Both EYES two times a day  senna 2 Tablet(s) Oral at bedtime  sertraline 125 milliGRAM(s) Oral daily  tiotropium 2.5 MICROgram(s) Inhaler 2 Puff(s) Inhalation daily    MEDICATIONS  (PRN):  acetaminophen     Tablet .. 650 milliGRAM(s) Oral every 6 hours PRN Temp greater or equal to 38C (100.4F), Mild Pain (1 - 3), Moderate Pain (4 - 6)  lidocaine   4% Patch 1 Patch Transdermal daily PRN chronic back pain  loperamide 2 milliGRAM(s) Oral once PRN Diarrhea  OLANZapine 1.25 milliGRAM(s) Oral every 6 hours PRN agitation      Vital Signs Last 24 Hrs  T(C): 36.9 (03 Jun 2025 07:50), Max: 36.9 (03 Jun 2025 07:50)  T(F): 98.4 (03 Jun 2025 07:50), Max: 98.4 (03 Jun 2025 07:50)  HR: 80 (03 Jun 2025 07:50) (80 - 80)  BP: 163/86 (03 Jun 2025 07:50) (163/86 - 163/86)  BP(mean): --  RR: 14  SpO2: --      CAPILLARY BLOOD GLUCOSE      POCT Blood Glucose.: 126 mg/dL (03 Jun 2025 15:43)  POCT Blood Glucose.: 175 mg/dL (03 Jun 2025 11:33)  POCT Blood Glucose.: 121 mg/dL (03 Jun 2025 07:46)  POCT Blood Glucose.: 176 mg/dL (02 Jun 2025 20:36)        PHYSICAL EXAM:  GENERAL: NAD  HEAD:  Atraumatic, Normocephalic  EYES: EOMI, conjunctiva and sclera clear  NECK: Supple, No JVD  CHEST/LUNG: Clear to auscultation bilaterally; No wheeze  HEART: Regular rate and rhythm; No murmurs, rubs, or gallops  ABDOMEN: Soft, Nontender, Nondistended; Bowel sounds present  EXTREMITIES:  2+ Peripheral Pulses, No clubbing, cyanosis, or edema  NEUROLOGY: non-focal  SKIN: No rashes or lesions    LABS:      reviewed in sunrise      Care Discussed with Consultants/Other Providers: Dr Moeller

## 2025-06-04 LAB
ADD ON TEST-SPECIMEN IN LAB: SIGNIFICANT CHANGE UP
ALBUMIN SERPL ELPH-MCNC: 4 G/DL — SIGNIFICANT CHANGE UP (ref 3.3–5)
ALP SERPL-CCNC: 500 U/L — HIGH (ref 40–120)
ALT FLD-CCNC: 49 U/L — HIGH (ref 4–33)
ANION GAP SERPL CALC-SCNC: 12 MMOL/L — SIGNIFICANT CHANGE UP (ref 7–14)
AST SERPL-CCNC: 35 U/L — HIGH (ref 4–32)
BASOPHILS # BLD AUTO: 0.02 K/UL — SIGNIFICANT CHANGE UP (ref 0–0.2)
BASOPHILS NFR BLD AUTO: 0.2 % — SIGNIFICANT CHANGE UP (ref 0–2)
BILIRUB SERPL-MCNC: 0.6 MG/DL — SIGNIFICANT CHANGE UP (ref 0.2–1.2)
BUN SERPL-MCNC: 13 MG/DL — SIGNIFICANT CHANGE UP (ref 7–23)
CALCIUM SERPL-MCNC: 10.1 MG/DL — SIGNIFICANT CHANGE UP (ref 8.4–10.5)
CHLORIDE SERPL-SCNC: 100 MMOL/L — SIGNIFICANT CHANGE UP (ref 98–107)
CO2 SERPL-SCNC: 31 MMOL/L — SIGNIFICANT CHANGE UP (ref 22–31)
CREAT SERPL-MCNC: 0.65 MG/DL — SIGNIFICANT CHANGE UP (ref 0.5–1.3)
EGFR: 92 ML/MIN/1.73M2 — SIGNIFICANT CHANGE UP
EGFR: 92 ML/MIN/1.73M2 — SIGNIFICANT CHANGE UP
EOSINOPHIL # BLD AUTO: 0.23 K/UL — SIGNIFICANT CHANGE UP (ref 0–0.5)
EOSINOPHIL NFR BLD AUTO: 2.6 % — SIGNIFICANT CHANGE UP (ref 0–6)
GGT SERPL-CCNC: 558 U/L — HIGH (ref 5–36)
GLUCOSE BLDC GLUCOMTR-MCNC: 116 MG/DL — HIGH (ref 70–99)
GLUCOSE BLDC GLUCOMTR-MCNC: 137 MG/DL — HIGH (ref 70–99)
GLUCOSE BLDC GLUCOMTR-MCNC: 162 MG/DL — HIGH (ref 70–99)
GLUCOSE BLDC GLUCOMTR-MCNC: 195 MG/DL — HIGH (ref 70–99)
GLUCOSE SERPL-MCNC: 172 MG/DL — HIGH (ref 70–99)
HCT VFR BLD CALC: 40.2 % — SIGNIFICANT CHANGE UP (ref 34.5–45)
HGB BLD-MCNC: 12.9 G/DL — SIGNIFICANT CHANGE UP (ref 11.5–15.5)
IANC: 3.57 K/UL — SIGNIFICANT CHANGE UP (ref 1.8–7.4)
IMM GRANULOCYTES NFR BLD AUTO: 0.2 % — SIGNIFICANT CHANGE UP (ref 0–0.9)
LYMPHOCYTES # BLD AUTO: 4.36 K/UL — HIGH (ref 1–3.3)
LYMPHOCYTES # BLD AUTO: 49.1 % — HIGH (ref 13–44)
MAGNESIUM SERPL-MCNC: 1.8 MG/DL — SIGNIFICANT CHANGE UP (ref 1.6–2.6)
MCHC RBC-ENTMCNC: 32.1 G/DL — SIGNIFICANT CHANGE UP (ref 32–36)
MCHC RBC-ENTMCNC: 32.3 PG — SIGNIFICANT CHANGE UP (ref 27–34)
MCV RBC AUTO: 100.8 FL — HIGH (ref 80–100)
MONOCYTES # BLD AUTO: 0.68 K/UL — SIGNIFICANT CHANGE UP (ref 0–0.9)
MONOCYTES NFR BLD AUTO: 7.7 % — SIGNIFICANT CHANGE UP (ref 2–14)
NEUTROPHILS # BLD AUTO: 3.57 K/UL — SIGNIFICANT CHANGE UP (ref 1.8–7.4)
NEUTROPHILS NFR BLD AUTO: 40.2 % — LOW (ref 43–77)
NRBC # BLD AUTO: 0 K/UL — SIGNIFICANT CHANGE UP (ref 0–0)
NRBC # FLD: 0 K/UL — SIGNIFICANT CHANGE UP (ref 0–0)
NRBC BLD AUTO-RTO: 0 /100 WBCS — SIGNIFICANT CHANGE UP (ref 0–0)
PHOSPHATE SERPL-MCNC: 3.4 MG/DL — SIGNIFICANT CHANGE UP (ref 2.5–4.5)
PLATELET # BLD AUTO: 156 K/UL — SIGNIFICANT CHANGE UP (ref 150–400)
POTASSIUM SERPL-MCNC: 3.5 MMOL/L — SIGNIFICANT CHANGE UP (ref 3.5–5.3)
POTASSIUM SERPL-SCNC: 3.5 MMOL/L — SIGNIFICANT CHANGE UP (ref 3.5–5.3)
PROT SERPL-MCNC: 7.1 G/DL — SIGNIFICANT CHANGE UP (ref 6–8.3)
RBC # BLD: 3.99 M/UL — SIGNIFICANT CHANGE UP (ref 3.8–5.2)
RBC # FLD: 12.9 % — SIGNIFICANT CHANGE UP (ref 10.3–14.5)
SODIUM SERPL-SCNC: 143 MMOL/L — SIGNIFICANT CHANGE UP (ref 135–145)
WBC # BLD: 8.88 K/UL — SIGNIFICANT CHANGE UP (ref 3.8–10.5)
WBC # FLD AUTO: 8.88 K/UL — SIGNIFICANT CHANGE UP (ref 3.8–10.5)

## 2025-06-04 RX ORDER — ATORVASTATIN CALCIUM 80 MG/1
20 TABLET, FILM COATED ORAL AT BEDTIME
Refills: 0 | Status: DISCONTINUED | OUTPATIENT
Start: 2025-06-04 | End: 2025-06-16

## 2025-06-04 RX ADMIN — SERTRALINE 125 MILLIGRAM(S): 100 TABLET, FILM COATED ORAL at 09:26

## 2025-06-04 RX ADMIN — LEVALBUTEROL HYDROCHLORIDE 1 PUFF(S): 1.25 SOLUTION RESPIRATORY (INHALATION) at 14:13

## 2025-06-04 RX ADMIN — MEMANTINE HYDROCHLORIDE 10 MILLIGRAM(S): 21 CAPSULE, EXTENDED RELEASE ORAL at 21:03

## 2025-06-04 RX ADMIN — LEVALBUTEROL HYDROCHLORIDE 1 PUFF(S): 1.25 SOLUTION RESPIRATORY (INHALATION) at 06:30

## 2025-06-04 RX ADMIN — Medication 1 DROP(S): at 06:27

## 2025-06-04 RX ADMIN — Medication 1 DOSE(S): at 21:02

## 2025-06-04 RX ADMIN — ATORVASTATIN CALCIUM 20 MILLIGRAM(S): 80 TABLET, FILM COATED ORAL at 21:03

## 2025-06-04 RX ADMIN — Medication 1 DROP(S): at 14:13

## 2025-06-04 RX ADMIN — TIOTROPIUM BROMIDE INHALATION SPRAY 2 PUFF(S): 3.12 SPRAY, METERED RESPIRATORY (INHALATION) at 14:12

## 2025-06-04 RX ADMIN — INSULIN LISPRO 1: 100 INJECTION, SOLUTION INTRAVENOUS; SUBCUTANEOUS at 11:49

## 2025-06-04 RX ADMIN — Medication 2 TABLET(S): at 21:03

## 2025-06-04 RX ADMIN — Medication 1 DOSE(S): at 09:29

## 2025-06-04 RX ADMIN — LISINOPRIL 40 MILLIGRAM(S): 5 TABLET ORAL at 09:25

## 2025-06-04 RX ADMIN — Medication 9 MILLIGRAM(S): at 21:03

## 2025-06-04 RX ADMIN — Medication 25 MILLIGRAM(S): at 14:13

## 2025-06-04 RX ADMIN — LEVALBUTEROL HYDROCHLORIDE 1 PUFF(S): 1.25 SOLUTION RESPIRATORY (INHALATION) at 21:03

## 2025-06-04 RX ADMIN — Medication 25 MILLIGRAM(S): at 09:25

## 2025-06-04 RX ADMIN — Medication 1 DROP(S): at 21:07

## 2025-06-04 RX ADMIN — MEMANTINE HYDROCHLORIDE 10 MILLIGRAM(S): 21 CAPSULE, EXTENDED RELEASE ORAL at 06:27

## 2025-06-04 RX ADMIN — Medication 25 MILLIGRAM(S): at 21:03

## 2025-06-04 NOTE — BH INPATIENT PSYCHIATRY PROGRESS NOTE - NSBHASSESSSUMMFT_PSY_ALL_CORE
74F hx asthma, depression, and HTN was admitted to the MICU with AHRF 2/2 asthma exacerbation resulting in acute on chronic HHRF causing AMS and increased WOB that required intubation. During her ICU course, she experienced seizure-like activity .  Additional issues included hyponatremia, acute on chronic metabolic alkalosis, and mild thrombocytopenia,  also having fevers.  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 Patient has h/o hx of major depressive disorder with psychosis, hx of 3 past inpatient psychiatric hospitalizations last in 2016 at [facility], all rehospitalizations were in context non compliance with the treatment. She has no hx of suicide attempts, no hx of substance abuse.    Pt currently requires inpatient level of care for ongoing treatment for catatonia.     5/22 - calm, cooperative, not oriented, though remembers name of son. No s/s catatonia. Denies mood symptoms. Eating and sleeping on unit.  5/23 - No signs of catatonia. Eating and ambulating on unit. For ECT this morning. Not oriented to time, place, or situation. Has been NPO. Spoke to , who believes pt is returning to baseline, but is concerned about pt's memory issues.  5/27 - Withdrawn, non-spontaneous speech, not oriented to time, place, or situation, though interactive and recalls her 's name. Hypoglycemic episode yesterday. D/w hospitalist, discontinued standing pre-meal Admelog of 2U TID, will keep on bedtime Lantus and sliding scale.   5/28: More engaged today, establishes good eye contact. Received ECT today, will monitor, if catatonia symptoms remain in control will continue with weekly ECT next week, if pt appears to be relapsing may order ECT for Friday.   5/29: Pt presenting with increased BM frequency, regular consistency. Will order imodium PRN for diarrhea.   5/30: Will observe pt over the weekend and next week for confusion. Family has brought up concern ECT may be worsening confusion and it is a possibility. Will observe off Zyprexa and space out ECT, may treat next Friday.   5/31: Remains confused but calm. Unclear if due to ECT or residual catatonia.  6/2: Will continue holding Zyprexa and ECT this week. Presentation is suggestive of protracted delirium.   6/3: Pt remains confused and disoriented. Continue holding Zyprexa and ECT. Ordered urinalysis and routine bloodwork.   6/4: Continue monitoring Alk phos and LFTs.     PLAN:  1. Admit to [facility] 2S on 9.27  2. Routine checks, as pt denying SI/HI/SIB  3. Psychiatric:  - Continue Namenda 10mg BID  - Continue Sertraline 125mg QD, titrate up to lowest effective dose  - Will HOLD olanzapine 2.5mg QHS for now, given concern for anticholinergic effects on cognition  - Would avoid benzodiazepines since pt was recently unable to tolerate them at [facility], becoming extremely sedated  - Continue ECT once weekly, last treatment occurred Friday 5/30.     4. Medical:   - Ordered Levalbuterol inhaler to [facility], to be delivered to 2S since [facility] pharmacy doesn't carry it  - Continue Advair and Spiriva for Asthma and COPD  - Continue Lisinopril 40mg QD and HCTZ 25mg QD for HTN  - Diabetes type 2: Lantus 3U at bedtime + ISS  - Lidocaine patches PRN daily + Tylenol PRN for lower back pain  - Bowel regimen: Senna 2 tabs QHS  - Diet: Easy to chew, consistent carbohydrates. No Beef No Pork  - Pt is at risk for falls given deconditioning. Seen by PT, recommended PRN walker with 1 staff assists. Pt is in a room with peer who has CO for added supervision and redirection during nighttime. During the daytime she should be either in the day room or the dining area with staff monitoring her at all times to provide redirection if pt tries to get up or ambulate unassisted.     5. Collateral: spoke with pt's  Liam on phone to update on progress 5/22 and 5/23.  6. Disposition: pending clinical course

## 2025-06-04 NOTE — BH INPATIENT PSYCHIATRY PROGRESS NOTE - NSBHMETABOLIC_PSY_ALL_CORE_FT
BMI: BMI (kg/m2): 28.3 (05-21-25 @ 00:57)  HbA1c: A1C with Estimated Average Glucose Result: 5.9 % (05-22-25 @ 08:09)    Glucose: POCT Blood Glucose.: 195 mg/dL (06-04-25 @ 11:18)    BP: 162/90 (06-03-25 @ 21:30) (162/90 - 163/86)Vital Signs Last 24 Hrs  T(C): --  T(F): --  HR: 83 (06-03-25 @ 21:30) (83 - 83)  BP: 162/90 (06-03-25 @ 21:30) (162/90 - 162/90)  BP(mean): --  RR: 18 (06-03-25 @ 21:30) (18 - 18)  SpO2: 95% (06-04-25 @ 06:30) (95% - 95%)      Lipid Panel: Date/Time: 05-22-25 @ 08:09  Cholesterol, Serum: 310  LDL Cholesterol Calculated: 234  HDL Cholesterol, Serum: 67  Total Cholesterol/HDL Ration Measurement: --  Triglycerides, Serum: 64

## 2025-06-04 NOTE — BH INPATIENT PSYCHIATRY PROGRESS NOTE - NSBHFUPINTERVALHXFT_PSY_A_CORE
Patient seen for follow up of catatonia. No events over the weekend, pt was reported to be confused and needing assistance from staff for ADLs. Fingerstick 137 this morning. BP slightly lower than yesterday with systolic at 150. Bloodwork reviewed, noted to have a spike in Alk phos at 500. Bilirubin is normal, LFTs slightly above normal range. Pt is asymptomatic, has been eating and drinking well. Denies being in any pain. Discussed with hospitalist, chandana appreciated.     Pt is observed sitting in the dayroom this morning. She is wearing her own clothes, grooming and hygiene are good. She is pleasant on approach. Remains confused and disoriented to time but oriented to setting. Reports feeling good in terms of mood, denies any physical or emotional complaints. Denies visual or auditory hallucinations.

## 2025-06-04 NOTE — BH INPATIENT PSYCHIATRY PROGRESS NOTE - NSBHCHARTREVIEWVS_PSY_A_CORE FT
Vital Signs Last 24 Hrs  T(C): --  T(F): --  HR: 83 (06-03-25 @ 21:30) (83 - 83)  BP: 162/90 (06-03-25 @ 21:30) (162/90 - 162/90)  BP(mean): --  RR: 18 (06-03-25 @ 21:30) (18 - 18)  SpO2: 95% (06-04-25 @ 06:30) (95% - 95%)

## 2025-06-04 NOTE — BH INPATIENT PSYCHIATRY PROGRESS NOTE - CURRENT MEDICATION
MEDICATIONS  (STANDING):  artificial tears (preservative free) Ophthalmic Solution 1 Drop(s) Both EYES every 8 hours  atorvastatin 40 milliGRAM(s) Oral at bedtime  dextrose Oral Gel 15 Gram(s) Oral once  fluticasone propionate/ salmeterol 250-50 MICROgram(s) Diskus 1 Dose(s) Inhalation two times a day  glucagon  Injectable 1 milliGRAM(s) IntraMuscular once  hydrALAZINE 25 milliGRAM(s) Oral three times a day  hydrochlorothiazide 25 milliGRAM(s) Oral daily  insulin lispro (ADMELOG) corrective regimen sliding scale   SubCutaneous at bedtime  insulin lispro (ADMELOG) corrective regimen sliding scale   SubCutaneous three times a day before meals  levalbuterol 45 MICROgram(s) HFA Inhaler 1 Puff(s) Inhalation every 8 hours  lisinopril 40 milliGRAM(s) Oral daily  melatonin. 9 milliGRAM(s) Oral at bedtime  memantine 10 milliGRAM(s) Oral at bedtime  memantine 10 milliGRAM(s) Oral <User Schedule>  petrolatum Ophthalmic Ointment 1 Application(s) Both EYES two times a day  senna 2 Tablet(s) Oral at bedtime  sertraline 125 milliGRAM(s) Oral daily  tiotropium 2.5 MICROgram(s) Inhaler 2 Puff(s) Inhalation daily    MEDICATIONS  (PRN):  acetaminophen     Tablet .. 650 milliGRAM(s) Oral every 6 hours PRN Temp greater or equal to 38C (100.4F), Mild Pain (1 - 3), Moderate Pain (4 - 6)  lidocaine   4% Patch 1 Patch Transdermal daily PRN chronic back pain  loperamide 2 milliGRAM(s) Oral once PRN Diarrhea  OLANZapine 1.25 milliGRAM(s) Oral every 6 hours PRN agitation

## 2025-06-05 LAB
ADD ON TEST-SPECIMEN IN LAB: SIGNIFICANT CHANGE UP
ALBUMIN SERPL ELPH-MCNC: 4.3 G/DL — SIGNIFICANT CHANGE UP (ref 3.3–5)
ALP SERPL-CCNC: 543 U/L — HIGH (ref 40–120)
ALT FLD-CCNC: 50 U/L — HIGH (ref 4–33)
ANION GAP SERPL CALC-SCNC: 11 MMOL/L — SIGNIFICANT CHANGE UP (ref 7–14)
AST SERPL-CCNC: 38 U/L — HIGH (ref 4–32)
BILIRUB SERPL-MCNC: 0.8 MG/DL — SIGNIFICANT CHANGE UP (ref 0.2–1.2)
BUN SERPL-MCNC: 11 MG/DL — SIGNIFICANT CHANGE UP (ref 7–23)
CALCIUM SERPL-MCNC: 10.1 MG/DL — SIGNIFICANT CHANGE UP (ref 8.4–10.5)
CHLORIDE SERPL-SCNC: 100 MMOL/L — SIGNIFICANT CHANGE UP (ref 98–107)
CO2 SERPL-SCNC: 32 MMOL/L — HIGH (ref 22–31)
CREAT SERPL-MCNC: 0.62 MG/DL — SIGNIFICANT CHANGE UP (ref 0.5–1.3)
EGFR: 93 ML/MIN/1.73M2 — SIGNIFICANT CHANGE UP
EGFR: 93 ML/MIN/1.73M2 — SIGNIFICANT CHANGE UP
GGT SERPL-CCNC: 593 U/L — HIGH (ref 5–36)
GLUCOSE BLDC GLUCOMTR-MCNC: 111 MG/DL — HIGH (ref 70–99)
GLUCOSE BLDC GLUCOMTR-MCNC: 133 MG/DL — HIGH (ref 70–99)
GLUCOSE BLDC GLUCOMTR-MCNC: 150 MG/DL — HIGH (ref 70–99)
GLUCOSE BLDC GLUCOMTR-MCNC: 161 MG/DL — HIGH (ref 70–99)
GLUCOSE SERPL-MCNC: 132 MG/DL — HIGH (ref 70–99)
INR BLD: 0.99 RATIO — SIGNIFICANT CHANGE UP (ref 0.85–1.16)
MAGNESIUM SERPL-MCNC: 1.9 MG/DL — SIGNIFICANT CHANGE UP (ref 1.6–2.6)
PHOSPHATE SERPL-MCNC: 3 MG/DL — SIGNIFICANT CHANGE UP (ref 2.5–4.5)
POTASSIUM SERPL-MCNC: 3.6 MMOL/L — SIGNIFICANT CHANGE UP (ref 3.5–5.3)
POTASSIUM SERPL-SCNC: 3.6 MMOL/L — SIGNIFICANT CHANGE UP (ref 3.5–5.3)
PROT SERPL-MCNC: 7.7 G/DL — SIGNIFICANT CHANGE UP (ref 6–8.3)
PROTHROM AB SERPL-ACNC: 11.5 SEC — SIGNIFICANT CHANGE UP (ref 9.9–13.4)
SODIUM SERPL-SCNC: 143 MMOL/L — SIGNIFICANT CHANGE UP (ref 135–145)

## 2025-06-05 PROCEDURE — 99232 SBSQ HOSP IP/OBS MODERATE 35: CPT

## 2025-06-05 RX ORDER — OLANZAPINE 10 MG/1
2.5 TABLET ORAL AT BEDTIME
Refills: 0 | Status: DISCONTINUED | OUTPATIENT
Start: 2025-06-05 | End: 2025-07-01

## 2025-06-05 RX ADMIN — ATORVASTATIN CALCIUM 20 MILLIGRAM(S): 80 TABLET, FILM COATED ORAL at 20:51

## 2025-06-05 RX ADMIN — LEVALBUTEROL HYDROCHLORIDE 1 PUFF(S): 1.25 SOLUTION RESPIRATORY (INHALATION) at 21:20

## 2025-06-05 RX ADMIN — SERTRALINE 125 MILLIGRAM(S): 100 TABLET, FILM COATED ORAL at 13:10

## 2025-06-05 RX ADMIN — OLANZAPINE 2.5 MILLIGRAM(S): 10 TABLET ORAL at 20:51

## 2025-06-05 RX ADMIN — LISINOPRIL 40 MILLIGRAM(S): 5 TABLET ORAL at 13:11

## 2025-06-05 RX ADMIN — INSULIN LISPRO 1: 100 INJECTION, SOLUTION INTRAVENOUS; SUBCUTANEOUS at 16:13

## 2025-06-05 RX ADMIN — Medication 9 MILLIGRAM(S): at 20:51

## 2025-06-05 RX ADMIN — MEMANTINE HYDROCHLORIDE 10 MILLIGRAM(S): 21 CAPSULE, EXTENDED RELEASE ORAL at 20:52

## 2025-06-05 RX ADMIN — MEMANTINE HYDROCHLORIDE 10 MILLIGRAM(S): 21 CAPSULE, EXTENDED RELEASE ORAL at 06:04

## 2025-06-05 RX ADMIN — Medication 1 DROP(S): at 06:04

## 2025-06-05 RX ADMIN — TIOTROPIUM BROMIDE INHALATION SPRAY 2 PUFF(S): 3.12 SPRAY, METERED RESPIRATORY (INHALATION) at 08:37

## 2025-06-05 RX ADMIN — Medication 1 DOSE(S): at 20:52

## 2025-06-05 RX ADMIN — Medication 2 TABLET(S): at 20:51

## 2025-06-05 RX ADMIN — LEVALBUTEROL HYDROCHLORIDE 1 PUFF(S): 1.25 SOLUTION RESPIRATORY (INHALATION) at 15:14

## 2025-06-05 RX ADMIN — Medication 1 DROP(S): at 15:15

## 2025-06-05 RX ADMIN — Medication 1 DROP(S): at 21:20

## 2025-06-05 RX ADMIN — Medication 1 DOSE(S): at 08:38

## 2025-06-05 RX ADMIN — Medication 25 MILLIGRAM(S): at 12:36

## 2025-06-05 RX ADMIN — Medication 50 MILLIGRAM(S): at 20:52

## 2025-06-05 NOTE — BH INPATIENT PSYCHIATRY PROGRESS NOTE - NSBHMETABOLIC_PSY_ALL_CORE_FT
BMI: BMI (kg/m2): 28.3 (05-21-25 @ 00:57)  HbA1c: A1C with Estimated Average Glucose Result: 5.9 % (05-22-25 @ 08:09)    Glucose: POCT Blood Glucose.: 133 mg/dL (06-05-25 @ 11:08)    BP: 160/90 (06-05-25 @ 12:36) (160/90 - 163/86)Vital Signs Last 24 Hrs  T(C): 36.9 (06-05-25 @ 07:51), Max: 36.9 (06-05-25 @ 07:51)  T(F): 98.4 (06-05-25 @ 07:51), Max: 98.4 (06-05-25 @ 07:51)  HR: 80 (06-05-25 @ 12:36) (80 - 80)  BP: 160/90 (06-05-25 @ 12:36) (160/90 - 160/90)  BP(mean): --  RR: 16 (06-05-25 @ 12:36) (16 - 16)  SpO2: --    Orthostatic VS  06-05-25 @ 07:51  Lying BP: 170/88 HR: 83  Sitting BP: 161/87 HR: 87  Standing BP: --/-- HR: --  Site: --  Mode: --    Lipid Panel: Date/Time: 05-22-25 @ 08:09  Cholesterol, Serum: 310  LDL Cholesterol Calculated: 234  HDL Cholesterol, Serum: 67  Total Cholesterol/HDL Ration Measurement: --  Triglycerides, Serum: 64

## 2025-06-05 NOTE — BH INPATIENT PSYCHIATRY PROGRESS NOTE - NSBHFUPINTERVALHXFT_PSY_A_CORE
Patient seen for follow up of catatonia. No events over the weekend, pt was reported to be confused and needing assistance from staff for ADLs. Fingerstick 111 this morning. BP still elevated this morning. Bloodwork repeated, alk phos uptrending, LFTs still mildly elevated, bilirubin is normal. Pt has been eating and drinking, denies RUQ pain. No tenderness to palpation on exam, abdomen is soft and depressible. Abdominal ultrasound scheduled for tomorrow morning at 9am.     Pt is observed sitting in the dining area this morning. Refused her morning meds (her 3 blood pressure medications and Zoloft). This is the 1st time pt refuses medications. Pt seemed suspicious, avoiding eye contact, would not say why she did not want to take her medications. Called patient's son (Dr. Silva) and had him talk to pt to try to encourage her to take her medications. Pt told her son she would take them but after he hung up, she refused. Will try again during visiting if other son comes. Resumed Zyprexa for paranoia. Family still wants to hold off on ECT and wait until Monday.    Patient seen for follow up of catatonia. No events over the weekend, pt was reported to be confused and needing assistance from staff for ADLs. Fingerstick 111 this morning. BP still elevated this morning. Bloodwork repeated, alk phos uptrending, LFTs still mildly elevated, bilirubin is normal. Pt has been eating and drinking, denies RUQ pain. No tenderness to palpation on exam, abdomen is soft and depressible. Abdominal ultrasound scheduled for tomorrow morning at 9am.     Pt is observed sitting in the dining area this morning. Refused her morning meds (her 3 blood pressure medications and Zoloft). This is the 1st time pt refuses medications. Pt seemed suspicious, avoiding eye contact, would not say why she did not want to take her medications. Called patient's son (Dr. Silva) and had him talk to pt to try to encourage her to take her medications. Pt told her son she would take them but after he hung up, she refused. Will try again during visiting if other son comes. Resumed Zyprexa for paranoia. Family still wants to hold off on ECT and wait until Monday. No rigidity on exam, pt is moving, looking around, no posturing or catalepsy on exam. No echolalia/exhopraxia, mannerism or verbigeration. Will continue monitoring.

## 2025-06-05 NOTE — BH INPATIENT PSYCHIATRY PROGRESS NOTE - CURRENT MEDICATION
MEDICATIONS  (STANDING):  artificial tears (preservative free) Ophthalmic Solution 1 Drop(s) Both EYES every 8 hours  atorvastatin 20 milliGRAM(s) Oral at bedtime  dextrose Oral Gel 15 Gram(s) Oral once  fluticasone propionate/ salmeterol 250-50 MICROgram(s) Diskus 1 Dose(s) Inhalation two times a day  glucagon  Injectable 1 milliGRAM(s) IntraMuscular once  hydrALAZINE 25 milliGRAM(s) Oral three times a day  hydrochlorothiazide 25 milliGRAM(s) Oral daily  insulin lispro (ADMELOG) corrective regimen sliding scale   SubCutaneous at bedtime  insulin lispro (ADMELOG) corrective regimen sliding scale   SubCutaneous three times a day before meals  levalbuterol 45 MICROgram(s) HFA Inhaler 1 Puff(s) Inhalation every 8 hours  lisinopril 40 milliGRAM(s) Oral daily  melatonin. 9 milliGRAM(s) Oral at bedtime  memantine 10 milliGRAM(s) Oral at bedtime  memantine 10 milliGRAM(s) Oral <User Schedule>  OLANZapine 2.5 milliGRAM(s) Oral at bedtime  petrolatum Ophthalmic Ointment 1 Application(s) Both EYES two times a day  senna 2 Tablet(s) Oral at bedtime  sertraline 125 milliGRAM(s) Oral daily  tiotropium 2.5 MICROgram(s) Inhaler 2 Puff(s) Inhalation daily    MEDICATIONS  (PRN):  acetaminophen     Tablet .. 650 milliGRAM(s) Oral every 6 hours PRN Temp greater or equal to 38C (100.4F), Mild Pain (1 - 3), Moderate Pain (4 - 6)  lidocaine   4% Patch 1 Patch Transdermal daily PRN chronic back pain  loperamide 2 milliGRAM(s) Oral once PRN Diarrhea  OLANZapine 1.25 milliGRAM(s) Oral every 6 hours PRN agitation

## 2025-06-05 NOTE — BH INPATIENT PSYCHIATRY PROGRESS NOTE - NSBHASSESSSUMMFT_PSY_ALL_CORE
74F hx asthma, depression, and HTN was admitted to the MICU with AHRF 2/2 asthma exacerbation resulting in acute on chronic HHRF causing AMS and increased WOB that required intubation. During her ICU course, she experienced seizure-like activity .  Additional issues included hyponatremia, acute on chronic metabolic alkalosis, and mild thrombocytopenia,  also having fevers.  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 Patient has h/o hx of major depressive disorder with psychosis, hx of 3 past inpatient psychiatric hospitalizations last in 2016 at [facility], all rehospitalizations were in context non compliance with the treatment. She has no hx of suicide attempts, no hx of substance abuse.    Pt currently requires inpatient level of care for ongoing treatment for catatonia.     5/22 - calm, cooperative, not oriented, though remembers name of son. No s/s catatonia. Denies mood symptoms. Eating and sleeping on unit.  5/23 - No signs of catatonia. Eating and ambulating on unit. For ECT this morning. Not oriented to time, place, or situation. Has been NPO. Spoke to , who believes pt is returning to baseline, but is concerned about pt's memory issues.  5/27 - Withdrawn, non-spontaneous speech, not oriented to time, place, or situation, though interactive and recalls her 's name. Hypoglycemic episode yesterday. D/w hospitalist, discontinued standing pre-meal Admelog of 2U TID, will keep on bedtime Lantus and sliding scale.   5/28: More engaged today, establishes good eye contact. Received ECT today, will monitor, if catatonia symptoms remain in control will continue with weekly ECT next week, if pt appears to be relapsing may order ECT for Friday.   5/29: Pt presenting with increased BM frequency, regular consistency. Will order imodium PRN for diarrhea.   5/30: Will observe pt over the weekend and next week for confusion. Family has brought up concern ECT may be worsening confusion and it is a possibility. Will observe off Zyprexa and space out ECT, may treat next Friday.   5/31: Remains confused but calm. Unclear if due to ECT or residual catatonia.  6/2: Will continue holding Zyprexa and ECT this week. Presentation is suggestive of protracted delirium.   6/3: Pt remains confused and disoriented. Continue holding Zyprexa and ECT. Ordered urinalysis and routine bloodwork.   6/4: Continue monitoring Alk phos and LFTs.   6/5: Alk phos uptrending, bilirubin is WNL, LFTs mildly elevated. Abdominal ultrasound scheduled for tomorrow, 6/6, at 9:00am. Family requesting ECT be held until Monday as they were concerned about confusion. Will resume Zyprexa 2.5mg QHS.     PLAN:  1. Admit to [facility] 2S on 9.27  2. Routine checks, as pt denying SI/HI/SIB  3. Psychiatric:  - Continue Namenda 10mg BID  - Continue Sertraline 125mg QD, titrate up to lowest effective dose  - Resume Olanzapine 2.5mg QHS due to paranoia  - Would avoid benzodiazepines since pt was recently unable to tolerate them at [facility], becoming extremely sedated  - Continue ECT once weekly, last treatment occurred Friday 5/30.     4. Medical:   - Abdominal ultrasound scheduled for 6/6/25 at 9am.   - PRN levalbuterol inhaler  - Continue Advair and Spiriva for Asthma and COPD  - Continue Lisinopril 40mg QD and HCTZ 25mg QD for HTN  - Diabetes type 2: Lantus 3U at bedtime + ISS  - Lidocaine patches PRN daily + Tylenol PRN for lower back pain  - Bowel regimen: Senna 2 tabs QHS  - Diet: Easy to chew, consistent carbohydrates. No Beef No Pork  - Pt is at risk for falls given deconditioning. Seen by PT, recommended PRN walker with 1 staff assists. Pt is in a room with peer who has CO for added supervision and redirection during nighttime. During the daytime she should be either in the day room or the dining area with staff monitoring her at all times to provide redirection if pt tries to get up or ambulate unassisted.     5. Collateral: spoke with pt's  Liam on phone to update on progress 5/22 and 5/23.  6. Disposition: pending clinical course

## 2025-06-05 NOTE — PROGRESS NOTE ADULT - SUBJECTIVE AND OBJECTIVE BOX
CC/Reason for Consult: elevated alk phos    SUBJECTIVE / OVERNIGHT EVENTS: Patient denies abdominal pain, dyspepsia, has good appetite    MEDICATIONS  (STANDING):  artificial tears (preservative free) Ophthalmic Solution 1 Drop(s) Both EYES every 8 hours  atorvastatin 20 milliGRAM(s) Oral at bedtime  dextrose Oral Gel 15 Gram(s) Oral once  fluticasone propionate/ salmeterol 250-50 MICROgram(s) Diskus 1 Dose(s) Inhalation two times a day  glucagon  Injectable 1 milliGRAM(s) IntraMuscular once  hydrALAZINE 50 milliGRAM(s) Oral three times a day  hydrochlorothiazide 25 milliGRAM(s) Oral daily  insulin lispro (ADMELOG) corrective regimen sliding scale   SubCutaneous at bedtime  insulin lispro (ADMELOG) corrective regimen sliding scale   SubCutaneous three times a day before meals  levalbuterol 45 MICROgram(s) HFA Inhaler 1 Puff(s) Inhalation every 8 hours  lisinopril 40 milliGRAM(s) Oral daily  melatonin. 9 milliGRAM(s) Oral at bedtime  memantine 10 milliGRAM(s) Oral at bedtime  memantine 10 milliGRAM(s) Oral <User Schedule>  OLANZapine 2.5 milliGRAM(s) Oral at bedtime  petrolatum Ophthalmic Ointment 1 Application(s) Both EYES two times a day  senna 2 Tablet(s) Oral at bedtime  sertraline 125 milliGRAM(s) Oral daily  tiotropium 2.5 MICROgram(s) Inhaler 2 Puff(s) Inhalation daily    MEDICATIONS  (PRN):  acetaminophen     Tablet .. 650 milliGRAM(s) Oral every 6 hours PRN Temp greater or equal to 38C (100.4F), Mild Pain (1 - 3), Moderate Pain (4 - 6)  lidocaine   4% Patch 1 Patch Transdermal daily PRN chronic back pain  loperamide 2 milliGRAM(s) Oral once PRN Diarrhea  OLANZapine 1.25 milliGRAM(s) Oral every 6 hours PRN agitation      Vital Signs Last 24 Hrs  T(C): 36.9 (05 Jun 2025 07:51), Max: 36.9 (05 Jun 2025 07:51)  T(F): 98.4 (05 Jun 2025 07:51), Max: 98.4 (05 Jun 2025 07:51)  HR: 80 (05 Jun 2025 12:36) (80 - 80)  BP: 160/90 (05 Jun 2025 12:36) (160/90 - 160/90)  BP(mean): --  RR: 16 (05 Jun 2025 12:36) (16 - 16)  SpO2: --      CAPILLARY BLOOD GLUCOSE      POCT Blood Glucose.: 161 mg/dL (05 Jun 2025 15:51)  POCT Blood Glucose.: 133 mg/dL (05 Jun 2025 11:08)  POCT Blood Glucose.: 111 mg/dL (05 Jun 2025 07:40)  POCT Blood Glucose.: 162 mg/dL (04 Jun 2025 20:09)        PHYSICAL EXAM:  GENERAL: NAD  HEAD:  Atraumatic, Normocephalic  EYES: EOMI, conjunctiva and sclera clear  NECK: Supple, No JVD  CHEST/LUNG: Clear to auscultation bilaterally; No wheeze  HEART: Regular rate and rhythm; No murmurs, rubs, or gallops  ABDOMEN: Soft, Nontender, Nondistended; Bowel sounds present  EXTREMITIES:  2+ Peripheral Pulses, No clubbing, cyanosis, or edema  NEUROLOGY: non-focal  SKIN: No rashes or lesions    LABS:                        12.9   8.88  )-----------( 156      ( 04 Jun 2025 10:00 )             40.2     06-05    143  |  100  |  11  ----------------------------<  132[H]  3.6   |  32[H]  |  0.62    Ca    10.1      05 Jun 2025 10:48  Phos  3.0     06-05  Mg     1.90     06-05    TPro  7.7  /  Alb  4.3  /  TBili  0.8  /  DBili  x   /  AST  38[H]  /  ALT  50[H]  /  AlkPhos  543[H]  06-05    PT/INR - ( 05 Jun 2025 10:48 )   PT: 11.5 sec;   INR: 0.99 ratio       Gamma Glutamyl Transferase, Serum (06.05.25 @ 10:48)    Gamma Glutamyl Transferase, Serum: 593 U/L            Urinalysis Basic - ( 05 Jun 2025 10:48 )    Color: x / Appearance: x / SG: x / pH: x  Gluc: 132 mg/dL / Ketone: x  / Bili: x / Urobili: x   Blood: x / Protein: x / Nitrite: x   Leuk Esterase: x / RBC: x / WBC x   Sq Epi: x / Non Sq Epi: x / Bacteria: x          Care Discussed with Consultants/Other Providers: Dr Moeller

## 2025-06-05 NOTE — BH INPATIENT PSYCHIATRY PROGRESS NOTE - NSBHCHARTREVIEWVS_PSY_A_CORE FT
Vital Signs Last 24 Hrs  T(C): 36.9 (06-05-25 @ 07:51), Max: 36.9 (06-05-25 @ 07:51)  T(F): 98.4 (06-05-25 @ 07:51), Max: 98.4 (06-05-25 @ 07:51)  HR: 80 (06-05-25 @ 12:36) (80 - 80)  BP: 160/90 (06-05-25 @ 12:36) (160/90 - 160/90)  BP(mean): --  RR: 16 (06-05-25 @ 12:36) (16 - 16)  SpO2: --    Orthostatic VS  06-05-25 @ 07:51  Lying BP: 170/88 HR: 83  Sitting BP: 161/87 HR: 87  Standing BP: --/-- HR: --  Site: --  Mode: --

## 2025-06-06 LAB
GLUCOSE BLDC GLUCOMTR-MCNC: 122 MG/DL — HIGH (ref 70–99)
GLUCOSE BLDC GLUCOMTR-MCNC: 136 MG/DL — HIGH (ref 70–99)
GLUCOSE BLDC GLUCOMTR-MCNC: 150 MG/DL — HIGH (ref 70–99)
GLUCOSE BLDC GLUCOMTR-MCNC: 152 MG/DL — HIGH (ref 70–99)

## 2025-06-06 PROCEDURE — 76700 US EXAM ABDOM COMPLETE: CPT | Mod: 26

## 2025-06-06 PROCEDURE — 99232 SBSQ HOSP IP/OBS MODERATE 35: CPT

## 2025-06-06 RX ORDER — PALIPERIDONE 9 MG/1
156 TABLET, EXTENDED RELEASE ORAL
Refills: 0
Start: 2025-06-06

## 2025-06-06 RX ADMIN — LISINOPRIL 40 MILLIGRAM(S): 5 TABLET ORAL at 08:01

## 2025-06-06 RX ADMIN — ATORVASTATIN CALCIUM 20 MILLIGRAM(S): 80 TABLET, FILM COATED ORAL at 20:53

## 2025-06-06 RX ADMIN — Medication 50 MILLIGRAM(S): at 12:21

## 2025-06-06 RX ADMIN — Medication 2 TABLET(S): at 20:53

## 2025-06-06 RX ADMIN — TIOTROPIUM BROMIDE INHALATION SPRAY 2 PUFF(S): 3.12 SPRAY, METERED RESPIRATORY (INHALATION) at 08:02

## 2025-06-06 RX ADMIN — Medication 50 MILLIGRAM(S): at 08:01

## 2025-06-06 RX ADMIN — MEMANTINE HYDROCHLORIDE 10 MILLIGRAM(S): 21 CAPSULE, EXTENDED RELEASE ORAL at 20:53

## 2025-06-06 RX ADMIN — LEVALBUTEROL HYDROCHLORIDE 1 PUFF(S): 1.25 SOLUTION RESPIRATORY (INHALATION) at 06:45

## 2025-06-06 RX ADMIN — MEMANTINE HYDROCHLORIDE 10 MILLIGRAM(S): 21 CAPSULE, EXTENDED RELEASE ORAL at 06:44

## 2025-06-06 RX ADMIN — Medication 1 DROP(S): at 21:58

## 2025-06-06 RX ADMIN — Medication 1 DOSE(S): at 08:01

## 2025-06-06 RX ADMIN — Medication 1 DOSE(S): at 20:55

## 2025-06-06 RX ADMIN — LEVALBUTEROL HYDROCHLORIDE 1 PUFF(S): 1.25 SOLUTION RESPIRATORY (INHALATION) at 14:04

## 2025-06-06 RX ADMIN — Medication 9 MILLIGRAM(S): at 20:53

## 2025-06-06 RX ADMIN — SERTRALINE 125 MILLIGRAM(S): 100 TABLET, FILM COATED ORAL at 08:01

## 2025-06-06 RX ADMIN — Medication 50 MILLIGRAM(S): at 20:53

## 2025-06-06 RX ADMIN — INSULIN LISPRO 0: 100 INJECTION, SOLUTION INTRAVENOUS; SUBCUTANEOUS at 20:55

## 2025-06-06 RX ADMIN — OLANZAPINE 2.5 MILLIGRAM(S): 10 TABLET ORAL at 20:53

## 2025-06-06 RX ADMIN — LEVALBUTEROL HYDROCHLORIDE 1 PUFF(S): 1.25 SOLUTION RESPIRATORY (INHALATION) at 21:59

## 2025-06-06 RX ADMIN — Medication 1 DROP(S): at 06:44

## 2025-06-06 NOTE — BH INPATIENT PSYCHIATRY PROGRESS NOTE - CURRENT MEDICATION
MEDICATIONS  (STANDING):  artificial tears (preservative free) Ophthalmic Solution 1 Drop(s) Both EYES every 8 hours  atorvastatin 20 milliGRAM(s) Oral at bedtime  dextrose Oral Gel 15 Gram(s) Oral once  fluticasone propionate/ salmeterol 250-50 MICROgram(s) Diskus 1 Dose(s) Inhalation two times a day  glucagon  Injectable 1 milliGRAM(s) IntraMuscular once  hydrALAZINE 50 milliGRAM(s) Oral three times a day  hydrochlorothiazide 25 milliGRAM(s) Oral daily  insulin lispro (ADMELOG) corrective regimen sliding scale   SubCutaneous at bedtime  insulin lispro (ADMELOG) corrective regimen sliding scale   SubCutaneous three times a day before meals  levalbuterol 45 MICROgram(s) HFA Inhaler 1 Puff(s) Inhalation every 8 hours  lisinopril 40 milliGRAM(s) Oral daily  melatonin. 9 milliGRAM(s) Oral at bedtime  memantine 10 milliGRAM(s) Oral at bedtime  memantine 10 milliGRAM(s) Oral <User Schedule>  OLANZapine 2.5 milliGRAM(s) Oral at bedtime  petrolatum Ophthalmic Ointment 1 Application(s) Both EYES two times a day  senna 2 Tablet(s) Oral at bedtime  sertraline 125 milliGRAM(s) Oral daily  tiotropium 2.5 MICROgram(s) Inhaler 2 Puff(s) Inhalation daily    MEDICATIONS  (PRN):  acetaminophen     Tablet .. 650 milliGRAM(s) Oral every 6 hours PRN Temp greater or equal to 38C (100.4F), Mild Pain (1 - 3), Moderate Pain (4 - 6)  lidocaine   4% Patch 1 Patch Transdermal daily PRN chronic back pain  loperamide 2 milliGRAM(s) Oral once PRN Diarrhea  OLANZapine 1.25 milliGRAM(s) Oral every 6 hours PRN agitation

## 2025-06-06 NOTE — PROGRESS NOTE ADULT - SUBJECTIVE AND OBJECTIVE BOX
CC/Reason for Consult: elevated alk phos, HTN    SUBJECTIVE / OVERNIGHT EVENTS:  denies abdominal pain, dyspepsia, nausea, vomiting, f/c    MEDICATIONS  (STANDING):  artificial tears (preservative free) Ophthalmic Solution 1 Drop(s) Both EYES every 8 hours  atorvastatin 20 milliGRAM(s) Oral at bedtime  dextrose Oral Gel 15 Gram(s) Oral once  fluticasone propionate/ salmeterol 250-50 MICROgram(s) Diskus 1 Dose(s) Inhalation two times a day  glucagon  Injectable 1 milliGRAM(s) IntraMuscular once  hydrALAZINE 50 milliGRAM(s) Oral three times a day  hydrochlorothiazide 25 milliGRAM(s) Oral daily  insulin lispro (ADMELOG) corrective regimen sliding scale   SubCutaneous at bedtime  insulin lispro (ADMELOG) corrective regimen sliding scale   SubCutaneous three times a day before meals  levalbuterol 45 MICROgram(s) HFA Inhaler 1 Puff(s) Inhalation every 8 hours  lisinopril 40 milliGRAM(s) Oral daily  melatonin. 9 milliGRAM(s) Oral at bedtime  memantine 10 milliGRAM(s) Oral at bedtime  memantine 10 milliGRAM(s) Oral <User Schedule>  OLANZapine 2.5 milliGRAM(s) Oral at bedtime  petrolatum Ophthalmic Ointment 1 Application(s) Both EYES two times a day  senna 2 Tablet(s) Oral at bedtime  sertraline 125 milliGRAM(s) Oral daily  tiotropium 2.5 MICROgram(s) Inhaler 2 Puff(s) Inhalation daily    MEDICATIONS  (PRN):  acetaminophen     Tablet .. 650 milliGRAM(s) Oral every 6 hours PRN Temp greater or equal to 38C (100.4F), Mild Pain (1 - 3), Moderate Pain (4 - 6)  lidocaine   4% Patch 1 Patch Transdermal daily PRN chronic back pain  loperamide 2 milliGRAM(s) Oral once PRN Diarrhea  OLANZapine 1.25 milliGRAM(s) Oral every 6 hours PRN agitation      Vital Signs Last 24 Hrs  T(C): 36.8 (06 Jun 2025 07:45), Max: 36.8 (06 Jun 2025 07:45)  T(F): 98.2 (06 Jun 2025 07:45), Max: 98.2 (06 Jun 2025 07:45)  HR: 90 (06 Jun 2025 12:10) (86 - 90)  BP: 129/95 (06 Jun 2025 12:10) (129/95 - 170/89)  BP(mean): --  RR: 15  SpO2: 94% (06 Jun 2025 07:45) (94% - 94%)      CAPILLARY BLOOD GLUCOSE      POCT Blood Glucose.: 152 mg/dL (06 Jun 2025 11:33)  POCT Blood Glucose.: 122 mg/dL (06 Jun 2025 07:42)  POCT Blood Glucose.: 150 mg/dL (05 Jun 2025 20:23)  POCT Blood Glucose.: 161 mg/dL (05 Jun 2025 15:51)        PHYSICAL EXAM:  GENERAL: NAD  HEAD:  Atraumatic, Normocephalic  EYES: EOMI, conjunctiva and sclera clear  NECK: Supple, No JVD  CHEST/LUNG: Clear to auscultation bilaterally; No wheeze  HEART: Regular rate and rhythm; No murmurs, rubs, or gallops  ABDOMEN: Soft, Nontender, Nondistended; Bowel sounds present  EXTREMITIES:  2+ Peripheral Pulses, No clubbing, cyanosis, or edema  NEUROLOGY: non-focal  SKIN: No rashes or lesions    LABS:    06-05    143  |  100  |  11  ----------------------------<  132[H]  3.6   |  32[H]  |  0.62    Ca    10.1      05 Jun 2025 10:48  Phos  3.0     06-05  Mg     1.90     06-05    TPro  7.7  /  Alb  4.3  /  TBili  0.8  /  DBili  x   /  AST  38[H]  /  ALT  50[H]  /  AlkPhos  543[H]  06-05    PT/INR - ( 05 Jun 2025 10:48 )   PT: 11.5 sec;   INR: 0.99 ratio               Urinalysis Basic - ( 05 Jun 2025 10:48 )    Color: x / Appearance: x / SG: x / pH: x  Gluc: 132 mg/dL / Ketone: x  / Bili: x / Urobili: x   Blood: x / Protein: x / Nitrite: x   Leuk Esterase: x / RBC: x / WBC x   Sq Epi: x / Non Sq Epi: x / Bacteria: x        RADIOLOGY & ADDITIONAL TESTS:  < from: US Abdomen Complete (US Abdomen Complete .) (06.06.25 @ 09:55) >    ACC: [id number] EXAM:  US ABDOMEN COMPLETE   ORDERED BY: AN JONES     PROCEDURE DATE:  06/06/2025          INTERPRETATION:  CLINICAL INFORMATION: 74-year-old female with abnormal   alkaline phosphatase    COMPARISON: Chest CT 3/31/2025    TECHNIQUE: Sonography of the abdomen.    FINDINGS:  Liver: Within normal limits. No focal abnormality is identified. The main   portal vein is patent with hepatopedal flow.  Bile ducts: Normal caliber. Common bile duct measures 3 mm.  Gallbladder: Moderately distended. There are multiple mobile echogenic   nonshadowing foci within the gallbladder lumen compatible with small   stones and/or aggregates of sludge. There is no wall thickening or   pericholecystic fluid. As per sonographer report, a sonographic Tomlin   sign was not elicited.  Pancreas: Partially obscured by overlying bowel. The visualized portions   are grossly unremarkable. The distal pancreatic body and tail are not   visualized.  Spleen: 5.5 cm. Within normal limits.  Right kidney: 9.1cm. No hydronephrosis.  Left kidney: 9.5 cm. No hydronephrosis.  Ascites: None.  Aorta and IVC: Visualized portions are within normal limits.    IMPRESSION:    1. Multiple mobile echogenic nonshadowing foci within the gallbladder,   compatible with small stones and/or aggregates of sludge. There is no   evidence for cholecystitis. No biliary dilatation.        --- End of Report ---            OLGA WHEELER MD; Attending Radiologist  This document has been electronically signed. Jun 6 2025 10:25AM    < end of copied text >        Care Discussed with Consultants/Other Providers:  Dr Moeller

## 2025-06-06 NOTE — BH INPATIENT PSYCHIATRY PROGRESS NOTE - NSBHFUPINTERVALHXFT_PSY_A_CORE
Patient seen for follow up of catatonia. No overnight events reported by staff, no PRNs needed. Fingerstick 122this morning. BP lower than yesterday, no orthostatic hypotension. Pt took her morning medications with no difficulties.    Pt was observed eating breakfast during morning rounds. She was noticed to be eating slowly but finished her food. As per staff this is how patient has been eating since arrival. Abdominal ultrasound completed today, revealed gallstones but no evidence for cholecystitis or biliary dilatation. Ms. Silva denies pain, there is no tenderness to palpation on physical exam and sonographic Tomlin sign was not elicited. Will repeat bloodwork on Monday and continue monitoring. Discussed with medicine.

## 2025-06-06 NOTE — BH INPATIENT PSYCHIATRY PROGRESS NOTE - NSBHMETABOLIC_PSY_ALL_CORE_FT
BMI: BMI (kg/m2): 28.3 (05-21-25 @ 00:57)  HbA1c: A1C with Estimated Average Glucose Result: 5.9 % (05-22-25 @ 08:09)    Glucose: POCT Blood Glucose.: 122 mg/dL (06-06-25 @ 07:42)    BP: 170/89 (06-05-25 @ 20:41) (160/90 - 170/89)Vital Signs Last 24 Hrs  T(C): 36.8 (06-06-25 @ 07:45), Max: 36.8 (06-06-25 @ 07:45)  T(F): 98.2 (06-06-25 @ 07:45), Max: 98.2 (06-06-25 @ 07:45)  HR: 86 (06-05-25 @ 20:41) (80 - 86)  BP: 170/89 (06-05-25 @ 20:41) (160/90 - 170/89)  BP(mean): --  RR: 16 (06-05-25 @ 12:36) (16 - 16)  SpO2: 94% (06-06-25 @ 07:45) (94% - 94%)    Orthostatic VS  06-06-25 @ 07:45  Lying BP: --/-- HR: --  Sitting BP: 158/74 HR: 75  Standing BP: 152/92 HR: 76  Site: --  Mode: --  Orthostatic VS  06-05-25 @ 07:51  Lying BP: 170/88 HR: 83  Sitting BP: 161/87 HR: 87  Standing BP: --/-- HR: --  Site: --  Mode: --    Lipid Panel: Date/Time: 05-22-25 @ 08:09  Cholesterol, Serum: 310  LDL Cholesterol Calculated: 234  HDL Cholesterol, Serum: 67  Total Cholesterol/HDL Ration Measurement: --  Triglycerides, Serum: 64

## 2025-06-06 NOTE — BH INPATIENT PSYCHIATRY PROGRESS NOTE - NSBHASSESSSUMMFT_PSY_ALL_CORE
74F hx asthma, depression, and HTN was admitted to the MICU with AHRF 2/2 asthma exacerbation resulting in acute on chronic HHRF causing AMS and increased WOB that required intubation. During her ICU course, she experienced seizure-like activity .  Additional issues included hyponatremia, acute on chronic metabolic alkalosis, and mild thrombocytopenia,  also having fevers.  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 Patient has h/o hx of major depressive disorder with psychosis, hx of 3 past inpatient psychiatric hospitalizations last in 2016 at [facility], all rehospitalizations were in context non compliance with the treatment. She has no hx of suicide attempts, no hx of substance abuse.    Pt currently requires inpatient level of care for ongoing treatment for catatonia.     5/22 - calm, cooperative, not oriented, though remembers name of son. No s/s catatonia. Denies mood symptoms. Eating and sleeping on unit.  5/23 - No signs of catatonia. Eating and ambulating on unit. For ECT this morning. Not oriented to time, place, or situation. Has been NPO. Spoke to , who believes pt is returning to baseline, but is concerned about pt's memory issues.  5/27 - Withdrawn, non-spontaneous speech, not oriented to time, place, or situation, though interactive and recalls her 's name. Hypoglycemic episode yesterday. D/w hospitalist, discontinued standing pre-meal Admelog of 2U TID, will keep on bedtime Lantus and sliding scale.   5/28: More engaged today, establishes good eye contact. Received ECT today, will monitor, if catatonia symptoms remain in control will continue with weekly ECT next week, if pt appears to be relapsing may order ECT for Friday.   5/29: Pt presenting with increased BM frequency, regular consistency. Will order imodium PRN for diarrhea.   5/30: Will observe pt over the weekend and next week for confusion. Family has brought up concern ECT may be worsening confusion and it is a possibility. Will observe off Zyprexa and space out ECT, may treat next Friday.   5/31: Remains confused but calm. Unclear if due to ECT or residual catatonia.  6/2: Will continue holding Zyprexa and ECT this week. Presentation is suggestive of protracted delirium.   6/3: Pt remains confused and disoriented. Continue holding Zyprexa and ECT. Ordered urinalysis and routine bloodwork.   6/4: Continue monitoring Alk phos and LFTs.   6/5: Alk phos uptrending, bilirubin is WNL, LFTs mildly elevated. Abdominal ultrasound scheduled for tomorrow, 6/6, at 9:00am. Family requesting ECT be held until Monday as they were concerned about confusion. Will resume Zyprexa 2.5mg QHS.   6/6: Abdominal ultrasound completed today, revealed gallstones but no evidence for cholecystitis or biliary dilatation. This likely explains elevated Alk phos and GGT. Ms. iSlva denies pain, there is no tenderness to palpation on physical exam and sonographic Tomlin sign was not elicited. Will repeat bloodwork on Monday and continue monitoring. Discussed with medicine. ECT ordered for Monday 6/9.     PLAN:  1. Admit to [facility] on 9.27  2. Routine checks, as pt denying SI/HI/SIB  3. Psychiatric:  - Continue Namenda 10mg BID  - Continue Sertraline 125mg QD, titrate up to lowest effective dose  - Resume Olanzapine 2.5mg QHS due to paranoia  - Would avoid benzodiazepines since pt was recently unable to tolerate them at [facility], becoming extremely sedated  - Continue ECT once weekly, last treatment occurred Friday 5/30.     4. Medical:   - Abdominal ultrasound scheduled for 6/6/25 at 9am.   - PRN levalbuterol inhaler  - Continue Advair and Spiriva for Asthma and COPD  - Continue Lisinopril 40mg QD and HCTZ 25mg QD for HTN  - Diabetes type 2: Lantus 3U at bedtime + ISS  - Lidocaine patches PRN daily + Tylenol PRN for lower back pain  - Bowel regimen: Senna 2 tabs QHS  - Diet: Easy to chew, consistent carbohydrates. No Beef No Pork  - Pt is at risk for falls given deconditioning. Seen by PT, recommended PRN walker with 1 staff assists. Pt is in a room with peer who has CO for added supervision and redirection during nighttime. During the daytime she should be either in the day room or the dining area with staff monitoring her at all times to provide redirection if pt tries to get up or ambulate unassisted.     5. Collateral: spoke with pt's  Laim on phone to update on progress 5/22 and 5/23.  6. Disposition: pending clinical course

## 2025-06-06 NOTE — BH INPATIENT PSYCHIATRY PROGRESS NOTE - NSBHCHARTREVIEWVS_PSY_A_CORE FT
Vital Signs Last 24 Hrs  T(C): 36.8 (06-06-25 @ 07:45), Max: 36.8 (06-06-25 @ 07:45)  T(F): 98.2 (06-06-25 @ 07:45), Max: 98.2 (06-06-25 @ 07:45)  HR: 86 (06-05-25 @ 20:41) (80 - 86)  BP: 170/89 (06-05-25 @ 20:41) (160/90 - 170/89)  BP(mean): --  RR: 16 (06-05-25 @ 12:36) (16 - 16)  SpO2: 94% (06-06-25 @ 07:45) (94% - 94%)    Orthostatic VS  06-06-25 @ 07:45  Lying BP: --/-- HR: --  Sitting BP: 158/74 HR: 75  Standing BP: 152/92 HR: 76  Site: --  Mode: --  Orthostatic VS  06-05-25 @ 07:51  Lying BP: 170/88 HR: 83  Sitting BP: 161/87 HR: 87  Standing BP: --/-- HR: --  Site: --  Mode: --

## 2025-06-07 LAB
GLUCOSE BLDC GLUCOMTR-MCNC: 120 MG/DL — HIGH (ref 70–99)
GLUCOSE BLDC GLUCOMTR-MCNC: 133 MG/DL — HIGH (ref 70–99)
GLUCOSE BLDC GLUCOMTR-MCNC: 155 MG/DL — HIGH (ref 70–99)
GLUCOSE BLDC GLUCOMTR-MCNC: 99 MG/DL — SIGNIFICANT CHANGE UP (ref 70–99)

## 2025-06-07 RX ADMIN — LEVALBUTEROL HYDROCHLORIDE 1 PUFF(S): 1.25 SOLUTION RESPIRATORY (INHALATION) at 06:32

## 2025-06-07 RX ADMIN — Medication 9 MILLIGRAM(S): at 21:27

## 2025-06-07 RX ADMIN — Medication 50 MILLIGRAM(S): at 21:27

## 2025-06-07 RX ADMIN — MEMANTINE HYDROCHLORIDE 10 MILLIGRAM(S): 21 CAPSULE, EXTENDED RELEASE ORAL at 06:32

## 2025-06-07 RX ADMIN — LISINOPRIL 40 MILLIGRAM(S): 5 TABLET ORAL at 09:10

## 2025-06-07 RX ADMIN — Medication 1 DOSE(S): at 09:08

## 2025-06-07 RX ADMIN — SERTRALINE 125 MILLIGRAM(S): 100 TABLET, FILM COATED ORAL at 09:10

## 2025-06-07 RX ADMIN — Medication 50 MILLIGRAM(S): at 09:10

## 2025-06-07 RX ADMIN — Medication 2 TABLET(S): at 21:27

## 2025-06-07 RX ADMIN — LEVALBUTEROL HYDROCHLORIDE 1 PUFF(S): 1.25 SOLUTION RESPIRATORY (INHALATION) at 21:28

## 2025-06-07 RX ADMIN — INSULIN LISPRO 1: 100 INJECTION, SOLUTION INTRAVENOUS; SUBCUTANEOUS at 16:17

## 2025-06-07 RX ADMIN — Medication 1 DROP(S): at 06:33

## 2025-06-07 RX ADMIN — TIOTROPIUM BROMIDE INHALATION SPRAY 2 PUFF(S): 3.12 SPRAY, METERED RESPIRATORY (INHALATION) at 09:08

## 2025-06-07 RX ADMIN — OLANZAPINE 2.5 MILLIGRAM(S): 10 TABLET ORAL at 21:27

## 2025-06-07 RX ADMIN — Medication 1 DROP(S): at 21:31

## 2025-06-07 RX ADMIN — Medication 1 DROP(S): at 13:27

## 2025-06-07 RX ADMIN — MEMANTINE HYDROCHLORIDE 10 MILLIGRAM(S): 21 CAPSULE, EXTENDED RELEASE ORAL at 21:27

## 2025-06-07 RX ADMIN — LEVALBUTEROL HYDROCHLORIDE 1 PUFF(S): 1.25 SOLUTION RESPIRATORY (INHALATION) at 13:28

## 2025-06-07 RX ADMIN — Medication 1 DOSE(S): at 21:28

## 2025-06-07 RX ADMIN — ATORVASTATIN CALCIUM 20 MILLIGRAM(S): 80 TABLET, FILM COATED ORAL at 21:27

## 2025-06-07 RX ADMIN — Medication 50 MILLIGRAM(S): at 13:28

## 2025-06-07 NOTE — BH INPATIENT PSYCHIATRY PROGRESS NOTE - NSBHASSESSSUMMFT_PSY_ALL_CORE
74F hx asthma, depression, and HTN was admitted to the MICU with AHRF 2/2 asthma exacerbation resulting in acute on chronic HHRF causing AMS and increased WOB that required intubation. During her ICU course, she experienced seizure-like activity .  Additional issues included hyponatremia, acute on chronic metabolic alkalosis, and mild thrombocytopenia,  also having fevers.  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 Patient has h/o hx of major depressive disorder with psychosis, hx of 3 past inpatient psychiatric hospitalizations last in 2016 at [facility], all rehospitalizations were in context non compliance with the treatment. She has no hx of suicide attempts, no hx of substance abuse.    Pt currently requires inpatient level of care for ongoing treatment for catatonia.     5/22 - calm, cooperative, not oriented, though remembers name of son. No s/s catatonia. Denies mood symptoms. Eating and sleeping on unit.  5/23 - No signs of catatonia. Eating and ambulating on unit. For ECT this morning. Not oriented to time, place, or situation. Has been NPO. Spoke to , who believes pt is returning to baseline, but is concerned about pt's memory issues.  5/27 - Withdrawn, non-spontaneous speech, not oriented to time, place, or situation, though interactive and recalls her 's name. Hypoglycemic episode yesterday. D/w hospitalist, discontinued standing pre-meal Admelog of 2U TID, will keep on bedtime Lantus and sliding scale.   5/28: More engaged today, establishes good eye contact. Received ECT today, will monitor, if catatonia symptoms remain in control will continue with weekly ECT next week, if pt appears to be relapsing may order ECT for Friday.   5/29: Pt presenting with increased BM frequency, regular consistency. Will order imodium PRN for diarrhea.   5/30: Will observe pt over the weekend and next week for confusion. Family has brought up concern ECT may be worsening confusion and it is a possibility. Will observe off Zyprexa and space out ECT, may treat next Friday.   5/31: Remains confused but calm. Unclear if due to ECT or residual catatonia.  6/2: Will continue holding Zyprexa and ECT this week. Presentation is suggestive of protracted delirium.   6/3: Pt remains confused and disoriented. Continue holding Zyprexa and ECT. Ordered urinalysis and routine bloodwork.   6/4: Continue monitoring Alk phos and LFTs.   6/5: Alk phos uptrending, bilirubin is WNL, LFTs mildly elevated. Abdominal ultrasound scheduled for tomorrow, 6/6, at 9:00am. Family requesting ECT be held until Monday as they were concerned about confusion. Will resume Zyprexa 2.5mg QHS.   6/6: Abdominal ultrasound completed today, revealed gallstones but no evidence for cholecystitis or biliary dilatation. This likely explains elevated Alk phos and GGT. Ms. Silva denies pain, there is no tenderness to palpation on physical exam and sonographic Tomlin sign was not elicited. Will repeat bloodwork on Monday and continue monitoring. Discussed with medicine. ECT ordered for Monday 6/9.   6/7: Speech improved, calm, pleasant, plan for ECT 6/9    PLAN:  1. Admit to [facility] 2[facility] on 9.27  2. Routine checks, as pt denying SI/HI/SIB  3. Psychiatric:  - Continue Namenda 10mg BID  - Continue Sertraline 125mg QD, titrate up to lowest effective dose  - Resume Olanzapine 2.5mg QHS due to paranoia  - Would avoid benzodiazepines since pt was recently unable to tolerate them at [facility], becoming extremely sedated  - Continue ECT once weekly, last treatment occurred Friday 5/30.     4. Medical:   - Abdominal ultrasound scheduled for 6/6/25 at 9am.   - PRN levalbuterol inhaler  - Continue Advair and Spiriva for Asthma and COPD  - Continue Lisinopril 40mg QD and HCTZ 25mg QD for HTN  - Diabetes type 2: Lantus 3U at bedtime + ISS  - Lidocaine patches PRN daily + Tylenol PRN for lower back pain  - Bowel regimen: Senna 2 tabs QHS  - Diet: Easy to chew, consistent carbohydrates. No Beef No Pork  - Pt is at risk for falls given deconditioning. Seen by PT, recommended PRN walker with 1 staff assists. Pt is in a room with peer who has CO for added supervision and redirection during nighttime. During the daytime she should be either in the day room or the dining area with staff monitoring her at all times to provide redirection if pt tries to get up or ambulate unassisted.     5. Collateral: spoke with pt's  Liam on phone to update on progress 5/22 and 5/23.  6. Disposition: pending clinical course

## 2025-06-07 NOTE — BH INPATIENT PSYCHIATRY PROGRESS NOTE - MSE UNSTRUCTURED FT
Awake and alert. Calm, pleasant, good eye contact. Affect bright, superficial. Speech more productive. Thought process is logical, concrete, paucity. No delusions expressed. No suicidal ideations.

## 2025-06-07 NOTE — BH INPATIENT PSYCHIATRY PROGRESS NOTE - CURRENT MEDICATION
MEDICATIONS  (STANDING):  artificial tears (preservative free) Ophthalmic Solution 1 Drop(s) Both EYES every 8 hours  atorvastatin 20 milliGRAM(s) Oral at bedtime  dextrose Oral Gel 15 Gram(s) Oral once  fluticasone propionate/ salmeterol 250-50 MICROgram(s) Diskus 1 Dose(s) Inhalation two times a day  glucagon  Injectable 1 milliGRAM(s) IntraMuscular once  hydrALAZINE 50 milliGRAM(s) Oral three times a day  hydrochlorothiazide 25 milliGRAM(s) Oral daily  insulin lispro (ADMELOG) corrective regimen sliding scale   SubCutaneous at bedtime  insulin lispro (ADMELOG) corrective regimen sliding scale   SubCutaneous three times a day before meals  levalbuterol 45 MICROgram(s) HFA Inhaler 1 Puff(s) Inhalation every 8 hours  lisinopril 40 milliGRAM(s) Oral daily  melatonin. 9 milliGRAM(s) Oral at bedtime  memantine 10 milliGRAM(s) Oral <User Schedule>  memantine 10 milliGRAM(s) Oral at bedtime  OLANZapine 2.5 milliGRAM(s) Oral at bedtime  petrolatum Ophthalmic Ointment 1 Application(s) Both EYES two times a day  senna 2 Tablet(s) Oral at bedtime  sertraline 125 milliGRAM(s) Oral daily  tiotropium 2.5 MICROgram(s) Inhaler 2 Puff(s) Inhalation daily    MEDICATIONS  (PRN):  acetaminophen     Tablet .. 650 milliGRAM(s) Oral every 6 hours PRN Temp greater or equal to 38C (100.4F), Mild Pain (1 - 3), Moderate Pain (4 - 6)  lidocaine   4% Patch 1 Patch Transdermal daily PRN chronic back pain  loperamide 2 milliGRAM(s) Oral once PRN Diarrhea  OLANZapine 1.25 milliGRAM(s) Oral every 6 hours PRN agitation

## 2025-06-07 NOTE — BH INPATIENT PSYCHIATRY PROGRESS NOTE - NSBHMETABOLIC_PSY_ALL_CORE_FT
BMI: BMI (kg/m2): 28.3 (05-21-25 @ 00:57)  HbA1c: A1C with Estimated Average Glucose Result: 5.9 % (05-22-25 @ 08:09)    Glucose: POCT Blood Glucose.: 155 mg/dL (06-07-25 @ 15:51)    BP: 129/95 (06-06-25 @ 12:10) (129/95 - 170/89)Vital Signs Last 24 Hrs  T(C): 37.1 (06-07-25 @ 07:29), Max: 37.1 (06-07-25 @ 07:29)  T(F): 98.7 (06-07-25 @ 07:29), Max: 98.7 (06-07-25 @ 07:29)  HR: --  BP: --  BP(mean): --  RR: 19 (06-07-25 @ 07:29) (19 - 19)  SpO2: --    Orthostatic VS  06-07-25 @ 07:29  Lying BP: --/-- HR: --  Sitting BP: 163/78 HR: 92  Standing BP: 173/84 HR: 92  Site: --  Mode: --  Orthostatic VS  06-06-25 @ 07:45  Lying BP: --/-- HR: --  Sitting BP: 158/74 HR: 75  Standing BP: 152/92 HR: 76  Site: --  Mode: --    Lipid Panel: Date/Time: 05-22-25 @ 08:09  Cholesterol, Serum: 310  LDL Cholesterol Calculated: 234  HDL Cholesterol, Serum: 67  Total Cholesterol/HDL Ration Measurement: --  Triglycerides, Serum: 64

## 2025-06-07 NOTE — BH INPATIENT PSYCHIATRY PROGRESS NOTE - NSBHCHARTREVIEWVS_PSY_A_CORE FT
Vital Signs Last 24 Hrs  T(C): 37.1 (06-07-25 @ 07:29), Max: 37.1 (06-07-25 @ 07:29)  T(F): 98.7 (06-07-25 @ 07:29), Max: 98.7 (06-07-25 @ 07:29)  HR: --  BP: --  BP(mean): --  RR: 19 (06-07-25 @ 07:29) (19 - 19)  SpO2: --    Orthostatic VS  06-07-25 @ 07:29  Lying BP: --/-- HR: --  Sitting BP: 163/78 HR: 92  Standing BP: 173/84 HR: 92  Site: --  Mode: --  Orthostatic VS  06-06-25 @ 07:45  Lying BP: --/-- HR: --  Sitting BP: 158/74 HR: 75  Standing BP: 152/92 HR: 76  Site: --  Mode: --

## 2025-06-08 LAB
GLUCOSE BLDC GLUCOMTR-MCNC: 129 MG/DL — HIGH (ref 70–99)
GLUCOSE BLDC GLUCOMTR-MCNC: 134 MG/DL — HIGH (ref 70–99)
GLUCOSE BLDC GLUCOMTR-MCNC: 171 MG/DL — HIGH (ref 70–99)
GLUCOSE BLDC GLUCOMTR-MCNC: 291 MG/DL — HIGH (ref 70–99)

## 2025-06-08 RX ADMIN — LISINOPRIL 40 MILLIGRAM(S): 5 TABLET ORAL at 08:42

## 2025-06-08 RX ADMIN — Medication 1 DROP(S): at 06:10

## 2025-06-08 RX ADMIN — TIOTROPIUM BROMIDE INHALATION SPRAY 2 PUFF(S): 3.12 SPRAY, METERED RESPIRATORY (INHALATION) at 08:41

## 2025-06-08 RX ADMIN — ATORVASTATIN CALCIUM 20 MILLIGRAM(S): 80 TABLET, FILM COATED ORAL at 20:37

## 2025-06-08 RX ADMIN — LEVALBUTEROL HYDROCHLORIDE 1 PUFF(S): 1.25 SOLUTION RESPIRATORY (INHALATION) at 13:23

## 2025-06-08 RX ADMIN — MEMANTINE HYDROCHLORIDE 10 MILLIGRAM(S): 21 CAPSULE, EXTENDED RELEASE ORAL at 06:59

## 2025-06-08 RX ADMIN — Medication 50 MILLIGRAM(S): at 20:38

## 2025-06-08 RX ADMIN — Medication 50 MILLIGRAM(S): at 08:42

## 2025-06-08 RX ADMIN — SERTRALINE 125 MILLIGRAM(S): 100 TABLET, FILM COATED ORAL at 08:42

## 2025-06-08 RX ADMIN — Medication 9 MILLIGRAM(S): at 20:37

## 2025-06-08 RX ADMIN — MEMANTINE HYDROCHLORIDE 10 MILLIGRAM(S): 21 CAPSULE, EXTENDED RELEASE ORAL at 20:38

## 2025-06-08 RX ADMIN — Medication 1 DROP(S): at 21:27

## 2025-06-08 RX ADMIN — Medication 1 DOSE(S): at 21:12

## 2025-06-08 RX ADMIN — Medication 1 DROP(S): at 13:22

## 2025-06-08 RX ADMIN — OLANZAPINE 2.5 MILLIGRAM(S): 10 TABLET ORAL at 20:38

## 2025-06-08 RX ADMIN — LEVALBUTEROL HYDROCHLORIDE 1 PUFF(S): 1.25 SOLUTION RESPIRATORY (INHALATION) at 06:10

## 2025-06-08 RX ADMIN — Medication 1 DOSE(S): at 08:41

## 2025-06-08 RX ADMIN — LEVALBUTEROL HYDROCHLORIDE 1 PUFF(S): 1.25 SOLUTION RESPIRATORY (INHALATION) at 21:12

## 2025-06-08 RX ADMIN — INSULIN LISPRO 3: 100 INJECTION, SOLUTION INTRAVENOUS; SUBCUTANEOUS at 12:09

## 2025-06-08 RX ADMIN — Medication 50 MILLIGRAM(S): at 12:35

## 2025-06-08 NOTE — BH INPATIENT PSYCHIATRY PROGRESS NOTE - NSBHMETABOLIC_PSY_ALL_CORE_FT
BMI: BMI (kg/m2): 28.3 (05-21-25 @ 00:57)  HbA1c: A1C with Estimated Average Glucose Result: 5.9 % (05-22-25 @ 08:09)    Glucose: POCT Blood Glucose.: 134 mg/dL (06-08-25 @ 15:47)    BP: 122/71 (06-08-25 @ 12:35) (122/71 - 170/89)Vital Signs Last 24 Hrs  T(C): 37 (06-08-25 @ 07:42), Max: 37 (06-08-25 @ 07:42)  T(F): 98.6 (06-08-25 @ 07:42), Max: 98.6 (06-08-25 @ 07:42)  HR: 78 (06-08-25 @ 12:35) (78 - 78)  BP: 122/71 (06-08-25 @ 12:35) (122/71 - 122/71)  BP(mean): --  RR: 16 (06-08-25 @ 12:35) (16 - 18)  SpO2: --    Orthostatic VS  06-08-25 @ 07:42  Lying BP: --/-- HR: --  Sitting BP: 149/58 HR: 73  Standing BP: 149/57 HR: 73  Site: upper right arm  Mode: electronic  Orthostatic VS  06-07-25 @ 07:29  Lying BP: --/-- HR: --  Sitting BP: 163/78 HR: 92  Standing BP: 173/84 HR: 92  Site: --  Mode: --    Lipid Panel: Date/Time: 05-22-25 @ 08:09  Cholesterol, Serum: 310  LDL Cholesterol Calculated: 234  HDL Cholesterol, Serum: 67  Total Cholesterol/HDL Ration Measurement: --  Triglycerides, Serum: 64

## 2025-06-08 NOTE — BH INPATIENT PSYCHIATRY PROGRESS NOTE - MSE UNSTRUCTURED FT
Awake and alert. Calm, pleasant, good eye contact. Affect bright, superficial. Speech more productive but still limited. Thought process is logical, concrete. No delusions expressed. No suicidal ideations. No tremor or mvt disorder

## 2025-06-08 NOTE — BH INPATIENT PSYCHIATRY PROGRESS NOTE - NSBHCHARTREVIEWVS_PSY_A_CORE FT
Vital Signs Last 24 Hrs  T(C): 37 (06-08-25 @ 07:42), Max: 37 (06-08-25 @ 07:42)  T(F): 98.6 (06-08-25 @ 07:42), Max: 98.6 (06-08-25 @ 07:42)  HR: 78 (06-08-25 @ 12:35) (78 - 78)  BP: 122/71 (06-08-25 @ 12:35) (122/71 - 122/71)  BP(mean): --  RR: 16 (06-08-25 @ 12:35) (16 - 18)  SpO2: --    Orthostatic VS  06-08-25 @ 07:42  Lying BP: --/-- HR: --  Sitting BP: 149/58 HR: 73  Standing BP: 149/57 HR: 73  Site: upper right arm  Mode: electronic  Orthostatic VS  06-07-25 @ 07:29  Lying BP: --/-- HR: --  Sitting BP: 163/78 HR: 92  Standing BP: 173/84 HR: 92  Site: --  Mode: --

## 2025-06-08 NOTE — BH INPATIENT PSYCHIATRY PROGRESS NOTE - NSBHASSESSSUMMFT_PSY_ALL_CORE
74F hx asthma, depression, and HTN was admitted to the MICU with AHRF 2/2 asthma exacerbation resulting in acute on chronic HHRF causing AMS and increased WOB that required intubation. During her ICU course, she experienced seizure-like activity .  Additional issues included hyponatremia, acute on chronic metabolic alkalosis, and mild thrombocytopenia,  also having fevers.  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 Patient has h/o hx of major depressive disorder with psychosis, hx of 3 past inpatient psychiatric hospitalizations last in 2016 at [facility], all rehospitalizations were in context non compliance with the treatment. She has no hx of suicide attempts, no hx of substance abuse.    Pt currently requires inpatient level of care for ongoing treatment for catatonia.     5/22 - calm, cooperative, not oriented, though remembers name of son. No s/s catatonia. Denies mood symptoms. Eating and sleeping on unit.  5/23 - No signs of catatonia. Eating and ambulating on unit. For ECT this morning. Not oriented to time, place, or situation. Has been NPO. Spoke to , who believes pt is returning to baseline, but is concerned about pt's memory issues.  5/27 - Withdrawn, non-spontaneous speech, not oriented to time, place, or situation, though interactive and recalls her 's name. Hypoglycemic episode yesterday. D/w hospitalist, discontinued standing pre-meal Admelog of 2U TID, will keep on bedtime Lantus and sliding scale.   5/28: More engaged today, establishes good eye contact. Received ECT today, will monitor, if catatonia symptoms remain in control will continue with weekly ECT next week, if pt appears to be relapsing may order ECT for Friday.   5/29: Pt presenting with increased BM frequency, regular consistency. Will order imodium PRN for diarrhea.   5/30: Will observe pt over the weekend and next week for confusion. Family has brought up concern ECT may be worsening confusion and it is a possibility. Will observe off Zyprexa and space out ECT, may treat next Friday.   5/31: Remains confused but calm. Unclear if due to ECT or residual catatonia.  6/2: Will continue holding Zyprexa and ECT this week. Presentation is suggestive of protracted delirium.   6/3: Pt remains confused and disoriented. Continue holding Zyprexa and ECT. Ordered urinalysis and routine bloodwork.   6/4: Continue monitoring Alk phos and LFTs.   6/5: Alk phos uptrending, bilirubin is WNL, LFTs mildly elevated. Abdominal ultrasound scheduled for tomorrow, 6/6, at 9:00am. Family requesting ECT be held until Monday as they were concerned about confusion. Will resume Zyprexa 2.5mg QHS.   6/6: Abdominal ultrasound completed today, revealed gallstones but no evidence for cholecystitis or biliary dilatation. This likely explains elevated Alk phos and GGT. Ms. Silva denies pain, there is no tenderness to palpation on physical exam and sonographic Tomlin sign was not elicited. Will repeat bloodwork on Monday and continue monitoring. Discussed with medicine. ECT ordered for Monday 6/9.   6/7-6/8: Speech improved, calm, pleasant, plan for ECT 6/9    PLAN:  1. Admit to [facility] 2S on 9.27  2. Routine checks, as pt denying SI/HI/SIB  3. Psychiatric:  - Continue Namenda 10mg BID  - Continue Sertraline 125mg QD, titrate up to lowest effective dose  - Resume Olanzapine 2.5mg QHS due to paranoia  - Would avoid benzodiazepines since pt was recently unable to tolerate them at [facility], becoming extremely sedated  - Continue ECT once weekly, last treatment occurred Friday 5/30.     4. Medical:   - Abdominal ultrasound scheduled for 6/6/25 at 9am.   - PRN levalbuterol inhaler  - Continue Advair and Spiriva for Asthma and COPD  - Continue Lisinopril 40mg QD and HCTZ 25mg QD for HTN  - Diabetes type 2: Lantus 3U at bedtime + ISS  - Lidocaine patches PRN daily + Tylenol PRN for lower back pain  - Bowel regimen: Senna 2 tabs QHS  - Diet: Easy to chew, consistent carbohydrates. No Beef No Pork  - Pt is at risk for falls given deconditioning. Seen by PT, recommended PRN walker with 1 staff assists. Pt is in a room with peer who has CO for added supervision and redirection during nighttime. During the daytime she should be either in the day room or the dining area with staff monitoring her at all times to provide redirection if pt tries to get up or ambulate unassisted.     5. Collateral: spoke with pt's  Liam on phone to update on progress 5/22 and 5/23.  6. Disposition: pending clinical course

## 2025-06-09 LAB
ALBUMIN SERPL ELPH-MCNC: 3.9 G/DL — SIGNIFICANT CHANGE UP (ref 3.3–5)
ALP SERPL-CCNC: 500 U/L — HIGH (ref 40–120)
ALT FLD-CCNC: 48 U/L — HIGH (ref 4–33)
ANION GAP SERPL CALC-SCNC: 10 MMOL/L — SIGNIFICANT CHANGE UP (ref 7–14)
AST SERPL-CCNC: 42 U/L — HIGH (ref 4–32)
BILIRUB SERPL-MCNC: 0.5 MG/DL — SIGNIFICANT CHANGE UP (ref 0.2–1.2)
BUN SERPL-MCNC: 10 MG/DL — SIGNIFICANT CHANGE UP (ref 7–23)
CALCIUM SERPL-MCNC: 9.6 MG/DL — SIGNIFICANT CHANGE UP (ref 8.4–10.5)
CHLORIDE SERPL-SCNC: 103 MMOL/L — SIGNIFICANT CHANGE UP (ref 98–107)
CHOLEST SERPL-MCNC: 227 MG/DL — HIGH
CO2 SERPL-SCNC: 29 MMOL/L — SIGNIFICANT CHANGE UP (ref 22–31)
CREAT SERPL-MCNC: 0.65 MG/DL — SIGNIFICANT CHANGE UP (ref 0.5–1.3)
EGFR: 92 ML/MIN/1.73M2 — SIGNIFICANT CHANGE UP
EGFR: 92 ML/MIN/1.73M2 — SIGNIFICANT CHANGE UP
GLUCOSE BLDC GLUCOMTR-MCNC: 107 MG/DL — HIGH (ref 70–99)
GLUCOSE BLDC GLUCOMTR-MCNC: 129 MG/DL — HIGH (ref 70–99)
GLUCOSE BLDC GLUCOMTR-MCNC: 234 MG/DL — HIGH (ref 70–99)
GLUCOSE SERPL-MCNC: 124 MG/DL — HIGH (ref 70–99)
HDLC SERPL-MCNC: 75 MG/DL — SIGNIFICANT CHANGE UP
LDLC SERPL-MCNC: 144 MG/DL — HIGH
LIPID PNL WITH DIRECT LDL SERPL: 144 MG/DL — HIGH
NONHDLC SERPL-MCNC: 152 MG/DL — HIGH
POTASSIUM SERPL-MCNC: 3.9 MMOL/L — SIGNIFICANT CHANGE UP (ref 3.5–5.3)
POTASSIUM SERPL-SCNC: 3.9 MMOL/L — SIGNIFICANT CHANGE UP (ref 3.5–5.3)
PROT SERPL-MCNC: 6.8 G/DL — SIGNIFICANT CHANGE UP (ref 6–8.3)
SODIUM SERPL-SCNC: 142 MMOL/L — SIGNIFICANT CHANGE UP (ref 135–145)
TRIGL SERPL-MCNC: 49 MG/DL — SIGNIFICANT CHANGE UP

## 2025-06-09 PROCEDURE — 90870 ELECTROCONVULSIVE THERAPY: CPT

## 2025-06-09 PROCEDURE — 99231 SBSQ HOSP IP/OBS SF/LOW 25: CPT | Mod: 25

## 2025-06-09 RX ADMIN — TIOTROPIUM BROMIDE INHALATION SPRAY 2 PUFF(S): 3.12 SPRAY, METERED RESPIRATORY (INHALATION) at 10:44

## 2025-06-09 RX ADMIN — Medication 50 MILLIGRAM(S): at 08:28

## 2025-06-09 RX ADMIN — LEVALBUTEROL HYDROCHLORIDE 1 PUFF(S): 1.25 SOLUTION RESPIRATORY (INHALATION) at 06:05

## 2025-06-09 RX ADMIN — LEVALBUTEROL HYDROCHLORIDE 1 PUFF(S): 1.25 SOLUTION RESPIRATORY (INHALATION) at 21:43

## 2025-06-09 RX ADMIN — OLANZAPINE 2.5 MILLIGRAM(S): 10 TABLET ORAL at 20:49

## 2025-06-09 RX ADMIN — ATORVASTATIN CALCIUM 20 MILLIGRAM(S): 80 TABLET, FILM COATED ORAL at 20:49

## 2025-06-09 RX ADMIN — Medication 1 DROP(S): at 21:43

## 2025-06-09 RX ADMIN — MEMANTINE HYDROCHLORIDE 10 MILLIGRAM(S): 21 CAPSULE, EXTENDED RELEASE ORAL at 20:49

## 2025-06-09 RX ADMIN — LISINOPRIL 40 MILLIGRAM(S): 5 TABLET ORAL at 08:29

## 2025-06-09 RX ADMIN — Medication 50 MILLIGRAM(S): at 20:49

## 2025-06-09 RX ADMIN — Medication 1 DROP(S): at 13:14

## 2025-06-09 RX ADMIN — MEMANTINE HYDROCHLORIDE 10 MILLIGRAM(S): 21 CAPSULE, EXTENDED RELEASE ORAL at 06:04

## 2025-06-09 RX ADMIN — Medication 1 DROP(S): at 06:51

## 2025-06-09 RX ADMIN — INSULIN LISPRO 2: 100 INJECTION, SOLUTION INTRAVENOUS; SUBCUTANEOUS at 16:22

## 2025-06-09 RX ADMIN — Medication 9 MILLIGRAM(S): at 20:49

## 2025-06-09 RX ADMIN — Medication 1 DOSE(S): at 20:48

## 2025-06-09 RX ADMIN — LEVALBUTEROL HYDROCHLORIDE 1 PUFF(S): 1.25 SOLUTION RESPIRATORY (INHALATION) at 13:15

## 2025-06-09 RX ADMIN — Medication 1 DOSE(S): at 10:43

## 2025-06-09 RX ADMIN — Medication 2 TABLET(S): at 20:49

## 2025-06-09 RX ADMIN — SERTRALINE 125 MILLIGRAM(S): 100 TABLET, FILM COATED ORAL at 08:28

## 2025-06-09 NOTE — ECT TREATMENT NOTE - NSECTPULMCOMMENT_PSY_ALL_CORE
Acute asthma exacerbation, Pneumonia

## 2025-06-09 NOTE — BH INPATIENT PSYCHIATRY PROGRESS NOTE - NSBHASSESSSUMMFT_PSY_ALL_CORE
74F hx asthma, depression, and HTN was admitted to the MICU with AHRF 2/2 asthma exacerbation resulting in acute on chronic HHRF causing AMS and increased WOB that required intubation. During her ICU course, she experienced seizure-like activity .  Additional issues included hyponatremia, acute on chronic metabolic alkalosis, and mild thrombocytopenia,  also having fevers.  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 Patient has h/o hx of major depressive disorder with psychosis, hx of 3 past inpatient psychiatric hospitalizations last in 2016 at [facility], all rehospitalizations were in context non compliance with the treatment. She has no hx of suicide attempts, no hx of substance abuse.    Pt currently requires inpatient level of care for ongoing treatment for catatonia.     5/22 - calm, cooperative, not oriented, though remembers name of son. No s/s catatonia. Denies mood symptoms. Eating and sleeping on unit.  5/23 - No signs of catatonia. Eating and ambulating on unit. For ECT this morning. Not oriented to time, place, or situation. Has been NPO. Spoke to , who believes pt is returning to baseline, but is concerned about pt's memory issues.  5/27 - Withdrawn, non-spontaneous speech, not oriented to time, place, or situation, though interactive and recalls her 's name. Hypoglycemic episode yesterday. D/w hospitalist, discontinued standing pre-meal Admelog of 2U TID, will keep on bedtime Lantus and sliding scale.   5/28: More engaged today, establishes good eye contact. Received ECT today, will monitor, if catatonia symptoms remain in control will continue with weekly ECT next week, if pt appears to be relapsing may order ECT for Friday.   5/29: Pt presenting with increased BM frequency, regular consistency. Will order imodium PRN for diarrhea.   5/30: Will observe pt over the weekend and next week for confusion. Family has brought up concern ECT may be worsening confusion and it is a possibility. Will observe off Zyprexa and space out ECT, may treat next Friday.   5/31: Remains confused but calm. Unclear if due to ECT or residual catatonia.  6/2: Will continue holding Zyprexa and ECT this week. Presentation is suggestive of protracted delirium.   6/3: Pt remains confused and disoriented. Continue holding Zyprexa and ECT. Ordered urinalysis and routine bloodwork.   6/4: Continue monitoring Alk phos and LFTs.   6/5: Alk phos uptrending, bilirubin is WNL, LFTs mildly elevated. Abdominal ultrasound scheduled for tomorrow, 6/6, at 9:00am. Family requesting ECT be held until Monday as they were concerned about confusion. Will resume Zyprexa 2.5mg QHS.   6/6: Abdominal ultrasound completed today, revealed gallstones but no evidence for cholecystitis or biliary dilatation. This likely explains elevated Alk phos and GGT. Ms. Silva denies pain, there is no tenderness to palpation on physical exam and sonographic Tomlin sign was not elicited. Will repeat bloodwork on Monday and continue monitoring. Discussed with medicine. ECT ordered for Monday 6/9.   6/7-6/8: Speech improved, calm, pleasant, plan for ECT 6/9    PLAN:  1. Admit to [facility] 2S on 9.27  2. Routine checks, as pt denying SI/HI/SIB  3. Psychiatric:  - Continue Namenda 10mg BID  - Continue Sertraline 125mg QD, titrate up to lowest effective dose  - Resume Olanzapine 2.5mg QHS due to paranoia  - Would avoid benzodiazepines since pt was recently unable to tolerate them at [facility], becoming extremely sedated  - Continue ECT once weekly, last treatment occurred Friday 5/30.     4. Medical:   - Abdominal ultrasound scheduled for 6/6/25 at 9am.   - PRN levalbuterol inhaler  - Continue Advair and Spiriva for Asthma and COPD  - Continue Lisinopril 40mg QD and HCTZ 25mg QD for HTN  - Diabetes type 2: Lantus 3U at bedtime + ISS  - Lidocaine patches PRN daily + Tylenol PRN for lower back pain  - Bowel regimen: Senna 2 tabs QHS  - Diet: Easy to chew, consistent carbohydrates. No Beef No Pork  - Pt is at risk for falls given deconditioning. Seen by PT, recommended PRN walker with 1 staff assists. Pt is in a room with peer who has CO for added supervision and redirection during nighttime. During the daytime she should be either in the day room or the dining area with staff monitoring her at all times to provide redirection if pt tries to get up or ambulate unassisted.     5. Collateral: spoke with pt's  Liam on phone to update on progress 5/22 and 5/23.  6. Disposition: pending clinical course

## 2025-06-09 NOTE — ECT TREATMENT NOTE - NSECTIMPPLAN_PSY_ALL_CORE
Assessment today offers no contraindications to continue plan of treatment with ECT.

## 2025-06-09 NOTE — ECT TREATMENT NOTE - NSECTROSNEGAT_PSY_ALL_CORE
Review of Systems negative/unchanged from previous exam except as noted below

## 2025-06-09 NOTE — ECT TREATMENT NOTE - NSECTPTEVAL_PSY_ALL_CORE
Patient evaluated and History and Physical reviewed prior to ECT. There are no significant changes to the patient's condition unless specified.

## 2025-06-09 NOTE — ECT TREATMENT NOTE - NSECTPOSTTXSUMMARY_PSY_ALL_CORE
The patient had a well modified grand mal seizure under general anesthesia and muscle relaxant. The patient is alert, responsive, in no acute distress.  Recovery uneventful.

## 2025-06-09 NOTE — BH INPATIENT PSYCHIATRY PROGRESS NOTE - NSBHFUPINTERVALHXFT_PSY_A_CORE
Patient seen for follow up. Observed sitting in the dayroom this morning. Vitals are stable. Pt remains forgetful but her affect is more reactive today and she is more talkative. Speech is more goal directed and logical. Pt asks about discharge planning and was able to have a short conversation about it. Discussed ultrasound results again since pt forgot.    Patient seen for follow up. Observed sitting in the dayroom this morning. Vitals are stable. Had ECT this morning. Pt remains forgetful but her affect is more reactive today and she is more talkative. She is better related on encounter and establishes good eye contact. Speech is more goal directed and logical. Pt asks about discharge planning and was able to have a short conversation about it. Denies paranoid/persecutive thoughts. Denies auditory or visual hallucinations. Discussed ultrasound results again since pt forgot.

## 2025-06-09 NOTE — ECT PRE-PROCEDURE CHECKLIST - INV PERIPH IV LOCATION
Right:/median cubital vein
Right:/metacarpal vein

## 2025-06-09 NOTE — BH INPATIENT PSYCHIATRY PROGRESS NOTE - NSBHCHARTREVIEWVS_PSY_A_CORE FT
Vital Signs Last 24 Hrs  T(C): 36.8 (06-09-25 @ 13:04), Max: 37.2 (06-09-25 @ 11:07)  T(F): 98.2 (06-09-25 @ 13:04), Max: 99 (06-09-25 @ 11:07)  HR: 90 (06-09-25 @ 13:04) (86 - 110)  BP: 110/89 (06-09-25 @ 13:04) (110/89 - 194/100)  BP(mean): --  RR: 19 (06-09-25 @ 12:15) (16 - 23)  SpO2: 99% (06-09-25 @ 12:15) (93% - 99%)    Orthostatic VS  06-09-25 @ 05:51  Lying BP: --/-- HR: --  Sitting BP: 166/79 HR: 83  Standing BP: 151/69 HR: 80  Site: --  Mode: --  Orthostatic VS  06-08-25 @ 07:42  Lying BP: --/-- HR: --  Sitting BP: 149/58 HR: 73  Standing BP: 149/57 HR: 73  Site: upper right arm  Mode: electronic

## 2025-06-09 NOTE — ECT PRE-PROCEDURE CHECKLIST - NSPROPTRIGHTNOTIFYOBTAINDET_GEN_A_NUR
Patient is unable and/or unwilling to answer due to disorientation and/or thought blocking. 

## 2025-06-09 NOTE — ECT PRE-PROCEDURE CHECKLIST - ALLERGIES
Allergies:-  No Known Allergies      

## 2025-06-09 NOTE — ECT TREATMENT NOTE - NSECTOTHERCOMMENT_PSY_ALL_CORE
Toxic metabolic encephalopathy, Edema of both legs, Need for prophylactic measure, Seizure-like activity 

## 2025-06-09 NOTE — ECT PRE-PROCEDURE CHECKLIST - CAREGIVER NAME
[facility] 
[facility] 
[facility] 
[facility]

## 2025-06-09 NOTE — ECT PRE-PROCEDURE CHECKLIST - NSECTCONSENT_PSY_ALL_CORE
BF Acute ECT consent from 4/22/25  Anesthesia consent expires on 10/25/25/yes
BF Acute ECT consent from 4/22/25  Anesthesia consent expires on 10/25/25/yes
consents @ chart/yes
ECT consent  obtained on 22/4/25  Anesthesia consent expires on 10/25/25/yes

## 2025-06-09 NOTE — BH INPATIENT PSYCHIATRY PROGRESS NOTE - NSBHMETABOLIC_PSY_ALL_CORE_FT
BMI: BMI (kg/m2): 28.3 (05-21-25 @ 00:57)  HbA1c: A1C with Estimated Average Glucose Result: 5.9 % (05-22-25 @ 08:09)    Glucose: POCT Blood Glucose.: 129 mg/dL (06-09-25 @ 07:33)    BP: 110/89 (06-09-25 @ 13:04) (110/89 - 194/100)Vital Signs Last 24 Hrs  T(C): 36.8 (06-09-25 @ 13:04), Max: 37.2 (06-09-25 @ 11:07)  T(F): 98.2 (06-09-25 @ 13:04), Max: 99 (06-09-25 @ 11:07)  HR: 90 (06-09-25 @ 13:04) (86 - 110)  BP: 110/89 (06-09-25 @ 13:04) (110/89 - 194/100)  BP(mean): --  RR: 19 (06-09-25 @ 12:15) (16 - 23)  SpO2: 99% (06-09-25 @ 12:15) (93% - 99%)    Orthostatic VS  06-09-25 @ 05:51  Lying BP: --/-- HR: --  Sitting BP: 166/79 HR: 83  Standing BP: 151/69 HR: 80  Site: --  Mode: --  Orthostatic VS  06-08-25 @ 07:42  Lying BP: --/-- HR: --  Sitting BP: 149/58 HR: 73  Standing BP: 149/57 HR: 73  Site: upper right arm  Mode: electronic    Lipid Panel: Date/Time: 06-09-25 @ 08:11  Cholesterol, Serum: 227  LDL Cholesterol Calculated: 144  HDL Cholesterol, Serum: 75  Total Cholesterol/HDL Ration Measurement: --  Triglycerides, Serum: 49   BMI: BMI (kg/m2): 28.3 (05-21-25 @ 00:57)  HbA1c: A1C with Estimated Average Glucose Result: 5.9 % (05-22-25 @ 08:09)    Glucose: POCT Blood Glucose.: 234 mg/dL (06-09-25 @ 15:37)    BP: 110/89 (06-09-25 @ 13:04) (110/89 - 194/100)Vital Signs Last 24 Hrs  T(C): 36.8 (06-09-25 @ 13:04), Max: 37.2 (06-09-25 @ 11:07)  T(F): 98.2 (06-09-25 @ 13:04), Max: 99 (06-09-25 @ 11:07)  HR: 90 (06-09-25 @ 13:04) (86 - 110)  BP: 110/89 (06-09-25 @ 13:04) (110/89 - 194/100)  BP(mean): --  RR: 19 (06-09-25 @ 12:15) (16 - 23)  SpO2: 99% (06-09-25 @ 12:15) (93% - 99%)    Orthostatic VS  06-09-25 @ 05:51  Lying BP: --/-- HR: --  Sitting BP: 166/79 HR: 83  Standing BP: 151/69 HR: 80  Site: --  Mode: --  Orthostatic VS  06-08-25 @ 07:42  Lying BP: --/-- HR: --  Sitting BP: 149/58 HR: 73  Standing BP: 149/57 HR: 73  Site: upper right arm  Mode: electronic    Lipid Panel: Date/Time: 06-09-25 @ 08:11  Cholesterol, Serum: 227  LDL Cholesterol Calculated: 144  HDL Cholesterol, Serum: 75  Total Cholesterol/HDL Ration Measurement: --  Triglycerides, Serum: 49

## 2025-06-09 NOTE — ECT TREATMENT NOTE - NSECTCPTCODE_PSY_ALL_CORE
97447 (ECT-Electroconvulsive Therapy)
65879 (ECT-Electroconvulsive Therapy)
15122 (ECT-Electroconvulsive Therapy)
65753 (ECT-Electroconvulsive Therapy)

## 2025-06-10 LAB
GLUCOSE BLDC GLUCOMTR-MCNC: 115 MG/DL — HIGH (ref 70–99)
GLUCOSE BLDC GLUCOMTR-MCNC: 121 MG/DL — HIGH (ref 70–99)
GLUCOSE BLDC GLUCOMTR-MCNC: 130 MG/DL — HIGH (ref 70–99)
GLUCOSE BLDC GLUCOMTR-MCNC: 192 MG/DL — HIGH (ref 70–99)

## 2025-06-10 PROCEDURE — 99231 SBSQ HOSP IP/OBS SF/LOW 25: CPT

## 2025-06-10 RX ADMIN — LEVALBUTEROL HYDROCHLORIDE 1 PUFF(S): 1.25 SOLUTION RESPIRATORY (INHALATION) at 14:33

## 2025-06-10 RX ADMIN — LEVALBUTEROL HYDROCHLORIDE 1 PUFF(S): 1.25 SOLUTION RESPIRATORY (INHALATION) at 06:39

## 2025-06-10 RX ADMIN — TIOTROPIUM BROMIDE INHALATION SPRAY 2 PUFF(S): 3.12 SPRAY, METERED RESPIRATORY (INHALATION) at 09:19

## 2025-06-10 RX ADMIN — OLANZAPINE 2.5 MILLIGRAM(S): 10 TABLET ORAL at 20:53

## 2025-06-10 RX ADMIN — Medication 1 DROP(S): at 14:33

## 2025-06-10 RX ADMIN — Medication 50 MILLIGRAM(S): at 08:03

## 2025-06-10 RX ADMIN — Medication 1 DROP(S): at 22:28

## 2025-06-10 RX ADMIN — LEVALBUTEROL HYDROCHLORIDE 1 PUFF(S): 1.25 SOLUTION RESPIRATORY (INHALATION) at 22:29

## 2025-06-10 RX ADMIN — LISINOPRIL 40 MILLIGRAM(S): 5 TABLET ORAL at 08:03

## 2025-06-10 RX ADMIN — INSULIN LISPRO 1: 100 INJECTION, SOLUTION INTRAVENOUS; SUBCUTANEOUS at 16:02

## 2025-06-10 RX ADMIN — Medication 50 MILLIGRAM(S): at 20:53

## 2025-06-10 RX ADMIN — Medication 1 DOSE(S): at 09:19

## 2025-06-10 RX ADMIN — Medication 50 MILLIGRAM(S): at 12:03

## 2025-06-10 RX ADMIN — ATORVASTATIN CALCIUM 20 MILLIGRAM(S): 80 TABLET, FILM COATED ORAL at 20:53

## 2025-06-10 RX ADMIN — SERTRALINE 125 MILLIGRAM(S): 100 TABLET, FILM COATED ORAL at 08:03

## 2025-06-10 RX ADMIN — Medication 9 MILLIGRAM(S): at 20:53

## 2025-06-10 RX ADMIN — Medication 1 DROP(S): at 06:39

## 2025-06-10 RX ADMIN — MEMANTINE HYDROCHLORIDE 10 MILLIGRAM(S): 21 CAPSULE, EXTENDED RELEASE ORAL at 06:40

## 2025-06-10 RX ADMIN — Medication 1 DOSE(S): at 20:52

## 2025-06-10 RX ADMIN — Medication 2 TABLET(S): at 20:53

## 2025-06-10 RX ADMIN — MEMANTINE HYDROCHLORIDE 10 MILLIGRAM(S): 21 CAPSULE, EXTENDED RELEASE ORAL at 20:53

## 2025-06-10 NOTE — BH INPATIENT PSYCHIATRY PROGRESS NOTE - NSBHCHARTREVIEWVS_PSY_A_CORE FT
Vital Signs Last 24 Hrs  T(C): 36.8 (06-10-25 @ 07:48), Max: 36.8 (06-10-25 @ 07:48)  T(F): 98.2 (06-10-25 @ 07:48), Max: 98.2 (06-10-25 @ 07:48)  HR: 72 (06-09-25 @ 21:30) (72 - 90)  BP: 152/88 (06-09-25 @ 21:30) (152/88 - 172/83)  BP(mean): --  RR: --  SpO2: --    Orthostatic VS  06-10-25 @ 09:24  Lying BP: --/-- HR: --  Sitting BP: 153/76 HR: 83  Standing BP: 146/79 HR: 86  Site: --  Mode: --  Orthostatic VS  06-10-25 @ 07:48  Lying BP: --/-- HR: --  Sitting BP: 166/72 HR: 81  Standing BP: 183/76 HR: 85  Site: --  Mode: --  Orthostatic VS  06-09-25 @ 05:51  Lying BP: --/-- HR: --  Sitting BP: 166/79 HR: 83  Standing BP: 151/69 HR: 80  Site: --  Mode: --

## 2025-06-10 NOTE — BH INPATIENT PSYCHIATRY PROGRESS NOTE - NSBHFUPINTERVALHXFT_PSY_A_CORE
Patient seen for follow up. Observed sitting in the dayroom this morning. Vitals are stable. Observed sitting down in brandon chair. Greeted writer saying "I was waiting for you!". Overall better related, continues to express eagerness to go back home. Remembered the day of the week today but remains disoriented to month and year. Oriented to place. Recognized writer but did nor remember writer's role within her treatment team. Reports feeling well in terms of mood, denies SI/HI/AH/VH.

## 2025-06-10 NOTE — BH INPATIENT PSYCHIATRY PROGRESS NOTE - NSBHASSESSSUMMFT_PSY_ALL_CORE
74F hx asthma, depression, and HTN was admitted to the MICU with AHRF 2/2 asthma exacerbation resulting in acute on chronic HHRF causing AMS and increased WOB that required intubation. During her ICU course, she experienced seizure-like activity .  Additional issues included hyponatremia, acute on chronic metabolic alkalosis, and mild thrombocytopenia,  also having fevers.  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 Patient has h/o hx of major depressive disorder with psychosis, hx of 3 past inpatient psychiatric hospitalizations last in 2016 at [facility], all rehospitalizations were in context non compliance with the treatment. She has no hx of suicide attempts, no hx of substance abuse.    Pt currently requires inpatient level of care for ongoing treatment for catatonia.     5/22 - calm, cooperative, not oriented, though remembers name of son. No s/s catatonia. Denies mood symptoms. Eating and sleeping on unit.  5/23 - No signs of catatonia. Eating and ambulating on unit. For ECT this morning. Not oriented to time, place, or situation. Has been NPO. Spoke to , who believes pt is returning to baseline, but is concerned about pt's memory issues.  5/27 - Withdrawn, non-spontaneous speech, not oriented to time, place, or situation, though interactive and recalls her 's name. Hypoglycemic episode yesterday. D/w hospitalist, discontinued standing pre-meal Admelog of 2U TID, will keep on bedtime Lantus and sliding scale.   5/28: More engaged today, establishes good eye contact. Received ECT today, will monitor, if catatonia symptoms remain in control will continue with weekly ECT next week, if pt appears to be relapsing may order ECT for Friday.   5/29: Pt presenting with increased BM frequency, regular consistency. Will order imodium PRN for diarrhea.   5/30: Will observe pt over the weekend and next week for confusion. Family has brought up concern ECT may be worsening confusion and it is a possibility. Will observe off Zyprexa and space out ECT, may treat next Friday.   5/31: Remains confused but calm. Unclear if due to ECT or residual catatonia.  6/2: Will continue holding Zyprexa and ECT this week. Presentation is suggestive of protracted delirium.   6/3: Pt remains confused and disoriented. Continue holding Zyprexa and ECT. Ordered urinalysis and routine bloodwork.   6/4: Continue monitoring Alk phos and LFTs.   6/5: Alk phos uptrending, bilirubin is WNL, LFTs mildly elevated. Abdominal ultrasound scheduled for tomorrow, 6/6, at 9:00am. Family requesting ECT be held until Monday as they were concerned about confusion. Will resume Zyprexa 2.5mg QHS.   6/6: Abdominal ultrasound completed today, revealed gallstones but no evidence for cholecystitis or biliary dilatation. This likely explains elevated Alk phos and GGT. Ms. Silva denies pain, there is no tenderness to palpation on physical exam and sonographic Tomlin sign was not elicited. Will repeat bloodwork on Monday and continue monitoring. Discussed with medicine. ECT ordered for Monday 6/9.   6/7-6/8: Speech improved, calm, pleasant, plan for ECT 6/9  6/10: Better related this week. No signs of catatonia. S/P ECT yesterday.     PLAN:  1. Admit to [facility] 2S on 9.27  2. Routine checks, as pt denying SI/HI/SIB  3. Psychiatric:  - Continue Namenda 10mg BID  - Continue Sertraline 125mg QD, titrate up to lowest effective dose  - Resume Olanzapine 2.5mg QHS due to paranoia  - Would avoid benzodiazepines since pt was recently unable to tolerate them at [facility], becoming extremely sedated  - Continue ECT once weekly, last treatment occurred Friday 5/30.     4. Medical:   - Abdominal ultrasound scheduled for 6/6/25 at 9am.   - PRN levalbuterol inhaler  - Continue Advair and Spiriva for Asthma and COPD  - Continue Lisinopril 40mg QD and HCTZ 25mg QD for HTN  - Diabetes type 2: Lantus 3U at bedtime + ISS  - Lidocaine patches PRN daily + Tylenol PRN for lower back pain  - Bowel regimen: Senna 2 tabs QHS  - Diet: Easy to chew, consistent carbohydrates. No Beef No Pork  - Pt is at risk for falls given deconditioning. Seen by PT, recommended PRN walker with 1 staff assists. Pt is in a room with peer who has CO for added supervision and redirection during nighttime. During the daytime she should be either in the day room or the dining area with staff monitoring her at all times to provide redirection if pt tries to get up or ambulate unassisted.     5. Collateral: spoke with pt's  Liam on phone to update on progress 5/22 and 5/23.  6. Disposition: pending clinical course

## 2025-06-10 NOTE — BH INPATIENT PSYCHIATRY PROGRESS NOTE - NSBHMETABOLIC_PSY_ALL_CORE_FT
BMI: BMI (kg/m2): 28.3 (05-21-25 @ 00:57)  HbA1c: A1C with Estimated Average Glucose Result: 5.9 % (05-22-25 @ 08:09)    Glucose: POCT Blood Glucose.: 130 mg/dL (06-10-25 @ 11:39)    BP: 152/88 (06-09-25 @ 21:30) (110/89 - 194/100)Vital Signs Last 24 Hrs  T(C): 36.8 (06-10-25 @ 07:48), Max: 36.8 (06-10-25 @ 07:48)  T(F): 98.2 (06-10-25 @ 07:48), Max: 98.2 (06-10-25 @ 07:48)  HR: 72 (06-09-25 @ 21:30) (72 - 90)  BP: 152/88 (06-09-25 @ 21:30) (152/88 - 172/83)  BP(mean): --  RR: --  SpO2: --    Orthostatic VS  06-10-25 @ 09:24  Lying BP: --/-- HR: --  Sitting BP: 153/76 HR: 83  Standing BP: 146/79 HR: 86  Site: --  Mode: --  Orthostatic VS  06-10-25 @ 07:48  Lying BP: --/-- HR: --  Sitting BP: 166/72 HR: 81  Standing BP: 183/76 HR: 85  Site: --  Mode: --  Orthostatic VS  06-09-25 @ 05:51  Lying BP: --/-- HR: --  Sitting BP: 166/79 HR: 83  Standing BP: 151/69 HR: 80  Site: --  Mode: --    Lipid Panel: Date/Time: 06-09-25 @ 08:11  Cholesterol, Serum: 227  LDL Cholesterol Calculated: 144  HDL Cholesterol, Serum: 75  Total Cholesterol/HDL Ration Measurement: --  Triglycerides, Serum: 49

## 2025-06-11 LAB
GLUCOSE BLDC GLUCOMTR-MCNC: 123 MG/DL — HIGH (ref 70–99)
GLUCOSE BLDC GLUCOMTR-MCNC: 160 MG/DL — HIGH (ref 70–99)
GLUCOSE BLDC GLUCOMTR-MCNC: 160 MG/DL — HIGH (ref 70–99)
GLUCOSE BLDC GLUCOMTR-MCNC: 165 MG/DL — HIGH (ref 70–99)

## 2025-06-11 PROCEDURE — 99231 SBSQ HOSP IP/OBS SF/LOW 25: CPT

## 2025-06-11 RX ADMIN — INSULIN LISPRO 1: 100 INJECTION, SOLUTION INTRAVENOUS; SUBCUTANEOUS at 16:40

## 2025-06-11 RX ADMIN — INSULIN LISPRO 1: 100 INJECTION, SOLUTION INTRAVENOUS; SUBCUTANEOUS at 12:22

## 2025-06-11 RX ADMIN — OLANZAPINE 2.5 MILLIGRAM(S): 10 TABLET ORAL at 21:33

## 2025-06-11 RX ADMIN — Medication 50 MILLIGRAM(S): at 12:37

## 2025-06-11 RX ADMIN — LEVALBUTEROL HYDROCHLORIDE 1 PUFF(S): 1.25 SOLUTION RESPIRATORY (INHALATION) at 06:23

## 2025-06-11 RX ADMIN — Medication 50 MILLIGRAM(S): at 08:53

## 2025-06-11 RX ADMIN — LEVALBUTEROL HYDROCHLORIDE 1 PUFF(S): 1.25 SOLUTION RESPIRATORY (INHALATION) at 22:26

## 2025-06-11 RX ADMIN — LEVALBUTEROL HYDROCHLORIDE 1 PUFF(S): 1.25 SOLUTION RESPIRATORY (INHALATION) at 13:41

## 2025-06-11 RX ADMIN — MEMANTINE HYDROCHLORIDE 10 MILLIGRAM(S): 21 CAPSULE, EXTENDED RELEASE ORAL at 06:23

## 2025-06-11 RX ADMIN — LISINOPRIL 40 MILLIGRAM(S): 5 TABLET ORAL at 08:52

## 2025-06-11 RX ADMIN — Medication 1 DOSE(S): at 08:51

## 2025-06-11 RX ADMIN — Medication 1 DOSE(S): at 21:35

## 2025-06-11 RX ADMIN — Medication 1 DROP(S): at 13:41

## 2025-06-11 RX ADMIN — Medication 1 DROP(S): at 22:27

## 2025-06-11 RX ADMIN — Medication 2 TABLET(S): at 21:33

## 2025-06-11 RX ADMIN — MEMANTINE HYDROCHLORIDE 10 MILLIGRAM(S): 21 CAPSULE, EXTENDED RELEASE ORAL at 21:33

## 2025-06-11 RX ADMIN — Medication 9 MILLIGRAM(S): at 21:33

## 2025-06-11 RX ADMIN — SERTRALINE 125 MILLIGRAM(S): 100 TABLET, FILM COATED ORAL at 08:56

## 2025-06-11 RX ADMIN — Medication 50 MILLIGRAM(S): at 21:33

## 2025-06-11 RX ADMIN — Medication 1 DROP(S): at 06:23

## 2025-06-11 RX ADMIN — ATORVASTATIN CALCIUM 20 MILLIGRAM(S): 80 TABLET, FILM COATED ORAL at 21:33

## 2025-06-11 RX ADMIN — TIOTROPIUM BROMIDE INHALATION SPRAY 2 PUFF(S): 3.12 SPRAY, METERED RESPIRATORY (INHALATION) at 08:50

## 2025-06-11 NOTE — BH INPATIENT PSYCHIATRY PROGRESS NOTE - NSBHMETABOLIC_PSY_ALL_CORE_FT
BMI: BMI (kg/m2): 28.3 (05-21-25 @ 00:57)  HbA1c: A1C with Estimated Average Glucose Result: 5.9 % (05-22-25 @ 08:09)    Glucose: POCT Blood Glucose.: 160 mg/dL (06-11-25 @ 11:41)    BP: 145/70 (06-11-25 @ 12:35) (110/89 - 194/100)Vital Signs Last 24 Hrs  T(C): 37.2 (06-11-25 @ 08:30), Max: 37.2 (06-11-25 @ 08:30)  T(F): 99 (06-11-25 @ 08:30), Max: 99 (06-11-25 @ 08:30)  HR: 80 (06-11-25 @ 12:35) (80 - 98)  BP: 145/70 (06-11-25 @ 12:35) (145/70 - 180/92)  BP(mean): 82 (06-11-25 @ 08:30) (82 - 82)  RR: 16 (06-11-25 @ 12:35) (16 - 18)  SpO2: 98% (06-10-25 @ 21:00) (98% - 98%)    Orthostatic VS  06-10-25 @ 09:24  Lying BP: --/-- HR: --  Sitting BP: 153/76 HR: 83  Standing BP: 146/79 HR: 86  Site: --  Mode: --  Orthostatic VS  06-10-25 @ 07:48  Lying BP: --/-- HR: --  Sitting BP: 166/72 HR: 81  Standing BP: 183/76 HR: 85  Site: --  Mode: --    Lipid Panel: Date/Time: 06-09-25 @ 08:11  Cholesterol, Serum: 227  LDL Cholesterol Calculated: 144  HDL Cholesterol, Serum: 75  Total Cholesterol/HDL Ration Measurement: --  Triglycerides, Serum: 49

## 2025-06-11 NOTE — BH INPATIENT PSYCHIATRY PROGRESS NOTE - NSBHCHARTREVIEWVS_PSY_A_CORE FT
Vital Signs Last 24 Hrs  T(C): 37.2 (06-11-25 @ 08:30), Max: 37.2 (06-11-25 @ 08:30)  T(F): 99 (06-11-25 @ 08:30), Max: 99 (06-11-25 @ 08:30)  HR: 80 (06-11-25 @ 12:35) (80 - 98)  BP: 145/70 (06-11-25 @ 12:35) (145/70 - 180/92)  BP(mean): 82 (06-11-25 @ 08:30) (82 - 82)  RR: 16 (06-11-25 @ 12:35) (16 - 18)  SpO2: 98% (06-10-25 @ 21:00) (98% - 98%)    Orthostatic VS  06-10-25 @ 09:24  Lying BP: --/-- HR: --  Sitting BP: 153/76 HR: 83  Standing BP: 146/79 HR: 86  Site: --  Mode: --  Orthostatic VS  06-10-25 @ 07:48  Lying BP: --/-- HR: --  Sitting BP: 166/72 HR: 81  Standing BP: 183/76 HR: 85  Site: --  Mode: --

## 2025-06-11 NOTE — BH INPATIENT PSYCHIATRY PROGRESS NOTE - MSE UNSTRUCTURED FT
Awake and alert. Calm, pleasant, good eye contact. Affect bright, superficial. Speech more productive but still limited. Thought process is logical, concrete. No delusions expressed. No suicidal ideations. No tremor or mvt disorder. Disoriented to time, oriented to place.

## 2025-06-11 NOTE — BH INPATIENT PSYCHIATRY PROGRESS NOTE - NSBHASSESSSUMMFT_PSY_ALL_CORE
74F hx asthma, depression, and HTN was admitted to the MICU with AHRF 2/2 asthma exacerbation resulting in acute on chronic HHRF causing AMS and increased WOB that required intubation. During her ICU course, she experienced seizure-like activity .  Additional issues included hyponatremia, acute on chronic metabolic alkalosis, and mild thrombocytopenia,  also having fevers.  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 Patient has h/o hx of major depressive disorder with psychosis, hx of 3 past inpatient psychiatric hospitalizations last in 2016 at [facility], all rehospitalizations were in context non compliance with the treatment. She has no hx of suicide attempts, no hx of substance abuse.    Pt currently requires inpatient level of care for ongoing treatment for catatonia.     5/22 - calm, cooperative, not oriented, though remembers name of son. No s/s catatonia. Denies mood symptoms. Eating and sleeping on unit.  5/23 - No signs of catatonia. Eating and ambulating on unit. For ECT this morning. Not oriented to time, place, or situation. Has been NPO. Spoke to , who believes pt is returning to baseline, but is concerned about pt's memory issues.  5/27 - Withdrawn, non-spontaneous speech, not oriented to time, place, or situation, though interactive and recalls her 's name. Hypoglycemic episode yesterday. D/w hospitalist, discontinued standing pre-meal Admelog of 2U TID, will keep on bedtime Lantus and sliding scale.   5/28: More engaged today, establishes good eye contact. Received ECT today, will monitor, if catatonia symptoms remain in control will continue with weekly ECT next week, if pt appears to be relapsing may order ECT for Friday.   5/29: Pt presenting with increased BM frequency, regular consistency. Will order imodium PRN for diarrhea.   5/30: Will observe pt over the weekend and next week for confusion. Family has brought up concern ECT may be worsening confusion and it is a possibility. Will observe off Zyprexa and space out ECT, may treat next Friday.   5/31: Remains confused but calm. Unclear if due to ECT or residual catatonia.  6/2: Will continue holding Zyprexa and ECT this week. Presentation is suggestive of protracted delirium.   6/3: Pt remains confused and disoriented. Continue holding Zyprexa and ECT. Ordered urinalysis and routine bloodwork.   6/4: Continue monitoring Alk phos and LFTs.   6/5: Alk phos uptrending, bilirubin is WNL, LFTs mildly elevated. Abdominal ultrasound scheduled for tomorrow, 6/6, at 9:00am. Family requesting ECT be held until Monday as they were concerned about confusion. Will resume Zyprexa 2.5mg QHS.   6/6: Abdominal ultrasound completed today, revealed gallstones but no evidence for cholecystitis or biliary dilatation. This likely explains elevated Alk phos and GGT. Ms. Silva denies pain, there is no tenderness to palpation on physical exam and sonographic Tomlin sign was not elicited. Will repeat bloodwork on Monday and continue monitoring. Discussed with medicine. ECT ordered for Monday 6/9.   6/7-6/8: Speech improved, calm, pleasant, plan for ECT 6/9  6/10: Better related this week. No signs of catatonia. S/P ECT yesterday.   6/11: No interval changes. Setting up homecare in preparation for discharge home. No symptoms of catatonia but pt remains disoriented with poor memory.     PLAN:  1. Admit to [facility] 2S on 9.27  2. Routine checks, as pt denying SI/HI/SIB  3. Psychiatric:  - Continue Namenda 10mg BID  - Continue Sertraline 125mg QD, titrate up to lowest effective dose  - Resume Olanzapine 2.5mg QHS due to paranoia  - Would avoid benzodiazepines since pt was recently unable to tolerate them at [facility], becoming extremely sedated  - Continue ECT once weekly, last treatment occurred Friday 5/30.     4. Medical:   - Abdominal ultrasound scheduled for 6/6/25 at 9am.   - PRN levalbuterol inhaler  - Continue Advair and Spiriva for Asthma and COPD  - Continue Lisinopril 40mg QD and HCTZ 25mg QD for HTN  - Diabetes type 2: Lantus 3U at bedtime + ISS  - Lidocaine patches PRN daily + Tylenol PRN for lower back pain  - Bowel regimen: Senna 2 tabs QHS  - Diet: Easy to chew, consistent carbohydrates. No Beef No Pork  - Pt is at risk for falls given deconditioning. Seen by PT, recommended PRN walker with 1 staff assists. Pt is in a room with peer who has CO for added supervision and redirection during nighttime. During the daytime she should be either in the day room or the dining area with staff monitoring her at all times to provide redirection if pt tries to get up or ambulate unassisted.     5. Collateral: spoke with pt's  Liam on phone to update on progress 5/22 and 5/23.  6. Disposition: pending clinical course

## 2025-06-11 NOTE — BH INPATIENT PSYCHIATRY PROGRESS NOTE - NSBHFUPINTERVALHXFT_PSY_A_CORE
Patient seen for follow up. Observed sitting in the dayroom this morning. Vitals are stable. Observed sitting down in brandon chair. Awake and alert. Better related.  Oriented to place but remains disoriented to time. Reports feeling well in terms of mood, denies SI/HI/AH/VH. Eager to return home. Aware homecare is being put in place.

## 2025-06-12 LAB
GLUCOSE BLDC GLUCOMTR-MCNC: 109 MG/DL — HIGH (ref 70–99)
GLUCOSE BLDC GLUCOMTR-MCNC: 117 MG/DL — HIGH (ref 70–99)
GLUCOSE BLDC GLUCOMTR-MCNC: 124 MG/DL — HIGH (ref 70–99)
GLUCOSE BLDC GLUCOMTR-MCNC: 130 MG/DL — HIGH (ref 70–99)

## 2025-06-12 PROCEDURE — 99232 SBSQ HOSP IP/OBS MODERATE 35: CPT

## 2025-06-12 RX ADMIN — Medication 1 DOSE(S): at 20:42

## 2025-06-12 RX ADMIN — TIOTROPIUM BROMIDE INHALATION SPRAY 2 PUFF(S): 3.12 SPRAY, METERED RESPIRATORY (INHALATION) at 09:10

## 2025-06-12 RX ADMIN — MEMANTINE HYDROCHLORIDE 10 MILLIGRAM(S): 21 CAPSULE, EXTENDED RELEASE ORAL at 20:41

## 2025-06-12 RX ADMIN — LISINOPRIL 40 MILLIGRAM(S): 5 TABLET ORAL at 09:09

## 2025-06-12 RX ADMIN — OLANZAPINE 2.5 MILLIGRAM(S): 10 TABLET ORAL at 20:42

## 2025-06-12 RX ADMIN — Medication 50 MILLIGRAM(S): at 20:41

## 2025-06-12 RX ADMIN — Medication 9 MILLIGRAM(S): at 20:41

## 2025-06-12 RX ADMIN — MEMANTINE HYDROCHLORIDE 10 MILLIGRAM(S): 21 CAPSULE, EXTENDED RELEASE ORAL at 06:34

## 2025-06-12 RX ADMIN — Medication 50 MILLIGRAM(S): at 12:32

## 2025-06-12 RX ADMIN — Medication 2 TABLET(S): at 20:42

## 2025-06-12 RX ADMIN — LEVALBUTEROL HYDROCHLORIDE 1 PUFF(S): 1.25 SOLUTION RESPIRATORY (INHALATION) at 21:23

## 2025-06-12 RX ADMIN — Medication 1 DOSE(S): at 09:10

## 2025-06-12 RX ADMIN — ATORVASTATIN CALCIUM 20 MILLIGRAM(S): 80 TABLET, FILM COATED ORAL at 20:42

## 2025-06-12 RX ADMIN — SERTRALINE 125 MILLIGRAM(S): 100 TABLET, FILM COATED ORAL at 09:09

## 2025-06-12 RX ADMIN — Medication 1 DROP(S): at 06:34

## 2025-06-12 RX ADMIN — LEVALBUTEROL HYDROCHLORIDE 1 PUFF(S): 1.25 SOLUTION RESPIRATORY (INHALATION) at 06:36

## 2025-06-12 RX ADMIN — Medication 1 DROP(S): at 21:20

## 2025-06-12 RX ADMIN — Medication 50 MILLIGRAM(S): at 09:09

## 2025-06-12 RX ADMIN — Medication 1 DROP(S): at 13:07

## 2025-06-12 RX ADMIN — LEVALBUTEROL HYDROCHLORIDE 1 PUFF(S): 1.25 SOLUTION RESPIRATORY (INHALATION) at 14:35

## 2025-06-12 NOTE — BH INPATIENT PSYCHIATRY PROGRESS NOTE - MSE UNSTRUCTURED FT
Awake and alert. Calm, pleasant, good eye contact. Affect bright, superficial. Speech more productive but still limited. Thought process is logical, concrete. No delusions expressed. No suicidal ideations. No tremor or mvt disorder. Disoriented to time, oriented to place. ambulating with walker with PT

## 2025-06-12 NOTE — BH INPATIENT PSYCHIATRY PROGRESS NOTE - NSBHCHARTREVIEWVS_PSY_A_CORE FT
Vital Signs Last 24 Hrs  T(C): 36.9 (06-12-25 @ 08:15), Max: 36.9 (06-12-25 @ 08:15)  T(F): 98.5 (06-12-25 @ 08:15), Max: 98.5 (06-12-25 @ 08:15)  HR: 82 (06-12-25 @ 12:30) (82 - 91)  BP: 144/72 (06-12-25 @ 12:30) (144/72 - 159/80)  BP(mean): --  RR: --  SpO2: --    Orthostatic VS  06-12-25 @ 08:15  Lying BP: --/-- HR: --  Sitting BP: 144/61 HR: 75  Standing BP: 141/67 HR: 82  Site: --  Mode: --

## 2025-06-12 NOTE — BH INPATIENT PSYCHIATRY PROGRESS NOTE - NSBHMETABOLIC_PSY_ALL_CORE_FT
BMI: BMI (kg/m2): 28.3 (05-21-25 @ 00:57)  HbA1c: A1C with Estimated Average Glucose Result: 5.9 % (05-22-25 @ 08:09)    Glucose: POCT Blood Glucose.: 124 mg/dL (06-12-25 @ 15:39)    BP: 144/72 (06-12-25 @ 12:30) (144/72 - 180/92)Vital Signs Last 24 Hrs  T(C): 36.9 (06-12-25 @ 08:15), Max: 36.9 (06-12-25 @ 08:15)  T(F): 98.5 (06-12-25 @ 08:15), Max: 98.5 (06-12-25 @ 08:15)  HR: 82 (06-12-25 @ 12:30) (82 - 91)  BP: 144/72 (06-12-25 @ 12:30) (144/72 - 159/80)  BP(mean): --  RR: --  SpO2: --    Orthostatic VS  06-12-25 @ 08:15  Lying BP: --/-- HR: --  Sitting BP: 144/61 HR: 75  Standing BP: 141/67 HR: 82  Site: --  Mode: --    Lipid Panel: Date/Time: 06-09-25 @ 08:11  Cholesterol, Serum: 227  LDL Cholesterol Calculated: 144  HDL Cholesterol, Serum: 75  Total Cholesterol/HDL Ration Measurement: --  Triglycerides, Serum: 49

## 2025-06-12 NOTE — BH INPATIENT PSYCHIATRY PROGRESS NOTE - NSBHASSESSSUMMFT_PSY_ALL_CORE
74F hx asthma, depression, and HTN was admitted to the MICU with AHRF 2/2 asthma exacerbation resulting in acute on chronic HHRF causing AMS and increased WOB that required intubation. During her ICU course, she experienced seizure-like activity .  Additional issues included hyponatremia, acute on chronic metabolic alkalosis, and mild thrombocytopenia,  also having fevers.  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 Patient has h/o hx of major depressive disorder with psychosis, hx of 3 past inpatient psychiatric hospitalizations last in 2016 at [facility], all rehospitalizations were in context non compliance with the treatment. She has no hx of suicide attempts, no hx of substance abuse.    Pt currently requires inpatient level of care for ongoing treatment for catatonia.     5/22 - calm, cooperative, not oriented, though remembers name of son. No s/s catatonia. Denies mood symptoms. Eating and sleeping on unit.  5/23 - No signs of catatonia. Eating and ambulating on unit. For ECT this morning. Not oriented to time, place, or situation. Has been NPO. Spoke to , who believes pt is returning to baseline, but is concerned about pt's memory issues.  5/27 - Withdrawn, non-spontaneous speech, not oriented to time, place, or situation, though interactive and recalls her 's name. Hypoglycemic episode yesterday. D/w hospitalist, discontinued standing pre-meal Admelog of 2U TID, will keep on bedtime Lantus and sliding scale.   5/28: More engaged today, establishes good eye contact. Received ECT today, will monitor, if catatonia symptoms remain in control will continue with weekly ECT next week, if pt appears to be relapsing may order ECT for Friday.   5/29: Pt presenting with increased BM frequency, regular consistency. Will order imodium PRN for diarrhea.   5/30: Will observe pt over the weekend and next week for confusion. Family has brought up concern ECT may be worsening confusion and it is a possibility. Will observe off Zyprexa and space out ECT, may treat next Friday.   5/31: Remains confused but calm. Unclear if due to ECT or residual catatonia.  6/2: Will continue holding Zyprexa and ECT this week. Presentation is suggestive of protracted delirium.   6/3: Pt remains confused and disoriented. Continue holding Zyprexa and ECT. Ordered urinalysis and routine bloodwork.   6/4: Continue monitoring Alk phos and LFTs.   6/5: Alk phos uptrending, bilirubin is WNL, LFTs mildly elevated. Abdominal ultrasound scheduled for tomorrow, 6/6, at 9:00am. Family requesting ECT be held until Monday as they were concerned about confusion. Will resume Zyprexa 2.5mg QHS.   6/6: Abdominal ultrasound completed today, revealed gallstones but no evidence for cholecystitis or biliary dilatation. This likely explains elevated Alk phos and GGT. Ms. Silva denies pain, there is no tenderness to palpation on physical exam and sonographic Tomlin sign was not elicited. Will repeat bloodwork on Monday and continue monitoring. Discussed with medicine. ECT ordered for Monday 6/9.   6/7-6/8: Speech improved, calm, pleasant, plan for ECT 6/9  6/10: Better related this week. No signs of catatonia. S/P ECT yesterday.   6/11: No interval changes. Setting up homecare in preparation for discharge home. No symptoms of catatonia but pt remains disoriented with poor memory.   6/12: memory impairment evident, no agitation or catatonia evident, tolerating meds.     PLAN:  1. Admit to [facility] 2S on 9.27  2. Routine checks, as pt denying SI/HI/SIB  3. Psychiatric:  - Continue Namenda 10mg BID  - Continue Sertraline 125mg QD, titrate up to lowest effective dose  - Resume Olanzapine 2.5mg QHS due to paranoia  - Would avoid benzodiazepines since pt was recently unable to tolerate them at [facility], becoming extremely sedated  - Continue ECT once weekly, last treatment occurred Friday 5/30.     4. Medical:   - Abdominal ultrasound scheduled for 6/6/25 at 9am.   - PRN levalbuterol inhaler  - Continue Advair and Spiriva for Asthma and COPD  - Continue Lisinopril 40mg QD and HCTZ 25mg QD for HTN  - Diabetes type 2: Lantus 3U at bedtime + ISS  - Lidocaine patches PRN daily + Tylenol PRN for lower back pain  - Bowel regimen: Senna 2 tabs QHS  - Diet: Easy to chew, consistent carbohydrates. No Beef No Pork  - Pt is at risk for falls given deconditioning. Seen by PT, recommended PRN walker with 1 staff assists. Pt is in a room with peer who has CO for added supervision and redirection during nighttime. During the daytime she should be either in the day room or the dining area with staff monitoring her at all times to provide redirection if pt tries to get up or ambulate unassisted.     5. Collateral: spoke with pt's  Liam on phone to update on progress 5/22 and 5/23.  6. Disposition: pending clinical course

## 2025-06-12 NOTE — BH INPATIENT PSYCHIATRY PROGRESS NOTE - NSBHFUPINTERVALHXFT_PSY_A_CORE
The patient is receiving follow-up care for catatonia, medical issues include: Hypertension (HTN), Asthma, and Diabetes Mellitus (DM). Treatment was discussed within the care team, with no reported issues overnight. Vital signs are stable (VSS).  The patient denies suicidal or homicidal ideation, as well as auditory or visual hallucinations. She describes her mood as good, with a full and congruent affect. However, she exhibits memory lapses, unable to recall the date or location, stating, "I do not remember" when assessed for orientation. She is oriented to self.  Her appetite and sleep are reported as good, with sleep logs confirming her statement. She was observed engaging in the community with family and peers. The patient ambulated slowly but steadily using a walker and supervised by PT, reporting no pain or discomfort, denying difficulties using the bathroom. Medications were accepted and well tolerated.

## 2025-06-13 LAB
GLUCOSE BLDC GLUCOMTR-MCNC: 117 MG/DL — HIGH (ref 70–99)
GLUCOSE BLDC GLUCOMTR-MCNC: 124 MG/DL — HIGH (ref 70–99)
GLUCOSE BLDC GLUCOMTR-MCNC: 124 MG/DL — HIGH (ref 70–99)
GLUCOSE BLDC GLUCOMTR-MCNC: 157 MG/DL — HIGH (ref 70–99)

## 2025-06-13 PROCEDURE — 99232 SBSQ HOSP IP/OBS MODERATE 35: CPT

## 2025-06-13 RX ADMIN — Medication 9 MILLIGRAM(S): at 21:06

## 2025-06-13 RX ADMIN — LISINOPRIL 40 MILLIGRAM(S): 5 TABLET ORAL at 08:08

## 2025-06-13 RX ADMIN — MEMANTINE HYDROCHLORIDE 10 MILLIGRAM(S): 21 CAPSULE, EXTENDED RELEASE ORAL at 06:21

## 2025-06-13 RX ADMIN — Medication 50 MILLIGRAM(S): at 21:05

## 2025-06-13 RX ADMIN — Medication 1 DOSE(S): at 12:13

## 2025-06-13 RX ADMIN — OLANZAPINE 2.5 MILLIGRAM(S): 10 TABLET ORAL at 21:07

## 2025-06-13 RX ADMIN — Medication 50 MILLIGRAM(S): at 12:12

## 2025-06-13 RX ADMIN — Medication 1 DROP(S): at 21:48

## 2025-06-13 RX ADMIN — SERTRALINE 125 MILLIGRAM(S): 100 TABLET, FILM COATED ORAL at 08:08

## 2025-06-13 RX ADMIN — MEMANTINE HYDROCHLORIDE 10 MILLIGRAM(S): 21 CAPSULE, EXTENDED RELEASE ORAL at 21:06

## 2025-06-13 RX ADMIN — TIOTROPIUM BROMIDE INHALATION SPRAY 2 PUFF(S): 3.12 SPRAY, METERED RESPIRATORY (INHALATION) at 12:13

## 2025-06-13 RX ADMIN — Medication 1 DROP(S): at 14:24

## 2025-06-13 RX ADMIN — LEVALBUTEROL HYDROCHLORIDE 1 PUFF(S): 1.25 SOLUTION RESPIRATORY (INHALATION) at 14:25

## 2025-06-13 RX ADMIN — Medication 1 DOSE(S): at 21:47

## 2025-06-13 RX ADMIN — Medication 2 TABLET(S): at 21:07

## 2025-06-13 RX ADMIN — ATORVASTATIN CALCIUM 20 MILLIGRAM(S): 80 TABLET, FILM COATED ORAL at 21:06

## 2025-06-13 RX ADMIN — Medication 50 MILLIGRAM(S): at 08:08

## 2025-06-13 RX ADMIN — Medication 1 DROP(S): at 06:22

## 2025-06-13 RX ADMIN — LEVALBUTEROL HYDROCHLORIDE 1 PUFF(S): 1.25 SOLUTION RESPIRATORY (INHALATION) at 06:21

## 2025-06-13 RX ADMIN — LEVALBUTEROL HYDROCHLORIDE 1 PUFF(S): 1.25 SOLUTION RESPIRATORY (INHALATION) at 21:47

## 2025-06-13 NOTE — BH INPATIENT PSYCHIATRY PROGRESS NOTE - CURRENT MEDICATION
MEDICATIONS  (STANDING):  artificial tears (preservative free) Ophthalmic Solution 1 Drop(s) Both EYES every 8 hours  atorvastatin 20 milliGRAM(s) Oral at bedtime  fluticasone propionate/ salmeterol 250-50 MICROgram(s) Diskus 1 Dose(s) Inhalation two times a day  glucagon  Injectable 1 milliGRAM(s) IntraMuscular once  hydrALAZINE 50 milliGRAM(s) Oral three times a day  hydrochlorothiazide 50 milliGRAM(s) Oral every 24 hours  levalbuterol 45 MICROgram(s) HFA Inhaler 1 Puff(s) Inhalation every 8 hours  lisinopril 40 milliGRAM(s) Oral daily  melatonin. 9 milliGRAM(s) Oral at bedtime  memantine 10 milliGRAM(s) Oral <User Schedule>  memantine 10 milliGRAM(s) Oral at bedtime  OLANZapine 2.5 milliGRAM(s) Oral at bedtime  petrolatum Ophthalmic Ointment 1 Application(s) Both EYES two times a day  senna 2 Tablet(s) Oral at bedtime  sertraline 125 milliGRAM(s) Oral daily  tiotropium 2.5 MICROgram(s) Inhaler 2 Puff(s) Inhalation daily    MEDICATIONS  (PRN):  acetaminophen     Tablet .. 650 milliGRAM(s) Oral every 6 hours PRN Temp greater or equal to 38C (100.4F), Mild Pain (1 - 3), Moderate Pain (4 - 6)  lidocaine   4% Patch 1 Patch Transdermal daily PRN chronic back pain  loperamide 2 milliGRAM(s) Oral once PRN Diarrhea  OLANZapine 1.25 milliGRAM(s) Oral every 6 hours PRN agitation

## 2025-06-13 NOTE — CHART NOTE - NSCHARTNOTEFT_GEN_A_CORE
Reviewed finger stick glucose readings from the week. Glucose levels at goal. D/c'ed fingersticks and sliding scale insulin.

## 2025-06-13 NOTE — CHART NOTE - NSCHARTNOTEFT_GEN_A_CORE
Reviewed blood pressure trend over the week. Overall, patient's blood pressure is not at goal (150-170s) despite resumption of hydralazine. Will increase HCTZ from 25mg qd to 50mg qd.

## 2025-06-13 NOTE — BH INPATIENT PSYCHIATRY PROGRESS NOTE - NSBHMETABOLIC_PSY_ALL_CORE_FT
BMI: BMI (kg/m2): 28.3 (05-21-25 @ 00:57)  HbA1c: A1C with Estimated Average Glucose Result: 5.9 % (05-22-25 @ 08:09)    Glucose: POCT Blood Glucose.: 124 mg/dL (06-13-25 @ 11:42)    BP: 127/98 (06-13-25 @ 11:07) (127/98 - 180/92)Vital Signs Last 24 Hrs  T(C): 36.9 (06-13-25 @ 07:58), Max: 36.9 (06-13-25 @ 07:58)  T(F): 98.4 (06-13-25 @ 07:58), Max: 98.4 (06-13-25 @ 07:58)  HR: 97 (06-13-25 @ 11:07) (92 - 97)  BP: 127/98 (06-13-25 @ 11:07) (127/98 - 174/91)  BP(mean): --  RR: --  SpO2: --    Orthostatic VS  06-12-25 @ 08:15  Lying BP: --/-- HR: --  Sitting BP: 144/61 HR: 75  Standing BP: 141/67 HR: 82  Site: --  Mode: --    Lipid Panel: Date/Time: 06-09-25 @ 08:11  Cholesterol, Serum: 227  LDL Cholesterol Calculated: 144  HDL Cholesterol, Serum: 75  Total Cholesterol/HDL Ration Measurement: --  Triglycerides, Serum: 49

## 2025-06-13 NOTE — BH INPATIENT PSYCHIATRY PROGRESS NOTE - NSBHASSESSSUMMFT_PSY_ALL_CORE
74F hx asthma, depression, and HTN was admitted to the MICU with AHRF 2/2 asthma exacerbation resulting in acute on chronic HHRF causing AMS and increased WOB that required intubation. During her ICU course, she experienced seizure-like activity .  Additional issues included hyponatremia, acute on chronic metabolic alkalosis, and mild thrombocytopenia,  also having fevers.  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 Patient has h/o hx of major depressive disorder with psychosis, hx of 3 past inpatient psychiatric hospitalizations last in 2016 at [facility], all rehospitalizations were in context non compliance with the treatment. She has no hx of suicide attempts, no hx of substance abuse.    Pt currently requires inpatient level of care for ongoing treatment for catatonia.     5/22 - calm, cooperative, not oriented, though remembers name of son. No s/s catatonia. Denies mood symptoms. Eating and sleeping on unit.  5/23 - No signs of catatonia. Eating and ambulating on unit. For ECT this morning. Not oriented to time, place, or situation. Has been NPO. Spoke to , who believes pt is returning to baseline, but is concerned about pt's memory issues.  5/27 - Withdrawn, non-spontaneous speech, not oriented to time, place, or situation, though interactive and recalls her 's name. Hypoglycemic episode yesterday. D/w hospitalist, discontinued standing pre-meal Admelog of 2U TID, will keep on bedtime Lantus and sliding scale.   5/28: More engaged today, establishes good eye contact. Received ECT today, will monitor, if catatonia symptoms remain in control will continue with weekly ECT next week, if pt appears to be relapsing may order ECT for Friday.   5/29: Pt presenting with increased BM frequency, regular consistency. Will order imodium PRN for diarrhea.   5/30: Will observe pt over the weekend and next week for confusion. Family has brought up concern ECT may be worsening confusion and it is a possibility. Will observe off Zyprexa and space out ECT, may treat next Friday.   5/31: Remains confused but calm. Unclear if due to ECT or residual catatonia.  6/2: Will continue holding Zyprexa and ECT this week. Presentation is suggestive of protracted delirium.   6/3: Pt remains confused and disoriented. Continue holding Zyprexa and ECT. Ordered urinalysis and routine bloodwork.   6/4: Continue monitoring Alk phos and LFTs.   6/5: Alk phos uptrending, bilirubin is WNL, LFTs mildly elevated. Abdominal ultrasound scheduled for tomorrow, 6/6, at 9:00am. Family requesting ECT be held until Monday as they were concerned about confusion. Will resume Zyprexa 2.5mg QHS.   6/6: Abdominal ultrasound completed today, revealed gallstones but no evidence for cholecystitis or biliary dilatation. This likely explains elevated Alk phos and GGT. Ms. Silva denies pain, there is no tenderness to palpation on physical exam and sonographic Tomlin sign was not elicited. Will repeat bloodwork on Monday and continue monitoring. Discussed with medicine. ECT ordered for Monday 6/9.   6/7-6/8: Speech improved, calm, pleasant, plan for ECT 6/9  6/10: Better related this week. No signs of catatonia. S/P ECT yesterday.   6/11: No interval changes. Setting up homecare in preparation for discharge home. No symptoms of catatonia but pt remains disoriented with poor memory.   6/12: memory impairment evident, no agitation or catatonia evident, tolerating meds.   6/13: Continues to present memory impairment. No signs of catatonia.     PLAN:  1. Admit to [facility] 2S on 9.27  2. Routine checks, as pt denying SI/HI/SIB  3. Psychiatric:  - Continue Namenda 10mg BID  - Continue Sertraline 125mg QD, titrate up to lowest effective dose  - Resume Olanzapine 2.5mg QHS due to paranoia  - Would avoid benzodiazepines since pt was recently unable to tolerate them at [facility], becoming extremely sedated  - Continue ECT once weekly, last treatment occurred Friday 5/30.     4. Medical:   - Abdominal ultrasound scheduled for 6/6/25 at 9am.   - PRN levalbuterol inhaler  - Continue Advair and Spiriva for Asthma and COPD  - Continue Lisinopril 40mg QD and HCTZ 25mg QD for HTN  - Diabetes type 2: Lantus 3U at bedtime + ISS  - Lidocaine patches PRN daily + Tylenol PRN for lower back pain  - Bowel regimen: Senna 2 tabs QHS  - Diet: Easy to chew, consistent carbohydrates. No Beef No Pork  - Pt is at risk for falls given deconditioning. Seen by PT, recommended PRN walker with 1 staff assists. Pt is in a room with peer who has CO for added supervision and redirection during nighttime. During the daytime she should be either in the day room or the dining area with staff monitoring her at all times to provide redirection if pt tries to get up or ambulate unassisted.     5. Collateral: spoke with pt's  Liam on phone to update on progress 5/22 and 5/23.  6. Disposition: pending clinical course

## 2025-06-13 NOTE — BH INPATIENT PSYCHIATRY PROGRESS NOTE - NSBHFUPINTERVALHXFT_PSY_A_CORE
The patient is receiving follow-up care for catatonia, medical issues include: Hypertension (HTN), Asthma, and Diabetes Mellitus (DM). Treatment was discussed within the care team, with no reported issues overnight. Vital signs are stable (VSS). Ms. Silva continues to endorse stable mood and denies suicidal thoughts. Denies auditory or visual hallucinations. Continues to present memory lapses, does not recall the date or why she is still in the hospital beyond "I was sick". Despite confusion she remains in good spirits.

## 2025-06-13 NOTE — BH INPATIENT PSYCHIATRY PROGRESS NOTE - NSBHCHARTREVIEWVS_PSY_A_CORE FT
Vital Signs Last 24 Hrs  T(C): 36.9 (06-13-25 @ 07:58), Max: 36.9 (06-13-25 @ 07:58)  T(F): 98.4 (06-13-25 @ 07:58), Max: 98.4 (06-13-25 @ 07:58)  HR: 97 (06-13-25 @ 11:07) (92 - 97)  BP: 127/98 (06-13-25 @ 11:07) (127/98 - 174/91)  BP(mean): --  RR: --  SpO2: --    Orthostatic VS  06-12-25 @ 08:15  Lying BP: --/-- HR: --  Sitting BP: 144/61 HR: 75  Standing BP: 141/67 HR: 82  Site: --  Mode: --

## 2025-06-14 LAB
GLUCOSE BLDC GLUCOMTR-MCNC: 118 MG/DL — HIGH (ref 70–99)
GLUCOSE BLDC GLUCOMTR-MCNC: 120 MG/DL — HIGH (ref 70–99)
GLUCOSE BLDC GLUCOMTR-MCNC: 141 MG/DL — HIGH (ref 70–99)

## 2025-06-14 PROCEDURE — 99232 SBSQ HOSP IP/OBS MODERATE 35: CPT

## 2025-06-14 RX ORDER — ALBUTEROL SULFATE 2.5 MG/3ML
2 VIAL, NEBULIZER (ML) INHALATION EVERY 6 HOURS
Refills: 0 | Status: DISCONTINUED | OUTPATIENT
Start: 2025-06-14 | End: 2025-07-01

## 2025-06-14 RX ADMIN — MEMANTINE HYDROCHLORIDE 10 MILLIGRAM(S): 21 CAPSULE, EXTENDED RELEASE ORAL at 06:55

## 2025-06-14 RX ADMIN — Medication 1 DOSE(S): at 09:01

## 2025-06-14 RX ADMIN — ATORVASTATIN CALCIUM 20 MILLIGRAM(S): 80 TABLET, FILM COATED ORAL at 20:14

## 2025-06-14 RX ADMIN — Medication 50 MILLIGRAM(S): at 12:48

## 2025-06-14 RX ADMIN — LEVALBUTEROL HYDROCHLORIDE 1 PUFF(S): 1.25 SOLUTION RESPIRATORY (INHALATION) at 20:14

## 2025-06-14 RX ADMIN — Medication 1 DOSE(S): at 20:14

## 2025-06-14 RX ADMIN — LEVALBUTEROL HYDROCHLORIDE 1 PUFF(S): 1.25 SOLUTION RESPIRATORY (INHALATION) at 06:56

## 2025-06-14 RX ADMIN — Medication 50 MILLIGRAM(S): at 20:14

## 2025-06-14 RX ADMIN — TIOTROPIUM BROMIDE INHALATION SPRAY 2 PUFF(S): 3.12 SPRAY, METERED RESPIRATORY (INHALATION) at 09:03

## 2025-06-14 RX ADMIN — LISINOPRIL 40 MILLIGRAM(S): 5 TABLET ORAL at 09:00

## 2025-06-14 RX ADMIN — SERTRALINE 125 MILLIGRAM(S): 100 TABLET, FILM COATED ORAL at 08:59

## 2025-06-14 RX ADMIN — Medication 1 DROP(S): at 06:56

## 2025-06-14 RX ADMIN — MEMANTINE HYDROCHLORIDE 10 MILLIGRAM(S): 21 CAPSULE, EXTENDED RELEASE ORAL at 20:14

## 2025-06-14 RX ADMIN — Medication 50 MILLIGRAM(S): at 09:00

## 2025-06-14 RX ADMIN — Medication 9 MILLIGRAM(S): at 20:14

## 2025-06-14 RX ADMIN — OLANZAPINE 2.5 MILLIGRAM(S): 10 TABLET ORAL at 20:14

## 2025-06-14 RX ADMIN — Medication 2 PUFF(S): at 03:30

## 2025-06-14 RX ADMIN — Medication 2 TABLET(S): at 20:13

## 2025-06-14 RX ADMIN — Medication 1 DROP(S): at 21:55

## 2025-06-14 NOTE — BH INPATIENT PSYCHIATRY PROGRESS NOTE - CURRENT MEDICATION
MEDICATIONS  (STANDING):  artificial tears (preservative free) Ophthalmic Solution 1 Drop(s) Both EYES every 8 hours  atorvastatin 20 milliGRAM(s) Oral at bedtime  fluticasone propionate/ salmeterol 250-50 MICROgram(s) Diskus 1 Dose(s) Inhalation two times a day  glucagon  Injectable 1 milliGRAM(s) IntraMuscular once  hydrALAZINE 50 milliGRAM(s) Oral three times a day  hydrochlorothiazide 50 milliGRAM(s) Oral daily  levalbuterol 45 MICROgram(s) HFA Inhaler 1 Puff(s) Inhalation every 8 hours  lisinopril 40 milliGRAM(s) Oral daily  melatonin. 9 milliGRAM(s) Oral at bedtime  memantine 10 milliGRAM(s) Oral <User Schedule>  memantine 10 milliGRAM(s) Oral at bedtime  OLANZapine 2.5 milliGRAM(s) Oral at bedtime  petrolatum Ophthalmic Ointment 1 Application(s) Both EYES two times a day  senna 2 Tablet(s) Oral at bedtime  sertraline 125 milliGRAM(s) Oral daily  tiotropium 2.5 MICROgram(s) Inhaler 2 Puff(s) Inhalation daily    MEDICATIONS  (PRN):  acetaminophen     Tablet .. 650 milliGRAM(s) Oral every 6 hours PRN Temp greater or equal to 38C (100.4F), Mild Pain (1 - 3), Moderate Pain (4 - 6)  albuterol    90 MICROgram(s) HFA Inhaler 2 Puff(s) Inhalation every 6 hours PRN Shortness of Breath and/or Wheezing  lidocaine   4% Patch 1 Patch Transdermal daily PRN chronic back pain  loperamide 2 milliGRAM(s) Oral once PRN Diarrhea  OLANZapine 1.25 milliGRAM(s) Oral every 6 hours PRN agitation

## 2025-06-14 NOTE — BH INPATIENT PSYCHIATRY PROGRESS NOTE - NSBHASSESSSUMMFT_PSY_ALL_CORE
74F hx asthma, depression, and HTN was admitted to the MICU with AHRF 2/2 asthma exacerbation resulting in acute on chronic HHRF causing AMS and increased WOB that required intubation. During her ICU course, she experienced seizure-like activity .  Additional issues included hyponatremia, acute on chronic metabolic alkalosis, and mild thrombocytopenia,  also having fevers.  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 Patient has h/o hx of major depressive disorder with psychosis, hx of 3 past inpatient psychiatric hospitalizations last in 2016 at [facility], all rehospitalizations were in context non compliance with the treatment. She has no hx of suicide attempts, no hx of substance abuse.    Pt currently requires inpatient level of care for ongoing treatment for catatonia.     5/22 - calm, cooperative, not oriented, though remembers name of son. No s/s catatonia. Denies mood symptoms. Eating and sleeping on unit.  5/23 - No signs of catatonia. Eating and ambulating on unit. For ECT this morning. Not oriented to time, place, or situation. Has been NPO. Spoke to , who believes pt is returning to baseline, but is concerned about pt's memory issues.  5/27 - Withdrawn, non-spontaneous speech, not oriented to time, place, or situation, though interactive and recalls her 's name. Hypoglycemic episode yesterday. D/w hospitalist, discontinued standing pre-meal Admelog of 2U TID, will keep on bedtime Lantus and sliding scale.   5/28: More engaged today, establishes good eye contact. Received ECT today, will monitor, if catatonia symptoms remain in control will continue with weekly ECT next week, if pt appears to be relapsing may order ECT for Friday.   5/29: Pt presenting with increased BM frequency, regular consistency. Will order imodium PRN for diarrhea.   5/30: Will observe pt over the weekend and next week for confusion. Family has brought up concern ECT may be worsening confusion and it is a possibility. Will observe off Zyprexa and space out ECT, may treat next Friday.   5/31: Remains confused but calm. Unclear if due to ECT or residual catatonia.  6/2: Will continue holding Zyprexa and ECT this week. Presentation is suggestive of protracted delirium.   6/3: Pt remains confused and disoriented. Continue holding Zyprexa and ECT. Ordered urinalysis and routine bloodwork.   6/4: Continue monitoring Alk phos and LFTs.   6/5: Alk phos uptrending, bilirubin is WNL, LFTs mildly elevated. Abdominal ultrasound scheduled for tomorrow, 6/6, at 9:00am. Family requesting ECT be held until Monday as they were concerned about confusion. Will resume Zyprexa 2.5mg QHS.   6/6: Abdominal ultrasound completed today, revealed gallstones but no evidence for cholecystitis or biliary dilatation. This likely explains elevated Alk phos and GGT. Ms. Silva denies pain, there is no tenderness to palpation on physical exam and sonographic Tomlin sign was not elicited. Will repeat bloodwork on Monday and continue monitoring. Discussed with medicine. ECT ordered for Monday 6/9.   6/7-6/8: Speech improved, calm, pleasant, plan for ECT 6/9  6/10: Better related this week. No signs of catatonia. S/P ECT yesterday.   6/11: No interval changes. Setting up homecare in preparation for discharge home. No symptoms of catatonia but pt remains disoriented with poor memory.   6/12: memory impairment evident, no agitation or catatonia evident, tolerating meds.   6/13: Continues to present memory impairment. No signs of catatonia.   6/14: Overnight staff reported near choking incidents on meds and water overnight. Diet changed to puree in the meantime and cineesophagram was ordered. Discussed choking precautions with staff and patients, advised pt remain upright while eating or drinking and take small bites.     PLAN:  1. Admit to [facility] 2S on 9.27  2. Routine checks, as pt denying SI/HI/SIB  3. Psychiatric:  - Continue Namenda 10mg BID  - Continue Sertraline 125mg QD, titrate up to lowest effective dose  - Resume Olanzapine 2.5mg QHS due to paranoia  - Would avoid benzodiazepines since pt was recently unable to tolerate them at [facility], becoming extremely sedated  - Continue ECT once weekly, last treatment occurred Friday 5/30.     4. Medical:   - Abdominal ultrasound scheduled for 6/6/25 at 9am.   - PRN levalbuterol inhaler  - Continue Advair and Spiriva for Asthma and COPD  - Continue Lisinopril 40mg QD and HCTZ 25mg QD for HTN  - Diabetes type 2: Lantus 3U at bedtime + ISS  - Lidocaine patches PRN daily + Tylenol PRN for lower back pain  - Bowel regimen: Senna 2 tabs QHS  - Diet: Easy to chew, consistent carbohydrates. No Beef No Pork  - Pt is at risk for falls given deconditioning. Seen by PT, recommended PRN walker with 1 staff assists. Pt is in a room with peer who has CO for added supervision and redirection during nighttime. During the daytime she should be either in the day room or the dining area with staff monitoring her at all times to provide redirection if pt tries to get up or ambulate unassisted.     5. Collateral: spoke with pt's  Liam on phone to update on progress 5/22 and 5/23.  6. Disposition: pending clinical course

## 2025-06-14 NOTE — BH INPATIENT PSYCHIATRY PROGRESS NOTE - PRN MEDS
MEDICATIONS  (PRN):  acetaminophen     Tablet .. 650 milliGRAM(s) Oral every 6 hours PRN Temp greater or equal to 38C (100.4F), Mild Pain (1 - 3), Moderate Pain (4 - 6)  albuterol    90 MICROgram(s) HFA Inhaler 2 Puff(s) Inhalation every 6 hours PRN Shortness of Breath and/or Wheezing  lidocaine   4% Patch 1 Patch Transdermal daily PRN chronic back pain  loperamide 2 milliGRAM(s) Oral once PRN Diarrhea  OLANZapine 1.25 milliGRAM(s) Oral every 6 hours PRN agitation

## 2025-06-14 NOTE — BH INPATIENT PSYCHIATRY PROGRESS NOTE - NSBHMETABOLIC_PSY_ALL_CORE_FT
BMI: BMI (kg/m2): 28.3 (05-21-25 @ 00:57)  HbA1c: A1C with Estimated Average Glucose Result: 5.9 % (05-22-25 @ 08:09)    Glucose: POCT Blood Glucose.: 117 mg/dL (06-13-25 @ 20:12)    BP: 151/81 (06-13-25 @ 22:15) (127/98 - 177/86)Vital Signs Last 24 Hrs  T(C): --  T(F): --  HR: 97 (06-13-25 @ 22:15) (95 - 97)  BP: 151/81 (06-13-25 @ 22:15) (127/98 - 177/86)  BP(mean): --  RR: --  SpO2: 95% (06-14-25 @ 03:30) (95% - 95%)      Lipid Panel: Date/Time: 06-09-25 @ 08:11  Cholesterol, Serum: 227  LDL Cholesterol Calculated: 144  HDL Cholesterol, Serum: 75  Total Cholesterol/HDL Ration Measurement: --  Triglycerides, Serum: 49

## 2025-06-14 NOTE — BH INPATIENT PSYCHIATRY PROGRESS NOTE - NSBHFUPINTERVALHXFT_PSY_A_CORE
The patient is receiving follow-up care for catatonia, medical issues include: Hypertension (HTN), Asthma, and Diabetes Mellitus (DM). Treatment was discussed within the care team, with no reported issues overnight. Overnight staff reported near choking incidents on meds and water overnight. Diet changed to puree in the meantime and cineesophagram was ordered. Discussed choking precautions with staff and patients, advised pt remain upright while eating or drinking and take small bites.

## 2025-06-14 NOTE — BH INPATIENT PSYCHIATRY PROGRESS NOTE - NSBHCHARTREVIEWVS_PSY_A_CORE FT
Vital Signs Last 24 Hrs  T(C): --  T(F): --  HR: 97 (06-13-25 @ 22:15) (95 - 97)  BP: 151/81 (06-13-25 @ 22:15) (127/98 - 177/86)  BP(mean): --  RR: --  SpO2: 95% (06-14-25 @ 03:30) (95% - 95%)

## 2025-06-15 LAB
GLUCOSE BLDC GLUCOMTR-MCNC: 105 MG/DL — HIGH (ref 70–99)
GLUCOSE BLDC GLUCOMTR-MCNC: 120 MG/DL — HIGH (ref 70–99)
GLUCOSE BLDC GLUCOMTR-MCNC: 165 MG/DL — HIGH (ref 70–99)
GLUCOSE BLDC GLUCOMTR-MCNC: 170 MG/DL — HIGH (ref 70–99)

## 2025-06-15 RX ADMIN — Medication 1 DROP(S): at 14:00

## 2025-06-15 RX ADMIN — Medication 1 DROP(S): at 21:07

## 2025-06-15 RX ADMIN — SERTRALINE 125 MILLIGRAM(S): 100 TABLET, FILM COATED ORAL at 08:21

## 2025-06-15 RX ADMIN — OLANZAPINE 2.5 MILLIGRAM(S): 10 TABLET ORAL at 20:20

## 2025-06-15 RX ADMIN — MEMANTINE HYDROCHLORIDE 10 MILLIGRAM(S): 21 CAPSULE, EXTENDED RELEASE ORAL at 06:28

## 2025-06-15 RX ADMIN — LISINOPRIL 40 MILLIGRAM(S): 5 TABLET ORAL at 08:21

## 2025-06-15 RX ADMIN — ATORVASTATIN CALCIUM 20 MILLIGRAM(S): 80 TABLET, FILM COATED ORAL at 20:20

## 2025-06-15 RX ADMIN — Medication 650 MILLIGRAM(S): at 11:21

## 2025-06-15 RX ADMIN — Medication 1 DROP(S): at 06:28

## 2025-06-15 RX ADMIN — TIOTROPIUM BROMIDE INHALATION SPRAY 2 PUFF(S): 3.12 SPRAY, METERED RESPIRATORY (INHALATION) at 09:20

## 2025-06-15 RX ADMIN — Medication 650 MILLIGRAM(S): at 10:21

## 2025-06-15 RX ADMIN — Medication 2 TABLET(S): at 20:20

## 2025-06-15 RX ADMIN — Medication 9 MILLIGRAM(S): at 20:20

## 2025-06-15 RX ADMIN — Medication 1 DOSE(S): at 20:20

## 2025-06-15 RX ADMIN — LEVALBUTEROL HYDROCHLORIDE 1 PUFF(S): 1.25 SOLUTION RESPIRATORY (INHALATION) at 21:07

## 2025-06-15 RX ADMIN — Medication 50 MILLIGRAM(S): at 08:21

## 2025-06-15 RX ADMIN — Medication 50 MILLIGRAM(S): at 12:06

## 2025-06-15 RX ADMIN — MEMANTINE HYDROCHLORIDE 10 MILLIGRAM(S): 21 CAPSULE, EXTENDED RELEASE ORAL at 20:21

## 2025-06-15 RX ADMIN — Medication 1 DOSE(S): at 09:20

## 2025-06-15 RX ADMIN — Medication 50 MILLIGRAM(S): at 20:21

## 2025-06-15 RX ADMIN — LEVALBUTEROL HYDROCHLORIDE 1 PUFF(S): 1.25 SOLUTION RESPIRATORY (INHALATION) at 14:00

## 2025-06-15 RX ADMIN — LEVALBUTEROL HYDROCHLORIDE 1 PUFF(S): 1.25 SOLUTION RESPIRATORY (INHALATION) at 06:29

## 2025-06-16 LAB
ALBUMIN SERPL ELPH-MCNC: 4 G/DL — SIGNIFICANT CHANGE UP (ref 3.3–5)
ALP SERPL-CCNC: 477 U/L — HIGH (ref 40–120)
ALT FLD-CCNC: 51 U/L — HIGH (ref 4–33)
ANION GAP SERPL CALC-SCNC: 12 MMOL/L — SIGNIFICANT CHANGE UP (ref 7–14)
AST SERPL-CCNC: 40 U/L — HIGH (ref 4–32)
BILIRUB SERPL-MCNC: 0.5 MG/DL — SIGNIFICANT CHANGE UP (ref 0.2–1.2)
BUN SERPL-MCNC: 13 MG/DL — SIGNIFICANT CHANGE UP (ref 7–23)
CALCIUM SERPL-MCNC: 9.4 MG/DL — SIGNIFICANT CHANGE UP (ref 8.4–10.5)
CHLORIDE SERPL-SCNC: 101 MMOL/L — SIGNIFICANT CHANGE UP (ref 98–107)
CO2 SERPL-SCNC: 29 MMOL/L — SIGNIFICANT CHANGE UP (ref 22–31)
CREAT SERPL-MCNC: 0.64 MG/DL — SIGNIFICANT CHANGE UP (ref 0.5–1.3)
EGFR: 93 ML/MIN/1.73M2 — SIGNIFICANT CHANGE UP
EGFR: 93 ML/MIN/1.73M2 — SIGNIFICANT CHANGE UP
GLUCOSE BLDC GLUCOMTR-MCNC: 117 MG/DL — HIGH (ref 70–99)
GLUCOSE BLDC GLUCOMTR-MCNC: 122 MG/DL — HIGH (ref 70–99)
GLUCOSE BLDC GLUCOMTR-MCNC: 142 MG/DL — HIGH (ref 70–99)
GLUCOSE BLDC GLUCOMTR-MCNC: 97 MG/DL — SIGNIFICANT CHANGE UP (ref 70–99)
GLUCOSE SERPL-MCNC: 174 MG/DL — HIGH (ref 70–99)
POTASSIUM SERPL-MCNC: 3.5 MMOL/L — SIGNIFICANT CHANGE UP (ref 3.5–5.3)
POTASSIUM SERPL-SCNC: 3.5 MMOL/L — SIGNIFICANT CHANGE UP (ref 3.5–5.3)
PROT SERPL-MCNC: 7.1 G/DL — SIGNIFICANT CHANGE UP (ref 6–8.3)
SODIUM SERPL-SCNC: 142 MMOL/L — SIGNIFICANT CHANGE UP (ref 135–145)

## 2025-06-16 PROCEDURE — 99232 SBSQ HOSP IP/OBS MODERATE 35: CPT

## 2025-06-16 PROCEDURE — 74230 X-RAY XM SWLNG FUNCJ C+: CPT | Mod: 26

## 2025-06-16 PROCEDURE — 99233 SBSQ HOSP IP/OBS HIGH 50: CPT

## 2025-06-16 RX ADMIN — MEMANTINE HYDROCHLORIDE 10 MILLIGRAM(S): 21 CAPSULE, EXTENDED RELEASE ORAL at 06:17

## 2025-06-16 RX ADMIN — Medication 50 MILLIGRAM(S): at 13:23

## 2025-06-16 RX ADMIN — SERTRALINE 125 MILLIGRAM(S): 100 TABLET, FILM COATED ORAL at 08:15

## 2025-06-16 RX ADMIN — Medication 50 MILLIGRAM(S): at 08:15

## 2025-06-16 RX ADMIN — OLANZAPINE 2.5 MILLIGRAM(S): 10 TABLET ORAL at 20:27

## 2025-06-16 RX ADMIN — Medication 650 MILLIGRAM(S): at 18:00

## 2025-06-16 RX ADMIN — LEVALBUTEROL HYDROCHLORIDE 1 PUFF(S): 1.25 SOLUTION RESPIRATORY (INHALATION) at 13:23

## 2025-06-16 RX ADMIN — MEMANTINE HYDROCHLORIDE 10 MILLIGRAM(S): 21 CAPSULE, EXTENDED RELEASE ORAL at 20:27

## 2025-06-16 RX ADMIN — LEVALBUTEROL HYDROCHLORIDE 1 PUFF(S): 1.25 SOLUTION RESPIRATORY (INHALATION) at 06:17

## 2025-06-16 RX ADMIN — Medication 2 TABLET(S): at 20:27

## 2025-06-16 RX ADMIN — TIOTROPIUM BROMIDE INHALATION SPRAY 2 PUFF(S): 3.12 SPRAY, METERED RESPIRATORY (INHALATION) at 09:05

## 2025-06-16 RX ADMIN — Medication 1 DROP(S): at 13:23

## 2025-06-16 RX ADMIN — Medication 75 MILLIGRAM(S): at 20:27

## 2025-06-16 RX ADMIN — Medication 1 DOSE(S): at 09:05

## 2025-06-16 RX ADMIN — LISINOPRIL 40 MILLIGRAM(S): 5 TABLET ORAL at 08:15

## 2025-06-16 RX ADMIN — Medication 1 DOSE(S): at 20:27

## 2025-06-16 RX ADMIN — Medication 650 MILLIGRAM(S): at 18:48

## 2025-06-16 RX ADMIN — Medication 1 DROP(S): at 21:01

## 2025-06-16 RX ADMIN — Medication 9 MILLIGRAM(S): at 20:27

## 2025-06-16 RX ADMIN — Medication 1 DROP(S): at 06:17

## 2025-06-16 RX ADMIN — LEVALBUTEROL HYDROCHLORIDE 1 PUFF(S): 1.25 SOLUTION RESPIRATORY (INHALATION) at 21:01

## 2025-06-16 NOTE — BH INPATIENT PSYCHIATRY PROGRESS NOTE - NSBHCHARTREVIEWVS_PSY_A_CORE FT
Vital Signs Last 24 Hrs  T(C): --  T(F): --  HR: 100 (06-15-25 @ 20:23) (100 - 100)  BP: 154/80 (06-15-25 @ 20:23) (154/80 - 154/80)  BP(mean): --  RR: --  SpO2: --    Orthostatic VS  06-16-25 @ 13:15  Lying BP: 155/86 HR: 87  Sitting BP: 164/92 HR: 88  Standing BP: --/-- HR: --  Site: --  Mode: --  Orthostatic VS  06-15-25 @ 11:42  Lying BP: 122/72 HR: 88  Sitting BP: 114/66 HR: 87  Standing BP: --/-- HR: --  Site: --  Mode: --  Orthostatic VS  06-15-25 @ 08:05  Lying BP: --/-- HR: --  Sitting BP: 160/85 HR: 80  Standing BP: 158/83 HR: 84  Site: --  Mode: --

## 2025-06-16 NOTE — BH INPATIENT PSYCHIATRY PROGRESS NOTE - NSBHFUPINTERVALHXFT_PSY_A_CORE
The patient is receiving follow-up care for catatonia, medical issues include: Hypertension (HTN), Asthma, and Diabetes Mellitus (DM). Treatment was discussed within the care team, with no reported issues overnight. Pt completed cineesophagram and did well, cleared to continue regular diet with thin fluids. Pt remains disoriented at baseline but in good spirits. Alk phos remains elevated by downtrending. Pt continues to deny symtpoms such as abdominal pain, nausea/vomiting or reduced appetite.

## 2025-06-16 NOTE — BH INPATIENT PSYCHIATRY PROGRESS NOTE - NSBHASSESSSUMMFT_PSY_ALL_CORE
74F hx asthma, depression, and HTN was admitted to the MICU with AHRF 2/2 asthma exacerbation resulting in acute on chronic HHRF causing AMS and increased WOB that required intubation. During her ICU course, she experienced seizure-like activity .  Additional issues included hyponatremia, acute on chronic metabolic alkalosis, and mild thrombocytopenia,  also having fevers.  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 Patient has h/o hx of major depressive disorder with psychosis, hx of 3 past inpatient psychiatric hospitalizations last in 2016 at [facility], all rehospitalizations were in context non compliance with the treatment. She has no hx of suicide attempts, no hx of substance abuse.    Pt currently requires inpatient level of care for ongoing treatment for catatonia.     5/22 - calm, cooperative, not oriented, though remembers name of son. No s/s catatonia. Denies mood symptoms. Eating and sleeping on unit.  5/23 - No signs of catatonia. Eating and ambulating on unit. For ECT this morning. Not oriented to time, place, or situation. Has been NPO. Spoke to , who believes pt is returning to baseline, but is concerned about pt's memory issues.  5/27 - Withdrawn, non-spontaneous speech, not oriented to time, place, or situation, though interactive and recalls her 's name. Hypoglycemic episode yesterday. D/w hospitalist, discontinued standing pre-meal Admelog of 2U TID, will keep on bedtime Lantus and sliding scale.   5/28: More engaged today, establishes good eye contact. Received ECT today, will monitor, if catatonia symptoms remain in control will continue with weekly ECT next week, if pt appears to be relapsing may order ECT for Friday.   5/29: Pt presenting with increased BM frequency, regular consistency. Will order imodium PRN for diarrhea.   5/30: Will observe pt over the weekend and next week for confusion. Family has brought up concern ECT may be worsening confusion and it is a possibility. Will observe off Zyprexa and space out ECT, may treat next Friday.   5/31: Remains confused but calm. Unclear if due to ECT or residual catatonia.  6/2: Will continue holding Zyprexa and ECT this week. Presentation is suggestive of protracted delirium.   6/3: Pt remains confused and disoriented. Continue holding Zyprexa and ECT. Ordered urinalysis and routine bloodwork.   6/4: Continue monitoring Alk phos and LFTs.   6/5: Alk phos uptrending, bilirubin is WNL, LFTs mildly elevated. Abdominal ultrasound scheduled for tomorrow, 6/6, at 9:00am. Family requesting ECT be held until Monday as they were concerned about confusion. Will resume Zyprexa 2.5mg QHS.   6/6: Abdominal ultrasound completed today, revealed gallstones but no evidence for cholecystitis or biliary dilatation. This likely explains elevated Alk phos and GGT. Ms. Silva denies pain, there is no tenderness to palpation on physical exam and sonographic Tomlin sign was not elicited. Will repeat bloodwork on Monday and continue monitoring. Discussed with medicine. ECT ordered for Monday 6/9.   6/7-6/8: Speech improved, calm, pleasant, plan for ECT 6/9  6/10: Better related this week. No signs of catatonia. S/P ECT yesterday.   6/11: No interval changes. Setting up homecare in preparation for discharge home. No symptoms of catatonia but pt remains disoriented with poor memory.   6/12: memory impairment evident, no agitation or catatonia evident, tolerating meds.   6/13: Continues to present memory impairment. No signs of catatonia.   6/14: Overnight staff reported near choking incidents on meds and water overnight. Diet changed to puree in the meantime and cineesophagram was ordered. Discussed choking precautions with staff and patients, advised pt remain upright while eating or drinking and take small bites.   6/16: Pt completed cineesophagram and was cleared to continue regular diet with thin liquids. Alk phos remains elevated but downtrending.     PLAN:  1. Admit to [facility] 2S on 9.27  2. Routine checks, as pt denying SI/HI/SIB  3. Psychiatric:  - Continue Namenda 10mg BID  - Continue Sertraline 125mg QD, titrate up to lowest effective dose  - Resume Olanzapine 2.5mg QHS due to paranoia  - Would avoid benzodiazepines since pt was recently unable to tolerate them at [facility], becoming extremely sedated  - Continue ECT once weekly, last treatment occurred Friday 5/30.     4. Medical:   - Abdominal ultrasound scheduled for 6/6/25 at 9am.   - PRN levalbuterol inhaler  - Continue Advair and Spiriva for Asthma and COPD  - Continue Lisinopril 40mg QD and HCTZ 25mg QD for HTN  - Diabetes type 2: Lantus 3U at bedtime + ISS  - Lidocaine patches PRN daily + Tylenol PRN for lower back pain  - Bowel regimen: Senna 2 tabs QHS  - Diet: Easy to chew, consistent carbohydrates. No Beef No Pork  - Pt is at risk for falls given deconditioning. Seen by PT, recommended PRN walker with 1 staff assists. Pt is in a room with peer who has CO for added supervision and redirection during nighttime. During the daytime she should be either in the day room or the dining area with staff monitoring her at all times to provide redirection if pt tries to get up or ambulate unassisted.     5. Collateral: spoke with pt's  Liam on phone to update on progress 5/22 and 5/23.  6. Disposition: pending clinical course

## 2025-06-16 NOTE — PROGRESS NOTE ADULT - SUBJECTIVE AND OBJECTIVE BOX
CC/Reason for Consult: elevated ALP    SUBJECTIVE / OVERNIGHT EVENTS:  no abd pain feels well    MEDICATIONS  (STANDING):  artificial tears (preservative free) Ophthalmic Solution 1 Drop(s) Both EYES every 8 hours  fluticasone propionate/ salmeterol 250-50 MICROgram(s) Diskus 1 Dose(s) Inhalation two times a day  glucagon  Injectable 1 milliGRAM(s) IntraMuscular once  hydrALAZINE 50 milliGRAM(s) Oral three times a day  hydrochlorothiazide 50 milliGRAM(s) Oral daily  levalbuterol 45 MICROgram(s) HFA Inhaler 1 Puff(s) Inhalation every 8 hours  lisinopril 40 milliGRAM(s) Oral daily  melatonin. 9 milliGRAM(s) Oral at bedtime  memantine 10 milliGRAM(s) Oral at bedtime  memantine 10 milliGRAM(s) Oral <User Schedule>  OLANZapine 2.5 milliGRAM(s) Oral at bedtime  petrolatum Ophthalmic Ointment 1 Application(s) Both EYES two times a day  senna 2 Tablet(s) Oral at bedtime  sertraline 125 milliGRAM(s) Oral daily  tiotropium 2.5 MICROgram(s) Inhaler 2 Puff(s) Inhalation daily    MEDICATIONS  (PRN):  acetaminophen     Tablet .. 650 milliGRAM(s) Oral every 6 hours PRN Temp greater or equal to 38C (100.4F), Mild Pain (1 - 3), Moderate Pain (4 - 6)  albuterol    90 MICROgram(s) HFA Inhaler 2 Puff(s) Inhalation every 6 hours PRN Shortness of Breath and/or Wheezing  lidocaine   4% Patch 1 Patch Transdermal daily PRN chronic back pain  loperamide 2 milliGRAM(s) Oral once PRN Diarrhea  OLANZapine 1.25 milliGRAM(s) Oral every 6 hours PRN agitation      Vital Signs Last 24 Hrs  afeb  HR: 100 (15 Blake 2025 20:23) (100 - 100)  BP: 154/80 (15 Blake 2025 20:23) (154/80 - 154/80)  BP(mean): --  RR: 15  SpO2: --      CAPILLARY BLOOD GLUCOSE      POCT Blood Glucose.: 97 mg/dL (16 Jun 2025 15:39)  POCT Blood Glucose.: 142 mg/dL (16 Jun 2025 11:52)  POCT Blood Glucose.: 117 mg/dL (16 Jun 2025 07:35)  POCT Blood Glucose.: 165 mg/dL (15 Blake 2025 20:08)        PHYSICAL EXAM:  GENERAL: NAD  HEAD:  Atraumatic, Normocephalic  EYES: EOMI, conjunctiva and sclera clear  NECK: Supple, No JVD  CHEST/LUNG: Clear to auscultation bilaterally; No wheeze  HEART: Regular rate and rhythm; No murmurs, rubs, or gallops  ABDOMEN: Soft, Nontender, Nondistended; Bowel sounds present  EXTREMITIES:  2+ Peripheral Pulses, No clubbing, cyanosis, or edema  NEUROLOGY: non-focal  SKIN: No rashes or lesions    LABS:    06-16    142  |  101  |  13  ----------------------------<  174[H]  3.5   |  29  |  0.64    Ca    9.4      16 Jun 2025 08:55    TPro  7.1  /  Alb  4.0  /  TBili  0.5  /  DBili  x   /  AST  40[H]  /  ALT  51[H]  /  AlkPhos  477[H]  06-16          Urinalysis Basic - ( 16 Jun 2025 08:55 )    Color: x / Appearance: x / SG: x / pH: x  Gluc: 174 mg/dL / Ketone: x  / Bili: x / Urobili: x   Blood: x / Protein: x / Nitrite: x   Leuk Esterase: x / RBC: x / WBC x   Sq Epi: x / Non Sq Epi: x / Bacteria: x      Care Discussed with Consultants/Other Providers: Dr Moeller

## 2025-06-16 NOTE — BH INPATIENT PSYCHIATRY PROGRESS NOTE - NSBHMETABOLIC_PSY_ALL_CORE_FT
BMI: BMI (kg/m2): 28.3 (05-21-25 @ 00:57)  HbA1c: A1C with Estimated Average Glucose Result: 5.9 % (05-22-25 @ 08:09)    Glucose: POCT Blood Glucose.: 142 mg/dL (06-16-25 @ 11:52)    BP: 154/80 (06-15-25 @ 20:23) (151/81 - 180/90)Vital Signs Last 24 Hrs  T(C): --  T(F): --  HR: 100 (06-15-25 @ 20:23) (100 - 100)  BP: 154/80 (06-15-25 @ 20:23) (154/80 - 154/80)  BP(mean): --  RR: --  SpO2: --    Orthostatic VS  06-16-25 @ 13:15  Lying BP: 155/86 HR: 87  Sitting BP: 164/92 HR: 88  Standing BP: --/-- HR: --  Site: --  Mode: --  Orthostatic VS  06-15-25 @ 11:42  Lying BP: 122/72 HR: 88  Sitting BP: 114/66 HR: 87  Standing BP: --/-- HR: --  Site: --  Mode: --  Orthostatic VS  06-15-25 @ 08:05  Lying BP: --/-- HR: --  Sitting BP: 160/85 HR: 80  Standing BP: 158/83 HR: 84  Site: --  Mode: --    Lipid Panel: Date/Time: 06-09-25 @ 08:11  Cholesterol, Serum: 227  LDL Cholesterol Calculated: 144  HDL Cholesterol, Serum: 75  Total Cholesterol/HDL Ration Measurement: --  Triglycerides, Serum: 49

## 2025-06-16 NOTE — BH INPATIENT PSYCHIATRY PROGRESS NOTE - CURRENT MEDICATION
MEDICATIONS  (STANDING):  artificial tears (preservative free) Ophthalmic Solution 1 Drop(s) Both EYES every 8 hours  atorvastatin 20 milliGRAM(s) Oral at bedtime  fluticasone propionate/ salmeterol 250-50 MICROgram(s) Diskus 1 Dose(s) Inhalation two times a day  glucagon  Injectable 1 milliGRAM(s) IntraMuscular once  hydrALAZINE 50 milliGRAM(s) Oral three times a day  hydrochlorothiazide 50 milliGRAM(s) Oral daily  levalbuterol 45 MICROgram(s) HFA Inhaler 1 Puff(s) Inhalation every 8 hours  lisinopril 40 milliGRAM(s) Oral daily  melatonin. 9 milliGRAM(s) Oral at bedtime  memantine 10 milliGRAM(s) Oral at bedtime  memantine 10 milliGRAM(s) Oral <User Schedule>  OLANZapine 2.5 milliGRAM(s) Oral at bedtime  petrolatum Ophthalmic Ointment 1 Application(s) Both EYES two times a day  senna 2 Tablet(s) Oral at bedtime  sertraline 125 milliGRAM(s) Oral daily  tiotropium 2.5 MICROgram(s) Inhaler 2 Puff(s) Inhalation daily    MEDICATIONS  (PRN):  acetaminophen     Tablet .. 650 milliGRAM(s) Oral every 6 hours PRN Temp greater or equal to 38C (100.4F), Mild Pain (1 - 3), Moderate Pain (4 - 6)  albuterol    90 MICROgram(s) HFA Inhaler 2 Puff(s) Inhalation every 6 hours PRN Shortness of Breath and/or Wheezing  lidocaine   4% Patch 1 Patch Transdermal daily PRN chronic back pain  loperamide 2 milliGRAM(s) Oral once PRN Diarrhea  OLANZapine 1.25 milliGRAM(s) Oral every 6 hours PRN agitation

## 2025-06-17 LAB
ALBUMIN SERPL ELPH-MCNC: 4.1 G/DL — SIGNIFICANT CHANGE UP (ref 3.3–5)
ALP SERPL-CCNC: 496 U/L — HIGH (ref 40–120)
ALT FLD-CCNC: 52 U/L — HIGH (ref 4–33)
AST SERPL-CCNC: 40 U/L — HIGH (ref 4–32)
BILIRUB DIRECT SERPL-MCNC: <0.2 MG/DL — SIGNIFICANT CHANGE UP (ref 0–0.3)
BILIRUB INDIRECT FLD-MCNC: >0.4 MG/DL — SIGNIFICANT CHANGE UP (ref 0–1)
BILIRUB SERPL-MCNC: 0.6 MG/DL — SIGNIFICANT CHANGE UP (ref 0.2–1.2)
GLUCOSE BLDC GLUCOMTR-MCNC: 119 MG/DL — HIGH (ref 70–99)
GLUCOSE BLDC GLUCOMTR-MCNC: 119 MG/DL — HIGH (ref 70–99)
GLUCOSE BLDC GLUCOMTR-MCNC: 123 MG/DL — HIGH (ref 70–99)
GLUCOSE BLDC GLUCOMTR-MCNC: 135 MG/DL — HIGH (ref 70–99)
HAV IGG SER QL IA: REACTIVE
HAV IGM SER-ACNC: SIGNIFICANT CHANGE UP
HBV SURFACE AB SER-ACNC: REACTIVE — SIGNIFICANT CHANGE UP
HBV SURFACE AG SER-ACNC: SIGNIFICANT CHANGE UP
HCV AB S/CO SERPL IA: 0.15 S/CO — SIGNIFICANT CHANGE UP (ref 0–0.79)
HCV AB SERPL-IMP: SIGNIFICANT CHANGE UP
IGG FLD-MCNC: 1061 MG/DL — SIGNIFICANT CHANGE UP (ref 610–1660)
IGG1 SER-MCNC: 663 MG/DL — SIGNIFICANT CHANGE UP (ref 240–1118)
IGG2 SER-MCNC: 285 MG/DL — SIGNIFICANT CHANGE UP (ref 124–549)
IGG3 SER-MCNC: 47.8 MG/DL — SIGNIFICANT CHANGE UP (ref 15–102)
IGG4 SER-MCNC: 9.2 MG/DL — SIGNIFICANT CHANGE UP (ref 1–123)
PROT SERPL-MCNC: 7.4 G/DL — SIGNIFICANT CHANGE UP (ref 6–8.3)

## 2025-06-17 RX ADMIN — Medication 9 MILLIGRAM(S): at 20:38

## 2025-06-17 RX ADMIN — LEVALBUTEROL HYDROCHLORIDE 1 PUFF(S): 1.25 SOLUTION RESPIRATORY (INHALATION) at 21:47

## 2025-06-17 RX ADMIN — LISINOPRIL 40 MILLIGRAM(S): 5 TABLET ORAL at 10:20

## 2025-06-17 RX ADMIN — Medication 1 DOSE(S): at 10:24

## 2025-06-17 RX ADMIN — LEVALBUTEROL HYDROCHLORIDE 1 PUFF(S): 1.25 SOLUTION RESPIRATORY (INHALATION) at 13:50

## 2025-06-17 RX ADMIN — Medication 75 MILLIGRAM(S): at 20:38

## 2025-06-17 RX ADMIN — Medication 75 MILLIGRAM(S): at 13:49

## 2025-06-17 RX ADMIN — Medication 1 DOSE(S): at 20:39

## 2025-06-17 RX ADMIN — MEMANTINE HYDROCHLORIDE 10 MILLIGRAM(S): 21 CAPSULE, EXTENDED RELEASE ORAL at 06:16

## 2025-06-17 RX ADMIN — Medication 75 MILLIGRAM(S): at 10:20

## 2025-06-17 RX ADMIN — Medication 1 DROP(S): at 06:16

## 2025-06-17 RX ADMIN — SERTRALINE 125 MILLIGRAM(S): 100 TABLET, FILM COATED ORAL at 10:20

## 2025-06-17 RX ADMIN — OLANZAPINE 2.5 MILLIGRAM(S): 10 TABLET ORAL at 20:38

## 2025-06-17 RX ADMIN — Medication 1 DROP(S): at 13:50

## 2025-06-17 RX ADMIN — MEMANTINE HYDROCHLORIDE 10 MILLIGRAM(S): 21 CAPSULE, EXTENDED RELEASE ORAL at 20:39

## 2025-06-17 RX ADMIN — Medication 2 TABLET(S): at 20:39

## 2025-06-17 RX ADMIN — Medication 1 DROP(S): at 21:47

## 2025-06-17 RX ADMIN — TIOTROPIUM BROMIDE INHALATION SPRAY 2 PUFF(S): 3.12 SPRAY, METERED RESPIRATORY (INHALATION) at 10:53

## 2025-06-17 RX ADMIN — LEVALBUTEROL HYDROCHLORIDE 1 PUFF(S): 1.25 SOLUTION RESPIRATORY (INHALATION) at 06:16

## 2025-06-17 NOTE — BH INPATIENT PSYCHIATRY PROGRESS NOTE - NSBHASSESSSUMMFT_PSY_ALL_CORE
74F hx asthma, depression, and HTN was admitted to the MICU with AHRF 2/2 asthma exacerbation resulting in acute on chronic HHRF causing AMS and increased WOB that required intubation. During her ICU course, she experienced seizure-like activity .  Additional issues included hyponatremia, acute on chronic metabolic alkalosis, and mild thrombocytopenia,  also having fevers.  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 Patient has h/o hx of major depressive disorder with psychosis, hx of 3 past inpatient psychiatric hospitalizations last in 2016 at [facility], all rehospitalizations were in context non compliance with the treatment. She has no hx of suicide attempts, no hx of substance abuse.    Pt currently requires inpatient level of care for ongoing treatment for catatonia.     5/22 - calm, cooperative, not oriented, though remembers name of son. No s/s catatonia. Denies mood symptoms. Eating and sleeping on unit.  5/23 - No signs of catatonia. Eating and ambulating on unit. For ECT this morning. Not oriented to time, place, or situation. Has been NPO. Spoke to , who believes pt is returning to baseline, but is concerned about pt's memory issues.  5/27 - Withdrawn, non-spontaneous speech, not oriented to time, place, or situation, though interactive and recalls her 's name. Hypoglycemic episode yesterday. D/w hospitalist, discontinued standing pre-meal Admelog of 2U TID, will keep on bedtime Lantus and sliding scale.   5/28: More engaged today, establishes good eye contact. Received ECT today, will monitor, if catatonia symptoms remain in control will continue with weekly ECT next week, if pt appears to be relapsing may order ECT for Friday.   5/29: Pt presenting with increased BM frequency, regular consistency. Will order imodium PRN for diarrhea.   5/30: Will observe pt over the weekend and next week for confusion. Family has brought up concern ECT may be worsening confusion and it is a possibility. Will observe off Zyprexa and space out ECT, may treat next Friday.   5/31: Remains confused but calm. Unclear if due to ECT or residual catatonia.  6/2: Will continue holding Zyprexa and ECT this week. Presentation is suggestive of protracted delirium.   6/3: Pt remains confused and disoriented. Continue holding Zyprexa and ECT. Ordered urinalysis and routine bloodwork.   6/4: Continue monitoring Alk phos and LFTs.   6/5: Alk phos uptrending, bilirubin is WNL, LFTs mildly elevated. Abdominal ultrasound scheduled for tomorrow, 6/6, at 9:00am. Family requesting ECT be held until Monday as they were concerned about confusion. Will resume Zyprexa 2.5mg QHS.   6/6: Abdominal ultrasound completed today, revealed gallstones but no evidence for cholecystitis or biliary dilatation. This likely explains elevated Alk phos and GGT. Ms. Silva denies pain, there is no tenderness to palpation on physical exam and sonographic Tomlin sign was not elicited. Will repeat bloodwork on Monday and continue monitoring. Discussed with medicine. ECT ordered for Monday 6/9.   6/7-6/8: Speech improved, calm, pleasant, plan for ECT 6/9  6/10: Better related this week. No signs of catatonia. S/P ECT yesterday.   6/11: No interval changes. Setting up homecare in preparation for discharge home. No symptoms of catatonia but pt remains disoriented with poor memory.   6/12: memory impairment evident, no agitation or catatonia evident, tolerating meds.   6/13: Continues to present memory impairment. No signs of catatonia.   6/14: Overnight staff reported near choking incidents on meds and water overnight. Diet changed to puree in the meantime and cineesophagram was ordered. Discussed choking precautions with staff and patients, advised pt remain upright while eating or drinking and take small bites.   6/16: Pt completed cineesophagram and was cleared to continue regular diet with thin liquids. Alk phos remains elevated but downtrending.   06/17: No signs of catatonia, depression, or psychotic symptoms.     PLAN:  1. Admit to [facility] 2S on 9.27  2. Routine checks, as pt denying SI/HI/SIB  3. Psychiatric:  - Continue Namenda 10mg BID  - Continue Sertraline 125mg QD, titrate up to lowest effective dose  - Resume Olanzapine 2.5mg QHS due to paranoia  - Would avoid benzodiazepines since pt was recently unable to tolerate them at [facility], becoming extremely sedated  - Continue ECT once weekly, last treatment occurred Friday 5/30.     4. Medical:   - Abdominal ultrasound scheduled for 6/6/25 at 9am.   - PRN levalbuterol inhaler  - Continue Advair and Spiriva for Asthma and COPD  - Continue Lisinopril 40mg QD and HCTZ 25mg QD for HTN  - Diabetes type 2: Lantus 3U at bedtime + ISS  - Lidocaine patches PRN daily + Tylenol PRN for lower back pain  - Bowel regimen: Senna 2 tabs QHS  - Diet: Easy to chew, consistent carbohydrates. No Beef No Pork  - Pt is at risk for falls given deconditioning. Seen by PT, recommended PRN walker with 1 staff assists. Pt is in a room with peer who has CO for added supervision and redirection during nighttime. During the daytime she should be either in the day room or the dining area with staff monitoring her at all times to provide redirection if pt tries to get up or ambulate unassisted.     5. Collateral: spoke with pt's  Liam on phone to update on progress 5/22 and 5/23.  6. Disposition: pending clinical course 74F hx asthma, depression, and HTN was admitted to the MICU with AHRF 2/2 asthma exacerbation resulting in acute on chronic HHRF causing AMS and increased WOB that required intubation. During her ICU course, she experienced seizure-like activity .  Additional issues included hyponatremia, acute on chronic metabolic alkalosis, and mild thrombocytopenia,  also having fevers.  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 Patient has h/o hx of major depressive disorder with psychosis, hx of 3 past inpatient psychiatric hospitalizations last in 2016 at [facility], all rehospitalizations were in context non compliance with the treatment. She has no hx of suicide attempts, no hx of substance abuse.    Pt currently requires inpatient level of care for ongoing treatment for catatonia.     5/22 - calm, cooperative, not oriented, though remembers name of son. No s/s catatonia. Denies mood symptoms. Eating and sleeping on unit.  5/23 - No signs of catatonia. Eating and ambulating on unit. For ECT this morning. Not oriented to time, place, or situation. Has been NPO. Spoke to , who believes pt is returning to baseline, but is concerned about pt's memory issues.  5/27 - Withdrawn, non-spontaneous speech, not oriented to time, place, or situation, though interactive and recalls her 's name. Hypoglycemic episode yesterday. D/w hospitalist, discontinued standing pre-meal Admelog of 2U TID, will keep on bedtime Lantus and sliding scale.   5/28: More engaged today, establishes good eye contact. Received ECT today, will monitor, if catatonia symptoms remain in control will continue with weekly ECT next week, if pt appears to be relapsing may order ECT for Friday.   5/29: Pt presenting with increased BM frequency, regular consistency. Will order imodium PRN for diarrhea.   5/30: Will observe pt over the weekend and next week for confusion. Family has brought up concern ECT may be worsening confusion and it is a possibility. Will observe off Zyprexa and space out ECT, may treat next Friday.   5/31: Remains confused but calm. Unclear if due to ECT or residual catatonia.  6/2: Will continue holding Zyprexa and ECT this week. Presentation is suggestive of protracted delirium.   6/3: Pt remains confused and disoriented. Continue holding Zyprexa and ECT. Ordered urinalysis and routine bloodwork.   6/4: Continue monitoring Alk phos and LFTs.   6/5: Alk phos uptrending, bilirubin is WNL, LFTs mildly elevated. Abdominal ultrasound scheduled for tomorrow, 6/6, at 9:00am. Family requesting ECT be held until Monday as they were concerned about confusion. Will resume Zyprexa 2.5mg QHS.   6/6: Abdominal ultrasound completed today, revealed gallstones but no evidence for cholecystitis or biliary dilatation. This likely explains elevated Alk phos and GGT. Ms. Silva denies pain, there is no tenderness to palpation on physical exam and sonographic Tomlin sign was not elicited. Will repeat bloodwork on Monday and continue monitoring. Discussed with medicine. ECT ordered for Monday 6/9.   6/7-6/8: Speech improved, calm, pleasant, plan for ECT 6/9  6/10: Better related this week. No signs of catatonia. S/P ECT yesterday.   6/11: No interval changes. Setting up homecare in preparation for discharge home. No symptoms of catatonia but pt remains disoriented with poor memory.   6/12: memory impairment evident, no agitation or catatonia evident, tolerating meds.   6/13: Continues to present memory impairment. No signs of catatonia.   6/14: Overnight staff reported near choking incidents on meds and water overnight. Diet changed to puree in the meantime and cineesophagram was ordered. Discussed choking precautions with staff and patients, advised pt remain upright while eating or drinking and take small bites.   6/16: Pt completed cineesophagram and was cleared to continue regular diet with thin liquids. Alk phos remains elevated but downtrending.   06/17: No signs of catatonia, depression, or psychotic symptoms.     PLAN:  1. Admit to [facility] 2S on 9.27  2. Routine checks, as pt denying SI/HI/SIB  3. Psychiatric:  - Continue Namenda 10mg BID  - Continue Sertraline 125mg QD, titrate up to lowest effective dose  - Resume Olanzapine 2.5mg QHS due to paranoia  - Would avoid benzodiazepines since pt was recently unable to tolerate them at [facility], becoming extremely sedated  - ECT held due to concern for memory impairment     4. Medical:   - Abdominal ultrasound scheduled for 6/6/25 at 9am.   - PRN levalbuterol inhaler  - Continue Advair and Spiriva for Asthma and COPD  - Continue Lisinopril 40mg QD, HCTZ 25mg QD, hydralazine 75 mg TID for HTN  - Diabetes type 2: CC diet. Lantus 3U at bedtime and FSBG discontinued  - Lidocaine patches PRN daily + Tylenol PRN for lower back pain  - Bowel regimen: Senna 2 tabs QHS  - Diet: Easy to chew, consistent carbohydrates. No Beef No Pork  - Pt is at risk for falls given deconditioning. Seen by PT, recommended PRN walker with 1 staff assists. Pt is in a room with peer who has CO for added supervision and redirection during nighttime. During the daytime she should be either in the day room or the dining area with staff monitoring her at all times to provide redirection if pt tries to get up or ambulate unassisted.     5. Collateral: spoke with pt's  Liam on phone to update on progress 5/22 and 5/23.  6. Disposition: pending clinical course

## 2025-06-17 NOTE — BH INPATIENT PSYCHIATRY PROGRESS NOTE - NSBHCHARTREVIEWVS_PSY_A_CORE FT
Vital Signs Last 24 Hrs  T(C): 36.2 (06-16-25 @ 16:34), Max: 36.2 (06-16-25 @ 16:34)  T(F): 97.1 (06-16-25 @ 16:34), Max: 97.1 (06-16-25 @ 16:34)  HR: 92 (06-16-25 @ 21:52) (92 - 92)  BP: 145/68 (06-16-25 @ 21:52) (145/68 - 145/68)  BP(mean): --  RR: --  SpO2: --    Orthostatic VS  06-17-25 @ 10:04  Lying BP: --/-- HR: --  Sitting BP: 133/69 HR: 89  Standing BP: 136/78 HR: 91  Site: --  Mode: --  Orthostatic VS  06-16-25 @ 13:15  Lying BP: 155/86 HR: 87  Sitting BP: 164/92 HR: 88  Standing BP: --/-- HR: --  Site: --  Mode: --   Vital Signs Last 24 Hrs  T(C): 36.2 (06-16-25 @ 16:34), Max: 36.2 (06-16-25 @ 16:34)  T(F): 97.1 (06-16-25 @ 16:34), Max: 97.1 (06-16-25 @ 16:34)  HR: 92 (06-16-25 @ 21:52) (92 - 92)  BP: 145/68 (06-16-25 @ 21:52) (145/68 - 145/68)  BP(mean): --  RR: --  SpO2: --    Orthostatic VS  06-17-25 @ 13:23  Lying BP: --/-- HR: --  Sitting BP: 128/56 HR: 99  Standing BP: --/-- HR: --  Site: --  Mode: --  Orthostatic VS  06-17-25 @ 10:04  Lying BP: --/-- HR: --  Sitting BP: 133/69 HR: 89  Standing BP: 136/78 HR: 91  Site: --  Mode: --  Orthostatic VS  06-16-25 @ 13:15  Lying BP: 155/86 HR: 87  Sitting BP: 164/92 HR: 88  Standing BP: --/-- HR: --  Site: --  Mode: --

## 2025-06-17 NOTE — BH INPATIENT PSYCHIATRY PROGRESS NOTE - NSBHMETABOLIC_PSY_ALL_CORE_FT
BMI: BMI (kg/m2): 28.3 (05-21-25 @ 00:57)  HbA1c: A1C with Estimated Average Glucose Result: 5.9 % (05-22-25 @ 08:09)    Glucose: POCT Blood Glucose.: 123 mg/dL (06-17-25 @ 11:25)    BP: 145/68 (06-16-25 @ 21:52) (145/68 - 180/90)Vital Signs Last 24 Hrs  T(C): 36.2 (06-16-25 @ 16:34), Max: 36.2 (06-16-25 @ 16:34)  T(F): 97.1 (06-16-25 @ 16:34), Max: 97.1 (06-16-25 @ 16:34)  HR: 92 (06-16-25 @ 21:52) (92 - 92)  BP: 145/68 (06-16-25 @ 21:52) (145/68 - 145/68)  BP(mean): --  RR: --  SpO2: --    Orthostatic VS  06-17-25 @ 10:04  Lying BP: --/-- HR: --  Sitting BP: 133/69 HR: 89  Standing BP: 136/78 HR: 91  Site: --  Mode: --  Orthostatic VS  06-16-25 @ 13:15  Lying BP: 155/86 HR: 87  Sitting BP: 164/92 HR: 88  Standing BP: --/-- HR: --  Site: --  Mode: --    Lipid Panel: Date/Time: 06-09-25 @ 08:11  Cholesterol, Serum: 227  LDL Cholesterol Calculated: 144  HDL Cholesterol, Serum: 75  Total Cholesterol/HDL Ration Measurement: --  Triglycerides, Serum: 49   BMI: BMI (kg/m2): 28.3 (05-21-25 @ 00:57)  HbA1c: A1C with Estimated Average Glucose Result: 5.9 % (05-22-25 @ 08:09)    Glucose: POCT Blood Glucose.: 123 mg/dL (06-17-25 @ 11:25)    BP: 145/68 (06-16-25 @ 21:52) (145/68 - 180/90)Vital Signs Last 24 Hrs  T(C): 36.2 (06-16-25 @ 16:34), Max: 36.2 (06-16-25 @ 16:34)  T(F): 97.1 (06-16-25 @ 16:34), Max: 97.1 (06-16-25 @ 16:34)  HR: 92 (06-16-25 @ 21:52) (92 - 92)  BP: 145/68 (06-16-25 @ 21:52) (145/68 - 145/68)  BP(mean): --  RR: --  SpO2: --    Orthostatic VS  06-17-25 @ 13:23  Lying BP: --/-- HR: --  Sitting BP: 128/56 HR: 99  Standing BP: --/-- HR: --  Site: --  Mode: --  Orthostatic VS  06-17-25 @ 10:04  Lying BP: --/-- HR: --  Sitting BP: 133/69 HR: 89  Standing BP: 136/78 HR: 91  Site: --  Mode: --  Orthostatic VS  06-16-25 @ 13:15  Lying BP: 155/86 HR: 87  Sitting BP: 164/92 HR: 88  Standing BP: --/-- HR: --  Site: --  Mode: --    Lipid Panel: Date/Time: 06-09-25 @ 08:11  Cholesterol, Serum: 227  LDL Cholesterol Calculated: 144  HDL Cholesterol, Serum: 75  Total Cholesterol/HDL Ration Measurement: --  Triglycerides, Serum: 49

## 2025-06-17 NOTE — BH INPATIENT PSYCHIATRY PROGRESS NOTE - CURRENT MEDICATION
MEDICATIONS  (STANDING):  artificial tears (preservative free) Ophthalmic Solution 1 Drop(s) Both EYES every 8 hours  fluticasone propionate/ salmeterol 250-50 MICROgram(s) Diskus 1 Dose(s) Inhalation two times a day  glucagon  Injectable 1 milliGRAM(s) IntraMuscular once  hydrALAZINE 75 milliGRAM(s) Oral three times a day  hydrochlorothiazide 25 milliGRAM(s) Oral daily  levalbuterol 45 MICROgram(s) HFA Inhaler 1 Puff(s) Inhalation every 8 hours  lisinopril 40 milliGRAM(s) Oral daily  melatonin. 9 milliGRAM(s) Oral at bedtime  memantine 10 milliGRAM(s) Oral <User Schedule>  memantine 10 milliGRAM(s) Oral at bedtime  OLANZapine 2.5 milliGRAM(s) Oral at bedtime  petrolatum Ophthalmic Ointment 1 Application(s) Both EYES two times a day  senna 2 Tablet(s) Oral at bedtime  sertraline 125 milliGRAM(s) Oral daily  tiotropium 2.5 MICROgram(s) Inhaler 2 Puff(s) Inhalation daily    MEDICATIONS  (PRN):  acetaminophen     Tablet .. 650 milliGRAM(s) Oral every 6 hours PRN Temp greater or equal to 38C (100.4F), Mild Pain (1 - 3), Moderate Pain (4 - 6)  albuterol    90 MICROgram(s) HFA Inhaler 2 Puff(s) Inhalation every 6 hours PRN Shortness of Breath and/or Wheezing  lidocaine   4% Patch 1 Patch Transdermal daily PRN chronic back pain  loperamide 2 milliGRAM(s) Oral once PRN Diarrhea  OLANZapine 1.25 milliGRAM(s) Oral every 6 hours PRN agitation

## 2025-06-17 NOTE — BH INPATIENT PSYCHIATRY PROGRESS NOTE - NSBHFUPINTERVALHXFT_PSY_A_CORE
The patient is receiving follow-up care for catatonia, medical issues include: Hypertension (HTN), Asthma, and Diabetes Mellitus (DM). Treatment was discussed within the care team, with no reported issues overnight. On exam today the patient reported that her mood was "not bad". She denied psychotic symptoms or SI. Denied medication side effects.

## 2025-06-17 NOTE — BH INPATIENT PSYCHIATRY PROGRESS NOTE - MSE UNSTRUCTURED FT
Awake and alert. Calm, pleasant, good eye contact. Affect bright, superficial. Mood "not bad". Speech more productive but still limited. Thought process is logical, concrete. No delusions expressed. No suicidal ideations. No tremor or mvt disorder. Disoriented to time, oriented to place. ambulating with walker with PT

## 2025-06-18 LAB
GLUCOSE BLDC GLUCOMTR-MCNC: 107 MG/DL — HIGH (ref 70–99)
GLUCOSE BLDC GLUCOMTR-MCNC: 119 MG/DL — HIGH (ref 70–99)
GLUCOSE BLDC GLUCOMTR-MCNC: 121 MG/DL — HIGH (ref 70–99)
GLUCOSE BLDC GLUCOMTR-MCNC: 185 MG/DL — HIGH (ref 70–99)
MITOCHONDRIA AB SER-ACNC: SIGNIFICANT CHANGE UP

## 2025-06-18 PROCEDURE — 99232 SBSQ HOSP IP/OBS MODERATE 35: CPT

## 2025-06-18 RX ADMIN — Medication 1 DROP(S): at 06:44

## 2025-06-18 RX ADMIN — Medication 1 DROP(S): at 21:00

## 2025-06-18 RX ADMIN — Medication 1 DOSE(S): at 20:51

## 2025-06-18 RX ADMIN — SERTRALINE 125 MILLIGRAM(S): 100 TABLET, FILM COATED ORAL at 08:44

## 2025-06-18 RX ADMIN — Medication 75 MILLIGRAM(S): at 12:09

## 2025-06-18 RX ADMIN — Medication 75 MILLIGRAM(S): at 20:51

## 2025-06-18 RX ADMIN — Medication 75 MILLIGRAM(S): at 08:44

## 2025-06-18 RX ADMIN — Medication 9 MILLIGRAM(S): at 20:51

## 2025-06-18 RX ADMIN — TIOTROPIUM BROMIDE INHALATION SPRAY 2 PUFF(S): 3.12 SPRAY, METERED RESPIRATORY (INHALATION) at 08:46

## 2025-06-18 RX ADMIN — Medication 1 DOSE(S): at 08:45

## 2025-06-18 RX ADMIN — LEVALBUTEROL HYDROCHLORIDE 1 PUFF(S): 1.25 SOLUTION RESPIRATORY (INHALATION) at 14:13

## 2025-06-18 RX ADMIN — Medication 1 DROP(S): at 14:12

## 2025-06-18 RX ADMIN — MEMANTINE HYDROCHLORIDE 10 MILLIGRAM(S): 21 CAPSULE, EXTENDED RELEASE ORAL at 06:44

## 2025-06-18 RX ADMIN — Medication 2 TABLET(S): at 20:50

## 2025-06-18 RX ADMIN — LISINOPRIL 40 MILLIGRAM(S): 5 TABLET ORAL at 08:44

## 2025-06-18 RX ADMIN — MEMANTINE HYDROCHLORIDE 10 MILLIGRAM(S): 21 CAPSULE, EXTENDED RELEASE ORAL at 20:51

## 2025-06-18 RX ADMIN — LEVALBUTEROL HYDROCHLORIDE 1 PUFF(S): 1.25 SOLUTION RESPIRATORY (INHALATION) at 21:00

## 2025-06-18 RX ADMIN — OLANZAPINE 2.5 MILLIGRAM(S): 10 TABLET ORAL at 20:50

## 2025-06-18 NOTE — BH INPATIENT PSYCHIATRY PROGRESS NOTE - NSBHASSESSSUMMFT_PSY_ALL_CORE
74F hx asthma, depression, and HTN was admitted to the MICU with AHRF 2/2 asthma exacerbation resulting in acute on chronic HHRF causing AMS and increased WOB that required intubation. During her ICU course, she experienced seizure-like activity .  Additional issues included hyponatremia, acute on chronic metabolic alkalosis, and mild thrombocytopenia,  also having fevers.  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 Patient has h/o hx of major depressive disorder with psychosis, hx of 3 past inpatient psychiatric hospitalizations last in 2016 at [facility], all rehospitalizations were in context non compliance with the treatment. She has no hx of suicide attempts, no hx of substance abuse.    Pt currently requires inpatient level of care for ongoing treatment for catatonia.     5/22 - calm, cooperative, not oriented, though remembers name of son. No s/s catatonia. Denies mood symptoms. Eating and sleeping on unit.  5/23 - No signs of catatonia. Eating and ambulating on unit. For ECT this morning. Not oriented to time, place, or situation. Has been NPO. Spoke to , who believes pt is returning to baseline, but is concerned about pt's memory issues.  5/27 - Withdrawn, non-spontaneous speech, not oriented to time, place, or situation, though interactive and recalls her 's name. Hypoglycemic episode yesterday. D/w hospitalist, discontinued standing pre-meal Admelog of 2U TID, will keep on bedtime Lantus and sliding scale.   5/28: More engaged today, establishes good eye contact. Received ECT today, will monitor, if catatonia symptoms remain in control will continue with weekly ECT next week, if pt appears to be relapsing may order ECT for Friday.   5/29: Pt presenting with increased BM frequency, regular consistency. Will order imodium PRN for diarrhea.   5/30: Will observe pt over the weekend and next week for confusion. Family has brought up concern ECT may be worsening confusion and it is a possibility. Will observe off Zyprexa and space out ECT, may treat next Friday.   5/31: Remains confused but calm. Unclear if due to ECT or residual catatonia.  6/2: Will continue holding Zyprexa and ECT this week. Presentation is suggestive of protracted delirium.   6/3: Pt remains confused and disoriented. Continue holding Zyprexa and ECT. Ordered urinalysis and routine bloodwork.   6/4: Continue monitoring Alk phos and LFTs.   6/5: Alk phos uptrending, bilirubin is WNL, LFTs mildly elevated. Abdominal ultrasound scheduled for tomorrow, 6/6, at 9:00am. Family requesting ECT be held until Monday as they were concerned about confusion. Will resume Zyprexa 2.5mg QHS.   6/6: Abdominal ultrasound completed today, revealed gallstones but no evidence for cholecystitis or biliary dilatation. This likely explains elevated Alk phos and GGT. Ms. Silva denies pain, there is no tenderness to palpation on physical exam and sonographic Tomlin sign was not elicited. Will repeat bloodwork on Monday and continue monitoring. Discussed with medicine. ECT ordered for Monday 6/9.   6/7-6/8: Speech improved, calm, pleasant, plan for ECT 6/9  6/10: Better related this week. No signs of catatonia. S/P ECT yesterday.   6/11: No interval changes. Setting up homecare in preparation for discharge home. No symptoms of catatonia but pt remains disoriented with poor memory.   6/12: memory impairment evident, no agitation or catatonia evident, tolerating meds.   6/13: Continues to present memory impairment. No signs of catatonia.   6/14: Overnight staff reported near choking incidents on meds and water overnight. Diet changed to puree in the meantime and cineesophagram was ordered. Discussed choking precautions with staff and patients, advised pt remain upright while eating or drinking and take small bites.   6/16: Pt completed cineesophagram and was cleared to continue regular diet with thin liquids. Alk phos remains elevated but downtrending.   06/17: No signs of catatonia, depression, or psychotic symptoms.     PLAN:  1. Admit to [facility] 2S on 9.27  2. Routine checks, as pt denying SI/HI/SIB  3. Psychiatric:  - Continue Namenda 10mg BID  - Continue Sertraline 125mg QD, titrate up to lowest effective dose  - Resume Olanzapine 2.5mg QHS due to paranoia  - Would avoid benzodiazepines since pt was recently unable to tolerate them at [facility], becoming extremely sedated  - ECT held due to concern for memory impairment     4. Medical:   - Abdominal ultrasound scheduled for 6/6/25 at 9am.   - PRN levalbuterol inhaler  - Continue Advair and Spiriva for Asthma and COPD  - Continue Lisinopril 40mg QD, HCTZ 25mg QD, hydralazine 75 mg TID for HTN  - Diabetes type 2: CC diet. Lantus 3U at bedtime and FSBG discontinued  - Lidocaine patches PRN daily + Tylenol PRN for lower back pain  - Bowel regimen: Senna 2 tabs QHS  - Diet: Easy to chew, consistent carbohydrates. No Beef No Pork  - Pt is at risk for falls given deconditioning. Seen by PT, recommended PRN walker with 1 staff assists. Pt is in a room with peer who has CO for added supervision and redirection during nighttime. During the daytime she should be either in the day room or the dining area with staff monitoring her at all times to provide redirection if pt tries to get up or ambulate unassisted.     5. Collateral: spoke with pt's  Liam on phone to update on progress 5/22 and 5/23.  6. Disposition: pending clinical course

## 2025-06-18 NOTE — BH INPATIENT PSYCHIATRY PROGRESS NOTE - NSBHMETABOLIC_PSY_ALL_CORE_FT
BMI: BMI (kg/m2): 28.3 (05-21-25 @ 00:57)  HbA1c: A1C with Estimated Average Glucose Result: 5.9 % (05-22-25 @ 08:09)    Glucose: POCT Blood Glucose.: 121 mg/dL (06-18-25 @ 11:15)    BP: 134/68 (06-18-25 @ 12:06) (134/68 - 172/70)Vital Signs Last 24 Hrs  T(C): 36.4 (06-18-25 @ 07:41), Max: 36.4 (06-18-25 @ 07:41)  T(F): 97.6 (06-18-25 @ 07:41), Max: 97.6 (06-18-25 @ 07:41)  HR: 84 (06-18-25 @ 12:06) (84 - 88)  BP: 134/68 (06-18-25 @ 12:06) (134/68 - 172/70)  BP(mean): --  RR: --  SpO2: 97% (06-18-25 @ 07:41) (97% - 97%)    Orthostatic VS  06-18-25 @ 07:41  Lying BP: --/-- HR: --  Sitting BP: 150/73 HR: 81  Standing BP: 137/93 HR: 91  Site: --  Mode: --  Orthostatic VS  06-17-25 @ 13:23  Lying BP: --/-- HR: --  Sitting BP: 128/56 HR: 99  Standing BP: --/-- HR: --  Site: --  Mode: --  Orthostatic VS  06-17-25 @ 10:04  Lying BP: --/-- HR: --  Sitting BP: 133/69 HR: 89  Standing BP: 136/78 HR: 91  Site: --  Mode: --    Lipid Panel: Date/Time: 06-09-25 @ 08:11  Cholesterol, Serum: 227  LDL Cholesterol Calculated: 144  HDL Cholesterol, Serum: 75  Total Cholesterol/HDL Ration Measurement: --  Triglycerides, Serum: 49

## 2025-06-18 NOTE — BH INPATIENT PSYCHIATRY PROGRESS NOTE - NSBHFUPINTERVALHXFT_PSY_A_CORE
The patient is receiving follow-up care for catatonia, medical issues include: Hypertension (HTN), Asthma, and Diabetes Mellitus (DM). Treatment was discussed within the care team, with no reported issues overnight. No interval changes. Pt reported her mood as "good", denies any complaints. Denies AH/VH/SI/HI.

## 2025-06-18 NOTE — BH INPATIENT PSYCHIATRY PROGRESS NOTE - NSBHCHARTREVIEWVS_PSY_A_CORE FT
Vital Signs Last 24 Hrs  T(C): 36.4 (06-18-25 @ 07:41), Max: 36.4 (06-18-25 @ 07:41)  T(F): 97.6 (06-18-25 @ 07:41), Max: 97.6 (06-18-25 @ 07:41)  HR: 84 (06-18-25 @ 12:06) (84 - 88)  BP: 134/68 (06-18-25 @ 12:06) (134/68 - 172/70)  BP(mean): --  RR: --  SpO2: 97% (06-18-25 @ 07:41) (97% - 97%)    Orthostatic VS  06-18-25 @ 07:41  Lying BP: --/-- HR: --  Sitting BP: 150/73 HR: 81  Standing BP: 137/93 HR: 91  Site: --  Mode: --  Orthostatic VS  06-17-25 @ 13:23  Lying BP: --/-- HR: --  Sitting BP: 128/56 HR: 99  Standing BP: --/-- HR: --  Site: --  Mode: --  Orthostatic VS  06-17-25 @ 10:04  Lying BP: --/-- HR: --  Sitting BP: 133/69 HR: 89  Standing BP: 136/78 HR: 91  Site: --  Mode: --

## 2025-06-19 LAB
GLUCOSE BLDC GLUCOMTR-MCNC: 128 MG/DL — HIGH (ref 70–99)
GLUCOSE BLDC GLUCOMTR-MCNC: 145 MG/DL — HIGH (ref 70–99)
GLUCOSE BLDC GLUCOMTR-MCNC: 171 MG/DL — HIGH (ref 70–99)
HEV AB FLD QL: NEGATIVE — SIGNIFICANT CHANGE UP

## 2025-06-19 PROCEDURE — 99232 SBSQ HOSP IP/OBS MODERATE 35: CPT

## 2025-06-19 RX ADMIN — Medication 75 MILLIGRAM(S): at 21:08

## 2025-06-19 RX ADMIN — Medication 1 DROP(S): at 21:00

## 2025-06-19 RX ADMIN — Medication 75 MILLIGRAM(S): at 12:09

## 2025-06-19 RX ADMIN — LEVALBUTEROL HYDROCHLORIDE 1 PUFF(S): 1.25 SOLUTION RESPIRATORY (INHALATION) at 14:20

## 2025-06-19 RX ADMIN — TIOTROPIUM BROMIDE INHALATION SPRAY 2 PUFF(S): 3.12 SPRAY, METERED RESPIRATORY (INHALATION) at 09:00

## 2025-06-19 RX ADMIN — Medication 1 DOSE(S): at 09:01

## 2025-06-19 RX ADMIN — Medication 2 TABLET(S): at 21:09

## 2025-06-19 RX ADMIN — MEMANTINE HYDROCHLORIDE 10 MILLIGRAM(S): 21 CAPSULE, EXTENDED RELEASE ORAL at 21:08

## 2025-06-19 RX ADMIN — Medication 75 MILLIGRAM(S): at 08:59

## 2025-06-19 RX ADMIN — Medication 9 MILLIGRAM(S): at 21:09

## 2025-06-19 RX ADMIN — LISINOPRIL 40 MILLIGRAM(S): 5 TABLET ORAL at 08:59

## 2025-06-19 RX ADMIN — MEMANTINE HYDROCHLORIDE 10 MILLIGRAM(S): 21 CAPSULE, EXTENDED RELEASE ORAL at 06:34

## 2025-06-19 RX ADMIN — LEVALBUTEROL HYDROCHLORIDE 1 PUFF(S): 1.25 SOLUTION RESPIRATORY (INHALATION) at 22:37

## 2025-06-19 RX ADMIN — SERTRALINE 125 MILLIGRAM(S): 100 TABLET, FILM COATED ORAL at 08:59

## 2025-06-19 RX ADMIN — Medication 1 DOSE(S): at 22:31

## 2025-06-19 RX ADMIN — Medication 1 DROP(S): at 06:34

## 2025-06-19 RX ADMIN — OLANZAPINE 2.5 MILLIGRAM(S): 10 TABLET ORAL at 21:09

## 2025-06-19 NOTE — BH INPATIENT PSYCHIATRY PROGRESS NOTE - NSBHFUPINTERVALHXFT_PSY_A_CORE
The patient is receiving follow-up care for catatonia, medical issues include: Hypertension (HTN), Asthma, and Diabetes Mellitus (DM). Treatment was discussed within the care team, with no reported issues overnight. No interval changes. Pt reported her mood as "good", denies any complaints. Denies AH/VH/SI/HI. Paritally oriented to time today, knows it is Thursday and we are in the summer. Said it was July and the year was 2020. Provided orientation. Continues to deny abdominal pain, nausea/vomiting. Liver enzymes and GGT remain elevated, will need hepatology follow up as outpatient for followup.

## 2025-06-19 NOTE — PROGRESS NOTE ADULT - PROVIDER SPECIALTY LIST ADULT
Internal Medicine
Internal Medicine
Podiatry
Hospitalist
Hospitalist
Internal Medicine

## 2025-06-19 NOTE — PROGRESS NOTE ADULT - SUBJECTIVE AND OBJECTIVE BOX
Podiatry pager #: [phone]/ [phone]    Patient is a 74y old  Female who presents with a chief complaint of psychosis (19 Jun 2025 13:54)Podiatry consultation for painful tender thickened toenails of both feet aggravated by shoe gear ambulation and palpation.      HPI:      PAST MEDICAL & SURGICAL HISTORY:  MDD (major depressive disorder), recurrent, severe, with psychosis      Asthma, mild intermittent, uncomplicated      Essential hypertension      No significant past surgical history          MEDICATIONS  (STANDING):  artificial tears (preservative free) Ophthalmic Solution 1 Drop(s) Both EYES every 8 hours  fluticasone propionate/ salmeterol 250-50 MICROgram(s) Diskus 1 Dose(s) Inhalation two times a day  glucagon  Injectable 1 milliGRAM(s) IntraMuscular once  hydrALAZINE 75 milliGRAM(s) Oral three times a day  hydrochlorothiazide 25 milliGRAM(s) Oral daily  levalbuterol 45 MICROgram(s) HFA Inhaler 1 Puff(s) Inhalation every 8 hours  lisinopril 40 milliGRAM(s) Oral daily  melatonin. 9 milliGRAM(s) Oral at bedtime  memantine 10 milliGRAM(s) Oral at bedtime  memantine 10 milliGRAM(s) Oral <User Schedule>  OLANZapine 2.5 milliGRAM(s) Oral at bedtime  petrolatum Ophthalmic Ointment 1 Application(s) Both EYES two times a day  senna 2 Tablet(s) Oral at bedtime  sertraline 125 milliGRAM(s) Oral daily  tiotropium 2.5 MICROgram(s) Inhaler 2 Puff(s) Inhalation daily    MEDICATIONS  (PRN):  acetaminophen     Tablet .. 650 milliGRAM(s) Oral every 6 hours PRN Temp greater or equal to 38C (100.4F), Mild Pain (1 - 3), Moderate Pain (4 - 6)  albuterol    90 MICROgram(s) HFA Inhaler 2 Puff(s) Inhalation every 6 hours PRN Shortness of Breath and/or Wheezing  lidocaine   4% Patch 1 Patch Transdermal daily PRN chronic back pain  loperamide 2 milliGRAM(s) Oral once PRN Diarrhea  OLANZapine 1.25 milliGRAM(s) Oral every 6 hours PRN agitation      Allergies    No Known Allergies    Intolerances        VITALS:    Vital Signs Last 24 Hrs  T(C): 36.8 (20 Jun 2025 07:19), Max: 36.8 (20 Jun 2025 07:19)  T(F): 98.2 (20 Jun 2025 07:19), Max: 98.2 (20 Jun 2025 07:19)  HR: 89 (19 Jun 2025 20:51) (89 - 96)  BP: 120/82 (19 Jun 2025 20:51) (120/82 - 123/70)  BP(mean): --  RR: --  SpO2: --        LABS:                CAPILLARY BLOOD GLUCOSE      POCT Blood Glucose.: 122 mg/dL (20 Jun 2025 07:25)  POCT Blood Glucose.: 171 mg/dL (19 Jun 2025 20:21)  POCT Blood Glucose.: 128 mg/dL (19 Jun 2025 11:31)          LOWER EXTREMITY PHYSICAL EXAM:    Vasular: DP/PT _1/4, B/L, CFT <_2 seconds B/L, Temperature gradient _wnl, B/L.   Neuro: Epicritic sensation _intact to the level of toes_, B/L.  Skin: Positive dystrophic hypertrophic brittle toenails with subungual debris to all 10 digits.  No open foot ulcerations or clinical signs section.    RADIOLOGY & ADDITIONAL STUDIES:

## 2025-06-19 NOTE — BH INPATIENT PSYCHIATRY PROGRESS NOTE - NSBHCHARTREVIEWVS_PSY_A_CORE FT
Vital Signs Last 24 Hrs  T(C): 36.9 (06-19-25 @ 08:04), Max: 36.9 (06-19-25 @ 08:04)  T(F): 98.4 (06-19-25 @ 08:04), Max: 98.4 (06-19-25 @ 08:04)  HR: 96 (06-19-25 @ 11:13) (84 - 96)  BP: 123/70 (06-19-25 @ 11:13) (123/70 - 160/90)  BP(mean): --  RR: --  SpO2: --    Orthostatic VS  06-19-25 @ 08:04  Lying BP: --/-- HR: --  Sitting BP: 172/85 HR: 88  Standing BP: 156/97 HR: 84  Site: upper right arm  Mode: electronic  Orthostatic VS  06-18-25 @ 07:41  Lying BP: --/-- HR: --  Sitting BP: 150/73 HR: 81  Standing BP: 137/93 HR: 91  Site: --  Mode: --  Orthostatic VS  06-17-25 @ 13:23  Lying BP: --/-- HR: --  Sitting BP: 128/56 HR: 99  Standing BP: --/-- HR: --  Site: --  Mode: --

## 2025-06-19 NOTE — BH INPATIENT PSYCHIATRY PROGRESS NOTE - NSBHMETABOLIC_PSY_ALL_CORE_FT
BMI: BMI (kg/m2): 28.3 (05-21-25 @ 00:57)  HbA1c: A1C with Estimated Average Glucose Result: 5.9 % (05-22-25 @ 08:09)    Glucose: POCT Blood Glucose.: 128 mg/dL (06-19-25 @ 11:31)    BP: 123/70 (06-19-25 @ 11:13) (123/70 - 172/70)Vital Signs Last 24 Hrs  T(C): 36.9 (06-19-25 @ 08:04), Max: 36.9 (06-19-25 @ 08:04)  T(F): 98.4 (06-19-25 @ 08:04), Max: 98.4 (06-19-25 @ 08:04)  HR: 96 (06-19-25 @ 11:13) (84 - 96)  BP: 123/70 (06-19-25 @ 11:13) (123/70 - 160/90)  BP(mean): --  RR: --  SpO2: --    Orthostatic VS  06-19-25 @ 08:04  Lying BP: --/-- HR: --  Sitting BP: 172/85 HR: 88  Standing BP: 156/97 HR: 84  Site: upper right arm  Mode: electronic  Orthostatic VS  06-18-25 @ 07:41  Lying BP: --/-- HR: --  Sitting BP: 150/73 HR: 81  Standing BP: 137/93 HR: 91  Site: --  Mode: --  Orthostatic VS  06-17-25 @ 13:23  Lying BP: --/-- HR: --  Sitting BP: 128/56 HR: 99  Standing BP: --/-- HR: --  Site: --  Mode: --    Lipid Panel: Date/Time: 06-09-25 @ 08:11  Cholesterol, Serum: 227  LDL Cholesterol Calculated: 144  HDL Cholesterol, Serum: 75  Total Cholesterol/HDL Ration Measurement: --  Triglycerides, Serum: 49

## 2025-06-19 NOTE — PROGRESS NOTE ADULT - ASSESSMENT
74Fw/ hx MDD, asthma, prior CVA presented to [facility] on 3/8/2025 with AHRF c/b AMS requiring MICU admission for intubation for respiratory failure i/s/o asthma exacerbation. Extubated 3/12 to face tent and then on BIPAP s/p RRT 3/19 for increased WOB/hypoxia despite aggressive asthma management s/p return to MICU for management of severe asthma. Possible aspiration event 3/31. Worsening respiratory status 4/2 - placed on NIV, weaned to nasal cannula, then RA.  ENT eval - no upper airway consideration for structural causes of asthma such as vocal cord dysfunction. Course c/b MDD w/ catatonia and poor PO, off ativan due to respiratory concerns trialed on Memantine, now undergoing ECT q weekly.  Transferred to [facility] for further management of catatonia and continued ECT.    # Acute respiratory failure with hypoxia and hypercapnia.   Due to status asthmaticus s/p intubation and bilevel, weaned off of NC to RA   - s/p course of antibiotics  - s/p Solumedrol and prednisone taper  - Xopenex MDI as needed  - C/w advair/spiriva  - on RA no wheezing  - Needs outpatient PFTs    # Acute asthma exacerbation  Resolved  - C/w ICS/LABA (advair)  - Xopenex MDI prn  - OP pulm f/up    # Toxic metabolic encephalopathy.   Likely initially in the setting of AHRF 2' asthma exacerbation  - Currently mental status more likely driven by her psych condition (Catatonia)  - Clinically improved with ECT    # Physical deconditioning  2/2 to deconditioning/critical care myopathy, CK WNL  - Doing better  - Continue PT while at [facility], current recs are for TK    # Hypertension.   Off amlodipine 2' edema    - c/w Lisinopril 40mg daily   - c/w HCTZ 25 mg daily  - c/w clonidine 0.1mg QD (was on hydralazine while at [facility] but not resuming it d/t TID dosing)  - avoid beta-blockers given asthma  - Monitor BP, assess response to clonidine    # Steroid-induced hyperglycemia.   HbA1c 5.7% c/w pre-DM, s/p steroids likely cause of hyperglycemia  - cont Lantus 3 units qhs, dc lispro premeal given hypoglycemia event   - HARDEEP, DM diet  - Monitor FS's, currently at goal, might be able to lower insulin as off steroids    # Seizure-like activity.   CTH: no acute changes  - CT spine: no fractures  - 3/8 ON: 6 minutes of seizure-like activity with myoclonic upper jerks -> broke with ativan  - EEG 3/10 showing no epileptiform activity; severe degree of diffuse or multifocal cerebral dysfunction c/w comatose   - MRI brain- Multiple chronic lacunar infarcts in the bilateral basal ganglia and thalami. No acute infarct  - Lipid panel: , Chol 310, started on lipitor to 40mg qhs  - Monitor LFT's/CK in 4 weeks    # MDD (major depressive disorder), recurrent, with catatonic features.   MDD w/ psychotic features.s  - c/w Namenda 10mg qhs  - ECT qweekly  - Care per Primary Team    
74Fw/ hx MDD, asthma, prior CVA presented to [facility] on 3/8/2025 with AHRF c/b AMS requiring MICU admission for intubation for respiratory failure i/s/o asthma exacerbation. Extubated 3/12 to face tent and then on BIPAP s/p RRT 3/19 for increased WOB/hypoxia despite aggressive asthma management s/p return to MICU for management of severe asthma. Possible aspiration event 3/31. Worsening respiratory status 4/2 - placed on NIV, weaned to nasal cannula, then RA.  ENT eval - no upper airway consideration for structural causes of asthma such as vocal cord dysfunction. Course c/b MDD w/ catatonia and poor PO, off ativan due to respiratory concerns trialed on Memantine, now undergoing ECT q weekly.  Transferred to [facility] for further management of catatonia and continued ECT.    #elevated alk phos  -GGT elevated, confirms hepatic origin  -asymptomatic  - RUQ US shows gallstones and sludge but no biliary dilatation or cholecystitis  - will recheck alk phos 6/9  - if abdominal pain develops will need surgical consultation    # Hypertension.   Off amlodipine 2' edema    - c/w Lisinopril 40mg daily   - c/w HCTZ 25 mg daily  - increased hydralazine to 50 tid,  BP improving  - avoid beta-blockers given asthma  - Monitor BP, assess response     # Acute respiratory failure with hypoxia and hypercapnia.   Due to status asthmaticus s/p intubation and bilevel, weaned off of NC to RA   - s/p course of antibiotics  - s/p Solumedrol and prednisone taper  - Xopenex MDI as needed  - C/w advair/spiriva  - on RA no wheezing  - Needs outpatient PFTs    # Acute asthma exacerbation  Resolved  - C/w ICS/LABA (advair)  - Xopenex MDI prn  - OP pulm f/up    # Toxic metabolic encephalopathy.   Likely initially in the setting of AHRF 2' asthma exacerbation  - Currently mental status more likely driven by her psych condition (Catatonia)  - Clinically improved with ECT    # Physical deconditioning  2/2 to deconditioning/critical care myopathy, CK WNL  - Doing better  - Continue PT while at [facility], current recs are for TK      # Steroid-induced hyperglycemia.   HbA1c 5.7% c/w pre-DM, s/p steroids likely cause of hyperglycemia  - discontinued lantus  - continue CC diet, monitor FSBG and if stable can d/c monitoring    # Seizure-like activity.   CTH: no acute changes  - CT spine: no fractures  - 3/8 ON: 6 minutes of seizure-like activity with myoclonic upper jerks -> broke with ativan  - EEG 3/10 showing no epileptiform activity; severe degree of diffuse or multifocal cerebral dysfunction c/w comatose   - MRI brain- Multiple chronic lacunar infarcts in the bilateral basal ganglia and thalami. No acute infarct  - Lipid panel: , Chol 310, started on lipitor to 40mg qhs  - mild transaminitis 6/4 -- will reduce lipitor to 20mg, recheck in 1 week with lipid profile (6/11)    # MDD (major depressive disorder), recurrent, with catatonic features.   MDD w/ psychotic features.s  - c/w Namenda 10mg qhs  - ECT qweekly  - Care per Primary Team    
Assessment/plan:    Onychomycosis secondary to dermatophytes    Aseptic debridement of mycotic nails all 10 digits with Betadine applied.  Recommend continue routine podiatric nail care as an outpatient.  Thank you for consultation.
74Fw/ hx MDD, asthma, prior CVA presented to [facility] on 3/8/2025 with AHRF c/b AMS requiring MICU admission for intubation for respiratory failure i/s/o asthma exacerbation. Extubated 3/12 to face tent and then on BIPAP s/p RRT 3/19 for increased WOB/hypoxia despite aggressive asthma management s/p return to MICU for management of severe asthma. Possible aspiration event 3/31. Worsening respiratory status 4/2 - placed on NIV, weaned to nasal cannula, then RA.  ENT eval - no upper airway consideration for structural causes of asthma such as vocal cord dysfunction. Course c/b MDD w/ catatonia and poor PO, off ativan due to respiratory concerns trialed on Memantine, now undergoing ECT q weekly.  Transferred to [facility] for further management of catatonia and continued ECT.    # Acute respiratory failure with hypoxia and hypercapnia.   Due to status asthmaticus s/p intubation and bilevel, weaned off of NC to RA   - s/p course of antibiotics  - s/p Solumedrol and prednisone taper  - Xopenex MDI as needed  - C/w advair/spiriva  - on RA no wheezing  - Needs outpatient PFTs    # Acute asthma exacerbation  Resolved  - C/w ICS/LABA (advair)  - Xopenex MDI prn  - OP pulm f/up    # Toxic metabolic encephalopathy.   Likely initially in the setting of AHRF 2' asthma exacerbation  - Currently mental status more likely driven by her psych condition (Catatonia)  - Clinically improved with ECT    # Physical deconditioning  2/2 to deconditioning/critical care myopathy, CK WNL  - Doing better  - Continue PT while at [facility], current recs are for TK    # Hypertension.   Off amlodipine 2' edema    - c/w Lisinopril 40mg daily   - c/w HCTZ 25 mg daily  - Borderline elevated BP, will start clonidine 0.1mg QD (was on hydralazine while at [facility] but not resuming it d/t TID dosing)  - avoid beta-blockers given asthma  - Monitor BP, assess response to clonidine    # Steroid-induced hyperglycemia.   HbA1c 5.7% c/w pre-DM, s/p steroids likely cause of hyperglycemia  - Lantus 3 units qhs and 2 units with meals  - HARDEEP, DM diet  - Monitor FS"s, currently at goal, might be able to lower insulin as off steroids    # Seizure-like activity.   CTH: no acute changes  - CT spine: no fractures  - 3/8 ON: 6 minutes of seizure-like activity with myoclonic upper jerks -> broke with ativan  - EEG 3/10 showing no epileptiform activity; severe degree of diffuse or multifocal cerebral dysfunction c/w comatose   - MRI brain- Multiple chronic lacunar infarcts in the bilateral basal ganglia and thalami. No acute infarct  - Lipid panel: , Chol 310, started on lipitor to 40mg qhs  - Monitor LFT's/CK in 4 weeks    # MDD (major depressive disorder), recurrent, with catatonic features.   MDD w/ psychotic features.s  - c/w Namenda 10mg qhs  - ECT qweekly  - Care per Primary Team    
74Fw/ hx MDD, asthma, prior CVA presented to [facility] on 3/8/2025 with AHRF c/b AMS requiring MICU admission for intubation for respiratory failure i/s/o asthma exacerbation. Extubated 3/12 to face tent and then on BIPAP s/p RRT 3/19 for increased WOB/hypoxia despite aggressive asthma management s/p return to MICU for management of severe asthma. Possible aspiration event 3/31. Worsening respiratory status 4/2 - placed on NIV, weaned to nasal cannula, then RA.  ENT eval - no upper airway consideration for structural causes of asthma such as vocal cord dysfunction. Course c/b MDD w/ catatonia and poor PO, off ativan due to respiratory concerns trialed on Memantine, now undergoing ECT q weekly.  Transferred to [facility] for further management of catatonia and continued ECT.    #elevated alk phos  -GGT elevated, confirms hepatic origin  -asymptomatic  -agree RUQ US evaluate for gallstones or signs of obstruction    # Hypertension.   Off amlodipine 2' edema    - c/w Lisinopril 40mg daily   - c/w HCTZ 25 mg daily  - increase hydralazine to 50 tid, monitor BP  - avoid beta-blockers given asthma  - Monitor BP, assess response     # Acute respiratory failure with hypoxia and hypercapnia.   Due to status asthmaticus s/p intubation and bilevel, weaned off of NC to RA   - s/p course of antibiotics  - s/p Solumedrol and prednisone taper  - Xopenex MDI as needed  - C/w advair/spiriva  - on RA no wheezing  - Needs outpatient PFTs    # Acute asthma exacerbation  Resolved  - C/w ICS/LABA (advair)  - Xopenex MDI prn  - OP pulm f/up    # Toxic metabolic encephalopathy.   Likely initially in the setting of AHRF 2' asthma exacerbation  - Currently mental status more likely driven by her psych condition (Catatonia)  - Clinically improved with ECT    # Physical deconditioning  2/2 to deconditioning/critical care myopathy, CK WNL  - Doing better  - Continue PT while at [facility], current recs are for TK      # Steroid-induced hyperglycemia.   HbA1c 5.7% c/w pre-DM, s/p steroids likely cause of hyperglycemia  - can discontinue lantus  - continue CC diet, monitor FSBG and if stable can d/c monitoring    # Seizure-like activity.   CTH: no acute changes  - CT spine: no fractures  - 3/8 ON: 6 minutes of seizure-like activity with myoclonic upper jerks -> broke with ativan  - EEG 3/10 showing no epileptiform activity; severe degree of diffuse or multifocal cerebral dysfunction c/w comatose   - MRI brain- Multiple chronic lacunar infarcts in the bilateral basal ganglia and thalami. No acute infarct  - Lipid panel: , Chol 310, started on lipitor to 40mg qhs  - mild transaminitis 6/4 -- will reduce lipitor to 20mg, recheck in 1 week with lipid profile (6/11)    # MDD (major depressive disorder), recurrent, with catatonic features.   MDD w/ psychotic features.s  - c/w Namenda 10mg qhs  - ECT qweekly  - Care per Primary Team    
74Fw/ hx MDD, asthma, prior CVA presented to [facility] on 3/8/2025 with AHRF c/b AMS requiring MICU admission for intubation for respiratory failure i/s/o asthma exacerbation. Extubated 3/12 to face tent and then on BIPAP s/p RRT 3/19 for increased WOB/hypoxia despite aggressive asthma management s/p return to MICU for management of severe asthma. Possible aspiration event 3/31. Worsening respiratory status 4/2 - placed on NIV, weaned to nasal cannula, then RA.  ENT eval - no upper airway consideration for structural causes of asthma such as vocal cord dysfunction. Course c/b MDD w/ catatonia and poor PO, off ativan due to respiratory concerns trialed on Memantine, now undergoing ECT q weekly.  Transferred to [facility] for further management of catatonia and continued ECT.    #elevated alk phos  -GGT elevated, confirms hepatic origin  -asymptomatic  - RUQ US shows gallstones and sludge but no biliary dilatation or cholecystitis  - if abdominal pain develops will need surgical consultation  - ALP elevation persists  - no new meds that often cause cholestatic liver injury  - will discontinue statin  - will need to follow up with a hepatologist for further workup  - P-ANCA was negative [facility] admission 3/20/25  - Hep ABCE serologies, AMA, IgG4 all negative (HepE IgM pending)  - Can follow up [facility]  [address] [place] [address]  Phone: [phone]    # Hypertension.   Off amlodipine 2' edema    - c/w Lisinopril 40mg daily   - c/w HCTZ 25 mg daily  - labile, sometimes at goal  - increased hydralazine to 75 tid  - avoid beta-blockers given asthma  - Monitor BP, assess response     # Acute respiratory failure with hypoxia and hypercapnia.   Due to status asthmaticus s/p intubation and bilevel, weaned off of NC to RA   - s/p course of antibiotics  - s/p Solumedrol and prednisone taper  - Xopenex MDI as needed  - C/w advair/spiriva  - on RA no wheezing  - Needs outpatient PFTs    # Acute asthma exacerbation  Resolved  - C/w ICS/LABA (advair)  - Xopenex MDI prn  - OP pulm f/up      # Physical deconditioning  2/2 to deconditioning/critical care myopathy, CK WNL  - Doing better  - Continue PT while at [facility]    # Steroid-induced hyperglycemia.   HbA1c 5.7% c/w pre-DM, s/p steroids likely cause of hyperglycemia  - discontinued lantus  - continue CC diet  - can discontinue FSBG checks    # Seizure-like activity.   CTH: no acute changes  - CT spine: no fractures  - 3/8 ON: 6 minutes of seizure-like activity with myoclonic upper jerks -> broke with ativan  - EEG 3/10 showing no epileptiform activity; severe degree of diffuse or multifocal cerebral dysfunction c/w comatose   - MRI brain- Multiple chronic lacunar infarcts in the bilateral basal ganglia and thalami. No acute infarct  - Lipid panel: , Chol 310, started on lipitor to 40mg qhs  - holding statin now due to persistent ALP elevation although uncommon cause for DILI    # MDD (major depressive disorder), recurrent, with catatonic features.   MDD w/ psychotic features.s  - Care per Primary Team    
74Fw/ hx MDD, asthma, prior CVA presented to [facility] on 3/8/2025 with AHRF c/b AMS requiring MICU admission for intubation for respiratory failure i/s/o asthma exacerbation. Extubated 3/12 to face tent and then on BIPAP s/p RRT 3/19 for increased WOB/hypoxia despite aggressive asthma management s/p return to MICU for management of severe asthma. Possible aspiration event 3/31. Worsening respiratory status 4/2 - placed on NIV, weaned to nasal cannula, then RA.  ENT eval - no upper airway consideration for structural causes of asthma such as vocal cord dysfunction. Course c/b MDD w/ catatonia and poor PO, off ativan due to respiratory concerns trialed on Memantine, now undergoing ECT q weekly.  Transferred to [facility] for further management of catatonia and continued ECT.    #elevated alk phos  -GGT elevated, confirms hepatic origin  -asymptomatic  - RUQ US shows gallstones and sludge but no biliary dilatation or cholecystitis  - if abdominal pain develops will need surgical consultation  - ALP elevation persists  - no new meds that often cause cholestatic liver injury  - will discontinue statin  - will need to follow up with a hepatologist for further workup  - P-ANCA was negative [facility] admission 3/20/25  - will send Hep ABCE serologies, AMA, IgG4    # Hypertension.   Off amlodipine 2' edema    - c/w Lisinopril 40mg daily   - c/w HCTZ 25 mg daily  - still not at goal  - increase hydralazine to 75 tid  - avoid beta-blockers given asthma  - Monitor BP, assess response     # Acute respiratory failure with hypoxia and hypercapnia.   Due to status asthmaticus s/p intubation and bilevel, weaned off of NC to RA   - s/p course of antibiotics  - s/p Solumedrol and prednisone taper  - Xopenex MDI as needed  - C/w advair/spiriva  - on RA no wheezing  - Needs outpatient PFTs    # Acute asthma exacerbation  Resolved  - C/w ICS/LABA (advair)  - Xopenex MDI prn  - OP pulm f/up      # Physical deconditioning  2/2 to deconditioning/critical care myopathy, CK WNL  - Doing better  - Continue PT while at [facility]    # Steroid-induced hyperglycemia.   HbA1c 5.7% c/w pre-DM, s/p steroids likely cause of hyperglycemia  - discontinued lantus  - continue CC diet  - can discontinue FSBG    # Seizure-like activity.   CTH: no acute changes  - CT spine: no fractures  - 3/8 ON: 6 minutes of seizure-like activity with myoclonic upper jerks -> broke with ativan  - EEG 3/10 showing no epileptiform activity; severe degree of diffuse or multifocal cerebral dysfunction c/w comatose   - MRI brain- Multiple chronic lacunar infarcts in the bilateral basal ganglia and thalami. No acute infarct  - Lipid panel: , Chol 310, started on lipitor to 40mg qhs  - holding statin now due to persistent ALP elavation although uncommon cause for DILI    # MDD (major depressive disorder), recurrent, with catatonic features.   MDD w/ psychotic features.s  - Care per Primary Team    
74Fw/ hx MDD, asthma, prior CVA presented to [facility] on 3/8/2025 with AHRF c/b AMS requiring MICU admission for intubation for respiratory failure i/s/o asthma exacerbation. Extubated 3/12 to face tent and then on BIPAP s/p RRT 3/19 for increased WOB/hypoxia despite aggressive asthma management s/p return to MICU for management of severe asthma. Possible aspiration event 3/31. Worsening respiratory status 4/2 - placed on NIV, weaned to nasal cannula, then RA.  ENT eval - no upper airway consideration for structural causes of asthma such as vocal cord dysfunction. Course c/b MDD w/ catatonia and poor PO, off ativan due to respiratory concerns trialed on Memantine, now undergoing ECT q weekly.  Transferred to [facility] for further management of catatonia and continued ECT.    # Acute respiratory failure with hypoxia and hypercapnia.   Due to status asthmaticus s/p intubation and bilevel, weaned off of NC to RA   - s/p course of antibiotics  - s/p Solumedrol and prednisone taper  - Xopenex MDI as needed  - C/w advair/spiriva  - on RA no wheezing  - Needs outpatient PFTs    # Acute asthma exacerbation  Resolved  - C/w ICS/LABA (advair)  - Xopenex MDI prn  - OP pulm f/up    # Toxic metabolic encephalopathy.   Likely initially in the setting of AHRF 2' asthma exacerbation  - Currently mental status more likely driven by her psych condition (Catatonia)  - Clinically improved with ECT    # Physical deconditioning  2/2 to deconditioning/critical care myopathy, CK WNL  - Doing better  - Continue PT while at [facility], current recs are for TK    # Hypertension.   Off amlodipine 2' edema    - c/w Lisinopril 40mg daily   - c/w HCTZ 25 mg daily  - not controlled, will d/c clonidine and resume hydralazine, titrate as needed  - avoid beta-blockers given asthma  - Monitor BP, assess response     # Steroid-induced hyperglycemia.   HbA1c 5.7% c/w pre-DM, s/p steroids likely cause of hyperglycemia  - can discontinue lantus  - continue CC diet, monitor FSBG and if stable can d/c monitoring    # Seizure-like activity.   CTH: no acute changes  - CT spine: no fractures  - 3/8 ON: 6 minutes of seizure-like activity with myoclonic upper jerks -> broke with ativan  - EEG 3/10 showing no epileptiform activity; severe degree of diffuse or multifocal cerebral dysfunction c/w comatose   - MRI brain- Multiple chronic lacunar infarcts in the bilateral basal ganglia and thalami. No acute infarct  - Lipid panel: , Chol 310, started on lipitor to 40mg qhs  - mild transaminitis 6/4 -- will reduce lipitor to 20mg, recheck in 1 week with lipid profile (6/11)    # MDD (major depressive disorder), recurrent, with catatonic features.   MDD w/ psychotic features.s  - c/w Namenda 10mg qhs  - ECT qweekly  - Care per Primary Team

## 2025-06-19 NOTE — PROGRESS NOTE ADULT - SUBJECTIVE AND OBJECTIVE BOX
CC/Reason for Consult: elevated ALP    SUBJECTIVE / OVERNIGHT EVENTS: Denies abdominal pain    MEDICATIONS  (STANDING):  artificial tears (preservative free) Ophthalmic Solution 1 Drop(s) Both EYES every 8 hours  fluticasone propionate/ salmeterol 250-50 MICROgram(s) Diskus 1 Dose(s) Inhalation two times a day  glucagon  Injectable 1 milliGRAM(s) IntraMuscular once  hydrALAZINE 75 milliGRAM(s) Oral three times a day  hydrochlorothiazide 25 milliGRAM(s) Oral daily  levalbuterol 45 MICROgram(s) HFA Inhaler 1 Puff(s) Inhalation every 8 hours  lisinopril 40 milliGRAM(s) Oral daily  melatonin. 9 milliGRAM(s) Oral at bedtime  memantine 10 milliGRAM(s) Oral <User Schedule>  memantine 10 milliGRAM(s) Oral at bedtime  OLANZapine 2.5 milliGRAM(s) Oral at bedtime  petrolatum Ophthalmic Ointment 1 Application(s) Both EYES two times a day  senna 2 Tablet(s) Oral at bedtime  sertraline 125 milliGRAM(s) Oral daily  tiotropium 2.5 MICROgram(s) Inhaler 2 Puff(s) Inhalation daily    MEDICATIONS  (PRN):  acetaminophen     Tablet .. 650 milliGRAM(s) Oral every 6 hours PRN Temp greater or equal to 38C (100.4F), Mild Pain (1 - 3), Moderate Pain (4 - 6)  albuterol    90 MICROgram(s) HFA Inhaler 2 Puff(s) Inhalation every 6 hours PRN Shortness of Breath and/or Wheezing  lidocaine   4% Patch 1 Patch Transdermal daily PRN chronic back pain  loperamide 2 milliGRAM(s) Oral once PRN Diarrhea  OLANZapine 1.25 milliGRAM(s) Oral every 6 hours PRN agitation      Vital Signs Last 24 Hrs  T(C): 36.9 (2025 08:04), Max: 36.9 (2025 08:04)  T(F): 98.4 (2025 08:04), Max: 98.4 (2025 08:04)  HR: 96 (2025 11:13) (84 - 96)  BP: 123/70 (2025 11:13) (123/70 - 160/90)  BP(mean): --  RR: 14        CAPILLARY BLOOD GLUCOSE      POCT Blood Glucose.: 128 mg/dL (2025 11:31)  POCT Blood Glucose.: 145 mg/dL (2025 07:36)  POCT Blood Glucose.: 107 mg/dL (2025 20:14)  POCT Blood Glucose.: 185 mg/dL (2025 15:25)        PHYSICAL EXAM:  GENERAL: NAD  HEAD:  Atraumatic, Normocephalic  EYES: EOMI, conjunctiva and sclera clear  NECK: Supple, No JVD  CHEST/LUNG: Clear to auscultation bilaterally; No wheeze  HEART: Regular rate and rhythm; No murmurs, rubs, or gallops  ABDOMEN: Soft, Nontender, Nondistended; Bowel sounds present  EXTREMITIES:  2+ Peripheral Pulses, No clubbing, cyanosis, or edema  NEUROLOGY: non-focal  SKIN: No rashes or lesions    LABS:    Hepatitis E IgG Antibodies (25 @ 09:00)    Hepatitis E IgG Antibodies: Negative  Hepatitis C Antibody Test (25 09:00)    Hepatitis C Virus S/CO Ratio: 0.15 S/CO   Hepatitis C Virus Interpretation: Nonreact: Non-Reactive  Hepatitis B Surface Antibody (25 @ 09:00)    Hepatitis B Surface Antibody: Reactive  Hepatitis A IgM Antibody (25 @ 09:00)    Hepatitis A IgM Antibody: Nonreact  Hepatitis A IgG Antibody (25 @ :00)    Hepatitis A IgG Ab Result: Reactive  Mitochondrial Antibody (25 @ 09:00)    Mitochondrial Antibody: <1:20: Method: Indirect fluorescent antibody.  IgG Subsets (25 @ 09:00)    IgG 1: 663 mg/dL   IgG 2: 285 mg/dL   IgG 3: 47.8 mg/dL   IgG 4: 9.2 mg/dL   Quantitative Ig mg/dL  Hepatic Function Panel in AM (25 @ 09:00)    Indirect Reacting Bilirubin: >0.4 mg/dL   Protein Total: 7.4 g/dL   Albumin: 4.1 g/dL   Bilirubin Total: 0.6 mg/dL   Bilirubin Direct: <0.2 mg/dL   Alkaline Phosphatase: 496 U/L   Aspartate Aminotransferase (AST/SGOT): 40 U/L   Alanine Aminotransferase (ALT/SGPT): 52 U/L                                  Care Discussed with Consultants/Other Providers: Dr Moeller

## 2025-06-19 NOTE — BH INPATIENT PSYCHIATRY PROGRESS NOTE - NSBHASSESSSUMMFT_PSY_ALL_CORE
74F hx asthma, depression, and HTN was admitted to the MICU with AHRF 2/2 asthma exacerbation resulting in acute on chronic HHRF causing AMS and increased WOB that required intubation. During her ICU course, she experienced seizure-like activity .  Additional issues included hyponatremia, acute on chronic metabolic alkalosis, and mild thrombocytopenia,  also having fevers.  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 Patient has h/o hx of major depressive disorder with psychosis, hx of 3 past inpatient psychiatric hospitalizations last in 2016 at [facility], all rehospitalizations were in context non compliance with the treatment. She has no hx of suicide attempts, no hx of substance abuse.    Pt currently requires inpatient level of care for ongoing treatment for catatonia.     5/22 - calm, cooperative, not oriented, though remembers name of son. No s/s catatonia. Denies mood symptoms. Eating and sleeping on unit.  5/23 - No signs of catatonia. Eating and ambulating on unit. For ECT this morning. Not oriented to time, place, or situation. Has been NPO. Spoke to , who believes pt is returning to baseline, but is concerned about pt's memory issues.  5/27 - Withdrawn, non-spontaneous speech, not oriented to time, place, or situation, though interactive and recalls her 's name. Hypoglycemic episode yesterday. D/w hospitalist, discontinued standing pre-meal Admelog of 2U TID, will keep on bedtime Lantus and sliding scale.   5/28: More engaged today, establishes good eye contact. Received ECT today, will monitor, if catatonia symptoms remain in control will continue with weekly ECT next week, if pt appears to be relapsing may order ECT for Friday.   5/29: Pt presenting with increased BM frequency, regular consistency. Will order imodium PRN for diarrhea.   5/30: Will observe pt over the weekend and next week for confusion. Family has brought up concern ECT may be worsening confusion and it is a possibility. Will observe off Zyprexa and space out ECT, may treat next Friday.   5/31: Remains confused but calm. Unclear if due to ECT or residual catatonia.  6/2: Will continue holding Zyprexa and ECT this week. Presentation is suggestive of protracted delirium.   6/3: Pt remains confused and disoriented. Continue holding Zyprexa and ECT. Ordered urinalysis and routine bloodwork.   6/4: Continue monitoring Alk phos and LFTs.   6/5: Alk phos uptrending, bilirubin is WNL, LFTs mildly elevated. Abdominal ultrasound scheduled for tomorrow, 6/6, at 9:00am. Family requesting ECT be held until Monday as they were concerned about confusion. Will resume Zyprexa 2.5mg QHS.   6/6: Abdominal ultrasound completed today, revealed gallstones but no evidence for cholecystitis or biliary dilatation. This likely explains elevated Alk phos and GGT. Ms. Silva denies pain, there is no tenderness to palpation on physical exam and sonographic Tomlin sign was not elicited. Will repeat bloodwork on Monday and continue monitoring. Discussed with medicine. ECT ordered for Monday 6/9.   6/7-6/8: Speech improved, calm, pleasant, plan for ECT 6/9  6/10: Better related this week. No signs of catatonia. S/P ECT yesterday.   6/11: No interval changes. Setting up homecare in preparation for discharge home. No symptoms of catatonia but pt remains disoriented with poor memory.   6/12: memory impairment evident, no agitation or catatonia evident, tolerating meds.   6/13: Continues to present memory impairment. No signs of catatonia.   6/14: Overnight staff reported near choking incidents on meds and water overnight. Diet changed to puree in the meantime and cineesophagram was ordered. Discussed choking precautions with staff and patients, advised pt remain upright while eating or drinking and take small bites.   6/16: Pt completed cineesophagram and was cleared to continue regular diet with thin liquids. Alk phos remains elevated but downtrending.   06/17: No signs of catatonia, depression, or psychotic symptoms.   6/19: Pt reported her mood as "good", denies any complaints. Denies AH/VH/SI/HI. Continues to deny abdominal pain, nausea/vomiting. Liver enzymes and GGT remain elevated, will need hepatology follow up as outpatient for followup.     PLAN:  1. Admit to [facility] on 9.27  2. Routine checks, as pt denying SI/HI/SIB  3. Psychiatric:  - Continue Namenda 10mg BID  - Continue Sertraline 125mg QD, titrate up to lowest effective dose  - Resume Olanzapine 2.5mg QHS due to paranoia  - Would avoid benzodiazepines since pt was recently unable to tolerate them at [facility], becoming extremely sedated  - ECT held due to concern for memory impairment     4. Medical:   - Abdominal ultrasound scheduled for 6/6/25 at 9am.   - PRN levalbuterol inhaler  - Continue Advair and Spiriva for Asthma and COPD  - Continue Lisinopril 40mg QD, HCTZ 25mg QD, hydralazine 75 mg TID for HTN  - Diabetes type 2: CC diet. Lantus 3U at bedtime and FSBG discontinued  - Lidocaine patches PRN daily + Tylenol PRN for lower back pain  - Bowel regimen: Senna 2 tabs QHS  - Diet: Easy to chew, consistent carbohydrates. No Beef No Pork  - Pt is at risk for falls given deconditioning. Seen by PT, recommended PRN walker with 1 staff assists. Pt is in a room with peer who has CO for added supervision and redirection during nighttime. During the daytime she should be either in the day room or the dining area with staff monitoring her at all times to provide redirection if pt tries to get up or ambulate unassisted.     5. Collateral: spoke with pt's  Liam on phone to update on progress 5/22 and 5/23.  6. Disposition: pending clinical course

## 2025-06-20 LAB
GLUCOSE BLDC GLUCOMTR-MCNC: 122 MG/DL — HIGH (ref 70–99)
GLUCOSE BLDC GLUCOMTR-MCNC: 200 MG/DL — HIGH (ref 70–99)
GLUCOSE BLDC GLUCOMTR-MCNC: 231 MG/DL — HIGH (ref 70–99)
GLUCOSE BLDC GLUCOMTR-MCNC: 235 MG/DL — HIGH (ref 70–99)
HEV IGM SER QL: NEGATIVE — SIGNIFICANT CHANGE UP

## 2025-06-20 PROCEDURE — 99231 SBSQ HOSP IP/OBS SF/LOW 25: CPT

## 2025-06-20 RX ADMIN — Medication 1 DROP(S): at 06:28

## 2025-06-20 RX ADMIN — Medication 75 MILLIGRAM(S): at 09:01

## 2025-06-20 RX ADMIN — MEMANTINE HYDROCHLORIDE 10 MILLIGRAM(S): 21 CAPSULE, EXTENDED RELEASE ORAL at 06:27

## 2025-06-20 RX ADMIN — Medication 1 DOSE(S): at 09:02

## 2025-06-20 RX ADMIN — Medication 75 MILLIGRAM(S): at 21:15

## 2025-06-20 RX ADMIN — TIOTROPIUM BROMIDE INHALATION SPRAY 2 PUFF(S): 3.12 SPRAY, METERED RESPIRATORY (INHALATION) at 09:02

## 2025-06-20 RX ADMIN — Medication 650 MILLIGRAM(S): at 18:59

## 2025-06-20 RX ADMIN — LEVALBUTEROL HYDROCHLORIDE 1 PUFF(S): 1.25 SOLUTION RESPIRATORY (INHALATION) at 23:25

## 2025-06-20 RX ADMIN — Medication 2 TABLET(S): at 21:14

## 2025-06-20 RX ADMIN — Medication 1 DOSE(S): at 21:19

## 2025-06-20 RX ADMIN — LEVALBUTEROL HYDROCHLORIDE 1 PUFF(S): 1.25 SOLUTION RESPIRATORY (INHALATION) at 06:40

## 2025-06-20 RX ADMIN — Medication 650 MILLIGRAM(S): at 18:12

## 2025-06-20 RX ADMIN — Medication 75 MILLIGRAM(S): at 13:19

## 2025-06-20 RX ADMIN — Medication 1 DROP(S): at 13:20

## 2025-06-20 RX ADMIN — OLANZAPINE 2.5 MILLIGRAM(S): 10 TABLET ORAL at 21:15

## 2025-06-20 RX ADMIN — SERTRALINE 125 MILLIGRAM(S): 100 TABLET, FILM COATED ORAL at 09:01

## 2025-06-20 RX ADMIN — LISINOPRIL 40 MILLIGRAM(S): 5 TABLET ORAL at 09:02

## 2025-06-20 RX ADMIN — MEMANTINE HYDROCHLORIDE 10 MILLIGRAM(S): 21 CAPSULE, EXTENDED RELEASE ORAL at 21:15

## 2025-06-20 RX ADMIN — Medication 1 DROP(S): at 21:19

## 2025-06-20 RX ADMIN — Medication 9 MILLIGRAM(S): at 21:14

## 2025-06-20 NOTE — BH INPATIENT PSYCHIATRY DISCHARGE NOTE - REASON FOR ADMISSION
Pt was admitted from [facility] after she had a complicated medical course in the ICU due to asthma exacerbation and later catatonia.

## 2025-06-20 NOTE — BH INPATIENT PSYCHIATRY DISCHARGE NOTE - NSDCRECOMMENDMEDICALFT_PSY_ALL_CORE
Please continue follow up with psychiatry and Hepatology.   Hepatology follow up: [address] [facility], [address], [place] [address]. Phone: [phone]     Please continue follow up with Psychiatry, Hepatology, and Pulmonology    Hepatology follow up: [address] [facility], [address], [place] [address]. Phone: [phone]     Please follow up with your outpatient appointments with Psychiatry, Hepatology, and Pulmonology.

## 2025-06-20 NOTE — BH INPATIENT PSYCHIATRY PROGRESS NOTE - NSBHCHARTREVIEWVS_PSY_A_CORE FT
Vital Signs Last 24 Hrs  T(C): 36.8 (06-20-25 @ 07:19), Max: 36.8 (06-20-25 @ 07:19)  T(F): 98.2 (06-20-25 @ 07:19), Max: 98.2 (06-20-25 @ 07:19)  HR: 89 (06-19-25 @ 20:51) (89 - 89)  BP: 120/82 (06-19-25 @ 20:51) (120/82 - 120/82)  BP(mean): --  RR: --  SpO2: --    Orthostatic VS  06-20-25 @ 07:19  Lying BP: --/-- HR: --  Sitting BP: 156/74 HR: 76  Standing BP: --/-- HR: --  Site: --  Mode: --  Orthostatic VS  06-19-25 @ 08:04  Lying BP: --/-- HR: --  Sitting BP: 172/85 HR: 88  Standing BP: 156/97 HR: 84  Site: upper right arm  Mode: electronic

## 2025-06-20 NOTE — BH INPATIENT PSYCHIATRY DISCHARGE NOTE - NSDCMRMEDTOKEN_GEN_ALL_CORE_FT
fluticasone-salmeterol: 1 puff(s) inhaled 2 times a day  hydroCHLOROthiazide 25 mg oral tablet: 1 tab(s) orally once a day  insulin glargine 100 units/mL subcutaneous solution: 3 unit(s) subcutaneous once a day (at bedtime)  insulin lispro 100 units/mL injectable solution: 2 unit(s) injectable 3 times a day (before meals)  levalbuterol 0.63 mg/3 mL inhalation solution: 3 milliliter(s) inhaled every 8 hours  levalbuterol 0.63 mg/3 mL inhalation solution: 3 milliliter(s) inhaled every 8 hours  lisinopril 40 mg oral tablet: 1 tab(s) orally once a day  melatonin 3 mg oral tablet: 3 tab(s) orally once a day (at bedtime)  memantine 10 mg oral tablet: 1 tab(s) orally once a day (at bedtime)  memantine 10 mg oral tablet: 1 tab(s) orally once a day at 7 AM  ocular lubricant ophthalmic ointment: 1 application to each affected eye 2 times a day  ocular lubricant preservative-free ophthalmic solution: 1 drop(s) to each affected eye every 8 hours  OLANZapine 2.5 mg oral tablet: 1 tab(s) orally once a day (at bedtime)  polyethylene glycol 3350 oral powder for reconstitution: 17 gram(s) orally 2 times a day  senna leaf extract oral tablet: 2 tab(s) orally once a day (at bedtime)  sertraline 25 mg oral tablet: 5 tab(s) orally once a day  tiotropium 2.5 mcg/inh inhalation aerosol: 2 puff(s) inhaled once a day  Xopenex HFA 45 mcg/inh inhalation aerosol: 1 puff(s) inhaled every 8 hours   albuterol 90 mcg/inh inhalation aerosol: 2 puff(s) inhaled every 6 hours as needed for Shortness of Breath and/or Wheezing  fluticasone-salmeterol 230 mcg-21 mcg/inh inhalation aerosol: 1 puff(s) inhaled 2 times a day  hydrALAZINE 25 mg oral tablet: 3 tab(s) orally 3 times a day  hydroCHLOROthiazide 25 mg oral tablet: 1 tab(s) orally once a day  Incruse Ellipta 62.5 mcg/inh inhalation powder: 1 puff(s) inhaled once a day  lisinopril 40 mg oral tablet: 1 tab(s) orally once a day  melatonin 3 mg oral tablet: 3 tab(s) orally once a day (at bedtime)  memantine 10 mg oral tablet: 1 tab(s) orally 2 times a day  ocular lubricant ophthalmic ointment: 1 application to each affected eye 2 times a day  ocular lubricant preservative-free ophthalmic solution: 1 drop(s) to each affected eye every 8 hours  OLANZapine 2.5 mg oral tablet: 1 tab(s) orally once a day (at bedtime)  senna leaf extract oral tablet: 2 tab(s) orally once a day (at bedtime)  sertraline 25 mg oral tablet: 1 tab(s) orally once a day Take along with Sertraline 100mg tablet for total of Sertraline 125mg PO daily  Zoloft 100 mg oral tablet: 1 tab(s) orally once a day Take along with Sertraline 25mg tablet for total of 125mg daily

## 2025-06-20 NOTE — BH INPATIENT PSYCHIATRY DISCHARGE NOTE - NSBHASSESSSUMMFT_PSY_ALL_CORE
* 74F hx asthma, depression, and HTN was admitted to the MICU with AHRF 2/2 asthma exacerbation resulting in acute on chronic HHRF causing AMS and increased WOB that required intubation. During her ICU course, she experienced seizure-like activity .  Additional issues included hyponatremia, acute on chronic metabolic alkalosis, and mild thrombocytopenia,  also having fevers.  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 Patient has h/o hx of major depressive disorder with psychosis, hx of 3 past inpatient psychiatric hospitalizations last in 2016 at [facility], all rehospitalizations were in context non compliance with the treatment. She has no hx of suicide attempts, no hx of substance abuse.    Pt currently requires inpatient level of care for ongoing treatment for catatonia.     5/22 - calm, cooperative, not oriented, though remembers name of son. No s/s catatonia. Denies mood symptoms. Eating and sleeping on unit.  5/23 - No signs of catatonia. Eating and ambulating on unit. For ECT this morning. Not oriented to time, place, or situation. Has been NPO. Spoke to , who believes pt is returning to baseline, but is concerned about pt's memory issues.  5/27 - Withdrawn, non-spontaneous speech, not oriented to time, place, or situation, though interactive and recalls her 's name. Hypoglycemic episode yesterday. D/w hospitalist, discontinued standing pre-meal Admelog of 2U TID, will keep on bedtime Lantus and sliding scale.   5/28: More engaged today, establishes good eye contact. Received ECT today, will monitor, if catatonia symptoms remain in control will continue with weekly ECT next week, if pt appears to be relapsing may order ECT for Friday.   5/29: Pt presenting with increased BM frequency, regular consistency. Will order imodium PRN for diarrhea.   5/30: Will observe pt over the weekend and next week for confusion. Family has brought up concern ECT may be worsening confusion and it is a possibility. Will observe off Zyprexa and space out ECT, may treat next Friday.   5/31: Remains confused but calm. Unclear if due to ECT or residual catatonia.  6/2: Will continue holding Zyprexa and ECT this week. Presentation is suggestive of protracted delirium.   6/3: Pt remains confused and disoriented. Continue holding Zyprexa and ECT. Ordered urinalysis and routine bloodwork.   6/4: Continue monitoring Alk phos and LFTs.   6/5: Alk phos uptrending, bilirubin is WNL, LFTs mildly elevated. Abdominal ultrasound scheduled for tomorrow, 6/6, at 9:00am. Family requesting ECT be held until Monday as they were concerned about confusion. Will resume Zyprexa 2.5mg QHS.   6/6: Abdominal ultrasound completed today, revealed gallstones but no evidence for cholecystitis or biliary dilatation. This likely explains elevated Alk phos and GGT. Ms. Silva denies pain, there is no tenderness to palpation on physical exam and sonographic Tomlin sign was not elicited. Will repeat bloodwork on Monday and continue monitoring. Discussed with medicine. ECT ordered for Monday 6/9.   6/7-6/8: Speech improved, calm, pleasant, plan for ECT 6/9  6/10: Better related this week. No signs of catatonia. S/P ECT yesterday.   6/11: No interval changes. Setting up homecare in preparation for discharge home. No symptoms of catatonia but pt remains disoriented with poor memory.   6/12: memory impairment evident, no agitation or catatonia evident, tolerating meds.   6/13: Continues to present memory impairment. No signs of catatonia.   6/14: Overnight staff reported near choking incidents on meds and water overnight. Diet changed to puree in the meantime and cineesophagram was ordered. Discussed choking precautions with staff and patients, advised pt remain upright while eating or drinking and take small bites.   6/16: Pt completed cineesophagram and was cleared to continue regular diet with thin liquids. Alk phos remains elevated but downtrending.   06/17: No signs of catatonia, depression, or psychotic symptoms.   6/19-6/20: Pt reported her mood as "good", denies any complaints. Denies AH/VH/SI/HI. Continues to deny abdominal pain, nausea/vomiting. Liver enzymes and GGT remain elevated, will need hepatology follow up as outpatient for followup. Hospitalist recs appreciated. Will continue holding ECT due to no sx of catatonia and family concerns about memory problems. If catatonia symptoms return would send for ECT.  6/23-24: Reports mood as good. Denies depressive or psychotic symptoms. No catatonic signs noted. Some delay with 3 word recall though able to recall all words with prompt. In light of above, will continue hold off ECT.   6/25 Reports some unsteadiness upon standing, uses walker to ambulate. Able to verbalize her needs. Denies depressive or psychotic sxs. No motor or verbal signs of catatonia noted.   6/26 No interval changes. Denies abdominal pain, nausea, vomiting. No recurrence of catatonic sxs. Denies depressive or psychotic sxs. Cont current medications.   6/27 Tolerating medications. No interval changes. Denies Si, Hi. Cont current meds.  06/30: Patient has improved significantly with her current treatment. Will plan for discharge tomorrow. Obtained an outpatient pulmonology appointment today.  07/01 Pt is stable for discharge. Will discharge today.    PLAN:  1. Admit to [facility] on 9.27  2. Routine checks, as pt denying SI/HI/SIB  3. Psychiatric:  - Continue Namenda 10mg BID  - Continue Sertraline 125mg QD, titrate up to lowest effective dose  - Resume Olanzapine 2.5mg QHS due to paranoia  - Would avoid benzodiazepines since pt was recently unable to tolerate them at [facility], becoming extremely sedated  - ECT held due to concern for memory impairment     4. Medical:   - Abdominal ultrasound scheduled for 6/6/25 at 9am.   - PRN levalbuterol inhaler  - Continue Advair and Spiriva for Asthma and COPD  - Continue Lisinopril 40mg QD, HCTZ 25mg QD, hydralazine 75 mg TID for HTN  - Diabetes type 2: CC diet. Lantus 3U at bedtime and FSBG discontinued  - Lidocaine patches PRN daily + Tylenol PRN for lower back pain  - Bowel regimen: Senna 2 tabs QHS  - Diet: Easy to chew, consistent carbohydrates. No Beef No Pork  - Pt is at risk for falls given deconditioning. Seen by PT, recommended PRN walker with 1 staff assists. Pt is in a room with peer who has CO for added supervision and redirection during nighttime. During the daytime she should be either in the day room or the dining area with staff monitoring her at all times to provide redirection if pt tries to get up or ambulate unassisted.     5. Collateral: spoke with pt's  Liam on phone to update on progress 5/22 and 5/23.  6. Disposition: DC today

## 2025-06-20 NOTE — BH INPATIENT PSYCHIATRY PROGRESS NOTE - NSBHASSESSSUMMFT_PSY_ALL_CORE
74F hx asthma, depression, and HTN was admitted to the MICU with AHRF 2/2 asthma exacerbation resulting in acute on chronic HHRF causing AMS and increased WOB that required intubation. During her ICU course, she experienced seizure-like activity .  Additional issues included hyponatremia, acute on chronic metabolic alkalosis, and mild thrombocytopenia,  also having fevers.  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 Patient has h/o hx of major depressive disorder with psychosis, hx of 3 past inpatient psychiatric hospitalizations last in 2016 at [facility], all rehospitalizations were in context non compliance with the treatment. She has no hx of suicide attempts, no hx of substance abuse.    Pt currently requires inpatient level of care for ongoing treatment for catatonia.     5/22 - calm, cooperative, not oriented, though remembers name of son. No s/s catatonia. Denies mood symptoms. Eating and sleeping on unit.  5/23 - No signs of catatonia. Eating and ambulating on unit. For ECT this morning. Not oriented to time, place, or situation. Has been NPO. Spoke to , who believes pt is returning to baseline, but is concerned about pt's memory issues.  5/27 - Withdrawn, non-spontaneous speech, not oriented to time, place, or situation, though interactive and recalls her 's name. Hypoglycemic episode yesterday. D/w hospitalist, discontinued standing pre-meal Admelog of 2U TID, will keep on bedtime Lantus and sliding scale.   5/28: More engaged today, establishes good eye contact. Received ECT today, will monitor, if catatonia symptoms remain in control will continue with weekly ECT next week, if pt appears to be relapsing may order ECT for Friday.   5/29: Pt presenting with increased BM frequency, regular consistency. Will order imodium PRN for diarrhea.   5/30: Will observe pt over the weekend and next week for confusion. Family has brought up concern ECT may be worsening confusion and it is a possibility. Will observe off Zyprexa and space out ECT, may treat next Friday.   5/31: Remains confused but calm. Unclear if due to ECT or residual catatonia.  6/2: Will continue holding Zyprexa and ECT this week. Presentation is suggestive of protracted delirium.   6/3: Pt remains confused and disoriented. Continue holding Zyprexa and ECT. Ordered urinalysis and routine bloodwork.   6/4: Continue monitoring Alk phos and LFTs.   6/5: Alk phos uptrending, bilirubin is WNL, LFTs mildly elevated. Abdominal ultrasound scheduled for tomorrow, 6/6, at 9:00am. Family requesting ECT be held until Monday as they were concerned about confusion. Will resume Zyprexa 2.5mg QHS.   6/6: Abdominal ultrasound completed today, revealed gallstones but no evidence for cholecystitis or biliary dilatation. This likely explains elevated Alk phos and GGT. Ms. Silva denies pain, there is no tenderness to palpation on physical exam and sonographic Tomlin sign was not elicited. Will repeat bloodwork on Monday and continue monitoring. Discussed with medicine. ECT ordered for Monday 6/9.   6/7-6/8: Speech improved, calm, pleasant, plan for ECT 6/9  6/10: Better related this week. No signs of catatonia. S/P ECT yesterday.   6/11: No interval changes. Setting up homecare in preparation for discharge home. No symptoms of catatonia but pt remains disoriented with poor memory.   6/12: memory impairment evident, no agitation or catatonia evident, tolerating meds.   6/13: Continues to present memory impairment. No signs of catatonia.   6/14: Overnight staff reported near choking incidents on meds and water overnight. Diet changed to puree in the meantime and cineesophagram was ordered. Discussed choking precautions with staff and patients, advised pt remain upright while eating or drinking and take small bites.   6/16: Pt completed cineesophagram and was cleared to continue regular diet with thin liquids. Alk phos remains elevated but downtrending.   06/17: No signs of catatonia, depression, or psychotic symptoms.   6/19-6/20: Pt reported her mood as "good", denies any complaints. Denies AH/VH/SI/HI. Continues to deny abdominal pain, nausea/vomiting. Liver enzymes and GGT remain elevated, will need hepatology follow up as outpatient for followup. Hospitalist recs appreciated. Will continue holding ECT due to no sx of catatonia and family concerns about memory problems. If catatonia symptoms return would send for ECT.    PLAN:  1. Admit to [facility] 2S on 9.27  2. Routine checks, as pt denying SI/HI/SIB  3. Psychiatric:  - Continue Namenda 10mg BID  - Continue Sertraline 125mg QD, titrate up to lowest effective dose  - Resume Olanzapine 2.5mg QHS due to paranoia  - Would avoid benzodiazepines since pt was recently unable to tolerate them at [facility] becoming extremely sedated  - ECT held due to concern for memory impairment     4. Medical:   - Can follow up Division of [facility]  [address] [address]  Phone: [phone]  - Abdominal ultrasound scheduled for 6/6/25 at 9am.   - PRN levalbuterol inhaler  - Continue Advair and Spiriva for Asthma and COPD  - Continue Lisinopril 40mg QD, HCTZ 25mg QD, hydralazine 75 mg TID for HTN  - Diabetes type 2: CC diet. Lantus 3U at bedtime and FSBG discontinued  - Lidocaine patches PRN daily + Tylenol PRN for lower back pain  - Bowel regimen: Senna 2 tabs QHS  - Diet: Easy to chew, consistent carbohydrates. No Beef No Pork  - Pt is at risk for falls given deconditioning. Seen by PT, recommended PRN walker with 1 staff assists. Pt is in a room with peer who has CO for added supervision and redirection during nighttime. During the daytime she should be either in the day room or the dining area with staff monitoring her at all times to provide redirection if pt tries to get up or ambulate unassisted.     5. Collateral: spoke with pt's  Liam on phone to update on progress 5/22 and 5/23.  6. Disposition: pending clinical course

## 2025-06-20 NOTE — BH INPATIENT PSYCHIATRY PROGRESS NOTE - CURRENT MEDICATION
MEDICATIONS  (STANDING):  artificial tears (preservative free) Ophthalmic Solution 1 Drop(s) Both EYES every 8 hours  fluticasone propionate/ salmeterol 250-50 MICROgram(s) Diskus 1 Dose(s) Inhalation two times a day  glucagon  Injectable 1 milliGRAM(s) IntraMuscular once  hydrALAZINE 75 milliGRAM(s) Oral three times a day  hydrochlorothiazide 25 milliGRAM(s) Oral daily  levalbuterol 45 MICROgram(s) HFA Inhaler 1 Puff(s) Inhalation every 8 hours  lisinopril 40 milliGRAM(s) Oral daily  melatonin. 9 milliGRAM(s) Oral at bedtime  memantine 10 milliGRAM(s) Oral at bedtime  memantine 10 milliGRAM(s) Oral <User Schedule>  OLANZapine 2.5 milliGRAM(s) Oral at bedtime  petrolatum Ophthalmic Ointment 1 Application(s) Both EYES two times a day  senna 2 Tablet(s) Oral at bedtime  sertraline 125 milliGRAM(s) Oral daily  tiotropium 2.5 MICROgram(s) Inhaler 2 Puff(s) Inhalation daily    MEDICATIONS  (PRN):  acetaminophen     Tablet .. 650 milliGRAM(s) Oral every 6 hours PRN Temp greater or equal to 38C (100.4F), Mild Pain (1 - 3), Moderate Pain (4 - 6)  albuterol    90 MICROgram(s) HFA Inhaler 2 Puff(s) Inhalation every 6 hours PRN Shortness of Breath and/or Wheezing  lidocaine   4% Patch 1 Patch Transdermal daily PRN chronic back pain  loperamide 2 milliGRAM(s) Oral once PRN Diarrhea  OLANZapine 1.25 milliGRAM(s) Oral every 6 hours PRN agitation

## 2025-06-20 NOTE — BH INPATIENT PSYCHIATRY DISCHARGE NOTE - HPI (INCLUDE ILLNESS QUALITY, SEVERITY, DURATION, TIMING, CONTEXT, MODIFYING FACTORS, ASSOCIATED SIGNS AND SYMPTOMS)
per  assessment " Patient is a 74F hx asthma, depression, and HTN was admitted to the MICU with AHRF 2/2 asthma exacerbation resulting in acute on chronic HHRF causing AMS and increased WOB that required intubation. During her ICU course, she experienced seizure-like activity .  Additional issues included hyponatremia, acute on chronic metabolic alkalosis, and mild thrombocytopenia,  also having fevers.  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 Patient has h/o hx of major depressive disorder with psychosis, hx of 3 past inpatient psychiatric hospitalizations last in 2016 at [facility], all rehospitalizations were in context non compliance with the treatment. She has no hx of suicide attempts, no hx of substance abuse. Patient w hx of paranoia, disorganization, catatonia, required MOO when in [facility].   "    Patient evaluated by MIA, confirms the above findings. On assessment, patient is calm, cooperative, however minimally responsive to questioning (interpretive not used due to availability), States no SI/I/P, no HI/I/P, no safety concerns    On encounter this morning pt was observed sitting in the dayroom, awake and alert. She took her medications. Encounter carried in English, pt was able to answer close ended questions but was unable to engage in a full conversation. Denied being in any pain, denied SI/HI and reported feeling safe in the unit. Eye contact was intermittent. Later patient was able to speak in olivarez sentences with [facility] staff who speaks her language. Confirmed pt is able to communicate in Akan, for which we do have interpreting services through [employer]. Pt verbalized feeling more comfortable speaking Akan, will use  for our next meetings.

## 2025-06-20 NOTE — BH INPATIENT PSYCHIATRY DISCHARGE NOTE - HOSPITAL COURSE
74F hx asthma, depression, and HTN was admitted to the MICU with AHRF 2/2 asthma exacerbation resulting in acute on chronic HHRF causing AMS and increased WOB that required intubation. During her ICU course, she experienced seizure-like activity .  Additional issues included hyponatremia, acute on chronic metabolic alkalosis, and mild thrombocytopenia,  also having fevers.  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 Patient has h/o hx of major depressive disorder with psychosis, hx of 3 past inpatient psychiatric hospitalizations last in 2016 at [facility]    5/22 - calm, cooperative, not oriented, though remembers name of son. No s/s catatonia. Denies mood symptoms. Eating and sleeping on unit.  5/23 - No signs of catatonia. Eating and ambulating on unit. For ECT this morning. Not oriented to time, place, or situation. Has been NPO. Spoke to , who believes pt is returning to baseline, but is concerned about pt's memory issues.  5/27 - Withdrawn, non-spontaneous speech, not oriented to time, place, or situation, though interactive and recalls her 's name. Hypoglycemic episode yesterday. D/w hospitalist, discontinued standing pre-meal Admelog of 2U TID, will keep on bedtime Lantus and sliding scale.   5/28: More engaged today, establishes good eye contact. Received ECT today, will monitor, if catatonia symptoms remain in control will continue with weekly ECT next week, if pt appears to be relapsing may order ECT for Friday.   5/29: Pt presenting with increased BM frequency, regular consistency. Will order imodium PRN for diarrhea.   5/30: Will observe pt over the weekend and next week for confusion. Family has brought up concern ECT may be worsening confusion and it is a possibility. Will observe off Zyprexa and space out ECT, may treat next Friday.   5/31: Remains confused but calm. Unclear if due to ECT or residual catatonia.  6/2: Will continue holding Zyprexa and ECT this week. Presentation is suggestive of protracted delirium.   6/3: Pt remains confused and disoriented. Continue holding Zyprexa and ECT. Ordered urinalysis and routine bloodwork.   6/4: Continue monitoring Alk phos and LFTs.   6/5: Alk phos uptrending, bilirubin is WNL, LFTs mildly elevated. Abdominal ultrasound scheduled for tomorrow, 6/6, at 9:00am. Family requesting ECT be held until Monday as they were concerned about confusion. Will resume Zyprexa 2.5mg QHS.   6/6: Abdominal ultrasound completed today, revealed gallstones but no evidence for cholecystitis or biliary dilatation. This likely explains elevated Alk phos and GGT. Ms. Silva denies pain, there is no tenderness to palpation on physical exam and sonographic Tomlin sign was not elicited. Will repeat bloodwork on Monday and continue monitoring. Discussed with medicine. ECT ordered for Monday 6/9.   6/7-6/8: Speech improved, calm, pleasant, plan for ECT 6/9  6/10: Better related this week. No signs of catatonia. S/P ECT yesterday.   6/11: No interval changes. Setting up homecare in preparation for discharge home. No symptoms of catatonia but pt remains disoriented with poor memory.   6/12: memory impairment evident, no agitation or catatonia evident, tolerating meds.   6/13: Continues to present memory impairment. No signs of catatonia.   6/14: Overnight staff reported near choking incidents on meds and water overnight. Diet changed to puree in the meantime and cineesophagram was ordered. Discussed choking precautions with staff and patients, advised pt remain upright while eating or drinking and take small bites.   6/16: Pt completed cineesophagram and was cleared to continue regular diet with thin liquids. Alk phos remains elevated but downtrending.   06/17: No signs of catatonia, depression, or psychotic symptoms.   6/19-6/20: Pt reported her mood as "good", denies any complaints. Denies AH/VH/SI/HI. Continues to deny abdominal pain, nausea/vomiting. Liver enzymes and GGT remain elevated, will need hepatology follow up as outpatient for followup. Hospitalist recs appreciated. Will continue holding ECT due to no sx of catatonia and family concerns about memory problems. If catatonia symptoms return would send for ECT. - The patient was initially admitted to [facility] for respiratory failure and had a prolonged hospital course due to multiple complications and catatonia. While at [facility] the patient was seen by the Psychiatry C/L and started ECT. Pt. was discharged to [facility] on sertraline 125 mg daily, olanzapine 2.5 mg qhs, memantine 10 mg BID and ECT. At [facility] the patient was continued on the medications and ECT and her catatonic symptoms resolved. ECT was discontinued after treatment on 06/09 due to remission of catatonic symptoms and concern for memory loss. By the day of discharge the patient's catatonic symptoms had resolved and her mood was stable with no SI/HI and no psychotic symptoms.  The patient was discharged to outpatient treatment at [facility] with follow up appointments with [facility] for elevated liver enzymes and [facility].    Psychiatric medications at discharge:  sertraline 125 mg daily, olanzapine 2.5 mg qhs, memantine 10 mg BID    Safety Assessment:  Risk Factors: mood disorder, psychiatric admission, recent stressors, medical conditions  Protective Factors: denied current depression symptoms, denied SI, future oriented, engaged in treatment, interested in continuing treatment, no known SAs, no known substance use, denies access to guns, strong social support, stable housing  Risk factors considered and risk assessed. The patient does not appear to be an imminent threat to self or others at this time and is appropriate for discharge from the hospital. Risk will be mitigated by further treatment as an outpatient

## 2025-06-20 NOTE — BH INPATIENT PSYCHIATRY DISCHARGE NOTE - OTHER PAST PSYCHIATRIC HISTORY (INCLUDE DETAILS REGARDING ONSET, COURSE OF ILLNESS, INPATIENT/OUTPATIENT TREATMENT)
Pt has a history of formal psychiatric treatment for MDD with psychosis with at least three prior psychiatric admissions, the last in [facility] in 2015.  Pt received medication over objection order during her 2015 admission.  Pt followed by the [facility] since 2016, history of f/u at [facility] AOPD, and [facility] OPD.  Medication non-compliance has been an issue for pt, with decompensation.  Pt admitted to [facility] 3/08-5/21/25 related to asthma exacerbation.  Pt was in ICU, intubated at some point.  Pt reportedly experienced seizure activity while at [facility].  Pt exhibited catatonic symptoms, and started an ECT course at [facility] on 4/25/25.  Pt has had eight ECT at [facility], with plan to continue her inpatient course here.  No prior suicide attempts or substance abuse

## 2025-06-20 NOTE — BH INPATIENT PSYCHIATRY PROGRESS NOTE - NSBHMETABOLIC_PSY_ALL_CORE_FT
BMI: BMI (kg/m2): 28.3 (05-21-25 @ 00:57)  HbA1c: A1C with Estimated Average Glucose Result: 5.9 % (05-22-25 @ 08:09)    Glucose: POCT Blood Glucose.: 235 mg/dL (06-20-25 @ 11:22)    BP: 120/82 (06-19-25 @ 20:51) (120/82 - 172/70)Vital Signs Last 24 Hrs  T(C): 36.8 (06-20-25 @ 07:19), Max: 36.8 (06-20-25 @ 07:19)  T(F): 98.2 (06-20-25 @ 07:19), Max: 98.2 (06-20-25 @ 07:19)  HR: 89 (06-19-25 @ 20:51) (89 - 89)  BP: 120/82 (06-19-25 @ 20:51) (120/82 - 120/82)  BP(mean): --  RR: --  SpO2: --    Orthostatic VS  06-20-25 @ 07:19  Lying BP: --/-- HR: --  Sitting BP: 156/74 HR: 76  Standing BP: --/-- HR: --  Site: --  Mode: --  Orthostatic VS  06-19-25 @ 08:04  Lying BP: --/-- HR: --  Sitting BP: 172/85 HR: 88  Standing BP: 156/97 HR: 84  Site: upper right arm  Mode: electronic    Lipid Panel: Date/Time: 06-09-25 @ 08:11  Cholesterol, Serum: 227  LDL Cholesterol Calculated: 144  HDL Cholesterol, Serum: 75  Total Cholesterol/HDL Ration Measurement: --  Triglycerides, Serum: 49

## 2025-06-20 NOTE — BH INPATIENT PSYCHIATRY PROGRESS NOTE - NSBHFUPINTERVALHXFT_PSY_A_CORE
The patient is receiving follow-up care for catatonia, medical issues include: Hypertension (HTN), Asthma, and Diabetes Mellitus (DM). Treatment was discussed within the care team, with no reported issues overnight. No interval changes. Pt reported her mood as "good", denies any complaints. Denies AH/VH/SI/HI. Seems better oriented, off by 1 day on the date and month (said it was Thursday and July). Continues to deny abdominal pain, nausea/vomiting. Liver enzymes and GGT remain elevated, will need hepatology follow up as outpatient for followup.

## 2025-06-20 NOTE — BH INPATIENT PSYCHIATRY DISCHARGE NOTE - NSBHFUPINTERVALHXFT_PSY_A_CORE
* Chart reviewed and case discussed with treatment team. No acute events overnight. No prns. On exam today the patient reported that she was feeling "okay". Stated that she was sleeping and eating well. Denied mood or psychotic symptoms. Denied medication side effects. Discussed the plan for care after discharge.

## 2025-06-21 LAB
GLUCOSE BLDC GLUCOMTR-MCNC: 123 MG/DL — HIGH (ref 70–99)
GLUCOSE BLDC GLUCOMTR-MCNC: 154 MG/DL — HIGH (ref 70–99)
GLUCOSE BLDC GLUCOMTR-MCNC: 159 MG/DL — HIGH (ref 70–99)
GLUCOSE BLDC GLUCOMTR-MCNC: 172 MG/DL — HIGH (ref 70–99)

## 2025-06-21 RX ADMIN — Medication 1 DROP(S): at 22:47

## 2025-06-21 RX ADMIN — LISINOPRIL 40 MILLIGRAM(S): 5 TABLET ORAL at 08:52

## 2025-06-21 RX ADMIN — Medication 1 DROP(S): at 06:26

## 2025-06-21 RX ADMIN — Medication 75 MILLIGRAM(S): at 20:37

## 2025-06-21 RX ADMIN — LEVALBUTEROL HYDROCHLORIDE 1 PUFF(S): 1.25 SOLUTION RESPIRATORY (INHALATION) at 06:29

## 2025-06-21 RX ADMIN — TIOTROPIUM BROMIDE INHALATION SPRAY 2 PUFF(S): 3.12 SPRAY, METERED RESPIRATORY (INHALATION) at 08:53

## 2025-06-21 RX ADMIN — Medication 1 DOSE(S): at 08:52

## 2025-06-21 RX ADMIN — MEMANTINE HYDROCHLORIDE 10 MILLIGRAM(S): 21 CAPSULE, EXTENDED RELEASE ORAL at 06:26

## 2025-06-21 RX ADMIN — MEMANTINE HYDROCHLORIDE 10 MILLIGRAM(S): 21 CAPSULE, EXTENDED RELEASE ORAL at 20:37

## 2025-06-21 RX ADMIN — Medication 2 TABLET(S): at 20:37

## 2025-06-21 RX ADMIN — SERTRALINE 125 MILLIGRAM(S): 100 TABLET, FILM COATED ORAL at 08:51

## 2025-06-21 RX ADMIN — OLANZAPINE 2.5 MILLIGRAM(S): 10 TABLET ORAL at 20:37

## 2025-06-21 RX ADMIN — Medication 75 MILLIGRAM(S): at 12:10

## 2025-06-21 RX ADMIN — Medication 75 MILLIGRAM(S): at 08:52

## 2025-06-21 RX ADMIN — Medication 1 DOSE(S): at 20:08

## 2025-06-21 RX ADMIN — Medication 9 MILLIGRAM(S): at 20:37

## 2025-06-21 RX ADMIN — LEVALBUTEROL HYDROCHLORIDE 1 PUFF(S): 1.25 SOLUTION RESPIRATORY (INHALATION) at 14:32

## 2025-06-21 RX ADMIN — LEVALBUTEROL HYDROCHLORIDE 1 PUFF(S): 1.25 SOLUTION RESPIRATORY (INHALATION) at 22:49

## 2025-06-22 LAB
GLUCOSE BLDC GLUCOMTR-MCNC: 114 MG/DL — HIGH (ref 70–99)
GLUCOSE BLDC GLUCOMTR-MCNC: 125 MG/DL — HIGH (ref 70–99)
GLUCOSE BLDC GLUCOMTR-MCNC: 132 MG/DL — HIGH (ref 70–99)
GLUCOSE BLDC GLUCOMTR-MCNC: 156 MG/DL — HIGH (ref 70–99)

## 2025-06-22 RX ADMIN — Medication 75 MILLIGRAM(S): at 20:36

## 2025-06-22 RX ADMIN — Medication 9 MILLIGRAM(S): at 20:36

## 2025-06-22 RX ADMIN — LEVALBUTEROL HYDROCHLORIDE 1 PUFF(S): 1.25 SOLUTION RESPIRATORY (INHALATION) at 21:22

## 2025-06-22 RX ADMIN — SERTRALINE 125 MILLIGRAM(S): 100 TABLET, FILM COATED ORAL at 09:16

## 2025-06-22 RX ADMIN — MEMANTINE HYDROCHLORIDE 10 MILLIGRAM(S): 21 CAPSULE, EXTENDED RELEASE ORAL at 06:10

## 2025-06-22 RX ADMIN — LEVALBUTEROL HYDROCHLORIDE 1 PUFF(S): 1.25 SOLUTION RESPIRATORY (INHALATION) at 15:54

## 2025-06-22 RX ADMIN — LEVALBUTEROL HYDROCHLORIDE 1 PUFF(S): 1.25 SOLUTION RESPIRATORY (INHALATION) at 06:10

## 2025-06-22 RX ADMIN — OLANZAPINE 2.5 MILLIGRAM(S): 10 TABLET ORAL at 20:36

## 2025-06-22 RX ADMIN — Medication 1 DOSE(S): at 20:36

## 2025-06-22 RX ADMIN — MEMANTINE HYDROCHLORIDE 10 MILLIGRAM(S): 21 CAPSULE, EXTENDED RELEASE ORAL at 20:36

## 2025-06-22 RX ADMIN — Medication 1 DOSE(S): at 09:16

## 2025-06-22 RX ADMIN — Medication 1 DROP(S): at 06:10

## 2025-06-22 RX ADMIN — Medication 1 DROP(S): at 21:21

## 2025-06-22 RX ADMIN — TIOTROPIUM BROMIDE INHALATION SPRAY 2 PUFF(S): 3.12 SPRAY, METERED RESPIRATORY (INHALATION) at 09:15

## 2025-06-22 RX ADMIN — Medication 75 MILLIGRAM(S): at 09:16

## 2025-06-22 RX ADMIN — Medication 75 MILLIGRAM(S): at 13:05

## 2025-06-22 RX ADMIN — Medication 2 TABLET(S): at 20:36

## 2025-06-22 RX ADMIN — LISINOPRIL 40 MILLIGRAM(S): 5 TABLET ORAL at 09:16

## 2025-06-22 RX ADMIN — Medication 1 DROP(S): at 13:05

## 2025-06-23 LAB
GLUCOSE BLDC GLUCOMTR-MCNC: 109 MG/DL — HIGH (ref 70–99)
GLUCOSE BLDC GLUCOMTR-MCNC: 124 MG/DL — HIGH (ref 70–99)
GLUCOSE BLDC GLUCOMTR-MCNC: 140 MG/DL — HIGH (ref 70–99)
GLUCOSE BLDC GLUCOMTR-MCNC: 214 MG/DL — HIGH (ref 70–99)

## 2025-06-23 PROCEDURE — 99231 SBSQ HOSP IP/OBS SF/LOW 25: CPT

## 2025-06-23 RX ADMIN — SERTRALINE 125 MILLIGRAM(S): 100 TABLET, FILM COATED ORAL at 08:29

## 2025-06-23 RX ADMIN — Medication 1 DOSE(S): at 20:28

## 2025-06-23 RX ADMIN — Medication 1 DROP(S): at 13:42

## 2025-06-23 RX ADMIN — Medication 75 MILLIGRAM(S): at 20:28

## 2025-06-23 RX ADMIN — Medication 75 MILLIGRAM(S): at 08:29

## 2025-06-23 RX ADMIN — Medication 2 TABLET(S): at 20:29

## 2025-06-23 RX ADMIN — Medication 1 DROP(S): at 22:42

## 2025-06-23 RX ADMIN — Medication 75 MILLIGRAM(S): at 12:14

## 2025-06-23 RX ADMIN — TIOTROPIUM BROMIDE INHALATION SPRAY 2 PUFF(S): 3.12 SPRAY, METERED RESPIRATORY (INHALATION) at 10:01

## 2025-06-23 RX ADMIN — LEVALBUTEROL HYDROCHLORIDE 1 PUFF(S): 1.25 SOLUTION RESPIRATORY (INHALATION) at 13:43

## 2025-06-23 RX ADMIN — MEMANTINE HYDROCHLORIDE 10 MILLIGRAM(S): 21 CAPSULE, EXTENDED RELEASE ORAL at 06:54

## 2025-06-23 RX ADMIN — OLANZAPINE 2.5 MILLIGRAM(S): 10 TABLET ORAL at 20:28

## 2025-06-23 RX ADMIN — LISINOPRIL 40 MILLIGRAM(S): 5 TABLET ORAL at 08:29

## 2025-06-23 RX ADMIN — Medication 9 MILLIGRAM(S): at 20:29

## 2025-06-23 RX ADMIN — Medication 1 DROP(S): at 06:54

## 2025-06-23 RX ADMIN — LEVALBUTEROL HYDROCHLORIDE 1 PUFF(S): 1.25 SOLUTION RESPIRATORY (INHALATION) at 10:01

## 2025-06-23 RX ADMIN — Medication 1 DOSE(S): at 10:01

## 2025-06-23 RX ADMIN — MEMANTINE HYDROCHLORIDE 10 MILLIGRAM(S): 21 CAPSULE, EXTENDED RELEASE ORAL at 20:28

## 2025-06-23 NOTE — BH INPATIENT PSYCHIATRY PROGRESS NOTE - NSBHFUPINTERVALHXFT_PSY_A_CORE
Patient is being f/u for catatonia. Case discussed with nursing. No acute issues overnight. No interval changes. Pt reported her mood as "good", denies any complaints. Seems better oriented,  (though it was sunday June 22 or 23). Continues to deny abdominal pain, nausea/vomiting. Eating and sleeping well. Liver enzymes and GGT remain elevated, will need hepatology follow up as outpatient for followup. Denies symptoms of shukri, paranoia, AVH, suicidal ideation/HI.

## 2025-06-23 NOTE — BH INPATIENT PSYCHIATRY PROGRESS NOTE - NSBHCHARTREVIEWVS_PSY_A_CORE FT
Vital Signs Last 24 Hrs  T(C): 36.7 (06-23-25 @ 08:00), Max: 36.7 (06-23-25 @ 08:00)  T(F): 98 (06-23-25 @ 08:00), Max: 98 (06-23-25 @ 08:00)  HR: 89 (06-23-25 @ 11:45) (87 - 89)  BP: 144/71 (06-23-25 @ 11:45) (144/71 - 152/76)  BP(mean): --  RR: 16 (06-23-25 @ 09:50) (16 - 16)  SpO2: --    Orthostatic VS  06-23-25 @ 09:50  Lying BP: --/-- HR: --  Sitting BP: 145/70 HR: 74  Standing BP: 149/82 HR: 74  Site: --  Mode: --  Orthostatic VS  06-22-25 @ 08:02  Lying BP: --/-- HR: --  Sitting BP: 138/81 HR: 82  Standing BP: 140/94 HR: 80  Site: --  Mode: --

## 2025-06-23 NOTE — BH INPATIENT PSYCHIATRY PROGRESS NOTE - NSBHMETABOLIC_PSY_ALL_CORE_FT
BMI: BMI (kg/m2): 28.3 (05-21-25 @ 00:57)  HbA1c: A1C with Estimated Average Glucose Result: 5.9 % (05-22-25 @ 08:09)    Glucose: POCT Blood Glucose.: 140 mg/dL (06-23-25 @ 12:05)    BP: 144/71 (06-23-25 @ 11:45) (118/60 - 157/82)Vital Signs Last 24 Hrs  T(C): 36.7 (06-23-25 @ 08:00), Max: 36.7 (06-23-25 @ 08:00)  T(F): 98 (06-23-25 @ 08:00), Max: 98 (06-23-25 @ 08:00)  HR: 89 (06-23-25 @ 11:45) (87 - 89)  BP: 144/71 (06-23-25 @ 11:45) (144/71 - 152/76)  BP(mean): --  RR: 16 (06-23-25 @ 09:50) (16 - 16)  SpO2: --    Orthostatic VS  06-23-25 @ 09:50  Lying BP: --/-- HR: --  Sitting BP: 145/70 HR: 74  Standing BP: 149/82 HR: 74  Site: --  Mode: --  Orthostatic VS  06-22-25 @ 08:02  Lying BP: --/-- HR: --  Sitting BP: 138/81 HR: 82  Standing BP: 140/94 HR: 80  Site: --  Mode: --    Lipid Panel: Date/Time: 06-09-25 @ 08:11  Cholesterol, Serum: 227  LDL Cholesterol Calculated: 144  HDL Cholesterol, Serum: 75  Total Cholesterol/HDL Ration Measurement: --  Triglycerides, Serum: 49

## 2025-06-23 NOTE — BH INPATIENT PSYCHIATRY PROGRESS NOTE - NSBHASSESSSUMMFT_PSY_ALL_CORE
74F hx asthma, depression, and HTN was admitted to the MICU with AHRF 2/2 asthma exacerbation resulting in acute on chronic HHRF causing AMS and increased WOB that required intubation. During her ICU course, she experienced seizure-like activity .  Additional issues included hyponatremia, acute on chronic metabolic alkalosis, and mild thrombocytopenia,  also having fevers.  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 Patient has h/o hx of major depressive disorder with psychosis, hx of 3 past inpatient psychiatric hospitalizations last in 2016 at [facility], all rehospitalizations were in context non compliance with the treatment. She has no hx of suicide attempts, no hx of substance abuse.    Pt currently requires inpatient level of care for ongoing treatment for catatonia.     5/22 - calm, cooperative, not oriented, though remembers name of son. No s/s catatonia. Denies mood symptoms. Eating and sleeping on unit.  5/23 - No signs of catatonia. Eating and ambulating on unit. For ECT this morning. Not oriented to time, place, or situation. Has been NPO. Spoke to , who believes pt is returning to baseline, but is concerned about pt's memory issues.  5/27 - Withdrawn, non-spontaneous speech, not oriented to time, place, or situation, though interactive and recalls her 's name. Hypoglycemic episode yesterday. D/w hospitalist, discontinued standing pre-meal Admelog of 2U TID, will keep on bedtime Lantus and sliding scale.   5/28: More engaged today, establishes good eye contact. Received ECT today, will monitor, if catatonia symptoms remain in control will continue with weekly ECT next week, if pt appears to be relapsing may order ECT for Friday.   5/29: Pt presenting with increased BM frequency, regular consistency. Will order imodium PRN for diarrhea.   5/30: Will observe pt over the weekend and next week for confusion. Family has brought up concern ECT may be worsening confusion and it is a possibility. Will observe off Zyprexa and space out ECT, may treat next Friday.   5/31: Remains confused but calm. Unclear if due to ECT or residual catatonia.  6/2: Will continue holding Zyprexa and ECT this week. Presentation is suggestive of protracted delirium.   6/3: Pt remains confused and disoriented. Continue holding Zyprexa and ECT. Ordered urinalysis and routine bloodwork.   6/4: Continue monitoring Alk phos and LFTs.   6/5: Alk phos uptrending, bilirubin is WNL, LFTs mildly elevated. Abdominal ultrasound scheduled for tomorrow, 6/6, at 9:00am. Family requesting ECT be held until Monday as they were concerned about confusion. Will resume Zyprexa 2.5mg QHS.   6/6: Abdominal ultrasound completed today, revealed gallstones but no evidence for cholecystitis or biliary dilatation. This likely explains elevated Alk phos and GGT. Ms. Silva denies pain, there is no tenderness to palpation on physical exam and sonographic Tomlin sign was not elicited. Will repeat bloodwork on Monday and continue monitoring. Discussed with medicine. ECT ordered for Monday 6/9.   6/7-6/8: Speech improved, calm, pleasant, plan for ECT 6/9  6/10: Better related this week. No signs of catatonia. S/P ECT yesterday.   6/11: No interval changes. Setting up homecare in preparation for discharge home. No symptoms of catatonia but pt remains disoriented with poor memory.   6/12: memory impairment evident, no agitation or catatonia evident, tolerating meds.   6/13: Continues to present memory impairment. No signs of catatonia.   6/14: Overnight staff reported near choking incidents on meds and water overnight. Diet changed to puree in the meantime and cineesophagram was ordered. Discussed choking precautions with staff and patients, advised pt remain upright while eating or drinking and take small bites.   6/16: Pt completed cineesophagram and was cleared to continue regular diet with thin liquids. Alk phos remains elevated but downtrending.   06/17: No signs of catatonia, depression, or psychotic symptoms.   6/19-6/20: Pt reported her mood as "good", denies any complaints. Denies AH/VH/SI/HI. Continues to deny abdominal pain, nausea/vomiting. Liver enzymes and GGT remain elevated, will need hepatology follow up as outpatient for followup. Hospitalist recs appreciated. Will continue holding ECT due to no sx of catatonia and family concerns about memory problems. If catatonia symptoms return would send for ECT.  6/23: Reports mood as good. Denies depressive or psychotic symptoms. No catatonic signs noted. Some delay with 3 word recall though able to recall all words with prompt. In light of above, will continue hold off ECT.     PLAN:  1. Admit to [facility] on 9.27  2. Routine checks, as pt denying SI/HI/SIB  3. Psychiatric:  - Continue Namenda 10mg BID  - Continue Sertraline 125mg QD, titrate up to lowest effective dose  - Resume Olanzapine 2.5mg QHS due to paranoia  - Would avoid benzodiazepines since pt was recently unable to tolerate them at [facility], becoming extremely sedated  - ECT held due to concern for memory impairment     4. Medical:   - Can follow up Division of [facility]  [address] [address]  Phone: [phone]  - Abdominal ultrasound scheduled for 6/6/25 at 9am.   - PRN levalbuterol inhaler  - Continue Advair and Spiriva for Asthma and COPD  - Continue Lisinopril 40mg QD, HCTZ 25mg QD, hydralazine 75 mg TID for HTN  - Diabetes type 2: CC diet. Lantus 3U at bedtime and FSBG discontinued  - Lidocaine patches PRN daily + Tylenol PRN for lower back pain  - Bowel regimen: Senna 2 tabs QHS  - Diet: Easy to chew, consistent carbohydrates. No Beef No Pork  - Pt is at risk for falls given deconditioning. Seen by PT, recommended PRN walker with 1 staff assists. Pt is in a room with peer who has CO for added supervision and redirection during nighttime. During the daytime she should be either in the day room or the dining area with staff monitoring her at all times to provide redirection if pt tries to get up or ambulate unassisted.     5. Collateral: spoke with pt's  Liam on phone to update on progress 5/22 and 5/23.  6. Disposition: pending clinical course

## 2025-06-24 LAB
GLUCOSE BLDC GLUCOMTR-MCNC: 118 MG/DL — HIGH (ref 70–99)
GLUCOSE BLDC GLUCOMTR-MCNC: 123 MG/DL — HIGH (ref 70–99)
GLUCOSE BLDC GLUCOMTR-MCNC: 128 MG/DL — HIGH (ref 70–99)
GLUCOSE BLDC GLUCOMTR-MCNC: 176 MG/DL — HIGH (ref 70–99)

## 2025-06-24 PROCEDURE — 99231 SBSQ HOSP IP/OBS SF/LOW 25: CPT

## 2025-06-24 RX ADMIN — LEVALBUTEROL HYDROCHLORIDE 1 PUFF(S): 1.25 SOLUTION RESPIRATORY (INHALATION) at 21:45

## 2025-06-24 RX ADMIN — MEMANTINE HYDROCHLORIDE 10 MILLIGRAM(S): 21 CAPSULE, EXTENDED RELEASE ORAL at 06:41

## 2025-06-24 RX ADMIN — Medication 1 DROP(S): at 06:41

## 2025-06-24 RX ADMIN — LISINOPRIL 40 MILLIGRAM(S): 5 TABLET ORAL at 08:11

## 2025-06-24 RX ADMIN — Medication 75 MILLIGRAM(S): at 21:22

## 2025-06-24 RX ADMIN — Medication 1 DOSE(S): at 08:11

## 2025-06-24 RX ADMIN — LEVALBUTEROL HYDROCHLORIDE 1 PUFF(S): 1.25 SOLUTION RESPIRATORY (INHALATION) at 06:41

## 2025-06-24 RX ADMIN — LEVALBUTEROL HYDROCHLORIDE 1 PUFF(S): 1.25 SOLUTION RESPIRATORY (INHALATION) at 14:04

## 2025-06-24 RX ADMIN — Medication 9 MILLIGRAM(S): at 21:21

## 2025-06-24 RX ADMIN — Medication 1 DROP(S): at 21:26

## 2025-06-24 RX ADMIN — TIOTROPIUM BROMIDE INHALATION SPRAY 2 PUFF(S): 3.12 SPRAY, METERED RESPIRATORY (INHALATION) at 08:12

## 2025-06-24 RX ADMIN — SERTRALINE 125 MILLIGRAM(S): 100 TABLET, FILM COATED ORAL at 08:11

## 2025-06-24 RX ADMIN — Medication 75 MILLIGRAM(S): at 12:36

## 2025-06-24 RX ADMIN — Medication 75 MILLIGRAM(S): at 08:10

## 2025-06-24 RX ADMIN — OLANZAPINE 2.5 MILLIGRAM(S): 10 TABLET ORAL at 21:22

## 2025-06-24 RX ADMIN — Medication 1 DROP(S): at 14:02

## 2025-06-24 RX ADMIN — MEMANTINE HYDROCHLORIDE 10 MILLIGRAM(S): 21 CAPSULE, EXTENDED RELEASE ORAL at 21:22

## 2025-06-24 RX ADMIN — Medication 1 DOSE(S): at 21:36

## 2025-06-24 RX ADMIN — Medication 2 TABLET(S): at 21:22

## 2025-06-24 NOTE — BH INPATIENT PSYCHIATRY PROGRESS NOTE - NSBHMETABOLIC_PSY_ALL_CORE_FT
BMI: BMI (kg/m2): 28.3 (05-21-25 @ 00:57)  HbA1c: A1C with Estimated Average Glucose Result: 5.9 % (05-22-25 @ 08:09)    Glucose: POCT Blood Glucose.: 118 mg/dL (06-24-25 @ 11:35)    BP: 164/80 (06-23-25 @ 20:30) (118/60 - 164/80)Vital Signs Last 24 Hrs  T(C): 37.1 (06-24-25 @ 08:11), Max: 37.1 (06-24-25 @ 08:11)  T(F): 98.8 (06-24-25 @ 08:11), Max: 98.8 (06-24-25 @ 08:11)  HR: 93 (06-23-25 @ 20:30) (93 - 93)  BP: 164/80 (06-23-25 @ 20:30) (164/80 - 164/80)  BP(mean): --  RR: --  SpO2: --    Orthostatic VS  06-24-25 @ 12:10  Lying BP: --/-- HR: --  Sitting BP: 133/70 HR: 90  Standing BP: 124/78 HR: 90  Site: upper left arm  Mode: electronic  Orthostatic VS  06-24-25 @ 08:11  Lying BP: --/-- HR: --  Sitting BP: 110/97 HR: 75  Standing BP: 167/88 HR: 84  Site: --  Mode: --  Orthostatic VS  06-23-25 @ 09:50  Lying BP: --/-- HR: --  Sitting BP: 145/70 HR: 74  Standing BP: 149/82 HR: 74  Site: --  Mode: --    Lipid Panel: Date/Time: 06-09-25 @ 08:11  Cholesterol, Serum: 227  LDL Cholesterol Calculated: 144  HDL Cholesterol, Serum: 75  Total Cholesterol/HDL Ration Measurement: --  Triglycerides, Serum: 49

## 2025-06-24 NOTE — BH INPATIENT PSYCHIATRY PROGRESS NOTE - NSBHFUPINTERVALHXFT_PSY_A_CORE
Patient is being f/u for catatonia. Case discussed with nursing. No acute issues overnight. No interval changes. Pt reported her mood as "good", denies any complaints. Grossly oriented. Denies abdominal pain, nausea/vomiting. Eating and sleeping well. Liver enzymes and GGT remain elevated, will need hepatology follow up as outpatient for followup. Denies symptoms of shukri, paranoia, AVH, suicidal ideation/HI.

## 2025-06-24 NOTE — BH INPATIENT PSYCHIATRY PROGRESS NOTE - NSBHCHARTREVIEWVS_PSY_A_CORE FT
Vital Signs Last 24 Hrs  T(C): 37.1 (06-24-25 @ 08:11), Max: 37.1 (06-24-25 @ 08:11)  T(F): 98.8 (06-24-25 @ 08:11), Max: 98.8 (06-24-25 @ 08:11)  HR: 93 (06-23-25 @ 20:30) (93 - 93)  BP: 164/80 (06-23-25 @ 20:30) (164/80 - 164/80)  BP(mean): --  RR: --  SpO2: --    Orthostatic VS  06-24-25 @ 12:10  Lying BP: --/-- HR: --  Sitting BP: 133/70 HR: 90  Standing BP: 124/78 HR: 90  Site: upper left arm  Mode: electronic  Orthostatic VS  06-24-25 @ 08:11  Lying BP: --/-- HR: --  Sitting BP: 110/97 HR: 75  Standing BP: 167/88 HR: 84  Site: --  Mode: --  Orthostatic VS  06-23-25 @ 09:50  Lying BP: --/-- HR: --  Sitting BP: 145/70 HR: 74  Standing BP: 149/82 HR: 74  Site: --  Mode: --

## 2025-06-24 NOTE — BH INPATIENT PSYCHIATRY PROGRESS NOTE - NSBHASSESSSUMMFT_PSY_ALL_CORE
74F hx asthma, depression, and HTN was admitted to the MICU with AHRF 2/2 asthma exacerbation resulting in acute on chronic HHRF causing AMS and increased WOB that required intubation. During her ICU course, she experienced seizure-like activity .  Additional issues included hyponatremia, acute on chronic metabolic alkalosis, and mild thrombocytopenia,  also having fevers.  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 Patient has h/o hx of major depressive disorder with psychosis, hx of 3 past inpatient psychiatric hospitalizations last in 2016 at [facility], all rehospitalizations were in context non compliance with the treatment. She has no hx of suicide attempts, no hx of substance abuse.    Pt currently requires inpatient level of care for ongoing treatment for catatonia.     5/22 - calm, cooperative, not oriented, though remembers name of son. No s/s catatonia. Denies mood symptoms. Eating and sleeping on unit.  5/23 - No signs of catatonia. Eating and ambulating on unit. For ECT this morning. Not oriented to time, place, or situation. Has been NPO. Spoke to , who believes pt is returning to baseline, but is concerned about pt's memory issues.  5/27 - Withdrawn, non-spontaneous speech, not oriented to time, place, or situation, though interactive and recalls her 's name. Hypoglycemic episode yesterday. D/w hospitalist, discontinued standing pre-meal Admelog of 2U TID, will keep on bedtime Lantus and sliding scale.   5/28: More engaged today, establishes good eye contact. Received ECT today, will monitor, if catatonia symptoms remain in control will continue with weekly ECT next week, if pt appears to be relapsing may order ECT for Friday.   5/29: Pt presenting with increased BM frequency, regular consistency. Will order imodium PRN for diarrhea.   5/30: Will observe pt over the weekend and next week for confusion. Family has brought up concern ECT may be worsening confusion and it is a possibility. Will observe off Zyprexa and space out ECT, may treat next Friday.   5/31: Remains confused but calm. Unclear if due to ECT or residual catatonia.  6/2: Will continue holding Zyprexa and ECT this week. Presentation is suggestive of protracted delirium.   6/3: Pt remains confused and disoriented. Continue holding Zyprexa and ECT. Ordered urinalysis and routine bloodwork.   6/4: Continue monitoring Alk phos and LFTs.   6/5: Alk phos uptrending, bilirubin is WNL, LFTs mildly elevated. Abdominal ultrasound scheduled for tomorrow, 6/6, at 9:00am. Family requesting ECT be held until Monday as they were concerned about confusion. Will resume Zyprexa 2.5mg QHS.   6/6: Abdominal ultrasound completed today, revealed gallstones but no evidence for cholecystitis or biliary dilatation. This likely explains elevated Alk phos and GGT. Ms. Silva denies pain, there is no tenderness to palpation on physical exam and sonographic Tomlin sign was not elicited. Will repeat bloodwork on Monday and continue monitoring. Discussed with medicine. ECT ordered for Monday 6/9.   6/7-6/8: Speech improved, calm, pleasant, plan for ECT 6/9  6/10: Better related this week. No signs of catatonia. S/P ECT yesterday.   6/11: No interval changes. Setting up homecare in preparation for discharge home. No symptoms of catatonia but pt remains disoriented with poor memory.   6/12: memory impairment evident, no agitation or catatonia evident, tolerating meds.   6/13: Continues to present memory impairment. No signs of catatonia.   6/14: Overnight staff reported near choking incidents on meds and water overnight. Diet changed to puree in the meantime and cineesophagram was ordered. Discussed choking precautions with staff and patients, advised pt remain upright while eating or drinking and take small bites.   6/16: Pt completed cineesophagram and was cleared to continue regular diet with thin liquids. Alk phos remains elevated but downtrending.   06/17: No signs of catatonia, depression, or psychotic symptoms.   6/19-6/20: Pt reported her mood as "good", denies any complaints. Denies AH/VH/SI/HI. Continues to deny abdominal pain, nausea/vomiting. Liver enzymes and GGT remain elevated, will need hepatology follow up as outpatient for followup. Hospitalist recs appreciated. Will continue holding ECT due to no sx of catatonia and family concerns about memory problems. If catatonia symptoms return would send for ECT.  6/23-24: Reports mood as good. Denies depressive or psychotic symptoms. No catatonic signs noted. Some delay with 3 word recall though able to recall all words with prompt. In light of above, will continue hold off ECT.     PLAN:  1. Admit to [facility] on 9.27  2. Routine checks, as pt denying SI/HI/SIB  3. Psychiatric:  - Continue Namenda 10mg BID  - Continue Sertraline 125mg QD, titrate up to lowest effective dose  - Resume Olanzapine 2.5mg QHS due to paranoia  - Would avoid benzodiazepines since pt was recently unable to tolerate them at [facility], becoming extremely sedated  - ECT held due to concern for memory impairment     4. Medical:   - Can follow up Division of [facility]  [address] [address]  Phone: [phone]  - Abdominal ultrasound scheduled for 6/6/25 at 9am.   - PRN levalbuterol inhaler  - Continue Advair and Spiriva for Asthma and COPD  - Continue Lisinopril 40mg QD, HCTZ 25mg QD, hydralazine 75 mg TID for HTN  - Diabetes type 2: CC diet. Lantus 3U at bedtime and FSBG discontinued  - Lidocaine patches PRN daily + Tylenol PRN for lower back pain  - Bowel regimen: Senna 2 tabs QHS  - Diet: Easy to chew, consistent carbohydrates. No Beef No Pork  - Pt is at risk for falls given deconditioning. Seen by PT, recommended PRN walker with 1 staff assists. Pt is in a room with peer who has CO for added supervision and redirection during nighttime. During the daytime she should be either in the day room or the dining area with staff monitoring her at all times to provide redirection if pt tries to get up or ambulate unassisted.     5. Collateral: spoke with pt's  Liam on phone to update on progress 5/22 and 5/23.  6. Disposition: pending clinical course

## 2025-06-25 LAB
GLUCOSE BLDC GLUCOMTR-MCNC: 115 MG/DL — HIGH (ref 70–99)
GLUCOSE BLDC GLUCOMTR-MCNC: 128 MG/DL — HIGH (ref 70–99)
GLUCOSE BLDC GLUCOMTR-MCNC: 194 MG/DL — HIGH (ref 70–99)
GLUCOSE BLDC GLUCOMTR-MCNC: 210 MG/DL — HIGH (ref 70–99)

## 2025-06-25 PROCEDURE — 99231 SBSQ HOSP IP/OBS SF/LOW 25: CPT

## 2025-06-25 RX ADMIN — MEMANTINE HYDROCHLORIDE 10 MILLIGRAM(S): 21 CAPSULE, EXTENDED RELEASE ORAL at 06:34

## 2025-06-25 RX ADMIN — Medication 75 MILLIGRAM(S): at 08:14

## 2025-06-25 RX ADMIN — SERTRALINE 125 MILLIGRAM(S): 100 TABLET, FILM COATED ORAL at 08:13

## 2025-06-25 RX ADMIN — Medication 1 DOSE(S): at 08:33

## 2025-06-25 RX ADMIN — Medication 75 MILLIGRAM(S): at 20:25

## 2025-06-25 RX ADMIN — Medication 1 DROP(S): at 22:09

## 2025-06-25 RX ADMIN — LEVALBUTEROL HYDROCHLORIDE 1 PUFF(S): 1.25 SOLUTION RESPIRATORY (INHALATION) at 22:08

## 2025-06-25 RX ADMIN — Medication 1 DROP(S): at 13:09

## 2025-06-25 RX ADMIN — Medication 9 MILLIGRAM(S): at 20:24

## 2025-06-25 RX ADMIN — Medication 75 MILLIGRAM(S): at 13:09

## 2025-06-25 RX ADMIN — LEVALBUTEROL HYDROCHLORIDE 1 PUFF(S): 1.25 SOLUTION RESPIRATORY (INHALATION) at 06:30

## 2025-06-25 RX ADMIN — Medication 1 DOSE(S): at 20:26

## 2025-06-25 RX ADMIN — Medication 1 DROP(S): at 06:34

## 2025-06-25 RX ADMIN — Medication 2 TABLET(S): at 20:25

## 2025-06-25 RX ADMIN — MEMANTINE HYDROCHLORIDE 10 MILLIGRAM(S): 21 CAPSULE, EXTENDED RELEASE ORAL at 20:25

## 2025-06-25 RX ADMIN — LISINOPRIL 40 MILLIGRAM(S): 5 TABLET ORAL at 08:14

## 2025-06-25 RX ADMIN — OLANZAPINE 2.5 MILLIGRAM(S): 10 TABLET ORAL at 20:25

## 2025-06-25 RX ADMIN — TIOTROPIUM BROMIDE INHALATION SPRAY 2 PUFF(S): 3.12 SPRAY, METERED RESPIRATORY (INHALATION) at 08:32

## 2025-06-25 NOTE — BH INPATIENT PSYCHIATRY PROGRESS NOTE - NSBHASSESSSUMMFT_PSY_ALL_CORE
74F hx asthma, depression, and HTN was admitted to the MICU with AHRF 2/2 asthma exacerbation resulting in acute on chronic HHRF causing AMS and increased WOB that required intubation. During her ICU course, she experienced seizure-like activity .  Additional issues included hyponatremia, acute on chronic metabolic alkalosis, and mild thrombocytopenia,  also having fevers.  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 Patient has h/o hx of major depressive disorder with psychosis, hx of 3 past inpatient psychiatric hospitalizations last in 2016 at [facility], all rehospitalizations were in context non compliance with the treatment. She has no hx of suicide attempts, no hx of substance abuse.    Pt currently requires inpatient level of care for ongoing treatment for catatonia.     5/22 - calm, cooperative, not oriented, though remembers name of son. No s/s catatonia. Denies mood symptoms. Eating and sleeping on unit.  5/23 - No signs of catatonia. Eating and ambulating on unit. For ECT this morning. Not oriented to time, place, or situation. Has been NPO. Spoke to , who believes pt is returning to baseline, but is concerned about pt's memory issues.  5/27 - Withdrawn, non-spontaneous speech, not oriented to time, place, or situation, though interactive and recalls her 's name. Hypoglycemic episode yesterday. D/w hospitalist, discontinued standing pre-meal Admelog of 2U TID, will keep on bedtime Lantus and sliding scale.   5/28: More engaged today, establishes good eye contact. Received ECT today, will monitor, if catatonia symptoms remain in control will continue with weekly ECT next week, if pt appears to be relapsing may order ECT for Friday.   5/29: Pt presenting with increased BM frequency, regular consistency. Will order imodium PRN for diarrhea.   5/30: Will observe pt over the weekend and next week for confusion. Family has brought up concern ECT may be worsening confusion and it is a possibility. Will observe off Zyprexa and space out ECT, may treat next Friday.   5/31: Remains confused but calm. Unclear if due to ECT or residual catatonia.  6/2: Will continue holding Zyprexa and ECT this week. Presentation is suggestive of protracted delirium.   6/3: Pt remains confused and disoriented. Continue holding Zyprexa and ECT. Ordered urinalysis and routine bloodwork.   6/4: Continue monitoring Alk phos and LFTs.   6/5: Alk phos uptrending, bilirubin is WNL, LFTs mildly elevated. Abdominal ultrasound scheduled for tomorrow, 6/6, at 9:00am. Family requesting ECT be held until Monday as they were concerned about confusion. Will resume Zyprexa 2.5mg QHS.   6/6: Abdominal ultrasound completed today, revealed gallstones but no evidence for cholecystitis or biliary dilatation. This likely explains elevated Alk phos and GGT. Ms. Silva denies pain, there is no tenderness to palpation on physical exam and sonographic Tomlin sign was not elicited. Will repeat bloodwork on Monday and continue monitoring. Discussed with medicine. ECT ordered for Monday 6/9.   6/7-6/8: Speech improved, calm, pleasant, plan for ECT 6/9  6/10: Better related this week. No signs of catatonia. S/P ECT yesterday.   6/11: No interval changes. Setting up homecare in preparation for discharge home. No symptoms of catatonia but pt remains disoriented with poor memory.   6/12: memory impairment evident, no agitation or catatonia evident, tolerating meds.   6/13: Continues to present memory impairment. No signs of catatonia.   6/14: Overnight staff reported near choking incidents on meds and water overnight. Diet changed to puree in the meantime and cineesophagram was ordered. Discussed choking precautions with staff and patients, advised pt remain upright while eating or drinking and take small bites.   6/16: Pt completed cineesophagram and was cleared to continue regular diet with thin liquids. Alk phos remains elevated but downtrending.   06/17: No signs of catatonia, depression, or psychotic symptoms.   6/19-6/20: Pt reported her mood as "good", denies any complaints. Denies AH/VH/SI/HI. Continues to deny abdominal pain, nausea/vomiting. Liver enzymes and GGT remain elevated, will need hepatology follow up as outpatient for followup. Hospitalist recs appreciated. Will continue holding ECT due to no sx of catatonia and family concerns about memory problems. If catatonia symptoms return would send for ECT.  6/23-24: Reports mood as good. Denies depressive or psychotic symptoms. No catatonic signs noted. Some delay with 3 word recall though able to recall all words with prompt. In light of above, will continue hold off ECT.   6/25 Reports some unsteadiness upon standing, uses walker to ambulate. Able to verbalize her needs. Denies depressive or psychotic sxs. No motor or verbal signs of catatonia noted.     PLAN:  1. Admit to [facility] on 9.27  2. Routine checks, as pt denying SI/HI/SIB  3. Psychiatric:  - Continue Namenda 10mg BID  - Continue Sertraline 125mg QD, titrate up to lowest effective dose  - Resume Olanzapine 2.5mg QHS due to paranoia  - Would avoid benzodiazepines since pt was recently unable to tolerate them at [facility], becoming extremely sedated  - ECT held due to concern for memory impairment     4. Medical:   - Can follow up [facility] of [facility]  [address] [place] [address]  Phone: [phone]  - Abdominal ultrasound scheduled for 6/6/25 at 9am.   - PRN levalbuterol inhaler  - Continue Advair and Spiriva for Asthma and COPD  - Continue Lisinopril 40mg QD, HCTZ 25mg QD, hydralazine 75 mg TID for HTN  - Diabetes type 2: CC diet. Lantus 3U at bedtime and FSBG discontinued  - Lidocaine patches PRN daily + Tylenol PRN for lower back pain  - Bowel regimen: Senna 2 tabs QHS  - Diet: Easy to chew, consistent carbohydrates. No Beef No Pork  - Pt is at risk for falls given deconditioning. Seen by PT, recommended PRN walker with 1 staff assists. Pt is in a room with peer who has CO for added supervision and redirection during nighttime. During the daytime she should be either in the day room or the dining area with staff monitoring her at all times to provide redirection if pt tries to get up or ambulate unassisted.     5. Collateral: spoke with pt's  Liam on phone to update on progress 5/22 and 5/23.  6. Disposition: pending clinical course

## 2025-06-25 NOTE — BH INPATIENT PSYCHIATRY PROGRESS NOTE - NSBHCHARTREVIEWVS_PSY_A_CORE FT
Vital Signs Last 24 Hrs  T(C): 36.9 (06-25-25 @ 07:56), Max: 36.9 (06-25-25 @ 07:56)  T(F): 98.4 (06-25-25 @ 07:56), Max: 98.4 (06-25-25 @ 07:56)  HR: 75 (06-25-25 @ 07:56) (75 - 83)  BP: 153/87 (06-25-25 @ 07:56) (150/90 - 153/87)  BP(mean): --  RR: --  SpO2: --    Orthostatic VS  06-24-25 @ 12:10  Lying BP: --/-- HR: --  Sitting BP: 133/70 HR: 90  Standing BP: 124/78 HR: 90  Site: upper left arm  Mode: electronic  Orthostatic VS  06-24-25 @ 08:11  Lying BP: --/-- HR: --  Sitting BP: 110/97 HR: 75  Standing BP: 167/88 HR: 84  Site: --  Mode: --

## 2025-06-25 NOTE — BH INPATIENT PSYCHIATRY PROGRESS NOTE - NSBHMETABOLIC_PSY_ALL_CORE_FT
BMI: BMI (kg/m2): 28.3 (05-21-25 @ 00:57)  HbA1c: A1C with Estimated Average Glucose Result: 5.9 % (05-22-25 @ 08:09)    Glucose: POCT Blood Glucose.: 194 mg/dL (06-25-25 @ 15:48)    BP: 153/87 (06-25-25 @ 07:56) (144/71 - 164/80)Vital Signs Last 24 Hrs  T(C): 36.9 (06-25-25 @ 07:56), Max: 36.9 (06-25-25 @ 07:56)  T(F): 98.4 (06-25-25 @ 07:56), Max: 98.4 (06-25-25 @ 07:56)  HR: 75 (06-25-25 @ 07:56) (75 - 83)  BP: 153/87 (06-25-25 @ 07:56) (150/90 - 153/87)  BP(mean): --  RR: --  SpO2: --    Orthostatic VS  06-24-25 @ 12:10  Lying BP: --/-- HR: --  Sitting BP: 133/70 HR: 90  Standing BP: 124/78 HR: 90  Site: upper left arm  Mode: electronic  Orthostatic VS  06-24-25 @ 08:11  Lying BP: --/-- HR: --  Sitting BP: 110/97 HR: 75  Standing BP: 167/88 HR: 84  Site: --  Mode: --    Lipid Panel: Date/Time: 06-09-25 @ 08:11  Cholesterol, Serum: 227  LDL Cholesterol Calculated: 144  HDL Cholesterol, Serum: 75  Total Cholesterol/HDL Ration Measurement: --  Triglycerides, Serum: 49

## 2025-06-25 NOTE — BH INPATIENT PSYCHIATRY PROGRESS NOTE - NSBHFUPINTERVALHXFT_PSY_A_CORE
Patient is being f/u for catatonia. Case discussed with nursing. No acute issues overnight. No interval changes. Pt reported her mood as "good", denies any complaints. Grossly oriented. Denies abdominal pain, nausea/vomiting. Eating and sleeping well. Reports mild unsteadiness in knees upon standing however denies any pain, swelling or chills. Patient has been using walker to ambulate. Patient will need hepatology follow up as outpatient for followup for elevated LFTs. Denies symptoms of shukri, paranoia, AVH, suicidal ideation/HI.

## 2025-06-26 LAB
GLUCOSE BLDC GLUCOMTR-MCNC: 109 MG/DL — HIGH (ref 70–99)
GLUCOSE BLDC GLUCOMTR-MCNC: 118 MG/DL — HIGH (ref 70–99)
GLUCOSE BLDC GLUCOMTR-MCNC: 135 MG/DL — HIGH (ref 70–99)
GLUCOSE BLDC GLUCOMTR-MCNC: 150 MG/DL — HIGH (ref 70–99)

## 2025-06-26 PROCEDURE — 99231 SBSQ HOSP IP/OBS SF/LOW 25: CPT

## 2025-06-26 RX ADMIN — Medication 9 MILLIGRAM(S): at 20:34

## 2025-06-26 RX ADMIN — OLANZAPINE 2.5 MILLIGRAM(S): 10 TABLET ORAL at 20:34

## 2025-06-26 RX ADMIN — MEMANTINE HYDROCHLORIDE 10 MILLIGRAM(S): 21 CAPSULE, EXTENDED RELEASE ORAL at 06:45

## 2025-06-26 RX ADMIN — Medication 75 MILLIGRAM(S): at 12:13

## 2025-06-26 RX ADMIN — LEVALBUTEROL HYDROCHLORIDE 1 PUFF(S): 1.25 SOLUTION RESPIRATORY (INHALATION) at 06:45

## 2025-06-26 RX ADMIN — SERTRALINE 125 MILLIGRAM(S): 100 TABLET, FILM COATED ORAL at 08:39

## 2025-06-26 RX ADMIN — Medication 1 DROP(S): at 22:24

## 2025-06-26 RX ADMIN — Medication 2 TABLET(S): at 20:34

## 2025-06-26 RX ADMIN — Medication 75 MILLIGRAM(S): at 20:34

## 2025-06-26 RX ADMIN — LEVALBUTEROL HYDROCHLORIDE 1 PUFF(S): 1.25 SOLUTION RESPIRATORY (INHALATION) at 13:02

## 2025-06-26 RX ADMIN — Medication 1 DOSE(S): at 09:41

## 2025-06-26 RX ADMIN — LISINOPRIL 40 MILLIGRAM(S): 5 TABLET ORAL at 08:39

## 2025-06-26 RX ADMIN — Medication 1 DROP(S): at 13:02

## 2025-06-26 RX ADMIN — TIOTROPIUM BROMIDE INHALATION SPRAY 2 PUFF(S): 3.12 SPRAY, METERED RESPIRATORY (INHALATION) at 09:41

## 2025-06-26 RX ADMIN — Medication 1 DOSE(S): at 20:39

## 2025-06-26 RX ADMIN — Medication 1 DROP(S): at 06:45

## 2025-06-26 RX ADMIN — Medication 75 MILLIGRAM(S): at 08:39

## 2025-06-26 RX ADMIN — LEVALBUTEROL HYDROCHLORIDE 1 PUFF(S): 1.25 SOLUTION RESPIRATORY (INHALATION) at 22:24

## 2025-06-26 RX ADMIN — MEMANTINE HYDROCHLORIDE 10 MILLIGRAM(S): 21 CAPSULE, EXTENDED RELEASE ORAL at 20:34

## 2025-06-26 NOTE — BH INPATIENT PSYCHIATRY PROGRESS NOTE - NSBHCHARTREVIEWVS_PSY_A_CORE FT
Vital Signs Last 24 Hrs  T(C): 36.2 (06-26-25 @ 08:01), Max: 36.2 (06-26-25 @ 08:01)  T(F): 97.1 (06-26-25 @ 08:01), Max: 97.1 (06-26-25 @ 08:01)  HR: 90 (06-26-25 @ 11:39) (88 - 90)  BP: 135/76 (06-26-25 @ 11:39) (135/76 - 174/92)  BP(mean): --  RR: --  SpO2: --    Orthostatic VS  06-26-25 @ 08:01  Lying BP: --/-- HR: --  Sitting BP: 154/92 HR: 90  Standing BP: 146/78 HR: 99  Site: upper left arm  Mode: electronic  Orthostatic VS  06-24-25 @ 12:10  Lying BP: --/-- HR: --  Sitting BP: 133/70 HR: 90  Standing BP: 124/78 HR: 90  Site: upper left arm  Mode: electronic

## 2025-06-26 NOTE — BH INPATIENT PSYCHIATRY PROGRESS NOTE - NSBHASSESSSUMMFT_PSY_ALL_CORE
74F hx asthma, depression, and HTN was admitted to the MICU with AHRF 2/2 asthma exacerbation resulting in acute on chronic HHRF causing AMS and increased WOB that required intubation. During her ICU course, she experienced seizure-like activity .  Additional issues included hyponatremia, acute on chronic metabolic alkalosis, and mild thrombocytopenia,  also having fevers.  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 Patient has h/o hx of major depressive disorder with psychosis, hx of 3 past inpatient psychiatric hospitalizations last in 2016 at [facility], all rehospitalizations were in context non compliance with the treatment. She has no hx of suicide attempts, no hx of substance abuse.    Pt currently requires inpatient level of care for ongoing treatment for catatonia.     5/22 - calm, cooperative, not oriented, though remembers name of son. No s/s catatonia. Denies mood symptoms. Eating and sleeping on unit.  5/23 - No signs of catatonia. Eating and ambulating on unit. For ECT this morning. Not oriented to time, place, or situation. Has been NPO. Spoke to , who believes pt is returning to baseline, but is concerned about pt's memory issues.  5/27 - Withdrawn, non-spontaneous speech, not oriented to time, place, or situation, though interactive and recalls her 's name. Hypoglycemic episode yesterday. D/w hospitalist, discontinued standing pre-meal Admelog of 2U TID, will keep on bedtime Lantus and sliding scale.   5/28: More engaged today, establishes good eye contact. Received ECT today, will monitor, if catatonia symptoms remain in control will continue with weekly ECT next week, if pt appears to be relapsing may order ECT for Friday.   5/29: Pt presenting with increased BM frequency, regular consistency. Will order imodium PRN for diarrhea.   5/30: Will observe pt over the weekend and next week for confusion. Family has brought up concern ECT may be worsening confusion and it is a possibility. Will observe off Zyprexa and space out ECT, may treat next Friday.   5/31: Remains confused but calm. Unclear if due to ECT or residual catatonia.  6/2: Will continue holding Zyprexa and ECT this week. Presentation is suggestive of protracted delirium.   6/3: Pt remains confused and disoriented. Continue holding Zyprexa and ECT. Ordered urinalysis and routine bloodwork.   6/4: Continue monitoring Alk phos and LFTs.   6/5: Alk phos uptrending, bilirubin is WNL, LFTs mildly elevated. Abdominal ultrasound scheduled for tomorrow, 6/6, at 9:00am. Family requesting ECT be held until Monday as they were concerned about confusion. Will resume Zyprexa 2.5mg QHS.   6/6: Abdominal ultrasound completed today, revealed gallstones but no evidence for cholecystitis or biliary dilatation. This likely explains elevated Alk phos and GGT. Ms. Silva denies pain, there is no tenderness to palpation on physical exam and sonographic Tomlin sign was not elicited. Will repeat bloodwork on Monday and continue monitoring. Discussed with medicine. ECT ordered for Monday 6/9.   6/7-6/8: Speech improved, calm, pleasant, plan for ECT 6/9  6/10: Better related this week. No signs of catatonia. S/P ECT yesterday.   6/11: No interval changes. Setting up homecare in preparation for discharge home. No symptoms of catatonia but pt remains disoriented with poor memory.   6/12: memory impairment evident, no agitation or catatonia evident, tolerating meds.   6/13: Continues to present memory impairment. No signs of catatonia.   6/14: Overnight staff reported near choking incidents on meds and water overnight. Diet changed to puree in the meantime and cineesophagram was ordered. Discussed choking precautions with staff and patients, advised pt remain upright while eating or drinking and take small bites.   6/16: Pt completed cineesophagram and was cleared to continue regular diet with thin liquids. Alk phos remains elevated but downtrending.   06/17: No signs of catatonia, depression, or psychotic symptoms.   6/19-6/20: Pt reported her mood as "good", denies any complaints. Denies AH/VH/SI/HI. Continues to deny abdominal pain, nausea/vomiting. Liver enzymes and GGT remain elevated, will need hepatology follow up as outpatient for followup. Hospitalist recs appreciated. Will continue holding ECT due to no sx of catatonia and family concerns about memory problems. If catatonia symptoms return would send for ECT.  6/23-24: Reports mood as good. Denies depressive or psychotic symptoms. No catatonic signs noted. Some delay with 3 word recall though able to recall all words with prompt. In light of above, will continue hold off ECT.   6/25 Reports some unsteadiness upon standing, uses walker to ambulate. Able to verbalize her needs. Denies depressive or psychotic sxs. No motor or verbal signs of catatonia noted.   6/26 No interval changes. Denies abdominal pain, nausea, vomiting. No recurrence of catatonic sxs. Denies depressive or psychotic sxs. Cont current medications.     PLAN:  1. Admit to [facility] on 9.27  2. Routine checks, as pt denying SI/HI/SIB  3. Psychiatric:  - Continue Namenda 10mg BID  - Continue Sertraline 125mg QD, titrate up to lowest effective dose  - Resume Olanzapine 2.5mg QHS due to paranoia  - Would avoid benzodiazepines since pt was recently unable to tolerate them at [facility], becoming extremely sedated  - ECT held due to concern for memory impairment     4. Medical:   - Can follow up Division of [facility]  [address] [address]  Phone: [phone]  - Abdominal ultrasound scheduled for 6/6/25 at 9am.   - PRN levalbuterol inhaler  - Continue Advair and Spiriva for Asthma and COPD  - Continue Lisinopril 40mg QD, HCTZ 25mg QD, hydralazine 75 mg TID for HTN  - Diabetes type 2: CC diet. Lantus 3U at bedtime and FSBG discontinued  - Lidocaine patches PRN daily + Tylenol PRN for lower back pain  - Bowel regimen: Senna 2 tabs QHS  - Diet: Easy to chew, consistent carbohydrates. No Beef No Pork  - Pt is at risk for falls given deconditioning. Seen by PT, recommended PRN walker with 1 staff assists. Pt is in a room with peer who has CO for added supervision and redirection during nighttime. During the daytime she should be either in the day room or the dining area with staff monitoring her at all times to provide redirection if pt tries to get up or ambulate unassisted.     5. Collateral: spoke with pt's  Liam on phone to update on progress 5/22 and 5/23.  6. Disposition: pending clinical course

## 2025-06-26 NOTE — BH INPATIENT PSYCHIATRY PROGRESS NOTE - NSBHMETABOLIC_PSY_ALL_CORE_FT
BMI: BMI (kg/m2): 28.3 (05-21-25 @ 00:57)  HbA1c: A1C with Estimated Average Glucose Result: 5.9 % (05-22-25 @ 08:09)    Glucose: POCT Blood Glucose.: 150 mg/dL (06-26-25 @ 11:41)    BP: 135/76 (06-26-25 @ 11:39) (135/76 - 174/92)Vital Signs Last 24 Hrs  T(C): 36.2 (06-26-25 @ 08:01), Max: 36.2 (06-26-25 @ 08:01)  T(F): 97.1 (06-26-25 @ 08:01), Max: 97.1 (06-26-25 @ 08:01)  HR: 90 (06-26-25 @ 11:39) (88 - 90)  BP: 135/76 (06-26-25 @ 11:39) (135/76 - 174/92)  BP(mean): --  RR: --  SpO2: --    Orthostatic VS  06-26-25 @ 08:01  Lying BP: --/-- HR: --  Sitting BP: 154/92 HR: 90  Standing BP: 146/78 HR: 99  Site: upper left arm  Mode: electronic  Orthostatic VS  06-24-25 @ 12:10  Lying BP: --/-- HR: --  Sitting BP: 133/70 HR: 90  Standing BP: 124/78 HR: 90  Site: upper left arm  Mode: electronic    Lipid Panel: Date/Time: 06-09-25 @ 08:11  Cholesterol, Serum: 227  LDL Cholesterol Calculated: 144  HDL Cholesterol, Serum: 75  Total Cholesterol/HDL Ration Measurement: --  Triglycerides, Serum: 49

## 2025-06-26 NOTE — BH INPATIENT PSYCHIATRY PROGRESS NOTE - NSBHFUPINTERVALHXFT_PSY_A_CORE
Patient is being f/u for catatonia. Case discussed with nursing. No acute issues overnight. No interval changes. Pt reported her mood as "good", denies any complaints. Grossly oriented. Denies abdominal pain, nausea/vomiting. Eating and sleeping well. Reports mild unsteadiness in knees upon standing however denies any pain, swelling or chills. Patient has been using walker to ambulate. Staff not noting any issues with ambulation with walker. Patient will need hepatology follow up as outpatient for followup for elevated LFTs. Denies symptoms of shukri, paranoia, AVH, suicidal ideation/HI.

## 2025-06-27 LAB
GLUCOSE BLDC GLUCOMTR-MCNC: 100 MG/DL — HIGH (ref 70–99)
GLUCOSE BLDC GLUCOMTR-MCNC: 111 MG/DL — HIGH (ref 70–99)
GLUCOSE BLDC GLUCOMTR-MCNC: 121 MG/DL — HIGH (ref 70–99)
GLUCOSE BLDC GLUCOMTR-MCNC: 140 MG/DL — HIGH (ref 70–99)

## 2025-06-27 PROCEDURE — 99231 SBSQ HOSP IP/OBS SF/LOW 25: CPT

## 2025-06-27 RX ORDER — OLANZAPINE 10 MG/1
1 TABLET ORAL
Qty: 30 | Refills: 0
Start: 2025-06-27 | End: 2025-07-26

## 2025-06-27 RX ORDER — MEMANTINE HYDROCHLORIDE 21 MG/1
1 CAPSULE, EXTENDED RELEASE ORAL
Qty: 60 | Refills: 0
Start: 2025-06-27 | End: 2025-07-26

## 2025-06-27 RX ORDER — SERTRALINE 100 MG/1
1 TABLET, FILM COATED ORAL
Qty: 30 | Refills: 0
Start: 2025-06-27 | End: 2025-07-26

## 2025-06-27 RX ORDER — LEVALBUTEROL HYDROCHLORIDE 1.25 MG/3ML
1 SOLUTION RESPIRATORY (INHALATION)
Qty: 1 | Refills: 0
Start: 2025-06-27

## 2025-06-27 RX ORDER — SENNA 187 MG
2 TABLET ORAL
Qty: 60 | Refills: 0
Start: 2025-06-27 | End: 2025-07-26

## 2025-06-27 RX ORDER — ALBUTEROL SULFATE 2.5 MG/3ML
2 VIAL, NEBULIZER (ML) INHALATION
Qty: 0 | Refills: 0 | DISCHARGE
Start: 2025-06-27

## 2025-06-27 RX ORDER — MELATONIN 5 MG
3 TABLET ORAL
Qty: 90 | Refills: 0
Start: 2025-06-27 | End: 2025-07-26

## 2025-06-27 RX ORDER — TIOTROPIUM BROMIDE INHALATION SPRAY 3.12 UG/1
2 SPRAY, METERED RESPIRATORY (INHALATION)
Qty: 1 | Refills: 0
Start: 2025-06-27

## 2025-06-27 RX ORDER — HYDROCHLOROTHIAZIDE 50 MG/1
1 TABLET ORAL
Qty: 30 | Refills: 0
Start: 2025-06-27 | End: 2025-07-26

## 2025-06-27 RX ORDER — LISINOPRIL 5 MG/1
1 TABLET ORAL
Qty: 30 | Refills: 0
Start: 2025-06-27 | End: 2025-07-26

## 2025-06-27 RX ADMIN — SERTRALINE 125 MILLIGRAM(S): 100 TABLET, FILM COATED ORAL at 08:40

## 2025-06-27 RX ADMIN — LEVALBUTEROL HYDROCHLORIDE 1 PUFF(S): 1.25 SOLUTION RESPIRATORY (INHALATION) at 21:13

## 2025-06-27 RX ADMIN — Medication 2 TABLET(S): at 20:30

## 2025-06-27 RX ADMIN — MEMANTINE HYDROCHLORIDE 10 MILLIGRAM(S): 21 CAPSULE, EXTENDED RELEASE ORAL at 20:29

## 2025-06-27 RX ADMIN — LEVALBUTEROL HYDROCHLORIDE 1 PUFF(S): 1.25 SOLUTION RESPIRATORY (INHALATION) at 13:57

## 2025-06-27 RX ADMIN — Medication 75 MILLIGRAM(S): at 12:15

## 2025-06-27 RX ADMIN — Medication 9 MILLIGRAM(S): at 20:30

## 2025-06-27 RX ADMIN — Medication 1 DOSE(S): at 20:30

## 2025-06-27 RX ADMIN — OLANZAPINE 2.5 MILLIGRAM(S): 10 TABLET ORAL at 20:30

## 2025-06-27 RX ADMIN — Medication 1 DOSE(S): at 08:40

## 2025-06-27 RX ADMIN — TIOTROPIUM BROMIDE INHALATION SPRAY 2 PUFF(S): 3.12 SPRAY, METERED RESPIRATORY (INHALATION) at 08:40

## 2025-06-27 RX ADMIN — Medication 1 DROP(S): at 06:26

## 2025-06-27 RX ADMIN — LEVALBUTEROL HYDROCHLORIDE 1 PUFF(S): 1.25 SOLUTION RESPIRATORY (INHALATION) at 06:26

## 2025-06-27 RX ADMIN — Medication 75 MILLIGRAM(S): at 20:30

## 2025-06-27 RX ADMIN — Medication 75 MILLIGRAM(S): at 08:39

## 2025-06-27 RX ADMIN — MEMANTINE HYDROCHLORIDE 10 MILLIGRAM(S): 21 CAPSULE, EXTENDED RELEASE ORAL at 06:26

## 2025-06-27 RX ADMIN — LISINOPRIL 40 MILLIGRAM(S): 5 TABLET ORAL at 08:40

## 2025-06-27 RX ADMIN — Medication 1 DROP(S): at 20:59

## 2025-06-27 RX ADMIN — Medication 1 DROP(S): at 13:56

## 2025-06-27 NOTE — BH INPATIENT PSYCHIATRY PROGRESS NOTE - NSBHASSESSSUMMFT_PSY_ALL_CORE
74F hx asthma, depression, and HTN was admitted to the MICU with AHRF 2/2 asthma exacerbation resulting in acute on chronic HHRF causing AMS and increased WOB that required intubation. During her ICU course, she experienced seizure-like activity .  Additional issues included hyponatremia, acute on chronic metabolic alkalosis, and mild thrombocytopenia,  also having fevers.  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 Patient has h/o hx of major depressive disorder with psychosis, hx of 3 past inpatient psychiatric hospitalizations last in 2016 at [facility], all rehospitalizations were in context non compliance with the treatment. She has no hx of suicide attempts, no hx of substance abuse.    Pt currently requires inpatient level of care for ongoing treatment for catatonia.     5/22 - calm, cooperative, not oriented, though remembers name of son. No s/s catatonia. Denies mood symptoms. Eating and sleeping on unit.  5/23 - No signs of catatonia. Eating and ambulating on unit. For ECT this morning. Not oriented to time, place, or situation. Has been NPO. Spoke to , who believes pt is returning to baseline, but is concerned about pt's memory issues.  5/27 - Withdrawn, non-spontaneous speech, not oriented to time, place, or situation, though interactive and recalls her 's name. Hypoglycemic episode yesterday. D/w hospitalist, discontinued standing pre-meal Admelog of 2U TID, will keep on bedtime Lantus and sliding scale.   5/28: More engaged today, establishes good eye contact. Received ECT today, will monitor, if catatonia symptoms remain in control will continue with weekly ECT next week, if pt appears to be relapsing may order ECT for Friday.   5/29: Pt presenting with increased BM frequency, regular consistency. Will order imodium PRN for diarrhea.   5/30: Will observe pt over the weekend and next week for confusion. Family has brought up concern ECT may be worsening confusion and it is a possibility. Will observe off Zyprexa and space out ECT, may treat next Friday.   5/31: Remains confused but calm. Unclear if due to ECT or residual catatonia.  6/2: Will continue holding Zyprexa and ECT this week. Presentation is suggestive of protracted delirium.   6/3: Pt remains confused and disoriented. Continue holding Zyprexa and ECT. Ordered urinalysis and routine bloodwork.   6/4: Continue monitoring Alk phos and LFTs.   6/5: Alk phos uptrending, bilirubin is WNL, LFTs mildly elevated. Abdominal ultrasound scheduled for tomorrow, 6/6, at 9:00am. Family requesting ECT be held until Monday as they were concerned about confusion. Will resume Zyprexa 2.5mg QHS.   6/6: Abdominal ultrasound completed today, revealed gallstones but no evidence for cholecystitis or biliary dilatation. This likely explains elevated Alk phos and GGT. Ms. Silva denies pain, there is no tenderness to palpation on physical exam and sonographic Tomlin sign was not elicited. Will repeat bloodwork on Monday and continue monitoring. Discussed with medicine. ECT ordered for Monday 6/9.   6/7-6/8: Speech improved, calm, pleasant, plan for ECT 6/9  6/10: Better related this week. No signs of catatonia. S/P ECT yesterday.   6/11: No interval changes. Setting up homecare in preparation for discharge home. No symptoms of catatonia but pt remains disoriented with poor memory.   6/12: memory impairment evident, no agitation or catatonia evident, tolerating meds.   6/13: Continues to present memory impairment. No signs of catatonia.   6/14: Overnight staff reported near choking incidents on meds and water overnight. Diet changed to puree in the meantime and cineesophagram was ordered. Discussed choking precautions with staff and patients, advised pt remain upright while eating or drinking and take small bites.   6/16: Pt completed cineesophagram and was cleared to continue regular diet with thin liquids. Alk phos remains elevated but downtrending.   06/17: No signs of catatonia, depression, or psychotic symptoms.   6/19-6/20: Pt reported her mood as "good", denies any complaints. Denies AH/VH/SI/HI. Continues to deny abdominal pain, nausea/vomiting. Liver enzymes and GGT remain elevated, will need hepatology follow up as outpatient for followup. Hospitalist recs appreciated. Will continue holding ECT due to no sx of catatonia and family concerns about memory problems. If catatonia symptoms return would send for ECT.  6/23-24: Reports mood as good. Denies depressive or psychotic symptoms. No catatonic signs noted. Some delay with 3 word recall though able to recall all words with prompt. In light of above, will continue hold off ECT.   6/25 Reports some unsteadiness upon standing, uses walker to ambulate. Able to verbalize her needs. Denies depressive or psychotic sxs. No motor or verbal signs of catatonia noted.   6/26 No interval changes. Denies abdominal pain, nausea, vomiting. No recurrence of catatonic sxs. Denies depressive or psychotic sxs. Cont current medications.   6/27 Tolerating medications. No interval changes. Denies Si, Hi. Cont current meds.    PLAN:  1. Admit to [facility] on 9.27  2. Routine checks, as pt denying SI/HI/SIB  3. Psychiatric:  - Continue Namenda 10mg BID  - Continue Sertraline 125mg QD, titrate up to lowest effective dose  - Resume Olanzapine 2.5mg QHS due to paranoia  - Would avoid benzodiazepines since pt was recently unable to tolerate them at [facility], becoming extremely sedated  - ECT held due to concern for memory impairment     4. Medical:   - Can follow up Division of [facility]  [address] [place] [address]  Phone: [phone]  - Abdominal ultrasound scheduled for 6/6/25 at 9am.   - PRN levalbuterol inhaler  - Continue Advair and Spiriva for Asthma and COPD  - Continue Lisinopril 40mg QD, HCTZ 25mg QD, hydralazine 75 mg TID for HTN  - Diabetes type 2: CC diet. Lantus 3U at bedtime and FSBG discontinued  - Lidocaine patches PRN daily + Tylenol PRN for lower back pain  - Bowel regimen: Senna 2 tabs QHS  - Diet: Easy to chew, consistent carbohydrates. No Beef No Pork  - Pt is at risk for falls given deconditioning. Seen by PT, recommended PRN walker with 1 staff assists. Pt is in a room with peer who has CO for added supervision and redirection during nighttime. During the daytime she should be either in the day room or the dining area with staff monitoring her at all times to provide redirection if pt tries to get up or ambulate unassisted.     5. Collateral: spoke with pt's  Liam on phone to update on progress 5/22 and 5/23.  6. Disposition: pending clinical course

## 2025-06-27 NOTE — BH INPATIENT PSYCHIATRY PROGRESS NOTE - NSBHMETABOLIC_PSY_ALL_CORE_FT
BMI: BMI (kg/m2): 28.3 (05-21-25 @ 00:57)  HbA1c: A1C with Estimated Average Glucose Result: 5.9 % (05-22-25 @ 08:09)    Glucose: POCT Blood Glucose.: 121 mg/dL (06-27-25 @ 11:36)    BP: 125/62 (06-26-25 @ 22:28) (125/62 - 174/92)Vital Signs Last 24 Hrs  T(C): 37 (06-27-25 @ 08:33), Max: 37.3 (06-27-25 @ 07:25)  T(F): 98.6 (06-27-25 @ 08:33), Max: 99.1 (06-27-25 @ 07:25)  HR: 74 (06-26-25 @ 22:28) (74 - 74)  BP: 125/62 (06-26-25 @ 22:28) (125/62 - 125/62)  BP(mean): --  RR: --  SpO2: 97% (06-27-25 @ 07:25) (97% - 97%)    Orthostatic VS  06-27-25 @ 07:25  Lying BP: --/-- HR: --  Sitting BP: 152/92 HR: 81  Standing BP: 158/88 HR: 82  Site: --  Mode: --  Orthostatic VS  06-26-25 @ 08:01  Lying BP: --/-- HR: --  Sitting BP: 154/92 HR: 90  Standing BP: 146/78 HR: 99  Site: upper left arm  Mode: electronic    Lipid Panel: Date/Time: 06-09-25 @ 08:11  Cholesterol, Serum: 227  LDL Cholesterol Calculated: 144  HDL Cholesterol, Serum: 75  Total Cholesterol/HDL Ration Measurement: --  Triglycerides, Serum: 49

## 2025-06-27 NOTE — BH INPATIENT PSYCHIATRY PROGRESS NOTE - NSBHCHARTREVIEWVS_PSY_A_CORE FT
Vital Signs Last 24 Hrs  T(C): 37 (06-27-25 @ 08:33), Max: 37.3 (06-27-25 @ 07:25)  T(F): 98.6 (06-27-25 @ 08:33), Max: 99.1 (06-27-25 @ 07:25)  HR: 74 (06-26-25 @ 22:28) (74 - 74)  BP: 125/62 (06-26-25 @ 22:28) (125/62 - 125/62)  BP(mean): --  RR: --  SpO2: 97% (06-27-25 @ 07:25) (97% - 97%)    Orthostatic VS  06-27-25 @ 07:25  Lying BP: --/-- HR: --  Sitting BP: 152/92 HR: 81  Standing BP: 158/88 HR: 82  Site: --  Mode: --  Orthostatic VS  06-26-25 @ 08:01  Lying BP: --/-- HR: --  Sitting BP: 154/92 HR: 90  Standing BP: 146/78 HR: 99  Site: upper left arm  Mode: electronic

## 2025-06-28 LAB
GLUCOSE BLDC GLUCOMTR-MCNC: 123 MG/DL — HIGH (ref 70–99)
GLUCOSE BLDC GLUCOMTR-MCNC: 125 MG/DL — HIGH (ref 70–99)
GLUCOSE BLDC GLUCOMTR-MCNC: 154 MG/DL — HIGH (ref 70–99)
GLUCOSE BLDC GLUCOMTR-MCNC: 93 MG/DL — SIGNIFICANT CHANGE UP (ref 70–99)

## 2025-06-28 RX ADMIN — Medication 2 TABLET(S): at 20:37

## 2025-06-28 RX ADMIN — MEMANTINE HYDROCHLORIDE 10 MILLIGRAM(S): 21 CAPSULE, EXTENDED RELEASE ORAL at 06:22

## 2025-06-28 RX ADMIN — Medication 75 MILLIGRAM(S): at 13:21

## 2025-06-28 RX ADMIN — Medication 1 DOSE(S): at 20:36

## 2025-06-28 RX ADMIN — LEVALBUTEROL HYDROCHLORIDE 1 PUFF(S): 1.25 SOLUTION RESPIRATORY (INHALATION) at 21:40

## 2025-06-28 RX ADMIN — Medication 1 DROP(S): at 20:40

## 2025-06-28 RX ADMIN — Medication 1 DROP(S): at 06:22

## 2025-06-28 RX ADMIN — TIOTROPIUM BROMIDE INHALATION SPRAY 2 PUFF(S): 3.12 SPRAY, METERED RESPIRATORY (INHALATION) at 10:18

## 2025-06-28 RX ADMIN — Medication 75 MILLIGRAM(S): at 10:14

## 2025-06-28 RX ADMIN — Medication 1 DROP(S): at 13:21

## 2025-06-28 RX ADMIN — MEMANTINE HYDROCHLORIDE 10 MILLIGRAM(S): 21 CAPSULE, EXTENDED RELEASE ORAL at 20:37

## 2025-06-28 RX ADMIN — SERTRALINE 125 MILLIGRAM(S): 100 TABLET, FILM COATED ORAL at 10:15

## 2025-06-28 RX ADMIN — LEVALBUTEROL HYDROCHLORIDE 1 PUFF(S): 1.25 SOLUTION RESPIRATORY (INHALATION) at 13:31

## 2025-06-28 RX ADMIN — LEVALBUTEROL HYDROCHLORIDE 1 PUFF(S): 1.25 SOLUTION RESPIRATORY (INHALATION) at 06:23

## 2025-06-28 RX ADMIN — Medication 75 MILLIGRAM(S): at 20:37

## 2025-06-28 RX ADMIN — Medication 9 MILLIGRAM(S): at 20:37

## 2025-06-28 RX ADMIN — OLANZAPINE 2.5 MILLIGRAM(S): 10 TABLET ORAL at 20:37

## 2025-06-28 RX ADMIN — Medication 1 DOSE(S): at 10:16

## 2025-06-28 RX ADMIN — LISINOPRIL 40 MILLIGRAM(S): 5 TABLET ORAL at 10:15

## 2025-06-29 LAB
GLUCOSE BLDC GLUCOMTR-MCNC: 116 MG/DL — HIGH (ref 70–99)
GLUCOSE BLDC GLUCOMTR-MCNC: 119 MG/DL — HIGH (ref 70–99)
GLUCOSE BLDC GLUCOMTR-MCNC: 128 MG/DL — HIGH (ref 70–99)
GLUCOSE BLDC GLUCOMTR-MCNC: 138 MG/DL — HIGH (ref 70–99)

## 2025-06-29 RX ADMIN — LEVALBUTEROL HYDROCHLORIDE 1 PUFF(S): 1.25 SOLUTION RESPIRATORY (INHALATION) at 14:30

## 2025-06-29 RX ADMIN — Medication 2 TABLET(S): at 20:25

## 2025-06-29 RX ADMIN — SERTRALINE 125 MILLIGRAM(S): 100 TABLET, FILM COATED ORAL at 08:26

## 2025-06-29 RX ADMIN — MEMANTINE HYDROCHLORIDE 10 MILLIGRAM(S): 21 CAPSULE, EXTENDED RELEASE ORAL at 06:27

## 2025-06-29 RX ADMIN — Medication 75 MILLIGRAM(S): at 08:26

## 2025-06-29 RX ADMIN — LISINOPRIL 40 MILLIGRAM(S): 5 TABLET ORAL at 08:26

## 2025-06-29 RX ADMIN — Medication 75 MILLIGRAM(S): at 12:14

## 2025-06-29 RX ADMIN — Medication 9 MILLIGRAM(S): at 20:25

## 2025-06-29 RX ADMIN — LEVALBUTEROL HYDROCHLORIDE 1 PUFF(S): 1.25 SOLUTION RESPIRATORY (INHALATION) at 05:33

## 2025-06-29 RX ADMIN — Medication 1 DROP(S): at 20:25

## 2025-06-29 RX ADMIN — MEMANTINE HYDROCHLORIDE 10 MILLIGRAM(S): 21 CAPSULE, EXTENDED RELEASE ORAL at 20:25

## 2025-06-29 RX ADMIN — OLANZAPINE 2.5 MILLIGRAM(S): 10 TABLET ORAL at 20:25

## 2025-06-29 RX ADMIN — TIOTROPIUM BROMIDE INHALATION SPRAY 2 PUFF(S): 3.12 SPRAY, METERED RESPIRATORY (INHALATION) at 09:00

## 2025-06-29 RX ADMIN — Medication 75 MILLIGRAM(S): at 20:25

## 2025-06-29 RX ADMIN — LEVALBUTEROL HYDROCHLORIDE 1 PUFF(S): 1.25 SOLUTION RESPIRATORY (INHALATION) at 21:05

## 2025-06-29 RX ADMIN — Medication 1 DOSE(S): at 20:25

## 2025-06-29 RX ADMIN — Medication 1 DROP(S): at 14:30

## 2025-06-29 RX ADMIN — Medication 1 DROP(S): at 05:30

## 2025-06-29 RX ADMIN — Medication 1 DOSE(S): at 09:00

## 2025-06-30 LAB
GLUCOSE BLDC GLUCOMTR-MCNC: 109 MG/DL — HIGH (ref 70–99)
GLUCOSE BLDC GLUCOMTR-MCNC: 115 MG/DL — HIGH (ref 70–99)
GLUCOSE BLDC GLUCOMTR-MCNC: 262 MG/DL — HIGH (ref 70–99)
GLUCOSE BLDC GLUCOMTR-MCNC: 94 MG/DL — SIGNIFICANT CHANGE UP (ref 70–99)

## 2025-06-30 PROCEDURE — 99233 SBSQ HOSP IP/OBS HIGH 50: CPT

## 2025-06-30 RX ORDER — ALBUTEROL SULFATE 2.5 MG/3ML
2 VIAL, NEBULIZER (ML) INHALATION
Qty: 1 | Refills: 0
Start: 2025-06-30

## 2025-06-30 RX ORDER — UMECLIDINIUM 62.5 UG/1
1 AEROSOL, POWDER ORAL
Qty: 1 | Refills: 0
Start: 2025-06-30 | End: 2025-07-29

## 2025-06-30 RX ADMIN — Medication 1 DOSE(S): at 20:33

## 2025-06-30 RX ADMIN — Medication 2 TABLET(S): at 20:33

## 2025-06-30 RX ADMIN — Medication 1 DROP(S): at 06:09

## 2025-06-30 RX ADMIN — MEMANTINE HYDROCHLORIDE 10 MILLIGRAM(S): 21 CAPSULE, EXTENDED RELEASE ORAL at 06:09

## 2025-06-30 RX ADMIN — LEVALBUTEROL HYDROCHLORIDE 1 PUFF(S): 1.25 SOLUTION RESPIRATORY (INHALATION) at 06:09

## 2025-06-30 RX ADMIN — Medication 75 MILLIGRAM(S): at 12:47

## 2025-06-30 RX ADMIN — LISINOPRIL 40 MILLIGRAM(S): 5 TABLET ORAL at 09:30

## 2025-06-30 RX ADMIN — Medication 75 MILLIGRAM(S): at 20:33

## 2025-06-30 RX ADMIN — LEVALBUTEROL HYDROCHLORIDE 1 PUFF(S): 1.25 SOLUTION RESPIRATORY (INHALATION) at 14:38

## 2025-06-30 RX ADMIN — Medication 9 MILLIGRAM(S): at 20:33

## 2025-06-30 RX ADMIN — OLANZAPINE 2.5 MILLIGRAM(S): 10 TABLET ORAL at 20:33

## 2025-06-30 RX ADMIN — Medication 1 DROP(S): at 21:04

## 2025-06-30 RX ADMIN — Medication 75 MILLIGRAM(S): at 08:52

## 2025-06-30 RX ADMIN — TIOTROPIUM BROMIDE INHALATION SPRAY 2 PUFF(S): 3.12 SPRAY, METERED RESPIRATORY (INHALATION) at 09:30

## 2025-06-30 RX ADMIN — MEMANTINE HYDROCHLORIDE 10 MILLIGRAM(S): 21 CAPSULE, EXTENDED RELEASE ORAL at 20:33

## 2025-06-30 RX ADMIN — SERTRALINE 125 MILLIGRAM(S): 100 TABLET, FILM COATED ORAL at 08:52

## 2025-06-30 RX ADMIN — LEVALBUTEROL HYDROCHLORIDE 1 PUFF(S): 1.25 SOLUTION RESPIRATORY (INHALATION) at 21:04

## 2025-06-30 RX ADMIN — Medication 1 DOSE(S): at 09:30

## 2025-06-30 RX ADMIN — Medication 1 DROP(S): at 14:37

## 2025-06-30 NOTE — BH INPATIENT PSYCHIATRY PROGRESS NOTE - NSICDXBHSECONDARYDX_PSY_ALL_CORE
MDD (major depressive disorder), recurrent, with catatonic features   F33.9  

## 2025-06-30 NOTE — BH INPATIENT PSYCHIATRY PROGRESS NOTE - NSBHMETABOLIC_PSY_ALL_CORE_FT
BMI: BMI (kg/m2): 28.3 (05-21-25 @ 00:57)  HbA1c: A1C with Estimated Average Glucose Result: 5.9 % (05-22-25 @ 08:09)    Glucose: POCT Blood Glucose.: 109 mg/dL (06-30-25 @ 15:45)    BP: 157/85 (06-29-25 @ 20:34) (129/58 - 164/86)Vital Signs Last 24 Hrs  T(C): 36.7 (06-30-25 @ 09:47), Max: 36.7 (06-30-25 @ 09:47)  T(F): 98 (06-30-25 @ 09:47), Max: 98 (06-30-25 @ 09:47)  HR: 87 (06-29-25 @ 20:34) (87 - 87)  BP: 157/85 (06-29-25 @ 20:34) (157/85 - 157/85)  BP(mean): --  RR: 18 (06-30-25 @ 12:22) (18 - 18)  SpO2: --    Orthostatic VS  06-30-25 @ 12:22  Lying BP: --/-- HR: --  Sitting BP: 162/74 HR: 96  Standing BP: 142/76 HR: 84  Site: upper left arm  Mode: auscultated w/ stethoscope  Orthostatic VS  06-30-25 @ 09:47  Lying BP: --/-- HR: --  Sitting BP: 156/71 HR: 76  Standing BP: 132/91 HR: 94  Site: upper left arm  Mode: electronic  Orthostatic VS  06-29-25 @ 07:43  Lying BP: --/-- HR: --  Sitting BP: 152/77 HR: 71  Standing BP: 164/90 HR: 85  Site: upper right arm  Mode: electronic    Lipid Panel: Date/Time: 06-09-25 @ 08:11  Cholesterol, Serum: 227  LDL Cholesterol Calculated: 144  HDL Cholesterol, Serum: 75  Total Cholesterol/HDL Ration Measurement: --  Triglycerides, Serum: 49

## 2025-06-30 NOTE — BH DISCHARGE NOTE NURSING/SOCIAL WORK/PSYCH REHAB - NSDCNEXTLEVELDETAILS_PSY_ALL_CORE
Fell off of crib. No LOC or vomiting. Bled from nose and mouth- not currently bleeding. Pt is smiling and playful.  No pmhx. EMR

## 2025-06-30 NOTE — BH INPATIENT PSYCHIATRY PROGRESS NOTE - NSTXPROBDCOTHR_PSY_ALL_CORE
DISCHARGE ISSUE - OTHER

## 2025-06-30 NOTE — BH INPATIENT PSYCHIATRY PROGRESS NOTE - NSBHMSEMUSCLE_PSY_A_CORE
OFFICE VISIT      Patient: Hali Cook   : 1988 MRN: [id number]    Sincere France M.D. [facility]     
Normal muscle tone/strength

## 2025-06-30 NOTE — BH INPATIENT PSYCHIATRY PROGRESS NOTE - NSBHCHARTREVIEWVS_PSY_A_CORE FT
Vital Signs Last 24 Hrs  T(C): 36.7 (06-30-25 @ 09:47), Max: 36.7 (06-30-25 @ 09:47)  T(F): 98 (06-30-25 @ 09:47), Max: 98 (06-30-25 @ 09:47)  HR: 87 (06-29-25 @ 20:34) (87 - 87)  BP: 157/85 (06-29-25 @ 20:34) (157/85 - 157/85)  BP(mean): --  RR: 18 (06-30-25 @ 12:22) (18 - 18)  SpO2: --    Orthostatic VS  06-30-25 @ 12:22  Lying BP: --/-- HR: --  Sitting BP: 162/74 HR: 96  Standing BP: 142/76 HR: 84  Site: upper left arm  Mode: auscultated w/ stethoscope  Orthostatic VS  06-30-25 @ 09:47  Lying BP: --/-- HR: --  Sitting BP: 156/71 HR: 76  Standing BP: 132/91 HR: 94  Site: upper left arm  Mode: electronic  Orthostatic VS  06-29-25 @ 07:43  Lying BP: --/-- HR: --  Sitting BP: 152/77 HR: 71  Standing BP: 164/90 HR: 85  Site: upper right arm  Mode: electronic

## 2025-06-30 NOTE — BH DISCHARGE NOTE NURSING/SOCIAL WORK/PSYCH REHAB - FINANCIAL ASSISTANCE
[facility] provides services at a reduced cost to those who are determined to be eligible through [facility]’s financial assistance program. Information regarding [facility]’s financial assistance program can be found by going to [email or web address] or by calling [phone].

## 2025-06-30 NOTE — BH INPATIENT PSYCHIATRY PROGRESS NOTE - NSTXDEPRESGOAL_PSY_ALL_CORE
Will identify 2 coping skills that assist in improving mood

## 2025-06-30 NOTE — BH INPATIENT PSYCHIATRY PROGRESS NOTE - NSTXDEPRESPROGRES_PSY_ALL_CORE
No Change

## 2025-06-30 NOTE — BH DISCHARGE NOTE NURSING/SOCIAL WORK/PSYCH REHAB - NSSCNAMETXT_GEN_ALL_CORE
[facility]  referral cancelled, pt will be staying with family out of our area

## 2025-06-30 NOTE — BH INPATIENT PSYCHIATRY PROGRESS NOTE - NSTXCOPEDATETRGT_PSY_ALL_CORE
04-Jun-2025
12-Jun-2025
12-Jun-2025
29-May-2025
04-Jun-2025
20-Jun-2025
27-Jun-2025
12-Jun-2025
29-May-2025
29-May-2025
04-Jun-2025
12-Jun-2025
12-Jun-2025
29-May-2025
04-Jun-2025
12-Jun-2025
04-Jul-2025
20-Jun-2025
04-Jun-2025
27-Jun-2025
27-Jun-2025
20-Jun-2025
12-Jun-2025
20-Jun-2025
12-Jun-2025
27-Jun-2025
29-May-2025
29-May-2025
27-Jun-2025
04-Jul-2025

## 2025-06-30 NOTE — BH DISCHARGE NOTE NURSING/SOCIAL WORK/PSYCH REHAB - DISCHARGE INSTRUCTIONS AFTERCARE APPOINTMENTS
In order to check the location, date, or time of your aftercare appointment, please refer to your Discharge Instructions Document given to you upon leaving the hospital.  If you have lost the instructions please call [phone]

## 2025-06-30 NOTE — BH INPATIENT PSYCHIATRY PROGRESS NOTE - NSTXDCOTHRGOAL_PSY_ALL_CORE
Pt will comply with medication, and recommended ECT, with improved symptoms, helping her ability to cooperate with caregiving at home without distress
Pt will maintain compliance with ECT, medication, and unit routine, with an improvement in symptoms, helping her toward her baseline functioning
Pt will maintain compliance with ECT, medication, and unit routine, with an improvement in symptoms, helping her toward her baseline functioning
Pt will comply with medication and participate in unit activity, with improved symptoms, helping her toward baseline functioning, so she can be at home with caregiving in place
Pt will comply with medication, and recommended ECT, with improved symptoms, helping her ability to cooperate with caregiving at home without distress
Pt will comply with medication and unit routine, with improved symptoms, helping her toward her baseline, where she can function at home with her supports in place
Pt will comply with medication, and recommended ECT, with improved symptoms, helping her ability to cooperate with caregiving at home without distress
Pt will comply with medication, ECT and participate in unit routine with improved symptoms helping her toward her baseline functioning
Pt will comply with medication, and recommended ECT, with improved symptoms, helping her ability to cooperate with caregiving at home without distress
Pt will comply with medication, ECT and participate in unit routine with improved symptoms helping her toward her baseline functioning
Pt will comply with medication, and recommended ECT, with improved symptoms, helping her ability to cooperate with caregiving at home without distress
Pt will maintain compliance with ECT, medication, and unit routine, with an improvement in symptoms, helping her toward her baseline functioning
Pt will comply with medication and participate in unit activity, with improved symptoms, helping her toward baseline functioning, so she can be at home with caregiving in place
Pt will comply with medication, ECT and participate in unit routine with improved symptoms helping her toward her baseline functioning
Pt will comply with medication, and recommended ECT, with improved symptoms, helping her ability to cooperate with caregiving at home without distress
Pt will maintain compliance with ECT, medication, and unit routine, with an improvement in symptoms, helping her toward her baseline functioning
Pt will comply with medication and unit routine, with improved symptoms, helping her toward her baseline, where she can function at home with her supports in place
Pt will maintain compliance with ECT, medication, and unit routine, with an improvement in symptoms, helping her toward her baseline functioning
Pt will maintain compliance with ECT, medication, and unit routine, with an improvement in symptoms, helping her toward her baseline functioning
Pt will comply with medication and unit routine, with improved symptoms, helping her toward her baseline, where she can function at home with her supports in place
Pt will comply with medication and participate in unit activity, with improved symptoms, helping her toward baseline functioning, so she can be at home with caregiving in place
Pt will comply with medication and unit routine, with improved symptoms, helping her toward her baseline, where she can function at home with her supports in place
Pt will comply with medication, and recommended ECT, with improved symptoms, helping her ability to cooperate with caregiving at home without distress
Pt will comply with medication and unit routine, with improved symptoms, helping her toward her baseline, where she can function at home with her supports in place
Pt will comply with medication, ECT and participate in unit routine with improved symptoms helping her toward her baseline functioning
Pt will comply with medication and participate in unit activity, with improved symptoms, helping her toward baseline functioning, so she can be at home with caregiving in place
Pt will comply with medication, ECT and participate in unit routine with improved symptoms helping her toward her baseline functioning
Pt will comply with medication, ECT and participate in unit routine with improved symptoms helping her toward her baseline functioning
Pt will comply with medication and participate in unit activity, with improved symptoms, helping her toward baseline functioning, so she can be at home with caregiving in place
Pt will comply with medication, ECT and participate in unit routine with improved symptoms helping her toward her baseline functioning
Pt will comply with medication and participate in unit activity, with improved symptoms, helping her toward baseline functioning, so she can be at home with caregiving in place
Pt will comply with medication and unit routine, with improved symptoms, helping her toward baseline functioning where she can function at home with supports

## 2025-06-30 NOTE — BH INPATIENT PSYCHIATRY PROGRESS NOTE - NSTXFALLGOAL_PSY_ALL_CORE
Ask for assistance when getting out of bed/chair and upon ambulation
Ask for assistance when getting out of bed/chair and upon ambulation
Call for assistance before getting out of bed or chair
Ask for assistance when getting out of bed/chair and upon ambulation
Call for assistance before getting out of bed or chair
Ask for assistance when getting out of bed/chair and upon ambulation
Call for assistance before getting out of bed or chair
Ask for assistance when getting out of bed/chair and upon ambulation
Ask for assistance when getting out of bed/chair and upon ambulation
Call for assistance before getting out of bed or chair
Ask for assistance when getting out of bed/chair and upon ambulation
Call for assistance before getting out of bed or chair
Ask for assistance when getting out of bed/chair and upon ambulation
Ask for assistance when getting out of bed/chair and upon ambulation
Call for assistance before getting out of bed or chair
Call for assistance before getting out of bed or chair
Ask for assistance when getting out of bed/chair and upon ambulation
Call for assistance before getting out of bed or chair
Ask for assistance when getting out of bed/chair and upon ambulation
Call for assistance before getting out of bed or chair
Call for assistance before getting out of bed or chair
Ask for assistance when getting out of bed/chair and upon ambulation
Call for assistance before getting out of bed or chair
Ask for assistance when getting out of bed/chair and upon ambulation
Call for assistance before getting out of bed or chair
Ask for assistance when getting out of bed/chair and upon ambulation
Call for assistance before getting out of bed or chair
Ask for assistance when getting out of bed/chair and upon ambulation
Ask for assistance when getting out of bed/chair and upon ambulation

## 2025-06-30 NOTE — BH INPATIENT PSYCHIATRY PROGRESS NOTE - NSTXCONFDATETRGT_PSY_ALL_CORE
28-May-2025
12-Jun-2025
28-May-2025
28-May-2025
12-Jun-2025
28-May-2025
28-May-2025
12-Jun-2025
28-May-2025
28-May-2025
12-Jun-2025
11-Jun-2025
28-May-2025
12-Jun-2025
28-May-2025
12-Jun-2025
28-May-2025
12-Jun-2025

## 2025-06-30 NOTE — BH DISCHARGE NOTE NURSING/SOCIAL WORK/PSYCH REHAB - NSDCPETBCESMAN_GEN_ALL_CORE
well developed, well nourished , in no acute distress , ambulating without difficulty , normal communication ability If you are a smoker, it is important for your health to stop smoking. Please be aware that second hand smoke is also harmful.

## 2025-06-30 NOTE — BH INPATIENT PSYCHIATRY PROGRESS NOTE - NSICDXBHPRIMARYDX_PSY_ALL_CORE
Catatonia   F06.1  

## 2025-06-30 NOTE — BH INPATIENT PSYCHIATRY PROGRESS NOTE - NSTXCONFDATEEST_PSY_ALL_CORE
21-May-2025
04-Jun-2025
21-May-2025

## 2025-06-30 NOTE — BH INPATIENT PSYCHIATRY PROGRESS NOTE - NSTXDEPRESDATEEST_PSY_ALL_CORE
21-May-2025

## 2025-06-30 NOTE — BH INPATIENT PSYCHIATRY PROGRESS NOTE - NSTXCOPEDATEEST_PSY_ALL_CORE
22-May-2025
22-May-2025
21-May-2025
13-Jun-2025
13-Jun-2025
20-Jun-2025
28-May-2025
28-May-2025
21-May-2025
27-Jun-2025
13-Jun-2025
22-May-2025
13-Jun-2025
22-May-2025
22-May-2025
28-May-2025
20-Jun-2025
13-Jun-2025
28-May-2025
20-Jun-2025
28-May-2025
21-May-2025
27-Jun-2025
21-May-2025
28-May-2025
13-Jun-2025
21-May-2025
28-May-2025
22-May-2025
20-Jun-2025
20-Jun-2025

## 2025-06-30 NOTE — BH INPATIENT PSYCHIATRY PROGRESS NOTE - NSTXCONFPROGRES_PSY_ALL_CORE
Improving
No Change
Improving
No Change
Improving
No Change
Improving
Improving
No Change
No Change
Improving
No Change
Improving
No Change
No Change
Improving
No Change
Improving
No Change

## 2025-06-30 NOTE — BH INPATIENT PSYCHIATRY PROGRESS NOTE - NSTXPROBFALL_PSY_ALL_CORE
FALL RISK

## 2025-06-30 NOTE — BH INPATIENT PSYCHIATRY PROGRESS NOTE - NSBHPSYCHOLCOGORIENT_PSY_A_CORE
Unable to assess
Oriented to time, place, person, situation
Unable to assess

## 2025-06-30 NOTE — BH INPATIENT PSYCHIATRY PROGRESS NOTE - NSBHMSESPABN_PSY_A_CORE
Decreased productivity

## 2025-06-30 NOTE — BH INPATIENT PSYCHIATRY PROGRESS NOTE - NSTXCONFGOAL_PSY_ALL_CORE
Other...
Other...
Will ask for assistance as required
Other...
Other...
Will ask for assistance as required
Other...
Will ask for assistance as required
Will ask for assistance as required
Other...
Will ask for assistance as required
Other...
Other...
Will ask for assistance as required
Will ask for assistance as required
Other...
Will ask for assistance as required
Other...
Other...
Will ask for assistance as required
Will ask for assistance as required
Other...

## 2025-06-30 NOTE — BH INPATIENT PSYCHIATRY PROGRESS NOTE - NSTXFALLPROGRES_PSY_ALL_CORE
Improving

## 2025-06-30 NOTE — BH INPATIENT PSYCHIATRY PROGRESS NOTE - NSTXDCOTHRDATETRGT_PSY_ALL_CORE
29-May-2025
13-Jun-2025
06-Jun-2025
19-Jun-2025
19-Jun-2025
27-Jun-2025
04-Jul-2025
27-Jun-2025
12-Jun-2025
27-Jun-2025
29-May-2025
06-Jun-2025
06-Jun-2025
27-Jun-2025
06-Jun-2025
13-Jun-2025
29-May-2025
12-Jun-2025
19-Jun-2025
29-May-2025
13-Jun-2025
29-May-2025
13-Jun-2025
27-Jun-2025
13-Jun-2025
19-Jun-2025
29-May-2025
19-Jun-2025
29-May-2025
27-Jun-2025

## 2025-06-30 NOTE — BH INPATIENT PSYCHIATRY PROGRESS NOTE - NSBHASSESSSUMMFT_PSY_ALL_CORE
74F hx asthma, depression, and HTN was admitted to the MICU with AHRF 2/2 asthma exacerbation resulting in acute on chronic HHRF causing AMS and increased WOB that required intubation. During her ICU course, she experienced seizure-like activity .  Additional issues included hyponatremia, acute on chronic metabolic alkalosis, and mild thrombocytopenia,  also having fevers.  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 Patient has h/o hx of major depressive disorder with psychosis, hx of 3 past inpatient psychiatric hospitalizations last in 2016 at [facility], all rehospitalizations were in context non compliance with the treatment. She has no hx of suicide attempts, no hx of substance abuse.    Pt currently requires inpatient level of care for ongoing treatment for catatonia.     5/22 - calm, cooperative, not oriented, though remembers name of son. No s/s catatonia. Denies mood symptoms. Eating and sleeping on unit.  5/23 - No signs of catatonia. Eating and ambulating on unit. For ECT this morning. Not oriented to time, place, or situation. Has been NPO. Spoke to , who believes pt is returning to baseline, but is concerned about pt's memory issues.  5/27 - Withdrawn, non-spontaneous speech, not oriented to time, place, or situation, though interactive and recalls her 's name. Hypoglycemic episode yesterday. D/w hospitalist, discontinued standing pre-meal Admelog of 2U TID, will keep on bedtime Lantus and sliding scale.   5/28: More engaged today, establishes good eye contact. Received ECT today, will monitor, if catatonia symptoms remain in control will continue with weekly ECT next week, if pt appears to be relapsing may order ECT for Friday.   5/29: Pt presenting with increased BM frequency, regular consistency. Will order imodium PRN for diarrhea.   5/30: Will observe pt over the weekend and next week for confusion. Family has brought up concern ECT may be worsening confusion and it is a possibility. Will observe off Zyprexa and space out ECT, may treat next Friday.   5/31: Remains confused but calm. Unclear if due to ECT or residual catatonia.  6/2: Will continue holding Zyprexa and ECT this week. Presentation is suggestive of protracted delirium.   6/3: Pt remains confused and disoriented. Continue holding Zyprexa and ECT. Ordered urinalysis and routine bloodwork.   6/4: Continue monitoring Alk phos and LFTs.   6/5: Alk phos uptrending, bilirubin is WNL, LFTs mildly elevated. Abdominal ultrasound scheduled for tomorrow, 6/6, at 9:00am. Family requesting ECT be held until Monday as they were concerned about confusion. Will resume Zyprexa 2.5mg QHS.   6/6: Abdominal ultrasound completed today, revealed gallstones but no evidence for cholecystitis or biliary dilatation. This likely explains elevated Alk phos and GGT. Ms. Silva denies pain, there is no tenderness to palpation on physical exam and sonographic Tomlin sign was not elicited. Will repeat bloodwork on Monday and continue monitoring. Discussed with medicine. ECT ordered for Monday 6/9.   6/7-6/8: Speech improved, calm, pleasant, plan for ECT 6/9  6/10: Better related this week. No signs of catatonia. S/P ECT yesterday.   6/11: No interval changes. Setting up homecare in preparation for discharge home. No symptoms of catatonia but pt remains disoriented with poor memory.   6/12: memory impairment evident, no agitation or catatonia evident, tolerating meds.   6/13: Continues to present memory impairment. No signs of catatonia.   6/14: Overnight staff reported near choking incidents on meds and water overnight. Diet changed to puree in the meantime and cineesophagram was ordered. Discussed choking precautions with staff and patients, advised pt remain upright while eating or drinking and take small bites.   6/16: Pt completed cineesophagram and was cleared to continue regular diet with thin liquids. Alk phos remains elevated but downtrending.   06/17: No signs of catatonia, depression, or psychotic symptoms.   6/19-6/20: Pt reported her mood as "good", denies any complaints. Denies AH/VH/SI/HI. Continues to deny abdominal pain, nausea/vomiting. Liver enzymes and GGT remain elevated, will need hepatology follow up as outpatient for followup. Hospitalist recs appreciated. Will continue holding ECT due to no sx of catatonia and family concerns about memory problems. If catatonia symptoms return would send for ECT.  6/23-24: Reports mood as good. Denies depressive or psychotic symptoms. No catatonic signs noted. Some delay with 3 word recall though able to recall all words with prompt. In light of above, will continue hold off ECT.   6/25 Reports some unsteadiness upon standing, uses walker to ambulate. Able to verbalize her needs. Denies depressive or psychotic sxs. No motor or verbal signs of catatonia noted.   6/26 No interval changes. Denies abdominal pain, nausea, vomiting. No recurrence of catatonic sxs. Denies depressive or psychotic sxs. Cont current medications.   6/27 Tolerating medications. No interval changes. Denies Si, Hi. Cont current meds.  06/30: Patient has improved significantly with her current treatment. Will plan for discharge tomorrow. Obtained an outpatient pulmonology appointment today.    PLAN:  1. Admit to [facility] on 9.27  2. Routine checks, as pt denying SI/HI/SIB  3. Psychiatric:  - Continue Namenda 10mg BID  - Continue Sertraline 125mg QD, titrate up to lowest effective dose  - Resume Olanzapine 2.5mg QHS due to paranoia  - Would avoid benzodiazepines since pt was recently unable to tolerate them at [facility], becoming extremely sedated  - ECT held due to concern for memory impairment     4. Medical:   - Abdominal ultrasound scheduled for 6/6/25 at 9am.   - PRN levalbuterol inhaler  - Continue Advair and Spiriva for Asthma and COPD  - Continue Lisinopril 40mg QD, HCTZ 25mg QD, hydralazine 75 mg TID for HTN  - Diabetes type 2: CC diet. Lantus 3U at bedtime and FSBG discontinued  - Lidocaine patches PRN daily + Tylenol PRN for lower back pain  - Bowel regimen: Senna 2 tabs QHS  - Diet: Easy to chew, consistent carbohydrates. No Beef No Pork  - Pt is at risk for falls given deconditioning. Seen by PT, recommended PRN walker with 1 staff assists. Pt is in a room with peer who has CO for added supervision and redirection during nighttime. During the daytime she should be either in the day room or the dining area with staff monitoring her at all times to provide redirection if pt tries to get up or ambulate unassisted.     5. Collateral: spoke with pt's  Liam on phone to update on progress 5/22 and 5/23.  6. Disposition: pending clinical course

## 2025-06-30 NOTE — BH INPATIENT PSYCHIATRY PROGRESS NOTE - ADDITIONAL DETAILS / COMMENTS
Mild delay with 3 word recall after 2 mins, able to recall 2/3 words unprompted, and recalls 3rd word with category prompt.

## 2025-06-30 NOTE — BH INPATIENT PSYCHIATRY PROGRESS NOTE - MSE OPTIONS
Unstructured MSE
Structured MSE
Unstructured MSE
Structured MSE
Unstructured MSE
Structured MSE
Unstructured MSE
Structured MSE
Unstructured MSE
Structured MSE
Structured MSE

## 2025-06-30 NOTE — BH INPATIENT PSYCHIATRY PROGRESS NOTE - NSTXCOPEGOAL_PSY_ALL_CORE
Identify and utilize 2 coping skills that meet their needs

## 2025-06-30 NOTE — BH DISCHARGE NOTE NURSING/SOCIAL WORK/PSYCH REHAB - NSCDUDCCRISIS_PSY_A_CORE
[facility]  [phone] [facility]-WELL ([phone])  Text "WELL" to [phone]  Website: [email or web address]  [facility]  [phone] [place] [phone]   Hours:  Monday through Friday from 9 AM to 3 PM/[facility]

## 2025-06-30 NOTE — BH INPATIENT PSYCHIATRY PROGRESS NOTE - NSTXFALLDATETRGT_PSY_ALL_CORE
12-Jun-2025
28-May-2025
28-May-2025
12-Jun-2025
28-May-2025
12-Jun-2025
12-Jun-2025
28-May-2025
12-Jun-2025
28-May-2025
12-Jun-2025
28-May-2025
12-Jun-2025
28-May-2025
28-May-2025
12-Jun-2025
12-Jun-2025
28-May-2025
12-Jun-2025
28-May-2025
12-Jun-2025
28-May-2025
12-Jun-2025
28-May-2025
12-Jun-2025
12-Jun-2025
28-May-2025
12-Jun-2025
12-Jun-2025

## 2025-06-30 NOTE — BH INPATIENT PSYCHIATRY PROGRESS NOTE - NSTXDEPRESDATETRGT_PSY_ALL_CORE
12-Jun-2025
28-May-2025
28-May-2025
12-Jun-2025
28-May-2025
12-Jun-2025
28-May-2025
12-Jun-2025
12-Jun-2025
28-May-2025
28-May-2025
12-Jun-2025
28-May-2025
28-May-2025
12-Jun-2025
28-May-2025
12-Jun-2025
28-May-2025
12-Jun-2025
12-Jun-2025
28-May-2025
12-Jun-2025

## 2025-06-30 NOTE — BH DISCHARGE NOTE NURSING/SOCIAL WORK/PSYCH REHAB - NSDCPRRECOMMEND_PSY_ALL_CORE
Patient is scheduled to be discharged today and will return to her previous residence. Writer encouraged patient to maintain her medication compliance, to utilize her coping skills and to participate in structured leisure activities.

## 2025-06-30 NOTE — BH INPATIENT PSYCHIATRY PROGRESS NOTE - NSTREATMENTCERTY_PSY_ALL_CORE

## 2025-06-30 NOTE — BH INPATIENT PSYCHIATRY PROGRESS NOTE - NSTXCONFGOALOTHER_PSY_ALL_CORE
Patient with moderate encouragement will participate in two rehab groups daily within seven days.

## 2025-06-30 NOTE — BH DISCHARGE NOTE NURSING/SOCIAL WORK/PSYCH REHAB - NSDCPRGOAL_PSY_ALL_CORE
Patient initially presented with symptoms of depression and anxiety. Patient's initial goals included increasing her hope in recovery, increasing her coping skills and decreasing her anxiety. Patient made gains towards her rehab goals as evidenced by patient's brighter affect, utilization of coping skills and decreased anxiety. Patient also demonstrated daily medication compliance and participation in some of the rehab group activities. During activities patient was cooperative and pleasant. Patient currently ambulates with a walker.

## 2025-06-30 NOTE — BH INPATIENT PSYCHIATRY PROGRESS NOTE - NSTXPROBCOPE_PSY_ALL_CORE
COPING, INEFFECTIVE

## 2025-06-30 NOTE — BH INPATIENT PSYCHIATRY PROGRESS NOTE - NSTXFALLINTERMD_PSY_ALL_CORE
PT and falls precautions

## 2025-06-30 NOTE — BH INPATIENT PSYCHIATRY PROGRESS NOTE - NSBHATTESTBILLING_PSY_A_CORE
[id number]-Pnsxlzrjxk OBS or IP - moderate complexity OR 35-49 mins
58819-Nnsiewbhfo OBS or IP - low complexity OR 25-34 mins
69840-Chwotbtitq OBS or IP - moderate complexity OR 35-49 mins
80466-Ntvfmmgqkn OBS or IP - low complexity OR 25-34 mins
81824-Lhxiubeigo OBS or IP - low complexity OR 25-34 mins
95674-Sfrircvnvd OBS or IP - moderate complexity OR 35-49 mins
64921-Byhebehpqc OBS or IP - low complexity OR 25-34 mins
72262-Qcqzakpsco OBS or IP - moderate complexity OR 35-49 mins
54144-Iscsetwetp OBS or IP - moderate complexity OR 35-49 mins
15528-Znvpyamgdu OBS or IP - moderate complexity OR 35-49 mins
42917-Nzhtpqlrpw OBS or IP - low complexity OR 25-34 mins
33165-Drfcebhvps OBS or IP - moderate complexity OR 35-49 mins
18086-Xdbxqblngi OBS or IP - low complexity OR 25-34 mins
49441-Hbyfchjnhu OBS or IP - moderate complexity OR 35-49 mins
21622-Czhuyfevwp OBS or IP - low complexity OR 25-34 mins
45247-Vftoujxzcc OBS or IP - moderate complexity OR 35-49 mins
42707-Wukvcihwcp OBS or IP - low complexity OR 25-34 mins
93822-Vejpyugxft OBS or IP - low complexity OR 25-34 mins
48442-Fnsqbrdnil OBS or IP - moderate complexity OR 35-49 mins
64863-Ytdyxxqgja OBS or IP - moderate complexity OR 35-49 mins
08712-Aouvktwumg OBS or IP - moderate complexity OR 35-49 mins
46753-Etebxqfwii OBS or IP - moderate complexity OR 35-49 mins
42429-Eyxrfgheqm OBS or IP - low complexity OR 25-34 mins
88952-Oylcdoxcca OBS or IP - low complexity OR 25-34 mins
08688-Jrkqnoczkg OBS or IP - low complexity OR 25-34 mins
52043-Pqcorwhuuj OBS or IP - moderate complexity OR 35-49 mins
29099-Mojtywqdbc OBS or IP - moderate complexity OR 35-49 mins
15066-Mgwnyojsul OBS or IP - moderate complexity OR 35-49 mins
50668-Obboqdetsk OBS or IP - low complexity OR 25-34 mins
46541-Gkpjgcrwfu OBS or IP - moderate complexity OR 35-49 mins
59780-Nznvjbzdiq OBS or IP - low complexity OR 25-34 mins
12261-Ihbzftjlcs OBS or IP - high complexity OR 50-79 mins

## 2025-06-30 NOTE — BH INPATIENT PSYCHIATRY PROGRESS NOTE - NSBHFUPINTERVALHXFT_PSY_A_CORE
Chart reviewed and case discussed with treatment team. No acute events overnight. No prns. On exam today the patient reported that she had a nice weekend because her children visited. Stated that her mood was "good" and she was sleeping and eating well. Denied psychotic symptoms. Denied medication side effects.

## 2025-06-30 NOTE — BH INPATIENT PSYCHIATRY PROGRESS NOTE - NSTXCONFINTERMD_PSY_ALL_CORE
meds and therapy

## 2025-06-30 NOTE — BH INPATIENT PSYCHIATRY PROGRESS NOTE - NSTXCOPEPROGRES_PSY_ALL_CORE
Improving
No Change
Improving
No Change
Improving
No Change
Improving
No Change
Improving
No Change
Improving
No Change
Improving

## 2025-06-30 NOTE — BH INPATIENT PSYCHIATRY PROGRESS NOTE - NSTXPROBCONF_PSY_ALL_CORE
CONFUSION, ACUTE/CHRONIC

## 2025-06-30 NOTE — BH DISCHARGE NOTE NURSING/SOCIAL WORK/PSYCH REHAB - PATIENT PORTAL LINK FT
You can access the FollowMyHealth Patient Portal offered by [facility] by registering at the following website: [email or web address]. By joining [facility]’s FollowMyHealth portal, you will also be able to view your health information using other applications (apps) compatible with our system.

## 2025-06-30 NOTE — BH INPATIENT PSYCHIATRY PROGRESS NOTE - NSBHATTESTTYPEVISIT_PSY_A_CORE
Attending Only
[place] without on-site Attending supervision
Attending Only
Attending with Resident/Fellow/Student
Attending Only

## 2025-06-30 NOTE — BH INPATIENT PSYCHIATRY PROGRESS NOTE - NSDCCRITERIA_PSY_ALL_CORE
Pending clinical improvement

## 2025-06-30 NOTE — BH INPATIENT PSYCHIATRY PROGRESS NOTE - NSTXDCOTHRDATEEST_PSY_ALL_CORE
20-Jun-2025
22-May-2025
12-Jun-2025
12-Jun-2025
22-May-2025
12-Jun-2025
30-May-2025
06-Jun-2025
21-May-2025
12-Jun-2025
12-Jun-2025
20-Jun-2025
22-May-2025
27-Jun-2025
21-May-2025
06-Jun-2025
06-Jun-2025
30-May-2025
12-Jun-2025
20-Jun-2025
22-May-2025
06-Jun-2025
20-Jun-2025
06-Jun-2025
12-Jun-2025
22-May-2025
20-Jun-2025
20-Jun-2025

## 2025-06-30 NOTE — BH DISCHARGE NOTE NURSING/SOCIAL WORK/PSYCH REHAB - NSBHDCAGENCY2FT_PSY_A_CORE
[facility]  No [facility] Pulmonology   [facility]

## 2025-07-01 VITALS — TEMPERATURE: 98 F

## 2025-07-01 LAB
GLUCOSE BLDC GLUCOMTR-MCNC: 133 MG/DL — HIGH (ref 70–99)
GLUCOSE BLDC GLUCOMTR-MCNC: 149 MG/DL — HIGH (ref 70–99)

## 2025-07-01 RX ADMIN — LISINOPRIL 40 MILLIGRAM(S): 5 TABLET ORAL at 09:13

## 2025-07-01 RX ADMIN — SERTRALINE 125 MILLIGRAM(S): 100 TABLET, FILM COATED ORAL at 09:13

## 2025-07-01 RX ADMIN — LEVALBUTEROL HYDROCHLORIDE 1 PUFF(S): 1.25 SOLUTION RESPIRATORY (INHALATION) at 06:20

## 2025-07-01 RX ADMIN — Medication 1 DROP(S): at 06:19

## 2025-07-01 RX ADMIN — Medication 1 DOSE(S): at 09:12

## 2025-07-01 RX ADMIN — Medication 75 MILLIGRAM(S): at 09:12

## 2025-07-01 RX ADMIN — Medication 75 MILLIGRAM(S): at 13:12

## 2025-07-01 RX ADMIN — TIOTROPIUM BROMIDE INHALATION SPRAY 2 PUFF(S): 3.12 SPRAY, METERED RESPIRATORY (INHALATION) at 09:12

## 2025-07-01 RX ADMIN — MEMANTINE HYDROCHLORIDE 10 MILLIGRAM(S): 21 CAPSULE, EXTENDED RELEASE ORAL at 06:19

## 2025-07-01 RX ADMIN — Medication 1 DROP(S): at 13:22

## 2025-07-10 NOTE — SOCIAL WORK POST DISCHARGE FOLLOW UP NOTE - NSBHSWFOLLOWUP_PSY_ALL_CORE_FT
SW was informed that pt did not link with her [facility] on 7/03/25 with Dr. Walker.  [place] spoke with pt's son, Jesus Silva by phone.  The family plan to have pt stay away from her home, due to presence of black mold was initiated with pt staying with her son in [place].  Mr. Silva states pt is likely return home the last week of July, when mold removal is completed.  SW encouraged pt's son to reschedule follow up in the [facility], as intake appointments can be difficult to schedule with short notice.  Son states he will confirm when pt will return to [place], and call the [facility] for an appn.  Son is aware of [facility],  if needed.  Son reports pt is doing well and supervised by family members.  SW reminds son of pt's pulmonary f/u on 7/17.  He indicates knowledge of the appointment.  SW offered assistance if son has difficulty with coordination. Treatment team is aware of pt's missed linkage, and current status.  At the time of discharge, pt was not a danger to self or others, and was discharged in the care of family members.  Case may be closed.

## 2025-07-15 ENCOUNTER — APPOINTMENT (OUTPATIENT)
Dept: PULMONOLOGY | Facility: CLINIC | Age: 75
End: 2025-07-15

## 2025-07-17 ENCOUNTER — APPOINTMENT (OUTPATIENT)
Dept: PULMONOLOGY | Facility: CLINIC | Age: 75
End: 2025-07-17

## 2025-07-23 RX ORDER — OLANZAPINE 10 MG/1
1 TABLET ORAL
Qty: 30 | Refills: 0
Start: 2025-07-23 | End: 2025-08-21

## 2025-07-23 RX ORDER — HYDROCHLOROTHIAZIDE 50 MG/1
1 TABLET ORAL
Qty: 30 | Refills: 0
Start: 2025-07-23 | End: 2025-08-21

## 2025-07-23 RX ORDER — SERTRALINE 100 MG/1
1 TABLET, FILM COATED ORAL
Qty: 30 | Refills: 0
Start: 2025-07-23 | End: 2025-08-21

## 2025-07-23 RX ORDER — MEMANTINE HYDROCHLORIDE 21 MG/1
1 CAPSULE, EXTENDED RELEASE ORAL
Qty: 60 | Refills: 0
Start: 2025-07-23 | End: 2025-08-21

## 2025-07-23 RX ORDER — ALBUTEROL SULFATE 2.5 MG/3ML
2 VIAL, NEBULIZER (ML) INHALATION
Qty: 1 | Refills: 0
Start: 2025-07-23 | End: 2025-08-21

## 2025-07-23 RX ORDER — LANOLIN/MINERAL OIL/PETROLATUM
1 OINTMENT (GRAM) OPHTHALMIC (EYE)
Qty: 1 | Refills: 0
Start: 2025-07-23 | End: 2025-08-21

## 2025-07-23 RX ORDER — LISINOPRIL 5 MG/1
1 TABLET ORAL
Qty: 30 | Refills: 0
Start: 2025-07-23 | End: 2025-08-21

## 2025-07-23 RX ORDER — MELATONIN 5 MG
3 TABLET ORAL
Qty: 90 | Refills: 0
Start: 2025-07-23 | End: 2025-08-21

## 2025-07-23 RX ORDER — UMECLIDINIUM 62.5 UG/1
1 AEROSOL, POWDER ORAL
Qty: 1 | Refills: 0
Start: 2025-07-23 | End: 2025-08-21

## 2025-07-23 RX ORDER — SENNA 187 MG
2 TABLET ORAL
Qty: 60 | Refills: 0
Start: 2025-07-23 | End: 2025-08-21

## 2025-08-01 ENCOUNTER — APPOINTMENT (OUTPATIENT)
Dept: PULMONOLOGY | Facility: CLINIC | Age: 75
End: 2025-08-01
Payer: MEDICARE

## 2025-08-01 ENCOUNTER — LABORATORY RESULT (OUTPATIENT)
Age: 75
End: 2025-08-01

## 2025-08-01 VITALS
HEIGHT: 61 IN | BODY MASS INDEX: 29.27 KG/M2 | SYSTOLIC BLOOD PRESSURE: 170 MMHG | HEART RATE: 87 BPM | TEMPERATURE: 98.8 F | WEIGHT: 155 LBS | RESPIRATION RATE: 16 BRPM | OXYGEN SATURATION: 94 % | DIASTOLIC BLOOD PRESSURE: 78 MMHG

## 2025-08-01 DIAGNOSIS — J45.909 UNSPECIFIED ASTHMA, UNCOMPLICATED: ICD-10-CM

## 2025-08-01 LAB
BASOPHILS # BLD AUTO: 0.01 K/UL
BASOPHILS NFR BLD AUTO: 0.1 %
EOSINOPHIL # BLD AUTO: 0.01 K/UL
EOSINOPHIL NFR BLD AUTO: 0.1 %
HCT VFR BLD CALC: 38.2 %
HGB BLD-MCNC: 12.5 G/DL
IMM GRANULOCYTES NFR BLD AUTO: 0.2 %
LYMPHOCYTES # BLD AUTO: 3.59 K/UL
LYMPHOCYTES NFR BLD AUTO: 34.9 %
MAN DIFF?: NORMAL
MCHC RBC-ENTMCNC: 31.8 PG
MCHC RBC-ENTMCNC: 32.7 G/DL
MCV RBC AUTO: 97.2 FL
MONOCYTES # BLD AUTO: 0.5 K/UL
MONOCYTES NFR BLD AUTO: 4.9 %
NEUTROPHILS # BLD AUTO: 6.17 K/UL
NEUTROPHILS NFR BLD AUTO: 59.8 %
PLATELET # BLD AUTO: 143 K/UL
RBC # BLD: 3.93 M/UL
RBC # FLD: 13.1 %
WBC # FLD AUTO: 10.3 K/UL

## 2025-08-01 PROCEDURE — 36415 COLL VENOUS BLD VENIPUNCTURE: CPT

## 2025-08-01 PROCEDURE — 99214 OFFICE O/P EST MOD 30 MIN: CPT

## 2025-08-01 RX ORDER — ALBUTEROL SULFATE 2.5 MG/3ML
(2.5 MG/3ML) SOLUTION RESPIRATORY (INHALATION)
Qty: 1 | Refills: 5 | Status: ACTIVE | COMMUNITY
Start: 2025-08-01 | End: 1900-01-01

## 2025-08-01 RX ORDER — PREDNISONE 20 MG/1
20 TABLET ORAL DAILY
Qty: 5 | Refills: 0 | Status: ACTIVE | COMMUNITY
Start: 2025-08-01 | End: 1900-01-01

## 2025-08-01 RX ORDER — FLUTICASONE FUROATE AND VILANTEROL TRIFENATATE 200; 25 UG/1; UG/1
200-25 POWDER RESPIRATORY (INHALATION) DAILY
Qty: 1 | Refills: 5 | Status: ACTIVE | COMMUNITY
Start: 2025-08-01 | End: 1900-01-01

## 2025-08-01 RX ORDER — ALBUTEROL SULFATE 90 UG/1
108 (90 BASE) INHALANT RESPIRATORY (INHALATION) EVERY 4 HOURS
Qty: 1 | Refills: 5 | Status: ACTIVE | COMMUNITY
Start: 2025-08-01 | End: 1900-01-01

## 2025-08-04 LAB
A ALTERNATA IGE QN: <0.1 KUA/L
A FUMIGATUS IGE QN: <0.1 KUA/L
C ALBICANS IGE QN: <0.1 KUA/L
C HERBARUM IGE QN: <0.1 KUA/L
CAT DANDER IGE QN: <0.1 KUA/L
COMMON RAGWEED IGE QN: 0.91 KUA/L
D FARINAE IGE QN: 0.15 KUA/L
D PTERONYSS IGE QN: <0.1 KUA/L
DEPRECATED A ALTERNATA IGE RAST QL: 0
DEPRECATED A FUMIGATUS IGE RAST QL: 0
DEPRECATED C ALBICANS IGE RAST QL: 0
DEPRECATED C HERBARUM IGE RAST QL: 0
DEPRECATED CAT DANDER IGE RAST QL: 0
DEPRECATED COMMON RAGWEED IGE RAST QL: 2
DEPRECATED D FARINAE IGE RAST QL: NORMAL
DEPRECATED D PTERONYSS IGE RAST QL: 0
DEPRECATED DOG DANDER IGE RAST QL: 0
DEPRECATED M RACEMOSUS IGE RAST QL: 0
DEPRECATED ROACH IGE RAST QL: 2
DEPRECATED TIMOTHY IGE RAST QL: NORMAL
DEPRECATED WHITE OAK IGE RAST QL: 1
DOG DANDER IGE QN: <0.1 KUA/L
M RACEMOSUS IGE QN: <0.1 KUA/L
ROACH IGE QN: 1.61 KUA/L
TIMOTHY IGE QN: 0.28 KUA/L
TOTAL IGE SMQN RAST: 22 KU/L
WHITE OAK IGE QN: 0.67 KUA/L

## 2025-08-20 ENCOUNTER — LABORATORY RESULT (OUTPATIENT)
Age: 75
End: 2025-08-20

## 2025-08-20 ENCOUNTER — NON-APPOINTMENT (OUTPATIENT)
Age: 75
End: 2025-08-20

## 2025-08-20 ENCOUNTER — APPOINTMENT (OUTPATIENT)
Dept: HEPATOLOGY | Facility: CLINIC | Age: 75
End: 2025-08-20

## 2025-08-20 VITALS
RESPIRATION RATE: 16 BRPM | HEART RATE: 80 BPM | OXYGEN SATURATION: 97 % | TEMPERATURE: 98.8 F | BODY MASS INDEX: 29.64 KG/M2 | DIASTOLIC BLOOD PRESSURE: 93 MMHG | WEIGHT: 157 LBS | HEIGHT: 61 IN | SYSTOLIC BLOOD PRESSURE: 209 MMHG

## 2025-08-20 DIAGNOSIS — R74.8 ABNORMAL LEVELS OF OTHER SERUM ENZYMES: ICD-10-CM

## 2025-08-26 LAB
A1AT SERPL-MCNC: 162 MG/DL
AFP-TM SERPL-MCNC: 3.5 NG/ML
ALBUMIN SERPL ELPH-MCNC: 4.5 G/DL
ALP BLD-CCNC: 479 U/L
ALT SERPL-CCNC: 64 U/L
ANA TITR SER: NEGATIVE
ANION GAP SERPL CALC-SCNC: 15 MMOL/L
AST SERPL-CCNC: 61 U/L
BASOPHILS # BLD AUTO: 0 K/UL
BASOPHILS NFR BLD AUTO: 0 %
BILIRUB DIRECT SERPL-MCNC: 0.21 MG/DL
BILIRUB SERPL-MCNC: 0.5 MG/DL
BUN SERPL-MCNC: 15 MG/DL
CALCIUM SERPL-MCNC: 10.2 MG/DL
CERULOPLASMIN SERPL-MCNC: 37 MG/DL
CHLORIDE SERPL-SCNC: 100 MMOL/L
CO2 SERPL-SCNC: 27 MMOL/L
CREAT SERPL-MCNC: 0.73 MG/DL
DEPRECATED KAPPA LC FREE/LAMBDA SER: 1.45 RATIO
EGFRCR SERPLBLD CKD-EPI 2021: 86 ML/MIN/1.73M2
EOSINOPHIL # BLD AUTO: 0.14 K/UL
EOSINOPHIL NFR BLD AUTO: 1.8 %
FERRITIN SERPL-MCNC: 61 NG/ML
GGT SERPL-CCNC: 674 U/L
GLUCOSE SERPL-MCNC: 104 MG/DL
HBV CORE IGG+IGM SER QL: REACTIVE
HBV SURFACE AB SERPL IA-ACNC: 525.4 MIU/ML
HBV SURFACE AG SER QL: NONREACTIVE
HCT VFR BLD CALC: 39.2 %
HCV AB SER QL: NONREACTIVE
HCV S/CO RATIO: 0.11 S/CO
HEPATITIS A IGG ANTIBODY: REACTIVE
HGB BLD-MCNC: 12.6 G/DL
IGA SERPL-MCNC: 100 MG/DL
IGG SERPL-MCNC: 997 MG/DL
IGM SERPL-MCNC: 58 MG/DL
INR PPP: 0.91 RATIO
KAPPA LC CSF-MCNC: 1.29 MG/DL
KAPPA LC SERPL-MCNC: 1.87 MG/DL
LYMPHOCYTES # BLD AUTO: 3.57 K/UL
LYMPHOCYTES NFR BLD AUTO: 45 %
MAN DIFF?: NORMAL
MCHC RBC-ENTMCNC: 31.5 PG
MCHC RBC-ENTMCNC: 32.1 G/DL
MCV RBC AUTO: 98 FL
MITOCHONDRIA AB SER IF-ACNC: NORMAL
MONOCYTES # BLD AUTO: 0.29 K/UL
MONOCYTES NFR BLD AUTO: 3.6 %
NEUTROPHILS # BLD AUTO: 3.94 K/UL
NEUTROPHILS NFR BLD AUTO: 49.6 %
PETH 16:0/18:1: NEGATIVE NG/ML
PETH 16:0/18:2: NEGATIVE NG/ML
PETH COMMENTS: NORMAL
PLATELET # BLD AUTO: 161 K/UL
POTASSIUM SERPL-SCNC: 3.8 MMOL/L
PROT SERPL-MCNC: 7.3 G/DL
PT BLD: 10.6 SEC
RBC # BLD: 4 M/UL
RBC # FLD: 13.2 %
SMOOTH MUSCLE AB SER QL IF: ABNORMAL
SODIUM SERPL-SCNC: 142 MMOL/L
SOLUBLE LIVER IGG SER IA-ACNC: 1.2
TTG IGA SER IA-ACNC: <0.5 U/ML
TTG IGA SER-ACNC: NEGATIVE
TTG IGG SER IA-ACNC: <0.8 U/ML
TTG IGG SER IA-ACNC: NEGATIVE
WBC # FLD AUTO: 7.94 K/UL

## 2025-08-29 LAB
ANTI - GP210 ANTIBODY, IGG: 1.1 UNITS
ANTI - SP100 ANTIBODY, IGG: 2.2 UNITS